# Patient Record
Sex: FEMALE | Race: WHITE | Employment: OTHER | URBAN - METROPOLITAN AREA
[De-identification: names, ages, dates, MRNs, and addresses within clinical notes are randomized per-mention and may not be internally consistent; named-entity substitution may affect disease eponyms.]

---

## 2017-05-04 PROBLEM — R10.12 LEFT UPPER QUADRANT PAIN: Status: ACTIVE | Noted: 2017-05-04

## 2017-05-14 PROBLEM — J18.9 PNEUMONIA OF LEFT LOWER LOBE DUE TO INFECTIOUS ORGANISM: Status: ACTIVE | Noted: 2017-05-14

## 2017-08-28 ENCOUNTER — HOSPITAL ENCOUNTER (OUTPATIENT)
Dept: OTHER | Age: 57
Discharge: HOME | End: 2017-08-28
Attending: SURGERY | Admitting: SURGERY

## 2017-08-28 LAB
ALBUMIN SERPL-MCNC: 3.5 GM/DL (ref 3.4–5)
ALP BLD-CCNC: 68 IU/L (ref 40–128)
ALT SERPL-CCNC: 16 U/L (ref 10–40)
ANION GAP SERPL CALCULATED.3IONS-SCNC: 12 MMOL/L (ref 4–16)
AST SERPL-CCNC: 23 IU/L (ref 15–37)
BASOPHILS ABSOLUTE: 0 K/CU MM
BASOPHILS RELATIVE PERCENT: 0.5 % (ref 0–1)
BILIRUB SERPL-MCNC: 0.2 MG/DL (ref 0–1)
BUN BLDV-MCNC: 24 MG/DL (ref 6–23)
CALCIUM SERPL-MCNC: 8 MG/DL (ref 8.3–10.6)
CHLORIDE BLD-SCNC: 95 MMOL/L (ref 99–110)
CO2: 31 MMOL/L (ref 21–32)
CREAT SERPL-MCNC: 0.7 MG/DL (ref 0.6–1.1)
DIFFERENTIAL TYPE: ABNORMAL
EOSINOPHILS ABSOLUTE: 0 K/CU MM
EOSINOPHILS RELATIVE PERCENT: 0 % (ref 0–3)
GFR AFRICAN AMERICAN: >60 ML/MIN/1.73M2
GFR NON-AFRICAN AMERICAN: >60 ML/MIN/1.73M2
GLUCOSE BLD-MCNC: 73 MG/DL (ref 70–140)
HCT VFR BLD CALC: 24.5 % (ref 37–47)
HEMOGLOBIN: 8 GM/DL (ref 12.5–16)
IMMATURE NEUTROPHIL %: 0.5 % (ref 0–0.43)
LYMPHOCYTES ABSOLUTE: 0.4 K/CU MM
LYMPHOCYTES RELATIVE PERCENT: 20 % (ref 24–44)
MAGNESIUM: 2.3 MG/DL (ref 1.8–2.4)
MCH RBC QN AUTO: 33.2 PG (ref 27–31)
MCHC RBC AUTO-ENTMCNC: 32.7 % (ref 32–36)
MCV RBC AUTO: 101.7 FL (ref 78–100)
MONOCYTES ABSOLUTE: 0.2 K/CU MM
MONOCYTES RELATIVE PERCENT: 8.4 % (ref 0–4)
NUCLEATED RBC %: 0 %
OSMOLALITY: 282 MOS/L (ref 280–300)
PDW BLD-RTO: 14.5 % (ref 11.7–14.9)
PHOSPHORUS: 5 MG/DL (ref 2.5–4.9)
PLATELET # BLD: 94 K/CU MM (ref 140–440)
PMV BLD AUTO: 11.4 FL (ref 7.5–11.1)
POTASSIUM SERPL-SCNC: 3.9 MMOL/L (ref 3.5–5.1)
PREALBUMIN: 18 MG/DL (ref 20–40)
RBC # BLD: 2.41 M/CU MM (ref 4.2–5.4)
SEGMENTED NEUTROPHILS ABSOLUTE COUNT: 1.5 K/CU MM
SEGMENTED NEUTROPHILS RELATIVE PERCENT: 70.6 % (ref 36–66)
SODIUM BLD-SCNC: 138 MMOL/L (ref 135–145)
TOTAL IMMATURE NEUTOROPHIL: 0.01 K/CU MM
TOTAL NUCLEATED RBC: 0 K/CU MM
TOTAL PROTEIN: 5.1 GM/DL (ref 6.4–8.2)
TRANSFERRIN: 244.6 MG/DL (ref 200–360)
TRIGL SERPL-MCNC: 58 MG/DL
WBC # BLD: 2.2 K/CU MM (ref 4–10.5)

## 2017-09-26 ENCOUNTER — HOSPITAL ENCOUNTER (OUTPATIENT)
Dept: OTHER | Age: 57
Discharge: HOME | End: 2017-09-26
Attending: SURGERY | Admitting: SURGERY

## 2017-09-26 LAB
ALBUMIN SERPL-MCNC: 3.8 GM/DL (ref 3.4–5)
ALP BLD-CCNC: 79 IU/L (ref 40–128)
ALT SERPL-CCNC: 10 U/L (ref 10–40)
ANION GAP SERPL CALCULATED.3IONS-SCNC: 11 MMOL/L (ref 4–16)
AST SERPL-CCNC: 13 IU/L (ref 15–37)
BASOPHILS ABSOLUTE: 0 K/CU MM
BASOPHILS RELATIVE PERCENT: 0.3 % (ref 0–1)
BILIRUB SERPL-MCNC: 0.2 MG/DL (ref 0–1)
BUN BLDV-MCNC: 32 MG/DL (ref 6–23)
CALCIUM SERPL-MCNC: 8.1 MG/DL (ref 8.3–10.6)
CHLORIDE BLD-SCNC: 99 MMOL/L (ref 99–110)
CO2: 27 MMOL/L (ref 21–32)
CREAT SERPL-MCNC: 0.8 MG/DL (ref 0.6–1.1)
DIFFERENTIAL TYPE: ABNORMAL
EOSINOPHILS ABSOLUTE: 0 K/CU MM
EOSINOPHILS RELATIVE PERCENT: 0 % (ref 0–3)
GFR AFRICAN AMERICAN: >60 ML/MIN/1.73M2
GFR NON-AFRICAN AMERICAN: >60 ML/MIN/1.73M2
GLUCOSE BLD-MCNC: 89 MG/DL (ref 70–140)
HCT VFR BLD CALC: 26.4 % (ref 37–47)
HEMOGLOBIN: 8.6 GM/DL (ref 12.5–16)
IMMATURE NEUTROPHIL %: 0.3 % (ref 0–0.43)
LYMPHOCYTES ABSOLUTE: 0.5 K/CU MM
LYMPHOCYTES RELATIVE PERCENT: 15 % (ref 24–44)
MAGNESIUM: 2.4 MG/DL (ref 1.8–2.4)
MCH RBC QN AUTO: 32 PG (ref 27–31)
MCHC RBC AUTO-ENTMCNC: 32.6 % (ref 32–36)
MCV RBC AUTO: 98.1 FL (ref 78–100)
MONOCYTES ABSOLUTE: 0.3 K/CU MM
MONOCYTES RELATIVE PERCENT: 7.2 % (ref 0–4)
NUCLEATED RBC %: 0 %
OSMOLALITY: 289 MOS/L (ref 280–300)
PDW BLD-RTO: 14.4 % (ref 11.7–14.9)
PHOSPHORUS: 4.1 MG/DL (ref 2.5–4.9)
PLATELET # BLD: 116 K/CU MM (ref 140–440)
PMV BLD AUTO: 10.9 FL (ref 7.5–11.1)
POTASSIUM SERPL-SCNC: 4.6 MMOL/L (ref 3.5–5.1)
PREALBUMIN: 22 MG/DL (ref 20–40)
RBC # BLD: 2.69 M/CU MM (ref 4.2–5.4)
SEGMENTED NEUTROPHILS ABSOLUTE COUNT: 2.8 K/CU MM
SEGMENTED NEUTROPHILS RELATIVE PERCENT: 77.2 % (ref 36–66)
SODIUM BLD-SCNC: 137 MMOL/L (ref 135–145)
TOTAL IMMATURE NEUTOROPHIL: 0.01 K/CU MM
TOTAL NUCLEATED RBC: 0 K/CU MM
TOTAL PROTEIN: 5.5 GM/DL (ref 6.4–8.2)
TRANSFERRIN: 244.2 MG/DL (ref 200–360)
TRIGL SERPL-MCNC: 50 MG/DL
WBC # BLD: 3.6 K/CU MM (ref 4–10.5)

## 2017-10-04 ENCOUNTER — HOSPITAL ENCOUNTER (OUTPATIENT)
Dept: OTHER | Age: 57
Discharge: HOME | End: 2017-10-04
Attending: SURGERY | Admitting: SURGERY

## 2017-10-04 LAB
ALBUMIN SERPL-MCNC: 3.8 GM/DL (ref 3.4–5)
ALP BLD-CCNC: 85 IU/L (ref 40–128)
ALT SERPL-CCNC: 11 U/L (ref 10–40)
ANION GAP SERPL CALCULATED.3IONS-SCNC: 14 MMOL/L (ref 4–16)
AST SERPL-CCNC: 15 IU/L (ref 15–37)
BASOPHILS ABSOLUTE: 0 K/CU MM
BASOPHILS RELATIVE PERCENT: 0 % (ref 0–1)
BILIRUB SERPL-MCNC: 0.2 MG/DL (ref 0–1)
BUN BLDV-MCNC: 24 MG/DL (ref 6–23)
CALCIUM SERPL-MCNC: 8.8 MG/DL (ref 8.3–10.6)
CHLORIDE BLD-SCNC: 95 MMOL/L (ref 99–110)
CO2: 26 MMOL/L (ref 21–32)
CREAT SERPL-MCNC: 0.9 MG/DL (ref 0.6–1.1)
DIFFERENTIAL TYPE: ABNORMAL
EOSINOPHILS ABSOLUTE: 0 K/CU MM
EOSINOPHILS RELATIVE PERCENT: 0 % (ref 0–3)
GFR AFRICAN AMERICAN: >60 ML/MIN/1.73M2
GFR NON-AFRICAN AMERICAN: >60 ML/MIN/1.73M2
GLUCOSE BLD-MCNC: 122 MG/DL (ref 70–140)
HCT VFR BLD CALC: 27.5 % (ref 37–47)
HEMOGLOBIN: 9.3 GM/DL (ref 12.5–16)
IMMATURE NEUTROPHIL %: 0.4 % (ref 0–0.43)
LYMPHOCYTES ABSOLUTE: 0.4 K/CU MM
LYMPHOCYTES RELATIVE PERCENT: 16.1 % (ref 24–44)
MAGNESIUM: 2.5 MG/DL (ref 1.8–2.4)
MCH RBC QN AUTO: 32.3 PG (ref 27–31)
MCHC RBC AUTO-ENTMCNC: 33.8 % (ref 32–36)
MCV RBC AUTO: 95.5 FL (ref 78–100)
MONOCYTES ABSOLUTE: 0.1 K/CU MM
MONOCYTES RELATIVE PERCENT: 3.7 % (ref 0–4)
NUCLEATED RBC %: 0 %
OSMOLALITY: 289 MOS/L (ref 280–300)
PDW BLD-RTO: 14.6 % (ref 11.7–14.9)
PHOSPHORUS: 3.9 MG/DL (ref 2.5–4.9)
PLATELET # BLD: 120 K/CU MM (ref 140–440)
PMV BLD AUTO: 9.6 FL (ref 7.5–11.1)
POTASSIUM SERPL-SCNC: 4 MMOL/L (ref 3.5–5.1)
RBC # BLD: 2.88 M/CU MM (ref 4.2–5.4)
SEGMENTED NEUTROPHILS ABSOLUTE COUNT: 2.2 K/CU MM
SEGMENTED NEUTROPHILS RELATIVE PERCENT: 79.8 % (ref 36–66)
SODIUM BLD-SCNC: 135 MMOL/L (ref 135–145)
TOTAL IMMATURE NEUTOROPHIL: 0.01 K/CU MM
TOTAL NUCLEATED RBC: 0 K/CU MM
TOTAL PROTEIN: 5.9 GM/DL (ref 6.4–8.2)
TRANSFERRIN: 253.5 MG/DL (ref 200–360)
TRIGL SERPL-MCNC: 88 MG/DL
WBC # BLD: 2.7 K/CU MM (ref 4–10.5)

## 2017-11-09 ENCOUNTER — HOSPITAL ENCOUNTER (OUTPATIENT)
Dept: OTHER | Age: 57
Discharge: HOME | End: 2017-11-09
Attending: SURGERY | Admitting: SURGERY

## 2017-11-09 LAB
ALBUMIN SERPL-MCNC: 4.2 GM/DL (ref 3.4–5)
ALP BLD-CCNC: 87 IU/L (ref 40–129)
ALT SERPL-CCNC: 16 U/L (ref 10–40)
ANION GAP SERPL CALCULATED.3IONS-SCNC: 15 MMOL/L (ref 4–16)
AST SERPL-CCNC: 19 IU/L (ref 15–37)
BASOPHILS ABSOLUTE: 0 K/CU MM
BASOPHILS RELATIVE PERCENT: 0.2 % (ref 0–1)
BILIRUB SERPL-MCNC: 0.4 MG/DL (ref 0–1)
BUN BLDV-MCNC: 17 MG/DL (ref 6–23)
CALCIUM SERPL-MCNC: 8.3 MG/DL (ref 8.3–10.6)
CHLORIDE BLD-SCNC: 98 MMOL/L (ref 99–110)
CO2: 25 MMOL/L (ref 21–32)
CREAT SERPL-MCNC: 0.7 MG/DL (ref 0.6–1.1)
DIFFERENTIAL TYPE: ABNORMAL
EOSINOPHILS ABSOLUTE: 0 K/CU MM
EOSINOPHILS RELATIVE PERCENT: 0 % (ref 0–3)
GFR AFRICAN AMERICAN: >60 ML/MIN/1.73M2
GFR NON-AFRICAN AMERICAN: >60 ML/MIN/1.73M2
GLUCOSE BLD-MCNC: 67 MG/DL (ref 70–140)
HCT VFR BLD CALC: 28.4 % (ref 37–47)
HEMOGLOBIN: 9.1 GM/DL (ref 12.5–16)
IMMATURE NEUTROPHIL %: 0.2 % (ref 0–0.43)
LYMPHOCYTES ABSOLUTE: 0.4 K/CU MM
LYMPHOCYTES RELATIVE PERCENT: 7.3 % (ref 24–44)
MAGNESIUM: 2 MG/DL (ref 1.8–2.4)
MCH RBC QN AUTO: 31.9 PG (ref 27–31)
MCHC RBC AUTO-ENTMCNC: 32 % (ref 32–36)
MCV RBC AUTO: 99.6 FL (ref 78–100)
MONOCYTES ABSOLUTE: 0.3 K/CU MM
MONOCYTES RELATIVE PERCENT: 5.2 % (ref 0–4)
PDW BLD-RTO: 16.8 % (ref 11.7–14.9)
PHOSPHORUS: 4.4 MG/DL (ref 2.5–4.9)
PLATELET # BLD: 109 K/CU MM (ref 140–440)
PMV BLD AUTO: 10.5 FL (ref 7.5–11.1)
POTASSIUM SERPL-SCNC: 4.4 MMOL/L (ref 3.5–5.1)
RBC # BLD: 2.85 M/CU MM (ref 4.2–5.4)
SEGMENTED NEUTROPHILS ABSOLUTE COUNT: 4.6 K/CU MM
SEGMENTED NEUTROPHILS RELATIVE PERCENT: 87.1 % (ref 36–66)
SODIUM BLD-SCNC: 138 MMOL/L (ref 135–145)
TOTAL IMMATURE NEUTOROPHIL: 0.01 K/CU MM
TOTAL PROTEIN: 6.1 GM/DL (ref 6.4–8.2)
WBC # BLD: 5.2 K/CU MM (ref 4–10.5)

## 2017-11-10 LAB
OSMOLALITY: 287 MOS/L (ref 280–300)
TRIGL SERPL-MCNC: 58 MG/DL

## 2017-11-14 ENCOUNTER — HOSPITAL ENCOUNTER (OUTPATIENT)
Dept: OTHER | Age: 57
Discharge: HOME | End: 2017-11-14
Attending: SURGERY | Admitting: SURGERY

## 2017-11-14 LAB
ALBUMIN SERPL-MCNC: 4 GM/DL (ref 3.4–5)
ALP BLD-CCNC: 101 IU/L (ref 40–128)
ALT SERPL-CCNC: 12 U/L (ref 10–40)
ANION GAP SERPL CALCULATED.3IONS-SCNC: 13 MMOL/L (ref 4–16)
AST SERPL-CCNC: 13 IU/L (ref 15–37)
BASOPHILS ABSOLUTE: 0 K/CU MM
BASOPHILS RELATIVE PERCENT: 0.3 % (ref 0–1)
BILIRUB SERPL-MCNC: 0.2 MG/DL (ref 0–1)
BUN BLDV-MCNC: 24 MG/DL (ref 6–23)
CALCIUM SERPL-MCNC: 8.6 MG/DL (ref 8.3–10.6)
CHLORIDE BLD-SCNC: 97 MMOL/L (ref 99–110)
CO2: 29 MMOL/L (ref 21–32)
CREAT SERPL-MCNC: 0.8 MG/DL (ref 0.6–1.1)
DIFFERENTIAL TYPE: ABNORMAL
EOSINOPHILS ABSOLUTE: 0 K/CU MM
EOSINOPHILS RELATIVE PERCENT: 0 % (ref 0–3)
GFR AFRICAN AMERICAN: >60 ML/MIN/1.73M2
GFR NON-AFRICAN AMERICAN: >60 ML/MIN/1.73M2
GLUCOSE BLD-MCNC: 72 MG/DL (ref 70–140)
HCT VFR BLD CALC: 30.8 % (ref 37–47)
HEMOGLOBIN: 9.8 GM/DL (ref 12.5–16)
IMMATURE NEUTROPHIL %: 0.3 % (ref 0–0.43)
LYMPHOCYTES ABSOLUTE: 0.6 K/CU MM
LYMPHOCYTES RELATIVE PERCENT: 17.6 % (ref 24–44)
MAGNESIUM: 2.1 MG/DL (ref 1.8–2.4)
MCH RBC QN AUTO: 31.8 PG (ref 27–31)
MCHC RBC AUTO-ENTMCNC: 31.8 % (ref 32–36)
MCV RBC AUTO: 100 FL (ref 78–100)
MONOCYTES ABSOLUTE: 0.4 K/CU MM
MONOCYTES RELATIVE PERCENT: 11.2 % (ref 0–4)
NUCLEATED RBC %: 0 %
OSMOLALITY: 294 MOS/L (ref 280–300)
PDW BLD-RTO: 16.1 % (ref 11.7–14.9)
PHOSPHORUS: 5.5 MG/DL (ref 2.5–4.9)
PLATELET # BLD: 137 K/CU MM (ref 140–440)
PMV BLD AUTO: 10.5 FL (ref 7.5–11.1)
POTASSIUM SERPL-SCNC: 4.5 MMOL/L (ref 3.5–5.1)
PREALBUMIN: 19 MG/DL (ref 20–40)
PREALBUMIN: 27 MG/DL (ref 20–40)
RBC # BLD: 3.08 M/CU MM (ref 4.2–5.4)
SEGMENTED NEUTROPHILS ABSOLUTE COUNT: 2.4 K/CU MM
SEGMENTED NEUTROPHILS RELATIVE PERCENT: 70.6 % (ref 36–66)
SODIUM BLD-SCNC: 139 MMOL/L (ref 135–145)
TOTAL IMMATURE NEUTOROPHIL: 0.01 K/CU MM
TOTAL NUCLEATED RBC: 0 K/CU MM
TOTAL PROTEIN: 5.9 GM/DL (ref 6.4–8.2)
TRANSFERRIN: 243.6 MG/DL (ref 200–360)
TRANSFERRIN: 253.4 MG/DL (ref 200–360)
TRIGL SERPL-MCNC: 118 MG/DL
WBC # BLD: 3.4 K/CU MM (ref 4–10.5)

## 2018-04-25 ENCOUNTER — TELEPHONE (OUTPATIENT)
Dept: GASTROENTEROLOGY | Age: 58
End: 2018-04-25

## 2018-04-25 PROBLEM — G93.41 ACUTE METABOLIC ENCEPHALOPATHY: Status: ACTIVE | Noted: 2018-04-25

## 2018-12-08 ENCOUNTER — APPOINTMENT (OUTPATIENT)
Dept: GENERAL RADIOLOGY | Age: 58
End: 2018-12-08
Payer: COMMERCIAL

## 2018-12-08 ENCOUNTER — HOSPITAL ENCOUNTER (EMERGENCY)
Age: 58
Discharge: ANOTHER HOSPITAL | End: 2018-12-09
Attending: EMERGENCY MEDICINE
Payer: COMMERCIAL

## 2018-12-08 DIAGNOSIS — Z45.02 AICD DISCHARGE: Primary | ICD-10-CM

## 2018-12-08 DIAGNOSIS — R42 LIGHTHEADEDNESS: ICD-10-CM

## 2018-12-08 DIAGNOSIS — R07.9 CHEST PAIN, UNSPECIFIED TYPE: ICD-10-CM

## 2018-12-08 LAB
BASOPHILS ABSOLUTE: 0 K/CU MM
BASOPHILS RELATIVE PERCENT: 0.7 % (ref 0–1)
DIFFERENTIAL TYPE: ABNORMAL
EOSINOPHILS ABSOLUTE: 0 K/CU MM
EOSINOPHILS RELATIVE PERCENT: 0.4 % (ref 0–3)
HCT VFR BLD CALC: 38.9 % (ref 37–47)
HEMOGLOBIN: 12.2 GM/DL (ref 12.5–16)
IMMATURE NEUTROPHIL %: 0.2 % (ref 0–0.43)
LYMPHOCYTES ABSOLUTE: 1 K/CU MM
LYMPHOCYTES RELATIVE PERCENT: 22.4 % (ref 24–44)
MCH RBC QN AUTO: 33 PG (ref 27–31)
MCHC RBC AUTO-ENTMCNC: 31.4 % (ref 32–36)
MCV RBC AUTO: 105.1 FL (ref 78–100)
MONOCYTES ABSOLUTE: 0.3 K/CU MM
MONOCYTES RELATIVE PERCENT: 6.9 % (ref 0–4)
NUCLEATED RBC %: 0 %
PDW BLD-RTO: 15.2 % (ref 11.7–14.9)
PLATELET # BLD: 487 K/CU MM (ref 140–440)
PMV BLD AUTO: 9.5 FL (ref 7.5–11.1)
RBC # BLD: 3.7 M/CU MM (ref 4.2–5.4)
REASON FOR REJECTION: NORMAL
REJECTED TEST: NORMAL
SEGMENTED NEUTROPHILS ABSOLUTE COUNT: 3.1 K/CU MM
SEGMENTED NEUTROPHILS RELATIVE PERCENT: 69.4 % (ref 36–66)
SOURCE: NORMAL
TOTAL IMMATURE NEUTOROPHIL: 0.01 K/CU MM
TOTAL NUCLEATED RBC: 0 K/CU MM
WBC # BLD: 4.5 K/CU MM (ref 4–10.5)

## 2018-12-08 PROCEDURE — 85025 COMPLETE CBC W/AUTO DIFF WBC: CPT

## 2018-12-08 PROCEDURE — 71045 X-RAY EXAM CHEST 1 VIEW: CPT

## 2018-12-08 PROCEDURE — 84443 ASSAY THYROID STIM HORMONE: CPT

## 2018-12-08 PROCEDURE — 83735 ASSAY OF MAGNESIUM: CPT

## 2018-12-08 PROCEDURE — 80053 COMPREHEN METABOLIC PANEL: CPT

## 2018-12-08 PROCEDURE — 84484 ASSAY OF TROPONIN QUANT: CPT

## 2018-12-08 PROCEDURE — 93005 ELECTROCARDIOGRAM TRACING: CPT | Performed by: EMERGENCY MEDICINE

## 2018-12-08 PROCEDURE — 84100 ASSAY OF PHOSPHORUS: CPT

## 2018-12-08 PROCEDURE — 99285 EMERGENCY DEPT VISIT HI MDM: CPT

## 2018-12-08 ASSESSMENT — PAIN DESCRIPTION - ONSET: ONSET: ON-GOING

## 2018-12-08 ASSESSMENT — PAIN DESCRIPTION - LOCATION: LOCATION: BACK;CHEST

## 2018-12-08 ASSESSMENT — PAIN DESCRIPTION - PAIN TYPE: TYPE: ACUTE PAIN

## 2018-12-08 ASSESSMENT — PAIN DESCRIPTION - ORIENTATION: ORIENTATION: LEFT

## 2018-12-08 ASSESSMENT — PAIN DESCRIPTION - FREQUENCY: FREQUENCY: CONTINUOUS

## 2018-12-08 ASSESSMENT — PAIN DESCRIPTION - PROGRESSION: CLINICAL_PROGRESSION: NOT CHANGED

## 2018-12-08 ASSESSMENT — PAIN DESCRIPTION - DESCRIPTORS: DESCRIPTORS: ACHING;DISCOMFORT;STABBING

## 2018-12-08 ASSESSMENT — PAIN SCALES - GENERAL: PAINLEVEL_OUTOF10: 8

## 2018-12-09 VITALS
SYSTOLIC BLOOD PRESSURE: 123 MMHG | DIASTOLIC BLOOD PRESSURE: 92 MMHG | OXYGEN SATURATION: 100 % | RESPIRATION RATE: 14 BRPM | HEART RATE: 79 BPM | WEIGHT: 123 LBS | TEMPERATURE: 98.5 F | BODY MASS INDEX: 19.77 KG/M2 | HEIGHT: 66 IN

## 2018-12-09 LAB
ALBUMIN SERPL-MCNC: 4.2 GM/DL (ref 3.4–5)
ALP BLD-CCNC: 92 IU/L (ref 40–129)
ALT SERPL-CCNC: 15 U/L (ref 10–40)
ANION GAP SERPL CALCULATED.3IONS-SCNC: 14 MMOL/L (ref 4–16)
AST SERPL-CCNC: 18 IU/L (ref 15–37)
BILIRUB SERPL-MCNC: 0.2 MG/DL (ref 0–1)
BUN BLDV-MCNC: 13 MG/DL (ref 6–23)
CALCIUM SERPL-MCNC: 9.4 MG/DL (ref 8.3–10.6)
CHLORIDE BLD-SCNC: 95 MMOL/L (ref 99–110)
CO2: 27 MMOL/L (ref 21–32)
CREAT SERPL-MCNC: 0.7 MG/DL (ref 0.6–1.1)
GFR AFRICAN AMERICAN: >60 ML/MIN/1.73M2
GFR NON-AFRICAN AMERICAN: >60 ML/MIN/1.73M2
GLUCOSE BLD-MCNC: 85 MG/DL (ref 70–99)
MAGNESIUM: 1.8 MG/DL (ref 1.8–2.4)
PHOSPHORUS: 4 MG/DL (ref 2.5–4.9)
POTASSIUM SERPL-SCNC: 3.2 MMOL/L (ref 3.5–5.1)
SODIUM BLD-SCNC: 136 MMOL/L (ref 135–145)
TOTAL PROTEIN: 7.6 GM/DL (ref 6.4–8.2)
TROPONIN T: 0.01 NG/ML
TSH HIGH SENSITIVITY: 2.49 UIU/ML (ref 0.27–4.2)

## 2018-12-09 PROCEDURE — 93010 ELECTROCARDIOGRAM REPORT: CPT | Performed by: INTERNAL MEDICINE

## 2018-12-09 PROCEDURE — 6370000000 HC RX 637 (ALT 250 FOR IP): Performed by: PHYSICIAN ASSISTANT

## 2018-12-09 PROCEDURE — 6370000000 HC RX 637 (ALT 250 FOR IP): Performed by: EMERGENCY MEDICINE

## 2018-12-09 RX ORDER — OXYCODONE HYDROCHLORIDE 5 MG/1
15 TABLET ORAL ONCE
Status: COMPLETED | OUTPATIENT
Start: 2018-12-09 | End: 2018-12-09

## 2018-12-09 RX ORDER — OXYCODONE HYDROCHLORIDE 5 MG/1
TABLET ORAL
Status: DISCONTINUED
Start: 2018-12-09 | End: 2018-12-09 | Stop reason: HOSPADM

## 2018-12-09 RX ORDER — OXYCODONE HYDROCHLORIDE 5 MG/1
15 TABLET ORAL ONCE
Status: DISCONTINUED | OUTPATIENT
Start: 2018-12-09 | End: 2019-02-04

## 2018-12-09 RX ORDER — POTASSIUM CHLORIDE 20 MEQ/1
40 TABLET, EXTENDED RELEASE ORAL ONCE
Status: COMPLETED | OUTPATIENT
Start: 2018-12-09 | End: 2018-12-09

## 2018-12-09 RX ADMIN — OXYCODONE HYDROCHLORIDE 15 MG: 5 TABLET ORAL at 00:36

## 2018-12-09 RX ADMIN — POTASSIUM CHLORIDE 40 MEQ: 20 TABLET, EXTENDED RELEASE ORAL at 00:38

## 2018-12-09 ASSESSMENT — PAIN SCALES - GENERAL: PAINLEVEL_OUTOF10: 8

## 2018-12-09 NOTE — ED PROVIDER NOTES
normal sinus rhythm with occasional PVCs. There is no ST elevation or depression. Labs are obtained and are significant for mild hypokalemia, mildly elevated troponin, and a mildly decreased hemoglobin no other clinically significant lab abnormalities. Chest x-rays negative. Patient was treated with oxycodone and 40 mEq of oral potassium chloride with improvement of pain. We attempted to interrogate her AICD, but her device is not compatible with her software. The nurses attempting to reach the representative for [employer] in order to have the device interrogated. At this time, care will be turned over to the [facility] as it is the end of my shift. He is to follow-up interrogation results and disposition the patient accordingly. Dr. Sushant Shepard is available in the emergency Department should she be needed. All diagnostic, treatment, and disposition decisions were made by myself in conjunction with the advanced practice provider. For all further details of the patient's emergency department visit, please see the advanced practice provider's documentation. Comment: Please note this report has been produced using speech recognition software and may contain errors related to that system including errors in grammar, punctuation, and spelling, as well as words and phrases that may be inappropriate. If there are any questions or concerns please feel free to contact the dictating provider for clarification.         Juaquin Dewitt MD  12/13/18 [id number]

## 2018-12-09 NOTE — ED PROVIDER NOTES
([facility] [address]     Spleen enlarged     Thyroid disease      Past Surgical History:   Procedure Laterality Date    ABDOMEN SURGERY      APPENDECTOMY      CARDIAC DEFIBRILLATOR PLACEMENT      CHOLECYSTECTOMY      COLONOSCOPY      CORONARY ANGIOPLASTY WITH STENT PLACEMENT      3 stents    ENDOSCOPY, COLON, DIAGNOSTIC      GASTRIC BYPASS SURGERY      HERNIA REPAIR      HIP SURGERY      HYSTERECTOMY      JOINT REPLACEMENT      SKIN BIOPSY      TONSILLECTOMY      VASCULAR SURGERY       Family History   Problem Relation Age of Onset    High Blood Pressure Mother     High Cholesterol Mother     Heart Disease Mother     Diabetes Mother     Heart Disease Father     Cancer Father     Other Sister         Multiple Sclerosis    Heart Disease Maternal Grandmother     High Blood Pressure Maternal Grandmother     High Cholesterol Maternal Grandmother      Social History     Social History    Marital status: Single     Spouse name: N/A    Number of children: N/A    Years of education: N/A     Occupational History    Not on file.      Social History Main Topics    Smoking status: Never Smoker    Smokeless tobacco: Never Used    Alcohol use No    Drug use: No    Sexual activity: Not Currently     Other Topics Concern    Not on file     Social History Narrative    No narrative on file     Current Facility-Administered Medications   Medication Dose Route Frequency Provider Last Rate Last Dose    oxyCODONE (ROXICODONE) immediate release tablet 15 mg  15 mg Oral Once Dennis Sanford MD         Current Outpatient Prescriptions   Medication Sig Dispense Refill    guaiFENesin (ROBITUSSIN) 100 MG/5ML syrup Take 30 mLs by mouth 4 times daily as needed for Cough      albuterol (PROVENTIL) (2.5 MG/3ML) 0.083% nebulizer solution Take 2.5 mg by nebulization every 4 hours as needed for Wheezing      sotalol (BETAPACE) 80 MG tablet Take 80 mg by mouth 2 times daily      insulin regular (HUMULIN R;NOVOLIN R) 100 Absolute 3.1 K/CU MM    Lymphocytes # 1.0 K/CU MM    Monocytes # 0.3 K/CU MM    Eosinophils # 0.0 K/CU MM    Basophils # 0.0 K/CU MM    Nucleated RBC % 0.0 %    Total Nucleated RBC 0.0 K/CU MM    Total Immature Neutrophil 0.01 K/CU MM    Immature Neutrophil % 0.2 0 - 0.43 %   SPECIMEN REJECTION   Result Value Ref Range    Source BLOOD     Rejected Test CMPR,MG,PHOS,TSHS     Reason for Rejection UNABLE TO PERFORM TESTING:    Comprehensive Metabolic Panel   Result Value Ref Range    Sodium 136 135 - 145 MMOL/L    Potassium 3.2 (L) 3.5 - 5.1 MMOL/L    Chloride 95 (L) 99 - 110 mMol/L    CO2 27 21 - 32 MMOL/L    BUN 13 6 - 23 MG/DL    CREATININE 0.7 0.6 - 1.1 MG/DL    Glucose 85 70 - 99 MG/DL    Calcium 9.4 8.3 - 10.6 MG/DL    Alb 4.2 3.4 - 5.0 GM/DL    Total Protein 7.6 6.4 - 8.2 GM/DL    Total Bilirubin 0.2 0.0 - 1.0 MG/DL    ALT 15 10 - 40 U/L    AST 18 15 - 37 IU/L    Alkaline Phosphatase 92 40 - 129 IU/L    GFR Non-African American >60 >60 mL/min/1.73m2    GFR African American >60 >60 mL/min/1.73m2    Anion Gap 14 4 - 16   Magnesium   Result Value Ref Range    Magnesium 1.8 1.8 - 2.4 mg/dl   Phosphorus   Result Value Ref Range    Phosphorus 4.0 2.5 - 4.9 MG/DL   TSH without Reflex   Result Value Ref Range    TSH, High Sensitivity 2.490 0.270 - 4.20 uIu/ml   Troponin   Result Value Ref Range    Troponin T 0.012 (H) <0.01 NG/ML   EKG 12 Lead   Result Value Ref Range    Ventricular Rate 67 BPM    Atrial Rate 67 BPM    P-R Interval 156 ms    QRS Duration 98 ms    Q-T Interval 438 ms    QTc Calculation (Bazett) 462 ms    P Axis 17 degrees    R Axis -43 degrees    T Axis -17 degrees    Diagnosis       Sinus rhythm with occasional premature ventricular complexes  Possible Left atrial enlargement  Left axis deviation  Inferior infarct (cited on or before 12-FEB-2017)  Abnormal ECG  When compared with ECG of 25-APR-2018 17:37,  premature ventricular complexes are now present  Minimal criteria for Anterior infarct are no

## 2018-12-12 LAB
EKG ATRIAL RATE: 67 BPM
EKG DIAGNOSIS: NORMAL
EKG P AXIS: 17 DEGREES
EKG P-R INTERVAL: 156 MS
EKG Q-T INTERVAL: 438 MS
EKG QRS DURATION: 98 MS
EKG QTC CALCULATION (BAZETT): 462 MS
EKG R AXIS: -43 DEGREES
EKG T AXIS: -17 DEGREES
EKG VENTRICULAR RATE: 67 BPM

## 2018-12-19 ENCOUNTER — HOSPITAL ENCOUNTER (EMERGENCY)
Age: 58
Discharge: ANOTHER HOSPITAL | End: 2018-12-20
Attending: EMERGENCY MEDICINE
Payer: COMMERCIAL

## 2018-12-19 ENCOUNTER — APPOINTMENT (OUTPATIENT)
Dept: GENERAL RADIOLOGY | Age: 58
End: 2018-12-19
Payer: COMMERCIAL

## 2018-12-19 DIAGNOSIS — Z45.02 DEFIBRILLATOR DISCHARGE: ICD-10-CM

## 2018-12-19 DIAGNOSIS — I47.20 V-TACH: Primary | ICD-10-CM

## 2018-12-19 LAB
ALBUMIN SERPL-MCNC: 3.4 GM/DL (ref 3.4–5)
ALP BLD-CCNC: 77 IU/L (ref 40–129)
ALT SERPL-CCNC: 13 U/L (ref 10–40)
ANION GAP SERPL CALCULATED.3IONS-SCNC: 17 MMOL/L (ref 4–16)
AST SERPL-CCNC: 14 IU/L (ref 15–37)
BASOPHILS ABSOLUTE: 0.1 K/CU MM
BASOPHILS RELATIVE PERCENT: 1 % (ref 0–1)
BILIRUB SERPL-MCNC: 0.1 MG/DL (ref 0–1)
BUN BLDV-MCNC: 9 MG/DL (ref 6–23)
CALCIUM SERPL-MCNC: 8.4 MG/DL (ref 8.3–10.6)
CHLORIDE BLD-SCNC: 98 MMOL/L (ref 99–110)
CO2: 24 MMOL/L (ref 21–32)
CREAT SERPL-MCNC: 0.5 MG/DL (ref 0.6–1.1)
DIFFERENTIAL TYPE: ABNORMAL
EOSINOPHILS ABSOLUTE: 0 K/CU MM
EOSINOPHILS RELATIVE PERCENT: 0.4 % (ref 0–3)
GFR AFRICAN AMERICAN: >60 ML/MIN/1.73M2
GFR NON-AFRICAN AMERICAN: >60 ML/MIN/1.73M2
GLUCOSE BLD-MCNC: 165 MG/DL (ref 70–99)
HCT VFR BLD CALC: 36.4 % (ref 37–47)
HEMOGLOBIN: 11 GM/DL (ref 12.5–16)
IMMATURE NEUTROPHIL %: 0.4 % (ref 0–0.43)
INR BLD: 1.07 INDEX
LYMPHOCYTES ABSOLUTE: 1.3 K/CU MM
LYMPHOCYTES RELATIVE PERCENT: 25.6 % (ref 24–44)
MAGNESIUM: 1.8 MG/DL (ref 1.8–2.4)
MCH RBC QN AUTO: 32.8 PG (ref 27–31)
MCHC RBC AUTO-ENTMCNC: 30.2 % (ref 32–36)
MCV RBC AUTO: 108.7 FL (ref 78–100)
MONOCYTES ABSOLUTE: 0.6 K/CU MM
MONOCYTES RELATIVE PERCENT: 10.6 % (ref 0–4)
NUCLEATED RBC %: 0 %
PDW BLD-RTO: 14.9 % (ref 11.7–14.9)
PLATELET # BLD: 463 K/CU MM (ref 140–440)
PMV BLD AUTO: 8.9 FL (ref 7.5–11.1)
POTASSIUM SERPL-SCNC: 3.1 MMOL/L (ref 3.5–5.1)
PRO-BNP: 748.1 PG/ML
PROTHROMBIN TIME: 12.2 SECONDS (ref 9.12–12.5)
RBC # BLD: 3.35 M/CU MM (ref 4.2–5.4)
SEGMENTED NEUTROPHILS ABSOLUTE COUNT: 3.2 K/CU MM
SEGMENTED NEUTROPHILS RELATIVE PERCENT: 62 % (ref 36–66)
SODIUM BLD-SCNC: 139 MMOL/L (ref 135–145)
TOTAL IMMATURE NEUTOROPHIL: 0.02 K/CU MM
TOTAL NUCLEATED RBC: 0 K/CU MM
TOTAL PROTEIN: 6.1 GM/DL (ref 6.4–8.2)
TROPONIN T: <0.01 NG/ML
TSH HIGH SENSITIVITY: 8.65 UIU/ML (ref 0.27–4.2)
WBC # BLD: 5.2 K/CU MM (ref 4–10.5)

## 2018-12-19 PROCEDURE — 6370000000 HC RX 637 (ALT 250 FOR IP): Performed by: EMERGENCY MEDICINE

## 2018-12-19 PROCEDURE — 93005 ELECTROCARDIOGRAM TRACING: CPT | Performed by: EMERGENCY MEDICINE

## 2018-12-19 PROCEDURE — 84443 ASSAY THYROID STIM HORMONE: CPT

## 2018-12-19 PROCEDURE — 85610 PROTHROMBIN TIME: CPT

## 2018-12-19 PROCEDURE — 71045 X-RAY EXAM CHEST 1 VIEW: CPT

## 2018-12-19 PROCEDURE — 85025 COMPLETE CBC W/AUTO DIFF WBC: CPT

## 2018-12-19 PROCEDURE — 36415 COLL VENOUS BLD VENIPUNCTURE: CPT

## 2018-12-19 PROCEDURE — 83880 ASSAY OF NATRIURETIC PEPTIDE: CPT

## 2018-12-19 PROCEDURE — 80053 COMPREHEN METABOLIC PANEL: CPT

## 2018-12-19 PROCEDURE — 83735 ASSAY OF MAGNESIUM: CPT

## 2018-12-19 PROCEDURE — 99285 EMERGENCY DEPT VISIT HI MDM: CPT

## 2018-12-19 PROCEDURE — 96374 THER/PROPH/DIAG INJ IV PUSH: CPT

## 2018-12-19 PROCEDURE — 84439 ASSAY OF FREE THYROXINE: CPT

## 2018-12-19 PROCEDURE — 84484 ASSAY OF TROPONIN QUANT: CPT

## 2018-12-19 RX ORDER — OXYCODONE HYDROCHLORIDE 5 MG/1
5 TABLET ORAL ONCE
Status: COMPLETED | OUTPATIENT
Start: 2018-12-19 | End: 2018-12-19

## 2018-12-19 RX ORDER — OXYCODONE HYDROCHLORIDE 5 MG/1
10 TABLET ORAL ONCE
Status: COMPLETED | OUTPATIENT
Start: 2018-12-20 | End: 2018-12-19

## 2018-12-19 RX ADMIN — OXYCODONE HYDROCHLORIDE 5 MG: 5 TABLET ORAL at 23:00

## 2018-12-19 RX ADMIN — OXYCODONE HYDROCHLORIDE 10 MG: 5 TABLET ORAL at 23:52

## 2018-12-19 ASSESSMENT — ENCOUNTER SYMPTOMS
SORE THROAT: 0
COUGH: 0
RHINORRHEA: 0
EYE REDNESS: 0
NAUSEA: 0
VOMITING: 0
BACK PAIN: 0
SHORTNESS OF BREATH: 0
ABDOMINAL PAIN: 0

## 2018-12-19 ASSESSMENT — PAIN DESCRIPTION - LOCATION: LOCATION: CHEST

## 2018-12-19 ASSESSMENT — PAIN DESCRIPTION - PAIN TYPE: TYPE: ACUTE PAIN

## 2018-12-19 ASSESSMENT — PAIN SCALES - GENERAL
PAINLEVEL_OUTOF10: 10
PAINLEVEL_OUTOF10: 9

## 2018-12-20 VITALS
DIASTOLIC BLOOD PRESSURE: 70 MMHG | HEIGHT: 66 IN | BODY MASS INDEX: 18.48 KG/M2 | RESPIRATION RATE: 16 BRPM | OXYGEN SATURATION: 100 % | SYSTOLIC BLOOD PRESSURE: 96 MMHG | HEART RATE: 78 BPM | WEIGHT: 115 LBS | TEMPERATURE: 97.7 F

## 2018-12-20 LAB — T4 FREE: 0.76 NG/DL (ref 0.9–1.8)

## 2018-12-20 PROCEDURE — 6370000000 HC RX 637 (ALT 250 FOR IP): Performed by: EMERGENCY MEDICINE

## 2018-12-20 PROCEDURE — 6360000002 HC RX W HCPCS: Performed by: EMERGENCY MEDICINE

## 2018-12-20 RX ORDER — MAGNESIUM SULFATE IN WATER 40 MG/ML
2 INJECTION, SOLUTION INTRAVENOUS ONCE
Status: COMPLETED | OUTPATIENT
Start: 2018-12-20 | End: 2018-12-20

## 2018-12-20 RX ORDER — POTASSIUM CHLORIDE 20 MEQ/1
40 TABLET, EXTENDED RELEASE ORAL PRN
Status: DISCONTINUED | OUTPATIENT
Start: 2018-12-20 | End: 2018-12-20 | Stop reason: HOSPADM

## 2018-12-20 RX ORDER — POTASSIUM CHLORIDE 7.45 MG/ML
10 INJECTION INTRAVENOUS PRN
Status: DISCONTINUED | OUTPATIENT
Start: 2018-12-20 | End: 2018-12-20 | Stop reason: HOSPADM

## 2018-12-20 RX ORDER — POTASSIUM CHLORIDE 20MEQ/15ML
40 LIQUID (ML) ORAL PRN
Status: DISCONTINUED | OUTPATIENT
Start: 2018-12-20 | End: 2018-12-20 | Stop reason: HOSPADM

## 2018-12-20 RX ADMIN — MAGNESIUM SULFATE HEPTAHYDRATE 2 G: 40 INJECTION, SOLUTION INTRAVENOUS at 01:51

## 2018-12-20 RX ADMIN — POTASSIUM CHLORIDE 40 MEQ: 20 TABLET, EXTENDED RELEASE ORAL at 01:49

## 2018-12-20 ASSESSMENT — PAIN SCALES - GENERAL: PAINLEVEL_OUTOF10: 8

## 2018-12-20 ASSESSMENT — PAIN DESCRIPTION - PAIN TYPE: TYPE: ACUTE PAIN

## 2018-12-20 ASSESSMENT — PAIN DESCRIPTION - LOCATION: LOCATION: CHEST

## 2018-12-20 NOTE — ED NOTES
Pt states she recently began taking a med for her heart that she is unsure of name.       Mark Estes RN  12/19/18 1480

## 2018-12-20 NOTE — ED PROVIDER NOTES
Triage Chief Complaint:   Loss of Consciousness (Defib Fired) and Chest Pain    [place]:  Cara Francisco is a 63 y.o. female that presents after her defibrillator firing she was at dinner. She states she felt lightheaded prior to the firing and then she lost consciousness after it fired for a brief second. She now has pain at the location of the defibrillator. She denies any shortness of breath. No current lightheadedness or dizziness. No lower extremity swelling. No fevers. Her defibrillator fired last week and she had an episode of syncope. She was sent to have an episode of V. tach which caused firing. She was started on Mexiletine at [facility] by her cardiologist.     ROS:   Review of Systems   Constitutional: Negative for chills and fever. HENT: Negative for congestion, rhinorrhea and sore throat. Eyes: Negative for redness and visual disturbance. Respiratory: Negative for cough and shortness of breath. Cardiovascular: Positive for chest pain. Negative for leg swelling. Gastrointestinal: Negative for abdominal pain, nausea and vomiting. Genitourinary: Negative for dysuria and frequency. Musculoskeletal: Negative for arthralgias and back pain. Skin: Negative for rash and wound. Neurological: Positive for syncope and light-headedness. Negative for weakness and headaches. Psychiatric/Behavioral: Negative. Negative for hallucinations and suicidal ideas. Past Medical History:   Diagnosis Date    Acute delirium     Arrhythmia     Arthritis     Asthma     CAD (coronary artery disease)     Cardiomyopathy ([address]     Cerebral artery occlusion with cerebral infarction ([address]     CHF (congestive heart failure) (HCC)     Enlarged liver     GERD (gastroesophageal reflux disease)     H/O echocardiogram 4/6/16    EF 55%, trivial TR & MR, stage 1 diastolic dysfunction    History of blood transfusion     Hx of cardiovascular stress test 12/29/2015    Alessia: EF 45%.  Pharmacologic stress myocardial perfusion scan shows a fixed inferior-lateral defect w/ no inducible ischemia. No evidence of ischemia. Inferior-lateral infarction. Normal LVSF    Hyperlipidemia     Hypertension     Lymph[facility] ([facility] [address]     MI, old     Movement disorder     MS (multiple sclerosis) ([facility] [address]     OP (osteoporosis)     PE (pulmonary embolism)     trapese filter    Pneumonia     Seizures ([facility] [address]     Spleen enlarged     Thyroid disease      Past Surgical History:   Procedure Laterality Date    ABDOMEN SURGERY      APPENDECTOMY      CARDIAC DEFIBRILLATOR PLACEMENT      CHOLECYSTECTOMY      COLONOSCOPY      CORONARY ANGIOPLASTY WITH STENT PLACEMENT      3 stents    ENDOSCOPY, COLON, DIAGNOSTIC      GASTRIC BYPASS SURGERY      HERNIA REPAIR      HIP SURGERY      HYSTERECTOMY      JOINT REPLACEMENT      SKIN BIOPSY      TONSILLECTOMY      VASCULAR SURGERY       Family History   Problem Relation Age of Onset    High Blood Pressure Mother     High Cholesterol Mother     Heart Disease Mother     Diabetes Mother     Heart Disease Father     Cancer Father     Other Sister         Multiple Sclerosis    Heart Disease Maternal Grandmother     High Blood Pressure Maternal Grandmother     High Cholesterol Maternal Grandmother      Social History     Social History    Marital status: Single     Spouse name: N/A    Number of children: N/A    Years of education: N/A     Occupational History    Not on file.      Social History Main Topics    Smoking status: Never Smoker    Smokeless tobacco: Never Used    Alcohol use No    Drug use: No    Sexual activity: Not Currently     Other Topics Concern    Not on file     Social History Narrative    No narrative on file     Current Facility-Administered Medications   Medication Dose Route Frequency Provider Last Rate Last Dose    potassium chloride (KLOR-CON M) extended release tablet 40 mEq  40 mEq Oral PRN Nithin Nava MD        Or    potassium chloride 20 Duration 114 ms    Q-T Interval 562 ms    QTc Calculation (Bazett) 635 ms    P Axis 19 degrees    R Axis -38 degrees    T Axis 6 degrees    Diagnosis       Sinus rhythm with occasional premature ventricular complexes  Left axis deviation  Inferior infarct (cited on or before 12-FEB-2017)  Anterolateral infarct , age undetermined  Abnormal ECG  When compared with ECG of 08-DEC-2018 23:09,  Anterior infarct is now present  Anterolateral infarct is now present  Nonspecific T wave abnormality now evident in Anterior leads  QT has lengthened        Radiographs (if obtained):  [] The following radiograph was interpreted by myself in the absence of a radiologist:  [x]Radiologist's Report Reviewed:  XR CHEST PORTABLE   Preliminary Result   No significant interval change since December 8, 2018. No radiographic   evidence of acute cardiopulmonary process. EKG (if obtained): (All EKG's are interpreted by myself in the absence of a cardiologist)  Sinus rhythm with occasional PVCs. Rate of 77. SC interval 156, , QTc 635. No ST elevations or depressions. Nonspecific T waves. Left axis deviation. Q waves in the inferior and anterior lateral leads. Impression: Abnormal EKG. Compared to previous EKG from 12/8/18, there are no significant changes. MDM:  Differential diagnoses considered include V tach, ventricular fibrillation, atrial arrhythmia,  electrolyte abnormality. EKG is not concerning for acute ischemia. Basic labs were obtained and shows a decreased potassium and magnesium level but are otherwise unremarkable. Her troponin level is normal.  Her TSH level is slightly elevated and free T4 level is slightly decreased. The TSH level is improved from previous. [employer] came in and interrogated the patient's defibrillator. It showed that it shocked her out of an episode of V. tach. Left for 45 seconds at the time of the shock.   Given this I spoke with cardiology at [facility] at

## 2018-12-21 LAB
EKG ATRIAL RATE: 77 BPM
EKG DIAGNOSIS: NORMAL
EKG P AXIS: 19 DEGREES
EKG P-R INTERVAL: 156 MS
EKG Q-T INTERVAL: 562 MS
EKG QRS DURATION: 114 MS
EKG QTC CALCULATION (BAZETT): 635 MS
EKG R AXIS: -38 DEGREES
EKG T AXIS: 6 DEGREES
EKG VENTRICULAR RATE: 77 BPM

## 2018-12-21 PROCEDURE — 93010 ELECTROCARDIOGRAM REPORT: CPT | Performed by: INTERNAL MEDICINE

## 2019-01-25 ENCOUNTER — APPOINTMENT (OUTPATIENT)
Dept: CT IMAGING | Age: 59
DRG: 871 | End: 2019-01-25
Payer: COMMERCIAL

## 2019-01-25 ENCOUNTER — APPOINTMENT (OUTPATIENT)
Dept: GENERAL RADIOLOGY | Age: 59
DRG: 871 | End: 2019-01-25
Payer: COMMERCIAL

## 2019-01-25 ENCOUNTER — HOSPITAL ENCOUNTER (INPATIENT)
Age: 59
LOS: 7 days | Discharge: HOME HEALTH | DRG: 871 | End: 2019-02-02
Attending: EMERGENCY MEDICINE
Payer: COMMERCIAL

## 2019-01-25 DIAGNOSIS — R10.9 FLANK PAIN: ICD-10-CM

## 2019-01-25 DIAGNOSIS — M53.3 SACRAL PAIN: ICD-10-CM

## 2019-01-25 DIAGNOSIS — R06.00 DYSPNEA, UNSPECIFIED TYPE: Primary | ICD-10-CM

## 2019-01-25 DIAGNOSIS — J18.9 PNEUMONIA DUE TO ORGANISM: ICD-10-CM

## 2019-01-25 DIAGNOSIS — L03.90 CELLULITIS, UNSPECIFIED CELLULITIS SITE: ICD-10-CM

## 2019-01-25 LAB
ALBUMIN SERPL-MCNC: 3.1 GM/DL (ref 3.4–5)
ALP BLD-CCNC: 90 IU/L (ref 40–129)
ALT SERPL-CCNC: 8 U/L (ref 10–40)
ANION GAP SERPL CALCULATED.3IONS-SCNC: 16 MMOL/L (ref 4–16)
AST SERPL-CCNC: 11 IU/L (ref 15–37)
BASOPHILS ABSOLUTE: 0 K/CU MM
BASOPHILS RELATIVE PERCENT: 0.8 % (ref 0–1)
BILIRUB SERPL-MCNC: 0.2 MG/DL (ref 0–1)
BUN BLDV-MCNC: 13 MG/DL (ref 6–23)
CALCIUM SERPL-MCNC: 8.6 MG/DL (ref 8.3–10.6)
CHLORIDE BLD-SCNC: 103 MMOL/L (ref 99–110)
CO2: 19 MMOL/L (ref 21–32)
CREAT SERPL-MCNC: 0.5 MG/DL (ref 0.6–1.1)
DIFFERENTIAL TYPE: ABNORMAL
EOSINOPHILS ABSOLUTE: 0 K/CU MM
EOSINOPHILS RELATIVE PERCENT: 0.2 % (ref 0–3)
GFR AFRICAN AMERICAN: >60 ML/MIN/1.73M2
GFR NON-AFRICAN AMERICAN: >60 ML/MIN/1.73M2
GLUCOSE BLD-MCNC: 81 MG/DL (ref 70–99)
GONADOTROPIN, CHORIONIC (HCG) QUANT: <0.5 UIU/ML
HCT VFR BLD CALC: 38.7 % (ref 37–47)
HEMOGLOBIN: 12.1 GM/DL (ref 12.5–16)
IMMATURE NEUTROPHIL %: 0.2 % (ref 0–0.43)
LIPASE: 14 IU/L (ref 13–60)
LYMPHOCYTES ABSOLUTE: 1.1 K/CU MM
LYMPHOCYTES RELATIVE PERCENT: 22 % (ref 24–44)
MCH RBC QN AUTO: 32.2 PG (ref 27–31)
MCHC RBC AUTO-ENTMCNC: 31.3 % (ref 32–36)
MCV RBC AUTO: 102.9 FL (ref 78–100)
MONOCYTES ABSOLUTE: 0.5 K/CU MM
MONOCYTES RELATIVE PERCENT: 9.6 % (ref 0–4)
NUCLEATED RBC %: 0 %
PDW BLD-RTO: 14.7 % (ref 11.7–14.9)
PLATELET # BLD: 401 K/CU MM (ref 140–440)
PMV BLD AUTO: 9.4 FL (ref 7.5–11.1)
POTASSIUM SERPL-SCNC: 3.7 MMOL/L (ref 3.5–5.1)
PRO-BNP: 2249 PG/ML
RBC # BLD: 3.76 M/CU MM (ref 4.2–5.4)
SEGMENTED NEUTROPHILS ABSOLUTE COUNT: 3.5 K/CU MM
SEGMENTED NEUTROPHILS RELATIVE PERCENT: 67.2 % (ref 36–66)
SODIUM BLD-SCNC: 138 MMOL/L (ref 135–145)
TOTAL CK: 23 IU/L (ref 26–140)
TOTAL IMMATURE NEUTOROPHIL: 0.01 K/CU MM
TOTAL NUCLEATED RBC: 0 K/CU MM
TOTAL PROTEIN: 6 GM/DL (ref 6.4–8.2)
TROPONIN T: <0.01 NG/ML
WBC # BLD: 5.2 K/CU MM (ref 4–10.5)

## 2019-01-25 PROCEDURE — 85025 COMPLETE CBC W/AUTO DIFF WBC: CPT

## 2019-01-25 PROCEDURE — 71045 X-RAY EXAM CHEST 1 VIEW: CPT

## 2019-01-25 PROCEDURE — 93005 ELECTROCARDIOGRAM TRACING: CPT | Performed by: EMERGENCY MEDICINE

## 2019-01-25 PROCEDURE — 2580000003 HC RX 258: Performed by: EMERGENCY MEDICINE

## 2019-01-25 PROCEDURE — 84484 ASSAY OF TROPONIN QUANT: CPT

## 2019-01-25 PROCEDURE — 83690 ASSAY OF LIPASE: CPT

## 2019-01-25 PROCEDURE — 80053 COMPREHEN METABOLIC PANEL: CPT

## 2019-01-25 PROCEDURE — 6370000000 HC RX 637 (ALT 250 FOR IP): Performed by: EMERGENCY MEDICINE

## 2019-01-25 PROCEDURE — 96375 TX/PRO/DX INJ NEW DRUG ADDON: CPT

## 2019-01-25 PROCEDURE — 71275 CT ANGIOGRAPHY CHEST: CPT

## 2019-01-25 PROCEDURE — 74176 CT ABD & PELVIS W/O CONTRAST: CPT

## 2019-01-25 PROCEDURE — 6360000002 HC RX W HCPCS: Performed by: EMERGENCY MEDICINE

## 2019-01-25 PROCEDURE — 96365 THER/PROPH/DIAG IV INF INIT: CPT

## 2019-01-25 PROCEDURE — 84702 CHORIONIC GONADOTROPIN TEST: CPT

## 2019-01-25 PROCEDURE — 99285 EMERGENCY DEPT VISIT HI MDM: CPT

## 2019-01-25 PROCEDURE — 83880 ASSAY OF NATRIURETIC PEPTIDE: CPT

## 2019-01-25 PROCEDURE — 36415 COLL VENOUS BLD VENIPUNCTURE: CPT

## 2019-01-25 PROCEDURE — 82550 ASSAY OF CK (CPK): CPT

## 2019-01-25 PROCEDURE — 6360000004 HC RX CONTRAST MEDICATION: Performed by: EMERGENCY MEDICINE

## 2019-01-25 PROCEDURE — 96367 TX/PROPH/DG ADDL SEQ IV INF: CPT

## 2019-01-25 RX ORDER — MORPHINE SULFATE 4 MG/ML
4 INJECTION, SOLUTION INTRAMUSCULAR; INTRAVENOUS EVERY 30 MIN PRN
Status: DISCONTINUED | OUTPATIENT
Start: 2019-01-25 | End: 2019-01-26

## 2019-01-25 RX ORDER — CLOPIDOGREL BISULFATE 75 MG/1
75 TABLET ORAL ONCE
Status: COMPLETED | OUTPATIENT
Start: 2019-01-25 | End: 2019-01-25

## 2019-01-25 RX ORDER — VANCOMYCIN HYDROCHLORIDE 1 G/200ML
20 INJECTION, SOLUTION INTRAVENOUS ONCE
Status: COMPLETED | OUTPATIENT
Start: 2019-01-25 | End: 2019-01-26

## 2019-01-25 RX ORDER — ONDANSETRON 2 MG/ML
4 INJECTION INTRAMUSCULAR; INTRAVENOUS EVERY 30 MIN PRN
Status: DISCONTINUED | OUTPATIENT
Start: 2019-01-25 | End: 2019-01-26

## 2019-01-25 RX ORDER — SODIUM CHLORIDE 0.9 % (FLUSH) 0.9 %
10 SYRINGE (ML) INJECTION 2 TIMES DAILY
Status: DISCONTINUED | OUTPATIENT
Start: 2019-01-25 | End: 2019-01-26

## 2019-01-25 RX ADMIN — MORPHINE SULFATE 4 MG: 4 INJECTION, SOLUTION INTRAMUSCULAR; INTRAVENOUS at 22:26

## 2019-01-25 RX ADMIN — Medication 10 ML: at 23:25

## 2019-01-25 RX ADMIN — ONDANSETRON 4 MG: 2 INJECTION INTRAMUSCULAR; INTRAVENOUS at 22:26

## 2019-01-25 RX ADMIN — IOPAMIDOL 80 ML: 755 INJECTION, SOLUTION INTRAVENOUS at 23:24

## 2019-01-25 RX ADMIN — TAZOBACTAM SODIUM AND PIPERACILLIN SODIUM 3.38 G: 375; 3 INJECTION, SOLUTION INTRAVENOUS at 23:28

## 2019-01-25 RX ADMIN — VANCOMYCIN HYDROCHLORIDE 1000 MG: 1 INJECTION, SOLUTION INTRAVENOUS at 23:52

## 2019-01-25 RX ADMIN — CLOPIDOGREL BISULFATE 75 MG: 75 TABLET ORAL at 22:27

## 2019-01-25 ASSESSMENT — PAIN SCALES - GENERAL
PAINLEVEL_OUTOF10: 10
PAINLEVEL_OUTOF10: 10

## 2019-01-25 ASSESSMENT — PAIN DESCRIPTION - LOCATION: LOCATION: CHEST

## 2019-01-25 ASSESSMENT — PAIN DESCRIPTION - PAIN TYPE: TYPE: ACUTE PAIN

## 2019-01-26 PROBLEM — J18.9 PNEUMONIA: Status: ACTIVE | Noted: 2019-01-26

## 2019-01-26 LAB
ADENOVIRUS DETECTION BY PCR: NOT DETECTED
BACTERIA: ABNORMAL /HPF
BILIRUBIN URINE: NEGATIVE MG/DL
BLOOD, URINE: NEGATIVE
BORDETELLA PERTUSSIS PCR: NOT DETECTED
CALCIUM OXALATE CRYSTALS: ABNORMAL /HPF
CHLAMYDOPHILA PNEUMONIA PCR: NOT DETECTED
CLARITY: CLEAR
COLOR: YELLOW
CORONAVIRUS 229E PCR: NOT DETECTED
CORONAVIRUS HKU1 PCR: NOT DETECTED
CORONAVIRUS NL63 PCR: NOT DETECTED
CORONAVIRUS OC43 PCR: NOT DETECTED
GLUCOSE BLD-MCNC: 89 MG/DL (ref 70–99)
GLUCOSE, URINE: NEGATIVE MG/DL
HUMAN METAPNEUMOVIRUS PCR: NOT DETECTED
INFLUENZA A BY PCR: NOT DETECTED
INFLUENZA A H1 (2009) PCR: NOT DETECTED
INFLUENZA A H1 PANDEMIC PCR: NOT DETECTED
INFLUENZA A H3 PCR: NOT DETECTED
INFLUENZA B BY PCR: NOT DETECTED
KETONES, URINE: ABNORMAL MG/DL
LEGIONELLA URINARY AG: NEGATIVE
LEUKOCYTE ESTERASE, URINE: ABNORMAL
MUCUS: ABNORMAL HPF
MYCOPLASMA PNEUMONIAE PCR: NOT DETECTED
NITRITE URINE, QUANTITATIVE: POSITIVE
PARAINFLUENZA 1 PCR: NOT DETECTED
PARAINFLUENZA 2 PCR: NOT DETECTED
PARAINFLUENZA 3 PCR: NOT DETECTED
PARAINFLUENZA 4 PCR: NOT DETECTED
PH, URINE: 6 (ref 5–8)
PROTEIN UA: 30 MG/DL
RBC URINE: 3 /HPF (ref 0–6)
RHINOVIRUS ENTEROVIRUS PCR: NOT DETECTED
RSV PCR: NOT DETECTED
SPECIFIC GRAVITY UA: 1.06 (ref 1–1.03)
SQUAMOUS EPITHELIAL: <1 /HPF
STREP PNEUMONIAE ANTIGEN: NORMAL
TRICHOMONAS: ABNORMAL /HPF
UROBILINOGEN, URINE: NORMAL MG/DL (ref 0.2–1)
WBC UA: 3 /HPF (ref 0–5)

## 2019-01-26 PROCEDURE — 6360000002 HC RX W HCPCS: Performed by: FAMILY MEDICINE

## 2019-01-26 PROCEDURE — 87798 DETECT AGENT NOS DNA AMP: CPT

## 2019-01-26 PROCEDURE — 81001 URINALYSIS AUTO W/SCOPE: CPT

## 2019-01-26 PROCEDURE — 36415 COLL VENOUS BLD VENIPUNCTURE: CPT

## 2019-01-26 PROCEDURE — 6370000000 HC RX 637 (ALT 250 FOR IP): Performed by: FAMILY MEDICINE

## 2019-01-26 PROCEDURE — 93010 ELECTROCARDIOGRAM REPORT: CPT | Performed by: INTERNAL MEDICINE

## 2019-01-26 PROCEDURE — 2580000003 HC RX 258: Performed by: FAMILY MEDICINE

## 2019-01-26 PROCEDURE — 87086 URINE CULTURE/COLONY COUNT: CPT

## 2019-01-26 PROCEDURE — 87449 NOS EACH ORGANISM AG IA: CPT

## 2019-01-26 PROCEDURE — 96375 TX/PRO/DX INJ NEW DRUG ADDON: CPT

## 2019-01-26 PROCEDURE — 87899 AGENT NOS ASSAY W/OPTIC: CPT

## 2019-01-26 PROCEDURE — 1200000000 HC SEMI PRIVATE

## 2019-01-26 PROCEDURE — 82962 GLUCOSE BLOOD TEST: CPT

## 2019-01-26 PROCEDURE — 94761 N-INVAS EAR/PLS OXIMETRY MLT: CPT

## 2019-01-26 PROCEDURE — 99223 1ST HOSP IP/OBS HIGH 75: CPT | Performed by: SURGERY

## 2019-01-26 PROCEDURE — 6360000002 HC RX W HCPCS: Performed by: EMERGENCY MEDICINE

## 2019-01-26 PROCEDURE — 87040 BLOOD CULTURE FOR BACTERIA: CPT

## 2019-01-26 RX ORDER — SODIUM CHLORIDE 9 MG/ML
INJECTION, SOLUTION INTRAVENOUS CONTINUOUS
Status: DISCONTINUED | OUTPATIENT
Start: 2019-01-26 | End: 2019-02-02 | Stop reason: HOSPADM

## 2019-01-26 RX ORDER — CLOPIDOGREL BISULFATE 75 MG/1
75 TABLET ORAL DAILY
Status: DISCONTINUED | OUTPATIENT
Start: 2019-01-26 | End: 2019-02-02 | Stop reason: HOSPADM

## 2019-01-26 RX ORDER — LISINOPRIL 2.5 MG/1
2.5 TABLET ORAL DAILY
Status: DISCONTINUED | OUTPATIENT
Start: 2019-01-26 | End: 2019-01-29

## 2019-01-26 RX ORDER — TORSEMIDE 20 MG/1
20 TABLET ORAL 2 TIMES DAILY
Status: DISCONTINUED | OUTPATIENT
Start: 2019-01-26 | End: 2019-01-27

## 2019-01-26 RX ORDER — DULOXETIN HYDROCHLORIDE 30 MG/1
60 CAPSULE, DELAYED RELEASE ORAL 2 TIMES DAILY
Status: DISCONTINUED | OUTPATIENT
Start: 2019-01-26 | End: 2019-02-02 | Stop reason: HOSPADM

## 2019-01-26 RX ORDER — GABAPENTIN 300 MG/1
300 CAPSULE ORAL 3 TIMES DAILY
Status: DISCONTINUED | OUTPATIENT
Start: 2019-01-26 | End: 2019-02-02 | Stop reason: HOSPADM

## 2019-01-26 RX ORDER — RANOLAZINE 500 MG/1
1000 TABLET, EXTENDED RELEASE ORAL 2 TIMES DAILY
Status: DISCONTINUED | OUTPATIENT
Start: 2019-01-26 | End: 2019-02-02 | Stop reason: HOSPADM

## 2019-01-26 RX ORDER — VANCOMYCIN HYDROCHLORIDE 1 G/200ML
1000 INJECTION, SOLUTION INTRAVENOUS EVERY 12 HOURS
Status: DISCONTINUED | OUTPATIENT
Start: 2019-01-26 | End: 2019-01-31

## 2019-01-26 RX ORDER — FERROUS GLUCONATE 324(37.5)
324 TABLET ORAL 2 TIMES DAILY
Status: DISCONTINUED | OUTPATIENT
Start: 2019-01-26 | End: 2019-02-02 | Stop reason: HOSPADM

## 2019-01-26 RX ORDER — ACETAMINOPHEN 325 MG/1
650 TABLET ORAL EVERY 4 HOURS PRN
Status: DISCONTINUED | OUTPATIENT
Start: 2019-01-26 | End: 2019-02-02 | Stop reason: HOSPADM

## 2019-01-26 RX ORDER — SODIUM CHLORIDE 0.9 % (FLUSH) 0.9 %
10 SYRINGE (ML) INJECTION EVERY 12 HOURS SCHEDULED
Status: DISCONTINUED | OUTPATIENT
Start: 2019-01-26 | End: 2019-02-02 | Stop reason: HOSPADM

## 2019-01-26 RX ORDER — MORPHINE SULFATE 4 MG/ML
2 INJECTION, SOLUTION INTRAMUSCULAR; INTRAVENOUS
Status: DISCONTINUED | OUTPATIENT
Start: 2019-01-26 | End: 2019-02-02 | Stop reason: HOSPADM

## 2019-01-26 RX ORDER — ASPIRIN 81 MG/1
81 TABLET, CHEWABLE ORAL DAILY
Status: DISCONTINUED | OUTPATIENT
Start: 2019-01-26 | End: 2019-02-02 | Stop reason: HOSPADM

## 2019-01-26 RX ORDER — VANCOMYCIN HYDROCHLORIDE 1 G/200ML
1000 INJECTION, SOLUTION INTRAVENOUS EVERY 12 HOURS
Status: DISCONTINUED | OUTPATIENT
Start: 2019-01-26 | End: 2019-01-26

## 2019-01-26 RX ORDER — ONDANSETRON 2 MG/ML
4 INJECTION INTRAMUSCULAR; INTRAVENOUS EVERY 6 HOURS PRN
Status: DISCONTINUED | OUTPATIENT
Start: 2019-01-26 | End: 2019-01-28

## 2019-01-26 RX ORDER — ERGOCALCIFEROL 1.25 MG/1
50000 CAPSULE ORAL WEEKLY
Status: DISCONTINUED | OUTPATIENT
Start: 2019-01-26 | End: 2019-02-02 | Stop reason: HOSPADM

## 2019-01-26 RX ORDER — ATORVASTATIN CALCIUM 40 MG/1
80 TABLET, FILM COATED ORAL NIGHTLY
Status: DISCONTINUED | OUTPATIENT
Start: 2019-01-26 | End: 2019-02-02 | Stop reason: HOSPADM

## 2019-01-26 RX ORDER — SODIUM CHLORIDE 0.9 % (FLUSH) 0.9 %
10 SYRINGE (ML) INJECTION PRN
Status: DISCONTINUED | OUTPATIENT
Start: 2019-01-26 | End: 2019-02-02 | Stop reason: HOSPADM

## 2019-01-26 RX ORDER — SOTALOL HYDROCHLORIDE 80 MG/1
80 TABLET ORAL 2 TIMES DAILY
Status: DISCONTINUED | OUTPATIENT
Start: 2019-01-26 | End: 2019-02-02 | Stop reason: HOSPADM

## 2019-01-26 RX ORDER — OXYCODONE HYDROCHLORIDE 5 MG/1
15 TABLET ORAL EVERY 4 HOURS PRN
Status: DISCONTINUED | OUTPATIENT
Start: 2019-01-26 | End: 2019-01-27

## 2019-01-26 RX ORDER — ALBUTEROL SULFATE 90 UG/1
2 AEROSOL, METERED RESPIRATORY (INHALATION) EVERY 6 HOURS PRN
Status: DISCONTINUED | OUTPATIENT
Start: 2019-01-26 | End: 2019-02-02 | Stop reason: HOSPADM

## 2019-01-26 RX ADMIN — CLOPIDOGREL BISULFATE 75 MG: 75 TABLET ORAL at 10:19

## 2019-01-26 RX ADMIN — ERGOCALCIFEROL 50000 UNITS: 1.25 CAPSULE ORAL at 10:18

## 2019-01-26 RX ADMIN — ONDANSETRON 4 MG: 2 INJECTION INTRAMUSCULAR; INTRAVENOUS at 17:01

## 2019-01-26 RX ADMIN — RANOLAZINE 1000 MG: 500 TABLET, FILM COATED, EXTENDED RELEASE ORAL at 10:18

## 2019-01-26 RX ADMIN — DULOXETINE 60 MG: 30 CAPSULE, DELAYED RELEASE ORAL at 22:06

## 2019-01-26 RX ADMIN — LEVOTHYROXINE SODIUM 137 MCG: 25 TABLET ORAL at 05:12

## 2019-01-26 RX ADMIN — OXYCODONE HYDROCHLORIDE 15 MG: 5 TABLET ORAL at 10:19

## 2019-01-26 RX ADMIN — SOTALOL HYDROCHLORIDE 80 MG: 80 TABLET ORAL at 22:07

## 2019-01-26 RX ADMIN — Medication 800 MG: at 03:48

## 2019-01-26 RX ADMIN — SOTALOL HYDROCHLORIDE 80 MG: 80 TABLET ORAL at 10:19

## 2019-01-26 RX ADMIN — TAZOBACTAM SODIUM AND PIPERACILLIN SODIUM 3.38 G: 375; 3 INJECTION, SOLUTION INTRAVENOUS at 05:08

## 2019-01-26 RX ADMIN — Medication 800 MG: at 10:19

## 2019-01-26 RX ADMIN — MORPHINE SULFATE 4 MG: 4 INJECTION, SOLUTION INTRAMUSCULAR; INTRAVENOUS at 01:02

## 2019-01-26 RX ADMIN — MORPHINE SULFATE 2 MG: 4 INJECTION INTRAVENOUS at 08:08

## 2019-01-26 RX ADMIN — OXYCODONE HYDROCHLORIDE 15 MG: 5 TABLET ORAL at 03:48

## 2019-01-26 RX ADMIN — GABAPENTIN 300 MG: 300 CAPSULE ORAL at 10:19

## 2019-01-26 RX ADMIN — ASPIRIN 81 MG CHEWABLE TABLET 81 MG: 81 TABLET CHEWABLE at 10:18

## 2019-01-26 RX ADMIN — DULOXETINE 60 MG: 30 CAPSULE, DELAYED RELEASE ORAL at 03:48

## 2019-01-26 RX ADMIN — DULOXETINE 60 MG: 30 CAPSULE, DELAYED RELEASE ORAL at 10:18

## 2019-01-26 RX ADMIN — TAZOBACTAM SODIUM AND PIPERACILLIN SODIUM 3.38 G: 375; 3 INJECTION, SOLUTION INTRAVENOUS at 10:39

## 2019-01-26 RX ADMIN — TORSEMIDE 20 MG: 20 TABLET ORAL at 10:18

## 2019-01-26 RX ADMIN — SODIUM CHLORIDE: 9 INJECTION, SOLUTION INTRAVENOUS at 03:48

## 2019-01-26 RX ADMIN — RANOLAZINE 1000 MG: 500 TABLET, FILM COATED, EXTENDED RELEASE ORAL at 03:49

## 2019-01-26 RX ADMIN — LISINOPRIL 2.5 MG: 2.5 TABLET ORAL at 10:19

## 2019-01-26 RX ADMIN — MORPHINE SULFATE 2 MG: 4 INJECTION INTRAVENOUS at 17:01

## 2019-01-26 RX ADMIN — TAZOBACTAM SODIUM AND PIPERACILLIN SODIUM 3.38 G: 375; 3 INJECTION, SOLUTION INTRAVENOUS at 17:01

## 2019-01-26 RX ADMIN — GABAPENTIN 300 MG: 300 CAPSULE ORAL at 14:20

## 2019-01-26 RX ADMIN — FERROUS GLUCONATE TAB 324 MG (37.5 MG ELEMENTAL IRON) 324 MG: 324 (37.5 FE) TAB at 10:18

## 2019-01-26 RX ADMIN — OXYCODONE HYDROCHLORIDE 15 MG: 5 TABLET ORAL at 19:02

## 2019-01-26 RX ADMIN — VANCOMYCIN HYDROCHLORIDE 1000 MG: 1 INJECTION, SOLUTION INTRAVENOUS at 10:39

## 2019-01-26 RX ADMIN — OXYCODONE HYDROCHLORIDE 15 MG: 5 TABLET ORAL at 14:19

## 2019-01-26 RX ADMIN — SODIUM CHLORIDE, PRESERVATIVE FREE 10 ML: 5 INJECTION INTRAVENOUS at 22:06

## 2019-01-26 RX ADMIN — Medication 800 MG: at 22:07

## 2019-01-26 RX ADMIN — GABAPENTIN 300 MG: 300 CAPSULE ORAL at 22:07

## 2019-01-26 RX ADMIN — SODIUM CHLORIDE: 9 INJECTION, SOLUTION INTRAVENOUS at 15:14

## 2019-01-26 RX ADMIN — ENOXAPARIN SODIUM 40 MG: 100 INJECTION SUBCUTANEOUS at 10:19

## 2019-01-26 ASSESSMENT — PAIN DESCRIPTION - PAIN TYPE: TYPE: CHRONIC PAIN

## 2019-01-26 ASSESSMENT — ENCOUNTER SYMPTOMS
EYE DISCHARGE: 0
COLOR CHANGE: 0
EYE REDNESS: 0
ABDOMINAL PAIN: 1
SHORTNESS OF BREATH: 1
EYES NEGATIVE: 1
ABDOMINAL DISTENTION: 0
RESPIRATORY NEGATIVE: 1
DIARRHEA: 0
SORE THROAT: 0
VOMITING: 0
CONSTIPATION: 0
CHEST TIGHTNESS: 0
ALLERGIC/IMMUNOLOGIC NEGATIVE: 1
COUGH: 1
NAUSEA: 1

## 2019-01-26 ASSESSMENT — PAIN DESCRIPTION - ONSET: ONSET: ON-GOING

## 2019-01-26 ASSESSMENT — PAIN SCALES - GENERAL
PAINLEVEL_OUTOF10: 8
PAINLEVEL_OUTOF10: 8
PAINLEVEL_OUTOF10: 0
PAINLEVEL_OUTOF10: 0
PAINLEVEL_OUTOF10: 8
PAINLEVEL_OUTOF10: 8
PAINLEVEL_OUTOF10: 9
PAINLEVEL_OUTOF10: 8
PAINLEVEL_OUTOF10: 0
PAINLEVEL_OUTOF10: 0
PAINLEVEL_OUTOF10: 9
PAINLEVEL_OUTOF10: 9

## 2019-01-26 ASSESSMENT — PAIN - FUNCTIONAL ASSESSMENT: PAIN_FUNCTIONAL_ASSESSMENT: PREVENTS OR INTERFERES SOME ACTIVE ACTIVITIES AND ADLS

## 2019-01-26 ASSESSMENT — PAIN DESCRIPTION - ORIENTATION
ORIENTATION: RIGHT
ORIENTATION: RIGHT

## 2019-01-26 ASSESSMENT — PAIN DESCRIPTION - LOCATION
LOCATION: HIP
LOCATION: HIP

## 2019-01-26 ASSESSMENT — PAIN DESCRIPTION - DESCRIPTORS
DESCRIPTORS: CONSTANT
DESCRIPTORS: CONSTANT

## 2019-01-26 ASSESSMENT — PAIN DESCRIPTION - FREQUENCY
FREQUENCY: CONTINUOUS
FREQUENCY: CONTINUOUS

## 2019-01-27 LAB
ANION GAP SERPL CALCULATED.3IONS-SCNC: 10 MMOL/L (ref 4–16)
BASOPHILS ABSOLUTE: 0.1 K/CU MM
BASOPHILS RELATIVE PERCENT: 1.4 % (ref 0–1)
BUN BLDV-MCNC: 9 MG/DL (ref 6–23)
CALCIUM SERPL-MCNC: 8 MG/DL (ref 8.3–10.6)
CHLORIDE BLD-SCNC: 108 MMOL/L (ref 99–110)
CO2: 24 MMOL/L (ref 21–32)
CREAT SERPL-MCNC: 0.7 MG/DL (ref 0.6–1.1)
CULTURE: NORMAL
DIFFERENTIAL TYPE: ABNORMAL
EOSINOPHILS ABSOLUTE: 0.1 K/CU MM
EOSINOPHILS RELATIVE PERCENT: 2.3 % (ref 0–3)
GFR AFRICAN AMERICAN: >60 ML/MIN/1.73M2
GFR NON-AFRICAN AMERICAN: >60 ML/MIN/1.73M2
GLUCOSE BLD-MCNC: 96 MG/DL (ref 70–99)
HCT VFR BLD CALC: 35.8 % (ref 37–47)
HEMOGLOBIN: 10.7 GM/DL (ref 12.5–16)
IMMATURE NEUTROPHIL %: 0.3 % (ref 0–0.43)
LYMPHOCYTES ABSOLUTE: 1 K/CU MM
LYMPHOCYTES RELATIVE PERCENT: 28 % (ref 24–44)
Lab: NORMAL
MCH RBC QN AUTO: 32.1 PG (ref 27–31)
MCHC RBC AUTO-ENTMCNC: 29.9 % (ref 32–36)
MCV RBC AUTO: 107.5 FL (ref 78–100)
MONOCYTES ABSOLUTE: 0.5 K/CU MM
MONOCYTES RELATIVE PERCENT: 13 % (ref 0–4)
NUCLEATED RBC %: 0 %
PDW BLD-RTO: 14.9 % (ref 11.7–14.9)
PLATELET # BLD: 337 K/CU MM (ref 140–440)
PMV BLD AUTO: 9.5 FL (ref 7.5–11.1)
POTASSIUM SERPL-SCNC: 3.6 MMOL/L (ref 3.5–5.1)
RBC # BLD: 3.33 M/CU MM (ref 4.2–5.4)
REPORT STATUS: NORMAL
SEGMENTED NEUTROPHILS ABSOLUTE COUNT: 1.9 K/CU MM
SEGMENTED NEUTROPHILS RELATIVE PERCENT: 55 % (ref 36–66)
SODIUM BLD-SCNC: 142 MMOL/L (ref 135–145)
SPECIMEN: NORMAL
TOTAL IMMATURE NEUTOROPHIL: 0.01 K/CU MM
TOTAL NUCLEATED RBC: 0 K/CU MM
WBC # BLD: 3.5 K/CU MM (ref 4–10.5)

## 2019-01-27 PROCEDURE — 6360000002 HC RX W HCPCS: Performed by: FAMILY MEDICINE

## 2019-01-27 PROCEDURE — 85025 COMPLETE CBC W/AUTO DIFF WBC: CPT

## 2019-01-27 PROCEDURE — 1200000000 HC SEMI PRIVATE

## 2019-01-27 PROCEDURE — 80202 ASSAY OF VANCOMYCIN: CPT

## 2019-01-27 PROCEDURE — 94761 N-INVAS EAR/PLS OXIMETRY MLT: CPT

## 2019-01-27 PROCEDURE — 6370000000 HC RX 637 (ALT 250 FOR IP): Performed by: HOSPITALIST

## 2019-01-27 PROCEDURE — 99232 SBSQ HOSP IP/OBS MODERATE 35: CPT | Performed by: SURGERY

## 2019-01-27 PROCEDURE — 6370000000 HC RX 637 (ALT 250 FOR IP): Performed by: FAMILY MEDICINE

## 2019-01-27 PROCEDURE — 2580000003 HC RX 258: Performed by: FAMILY MEDICINE

## 2019-01-27 PROCEDURE — 80048 BASIC METABOLIC PNL TOTAL CA: CPT

## 2019-01-27 PROCEDURE — 2580000003 HC RX 258: Performed by: HOSPITALIST

## 2019-01-27 PROCEDURE — 36415 COLL VENOUS BLD VENIPUNCTURE: CPT

## 2019-01-27 RX ORDER — TORSEMIDE 20 MG/1
20 TABLET ORAL 2 TIMES DAILY
Status: DISCONTINUED | OUTPATIENT
Start: 2019-01-27 | End: 2019-01-29

## 2019-01-27 RX ORDER — 0.9 % SODIUM CHLORIDE 0.9 %
500 INTRAVENOUS SOLUTION INTRAVENOUS ONCE
Status: COMPLETED | OUTPATIENT
Start: 2019-01-27 | End: 2019-01-27

## 2019-01-27 RX ADMIN — MORPHINE SULFATE 2 MG: 4 INJECTION INTRAVENOUS at 05:05

## 2019-01-27 RX ADMIN — TAZOBACTAM SODIUM AND PIPERACILLIN SODIUM 3.38 G: 375; 3 INJECTION, SOLUTION INTRAVENOUS at 05:04

## 2019-01-27 RX ADMIN — TAZOBACTAM SODIUM AND PIPERACILLIN SODIUM 3.38 G: 375; 3 INJECTION, SOLUTION INTRAVENOUS at 23:22

## 2019-01-27 RX ADMIN — LEVOTHYROXINE SODIUM 137 MCG: 25 TABLET ORAL at 09:17

## 2019-01-27 RX ADMIN — TAZOBACTAM SODIUM AND PIPERACILLIN SODIUM 3.38 G: 375; 3 INJECTION, SOLUTION INTRAVENOUS at 17:27

## 2019-01-27 RX ADMIN — OXYCODONE HYDROCHLORIDE 15 MG: 10 TABLET ORAL at 23:46

## 2019-01-27 RX ADMIN — LISINOPRIL 2.5 MG: 2.5 TABLET ORAL at 09:09

## 2019-01-27 RX ADMIN — SODIUM CHLORIDE: 9 INJECTION, SOLUTION INTRAVENOUS at 23:19

## 2019-01-27 RX ADMIN — FERROUS GLUCONATE TAB 324 MG (37.5 MG ELEMENTAL IRON) 324 MG: 324 (37.5 FE) TAB at 09:17

## 2019-01-27 RX ADMIN — DULOXETINE 60 MG: 30 CAPSULE, DELAYED RELEASE ORAL at 20:48

## 2019-01-27 RX ADMIN — TAZOBACTAM SODIUM AND PIPERACILLIN SODIUM 3.38 G: 375; 3 INJECTION, SOLUTION INTRAVENOUS at 00:05

## 2019-01-27 RX ADMIN — ASPIRIN 81 MG CHEWABLE TABLET 81 MG: 81 TABLET CHEWABLE at 09:09

## 2019-01-27 RX ADMIN — TORSEMIDE 20 MG: 20 TABLET ORAL at 09:09

## 2019-01-27 RX ADMIN — Medication 800 MG: at 20:48

## 2019-01-27 RX ADMIN — GABAPENTIN 300 MG: 300 CAPSULE ORAL at 09:10

## 2019-01-27 RX ADMIN — SODIUM CHLORIDE 500 ML: 9 INJECTION, SOLUTION INTRAVENOUS at 00:10

## 2019-01-27 RX ADMIN — OXYCODONE HYDROCHLORIDE 15 MG: 5 TABLET ORAL at 01:55

## 2019-01-27 RX ADMIN — VANCOMYCIN HYDROCHLORIDE 1000 MG: 1 INJECTION, SOLUTION INTRAVENOUS at 23:40

## 2019-01-27 RX ADMIN — OXYCODONE HYDROCHLORIDE 15 MG: 5 TABLET ORAL at 07:04

## 2019-01-27 RX ADMIN — ONDANSETRON 4 MG: 2 INJECTION INTRAMUSCULAR; INTRAVENOUS at 23:19

## 2019-01-27 RX ADMIN — VANCOMYCIN HYDROCHLORIDE 1000 MG: 1 INJECTION, SOLUTION INTRAVENOUS at 09:10

## 2019-01-27 RX ADMIN — RANOLAZINE 1000 MG: 500 TABLET, FILM COATED, EXTENDED RELEASE ORAL at 09:09

## 2019-01-27 RX ADMIN — OXYCODONE HYDROCHLORIDE 15 MG: 10 TABLET ORAL at 17:26

## 2019-01-27 RX ADMIN — OXYCODONE HYDROCHLORIDE 15 MG: 10 TABLET ORAL at 14:23

## 2019-01-27 RX ADMIN — SODIUM CHLORIDE, PRESERVATIVE FREE 10 ML: 5 INJECTION INTRAVENOUS at 20:49

## 2019-01-27 RX ADMIN — SODIUM CHLORIDE: 9 INJECTION, SOLUTION INTRAVENOUS at 00:15

## 2019-01-27 RX ADMIN — VANCOMYCIN HYDROCHLORIDE 1000 MG: 1 INJECTION, SOLUTION INTRAVENOUS at 00:06

## 2019-01-27 RX ADMIN — CLOPIDOGREL BISULFATE 75 MG: 75 TABLET ORAL at 09:09

## 2019-01-27 RX ADMIN — RANOLAZINE 1000 MG: 500 TABLET, FILM COATED, EXTENDED RELEASE ORAL at 20:48

## 2019-01-27 RX ADMIN — Medication 800 MG: at 09:09

## 2019-01-27 RX ADMIN — GABAPENTIN 300 MG: 300 CAPSULE ORAL at 20:48

## 2019-01-27 RX ADMIN — SODIUM CHLORIDE: 9 INJECTION, SOLUTION INTRAVENOUS at 14:24

## 2019-01-27 RX ADMIN — OXYCODONE HYDROCHLORIDE 15 MG: 10 TABLET ORAL at 20:48

## 2019-01-27 RX ADMIN — ONDANSETRON 4 MG: 2 INJECTION INTRAMUSCULAR; INTRAVENOUS at 11:13

## 2019-01-27 RX ADMIN — SOTALOL HYDROCHLORIDE 80 MG: 80 TABLET ORAL at 09:10

## 2019-01-27 RX ADMIN — GABAPENTIN 300 MG: 300 CAPSULE ORAL at 14:23

## 2019-01-27 RX ADMIN — SOTALOL HYDROCHLORIDE 80 MG: 80 TABLET ORAL at 20:48

## 2019-01-27 RX ADMIN — DULOXETINE 60 MG: 30 CAPSULE, DELAYED RELEASE ORAL at 09:09

## 2019-01-27 RX ADMIN — ENOXAPARIN SODIUM 40 MG: 100 INJECTION SUBCUTANEOUS at 09:10

## 2019-01-27 RX ADMIN — TAZOBACTAM SODIUM AND PIPERACILLIN SODIUM 3.38 G: 375; 3 INJECTION, SOLUTION INTRAVENOUS at 09:10

## 2019-01-27 RX ADMIN — OXYCODONE HYDROCHLORIDE 15 MG: 5 TABLET ORAL at 11:11

## 2019-01-27 ASSESSMENT — PAIN DESCRIPTION - LOCATION
LOCATION: ABDOMEN

## 2019-01-27 ASSESSMENT — PAIN DESCRIPTION - FREQUENCY
FREQUENCY: CONTINUOUS

## 2019-01-27 ASSESSMENT — PAIN SCALES - GENERAL
PAINLEVEL_OUTOF10: 8
PAINLEVEL_OUTOF10: 8
PAINLEVEL_OUTOF10: 0
PAINLEVEL_OUTOF10: 8
PAINLEVEL_OUTOF10: 0
PAINLEVEL_OUTOF10: 0
PAINLEVEL_OUTOF10: 8
PAINLEVEL_OUTOF10: 0
PAINLEVEL_OUTOF10: 8
PAINLEVEL_OUTOF10: 0
PAINLEVEL_OUTOF10: 0
PAINLEVEL_OUTOF10: 6
PAINLEVEL_OUTOF10: 8

## 2019-01-27 ASSESSMENT — PAIN DESCRIPTION - ONSET
ONSET: ON-GOING
ONSET: ON-GOING

## 2019-01-27 ASSESSMENT — PAIN DESCRIPTION - PAIN TYPE
TYPE: CHRONIC PAIN

## 2019-01-27 ASSESSMENT — PAIN DESCRIPTION - DESCRIPTORS
DESCRIPTORS: DISCOMFORT
DESCRIPTORS: DISCOMFORT
DESCRIPTORS: ACHING

## 2019-01-27 ASSESSMENT — PAIN - FUNCTIONAL ASSESSMENT
PAIN_FUNCTIONAL_ASSESSMENT: ACTIVITIES ARE NOT PREVENTED

## 2019-01-27 ASSESSMENT — PAIN DESCRIPTION - ORIENTATION
ORIENTATION: RIGHT

## 2019-01-28 PROBLEM — M53.3 SACRAL PAIN: Status: ACTIVE | Noted: 2019-01-28

## 2019-01-28 LAB
ANION GAP SERPL CALCULATED.3IONS-SCNC: 9 MMOL/L (ref 4–16)
BUN BLDV-MCNC: 9 MG/DL (ref 6–23)
CALCIUM SERPL-MCNC: 8 MG/DL (ref 8.3–10.6)
CHLORIDE BLD-SCNC: 107 MMOL/L (ref 99–110)
CO2: 25 MMOL/L (ref 21–32)
CREAT SERPL-MCNC: 0.7 MG/DL (ref 0.6–1.1)
GFR AFRICAN AMERICAN: >60 ML/MIN/1.73M2
GFR NON-AFRICAN AMERICAN: >60 ML/MIN/1.73M2
GLUCOSE BLD-MCNC: 95 MG/DL (ref 70–99)
HCT VFR BLD CALC: 34 % (ref 37–47)
HEMOGLOBIN: 10.2 GM/DL (ref 12.5–16)
MCH RBC QN AUTO: 32.5 PG (ref 27–31)
MCHC RBC AUTO-ENTMCNC: 30 % (ref 32–36)
MCV RBC AUTO: 108.3 FL (ref 78–100)
PDW BLD-RTO: 15 % (ref 11.7–14.9)
PLATELET # BLD: 298 K/CU MM (ref 140–440)
PMV BLD AUTO: 9.8 FL (ref 7.5–11.1)
POTASSIUM SERPL-SCNC: 4.5 MMOL/L (ref 3.5–5.1)
RBC # BLD: 3.14 M/CU MM (ref 4.2–5.4)
REASON FOR REJECTION: NORMAL
REJECTED TEST: NORMAL
SODIUM BLD-SCNC: 141 MMOL/L (ref 135–145)
SOURCE: NORMAL
VANCOMYCIN TROUGH: 17 UG/ML (ref 10–20)
WBC # BLD: 4.7 K/CU MM (ref 4–10.5)

## 2019-01-28 PROCEDURE — 6360000002 HC RX W HCPCS: Performed by: FAMILY MEDICINE

## 2019-01-28 PROCEDURE — 1200000000 HC SEMI PRIVATE

## 2019-01-28 PROCEDURE — 36415 COLL VENOUS BLD VENIPUNCTURE: CPT

## 2019-01-28 PROCEDURE — 6370000000 HC RX 637 (ALT 250 FOR IP): Performed by: FAMILY MEDICINE

## 2019-01-28 PROCEDURE — 2580000003 HC RX 258: Performed by: FAMILY MEDICINE

## 2019-01-28 PROCEDURE — 80048 BASIC METABOLIC PNL TOTAL CA: CPT

## 2019-01-28 PROCEDURE — 6370000000 HC RX 637 (ALT 250 FOR IP): Performed by: HOSPITALIST

## 2019-01-28 PROCEDURE — 6360000002 HC RX W HCPCS: Performed by: HOSPITALIST

## 2019-01-28 PROCEDURE — 99232 SBSQ HOSP IP/OBS MODERATE 35: CPT | Performed by: SURGERY

## 2019-01-28 PROCEDURE — 85027 COMPLETE CBC AUTOMATED: CPT

## 2019-01-28 PROCEDURE — 80202 ASSAY OF VANCOMYCIN: CPT

## 2019-01-28 PROCEDURE — 94761 N-INVAS EAR/PLS OXIMETRY MLT: CPT

## 2019-01-28 RX ORDER — ONDANSETRON 2 MG/ML
4 INJECTION INTRAMUSCULAR; INTRAVENOUS EVERY 4 HOURS PRN
Status: DISCONTINUED | OUTPATIENT
Start: 2019-01-28 | End: 2019-02-02 | Stop reason: HOSPADM

## 2019-01-28 RX ADMIN — LEVOTHYROXINE SODIUM 137 MCG: 25 TABLET ORAL at 05:01

## 2019-01-28 RX ADMIN — Medication 800 MG: at 20:17

## 2019-01-28 RX ADMIN — OXYCODONE HYDROCHLORIDE 15 MG: 10 TABLET ORAL at 14:54

## 2019-01-28 RX ADMIN — DULOXETINE 60 MG: 30 CAPSULE, DELAYED RELEASE ORAL at 20:16

## 2019-01-28 RX ADMIN — VANCOMYCIN HYDROCHLORIDE 1000 MG: 1 INJECTION, SOLUTION INTRAVENOUS at 23:16

## 2019-01-28 RX ADMIN — CLOPIDOGREL BISULFATE 75 MG: 75 TABLET ORAL at 08:26

## 2019-01-28 RX ADMIN — OXYCODONE HYDROCHLORIDE 15 MG: 10 TABLET ORAL at 11:25

## 2019-01-28 RX ADMIN — FERROUS GLUCONATE TAB 324 MG (37.5 MG ELEMENTAL IRON) 324 MG: 324 (37.5 FE) TAB at 20:16

## 2019-01-28 RX ADMIN — VANCOMYCIN HYDROCHLORIDE 1000 MG: 1 INJECTION, SOLUTION INTRAVENOUS at 13:36

## 2019-01-28 RX ADMIN — GABAPENTIN 300 MG: 300 CAPSULE ORAL at 23:15

## 2019-01-28 RX ADMIN — LISINOPRIL 2.5 MG: 2.5 TABLET ORAL at 08:26

## 2019-01-28 RX ADMIN — TORSEMIDE 20 MG: 20 TABLET ORAL at 08:26

## 2019-01-28 RX ADMIN — GABAPENTIN 300 MG: 300 CAPSULE ORAL at 08:26

## 2019-01-28 RX ADMIN — SOTALOL HYDROCHLORIDE 80 MG: 80 TABLET ORAL at 08:27

## 2019-01-28 RX ADMIN — ATORVASTATIN CALCIUM 80 MG: 40 TABLET, FILM COATED ORAL at 20:15

## 2019-01-28 RX ADMIN — SODIUM CHLORIDE: 9 INJECTION, SOLUTION INTRAVENOUS at 23:08

## 2019-01-28 RX ADMIN — ENOXAPARIN SODIUM 40 MG: 100 INJECTION SUBCUTANEOUS at 08:27

## 2019-01-28 RX ADMIN — ASPIRIN 81 MG CHEWABLE TABLET 81 MG: 81 TABLET CHEWABLE at 08:25

## 2019-01-28 RX ADMIN — OXYCODONE HYDROCHLORIDE 15 MG: 10 TABLET ORAL at 08:27

## 2019-01-28 RX ADMIN — TAZOBACTAM SODIUM AND PIPERACILLIN SODIUM 3.38 G: 375; 3 INJECTION, SOLUTION INTRAVENOUS at 05:01

## 2019-01-28 RX ADMIN — SODIUM CHLORIDE: 9 INJECTION, SOLUTION INTRAVENOUS at 11:16

## 2019-01-28 RX ADMIN — GABAPENTIN 300 MG: 300 CAPSULE ORAL at 13:33

## 2019-01-28 RX ADMIN — RANOLAZINE 1000 MG: 500 TABLET, FILM COATED, EXTENDED RELEASE ORAL at 08:26

## 2019-01-28 RX ADMIN — TAZOBACTAM SODIUM AND PIPERACILLIN SODIUM 3.38 G: 375; 3 INJECTION, SOLUTION INTRAVENOUS at 11:00

## 2019-01-28 RX ADMIN — RANOLAZINE 1000 MG: 500 TABLET, FILM COATED, EXTENDED RELEASE ORAL at 20:17

## 2019-01-28 RX ADMIN — Medication 800 MG: at 08:26

## 2019-01-28 RX ADMIN — FERROUS GLUCONATE TAB 324 MG (37.5 MG ELEMENTAL IRON) 324 MG: 324 (37.5 FE) TAB at 08:32

## 2019-01-28 RX ADMIN — ONDANSETRON 4 MG: 2 INJECTION INTRAMUSCULAR; INTRAVENOUS at 18:03

## 2019-01-28 RX ADMIN — OXYCODONE HYDROCHLORIDE 15 MG: 10 TABLET ORAL at 05:01

## 2019-01-28 RX ADMIN — OXYCODONE HYDROCHLORIDE 15 MG: 10 TABLET ORAL at 18:04

## 2019-01-28 RX ADMIN — DULOXETINE 60 MG: 30 CAPSULE, DELAYED RELEASE ORAL at 08:26

## 2019-01-28 RX ADMIN — OXYCODONE HYDROCHLORIDE 15 MG: 10 TABLET ORAL at 21:27

## 2019-01-28 RX ADMIN — ONDANSETRON 4 MG: 2 INJECTION INTRAMUSCULAR; INTRAVENOUS at 13:33

## 2019-01-28 RX ADMIN — SODIUM CHLORIDE: 9 INJECTION, SOLUTION INTRAVENOUS at 05:02

## 2019-01-28 ASSESSMENT — PAIN DESCRIPTION - DESCRIPTORS
DESCRIPTORS: DISCOMFORT

## 2019-01-28 ASSESSMENT — PAIN SCALES - GENERAL
PAINLEVEL_OUTOF10: 8
PAINLEVEL_OUTOF10: 8
PAINLEVEL_OUTOF10: 0
PAINLEVEL_OUTOF10: 1
PAINLEVEL_OUTOF10: 4
PAINLEVEL_OUTOF10: 0
PAINLEVEL_OUTOF10: 0
PAINLEVEL_OUTOF10: 7
PAINLEVEL_OUTOF10: 8
PAINLEVEL_OUTOF10: 7
PAINLEVEL_OUTOF10: 7

## 2019-01-28 ASSESSMENT — PAIN DESCRIPTION - FREQUENCY
FREQUENCY: CONTINUOUS

## 2019-01-28 ASSESSMENT — PAIN DESCRIPTION - LOCATION
LOCATION: ABDOMEN

## 2019-01-28 ASSESSMENT — PAIN DESCRIPTION - ONSET
ONSET: ON-GOING

## 2019-01-28 ASSESSMENT — PAIN DESCRIPTION - ORIENTATION
ORIENTATION: RIGHT

## 2019-01-28 ASSESSMENT — PAIN - FUNCTIONAL ASSESSMENT: PAIN_FUNCTIONAL_ASSESSMENT: ACTIVITIES ARE NOT PREVENTED

## 2019-01-28 ASSESSMENT — PAIN DESCRIPTION - PAIN TYPE
TYPE: CHRONIC PAIN

## 2019-01-29 LAB
ANION GAP SERPL CALCULATED.3IONS-SCNC: 9 MMOL/L (ref 4–16)
BUN BLDV-MCNC: 9 MG/DL (ref 6–23)
CALCIUM SERPL-MCNC: 7.8 MG/DL (ref 8.3–10.6)
CHLORIDE BLD-SCNC: 109 MMOL/L (ref 99–110)
CO2: 25 MMOL/L (ref 21–32)
CREAT SERPL-MCNC: 0.6 MG/DL (ref 0.6–1.1)
GFR AFRICAN AMERICAN: >60 ML/MIN/1.73M2
GFR NON-AFRICAN AMERICAN: >60 ML/MIN/1.73M2
GLUCOSE BLD-MCNC: 100 MG/DL (ref 70–99)
HCT VFR BLD CALC: 33.8 % (ref 37–47)
HEMOGLOBIN: 10.1 GM/DL (ref 12.5–16)
MCH RBC QN AUTO: 31.8 PG (ref 27–31)
MCHC RBC AUTO-ENTMCNC: 29.9 % (ref 32–36)
MCV RBC AUTO: 106.3 FL (ref 78–100)
PDW BLD-RTO: 14.9 % (ref 11.7–14.9)
PLATELET # BLD: 350 K/CU MM (ref 140–440)
PMV BLD AUTO: 9.6 FL (ref 7.5–11.1)
POTASSIUM SERPL-SCNC: 3.9 MMOL/L (ref 3.5–5.1)
RBC # BLD: 3.18 M/CU MM (ref 4.2–5.4)
SODIUM BLD-SCNC: 143 MMOL/L (ref 135–145)
WBC # BLD: 3.6 K/CU MM (ref 4–10.5)

## 2019-01-29 PROCEDURE — 2580000003 HC RX 258: Performed by: INTERNAL MEDICINE

## 2019-01-29 PROCEDURE — 1200000000 HC SEMI PRIVATE

## 2019-01-29 PROCEDURE — 6370000000 HC RX 637 (ALT 250 FOR IP): Performed by: HOSPITALIST

## 2019-01-29 PROCEDURE — 6370000000 HC RX 637 (ALT 250 FOR IP): Performed by: FAMILY MEDICINE

## 2019-01-29 PROCEDURE — 6360000002 HC RX W HCPCS: Performed by: HOSPITALIST

## 2019-01-29 PROCEDURE — 36415 COLL VENOUS BLD VENIPUNCTURE: CPT

## 2019-01-29 PROCEDURE — 80048 BASIC METABOLIC PNL TOTAL CA: CPT

## 2019-01-29 PROCEDURE — 85027 COMPLETE CBC AUTOMATED: CPT

## 2019-01-29 PROCEDURE — 6360000002 HC RX W HCPCS: Performed by: FAMILY MEDICINE

## 2019-01-29 PROCEDURE — 2580000003 HC RX 258: Performed by: FAMILY MEDICINE

## 2019-01-29 RX ORDER — SODIUM CHLORIDE 9 MG/ML
INJECTION, SOLUTION INTRAVENOUS CONTINUOUS
Status: ACTIVE | OUTPATIENT
Start: 2019-01-29 | End: 2019-01-30

## 2019-01-29 RX ORDER — 0.9 % SODIUM CHLORIDE 0.9 %
250 INTRAVENOUS SOLUTION INTRAVENOUS ONCE
Status: COMPLETED | OUTPATIENT
Start: 2019-01-29 | End: 2019-01-29

## 2019-01-29 RX ADMIN — GABAPENTIN 300 MG: 300 CAPSULE ORAL at 20:12

## 2019-01-29 RX ADMIN — SOTALOL HYDROCHLORIDE 80 MG: 80 TABLET ORAL at 09:39

## 2019-01-29 RX ADMIN — OXYCODONE HYDROCHLORIDE 15 MG: 10 TABLET ORAL at 10:16

## 2019-01-29 RX ADMIN — DULOXETINE 60 MG: 30 CAPSULE, DELAYED RELEASE ORAL at 09:38

## 2019-01-29 RX ADMIN — OXYCODONE HYDROCHLORIDE 15 MG: 10 TABLET ORAL at 22:20

## 2019-01-29 RX ADMIN — ONDANSETRON 4 MG: 2 INJECTION INTRAMUSCULAR; INTRAVENOUS at 20:13

## 2019-01-29 RX ADMIN — ATORVASTATIN CALCIUM 80 MG: 40 TABLET, FILM COATED ORAL at 20:11

## 2019-01-29 RX ADMIN — OXYCODONE HYDROCHLORIDE 15 MG: 10 TABLET ORAL at 16:43

## 2019-01-29 RX ADMIN — LEVOTHYROXINE SODIUM 137 MCG: 25 TABLET ORAL at 07:17

## 2019-01-29 RX ADMIN — VANCOMYCIN HYDROCHLORIDE 1000 MG: 1 INJECTION, SOLUTION INTRAVENOUS at 23:30

## 2019-01-29 RX ADMIN — ONDANSETRON 4 MG: 2 INJECTION INTRAMUSCULAR; INTRAVENOUS at 14:17

## 2019-01-29 RX ADMIN — TORSEMIDE 20 MG: 20 TABLET ORAL at 20:12

## 2019-01-29 RX ADMIN — SOTALOL HYDROCHLORIDE 80 MG: 80 TABLET ORAL at 20:12

## 2019-01-29 RX ADMIN — ONDANSETRON 4 MG: 2 INJECTION INTRAMUSCULAR; INTRAVENOUS at 00:32

## 2019-01-29 RX ADMIN — GABAPENTIN 300 MG: 300 CAPSULE ORAL at 09:39

## 2019-01-29 RX ADMIN — VANCOMYCIN HYDROCHLORIDE 1000 MG: 1 INJECTION, SOLUTION INTRAVENOUS at 11:28

## 2019-01-29 RX ADMIN — GABAPENTIN 300 MG: 300 CAPSULE ORAL at 13:11

## 2019-01-29 RX ADMIN — OXYCODONE HYDROCHLORIDE 15 MG: 10 TABLET ORAL at 00:34

## 2019-01-29 RX ADMIN — SODIUM CHLORIDE, PRESERVATIVE FREE 10 ML: 5 INJECTION INTRAVENOUS at 20:14

## 2019-01-29 RX ADMIN — RANOLAZINE 1000 MG: 500 TABLET, FILM COATED, EXTENDED RELEASE ORAL at 20:12

## 2019-01-29 RX ADMIN — SODIUM CHLORIDE: 9 INJECTION, SOLUTION INTRAVENOUS at 09:38

## 2019-01-29 RX ADMIN — ONDANSETRON 4 MG: 2 INJECTION INTRAMUSCULAR; INTRAVENOUS at 09:38

## 2019-01-29 RX ADMIN — FERROUS GLUCONATE TAB 324 MG (37.5 MG ELEMENTAL IRON) 324 MG: 324 (37.5 FE) TAB at 09:38

## 2019-01-29 RX ADMIN — MORPHINE SULFATE 2 MG: 4 INJECTION INTRAVENOUS at 20:13

## 2019-01-29 RX ADMIN — RANOLAZINE 1000 MG: 500 TABLET, FILM COATED, EXTENDED RELEASE ORAL at 09:38

## 2019-01-29 RX ADMIN — OXYCODONE HYDROCHLORIDE 15 MG: 10 TABLET ORAL at 13:12

## 2019-01-29 RX ADMIN — CLOPIDOGREL BISULFATE 75 MG: 75 TABLET ORAL at 09:38

## 2019-01-29 RX ADMIN — DULOXETINE 60 MG: 30 CAPSULE, DELAYED RELEASE ORAL at 20:11

## 2019-01-29 RX ADMIN — OXYCODONE HYDROCHLORIDE 15 MG: 10 TABLET ORAL at 03:54

## 2019-01-29 RX ADMIN — SODIUM CHLORIDE: 9 INJECTION, SOLUTION INTRAVENOUS at 23:30

## 2019-01-29 RX ADMIN — Medication 800 MG: at 20:13

## 2019-01-29 RX ADMIN — FERROUS GLUCONATE TAB 324 MG (37.5 MG ELEMENTAL IRON) 324 MG: 324 (37.5 FE) TAB at 20:13

## 2019-01-29 RX ADMIN — Medication 800 MG: at 09:38

## 2019-01-29 RX ADMIN — SODIUM CHLORIDE 250 ML: 9 INJECTION, SOLUTION INTRAVENOUS at 16:44

## 2019-01-29 RX ADMIN — SODIUM CHLORIDE, PRESERVATIVE FREE 10 ML: 5 INJECTION INTRAVENOUS at 09:38

## 2019-01-29 RX ADMIN — OXYCODONE HYDROCHLORIDE 15 MG: 10 TABLET ORAL at 07:17

## 2019-01-29 RX ADMIN — ENOXAPARIN SODIUM 40 MG: 100 INJECTION SUBCUTANEOUS at 09:39

## 2019-01-29 RX ADMIN — ASPIRIN 81 MG CHEWABLE TABLET 81 MG: 81 TABLET CHEWABLE at 09:39

## 2019-01-29 RX ADMIN — TORSEMIDE 20 MG: 20 TABLET ORAL at 09:39

## 2019-01-29 ASSESSMENT — PAIN SCALES - GENERAL
PAINLEVEL_OUTOF10: 9
PAINLEVEL_OUTOF10: 8
PAINLEVEL_OUTOF10: 7
PAINLEVEL_OUTOF10: 6
PAINLEVEL_OUTOF10: 8
PAINLEVEL_OUTOF10: 7
PAINLEVEL_OUTOF10: 8
PAINLEVEL_OUTOF10: 0
PAINLEVEL_OUTOF10: 5

## 2019-01-29 ASSESSMENT — PAIN DESCRIPTION - PAIN TYPE: TYPE: ACUTE PAIN

## 2019-01-29 ASSESSMENT — PAIN DESCRIPTION - LOCATION
LOCATION: ABDOMEN

## 2019-01-30 ENCOUNTER — APPOINTMENT (OUTPATIENT)
Dept: GENERAL RADIOLOGY | Age: 59
DRG: 871 | End: 2019-01-30
Payer: COMMERCIAL

## 2019-01-30 LAB
ANION GAP SERPL CALCULATED.3IONS-SCNC: 8 MMOL/L (ref 4–16)
BASOPHILS ABSOLUTE: 0 K/CU MM
BASOPHILS RELATIVE PERCENT: 0.7 % (ref 0–1)
BUN BLDV-MCNC: 13 MG/DL (ref 6–23)
CALCIUM SERPL-MCNC: 8.4 MG/DL (ref 8.3–10.6)
CHLORIDE BLD-SCNC: 103 MMOL/L (ref 99–110)
CO2: 31 MMOL/L (ref 21–32)
CREAT SERPL-MCNC: 0.7 MG/DL (ref 0.6–1.1)
DIFFERENTIAL TYPE: ABNORMAL
EOSINOPHILS ABSOLUTE: 0 K/CU MM
EOSINOPHILS RELATIVE PERCENT: 0.7 % (ref 0–3)
GFR AFRICAN AMERICAN: >60 ML/MIN/1.73M2
GFR NON-AFRICAN AMERICAN: >60 ML/MIN/1.73M2
GLUCOSE BLD-MCNC: 101 MG/DL (ref 70–99)
HCT VFR BLD CALC: 37.7 % (ref 37–47)
HEMOGLOBIN: 11 GM/DL (ref 12.5–16)
IMMATURE NEUTROPHIL %: 0.3 % (ref 0–0.43)
LYMPHOCYTES ABSOLUTE: 1.6 K/CU MM
LYMPHOCYTES RELATIVE PERCENT: 27.5 % (ref 24–44)
MCH RBC QN AUTO: 32.1 PG (ref 27–31)
MCHC RBC AUTO-ENTMCNC: 29.2 % (ref 32–36)
MCV RBC AUTO: 109.9 FL (ref 78–100)
MONOCYTES ABSOLUTE: 0.5 K/CU MM
MONOCYTES RELATIVE PERCENT: 8.1 % (ref 0–4)
NUCLEATED RBC %: 0 %
PDW BLD-RTO: 15.1 % (ref 11.7–14.9)
PLATELET # BLD: 348 K/CU MM (ref 140–440)
PMV BLD AUTO: 9.6 FL (ref 7.5–11.1)
POTASSIUM SERPL-SCNC: 3.8 MMOL/L (ref 3.5–5.1)
RBC # BLD: 3.43 M/CU MM (ref 4.2–5.4)
SEGMENTED NEUTROPHILS ABSOLUTE COUNT: 3.6 K/CU MM
SEGMENTED NEUTROPHILS RELATIVE PERCENT: 62.7 % (ref 36–66)
SODIUM BLD-SCNC: 142 MMOL/L (ref 135–145)
TOTAL IMMATURE NEUTOROPHIL: 0.02 K/CU MM
TOTAL NUCLEATED RBC: 0 K/CU MM
WBC # BLD: 5.8 K/CU MM (ref 4–10.5)

## 2019-01-30 PROCEDURE — 6360000002 HC RX W HCPCS: Performed by: HOSPITALIST

## 2019-01-30 PROCEDURE — 36415 COLL VENOUS BLD VENIPUNCTURE: CPT

## 2019-01-30 PROCEDURE — 6370000000 HC RX 637 (ALT 250 FOR IP): Performed by: HOSPITALIST

## 2019-01-30 PROCEDURE — 94761 N-INVAS EAR/PLS OXIMETRY MLT: CPT

## 2019-01-30 PROCEDURE — 71046 X-RAY EXAM CHEST 2 VIEWS: CPT

## 2019-01-30 PROCEDURE — 6360000002 HC RX W HCPCS: Performed by: FAMILY MEDICINE

## 2019-01-30 PROCEDURE — 85025 COMPLETE CBC W/AUTO DIFF WBC: CPT

## 2019-01-30 PROCEDURE — 2580000003 HC RX 258: Performed by: FAMILY MEDICINE

## 2019-01-30 PROCEDURE — 80048 BASIC METABOLIC PNL TOTAL CA: CPT

## 2019-01-30 PROCEDURE — 6370000000 HC RX 637 (ALT 250 FOR IP): Performed by: FAMILY MEDICINE

## 2019-01-30 PROCEDURE — 84145 PROCALCITONIN (PCT): CPT

## 2019-01-30 PROCEDURE — 1200000000 HC SEMI PRIVATE

## 2019-01-30 RX ADMIN — VANCOMYCIN HYDROCHLORIDE 1000 MG: 1 INJECTION, SOLUTION INTRAVENOUS at 22:27

## 2019-01-30 RX ADMIN — SOTALOL HYDROCHLORIDE 80 MG: 80 TABLET ORAL at 22:26

## 2019-01-30 RX ADMIN — ONDANSETRON 4 MG: 2 INJECTION INTRAMUSCULAR; INTRAVENOUS at 01:56

## 2019-01-30 RX ADMIN — ONDANSETRON 4 MG: 2 INJECTION INTRAMUSCULAR; INTRAVENOUS at 21:23

## 2019-01-30 RX ADMIN — RANOLAZINE 1000 MG: 500 TABLET, FILM COATED, EXTENDED RELEASE ORAL at 22:25

## 2019-01-30 RX ADMIN — Medication 800 MG: at 22:26

## 2019-01-30 RX ADMIN — ATORVASTATIN CALCIUM 80 MG: 40 TABLET, FILM COATED ORAL at 22:26

## 2019-01-30 RX ADMIN — DULOXETINE 60 MG: 30 CAPSULE, DELAYED RELEASE ORAL at 22:26

## 2019-01-30 RX ADMIN — SODIUM CHLORIDE: 9 INJECTION, SOLUTION INTRAVENOUS at 23:34

## 2019-01-30 RX ADMIN — SOTALOL HYDROCHLORIDE 80 MG: 80 TABLET ORAL at 08:08

## 2019-01-30 RX ADMIN — GABAPENTIN 300 MG: 300 CAPSULE ORAL at 08:08

## 2019-01-30 RX ADMIN — Medication 800 MG: at 08:08

## 2019-01-30 RX ADMIN — ENOXAPARIN SODIUM 40 MG: 100 INJECTION SUBCUTANEOUS at 08:08

## 2019-01-30 RX ADMIN — OXYCODONE HYDROCHLORIDE 15 MG: 10 TABLET ORAL at 18:12

## 2019-01-30 RX ADMIN — ONDANSETRON 4 MG: 2 INJECTION INTRAMUSCULAR; INTRAVENOUS at 09:03

## 2019-01-30 RX ADMIN — CLOPIDOGREL BISULFATE 75 MG: 75 TABLET ORAL at 08:08

## 2019-01-30 RX ADMIN — RANOLAZINE 1000 MG: 500 TABLET, FILM COATED, EXTENDED RELEASE ORAL at 08:08

## 2019-01-30 RX ADMIN — ONDANSETRON 4 MG: 2 INJECTION INTRAMUSCULAR; INTRAVENOUS at 15:02

## 2019-01-30 RX ADMIN — OXYCODONE HYDROCHLORIDE 15 MG: 10 TABLET ORAL at 01:56

## 2019-01-30 RX ADMIN — ASPIRIN 81 MG CHEWABLE TABLET 81 MG: 81 TABLET CHEWABLE at 08:08

## 2019-01-30 RX ADMIN — FERROUS GLUCONATE TAB 324 MG (37.5 MG ELEMENTAL IRON) 324 MG: 324 (37.5 FE) TAB at 08:14

## 2019-01-30 RX ADMIN — OXYCODONE HYDROCHLORIDE 15 MG: 10 TABLET ORAL at 11:49

## 2019-01-30 RX ADMIN — GABAPENTIN 300 MG: 300 CAPSULE ORAL at 12:58

## 2019-01-30 RX ADMIN — GABAPENTIN 300 MG: 300 CAPSULE ORAL at 22:26

## 2019-01-30 RX ADMIN — OXYCODONE HYDROCHLORIDE 15 MG: 10 TABLET ORAL at 09:03

## 2019-01-30 RX ADMIN — OXYCODONE HYDROCHLORIDE 15 MG: 10 TABLET ORAL at 05:41

## 2019-01-30 RX ADMIN — VANCOMYCIN HYDROCHLORIDE 1000 MG: 1 INJECTION, SOLUTION INTRAVENOUS at 11:44

## 2019-01-30 RX ADMIN — DULOXETINE 60 MG: 30 CAPSULE, DELAYED RELEASE ORAL at 08:07

## 2019-01-30 RX ADMIN — FERROUS GLUCONATE TAB 324 MG (37.5 MG ELEMENTAL IRON) 324 MG: 324 (37.5 FE) TAB at 22:25

## 2019-01-30 RX ADMIN — OXYCODONE HYDROCHLORIDE 15 MG: 10 TABLET ORAL at 21:23

## 2019-01-30 RX ADMIN — OXYCODONE HYDROCHLORIDE 15 MG: 10 TABLET ORAL at 15:02

## 2019-01-30 RX ADMIN — LEVOTHYROXINE SODIUM 137 MCG: 25 TABLET ORAL at 08:07

## 2019-01-30 ASSESSMENT — PAIN SCALES - GENERAL
PAINLEVEL_OUTOF10: 9
PAINLEVEL_OUTOF10: 9
PAINLEVEL_OUTOF10: 0
PAINLEVEL_OUTOF10: 8
PAINLEVEL_OUTOF10: 9
PAINLEVEL_OUTOF10: 0
PAINLEVEL_OUTOF10: 2
PAINLEVEL_OUTOF10: 9
PAINLEVEL_OUTOF10: 9
PAINLEVEL_OUTOF10: 6
PAINLEVEL_OUTOF10: 0

## 2019-01-30 ASSESSMENT — PAIN DESCRIPTION - PAIN TYPE: TYPE: ACUTE PAIN

## 2019-01-30 ASSESSMENT — PAIN DESCRIPTION - ORIENTATION: ORIENTATION: RIGHT;LEFT

## 2019-01-30 ASSESSMENT — PAIN DESCRIPTION - LOCATION: LOCATION: ABDOMEN;HIP

## 2019-01-31 LAB
ANION GAP SERPL CALCULATED.3IONS-SCNC: 7 MMOL/L (ref 4–16)
BUN BLDV-MCNC: 11 MG/DL (ref 6–23)
CALCIUM SERPL-MCNC: 7.9 MG/DL (ref 8.3–10.6)
CHLORIDE BLD-SCNC: 105 MMOL/L (ref 99–110)
CO2: 30 MMOL/L (ref 21–32)
CREAT SERPL-MCNC: 0.5 MG/DL (ref 0.6–1.1)
CULTURE: NORMAL
CULTURE: NORMAL
GFR AFRICAN AMERICAN: >60 ML/MIN/1.73M2
GFR NON-AFRICAN AMERICAN: >60 ML/MIN/1.73M2
GLUCOSE BLD-MCNC: 109 MG/DL (ref 70–99)
Lab: NORMAL
Lab: NORMAL
POTASSIUM SERPL-SCNC: 4 MMOL/L (ref 3.5–5.1)
PROCALCITONIN: 0.05
REPORT STATUS: NORMAL
REPORT STATUS: NORMAL
SODIUM BLD-SCNC: 142 MMOL/L (ref 135–145)
SPECIMEN: NORMAL
SPECIMEN: NORMAL

## 2019-01-31 PROCEDURE — 2580000003 HC RX 258: Performed by: FAMILY MEDICINE

## 2019-01-31 PROCEDURE — 6360000002 HC RX W HCPCS: Performed by: FAMILY MEDICINE

## 2019-01-31 PROCEDURE — 6370000000 HC RX 637 (ALT 250 FOR IP): Performed by: FAMILY MEDICINE

## 2019-01-31 PROCEDURE — 1200000000 HC SEMI PRIVATE

## 2019-01-31 PROCEDURE — 80048 BASIC METABOLIC PNL TOTAL CA: CPT

## 2019-01-31 PROCEDURE — 6360000002 HC RX W HCPCS: Performed by: HOSPITALIST

## 2019-01-31 PROCEDURE — 36415 COLL VENOUS BLD VENIPUNCTURE: CPT

## 2019-01-31 PROCEDURE — 6370000000 HC RX 637 (ALT 250 FOR IP): Performed by: HOSPITALIST

## 2019-01-31 PROCEDURE — 94761 N-INVAS EAR/PLS OXIMETRY MLT: CPT

## 2019-01-31 RX ORDER — CEFDINIR 300 MG/1
300 CAPSULE ORAL EVERY 12 HOURS SCHEDULED
Status: DISCONTINUED | OUTPATIENT
Start: 2019-01-31 | End: 2019-02-02

## 2019-01-31 RX ADMIN — SOTALOL HYDROCHLORIDE 80 MG: 80 TABLET ORAL at 21:04

## 2019-01-31 RX ADMIN — OXYCODONE HYDROCHLORIDE 15 MG: 10 TABLET ORAL at 10:41

## 2019-01-31 RX ADMIN — CLOPIDOGREL BISULFATE 75 MG: 75 TABLET ORAL at 08:07

## 2019-01-31 RX ADMIN — OXYCODONE HYDROCHLORIDE 15 MG: 10 TABLET ORAL at 14:49

## 2019-01-31 RX ADMIN — SODIUM CHLORIDE: 9 INJECTION, SOLUTION INTRAVENOUS at 10:45

## 2019-01-31 RX ADMIN — OXYCODONE HYDROCHLORIDE 15 MG: 10 TABLET ORAL at 07:12

## 2019-01-31 RX ADMIN — ASPIRIN 81 MG CHEWABLE TABLET 81 MG: 81 TABLET CHEWABLE at 08:07

## 2019-01-31 RX ADMIN — Medication 800 MG: at 21:05

## 2019-01-31 RX ADMIN — Medication 800 MG: at 08:07

## 2019-01-31 RX ADMIN — SOTALOL HYDROCHLORIDE 80 MG: 80 TABLET ORAL at 08:07

## 2019-01-31 RX ADMIN — ONDANSETRON 4 MG: 2 INJECTION INTRAMUSCULAR; INTRAVENOUS at 10:41

## 2019-01-31 RX ADMIN — GABAPENTIN 300 MG: 300 CAPSULE ORAL at 13:12

## 2019-01-31 RX ADMIN — FERROUS GLUCONATE TAB 324 MG (37.5 MG ELEMENTAL IRON) 324 MG: 324 (37.5 FE) TAB at 08:07

## 2019-01-31 RX ADMIN — DULOXETINE 60 MG: 30 CAPSULE, DELAYED RELEASE ORAL at 21:04

## 2019-01-31 RX ADMIN — ONDANSETRON 4 MG: 2 INJECTION INTRAMUSCULAR; INTRAVENOUS at 14:49

## 2019-01-31 RX ADMIN — GABAPENTIN 300 MG: 300 CAPSULE ORAL at 08:07

## 2019-01-31 RX ADMIN — RANOLAZINE 1000 MG: 500 TABLET, FILM COATED, EXTENDED RELEASE ORAL at 21:04

## 2019-01-31 RX ADMIN — GABAPENTIN 300 MG: 300 CAPSULE ORAL at 21:05

## 2019-01-31 RX ADMIN — OXYCODONE HYDROCHLORIDE 15 MG: 10 TABLET ORAL at 00:47

## 2019-01-31 RX ADMIN — RANOLAZINE 1000 MG: 500 TABLET, FILM COATED, EXTENDED RELEASE ORAL at 08:07

## 2019-01-31 RX ADMIN — ATORVASTATIN CALCIUM 80 MG: 40 TABLET, FILM COATED ORAL at 21:05

## 2019-01-31 RX ADMIN — OXYCODONE HYDROCHLORIDE 15 MG: 10 TABLET ORAL at 03:57

## 2019-01-31 RX ADMIN — ENOXAPARIN SODIUM 40 MG: 100 INJECTION SUBCUTANEOUS at 08:06

## 2019-01-31 RX ADMIN — VANCOMYCIN HYDROCHLORIDE 1000 MG: 1 INJECTION, SOLUTION INTRAVENOUS at 10:45

## 2019-01-31 RX ADMIN — ONDANSETRON 4 MG: 2 INJECTION INTRAMUSCULAR; INTRAVENOUS at 03:57

## 2019-01-31 RX ADMIN — DULOXETINE 60 MG: 30 CAPSULE, DELAYED RELEASE ORAL at 08:07

## 2019-01-31 RX ADMIN — MORPHINE SULFATE 2 MG: 4 INJECTION INTRAVENOUS at 21:02

## 2019-01-31 RX ADMIN — LEVOTHYROXINE SODIUM 137 MCG: 25 TABLET ORAL at 05:53

## 2019-01-31 RX ADMIN — OXYCODONE HYDROCHLORIDE 15 MG: 10 TABLET ORAL at 18:41

## 2019-01-31 ASSESSMENT — PAIN SCALES - GENERAL
PAINLEVEL_OUTOF10: 8
PAINLEVEL_OUTOF10: 4
PAINLEVEL_OUTOF10: 9
PAINLEVEL_OUTOF10: 8
PAINLEVEL_OUTOF10: 9
PAINLEVEL_OUTOF10: 9
PAINLEVEL_OUTOF10: 8
PAINLEVEL_OUTOF10: 8

## 2019-02-01 ENCOUNTER — APPOINTMENT (OUTPATIENT)
Dept: ULTRASOUND IMAGING | Age: 59
DRG: 871 | End: 2019-02-01
Payer: COMMERCIAL

## 2019-02-01 LAB
ANION GAP SERPL CALCULATED.3IONS-SCNC: 6 MMOL/L (ref 4–16)
BUN BLDV-MCNC: 11 MG/DL (ref 6–23)
CALCIUM SERPL-MCNC: 8 MG/DL (ref 8.3–10.6)
CHLORIDE BLD-SCNC: 107 MMOL/L (ref 99–110)
CO2: 27 MMOL/L (ref 21–32)
CREAT SERPL-MCNC: 0.5 MG/DL (ref 0.6–1.1)
FOLATE: >20 NG/ML (ref 3.1–17.5)
GFR AFRICAN AMERICAN: >60 ML/MIN/1.73M2
GFR NON-AFRICAN AMERICAN: >60 ML/MIN/1.73M2
GLUCOSE BLD-MCNC: 98 MG/DL (ref 70–99)
POTASSIUM SERPL-SCNC: 4.9 MMOL/L (ref 3.5–5.1)
SODIUM BLD-SCNC: 140 MMOL/L (ref 135–145)
VITAMIN B-12: 287.3 PG/ML (ref 211–911)

## 2019-02-01 PROCEDURE — 99232 SBSQ HOSP IP/OBS MODERATE 35: CPT | Performed by: SURGERY

## 2019-02-01 PROCEDURE — 6360000002 HC RX W HCPCS: Performed by: HOSPITALIST

## 2019-02-01 PROCEDURE — 94761 N-INVAS EAR/PLS OXIMETRY MLT: CPT

## 2019-02-01 PROCEDURE — 6360000002 HC RX W HCPCS: Performed by: FAMILY MEDICINE

## 2019-02-01 PROCEDURE — 80048 BASIC METABOLIC PNL TOTAL CA: CPT

## 2019-02-01 PROCEDURE — 6370000000 HC RX 637 (ALT 250 FOR IP): Performed by: FAMILY MEDICINE

## 2019-02-01 PROCEDURE — 6370000000 HC RX 637 (ALT 250 FOR IP): Performed by: INTERNAL MEDICINE

## 2019-02-01 PROCEDURE — 6370000000 HC RX 637 (ALT 250 FOR IP): Performed by: HOSPITALIST

## 2019-02-01 PROCEDURE — 36415 COLL VENOUS BLD VENIPUNCTURE: CPT

## 2019-02-01 PROCEDURE — 2580000003 HC RX 258: Performed by: FAMILY MEDICINE

## 2019-02-01 PROCEDURE — 82607 VITAMIN B-12: CPT

## 2019-02-01 PROCEDURE — 76642 ULTRASOUND BREAST LIMITED: CPT

## 2019-02-01 PROCEDURE — 82746 ASSAY OF FOLIC ACID SERUM: CPT

## 2019-02-01 PROCEDURE — 1200000000 HC SEMI PRIVATE

## 2019-02-01 RX ORDER — PROMETHAZINE HYDROCHLORIDE 25 MG/1
25 TABLET ORAL EVERY 6 HOURS PRN
Status: DISCONTINUED | OUTPATIENT
Start: 2019-02-01 | End: 2019-02-02 | Stop reason: HOSPADM

## 2019-02-01 RX ADMIN — OXYCODONE HYDROCHLORIDE 15 MG: 10 TABLET ORAL at 17:55

## 2019-02-01 RX ADMIN — FERROUS GLUCONATE TAB 324 MG (37.5 MG ELEMENTAL IRON) 324 MG: 324 (37.5 FE) TAB at 10:13

## 2019-02-01 RX ADMIN — CEFDINIR 300 MG: 300 CAPSULE ORAL at 21:58

## 2019-02-01 RX ADMIN — SODIUM CHLORIDE: 9 INJECTION, SOLUTION INTRAVENOUS at 00:45

## 2019-02-01 RX ADMIN — LEVETIRACETAM 625 MG: 500 TABLET, FILM COATED ORAL at 21:58

## 2019-02-01 RX ADMIN — OXYCODONE HYDROCHLORIDE 15 MG: 10 TABLET ORAL at 06:26

## 2019-02-01 RX ADMIN — DULOXETINE 60 MG: 30 CAPSULE, DELAYED RELEASE ORAL at 10:14

## 2019-02-01 RX ADMIN — ENOXAPARIN SODIUM 40 MG: 100 INJECTION SUBCUTANEOUS at 10:14

## 2019-02-01 RX ADMIN — CEFDINIR 300 MG: 300 CAPSULE ORAL at 10:14

## 2019-02-01 RX ADMIN — SOTALOL HYDROCHLORIDE 80 MG: 80 TABLET ORAL at 21:47

## 2019-02-01 RX ADMIN — GABAPENTIN 300 MG: 300 CAPSULE ORAL at 21:47

## 2019-02-01 RX ADMIN — GABAPENTIN 300 MG: 300 CAPSULE ORAL at 13:51

## 2019-02-01 RX ADMIN — OXYCODONE HYDROCHLORIDE 15 MG: 10 TABLET ORAL at 01:06

## 2019-02-01 RX ADMIN — Medication 800 MG: at 21:47

## 2019-02-01 RX ADMIN — LEVETIRACETAM 625 MG: 500 TABLET, FILM COATED ORAL at 01:06

## 2019-02-01 RX ADMIN — LEVETIRACETAM 625 MG: 500 TABLET, FILM COATED ORAL at 10:14

## 2019-02-01 RX ADMIN — Medication 800 MG: at 10:14

## 2019-02-01 RX ADMIN — OXYCODONE HYDROCHLORIDE 15 MG: 10 TABLET ORAL at 10:13

## 2019-02-01 RX ADMIN — CLOPIDOGREL BISULFATE 75 MG: 75 TABLET ORAL at 10:14

## 2019-02-01 RX ADMIN — RANOLAZINE 1000 MG: 500 TABLET, FILM COATED, EXTENDED RELEASE ORAL at 10:14

## 2019-02-01 RX ADMIN — FERROUS GLUCONATE TAB 324 MG (37.5 MG ELEMENTAL IRON) 324 MG: 324 (37.5 FE) TAB at 21:59

## 2019-02-01 RX ADMIN — OXYCODONE HYDROCHLORIDE 15 MG: 10 TABLET ORAL at 21:46

## 2019-02-01 RX ADMIN — LEVOTHYROXINE SODIUM 137 MCG: 25 TABLET ORAL at 06:22

## 2019-02-01 RX ADMIN — GABAPENTIN 300 MG: 300 CAPSULE ORAL at 10:14

## 2019-02-01 RX ADMIN — ONDANSETRON 4 MG: 2 INJECTION INTRAMUSCULAR; INTRAVENOUS at 16:27

## 2019-02-01 RX ADMIN — RANOLAZINE 1000 MG: 500 TABLET, FILM COATED, EXTENDED RELEASE ORAL at 21:46

## 2019-02-01 RX ADMIN — OXYCODONE HYDROCHLORIDE 15 MG: 10 TABLET ORAL at 13:51

## 2019-02-01 RX ADMIN — DULOXETINE 60 MG: 30 CAPSULE, DELAYED RELEASE ORAL at 21:47

## 2019-02-01 RX ADMIN — SOTALOL HYDROCHLORIDE 80 MG: 80 TABLET ORAL at 10:13

## 2019-02-01 RX ADMIN — FERROUS GLUCONATE TAB 324 MG (37.5 MG ELEMENTAL IRON) 324 MG: 324 (37.5 FE) TAB at 01:07

## 2019-02-01 RX ADMIN — PROMETHAZINE HYDROCHLORIDE 25 MG: 25 TABLET ORAL at 21:47

## 2019-02-01 RX ADMIN — ASPIRIN 81 MG CHEWABLE TABLET 81 MG: 81 TABLET CHEWABLE at 10:13

## 2019-02-01 ASSESSMENT — PAIN DESCRIPTION - ONSET
ONSET: ON-GOING

## 2019-02-01 ASSESSMENT — PAIN DESCRIPTION - LOCATION
LOCATION: GENERALIZED

## 2019-02-01 ASSESSMENT — PAIN SCALES - GENERAL
PAINLEVEL_OUTOF10: 9
PAINLEVEL_OUTOF10: 8
PAINLEVEL_OUTOF10: 0
PAINLEVEL_OUTOF10: 8
PAINLEVEL_OUTOF10: 9
PAINLEVEL_OUTOF10: 9
PAINLEVEL_OUTOF10: 0
PAINLEVEL_OUTOF10: 0
PAINLEVEL_OUTOF10: 8
PAINLEVEL_OUTOF10: 8

## 2019-02-01 ASSESSMENT — PAIN DESCRIPTION - FREQUENCY
FREQUENCY: CONTINUOUS
FREQUENCY: CONTINUOUS
FREQUENCY: INTERMITTENT

## 2019-02-01 ASSESSMENT — PAIN DESCRIPTION - PAIN TYPE
TYPE: ACUTE PAIN
TYPE: CHRONIC PAIN
TYPE: ACUTE PAIN

## 2019-02-01 ASSESSMENT — PAIN DESCRIPTION - DESCRIPTORS
DESCRIPTORS: ACHING
DESCRIPTORS: ACHING
DESCRIPTORS: DISCOMFORT

## 2019-02-01 ASSESSMENT — PAIN - FUNCTIONAL ASSESSMENT
PAIN_FUNCTIONAL_ASSESSMENT: ACTIVITIES ARE NOT PREVENTED

## 2019-02-01 ASSESSMENT — PAIN DESCRIPTION - PROGRESSION
CLINICAL_PROGRESSION: NOT CHANGED
CLINICAL_PROGRESSION: NOT CHANGED

## 2019-02-02 VITALS
DIASTOLIC BLOOD PRESSURE: 58 MMHG | SYSTOLIC BLOOD PRESSURE: 106 MMHG | HEART RATE: 59 BPM | HEIGHT: 66 IN | OXYGEN SATURATION: 94 % | WEIGHT: 161.1 LBS | RESPIRATION RATE: 18 BRPM | TEMPERATURE: 98.7 F | BODY MASS INDEX: 25.89 KG/M2

## 2019-02-02 LAB
ANION GAP SERPL CALCULATED.3IONS-SCNC: 7 MMOL/L (ref 4–16)
BUN BLDV-MCNC: 12 MG/DL (ref 6–23)
CALCIUM SERPL-MCNC: 8.1 MG/DL (ref 8.3–10.6)
CHLORIDE BLD-SCNC: 105 MMOL/L (ref 99–110)
CO2: 29 MMOL/L (ref 21–32)
CREAT SERPL-MCNC: 0.5 MG/DL (ref 0.6–1.1)
GFR AFRICAN AMERICAN: >60 ML/MIN/1.73M2
GFR NON-AFRICAN AMERICAN: >60 ML/MIN/1.73M2
GLUCOSE BLD-MCNC: 102 MG/DL (ref 70–99)
POTASSIUM SERPL-SCNC: 4.4 MMOL/L (ref 3.5–5.1)
SODIUM BLD-SCNC: 141 MMOL/L (ref 135–145)

## 2019-02-02 PROCEDURE — 6360000002 HC RX W HCPCS: Performed by: FAMILY MEDICINE

## 2019-02-02 PROCEDURE — 6370000000 HC RX 637 (ALT 250 FOR IP): Performed by: FAMILY MEDICINE

## 2019-02-02 PROCEDURE — 6370000000 HC RX 637 (ALT 250 FOR IP): Performed by: HOSPITALIST

## 2019-02-02 PROCEDURE — 80048 BASIC METABOLIC PNL TOTAL CA: CPT

## 2019-02-02 PROCEDURE — 36415 COLL VENOUS BLD VENIPUNCTURE: CPT

## 2019-02-02 PROCEDURE — 6370000000 HC RX 637 (ALT 250 FOR IP): Performed by: INTERNAL MEDICINE

## 2019-02-02 RX ORDER — LEVETIRACETAM 750 MG/1
750 TABLET ORAL 2 TIMES DAILY
COMMUNITY
End: 2021-01-22

## 2019-02-02 RX ADMIN — OXYCODONE HYDROCHLORIDE 15 MG: 10 TABLET ORAL at 21:24

## 2019-02-02 RX ADMIN — GABAPENTIN 300 MG: 300 CAPSULE ORAL at 21:18

## 2019-02-02 RX ADMIN — CEFDINIR 300 MG: 300 CAPSULE ORAL at 08:56

## 2019-02-02 RX ADMIN — LEVOTHYROXINE SODIUM 137 MCG: 25 TABLET ORAL at 07:57

## 2019-02-02 RX ADMIN — OXYCODONE HYDROCHLORIDE 15 MG: 10 TABLET ORAL at 18:32

## 2019-02-02 RX ADMIN — GABAPENTIN 300 MG: 300 CAPSULE ORAL at 12:20

## 2019-02-02 RX ADMIN — RANOLAZINE 1000 MG: 500 TABLET, FILM COATED, EXTENDED RELEASE ORAL at 21:18

## 2019-02-02 RX ADMIN — Medication 800 MG: at 21:19

## 2019-02-02 RX ADMIN — CLOPIDOGREL BISULFATE 75 MG: 75 TABLET ORAL at 08:53

## 2019-02-02 RX ADMIN — FERROUS GLUCONATE TAB 324 MG (37.5 MG ELEMENTAL IRON) 324 MG: 324 (37.5 FE) TAB at 21:19

## 2019-02-02 RX ADMIN — DULOXETINE 60 MG: 30 CAPSULE, DELAYED RELEASE ORAL at 08:53

## 2019-02-02 RX ADMIN — ERGOCALCIFEROL 50000 UNITS: 1.25 CAPSULE ORAL at 08:56

## 2019-02-02 RX ADMIN — ENOXAPARIN SODIUM 40 MG: 100 INJECTION SUBCUTANEOUS at 08:53

## 2019-02-02 RX ADMIN — Medication 800 MG: at 08:53

## 2019-02-02 RX ADMIN — LEVETIRACETAM 625 MG: 500 TABLET, FILM COATED ORAL at 21:22

## 2019-02-02 RX ADMIN — OXYCODONE HYDROCHLORIDE 15 MG: 10 TABLET ORAL at 04:23

## 2019-02-02 RX ADMIN — OXYCODONE HYDROCHLORIDE 15 MG: 10 TABLET ORAL at 08:53

## 2019-02-02 RX ADMIN — LEVETIRACETAM 625 MG: 500 TABLET, FILM COATED ORAL at 08:57

## 2019-02-02 RX ADMIN — SOTALOL HYDROCHLORIDE 80 MG: 80 TABLET ORAL at 08:53

## 2019-02-02 RX ADMIN — FERROUS GLUCONATE TAB 324 MG (37.5 MG ELEMENTAL IRON) 324 MG: 324 (37.5 FE) TAB at 08:57

## 2019-02-02 RX ADMIN — RANOLAZINE 1000 MG: 500 TABLET, FILM COATED, EXTENDED RELEASE ORAL at 08:53

## 2019-02-02 RX ADMIN — GABAPENTIN 300 MG: 300 CAPSULE ORAL at 08:53

## 2019-02-02 RX ADMIN — OXYCODONE HYDROCHLORIDE 15 MG: 10 TABLET ORAL at 15:24

## 2019-02-02 RX ADMIN — ASPIRIN 81 MG CHEWABLE TABLET 81 MG: 81 TABLET CHEWABLE at 08:53

## 2019-02-02 RX ADMIN — OXYCODONE HYDROCHLORIDE 15 MG: 10 TABLET ORAL at 12:20

## 2019-02-02 RX ADMIN — SOTALOL HYDROCHLORIDE 80 MG: 80 TABLET ORAL at 21:18

## 2019-02-02 RX ADMIN — DULOXETINE 60 MG: 30 CAPSULE, DELAYED RELEASE ORAL at 21:18

## 2019-02-02 ASSESSMENT — PAIN SCALES - GENERAL
PAINLEVEL_OUTOF10: 0
PAINLEVEL_OUTOF10: 9
PAINLEVEL_OUTOF10: 9
PAINLEVEL_OUTOF10: 8
PAINLEVEL_OUTOF10: 9
PAINLEVEL_OUTOF10: 6
PAINLEVEL_OUTOF10: 9

## 2019-02-02 ASSESSMENT — PAIN DESCRIPTION - DESCRIPTORS: DESCRIPTORS: DISCOMFORT

## 2019-02-02 ASSESSMENT — PAIN DESCRIPTION - FREQUENCY: FREQUENCY: INTERMITTENT

## 2019-02-02 ASSESSMENT — PAIN DESCRIPTION - ONSET: ONSET: ON-GOING

## 2019-02-02 ASSESSMENT — PAIN DESCRIPTION - LOCATION: LOCATION: GENERALIZED

## 2019-02-02 ASSESSMENT — PAIN DESCRIPTION - PAIN TYPE: TYPE: CHRONIC PAIN

## 2019-02-03 ENCOUNTER — HOSPITAL ENCOUNTER (INPATIENT)
Age: 59
LOS: 3 days | Discharge: HOME | DRG: 291 | End: 2019-02-07
Attending: EMERGENCY MEDICINE | Admitting: INTERNAL MEDICINE
Payer: COMMERCIAL

## 2019-02-03 ENCOUNTER — APPOINTMENT (OUTPATIENT)
Dept: GENERAL RADIOLOGY | Age: 59
DRG: 291 | End: 2019-02-03
Payer: COMMERCIAL

## 2019-02-03 DIAGNOSIS — I50.9 ACUTE ON CHRONIC CONGESTIVE HEART FAILURE, UNSPECIFIED HEART FAILURE TYPE (HCC): Primary | ICD-10-CM

## 2019-02-03 DIAGNOSIS — R07.9 CHEST PAIN, UNSPECIFIED TYPE: ICD-10-CM

## 2019-02-03 DIAGNOSIS — R09.02 HYPOXIA: ICD-10-CM

## 2019-02-03 PROBLEM — I50.23 ACUTE ON CHRONIC SYSTOLIC CHF (CONGESTIVE HEART FAILURE), NYHA CLASS 3 (HCC): Status: ACTIVE | Noted: 2019-02-03

## 2019-02-03 LAB
ALBUMIN SERPL-MCNC: 2.9 GM/DL (ref 3.4–5)
ALP BLD-CCNC: 65 IU/L (ref 40–129)
ALT SERPL-CCNC: 7 U/L (ref 10–40)
ANION GAP SERPL CALCULATED.3IONS-SCNC: 10 MMOL/L (ref 4–16)
AST SERPL-CCNC: 18 IU/L (ref 15–37)
BASOPHILS ABSOLUTE: 0.1 K/CU MM
BASOPHILS RELATIVE PERCENT: 0.8 % (ref 0–1)
BILIRUB SERPL-MCNC: 0.1 MG/DL (ref 0–1)
BUN BLDV-MCNC: 10 MG/DL (ref 6–23)
CALCIUM SERPL-MCNC: 7.5 MG/DL (ref 8.3–10.6)
CHLORIDE BLD-SCNC: 110 MMOL/L (ref 99–110)
CO2: 21 MMOL/L (ref 21–32)
CREAT SERPL-MCNC: 0.4 MG/DL (ref 0.6–1.1)
DIFFERENTIAL TYPE: ABNORMAL
EOSINOPHILS ABSOLUTE: 0 K/CU MM
EOSINOPHILS RELATIVE PERCENT: 0.2 % (ref 0–3)
GFR AFRICAN AMERICAN: >60 ML/MIN/1.73M2
GFR NON-AFRICAN AMERICAN: >60 ML/MIN/1.73M2
GLUCOSE BLD-MCNC: 84 MG/DL (ref 70–99)
HCT VFR BLD CALC: 31.6 % (ref 37–47)
HEMOGLOBIN: 9.7 GM/DL (ref 12.5–16)
IMMATURE NEUTROPHIL %: 0.3 % (ref 0–0.43)
LYMPHOCYTES ABSOLUTE: 1.4 K/CU MM
LYMPHOCYTES RELATIVE PERCENT: 15.7 % (ref 24–44)
MCH RBC QN AUTO: 32.4 PG (ref 27–31)
MCHC RBC AUTO-ENTMCNC: 30.7 % (ref 32–36)
MCV RBC AUTO: 105.7 FL (ref 78–100)
MONOCYTES ABSOLUTE: 0.7 K/CU MM
MONOCYTES RELATIVE PERCENT: 7.5 % (ref 0–4)
NUCLEATED RBC %: 0 %
PDW BLD-RTO: 15.7 % (ref 11.7–14.9)
PLATELET # BLD: 328 K/CU MM (ref 140–440)
PMV BLD AUTO: 10.6 FL (ref 7.5–11.1)
POTASSIUM SERPL-SCNC: 4 MMOL/L (ref 3.5–5.1)
PRO-BNP: 5941 PG/ML
RBC # BLD: 2.99 M/CU MM (ref 4.2–5.4)
REASON FOR REJECTION: NORMAL
REJECTED TEST: NORMAL
SEGMENTED NEUTROPHILS ABSOLUTE COUNT: 6.7 K/CU MM
SEGMENTED NEUTROPHILS RELATIVE PERCENT: 75.5 % (ref 36–66)
SODIUM BLD-SCNC: 141 MMOL/L (ref 135–145)
SOURCE: NORMAL
TOTAL IMMATURE NEUTOROPHIL: 0.03 K/CU MM
TOTAL NUCLEATED RBC: 0 K/CU MM
TOTAL PROTEIN: 4.6 GM/DL (ref 6.4–8.2)
TROPONIN T: <0.01 NG/ML
WBC # BLD: 8.9 K/CU MM (ref 4–10.5)

## 2019-02-03 PROCEDURE — 96374 THER/PROPH/DIAG INJ IV PUSH: CPT

## 2019-02-03 PROCEDURE — 80053 COMPREHEN METABOLIC PANEL: CPT

## 2019-02-03 PROCEDURE — 99285 EMERGENCY DEPT VISIT HI MDM: CPT

## 2019-02-03 PROCEDURE — 71046 X-RAY EXAM CHEST 2 VIEWS: CPT

## 2019-02-03 PROCEDURE — 85025 COMPLETE CBC W/AUTO DIFF WBC: CPT

## 2019-02-03 PROCEDURE — 93005 ELECTROCARDIOGRAM TRACING: CPT | Performed by: EMERGENCY MEDICINE

## 2019-02-03 PROCEDURE — 93010 ELECTROCARDIOGRAM REPORT: CPT | Performed by: INTERNAL MEDICINE

## 2019-02-03 PROCEDURE — 6360000002 HC RX W HCPCS: Performed by: PHYSICIAN ASSISTANT

## 2019-02-03 PROCEDURE — 96375 TX/PRO/DX INJ NEW DRUG ADDON: CPT

## 2019-02-03 PROCEDURE — 84484 ASSAY OF TROPONIN QUANT: CPT

## 2019-02-03 PROCEDURE — 83880 ASSAY OF NATRIURETIC PEPTIDE: CPT

## 2019-02-03 RX ORDER — FUROSEMIDE 10 MG/ML
40 INJECTION INTRAMUSCULAR; INTRAVENOUS ONCE
Status: COMPLETED | OUTPATIENT
Start: 2019-02-03 | End: 2019-02-03

## 2019-02-03 RX ORDER — LISINOPRIL 10 MG/1
5 TABLET ORAL DAILY
Status: CANCELLED | OUTPATIENT
Start: 2019-02-04

## 2019-02-03 RX ORDER — MORPHINE SULFATE 4 MG/ML
4 INJECTION, SOLUTION INTRAMUSCULAR; INTRAVENOUS ONCE
Status: COMPLETED | OUTPATIENT
Start: 2019-02-03 | End: 2019-02-03

## 2019-02-03 RX ORDER — ONDANSETRON 2 MG/ML
4 INJECTION INTRAMUSCULAR; INTRAVENOUS ONCE
Status: COMPLETED | OUTPATIENT
Start: 2019-02-03 | End: 2019-02-03

## 2019-02-03 RX ADMIN — FUROSEMIDE 40 MG: 10 INJECTION, SOLUTION INTRAVENOUS at 23:37

## 2019-02-03 RX ADMIN — ONDANSETRON 4 MG: 2 INJECTION INTRAMUSCULAR; INTRAVENOUS at 20:54

## 2019-02-03 RX ADMIN — MORPHINE SULFATE 4 MG: 4 INJECTION INTRAVENOUS at 20:54

## 2019-02-03 ASSESSMENT — PAIN SCALES - GENERAL
PAINLEVEL_OUTOF10: 8
PAINLEVEL_OUTOF10: 9

## 2019-02-03 ASSESSMENT — PAIN DESCRIPTION - LOCATION: LOCATION: CHEST;NECK;BACK

## 2019-02-04 PROBLEM — E43 SEVERE MALNUTRITION (HCC): Chronic | Status: ACTIVE | Noted: 2019-02-04

## 2019-02-04 LAB
ANION GAP SERPL CALCULATED.3IONS-SCNC: 10 MMOL/L (ref 4–16)
BUN BLDV-MCNC: 10 MG/DL (ref 6–23)
CALCIUM SERPL-MCNC: 8.2 MG/DL (ref 8.3–10.6)
CHLORIDE BLD-SCNC: 106 MMOL/L (ref 99–110)
CHOLESTEROL: 162 MG/DL
CO2: 27 MMOL/L (ref 21–32)
CREAT SERPL-MCNC: 0.5 MG/DL (ref 0.6–1.1)
EKG ATRIAL RATE: 66 BPM
EKG ATRIAL RATE: 69 BPM
EKG DIAGNOSIS: NORMAL
EKG DIAGNOSIS: NORMAL
EKG P AXIS: 19 DEGREES
EKG P AXIS: 26 DEGREES
EKG P-R INTERVAL: 128 MS
EKG P-R INTERVAL: 150 MS
EKG Q-T INTERVAL: 440 MS
EKG Q-T INTERVAL: 470 MS
EKG QRS DURATION: 102 MS
EKG QRS DURATION: 102 MS
EKG QTC CALCULATION (BAZETT): 471 MS
EKG QTC CALCULATION (BAZETT): 492 MS
EKG R AXIS: -35 DEGREES
EKG R AXIS: -47 DEGREES
EKG T AXIS: -35 DEGREES
EKG T AXIS: -53 DEGREES
EKG VENTRICULAR RATE: 66 BPM
EKG VENTRICULAR RATE: 69 BPM
GFR AFRICAN AMERICAN: >60 ML/MIN/1.73M2
GFR NON-AFRICAN AMERICAN: >60 ML/MIN/1.73M2
GLUCOSE BLD-MCNC: 107 MG/DL (ref 70–99)
HCT VFR BLD CALC: 28.1 % (ref 37–47)
HDLC SERPL-MCNC: 59 MG/DL
HEMOGLOBIN: 8.7 GM/DL (ref 12.5–16)
LDL CHOLESTEROL DIRECT: 105 MG/DL
LV EF: 38 %
LVEF MODALITY: NORMAL
MAGNESIUM: 2 MG/DL (ref 1.8–2.4)
MCH RBC QN AUTO: 32.6 PG (ref 27–31)
MCHC RBC AUTO-ENTMCNC: 31 % (ref 32–36)
MCV RBC AUTO: 105.2 FL (ref 78–100)
PDW BLD-RTO: 15.8 % (ref 11.7–14.9)
PLATELET # BLD: 299 K/CU MM (ref 140–440)
PMV BLD AUTO: 10.7 FL (ref 7.5–11.1)
POTASSIUM SERPL-SCNC: 3.7 MMOL/L (ref 3.5–5.1)
RBC # BLD: 2.67 M/CU MM (ref 4.2–5.4)
SODIUM BLD-SCNC: 143 MMOL/L (ref 135–145)
TRIGL SERPL-MCNC: 45 MG/DL
WBC # BLD: 5.3 K/CU MM (ref 4–10.5)

## 2019-02-04 PROCEDURE — 80061 LIPID PANEL: CPT

## 2019-02-04 PROCEDURE — 99222 1ST HOSP IP/OBS MODERATE 55: CPT | Performed by: INTERNAL MEDICINE

## 2019-02-04 PROCEDURE — 85027 COMPLETE CBC AUTOMATED: CPT

## 2019-02-04 PROCEDURE — G0378 HOSPITAL OBSERVATION PER HR: HCPCS

## 2019-02-04 PROCEDURE — 2580000003 HC RX 258: Performed by: INTERNAL MEDICINE

## 2019-02-04 PROCEDURE — 83721 ASSAY OF BLOOD LIPOPROTEIN: CPT

## 2019-02-04 PROCEDURE — 93306 TTE W/DOPPLER COMPLETE: CPT

## 2019-02-04 PROCEDURE — 6370000000 HC RX 637 (ALT 250 FOR IP): Performed by: INTERNAL MEDICINE

## 2019-02-04 PROCEDURE — 36415 COLL VENOUS BLD VENIPUNCTURE: CPT

## 2019-02-04 PROCEDURE — 1200000000 HC SEMI PRIVATE

## 2019-02-04 PROCEDURE — 80048 BASIC METABOLIC PNL TOTAL CA: CPT

## 2019-02-04 PROCEDURE — 6360000002 HC RX W HCPCS: Performed by: INTERNAL MEDICINE

## 2019-02-04 PROCEDURE — 83735 ASSAY OF MAGNESIUM: CPT

## 2019-02-04 RX ORDER — FAMOTIDINE 20 MG/1
20 TABLET, FILM COATED ORAL 2 TIMES DAILY
Status: DISCONTINUED | OUTPATIENT
Start: 2019-02-04 | End: 2019-02-07 | Stop reason: HOSPADM

## 2019-02-04 RX ORDER — SENNA AND DOCUSATE SODIUM 50; 8.6 MG/1; MG/1
1 TABLET, FILM COATED ORAL DAILY
Status: DISCONTINUED | OUTPATIENT
Start: 2019-02-04 | End: 2019-02-07 | Stop reason: HOSPADM

## 2019-02-04 RX ORDER — ALBUTEROL SULFATE 90 UG/1
2 AEROSOL, METERED RESPIRATORY (INHALATION) EVERY 6 HOURS PRN
Status: DISCONTINUED | OUTPATIENT
Start: 2019-02-04 | End: 2019-02-07 | Stop reason: HOSPADM

## 2019-02-04 RX ORDER — OMEGA-3-ACID ETHYL ESTERS 1 G/1
1000 CAPSULE, LIQUID FILLED ORAL NIGHTLY
Status: DISCONTINUED | OUTPATIENT
Start: 2019-02-04 | End: 2019-02-07 | Stop reason: HOSPADM

## 2019-02-04 RX ORDER — LISINOPRIL 2.5 MG/1
2.5 TABLET ORAL DAILY
Status: DISCONTINUED | OUTPATIENT
Start: 2019-02-04 | End: 2019-02-07 | Stop reason: HOSPADM

## 2019-02-04 RX ORDER — DULOXETIN HYDROCHLORIDE 30 MG/1
60 CAPSULE, DELAYED RELEASE ORAL 2 TIMES DAILY
Status: DISCONTINUED | OUTPATIENT
Start: 2019-02-04 | End: 2019-02-07 | Stop reason: HOSPADM

## 2019-02-04 RX ORDER — ERGOCALCIFEROL 1.25 MG/1
50000 CAPSULE ORAL
Status: DISCONTINUED | OUTPATIENT
Start: 2019-02-04 | End: 2019-02-07 | Stop reason: HOSPADM

## 2019-02-04 RX ORDER — RANOLAZINE 500 MG/1
1000 TABLET, EXTENDED RELEASE ORAL 2 TIMES DAILY
Status: DISCONTINUED | OUTPATIENT
Start: 2019-02-04 | End: 2019-02-07 | Stop reason: HOSPADM

## 2019-02-04 RX ORDER — ACETAMINOPHEN 325 MG/1
650 TABLET ORAL EVERY 4 HOURS PRN
Status: DISCONTINUED | OUTPATIENT
Start: 2019-02-04 | End: 2019-02-07 | Stop reason: HOSPADM

## 2019-02-04 RX ORDER — SODIUM CHLORIDE 0.9 % (FLUSH) 0.9 %
10 SYRINGE (ML) INJECTION PRN
Status: DISCONTINUED | OUTPATIENT
Start: 2019-02-04 | End: 2019-02-07 | Stop reason: HOSPADM

## 2019-02-04 RX ORDER — NITROGLYCERIN 0.4 MG/1
0.4 TABLET SUBLINGUAL EVERY 5 MIN PRN
Status: DISCONTINUED | OUTPATIENT
Start: 2019-02-04 | End: 2019-02-07 | Stop reason: HOSPADM

## 2019-02-04 RX ORDER — LANOLIN ALCOHOL/MO/W.PET/CERES
1000 CREAM (GRAM) TOPICAL DAILY
Status: DISCONTINUED | OUTPATIENT
Start: 2019-02-04 | End: 2019-02-07 | Stop reason: HOSPADM

## 2019-02-04 RX ORDER — ASPIRIN 81 MG/1
81 TABLET, CHEWABLE ORAL DAILY
Status: DISCONTINUED | OUTPATIENT
Start: 2019-02-04 | End: 2019-02-07 | Stop reason: HOSPADM

## 2019-02-04 RX ORDER — CLOPIDOGREL BISULFATE 75 MG/1
75 TABLET ORAL DAILY
Status: DISCONTINUED | OUTPATIENT
Start: 2019-02-04 | End: 2019-02-07 | Stop reason: HOSPADM

## 2019-02-04 RX ORDER — ATORVASTATIN CALCIUM 40 MG/1
80 TABLET, FILM COATED ORAL NIGHTLY
Status: DISCONTINUED | OUTPATIENT
Start: 2019-02-04 | End: 2019-02-07 | Stop reason: HOSPADM

## 2019-02-04 RX ORDER — GABAPENTIN 300 MG/1
300 CAPSULE ORAL 3 TIMES DAILY
Status: DISCONTINUED | OUTPATIENT
Start: 2019-02-04 | End: 2019-02-07 | Stop reason: HOSPADM

## 2019-02-04 RX ORDER — SODIUM CHLORIDE 0.9 % (FLUSH) 0.9 %
10 SYRINGE (ML) INJECTION EVERY 12 HOURS SCHEDULED
Status: DISCONTINUED | OUTPATIENT
Start: 2019-02-04 | End: 2019-02-07 | Stop reason: HOSPADM

## 2019-02-04 RX ORDER — FERROUS GLUCONATE 324(37.5)
324 TABLET ORAL 2 TIMES DAILY
Status: DISCONTINUED | OUTPATIENT
Start: 2019-02-04 | End: 2019-02-07 | Stop reason: HOSPADM

## 2019-02-04 RX ORDER — LEVETIRACETAM 250 MG/1
750 TABLET ORAL 2 TIMES DAILY
Status: DISCONTINUED | OUTPATIENT
Start: 2019-02-04 | End: 2019-02-07 | Stop reason: HOSPADM

## 2019-02-04 RX ORDER — SOTALOL HYDROCHLORIDE 80 MG/1
80 TABLET ORAL 2 TIMES DAILY
Status: DISCONTINUED | OUTPATIENT
Start: 2019-02-04 | End: 2019-02-07 | Stop reason: HOSPADM

## 2019-02-04 RX ORDER — ONDANSETRON 2 MG/ML
4 INJECTION INTRAMUSCULAR; INTRAVENOUS EVERY 6 HOURS PRN
Status: DISCONTINUED | OUTPATIENT
Start: 2019-02-04 | End: 2019-02-07 | Stop reason: HOSPADM

## 2019-02-04 RX ORDER — LANOLIN ALCOHOL/MO/W.PET/CERES
6 CREAM (GRAM) TOPICAL NIGHTLY PRN
Status: DISCONTINUED | OUTPATIENT
Start: 2019-02-04 | End: 2019-02-07 | Stop reason: HOSPADM

## 2019-02-04 RX ORDER — FUROSEMIDE 10 MG/ML
40 INJECTION INTRAMUSCULAR; INTRAVENOUS 2 TIMES DAILY
Status: DISCONTINUED | OUTPATIENT
Start: 2019-02-04 | End: 2019-02-07 | Stop reason: HOSPADM

## 2019-02-04 RX ADMIN — OXYCODONE HYDROCHLORIDE 15 MG: 10 TABLET ORAL at 01:27

## 2019-02-04 RX ADMIN — LEVETIRACETAM 750 MG: 250 TABLET, FILM COATED ORAL at 21:11

## 2019-02-04 RX ADMIN — SOTALOL HYDROCHLORIDE 80 MG: 80 TABLET ORAL at 21:11

## 2019-02-04 RX ADMIN — SENNOSIDES AND DOCUSATE SODIUM 1 TABLET: 8.6; 5 TABLET ORAL at 08:23

## 2019-02-04 RX ADMIN — OXYCODONE HYDROCHLORIDE 15 MG: 10 TABLET ORAL at 05:46

## 2019-02-04 RX ADMIN — SODIUM CHLORIDE, PRESERVATIVE FREE 10 ML: 5 INJECTION INTRAVENOUS at 08:24

## 2019-02-04 RX ADMIN — RANOLAZINE 1000 MG: 500 TABLET, FILM COATED, EXTENDED RELEASE ORAL at 01:27

## 2019-02-04 RX ADMIN — RANOLAZINE 1000 MG: 500 TABLET, FILM COATED, EXTENDED RELEASE ORAL at 21:10

## 2019-02-04 RX ADMIN — ENOXAPARIN SODIUM 40 MG: 100 INJECTION SUBCUTANEOUS at 08:24

## 2019-02-04 RX ADMIN — FAMOTIDINE 20 MG: 20 TABLET, FILM COATED ORAL at 01:27

## 2019-02-04 RX ADMIN — FAMOTIDINE 20 MG: 20 TABLET, FILM COATED ORAL at 08:33

## 2019-02-04 RX ADMIN — LEVOTHYROXINE SODIUM 137 MCG: 25 TABLET ORAL at 05:46

## 2019-02-04 RX ADMIN — FERROUS GLUCONATE TAB 324 MG (37.5 MG ELEMENTAL IRON) 324 MG: 324 (37.5 FE) TAB at 21:10

## 2019-02-04 RX ADMIN — FERROUS GLUCONATE TAB 324 MG (37.5 MG ELEMENTAL IRON) 324 MG: 324 (37.5 FE) TAB at 08:22

## 2019-02-04 RX ADMIN — GABAPENTIN 300 MG: 300 CAPSULE ORAL at 12:55

## 2019-02-04 RX ADMIN — OMEGA-3-ACID ETHYL ESTERS 1000 MG: 1 CAPSULE, LIQUID FILLED ORAL at 21:10

## 2019-02-04 RX ADMIN — OXYCODONE HYDROCHLORIDE 15 MG: 10 TABLET ORAL at 12:52

## 2019-02-04 RX ADMIN — OXYCODONE HYDROCHLORIDE 15 MG: 10 TABLET ORAL at 21:54

## 2019-02-04 RX ADMIN — ERGOCALCIFEROL 50000 UNITS: 1.25 CAPSULE ORAL at 08:34

## 2019-02-04 RX ADMIN — SODIUM CHLORIDE, PRESERVATIVE FREE 10 ML: 5 INJECTION INTRAVENOUS at 21:14

## 2019-02-04 RX ADMIN — OXYCODONE HYDROCHLORIDE 15 MG: 10 TABLET ORAL at 18:52

## 2019-02-04 RX ADMIN — GABAPENTIN 300 MG: 300 CAPSULE ORAL at 21:10

## 2019-02-04 RX ADMIN — FAMOTIDINE 20 MG: 20 TABLET, FILM COATED ORAL at 21:10

## 2019-02-04 RX ADMIN — SOTALOL HYDROCHLORIDE 80 MG: 80 TABLET ORAL at 08:23

## 2019-02-04 RX ADMIN — DULOXETINE HYDROCHLORIDE 60 MG: 30 CAPSULE, DELAYED RELEASE ORAL at 08:33

## 2019-02-04 RX ADMIN — SOTALOL HYDROCHLORIDE 80 MG: 80 TABLET ORAL at 01:27

## 2019-02-04 RX ADMIN — ASPIRIN 81 MG 81 MG: 81 TABLET ORAL at 08:23

## 2019-02-04 RX ADMIN — ATORVASTATIN CALCIUM 80 MG: 40 TABLET, FILM COATED ORAL at 01:27

## 2019-02-04 RX ADMIN — METOPROLOL TARTRATE 12.5 MG: 25 TABLET ORAL at 01:34

## 2019-02-04 RX ADMIN — CLOPIDOGREL BISULFATE 75 MG: 75 TABLET, FILM COATED ORAL at 08:23

## 2019-02-04 RX ADMIN — FERROUS GLUCONATE TAB 324 MG (37.5 MG ELEMENTAL IRON) 324 MG: 324 (37.5 FE) TAB at 01:27

## 2019-02-04 RX ADMIN — CYANOCOBALAMIN TAB 1000 MCG 1000 MCG: 1000 TAB at 08:23

## 2019-02-04 RX ADMIN — FUROSEMIDE 40 MG: 10 INJECTION, SOLUTION INTRAMUSCULAR; INTRAVENOUS at 09:54

## 2019-02-04 RX ADMIN — OMEGA-3-ACID ETHYL ESTERS 1000 MG: 1 CAPSULE, LIQUID FILLED ORAL at 01:34

## 2019-02-04 RX ADMIN — DULOXETINE HYDROCHLORIDE 60 MG: 30 CAPSULE, DELAYED RELEASE ORAL at 21:09

## 2019-02-04 RX ADMIN — LEVETIRACETAM 750 MG: 250 TABLET, FILM COATED ORAL at 08:24

## 2019-02-04 RX ADMIN — RANOLAZINE 1000 MG: 500 TABLET, FILM COATED, EXTENDED RELEASE ORAL at 08:22

## 2019-02-04 RX ADMIN — GABAPENTIN 300 MG: 300 CAPSULE ORAL at 08:23

## 2019-02-04 RX ADMIN — OXYCODONE HYDROCHLORIDE 15 MG: 10 TABLET ORAL at 15:53

## 2019-02-04 RX ADMIN — METOPROLOL TARTRATE 12.5 MG: 25 TABLET ORAL at 21:10

## 2019-02-04 RX ADMIN — FUROSEMIDE 40 MG: 10 INJECTION, SOLUTION INTRAMUSCULAR; INTRAVENOUS at 17:47

## 2019-02-04 RX ADMIN — ATORVASTATIN CALCIUM 80 MG: 40 TABLET, FILM COATED ORAL at 21:10

## 2019-02-04 RX ADMIN — OXYCODONE HYDROCHLORIDE 15 MG: 10 TABLET ORAL at 09:52

## 2019-02-04 ASSESSMENT — PAIN SCALES - GENERAL
PAINLEVEL_OUTOF10: 8
PAINLEVEL_OUTOF10: 4
PAINLEVEL_OUTOF10: 9
PAINLEVEL_OUTOF10: 5
PAINLEVEL_OUTOF10: 8
PAINLEVEL_OUTOF10: 9
PAINLEVEL_OUTOF10: 4
PAINLEVEL_OUTOF10: 8
PAINLEVEL_OUTOF10: 8
PAINLEVEL_OUTOF10: 4
PAINLEVEL_OUTOF10: 8
PAINLEVEL_OUTOF10: 6
PAINLEVEL_OUTOF10: 9
PAINLEVEL_OUTOF10: 5
PAINLEVEL_OUTOF10: 7
PAINLEVEL_OUTOF10: 9

## 2019-02-04 ASSESSMENT — PAIN DESCRIPTION - PAIN TYPE
TYPE: CHRONIC PAIN
TYPE: CHRONIC PAIN

## 2019-02-04 ASSESSMENT — PAIN DESCRIPTION - PROGRESSION
CLINICAL_PROGRESSION: GRADUALLY IMPROVING
CLINICAL_PROGRESSION: NOT CHANGED
CLINICAL_PROGRESSION: NOT CHANGED

## 2019-02-04 ASSESSMENT — PAIN DESCRIPTION - LOCATION
LOCATION: CHEST;BACK
LOCATION: BACK;CHEST

## 2019-02-04 ASSESSMENT — PAIN DESCRIPTION - ORIENTATION: ORIENTATION: RIGHT;LEFT

## 2019-02-04 ASSESSMENT — PAIN DESCRIPTION - ONSET
ONSET: ON-GOING
ONSET: ON-GOING

## 2019-02-04 ASSESSMENT — PAIN DESCRIPTION - DESCRIPTORS
DESCRIPTORS: DISCOMFORT
DESCRIPTORS: DISCOMFORT

## 2019-02-04 ASSESSMENT — PAIN DESCRIPTION - FREQUENCY
FREQUENCY: CONTINUOUS
FREQUENCY: CONTINUOUS

## 2019-02-05 LAB
ANION GAP SERPL CALCULATED.3IONS-SCNC: 11 MMOL/L (ref 4–16)
BUN BLDV-MCNC: 9 MG/DL (ref 6–23)
CALCIUM SERPL-MCNC: 8.5 MG/DL (ref 8.3–10.6)
CHLORIDE BLD-SCNC: 101 MMOL/L (ref 99–110)
CO2: 28 MMOL/L (ref 21–32)
CREAT SERPL-MCNC: 0.7 MG/DL (ref 0.6–1.1)
GFR AFRICAN AMERICAN: >60 ML/MIN/1.73M2
GFR NON-AFRICAN AMERICAN: >60 ML/MIN/1.73M2
GLUCOSE BLD-MCNC: 112 MG/DL (ref 70–99)
MAGNESIUM: 2.1 MG/DL (ref 1.8–2.4)
POTASSIUM SERPL-SCNC: 4.1 MMOL/L (ref 3.5–5.1)
PRO-BNP: 2875 PG/ML
SODIUM BLD-SCNC: 140 MMOL/L (ref 135–145)

## 2019-02-05 PROCEDURE — 99232 SBSQ HOSP IP/OBS MODERATE 35: CPT | Performed by: INTERNAL MEDICINE

## 2019-02-05 PROCEDURE — 36415 COLL VENOUS BLD VENIPUNCTURE: CPT

## 2019-02-05 PROCEDURE — 6360000002 HC RX W HCPCS: Performed by: INTERNAL MEDICINE

## 2019-02-05 PROCEDURE — 6370000000 HC RX 637 (ALT 250 FOR IP): Performed by: INTERNAL MEDICINE

## 2019-02-05 PROCEDURE — 80048 BASIC METABOLIC PNL TOTAL CA: CPT

## 2019-02-05 PROCEDURE — 1200000000 HC SEMI PRIVATE

## 2019-02-05 PROCEDURE — 83735 ASSAY OF MAGNESIUM: CPT

## 2019-02-05 PROCEDURE — 2700000000 HC OXYGEN THERAPY PER DAY

## 2019-02-05 PROCEDURE — 83880 ASSAY OF NATRIURETIC PEPTIDE: CPT

## 2019-02-05 PROCEDURE — 2580000003 HC RX 258: Performed by: INTERNAL MEDICINE

## 2019-02-05 PROCEDURE — 94761 N-INVAS EAR/PLS OXIMETRY MLT: CPT

## 2019-02-05 RX ADMIN — CYANOCOBALAMIN TAB 1000 MCG 1000 MCG: 1000 TAB at 08:48

## 2019-02-05 RX ADMIN — OXYCODONE HYDROCHLORIDE 15 MG: 10 TABLET ORAL at 10:31

## 2019-02-05 RX ADMIN — LEVOTHYROXINE SODIUM 137 MCG: 25 TABLET ORAL at 06:11

## 2019-02-05 RX ADMIN — CLOPIDOGREL BISULFATE 75 MG: 75 TABLET, FILM COATED ORAL at 08:47

## 2019-02-05 RX ADMIN — DULOXETINE HYDROCHLORIDE 60 MG: 30 CAPSULE, DELAYED RELEASE ORAL at 08:47

## 2019-02-05 RX ADMIN — OXYCODONE HYDROCHLORIDE 15 MG: 10 TABLET ORAL at 03:48

## 2019-02-05 RX ADMIN — OXYCODONE HYDROCHLORIDE 15 MG: 10 TABLET ORAL at 07:02

## 2019-02-05 RX ADMIN — GABAPENTIN 300 MG: 300 CAPSULE ORAL at 13:10

## 2019-02-05 RX ADMIN — SENNOSIDES AND DOCUSATE SODIUM 1 TABLET: 8.6; 5 TABLET ORAL at 08:47

## 2019-02-05 RX ADMIN — FUROSEMIDE 40 MG: 10 INJECTION, SOLUTION INTRAMUSCULAR; INTRAVENOUS at 08:49

## 2019-02-05 RX ADMIN — SOTALOL HYDROCHLORIDE 80 MG: 80 TABLET ORAL at 21:57

## 2019-02-05 RX ADMIN — SODIUM CHLORIDE, PRESERVATIVE FREE 10 ML: 5 INJECTION INTRAVENOUS at 21:56

## 2019-02-05 RX ADMIN — FUROSEMIDE 40 MG: 10 INJECTION, SOLUTION INTRAMUSCULAR; INTRAVENOUS at 17:19

## 2019-02-05 RX ADMIN — FERROUS GLUCONATE TAB 324 MG (37.5 MG ELEMENTAL IRON) 324 MG: 324 (37.5 FE) TAB at 08:47

## 2019-02-05 RX ADMIN — FAMOTIDINE 20 MG: 20 TABLET, FILM COATED ORAL at 08:47

## 2019-02-05 RX ADMIN — RANOLAZINE 1000 MG: 500 TABLET, FILM COATED, EXTENDED RELEASE ORAL at 08:47

## 2019-02-05 RX ADMIN — OXYCODONE HYDROCHLORIDE 15 MG: 10 TABLET ORAL at 21:56

## 2019-02-05 RX ADMIN — OXYCODONE HYDROCHLORIDE 15 MG: 10 TABLET ORAL at 17:20

## 2019-02-05 RX ADMIN — LEVETIRACETAM 750 MG: 250 TABLET, FILM COATED ORAL at 08:47

## 2019-02-05 RX ADMIN — FERROUS GLUCONATE TAB 324 MG (37.5 MG ELEMENTAL IRON) 324 MG: 324 (37.5 FE) TAB at 21:58

## 2019-02-05 RX ADMIN — METOPROLOL TARTRATE 12.5 MG: 25 TABLET ORAL at 08:48

## 2019-02-05 RX ADMIN — SODIUM CHLORIDE, PRESERVATIVE FREE 10 ML: 5 INJECTION INTRAVENOUS at 08:49

## 2019-02-05 RX ADMIN — DULOXETINE HYDROCHLORIDE 60 MG: 30 CAPSULE, DELAYED RELEASE ORAL at 21:57

## 2019-02-05 RX ADMIN — LEVETIRACETAM 750 MG: 250 TABLET, FILM COATED ORAL at 21:57

## 2019-02-05 RX ADMIN — RANOLAZINE 1000 MG: 500 TABLET, FILM COATED, EXTENDED RELEASE ORAL at 21:57

## 2019-02-05 RX ADMIN — GABAPENTIN 300 MG: 300 CAPSULE ORAL at 08:47

## 2019-02-05 RX ADMIN — OMEGA-3-ACID ETHYL ESTERS 1000 MG: 1 CAPSULE, LIQUID FILLED ORAL at 21:58

## 2019-02-05 RX ADMIN — ONDANSETRON 4 MG: 2 INJECTION INTRAMUSCULAR; INTRAVENOUS at 13:10

## 2019-02-05 RX ADMIN — FAMOTIDINE 20 MG: 20 TABLET, FILM COATED ORAL at 21:57

## 2019-02-05 RX ADMIN — SOTALOL HYDROCHLORIDE 80 MG: 80 TABLET ORAL at 08:48

## 2019-02-05 RX ADMIN — GABAPENTIN 300 MG: 300 CAPSULE ORAL at 21:58

## 2019-02-05 RX ADMIN — OXYCODONE HYDROCHLORIDE 15 MG: 10 TABLET ORAL at 14:00

## 2019-02-05 RX ADMIN — ENOXAPARIN SODIUM 40 MG: 100 INJECTION SUBCUTANEOUS at 08:49

## 2019-02-05 RX ADMIN — ATORVASTATIN CALCIUM 80 MG: 40 TABLET, FILM COATED ORAL at 21:58

## 2019-02-05 RX ADMIN — OXYCODONE HYDROCHLORIDE 15 MG: 10 TABLET ORAL at 00:56

## 2019-02-05 RX ADMIN — ASPIRIN 81 MG 81 MG: 81 TABLET ORAL at 08:48

## 2019-02-05 ASSESSMENT — PAIN SCALES - GENERAL
PAINLEVEL_OUTOF10: 8
PAINLEVEL_OUTOF10: 5
PAINLEVEL_OUTOF10: 7
PAINLEVEL_OUTOF10: 8
PAINLEVEL_OUTOF10: 2
PAINLEVEL_OUTOF10: 8
PAINLEVEL_OUTOF10: 6
PAINLEVEL_OUTOF10: 8
PAINLEVEL_OUTOF10: 9
PAINLEVEL_OUTOF10: 3
PAINLEVEL_OUTOF10: 2
PAINLEVEL_OUTOF10: 3
PAINLEVEL_OUTOF10: 9

## 2019-02-05 ASSESSMENT — PAIN DESCRIPTION - PAIN TYPE: TYPE: CHRONIC PAIN

## 2019-02-05 ASSESSMENT — PAIN DESCRIPTION - LOCATION: LOCATION: CHEST;BACK;HIP

## 2019-02-05 ASSESSMENT — PAIN DESCRIPTION - ORIENTATION: ORIENTATION: RIGHT;LEFT

## 2019-02-06 LAB
ANION GAP SERPL CALCULATED.3IONS-SCNC: 6 MMOL/L (ref 4–16)
BUN BLDV-MCNC: 11 MG/DL (ref 6–23)
CALCIUM SERPL-MCNC: 8.2 MG/DL (ref 8.3–10.6)
CHLORIDE BLD-SCNC: 101 MMOL/L (ref 99–110)
CO2: 33 MMOL/L (ref 21–32)
CREAT SERPL-MCNC: 0.6 MG/DL (ref 0.6–1.1)
EKG ATRIAL RATE: 53 BPM
EKG DIAGNOSIS: NORMAL
EKG P AXIS: 13 DEGREES
EKG P-R INTERVAL: 148 MS
EKG Q-T INTERVAL: 534 MS
EKG QRS DURATION: 98 MS
EKG QTC CALCULATION (BAZETT): 501 MS
EKG R AXIS: -37 DEGREES
EKG T AXIS: -24 DEGREES
EKG VENTRICULAR RATE: 53 BPM
GFR AFRICAN AMERICAN: >60 ML/MIN/1.73M2
GFR NON-AFRICAN AMERICAN: >60 ML/MIN/1.73M2
GLUCOSE BLD-MCNC: 89 MG/DL (ref 70–99)
MAGNESIUM: 2.1 MG/DL (ref 1.8–2.4)
POTASSIUM SERPL-SCNC: 3.9 MMOL/L (ref 3.5–5.1)
REASON FOR REJECTION: NORMAL
REJECTED TEST: NORMAL
SODIUM BLD-SCNC: 140 MMOL/L (ref 135–145)
SOURCE: NORMAL

## 2019-02-06 PROCEDURE — 1200000000 HC SEMI PRIVATE

## 2019-02-06 PROCEDURE — 36415 COLL VENOUS BLD VENIPUNCTURE: CPT

## 2019-02-06 PROCEDURE — 6360000002 HC RX W HCPCS: Performed by: INTERNAL MEDICINE

## 2019-02-06 PROCEDURE — 6370000000 HC RX 637 (ALT 250 FOR IP): Performed by: INTERNAL MEDICINE

## 2019-02-06 PROCEDURE — 99232 SBSQ HOSP IP/OBS MODERATE 35: CPT | Performed by: INTERNAL MEDICINE

## 2019-02-06 PROCEDURE — 83735 ASSAY OF MAGNESIUM: CPT

## 2019-02-06 PROCEDURE — 2580000003 HC RX 258: Performed by: INTERNAL MEDICINE

## 2019-02-06 PROCEDURE — 80048 BASIC METABOLIC PNL TOTAL CA: CPT

## 2019-02-06 RX ORDER — PROMETHAZINE HYDROCHLORIDE 25 MG/ML
6.25 INJECTION, SOLUTION INTRAMUSCULAR; INTRAVENOUS EVERY 6 HOURS PRN
Status: DISCONTINUED | OUTPATIENT
Start: 2019-02-06 | End: 2019-02-07 | Stop reason: HOSPADM

## 2019-02-06 RX ADMIN — CYANOCOBALAMIN TAB 1000 MCG 1000 MCG: 1000 TAB at 08:55

## 2019-02-06 RX ADMIN — DULOXETINE HYDROCHLORIDE 60 MG: 30 CAPSULE, DELAYED RELEASE ORAL at 08:54

## 2019-02-06 RX ADMIN — OXYCODONE HYDROCHLORIDE 15 MG: 10 TABLET ORAL at 12:34

## 2019-02-06 RX ADMIN — SOTALOL HYDROCHLORIDE 80 MG: 80 TABLET ORAL at 22:21

## 2019-02-06 RX ADMIN — ASPIRIN 81 MG 81 MG: 81 TABLET ORAL at 08:55

## 2019-02-06 RX ADMIN — OXYCODONE HYDROCHLORIDE 15 MG: 10 TABLET ORAL at 01:46

## 2019-02-06 RX ADMIN — GABAPENTIN 300 MG: 300 CAPSULE ORAL at 16:04

## 2019-02-06 RX ADMIN — LEVOTHYROXINE SODIUM 137 MCG: 25 TABLET ORAL at 06:41

## 2019-02-06 RX ADMIN — OXYCODONE HYDROCHLORIDE 15 MG: 10 TABLET ORAL at 08:53

## 2019-02-06 RX ADMIN — DULOXETINE HYDROCHLORIDE 60 MG: 30 CAPSULE, DELAYED RELEASE ORAL at 22:20

## 2019-02-06 RX ADMIN — FAMOTIDINE 20 MG: 20 TABLET, FILM COATED ORAL at 08:55

## 2019-02-06 RX ADMIN — ONDANSETRON 4 MG: 2 INJECTION INTRAMUSCULAR; INTRAVENOUS at 13:37

## 2019-02-06 RX ADMIN — FERROUS GLUCONATE TAB 324 MG (37.5 MG ELEMENTAL IRON) 324 MG: 324 (37.5 FE) TAB at 22:21

## 2019-02-06 RX ADMIN — LEVETIRACETAM 750 MG: 250 TABLET, FILM COATED ORAL at 08:54

## 2019-02-06 RX ADMIN — OXYCODONE HYDROCHLORIDE 15 MG: 10 TABLET ORAL at 16:02

## 2019-02-06 RX ADMIN — FUROSEMIDE 40 MG: 10 INJECTION, SOLUTION INTRAMUSCULAR; INTRAVENOUS at 10:19

## 2019-02-06 RX ADMIN — OXYCODONE HYDROCHLORIDE 15 MG: 10 TABLET ORAL at 22:33

## 2019-02-06 RX ADMIN — FUROSEMIDE 40 MG: 10 INJECTION, SOLUTION INTRAMUSCULAR; INTRAVENOUS at 18:56

## 2019-02-06 RX ADMIN — LEVETIRACETAM 750 MG: 250 TABLET, FILM COATED ORAL at 22:21

## 2019-02-06 RX ADMIN — ONDANSETRON 4 MG: 2 INJECTION INTRAMUSCULAR; INTRAVENOUS at 22:33

## 2019-02-06 RX ADMIN — RANOLAZINE 1000 MG: 500 TABLET, FILM COATED, EXTENDED RELEASE ORAL at 22:20

## 2019-02-06 RX ADMIN — FERROUS GLUCONATE TAB 324 MG (37.5 MG ELEMENTAL IRON) 324 MG: 324 (37.5 FE) TAB at 08:55

## 2019-02-06 RX ADMIN — FAMOTIDINE 20 MG: 20 TABLET, FILM COATED ORAL at 22:20

## 2019-02-06 RX ADMIN — ENOXAPARIN SODIUM 40 MG: 100 INJECTION SUBCUTANEOUS at 08:42

## 2019-02-06 RX ADMIN — PROMETHAZINE HYDROCHLORIDE 6.25 MG: 25 INJECTION, SOLUTION INTRAMUSCULAR; INTRAVENOUS at 16:55

## 2019-02-06 RX ADMIN — CLOPIDOGREL BISULFATE 75 MG: 75 TABLET, FILM COATED ORAL at 08:54

## 2019-02-06 RX ADMIN — METOPROLOL TARTRATE 12.5 MG: 25 TABLET ORAL at 22:20

## 2019-02-06 RX ADMIN — OXYCODONE HYDROCHLORIDE 15 MG: 10 TABLET ORAL at 19:27

## 2019-02-06 RX ADMIN — GABAPENTIN 300 MG: 300 CAPSULE ORAL at 08:55

## 2019-02-06 RX ADMIN — SODIUM CHLORIDE, PRESERVATIVE FREE 10 ML: 5 INJECTION INTRAVENOUS at 08:43

## 2019-02-06 RX ADMIN — OXYCODONE HYDROCHLORIDE 15 MG: 10 TABLET ORAL at 04:52

## 2019-02-06 RX ADMIN — OMEGA-3-ACID ETHYL ESTERS 1000 MG: 1 CAPSULE, LIQUID FILLED ORAL at 22:20

## 2019-02-06 RX ADMIN — GABAPENTIN 300 MG: 300 CAPSULE ORAL at 22:19

## 2019-02-06 RX ADMIN — ATORVASTATIN CALCIUM 80 MG: 40 TABLET, FILM COATED ORAL at 22:20

## 2019-02-06 RX ADMIN — SODIUM CHLORIDE, PRESERVATIVE FREE 10 ML: 5 INJECTION INTRAVENOUS at 22:29

## 2019-02-06 RX ADMIN — SENNOSIDES AND DOCUSATE SODIUM 1 TABLET: 8.6; 5 TABLET ORAL at 08:55

## 2019-02-06 ASSESSMENT — PAIN DESCRIPTION - FREQUENCY: FREQUENCY: CONTINUOUS

## 2019-02-06 ASSESSMENT — PAIN SCALES - GENERAL
PAINLEVEL_OUTOF10: 8
PAINLEVEL_OUTOF10: 7
PAINLEVEL_OUTOF10: 8
PAINLEVEL_OUTOF10: 8
PAINLEVEL_OUTOF10: 7
PAINLEVEL_OUTOF10: 9
PAINLEVEL_OUTOF10: 8
PAINLEVEL_OUTOF10: 9
PAINLEVEL_OUTOF10: 9
PAINLEVEL_OUTOF10: 8

## 2019-02-06 ASSESSMENT — PAIN DESCRIPTION - ORIENTATION
ORIENTATION: RIGHT;LEFT
ORIENTATION: LEFT;RIGHT
ORIENTATION: RIGHT;LEFT

## 2019-02-06 ASSESSMENT — PAIN DESCRIPTION - DESCRIPTORS: DESCRIPTORS: DISCOMFORT

## 2019-02-06 ASSESSMENT — PAIN DESCRIPTION - LOCATION
LOCATION: CHEST;BACK;HIP
LOCATION: CHEST;HIP
LOCATION: CHEST;BACK;HIP
LOCATION: HIP;ABDOMEN
LOCATION: CHEST;HIP
LOCATION: CHEST;HIP;BACK
LOCATION: CHEST;HIP
LOCATION: CHEST;HIP

## 2019-02-06 ASSESSMENT — PAIN DESCRIPTION - PAIN TYPE
TYPE: CHRONIC PAIN

## 2019-02-06 ASSESSMENT — PAIN - FUNCTIONAL ASSESSMENT: PAIN_FUNCTIONAL_ASSESSMENT: PREVENTS OR INTERFERES SOME ACTIVE ACTIVITIES AND ADLS

## 2019-02-06 ASSESSMENT — PAIN DESCRIPTION - ONSET: ONSET: ON-GOING

## 2019-02-06 ASSESSMENT — PAIN DESCRIPTION - PROGRESSION: CLINICAL_PROGRESSION: GRADUALLY WORSENING

## 2019-02-07 VITALS
BODY MASS INDEX: 22.66 KG/M2 | HEIGHT: 66 IN | TEMPERATURE: 98.4 F | SYSTOLIC BLOOD PRESSURE: 96 MMHG | OXYGEN SATURATION: 100 % | DIASTOLIC BLOOD PRESSURE: 46 MMHG | HEART RATE: 65 BPM | WEIGHT: 141 LBS | RESPIRATION RATE: 14 BRPM

## 2019-02-07 LAB
ANION GAP SERPL CALCULATED.3IONS-SCNC: 9 MMOL/L (ref 4–16)
BUN BLDV-MCNC: 13 MG/DL (ref 6–23)
CALCIUM SERPL-MCNC: 8.2 MG/DL (ref 8.3–10.6)
CHLORIDE BLD-SCNC: 99 MMOL/L (ref 99–110)
CO2: 29 MMOL/L (ref 21–32)
CREAT SERPL-MCNC: 0.5 MG/DL (ref 0.6–1.1)
GFR AFRICAN AMERICAN: >60 ML/MIN/1.73M2
GFR NON-AFRICAN AMERICAN: >60 ML/MIN/1.73M2
GLUCOSE BLD-MCNC: 93 MG/DL (ref 70–99)
MAGNESIUM: 2.3 MG/DL (ref 1.8–2.4)
POTASSIUM SERPL-SCNC: 4.3 MMOL/L (ref 3.5–5.1)
PRO-BNP: 889.4 PG/ML
SODIUM BLD-SCNC: 137 MMOL/L (ref 135–145)

## 2019-02-07 PROCEDURE — 83880 ASSAY OF NATRIURETIC PEPTIDE: CPT

## 2019-02-07 PROCEDURE — 6360000002 HC RX W HCPCS: Performed by: INTERNAL MEDICINE

## 2019-02-07 PROCEDURE — 6370000000 HC RX 637 (ALT 250 FOR IP): Performed by: INTERNAL MEDICINE

## 2019-02-07 PROCEDURE — 36415 COLL VENOUS BLD VENIPUNCTURE: CPT

## 2019-02-07 PROCEDURE — 83735 ASSAY OF MAGNESIUM: CPT

## 2019-02-07 PROCEDURE — 80048 BASIC METABOLIC PNL TOTAL CA: CPT

## 2019-02-07 PROCEDURE — 2580000003 HC RX 258: Performed by: INTERNAL MEDICINE

## 2019-02-07 RX ORDER — FAMOTIDINE 20 MG/1
20 TABLET, FILM COATED ORAL 2 TIMES DAILY
Qty: 60 TABLET | Refills: 3 | Status: SHIPPED | OUTPATIENT
Start: 2019-02-07 | End: 2021-01-22

## 2019-02-07 RX ADMIN — SODIUM CHLORIDE, PRESERVATIVE FREE 10 ML: 5 INJECTION INTRAVENOUS at 09:18

## 2019-02-07 RX ADMIN — OXYCODONE HYDROCHLORIDE 15 MG: 10 TABLET ORAL at 06:11

## 2019-02-07 RX ADMIN — DULOXETINE HYDROCHLORIDE 60 MG: 30 CAPSULE, DELAYED RELEASE ORAL at 09:23

## 2019-02-07 RX ADMIN — FUROSEMIDE 40 MG: 10 INJECTION, SOLUTION INTRAMUSCULAR; INTRAVENOUS at 09:19

## 2019-02-07 RX ADMIN — ASPIRIN 81 MG 81 MG: 81 TABLET ORAL at 09:23

## 2019-02-07 RX ADMIN — FERROUS GLUCONATE TAB 324 MG (37.5 MG ELEMENTAL IRON) 324 MG: 324 (37.5 FE) TAB at 09:23

## 2019-02-07 RX ADMIN — OXYCODONE HYDROCHLORIDE 15 MG: 10 TABLET ORAL at 09:18

## 2019-02-07 RX ADMIN — CLOPIDOGREL BISULFATE 75 MG: 75 TABLET, FILM COATED ORAL at 09:23

## 2019-02-07 RX ADMIN — GABAPENTIN 300 MG: 300 CAPSULE ORAL at 09:23

## 2019-02-07 RX ADMIN — LEVOTHYROXINE SODIUM 137 MCG: 25 TABLET ORAL at 06:07

## 2019-02-07 RX ADMIN — LEVETIRACETAM 750 MG: 250 TABLET, FILM COATED ORAL at 09:19

## 2019-02-07 RX ADMIN — CYANOCOBALAMIN TAB 1000 MCG 1000 MCG: 1000 TAB at 09:24

## 2019-02-07 RX ADMIN — SENNOSIDES AND DOCUSATE SODIUM 1 TABLET: 8.6; 5 TABLET ORAL at 09:24

## 2019-02-07 RX ADMIN — FAMOTIDINE 20 MG: 20 TABLET, FILM COATED ORAL at 09:24

## 2019-02-07 RX ADMIN — ERGOCALCIFEROL 50000 UNITS: 1.25 CAPSULE ORAL at 09:40

## 2019-02-07 RX ADMIN — RANOLAZINE 1000 MG: 500 TABLET, FILM COATED, EXTENDED RELEASE ORAL at 09:23

## 2019-02-07 RX ADMIN — ENOXAPARIN SODIUM 40 MG: 100 INJECTION SUBCUTANEOUS at 09:22

## 2019-02-07 RX ADMIN — OXYCODONE HYDROCHLORIDE 15 MG: 10 TABLET ORAL at 12:44

## 2019-02-07 RX ADMIN — GABAPENTIN 300 MG: 300 CAPSULE ORAL at 12:44

## 2019-02-07 ASSESSMENT — PAIN SCALES - GENERAL
PAINLEVEL_OUTOF10: 8
PAINLEVEL_OUTOF10: 8
PAINLEVEL_OUTOF10: 7
PAINLEVEL_OUTOF10: 8
PAINLEVEL_OUTOF10: 7
PAINLEVEL_OUTOF10: 9
PAINLEVEL_OUTOF10: 8

## 2019-02-07 ASSESSMENT — PAIN DESCRIPTION - FREQUENCY
FREQUENCY: CONTINUOUS

## 2019-02-07 ASSESSMENT — PAIN DESCRIPTION - LOCATION
LOCATION: BACK;HIP
LOCATION: CHEST;BACK;HIP
LOCATION: BACK;HIP
LOCATION: BACK;CHEST;HIP
LOCATION: BACK;CHEST;HIP
LOCATION: CHEST;BACK;HIP
LOCATION: HIP;ABDOMEN
LOCATION: BACK;CHEST;HIP
LOCATION: BACK;CHEST;HIP
LOCATION: CHEST;HIP
LOCATION: BACK;CHEST;HIP

## 2019-02-07 ASSESSMENT — PAIN DESCRIPTION - PAIN TYPE
TYPE: CHRONIC PAIN

## 2019-02-07 ASSESSMENT — PAIN DESCRIPTION - ORIENTATION
ORIENTATION: RIGHT;LEFT

## 2019-02-07 ASSESSMENT — PAIN DESCRIPTION - ONSET: ONSET: ON-GOING

## 2019-02-07 ASSESSMENT — PAIN DESCRIPTION - PROGRESSION: CLINICAL_PROGRESSION: GRADUALLY WORSENING

## 2019-02-07 ASSESSMENT — PAIN DESCRIPTION - DESCRIPTORS
DESCRIPTORS: CONSTANT
DESCRIPTORS: DISCOMFORT

## 2019-02-07 ASSESSMENT — PAIN - FUNCTIONAL ASSESSMENT: PAIN_FUNCTIONAL_ASSESSMENT: PREVENTS OR INTERFERES SOME ACTIVE ACTIVITIES AND ADLS

## 2019-02-18 ENCOUNTER — APPOINTMENT (OUTPATIENT)
Dept: GENERAL RADIOLOGY | Age: 59
DRG: 393 | End: 2019-02-18
Payer: COMMERCIAL

## 2019-02-18 ENCOUNTER — HOSPITAL ENCOUNTER (INPATIENT)
Age: 59
LOS: 8 days | Discharge: HOME HEALTH | DRG: 393 | End: 2019-02-26
Attending: EMERGENCY MEDICINE | Admitting: INTERNAL MEDICINE
Payer: COMMERCIAL

## 2019-02-18 DIAGNOSIS — R07.9 CHEST PAIN, UNSPECIFIED TYPE: Primary | ICD-10-CM

## 2019-02-18 DIAGNOSIS — R06.00 DYSPNEA, UNSPECIFIED TYPE: ICD-10-CM

## 2019-02-18 LAB
ALBUMIN SERPL-MCNC: 3.5 GM/DL (ref 3.4–5)
ALP BLD-CCNC: 108 IU/L (ref 40–129)
ALT SERPL-CCNC: 10 U/L (ref 10–40)
ANION GAP SERPL CALCULATED.3IONS-SCNC: 13 MMOL/L (ref 4–16)
AST SERPL-CCNC: 13 IU/L (ref 15–37)
BASOPHILS ABSOLUTE: 0 K/CU MM
BASOPHILS RELATIVE PERCENT: 0.5 % (ref 0–1)
BILIRUB SERPL-MCNC: 0.1 MG/DL (ref 0–1)
BUN BLDV-MCNC: 11 MG/DL (ref 6–23)
CALCIUM SERPL-MCNC: 8.5 MG/DL (ref 8.3–10.6)
CHLORIDE BLD-SCNC: 103 MMOL/L (ref 99–110)
CO2: 21 MMOL/L (ref 21–32)
CREAT SERPL-MCNC: 0.5 MG/DL (ref 0.6–1.1)
DIFFERENTIAL TYPE: ABNORMAL
EOSINOPHILS ABSOLUTE: 0 K/CU MM
EOSINOPHILS RELATIVE PERCENT: 0.2 % (ref 0–3)
GFR AFRICAN AMERICAN: >60 ML/MIN/1.73M2
GFR NON-AFRICAN AMERICAN: >60 ML/MIN/1.73M2
GLUCOSE BLD-MCNC: 184 MG/DL (ref 70–99)
HCT VFR BLD CALC: 38.3 % (ref 37–47)
HEMOGLOBIN: 12 GM/DL (ref 12.5–16)
IMMATURE NEUTROPHIL %: 0.5 % (ref 0–0.43)
LYMPHOCYTES ABSOLUTE: 1 K/CU MM
LYMPHOCYTES RELATIVE PERCENT: 17.3 % (ref 24–44)
MAGNESIUM: 2 MG/DL (ref 1.8–2.4)
MCH RBC QN AUTO: 32.3 PG (ref 27–31)
MCHC RBC AUTO-ENTMCNC: 31.3 % (ref 32–36)
MCV RBC AUTO: 103.2 FL (ref 78–100)
MONOCYTES ABSOLUTE: 0.2 K/CU MM
MONOCYTES RELATIVE PERCENT: 4 % (ref 0–4)
NUCLEATED RBC %: 0 %
PDW BLD-RTO: 15.1 % (ref 11.7–14.9)
PLATELET # BLD: 423 K/CU MM (ref 140–440)
PMV BLD AUTO: 9.2 FL (ref 7.5–11.1)
POTASSIUM SERPL-SCNC: 4 MMOL/L (ref 3.5–5.1)
PRO-BNP: 1007 PG/ML
RBC # BLD: 3.71 M/CU MM (ref 4.2–5.4)
SEGMENTED NEUTROPHILS ABSOLUTE COUNT: 4.7 K/CU MM
SEGMENTED NEUTROPHILS RELATIVE PERCENT: 77.5 % (ref 36–66)
SODIUM BLD-SCNC: 137 MMOL/L (ref 135–145)
TOTAL IMMATURE NEUTOROPHIL: 0.03 K/CU MM
TOTAL NUCLEATED RBC: 0 K/CU MM
TOTAL PROTEIN: 5.6 GM/DL (ref 6.4–8.2)
TROPONIN T: <0.01 NG/ML
TROPONIN T: <0.01 NG/ML
WBC # BLD: 6 K/CU MM (ref 4–10.5)

## 2019-02-18 PROCEDURE — G0378 HOSPITAL OBSERVATION PER HR: HCPCS

## 2019-02-18 PROCEDURE — 6370000000 HC RX 637 (ALT 250 FOR IP): Performed by: NURSE PRACTITIONER

## 2019-02-18 PROCEDURE — 83880 ASSAY OF NATRIURETIC PEPTIDE: CPT

## 2019-02-18 PROCEDURE — 71045 X-RAY EXAM CHEST 1 VIEW: CPT

## 2019-02-18 PROCEDURE — 96374 THER/PROPH/DIAG INJ IV PUSH: CPT

## 2019-02-18 PROCEDURE — 6370000000 HC RX 637 (ALT 250 FOR IP): Performed by: EMERGENCY MEDICINE

## 2019-02-18 PROCEDURE — 84484 ASSAY OF TROPONIN QUANT: CPT

## 2019-02-18 PROCEDURE — 1200000000 HC SEMI PRIVATE

## 2019-02-18 PROCEDURE — 6360000002 HC RX W HCPCS: Performed by: PHYSICIAN ASSISTANT

## 2019-02-18 PROCEDURE — 93005 ELECTROCARDIOGRAM TRACING: CPT | Performed by: PHYSICIAN ASSISTANT

## 2019-02-18 PROCEDURE — 99285 EMERGENCY DEPT VISIT HI MDM: CPT

## 2019-02-18 PROCEDURE — 36415 COLL VENOUS BLD VENIPUNCTURE: CPT

## 2019-02-18 PROCEDURE — 80053 COMPREHEN METABOLIC PANEL: CPT

## 2019-02-18 PROCEDURE — 83735 ASSAY OF MAGNESIUM: CPT

## 2019-02-18 PROCEDURE — 85025 COMPLETE CBC W/AUTO DIFF WBC: CPT

## 2019-02-18 RX ORDER — LISINOPRIL 2.5 MG/1
2.5 TABLET ORAL DAILY
Status: DISCONTINUED | OUTPATIENT
Start: 2019-02-18 | End: 2019-02-26 | Stop reason: HOSPADM

## 2019-02-18 RX ORDER — ALBUTEROL SULFATE 2.5 MG/3ML
2.5 SOLUTION RESPIRATORY (INHALATION) EVERY 4 HOURS PRN
Status: DISCONTINUED | OUTPATIENT
Start: 2019-02-18 | End: 2019-02-26 | Stop reason: HOSPADM

## 2019-02-18 RX ORDER — MEXILETINE HYDROCHLORIDE 150 MG/1
150 CAPSULE ORAL 3 TIMES DAILY
Status: DISCONTINUED | OUTPATIENT
Start: 2019-02-18 | End: 2019-02-26 | Stop reason: HOSPADM

## 2019-02-18 RX ORDER — ATORVASTATIN CALCIUM 40 MG/1
80 TABLET, FILM COATED ORAL NIGHTLY
Status: DISCONTINUED | OUTPATIENT
Start: 2019-02-18 | End: 2019-02-26 | Stop reason: HOSPADM

## 2019-02-18 RX ORDER — SENNA AND DOCUSATE SODIUM 50; 8.6 MG/1; MG/1
1 TABLET, FILM COATED ORAL DAILY
Status: DISCONTINUED | OUTPATIENT
Start: 2019-02-18 | End: 2019-02-26 | Stop reason: HOSPADM

## 2019-02-18 RX ORDER — METOPROLOL SUCCINATE 25 MG/1
12.5 TABLET, EXTENDED RELEASE ORAL DAILY
Status: DISCONTINUED | OUTPATIENT
Start: 2019-02-18 | End: 2019-02-26 | Stop reason: HOSPADM

## 2019-02-18 RX ORDER — LANOLIN ALCOHOL/MO/W.PET/CERES
6 CREAM (GRAM) TOPICAL NIGHTLY PRN
Status: DISCONTINUED | OUTPATIENT
Start: 2019-02-18 | End: 2019-02-26 | Stop reason: HOSPADM

## 2019-02-18 RX ORDER — ACETAMINOPHEN 80 MG
TABLET,CHEWABLE ORAL
Status: DISPENSED
Start: 2019-02-18 | End: 2019-02-19

## 2019-02-18 RX ORDER — MEXILETINE HYDROCHLORIDE 150 MG/1
150 CAPSULE ORAL 3 TIMES DAILY
Status: ON HOLD | COMMUNITY
End: 2020-05-24 | Stop reason: SINTOL

## 2019-02-18 RX ORDER — HYDROMORPHONE HCL 110MG/55ML
1 PATIENT CONTROLLED ANALGESIA SYRINGE INTRAVENOUS ONCE
Status: COMPLETED | OUTPATIENT
Start: 2019-02-18 | End: 2019-02-18

## 2019-02-18 RX ORDER — ALBUTEROL SULFATE 90 UG/1
2 AEROSOL, METERED RESPIRATORY (INHALATION) EVERY 6 HOURS PRN
Status: DISCONTINUED | OUTPATIENT
Start: 2019-02-18 | End: 2019-02-26 | Stop reason: HOSPADM

## 2019-02-18 RX ORDER — OXYCODONE HYDROCHLORIDE 5 MG/1
15 TABLET ORAL ONCE
Status: COMPLETED | OUTPATIENT
Start: 2019-02-18 | End: 2019-02-18

## 2019-02-18 RX ORDER — DULOXETIN HYDROCHLORIDE 30 MG/1
60 CAPSULE, DELAYED RELEASE ORAL 2 TIMES DAILY
Status: DISCONTINUED | OUTPATIENT
Start: 2019-02-18 | End: 2019-02-26 | Stop reason: HOSPADM

## 2019-02-18 RX ORDER — SODIUM CHLORIDE 0.9 % (FLUSH) 0.9 %
10 SYRINGE (ML) INJECTION EVERY 12 HOURS SCHEDULED
Status: DISCONTINUED | OUTPATIENT
Start: 2019-02-18 | End: 2019-02-23 | Stop reason: SDUPTHER

## 2019-02-18 RX ORDER — FAMOTIDINE 20 MG/1
20 TABLET, FILM COATED ORAL 2 TIMES DAILY
Status: DISCONTINUED | OUTPATIENT
Start: 2019-02-18 | End: 2019-02-26 | Stop reason: HOSPADM

## 2019-02-18 RX ORDER — FERROUS GLUCONATE 324(37.5)
324 TABLET ORAL 2 TIMES DAILY
Status: DISCONTINUED | OUTPATIENT
Start: 2019-02-18 | End: 2019-02-26 | Stop reason: HOSPADM

## 2019-02-18 RX ORDER — METOPROLOL SUCCINATE 25 MG/1
12.5 TABLET, EXTENDED RELEASE ORAL DAILY
Status: ON HOLD | COMMUNITY
End: 2019-04-05 | Stop reason: HOSPADM

## 2019-02-18 RX ORDER — ACETAMINOPHEN 325 MG/1
650 TABLET ORAL EVERY 4 HOURS PRN
Status: DISCONTINUED | OUTPATIENT
Start: 2019-02-18 | End: 2019-02-26 | Stop reason: HOSPADM

## 2019-02-18 RX ORDER — ONDANSETRON 2 MG/ML
4 INJECTION INTRAMUSCULAR; INTRAVENOUS EVERY 6 HOURS PRN
Status: DISCONTINUED | OUTPATIENT
Start: 2019-02-18 | End: 2019-02-26 | Stop reason: HOSPADM

## 2019-02-18 RX ORDER — RANOLAZINE 500 MG/1
1000 TABLET, EXTENDED RELEASE ORAL 2 TIMES DAILY
Status: DISCONTINUED | OUTPATIENT
Start: 2019-02-18 | End: 2019-02-26 | Stop reason: HOSPADM

## 2019-02-18 RX ORDER — DOCUSATE SODIUM 100 MG/1
100 CAPSULE, LIQUID FILLED ORAL 2 TIMES DAILY PRN
COMMUNITY

## 2019-02-18 RX ORDER — POTASSIUM CHLORIDE 20 MEQ/1
20 TABLET, EXTENDED RELEASE ORAL DAILY
Status: DISCONTINUED | OUTPATIENT
Start: 2019-02-18 | End: 2019-02-26 | Stop reason: HOSPADM

## 2019-02-18 RX ORDER — GABAPENTIN 300 MG/1
300 CAPSULE ORAL 3 TIMES DAILY
Status: DISCONTINUED | OUTPATIENT
Start: 2019-02-18 | End: 2019-02-26 | Stop reason: HOSPADM

## 2019-02-18 RX ORDER — ASPIRIN 81 MG/1
81 TABLET, CHEWABLE ORAL DAILY
Status: DISCONTINUED | OUTPATIENT
Start: 2019-02-18 | End: 2019-02-26 | Stop reason: HOSPADM

## 2019-02-18 RX ORDER — DOCUSATE SODIUM 100 MG/1
100 CAPSULE, LIQUID FILLED ORAL 2 TIMES DAILY PRN
Status: DISCONTINUED | OUTPATIENT
Start: 2019-02-18 | End: 2019-02-26 | Stop reason: HOSPADM

## 2019-02-18 RX ORDER — SODIUM CHLORIDE 0.9 % (FLUSH) 0.9 %
10 SYRINGE (ML) INJECTION PRN
Status: DISCONTINUED | OUTPATIENT
Start: 2019-02-18 | End: 2019-02-23 | Stop reason: SDUPTHER

## 2019-02-18 RX ORDER — CLOPIDOGREL BISULFATE 75 MG/1
75 TABLET ORAL DAILY
Status: DISCONTINUED | OUTPATIENT
Start: 2019-02-18 | End: 2019-02-26 | Stop reason: HOSPADM

## 2019-02-18 RX ORDER — LANOLIN ALCOHOL/MO/W.PET/CERES
6 CREAM (GRAM) TOPICAL NIGHTLY PRN
Status: ON HOLD | COMMUNITY
End: 2019-03-19

## 2019-02-18 RX ORDER — TORSEMIDE 20 MG/1
20 TABLET ORAL 2 TIMES DAILY
Status: DISCONTINUED | OUTPATIENT
Start: 2019-02-18 | End: 2019-02-26 | Stop reason: HOSPADM

## 2019-02-18 RX ORDER — OMEGA-3-ACID ETHYL ESTERS 1 G/1
1000 CAPSULE, LIQUID FILLED ORAL NIGHTLY
Status: DISCONTINUED | OUTPATIENT
Start: 2019-02-18 | End: 2019-02-26 | Stop reason: HOSPADM

## 2019-02-18 RX ORDER — MAGNESIUM OXIDE 400 MG/1
800 TABLET ORAL 2 TIMES DAILY
COMMUNITY
End: 2021-01-22

## 2019-02-18 RX ORDER — ERGOCALCIFEROL 1.25 MG/1
50000 CAPSULE ORAL
Status: DISCONTINUED | OUTPATIENT
Start: 2019-02-18 | End: 2019-02-26 | Stop reason: HOSPADM

## 2019-02-18 RX ORDER — NYSTATIN 100000 [USP'U]/G
1 POWDER TOPICAL DAILY
Status: ON HOLD | COMMUNITY
End: 2019-09-24 | Stop reason: ALTCHOICE

## 2019-02-18 RX ADMIN — Medication 800 MG: at 21:20

## 2019-02-18 RX ADMIN — MEXILETINE HYDROCHLORIDE 150 MG: 150 CAPSULE ORAL at 23:28

## 2019-02-18 RX ADMIN — ACETAMINOPHEN 650 MG: 325 TABLET ORAL at 21:29

## 2019-02-18 RX ADMIN — LEVETIRACETAM 750 MG: 500 TABLET, FILM COATED ORAL at 21:21

## 2019-02-18 RX ADMIN — OXYCODONE HYDROCHLORIDE 15 MG: 5 TABLET ORAL at 17:50

## 2019-02-18 RX ADMIN — HYDROMORPHONE HYDROCHLORIDE 1 MG: 2 INJECTION INTRAMUSCULAR; INTRAVENOUS; SUBCUTANEOUS at 16:02

## 2019-02-18 RX ADMIN — FERROUS GLUCONATE TAB 324 MG (37.5 MG ELEMENTAL IRON) 324 MG: 324 (37.5 FE) TAB at 21:21

## 2019-02-18 RX ADMIN — ERGOCALCIFEROL 50000 UNITS: 1.25 CAPSULE ORAL at 23:28

## 2019-02-18 RX ADMIN — ATORVASTATIN CALCIUM 80 MG: 40 TABLET, FILM COATED ORAL at 21:20

## 2019-02-18 RX ADMIN — DOCUSATE SODIUM 100 MG: 100 CAPSULE, LIQUID FILLED ORAL at 21:21

## 2019-02-18 RX ADMIN — MELATONIN TAB 3 MG 6 MG: 3 TAB at 21:21

## 2019-02-18 RX ADMIN — CLOPIDOGREL BISULFATE 75 MG: 75 TABLET, FILM COATED ORAL at 21:20

## 2019-02-18 RX ADMIN — POTASSIUM CHLORIDE 20 MEQ: 1500 TABLET, EXTENDED RELEASE ORAL at 21:30

## 2019-02-18 RX ADMIN — METOPROLOL SUCCINATE 12.5 MG: 25 TABLET, EXTENDED RELEASE ORAL at 21:22

## 2019-02-18 RX ADMIN — OXYCODONE HYDROCHLORIDE 15 MG: 10 TABLET ORAL at 21:20

## 2019-02-18 RX ADMIN — OMEGA-3-ACID ETHYL ESTERS 1000 MG: 1 CAPSULE, LIQUID FILLED ORAL at 21:21

## 2019-02-18 RX ADMIN — RANOLAZINE 1000 MG: 500 TABLET, FILM COATED, EXTENDED RELEASE ORAL at 21:22

## 2019-02-18 RX ADMIN — LISINOPRIL 2.5 MG: 2.5 TABLET ORAL at 21:20

## 2019-02-18 RX ADMIN — DOCUSATE SODIUM - SENNOSIDES 1 TABLET: 50; 8.6 TABLET, FILM COATED ORAL at 21:21

## 2019-02-18 RX ADMIN — TORSEMIDE 20 MG: 20 TABLET ORAL at 21:25

## 2019-02-18 RX ADMIN — DULOXETINE HYDROCHLORIDE 60 MG: 30 CAPSULE, DELAYED RELEASE ORAL at 21:20

## 2019-02-18 RX ADMIN — ASPIRIN 81 MG 81 MG: 81 TABLET ORAL at 21:20

## 2019-02-18 RX ADMIN — GABAPENTIN 300 MG: 300 CAPSULE ORAL at 21:20

## 2019-02-18 RX ADMIN — FAMOTIDINE 20 MG: 20 TABLET ORAL at 21:21

## 2019-02-18 ASSESSMENT — PAIN DESCRIPTION - LOCATION
LOCATION: CHEST

## 2019-02-18 ASSESSMENT — PAIN DESCRIPTION - FREQUENCY
FREQUENCY: CONTINUOUS
FREQUENCY: CONTINUOUS

## 2019-02-18 ASSESSMENT — PAIN DESCRIPTION - ONSET
ONSET: ON-GOING
ONSET: ON-GOING

## 2019-02-18 ASSESSMENT — PAIN DESCRIPTION - PAIN TYPE
TYPE: ACUTE PAIN

## 2019-02-18 ASSESSMENT — PAIN SCALES - GENERAL
PAINLEVEL_OUTOF10: 6
PAINLEVEL_OUTOF10: 6
PAINLEVEL_OUTOF10: 9
PAINLEVEL_OUTOF10: 6
PAINLEVEL_OUTOF10: 9
PAINLEVEL_OUTOF10: 9

## 2019-02-18 ASSESSMENT — PAIN DESCRIPTION - PROGRESSION
CLINICAL_PROGRESSION: NOT CHANGED
CLINICAL_PROGRESSION: NOT CHANGED

## 2019-02-18 ASSESSMENT — PAIN DESCRIPTION - DESCRIPTORS
DESCRIPTORS: PRESSURE

## 2019-02-19 ENCOUNTER — APPOINTMENT (OUTPATIENT)
Dept: NUCLEAR MEDICINE | Age: 59
DRG: 393 | End: 2019-02-19
Payer: COMMERCIAL

## 2019-02-19 LAB
ANION GAP SERPL CALCULATED.3IONS-SCNC: 11 MMOL/L (ref 4–16)
BASOPHILS ABSOLUTE: 0 K/CU MM
BASOPHILS RELATIVE PERCENT: 1 % (ref 0–1)
BUN BLDV-MCNC: 11 MG/DL (ref 6–23)
CALCIUM SERPL-MCNC: 8.5 MG/DL (ref 8.3–10.6)
CHLORIDE BLD-SCNC: 105 MMOL/L (ref 99–110)
CO2: 27 MMOL/L (ref 21–32)
CREAT SERPL-MCNC: 0.7 MG/DL (ref 0.6–1.1)
DIFFERENTIAL TYPE: ABNORMAL
EKG ATRIAL RATE: 53 BPM
EKG ATRIAL RATE: 72 BPM
EKG DIAGNOSIS: NORMAL
EKG DIAGNOSIS: NORMAL
EKG P AXIS: 10 DEGREES
EKG P AXIS: 4 DEGREES
EKG P-R INTERVAL: 142 MS
EKG P-R INTERVAL: 150 MS
EKG Q-T INTERVAL: 406 MS
EKG Q-T INTERVAL: 490 MS
EKG QRS DURATION: 90 MS
EKG QRS DURATION: 98 MS
EKG QTC CALCULATION (BAZETT): 444 MS
EKG QTC CALCULATION (BAZETT): 459 MS
EKG R AXIS: -41 DEGREES
EKG R AXIS: -44 DEGREES
EKG T AXIS: -30 DEGREES
EKG T AXIS: -64 DEGREES
EKG VENTRICULAR RATE: 53 BPM
EKG VENTRICULAR RATE: 72 BPM
EOSINOPHILS ABSOLUTE: 0 K/CU MM
EOSINOPHILS RELATIVE PERCENT: 0.7 % (ref 0–3)
GFR AFRICAN AMERICAN: >60 ML/MIN/1.73M2
GFR NON-AFRICAN AMERICAN: >60 ML/MIN/1.73M2
GLUCOSE BLD-MCNC: 86 MG/DL (ref 70–99)
HCT VFR BLD CALC: 38.8 % (ref 37–47)
HEMOGLOBIN: 11.9 GM/DL (ref 12.5–16)
IMMATURE NEUTROPHIL %: 0 % (ref 0–0.43)
LV EF: 32 %
LVEF MODALITY: NORMAL
LYMPHOCYTES ABSOLUTE: 1.3 K/CU MM
LYMPHOCYTES RELATIVE PERCENT: 32.1 % (ref 24–44)
MCH RBC QN AUTO: 32.3 PG (ref 27–31)
MCHC RBC AUTO-ENTMCNC: 30.7 % (ref 32–36)
MCV RBC AUTO: 105.4 FL (ref 78–100)
MONOCYTES ABSOLUTE: 0.4 K/CU MM
MONOCYTES RELATIVE PERCENT: 10.3 % (ref 0–4)
NUCLEATED RBC %: 0 %
PDW BLD-RTO: 15.2 % (ref 11.7–14.9)
PLATELET # BLD: 384 K/CU MM (ref 140–440)
PMV BLD AUTO: 9.3 FL (ref 7.5–11.1)
POTASSIUM SERPL-SCNC: 4.1 MMOL/L (ref 3.5–5.1)
RBC # BLD: 3.68 M/CU MM (ref 4.2–5.4)
SEGMENTED NEUTROPHILS ABSOLUTE COUNT: 2.3 K/CU MM
SEGMENTED NEUTROPHILS RELATIVE PERCENT: 55.9 % (ref 36–66)
SODIUM BLD-SCNC: 143 MMOL/L (ref 135–145)
TOTAL IMMATURE NEUTOROPHIL: 0 K/CU MM
TOTAL NUCLEATED RBC: 0 K/CU MM
TROPONIN T: <0.01 NG/ML
WBC # BLD: 4.2 K/CU MM (ref 4–10.5)

## 2019-02-19 PROCEDURE — G0378 HOSPITAL OBSERVATION PER HR: HCPCS

## 2019-02-19 PROCEDURE — 93010 ELECTROCARDIOGRAM REPORT: CPT | Performed by: INTERNAL MEDICINE

## 2019-02-19 PROCEDURE — 99219 PR INITIAL OBSERVATION CARE/DAY 50 MINUTES: CPT | Performed by: INTERNAL MEDICINE

## 2019-02-19 PROCEDURE — 3430000000 HC RX DIAGNOSTIC RADIOPHARMACEUTICAL: Performed by: INTERNAL MEDICINE

## 2019-02-19 PROCEDURE — 2580000003 HC RX 258: Performed by: NURSE PRACTITIONER

## 2019-02-19 PROCEDURE — 6370000000 HC RX 637 (ALT 250 FOR IP): Performed by: NURSE PRACTITIONER

## 2019-02-19 PROCEDURE — 80048 BASIC METABOLIC PNL TOTAL CA: CPT

## 2019-02-19 PROCEDURE — 94761 N-INVAS EAR/PLS OXIMETRY MLT: CPT

## 2019-02-19 PROCEDURE — 84484 ASSAY OF TROPONIN QUANT: CPT

## 2019-02-19 PROCEDURE — 1200000000 HC SEMI PRIVATE

## 2019-02-19 PROCEDURE — 6370000000 HC RX 637 (ALT 250 FOR IP): Performed by: INTERNAL MEDICINE

## 2019-02-19 PROCEDURE — 78452 HT MUSCLE IMAGE SPECT MULT: CPT

## 2019-02-19 PROCEDURE — 6360000002 HC RX W HCPCS: Performed by: NURSE PRACTITIONER

## 2019-02-19 PROCEDURE — 99211 OFF/OP EST MAY X REQ PHY/QHP: CPT

## 2019-02-19 PROCEDURE — 93017 CV STRESS TEST TRACING ONLY: CPT

## 2019-02-19 PROCEDURE — 36415 COLL VENOUS BLD VENIPUNCTURE: CPT

## 2019-02-19 PROCEDURE — 92610 EVALUATE SWALLOWING FUNCTION: CPT | Performed by: SPEECH-LANGUAGE PATHOLOGIST

## 2019-02-19 PROCEDURE — 6360000002 HC RX W HCPCS: Performed by: INTERNAL MEDICINE

## 2019-02-19 PROCEDURE — A9500 TC99M SESTAMIBI: HCPCS | Performed by: INTERNAL MEDICINE

## 2019-02-19 PROCEDURE — 85025 COMPLETE CBC W/AUTO DIFF WBC: CPT

## 2019-02-19 RX ADMIN — DOCUSATE SODIUM - SENNOSIDES 1 TABLET: 50; 8.6 TABLET, FILM COATED ORAL at 13:09

## 2019-02-19 RX ADMIN — GABAPENTIN 300 MG: 300 CAPSULE ORAL at 05:39

## 2019-02-19 RX ADMIN — LEVETIRACETAM 750 MG: 500 TABLET, FILM COATED ORAL at 13:08

## 2019-02-19 RX ADMIN — DULOXETINE HYDROCHLORIDE 60 MG: 30 CAPSULE, DELAYED RELEASE ORAL at 13:09

## 2019-02-19 RX ADMIN — RANOLAZINE 1000 MG: 500 TABLET, FILM COATED, EXTENDED RELEASE ORAL at 13:08

## 2019-02-19 RX ADMIN — DULOXETINE HYDROCHLORIDE 60 MG: 30 CAPSULE, DELAYED RELEASE ORAL at 22:14

## 2019-02-19 RX ADMIN — RANOLAZINE 1000 MG: 500 TABLET, FILM COATED, EXTENDED RELEASE ORAL at 22:14

## 2019-02-19 RX ADMIN — ACETAMINOPHEN 650 MG: 325 TABLET ORAL at 05:39

## 2019-02-19 RX ADMIN — REGADENOSON 0.4 MG: 0.08 INJECTION, SOLUTION INTRAVENOUS at 11:06

## 2019-02-19 RX ADMIN — Medication 10 MILLICURIE: at 07:35

## 2019-02-19 RX ADMIN — FAMOTIDINE 20 MG: 20 TABLET ORAL at 13:09

## 2019-02-19 RX ADMIN — ASPIRIN 81 MG 81 MG: 81 TABLET ORAL at 13:09

## 2019-02-19 RX ADMIN — Medication 800 MG: at 22:13

## 2019-02-19 RX ADMIN — LEVETIRACETAM 750 MG: 500 TABLET, FILM COATED ORAL at 22:14

## 2019-02-19 RX ADMIN — TORSEMIDE 20 MG: 20 TABLET ORAL at 13:14

## 2019-02-19 RX ADMIN — OXYCODONE HYDROCHLORIDE 15 MG: 10 TABLET ORAL at 09:41

## 2019-02-19 RX ADMIN — ACETAMINOPHEN 650 MG: 325 TABLET ORAL at 22:13

## 2019-02-19 RX ADMIN — MELATONIN TAB 3 MG 6 MG: 3 TAB at 22:13

## 2019-02-19 RX ADMIN — OMEGA-3-ACID ETHYL ESTERS 1000 MG: 1 CAPSULE, LIQUID FILLED ORAL at 22:13

## 2019-02-19 RX ADMIN — OXYCODONE HYDROCHLORIDE 15 MG: 10 TABLET ORAL at 05:43

## 2019-02-19 RX ADMIN — OXYCODONE HYDROCHLORIDE 15 MG: 5 TABLET ORAL at 19:05

## 2019-02-19 RX ADMIN — LEVOTHYROXINE SODIUM 137 MCG: 0.11 TABLET ORAL at 05:39

## 2019-02-19 RX ADMIN — FERROUS GLUCONATE TAB 324 MG (37.5 MG ELEMENTAL IRON) 324 MG: 324 (37.5 FE) TAB at 13:07

## 2019-02-19 RX ADMIN — POTASSIUM CHLORIDE 20 MEQ: 1500 TABLET, EXTENDED RELEASE ORAL at 13:08

## 2019-02-19 RX ADMIN — OXYCODONE HYDROCHLORIDE 15 MG: 5 TABLET ORAL at 22:13

## 2019-02-19 RX ADMIN — OXYCODONE HYDROCHLORIDE 15 MG: 5 TABLET ORAL at 13:08

## 2019-02-19 RX ADMIN — GABAPENTIN 300 MG: 300 CAPSULE ORAL at 13:08

## 2019-02-19 RX ADMIN — MEXILETINE HYDROCHLORIDE 150 MG: 150 CAPSULE ORAL at 22:12

## 2019-02-19 RX ADMIN — OXYCODONE HYDROCHLORIDE 15 MG: 10 TABLET ORAL at 01:44

## 2019-02-19 RX ADMIN — Medication 800 MG: at 13:09

## 2019-02-19 RX ADMIN — Medication 30 MILLICURIE: at 11:15

## 2019-02-19 RX ADMIN — OXYCODONE HYDROCHLORIDE 15 MG: 5 TABLET ORAL at 16:13

## 2019-02-19 RX ADMIN — FERROUS GLUCONATE TAB 324 MG (37.5 MG ELEMENTAL IRON) 324 MG: 324 (37.5 FE) TAB at 22:13

## 2019-02-19 RX ADMIN — CLOPIDOGREL BISULFATE 75 MG: 75 TABLET, FILM COATED ORAL at 13:10

## 2019-02-19 RX ADMIN — SODIUM CHLORIDE, PRESERVATIVE FREE 10 ML: 5 INJECTION INTRAVENOUS at 13:10

## 2019-02-19 RX ADMIN — GABAPENTIN 300 MG: 300 CAPSULE ORAL at 22:13

## 2019-02-19 RX ADMIN — MEXILETINE HYDROCHLORIDE 150 MG: 150 CAPSULE ORAL at 13:14

## 2019-02-19 RX ADMIN — ATORVASTATIN CALCIUM 80 MG: 40 TABLET, FILM COATED ORAL at 22:13

## 2019-02-19 RX ADMIN — ONDANSETRON 4 MG: 2 INJECTION INTRAMUSCULAR; INTRAVENOUS at 17:51

## 2019-02-19 RX ADMIN — FAMOTIDINE 20 MG: 20 TABLET ORAL at 22:14

## 2019-02-19 ASSESSMENT — PAIN DESCRIPTION - ONSET
ONSET: ON-GOING
ONSET: ON-GOING

## 2019-02-19 ASSESSMENT — PAIN SCALES - GENERAL
PAINLEVEL_OUTOF10: 8
PAINLEVEL_OUTOF10: 9
PAINLEVEL_OUTOF10: 8
PAINLEVEL_OUTOF10: 9
PAINLEVEL_OUTOF10: 7

## 2019-02-19 ASSESSMENT — PAIN DESCRIPTION - DESCRIPTORS
DESCRIPTORS: ACHING;BURNING;PRESSURE
DESCRIPTORS: PRESSURE

## 2019-02-19 ASSESSMENT — PAIN DESCRIPTION - LOCATION
LOCATION: BACK;HIP
LOCATION: CHEST
LOCATION: HIP

## 2019-02-19 ASSESSMENT — PAIN DESCRIPTION - PAIN TYPE
TYPE: ACUTE PAIN
TYPE: CHRONIC PAIN
TYPE: ACUTE PAIN

## 2019-02-19 ASSESSMENT — PAIN DESCRIPTION - ORIENTATION
ORIENTATION: UPPER
ORIENTATION: RIGHT;LEFT

## 2019-02-19 ASSESSMENT — PAIN DESCRIPTION - FREQUENCY
FREQUENCY: CONTINUOUS
FREQUENCY: CONTINUOUS

## 2019-02-19 ASSESSMENT — PAIN DESCRIPTION - PROGRESSION
CLINICAL_PROGRESSION: NOT CHANGED
CLINICAL_PROGRESSION: NOT CHANGED

## 2019-02-19 ASSESSMENT — PAIN - FUNCTIONAL ASSESSMENT
PAIN_FUNCTIONAL_ASSESSMENT: PREVENTS OR INTERFERES SOME ACTIVE ACTIVITIES AND ADLS
PAIN_FUNCTIONAL_ASSESSMENT: PREVENTS OR INTERFERES SOME ACTIVE ACTIVITIES AND ADLS

## 2019-02-20 LAB
ERYTHROCYTE SEDIMENTATION RATE: 17 MM/HR (ref 0–30)
HIGH SENSITIVE C-REACTIVE PROTEIN: 1.7 MG/L

## 2019-02-20 PROCEDURE — 87071 CULTURE AEROBIC QUANT OTHER: CPT

## 2019-02-20 PROCEDURE — 36415 COLL VENOUS BLD VENIPUNCTURE: CPT

## 2019-02-20 PROCEDURE — 86141 C-REACTIVE PROTEIN HS: CPT

## 2019-02-20 PROCEDURE — 87081 CULTURE SCREEN ONLY: CPT

## 2019-02-20 PROCEDURE — 99231 SBSQ HOSP IP/OBS SF/LOW 25: CPT | Performed by: INTERNAL MEDICINE

## 2019-02-20 PROCEDURE — 6370000000 HC RX 637 (ALT 250 FOR IP): Performed by: NURSE PRACTITIONER

## 2019-02-20 PROCEDURE — 87147 CULTURE TYPE IMMUNOLOGIC: CPT

## 2019-02-20 PROCEDURE — 2580000003 HC RX 258: Performed by: NURSE PRACTITIONER

## 2019-02-20 PROCEDURE — 85652 RBC SED RATE AUTOMATED: CPT

## 2019-02-20 PROCEDURE — 6360000002 HC RX W HCPCS: Performed by: INTERNAL MEDICINE

## 2019-02-20 PROCEDURE — 99222 1ST HOSP IP/OBS MODERATE 55: CPT | Performed by: INTERNAL MEDICINE

## 2019-02-20 PROCEDURE — 87073 CULTURE BACTERIA ANAEROBIC: CPT

## 2019-02-20 PROCEDURE — 6370000000 HC RX 637 (ALT 250 FOR IP): Performed by: INTERNAL MEDICINE

## 2019-02-20 PROCEDURE — 1200000000 HC SEMI PRIVATE

## 2019-02-20 PROCEDURE — 87186 SC STD MICRODIL/AGAR DIL: CPT

## 2019-02-20 PROCEDURE — 87077 CULTURE AEROBIC IDENTIFY: CPT

## 2019-02-20 PROCEDURE — 6360000002 HC RX W HCPCS: Performed by: NURSE PRACTITIONER

## 2019-02-20 PROCEDURE — G0378 HOSPITAL OBSERVATION PER HR: HCPCS

## 2019-02-20 PROCEDURE — 2580000003 HC RX 258: Performed by: INTERNAL MEDICINE

## 2019-02-20 RX ORDER — VANCOMYCIN HYDROCHLORIDE 1 G/200ML
1000 INJECTION, SOLUTION INTRAVENOUS EVERY 12 HOURS
Status: DISCONTINUED | OUTPATIENT
Start: 2019-02-20 | End: 2019-02-21

## 2019-02-20 RX ORDER — CEFAZOLIN SODIUM 1 G/50ML
1 INJECTION, SOLUTION INTRAVENOUS EVERY 8 HOURS
Status: DISCONTINUED | OUTPATIENT
Start: 2019-02-20 | End: 2019-02-20

## 2019-02-20 RX ORDER — LACTOBACILLUS RHAMNOSUS GG 10B CELL
1 CAPSULE ORAL
Status: DISCONTINUED | OUTPATIENT
Start: 2019-02-20 | End: 2019-02-26 | Stop reason: HOSPADM

## 2019-02-20 RX ADMIN — ACETAMINOPHEN 650 MG: 325 TABLET ORAL at 06:46

## 2019-02-20 RX ADMIN — SODIUM CHLORIDE, PRESERVATIVE FREE 10 ML: 5 INJECTION INTRAVENOUS at 10:23

## 2019-02-20 RX ADMIN — VANCOMYCIN HYDROCHLORIDE 1000 MG: 1 INJECTION, SOLUTION INTRAVENOUS at 18:07

## 2019-02-20 RX ADMIN — FAMOTIDINE 20 MG: 20 TABLET ORAL at 10:22

## 2019-02-20 RX ADMIN — OXYCODONE HYDROCHLORIDE 15 MG: 5 TABLET ORAL at 21:06

## 2019-02-20 RX ADMIN — OXYCODONE HYDROCHLORIDE 15 MG: 5 TABLET ORAL at 01:52

## 2019-02-20 RX ADMIN — FERROUS GLUCONATE TAB 324 MG (37.5 MG ELEMENTAL IRON) 324 MG: 324 (37.5 FE) TAB at 10:22

## 2019-02-20 RX ADMIN — MEXILETINE HYDROCHLORIDE 150 MG: 150 CAPSULE ORAL at 06:42

## 2019-02-20 RX ADMIN — GABAPENTIN 300 MG: 300 CAPSULE ORAL at 21:05

## 2019-02-20 RX ADMIN — OXYCODONE HYDROCHLORIDE 15 MG: 5 TABLET ORAL at 17:45

## 2019-02-20 RX ADMIN — OXYCODONE HYDROCHLORIDE 15 MG: 5 TABLET ORAL at 14:22

## 2019-02-20 RX ADMIN — ENOXAPARIN SODIUM 30 MG: 30 INJECTION SUBCUTANEOUS at 10:23

## 2019-02-20 RX ADMIN — GABAPENTIN 300 MG: 300 CAPSULE ORAL at 06:42

## 2019-02-20 RX ADMIN — TORSEMIDE 20 MG: 20 TABLET ORAL at 10:22

## 2019-02-20 RX ADMIN — ACETAMINOPHEN 650 MG: 325 TABLET ORAL at 21:05

## 2019-02-20 RX ADMIN — GABAPENTIN 300 MG: 300 CAPSULE ORAL at 14:21

## 2019-02-20 RX ADMIN — DULOXETINE HYDROCHLORIDE 60 MG: 30 CAPSULE, DELAYED RELEASE ORAL at 21:05

## 2019-02-20 RX ADMIN — CLOPIDOGREL BISULFATE 75 MG: 75 TABLET, FILM COATED ORAL at 10:20

## 2019-02-20 RX ADMIN — MEROPENEM 2 G: 1 INJECTION, POWDER, FOR SOLUTION INTRAVENOUS at 17:37

## 2019-02-20 RX ADMIN — RANOLAZINE 1000 MG: 500 TABLET, FILM COATED, EXTENDED RELEASE ORAL at 21:05

## 2019-02-20 RX ADMIN — Medication 800 MG: at 10:22

## 2019-02-20 RX ADMIN — RANOLAZINE 1000 MG: 500 TABLET, FILM COATED, EXTENDED RELEASE ORAL at 10:22

## 2019-02-20 RX ADMIN — LEVETIRACETAM 750 MG: 500 TABLET, FILM COATED ORAL at 21:06

## 2019-02-20 RX ADMIN — ASPIRIN 81 MG 81 MG: 81 TABLET ORAL at 10:21

## 2019-02-20 RX ADMIN — DOCUSATE SODIUM - SENNOSIDES 1 TABLET: 50; 8.6 TABLET, FILM COATED ORAL at 10:22

## 2019-02-20 RX ADMIN — DULOXETINE HYDROCHLORIDE 60 MG: 30 CAPSULE, DELAYED RELEASE ORAL at 10:22

## 2019-02-20 RX ADMIN — POTASSIUM CHLORIDE 20 MEQ: 1500 TABLET, EXTENDED RELEASE ORAL at 10:22

## 2019-02-20 RX ADMIN — LEVETIRACETAM 750 MG: 500 TABLET, FILM COATED ORAL at 10:22

## 2019-02-20 RX ADMIN — MEXILETINE HYDROCHLORIDE 150 MG: 150 CAPSULE ORAL at 21:05

## 2019-02-20 RX ADMIN — MEXILETINE HYDROCHLORIDE 150 MG: 150 CAPSULE ORAL at 14:21

## 2019-02-20 RX ADMIN — ATORVASTATIN CALCIUM 80 MG: 40 TABLET, FILM COATED ORAL at 21:06

## 2019-02-20 RX ADMIN — OXYCODONE HYDROCHLORIDE 15 MG: 5 TABLET ORAL at 07:59

## 2019-02-20 RX ADMIN — FAMOTIDINE 20 MG: 20 TABLET ORAL at 21:06

## 2019-02-20 RX ADMIN — FERROUS GLUCONATE TAB 324 MG (37.5 MG ELEMENTAL IRON) 324 MG: 324 (37.5 FE) TAB at 21:06

## 2019-02-20 RX ADMIN — OXYCODONE HYDROCHLORIDE 15 MG: 5 TABLET ORAL at 11:01

## 2019-02-20 RX ADMIN — Medication 1 CAPSULE: at 17:45

## 2019-02-20 RX ADMIN — LEVOTHYROXINE SODIUM 137 MCG: 0.11 TABLET ORAL at 06:42

## 2019-02-20 RX ADMIN — OMEGA-3-ACID ETHYL ESTERS 1000 MG: 1 CAPSULE, LIQUID FILLED ORAL at 21:05

## 2019-02-20 RX ADMIN — ONDANSETRON 4 MG: 2 INJECTION INTRAMUSCULAR; INTRAVENOUS at 12:41

## 2019-02-20 RX ADMIN — Medication 800 MG: at 21:06

## 2019-02-20 RX ADMIN — TORSEMIDE 20 MG: 20 TABLET ORAL at 17:45

## 2019-02-20 RX ADMIN — OXYCODONE HYDROCHLORIDE 15 MG: 5 TABLET ORAL at 04:42

## 2019-02-20 RX ADMIN — ACETAMINOPHEN 650 MG: 325 TABLET ORAL at 02:17

## 2019-02-20 ASSESSMENT — PAIN DESCRIPTION - PROGRESSION

## 2019-02-20 ASSESSMENT — PAIN - FUNCTIONAL ASSESSMENT: PAIN_FUNCTIONAL_ASSESSMENT: PREVENTS OR INTERFERES SOME ACTIVE ACTIVITIES AND ADLS

## 2019-02-20 ASSESSMENT — PAIN SCALES - GENERAL
PAINLEVEL_OUTOF10: 7
PAINLEVEL_OUTOF10: 8
PAINLEVEL_OUTOF10: 7
PAINLEVEL_OUTOF10: 6
PAINLEVEL_OUTOF10: 7

## 2019-02-20 ASSESSMENT — PAIN DESCRIPTION - DESCRIPTORS: DESCRIPTORS: CONSTANT;CRUSHING

## 2019-02-20 ASSESSMENT — PAIN DESCRIPTION - LOCATION: LOCATION: HIP

## 2019-02-20 ASSESSMENT — PAIN DESCRIPTION - ONSET: ONSET: ON-GOING

## 2019-02-20 ASSESSMENT — PAIN DESCRIPTION - PAIN TYPE: TYPE: CHRONIC PAIN

## 2019-02-20 ASSESSMENT — PAIN DESCRIPTION - FREQUENCY: FREQUENCY: CONTINUOUS

## 2019-02-20 ASSESSMENT — PAIN DESCRIPTION - ORIENTATION: ORIENTATION: UPPER

## 2019-02-21 LAB
BUN BLDV-MCNC: 16 MG/DL (ref 6–23)
CREAT SERPL-MCNC: 0.8 MG/DL (ref 0.6–1.1)
CULTURE: NORMAL
GFR AFRICAN AMERICAN: >60 ML/MIN/1.73M2
GFR NON-AFRICAN AMERICAN: >60 ML/MIN/1.73M2
Lab: NORMAL
REPORT STATUS: NORMAL
SPECIMEN: NORMAL

## 2019-02-21 PROCEDURE — 99232 SBSQ HOSP IP/OBS MODERATE 35: CPT | Performed by: INTERNAL MEDICINE

## 2019-02-21 PROCEDURE — 84520 ASSAY OF UREA NITROGEN: CPT

## 2019-02-21 PROCEDURE — 6360000002 HC RX W HCPCS: Performed by: INTERNAL MEDICINE

## 2019-02-21 PROCEDURE — 6360000002 HC RX W HCPCS: Performed by: NURSE PRACTITIONER

## 2019-02-21 PROCEDURE — 6370000000 HC RX 637 (ALT 250 FOR IP): Performed by: INTERNAL MEDICINE

## 2019-02-21 PROCEDURE — 6370000000 HC RX 637 (ALT 250 FOR IP): Performed by: NURSE PRACTITIONER

## 2019-02-21 PROCEDURE — 2580000003 HC RX 258: Performed by: INTERNAL MEDICINE

## 2019-02-21 PROCEDURE — 2580000003 HC RX 258: Performed by: NURSE PRACTITIONER

## 2019-02-21 PROCEDURE — 87040 BLOOD CULTURE FOR BACTERIA: CPT

## 2019-02-21 PROCEDURE — 82565 ASSAY OF CREATININE: CPT

## 2019-02-21 PROCEDURE — 36415 COLL VENOUS BLD VENIPUNCTURE: CPT

## 2019-02-21 PROCEDURE — 1200000000 HC SEMI PRIVATE

## 2019-02-21 RX ORDER — VANCOMYCIN HYDROCHLORIDE 1 G/200ML
1000 INJECTION, SOLUTION INTRAVENOUS
Status: DISCONTINUED | OUTPATIENT
Start: 2019-02-22 | End: 2019-02-23

## 2019-02-21 RX ADMIN — OXYCODONE HYDROCHLORIDE 15 MG: 5 TABLET ORAL at 06:09

## 2019-02-21 RX ADMIN — TORSEMIDE 20 MG: 20 TABLET ORAL at 20:33

## 2019-02-21 RX ADMIN — ASPIRIN 81 MG 81 MG: 81 TABLET ORAL at 09:52

## 2019-02-21 RX ADMIN — OXYCODONE HYDROCHLORIDE 15 MG: 5 TABLET ORAL at 00:04

## 2019-02-21 RX ADMIN — Medication 1 CAPSULE: at 09:53

## 2019-02-21 RX ADMIN — LEVOTHYROXINE SODIUM 137 MCG: 0.11 TABLET ORAL at 06:06

## 2019-02-21 RX ADMIN — CLOPIDOGREL BISULFATE 75 MG: 75 TABLET, FILM COATED ORAL at 09:51

## 2019-02-21 RX ADMIN — OMEGA-3-ACID ETHYL ESTERS 1000 MG: 1 CAPSULE, LIQUID FILLED ORAL at 20:32

## 2019-02-21 RX ADMIN — SODIUM CHLORIDE, PRESERVATIVE FREE 10 ML: 5 INJECTION INTRAVENOUS at 20:34

## 2019-02-21 RX ADMIN — ACETAMINOPHEN 650 MG: 325 TABLET ORAL at 20:31

## 2019-02-21 RX ADMIN — FAMOTIDINE 20 MG: 20 TABLET ORAL at 20:32

## 2019-02-21 RX ADMIN — ACETAMINOPHEN 650 MG: 325 TABLET ORAL at 13:23

## 2019-02-21 RX ADMIN — OXYCODONE HYDROCHLORIDE 15 MG: 5 TABLET ORAL at 16:49

## 2019-02-21 RX ADMIN — MELATONIN TAB 3 MG 6 MG: 3 TAB at 20:32

## 2019-02-21 RX ADMIN — GABAPENTIN 300 MG: 300 CAPSULE ORAL at 20:33

## 2019-02-21 RX ADMIN — MEXILETINE HYDROCHLORIDE 150 MG: 150 CAPSULE ORAL at 14:18

## 2019-02-21 RX ADMIN — OXYCODONE HYDROCHLORIDE 15 MG: 5 TABLET ORAL at 20:31

## 2019-02-21 RX ADMIN — LISINOPRIL 2.5 MG: 2.5 TABLET ORAL at 09:53

## 2019-02-21 RX ADMIN — LEVETIRACETAM 750 MG: 500 TABLET, FILM COATED ORAL at 09:51

## 2019-02-21 RX ADMIN — MEROPENEM 2 G: 1 INJECTION, POWDER, FOR SOLUTION INTRAVENOUS at 23:14

## 2019-02-21 RX ADMIN — FERROUS GLUCONATE TAB 324 MG (37.5 MG ELEMENTAL IRON) 324 MG: 324 (37.5 FE) TAB at 20:32

## 2019-02-21 RX ADMIN — SODIUM CHLORIDE, PRESERVATIVE FREE 10 ML: 5 INJECTION INTRAVENOUS at 09:55

## 2019-02-21 RX ADMIN — DULOXETINE HYDROCHLORIDE 60 MG: 30 CAPSULE, DELAYED RELEASE ORAL at 20:33

## 2019-02-21 RX ADMIN — Medication 1 CAPSULE: at 13:23

## 2019-02-21 RX ADMIN — VANCOMYCIN HYDROCHLORIDE 1000 MG: 1 INJECTION, SOLUTION INTRAVENOUS at 23:57

## 2019-02-21 RX ADMIN — RANOLAZINE 1000 MG: 500 TABLET, FILM COATED, EXTENDED RELEASE ORAL at 09:52

## 2019-02-21 RX ADMIN — MEROPENEM 2 G: 1 INJECTION, POWDER, FOR SOLUTION INTRAVENOUS at 13:22

## 2019-02-21 RX ADMIN — OXYCODONE HYDROCHLORIDE 15 MG: 5 TABLET ORAL at 09:50

## 2019-02-21 RX ADMIN — OXYCODONE HYDROCHLORIDE 15 MG: 5 TABLET ORAL at 13:23

## 2019-02-21 RX ADMIN — MEXILETINE HYDROCHLORIDE 150 MG: 150 CAPSULE ORAL at 20:33

## 2019-02-21 RX ADMIN — RANOLAZINE 1000 MG: 500 TABLET, FILM COATED, EXTENDED RELEASE ORAL at 20:32

## 2019-02-21 RX ADMIN — GABAPENTIN 300 MG: 300 CAPSULE ORAL at 14:18

## 2019-02-21 RX ADMIN — ERGOCALCIFEROL 50000 UNITS: 1.25 CAPSULE ORAL at 13:24

## 2019-02-21 RX ADMIN — TORSEMIDE 20 MG: 20 TABLET ORAL at 09:51

## 2019-02-21 RX ADMIN — METOPROLOL SUCCINATE 12.5 MG: 25 TABLET, EXTENDED RELEASE ORAL at 09:52

## 2019-02-21 RX ADMIN — Medication 800 MG: at 20:33

## 2019-02-21 RX ADMIN — DULOXETINE HYDROCHLORIDE 60 MG: 30 CAPSULE, DELAYED RELEASE ORAL at 09:52

## 2019-02-21 RX ADMIN — ENOXAPARIN SODIUM 30 MG: 30 INJECTION SUBCUTANEOUS at 09:53

## 2019-02-21 RX ADMIN — FAMOTIDINE 20 MG: 20 TABLET ORAL at 09:52

## 2019-02-21 RX ADMIN — FERROUS GLUCONATE TAB 324 MG (37.5 MG ELEMENTAL IRON) 324 MG: 324 (37.5 FE) TAB at 09:52

## 2019-02-21 RX ADMIN — Medication 1 CAPSULE: at 16:49

## 2019-02-21 RX ADMIN — GABAPENTIN 300 MG: 300 CAPSULE ORAL at 06:06

## 2019-02-21 RX ADMIN — ACETAMINOPHEN 650 MG: 325 TABLET ORAL at 01:11

## 2019-02-21 RX ADMIN — MEXILETINE HYDROCHLORIDE 150 MG: 150 CAPSULE ORAL at 06:06

## 2019-02-21 RX ADMIN — MEROPENEM 2 G: 1 INJECTION, POWDER, FOR SOLUTION INTRAVENOUS at 02:13

## 2019-02-21 RX ADMIN — OXYCODONE HYDROCHLORIDE 15 MG: 5 TABLET ORAL at 03:05

## 2019-02-21 RX ADMIN — POTASSIUM CHLORIDE 20 MEQ: 1500 TABLET, EXTENDED RELEASE ORAL at 09:53

## 2019-02-21 RX ADMIN — LEVETIRACETAM 750 MG: 500 TABLET, FILM COATED ORAL at 20:32

## 2019-02-21 RX ADMIN — ATORVASTATIN CALCIUM 80 MG: 40 TABLET, FILM COATED ORAL at 20:33

## 2019-02-21 RX ADMIN — VANCOMYCIN HYDROCHLORIDE 1000 MG: 1 INJECTION, SOLUTION INTRAVENOUS at 06:06

## 2019-02-21 RX ADMIN — Medication 800 MG: at 09:51

## 2019-02-21 ASSESSMENT — PAIN SCALES - GENERAL
PAINLEVEL_OUTOF10: 8
PAINLEVEL_OUTOF10: 9
PAINLEVEL_OUTOF10: 7
PAINLEVEL_OUTOF10: 7
PAINLEVEL_OUTOF10: 5
PAINLEVEL_OUTOF10: 7
PAINLEVEL_OUTOF10: 9
PAINLEVEL_OUTOF10: 5
PAINLEVEL_OUTOF10: 8
PAINLEVEL_OUTOF10: 7
PAINLEVEL_OUTOF10: 8
PAINLEVEL_OUTOF10: 6

## 2019-02-21 ASSESSMENT — PAIN DESCRIPTION - PROGRESSION

## 2019-02-21 ASSESSMENT — PAIN DESCRIPTION - FREQUENCY
FREQUENCY: CONTINUOUS
FREQUENCY: INTERMITTENT
FREQUENCY: CONTINUOUS
FREQUENCY: CONTINUOUS

## 2019-02-21 ASSESSMENT — PAIN DESCRIPTION - DESCRIPTORS
DESCRIPTORS: CONSTANT
DESCRIPTORS: CONSTANT;ACHING
DESCRIPTORS: ACHING;CRAMPING
DESCRIPTORS: CONSTANT;ACHING
DESCRIPTORS: CONSTANT;ACHING
DESCRIPTORS: CONSTANT;CRAMPING

## 2019-02-21 ASSESSMENT — PAIN DESCRIPTION - LOCATION
LOCATION: HIP
LOCATION: ABDOMEN;HIP
LOCATION: HIP
LOCATION: ABDOMEN;HIP
LOCATION: HIP

## 2019-02-21 ASSESSMENT — PAIN - FUNCTIONAL ASSESSMENT
PAIN_FUNCTIONAL_ASSESSMENT: PREVENTS OR INTERFERES SOME ACTIVE ACTIVITIES AND ADLS
PAIN_FUNCTIONAL_ASSESSMENT: PREVENTS OR INTERFERES SOME ACTIVE ACTIVITIES AND ADLS
PAIN_FUNCTIONAL_ASSESSMENT: ACTIVITIES ARE NOT PREVENTED

## 2019-02-21 ASSESSMENT — PAIN DESCRIPTION - ORIENTATION
ORIENTATION: UPPER

## 2019-02-21 ASSESSMENT — PAIN DESCRIPTION - PAIN TYPE
TYPE: CHRONIC PAIN

## 2019-02-21 ASSESSMENT — PAIN DESCRIPTION - ONSET
ONSET: ON-GOING

## 2019-02-22 LAB
ANION GAP SERPL CALCULATED.3IONS-SCNC: 10 MMOL/L (ref 4–16)
BUN BLDV-MCNC: 18 MG/DL (ref 6–23)
CALCIUM SERPL-MCNC: 8.2 MG/DL (ref 8.3–10.6)
CHLORIDE BLD-SCNC: 101 MMOL/L (ref 99–110)
CO2: 30 MMOL/L (ref 21–32)
CREAT SERPL-MCNC: 0.7 MG/DL (ref 0.6–1.1)
GFR AFRICAN AMERICAN: >60 ML/MIN/1.73M2
GFR NON-AFRICAN AMERICAN: >60 ML/MIN/1.73M2
GLUCOSE BLD-MCNC: 115 MG/DL (ref 70–99)
POTASSIUM SERPL-SCNC: 3.7 MMOL/L (ref 3.5–5.1)
SODIUM BLD-SCNC: 141 MMOL/L (ref 135–145)
VANCOMYCIN TROUGH: 20.2 UG/ML (ref 10–20)

## 2019-02-22 PROCEDURE — 6360000002 HC RX W HCPCS: Performed by: NURSE PRACTITIONER

## 2019-02-22 PROCEDURE — 6370000000 HC RX 637 (ALT 250 FOR IP): Performed by: INTERNAL MEDICINE

## 2019-02-22 PROCEDURE — 94761 N-INVAS EAR/PLS OXIMETRY MLT: CPT

## 2019-02-22 PROCEDURE — 2580000003 HC RX 258: Performed by: INTERNAL MEDICINE

## 2019-02-22 PROCEDURE — 6370000000 HC RX 637 (ALT 250 FOR IP): Performed by: NURSE PRACTITIONER

## 2019-02-22 PROCEDURE — 1200000000 HC SEMI PRIVATE

## 2019-02-22 PROCEDURE — 2580000003 HC RX 258: Performed by: NURSE PRACTITIONER

## 2019-02-22 PROCEDURE — 80048 BASIC METABOLIC PNL TOTAL CA: CPT

## 2019-02-22 PROCEDURE — 99232 SBSQ HOSP IP/OBS MODERATE 35: CPT | Performed by: INTERNAL MEDICINE

## 2019-02-22 PROCEDURE — 6360000002 HC RX W HCPCS: Performed by: INTERNAL MEDICINE

## 2019-02-22 PROCEDURE — 80202 ASSAY OF VANCOMYCIN: CPT

## 2019-02-22 RX ORDER — BACITRACIN 500 [USP'U]/G
OINTMENT TOPICAL 4 TIMES DAILY
Status: DISCONTINUED | OUTPATIENT
Start: 2019-02-22 | End: 2019-02-26 | Stop reason: HOSPADM

## 2019-02-22 RX ADMIN — ATORVASTATIN CALCIUM 80 MG: 40 TABLET, FILM COATED ORAL at 19:59

## 2019-02-22 RX ADMIN — SODIUM CHLORIDE, PRESERVATIVE FREE 10 ML: 5 INJECTION INTRAVENOUS at 20:01

## 2019-02-22 RX ADMIN — RANOLAZINE 1000 MG: 500 TABLET, FILM COATED, EXTENDED RELEASE ORAL at 10:03

## 2019-02-22 RX ADMIN — ACETAMINOPHEN 650 MG: 325 TABLET ORAL at 09:47

## 2019-02-22 RX ADMIN — GABAPENTIN 300 MG: 300 CAPSULE ORAL at 06:33

## 2019-02-22 RX ADMIN — Medication 1 CAPSULE: at 10:03

## 2019-02-22 RX ADMIN — OXYCODONE HYDROCHLORIDE 15 MG: 5 TABLET ORAL at 00:56

## 2019-02-22 RX ADMIN — LEVETIRACETAM 750 MG: 500 TABLET, FILM COATED ORAL at 19:59

## 2019-02-22 RX ADMIN — FERROUS GLUCONATE TAB 324 MG (37.5 MG ELEMENTAL IRON) 324 MG: 324 (37.5 FE) TAB at 20:00

## 2019-02-22 RX ADMIN — RANOLAZINE 1000 MG: 500 TABLET, FILM COATED, EXTENDED RELEASE ORAL at 19:59

## 2019-02-22 RX ADMIN — ZINC OXIDE: 200 OINTMENT TOPICAL at 14:21

## 2019-02-22 RX ADMIN — ASPIRIN 81 MG 81 MG: 81 TABLET ORAL at 10:06

## 2019-02-22 RX ADMIN — MEXILETINE HYDROCHLORIDE 150 MG: 150 CAPSULE ORAL at 20:00

## 2019-02-22 RX ADMIN — OMEGA-3-ACID ETHYL ESTERS 1000 MG: 1 CAPSULE, LIQUID FILLED ORAL at 19:59

## 2019-02-22 RX ADMIN — MUPIROCIN: 20 OINTMENT TOPICAL at 20:00

## 2019-02-22 RX ADMIN — OXYCODONE HYDROCHLORIDE 15 MG: 5 TABLET ORAL at 16:12

## 2019-02-22 RX ADMIN — ZINC OXIDE: 200 OINTMENT TOPICAL at 20:00

## 2019-02-22 RX ADMIN — DULOXETINE HYDROCHLORIDE 60 MG: 30 CAPSULE, DELAYED RELEASE ORAL at 19:59

## 2019-02-22 RX ADMIN — FERROUS GLUCONATE TAB 324 MG (37.5 MG ELEMENTAL IRON) 324 MG: 324 (37.5 FE) TAB at 10:06

## 2019-02-22 RX ADMIN — ACETAMINOPHEN 650 MG: 325 TABLET ORAL at 19:58

## 2019-02-22 RX ADMIN — ACETAMINOPHEN 650 MG: 325 TABLET ORAL at 00:56

## 2019-02-22 RX ADMIN — ACETAMINOPHEN 650 MG: 325 TABLET ORAL at 16:12

## 2019-02-22 RX ADMIN — MEXILETINE HYDROCHLORIDE 150 MG: 150 CAPSULE ORAL at 06:34

## 2019-02-22 RX ADMIN — FAMOTIDINE 20 MG: 20 TABLET ORAL at 10:04

## 2019-02-22 RX ADMIN — CLOPIDOGREL BISULFATE 75 MG: 75 TABLET, FILM COATED ORAL at 10:05

## 2019-02-22 RX ADMIN — Medication 1 CAPSULE: at 12:07

## 2019-02-22 RX ADMIN — OXYCODONE HYDROCHLORIDE 15 MG: 5 TABLET ORAL at 19:59

## 2019-02-22 RX ADMIN — GABAPENTIN 300 MG: 300 CAPSULE ORAL at 20:05

## 2019-02-22 RX ADMIN — GABAPENTIN 300 MG: 300 CAPSULE ORAL at 14:22

## 2019-02-22 RX ADMIN — TORSEMIDE 20 MG: 20 TABLET ORAL at 10:04

## 2019-02-22 RX ADMIN — LEVOTHYROXINE SODIUM 137 MCG: 0.11 TABLET ORAL at 06:33

## 2019-02-22 RX ADMIN — MEROPENEM 2 G: 1 INJECTION, POWDER, FOR SOLUTION INTRAVENOUS at 15:10

## 2019-02-22 RX ADMIN — DULOXETINE HYDROCHLORIDE 60 MG: 30 CAPSULE, DELAYED RELEASE ORAL at 10:04

## 2019-02-22 RX ADMIN — LEVETIRACETAM 750 MG: 500 TABLET, FILM COATED ORAL at 10:05

## 2019-02-22 RX ADMIN — MEXILETINE HYDROCHLORIDE 150 MG: 150 CAPSULE ORAL at 14:22

## 2019-02-22 RX ADMIN — FAMOTIDINE 20 MG: 20 TABLET ORAL at 20:00

## 2019-02-22 RX ADMIN — OXYCODONE HYDROCHLORIDE 15 MG: 5 TABLET ORAL at 13:09

## 2019-02-22 RX ADMIN — MELATONIN TAB 3 MG 6 MG: 3 TAB at 20:00

## 2019-02-22 RX ADMIN — DOCUSATE SODIUM - SENNOSIDES 1 TABLET: 50; 8.6 TABLET, FILM COATED ORAL at 10:05

## 2019-02-22 RX ADMIN — POTASSIUM CHLORIDE 20 MEQ: 1500 TABLET, EXTENDED RELEASE ORAL at 12:07

## 2019-02-22 RX ADMIN — ENOXAPARIN SODIUM 30 MG: 30 INJECTION SUBCUTANEOUS at 10:06

## 2019-02-22 RX ADMIN — CHLORHEXIDINE GLUCONATE: 4 LIQUID TOPICAL at 14:42

## 2019-02-22 RX ADMIN — OXYCODONE HYDROCHLORIDE 15 MG: 5 TABLET ORAL at 06:41

## 2019-02-22 RX ADMIN — OXYCODONE HYDROCHLORIDE 15 MG: 5 TABLET ORAL at 09:47

## 2019-02-22 RX ADMIN — Medication 1 CAPSULE: at 17:44

## 2019-02-22 RX ADMIN — MEROPENEM 2 G: 1 INJECTION, POWDER, FOR SOLUTION INTRAVENOUS at 06:33

## 2019-02-22 RX ADMIN — Medication 800 MG: at 19:59

## 2019-02-22 RX ADMIN — TORSEMIDE 20 MG: 20 TABLET ORAL at 17:44

## 2019-02-22 RX ADMIN — Medication 800 MG: at 10:05

## 2019-02-22 RX ADMIN — SODIUM CHLORIDE, PRESERVATIVE FREE 10 ML: 5 INJECTION INTRAVENOUS at 10:07

## 2019-02-22 RX ADMIN — VANCOMYCIN HYDROCHLORIDE 1000 MG: 1 INJECTION, SOLUTION INTRAVENOUS at 17:45

## 2019-02-22 RX ADMIN — MEROPENEM 2 G: 1 INJECTION, POWDER, FOR SOLUTION INTRAVENOUS at 22:08

## 2019-02-22 ASSESSMENT — PAIN SCALES - GENERAL
PAINLEVEL_OUTOF10: 8
PAINLEVEL_OUTOF10: 9
PAINLEVEL_OUTOF10: 8
PAINLEVEL_OUTOF10: 8
PAINLEVEL_OUTOF10: 5
PAINLEVEL_OUTOF10: 9
PAINLEVEL_OUTOF10: 5
PAINLEVEL_OUTOF10: 7
PAINLEVEL_OUTOF10: 6

## 2019-02-22 ASSESSMENT — PAIN DESCRIPTION - PAIN TYPE: TYPE: CHRONIC PAIN

## 2019-02-22 ASSESSMENT — PAIN DESCRIPTION - LOCATION: LOCATION: HIP

## 2019-02-23 LAB
ANION GAP SERPL CALCULATED.3IONS-SCNC: 6 MMOL/L (ref 4–16)
BUN BLDV-MCNC: 20 MG/DL (ref 6–23)
CALCIUM SERPL-MCNC: 8.6 MG/DL (ref 8.3–10.6)
CHLORIDE BLD-SCNC: 100 MMOL/L (ref 99–110)
CO2: 35 MMOL/L (ref 21–32)
CREAT SERPL-MCNC: 0.8 MG/DL (ref 0.6–1.1)
GFR AFRICAN AMERICAN: >60 ML/MIN/1.73M2
GFR NON-AFRICAN AMERICAN: >60 ML/MIN/1.73M2
GLUCOSE BLD-MCNC: 104 MG/DL (ref 70–99)
POTASSIUM SERPL-SCNC: 3.6 MMOL/L (ref 3.5–5.1)
SODIUM BLD-SCNC: 141 MMOL/L (ref 135–145)
VANCOMYCIN TROUGH: 10.9 UG/ML (ref 10–20)

## 2019-02-23 PROCEDURE — 6360000002 HC RX W HCPCS: Performed by: INTERNAL MEDICINE

## 2019-02-23 PROCEDURE — 80048 BASIC METABOLIC PNL TOTAL CA: CPT

## 2019-02-23 PROCEDURE — 76937 US GUIDE VASCULAR ACCESS: CPT

## 2019-02-23 PROCEDURE — C1751 CATH, INF, PER/CENT/MIDLINE: HCPCS

## 2019-02-23 PROCEDURE — 80202 ASSAY OF VANCOMYCIN: CPT

## 2019-02-23 PROCEDURE — 6360000002 HC RX W HCPCS: Performed by: NURSE PRACTITIONER

## 2019-02-23 PROCEDURE — 6370000000 HC RX 637 (ALT 250 FOR IP): Performed by: NURSE PRACTITIONER

## 2019-02-23 PROCEDURE — 1200000000 HC SEMI PRIVATE

## 2019-02-23 PROCEDURE — 6370000000 HC RX 637 (ALT 250 FOR IP): Performed by: INTERNAL MEDICINE

## 2019-02-23 PROCEDURE — 2580000003 HC RX 258: Performed by: INTERNAL MEDICINE

## 2019-02-23 PROCEDURE — 02HV33Z INSERTION OF INFUSION DEVICE INTO SUPERIOR VENA CAVA, PERCUTANEOUS APPROACH: ICD-10-PCS | Performed by: INTERNAL MEDICINE

## 2019-02-23 PROCEDURE — 36569 INSJ PICC 5 YR+ W/O IMAGING: CPT

## 2019-02-23 PROCEDURE — 36415 COLL VENOUS BLD VENIPUNCTURE: CPT

## 2019-02-23 PROCEDURE — 2500000003 HC RX 250 WO HCPCS: Performed by: INTERNAL MEDICINE

## 2019-02-23 PROCEDURE — C1769 GUIDE WIRE: HCPCS

## 2019-02-23 RX ORDER — SODIUM CHLORIDE 0.9 % (FLUSH) 0.9 %
10 SYRINGE (ML) INJECTION PRN
Status: DISCONTINUED | OUTPATIENT
Start: 2019-02-23 | End: 2019-02-26 | Stop reason: HOSPADM

## 2019-02-23 RX ORDER — BACITRACIN 500 [USP'U]/G
OINTMENT TOPICAL
Qty: 56 G | Refills: 1 | Status: SHIPPED | OUTPATIENT
Start: 2019-02-23 | End: 2019-03-31

## 2019-02-23 RX ORDER — LIDOCAINE HYDROCHLORIDE 10 MG/ML
5 INJECTION, SOLUTION EPIDURAL; INFILTRATION; INTRACAUDAL; PERINEURAL ONCE
Status: COMPLETED | OUTPATIENT
Start: 2019-02-23 | End: 2019-02-23

## 2019-02-23 RX ORDER — LACTOBACILLUS RHAMNOSUS GG 10B CELL
1 CAPSULE ORAL
Qty: 90 CAPSULE | Refills: 0 | Status: SHIPPED | OUTPATIENT
Start: 2019-02-23 | End: 2019-02-26

## 2019-02-23 RX ORDER — SODIUM CHLORIDE 0.9 % (FLUSH) 0.9 %
10 SYRINGE (ML) INJECTION EVERY 12 HOURS SCHEDULED
Status: DISCONTINUED | OUTPATIENT
Start: 2019-02-23 | End: 2019-02-26 | Stop reason: HOSPADM

## 2019-02-23 RX ADMIN — ACETAMINOPHEN 650 MG: 325 TABLET ORAL at 21:07

## 2019-02-23 RX ADMIN — FERROUS GLUCONATE TAB 324 MG (37.5 MG ELEMENTAL IRON) 324 MG: 324 (37.5 FE) TAB at 10:52

## 2019-02-23 RX ADMIN — TORSEMIDE 20 MG: 20 TABLET ORAL at 17:40

## 2019-02-23 RX ADMIN — DOCUSATE SODIUM - SENNOSIDES 1 TABLET: 50; 8.6 TABLET, FILM COATED ORAL at 10:51

## 2019-02-23 RX ADMIN — OXYCODONE HYDROCHLORIDE 15 MG: 5 TABLET ORAL at 21:08

## 2019-02-23 RX ADMIN — LIDOCAINE HYDROCHLORIDE 5 ML: 10 INJECTION, SOLUTION EPIDURAL; INFILTRATION; INTRACAUDAL; PERINEURAL at 17:06

## 2019-02-23 RX ADMIN — ENOXAPARIN SODIUM 30 MG: 30 INJECTION SUBCUTANEOUS at 10:53

## 2019-02-23 RX ADMIN — Medication 1 CAPSULE: at 10:49

## 2019-02-23 RX ADMIN — Medication 800 MG: at 10:51

## 2019-02-23 RX ADMIN — OXYCODONE HYDROCHLORIDE 15 MG: 5 TABLET ORAL at 11:37

## 2019-02-23 RX ADMIN — MUPIROCIN: 20 OINTMENT TOPICAL at 21:20

## 2019-02-23 RX ADMIN — GABAPENTIN 300 MG: 300 CAPSULE ORAL at 21:08

## 2019-02-23 RX ADMIN — CLOPIDOGREL BISULFATE 75 MG: 75 TABLET, FILM COATED ORAL at 10:57

## 2019-02-23 RX ADMIN — MEROPENEM 2 G: 1 INJECTION, POWDER, FOR SOLUTION INTRAVENOUS at 23:18

## 2019-02-23 RX ADMIN — ZINC OXIDE: 200 OINTMENT TOPICAL at 21:10

## 2019-02-23 RX ADMIN — Medication 1 CAPSULE: at 17:40

## 2019-02-23 RX ADMIN — OXYCODONE HYDROCHLORIDE 15 MG: 5 TABLET ORAL at 17:51

## 2019-02-23 RX ADMIN — ATORVASTATIN CALCIUM 80 MG: 40 TABLET, FILM COATED ORAL at 21:07

## 2019-02-23 RX ADMIN — TORSEMIDE 20 MG: 20 TABLET ORAL at 10:52

## 2019-02-23 RX ADMIN — LEVETIRACETAM 750 MG: 500 TABLET, FILM COATED ORAL at 21:07

## 2019-02-23 RX ADMIN — Medication 800 MG: at 21:08

## 2019-02-23 RX ADMIN — LEVETIRACETAM 750 MG: 500 TABLET, FILM COATED ORAL at 10:50

## 2019-02-23 RX ADMIN — ASPIRIN 81 MG 81 MG: 81 TABLET ORAL at 10:52

## 2019-02-23 RX ADMIN — LEVOTHYROXINE SODIUM 137 MCG: 0.11 TABLET ORAL at 06:34

## 2019-02-23 RX ADMIN — FAMOTIDINE 20 MG: 20 TABLET ORAL at 10:52

## 2019-02-23 RX ADMIN — CHLORHEXIDINE GLUCONATE: 4 LIQUID TOPICAL at 14:48

## 2019-02-23 RX ADMIN — CHLORHEXIDINE GLUCONATE: 4 LIQUID TOPICAL at 21:23

## 2019-02-23 RX ADMIN — MEXILETINE HYDROCHLORIDE 150 MG: 150 CAPSULE ORAL at 14:22

## 2019-02-23 RX ADMIN — FERROUS GLUCONATE TAB 324 MG (37.5 MG ELEMENTAL IRON) 324 MG: 324 (37.5 FE) TAB at 21:07

## 2019-02-23 RX ADMIN — ACETAMINOPHEN 650 MG: 325 TABLET ORAL at 14:47

## 2019-02-23 RX ADMIN — MEXILETINE HYDROCHLORIDE 150 MG: 150 CAPSULE ORAL at 21:20

## 2019-02-23 RX ADMIN — OXYCODONE HYDROCHLORIDE 15 MG: 5 TABLET ORAL at 14:46

## 2019-02-23 RX ADMIN — VANCOMYCIN HYDROCHLORIDE 750 MG: 5 INJECTION, POWDER, LYOPHILIZED, FOR SOLUTION INTRAVENOUS at 21:08

## 2019-02-23 RX ADMIN — MUPIROCIN: 20 OINTMENT TOPICAL at 10:58

## 2019-02-23 RX ADMIN — MEROPENEM 2 G: 1 INJECTION, POWDER, FOR SOLUTION INTRAVENOUS at 06:34

## 2019-02-23 RX ADMIN — GABAPENTIN 300 MG: 300 CAPSULE ORAL at 14:22

## 2019-02-23 RX ADMIN — MEROPENEM 2 G: 1 INJECTION, POWDER, FOR SOLUTION INTRAVENOUS at 14:23

## 2019-02-23 RX ADMIN — RANOLAZINE 1000 MG: 500 TABLET, FILM COATED, EXTENDED RELEASE ORAL at 10:50

## 2019-02-23 RX ADMIN — ZINC OXIDE: 200 OINTMENT TOPICAL at 10:57

## 2019-02-23 RX ADMIN — DULOXETINE HYDROCHLORIDE 60 MG: 30 CAPSULE, DELAYED RELEASE ORAL at 21:08

## 2019-02-23 RX ADMIN — DULOXETINE HYDROCHLORIDE 60 MG: 30 CAPSULE, DELAYED RELEASE ORAL at 10:51

## 2019-02-23 RX ADMIN — OXYCODONE HYDROCHLORIDE 15 MG: 5 TABLET ORAL at 03:57

## 2019-02-23 RX ADMIN — POTASSIUM CHLORIDE 20 MEQ: 1500 TABLET, EXTENDED RELEASE ORAL at 12:01

## 2019-02-23 RX ADMIN — SODIUM CHLORIDE, PRESERVATIVE FREE 10 ML: 5 INJECTION INTRAVENOUS at 21:08

## 2019-02-23 RX ADMIN — ZINC OXIDE: 200 OINTMENT TOPICAL at 17:41

## 2019-02-23 RX ADMIN — OXYCODONE HYDROCHLORIDE 15 MG: 5 TABLET ORAL at 08:20

## 2019-02-23 RX ADMIN — OXYCODONE HYDROCHLORIDE 15 MG: 5 TABLET ORAL at 00:27

## 2019-02-23 RX ADMIN — GABAPENTIN 300 MG: 300 CAPSULE ORAL at 06:34

## 2019-02-23 RX ADMIN — RANOLAZINE 1000 MG: 500 TABLET, FILM COATED, EXTENDED RELEASE ORAL at 21:07

## 2019-02-23 RX ADMIN — FAMOTIDINE 20 MG: 20 TABLET ORAL at 21:08

## 2019-02-23 RX ADMIN — MEXILETINE HYDROCHLORIDE 150 MG: 150 CAPSULE ORAL at 06:34

## 2019-02-23 RX ADMIN — ACETAMINOPHEN 650 MG: 325 TABLET ORAL at 03:57

## 2019-02-23 RX ADMIN — ZINC OXIDE: 200 OINTMENT TOPICAL at 14:23

## 2019-02-23 RX ADMIN — OMEGA-3-ACID ETHYL ESTERS 1000 MG: 1 CAPSULE, LIQUID FILLED ORAL at 21:07

## 2019-02-23 RX ADMIN — SODIUM CHLORIDE, PRESERVATIVE FREE 10 ML: 5 INJECTION INTRAVENOUS at 12:01

## 2019-02-23 ASSESSMENT — PAIN DESCRIPTION - PAIN TYPE
TYPE: CHRONIC PAIN
TYPE: CHRONIC PAIN

## 2019-02-23 ASSESSMENT — PAIN SCALES - GENERAL
PAINLEVEL_OUTOF10: 7
PAINLEVEL_OUTOF10: 7
PAINLEVEL_OUTOF10: 8
PAINLEVEL_OUTOF10: 7
PAINLEVEL_OUTOF10: 8
PAINLEVEL_OUTOF10: 8
PAINLEVEL_OUTOF10: 7
PAINLEVEL_OUTOF10: 7

## 2019-02-23 ASSESSMENT — PAIN DESCRIPTION - PROGRESSION: CLINICAL_PROGRESSION: NOT CHANGED

## 2019-02-23 ASSESSMENT — PAIN DESCRIPTION - LOCATION
LOCATION: HIP
LOCATION: HIP

## 2019-02-23 ASSESSMENT — PAIN DESCRIPTION - ONSET
ONSET: ON-GOING
ONSET: ON-GOING

## 2019-02-23 ASSESSMENT — PAIN DESCRIPTION - FREQUENCY
FREQUENCY: CONTINUOUS
FREQUENCY: CONTINUOUS

## 2019-02-23 ASSESSMENT — PAIN DESCRIPTION - DESCRIPTORS
DESCRIPTORS: DULL;ACHING
DESCRIPTORS: ACHING;DULL

## 2019-02-23 ASSESSMENT — PAIN DESCRIPTION - ORIENTATION: ORIENTATION: RIGHT;LEFT

## 2019-02-24 LAB
ANION GAP SERPL CALCULATED.3IONS-SCNC: 8 MMOL/L (ref 4–16)
BUN BLDV-MCNC: 19 MG/DL (ref 6–23)
CALCIUM SERPL-MCNC: 9.2 MG/DL (ref 8.3–10.6)
CHLORIDE BLD-SCNC: 96 MMOL/L (ref 99–110)
CO2: 35 MMOL/L (ref 21–32)
CREAT SERPL-MCNC: 0.7 MG/DL (ref 0.6–1.1)
GFR AFRICAN AMERICAN: >60 ML/MIN/1.73M2
GFR NON-AFRICAN AMERICAN: >60 ML/MIN/1.73M2
GLUCOSE BLD-MCNC: 103 MG/DL (ref 70–99)
POTASSIUM SERPL-SCNC: 3.7 MMOL/L (ref 3.5–5.1)
SODIUM BLD-SCNC: 139 MMOL/L (ref 135–145)

## 2019-02-24 PROCEDURE — 6360000002 HC RX W HCPCS: Performed by: INTERNAL MEDICINE

## 2019-02-24 PROCEDURE — 6370000000 HC RX 637 (ALT 250 FOR IP): Performed by: INTERNAL MEDICINE

## 2019-02-24 PROCEDURE — 2580000003 HC RX 258: Performed by: INTERNAL MEDICINE

## 2019-02-24 PROCEDURE — 6370000000 HC RX 637 (ALT 250 FOR IP): Performed by: NURSE PRACTITIONER

## 2019-02-24 PROCEDURE — 36415 COLL VENOUS BLD VENIPUNCTURE: CPT

## 2019-02-24 PROCEDURE — 80048 BASIC METABOLIC PNL TOTAL CA: CPT

## 2019-02-24 PROCEDURE — 1200000000 HC SEMI PRIVATE

## 2019-02-24 PROCEDURE — 6360000002 HC RX W HCPCS: Performed by: NURSE PRACTITIONER

## 2019-02-24 RX ADMIN — Medication 1 CAPSULE: at 16:27

## 2019-02-24 RX ADMIN — DULOXETINE HYDROCHLORIDE 60 MG: 30 CAPSULE, DELAYED RELEASE ORAL at 22:51

## 2019-02-24 RX ADMIN — GABAPENTIN 300 MG: 300 CAPSULE ORAL at 22:58

## 2019-02-24 RX ADMIN — Medication 800 MG: at 22:50

## 2019-02-24 RX ADMIN — MEROPENEM 2 G: 1 INJECTION, POWDER, FOR SOLUTION INTRAVENOUS at 14:17

## 2019-02-24 RX ADMIN — ZINC OXIDE: 200 OINTMENT TOPICAL at 16:38

## 2019-02-24 RX ADMIN — DOCUSATE SODIUM - SENNOSIDES 1 TABLET: 50; 8.6 TABLET, FILM COATED ORAL at 10:04

## 2019-02-24 RX ADMIN — GABAPENTIN 300 MG: 300 CAPSULE ORAL at 06:46

## 2019-02-24 RX ADMIN — MUPIROCIN: 20 OINTMENT TOPICAL at 22:59

## 2019-02-24 RX ADMIN — ATORVASTATIN CALCIUM 80 MG: 40 TABLET, FILM COATED ORAL at 22:50

## 2019-02-24 RX ADMIN — OXYCODONE HYDROCHLORIDE 15 MG: 5 TABLET ORAL at 23:01

## 2019-02-24 RX ADMIN — OXYCODONE HYDROCHLORIDE 15 MG: 5 TABLET ORAL at 09:53

## 2019-02-24 RX ADMIN — OMEGA-3-ACID ETHYL ESTERS 1000 MG: 1 CAPSULE, LIQUID FILLED ORAL at 22:51

## 2019-02-24 RX ADMIN — CLOPIDOGREL BISULFATE 75 MG: 75 TABLET, FILM COATED ORAL at 10:06

## 2019-02-24 RX ADMIN — FERROUS GLUCONATE TAB 324 MG (37.5 MG ELEMENTAL IRON) 324 MG: 324 (37.5 FE) TAB at 22:51

## 2019-02-24 RX ADMIN — OXYCODONE HYDROCHLORIDE 15 MG: 5 TABLET ORAL at 00:29

## 2019-02-24 RX ADMIN — Medication 1 CAPSULE: at 12:54

## 2019-02-24 RX ADMIN — ZINC OXIDE: 200 OINTMENT TOPICAL at 22:49

## 2019-02-24 RX ADMIN — Medication 1 CAPSULE: at 10:03

## 2019-02-24 RX ADMIN — FERROUS GLUCONATE TAB 324 MG (37.5 MG ELEMENTAL IRON) 324 MG: 324 (37.5 FE) TAB at 10:05

## 2019-02-24 RX ADMIN — ASPIRIN 81 MG 81 MG: 81 TABLET ORAL at 10:07

## 2019-02-24 RX ADMIN — ZINC OXIDE: 200 OINTMENT TOPICAL at 12:54

## 2019-02-24 RX ADMIN — OXYCODONE HYDROCHLORIDE 15 MG: 5 TABLET ORAL at 16:27

## 2019-02-24 RX ADMIN — MEROPENEM 2 G: 1 INJECTION, POWDER, FOR SOLUTION INTRAVENOUS at 22:49

## 2019-02-24 RX ADMIN — Medication 800 MG: at 10:06

## 2019-02-24 RX ADMIN — LEVETIRACETAM 750 MG: 500 TABLET, FILM COATED ORAL at 10:04

## 2019-02-24 RX ADMIN — TORSEMIDE 20 MG: 20 TABLET ORAL at 16:28

## 2019-02-24 RX ADMIN — RANOLAZINE 1000 MG: 500 TABLET, FILM COATED, EXTENDED RELEASE ORAL at 22:50

## 2019-02-24 RX ADMIN — MUPIROCIN: 20 OINTMENT TOPICAL at 10:09

## 2019-02-24 RX ADMIN — FAMOTIDINE 20 MG: 20 TABLET ORAL at 22:58

## 2019-02-24 RX ADMIN — LEVETIRACETAM 750 MG: 500 TABLET, FILM COATED ORAL at 22:51

## 2019-02-24 RX ADMIN — LEVOTHYROXINE SODIUM 137 MCG: 0.11 TABLET ORAL at 06:46

## 2019-02-24 RX ADMIN — DULOXETINE HYDROCHLORIDE 60 MG: 30 CAPSULE, DELAYED RELEASE ORAL at 10:05

## 2019-02-24 RX ADMIN — OXYCODONE HYDROCHLORIDE 15 MG: 5 TABLET ORAL at 12:53

## 2019-02-24 RX ADMIN — MEXILETINE HYDROCHLORIDE 150 MG: 150 CAPSULE ORAL at 06:47

## 2019-02-24 RX ADMIN — MEROPENEM 2 G: 1 INJECTION, POWDER, FOR SOLUTION INTRAVENOUS at 06:47

## 2019-02-24 RX ADMIN — POTASSIUM CHLORIDE 20 MEQ: 1500 TABLET, EXTENDED RELEASE ORAL at 10:04

## 2019-02-24 RX ADMIN — MEXILETINE HYDROCHLORIDE 150 MG: 150 CAPSULE ORAL at 22:50

## 2019-02-24 RX ADMIN — SODIUM CHLORIDE, PRESERVATIVE FREE 10 ML: 5 INJECTION INTRAVENOUS at 10:07

## 2019-02-24 RX ADMIN — OXYCODONE HYDROCHLORIDE 15 MG: 5 TABLET ORAL at 03:49

## 2019-02-24 RX ADMIN — CHLORHEXIDINE GLUCONATE: 4 LIQUID TOPICAL at 16:38

## 2019-02-24 RX ADMIN — RANOLAZINE 1000 MG: 500 TABLET, FILM COATED, EXTENDED RELEASE ORAL at 10:05

## 2019-02-24 RX ADMIN — ACETAMINOPHEN 650 MG: 325 TABLET ORAL at 16:27

## 2019-02-24 RX ADMIN — SODIUM CHLORIDE, PRESERVATIVE FREE 10 ML: 5 INJECTION INTRAVENOUS at 22:50

## 2019-02-24 RX ADMIN — OXYCODONE HYDROCHLORIDE 15 MG: 5 TABLET ORAL at 06:49

## 2019-02-24 RX ADMIN — ONDANSETRON 4 MG: 2 INJECTION INTRAMUSCULAR; INTRAVENOUS at 16:27

## 2019-02-24 RX ADMIN — MEXILETINE HYDROCHLORIDE 150 MG: 150 CAPSULE ORAL at 12:54

## 2019-02-24 RX ADMIN — VANCOMYCIN HYDROCHLORIDE 750 MG: 5 INJECTION, POWDER, LYOPHILIZED, FOR SOLUTION INTRAVENOUS at 07:58

## 2019-02-24 RX ADMIN — ENOXAPARIN SODIUM 30 MG: 30 INJECTION SUBCUTANEOUS at 10:06

## 2019-02-24 RX ADMIN — VANCOMYCIN HYDROCHLORIDE 750 MG: 5 INJECTION, POWDER, LYOPHILIZED, FOR SOLUTION INTRAVENOUS at 17:28

## 2019-02-24 RX ADMIN — FAMOTIDINE 20 MG: 20 TABLET ORAL at 10:06

## 2019-02-24 RX ADMIN — TORSEMIDE 20 MG: 20 TABLET ORAL at 10:05

## 2019-02-24 RX ADMIN — ZINC OXIDE: 200 OINTMENT TOPICAL at 10:08

## 2019-02-24 RX ADMIN — GABAPENTIN 300 MG: 300 CAPSULE ORAL at 12:53

## 2019-02-24 ASSESSMENT — PAIN SCALES - GENERAL
PAINLEVEL_OUTOF10: 6
PAINLEVEL_OUTOF10: 7
PAINLEVEL_OUTOF10: 8
PAINLEVEL_OUTOF10: 0

## 2019-02-25 ENCOUNTER — APPOINTMENT (OUTPATIENT)
Dept: INTERVENTIONAL RADIOLOGY/VASCULAR | Age: 59
DRG: 393 | End: 2019-02-25
Payer: COMMERCIAL

## 2019-02-25 ENCOUNTER — APPOINTMENT (OUTPATIENT)
Dept: CT IMAGING | Age: 59
DRG: 393 | End: 2019-02-25
Payer: COMMERCIAL

## 2019-02-25 LAB
ANION GAP SERPL CALCULATED.3IONS-SCNC: 6 MMOL/L (ref 4–16)
BUN BLDV-MCNC: 20 MG/DL (ref 6–23)
CALCIUM SERPL-MCNC: 8.6 MG/DL (ref 8.3–10.6)
CHLORIDE BLD-SCNC: 95 MMOL/L (ref 99–110)
CO2: 39 MMOL/L (ref 21–32)
CREAT SERPL-MCNC: 0.6 MG/DL (ref 0.6–1.1)
GFR AFRICAN AMERICAN: >60 ML/MIN/1.73M2
GFR NON-AFRICAN AMERICAN: >60 ML/MIN/1.73M2
GLUCOSE BLD-MCNC: 95 MG/DL (ref 70–99)
POTASSIUM SERPL-SCNC: 3.3 MMOL/L (ref 3.5–5.1)
SODIUM BLD-SCNC: 140 MMOL/L (ref 135–145)
VANCOMYCIN TROUGH: 12.2 UG/ML (ref 10–20)

## 2019-02-25 PROCEDURE — 77001 FLUOROGUIDE FOR VEIN DEVICE: CPT

## 2019-02-25 PROCEDURE — C1751 CATH, INF, PER/CENT/MIDLINE: HCPCS

## 2019-02-25 PROCEDURE — 80202 ASSAY OF VANCOMYCIN: CPT

## 2019-02-25 PROCEDURE — 02HV33Z INSERTION OF INFUSION DEVICE INTO SUPERIOR VENA CAVA, PERCUTANEOUS APPROACH: ICD-10-PCS | Performed by: RADIOLOGY

## 2019-02-25 PROCEDURE — 6360000002 HC RX W HCPCS: Performed by: INTERNAL MEDICINE

## 2019-02-25 PROCEDURE — 36415 COLL VENOUS BLD VENIPUNCTURE: CPT

## 2019-02-25 PROCEDURE — 36556 INSERT NON-TUNNEL CV CATH: CPT

## 2019-02-25 PROCEDURE — 99232 SBSQ HOSP IP/OBS MODERATE 35: CPT | Performed by: INTERNAL MEDICINE

## 2019-02-25 PROCEDURE — 2580000003 HC RX 258: Performed by: INTERNAL MEDICINE

## 2019-02-25 PROCEDURE — 74177 CT ABD & PELVIS W/CONTRAST: CPT

## 2019-02-25 PROCEDURE — 6370000000 HC RX 637 (ALT 250 FOR IP): Performed by: NURSE PRACTITIONER

## 2019-02-25 PROCEDURE — 6360000002 HC RX W HCPCS: Performed by: NURSE PRACTITIONER

## 2019-02-25 PROCEDURE — 80048 BASIC METABOLIC PNL TOTAL CA: CPT

## 2019-02-25 PROCEDURE — 6360000004 HC RX CONTRAST MEDICATION: Performed by: INTERNAL MEDICINE

## 2019-02-25 PROCEDURE — 76937 US GUIDE VASCULAR ACCESS: CPT

## 2019-02-25 PROCEDURE — 6370000000 HC RX 637 (ALT 250 FOR IP): Performed by: INTERNAL MEDICINE

## 2019-02-25 PROCEDURE — 1200000000 HC SEMI PRIVATE

## 2019-02-25 RX ORDER — SODIUM CHLORIDE 9 MG/ML
INJECTION, SOLUTION INTRAVENOUS CONTINUOUS
Status: DISCONTINUED | OUTPATIENT
Start: 2019-02-25 | End: 2019-02-26 | Stop reason: HOSPADM

## 2019-02-25 RX ORDER — SODIUM CHLORIDE 0.9 % (FLUSH) 0.9 %
10 SYRINGE (ML) INJECTION 2 TIMES DAILY
Status: DISCONTINUED | OUTPATIENT
Start: 2019-02-25 | End: 2019-02-26 | Stop reason: HOSPADM

## 2019-02-25 RX ADMIN — FERROUS GLUCONATE TAB 324 MG (37.5 MG ELEMENTAL IRON) 324 MG: 324 (37.5 FE) TAB at 09:41

## 2019-02-25 RX ADMIN — Medication 1 CAPSULE: at 09:41

## 2019-02-25 RX ADMIN — LEVETIRACETAM 750 MG: 500 TABLET, FILM COATED ORAL at 20:22

## 2019-02-25 RX ADMIN — ZINC OXIDE: 200 OINTMENT TOPICAL at 20:24

## 2019-02-25 RX ADMIN — Medication 1 CAPSULE: at 13:19

## 2019-02-25 RX ADMIN — SODIUM CHLORIDE: 9 INJECTION, SOLUTION INTRAVENOUS at 13:23

## 2019-02-25 RX ADMIN — SODIUM CHLORIDE, PRESERVATIVE FREE 10 ML: 5 INJECTION INTRAVENOUS at 17:58

## 2019-02-25 RX ADMIN — LEVETIRACETAM 750 MG: 500 TABLET, FILM COATED ORAL at 09:40

## 2019-02-25 RX ADMIN — ACETAMINOPHEN 650 MG: 325 TABLET ORAL at 21:23

## 2019-02-25 RX ADMIN — OXYCODONE HYDROCHLORIDE 15 MG: 5 TABLET ORAL at 13:19

## 2019-02-25 RX ADMIN — OXYCODONE HYDROCHLORIDE 15 MG: 5 TABLET ORAL at 22:47

## 2019-02-25 RX ADMIN — GABAPENTIN 300 MG: 300 CAPSULE ORAL at 20:22

## 2019-02-25 RX ADMIN — MUPIROCIN: 20 OINTMENT TOPICAL at 11:08

## 2019-02-25 RX ADMIN — DULOXETINE HYDROCHLORIDE 60 MG: 30 CAPSULE, DELAYED RELEASE ORAL at 20:23

## 2019-02-25 RX ADMIN — ENOXAPARIN SODIUM 30 MG: 30 INJECTION SUBCUTANEOUS at 09:41

## 2019-02-25 RX ADMIN — FAMOTIDINE 20 MG: 20 TABLET ORAL at 09:39

## 2019-02-25 RX ADMIN — IOPAMIDOL 85 ML: 755 INJECTION, SOLUTION INTRAVENOUS at 17:57

## 2019-02-25 RX ADMIN — GABAPENTIN 300 MG: 300 CAPSULE ORAL at 13:19

## 2019-02-25 RX ADMIN — GABAPENTIN 300 MG: 300 CAPSULE ORAL at 06:20

## 2019-02-25 RX ADMIN — ASPIRIN 81 MG 81 MG: 81 TABLET ORAL at 09:39

## 2019-02-25 RX ADMIN — MEXILETINE HYDROCHLORIDE 150 MG: 150 CAPSULE ORAL at 20:22

## 2019-02-25 RX ADMIN — SODIUM CHLORIDE, PRESERVATIVE FREE 10 ML: 5 INJECTION INTRAVENOUS at 13:20

## 2019-02-25 RX ADMIN — MEROPENEM 2 G: 1 INJECTION, POWDER, FOR SOLUTION INTRAVENOUS at 06:20

## 2019-02-25 RX ADMIN — TORSEMIDE 20 MG: 20 TABLET ORAL at 09:40

## 2019-02-25 RX ADMIN — Medication 1 CAPSULE: at 18:08

## 2019-02-25 RX ADMIN — RANOLAZINE 1000 MG: 500 TABLET, FILM COATED, EXTENDED RELEASE ORAL at 09:39

## 2019-02-25 RX ADMIN — MEXILETINE HYDROCHLORIDE 150 MG: 150 CAPSULE ORAL at 06:20

## 2019-02-25 RX ADMIN — DOCUSATE SODIUM - SENNOSIDES 1 TABLET: 50; 8.6 TABLET, FILM COATED ORAL at 09:40

## 2019-02-25 RX ADMIN — OXYCODONE HYDROCHLORIDE 15 MG: 5 TABLET ORAL at 06:20

## 2019-02-25 RX ADMIN — POTASSIUM CHLORIDE 20 MEQ: 1500 TABLET, EXTENDED RELEASE ORAL at 09:40

## 2019-02-25 RX ADMIN — MEROPENEM 2 G: 1 INJECTION, POWDER, FOR SOLUTION INTRAVENOUS at 23:21

## 2019-02-25 RX ADMIN — RANOLAZINE 1000 MG: 500 TABLET, FILM COATED, EXTENDED RELEASE ORAL at 20:22

## 2019-02-25 RX ADMIN — METOPROLOL SUCCINATE 12.5 MG: 25 TABLET, EXTENDED RELEASE ORAL at 09:39

## 2019-02-25 RX ADMIN — Medication 800 MG: at 09:40

## 2019-02-25 RX ADMIN — OXYCODONE HYDROCHLORIDE 15 MG: 5 TABLET ORAL at 18:08

## 2019-02-25 RX ADMIN — VANCOMYCIN HYDROCHLORIDE 750 MG: 5 INJECTION, POWDER, LYOPHILIZED, FOR SOLUTION INTRAVENOUS at 10:06

## 2019-02-25 RX ADMIN — OXYCODONE HYDROCHLORIDE 15 MG: 5 TABLET ORAL at 03:11

## 2019-02-25 RX ADMIN — ERGOCALCIFEROL 50000 UNITS: 1.25 CAPSULE ORAL at 09:41

## 2019-02-25 RX ADMIN — OXYCODONE HYDROCHLORIDE 15 MG: 5 TABLET ORAL at 09:40

## 2019-02-25 RX ADMIN — DULOXETINE HYDROCHLORIDE 60 MG: 30 CAPSULE, DELAYED RELEASE ORAL at 09:40

## 2019-02-25 RX ADMIN — TORSEMIDE 20 MG: 20 TABLET ORAL at 18:08

## 2019-02-25 RX ADMIN — ZINC OXIDE: 200 OINTMENT TOPICAL at 11:10

## 2019-02-25 RX ADMIN — FAMOTIDINE 20 MG: 20 TABLET ORAL at 20:22

## 2019-02-25 RX ADMIN — OMEGA-3-ACID ETHYL ESTERS 1000 MG: 1 CAPSULE, LIQUID FILLED ORAL at 20:22

## 2019-02-25 RX ADMIN — ATORVASTATIN CALCIUM 80 MG: 40 TABLET, FILM COATED ORAL at 20:22

## 2019-02-25 RX ADMIN — FERROUS GLUCONATE TAB 324 MG (37.5 MG ELEMENTAL IRON) 324 MG: 324 (37.5 FE) TAB at 20:22

## 2019-02-25 RX ADMIN — Medication 800 MG: at 20:22

## 2019-02-25 RX ADMIN — MEROPENEM 2 G: 1 INJECTION, POWDER, FOR SOLUTION INTRAVENOUS at 13:23

## 2019-02-25 RX ADMIN — MUPIROCIN: 20 OINTMENT TOPICAL at 20:23

## 2019-02-25 RX ADMIN — ZINC OXIDE: 200 OINTMENT TOPICAL at 13:28

## 2019-02-25 RX ADMIN — LEVOTHYROXINE SODIUM 137 MCG: 0.11 TABLET ORAL at 06:20

## 2019-02-25 RX ADMIN — ZINC OXIDE: 200 OINTMENT TOPICAL at 18:09

## 2019-02-25 RX ADMIN — LISINOPRIL 2.5 MG: 2.5 TABLET ORAL at 09:40

## 2019-02-25 RX ADMIN — VANCOMYCIN HYDROCHLORIDE 750 MG: 5 INJECTION, POWDER, LYOPHILIZED, FOR SOLUTION INTRAVENOUS at 23:22

## 2019-02-25 RX ADMIN — MEXILETINE HYDROCHLORIDE 150 MG: 150 CAPSULE ORAL at 13:21

## 2019-02-25 RX ADMIN — CLOPIDOGREL BISULFATE 75 MG: 75 TABLET, FILM COATED ORAL at 09:41

## 2019-02-25 ASSESSMENT — PAIN SCALES - GENERAL
PAINLEVEL_OUTOF10: 0
PAINLEVEL_OUTOF10: 0
PAINLEVEL_OUTOF10: 10
PAINLEVEL_OUTOF10: 5
PAINLEVEL_OUTOF10: 10
PAINLEVEL_OUTOF10: 8
PAINLEVEL_OUTOF10: 10
PAINLEVEL_OUTOF10: 9
PAINLEVEL_OUTOF10: 0
PAINLEVEL_OUTOF10: 7

## 2019-02-26 VITALS
HEIGHT: 66 IN | OXYGEN SATURATION: 97 % | BODY MASS INDEX: 23.31 KG/M2 | HEART RATE: 65 BPM | DIASTOLIC BLOOD PRESSURE: 66 MMHG | RESPIRATION RATE: 16 BRPM | WEIGHT: 145.06 LBS | TEMPERATURE: 98.3 F | SYSTOLIC BLOOD PRESSURE: 121 MMHG

## 2019-02-26 LAB
ANION GAP SERPL CALCULATED.3IONS-SCNC: 8 MMOL/L (ref 4–16)
BUN BLDV-MCNC: 14 MG/DL (ref 6–23)
CALCIUM SERPL-MCNC: 8.1 MG/DL (ref 8.3–10.6)
CHLORIDE BLD-SCNC: 93 MMOL/L (ref 99–110)
CO2: 35 MMOL/L (ref 21–32)
CREAT SERPL-MCNC: 0.7 MG/DL (ref 0.6–1.1)
CULTURE: NORMAL
CULTURE: NORMAL
GFR AFRICAN AMERICAN: >60 ML/MIN/1.73M2
GFR NON-AFRICAN AMERICAN: >60 ML/MIN/1.73M2
GLUCOSE BLD-MCNC: 88 MG/DL (ref 70–99)
Lab: NORMAL
Lab: NORMAL
POTASSIUM SERPL-SCNC: 3.3 MMOL/L (ref 3.5–5.1)
REPORT STATUS: NORMAL
REPORT STATUS: NORMAL
SODIUM BLD-SCNC: 136 MMOL/L (ref 135–145)
SPECIMEN: NORMAL
SPECIMEN: NORMAL

## 2019-02-26 PROCEDURE — 6360000002 HC RX W HCPCS: Performed by: INTERNAL MEDICINE

## 2019-02-26 PROCEDURE — 2580000003 HC RX 258: Performed by: INTERNAL MEDICINE

## 2019-02-26 PROCEDURE — 6370000000 HC RX 637 (ALT 250 FOR IP): Performed by: INTERNAL MEDICINE

## 2019-02-26 PROCEDURE — 80048 BASIC METABOLIC PNL TOTAL CA: CPT

## 2019-02-26 PROCEDURE — 6370000000 HC RX 637 (ALT 250 FOR IP): Performed by: NURSE PRACTITIONER

## 2019-02-26 PROCEDURE — 6360000002 HC RX W HCPCS: Performed by: NURSE PRACTITIONER

## 2019-02-26 RX ORDER — LACTOBACILLUS RHAMNOSUS GG 10B CELL
1 CAPSULE ORAL
Qty: 90 CAPSULE | Refills: 0 | Status: SHIPPED | OUTPATIENT
Start: 2019-02-26 | End: 2020-06-08

## 2019-02-26 RX ORDER — LEVOTHYROXINE SODIUM 137 UG/1
137 TABLET ORAL DAILY
Qty: 30 TABLET | Refills: 0 | Status: ON HOLD | OUTPATIENT
Start: 2019-02-26 | End: 2019-04-05 | Stop reason: HOSPADM

## 2019-02-26 RX ORDER — ONDANSETRON 4 MG/1
4 TABLET, FILM COATED ORAL EVERY 6 HOURS PRN
Qty: 20 TABLET | Refills: 0 | Status: ON HOLD | OUTPATIENT
Start: 2019-02-26 | End: 2021-01-27 | Stop reason: HOSPADM

## 2019-02-26 RX ADMIN — CHLORHEXIDINE GLUCONATE: 4 LIQUID TOPICAL at 08:39

## 2019-02-26 RX ADMIN — FAMOTIDINE 20 MG: 20 TABLET ORAL at 08:29

## 2019-02-26 RX ADMIN — LEVOTHYROXINE SODIUM 137 MCG: 0.11 TABLET ORAL at 08:29

## 2019-02-26 RX ADMIN — POTASSIUM CHLORIDE 20 MEQ: 1500 TABLET, EXTENDED RELEASE ORAL at 08:28

## 2019-02-26 RX ADMIN — LEVETIRACETAM 750 MG: 500 TABLET, FILM COATED ORAL at 08:29

## 2019-02-26 RX ADMIN — Medication 800 MG: at 08:30

## 2019-02-26 RX ADMIN — ENOXAPARIN SODIUM 30 MG: 30 INJECTION SUBCUTANEOUS at 08:39

## 2019-02-26 RX ADMIN — GABAPENTIN 300 MG: 300 CAPSULE ORAL at 14:39

## 2019-02-26 RX ADMIN — MEXILETINE HYDROCHLORIDE 150 MG: 150 CAPSULE ORAL at 08:44

## 2019-02-26 RX ADMIN — OXYCODONE HYDROCHLORIDE 15 MG: 5 TABLET ORAL at 15:43

## 2019-02-26 RX ADMIN — MEROPENEM 2 G: 1 INJECTION, POWDER, FOR SOLUTION INTRAVENOUS at 06:54

## 2019-02-26 RX ADMIN — MUPIROCIN: 20 OINTMENT TOPICAL at 08:44

## 2019-02-26 RX ADMIN — CLOPIDOGREL BISULFATE 75 MG: 75 TABLET, FILM COATED ORAL at 08:45

## 2019-02-26 RX ADMIN — GABAPENTIN 300 MG: 300 CAPSULE ORAL at 08:30

## 2019-02-26 RX ADMIN — VANCOMYCIN HYDROCHLORIDE 750 MG: 5 INJECTION, POWDER, LYOPHILIZED, FOR SOLUTION INTRAVENOUS at 06:53

## 2019-02-26 RX ADMIN — SODIUM CHLORIDE, PRESERVATIVE FREE 10 ML: 5 INJECTION INTRAVENOUS at 08:45

## 2019-02-26 RX ADMIN — OXYCODONE HYDROCHLORIDE 15 MG: 5 TABLET ORAL at 08:29

## 2019-02-26 RX ADMIN — DULOXETINE HYDROCHLORIDE 60 MG: 30 CAPSULE, DELAYED RELEASE ORAL at 08:30

## 2019-02-26 RX ADMIN — DOCUSATE SODIUM - SENNOSIDES 1 TABLET: 50; 8.6 TABLET, FILM COATED ORAL at 08:30

## 2019-02-26 RX ADMIN — ASPIRIN 81 MG 81 MG: 81 TABLET ORAL at 08:30

## 2019-02-26 RX ADMIN — Medication 1 CAPSULE: at 12:13

## 2019-02-26 RX ADMIN — FERROUS GLUCONATE TAB 324 MG (37.5 MG ELEMENTAL IRON) 324 MG: 324 (37.5 FE) TAB at 08:30

## 2019-02-26 RX ADMIN — ZINC OXIDE: 200 OINTMENT TOPICAL at 08:44

## 2019-02-26 RX ADMIN — OXYCODONE HYDROCHLORIDE 15 MG: 5 TABLET ORAL at 02:10

## 2019-02-26 RX ADMIN — MEROPENEM 2 G: 1 INJECTION, POWDER, FOR SOLUTION INTRAVENOUS at 14:40

## 2019-02-26 RX ADMIN — MEXILETINE HYDROCHLORIDE 150 MG: 150 CAPSULE ORAL at 14:40

## 2019-02-26 RX ADMIN — OXYCODONE HYDROCHLORIDE 15 MG: 5 TABLET ORAL at 12:13

## 2019-02-26 RX ADMIN — Medication 1 CAPSULE: at 08:00

## 2019-02-26 RX ADMIN — RANOLAZINE 1000 MG: 500 TABLET, FILM COATED, EXTENDED RELEASE ORAL at 08:29

## 2019-02-26 ASSESSMENT — PAIN SCALES - GENERAL
PAINLEVEL_OUTOF10: 10

## 2019-02-28 LAB
CULTURE: NORMAL
Lab: NORMAL
ORGANISM: NORMAL
ORGANISM: NORMAL
REPORT STATUS: NORMAL
SPECIMEN: NORMAL

## 2019-03-03 ENCOUNTER — APPOINTMENT (OUTPATIENT)
Dept: GENERAL RADIOLOGY | Age: 59
End: 2019-03-03
Payer: COMMERCIAL

## 2019-03-03 ENCOUNTER — HOSPITAL ENCOUNTER (EMERGENCY)
Age: 59
Discharge: HOME | End: 2019-03-03
Attending: EMERGENCY MEDICINE
Payer: COMMERCIAL

## 2019-03-03 VITALS
OXYGEN SATURATION: 97 % | HEART RATE: 75 BPM | SYSTOLIC BLOOD PRESSURE: 136 MMHG | WEIGHT: 124 LBS | TEMPERATURE: 97.9 F | BODY MASS INDEX: 19.93 KG/M2 | HEIGHT: 66 IN | DIASTOLIC BLOOD PRESSURE: 79 MMHG | RESPIRATION RATE: 18 BRPM

## 2019-03-03 DIAGNOSIS — E87.6 HYPOKALEMIA: Primary | ICD-10-CM

## 2019-03-03 DIAGNOSIS — R00.2 PALPITATIONS: ICD-10-CM

## 2019-03-03 LAB
ALBUMIN SERPL-MCNC: 3.3 GM/DL (ref 3.4–5)
ALP BLD-CCNC: 92 IU/L (ref 40–129)
ALT SERPL-CCNC: 11 U/L (ref 10–40)
ANION GAP SERPL CALCULATED.3IONS-SCNC: 10 MMOL/L (ref 4–16)
AST SERPL-CCNC: 14 IU/L (ref 15–37)
BASOPHILS ABSOLUTE: 0.1 K/CU MM
BASOPHILS RELATIVE PERCENT: 1.9 % (ref 0–1)
BILIRUB SERPL-MCNC: 0.2 MG/DL (ref 0–1)
BUN BLDV-MCNC: 6 MG/DL (ref 6–23)
CALCIUM SERPL-MCNC: 7.9 MG/DL (ref 8.3–10.6)
CHLORIDE BLD-SCNC: 108 MMOL/L (ref 99–110)
CO2: 24 MMOL/L (ref 21–32)
CREAT SERPL-MCNC: 0.5 MG/DL (ref 0.6–1.1)
DIFFERENTIAL TYPE: ABNORMAL
EOSINOPHILS ABSOLUTE: 0.1 K/CU MM
EOSINOPHILS RELATIVE PERCENT: 2.3 % (ref 0–3)
GFR AFRICAN AMERICAN: >60 ML/MIN/1.73M2
GFR NON-AFRICAN AMERICAN: >60 ML/MIN/1.73M2
GLUCOSE BLD-MCNC: 94 MG/DL (ref 70–99)
HCT VFR BLD CALC: 33.2 % (ref 37–47)
HEMOGLOBIN: 10.2 GM/DL (ref 12.5–16)
IMMATURE NEUTROPHIL %: 0.2 % (ref 0–0.43)
LACTATE: 0.6 MMOL/L (ref 0.4–2)
LYMPHOCYTES ABSOLUTE: 1 K/CU MM
LYMPHOCYTES RELATIVE PERCENT: 19.7 % (ref 24–44)
MAGNESIUM: 2.2 MG/DL (ref 1.8–2.4)
MCH RBC QN AUTO: 32.3 PG (ref 27–31)
MCHC RBC AUTO-ENTMCNC: 30.7 % (ref 32–36)
MCV RBC AUTO: 105.1 FL (ref 78–100)
MONOCYTES ABSOLUTE: 0.5 K/CU MM
MONOCYTES RELATIVE PERCENT: 10.6 % (ref 0–4)
NUCLEATED RBC %: 0 %
PDW BLD-RTO: 15.1 % (ref 11.7–14.9)
PLATELET # BLD: 259 K/CU MM (ref 140–440)
PMV BLD AUTO: 9.7 FL (ref 7.5–11.1)
POTASSIUM SERPL-SCNC: 3.2 MMOL/L (ref 3.5–5.1)
RBC # BLD: 3.16 M/CU MM (ref 4.2–5.4)
SEGMENTED NEUTROPHILS ABSOLUTE COUNT: 3.2 K/CU MM
SEGMENTED NEUTROPHILS RELATIVE PERCENT: 65.3 % (ref 36–66)
SODIUM BLD-SCNC: 142 MMOL/L (ref 135–145)
TOTAL IMMATURE NEUTOROPHIL: 0.01 K/CU MM
TOTAL NUCLEATED RBC: 0 K/CU MM
TOTAL PROTEIN: 5.4 GM/DL (ref 6.4–8.2)
TROPONIN T: <0.01 NG/ML
WBC # BLD: 4.8 K/CU MM (ref 4–10.5)

## 2019-03-03 PROCEDURE — 84484 ASSAY OF TROPONIN QUANT: CPT

## 2019-03-03 PROCEDURE — 85025 COMPLETE CBC W/AUTO DIFF WBC: CPT

## 2019-03-03 PROCEDURE — 93005 ELECTROCARDIOGRAM TRACING: CPT | Performed by: EMERGENCY MEDICINE

## 2019-03-03 PROCEDURE — 99285 EMERGENCY DEPT VISIT HI MDM: CPT

## 2019-03-03 PROCEDURE — 83605 ASSAY OF LACTIC ACID: CPT

## 2019-03-03 PROCEDURE — 73501 X-RAY EXAM HIP UNI 1 VIEW: CPT

## 2019-03-03 PROCEDURE — 71045 X-RAY EXAM CHEST 1 VIEW: CPT

## 2019-03-03 PROCEDURE — 6370000000 HC RX 637 (ALT 250 FOR IP): Performed by: EMERGENCY MEDICINE

## 2019-03-03 PROCEDURE — 83735 ASSAY OF MAGNESIUM: CPT

## 2019-03-03 PROCEDURE — 80053 COMPREHEN METABOLIC PANEL: CPT

## 2019-03-03 RX ORDER — OXYCODONE HYDROCHLORIDE 5 MG/1
15 TABLET ORAL ONCE
Status: COMPLETED | OUTPATIENT
Start: 2019-03-03 | End: 2019-03-03

## 2019-03-03 RX ORDER — POTASSIUM CHLORIDE 20 MEQ/1
40 TABLET, EXTENDED RELEASE ORAL ONCE
Status: COMPLETED | OUTPATIENT
Start: 2019-03-03 | End: 2019-03-03

## 2019-03-03 RX ADMIN — POTASSIUM CHLORIDE 40 MEQ: 20 TABLET, EXTENDED RELEASE ORAL at 18:40

## 2019-03-03 RX ADMIN — OXYCODONE HYDROCHLORIDE 15 MG: 5 TABLET ORAL at 17:07

## 2019-03-03 ASSESSMENT — PAIN SCALES - GENERAL
PAINLEVEL_OUTOF10: 9
PAINLEVEL_OUTOF10: 9
PAINLEVEL_OUTOF10: 3

## 2019-03-03 ASSESSMENT — PAIN DESCRIPTION - PAIN TYPE: TYPE: ACUTE PAIN

## 2019-03-03 ASSESSMENT — PAIN DESCRIPTION - LOCATION: LOCATION: GENERALIZED

## 2019-03-04 PROCEDURE — 93010 ELECTROCARDIOGRAM REPORT: CPT | Performed by: INTERNAL MEDICINE

## 2019-03-08 LAB
EKG ATRIAL RATE: 81 BPM
EKG DIAGNOSIS: NORMAL
EKG P AXIS: 16 DEGREES
EKG P-R INTERVAL: 136 MS
EKG Q-T INTERVAL: 390 MS
EKG QRS DURATION: 108 MS
EKG QTC CALCULATION (BAZETT): 453 MS
EKG R AXIS: -47 DEGREES
EKG T AXIS: -55 DEGREES
EKG VENTRICULAR RATE: 81 BPM

## 2019-03-19 ENCOUNTER — APPOINTMENT (OUTPATIENT)
Dept: CT IMAGING | Age: 59
DRG: 394 | End: 2019-03-19
Payer: COMMERCIAL

## 2019-03-19 ENCOUNTER — HOSPITAL ENCOUNTER (INPATIENT)
Age: 59
LOS: 4 days | Discharge: ANOTHER HOSPITAL | DRG: 394 | End: 2019-03-23
Attending: EMERGENCY MEDICINE | Admitting: HOSPITALIST
Payer: COMMERCIAL

## 2019-03-19 DIAGNOSIS — L03.319 CELLULITIS AT GASTROSTOMY TUBE SITE (HCC): Primary | ICD-10-CM

## 2019-03-19 DIAGNOSIS — K94.22 CELLULITIS AT GASTROSTOMY TUBE SITE (HCC): Primary | ICD-10-CM

## 2019-03-19 LAB
ALBUMIN SERPL-MCNC: 3.6 GM/DL (ref 3.4–5)
ALP BLD-CCNC: 79 IU/L (ref 40–129)
ALT SERPL-CCNC: 17 U/L (ref 10–40)
ANION GAP SERPL CALCULATED.3IONS-SCNC: 9 MMOL/L (ref 4–16)
AST SERPL-CCNC: 16 IU/L (ref 15–37)
BASOPHILS ABSOLUTE: 0 K/CU MM
BASOPHILS RELATIVE PERCENT: 0.2 % (ref 0–1)
BILIRUB SERPL-MCNC: 0.3 MG/DL (ref 0–1)
BUN BLDV-MCNC: 9 MG/DL (ref 6–23)
CALCIUM SERPL-MCNC: 8.2 MG/DL (ref 8.3–10.6)
CHLORIDE BLD-SCNC: 109 MMOL/L (ref 99–110)
CO2: 22 MMOL/L (ref 21–32)
CREAT SERPL-MCNC: 0.9 MG/DL (ref 0.6–1.1)
DIFFERENTIAL TYPE: ABNORMAL
EOSINOPHILS ABSOLUTE: 0 K/CU MM
EOSINOPHILS RELATIVE PERCENT: 0.7 % (ref 0–3)
ERYTHROCYTE SEDIMENTATION RATE: 51 MM/HR (ref 0–30)
GFR AFRICAN AMERICAN: >60 ML/MIN/1.73M2
GFR NON-AFRICAN AMERICAN: >60 ML/MIN/1.73M2
GLUCOSE BLD-MCNC: 111 MG/DL (ref 70–99)
HCT VFR BLD CALC: 35.2 % (ref 37–47)
HEMOGLOBIN: 11 GM/DL (ref 12.5–16)
HIGH SENSITIVE C-REACTIVE PROTEIN: 9.9 MG/L
IMMATURE NEUTROPHIL %: 0.5 % (ref 0–0.43)
LACTATE: 1.6 MMOL/L (ref 0.4–2)
LYMPHOCYTES ABSOLUTE: 0.7 K/CU MM
LYMPHOCYTES RELATIVE PERCENT: 12.6 % (ref 24–44)
MCH RBC QN AUTO: 32 PG (ref 27–31)
MCHC RBC AUTO-ENTMCNC: 31.3 % (ref 32–36)
MCV RBC AUTO: 102.3 FL (ref 78–100)
MONOCYTES ABSOLUTE: 0.5 K/CU MM
MONOCYTES RELATIVE PERCENT: 8.7 % (ref 0–4)
NUCLEATED RBC %: 0 %
PDW BLD-RTO: 15.9 % (ref 11.7–14.9)
PLATELET # BLD: 320 K/CU MM (ref 140–440)
PMV BLD AUTO: 9.5 FL (ref 7.5–11.1)
POTASSIUM SERPL-SCNC: 4.3 MMOL/L (ref 3.5–5.1)
PROCALCITONIN: 0.03
RBC # BLD: 3.44 M/CU MM (ref 4.2–5.4)
SEGMENTED NEUTROPHILS ABSOLUTE COUNT: 4.4 K/CU MM
SEGMENTED NEUTROPHILS RELATIVE PERCENT: 77.3 % (ref 36–66)
SODIUM BLD-SCNC: 140 MMOL/L (ref 135–145)
TOTAL IMMATURE NEUTOROPHIL: 0.03 K/CU MM
TOTAL NUCLEATED RBC: 0 K/CU MM
TOTAL PROTEIN: 6.2 GM/DL (ref 6.4–8.2)
WBC # BLD: 5.6 K/CU MM (ref 4–10.5)

## 2019-03-19 PROCEDURE — 99221 1ST HOSP IP/OBS SF/LOW 40: CPT | Performed by: SURGERY

## 2019-03-19 PROCEDURE — 6360000002 HC RX W HCPCS: Performed by: NURSE PRACTITIONER

## 2019-03-19 PROCEDURE — 99284 EMERGENCY DEPT VISIT MOD MDM: CPT

## 2019-03-19 PROCEDURE — 80053 COMPREHEN METABOLIC PANEL: CPT

## 2019-03-19 PROCEDURE — 86141 C-REACTIVE PROTEIN HS: CPT

## 2019-03-19 PROCEDURE — 96376 TX/PRO/DX INJ SAME DRUG ADON: CPT

## 2019-03-19 PROCEDURE — 85025 COMPLETE CBC W/AUTO DIFF WBC: CPT

## 2019-03-19 PROCEDURE — 85652 RBC SED RATE AUTOMATED: CPT

## 2019-03-19 PROCEDURE — 83605 ASSAY OF LACTIC ACID: CPT

## 2019-03-19 PROCEDURE — 6360000004 HC RX CONTRAST MEDICATION: Performed by: EMERGENCY MEDICINE

## 2019-03-19 PROCEDURE — 87040 BLOOD CULTURE FOR BACTERIA: CPT

## 2019-03-19 PROCEDURE — 36415 COLL VENOUS BLD VENIPUNCTURE: CPT

## 2019-03-19 PROCEDURE — 93010 ELECTROCARDIOGRAM REPORT: CPT | Performed by: INTERNAL MEDICINE

## 2019-03-19 PROCEDURE — 6360000002 HC RX W HCPCS: Performed by: EMERGENCY MEDICINE

## 2019-03-19 PROCEDURE — 99221 1ST HOSP IP/OBS SF/LOW 40: CPT | Performed by: INTERNAL MEDICINE

## 2019-03-19 PROCEDURE — 2500000003 HC RX 250 WO HCPCS: Performed by: NURSE PRACTITIONER

## 2019-03-19 PROCEDURE — 2580000003 HC RX 258: Performed by: NURSE PRACTITIONER

## 2019-03-19 PROCEDURE — 6370000000 HC RX 637 (ALT 250 FOR IP): Performed by: NURSE PRACTITIONER

## 2019-03-19 PROCEDURE — 84145 PROCALCITONIN (PCT): CPT

## 2019-03-19 PROCEDURE — 93005 ELECTROCARDIOGRAM TRACING: CPT | Performed by: EMERGENCY MEDICINE

## 2019-03-19 PROCEDURE — 96365 THER/PROPH/DIAG IV INF INIT: CPT

## 2019-03-19 PROCEDURE — 96375 TX/PRO/DX INJ NEW DRUG ADDON: CPT

## 2019-03-19 PROCEDURE — 1200000000 HC SEMI PRIVATE

## 2019-03-19 PROCEDURE — 74177 CT ABD & PELVIS W/CONTRAST: CPT

## 2019-03-19 PROCEDURE — 2580000003 HC RX 258: Performed by: EMERGENCY MEDICINE

## 2019-03-19 RX ORDER — FERROUS GLUCONATE 324(37.5)
324 TABLET ORAL 2 TIMES DAILY WITH MEALS
Status: DISCONTINUED | OUTPATIENT
Start: 2019-03-19 | End: 2019-03-23 | Stop reason: HOSPADM

## 2019-03-19 RX ORDER — METOPROLOL SUCCINATE 25 MG/1
12.5 TABLET, EXTENDED RELEASE ORAL DAILY
Status: DISCONTINUED | OUTPATIENT
Start: 2019-03-20 | End: 2019-03-23 | Stop reason: HOSPADM

## 2019-03-19 RX ORDER — LACTOBACILLUS RHAMNOSUS GG 10B CELL
1 CAPSULE ORAL
Status: DISCONTINUED | OUTPATIENT
Start: 2019-03-19 | End: 2019-03-23 | Stop reason: HOSPADM

## 2019-03-19 RX ORDER — MULTIVIT-MIN/FERROUS GLUCONATE 9 MG/15 ML
15 LIQUID (ML) ORAL DAILY
Status: DISCONTINUED | OUTPATIENT
Start: 2019-03-20 | End: 2019-03-23 | Stop reason: HOSPADM

## 2019-03-19 RX ORDER — POTASSIUM CHLORIDE 20 MEQ/1
20 TABLET, EXTENDED RELEASE ORAL DAILY
Status: DISCONTINUED | OUTPATIENT
Start: 2019-03-20 | End: 2019-03-23 | Stop reason: HOSPADM

## 2019-03-19 RX ORDER — ATORVASTATIN CALCIUM 40 MG/1
80 TABLET, FILM COATED ORAL NIGHTLY
Status: DISCONTINUED | OUTPATIENT
Start: 2019-03-19 | End: 2019-03-23 | Stop reason: HOSPADM

## 2019-03-19 RX ORDER — ASPIRIN 81 MG/1
81 TABLET, CHEWABLE ORAL DAILY
Status: DISCONTINUED | OUTPATIENT
Start: 2019-03-20 | End: 2019-03-23 | Stop reason: HOSPADM

## 2019-03-19 RX ORDER — GABAPENTIN 300 MG/1
300 CAPSULE ORAL 3 TIMES DAILY
Status: DISCONTINUED | OUTPATIENT
Start: 2019-03-19 | End: 2019-03-23 | Stop reason: HOSPADM

## 2019-03-19 RX ORDER — LANOLIN ALCOHOL/MO/W.PET/CERES
1000 CREAM (GRAM) TOPICAL DAILY
Status: DISCONTINUED | OUTPATIENT
Start: 2019-03-20 | End: 2019-03-23 | Stop reason: HOSPADM

## 2019-03-19 RX ORDER — SENNA AND DOCUSATE SODIUM 50; 8.6 MG/1; MG/1
1 TABLET, FILM COATED ORAL DAILY
Status: DISCONTINUED | OUTPATIENT
Start: 2019-03-20 | End: 2019-03-23 | Stop reason: HOSPADM

## 2019-03-19 RX ORDER — DOCUSATE SODIUM 100 MG/1
100 CAPSULE, LIQUID FILLED ORAL 2 TIMES DAILY PRN
Status: DISCONTINUED | OUTPATIENT
Start: 2019-03-19 | End: 2019-03-23 | Stop reason: HOSPADM

## 2019-03-19 RX ORDER — MORPHINE SULFATE 4 MG/ML
4 INJECTION, SOLUTION INTRAMUSCULAR; INTRAVENOUS EVERY 30 MIN PRN
Status: DISCONTINUED | OUTPATIENT
Start: 2019-03-19 | End: 2019-03-20

## 2019-03-19 RX ORDER — VANCOMYCIN HYDROCHLORIDE 1 G/200ML
1000 INJECTION, SOLUTION INTRAVENOUS ONCE
Status: COMPLETED | OUTPATIENT
Start: 2019-03-19 | End: 2019-03-19

## 2019-03-19 RX ORDER — OMEGA-3-ACID ETHYL ESTERS 1 G/1
1000 CAPSULE, LIQUID FILLED ORAL NIGHTLY
Status: DISCONTINUED | OUTPATIENT
Start: 2019-03-19 | End: 2019-03-23 | Stop reason: HOSPADM

## 2019-03-19 RX ORDER — FAMOTIDINE 20 MG/1
20 TABLET, FILM COATED ORAL 2 TIMES DAILY
Status: DISCONTINUED | OUTPATIENT
Start: 2019-03-19 | End: 2019-03-23 | Stop reason: HOSPADM

## 2019-03-19 RX ORDER — CLOPIDOGREL BISULFATE 75 MG/1
75 TABLET ORAL DAILY
Status: DISCONTINUED | OUTPATIENT
Start: 2019-03-20 | End: 2019-03-23 | Stop reason: HOSPADM

## 2019-03-19 RX ORDER — TORSEMIDE 20 MG/1
20 TABLET ORAL 2 TIMES DAILY
Status: DISCONTINUED | OUTPATIENT
Start: 2019-03-19 | End: 2019-03-23 | Stop reason: HOSPADM

## 2019-03-19 RX ORDER — PROMETHAZINE HYDROCHLORIDE 25 MG/1
25 TABLET ORAL EVERY 6 HOURS PRN
Status: DISCONTINUED | OUTPATIENT
Start: 2019-03-19 | End: 2019-03-23 | Stop reason: HOSPADM

## 2019-03-19 RX ORDER — LISINOPRIL 2.5 MG/1
2.5 TABLET ORAL DAILY
Status: DISCONTINUED | OUTPATIENT
Start: 2019-03-20 | End: 2019-03-23 | Stop reason: HOSPADM

## 2019-03-19 RX ORDER — MEXILETINE HYDROCHLORIDE 150 MG/1
150 CAPSULE ORAL 3 TIMES DAILY
Status: DISCONTINUED | OUTPATIENT
Start: 2019-03-19 | End: 2019-03-23 | Stop reason: HOSPADM

## 2019-03-19 RX ORDER — ERGOCALCIFEROL 1.25 MG/1
50000 CAPSULE ORAL
Status: DISCONTINUED | OUTPATIENT
Start: 2019-03-21 | End: 2019-03-23 | Stop reason: HOSPADM

## 2019-03-19 RX ORDER — DULOXETIN HYDROCHLORIDE 30 MG/1
60 CAPSULE, DELAYED RELEASE ORAL 2 TIMES DAILY
Status: DISCONTINUED | OUTPATIENT
Start: 2019-03-19 | End: 2019-03-23 | Stop reason: HOSPADM

## 2019-03-19 RX ORDER — 0.9 % SODIUM CHLORIDE 0.9 %
10 VIAL (ML) INJECTION
Status: COMPLETED | OUTPATIENT
Start: 2019-03-19 | End: 2019-03-19

## 2019-03-19 RX ORDER — ONDANSETRON 4 MG/1
4 TABLET, ORALLY DISINTEGRATING ORAL EVERY 6 HOURS PRN
Status: DISCONTINUED | OUTPATIENT
Start: 2019-03-19 | End: 2019-03-23 | Stop reason: HOSPADM

## 2019-03-19 RX ORDER — ALBUTEROL SULFATE 2.5 MG/3ML
2.5 SOLUTION RESPIRATORY (INHALATION) EVERY 4 HOURS PRN
Status: DISCONTINUED | OUTPATIENT
Start: 2019-03-19 | End: 2019-03-23 | Stop reason: HOSPADM

## 2019-03-19 RX ORDER — LANOLIN ALCOHOL/MO/W.PET/CERES
6 CREAM (GRAM) TOPICAL NIGHTLY PRN
Status: DISCONTINUED | OUTPATIENT
Start: 2019-03-19 | End: 2019-03-23 | Stop reason: HOSPADM

## 2019-03-19 RX ORDER — RANOLAZINE 500 MG/1
1000 TABLET, EXTENDED RELEASE ORAL 2 TIMES DAILY
Status: DISCONTINUED | OUTPATIENT
Start: 2019-03-19 | End: 2019-03-23 | Stop reason: HOSPADM

## 2019-03-19 RX ORDER — ACETAMINOPHEN 325 MG/1
650 TABLET ORAL EVERY 4 HOURS PRN
Status: DISCONTINUED | OUTPATIENT
Start: 2019-03-19 | End: 2019-03-23 | Stop reason: HOSPADM

## 2019-03-19 RX ORDER — ALBUTEROL SULFATE 90 UG/1
2 AEROSOL, METERED RESPIRATORY (INHALATION) EVERY 6 HOURS PRN
Status: DISCONTINUED | OUTPATIENT
Start: 2019-03-19 | End: 2019-03-23 | Stop reason: HOSPADM

## 2019-03-19 RX ADMIN — MEXILETINE HYDROCHLORIDE 150 MG: 150 CAPSULE ORAL at 22:56

## 2019-03-19 RX ADMIN — FERROUS GLUCONATE TAB 324 MG (37.5 MG ELEMENTAL IRON) 324 MG: 324 (37.5 FE) TAB at 19:02

## 2019-03-19 RX ADMIN — FAMOTIDINE 20 MG: 20 TABLET ORAL at 20:43

## 2019-03-19 RX ADMIN — IOPAMIDOL 75 ML: 755 INJECTION, SOLUTION INTRAVENOUS at 14:07

## 2019-03-19 RX ADMIN — DULOXETINE HYDROCHLORIDE 60 MG: 30 CAPSULE, DELAYED RELEASE ORAL at 20:42

## 2019-03-19 RX ADMIN — DEXTROSE MONOHYDRATE 600 MG: 5 INJECTION, SOLUTION INTRAVENOUS at 20:41

## 2019-03-19 RX ADMIN — SODIUM CHLORIDE 10 ML: 9 INJECTION, SOLUTION INTRAMUSCULAR; INTRAVENOUS; SUBCUTANEOUS at 14:07

## 2019-03-19 RX ADMIN — OXYCODONE HYDROCHLORIDE 15 MG: 5 TABLET ORAL at 19:59

## 2019-03-19 RX ADMIN — VANCOMYCIN HYDROCHLORIDE 1000 MG: 1 INJECTION, SOLUTION INTRAVENOUS at 15:52

## 2019-03-19 RX ADMIN — ATORVASTATIN CALCIUM 80 MG: 40 TABLET, FILM COATED ORAL at 20:41

## 2019-03-19 RX ADMIN — ACETAMINOPHEN 650 MG: 325 TABLET ORAL at 20:42

## 2019-03-19 RX ADMIN — TORSEMIDE 20 MG: 20 TABLET ORAL at 19:02

## 2019-03-19 RX ADMIN — MORPHINE SULFATE 4 MG: 4 INJECTION INTRAVENOUS at 15:51

## 2019-03-19 RX ADMIN — Medication 800 MG: at 20:42

## 2019-03-19 RX ADMIN — GABAPENTIN 300 MG: 300 CAPSULE ORAL at 20:43

## 2019-03-19 RX ADMIN — MICONAZOLE NITRATE: 2 POWDER TOPICAL at 22:56

## 2019-03-19 RX ADMIN — Medication 1 CAPSULE: at 19:02

## 2019-03-19 RX ADMIN — MORPHINE SULFATE 4 MG: 4 INJECTION INTRAVENOUS at 13:20

## 2019-03-19 RX ADMIN — RANOLAZINE 1000 MG: 500 TABLET, FILM COATED, EXTENDED RELEASE ORAL at 20:52

## 2019-03-19 RX ADMIN — MORPHINE SULFATE 4 MG: 4 INJECTION INTRAVENOUS at 14:21

## 2019-03-19 RX ADMIN — LEVETIRACETAM 750 MG: 250 TABLET, FILM COATED ORAL at 20:43

## 2019-03-19 RX ADMIN — OMEGA-3-ACID ETHYL ESTERS 1000 MG: 1 CAPSULE, LIQUID FILLED ORAL at 20:43

## 2019-03-19 RX ADMIN — MEROPENEM 1 G: 1 INJECTION, POWDER, FOR SOLUTION INTRAVENOUS at 22:55

## 2019-03-19 RX ADMIN — OXYCODONE HYDROCHLORIDE 15 MG: 5 TABLET ORAL at 23:12

## 2019-03-19 ASSESSMENT — PAIN SCALES - GENERAL
PAINLEVEL_OUTOF10: 7
PAINLEVEL_OUTOF10: 8
PAINLEVEL_OUTOF10: 8
PAINLEVEL_OUTOF10: 9
PAINLEVEL_OUTOF10: 9
PAINLEVEL_OUTOF10: 8
PAINLEVEL_OUTOF10: 9

## 2019-03-19 ASSESSMENT — PAIN DESCRIPTION - LOCATION
LOCATION: ABDOMEN
LOCATION: ABDOMEN

## 2019-03-19 ASSESSMENT — ENCOUNTER SYMPTOMS
ABDOMINAL DISTENTION: 0
COUGH: 0
NAUSEA: 1
ABDOMINAL PAIN: 1
VOMITING: 0
SHORTNESS OF BREATH: 0
EYE DISCHARGE: 0
CONSTIPATION: 0
COLOR CHANGE: 0
CHEST TIGHTNESS: 0
EYE REDNESS: 0
SORE THROAT: 0
DIARRHEA: 0

## 2019-03-19 ASSESSMENT — PAIN DESCRIPTION - PAIN TYPE
TYPE: ACUTE PAIN
TYPE: ACUTE PAIN

## 2019-03-20 LAB — HIGH SENSITIVE C-REACTIVE PROTEIN: 7.4 MG/L

## 2019-03-20 PROCEDURE — 2580000003 HC RX 258: Performed by: INTERNAL MEDICINE

## 2019-03-20 PROCEDURE — 6370000000 HC RX 637 (ALT 250 FOR IP): Performed by: NURSE PRACTITIONER

## 2019-03-20 PROCEDURE — 99232 SBSQ HOSP IP/OBS MODERATE 35: CPT | Performed by: INTERNAL MEDICINE

## 2019-03-20 PROCEDURE — 99232 SBSQ HOSP IP/OBS MODERATE 35: CPT | Performed by: SURGERY

## 2019-03-20 PROCEDURE — 2580000003 HC RX 258: Performed by: NURSE PRACTITIONER

## 2019-03-20 PROCEDURE — 2500000003 HC RX 250 WO HCPCS: Performed by: NURSE PRACTITIONER

## 2019-03-20 PROCEDURE — 86141 C-REACTIVE PROTEIN HS: CPT

## 2019-03-20 PROCEDURE — 36415 COLL VENOUS BLD VENIPUNCTURE: CPT

## 2019-03-20 PROCEDURE — 6360000002 HC RX W HCPCS: Performed by: INTERNAL MEDICINE

## 2019-03-20 PROCEDURE — 6360000002 HC RX W HCPCS: Performed by: NURSE PRACTITIONER

## 2019-03-20 PROCEDURE — 1200000000 HC SEMI PRIVATE

## 2019-03-20 RX ADMIN — LISINOPRIL 2.5 MG: 2.5 TABLET ORAL at 10:22

## 2019-03-20 RX ADMIN — MEXILETINE HYDROCHLORIDE 150 MG: 150 CAPSULE ORAL at 21:26

## 2019-03-20 RX ADMIN — LEVETIRACETAM 750 MG: 250 TABLET, FILM COATED ORAL at 10:22

## 2019-03-20 RX ADMIN — Medication 800 MG: at 10:22

## 2019-03-20 RX ADMIN — VANCOMYCIN HYDROCHLORIDE 500 MG: 500 INJECTION, POWDER, LYOPHILIZED, FOR SOLUTION INTRAVENOUS at 16:51

## 2019-03-20 RX ADMIN — MEXILETINE HYDROCHLORIDE 150 MG: 150 CAPSULE ORAL at 10:24

## 2019-03-20 RX ADMIN — Medication 800 MG: at 21:26

## 2019-03-20 RX ADMIN — LEVOTHYROXINE SODIUM 137 MCG: 25 TABLET ORAL at 06:25

## 2019-03-20 RX ADMIN — DEXTROSE MONOHYDRATE 600 MG: 5 INJECTION, SOLUTION INTRAVENOUS at 14:33

## 2019-03-20 RX ADMIN — TORSEMIDE 20 MG: 20 TABLET ORAL at 18:38

## 2019-03-20 RX ADMIN — OXYCODONE HYDROCHLORIDE 15 MG: 5 TABLET ORAL at 19:25

## 2019-03-20 RX ADMIN — Medication 1 CAPSULE: at 10:25

## 2019-03-20 RX ADMIN — LEVETIRACETAM 750 MG: 250 TABLET, FILM COATED ORAL at 21:25

## 2019-03-20 RX ADMIN — FERROUS GLUCONATE TAB 324 MG (37.5 MG ELEMENTAL IRON) 324 MG: 324 (37.5 FE) TAB at 10:26

## 2019-03-20 RX ADMIN — RANOLAZINE 1000 MG: 500 TABLET, FILM COATED, EXTENDED RELEASE ORAL at 10:22

## 2019-03-20 RX ADMIN — VANCOMYCIN HYDROCHLORIDE 500 MG: 500 INJECTION, POWDER, LYOPHILIZED, FOR SOLUTION INTRAVENOUS at 02:40

## 2019-03-20 RX ADMIN — OXYCODONE HYDROCHLORIDE 15 MG: 5 TABLET ORAL at 16:01

## 2019-03-20 RX ADMIN — OXYCODONE HYDROCHLORIDE 15 MG: 5 TABLET ORAL at 22:31

## 2019-03-20 RX ADMIN — CYANOCOBALAMIN TAB 1000 MCG 1000 MCG: 1000 TAB at 10:26

## 2019-03-20 RX ADMIN — ASPIRIN 81 MG 81 MG: 81 TABLET ORAL at 10:22

## 2019-03-20 RX ADMIN — POTASSIUM CHLORIDE 20 MEQ: 1500 TABLET, EXTENDED RELEASE ORAL at 10:23

## 2019-03-20 RX ADMIN — CLOPIDOGREL BISULFATE 75 MG: 75 TABLET ORAL at 10:23

## 2019-03-20 RX ADMIN — SENNOSIDES AND DOCUSATE SODIUM 1 TABLET: 8.6; 5 TABLET ORAL at 10:25

## 2019-03-20 RX ADMIN — OMEGA-3-ACID ETHYL ESTERS 1000 MG: 1 CAPSULE, LIQUID FILLED ORAL at 21:26

## 2019-03-20 RX ADMIN — RANOLAZINE 1000 MG: 500 TABLET, FILM COATED, EXTENDED RELEASE ORAL at 21:26

## 2019-03-20 RX ADMIN — DEXTROSE MONOHYDRATE 600 MG: 5 INJECTION, SOLUTION INTRAVENOUS at 04:47

## 2019-03-20 RX ADMIN — OXYCODONE HYDROCHLORIDE 15 MG: 5 TABLET ORAL at 05:50

## 2019-03-20 RX ADMIN — MICONAZOLE NITRATE: 2 POWDER TOPICAL at 10:35

## 2019-03-20 RX ADMIN — ATORVASTATIN CALCIUM 80 MG: 40 TABLET, FILM COATED ORAL at 21:25

## 2019-03-20 RX ADMIN — MEXILETINE HYDROCHLORIDE 150 MG: 150 CAPSULE ORAL at 14:27

## 2019-03-20 RX ADMIN — OXYCODONE HYDROCHLORIDE 15 MG: 5 TABLET ORAL at 02:39

## 2019-03-20 RX ADMIN — GABAPENTIN 300 MG: 300 CAPSULE ORAL at 10:23

## 2019-03-20 RX ADMIN — FERROUS GLUCONATE TAB 324 MG (37.5 MG ELEMENTAL IRON) 324 MG: 324 (37.5 FE) TAB at 16:04

## 2019-03-20 RX ADMIN — MEROPENEM 1 G: 1 INJECTION, POWDER, FOR SOLUTION INTRAVENOUS at 14:28

## 2019-03-20 RX ADMIN — TORSEMIDE 20 MG: 20 TABLET ORAL at 10:23

## 2019-03-20 RX ADMIN — MEROPENEM 1 G: 1 INJECTION, POWDER, FOR SOLUTION INTRAVENOUS at 05:50

## 2019-03-20 RX ADMIN — DULOXETINE HYDROCHLORIDE 60 MG: 30 CAPSULE, DELAYED RELEASE ORAL at 21:26

## 2019-03-20 RX ADMIN — GABAPENTIN 300 MG: 300 CAPSULE ORAL at 14:57

## 2019-03-20 RX ADMIN — GABAPENTIN 300 MG: 300 CAPSULE ORAL at 21:26

## 2019-03-20 RX ADMIN — Medication 1 CAPSULE: at 14:27

## 2019-03-20 RX ADMIN — OXYCODONE HYDROCHLORIDE 15 MG: 5 TABLET ORAL at 09:16

## 2019-03-20 RX ADMIN — MEROPENEM 1 G: 1 INJECTION, POWDER, FOR SOLUTION INTRAVENOUS at 21:26

## 2019-03-20 RX ADMIN — FAMOTIDINE 20 MG: 20 TABLET ORAL at 10:23

## 2019-03-20 RX ADMIN — Medication 1 CAPSULE: at 16:05

## 2019-03-20 RX ADMIN — FAMOTIDINE 20 MG: 20 TABLET ORAL at 21:26

## 2019-03-20 RX ADMIN — DULOXETINE HYDROCHLORIDE 60 MG: 30 CAPSULE, DELAYED RELEASE ORAL at 10:24

## 2019-03-20 RX ADMIN — OXYCODONE HYDROCHLORIDE 15 MG: 5 TABLET ORAL at 12:54

## 2019-03-20 RX ADMIN — CHLORHEXIDINE GLUCONATE: 4 LIQUID TOPICAL at 10:36

## 2019-03-20 ASSESSMENT — PAIN - FUNCTIONAL ASSESSMENT
PAIN_FUNCTIONAL_ASSESSMENT: PREVENTS OR INTERFERES WITH MANY ACTIVE NOT PASSIVE ACTIVITIES
PAIN_FUNCTIONAL_ASSESSMENT: PREVENTS OR INTERFERES WITH ALL ACTIVE AND SOME PASSIVE ACTIVITIES

## 2019-03-20 ASSESSMENT — PAIN DESCRIPTION - LOCATION
LOCATION: ABDOMEN

## 2019-03-20 ASSESSMENT — PAIN SCALES - GENERAL
PAINLEVEL_OUTOF10: 3
PAINLEVEL_OUTOF10: 7
PAINLEVEL_OUTOF10: 5
PAINLEVEL_OUTOF10: 8
PAINLEVEL_OUTOF10: 7
PAINLEVEL_OUTOF10: 8

## 2019-03-20 ASSESSMENT — PAIN DESCRIPTION - PROGRESSION
CLINICAL_PROGRESSION: NOT CHANGED
CLINICAL_PROGRESSION: NOT CHANGED

## 2019-03-20 ASSESSMENT — PAIN DESCRIPTION - FREQUENCY
FREQUENCY: CONTINUOUS
FREQUENCY: CONTINUOUS

## 2019-03-20 ASSESSMENT — PAIN DESCRIPTION - DESCRIPTORS
DESCRIPTORS: ACHING;SHARP

## 2019-03-20 ASSESSMENT — PAIN DESCRIPTION - ONSET
ONSET: ON-GOING
ONSET: ON-GOING

## 2019-03-20 ASSESSMENT — PAIN DESCRIPTION - ORIENTATION
ORIENTATION: RIGHT;LEFT

## 2019-03-20 ASSESSMENT — PAIN DESCRIPTION - PAIN TYPE
TYPE: ACUTE PAIN;CHRONIC PAIN

## 2019-03-21 LAB
ANION GAP SERPL CALCULATED.3IONS-SCNC: 13 MMOL/L (ref 4–16)
BUN BLDV-MCNC: 10 MG/DL (ref 6–23)
CALCIUM SERPL-MCNC: 7.7 MG/DL (ref 8.3–10.6)
CHLORIDE BLD-SCNC: 102 MMOL/L (ref 99–110)
CO2: 25 MMOL/L (ref 21–32)
CREAT SERPL-MCNC: 0.8 MG/DL (ref 0.6–1.1)
DOSE AMOUNT: ABNORMAL
DOSE TIME: ABNORMAL
GFR AFRICAN AMERICAN: >60 ML/MIN/1.73M2
GFR NON-AFRICAN AMERICAN: >60 ML/MIN/1.73M2
GLUCOSE BLD-MCNC: 98 MG/DL (ref 70–99)
POTASSIUM SERPL-SCNC: 4.1 MMOL/L (ref 3.5–5.1)
SODIUM BLD-SCNC: 140 MMOL/L (ref 135–145)
VANCOMYCIN TROUGH: 8.6 UG/ML (ref 10–20)

## 2019-03-21 PROCEDURE — 6370000000 HC RX 637 (ALT 250 FOR IP): Performed by: NURSE PRACTITIONER

## 2019-03-21 PROCEDURE — 6360000002 HC RX W HCPCS: Performed by: NURSE PRACTITIONER

## 2019-03-21 PROCEDURE — 80048 BASIC METABOLIC PNL TOTAL CA: CPT

## 2019-03-21 PROCEDURE — 1200000000 HC SEMI PRIVATE

## 2019-03-21 PROCEDURE — 36415 COLL VENOUS BLD VENIPUNCTURE: CPT

## 2019-03-21 PROCEDURE — 2580000003 HC RX 258: Performed by: NURSE PRACTITIONER

## 2019-03-21 PROCEDURE — 80202 ASSAY OF VANCOMYCIN: CPT

## 2019-03-21 PROCEDURE — 6360000002 HC RX W HCPCS: Performed by: INTERNAL MEDICINE

## 2019-03-21 PROCEDURE — 2500000003 HC RX 250 WO HCPCS: Performed by: INTERNAL MEDICINE

## 2019-03-21 PROCEDURE — 2580000003 HC RX 258: Performed by: INTERNAL MEDICINE

## 2019-03-21 PROCEDURE — 99211 OFF/OP EST MAY X REQ PHY/QHP: CPT

## 2019-03-21 PROCEDURE — 99232 SBSQ HOSP IP/OBS MODERATE 35: CPT | Performed by: INTERNAL MEDICINE

## 2019-03-21 RX ADMIN — RANOLAZINE 1000 MG: 500 TABLET, FILM COATED, EXTENDED RELEASE ORAL at 09:52

## 2019-03-21 RX ADMIN — FERROUS GLUCONATE TAB 324 MG (37.5 MG ELEMENTAL IRON) 324 MG: 324 (37.5 FE) TAB at 09:53

## 2019-03-21 RX ADMIN — OXYCODONE HYDROCHLORIDE 15 MG: 5 TABLET ORAL at 19:28

## 2019-03-21 RX ADMIN — RANOLAZINE 1000 MG: 500 TABLET, FILM COATED, EXTENDED RELEASE ORAL at 21:27

## 2019-03-21 RX ADMIN — Medication 1 CAPSULE: at 09:51

## 2019-03-21 RX ADMIN — SILVER SULFADIAZINE: 10 CREAM TOPICAL at 22:48

## 2019-03-21 RX ADMIN — MEXILETINE HYDROCHLORIDE 150 MG: 150 CAPSULE ORAL at 22:46

## 2019-03-21 RX ADMIN — LEVOTHYROXINE SODIUM 137 MCG: 25 TABLET ORAL at 05:52

## 2019-03-21 RX ADMIN — OXYCODONE HYDROCHLORIDE 15 MG: 5 TABLET ORAL at 22:47

## 2019-03-21 RX ADMIN — CLOPIDOGREL BISULFATE 75 MG: 75 TABLET ORAL at 09:52

## 2019-03-21 RX ADMIN — GABAPENTIN 300 MG: 300 CAPSULE ORAL at 21:28

## 2019-03-21 RX ADMIN — MICONAZOLE NITRATE: 2 POWDER TOPICAL at 22:48

## 2019-03-21 RX ADMIN — FERROUS GLUCONATE TAB 324 MG (37.5 MG ELEMENTAL IRON) 324 MG: 324 (37.5 FE) TAB at 16:49

## 2019-03-21 RX ADMIN — VANCOMYCIN 750 MG: 1 INJECTION, SOLUTION INTRAVENOUS at 11:02

## 2019-03-21 RX ADMIN — Medication 800 MG: at 09:52

## 2019-03-21 RX ADMIN — Medication 1 CAPSULE: at 13:02

## 2019-03-21 RX ADMIN — MEXILETINE HYDROCHLORIDE 150 MG: 150 CAPSULE ORAL at 14:19

## 2019-03-21 RX ADMIN — OXYCODONE HYDROCHLORIDE 15 MG: 5 TABLET ORAL at 05:52

## 2019-03-21 RX ADMIN — TORSEMIDE 20 MG: 20 TABLET ORAL at 16:51

## 2019-03-21 RX ADMIN — LEVETIRACETAM 750 MG: 250 TABLET, FILM COATED ORAL at 21:27

## 2019-03-21 RX ADMIN — ASPIRIN 81 MG 81 MG: 81 TABLET ORAL at 09:53

## 2019-03-21 RX ADMIN — Medication 800 MG: at 21:27

## 2019-03-21 RX ADMIN — OXYCODONE HYDROCHLORIDE 15 MG: 5 TABLET ORAL at 01:31

## 2019-03-21 RX ADMIN — MICONAZOLE NITRATE: 2 POWDER TOPICAL at 11:02

## 2019-03-21 RX ADMIN — OXYCODONE HYDROCHLORIDE 15 MG: 5 TABLET ORAL at 16:25

## 2019-03-21 RX ADMIN — FAMOTIDINE 20 MG: 20 TABLET ORAL at 09:53

## 2019-03-21 RX ADMIN — CYANOCOBALAMIN TAB 1000 MCG 1000 MCG: 1000 TAB at 09:52

## 2019-03-21 RX ADMIN — MEROPENEM 1 G: 1 INJECTION, POWDER, FOR SOLUTION INTRAVENOUS at 05:52

## 2019-03-21 RX ADMIN — POTASSIUM CHLORIDE 20 MEQ: 1500 TABLET, EXTENDED RELEASE ORAL at 09:53

## 2019-03-21 RX ADMIN — Medication 1 CAPSULE: at 16:48

## 2019-03-21 RX ADMIN — ATORVASTATIN CALCIUM 80 MG: 40 TABLET, FILM COATED ORAL at 21:28

## 2019-03-21 RX ADMIN — LEVETIRACETAM 750 MG: 250 TABLET, FILM COATED ORAL at 09:52

## 2019-03-21 RX ADMIN — GABAPENTIN 300 MG: 300 CAPSULE ORAL at 09:52

## 2019-03-21 RX ADMIN — OXYCODONE HYDROCHLORIDE 15 MG: 5 TABLET ORAL at 09:51

## 2019-03-21 RX ADMIN — ERGOCALCIFEROL 50000 UNITS: 1.25 CAPSULE ORAL at 09:52

## 2019-03-21 RX ADMIN — DULOXETINE HYDROCHLORIDE 60 MG: 30 CAPSULE, DELAYED RELEASE ORAL at 09:53

## 2019-03-21 RX ADMIN — DULOXETINE HYDROCHLORIDE 60 MG: 30 CAPSULE, DELAYED RELEASE ORAL at 21:28

## 2019-03-21 RX ADMIN — SILVER SULFADIAZINE: 10 CREAM TOPICAL at 14:20

## 2019-03-21 RX ADMIN — TORSEMIDE 20 MG: 20 TABLET ORAL at 09:52

## 2019-03-21 RX ADMIN — OXYCODONE HYDROCHLORIDE 15 MG: 5 TABLET ORAL at 13:02

## 2019-03-21 RX ADMIN — MEXILETINE HYDROCHLORIDE 150 MG: 150 CAPSULE ORAL at 09:53

## 2019-03-21 RX ADMIN — OMEGA-3-ACID ETHYL ESTERS 1000 MG: 1 CAPSULE, LIQUID FILLED ORAL at 21:27

## 2019-03-21 RX ADMIN — GABAPENTIN 300 MG: 300 CAPSULE ORAL at 13:03

## 2019-03-21 RX ADMIN — FAMOTIDINE 20 MG: 20 TABLET ORAL at 21:28

## 2019-03-21 ASSESSMENT — PAIN SCALES - GENERAL
PAINLEVEL_OUTOF10: 8
PAINLEVEL_OUTOF10: 7
PAINLEVEL_OUTOF10: 6
PAINLEVEL_OUTOF10: 4
PAINLEVEL_OUTOF10: 9
PAINLEVEL_OUTOF10: 7
PAINLEVEL_OUTOF10: 7
PAINLEVEL_OUTOF10: 0
PAINLEVEL_OUTOF10: 8

## 2019-03-21 ASSESSMENT — PAIN DESCRIPTION - LOCATION
LOCATION: ABDOMEN
LOCATION: ABDOMEN
LOCATION: ABDOMEN;HIP

## 2019-03-21 ASSESSMENT — PAIN DESCRIPTION - PAIN TYPE
TYPE: ACUTE PAIN;CHRONIC PAIN
TYPE: ACUTE PAIN
TYPE: ACUTE PAIN;CHRONIC PAIN

## 2019-03-21 ASSESSMENT — PAIN DESCRIPTION - DESCRIPTORS: DESCRIPTORS: ACHING;SHARP

## 2019-03-21 ASSESSMENT — PAIN DESCRIPTION - FREQUENCY: FREQUENCY: CONTINUOUS

## 2019-03-21 ASSESSMENT — PAIN DESCRIPTION - PROGRESSION: CLINICAL_PROGRESSION: NOT CHANGED

## 2019-03-21 ASSESSMENT — PAIN DESCRIPTION - ONSET: ONSET: ON-GOING

## 2019-03-21 ASSESSMENT — PAIN - FUNCTIONAL ASSESSMENT: PAIN_FUNCTIONAL_ASSESSMENT: PREVENTS OR INTERFERES WITH ALL ACTIVE AND SOME PASSIVE ACTIVITIES

## 2019-03-21 ASSESSMENT — PAIN DESCRIPTION - ORIENTATION
ORIENTATION: RIGHT
ORIENTATION: RIGHT;LEFT

## 2019-03-22 ENCOUNTER — APPOINTMENT (OUTPATIENT)
Dept: GENERAL RADIOLOGY | Age: 59
DRG: 394 | End: 2019-03-22
Payer: COMMERCIAL

## 2019-03-22 LAB
ANION GAP SERPL CALCULATED.3IONS-SCNC: 9 MMOL/L (ref 4–16)
BUN BLDV-MCNC: 12 MG/DL (ref 6–23)
CALCIUM SERPL-MCNC: 8.3 MG/DL (ref 8.3–10.6)
CHLORIDE BLD-SCNC: 97 MMOL/L (ref 99–110)
CO2: 31 MMOL/L (ref 21–32)
CREAT SERPL-MCNC: 0.8 MG/DL (ref 0.6–1.1)
GFR AFRICAN AMERICAN: >60 ML/MIN/1.73M2
GFR NON-AFRICAN AMERICAN: >60 ML/MIN/1.73M2
GLUCOSE BLD-MCNC: 100 MG/DL (ref 70–99)
IGA: 50 MG/DL (ref 69–382)
IGG,SERUM: 416 MG/DL (ref 723–1685)
IGM,SERUM: <25 MG/DL (ref 62–277)
POTASSIUM SERPL-SCNC: 3.6 MMOL/L (ref 3.5–5.1)
SODIUM BLD-SCNC: 137 MMOL/L (ref 135–145)

## 2019-03-22 PROCEDURE — 99232 SBSQ HOSP IP/OBS MODERATE 35: CPT | Performed by: INTERNAL MEDICINE

## 2019-03-22 PROCEDURE — 76937 US GUIDE VASCULAR ACCESS: CPT | Performed by: SURGERY

## 2019-03-22 PROCEDURE — 6370000000 HC RX 637 (ALT 250 FOR IP): Performed by: NURSE PRACTITIONER

## 2019-03-22 PROCEDURE — 86162 COMPLEMENT TOTAL (CH50): CPT

## 2019-03-22 PROCEDURE — 36415 COLL VENOUS BLD VENIPUNCTURE: CPT

## 2019-03-22 PROCEDURE — 02HV33Z INSERTION OF INFUSION DEVICE INTO SUPERIOR VENA CAVA, PERCUTANEOUS APPROACH: ICD-10-PCS | Performed by: SURGERY

## 2019-03-22 PROCEDURE — 6360000002 HC RX W HCPCS: Performed by: INTERNAL MEDICINE

## 2019-03-22 PROCEDURE — 71045 X-RAY EXAM CHEST 1 VIEW: CPT

## 2019-03-22 PROCEDURE — 82784 ASSAY IGA/IGD/IGG/IGM EACH: CPT

## 2019-03-22 PROCEDURE — 1200000000 HC SEMI PRIVATE

## 2019-03-22 PROCEDURE — 2580000003 HC RX 258: Performed by: INTERNAL MEDICINE

## 2019-03-22 PROCEDURE — 99232 SBSQ HOSP IP/OBS MODERATE 35: CPT | Performed by: SURGERY

## 2019-03-22 PROCEDURE — 80048 BASIC METABOLIC PNL TOTAL CA: CPT

## 2019-03-22 PROCEDURE — 36556 INSERT NON-TUNNEL CV CATH: CPT | Performed by: SURGERY

## 2019-03-22 RX ADMIN — ATORVASTATIN CALCIUM 80 MG: 40 TABLET, FILM COATED ORAL at 21:22

## 2019-03-22 RX ADMIN — ONDANSETRON 4 MG: 4 TABLET, ORALLY DISINTEGRATING ORAL at 20:23

## 2019-03-22 RX ADMIN — OMEGA-3-ACID ETHYL ESTERS 1000 MG: 1 CAPSULE, LIQUID FILLED ORAL at 21:23

## 2019-03-22 RX ADMIN — Medication 1 CAPSULE: at 17:05

## 2019-03-22 RX ADMIN — FAMOTIDINE 20 MG: 20 TABLET ORAL at 09:34

## 2019-03-22 RX ADMIN — FERROUS GLUCONATE TAB 324 MG (37.5 MG ELEMENTAL IRON) 324 MG: 324 (37.5 FE) TAB at 17:05

## 2019-03-22 RX ADMIN — Medication 1 CAPSULE: at 13:11

## 2019-03-22 RX ADMIN — CLOPIDOGREL BISULFATE 75 MG: 75 TABLET ORAL at 09:34

## 2019-03-22 RX ADMIN — OXYCODONE HYDROCHLORIDE 15 MG: 5 TABLET ORAL at 17:05

## 2019-03-22 RX ADMIN — OXYCODONE HYDROCHLORIDE 15 MG: 5 TABLET ORAL at 13:11

## 2019-03-22 RX ADMIN — OXYCODONE HYDROCHLORIDE 15 MG: 5 TABLET ORAL at 05:31

## 2019-03-22 RX ADMIN — MEROPENEM 1 G: 1 INJECTION, POWDER, FOR SOLUTION INTRAVENOUS at 19:55

## 2019-03-22 RX ADMIN — RANOLAZINE 1000 MG: 500 TABLET, FILM COATED, EXTENDED RELEASE ORAL at 09:33

## 2019-03-22 RX ADMIN — MICONAZOLE NITRATE: 2 POWDER TOPICAL at 21:31

## 2019-03-22 RX ADMIN — OXYCODONE HYDROCHLORIDE 15 MG: 5 TABLET ORAL at 20:23

## 2019-03-22 RX ADMIN — DULOXETINE HYDROCHLORIDE 60 MG: 30 CAPSULE, DELAYED RELEASE ORAL at 09:34

## 2019-03-22 RX ADMIN — GABAPENTIN 300 MG: 300 CAPSULE ORAL at 09:33

## 2019-03-22 RX ADMIN — MICONAZOLE NITRATE: 2 POWDER TOPICAL at 09:32

## 2019-03-22 RX ADMIN — LEVETIRACETAM 750 MG: 250 TABLET, FILM COATED ORAL at 21:22

## 2019-03-22 RX ADMIN — MEXILETINE HYDROCHLORIDE 150 MG: 150 CAPSULE ORAL at 21:22

## 2019-03-22 RX ADMIN — Medication 800 MG: at 09:33

## 2019-03-22 RX ADMIN — Medication 800 MG: at 21:22

## 2019-03-22 RX ADMIN — OXYCODONE HYDROCHLORIDE 15 MG: 5 TABLET ORAL at 09:33

## 2019-03-22 RX ADMIN — TORSEMIDE 20 MG: 20 TABLET ORAL at 17:05

## 2019-03-22 RX ADMIN — FERROUS GLUCONATE TAB 324 MG (37.5 MG ELEMENTAL IRON) 324 MG: 324 (37.5 FE) TAB at 09:34

## 2019-03-22 RX ADMIN — LEVOTHYROXINE SODIUM 137 MCG: 25 TABLET ORAL at 05:31

## 2019-03-22 RX ADMIN — SILVER SULFADIAZINE: 10 CREAM TOPICAL at 21:27

## 2019-03-22 RX ADMIN — CHLORHEXIDINE GLUCONATE: 4 LIQUID TOPICAL at 10:00

## 2019-03-22 RX ADMIN — MEXILETINE HYDROCHLORIDE 150 MG: 150 CAPSULE ORAL at 09:33

## 2019-03-22 RX ADMIN — FAMOTIDINE 20 MG: 20 TABLET ORAL at 21:23

## 2019-03-22 RX ADMIN — GABAPENTIN 300 MG: 300 CAPSULE ORAL at 21:22

## 2019-03-22 RX ADMIN — Medication 1 CAPSULE: at 09:32

## 2019-03-22 RX ADMIN — MEXILETINE HYDROCHLORIDE 150 MG: 150 CAPSULE ORAL at 13:11

## 2019-03-22 RX ADMIN — RANOLAZINE 1000 MG: 500 TABLET, FILM COATED, EXTENDED RELEASE ORAL at 21:22

## 2019-03-22 RX ADMIN — CYANOCOBALAMIN TAB 1000 MCG 1000 MCG: 1000 TAB at 09:34

## 2019-03-22 RX ADMIN — SILVER SULFADIAZINE: 10 CREAM TOPICAL at 09:32

## 2019-03-22 RX ADMIN — OXYCODONE HYDROCHLORIDE 15 MG: 5 TABLET ORAL at 23:35

## 2019-03-22 RX ADMIN — OXYCODONE HYDROCHLORIDE 15 MG: 5 TABLET ORAL at 02:13

## 2019-03-22 RX ADMIN — ASPIRIN 81 MG 81 MG: 81 TABLET ORAL at 09:34

## 2019-03-22 RX ADMIN — POTASSIUM CHLORIDE 20 MEQ: 1500 TABLET, EXTENDED RELEASE ORAL at 09:33

## 2019-03-22 RX ADMIN — LEVETIRACETAM 750 MG: 250 TABLET, FILM COATED ORAL at 09:33

## 2019-03-22 RX ADMIN — GABAPENTIN 300 MG: 300 CAPSULE ORAL at 13:11

## 2019-03-22 RX ADMIN — TORSEMIDE 20 MG: 20 TABLET ORAL at 09:34

## 2019-03-22 RX ADMIN — DULOXETINE HYDROCHLORIDE 60 MG: 30 CAPSULE, DELAYED RELEASE ORAL at 21:23

## 2019-03-22 ASSESSMENT — PAIN DESCRIPTION - LOCATION
LOCATION: BACK;HIP;ABDOMEN
LOCATION: HIP;ABDOMEN

## 2019-03-22 ASSESSMENT — PAIN DESCRIPTION - PAIN TYPE
TYPE: ACUTE PAIN
TYPE: ACUTE PAIN

## 2019-03-22 ASSESSMENT — PAIN SCALES - GENERAL
PAINLEVEL_OUTOF10: 8
PAINLEVEL_OUTOF10: 6
PAINLEVEL_OUTOF10: 8
PAINLEVEL_OUTOF10: 9
PAINLEVEL_OUTOF10: 7
PAINLEVEL_OUTOF10: 8
PAINLEVEL_OUTOF10: 7
PAINLEVEL_OUTOF10: 7

## 2019-03-22 ASSESSMENT — PAIN DESCRIPTION - FREQUENCY
FREQUENCY: CONTINUOUS
FREQUENCY: CONTINUOUS

## 2019-03-22 ASSESSMENT — PAIN DESCRIPTION - ORIENTATION
ORIENTATION: RIGHT;LEFT
ORIENTATION: RIGHT;LEFT

## 2019-03-22 ASSESSMENT — PAIN DESCRIPTION - DESCRIPTORS
DESCRIPTORS: SHARP;ACHING
DESCRIPTORS: ACHING;SHARP

## 2019-03-23 VITALS
OXYGEN SATURATION: 94 % | WEIGHT: 122 LBS | HEIGHT: 66 IN | SYSTOLIC BLOOD PRESSURE: 117 MMHG | BODY MASS INDEX: 19.61 KG/M2 | RESPIRATION RATE: 18 BRPM | DIASTOLIC BLOOD PRESSURE: 59 MMHG | TEMPERATURE: 98.2 F | HEART RATE: 57 BPM

## 2019-03-23 PROCEDURE — 6360000002 HC RX W HCPCS: Performed by: INTERNAL MEDICINE

## 2019-03-23 PROCEDURE — 2580000003 HC RX 258: Performed by: INTERNAL MEDICINE

## 2019-03-23 RX ADMIN — VANCOMYCIN HYDROCHLORIDE 750 MG: 1 INJECTION, POWDER, LYOPHILIZED, FOR SOLUTION INTRAVENOUS at 00:01

## 2019-03-23 ASSESSMENT — PAIN SCALES - GENERAL: PAINLEVEL_OUTOF10: 4

## 2019-03-24 LAB
CULTURE: NORMAL
CULTURE: NORMAL
Lab: NORMAL
Lab: NORMAL
SPECIMEN: NORMAL
SPECIMEN: NORMAL

## 2019-03-25 LAB
EKG ATRIAL RATE: 75 BPM
EKG DIAGNOSIS: NORMAL
EKG P AXIS: 9 DEGREES
EKG P-R INTERVAL: 118 MS
EKG Q-T INTERVAL: 410 MS
EKG QRS DURATION: 90 MS
EKG QTC CALCULATION (BAZETT): 457 MS
EKG R AXIS: -42 DEGREES
EKG T AXIS: 100 DEGREES
EKG VENTRICULAR RATE: 75 BPM

## 2019-03-27 LAB
COMPLEMENT TOTAL (CH50): 102 CAE UNITS (ref 60–144)
COMPLEMENT TOTAL (CH50): NORMAL CAE UNITS (ref 60–144)

## 2019-03-30 ENCOUNTER — APPOINTMENT (OUTPATIENT)
Dept: CT IMAGING | Age: 59
DRG: 602 | End: 2019-03-30
Payer: COMMERCIAL

## 2019-03-30 ENCOUNTER — APPOINTMENT (OUTPATIENT)
Dept: GENERAL RADIOLOGY | Age: 59
DRG: 602 | End: 2019-03-30
Payer: COMMERCIAL

## 2019-03-30 ENCOUNTER — HOSPITAL ENCOUNTER (EMERGENCY)
Age: 59
Discharge: HOME | DRG: 602 | End: 2019-03-31
Attending: EMERGENCY MEDICINE
Payer: COMMERCIAL

## 2019-03-30 DIAGNOSIS — R10.9 ABDOMINAL PAIN, UNSPECIFIED ABDOMINAL LOCATION: Primary | ICD-10-CM

## 2019-03-30 LAB
ALBUMIN SERPL-MCNC: 3.6 GM/DL (ref 3.4–5)
ALP BLD-CCNC: 93 IU/L (ref 40–128)
ALT SERPL-CCNC: 12 U/L (ref 10–40)
AMMONIA: 21 UMOL/L (ref 11–51)
ANION GAP SERPL CALCULATED.3IONS-SCNC: 13 MMOL/L (ref 4–16)
AST SERPL-CCNC: 14 IU/L (ref 15–37)
BACTERIA: ABNORMAL /HPF
BASOPHILS ABSOLUTE: 0 K/CU MM
BASOPHILS RELATIVE PERCENT: 0.2 % (ref 0–1)
BILIRUB SERPL-MCNC: 0.3 MG/DL (ref 0–1)
BILIRUBIN URINE: NEGATIVE MG/DL
BLOOD, URINE: NEGATIVE
BUN BLDV-MCNC: 9 MG/DL (ref 6–23)
CALCIUM SERPL-MCNC: 9 MG/DL (ref 8.3–10.6)
CHLORIDE BLD-SCNC: 109 MMOL/L (ref 99–110)
CLARITY: ABNORMAL
CO2: 20 MMOL/L (ref 21–32)
COLOR: YELLOW
CREAT SERPL-MCNC: 0.5 MG/DL (ref 0.6–1.1)
DIFFERENTIAL TYPE: ABNORMAL
EOSINOPHILS ABSOLUTE: 0 K/CU MM
EOSINOPHILS RELATIVE PERCENT: 0 % (ref 0–3)
GFR AFRICAN AMERICAN: >60 ML/MIN/1.73M2
GFR NON-AFRICAN AMERICAN: >60 ML/MIN/1.73M2
GLUCOSE BLD-MCNC: 110 MG/DL (ref 70–99)
GLUCOSE, URINE: NEGATIVE MG/DL
HCT VFR BLD CALC: 32.2 % (ref 37–47)
HEMOGLOBIN: 10.4 GM/DL (ref 12.5–16)
IMMATURE NEUTROPHIL %: 0.5 % (ref 0–0.43)
KETONES, URINE: ABNORMAL MG/DL
LACTATE: 1.5 MMOL/L (ref 0.4–2)
LEUKOCYTE ESTERASE, URINE: ABNORMAL
LIPASE: 27 IU/L (ref 13–60)
LYMPHOCYTES ABSOLUTE: 0.8 K/CU MM
LYMPHOCYTES RELATIVE PERCENT: 19.4 % (ref 24–44)
MCH RBC QN AUTO: 32.8 PG (ref 27–31)
MCHC RBC AUTO-ENTMCNC: 32.3 % (ref 32–36)
MCV RBC AUTO: 101.6 FL (ref 78–100)
MONOCYTES ABSOLUTE: 0.3 K/CU MM
MONOCYTES RELATIVE PERCENT: 7.5 % (ref 0–4)
MUCUS: ABNORMAL HPF
NITRITE URINE, QUANTITATIVE: NEGATIVE
NUCLEATED RBC %: 0 %
PDW BLD-RTO: 15.9 % (ref 11.7–14.9)
PH, URINE: 8 (ref 5–8)
PLATELET # BLD: 305 K/CU MM (ref 140–440)
PMV BLD AUTO: 10.1 FL (ref 7.5–11.1)
POTASSIUM SERPL-SCNC: 3.5 MMOL/L (ref 3.5–5.1)
PROTEIN UA: NEGATIVE MG/DL
RBC # BLD: 3.17 M/CU MM (ref 4.2–5.4)
RBC URINE: ABNORMAL /HPF (ref 0–6)
SEGMENTED NEUTROPHILS ABSOLUTE COUNT: 3 K/CU MM
SEGMENTED NEUTROPHILS RELATIVE PERCENT: 72.4 % (ref 36–66)
SODIUM BLD-SCNC: 142 MMOL/L (ref 135–145)
SPECIFIC GRAVITY UA: 1.04 (ref 1–1.03)
SPECIFIC GRAVITY UA: ABNORMAL (ref 1–1.03)
SQUAMOUS EPITHELIAL: 21 /HPF
TOTAL CK: 24 IU/L (ref 26–140)
TOTAL IMMATURE NEUTOROPHIL: 0.02 K/CU MM
TOTAL NUCLEATED RBC: 0 K/CU MM
TOTAL PROTEIN: 6 GM/DL (ref 6.4–8.2)
TRANSITIONAL EPITHELIAL: <1 /HPF
TRICHOMONAS: ABNORMAL /HPF
TROPONIN T: <0.01 NG/ML
UROBILINOGEN, URINE: NORMAL MG/DL (ref 0.2–1)
WBC # BLD: 4.1 K/CU MM (ref 4–10.5)
WBC UA: 7 /HPF (ref 0–5)

## 2019-03-30 PROCEDURE — 83690 ASSAY OF LIPASE: CPT

## 2019-03-30 PROCEDURE — 84484 ASSAY OF TROPONIN QUANT: CPT

## 2019-03-30 PROCEDURE — 81001 URINALYSIS AUTO W/SCOPE: CPT

## 2019-03-30 PROCEDURE — 99285 EMERGENCY DEPT VISIT HI MDM: CPT

## 2019-03-30 PROCEDURE — 74177 CT ABD & PELVIS W/CONTRAST: CPT

## 2019-03-30 PROCEDURE — 82140 ASSAY OF AMMONIA: CPT

## 2019-03-30 PROCEDURE — 2580000003 HC RX 258: Performed by: EMERGENCY MEDICINE

## 2019-03-30 PROCEDURE — 96360 HYDRATION IV INFUSION INIT: CPT

## 2019-03-30 PROCEDURE — 70450 CT HEAD/BRAIN W/O DYE: CPT

## 2019-03-30 PROCEDURE — 6370000000 HC RX 637 (ALT 250 FOR IP): Performed by: EMERGENCY MEDICINE

## 2019-03-30 PROCEDURE — 96361 HYDRATE IV INFUSION ADD-ON: CPT

## 2019-03-30 PROCEDURE — 82550 ASSAY OF CK (CPK): CPT

## 2019-03-30 PROCEDURE — 85025 COMPLETE CBC W/AUTO DIFF WBC: CPT

## 2019-03-30 PROCEDURE — 80053 COMPREHEN METABOLIC PANEL: CPT

## 2019-03-30 PROCEDURE — 93005 ELECTROCARDIOGRAM TRACING: CPT | Performed by: EMERGENCY MEDICINE

## 2019-03-30 PROCEDURE — 6360000004 HC RX CONTRAST MEDICATION: Performed by: EMERGENCY MEDICINE

## 2019-03-30 PROCEDURE — 71045 X-RAY EXAM CHEST 1 VIEW: CPT

## 2019-03-30 PROCEDURE — 83605 ASSAY OF LACTIC ACID: CPT

## 2019-03-30 RX ORDER — 0.9 % SODIUM CHLORIDE 0.9 %
1000 INTRAVENOUS SOLUTION INTRAVENOUS ONCE
Status: COMPLETED | OUTPATIENT
Start: 2019-03-30 | End: 2019-03-30

## 2019-03-30 RX ORDER — OXYCODONE HYDROCHLORIDE 5 MG/1
15 TABLET ORAL ONCE
Status: COMPLETED | OUTPATIENT
Start: 2019-03-30 | End: 2019-03-30

## 2019-03-30 RX ADMIN — OXYCODONE HYDROCHLORIDE 15 MG: 5 TABLET ORAL at 20:34

## 2019-03-30 RX ADMIN — SODIUM CHLORIDE 1000 ML: 9 INJECTION, SOLUTION INTRAVENOUS at 20:33

## 2019-03-30 RX ADMIN — IOPAMIDOL 75 ML: 755 INJECTION, SOLUTION INTRAVENOUS at 21:49

## 2019-03-30 ASSESSMENT — PAIN SCALES - GENERAL: PAINLEVEL_OUTOF10: 10

## 2019-03-30 ASSESSMENT — PAIN DESCRIPTION - ORIENTATION: ORIENTATION: LEFT;UPPER

## 2019-03-30 ASSESSMENT — PAIN DESCRIPTION - LOCATION: LOCATION: ABDOMEN

## 2019-03-30 ASSESSMENT — PAIN DESCRIPTION - PAIN TYPE: TYPE: ACUTE PAIN

## 2019-03-30 ASSESSMENT — PAIN DESCRIPTION - DESCRIPTORS: DESCRIPTORS: PATIENT UNABLE TO DESCRIBE

## 2019-03-30 NOTE — ED PROVIDER NOTES
Triage Chief Complaint:   Altered Mental Status (pt presents to ED via EMS from home due to increasing confusion per family; pt is not present on arrival; EMS reports blood glucose en route 128; pt oriented to person, but disoriented to place, situation, and time, does follow commands but needs reinforcement); Abdominal Pain (pt reporting pain, especially at G/J tube placment; drainage and foul odor noted at site; history of this; recently at [facility] this month; history of gastric bypass c/b malabsorption s/p GJ tube on chronic tube feeds); and Generalized Body Aches    [place]:  Ana Pandya is a 63 y.o. female that presents with abdominal pain. Patient reports pain started yesterday. Pain has been slowly progressive. Patient reports pain at the site of her GJ-tube that was recently replaced on 3/27/19. Patient reports pain is currently severe, constant and localized at the site of her tube. Pain is otherwise poorly described. Patient reports she did not take any of her home pain medications today. Some generalized body aches. Family reporting to EMS the patient seemed more confused. Patient is dependent on her tube 4 chronic tube feedings following gastric bypass and gastroparesis. Patient is slightly confused limiting history. ROS:  Limited as above. Past Medical History:   Diagnosis Date    Acute delirium     Arrhythmia     Arthritis     Asthma     CAD (coronary artery disease)     1st stent at age 46    Cardiomyopathy [facility])     Cerebral artery occlusion with cerebral infarction ([facility] [address]     CHF (congestive heart failure) (HCC)     Enlarged liver     GERD (gastroesophageal reflux disease)     H/O echocardiogram 4/6/16    EF 55%, trivial TR & MR, stage 1 diastolic dysfunction    History of blood transfusion     Hx of cardiovascular stress test 12/29/2015    Alessia: EF 45%. Pharmacologic stress myocardial perfusion scan shows a fixed inferior-lateral defect w/ no inducible ischemia.  No evidence of ischemia. Inferior-lateral infarction.  Normal LVSF    Hyperlipidemia     Hypertension     Lymphoma ([facility] [address]     MI, old     Movement disorder     MS (multiple sclerosis) ([facility] [address]     OP (osteoporosis)     PE (pulmonary embolism)     trapese filter    Pneumonia     Seizures ([facility] [address]     Spleen enlarged     Thyroid disease      Past Surgical History:   Procedure Laterality Date    ABDOMEN SURGERY      APPENDECTOMY      CARDIAC DEFIBRILLATOR PLACEMENT      CHOLECYSTECTOMY      COLONOSCOPY      CORONARY ANGIOPLASTY WITH STENT PLACEMENT      3 stents    CORONARY ARTERY BYPASS GRAFT      Age 49    ENDOSCOPY, COLON, DIAGNOSTIC      GASTRIC BYPASS SURGERY      HERNIA REPAIR      HIP SURGERY      HYSTERECTOMY      JOINT REPLACEMENT      SKIN BIOPSY      TONSILLECTOMY      VASCULAR SURGERY       Family History   Problem Relation Age of Onset    High Blood Pressure Mother     High Cholesterol Mother     Heart Disease Mother     Diabetes Mother     Heart Disease Father     Cancer Father     Other Sister         Multiple Sclerosis    Heart Disease Maternal Grandmother     High Blood Pressure Maternal Grandmother     High Cholesterol Maternal Grandmother      Social History     Socioeconomic History    Marital status: Single     Spouse name: Not on file    Number of children: Not on file    Years of education: Not on file    Highest education level: Not on file   Occupational History    Not on file   Social Needs    Financial resource strain: Not on file    Food insecurity:     Worry: Not on file     Inability: Not on file    Transportation needs:     Medical: Not on file     Non-medical: Not on file   Tobacco Use    Smoking status: Never Smoker    Smokeless tobacco: Never Used   Substance and Sexual Activity    Alcohol use: No    Drug use: No    Sexual activity: Not Currently   Lifestyle    Physical activity:     Days per week: Not on file     Minutes per session: Not on file    Wheezing      torsemide (DEMADEX) 20 MG tablet Take 20 mg by mouth 2 times daily      atorvastatin (LIPITOR) 80 MG tablet Take 80 mg by mouth nightly      DULoxetine (CYMBALTA) 60 MG extended release capsule Take 60 mg by mouth 2 times daily      Pediatric Multivitamins-Iron (FLINTSTONES PLUS IRON) CHEW Take 1 tablet by mouth 4 times daily      Omega-3 1000 MG CAPS Take 1,000 mg by mouth nightly      potassium chloride (KLOR-CON M) 20 MEQ extended release tablet Take 20 mEq by mouth daily      ranolazine (RANEXA) 1000 MG extended release tablet Take 1,000 mg by mouth 2 times daily      senna-docusate (PERICOLACE) 8.6-50 MG per tablet Take 1 tablet by mouth daily      promethazine (PHENERGAN) 25 MG tablet Take 25 mg by mouth every 6 hours as needed for Nausea      gabapentin (NEURONTIN) 300 MG capsule Take 300 mg by mouth 3 times daily. Rosalva Juares clopidogrel (PLAVIX) 75 MG tablet Take 75 mg by mouth daily      lisinopril (PRINIVIL;ZESTRIL) 2.5 MG tablet Take 2.5 mg by mouth daily      oxyCODONE (OXY-IR) 15 MG immediate release tablet Take 1 tablet by mouth every 4 hours as needed for Pain . 30 tablet 0    acetaminophen 650 MG TABS Take 650 mg by mouth every 4 hours as needed 120 tablet 3    ferrous gluconate (FERGON) 324 (38 FE) MG tablet Take 324 mg by mouth 2 times daily       aspirin 81 MG chewable tablet Take 81 mg by mouth daily.  interferon beta-1b (BETASERON) 0.3 MG injection Inject 0.25 mg into the skin every 30 days 04/25/18 Last known dose given 04/19/18 per MAR      vitamin B-12 (CYANOCOBALAMIN) 1000 MCG tablet Inject 1,000 mcg into the muscle every 30 days 04/25/18 Given on the 28 th of each month      albuterol (PROVENTIL HFA;VENTOLIN HFA) 108 (90 BASE) MCG/ACT inhaler Inhale 2 puffs into the lungs every 6 hours as needed.         vitamin D (ERGOCALCIFEROL) 68914 UNIT CAPS capsule Take 50,000 Units by mouth Twice a Week Take on Mondays and on Fridays       Allergies   Allergen Reactions    Baclofen     Fentanyl Swelling    Ibuprofen      \"Due to heart problems\"    Methocarbamol      Worsening edema    Nsaids      \"Due to heart problems\"    Tizanidine     Tolmetin      \"Due to heart problems\"    Tramadol Other (See Comments)     Doctor doesn't her to take due to heart problems  Seizures   Doctor doesn't her to take due to lowers seizure threshold.  Avelox [Moxifloxacin Hcl In Nacl] Swelling and Rash    Betadine [Povidone Iodine] Rash    Moxifloxacin Rash and Swelling     Lips swell and tingling in face        Nursing Notes Reviewed    Physical Exam:  ED Triage Vitals   Enc Vitals Group      BP 03/30/19 1932 (!) 125/54      Pulse 03/30/19 1914 78      Resp 03/30/19 1914 22      Temp 03/30/19 1914 97.9 °F (36.6 °C)      Temp Source 03/30/19 1914 Oral      SpO2 03/30/19 1914 100 %      Weight 03/30/19 1915 122 lb (55.3 kg)      Height 03/30/19 1915 5' 6\" (1.676 m)      Head Circumference --       Peak Flow --       Pain Score --       Pain Loc --       Pain Edu? --       Excl. in [address]? --        My pulse ox interpretation is - normal    General appearance:  Appears chronically ill. Moaning intermittently. Skin:  Warm. Dry. Mild erythema surrounding G-tube site without significant induration or warmth. Dressing was soiled and replaced. Eye:  Extraocular movements intact. Ears, nose, mouth and throat:  Oral mucosa moist   Neck:  Trachea midline. Extremity:  No swelling. Normal ROM     Heart:  Regular rate and rhythm, normal S1 & S2, no extra heart sounds. Perfusion:  Intact   Respiratory:  Lungs clear to auscultation bilaterally. Respirations nonlabored. Speaking clearly in full sentences. No respiratory distress. Abdominal:  Normal bowel sounds. Soft. Tender to palpation along GJ-tube site. Abdomen is otherwise benign and nontender. No rebound or guarding. No peritoneal signs. Non distended.   Back:  No CVA tenderness to palpation     Neurological:  Alert and oriented to person and place but not day. No focal neuro deficits. Sensation intact to light touch to distal upper/lower extremities; 5/5 and symmetric  and dorsi/plantar flexion            Psychiatric:  Deferred.     I have reviewed and interpreted all of the currently available lab results from this visit (if applicable):  Results for orders placed or performed during the hospital encounter of 03/30/19   CBC auto diff   Result Value Ref Range    WBC 4.1 4.0 - 10.5 K/CU MM    RBC 3.17 (L) 4.2 - 5.4 M/CU MM    Hemoglobin 10.4 (L) 12.5 - 16.0 GM/DL    Hematocrit 32.2 (L) 37 - 47 %    .6 (H) 78 - 100 FL    MCH 32.8 (H) 27 - 31 PG    MCHC 32.3 32.0 - 36.0 %    RDW 15.9 (H) 11.7 - 14.9 %    Platelets 204 083 - 912 K/CU MM    MPV 10.1 7.5 - 11.1 FL    Differential Type AUTOMATED DIFFERENTIAL     Segs Relative 72.4 (H) 36 - 66 %    Lymphocytes % 19.4 (L) 24 - 44 %    Monocytes % 7.5 (H) 0 - 4 %    Eosinophils % 0.0 0 - 3 %    Basophils % 0.2 0 - 1 %    Segs Absolute 3.0 K/CU MM    Lymphocytes # 0.8 K/CU MM    Monocytes # 0.3 K/CU MM    Eosinophils # 0.0 K/CU MM    Basophils # 0.0 K/CU MM    Nucleated RBC % 0.0 %    Total Nucleated RBC 0.0 K/CU MM    Total Immature Neutrophil 0.02 K/CU MM    Immature Neutrophil % 0.5 (H) 0 - 0.43 %   CMP   Result Value Ref Range    Sodium 142 135 - 145 MMOL/L    Potassium 3.5 3.5 - 5.1 MMOL/L    Chloride 109 99 - 110 mMol/L    CO2 20 (L) 21 - 32 MMOL/L    BUN 9 6 - 23 MG/DL    CREATININE 0.5 (L) 0.6 - 1.1 MG/DL    Glucose 110 (H) 70 - 99 MG/DL    Calcium 9.0 8.3 - 10.6 MG/DL    Alb 3.6 3.4 - 5.0 GM/DL    Total Protein 6.0 (L) 6.4 - 8.2 GM/DL    Total Bilirubin 0.3 0.0 - 1.0 MG/DL    ALT 12 10 - 40 U/L    AST 14 (L) 15 - 37 IU/L    Alkaline Phosphatase 93 40 - 128 IU/L    GFR Non-African American >60 >60 mL/min/1.73m2    GFR African American >60 >60 mL/min/1.73m2    Anion Gap 13 4 - 16   Lipase   Result Value Ref Range    Lipase 27 13 - 60 IU/L   Lactic Acid, Plasma   Result Value Ref Range    Lactate 1.5 0.4 - 2.0 mMOL/L   Urinalysis   Result Value Ref Range    Color, UA YELLOW YELLOW    Clarity, UA HAZY (A) CLEAR    Glucose, Urine NEGATIVE NEGATIVE MG/DL    Bilirubin Urine NEGATIVE NEGATIVE MG/DL    Ketones, Urine SMALL (A) NEGATIVE MG/DL    Specific Gravity, UA 1.040 (H) 1.001 - 1.035    Specific Gravity, UA (H) 1.001 - 1.035     (NOTE)  **CONSIDER URINE OSMOLARITY TEST IF CLINICALLY INDICATED**      Blood, Urine NEGATIVE NEGATIVE    pH, Urine 8.0 5.0 - 8.0    Protein, UA NEGATIVE NEGATIVE MG/DL    Urobilinogen, Urine NORMAL 0.2 - 1.0 MG/DL    Nitrite Urine, Quantitative NEGATIVE NEGATIVE    Leukocyte Esterase, Urine SMALL (A) NEGATIVE    RBC, UA NONE SEEN 0 - 6 /HPF    WBC, UA 7 (H) 0 - 5 /HPF    Bacteria, UA RARE (A) NEGATIVE /HPF    Squam Epithel, UA 21 /HPF    Trans Epithel, UA <1 /HPF    Mucus, UA RARE (A) NEGATIVE HPF    Trichomonas, UA NONE SEEN NONE SEEN /HPF   CK   Result Value Ref Range    Total CK 24 (L) 26 - 140 IU/L   Troponin   Result Value Ref Range    Troponin T <0.010 <0.01 NG/ML   Ammonia Level   Result Value Ref Range    Ammonia 21 11 - 51 UMOL/L   EKG 12 Lead   Result Value Ref Range    Ventricular Rate 87 BPM    Atrial Rate 87 BPM    P-R Interval 132 ms    QRS Duration 96 ms    Q-T Interval 406 ms    QTc Calculation (Bazett) 488 ms    P Axis 31 degrees    R Axis -48 degrees    T Axis -74 degrees    Diagnosis       Normal sinus rhythm  Left axis deviation  Possible Lateral infarct , age undetermined  Inferior infarct (cited on or before 12-FEB-2017)  Abnormal ECG  When compared with ECG of 19-MAR-2019 12:13,  Borderline criteria for Lateral infarct are now present        Radiographs (if obtained):  [] The following radiograph was interpreted by myself in the absence of a radiologist:   [x] Radiologist's Report Reviewed:  CT ABDOMEN PELVIS W IV CONTRAST Additional Contrast? None   Preliminary Result   1. No acute findings within the abdomen or pelvis.    2. post gastric bypass. There is a percutaneous gastrostomy tube in the excluded portion of the stomach. Diverticulosis of the sigmoid colon with no diverticular inflammatory stranding. Pelvis: Streak artifact from right hip prosthesis. Urinary bladder grossly unremarkable. Uterus surgically absent. No pelvic fluid collection Peritoneum/Retroperitoneum: No abnormal intra abdominopelvic fluid collection. No ascites or pneumoperitoneum. Aorta normal in caliber. IVC filter noted Bones/Soft Tissues: There is a gas collection adjacent to the intramuscular portion of a percutaneous gastrostomy tube. This measures approximately 3 cm in craniocaudal dimension and 1 cm in transverse dimension. There is no obvious associated fluid and this appears to extend alongside the tube in extent to the skin. No acute bony abnormality     Intramuscular gas collection adjacent to the tubing of a percutaneous gastrostomy tube. This appears to extend alongside the tubing and extend to the skin compatible with a draining infection. No drainable abscess is demonstrated currently. No acute intra abdominopelvic process is demonstrated     Xr Chest Portable    Result Date: 3/22/2019  EXAMINATION: SINGLE XRAY VIEW OF THE CHEST 3/22/2019 8:57 pm COMPARISON: 03/03/2019. HISTORY: ORDERING SYSTEM PROVIDED HISTORY: Central line placement TECHNOLOGIST PROVIDED HISTORY: Reason for exam:->Central line placement Ordering Physician Provided Reason for Exam: central line placement Acuity: Unknown Type of Exam: Initial FINDINGS: AICD overlies left hemithorax. Right IJ central venous catheter present with tip at cavoatrial junction. Lungs are clear. Cardiac and mediastinal silhouettes are within normal limits. No pneumothoraces. Bony structures appear intact. Right IJ central venous catheter present with tip at cavoatrial junction. No evidence for acute cardiopulmonary process to include no pneumothorax.      Xr Chest Portable    Result hypoperfusion. Ammonia within normal limits. CK is not suggestive of rhabdomyolysis. Chest x-ray negative for acute cardio pulmonary process. CT of patient's abd/pelvis negative for acute process with satisfactory placement of patient's new GJ tube. Patient treated symptomatically with her oxycodone 15 mg (home regimen). On reevaluation, patient is sleeping. Patient awakens and does look improved. Patient's mental status is improving and she is able to tell me extensive medical history and remains of medical providers. Vital signs remained stable. Pain remains isolated at the site of her GJ tube with some localized erythema and superficial tenderness palpation. Barrier cream applied around patient's G-tube in dressing was changed. Patient counseled that she will need more frequent dressing changes as she is only changing is now every 2 days and does report she does have supplies for this at home. I do believe patient is appropriate for further outpatient follow-up despite her complicated medical history pain is localized to superficial abdominal wall and there are no signs of infection. Patient also counseled the need to maintain her home pain medication regimen and she should not be holding his medications and was in likely some degree of withdrawal here. Patient counseled the need for close outpatient follow-up and is agreeable. Discharged home. Questions sought and answered with the patient. They voice understanding and agree with plan. Instructed to return for any worsening or worrisome concerns. Clinical Impression:  1. Abdominal pain, unspecified abdominal location      Disposition referral (if applicable):  No follow-up provider specified.   Disposition medications (if applicable):  New Prescriptions    No medications on file       Comment: Please note this report has been produced using speech recognition software and may contain errors related to that system including errors in

## 2019-03-30 NOTE — ED NOTES
Bed: ED-30  Expected date:   Expected time:   Means of arrival:   Comments:  Medic 65 F pain all over     Mariia Sweet, RN  03/30/19 1921

## 2019-03-30 NOTE — ED NOTES
Arturo Reid is an alert but disoriented 63 y.o female that presents to ED via EMS from home due to family concerns of increasing confusion and generalized body aches. Reportedly, pt was released from [facility] 2 days ago. Pt's family not present on arrival. Pt reporting abdominal pain, especially at G/J tube site and generalized body aches. Reportedly, the pain was radiating up into the chest. Pt denies chest pain currently. EMS did administer 1 nitro en route. Pt had J/G tube replaced at [facility] and was reportedly having discharge from the site. Site does have foul odor noted and drainage. Pt history of gastric bypass, malabsoprition issues and on chronic tube feets, history of recurrent bacteremia, hypothyroidism, hyperlipidemia, PE, seizures, MS, asthma, and cardiomyopathy. Pt placed on continuous cardiac monitoring and pulse oximetry. Pt denying chest pain at this time but mainly complaining of pain in her abdomen and \"all over. \" Pt does follow commands but requires reinforcement. Able to tell this nurse her name but unable to tell where she is, time or situation other than she is in pain.       Stanley Snyder, RN  03/30/19 1924

## 2019-03-31 ENCOUNTER — HOSPITAL ENCOUNTER (INPATIENT)
Age: 59
LOS: 8 days | Discharge: HOME | DRG: 602 | End: 2019-04-08
Attending: EMERGENCY MEDICINE | Admitting: HOSPITALIST
Payer: COMMERCIAL

## 2019-03-31 VITALS
WEIGHT: 122 LBS | TEMPERATURE: 97.9 F | HEIGHT: 66 IN | OXYGEN SATURATION: 100 % | HEART RATE: 72 BPM | BODY MASS INDEX: 19.61 KG/M2 | SYSTOLIC BLOOD PRESSURE: 137 MMHG | RESPIRATION RATE: 22 BRPM | DIASTOLIC BLOOD PRESSURE: 76 MMHG

## 2019-03-31 DIAGNOSIS — R10.13 ABDOMINAL PAIN, EPIGASTRIC: Primary | ICD-10-CM

## 2019-03-31 DIAGNOSIS — E87.6 HYPOKALEMIA: ICD-10-CM

## 2019-03-31 PROBLEM — R10.9 ABDOMINAL PAIN: Status: ACTIVE | Noted: 2019-03-31

## 2019-03-31 LAB
ALBUMIN SERPL-MCNC: 3.6 GM/DL (ref 3.4–5)
ALP BLD-CCNC: 86 IU/L (ref 40–129)
ALT SERPL-CCNC: 14 U/L (ref 10–40)
ANION GAP SERPL CALCULATED.3IONS-SCNC: 12 MMOL/L (ref 4–16)
AST SERPL-CCNC: 18 IU/L (ref 15–37)
BASOPHILS ABSOLUTE: 0 K/CU MM
BASOPHILS RELATIVE PERCENT: 0.7 % (ref 0–1)
BILIRUB SERPL-MCNC: 0.2 MG/DL (ref 0–1)
BUN BLDV-MCNC: 7 MG/DL (ref 6–23)
CALCIUM SERPL-MCNC: 8.3 MG/DL (ref 8.3–10.6)
CHLORIDE BLD-SCNC: 109 MMOL/L (ref 99–110)
CO2: 20 MMOL/L (ref 21–32)
CREAT SERPL-MCNC: 0.6 MG/DL (ref 0.6–1.1)
DIFFERENTIAL TYPE: ABNORMAL
EOSINOPHILS ABSOLUTE: 0 K/CU MM
EOSINOPHILS RELATIVE PERCENT: 0 % (ref 0–3)
GFR AFRICAN AMERICAN: >60 ML/MIN/1.73M2
GFR NON-AFRICAN AMERICAN: >60 ML/MIN/1.73M2
GLUCOSE BLD-MCNC: 103 MG/DL (ref 70–99)
HCT VFR BLD CALC: 31 % (ref 37–47)
HEMOGLOBIN: 9.7 GM/DL (ref 12.5–16)
IMMATURE NEUTROPHIL %: 0.2 % (ref 0–0.43)
LACTATE: 1.2 MMOL/L (ref 0.4–2)
LIPASE: 28 IU/L (ref 13–60)
LYMPHOCYTES ABSOLUTE: 1.1 K/CU MM
LYMPHOCYTES RELATIVE PERCENT: 25.2 % (ref 24–44)
MCH RBC QN AUTO: 32.1 PG (ref 27–31)
MCHC RBC AUTO-ENTMCNC: 31.3 % (ref 32–36)
MCV RBC AUTO: 102.6 FL (ref 78–100)
MONOCYTES ABSOLUTE: 0.5 K/CU MM
MONOCYTES RELATIVE PERCENT: 10.4 % (ref 0–4)
NUCLEATED RBC %: 0 %
PDW BLD-RTO: 16.5 % (ref 11.7–14.9)
PLATELET # BLD: 293 K/CU MM (ref 140–440)
PMV BLD AUTO: 9.9 FL (ref 7.5–11.1)
POTASSIUM SERPL-SCNC: 3.3 MMOL/L (ref 3.5–5.1)
RBC # BLD: 3.02 M/CU MM (ref 4.2–5.4)
SEGMENTED NEUTROPHILS ABSOLUTE COUNT: 2.8 K/CU MM
SEGMENTED NEUTROPHILS RELATIVE PERCENT: 63.5 % (ref 36–66)
SODIUM BLD-SCNC: 141 MMOL/L (ref 135–145)
TOTAL IMMATURE NEUTOROPHIL: 0.01 K/CU MM
TOTAL NUCLEATED RBC: 0 K/CU MM
TOTAL PROTEIN: 5.8 GM/DL (ref 6.4–8.2)
WBC # BLD: 4.4 K/CU MM (ref 4–10.5)

## 2019-03-31 PROCEDURE — 96375 TX/PRO/DX INJ NEW DRUG ADDON: CPT

## 2019-03-31 PROCEDURE — 6360000002 HC RX W HCPCS: Performed by: EMERGENCY MEDICINE

## 2019-03-31 PROCEDURE — 93010 ELECTROCARDIOGRAM REPORT: CPT | Performed by: INTERNAL MEDICINE

## 2019-03-31 PROCEDURE — 85025 COMPLETE CBC W/AUTO DIFF WBC: CPT

## 2019-03-31 PROCEDURE — 2500000003 HC RX 250 WO HCPCS: Performed by: NURSE PRACTITIONER

## 2019-03-31 PROCEDURE — 2580000003 HC RX 258: Performed by: NURSE PRACTITIONER

## 2019-03-31 PROCEDURE — 1200000000 HC SEMI PRIVATE

## 2019-03-31 PROCEDURE — 99285 EMERGENCY DEPT VISIT HI MDM: CPT

## 2019-03-31 PROCEDURE — 80053 COMPREHEN METABOLIC PANEL: CPT

## 2019-03-31 PROCEDURE — 83605 ASSAY OF LACTIC ACID: CPT

## 2019-03-31 PROCEDURE — 6360000002 HC RX W HCPCS: Performed by: NURSE PRACTITIONER

## 2019-03-31 PROCEDURE — 96376 TX/PRO/DX INJ SAME DRUG ADON: CPT

## 2019-03-31 PROCEDURE — 6370000000 HC RX 637 (ALT 250 FOR IP): Performed by: NURSE PRACTITIONER

## 2019-03-31 PROCEDURE — 83690 ASSAY OF LIPASE: CPT

## 2019-03-31 PROCEDURE — 6370000000 HC RX 637 (ALT 250 FOR IP): Performed by: EMERGENCY MEDICINE

## 2019-03-31 RX ORDER — CLOPIDOGREL BISULFATE 75 MG/1
75 TABLET ORAL DAILY
Status: DISCONTINUED | OUTPATIENT
Start: 2019-04-01 | End: 2019-04-08 | Stop reason: HOSPADM

## 2019-03-31 RX ORDER — METOPROLOL SUCCINATE 25 MG/1
12.5 TABLET, EXTENDED RELEASE ORAL DAILY
Status: DISCONTINUED | OUTPATIENT
Start: 2019-04-01 | End: 2019-04-08 | Stop reason: HOSPADM

## 2019-03-31 RX ORDER — DULOXETIN HYDROCHLORIDE 30 MG/1
60 CAPSULE, DELAYED RELEASE ORAL 2 TIMES DAILY
Status: DISCONTINUED | OUTPATIENT
Start: 2019-03-31 | End: 2019-04-08 | Stop reason: HOSPADM

## 2019-03-31 RX ORDER — ONDANSETRON 2 MG/ML
4 INJECTION INTRAMUSCULAR; INTRAVENOUS EVERY 6 HOURS PRN
Status: DISCONTINUED | OUTPATIENT
Start: 2019-03-31 | End: 2019-04-08 | Stop reason: HOSPADM

## 2019-03-31 RX ORDER — MORPHINE SULFATE 4 MG/ML
4 INJECTION, SOLUTION INTRAMUSCULAR; INTRAVENOUS ONCE
Status: COMPLETED | OUTPATIENT
Start: 2019-03-31 | End: 2019-03-31

## 2019-03-31 RX ORDER — SODIUM CHLORIDE 0.9 % (FLUSH) 0.9 %
10 SYRINGE (ML) INJECTION EVERY 12 HOURS SCHEDULED
Status: DISCONTINUED | OUTPATIENT
Start: 2019-03-31 | End: 2019-04-08 | Stop reason: HOSPADM

## 2019-03-31 RX ORDER — ONDANSETRON 2 MG/ML
4 INJECTION INTRAMUSCULAR; INTRAVENOUS ONCE
Status: COMPLETED | OUTPATIENT
Start: 2019-03-31 | End: 2019-03-31

## 2019-03-31 RX ORDER — RANOLAZINE 500 MG/1
1000 TABLET, EXTENDED RELEASE ORAL 2 TIMES DAILY
Status: DISCONTINUED | OUTPATIENT
Start: 2019-03-31 | End: 2019-04-08 | Stop reason: HOSPADM

## 2019-03-31 RX ORDER — MORPHINE SULFATE 4 MG/ML
2 INJECTION, SOLUTION INTRAMUSCULAR; INTRAVENOUS EVERY 4 HOURS PRN
Status: DISCONTINUED | OUTPATIENT
Start: 2019-03-31 | End: 2019-04-01

## 2019-03-31 RX ORDER — OXYCODONE HYDROCHLORIDE 5 MG/1
15 TABLET ORAL ONCE
Status: COMPLETED | OUTPATIENT
Start: 2019-03-31 | End: 2019-03-31

## 2019-03-31 RX ORDER — POTASSIUM CHLORIDE 7.45 MG/ML
10 INJECTION INTRAVENOUS PRN
Status: DISCONTINUED | OUTPATIENT
Start: 2019-03-31 | End: 2019-04-06 | Stop reason: ALTCHOICE

## 2019-03-31 RX ORDER — SODIUM CHLORIDE 0.9 % (FLUSH) 0.9 %
10 SYRINGE (ML) INJECTION PRN
Status: DISCONTINUED | OUTPATIENT
Start: 2019-03-31 | End: 2019-04-08 | Stop reason: HOSPADM

## 2019-03-31 RX ORDER — ACETAMINOPHEN 325 MG/1
650 TABLET ORAL EVERY 4 HOURS PRN
Status: DISCONTINUED | OUTPATIENT
Start: 2019-03-31 | End: 2019-04-08 | Stop reason: HOSPADM

## 2019-03-31 RX ORDER — LISINOPRIL 2.5 MG/1
2.5 TABLET ORAL DAILY
Status: DISCONTINUED | OUTPATIENT
Start: 2019-04-01 | End: 2019-04-02

## 2019-03-31 RX ORDER — OXYCODONE HYDROCHLORIDE 5 MG/1
15 TABLET ORAL ONCE
Status: DISCONTINUED | OUTPATIENT
Start: 2019-03-31 | End: 2019-11-17

## 2019-03-31 RX ORDER — POTASSIUM CHLORIDE 1.5 G/1.77G
40 POWDER, FOR SOLUTION ORAL PRN
Status: DISCONTINUED | OUTPATIENT
Start: 2019-03-31 | End: 2019-04-06 | Stop reason: ALTCHOICE

## 2019-03-31 RX ORDER — POTASSIUM CHLORIDE 20 MEQ/1
40 TABLET, EXTENDED RELEASE ORAL PRN
Status: DISCONTINUED | OUTPATIENT
Start: 2019-03-31 | End: 2019-04-06 | Stop reason: ALTCHOICE

## 2019-03-31 RX ADMIN — MORPHINE SULFATE 4 MG: 4 INJECTION INTRAVENOUS at 20:22

## 2019-03-31 RX ADMIN — ONDANSETRON 4 MG: 2 INJECTION INTRAMUSCULAR; INTRAVENOUS at 20:23

## 2019-03-31 RX ADMIN — OXYCODONE HYDROCHLORIDE 15 MG: 5 TABLET ORAL at 18:26

## 2019-03-31 RX ADMIN — MORPHINE SULFATE 2 MG: 4 INJECTION INTRAVENOUS at 22:20

## 2019-03-31 RX ADMIN — SODIUM CHLORIDE, PRESERVATIVE FREE 10 ML: 5 INJECTION INTRAVENOUS at 22:21

## 2019-03-31 RX ADMIN — DULOXETINE 60 MG: 30 CAPSULE, DELAYED RELEASE ORAL at 22:20

## 2019-03-31 RX ADMIN — FAMOTIDINE 20 MG: 10 INJECTION, SOLUTION INTRAVENOUS at 22:20

## 2019-03-31 RX ADMIN — RANOLAZINE 1000 MG: 500 TABLET, FILM COATED, EXTENDED RELEASE ORAL at 22:20

## 2019-03-31 ASSESSMENT — PAIN DESCRIPTION - PROGRESSION: CLINICAL_PROGRESSION: NOT CHANGED

## 2019-03-31 ASSESSMENT — PAIN SCALES - GENERAL
PAINLEVEL_OUTOF10: 10
PAINLEVEL_OUTOF10: 9
PAINLEVEL_OUTOF10: 9
PAINLEVEL_OUTOF10: 10
PAINLEVEL_OUTOF10: 10

## 2019-03-31 ASSESSMENT — PAIN DESCRIPTION - PAIN TYPE
TYPE: ACUTE PAIN

## 2019-03-31 ASSESSMENT — PAIN DESCRIPTION - LOCATION
LOCATION: ABDOMEN

## 2019-03-31 ASSESSMENT — PAIN DESCRIPTION - ORIENTATION: ORIENTATION: MID

## 2019-03-31 ASSESSMENT — PAIN DESCRIPTION - ONSET: ONSET: ON-GOING

## 2019-03-31 ASSESSMENT — PAIN DESCRIPTION - DESCRIPTORS: DESCRIPTORS: SHARP

## 2019-03-31 ASSESSMENT — PAIN DESCRIPTION - FREQUENCY: FREQUENCY: CONTINUOUS

## 2019-03-31 NOTE — ED NOTES
Significant other arrives to ED. Pt c/o pain. Pt will be medicated for continued pain prior to discharge. See downtime charting.      José Nicole RN  03/31/19 [id number]

## 2019-03-31 NOTE — ED PROVIDER NOTES
Triage Chief Complaint:   Abdominal Pain (ongoing since last night; seen here yesterday for same issue; recently had G-J tube replaced, c/o increased pain at G tube site) and Generalized Body Aches    [place]:  Rosenda Jarquin is a 63 y.o. female that presents to the emergency department with epigastric abdominal discomfort. She reports symptoms worsened over the past week. Recently had her G-J tube replaced and has been complaining of constant pain since that time. She reports she is tolerating food and having bowel movements. No aggravating or relieving factors. She reports that she does not have fevers. Was supposed to see her infectious disease  Dr. Barbara Banegas Wednesday. She reports that she was supposed to get antibiotics as she was positive for pseudomonas.     ROS:  General:  No fevers, no chills, no weakness  Eyes:  No recent vison changes, no discharge  ENT:  No sore throat, no nasal congestion, no hearing changes  Cardiovascular:  No chest pain, no palpitations  Respiratory:  No shortness of breath, no cough, no wheezing  Gastrointestinal:  +pain, +nausea, no vomiting, no diarrhea  Musculoskeletal:  No muscle pain, no joint pain  Skin:  No rash, no pruritis, no easy bruising  Neurologic:  No speech problems, no headache, no extremity numbness, no extremity tingling, no extremity weakness  Psychiatric:  No anxiety  Genitourinary:  No dysuria, no hematuria  Endocrine:  No unexpected weight gain, no unexpected weight loss  Extremities:  no edema, no pain    Past Medical History:   Diagnosis Date    Acute delirium     Arrhythmia     Arthritis     Asthma     CAD (coronary artery disease)     1st stent at age 46    Cardiomyopathy [facility])     [facility]erebral artery occlusion with [facility] infarction [facility]     CHF (congestive heart failure) ([facility]     Enlarged liver     GERD (gastroesophageal reflux disease)     H/O echocardiogram 4/6/16    EF 55%, trivial TR & MR, stage 1 diastolic dysfunction    History of blood transfusion     Hx of cardiovascular stress test 12/29/2015    Alessia: EF 45%. Pharmacologic stress myocardial perfusion scan shows a fixed inferior-lateral defect w/ no inducible ischemia. No evidence of ischemia. Inferior-lateral infarction.  Normal LVSF    Hyperlipidemia     Hypertension     Lymphoma ([facility]     MI, old     Movement disorder     MS (multiple sclerosis) ([facility]     OP (osteoporosis)     PE (pulmonary embolism)     trapese filter    Pneumonia     Seizures ([facility]     Spleen enlarged     Thyroid disease      Past Surgical History:   Procedure Laterality Date    ABDOMEN SURGERY      APPENDECTOMY      CARDIAC DEFIBRILLATOR PLACEMENT      CHOLECYSTECTOMY      COLONOSCOPY      CORONARY ANGIOPLASTY WITH STENT PLACEMENT      3 stents    CORONARY ARTERY BYPASS GRAFT      Age 49    ENDOSCOPY, COLON, DIAGNOSTIC      GASTRIC BYPASS SURGERY      HERNIA REPAIR      HIP SURGERY      HYSTERECTOMY      JOINT REPLACEMENT      SKIN BIOPSY      TONSILLECTOMY      VASCULAR SURGERY       Family History   Problem Relation Age of Onset    High Blood Pressure Mother     High Cholesterol Mother     Heart Disease Mother     Diabetes Mother     Heart Disease Father     Cancer Father     Other Sister         Multiple Sclerosis    Heart Disease Maternal Grandmother     High Blood Pressure Maternal Grandmother     High Cholesterol Maternal Grandmother      Social History     Socioeconomic History    Marital status: Single     Spouse name: Not on file    Number of children: Not on file    Years of education: Not on file    Highest education level: Not on file   Occupational History    Not on file   Social Needs    Financial resource strain: Not on file    Food insecurity:     Worry: Not on file     Inability: Not on file    Transportation needs:     Medical: Not on file     Non-medical: Not on file   Tobacco Use    Smoking status: Never Smoker    Smokeless tobacco: Never Used   Substance and Sexual Activity    Alcohol use: No    Drug use: No    Sexual activity: Not Currently   Lifestyle    Physical activity:     Days per week: Not on file     Minutes per session: Not on file    Stress: Not on file   Relationships    Social connections:     Talks on phone: Not on file     Gets together: Not on file     Attends Mandaeism service: Not on file     Active member of club or organization: Not on file     Attends meetings of clubs or organizations: Not on file     Relationship status: Not on file    Intimate partner violence:     Fear of current or ex partner: Not on file     Emotionally abused: Not on file     Physically abused: Not on file     Forced sexual activity: Not on file   Other Topics Concern    Not on file   Social History Narrative    Not on file     Current Facility-Administered Medications   Medication Dose Route Frequency Provider Last Rate Last Dose    morphine sulfate (PF) injection 4 mg  4 mg Intravenous Q4H PRN Lynette Hurst MD   4 mg at 04/01/19 0218    oxyCODONE (ROXICODONE) immediate release tablet 15 mg  15 mg Oral Q4H PRN Lynette Hurst MD        sodium chloride flush 0.9 % injection 10 mL  10 mL Intravenous 2 times per day JESSE Bernstein NP   10 mL at 03/31/19 2221    sodium chloride flush 0.9 % injection 10 mL  10 mL Intravenous PRN JESSE Bernstein NP        ondansetron (ZOFRAN) injection 4 mg  4 mg Intravenous Q6H PRN JESSE Bernstein NP        enoxaparin (LOVENOX) injection 40 mg  40 mg Subcutaneous Daily JESSE Bernstein NP        famotidine (PEPCID) injection 20 mg  20 mg Intravenous BID JESSE Bernstein NP   20 mg at 03/31/19 2220    acetaminophen (TYLENOL) tablet 650 mg  650 mg Oral Q4H PRN JESSE Bernstein NP        potassium chloride (KLOR-CON M) extended release tablet 40 mEq  40 mEq Oral PRN JESSE Bernstein NP        Or    potassium chloride (KLOR-CON) packet 40 mEq  40 mEq Oral PRN JESSE Betts NP        Or   [facility] potassium chloride 10 mEq/100 mL IVPB (Peripheral Line)  10 mEq Intravenous PRN JESSE Betts NP        metoprolol succinate (TOPROL XL) extended release tablet 12.5 mg  12.5 mg Oral Daily JESSE Betts NP        clopidogrel (PLAVIX) tablet 75 mg  75 mg Oral Daily JESSE Betts NP        DULoxetine (CYMBALTA) extended release capsule 60 mg  60 mg Oral BID JESSE Betts NP   60 mg at 03/31/19 2220    ranolazine (RANEXA) extended release tablet 1,000 mg  1,000 mg Oral BID JESSE Betts NP   1,000 mg at 03/31/19 2220    lisinopril (PRINIVIL;ZESTRIL) tablet 2.5 mg  2.5 mg Oral Daily JESSE Betts NP         Facility-Administered Medications Ordered in Other Encounters   Medication Dose Route Frequency Provider Last Rate Last Dose    oxyCODONE (ROXICODONE) immediate release tablet 15 mg  15 mg Oral Once Jose Salamanca MD         Allergies   Allergen Reactions    Baclofen     Fentanyl Swelling    Ibuprofen      \"Due to heart problems\"    Methocarbamol      Worsening edema    Nsaids      \"Due to heart problems\"    Tizanidine     Tolmetin      \"Due to heart problems\"    Tramadol Other (See Comments)     Doctor doesn't her to take due to heart problems  Seizures   Doctor doesn't her to take due to lowers seizure threshold.     Avelox [Moxifloxacin Hcl In Nacl] Swelling and Rash    Betadine [Povidone Iodine] Rash    Moxifloxacin Rash and Swelling     Lips swell and tingling in face        Nursing Notes Reviewed    Physical Exam:  ED Triage Vitals   Enc Vitals Group      BP 03/31/19 1740 (!) 152/129      Pulse 03/31/19 1740 92      Resp 03/31/19 1740 17      Temp 03/31/19 1740 98.4 °F (36.9 °C)      Temp Source 03/31/19 1740 Oral      SpO2 03/31/19 1740 98 %      Weight 03/31/19 1731 122 lb (55.3 kg)      Height 03/31/19 1731 5' 6\" (1.676 m)      Head Circumference --       Peak Flow --       Pain Score --       Pain Loc --       Pain Edu? --       Excl. in [address]? --        My pulse ox interpretation is - normal    General appearance:  No acute distress. Skin:  Warm. Dry. Eye:  Extraocular movements intact. Ears, nose, mouth and throat:  Oral mucosa moist   Neck:  Trachea midline. Extremity:  No swelling. Normal ROM     Heart:  Regular rate and rhythm, normal S1 & S2, no extra heart sounds. Perfusion:  intact  Respiratory:  Lungs clear to auscultation bilaterally. Respirations nonlabored. Abdominal:  Normal bowel sounds. Soft. Diffuse tenderness. GJ tube in place with bilious colored fluid in bag. .  Non distended. Neurological:  Alert and oriented times 3. No focal neuro deficits.           I have reviewed and interpreted all of the currently available lab results from this visit (if applicable):  Results for orders placed or performed during the hospital encounter of 03/31/19   CBC Auto Differential   Result Value Ref Range    WBC 4.4 4.0 - 10.5 K/CU MM    RBC 3.02 (L) 4.2 - 5.4 M/CU MM    Hemoglobin 9.7 (L) 12.5 - 16.0 GM/DL    Hematocrit 31.0 (L) 37 - 47 %    .6 (H) 78 - 100 FL    MCH 32.1 (H) 27 - 31 PG    MCHC 31.3 (L) 32.0 - 36.0 %    RDW 16.5 (H) 11.7 - 14.9 %    Platelets 592 453 - 880 K/CU MM    MPV 9.9 7.5 - 11.1 FL    Differential Type AUTOMATED DIFFERENTIAL     Segs Relative 63.5 36 - 66 %    Lymphocytes % 25.2 24 - 44 %    Monocytes % 10.4 (H) 0 - 4 %    Eosinophils % 0.0 0 - 3 %    Basophils % 0.7 0 - 1 %    Segs Absolute 2.8 K/CU MM    Lymphocytes # 1.1 K/CU MM    Monocytes # 0.5 K/CU MM    Eosinophils # 0.0 K/CU MM    Basophils # 0.0 K/CU MM    Nucleated RBC % 0.0 %    Total Nucleated RBC 0.0 K/CU MM    Total Immature Neutrophil 0.01 K/CU MM    Immature Neutrophil % 0.2 0 - 0.43 %   CMP   Result Value Ref Range    Sodium 141 135 - 145 MMOL/L    Potassium 3.3 (L) 3.5 - 5.1 MMOL/L    Chloride 109 99 - 110 mMol/L    CO2 20 (L) 21 - 32 MMOL/L    BUN 7 6 - 23 MG/DL CREATININE 0.6 0.6 - 1.1 MG/DL    Glucose 103 (H) 70 - 99 MG/DL    Calcium 8.3 8.3 - 10.6 MG/DL    Alb 3.6 3.4 - 5.0 GM/DL    Total Protein 5.8 (L) 6.4 - 8.2 GM/DL    Total Bilirubin 0.2 0.0 - 1.0 MG/DL    ALT 14 10 - 40 U/L    AST 18 15 - 37 IU/L    Alkaline Phosphatase 86 40 - 129 IU/L    GFR Non-African American >60 >60 mL/min/1.73m2    GFR African American >60 >60 mL/min/1.73m2    Anion Gap 12 4 - 16   Lipase   Result Value Ref Range    Lipase 28 13 - 60 IU/L   Lactic Acid, Plasma   Result Value Ref Range    Lactate 1.2 0.4 - 2.0 mMOL/L      Radiographs (if obtained):  [] The following radiograph was interpreted by myself in the absence of a radiologist:   [] Radiologist's Report Reviewed:  No orders to display         EKG (if obtained): (All EKG's are interpreted by myself in the absence of a cardiologist)    Chart review shows recent radiographs:  Ct Head Wo Contrast    Result Date: 3/30/2019  EXAMINATION: CT OF THE HEAD WITHOUT CONTRAST  3/30/2019 9:08 pm TECHNIQUE: CT of the head was performed without the administration of intravenous contrast. Dose modulation, iterative reconstruction, and/or weight based adjustment of the mA/kV was utilized to reduce the radiation dose to as low as reasonably achievable. COMPARISON: CT head April 25, 2018 HISTORY: ORDERING SYSTEM PROVIDED HISTORY: [facility] TECHNOLOGIST PROVIDED HISTORY: Has a \"code stroke\" or \"stroke alert\" been called? ->No Ordering Physician Provided Reason for Exam: gi pain FINDINGS: BRAIN/VENTRICLES: There is no acute intracranial hemorrhage, mass effect or midline shift. No abnormal extra-axial fluid collection. Redemonstration of stable left frontal encephalomalacia compatible with sequela of remote infarct. The gray-white differentiation is otherwise maintained without evidence of an acute infarct. There is no evidence of hydrocephalus. ORBITS: The visualized portion of the orbits demonstrate no acute abnormality.  SINUSES: The visualized paranasal gastrostomy tube. This measures approximately 3 cm in craniocaudal dimension and 1 cm in transverse dimension. There is no obvious associated fluid and this appears to extend alongside the tube in extent to the skin. No acute bony abnormality     Intramuscular gas collection adjacent to the tubing of a percutaneous gastrostomy tube. This appears to extend alongside the tubing and extend to the skin compatible with a draining infection. No drainable abscess is demonstrated currently. No acute intra abdominopelvic process is demonstrated     Xr Chest Portable    Result Date: 3/30/2019  EXAMINATION: SINGLE XRAY VIEW OF THE CHEST 3/30/2019 8:08 pm COMPARISON: March 22, 2019 HISTORY: ORDERING SYSTEM PROVIDED HISTORY: AMS, recent admit TECHNOLOGIST PROVIDED HISTORY: Reason for exam:->AMS, recent admit Ordering Physician Provided Reason for Exam: AMS, recent admit Acuity: Unknown Type of Exam: Unknown FINDINGS: No evidence of pneumonia, edema or other acute pulmonary process. No evidence of acute process of the cardiac or mediastinal structures. No evidence of pneumothorax or pleural effusion. No evidence of acute cardiopulmonary disease. Xr Chest Portable    Result Date: 3/22/2019  EXAMINATION: SINGLE XRAY VIEW OF THE CHEST 3/22/2019 8:57 pm COMPARISON: 03/03/2019. HISTORY: ORDERING SYSTEM PROVIDED HISTORY: Central line placement TECHNOLOGIST PROVIDED HISTORY: Reason for exam:->Central line placement Ordering Physician Provided Reason for Exam: central line placement Acuity: Unknown Type of Exam: Initial FINDINGS: AICD overlies left hemithorax. Right IJ central venous catheter present with tip at cavoatrial junction. Lungs are clear. Cardiac and mediastinal silhouettes are within normal limits. No pneumothoraces. Bony structures appear intact. Right IJ central venous catheter present with tip at cavoatrial junction. No evidence for acute cardiopulmonary process to include no pneumothorax.      Km Mancini Chest Portable    Result Date: 3/3/2019  EXAMINATION: SINGLE XRAY VIEW OF THE CHEST 3/3/2019 5:41 pm COMPARISON: 02/18/2019 HISTORY: ORDERING SYSTEM PROVIDED HISTORY: palpitations TECHNOLOGIST PROVIDED HISTORY: Reason for exam:->palpitations Ordering Physician Provided Reason for Exam: palpitations FINDINGS: Prior sternal splitting procedure. There is an implanted external defibrillator, unchanged. There are coronary artery stents. The heart is borderline enlarged and unchanged. The lungs are clear. No pneumothorax or pleural fluid. There is a right internal jugular venous catheter with the tip in good position in the region of the superior vena cava. The patient is rotated to the left similar to the prior study. Stable chest x-ray. No acute disease. Xr Hip 1 Vw W Pelvis Right    Result Date: 3/3/2019  EXAMINATION: SINGLE XRAY VIEW OF THE PELVIS AND 2 XRAY VIEWS RIGHT HIP 3/3/2019 6:50 pm COMPARISON: None. HISTORY: ORDERING SYSTEM PROVIDED HISTORY: pain TECHNOLOGIST PROVIDED HISTORY: Reason for exam:->pain Ordering Physician Provided Reason for Exam: injury;pt fell while getting into ER wheelchair Acuity: Acute Type of Exam: Initial Mechanism of Injury: fell trying to get into wheelchair FINDINGS: There is a right hip arthroplasty with cerclage wire. Arthroplasty is intact and in good position. There is lucency with thin linear sclerosis adjacent to the femoral shaft component suggesting mild loosening. Gas and stool obscure visualization of the sacrum. No definite pelvic fracture. No evidence of an acute fracture or dislocation. Intact right hip arthroplasty with suspicion of mild loosening of the femoral shaft component. MDM:  56-year-old female presenting with worsening abdominal discomfort. Given history and physical suspicious for infectious etiology versus JG tube complication.  Patient does not have any systemic complaints concerning for infection however CAT scan from yesterday shows that she has air fluid levels that are also involving her subcutaneous tissues. I discussed the case with  and was on-call he alsopatient well. We will consult with patient as needed. On-call infectious disease not available to be consulted urgently. I reassessed patient and she continues to have persistence of her symptoms. She was given her home dose of oxycodone and morphine. She was admitted to the hospital for intractable abdominal pain. will be seen by infectious disease to determine if she will need antibiotics as well as surgery consult. She agreed with the plan and remains stable at the bedside. I signed out the patient to the hospitalist.    Clinical Impression:  1. Abdominal pain, epigastric    2. Hypokalemia      Disposition referral (if applicable):  Josee Huizar MD  [address] [place] [address]  [phone]          Disposition medications (if applicable):  Current Discharge Medication List          Comment: Please note this report has been produced using speech recognition software and may contain errors related to that system including errors in grammar, punctuation, and spelling, as well as words and phrases that may be inappropriate. If there are any questions or concerns please feel free to contact the dictating provider for clarification.        Glendy Reyna MD  04/01/19 3798

## 2019-04-01 ENCOUNTER — APPOINTMENT (OUTPATIENT)
Dept: ULTRASOUND IMAGING | Age: 59
DRG: 602 | End: 2019-04-01
Payer: COMMERCIAL

## 2019-04-01 LAB
ALBUMIN SERPL-MCNC: 3.5 GM/DL (ref 3.4–5)
ALP BLD-CCNC: 94 IU/L (ref 40–128)
ALT SERPL-CCNC: 18 U/L (ref 10–40)
ANION GAP SERPL CALCULATED.3IONS-SCNC: 10 MMOL/L (ref 4–16)
ANION GAP SERPL CALCULATED.3IONS-SCNC: 9 MMOL/L (ref 4–16)
AST SERPL-CCNC: 24 IU/L (ref 15–37)
BASOPHILS ABSOLUTE: 0 K/CU MM
BASOPHILS RELATIVE PERCENT: 0.5 % (ref 0–1)
BILIRUB SERPL-MCNC: 0.2 MG/DL (ref 0–1)
BUN BLDV-MCNC: 8 MG/DL (ref 6–23)
BUN BLDV-MCNC: 8 MG/DL (ref 6–23)
C-REACTIVE PROTEIN, HIGH SENSITIVITY: 1.2 MG/L
CALCIUM SERPL-MCNC: 8 MG/DL (ref 8.3–10.6)
CALCIUM SERPL-MCNC: 8.9 MG/DL (ref 8.3–10.6)
CHLORIDE BLD-SCNC: 106 MMOL/L (ref 99–110)
CHLORIDE BLD-SCNC: 110 MMOL/L (ref 99–110)
CO2: 22 MMOL/L (ref 21–32)
CO2: 22 MMOL/L (ref 21–32)
CREAT SERPL-MCNC: 0.6 MG/DL (ref 0.6–1.1)
CREAT SERPL-MCNC: 0.6 MG/DL (ref 0.6–1.1)
DIFFERENTIAL TYPE: ABNORMAL
EKG ATRIAL RATE: 87 BPM
EKG DIAGNOSIS: NORMAL
EKG P AXIS: 31 DEGREES
EKG P-R INTERVAL: 132 MS
EKG Q-T INTERVAL: 406 MS
EKG QRS DURATION: 96 MS
EKG QTC CALCULATION (BAZETT): 488 MS
EKG R AXIS: -48 DEGREES
EKG T AXIS: -74 DEGREES
EKG VENTRICULAR RATE: 87 BPM
EOSINOPHILS ABSOLUTE: 0 K/CU MM
EOSINOPHILS RELATIVE PERCENT: 0.8 % (ref 0–3)
ERYTHROCYTE SEDIMENTATION RATE: 28 MM/HR (ref 0–30)
GFR AFRICAN AMERICAN: >60 ML/MIN/1.73M2
GFR AFRICAN AMERICAN: >60 ML/MIN/1.73M2
GFR NON-AFRICAN AMERICAN: >60 ML/MIN/1.73M2
GFR NON-AFRICAN AMERICAN: >60 ML/MIN/1.73M2
GLUCOSE BLD-MCNC: 101 MG/DL (ref 70–99)
GLUCOSE BLD-MCNC: 98 MG/DL (ref 70–99)
HCT VFR BLD CALC: 29 % (ref 37–47)
HEMOGLOBIN: 9 GM/DL (ref 12.5–16)
IMMATURE NEUTROPHIL %: 0.3 % (ref 0–0.43)
LYMPHOCYTES ABSOLUTE: 1.4 K/CU MM
LYMPHOCYTES RELATIVE PERCENT: 36.9 % (ref 24–44)
MCH RBC QN AUTO: 32.4 PG (ref 27–31)
MCHC RBC AUTO-ENTMCNC: 31 % (ref 32–36)
MCV RBC AUTO: 104.3 FL (ref 78–100)
MONOCYTES ABSOLUTE: 0.6 K/CU MM
MONOCYTES RELATIVE PERCENT: 16.7 % (ref 0–4)
NUCLEATED RBC %: 0 %
PDW BLD-RTO: 16.3 % (ref 11.7–14.9)
PLATELET # BLD: 268 K/CU MM (ref 140–440)
PMV BLD AUTO: 10.4 FL (ref 7.5–11.1)
POTASSIUM SERPL-SCNC: 3.6 MMOL/L (ref 3.5–5.1)
POTASSIUM SERPL-SCNC: 3.7 MMOL/L (ref 3.5–5.1)
RBC # BLD: 2.78 M/CU MM (ref 4.2–5.4)
SEGMENTED NEUTROPHILS ABSOLUTE COUNT: 1.7 K/CU MM
SEGMENTED NEUTROPHILS RELATIVE PERCENT: 44.8 % (ref 36–66)
SODIUM BLD-SCNC: 138 MMOL/L (ref 135–145)
SODIUM BLD-SCNC: 141 MMOL/L (ref 135–145)
TOTAL IMMATURE NEUTOROPHIL: 0.01 K/CU MM
TOTAL NUCLEATED RBC: 0 K/CU MM
TOTAL PROTEIN: 5.1 GM/DL (ref 6.4–8.2)
WBC # BLD: 3.8 K/CU MM (ref 4–10.5)

## 2019-04-01 PROCEDURE — 6360000002 HC RX W HCPCS: Performed by: NURSE PRACTITIONER

## 2019-04-01 PROCEDURE — 6370000000 HC RX 637 (ALT 250 FOR IP): Performed by: INTERNAL MEDICINE

## 2019-04-01 PROCEDURE — 85652 RBC SED RATE AUTOMATED: CPT

## 2019-04-01 PROCEDURE — 2500000003 HC RX 250 WO HCPCS: Performed by: NURSE PRACTITIONER

## 2019-04-01 PROCEDURE — 87147 CULTURE TYPE IMMUNOLOGIC: CPT

## 2019-04-01 PROCEDURE — 96376 TX/PRO/DX INJ SAME DRUG ADON: CPT

## 2019-04-01 PROCEDURE — 87186 SC STD MICRODIL/AGAR DIL: CPT

## 2019-04-01 PROCEDURE — 87071 CULTURE AEROBIC QUANT OTHER: CPT

## 2019-04-01 PROCEDURE — 96366 THER/PROPH/DIAG IV INF ADDON: CPT

## 2019-04-01 PROCEDURE — 99222 1ST HOSP IP/OBS MODERATE 55: CPT | Performed by: INTERNAL MEDICINE

## 2019-04-01 PROCEDURE — 84145 PROCALCITONIN (PCT): CPT

## 2019-04-01 PROCEDURE — 6370000000 HC RX 637 (ALT 250 FOR IP): Performed by: NURSE PRACTITIONER

## 2019-04-01 PROCEDURE — 96372 THER/PROPH/DIAG INJ SC/IM: CPT

## 2019-04-01 PROCEDURE — 99221 1ST HOSP IP/OBS SF/LOW 40: CPT | Performed by: SURGERY

## 2019-04-01 PROCEDURE — 87073 CULTURE BACTERIA ANAEROBIC: CPT

## 2019-04-01 PROCEDURE — 87077 CULTURE AEROBIC IDENTIFY: CPT

## 2019-04-01 PROCEDURE — 6360000002 HC RX W HCPCS: Performed by: INTERNAL MEDICINE

## 2019-04-01 PROCEDURE — 96365 THER/PROPH/DIAG IV INF INIT: CPT

## 2019-04-01 PROCEDURE — 80053 COMPREHEN METABOLIC PANEL: CPT

## 2019-04-01 PROCEDURE — 94761 N-INVAS EAR/PLS OXIMETRY MLT: CPT

## 2019-04-01 PROCEDURE — 93971 EXTREMITY STUDY: CPT

## 2019-04-01 PROCEDURE — 85025 COMPLETE CBC W/AUTO DIFF WBC: CPT

## 2019-04-01 PROCEDURE — 80048 BASIC METABOLIC PNL TOTAL CA: CPT

## 2019-04-01 PROCEDURE — 1200000000 HC SEMI PRIVATE

## 2019-04-01 PROCEDURE — 2580000003 HC RX 258: Performed by: NURSE PRACTITIONER

## 2019-04-01 PROCEDURE — 86140 C-REACTIVE PROTEIN: CPT

## 2019-04-01 RX ORDER — LINEZOLID 2 MG/ML
600 INJECTION, SOLUTION INTRAVENOUS EVERY 12 HOURS
Status: DISCONTINUED | OUTPATIENT
Start: 2019-04-01 | End: 2019-04-08 | Stop reason: HOSPADM

## 2019-04-01 RX ORDER — MORPHINE SULFATE 4 MG/ML
4 INJECTION, SOLUTION INTRAMUSCULAR; INTRAVENOUS EVERY 4 HOURS PRN
Status: DISCONTINUED | OUTPATIENT
Start: 2019-04-01 | End: 2019-04-04

## 2019-04-01 RX ADMIN — FAMOTIDINE 20 MG: 10 INJECTION, SOLUTION INTRAVENOUS at 20:45

## 2019-04-01 RX ADMIN — METOPROLOL SUCCINATE 12.5 MG: 25 TABLET, EXTENDED RELEASE ORAL at 10:41

## 2019-04-01 RX ADMIN — ENOXAPARIN SODIUM 40 MG: 40 INJECTION SUBCUTANEOUS at 10:36

## 2019-04-01 RX ADMIN — OXYCODONE HYDROCHLORIDE 15 MG: 5 TABLET ORAL at 18:34

## 2019-04-01 RX ADMIN — FAMOTIDINE 20 MG: 10 INJECTION, SOLUTION INTRAVENOUS at 10:34

## 2019-04-01 RX ADMIN — OXYCODONE HYDROCHLORIDE 15 MG: 5 TABLET ORAL at 13:14

## 2019-04-01 RX ADMIN — ONDANSETRON 4 MG: 2 INJECTION INTRAMUSCULAR; INTRAVENOUS at 12:19

## 2019-04-01 RX ADMIN — ONDANSETRON 4 MG: 2 INJECTION INTRAMUSCULAR; INTRAVENOUS at 18:57

## 2019-04-01 RX ADMIN — MORPHINE SULFATE 4 MG: 4 INJECTION INTRAVENOUS at 02:18

## 2019-04-01 RX ADMIN — LINEZOLID 600 MG: 600 INJECTION, SOLUTION INTRAVENOUS at 22:21

## 2019-04-01 RX ADMIN — DULOXETINE 60 MG: 30 CAPSULE, DELAYED RELEASE ORAL at 20:46

## 2019-04-01 RX ADMIN — MORPHINE SULFATE 4 MG: 4 INJECTION INTRAVENOUS at 20:45

## 2019-04-01 RX ADMIN — MORPHINE SULFATE 4 MG: 4 INJECTION INTRAVENOUS at 10:34

## 2019-04-01 RX ADMIN — LINEZOLID 600 MG: 600 INJECTION, SOLUTION INTRAVENOUS at 10:47

## 2019-04-01 RX ADMIN — MORPHINE SULFATE 4 MG: 4 INJECTION INTRAVENOUS at 15:30

## 2019-04-01 RX ADMIN — SODIUM CHLORIDE, PRESERVATIVE FREE 10 ML: 5 INJECTION INTRAVENOUS at 10:47

## 2019-04-01 RX ADMIN — RANOLAZINE 1000 MG: 500 TABLET, FILM COATED, EXTENDED RELEASE ORAL at 10:35

## 2019-04-01 RX ADMIN — LISINOPRIL 2.5 MG: 2.5 TABLET ORAL at 10:40

## 2019-04-01 RX ADMIN — OXYCODONE HYDROCHLORIDE 15 MG: 5 TABLET ORAL at 23:09

## 2019-04-01 RX ADMIN — DULOXETINE 60 MG: 30 CAPSULE, DELAYED RELEASE ORAL at 10:36

## 2019-04-01 RX ADMIN — OXYCODONE HYDROCHLORIDE 15 MG: 5 TABLET ORAL at 04:40

## 2019-04-01 RX ADMIN — SODIUM CHLORIDE, PRESERVATIVE FREE 10 ML: 5 INJECTION INTRAVENOUS at 20:46

## 2019-04-01 RX ADMIN — RANOLAZINE 1000 MG: 500 TABLET, FILM COATED, EXTENDED RELEASE ORAL at 20:46

## 2019-04-01 RX ADMIN — CLOPIDOGREL BISULFATE 75 MG: 75 TABLET ORAL at 10:35

## 2019-04-01 ASSESSMENT — PAIN DESCRIPTION - ONSET
ONSET: ON-GOING
ONSET: ON-GOING

## 2019-04-01 ASSESSMENT — PAIN SCALES - GENERAL
PAINLEVEL_OUTOF10: 5
PAINLEVEL_OUTOF10: 9
PAINLEVEL_OUTOF10: 8
PAINLEVEL_OUTOF10: 7
PAINLEVEL_OUTOF10: 5
PAINLEVEL_OUTOF10: 7
PAINLEVEL_OUTOF10: 4
PAINLEVEL_OUTOF10: 9
PAINLEVEL_OUTOF10: 8
PAINLEVEL_OUTOF10: 0
PAINLEVEL_OUTOF10: 7
PAINLEVEL_OUTOF10: 8

## 2019-04-01 ASSESSMENT — ENCOUNTER SYMPTOMS
EYE REDNESS: 0
DIARRHEA: 0
ABDOMINAL DISTENTION: 0
SHORTNESS OF BREATH: 0
NAUSEA: 0
ABDOMINAL PAIN: 1
CHEST TIGHTNESS: 0
SORE THROAT: 0
COLOR CHANGE: 0
CONSTIPATION: 0
VOMITING: 0
EYE DISCHARGE: 0

## 2019-04-01 ASSESSMENT — PAIN DESCRIPTION - PROGRESSION
CLINICAL_PROGRESSION: NOT CHANGED
CLINICAL_PROGRESSION: NOT CHANGED

## 2019-04-01 ASSESSMENT — PAIN DESCRIPTION - LOCATION
LOCATION: GENERALIZED
LOCATION: GENERALIZED

## 2019-04-01 ASSESSMENT — PAIN DESCRIPTION - DESCRIPTORS
DESCRIPTORS: ACHING
DESCRIPTORS: BURNING

## 2019-04-01 ASSESSMENT — PAIN DESCRIPTION - ORIENTATION
ORIENTATION: RIGHT;LEFT
ORIENTATION: RIGHT;LEFT

## 2019-04-01 ASSESSMENT — PAIN DESCRIPTION - PAIN TYPE
TYPE: ACUTE PAIN
TYPE: ACUTE PAIN

## 2019-04-01 ASSESSMENT — PAIN DESCRIPTION - FREQUENCY
FREQUENCY: CONTINUOUS
FREQUENCY: CONTINUOUS

## 2019-04-01 NOTE — PLAN OF CARE
Nutrition Problem: Inadequate oral intake  Intervention: Food and/or Nutrient Delivery: Continue current diet, Start Tube Feeding  Nutritional Goals: Patient will tolerate tube feeding to provide consistent source of nutrition

## 2019-04-01 NOTE — PLAN OF CARE
Problem: Pain:  Goal: Pain level will decrease  Description  Pain level will decrease  Outcome: Ongoing  Goal: Control of acute pain  Description  Control of acute pain  Outcome: Ongoing  Goal: Control of chronic pain  Description  Control of chronic pain  Outcome: Ongoing  Goal: Patient's pain/discomfort is manageable  Description  Patient's pain/discomfort is manageable  Outcome: Ongoing     Problem: Falls - Risk of:  Goal: Will remain free from falls  Description  Will remain free from falls  Outcome: Ongoing  Goal: Absence of physical injury  Description  Absence of physical injury  Outcome: Ongoing     Problem: Infection:  Goal: Will remain free from infection  Description  Will remain free from infection  Outcome: Ongoing     Problem: Safety:  Goal: Free from accidental physical injury  Description  Free from accidental physical injury  Outcome: Ongoing  Goal: Free from intentional harm  Description  Free from intentional harm  Outcome: Ongoing     Problem: Daily Care:  Goal: Daily care needs are met  Description  Daily care needs are met  Outcome: Ongoing     Problem: Discharge Planning:  Goal: Patients continuum of care needs are met  Description  Patients continuum of care needs are met  Outcome: Ongoing

## 2019-04-01 NOTE — PROGRESS NOTES
Hospitalist Progress Note      Name:  Adelso Viera /Age/Sex: 1960  (63 y.o. female)   MRN & CSN:  [id number] & [id number] Admission Date/Time: 3/31/2019  5:33 PM   Location:  [address] [place] [address] PCP: Denisha Ruiz MD         Hospital Day: 2    Assessment and Plan:   Adelso Viera is a 63 y.o.  female  who presents with abdominal pain        1- Abdominal pain with a possible peristomal cellulitis at [address] [place] [address] site:  Patient had history of multidrug-resistant organism MRSA MDR pseudomonas  Difficult to distinguish infective versus chemical damage  ID consulted  Treated empirically with linezolid  Use topical Silvadene  Superficial wound culture  Continuous barrier cream to the GJ site  CT of the abdomen showed the GJ tube appeared to be near the pylorus  Surgery is consulted, ok to  continue tube feed via jejunal port  No surgical plan  On oxycodone 15 mg q4h prn home dose        2- history of malabsorption s/p tatiana- en  Y with complication status post GJ tube placement      3- hypokalemia on replacement  Patient requires continued admission due to peristomal cellulitis          Diet DIET LOW SODIUM 2 GM;  Diet Tube Feed Continuous/Cyclic w/ Diet   DVT Prophylaxis [x] Lovenox, []  Heparin, [] SCDs, [] Ambulation   GI Prophylaxis [] PPI,  [x] H2 Blocker,  [] Carafate,  [x] Diet/Tube Feeds   Code Status Full Code   Disposition    MDM [] Low, [] Moderate,[]  High  Patient's risk as above due to      History of Present Illness:        Patient was seen and examined at the bedside. Patient report purulent discharge from the [facility] [address] [place] [address] [facility] [place] site  Patient denied nausea vomiting or change in bowel movement  I did consult ID .  She also complaining of  right leg pain, she states had hx of DVT in paste, I did order venous doppler  Resume on home dose of pain medication    Objective:        Intake/Output Summary (Last 24 hours) at 2019 1232  Last data filed at 2019 0741  Gross per 24 hour   Intake --   Output 275 ml

## 2019-04-01 NOTE — PROGRESS NOTES
Unable to assess  · Oral Nutrition Supplement (ONS) Orders: None  · ONS intake:    · Tube Feeding (TF) Orders:   · Feeding Route: J/G Tube  · Current TF & Flush Orders Provides: plan to resume as per usual   · Goal TF & Flush Orders Provides: osmolite 1.2 from 4p-9a at 90 ml/hr to provide 1530 ml, 1836 calories, 84 g protein, 1254 ml free water  · Anthropometric Measures:  · Ht: 5' 6\" (167.6 cm)   · Current Body Wt: 126 lb 1.7 oz (57.2 kg)  · Weight Change: variance in weights over past year, per records loss in past months more than 10% in 6 months    · Ideal Body Wt: 130 lb (59 kg), % [place] Body 97  · BMI Classification: BMI 18.5 - 24.9 Normal Weight(BMI-20.4 )    Nutrition Interventions:   Continue current diet, Start Tube Feeding  Continued Inpatient Monitoring, Education not appropriate at this time    Nutrition Evaluation:   · Evaluation: Goals set   · Goals: Patient will tolerate tube feeding to provide consistent source of nutrition   · Monitoring: Meal Intake, TF Intake, TF Tolerance, Pertinent Labs, Weight      Electronically signed by Nanci Swanson RD, LD on 4/1/19 at 11:48 AM    Contact Number: [phone]

## 2019-04-01 NOTE — CONSULTS
Infectious Disease Consult Note  2019   Patient Name: Sara Rose : 1960   Impression   Peristomal cellulitis at [address] [place] [address]  o Known to me from previous admissions, has a history of multidrug resistant organisms-MRSA, MDR Pseudomonas and now VRE  o Difficult to distinguish infective versus chemical damage  o We will need to control underlying risk factor of continues EKG of intestinal contents from site, to have any hope of controlling cellulitis/dermatitis  o Needs a physical solution rather than an antibiotic  o IgM total complement assay within normal limits   Gastroparesis/malabsorption status post Maru-en-Y with a PEG tube   CHF status post AICD   Multi-morbidity: per PMHx  Plan:   Treat empirically with linezolid   We will consider use of topical Silvadene   Discussed with Dr. Arlene Jin, who will review patient   Superficial wound culture   Continue barrier cream to [address] [place] [address] to minimize inflammation and maceration   Check ESR, CRP. Thank you for allowing me to consult in the care of this patient.  ------------------------  REASON FOR CONSULT: Infective syndrome   Requested by: Hospitalist service. HPI:Patient is a 63 y.o. white female with a history of CHF with AICD,  surgical asplenia,  Maru-en-Y, complicated by malabsorption and has required a gastrostomy-jejunostomy tube and has had recurrent hospital admissions for peristomal cellulitis. Peristomal area has been culture positive for multidrug resistant organisms. She was last seen by myself after last admission but was transferred to [facility] where she had the [address] tube changed out and was discharged home after a 5 day stay on 3/29/19. She was re-admitted 3/31/2019 for further evaluation and management of peristomal pain, and increased discharge from the site. She described the fluid is being purulent. She denies any fever, chills, change in bowel or urinary habits. She continues to complain of malaise. ?   Infectious diseases of cardiovascular stress test 12/29/2015    Alessia: EF 45%. Pharmacologic stress myocardial perfusion scan shows a fixed inferior-lateral defect w/ no inducible ischemia. No evidence of ischemia. Inferior-lateral infarction. Normal LVSF    Hyperlipidemia     Hypertension     Lymphoma [facility]     MI, old     Movement disorder     MS (multiple sclerosis) ([facility]     OP (osteoporosis)     PE (pulmonary embolism)     trapese filter    Pneumonia     Seizures ([facility] [facility]     Spleen enlarged     Thyroid disease       Past Surgical History:   Procedure Laterality Date    ABDOMEN SURGERY      APPENDECTOMY      CARDIAC DEFIBRILLATOR PLACEMENT      CHOLECYSTECTOMY      COLONOSCOPY      CORONARY ANGIOPLASTY WITH STENT PLACEMENT      3 stents    CORONARY ARTERY BYPASS GRAFT      Age 49    ENDOSCOPY, COLON, DIAGNOSTIC      GASTRIC BYPASS SURGERY      HERNIA REPAIR      HIP SURGERY      HYSTERECTOMY      JOINT REPLACEMENT      SKIN BIOPSY      TONSILLECTOMY      VASCULAR SURGERY        Family History   Problem Relation Age of Onset    High Blood Pressure Mother     High Cholesterol Mother     Heart Disease Mother     Diabetes Mother     Heart Disease Father     Cancer Father     Other Sister         Multiple Sclerosis    Heart Disease Maternal Grandmother     High Blood Pressure Maternal Grandmother     High Cholesterol Maternal Grandmother       Infectious disease related family history - not contibutory. SOCIAL HISTORY  Social History     Tobacco Use    Smoking status: Never Smoker    Smokeless tobacco: Never Used   Substance Use Topics    Alcohol use: No       Born:   Lived   Occupation:   No recent travel of significance.  No recent unusual exposures.  NO pets   ?   ALLERGIES  Allergies   Allergen Reactions    Baclofen     Fentanyl Swelling    Ibuprofen      \"Due to heart problems\"    Methocarbamol      Worsening edema    Nsaids      \"Due to heart problems\"    Tizanidine     Tolmetin      \"Due to heart problems\"    Tramadol Other (See Comments)     Doctor doesn't her to take due to heart problems  Seizures   Doctor doesn't her to take due to lowers seizure threshold.  Avelox [Moxifloxacin Hcl In Nacl] Swelling and Rash    Betadine [Povidone Iodine] Rash    Moxifloxacin Rash and Swelling     Lips swell and tingling in face       MEDICATIONS  Reviewed and are per the chart/EMR. ? Antibiotics:   ?  -------------------------------------------------------------------------------------------------------------------    Vital Signs:  Vitals:    04/01/19 0825   BP:    Pulse:    Resp:    Temp:    SpO2: 99%         Exam:    VS: noted; wt 57.2 kg  Gen: alert and oriented X3, no distress  Skin: no stigmata of endocarditis  Wounds: C/D/I  HEMT: AT/NC Oropharynx pink, moist, and without lesions or exudates; dentition in good state of repair  Eyes: PERRLA, EOMI, conjunctiva pink, sclera anicteric. Neck: Supple. Trachea midline. No LAD. Chest: no distress and CTA. Good air movement. Heart: RRR and no MRG. Abd: Left upper quadrant GEJ tube with peristomal blanchable redness and maceration, tender to palpation. soft, non-distended, no tenderness, no hepatomegaly. Normoactive bowel sounds. Ext: no clubbing, cyanosis, or edema  Catheter Site: Right internal jugular CVC without erythema or tenderness  Neuro: Mental status intact. CN 2-12 intact and no focal sensory or motor deficits    ? Diagnostic Studies: reviewed  ? ? I have examined this patient and available medical records on this date and have made the above observations, conclusions and recommendations.   Electronically signed by: Electronically signed by Chiquita Hardy MD on 4/1/2019 at 9:37 AM

## 2019-04-01 NOTE — ED NOTES
Pt alert and oriented. States lives at home with family, walks with a walker but it weak. Pt states she does eat a regular diet by mouth and takes pills by mouth without difficulty. Pt does have to use tube feedings as well. Pt states has a hx of MRSA and pseudomonas, see ID Dr. Saw Carpio.       Miriam Pimentel RN  03/31/19 0930

## 2019-04-01 NOTE — CONSULTS
GENERAL SURGERY INPATIENT CONSULT NOTE  [facility]) Physicians    PATIENT: Karina Melissa 1960, 63 y.o., female    MRN: [id number]    Physician: Mona Man MD    Date: 4/1/19    Reason for Evaluation:     Chief Complaint   Patient presents with    Abdominal Pain     ongoing since last night; seen here yesterday for same issue; recently had G-J tube replaced, c/o increased pain at G tube site    Generalized Body Aches       Requesting Physician: Lenny Nicole MD    CHIEF COMPLAINT:     Chief Complaint   Patient presents with    Abdominal Pain     ongoing since last night; seen here yesterday for same issue; recently had G-J tube replaced, c/o increased pain at G tube site    Generalized Body Aches       History Obtained From:  patient, electronic medical record    HISTORY OF PRESENT ILLNESS:      Jack Morrison is a 63 y.o. female presenting with drainage around her G tube and concern for infection. She has a complex history including remote tatiana en Y gastric bypass with later failure to thrive with excessive weight loss requiring enteral nutrition. 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 She has had the G-J tube exchanged several times, most recently had it replaced at [facility] on 3/25/19 after a failed IR replacement at [facility]. In the interval, she was seen at [facility] for possible infection around the G-J tube site, and was transferred to [facility] for endoscopic replacement of the G-J tube (we don't have the correct scope at [facility] to do it). She now returns with concerns for purulent drainage around the G-J tube.       Past Medical History:    Past Medical History:   Diagnosis Date    Acute delirium     Arrhythmia     Arthritis     Asthma     CAD (coronary artery disease)     1st stent at age 46    Cardiomyopathy [facility])     Cerebral artery occlusion with cerebral infarction ([facility] [address]     CHF (congestive heart failure) ([facility] [address] [facility]     Enlarged liver     GERD (gastroesophageal reflux disease)     H/O echocardiogram 4/6/16    EF 55%, trivial TR & MR, stage 1 diastolic dysfunction    History of blood transfusion     Hx of cardiovascular stress test 12/29/2015    Alessia: EF 45%. Pharmacologic stress myocardial perfusion scan shows a fixed inferior-lateral defect w/ no inducible ischemia. No evidence of ischemia. Inferior-lateral infarction.  Normal LVSF    Hyperlipidemia     Hypertension     Lymphoma ([facility] [address]     MI, old     Movement disorder     MS (multiple sclerosis) ([facility] [address]     OP (osteoporosis)     PE (pulmonary embolism)     trapese filter    Pneumonia     Seizures ([facility] [address]     Spleen enlarged     Thyroid disease        Past Surgical History:    Past Surgical History:   Procedure Laterality Date    ABDOMEN SURGERY      APPENDECTOMY      CARDIAC DEFIBRILLATOR PLACEMENT      CHOLECYSTECTOMY      COLONOSCOPY      CORONARY ANGIOPLASTY WITH STENT PLACEMENT      3 stents    CORONARY ARTERY BYPASS GRAFT      Age 49    ENDOSCOPY, COLON, DIAGNOSTIC      GASTRIC BYPASS SURGERY      HERNIA REPAIR      HIP SURGERY      HYSTERECTOMY      JOINT REPLACEMENT      SKIN BIOPSY      TONSILLECTOMY      VASCULAR SURGERY         Current Medications:   Current Facility-Administered Medications   Medication Dose Route Frequency Provider Last Rate Last Dose    morphine sulfate (PF) injection 4 mg  4 mg Intravenous Q4H PRN Laverne Mario MD   4 mg at 04/01/19 0218    oxyCODONE (ROXICODONE) immediate release tablet 15 mg  15 mg Oral Q4H PRN Laverne Mario MD   15 mg at 04/01/19 0440    sodium chloride flush 0.9 % injection 10 mL  10 mL Intravenous 2 times per day JESSE Hodges - NP   10 mL at 03/31/19 2221    sodium chloride flush 0.9 % injection 10 mL  10 mL Intravenous PRN JESSE Hodges NP        ondansetron [facility]) injection 4 mg  4 mg Intravenous Q6H PRN Karen Salazar Shotsberger, APRN - NP        enoxaparin (LOVENOX) injection 40 mg  40 mg Subcutaneous Daily JESSE Zhu NP        famotidine (PEPCID) injection 20 mg  20 mg Intravenous BID JESSE Zhu - NP   20 mg at 03/31/19 2220    acetaminophen (TYLENOL) tablet 650 mg  650 mg Oral Q4H PRN JESSE Zhu - NP        potassium chloride (KLOR-CON M) extended release tablet 40 mEq  40 mEq Oral PRN JESSE Zhu - NP        Or    potassium chloride (KLOR-CON) packet 40 mEq  40 mEq Oral PRN JESSE Zhu NP        Or    potassium chloride 10 mEq/100 mL IVPB (Peripheral Line)  10 mEq Intravenous PRN JESSE Zhu - CALVIN        metoprolol succinate (TOPROL XL) extended release tablet 12.5 mg  12.5 mg Oral Daily JESSE Zhu NP        clopidogrel (PLAVIX) tablet 75 mg  75 mg Oral Daily JESSE Zhu NP        DULoxetine (CYMBALTA) extended release capsule 60 mg  60 mg Oral BID JESSE Zhu NP   60 mg at 03/31/19 2220    ranolazine (RANEXA) extended release tablet 1,000 mg  1,000 mg Oral BID JESSE Zhu - NP   1,000 mg at 03/31/19 2220    lisinopril (PRINIVIL;ZESTRIL) tablet 2.5 mg  2.5 mg Oral Daily JESSE Zhu NP         Facility-Administered Medications Ordered in Other Encounters   Medication Dose Route Frequency Provider Last Rate Last Dose    oxyCODONE (ROXICODONE) immediate release tablet 15 mg  15 mg Oral Once Darlene Gentile MD           Allergies:  Baclofen; Fentanyl; Ibuprofen; Methocarbamol; Nsaids; Tizanidine; Tolmetin; Tramadol; Avelox [moxifloxacin hcl in nacl];  Betadine [povidone iodine]; and Moxifloxacin    Social History:   Social History     Socioeconomic History    Marital status: Single     Spouse name: None    Number of children: None    Years of education: None    Highest education level: None   Occupational History    None   Social Needs    Financial resource strain: None  Food insecurity:     Worry: None     Inability: None    Transportation needs:     Medical: None     Non-medical: None   Tobacco Use    Smoking status: Never Smoker    Smokeless tobacco: Never Used   Substance and Sexual Activity    Alcohol use: No    Drug use: No    Sexual activity: Not Currently   Lifestyle    Physical activity:     Days per week: None     Minutes per session: None    Stress: None   Relationships    Social connections:     Talks on phone: None     Gets together: None     Attends Mandaeism service: None     Active member of club or organization: None     Attends meetings of clubs or organizations: None     Relationship status: None    Intimate partner violence:     Fear of current or ex partner: None     Emotionally abused: None     Physically abused: None     Forced sexual activity: None   Other Topics Concern    None   Social History Narrative    None       Family History:   Family History   Problem Relation Age of Onset    High Blood Pressure Mother     High Cholesterol Mother     Heart Disease Mother     Diabetes Mother     Heart Disease Father     Cancer Father     Other Sister         Multiple Sclerosis    Heart Disease Maternal Grandmother     High Blood Pressure Maternal Grandmother     High Cholesterol Maternal Grandmother        REVIEW OF SYSTEMS:    Review of Systems   Constitutional: Positive for appetite change (chronically poor appetite). Negative for chills and fever. HENT: Negative for congestion and sore throat. Eyes: Negative for discharge and redness. Respiratory: Negative for chest tightness and shortness of breath. Cardiovascular: Negative for chest pain and palpitations. Gastrointestinal: Positive for abdominal pain (around [address] tube site). Negative for abdominal distention, constipation, diarrhea, nausea and vomiting. Genitourinary: Negative for dysuria and flank pain. Musculoskeletal: Positive for arthralgias (chronic).  Negative for myalgias. Skin: Negative for color change and rash. Neurological: Negative for dizziness and numbness. Psychiatric/Behavioral: Negative for confusion. The patient is not nervous/anxious. I have reviewed the patient's information pertinent to this visit, including medical history, family history, social history and review of systems. PHYSICAL EXAM:    Vitals:    03/31/19 1905 03/31/19 2120 03/31/19 2145 04/01/19 0519   BP: (!) 139/96 138/64 (!) 150/87 (!) 145/73   Pulse: 78 69 84 59   Resp: 18 18 16 16   Temp:  98.8 °F (37.1 °C) 99.4 °F (37.4 °C) 97.6 °F (36.4 °C)   TempSrc:  Oral Oral Oral   SpO2: 100% 100% 100% 99%   Weight:   126 lb (57.2 kg)    Height:   5' 6\" (1.676 m)        Physical Exam   Constitutional: She is oriented to person, place, and time. She appears well-developed. No distress. Chronically ill appearing   HENT:   Head: Normocephalic and atraumatic. Eyes: Pupils are equal, round, and reactive to light. Right eye exhibits no discharge. Left eye exhibits no discharge. Neck: Neck supple. No tracheal deviation present. Cardiovascular: Normal rate and regular rhythm. Pulmonary/Chest: No respiratory distress. She has no wheezes. Shallow effort   Abdominal: Soft. There is tenderness (around G tube). There is no rebound and no guarding. GJ-tube with surrounding macerated skin and gastric drainage, mildly erythematous, but no apparent abscess. No crepitance. Musculoskeletal: She exhibits no tenderness or deformity. Neurological: She is alert and oriented to person, place, and time. Skin: Skin is warm and dry. No rash noted. She is not diaphoretic. Psychiatric: She has a normal mood and affect.  Her behavior is normal.       DATA:    Lab Results   Component Value Date    WBC 3.8 (L) 04/01/2019    HGB 9.0 (L) 04/01/2019    HCT 29.0 (L) 04/01/2019     04/01/2019     04/01/2019    K 3.6 04/01/2019     04/01/2019    CO2 22 04/01/2019    BUN 8 04/01/2019 CREATININE 0.6 04/01/2019    GLUCOSE 101 (H) 04/01/2019    CALCIUM 8.0 (L) 04/01/2019    PROT 5.8 (L) 03/31/2019    BILITOT 0.2 03/31/2019    AST 18 03/31/2019    ALT 14 03/31/2019    ALKPHOS 86 03/31/2019    AMYLASE 30 02/21/2013    LIPASE 28 03/31/2019    INR 1.07 12/19/2018    GLUF 150 (H) 02/12/2017       Imaging:   Ct Head Wo Contrast    Result Date: 3/30/2019  EXAMINATION: CT OF THE HEAD WITHOUT CONTRAST  3/30/2019 9:08 pm TECHNIQUE: CT of the head was performed without the administration of intravenous contrast. Dose modulation, iterative reconstruction, and/or weight based adjustment of the mA/kV was utilized to reduce the radiation dose to as low as reasonably achievable. COMPARISON: CT head April 25, 2018 HISTORY: ORDERING SYSTEM PROVIDED HISTORY: AMS TECHNOLOGIST PROVIDED HISTORY: Has a \"code stroke\" or \"stroke alert\" been called? ->No Ordering Physician Provided Reason for Exam: gi pain FINDINGS: BRAIN/VENTRICLES: There is no acute intracranial hemorrhage, mass effect or midline shift. No abnormal extra-axial fluid collection. Redemonstration of stable left frontal encephalomalacia compatible with sequela of remote infarct. The gray-white differentiation is otherwise maintained without evidence of an acute infarct. There is no evidence of hydrocephalus. ORBITS: The visualized portion of the orbits demonstrate no acute abnormality. SINUSES: The visualized paranasal sinuses and mastoid air cells demonstrate no acute abnormality. SOFT TISSUES/SKULL:  No acute abnormality of the visualized skull or soft tissues. No acute intracranial abnormality. Ct Abdomen Pelvis W Iv Contrast Additional Contrast? None    Result Date: 3/31/2019  EXAMINATION: CT OF THE ABDOMEN AND PELVIS WITH CONTRAST 3/30/2019 9:08 pm TECHNIQUE: CT of the abdomen and pelvis was performed with the administration of intravenous contrast. Multiplanar reformatted images are provided for review.  Dose modulation, iterative reconstruction, and/or weight based adjustment of the mA/kV was utilized to reduce the radiation dose to as low as reasonably achievable. COMPARISON: None HISTORY: ORDERING SYSTEM PROVIDED HISTORY: abd pain, recent GJ tube placement TECHNOLOGIST PROVIDED HISTORY: Additional Contrast?->None Ordering Physician Provided Reason for Exam: gi pain FINDINGS: Lower Chest: Bibasilar atelectasis is noted. Cardiomegaly is seen. Median sternotomy wires are noted. Coronary artery calcifications are seen. A left-sided ICD is in place. Organs: The liver, adrenal glands, pancreas, and kidneys are unremarkable. The spleen is absent. A few splenules are seen within the left upper quadrant. GI/Bowel: The patient is status post gastric bypass surgery. A gastrojejunostomy tube is noted. The tip is seen within the jejunal loops in the left mid abdomen. There is no evidence of bowel obstruction. The appendix is not well visualized. There is a fluid and air-filled tract in the previously noted gastrostomy tube extending to the subcutaneous tissues. Pelvis: The bladder is unremarkable. There is no free fluid. Beam hardening artifact from a right hip arthroplasty is seen obscuring a portion of the pelvis. Peritoneum/Retroperitoneum: Mild atherosclerotic disease is seen involving the aorta. There is no evidence of free air or lymphadenopathy. An inferior vena cava filter is seen. Bones/Soft Tissues: No destructive osseous lesions are identified. 1. No acute findings within the abdomen or pelvis. 2. Gastrojejunostomy tube is in place without acute complication noted. 3. There is a fluid and air-filled tract in the previously noted gastrostomy tube extending to the subcutaneous tissues. Pertinent laboratory and imaging studies were personally reviewed if available.     IMPRESSION:    Patricia Graff is a 63 y.o. female with complex history including gastric bypass, failure to thrive, enteral access via gastrostomy in the remnant stomach subsequently converted to a G-J tube due to chronic drainage and suspicion of gastric outlet obstruction. Now presenting with drainage around her [address] tube site with concern for infection, last replaced at [facility] on 3/25/19. Patient Active Problem List    Diagnosis Date Noted    Acute delirium      Priority: High    Coronary artery disease involving coronary bypass graft of native heart with angina pectoris ([facility] [address]      Priority: High    Shortness of breath      Priority: High    Ischemic cardiomyopathy      Priority: High    Angina at rest [facility])      Priority: High    Abdominal pain 03/31/2019    History of Maru-en-Y gastric bypass     Gastroparesis     Cellulitis at gastrostomy tube site ([facility] [address] 03/19/2019    Cellulitis     Severe malnutrition ([facility] [address] 02/04/2019    Acute on chronic systolic CHF (congestive heart failure), NYHA class 3 ([facility] [address] [facility] 02/03/2019    Sacral pain 01/28/2019    Pneumonia 01/26/2019    Acute metabolic encephalopathy 06/22/1222    Pneumonia of left lower lobe due to infectious organism ([facility] [address] 05/14/2017    Left upper quadrant pain 05/04/2017    Congestive heart failure (HCC)     CHF exacerbation (HCC)     Acute exacerbation of CHF (congestive heart failure) ([facility] [address]     CAD (coronary artery disease) 04/01/2016    Hyperlipidemia 04/01/2016    Thyroid disease 04/01/2016    Chest pain     Chest pain 01/28/2012       Visit Diagnoses:  1. Abdominal pain, epigastric    2. Hypokalemia        PLAN:  · Upon reviewing the CT scan, the balloon of her GJ tube appears to be near the pylorus. On exam the bumper was at approximately 10cm, pulled the balloon back so it moves easily and secured the bumper with a silk suture around it at 4cm (with approximately 1cm between the bumper and the skin). · Drainage around her G-J tube has been a chronic problem. I suspect her erythema is chemical rather than infectious.  Recommend keeping the G port to gravity drainage and keeping tension

## 2019-04-01 NOTE — H&P
History and Physical      Name:  Adelso Viera /Age/Sex: 1960  (63 y.o. female)   MRN & CSN:  [id number] & [id number] Admission Date/Time: 3/31/2019  5:33 PM   Location:  ED15/ED-15 PCP: Denisha Ruiz MD       Hospital Day: 1    Discussed patient and POC with Dr. Kurt Echevarria and Plan:     Adelso Viera is a 63 y.o.  female  who presents with abdominal pain. - Abdominal pain  Infection v local irritation from gastric secretion  CT ab/pelv from 3/30: No acute findings; GJ tube in place; fluid & air-filled tract extending in SQ  Afebrile; WBC 4.4; Lactate 1.2  G-J tube replaced at [facility] last week  C/s Gen surgery    - Possible cellulitis, PEG site  Infection v local irritation from gastric secretion  C/s ID- Seen by Dr Cecelia Victor 3/22  Holding abx due to afebrile, stable VS, and labs. Does not appear toxic. If patient experiences change in status and appears with infection prior to ID and surgical evaluation, will start Zosyn. Wound care consult    - Malabsorption  Hx of tatiana-en-y with complication  GJ tube  Gi feedings  Nutritional c/s    - CHF  S/p ICD  Stable    - Hypokalemia  K 3.3  K replacement protocol  Recheck    - HTN  Metoprolol and lisinopril  stable    - Chronic: treatment continued per home medications unless  contraindicated by above plan and assessment.      Hx of splenectomy      Discussed patient with ED physician    Diet Card; tube feedings per Nutrition   DVT Prophylaxis [x] Lovenox, []  Heparin, [] SCDs, [] Ambulation   GI Prophylaxis [x] PPI,  [] H2 Blocker,  [] Carafate,  [] Diet/Tube Feeds   Code Status full   Disposition Patient requires continued admission due to persistent abdominal pain   MDM [] Low, [x] Moderate,[]  High  Patient's risk as above due to      [x] One or more chronic illnesses with exacerbation progression      [x] Two or more stable chronic illnesses      [x] Undiagnosed new problem with uncertain prognosis      [] Elective major surgery bilaterally and palpable. No peripheral edema. GI Abdomen is soft without significant tenderness, masses, or guarding. G-J tube present. Mild erythema of 1-2 cm surrounding site. Drainage present. Bowel sounds are normoactive. Rectal exam deferred.  No costovertebral angle tenderness. Coppola catheter is not present. HEME/LYMPH No palpable cervical lymphadenopathy and no hepatosplenomegaly. No petechiae or ecchymoses. MSK No gross joint deformities. SKIN Normal coloration, warm, dry. NEURO Cranial nerves appear grossly intact, normal speech, no lateralizing weakness. PSYCH Awake, alert, oriented x 4. Affect appropriate. Past Medical History:      Past Medical History:   Diagnosis Date    Acute delirium     Arrhythmia     Arthritis     Asthma     CAD (coronary artery disease)     1st stent at age 46    Cardiomyopathy [facility])     Cerebral artery occlusion with cerebral infarction ([address]     CHF (congestive heart failure) (HCC)     Enlarged liver     GERD (gastroesophageal reflux disease)     H/O echocardiogram 4/6/16    EF 55%, trivial TR & MR, stage 1 diastolic dysfunction    History of blood transfusion     Hx of cardiovascular stress test 12/29/2015    Alessia: EF 45%. Pharmacologic stress myocardial perfusion scan shows a fixed inferior-lateral defect w/ no inducible ischemia. No evidence of ischemia. Inferior-lateral infarction. Normal LVSF    Hyperlipidemia     Hypertension     Lymphoma ([address]     MI, old     Movement disorder     MS (multiple sclerosis) ([address]     OP (osteoporosis)     PE (pulmonary embolism)     trapese filter    Pneumonia     Seizures ([address]     Spleen enlarged     Thyroid disease      PSHX:  has a past surgical history that includes Coronary angioplasty with stent; Hysterectomy; Appendectomy; Cholecystectomy; Tonsillectomy; Gastric bypass surgery; Cardiac defibrillator placement; hip surgery; Abdomen surgery;  Colonoscopy; Endoscopy, colon, diagnostic; hernia repair; joint replacement; skin biopsy; vascular surgery; and Coronary artery bypass graft. Allergies: Allergies   Allergen Reactions    Baclofen     Fentanyl Swelling    Ibuprofen      \"Due to heart problems\"    Methocarbamol      Worsening edema    Nsaids      \"Due to heart problems\"    Tizanidine     Tolmetin      \"Due to heart problems\"    Tramadol Other (See Comments)     Doctor doesn't her to take due to heart problems  Seizures   Doctor doesn't her to take due to lowers seizure threshold.  Avelox [Moxifloxacin Hcl In Nacl] Swelling and Rash    Betadine [Povidone Iodine] Rash    Moxifloxacin Rash and Swelling     Lips swell and tingling in face        FAM HX: family history includes Cancer in her father; Diabetes in her mother; Heart Disease in her father, maternal grandmother, and mother; High Blood Pressure in her maternal grandmother and mother; High Cholesterol in her maternal grandmother and mother; Other in her sister.   Soc HX:   Social History     Socioeconomic History    Marital status: Single     Spouse name: None    Number of children: None    Years of education: None    Highest education level: None   Occupational History    None   Social Needs    Financial resource strain: None    Food insecurity:     Worry: None     Inability: None    Transportation needs:     Medical: None     Non-medical: None   Tobacco Use    Smoking status: Never Smoker    Smokeless tobacco: Never Used   Substance and Sexual Activity    Alcohol use: No    Drug use: No    Sexual activity: Not Currently   Lifestyle    Physical activity:     Days per week: None     Minutes per session: None    Stress: None   Relationships    Social connections:     Talks on phone: None     Gets together: None     Attends Judaism service: None     Active member of club or organization: None     Attends meetings of clubs or organizations: None     Relationship status: None    Intimate partner violence:     Fear of current or ex partner: None     Emotionally abused: None     Physically abused: None     Forced sexual activity: None   Other Topics Concern    None   Social History Narrative    None       Medications:   Medications:    white petrolatum-corn starch-lanolin (TRIPLE PASTE) 12.8 % ointment Apply topically twice a day with dressing changes.  Zen Cloud MD Needs Review   levothyroxine (SYNTHROID) 137 MCG tablet Take 1 tablet by mouth Daily Sanna Monreal MD Needs Review   ondansetron (ZOFRAN) 4 MG tablet Take 1 tablet by mouth every 6 hours as needed for Nausea or Vomiting Sanna Monreal MD Needs Review   lactobacillus (CULTURELLE) capsule Take 1 capsule by mouth 3 times daily (with meals) Sanna Monreal MD Needs Review   chlorhexidine gluconate (ANTISEPTIC SKIN CLEANSER) 4 % SOLN external solution Apply topically daily Jae Duval MD Needs Review   magnesium oxide (MAG-OX) 400 MG tablet Take 800 mg by mouth 2 times daily Historical MD Alberto Needs Review   metoprolol succinate (TOPROL XL) 25 MG extended release tablet Take 12.5 mg by mouth daily Francisco J Dominguez MD Needs Review   nystatin (MYCOSTATIN) 712344 UNIT/GM powder Apply 1 g topically daily To stomach Francisco J Dominguez MD Needs Review   mexiletine (MEXITIL) 150 MG capsule Take 150 mg by mouth 3 times daily Francisco J Dominguez MD Needs Review   docusate sodium (COLACE) 100 MG capsule Take 100 mg by mouth 2 times daily as needed for Constipation Historical MD Alberto Needs Review   famotidine (PEPCID) 20 MG tablet Take 1 tablet by mouth 2 times daily Arcenio Underwood MD Needs Review   levETIRAcetam (KEPPRA) 750 MG tablet Take 750 mg by mouth 2 times daily Francisco J Dominguez MD Needs Review   albuterol (PROVENTIL) (2.5 MG/3ML) 0.083% nebulizer solution Take 2.5 mg by nebulization every 4 hours as needed for Wheezing Historical MD Alberto Needs Review   torsemide (DEMADEX) 20 MG tablet Take 20 mg by mouth 2 times daily Francisco J Dominguez MD Needs Review   atorvastatin (LIPITOR) 80 MG tablet Take 80 mg by mouth nightly Historical Provider, MD Needs Review   DULoxetine (CYMBALTA) 60 MG extended release capsule Take 60 mg by mouth 2 times daily Historical MD Alberto Needs Review   Pediatric Multivitamins-Iron (FLINTSTONES PLUS IRON) CHEW Take 1 tablet by mouth 4 times daily Historical Provider, MD Needs Review   Omega-3 1000 MG CAPS Take 1,000 mg by mouth nightly Historical Provider, MD Needs Review   potassium chloride (KLOR-CON M) 20 MEQ extended release tablet Take 20 mEq by mouth daily Historical Provider, MD Needs Review   ranolazine (RANEXA) 1000 MG extended release tablet Take 1,000 mg by mouth 2 times daily Historical Provider, MD Needs Review   senna-docusate (PERICOLACE) 8.6-50 MG per tablet Take 1 tablet by mouth daily Historical Provider, MD Needs Review   promethazine (PHENERGAN) 25 MG tablet Take 25 mg by mouth every 6 hours as needed for Nausea Historical Provider, MD Needs Review   gabapentin (NEURONTIN) 300 MG capsule Take 300 mg by mouth 3 times daily. Marlene House Historical Provider, MD Needs Review   clopidogrel (PLAVIX) 75 MG tablet Take 75 mg by mouth daily Historical Provider, MD Needs Review   lisinopril (PRINIVIL;ZESTRIL) 2.5 MG tablet Take 2.5 mg by mouth daily Historical Provider, MD Needs Review   oxyCODONE (OXY-IR) 15 MG immediate release tablet Take 1 tablet by mouth every 4 hours as needed for Pain . Valdemar Sharma DO Needs Review   acetaminophen 650 MG TABS Take 650 mg by mouth every 4 hours as needed Quin Horowitz MD Needs Review   ferrous gluconate (FERGON) 324 (38 FE) MG tablet Take 324 mg by mouth 2 times daily  Historical Provider, MD Needs Review   aspirin 81 MG chewable tablet Take 81 mg by mouth daily.  Historical Provider, MD Needs Review   interferon beta-1b (BETASERON) 0.3 MG injection Inject 0.25 mg into the skin every 30 days 04/25/18 Last known dose given 04/19/18 per [facility] Historical Provider, MD Needs Review   vitamin B-12 (CYANOCOBALAMIN) 1000 MCG tablet Inject 1,000 mcg into the muscle every 30 days 04/25/18 Given on the 28 th of each month Historical Provider, MD Needs Review   albuterol (PROVENTIL HFA;VENTOLIN HFA) 108 (90 BASE) MCG/ACT inhaler Inhale 2 puffs into the lungs every 6 hours as needed.   Historical Provider, MD Needs Review   vitamin D (ERGOCALCIFEROL) 21935 UNIT CAPS capsule Take 50,000 Units by mouth Twice a Week Take on Mondays and on Fridays Historical Provider, MD Needs Review     Data:     EXAMINATION:   CT OF THE ABDOMEN AND PELVIS WITH CONTRAST 3/30/2019 9:08 pm       TECHNIQUE:   CT of the abdomen and pelvis was performed with the administration of   intravenous contrast. Multiplanar reformatted images are provided for review. Dose modulation, iterative reconstruction, and/or weight based adjustment of   the mA/kV was utilized to reduce the radiation dose to as low as reasonably   achievable.       COMPARISON:   None       HISTORY:   ORDERING SYSTEM PROVIDED HISTORY: abd pain, recent GJ tube placement   TECHNOLOGIST PROVIDED HISTORY:   Additional Contrast?->None   Ordering Physician Provided Reason for Exam: gi pain       FINDINGS:   Lower Chest: Bibasilar atelectasis is noted.  Cardiomegaly is seen.  Median   sternotomy wires are noted.  Coronary artery calcifications are seen.  A   left-sided ICD is in place.       Organs: The liver, adrenal glands, pancreas, and kidneys are unremarkable. The spleen is absent.  A few splenules are seen within the left upper   quadrant.       GI/Bowel: The patient is status post gastric bypass surgery.  A   gastrojejunostomy tube is noted.  The tip is seen within the jejunal loops in   the left mid abdomen.  There is no evidence of bowel obstruction.  The   appendix is not well visualized.       There is a fluid and air-filled tract in the previously noted gastrostomy   tube extending to the subcutaneous tissues.       Pelvis:  The bladder is unremarkable. Doreene Kanchan is no free fluid.  Beam hardening   artifact from a right hip arthroplasty is seen obscuring a portion of the   pelvis.       Peritoneum/Retroperitoneum: Mild atherosclerotic disease is seen involving   the aorta.  There is no evidence of free air or lymphadenopathy.  An inferior   vena cava filter is seen.       Bones/Soft Tissues: No destructive osseous lesions are identified.           Impression   1. No acute findings within the abdomen or pelvis. 2. Gastrojejunostomy tube is in place without acute complication noted. 3. There is a fluid and air-filled tract in the previously noted gastrostomy   tube extending to the subcutaneous tissues.         EXAMINATION:   CT OF THE HEAD WITHOUT CONTRAST  3/30/2019 9:08 pm       TECHNIQUE:   CT of the head was performed without the administration of intravenous   contrast. Dose modulation, iterative reconstruction, and/or weight based   adjustment of the mA/kV was utilized to reduce the radiation dose to as low   as reasonably achievable.       COMPARISON:   CT head April 25, 2018       HISTORY:   ORDERING SYSTEM PROVIDED HISTORY: AMS   TECHNOLOGIST PROVIDED HISTORY:   Has a \"code stroke\" or \"stroke alert\" been called? ->No   Ordering Physician Provided Reason for Exam: gi pain       FINDINGS:   BRAIN/VENTRICLES: There is no acute intracranial hemorrhage, mass effect or   midline shift.  No abnormal extra-axial fluid collection.  Redemonstration of   stable left frontal encephalomalacia compatible with sequela of remote   infarct.  The gray-white differentiation is otherwise maintained without   evidence of an acute infarct.  There is no evidence of hydrocephalus.       ORBITS: The visualized portion of the orbits demonstrate no acute abnormality.       SINUSES: The visualized paranasal sinuses and mastoid air cells demonstrate   no acute abnormality.       SOFT TISSUES/SKULL:  No acute abnormality of the visualized skull or soft   tissues.         Impression   No acute intracranial abnormality. EXAMINATION:   SINGLE XRAY VIEW OF THE CHEST       3/30/2019 8:08 pm       COMPARISON:   March 22, 2019       HISTORY:   ORDERING SYSTEM PROVIDED HISTORY: AMS, recent admit   TECHNOLOGIST PROVIDED HISTORY:   Reason for exam:->AMS, recent admit   Ordering Physician Provided Reason for Exam: AMS, recent admit   Acuity: Unknown   Type of Exam: Unknown       FINDINGS:   No evidence of pneumonia, edema or other acute pulmonary process. No evidence   of acute process of the cardiac or mediastinal structures. No evidence of   pneumothorax or pleural effusion.           Impression   No evidence of acute cardiopulmonary disease.      Normal sinus rhythm   Left axis deviation   Possible Lateral infarct , age undetermined   Inferior infarct (cited on or before 12-FEB-2017)   Abnormal ECG   When compared with ECG of 19-MAR-2019 12:13,   Borderline criteria for Lateral infarct are now present   Confirmed by Raya Sinclair [id number] ([id number]) on 3/31/2019 4:45:34 PM    Electronically signed by JESSE Hanna NP on 3/31/2019 at 9:07 PM

## 2019-04-02 LAB
HCT VFR BLD CALC: 28.6 % (ref 37–47)
HEMOGLOBIN: 9 GM/DL (ref 12.5–16)
MAGNESIUM: 2 MG/DL (ref 1.8–2.4)
MCH RBC QN AUTO: 33 PG (ref 27–31)
MCHC RBC AUTO-ENTMCNC: 31.5 % (ref 32–36)
MCV RBC AUTO: 104.8 FL (ref 78–100)
PDW BLD-RTO: 16 % (ref 11.7–14.9)
PHOSPHORUS: 4.5 MG/DL (ref 2.5–4.9)
PLATELET # BLD: 276 K/CU MM (ref 140–440)
PMV BLD AUTO: 10.2 FL (ref 7.5–11.1)
RBC # BLD: 2.73 M/CU MM (ref 4.2–5.4)
WBC # BLD: 4.1 K/CU MM (ref 4–10.5)

## 2019-04-02 PROCEDURE — 83735 ASSAY OF MAGNESIUM: CPT

## 2019-04-02 PROCEDURE — 94761 N-INVAS EAR/PLS OXIMETRY MLT: CPT

## 2019-04-02 PROCEDURE — 6360000002 HC RX W HCPCS: Performed by: INTERNAL MEDICINE

## 2019-04-02 PROCEDURE — 2500000003 HC RX 250 WO HCPCS: Performed by: NURSE PRACTITIONER

## 2019-04-02 PROCEDURE — 2580000003 HC RX 258: Performed by: NURSE PRACTITIONER

## 2019-04-02 PROCEDURE — 6370000000 HC RX 637 (ALT 250 FOR IP): Performed by: NURSE PRACTITIONER

## 2019-04-02 PROCEDURE — 92610 EVALUATE SWALLOWING FUNCTION: CPT | Performed by: SPEECH-LANGUAGE PATHOLOGIST

## 2019-04-02 PROCEDURE — 99211 OFF/OP EST MAY X REQ PHY/QHP: CPT

## 2019-04-02 PROCEDURE — 6370000000 HC RX 637 (ALT 250 FOR IP): Performed by: INTERNAL MEDICINE

## 2019-04-02 PROCEDURE — 99232 SBSQ HOSP IP/OBS MODERATE 35: CPT | Performed by: INTERNAL MEDICINE

## 2019-04-02 PROCEDURE — 99232 SBSQ HOSP IP/OBS MODERATE 35: CPT | Performed by: SURGERY

## 2019-04-02 PROCEDURE — 1200000000 HC SEMI PRIVATE

## 2019-04-02 PROCEDURE — 96376 TX/PRO/DX INJ SAME DRUG ADON: CPT

## 2019-04-02 PROCEDURE — 96366 THER/PROPH/DIAG IV INF ADDON: CPT

## 2019-04-02 PROCEDURE — 96372 THER/PROPH/DIAG INJ SC/IM: CPT

## 2019-04-02 PROCEDURE — 6360000002 HC RX W HCPCS: Performed by: NURSE PRACTITIONER

## 2019-04-02 PROCEDURE — 84100 ASSAY OF PHOSPHORUS: CPT

## 2019-04-02 PROCEDURE — 85027 COMPLETE CBC AUTOMATED: CPT

## 2019-04-02 RX ORDER — GABAPENTIN 300 MG/1
300 CAPSULE ORAL 3 TIMES DAILY
Status: DISCONTINUED | OUTPATIENT
Start: 2019-04-02 | End: 2019-04-08 | Stop reason: HOSPADM

## 2019-04-02 RX ORDER — FERROUS GLUCONATE 324(37.5)
324 TABLET ORAL 2 TIMES DAILY WITH MEALS
Status: DISCONTINUED | OUTPATIENT
Start: 2019-04-02 | End: 2019-04-08 | Stop reason: HOSPADM

## 2019-04-02 RX ORDER — MEXILETINE HYDROCHLORIDE 150 MG/1
150 CAPSULE ORAL 3 TIMES DAILY
Status: DISCONTINUED | OUTPATIENT
Start: 2019-04-02 | End: 2019-04-08 | Stop reason: HOSPADM

## 2019-04-02 RX ORDER — LACTOBACILLUS RHAMNOSUS GG 10B CELL
1 CAPSULE ORAL
Status: DISCONTINUED | OUTPATIENT
Start: 2019-04-02 | End: 2019-04-08 | Stop reason: HOSPADM

## 2019-04-02 RX ORDER — ALBUTEROL SULFATE 2.5 MG/3ML
2.5 SOLUTION RESPIRATORY (INHALATION) EVERY 4 HOURS PRN
Status: DISCONTINUED | OUTPATIENT
Start: 2019-04-02 | End: 2019-04-08 | Stop reason: HOSPADM

## 2019-04-02 RX ORDER — ASPIRIN 81 MG/1
81 TABLET, CHEWABLE ORAL DAILY
Status: DISCONTINUED | OUTPATIENT
Start: 2019-04-02 | End: 2019-04-08 | Stop reason: HOSPADM

## 2019-04-02 RX ORDER — TORSEMIDE 20 MG/1
20 TABLET ORAL 2 TIMES DAILY
Status: DISCONTINUED | OUTPATIENT
Start: 2019-04-02 | End: 2019-04-08 | Stop reason: HOSPADM

## 2019-04-02 RX ORDER — DOCUSATE SODIUM 100 MG/1
100 CAPSULE, LIQUID FILLED ORAL 2 TIMES DAILY PRN
Status: DISCONTINUED | OUTPATIENT
Start: 2019-04-02 | End: 2019-04-08 | Stop reason: HOSPADM

## 2019-04-02 RX ORDER — ALBUTEROL SULFATE 90 UG/1
2 AEROSOL, METERED RESPIRATORY (INHALATION) EVERY 6 HOURS PRN
Status: DISCONTINUED | OUTPATIENT
Start: 2019-04-02 | End: 2019-04-08 | Stop reason: HOSPADM

## 2019-04-02 RX ADMIN — LINEZOLID 600 MG: 600 INJECTION, SOLUTION INTRAVENOUS at 21:36

## 2019-04-02 RX ADMIN — MORPHINE SULFATE 4 MG: 4 INJECTION INTRAVENOUS at 07:03

## 2019-04-02 RX ADMIN — RANOLAZINE 1000 MG: 500 TABLET, FILM COATED, EXTENDED RELEASE ORAL at 21:37

## 2019-04-02 RX ADMIN — RANOLAZINE 1000 MG: 500 TABLET, FILM COATED, EXTENDED RELEASE ORAL at 09:26

## 2019-04-02 RX ADMIN — OXYCODONE HYDROCHLORIDE 15 MG: 5 TABLET ORAL at 21:45

## 2019-04-02 RX ADMIN — OXYCODONE HYDROCHLORIDE 15 MG: 5 TABLET ORAL at 04:54

## 2019-04-02 RX ADMIN — ENOXAPARIN SODIUM 40 MG: 40 INJECTION SUBCUTANEOUS at 08:36

## 2019-04-02 RX ADMIN — SODIUM CHLORIDE, PRESERVATIVE FREE 10 ML: 5 INJECTION INTRAVENOUS at 08:36

## 2019-04-02 RX ADMIN — GABAPENTIN 300 MG: 300 CAPSULE ORAL at 21:36

## 2019-04-02 RX ADMIN — ASPIRIN 81 MG CHEWABLE TABLET 81 MG: 81 TABLET CHEWABLE at 16:47

## 2019-04-02 RX ADMIN — MEXILETINE HYDROCHLORIDE 150 MG: 150 CAPSULE ORAL at 17:24

## 2019-04-02 RX ADMIN — DULOXETINE 60 MG: 30 CAPSULE, DELAYED RELEASE ORAL at 08:35

## 2019-04-02 RX ADMIN — GABAPENTIN 300 MG: 300 CAPSULE ORAL at 16:47

## 2019-04-02 RX ADMIN — MORPHINE SULFATE 4 MG: 4 INJECTION INTRAVENOUS at 20:14

## 2019-04-02 RX ADMIN — LINEZOLID 600 MG: 600 INJECTION, SOLUTION INTRAVENOUS at 09:31

## 2019-04-02 RX ADMIN — FAMOTIDINE 20 MG: 10 INJECTION, SOLUTION INTRAVENOUS at 08:36

## 2019-04-02 RX ADMIN — CLOPIDOGREL BISULFATE 75 MG: 75 TABLET ORAL at 08:36

## 2019-04-02 RX ADMIN — DULOXETINE 60 MG: 30 CAPSULE, DELAYED RELEASE ORAL at 21:37

## 2019-04-02 RX ADMIN — SODIUM CHLORIDE, PRESERVATIVE FREE 10 ML: 5 INJECTION INTRAVENOUS at 21:37

## 2019-04-02 RX ADMIN — Medication 1 CAPSULE: at 16:47

## 2019-04-02 RX ADMIN — Medication 800 MG: at 21:36

## 2019-04-02 RX ADMIN — OXYCODONE HYDROCHLORIDE 15 MG: 5 TABLET ORAL at 17:25

## 2019-04-02 RX ADMIN — FERROUS GLUCONATE TAB 324 MG (37.5 MG ELEMENTAL IRON) 324 MG: 324 (37.5 FE) TAB at 17:24

## 2019-04-02 RX ADMIN — OXYCODONE HYDROCHLORIDE 15 MG: 5 TABLET ORAL at 13:14

## 2019-04-02 RX ADMIN — MORPHINE SULFATE 4 MG: 4 INJECTION INTRAVENOUS at 15:21

## 2019-04-02 RX ADMIN — MORPHINE SULFATE 4 MG: 4 INJECTION INTRAVENOUS at 01:50

## 2019-04-02 RX ADMIN — FAMOTIDINE 20 MG: 10 INJECTION, SOLUTION INTRAVENOUS at 21:37

## 2019-04-02 RX ADMIN — MEXILETINE HYDROCHLORIDE 150 MG: 150 CAPSULE ORAL at 21:37

## 2019-04-02 RX ADMIN — LEVETIRACETAM 750 MG: 500 TABLET, FILM COATED ORAL at 21:36

## 2019-04-02 RX ADMIN — OXYCODONE HYDROCHLORIDE 15 MG: 5 TABLET ORAL at 09:26

## 2019-04-02 ASSESSMENT — PAIN DESCRIPTION - PAIN TYPE
TYPE: ACUTE PAIN

## 2019-04-02 ASSESSMENT — PAIN SCALES - GENERAL
PAINLEVEL_OUTOF10: 7
PAINLEVEL_OUTOF10: 7
PAINLEVEL_OUTOF10: 3
PAINLEVEL_OUTOF10: 5
PAINLEVEL_OUTOF10: 7
PAINLEVEL_OUTOF10: 8
PAINLEVEL_OUTOF10: 7
PAINLEVEL_OUTOF10: 9
PAINLEVEL_OUTOF10: 8
PAINLEVEL_OUTOF10: 4
PAINLEVEL_OUTOF10: 7
PAINLEVEL_OUTOF10: 8
PAINLEVEL_OUTOF10: 0
PAINLEVEL_OUTOF10: 9
PAINLEVEL_OUTOF10: 9
PAINLEVEL_OUTOF10: 5
PAINLEVEL_OUTOF10: 7

## 2019-04-02 ASSESSMENT — PAIN DESCRIPTION - FREQUENCY
FREQUENCY: CONTINUOUS

## 2019-04-02 ASSESSMENT — PAIN DESCRIPTION - ORIENTATION
ORIENTATION: LEFT

## 2019-04-02 ASSESSMENT — PAIN DESCRIPTION - PROGRESSION
CLINICAL_PROGRESSION: NOT CHANGED

## 2019-04-02 ASSESSMENT — PAIN DESCRIPTION - DESCRIPTORS
DESCRIPTORS: DISCOMFORT
DESCRIPTORS: DISCOMFORT;CONSTANT

## 2019-04-02 ASSESSMENT — PAIN DESCRIPTION - ONSET
ONSET: ON-GOING

## 2019-04-02 ASSESSMENT — PAIN DESCRIPTION - LOCATION
LOCATION: ABDOMEN

## 2019-04-02 NOTE — PROGRESS NOTES
Desmond Ferreira is a 63 y.o. female patient has mild pain     Current Facility-Administered Medications   Medication Dose Route Frequency Provider Last Rate Last Dose    morphine sulfate (PF) injection 4 mg  4 mg Intravenous Q4H PRN Gracie Quan MD   4 mg at 04/02/19 0150    oxyCODONE (ROXICODONE) immediate release tablet 15 mg  15 mg Oral Q4H PRN Gracie Quan MD   15 mg at 04/02/19 0454    linezolid (ZYVOX) IVPB 600 mg  600 mg Intravenous Q12H Alecia Dhaliwal MD   Stopped at 04/01/19 2321    sodium chloride flush 0.9 % injection 10 mL  10 mL Intravenous 2 times per day JESSE Norwood - NP   10 mL at 04/01/19 2046    sodium chloride flush 0.9 % injection 10 mL  10 mL Intravenous PRN JESSE Norwood - NP        ondansetron [facility]) injection 4 mg  4 mg Intravenous Q6H PRN Bassem Fajardo APRN - NP   4 mg at 04/01/19 1857    enoxaparin (LOVENOX) injection 40 mg  40 mg Subcutaneous Daily JESSE Norwood - NP   40 mg at 04/01/19 1036    famotidine (PEPCID) injection 20 mg  20 mg Intravenous BID JESSE Norwood - NP   20 mg at 04/01/19 2045    acetaminophen (TYLENOL) tablet 650 mg  650 mg Oral Q4H PRN JESSE Norwood - NP        potassium chloride (KLOR-CON M) extended release tablet 40 mEq  40 mEq Oral PRN JESSE Norwood - NP        Or    potassium chloride (KLOR-CON) packet 40 mEq  40 mEq Oral PRN JESSE Norwood - NP        Or    potassium chloride 10 mEq/100 mL IVPB (Peripheral Line)  10 mEq Intravenous PRN JESSE Norwood - NP        metoprolol succinate (TOPROL XL) extended release tablet 12.5 mg  12.5 mg Oral Daily Bassemallyssa Fajardo APRN - NP   12.5 mg at 04/01/19 1041    clopidogrel (PLAVIX) tablet 75 mg  75 mg Oral Daily JESSE Norwood - NP   75 mg at 04/01/19 1035    DULoxetine (CYMBALTA) extended release capsule 60 mg  60 mg Oral BID JESSE Norwood NP   60 mg at 04/01/19 2046    ranolazine (RANEXA) extended release tablet 1,000 mg  1,000 mg Oral BID JESSE Zhu Charly NP   1,000 mg at 04/01/19 2046    lisinopril (PRINIVIL;ZESTRIL) tablet 2.5 mg  2.5 mg Oral Daily JESSE Zhu Charly NP   2.5 mg at 04/01/19 1040     Facility-Administered Medications Ordered in Other Encounters   Medication Dose Route Frequency Provider Last Rate Last Dose    oxyCODONE (ROXICODONE) immediate release tablet 15 mg  15 mg Oral Once Darlene Gentile MD         Allergies   Allergen Reactions    Baclofen     Fentanyl Swelling    Ibuprofen      \"Due to heart problems\"    Methocarbamol      Worsening edema    Nsaids      \"Due to heart problems\"    Tizanidine     Tolmetin      \"Due to heart problems\"    Tramadol Other (See Comments)     Doctor doesn't her to take due to heart problems  Seizures   Doctor doesn't her to take due to lowers seizure threshold.  Avelox [Moxifloxacin Hcl In Nacl] Swelling and Rash    Betadine [Povidone Iodine] Rash    Moxifloxacin Rash and Swelling     Lips swell and tingling in face      Active Problems:    Abdominal pain    Feeding tube dysfunction    Abdominal pain, epigastric    Gastrojejunostomy tube status (HCC)    Chronic malnutrition (HCC)  Resolved Problems:    * No resolved hospital problems. *    Blood pressure (!) 99/57, pulse 63, temperature 97.2 °F (36.2 °C), temperature source Oral, resp. rate 16, height 5' 6\" (1.676 m), weight 126 lb (57.2 kg), SpO2 100 %. Subjective:  Symptoms:  Stable. Diet:  Adequate intake. Pain:  She complains of pain that is mild. Objective:  General Appearance:  Comfortable. Vital signs: (most recent): Blood pressure (!) 99/55, pulse 57, temperature 97.8 °F (36.6 °C), temperature source Oral, resp. rate 16, height 5' 6\" (1.676 m), weight 124 lb 6.4 oz (56.4 kg), SpO2 97 %. Vital signs are normal.    HEENT: Normal HEENT exam.    Heart: Normal rate. Abdomen: Abdomen is soft. (PEG tube with mild discomfort).   Bowel sounds are normal.     Extremities: Decreased range of motion. Neurological: Patient is alert. Pupils:  Pupils are equal, round, and reactive to light. Skin:  Warm.       Assessment & Plan  abd pain with GJ site peristomal cellulitis  -ID consulted  -zyvox  -wound cx  -CT with no acute complication  Hx of MRSA   -no surgery  HTN  -BB and stop ACE as SBP 90s  Hx of malabsorption  -GJ tube placement   Macrocytic anemia  -B12/folate recheck  Hypokalemia  -recheck and check mag  Edema  -doppler neg for DVT right lower ext  DVT prophylaxis  -lovenox        [facility], MD  4/2/2019

## 2019-04-02 NOTE — PROGRESS NOTES
Speech Language Pathology  Facility/Department: [address] [place] [address]   BEDSIDE SWALLOW EVALUATION    NAME: Ana Pandya  : 1960  MRN: [id number]    Impression  Dysphagia Diagnosis: Swallow function appears grossly intact  Dysphagia Outcome Severity Scale: Level 7: Normal in all situations     Ana Pandya was seen for a clinical bedside swallow evaluation following admission for PEG tube cellulitis/infection. Pt has had multiple admission for same. H/o Gastric bypass, GERD, malabsorption, s/o Maru-en Y, and CVA. Pt denies h/o dysphagia or current difficulty swallowing and indicates that PEG tube is for nutrition only. Pt has been seen on prior admission for swallowing assessment which was WNL. Pt was alert, pleasant, and cooperative. Oromotor exam was WNL with no focal or generalized weakness. PO trials of regular solids and thins given with normal oropharyngeal swallow. Recommend:  Regular/Thins. No needs identified at this time. ADMISSION DATE: 3/31/2019  ADMITTING DIAGNOSIS: has Chest pain; Chest pain; CAD (coronary artery disease); Hyperlipidemia; Thyroid disease; Acute exacerbation of CHF (congestive heart failure) ([address] CHF exacerbation ([address] Congestive heart failure ([address] Left upper quadrant pain; Pneumonia of left lower lobe due to infectious organism [facility]); Angina at rest [facility]); Coronary artery disease involving coronary bypass graft of native heart with angina pectoris ([address] Shortness of breath; Ischemic cardiomyopathy; Acute metabolic encephalopathy; Acute delirium; Pneumonia; Sacral pain; Acute on chronic systolic CHF (congestive heart failure), NYHA class 3 ([address] Severe malnutrition ([address] Cellulitis; Cellulitis at gastrostomy tube site [facility]); History of Maru-en-Y gastric bypass; Gastroparesis; Abdominal pain; Feeding tube dysfunction;  Abdominal pain, epigastric; Gastrojejunostomy tube status ([address] and Chronic malnutrition ([address] on their problem list.  ONSET DATE: Motor Deficits  Oral/Motor  Oral Motor:  Within functional limits    Oral Phase Dysfunction  Oral Phase  Oral Phase: WNL     Indicators of Pharyngeal Phase Dysfunction   Pharyngeal Phase  Pharyngeal Phase: WNL    Prognosis  Prognosis  Prognosis for safe diet advancement: excellent  Individuals consulted  Consulted and agree with results and recommendations: Patient    Education  Patient Education: results WNL  Patient Education Response: Demonstrated understanding      G-Code            Therapy Time  SLP Individual Minutes  Time In: 3808  Time Out: [facility]  Minutes: Roxy Park, SLP  4/2/2019 10:51 AM

## 2019-04-02 NOTE — PROGRESS NOTES
[facility]    PATIENT: Irving Simmons, 63 y.o., female, MRN: [id number]    Hospital Day:  LOS: 2 days     Irving Simmons is a 63 y.o. female with presenting with drainage around her G tube and concern for infection. She has a complex history including remote tatiana en Y gastric bypass with later failure to thrive with excessive weight loss requiring enteral nutrition. 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 She has had the G-J tube exchanged several times, most recently had it replaced at [facility] on 3/25/19 after a failed IR replacement at [facility]. In the interval, she was seen at [facility] for possible infection around the G-J tube site, and was transferred to [facility] for endoscopic replacement of the G-J tube (we don't have the correct scope at [facility] to do it). She now returns with concerns for purulent drainage around the G-J tube              Subjective:  Chief Complaint: abdominal pain  Pain: 3/10  BM: yes   Diet: Diet Tube Feed Continuous/Cyclic w/ Diet  DIET GENERAL; Activity: as tolerated    The drainage and redness around her tube is some better today. She is still having pain.      Objective:    Vitals: BP (!) 99/55   Pulse 57   Temp 97.8 °F (36.6 °C) (Oral)   Resp 16   Ht 5' 6\" (1.676 m)   Wt 124 lb 6.4 oz (56.4 kg)   SpO2 97%   BMI 20.08 kg/m²   Vital Signs (Last 24 Hours)  Temp  Av.9 °F (36.6 °C)  Min: 97.2 °F (36.2 °C)  Max: 98.5 °F (36.9 °C)  Pulse  Av.2  Min: 54  Max: 63  BP  Min: 93/50  Max: 130/69  Resp  Av.2  Min: 16  Max: 17  SpO2  Av.7 %  Min: 97 %  Max: 100 %  Wt Readings from Last 3 Encounters:   19 124 lb 6.4 oz (56.4 kg)   19 122 lb (55.3 kg)   19 122 lb (55.3 kg)       I/O:  07 -  07  In: 270 [P.O.:240]  Out: 275     IV Fluids:     Scheduled Meds: linezolid, 600 mg, Intravenous, Q12H    sodium chloride flush, 10 mL, Intravenous, 2 times per day    enoxaparin, 40 mg, Subcutaneous, Daily    famotidine (PEPCID) injection, 20 mg, Intravenous, BID    metoprolol succinate, 12.5 mg, Oral, Daily    clopidogrel, 75 mg, Oral, Daily    DULoxetine, 60 mg, Oral, BID    ranolazine, 1,000 mg, Oral, BID    Physical Exam:  General Appearance:   Alert, cooperative, no distress    Head:   Normocephalic, atraumatic    Lungs:    Equal chest rise, respirations unlabored   Heart:   Regular rate and rhythm    Abdomen:    Soft, minimally-tender around GJ tube, no rebound or guarding. Dermatitis/erythema around [address] tube improving.     Extremities:  No cyanosis or edema    Neurologic:  Nonfocal, grossly intact        Labs/Imaging Results:   Recent Results (from the past 24 hour(s))   Comprehensive Metabolic Panel    Collection Time: 04/01/19  3:15 PM   Result Value Ref Range    Sodium 138 135 - 145 MMOL/L    Potassium 3.7 3.5 - 5.1 MMOL/L    Chloride 106 99 - 110 mMol/L    CO2 22 21 - 32 MMOL/L    BUN 8 6 - 23 MG/DL    CREATININE 0.6 0.6 - 1.1 MG/DL    Glucose 98 70 - 99 MG/DL    Calcium 8.9 8.3 - 10.6 MG/DL    Alb 3.5 3.4 - 5.0 GM/DL    Total Protein 5.1 (L) 6.4 - 8.2 GM/DL    Total Bilirubin 0.2 0.0 - 1.0 MG/DL    ALT 18 10 - 40 U/L    AST 24 15 - 37 IU/L    Alkaline Phosphatase 94 40 - 128 IU/L    GFR Non-African American >60 >60 mL/min/1.73m2    GFR African American >60 >60 mL/min/1.73m2    Anion Gap 10 4 - 16   CBC    Collection Time: 04/02/19  6:05 AM   Result Value Ref Range    WBC 4.1 4.0 - 10.5 K/CU MM    RBC 2.73 (L) 4.2 - 5.4 M/CU MM    Hemoglobin 9.0 (L) 12.5 - 16.0 GM/DL    Hematocrit 28.6 (L) 37 - 47 %    .8 (H) 78 - 100 FL    MCH 33.0 (H) 27 - 31 PG    MCHC 31.5 (L) 32.0 - 36.0 %    RDW 16.0 (H) 11.7 - 14.9 %    Platelets 806 366 - 952 K/CU MM    MPV 10.2 7.5 - 11.1 FL   Phosphorus    Collection Time: 04/02/19  6:05 AM   Result Value Ref Range    Phosphorus 4.5 2.5 - 4.9 MG/DL

## 2019-04-02 NOTE — PROGRESS NOTES
MMOL/L    Potassium 3.7 3.5 - 5.1 MMOL/L    Chloride 106 99 - 110 mMol/L    CO2 22 21 - 32 MMOL/L    BUN 8 6 - 23 MG/DL    CREATININE 0.6 0.6 - 1.1 MG/DL    Glucose 98 70 - 99 MG/DL    Calcium 8.9 8.3 - 10.6 MG/DL    Alb 3.5 3.4 - 5.0 GM/DL    Total Protein 5.1 (L) 6.4 - 8.2 GM/DL    Total Bilirubin 0.2 0.0 - 1.0 MG/DL    ALT 18 10 - 40 U/L    AST 24 15 - 37 IU/L    Alkaline Phosphatase 94 40 - 128 IU/L    GFR Non-African American >60 >60 mL/min/1.73m2    GFR African American >60 >60 mL/min/1.73m2    Anion Gap 10 4 - 16   CBC    Collection Time: 04/02/19  6:05 AM   Result Value Ref Range    WBC 4.1 4.0 - 10.5 K/CU MM    RBC 2.73 (L) 4.2 - 5.4 M/CU MM    Hemoglobin 9.0 (L) 12.5 - 16.0 GM/DL    Hematocrit 28.6 (L) 37 - 47 %    .8 (H) 78 - 100 FL    MCH 33.0 (H) 27 - 31 PG    MCHC 31.5 (L) 32.0 - 36.0 %    RDW 16.0 (H) 11.7 - 14.9 %    Platelets 459 695 - 491 K/CU MM    MPV 10.2 7.5 - 11.1 FL   Phosphorus    Collection Time: 04/02/19  6:05 AM   Result Value Ref Range    Phosphorus 4.5 2.5 - 4.9 MG/DL   Magnesium    Collection Time: 04/02/19  6:05 AM   Result Value Ref Range    Magnesium 2.0 1.8 - 2.4 mg/dl     CULTURE results: Invalid input(s): BLOOD CULTURE,  URINE CULTURE, SURGICAL CULTURE    Diagnosis:  Patient Active Problem List   Diagnosis    Chest pain    Chest pain    CAD (coronary artery disease)    Hyperlipidemia    Thyroid disease    Acute exacerbation of CHF (congestive heart failure) (HCC)    CHF exacerbation (HCC)    Congestive heart failure ([address]    Left upper quadrant pain    Pneumonia of left lower lobe due to infectious organism [facility]    Angina at rest [facility])    Coronary artery disease involving coronary bypass graft of native heart with angina pectoris ([facility])    Shortness of breath    Ischemic cardiomyopathy    Acute metabolic encephalopathy    Acute delirium    Pneumonia    Sacral pain    Acute on chronic systolic CHF (congestive heart failure), NYHA class 3 ([facility])    Severe malnutrition ([facility]    Cellulitis    Cellulitis at gastrostomy tube site [facility])    History of Maru-en-Y gastric bypass    Gastroparesis    Abdominal pain    Feeding tube dysfunction    Abdominal pain, epigastric    Gastrojejunostomy tube status (HCC)    Chronic malnutrition (HCC)       Active Problems  Active Problems:    Abdominal pain    Feeding tube dysfunction    Abdominal pain, epigastric    Gastrojejunostomy tube status (HCC)    Chronic malnutrition ([address]  Resolved Problems:    * No resolved hospital problems.  *    Electronically signed by: Electronically signed by Aren Vaca MD on 4/2/2019 at 8:29 AM

## 2019-04-02 NOTE — CONSULTS
meals) 90 capsule 0    chlorhexidine gluconate (ANTISEPTIC SKIN CLEANSER) 4 % SOLN external solution Apply topically daily 473 mL 1    magnesium oxide (MAG-OX) 400 MG tablet Take 800 mg by mouth 2 times daily      metoprolol succinate (TOPROL XL) 25 MG extended release tablet Take 12.5 mg by mouth daily      nystatin (MYCOSTATIN) 867198 UNIT/GM powder Apply 1 g topically daily To stomach      mexiletine (MEXITIL) 150 MG capsule Take 150 mg by mouth 3 times daily      docusate sodium (COLACE) 100 MG capsule Take 100 mg by mouth 2 times daily as needed for Constipation      famotidine (PEPCID) 20 MG tablet Take 1 tablet by mouth 2 times daily 60 tablet 3    levETIRAcetam (KEPPRA) 750 MG tablet Take 750 mg by mouth 2 times daily      albuterol (PROVENTIL) (2.5 MG/3ML) 0.083% nebulizer solution Take 2.5 mg by nebulization every 4 hours as needed for Wheezing      torsemide (DEMADEX) 20 MG tablet Take 20 mg by mouth 2 times daily      atorvastatin (LIPITOR) 80 MG tablet Take 80 mg by mouth nightly      DULoxetine (CYMBALTA) 60 MG extended release capsule Take 60 mg by mouth 2 times daily      Pediatric Multivitamins-Iron (FLINTSTONES PLUS IRON) CHEW Take 1 tablet by mouth 4 times daily      Omega-3 1000 MG CAPS Take 1,000 mg by mouth nightly      potassium chloride (KLOR-CON M) 20 MEQ extended release tablet Take 20 mEq by mouth daily      ranolazine (RANEXA) 1000 MG extended release tablet Take 1,000 mg by mouth 2 times daily      senna-docusate (PERICOLACE) 8.6-50 MG per tablet Take 1 tablet by mouth daily      promethazine (PHENERGAN) 25 MG tablet Take 25 mg by mouth every 6 hours as needed for Nausea      gabapentin (NEURONTIN) 300 MG capsule Take 300 mg by mouth 3 times daily.  [facility] clopidogrel (PLAVIX) 75 MG tablet Take 75 mg by mouth daily      lisinopril (PRINIVIL;ZESTRIL) 2.5 MG tablet Take 2.5 mg by mouth daily      oxyCODONE (OXY-IR) 15 MG immediate release tablet Take 1 tablet by mouth every 4 hours as needed for Pain . 30 tablet 0    acetaminophen 650 MG TABS Take 650 mg by mouth every 4 hours as needed 120 tablet 3    ferrous gluconate (FERGON) 324 (38 FE) MG tablet Take 324 mg by mouth 2 times daily       aspirin 81 MG chewable tablet Take 81 mg by mouth daily.  interferon beta-1b (BETASERON) 0.3 MG injection Inject 0.25 mg into the skin every 30 days 04/25/18 Last known dose given 04/19/18 per MAR      vitamin B-12 (CYANOCOBALAMIN) 1000 MCG tablet Inject 1,000 mcg into the muscle every 30 days 04/25/18 Given on the 28 th of each month      albuterol (PROVENTIL HFA;VENTOLIN HFA) 108 (90 BASE) MCG/ACT inhaler Inhale 2 puffs into the lungs every 6 hours as needed.         vitamin D (ERGOCALCIFEROL) 13233 UNIT CAPS capsule Take 50,000 Units by mouth Twice a Week Take on Mondays and on Fridays           Objective:      BP (!) 99/55   Pulse 57   Temp 97.8 °F (36.6 °C) (Oral)   Resp 16   Ht 5' 6\" (1.676 m)   Wt 124 lb 6.4 oz (56.4 kg)   SpO2 97%   BMI 20.08 kg/m²   Donal Risk Score: Donal Scale Score: 19    LABS    CBC:   Lab Results   Component Value Date    WBC 4.1 04/02/2019    RBC 2.73 04/02/2019    HGB 9.0 04/02/2019    HCT 28.6 04/02/2019    .8 04/02/2019    MCH 33.0 04/02/2019    MCHC 31.5 04/02/2019    RDW 16.0 04/02/2019     04/02/2019    MPV 10.2 04/02/2019     CMP:    Lab Results   Component Value Date     04/01/2019    K 3.7 04/01/2019     04/01/2019    CO2 22 04/01/2019    BUN 8 04/01/2019    CREATININE 0.6 04/01/2019    GFRAA >60 04/01/2019    LABGLOM >60 04/01/2019    GLUCOSE 98 04/01/2019    PROT 5.1 04/01/2019    PROT 6.3 02/21/2013    LABALBU 3.5 04/01/2019    CALCIUM 8.9 04/01/2019    BILITOT 0.2 04/01/2019    ALKPHOS 94 04/01/2019    AST 24 04/01/2019    ALT 18 04/01/2019     Albumin:    Lab Results   Component Value Date    LABALBU 3.5 04/01/2019     PT/INR:    Lab Results   Component Value Date    PROTIME 12.2 12/19/2018    PROTIME Distance (cm) 0 3/21/2019 11:24 AM   Wound Assessment PHILIP 4/2/2019  8:30 AM   Drainage Amount None 4/2/2019  8:30 AM   Drainage Description Serosanguinous 3/21/2019 11:24 AM   Odor None 3/22/2019  9:18 PM   Margins Undefined edges 3/21/2019 11:24 AM   Luz Elena-wound Assessment Intact 3/21/2019 11:24 AM   [place]%Wound Bed 0 3/21/2019 11:24 AM   Red%Wound Bed 100 3/21/2019 11:24 AM   Yellow%Wound Bed 0 3/21/2019 11:24 AM   Black%Wound Bed 0 3/21/2019 11:24 AM   Purple%Wound Bed 0 3/21/2019 11:24 AM   Other%Wound Bed 0 3/21/2019 11:24 AM   Culture Taken No 3/21/2019 10:49 PM   Number of days: 12       Response to treatment:  Well tolerated by patient. Pain Assessment:  Severity:  0  Quality of pain: none  Wound Pain Timing/Severity:   Premedicated: no    Plan:     Plan of Care:  wound care assessment for g-tube has some yellow drainage around tube noted. Skin intact not open cleanse with soap and water or saline apply calazime then  cover with aquacel change daily     Specialty Bed Required : no  [] Low Air Loss   [] Pressure Redistribution  [] Fluid Immersion  [] Bariatric  [] Total Pressure Relief  [] Other:     Discharge Plan:  Placement for patient upon discharge: To be determined  Hospice Care: no  Patient appropriate for Outpatient [address]: no    Patient/Caregiver Teaching:  Level of patient/caregiver understanding able to: patient verbalized understanding        Electronically signed by Maximus Blair RN,  on 4/2/2019 at 4:14 PM

## 2019-04-03 LAB
FOLATE: >20 NG/ML (ref 3.1–17.5)
PROCALCITONIN: <0.07 NG/ML
T4 FREE: 0.67 NG/DL (ref 0.9–1.8)
TSH HIGH SENSITIVITY: 10.63 UIU/ML (ref 0.27–4.2)
VITAMIN B-12: 365.8 PG/ML (ref 211–911)

## 2019-04-03 PROCEDURE — 96367 TX/PROPH/DG ADDL SEQ IV INF: CPT

## 2019-04-03 PROCEDURE — 6360000002 HC RX W HCPCS: Performed by: INTERNAL MEDICINE

## 2019-04-03 PROCEDURE — 99232 SBSQ HOSP IP/OBS MODERATE 35: CPT | Performed by: INTERNAL MEDICINE

## 2019-04-03 PROCEDURE — 84443 ASSAY THYROID STIM HORMONE: CPT

## 2019-04-03 PROCEDURE — 2500000003 HC RX 250 WO HCPCS: Performed by: NURSE PRACTITIONER

## 2019-04-03 PROCEDURE — 84439 ASSAY OF FREE THYROXINE: CPT

## 2019-04-03 PROCEDURE — 96372 THER/PROPH/DIAG INJ SC/IM: CPT

## 2019-04-03 PROCEDURE — 2580000003 HC RX 258: Performed by: NURSE PRACTITIONER

## 2019-04-03 PROCEDURE — 82746 ASSAY OF FOLIC ACID SERUM: CPT

## 2019-04-03 PROCEDURE — 94761 N-INVAS EAR/PLS OXIMETRY MLT: CPT

## 2019-04-03 PROCEDURE — 96376 TX/PRO/DX INJ SAME DRUG ADON: CPT

## 2019-04-03 PROCEDURE — 82607 VITAMIN B-12: CPT

## 2019-04-03 PROCEDURE — 6370000000 HC RX 637 (ALT 250 FOR IP): Performed by: NURSE PRACTITIONER

## 2019-04-03 PROCEDURE — 6360000002 HC RX W HCPCS: Performed by: NURSE PRACTITIONER

## 2019-04-03 PROCEDURE — 2580000003 HC RX 258: Performed by: INTERNAL MEDICINE

## 2019-04-03 PROCEDURE — 1200000000 HC SEMI PRIVATE

## 2019-04-03 PROCEDURE — 6370000000 HC RX 637 (ALT 250 FOR IP): Performed by: INTERNAL MEDICINE

## 2019-04-03 PROCEDURE — 96366 THER/PROPH/DIAG IV INF ADDON: CPT

## 2019-04-03 RX ADMIN — ASPIRIN 81 MG CHEWABLE TABLET 81 MG: 81 TABLET CHEWABLE at 08:46

## 2019-04-03 RX ADMIN — CLOPIDOGREL BISULFATE 75 MG: 75 TABLET ORAL at 08:46

## 2019-04-03 RX ADMIN — LINEZOLID 600 MG: 600 INJECTION, SOLUTION INTRAVENOUS at 10:42

## 2019-04-03 RX ADMIN — ENOXAPARIN SODIUM 40 MG: 40 INJECTION SUBCUTANEOUS at 08:47

## 2019-04-03 RX ADMIN — GABAPENTIN 300 MG: 300 CAPSULE ORAL at 05:34

## 2019-04-03 RX ADMIN — LEVETIRACETAM 750 MG: 500 TABLET, FILM COATED ORAL at 08:57

## 2019-04-03 RX ADMIN — Medication 1 CAPSULE: at 11:54

## 2019-04-03 RX ADMIN — LEVETIRACETAM 750 MG: 500 TABLET, FILM COATED ORAL at 21:48

## 2019-04-03 RX ADMIN — MORPHINE SULFATE 4 MG: 4 INJECTION INTRAVENOUS at 23:25

## 2019-04-03 RX ADMIN — OXYCODONE HYDROCHLORIDE 15 MG: 5 TABLET ORAL at 17:14

## 2019-04-03 RX ADMIN — RANOLAZINE 1000 MG: 500 TABLET, FILM COATED, EXTENDED RELEASE ORAL at 08:49

## 2019-04-03 RX ADMIN — OXYCODONE HYDROCHLORIDE 15 MG: 5 TABLET ORAL at 13:04

## 2019-04-03 RX ADMIN — FAMOTIDINE 20 MG: 10 INJECTION, SOLUTION INTRAVENOUS at 08:44

## 2019-04-03 RX ADMIN — LEVOTHYROXINE SODIUM 137 MCG: 25 TABLET ORAL at 05:35

## 2019-04-03 RX ADMIN — FERROUS GLUCONATE TAB 324 MG (37.5 MG ELEMENTAL IRON) 324 MG: 324 (37.5 FE) TAB at 08:56

## 2019-04-03 RX ADMIN — GABAPENTIN 300 MG: 300 CAPSULE ORAL at 21:49

## 2019-04-03 RX ADMIN — OXYCODONE HYDROCHLORIDE 15 MG: 5 TABLET ORAL at 07:20

## 2019-04-03 RX ADMIN — MORPHINE SULFATE 4 MG: 4 INJECTION INTRAVENOUS at 00:28

## 2019-04-03 RX ADMIN — OXYCODONE HYDROCHLORIDE 15 MG: 5 TABLET ORAL at 02:08

## 2019-04-03 RX ADMIN — MEROPENEM 2 G: 1 INJECTION, POWDER, FOR SOLUTION INTRAVENOUS at 13:36

## 2019-04-03 RX ADMIN — DULOXETINE 60 MG: 30 CAPSULE, DELAYED RELEASE ORAL at 08:47

## 2019-04-03 RX ADMIN — Medication 800 MG: at 21:49

## 2019-04-03 RX ADMIN — MORPHINE SULFATE 4 MG: 4 INJECTION INTRAVENOUS at 10:40

## 2019-04-03 RX ADMIN — MORPHINE SULFATE 4 MG: 4 INJECTION INTRAVENOUS at 19:06

## 2019-04-03 RX ADMIN — FAMOTIDINE 20 MG: 10 INJECTION, SOLUTION INTRAVENOUS at 21:49

## 2019-04-03 RX ADMIN — Medication 1 CAPSULE: at 17:13

## 2019-04-03 RX ADMIN — Medication 1 CAPSULE: at 08:48

## 2019-04-03 RX ADMIN — DULOXETINE 60 MG: 30 CAPSULE, DELAYED RELEASE ORAL at 21:49

## 2019-04-03 RX ADMIN — FERROUS GLUCONATE TAB 324 MG (37.5 MG ELEMENTAL IRON) 324 MG: 324 (37.5 FE) TAB at 17:13

## 2019-04-03 RX ADMIN — GABAPENTIN 300 MG: 300 CAPSULE ORAL at 13:43

## 2019-04-03 RX ADMIN — MORPHINE SULFATE 4 MG: 4 INJECTION INTRAVENOUS at 04:39

## 2019-04-03 RX ADMIN — MEXILETINE HYDROCHLORIDE 150 MG: 150 CAPSULE ORAL at 05:35

## 2019-04-03 RX ADMIN — SODIUM CHLORIDE, PRESERVATIVE FREE 10 ML: 5 INJECTION INTRAVENOUS at 21:50

## 2019-04-03 RX ADMIN — RANOLAZINE 1000 MG: 500 TABLET, FILM COATED, EXTENDED RELEASE ORAL at 21:49

## 2019-04-03 RX ADMIN — SODIUM CHLORIDE, PRESERVATIVE FREE 10 ML: 5 INJECTION INTRAVENOUS at 08:49

## 2019-04-03 RX ADMIN — Medication 800 MG: at 08:48

## 2019-04-03 RX ADMIN — MEROPENEM 2 G: 1 INJECTION, POWDER, FOR SOLUTION INTRAVENOUS at 23:21

## 2019-04-03 RX ADMIN — MEXILETINE HYDROCHLORIDE 150 MG: 150 CAPSULE ORAL at 21:48

## 2019-04-03 RX ADMIN — LINEZOLID 600 MG: 600 INJECTION, SOLUTION INTRAVENOUS at 21:48

## 2019-04-03 RX ADMIN — MEXILETINE HYDROCHLORIDE 150 MG: 150 CAPSULE ORAL at 13:43

## 2019-04-03 RX ADMIN — OXYCODONE HYDROCHLORIDE 15 MG: 5 TABLET ORAL at 21:49

## 2019-04-03 ASSESSMENT — PAIN SCALES - GENERAL
PAINLEVEL_OUTOF10: 0
PAINLEVEL_OUTOF10: 0
PAINLEVEL_OUTOF10: 8
PAINLEVEL_OUTOF10: 9
PAINLEVEL_OUTOF10: 8
PAINLEVEL_OUTOF10: 9
PAINLEVEL_OUTOF10: 8
PAINLEVEL_OUTOF10: 8
PAINLEVEL_OUTOF10: 9
PAINLEVEL_OUTOF10: 8
PAINLEVEL_OUTOF10: 0
PAINLEVEL_OUTOF10: 4
PAINLEVEL_OUTOF10: 8

## 2019-04-03 ASSESSMENT — PAIN DESCRIPTION - PROGRESSION
CLINICAL_PROGRESSION: NOT CHANGED
CLINICAL_PROGRESSION: GRADUALLY WORSENING
CLINICAL_PROGRESSION: NOT CHANGED
CLINICAL_PROGRESSION: GRADUALLY WORSENING
CLINICAL_PROGRESSION: NOT CHANGED
CLINICAL_PROGRESSION: GRADUALLY WORSENING
CLINICAL_PROGRESSION: GRADUALLY WORSENING
CLINICAL_PROGRESSION: NOT CHANGED

## 2019-04-03 ASSESSMENT — PAIN DESCRIPTION - PAIN TYPE
TYPE: ACUTE PAIN

## 2019-04-03 ASSESSMENT — PAIN DESCRIPTION - LOCATION
LOCATION: ABDOMEN

## 2019-04-03 ASSESSMENT — PAIN - FUNCTIONAL ASSESSMENT
PAIN_FUNCTIONAL_ASSESSMENT: ACTIVITIES ARE NOT PREVENTED

## 2019-04-03 ASSESSMENT — PAIN DESCRIPTION - FREQUENCY
FREQUENCY: CONTINUOUS

## 2019-04-03 ASSESSMENT — PAIN DESCRIPTION - ORIENTATION
ORIENTATION: MID
ORIENTATION: RIGHT;MID
ORIENTATION: MID
ORIENTATION: MID;LOWER

## 2019-04-03 ASSESSMENT — PAIN DESCRIPTION - ONSET
ONSET: ON-GOING

## 2019-04-03 ASSESSMENT — PAIN DESCRIPTION - DESCRIPTORS
DESCRIPTORS: SHARP

## 2019-04-03 NOTE — PROGRESS NOTES
Ramos Marin is a 63 y.o. female patient feels weak this morning    Current Facility-Administered Medications   Medication Dose Route Frequency Provider Last Rate Last Dose    albuterol sulfate  (90 Base) MCG/ACT inhaler 2 puff  2 puff Inhalation Q6H PRN Jarome Mylar, APRN - NP        albuterol (PROVENTIL) nebulizer solution 2.5 mg  2.5 mg Nebulization Q4H PRN Jarome Mylar, APRN - NP        aspirin chewable tablet 81 mg  81 mg Oral Daily Jarome Mylar, APRN - NP   81 mg at 04/02/19 1647    gabapentin (NEURONTIN) capsule 300 mg  300 mg Oral TID Jarome Mylar, APRN - NP   300 mg at 04/03/19 0534    ferrous gluconate 324 (37.5 Fe) MG tablet 324 mg  324 mg Oral BID WC Jarome Mylar, APRN - NP   324 mg at 04/02/19 1724    docusate sodium (COLACE) capsule 100 mg  100 mg Oral BID PRN Jarome Mylar, APRN - NP        lactobacillus (CULTURELLE) capsule 1 capsule  1 capsule Oral TID WC Jarome Mylar, APRN - NP   1 capsule at 04/02/19 1647    levETIRAcetam (KEPPRA) tablet 750 mg  750 mg Oral BID Jarome Mylar, APRN - NP   750 mg at 04/02/19 2136    levothyroxine (SYNTHROID) tablet 137 mcg  137 mcg Oral Daily Jarome Mylar, APRN - NP   137 mcg at 04/03/19 0535    magnesium oxide (MAG-OX) tablet 800 mg  800 mg Oral BID Jarome Mylar, APRN - NP   800 mg at 04/02/19 2136    mexiletine (MEXITIL) capsule 150 mg  150 mg Oral TID Jarome Mylar, APRN - NP   150 mg at 04/03/19 0535    oxyCODONE (ROXICODONE) immediate release tablet 15 mg  15 mg Oral Q4H PRN Jarome Mylar, APRN - NP        torsemide (DEMADEX) tablet 20 mg  20 mg Oral BID Jarome Mylar, APRN - NP        morphine sulfate (PF) injection 4 mg  4 mg Intravenous Q4H PRN Laverne Mario MD   4 mg at 04/03/19 0439    oxyCODONE (ROXICODONE) immediate release tablet 15 mg  15 mg Oral Q4H PRN Laverne Mario MD   15 mg at 04/03/19 0720    linezolid (ZYVOX) IVPB 600 mg  600 mg Intravenous Q12H Estiven Pack Mukesh Jean MD   Stopped at 04/02/19 2240    sodium chloride flush 0.9 % injection 10 mL  10 mL Intravenous 2 times per day JESSE Zhu - NP   10 mL at 04/02/19 2137    sodium chloride flush 0.9 % injection 10 mL  10 mL Intravenous PRN Neda Newell APRN - NP        ondansetron [facility]) injection 4 mg  4 mg Intravenous Q6H PRN Neda Newell APRN - NP   4 mg at 04/01/19 1857    enoxaparin (LOVENOX) injection 40 mg  40 mg Subcutaneous Daily Neda Newell APRN - NP   40 mg at 04/02/19 0836    famotidine (PEPCID) injection 20 mg  20 mg Intravenous BID JESSE Zhu - NP   20 mg at 04/02/19 2137    acetaminophen (TYLENOL) tablet 650 mg  650 mg Oral Q4H PRN Neda Newell APRN - NP        potassium chloride (KLOR-CON M) extended release tablet 40 mEq  40 mEq Oral PRN JESSE Zhu - NP        Or    potassium chloride (KLOR-CON) packet 40 mEq  40 mEq Oral PRN Neda Newell APRN - NP        Or    potassium chloride 10 mEq/100 mL IVPB (Peripheral Line)  10 mEq Intravenous PRN Neda Newell APRN - NP        metoprolol succinate (TOPROL XL) extended release tablet 12.5 mg  12.5 mg Oral Daily Neda Newell APRN - NP   Stopped at 04/02/19 0900    clopidogrel (PLAVIX) tablet 75 mg  75 mg Oral Daily JESSE Zhu - NP   75 mg at 04/02/19 0836    DULoxetine (CYMBALTA) extended release capsule 60 mg  60 mg Oral BID Neda Newell APRN - NP   60 mg at 04/02/19 2137    ranolazine (RANEXA) extended release tablet 1,000 mg  1,000 mg Oral BID Neda Newell APRN - NP   1,000 mg at 04/02/19 2137     Facility-Administered Medications Ordered in Other Encounters   Medication Dose Route Frequency Provider Last Rate Last Dose    oxyCODONE (ROXICODONE) immediate release tablet 15 mg  15 mg Oral Once Darlene Gentile MD         Allergies   Allergen Reactions    Baclofen     Fentanyl Swelling    Ibuprofen      \"Due to heart problems\"    Methocarbamol Worsening edema    Nsaids      \"Due to heart problems\"    Tizanidine     Tolmetin      \"Due to heart problems\"    Tramadol Other (See Comments)     Doctor doesn't her to take due to heart problems  Seizures   Doctor doesn't her to take due to lowers seizure threshold.  Avelox [Moxifloxacin Hcl In Nacl] Swelling and Rash    Betadine [Povidone Iodine] Rash    Moxifloxacin Rash and Swelling     Lips swell and tingling in face      Active Problems:    Abdominal pain    Feeding tube dysfunction    Abdominal pain, epigastric    Gastrojejunostomy tube status (HCC)    Chronic malnutrition ([facility]    Asplenia  Resolved Problems:    * No resolved hospital problems. *    Blood pressure 107/63, pulse 64, temperature 98.6 °F (37 °C), temperature source Oral, resp. rate 16, height 5' 6\" (1.676 m), weight 134 lb 4.8 oz (60.9 kg), SpO2 99 %. Subjective:  Symptoms:  Stable. Diet:  Adequate intake. Pain:  She complains of pain that is mild. Objective:  General Appearance:  Comfortable. Vital signs: (most recent): Blood pressure 107/63, pulse 64, temperature 98.6 °F (37 °C), temperature source Oral, resp. rate 16, height 5' 6\" (1.676 m), weight 134 lb 4.8 oz (60.9 kg), SpO2 99 %. Vital signs are normal.    HEENT: Normal HEENT exam.    Heart: Normal rate. Abdomen: Abdomen is soft. (PEG tube with mild discomfort). Bowel sounds are normal.     Extremities: Decreased range of motion. Neurological: Patient is alert. Pupils:  Pupils are equal, round, and reactive to light. Skin:  Warm.       Assessment & Plan  abd pain with GJ site peristomal cellulitis  -ID consulted  -zyvox  -wound cx  -CT with no acute complication  Hx of MRSA   -no surgery  HTN  -BB with parameters and stop ACE as SBP 90s  Hx of malabsorption  -GJ tube placement   Macrocytic anemia  -B12/folate wnl  Hypokalemia  -recheck and check mag  Edema  -doppler neg for DVT right lower ext  DVT prophylaxis  -lovenox      More fatigued and

## 2019-04-03 NOTE — PROGRESS NOTES
Infectious Disease Progress Note  4/3/2019   Patient Name: Pete Lau : 1960   Impression  · Peristomal cellulitis at [address] [place] [address]  ? Improving redness  · Gastroparesis/malabsorption status post Maru-en-Y with a PEG tube  · CHF status post AICD  · Multi-morbidity: per PMHx  Plan:  · Continue linezolid,   · Start meropenem  · Follow-up superficial culture  · Discussed with Dr. Emeterio Cardozo on 19, she advises that patient will need to follow-up with [facility]  · Continue barrier cream to [address] [place] [address] to minimize inflammation and maceration        Ongoing Antimicrobial Therapy  Linezolid -  Meropenem 4/? Completed Antimicrobial Therapy  ? History:? Interval history noted: Being seen for peristomal cellulitis/inflammation  Feels better today Denies n/v/d/f or untoward effects of antibiotics  Physical Exam:  Vital Signs: /64   Pulse 66   Temp 97.5 °F (36.4 °C) (Oral)   Resp 16   Ht 5' 6\" (1.676 m)   Wt 134 lb 4.8 oz (60.9 kg)   SpO2 97%   BMI 21.68 kg/m²     Gen: alert and oriented X3, no distress  Skin: no stigmata of endocarditis  Wounds: C/D/I  HEMT: AT/NC Oropharynx pink, moist, and without lesions or exudates; dentition in good state of repair  Eyes: PERRLA, EOMI, conjunctiva pink, sclera anicteric. Neck: Supple. Trachea midline. No LAD. Chest: no distress and CTA. Good air movement. Heart: RRR and no MRG. Abd: Left upper quadrant GEJ tube with peristomal blanchable redness, less macerated, slimy discharge on tube, tender to palpation. soft, non-distended, no tenderness, no hepatomegaly. Normoactive bowel sounds. Ext: no clubbing, cyanosis, or edema  Catheter Site: Right internal jugular CVC without erythema or tenderness  Neuro: Mental status intact. CN 2-12 intact and no focal sensory or motor deficits         Radiologic / Imaging / TESTING  No results found.      Labs:    Recent Results (from the past 24 hour(s))   Vitamin B12 & folate    Collection Time: 19  5:40 AM   Result Value Ref Range    Vitamin B-12 365.8 211 - 911 pg/ml    Folate >20.0 (H) 3.1 - 17.5 NG/ML   TSH without Reflex    Collection Time: 04/03/19  5:40 AM   Result Value Ref Range    TSH, High Sensitivity 10.630 (H) 0.270 - 4.20 uIu/ml   T4, free    Collection Time: 04/03/19  5:40 AM   Result Value Ref Range    T4 Free 0.67 (L) 0.9 - 1.8 NG/DL     CULTURE results: Invalid input(s): BLOOD CULTURE,  URINE CULTURE, SURGICAL CULTURE    Diagnosis:  Patient Active Problem List   Diagnosis    Chest pain    Chest pain    CAD (coronary artery disease)    Hyperlipidemia    Thyroid disease    Acute exacerbation of CHF (congestive heart failure) (HCC)    CHF exacerbation (HCC)    Congestive heart failure (HCC)    Left upper quadrant pain    Pneumonia of left lower lobe due to infectious organism ([facility]    Angina at rest [facility])    Coronary artery disease involving coronary bypass graft of native heart with angina pectoris ([facility])    Shortness of breath    Ischemic cardiomyopathy    Acute metabolic encephalopathy    Acute delirium    Pneumonia    Sacral pain    Acute on chronic systolic CHF (congestive heart failure), NYHA class 3 (HCC)    Severe malnutrition (HCC)    Cellulitis    Cellulitis at gastrostomy tube site ([address]    History of Maru-en-Y gastric bypass    Gastroparesis    Abdominal pain    Feeding tube dysfunction    Abdominal pain, epigastric    Gastrojejunostomy tube status (HCC)    Chronic malnutrition (HCC)    Asplenia       Active Problems  Active Problems:    Abdominal pain    Feeding tube dysfunction    Abdominal pain, epigastric    Gastrojejunostomy tube status (HCC)    Chronic malnutrition ([address]    Asplenia  Resolved Problems:    * No resolved hospital problems.  *    Electronically signed by: Electronically signed by Sky Maki MD on 4/3/2019 at 12:54 PM

## 2019-04-03 NOTE — PLAN OF CARE
Problem: Pain:  Goal: Pain level will decrease  Description  Pain level will decrease  4/3/2019 1129 by Charlie Onofre RN  Outcome: Ongoing  4/2/2019 2259 by Favio Balderas RN  Outcome: Ongoing  Goal: Control of acute pain  Description  Control of acute pain  4/3/2019 1129 by Charlie Onofre RN  Outcome: Ongoing  4/2/2019 2259 by Favio Balderas RN  Outcome: Ongoing  Goal: Control of chronic pain  Description  Control of chronic pain  4/3/2019 1129 by Charlie Onofre RN  Outcome: Ongoing  4/2/2019 2259 by Favio Balderas RN  Outcome: Ongoing  Goal: Patient's pain/discomfort is manageable  Description  Patient's pain/discomfort is manageable  4/3/2019 1129 by Charlie Onofre RN  Outcome: Ongoing  4/2/2019 2259 by Favio Balderas RN  Outcome: Ongoing     Problem: Falls - Risk of:  Goal: Will remain free from falls  Description  Will remain free from falls  4/3/2019 1129 by Charlie Onofre RN  Outcome: Ongoing  4/2/2019 2259 by Favio Balderas RN  Outcome: Ongoing  Goal: Absence of physical injury  Description  Absence of physical injury  4/3/2019 1129 by Charlie Onofre RN  Outcome: Ongoing  4/2/2019 2259 by Favio Balderas RN  Outcome: Ongoing     Problem: Infection:  Goal: Will remain free from infection  Description  Will remain free from infection  4/3/2019 1129 by Charlie Onofre RN  Outcome: Ongoing  4/2/2019 2259 by Favio Balderas RN  Outcome: Ongoing     Problem: Safety:  Goal: Free from accidental physical injury  Description  Free from accidental physical injury  4/3/2019 1129 by Charlie Onofre RN  Outcome: Ongoing  4/2/2019 2259 by Favio Balderas RN  Outcome: Ongoing  Goal: Free from intentional harm  Description  Free from intentional harm  4/3/2019 1129 by Charlie Onofre RN  Outcome: Ongoing  4/2/2019 2259 by Favio Balderas RN  Outcome: Ongoing     Problem: Daily Care:  Goal: Daily care needs are met  Description  Daily care needs are met  4/3/2019 1129 by Charlie Onofre RN  Outcome: Ongoing  4/2/2019 2259 by Mirta Jordan RN  Outcome: Ongoing     Problem: Discharge Planning:  Goal: Patients continuum of care needs are met  Description  Patients continuum of care needs are met  4/3/2019 1129 by Samantha lAvarez RN  Outcome: Ongoing  4/2/2019 2259 by Mirta Jordan RN  Outcome: Ongoing     Problem: Nutrition  Goal: Optimal nutrition therapy  4/3/2019 1129 by Samantha Alvarez RN  Outcome: Ongoing  4/2/2019 2259 by Mirta Jordan RN  Outcome: Ongoing

## 2019-04-04 PROCEDURE — 6370000000 HC RX 637 (ALT 250 FOR IP): Performed by: NURSE PRACTITIONER

## 2019-04-04 PROCEDURE — 96366 THER/PROPH/DIAG IV INF ADDON: CPT

## 2019-04-04 PROCEDURE — 6360000002 HC RX W HCPCS: Performed by: HOSPITALIST

## 2019-04-04 PROCEDURE — 2580000003 HC RX 258: Performed by: NURSE PRACTITIONER

## 2019-04-04 PROCEDURE — 1200000000 HC SEMI PRIVATE

## 2019-04-04 PROCEDURE — 99232 SBSQ HOSP IP/OBS MODERATE 35: CPT | Performed by: INTERNAL MEDICINE

## 2019-04-04 PROCEDURE — 96376 TX/PRO/DX INJ SAME DRUG ADON: CPT

## 2019-04-04 PROCEDURE — 6360000002 HC RX W HCPCS: Performed by: NURSE PRACTITIONER

## 2019-04-04 PROCEDURE — 96367 TX/PROPH/DG ADDL SEQ IV INF: CPT

## 2019-04-04 PROCEDURE — 6370000000 HC RX 637 (ALT 250 FOR IP): Performed by: HOSPITALIST

## 2019-04-04 PROCEDURE — 6360000002 HC RX W HCPCS: Performed by: INTERNAL MEDICINE

## 2019-04-04 PROCEDURE — 2500000003 HC RX 250 WO HCPCS: Performed by: NURSE PRACTITIONER

## 2019-04-04 PROCEDURE — 6370000000 HC RX 637 (ALT 250 FOR IP): Performed by: INTERNAL MEDICINE

## 2019-04-04 PROCEDURE — 94761 N-INVAS EAR/PLS OXIMETRY MLT: CPT

## 2019-04-04 PROCEDURE — 96372 THER/PROPH/DIAG INJ SC/IM: CPT

## 2019-04-04 PROCEDURE — 2580000003 HC RX 258: Performed by: INTERNAL MEDICINE

## 2019-04-04 RX ORDER — MORPHINE SULFATE 4 MG/ML
2 INJECTION, SOLUTION INTRAMUSCULAR; INTRAVENOUS EVERY 4 HOURS PRN
Status: DISCONTINUED | OUTPATIENT
Start: 2019-04-04 | End: 2019-04-05

## 2019-04-04 RX ADMIN — TORSEMIDE 20 MG: 20 TABLET ORAL at 18:10

## 2019-04-04 RX ADMIN — Medication 1 CAPSULE: at 10:47

## 2019-04-04 RX ADMIN — CLOPIDOGREL BISULFATE 75 MG: 75 TABLET ORAL at 10:48

## 2019-04-04 RX ADMIN — FERROUS GLUCONATE TAB 324 MG (37.5 MG ELEMENTAL IRON) 324 MG: 324 (37.5 FE) TAB at 18:10

## 2019-04-04 RX ADMIN — GABAPENTIN 300 MG: 300 CAPSULE ORAL at 21:27

## 2019-04-04 RX ADMIN — Medication 1 CAPSULE: at 14:29

## 2019-04-04 RX ADMIN — MORPHINE SULFATE 2 MG: 4 INJECTION INTRAVENOUS at 14:44

## 2019-04-04 RX ADMIN — MEROPENEM 2 G: 1 INJECTION, POWDER, FOR SOLUTION INTRAVENOUS at 14:29

## 2019-04-04 RX ADMIN — DULOXETINE 60 MG: 30 CAPSULE, DELAYED RELEASE ORAL at 21:27

## 2019-04-04 RX ADMIN — Medication 1 CAPSULE: at 18:10

## 2019-04-04 RX ADMIN — OXYCODONE HYDROCHLORIDE 15 MG: 5 TABLET ORAL at 16:50

## 2019-04-04 RX ADMIN — LEVOTHYROXINE SODIUM 175 MCG: 100 TABLET ORAL at 11:16

## 2019-04-04 RX ADMIN — GABAPENTIN 300 MG: 300 CAPSULE ORAL at 14:29

## 2019-04-04 RX ADMIN — MORPHINE SULFATE 2 MG: 4 INJECTION INTRAVENOUS at 21:29

## 2019-04-04 RX ADMIN — MEXILETINE HYDROCHLORIDE 150 MG: 150 CAPSULE ORAL at 14:29

## 2019-04-04 RX ADMIN — Medication 800 MG: at 10:47

## 2019-04-04 RX ADMIN — SODIUM CHLORIDE, PRESERVATIVE FREE 10 ML: 5 INJECTION INTRAVENOUS at 10:47

## 2019-04-04 RX ADMIN — FAMOTIDINE 20 MG: 10 INJECTION, SOLUTION INTRAVENOUS at 10:46

## 2019-04-04 RX ADMIN — GABAPENTIN 300 MG: 300 CAPSULE ORAL at 11:17

## 2019-04-04 RX ADMIN — ASPIRIN 81 MG CHEWABLE TABLET 81 MG: 81 TABLET CHEWABLE at 10:48

## 2019-04-04 RX ADMIN — FERROUS GLUCONATE TAB 324 MG (37.5 MG ELEMENTAL IRON) 324 MG: 324 (37.5 FE) TAB at 11:19

## 2019-04-04 RX ADMIN — CEFTRIAXONE SODIUM 2 G: 2 INJECTION, POWDER, FOR SOLUTION INTRAMUSCULAR; INTRAVENOUS at 18:14

## 2019-04-04 RX ADMIN — FAMOTIDINE 20 MG: 10 INJECTION, SOLUTION INTRAVENOUS at 21:28

## 2019-04-04 RX ADMIN — RANOLAZINE 1000 MG: 500 TABLET, FILM COATED, EXTENDED RELEASE ORAL at 10:48

## 2019-04-04 RX ADMIN — DULOXETINE 60 MG: 30 CAPSULE, DELAYED RELEASE ORAL at 10:47

## 2019-04-04 RX ADMIN — Medication 800 MG: at 21:27

## 2019-04-04 RX ADMIN — MEXILETINE HYDROCHLORIDE 150 MG: 150 CAPSULE ORAL at 10:49

## 2019-04-04 RX ADMIN — OXYCODONE HYDROCHLORIDE 15 MG: 5 TABLET ORAL at 22:52

## 2019-04-04 RX ADMIN — OXYCODONE HYDROCHLORIDE 15 MG: 5 TABLET ORAL at 11:17

## 2019-04-04 RX ADMIN — LINEZOLID 600 MG: 600 INJECTION, SOLUTION INTRAVENOUS at 21:28

## 2019-04-04 RX ADMIN — MEROPENEM 2 G: 1 INJECTION, POWDER, FOR SOLUTION INTRAVENOUS at 11:16

## 2019-04-04 RX ADMIN — LEVETIRACETAM 750 MG: 500 TABLET, FILM COATED ORAL at 21:26

## 2019-04-04 RX ADMIN — LEVETIRACETAM 750 MG: 500 TABLET, FILM COATED ORAL at 11:18

## 2019-04-04 RX ADMIN — RANOLAZINE 1000 MG: 500 TABLET, FILM COATED, EXTENDED RELEASE ORAL at 21:27

## 2019-04-04 RX ADMIN — MEXILETINE HYDROCHLORIDE 150 MG: 150 CAPSULE ORAL at 21:30

## 2019-04-04 RX ADMIN — LINEZOLID 600 MG: 600 INJECTION, SOLUTION INTRAVENOUS at 12:12

## 2019-04-04 RX ADMIN — ENOXAPARIN SODIUM 40 MG: 40 INJECTION SUBCUTANEOUS at 10:45

## 2019-04-04 ASSESSMENT — PAIN DESCRIPTION - ORIENTATION
ORIENTATION: RIGHT;LEFT
ORIENTATION: RIGHT;LEFT

## 2019-04-04 ASSESSMENT — PAIN DESCRIPTION - DESCRIPTORS
DESCRIPTORS: ACHING
DESCRIPTORS: ACHING

## 2019-04-04 ASSESSMENT — PAIN DESCRIPTION - LOCATION
LOCATION: GENERALIZED
LOCATION: GENERALIZED

## 2019-04-04 ASSESSMENT — PAIN DESCRIPTION - PROGRESSION: CLINICAL_PROGRESSION: GRADUALLY IMPROVING

## 2019-04-04 ASSESSMENT — PAIN DESCRIPTION - ONSET
ONSET: ON-GOING
ONSET: ON-GOING

## 2019-04-04 ASSESSMENT — PAIN - FUNCTIONAL ASSESSMENT: PAIN_FUNCTIONAL_ASSESSMENT: ACTIVITIES ARE NOT PREVENTED

## 2019-04-04 ASSESSMENT — PAIN SCALES - GENERAL
PAINLEVEL_OUTOF10: 9
PAINLEVEL_OUTOF10: 9
PAINLEVEL_OUTOF10: 7
PAINLEVEL_OUTOF10: 8
PAINLEVEL_OUTOF10: 10

## 2019-04-04 ASSESSMENT — PAIN DESCRIPTION - PAIN TYPE
TYPE: CHRONIC PAIN
TYPE: CHRONIC PAIN

## 2019-04-04 ASSESSMENT — PAIN DESCRIPTION - FREQUENCY
FREQUENCY: CONTINUOUS
FREQUENCY: CONTINUOUS

## 2019-04-04 NOTE — PROGRESS NOTES
Per CNP Quintero - only do routine bp's when scheduled per floor - no extra and hold all pain medications if systolic bp is lower than 110 - if above 110 - ok to give only one pain medication until pt is seen by pain management.   Prasanth Walker RN

## 2019-04-04 NOTE — PROGRESS NOTES
Ria Davidson is a 63 y.o. female patient feels weak and wanting pain meds    Current Facility-Administered Medications   Medication Dose Route Frequency Provider Last Rate Last Dose    meropenem (MERREM) 2 g in sodium chloride 0.9 % 100 mL IVPB  2 g Intravenous Q8H Jeane Garcia MD   Stopped at 04/03/19 2353    levothyroxine (SYNTHROID) tablet 175 mcg  175 mcg Oral Daily Joyce Samuel MD        albuterol sulfate  (90 Base) MCG/ACT inhaler 2 puff  2 puff Inhalation Q6H PRN Levada Labella, APRN - NP        albuterol (PROVENTIL) nebulizer solution 2.5 mg  2.5 mg Nebulization Q4H PRN Levada Labella, APRN - NP        aspirin chewable tablet 81 mg  81 mg Oral Daily Levada Labella, APRN - NP   81 mg at 04/03/19 0846    gabapentin (NEURONTIN) capsule 300 mg  300 mg Oral TID Levada Labella, APRN - NP   300 mg at 04/03/19 2149    ferrous gluconate 324 (37.5 Fe) MG tablet 324 mg  324 mg Oral BID  Levada Labella, APRN - NP   324 mg at 04/03/19 1713    docusate sodium (COLACE) capsule 100 mg  100 mg Oral BID PRN Levada Labella, APRN - NP        lactobacillus (CULTURELLE) capsule 1 capsule  1 capsule Oral TID  Levada Labella, APRN - NP   1 capsule at 04/03/19 1713    levETIRAcetam (KEPPRA) tablet 750 mg  750 mg Oral BID Levada Labella, APRN - NP   750 mg at 04/03/19 2148    magnesium oxide (MAG-OX) tablet 800 mg  800 mg Oral BID Levada Labella, APRN - NP   800 mg at 04/03/19 2149    mexiletine (MEXITIL) capsule 150 mg  150 mg Oral TID Levada Labella, APRN - NP   150 mg at 04/03/19 2148    oxyCODONE (ROXICODONE) immediate release tablet 15 mg  15 mg Oral Q4H PRN Levada Labella, APRN - NP        torsemide (DEMADEX) tablet 20 mg  20 mg Oral BID Levada Labella, APRN - NP        morphine sulfate (PF) injection 4 mg  4 mg Intravenous Q4H PRN Angel Andrade MD   4 mg at 04/03/19 5674    oxyCODONE (ROXICODONE) immediate release tablet 15 mg  15 mg Oral Q4H PRN Seferino Baclofen     Fentanyl Swelling    Ibuprofen      \"Due to heart problems\"    Methocarbamol      Worsening edema    Nsaids      \"Due to heart problems\"    Tizanidine     Tolmetin      \"Due to heart problems\"    Tramadol Other (See Comments)     Doctor doesn't her to take due to heart problems  Seizures   Doctor doesn't her to take due to lowers seizure threshold.  Avelox [Moxifloxacin Hcl In Nacl] Swelling and Rash    Betadine [Povidone Iodine] Rash    Moxifloxacin Rash and Swelling     Lips swell and tingling in face      Active Problems:    Abdominal pain    Feeding tube dysfunction    Abdominal pain, epigastric    Gastrojejunostomy tube status (HCC)    Chronic malnutrition ([facility] [address]    Asplenia  Resolved Problems:    * No resolved hospital problems. *    Blood pressure (!) 102/54, pulse 54, temperature 99.6 °F (37.6 °C), temperature source Oral, resp. rate 12, height 5' 6\" (1.676 m), weight 134 lb 4.8 oz (60.9 kg), SpO2 100 %. Subjective:  Symptoms:  Stable. Diet:  Adequate intake. Pain:  She complains of pain that is mild. Objective:  General Appearance:  Comfortable. Vital signs: (most recent): Blood pressure (!) 102/54, pulse 54, temperature 99.6 °F (37.6 °C), temperature source Oral, resp. rate 12, height 5' 6\" (1.676 m), weight 134 lb 4.8 oz (60.9 kg), SpO2 100 %. Vital signs are normal.    HEENT: Normal HEENT exam.    Heart: Normal rate. Abdomen: Abdomen is soft. (PEG tube with mild discomfort). Bowel sounds are normal.     Extremities: Decreased range of motion. Neurological: Patient is alert. Pupils:  Pupils are equal, round, and reactive to light. Skin:  Warm. Assessment & Plan  abd pain with GJ site peristomal cellulitis  -ID consulted  -zyvox  -wound cx with MRSA and klebsiella  -CT with no acute complication  -no surgery  Hypothyroid  -increased synthroid.  Recheck TSH in 4 wks  HTN  -BB with parameters and stop ACE as SBP 90s  Hx of malabsorption  -GJ tube placement   Macrocytic anemia  -B12/folate wnl  Hypokalemia(resolved)  -mag wnl  Edema  -doppler neg for DVT right lower ext  DVT prophylaxis  -lovenox      Await for final sensitivies for discharge  Rodrigo Arteaga MD  4/4/2019

## 2019-04-04 NOTE — PROGRESS NOTES
Call initiated by: Nursing staff:  Jumana Marie, RN  Call addressed around: 4/4/2019 5:58 AM      Reason for call: patient asking pain medication round the clock and get upset if she did not get her pain meds,       Orders placed: unable to give oxycodone, morphine, due to SBP at 70's to low 100's, patient refused tylenol, Cymbalta, pt is allergic to fentanyl and NSAID, pain management consulted        Sosa Ulloa, JESSE - CNP

## 2019-04-04 NOTE — CONSULTS
[address], [facility]                                  CONSULTATION    PATIENT NAME: Severiano Creeks                   :        1960  MED REC NO:   [id number]                          ROOM:       [id number]  ACCOUNT NO:   [de-identified]                           ADMIT DATE: 2019  PROVIDER:     Yuni Navarro MD    CONSULT DATE:  2019    This patient is well known to me, seen and followed up in the office. The patient states that her pain is under control and that she does not  need any adjustment. Therefore, there is no need for me to follow her  up here. She can follow up and keep her appointment at the office.         Barbara Caldwell MD    D: 2019 12:51:26       T: 2019 13:42:55     [id number]  Job#: [id number]     Doc#: [id number]    CC:

## 2019-04-04 NOTE — PLAN OF CARE
Problem: Pain:  Goal: Pain level will decrease  Description  Pain level will decrease  4/4/2019 0027 by Alysia Benson RN  Outcome: Ongoing  4/3/2019 1129 by Jean-Paul Nichole RN  Outcome: Ongoing  Goal: Control of acute pain  Description  Control of acute pain  4/4/2019 0027 by Alysia Benson RN  Outcome: Ongoing  4/3/2019 1129 by Jean-Paul Nichole RN  Outcome: Ongoing  Goal: Control of chronic pain  Description  Control of chronic pain  4/4/2019 0027 by Alysia Benson RN  Outcome: Ongoing  4/3/2019 1129 by Jean-Paul Nichole RN  Outcome: Ongoing  Goal: Patient's pain/discomfort is manageable  Description  Patient's pain/discomfort is manageable  4/4/2019 0027 by Alysia Benson RN  Outcome: Ongoing  4/3/2019 1129 by Jean-Paul Nichole RN  Outcome: Ongoing     Problem: Falls - Risk of:  Goal: Will remain free from falls  Description  Will remain free from falls  4/4/2019 0027 by Alysia Benson RN  Outcome: Ongoing  4/3/2019 1129 by Jean-Paul Nichole RN  Outcome: Ongoing  Goal: Absence of physical injury  Description  Absence of physical injury  4/4/2019 0027 by Alysia Benson RN  Outcome: Ongoing  4/3/2019 1129 by Jean-Paul Nichole RN  Outcome: Ongoing     Problem: Infection:  Goal: Will remain free from infection  Description  Will remain free from infection  4/4/2019 0027 by Alysia Benson RN  Outcome: Ongoing  4/3/2019 1129 by Jean-Paul Nichole RN  Outcome: Ongoing     Problem: Safety:  Goal: Free from accidental physical injury  Description  Free from accidental physical injury  4/4/2019 0027 by Alysia Benson RN  Outcome: Ongoing  4/3/2019 1129 by Jean-Paul Nichole RN  Outcome: Ongoing  Goal: Free from intentional harm  Description  Free from intentional harm  4/4/2019 0027 by Alysia Benson RN  Outcome: Ongoing  4/3/2019 1129 by Jean-Paul Nichole RN  Outcome: Ongoing     Problem: Daily Care:  Goal: Daily care needs are met  Description  Daily care needs are met  4/4/2019 0027 by Alysia Benson RN  Outcome: Ongoing  4/3/2019 1129 by Avelina Terry RN  Outcome: Ongoing     Problem: Discharge Planning:  Goal: Patients continuum of care needs are met  Description  Patients continuum of care needs are met  4/4/2019 0027 by Alba Larkin RN  Outcome: Ongoing  4/3/2019 1129 by Avelina Terry RN  Outcome: Ongoing     Problem: Nutrition  Goal: Optimal nutrition therapy  4/4/2019 0027 by Alba Larkin RN  Outcome: Ongoing  4/3/2019 1129 by Avelina Terry RN  Outcome: Ongoing

## 2019-04-04 NOTE — PROGRESS NOTES
Nutrition Assessment (Enteral Nutrition)    Type and Reason for Visit: Reassess(ongoing follow up  )    Nutrition Recommendations: Diet as tolerates   Continue osmolite 1.2 TF per order     Nutrition Assessment: Remains on tube feeding to provide main source of nutrition and meet minimum estimated needs. Able to have general diet as desired, intake usually small, 1-25% of meals. Remains high nutrition risk .     Malnutrition Assessment:  · Malnutrition Status: (hx chronic malnutrition )  · Context: Chronic illness    Nutrition Risk Level: High    Nutrition Needs:  · Estimated Daily Total Kcal: 4540-1449 (25-30 juancho/kg current weight)  · Estimated Daily Protein (g): 68-85 (1.2-1.5 g/kg)  · Estimated Daily Fluid (ml/day): 2536-6886 (1 ml/juancho)    Nutrition Diagnosis:   · Problem: Inadequate oral intake  · Etiology: related to Alteration in GI function     Signs and symptoms:  as evidenced by Diet history of poor intake, Nutrition support - EN, Weight loss greater than or equal to 10% in 6 months    Objective Information:  · Nutrition-Focused Physical Findings: receiving nursing care on visit, meal tray present, nutrition janet 3   · Wound Type: None  · Current Nutrition Therapies:  · Oral Diet Orders: General   · Oral Diet intake: 1-25%  · Oral Nutrition Supplement (ONS) Orders: None  · ONS intake:    · Tube Feeding (TF) Orders:   · Feeding Route: J/G Tube  · Formula: Standard without Fiber  · Rate (ml/hr):90 ml/hr 4p-9a     · Volume (ml/day): 1530  · Duration: Cyclic  · Current TF & Flush Orders Provides: 1836 calories, 84 g protein, 1254 ml free water   · Goal TF & Flush Orders Provides: osmolite 1.2 from 4p-9a at 90 ml/hr to provide 1530 ml, 1836 calories, 84 g protein, 1254 ml free water  · Additional Calories:    · Anthropometric Measures:  · Ht: 5' 6\" (167.6 cm)   · Current Body Wt: 134 lb 4.2 oz (60.9 kg)  · Admission Body Wt: 126 lb 1.7 oz (57.2 kg)  · Usual Body Wt:    · Weight Change: variance in weights over past year, per records loss in past months more than 10% in 6 months    · Ideal Body Wt: 130 lb (59 kg), % Ideal Body 97  · Adjusted Body Wt:  , body weight adjusted for    · BMI Classification: BMI 18.5 - 24.9 Normal Weight    Nutrition Interventions:   Continue current diet, Continue current Tube Feeding  Continued Inpatient Monitoring, Education not appropriate at this time, Coordination of Care    Nutrition Evaluation:   · Evaluation: Progressing toward goals   · Goals: Patient will tolerate tube feeding to provide consistent source of nutrition   · Monitoring: Meal Intake, TF Tolerance, TF Intake, Diet Tolerance, Weight, Pertinent Labs      Electronically signed by Suzanne Pickering RD, LD on 4/4/19 at 2:28 PM    Contact Number: [phone]

## 2019-04-04 NOTE — PROGRESS NOTES
Infectious Disease Progress Note  2019   Patient Name: Caesar Marroquin : 1960   Impression  · Peristomal cellulitis at [address] [place] [address]  ? Improving redness  ? Culture positive for MRSA, and K pneumoniae  · Gastroparesis/malabsorption status post Maru-en-Y with a PEG tube  · CHF status post AICD  · Multi-morbidity: per PMHx  Plan:  · Continue linezolid,   · D/c meropenem  · Start ceftriaxone  · May d/c with oral antibiotics linezolid 600 mg bid and bactrim DS I tab po bid (end-date: 19)  · Discussed with Dr. Wero Salazar on 19, she advises that patient will need to follow-up with [facility]  · Continue barrier cream to [address] [place] [address] to minimize inflammation and maceration        Ongoing Antimicrobial Therapy  Linezolid -  Ceftriaxone -  Completed Antimicrobial Therapy  Meropenem 4/3-?? History:? Interval history noted: Being seen for peristomal cellulitis/inflammation  Feels better today Denies n/v/d/f or untoward effects of antibiotics  Physical Exam:  Vital Signs: BP (!) 102/54   Pulse 54   Temp 99.6 °F (37.6 °C) (Oral)   Resp 12   Ht 5' 6\" (1.676 m)   Wt 134 lb 4.8 oz (60.9 kg)   SpO2 100%   BMI 21.68 kg/m²     Gen: alert and oriented X3, no distress  Skin: no stigmata of endocarditis  Wounds: C/D/I  HEMT: AT/NC Oropharynx pink, moist, and without lesions or exudates; dentition in good state of repair  Eyes: PERRLA, EOMI, conjunctiva pink, sclera anicteric. Neck: Supple. Trachea midline. No LAD. Chest: no distress and CTA. Good air movement. Heart: RRR and no MRG. Abd: Left upper quadrant GEJ tube with peristomal blanchable redness, less macerated, slimy discharge on tube, tender to palpation. soft, non-distended, no tenderness, no hepatomegaly. Normoactive bowel sounds. Ext: no clubbing, cyanosis, or edema  Catheter Site: Right internal jugular CVC without erythema or tenderness  Neuro: Mental status intact.  CN 2-12 intact and no focal sensory or motor deficits         Radiologic / Imaging /

## 2019-04-05 ENCOUNTER — APPOINTMENT (OUTPATIENT)
Dept: CT IMAGING | Age: 59
DRG: 602 | End: 2019-04-05
Payer: COMMERCIAL

## 2019-04-05 ENCOUNTER — APPOINTMENT (OUTPATIENT)
Dept: GENERAL RADIOLOGY | Age: 59
DRG: 602 | End: 2019-04-05
Payer: COMMERCIAL

## 2019-04-05 PROCEDURE — 96372 THER/PROPH/DIAG INJ SC/IM: CPT

## 2019-04-05 PROCEDURE — 6360000002 HC RX W HCPCS: Performed by: NURSE PRACTITIONER

## 2019-04-05 PROCEDURE — 2500000003 HC RX 250 WO HCPCS: Performed by: NURSE PRACTITIONER

## 2019-04-05 PROCEDURE — 99232 SBSQ HOSP IP/OBS MODERATE 35: CPT | Performed by: INTERNAL MEDICINE

## 2019-04-05 PROCEDURE — 6370000000 HC RX 637 (ALT 250 FOR IP): Performed by: INTERNAL MEDICINE

## 2019-04-05 PROCEDURE — 6360000002 HC RX W HCPCS: Performed by: INTERNAL MEDICINE

## 2019-04-05 PROCEDURE — 6370000000 HC RX 637 (ALT 250 FOR IP): Performed by: NURSE PRACTITIONER

## 2019-04-05 PROCEDURE — 96366 THER/PROPH/DIAG IV INF ADDON: CPT

## 2019-04-05 PROCEDURE — 2580000003 HC RX 258: Performed by: INTERNAL MEDICINE

## 2019-04-05 PROCEDURE — 73030 X-RAY EXAM OF SHOULDER: CPT

## 2019-04-05 PROCEDURE — 2580000003 HC RX 258: Performed by: NURSE PRACTITIONER

## 2019-04-05 PROCEDURE — 96375 TX/PRO/DX INJ NEW DRUG ADDON: CPT

## 2019-04-05 PROCEDURE — 96376 TX/PRO/DX INJ SAME DRUG ADON: CPT

## 2019-04-05 PROCEDURE — 1200000000 HC SEMI PRIVATE

## 2019-04-05 PROCEDURE — 6370000000 HC RX 637 (ALT 250 FOR IP): Performed by: HOSPITALIST

## 2019-04-05 PROCEDURE — 70450 CT HEAD/BRAIN W/O DYE: CPT

## 2019-04-05 RX ORDER — MORPHINE SULFATE 4 MG/ML
4 INJECTION, SOLUTION INTRAMUSCULAR; INTRAVENOUS EVERY 4 HOURS PRN
Status: DISCONTINUED | OUTPATIENT
Start: 2019-04-05 | End: 2019-04-08 | Stop reason: HOSPADM

## 2019-04-05 RX ORDER — LINEZOLID 600 MG/1
600 TABLET, FILM COATED ORAL 2 TIMES DAILY
Qty: 20 TABLET | Refills: 0 | Status: SHIPPED | OUTPATIENT
Start: 2019-04-05 | End: 2019-04-08 | Stop reason: SDUPTHER

## 2019-04-05 RX ORDER — LEVOTHYROXINE SODIUM 175 UG/1
175 TABLET ORAL DAILY
Qty: 30 TABLET | Refills: 3 | Status: SHIPPED | OUTPATIENT
Start: 2019-04-06 | End: 2019-04-05

## 2019-04-05 RX ORDER — SULFAMETHOXAZOLE AND TRIMETHOPRIM 800; 160 MG/1; MG/1
1 TABLET ORAL 2 TIMES DAILY
Qty: 20 TABLET | Refills: 0 | Status: SHIPPED | OUTPATIENT
Start: 2019-04-05 | End: 2019-04-15

## 2019-04-05 RX ORDER — SULFAMETHOXAZOLE AND TRIMETHOPRIM 800; 160 MG/1; MG/1
1 TABLET ORAL 2 TIMES DAILY
Qty: 20 TABLET | Refills: 0 | Status: SHIPPED | OUTPATIENT
Start: 2019-04-05 | End: 2019-04-05 | Stop reason: SDUPTHER

## 2019-04-05 RX ORDER — LEVOTHYROXINE SODIUM 175 UG/1
175 TABLET ORAL DAILY
Qty: 30 TABLET | Refills: 0 | Status: SHIPPED | OUTPATIENT
Start: 2019-04-06 | End: 2019-10-24

## 2019-04-05 RX ADMIN — LEVETIRACETAM 750 MG: 500 TABLET, FILM COATED ORAL at 22:45

## 2019-04-05 RX ADMIN — RANOLAZINE 1000 MG: 500 TABLET, FILM COATED, EXTENDED RELEASE ORAL at 08:48

## 2019-04-05 RX ADMIN — FAMOTIDINE 20 MG: 10 INJECTION, SOLUTION INTRAVENOUS at 08:47

## 2019-04-05 RX ADMIN — RANOLAZINE 1000 MG: 500 TABLET, FILM COATED, EXTENDED RELEASE ORAL at 22:36

## 2019-04-05 RX ADMIN — FERROUS GLUCONATE TAB 324 MG (37.5 MG ELEMENTAL IRON) 324 MG: 324 (37.5 FE) TAB at 08:57

## 2019-04-05 RX ADMIN — LEVETIRACETAM 750 MG: 500 TABLET, FILM COATED ORAL at 08:57

## 2019-04-05 RX ADMIN — DULOXETINE 60 MG: 30 CAPSULE, DELAYED RELEASE ORAL at 08:48

## 2019-04-05 RX ADMIN — CEFTRIAXONE SODIUM 2 G: 2 INJECTION, POWDER, FOR SOLUTION INTRAMUSCULAR; INTRAVENOUS at 18:22

## 2019-04-05 RX ADMIN — LINEZOLID 600 MG: 600 INJECTION, SOLUTION INTRAVENOUS at 22:49

## 2019-04-05 RX ADMIN — GABAPENTIN 300 MG: 300 CAPSULE ORAL at 22:37

## 2019-04-05 RX ADMIN — LINEZOLID 600 MG: 600 INJECTION, SOLUTION INTRAVENOUS at 08:57

## 2019-04-05 RX ADMIN — GABAPENTIN 300 MG: 300 CAPSULE ORAL at 06:04

## 2019-04-05 RX ADMIN — SODIUM CHLORIDE, PRESERVATIVE FREE 10 ML: 5 INJECTION INTRAVENOUS at 08:49

## 2019-04-05 RX ADMIN — FERROUS GLUCONATE TAB 324 MG (37.5 MG ELEMENTAL IRON) 324 MG: 324 (37.5 FE) TAB at 18:22

## 2019-04-05 RX ADMIN — Medication 800 MG: at 22:37

## 2019-04-05 RX ADMIN — Medication 1 CAPSULE: at 08:48

## 2019-04-05 RX ADMIN — LEVOTHYROXINE SODIUM 175 MCG: 100 TABLET ORAL at 06:04

## 2019-04-05 RX ADMIN — MEXILETINE HYDROCHLORIDE 150 MG: 150 CAPSULE ORAL at 22:36

## 2019-04-05 RX ADMIN — Medication 800 MG: at 08:48

## 2019-04-05 RX ADMIN — GABAPENTIN 300 MG: 300 CAPSULE ORAL at 13:49

## 2019-04-05 RX ADMIN — OXYCODONE HYDROCHLORIDE 15 MG: 5 TABLET ORAL at 09:03

## 2019-04-05 RX ADMIN — ENOXAPARIN SODIUM 40 MG: 40 INJECTION SUBCUTANEOUS at 08:47

## 2019-04-05 RX ADMIN — DULOXETINE 60 MG: 30 CAPSULE, DELAYED RELEASE ORAL at 22:36

## 2019-04-05 RX ADMIN — OXYCODONE HYDROCHLORIDE 15 MG: 5 TABLET ORAL at 22:45

## 2019-04-05 RX ADMIN — FAMOTIDINE 20 MG: 10 INJECTION, SOLUTION INTRAVENOUS at 22:37

## 2019-04-05 RX ADMIN — OXYCODONE HYDROCHLORIDE 15 MG: 5 TABLET ORAL at 13:48

## 2019-04-05 RX ADMIN — Medication 1 CAPSULE: at 13:49

## 2019-04-05 RX ADMIN — OXYCODONE HYDROCHLORIDE 15 MG: 5 TABLET ORAL at 18:22

## 2019-04-05 RX ADMIN — TORSEMIDE 20 MG: 20 TABLET ORAL at 08:48

## 2019-04-05 RX ADMIN — ASPIRIN 81 MG CHEWABLE TABLET 81 MG: 81 TABLET CHEWABLE at 08:49

## 2019-04-05 RX ADMIN — CLOPIDOGREL BISULFATE 75 MG: 75 TABLET ORAL at 08:48

## 2019-04-05 RX ADMIN — Medication 1 CAPSULE: at 18:22

## 2019-04-05 RX ADMIN — MEXILETINE HYDROCHLORIDE 150 MG: 150 CAPSULE ORAL at 13:48

## 2019-04-05 RX ADMIN — TORSEMIDE 20 MG: 20 TABLET ORAL at 18:22

## 2019-04-05 ASSESSMENT — PAIN SCALES - GENERAL
PAINLEVEL_OUTOF10: 9
PAINLEVEL_OUTOF10: 0
PAINLEVEL_OUTOF10: 8
PAINLEVEL_OUTOF10: 5

## 2019-04-05 NOTE — PROGRESS NOTES
Radiologic / Imaging / TESTING  No results found. Labs:    No results found for this or any previous visit (from the past 24 hour(s)). CULTURE results: Invalid input(s): BLOOD CULTURE,  URINE CULTURE, SURGICAL CULTURE    Diagnosis:  Patient Active Problem List   Diagnosis    Chest pain    Chest pain    CAD (coronary artery disease)    Hyperlipidemia    Thyroid disease    Acute exacerbation of CHF (congestive heart failure) (HCC)    CHF exacerbation (HCC)    Congestive heart failure ([facility])    Left upper quadrant pain    Pneumonia of left lower lobe due to infectious organism ([address]    Angina at rest [facility])    Coronary artery disease involving coronary bypass graft of native heart with angina pectoris ([facility])    Shortness of breath    Ischemic cardiomyopathy    Acute metabolic encephalopathy    Acute delirium    Pneumonia    Sacral pain    Acute on chronic systolic CHF (congestive heart failure), NYHA class 3 ([facility])    Severe malnutrition ([facility])    Cellulitis    Cellulitis at gastrostomy tube site ([address]    History of Maru-en-Y gastric bypass    Gastroparesis    Abdominal pain    Feeding tube dysfunction    Abdominal pain, epigastric    Gastrojejunostomy tube status ([facility])    Chronic malnutrition ([facility])    Asplenia       Active Problems  Active Problems:    Abdominal pain    Feeding tube dysfunction    Abdominal pain, epigastric    Gastrojejunostomy tube status (HCC)    Chronic malnutrition ([address]    Asplenia  Resolved Problems:    * No resolved hospital problems.  *    Electronically signed by: Electronically signed by Cristel Chen MD on 4/5/2019 at 11:33 AM

## 2019-04-05 NOTE — PROGRESS NOTES
CLINICAL PHARMACY NOTE: MEDS TO [address] Select Patient?: No  Total # of Prescriptions Filled: 2   The following medications were delivered to the patient:  · Levothyroxine 175mcg  · Bactrim 800-160mg  Total # of Interventions Completed: 0  Time Spent (min): 15    Additional Documentation:

## 2019-04-05 NOTE — PROGRESS NOTES
Rosenda Jarquin is a 63 y.o. female stated she was not conscious this morning but verified with nursing this is not accurate.  Complains of right arm pain    Current Facility-Administered Medications   Medication Dose Route Frequency Provider Last Rate Last Dose    morphine sulfate (PF) injection 2 mg  2 mg Intravenous Q4H PRN Jose Maria Bolivar MD   2 mg at 04/04/19 2129    cefTRIAXone (ROCEPHIN) 2 g in dextrose 5 % 50 mL IVPB  2 g Intravenous Q24H Sarah Reyna MD   Stopped at 04/04/19 1845    levothyroxine (SYNTHROID) tablet 175 mcg  175 mcg Oral Daily Tomichael Bolivar MD   175 mcg at 04/05/19 0604    albuterol sulfate  (90 Base) MCG/ACT inhaler 2 puff  2 puff Inhalation Q6H PRN Evaline Courtney, APRN - NP        albuterol (PROVENTIL) nebulizer solution 2.5 mg  2.5 mg Nebulization Q4H PRN Evaline Courtney, APRN - NP        aspirin chewable tablet 81 mg  81 mg Oral Daily Evaline Courtney, APRN - NP   81 mg at 04/04/19 1048    gabapentin (NEURONTIN) capsule 300 mg  300 mg Oral TID Evaline Courtney, APRN - NP   300 mg at 04/05/19 0604    ferrous gluconate 324 (37.5 Fe) MG tablet 324 mg  324 mg Oral BID WC Evaline Courtney, APRN - NP   324 mg at 04/04/19 1810    docusate sodium (COLACE) capsule 100 mg  100 mg Oral BID PRN Evaline Courtney, APRN - NP        lactobacillus (CULTURELLE) capsule 1 capsule  1 capsule Oral TID  Evaline Courtney, APRN - NP   1 capsule at 04/04/19 1810    levETIRAcetam (KEPPRA) tablet 750 mg  750 mg Oral BID Evaline Courtney, APRN - NP   750 mg at 04/04/19 2126    magnesium oxide (MAG-OX) tablet 800 mg  800 mg Oral BID Evaline Courtney, APRN - NP   800 mg at 04/04/19 2127    mexiletine (MEXITIL) capsule 150 mg  150 mg Oral TID Evaline Courtney, APRN - NP   150 mg at 04/04/19 2130    torsemide (DEMADEX) tablet 20 mg  20 mg Oral BID Evaline Courtney, APRN - NP   20 mg at 04/04/19 1810    oxyCODONE (ROXICODONE) immediate release tablet 15 mg  15 mg Oral Q4H PRN Seferino Anshul Denson MD   15 mg at 04/04/19 2252    linezolid (ZYVOX) IVPB 600 mg  600 mg Intravenous Q12H Roge Denise MD   Stopped at 04/04/19 2245    sodium chloride flush 0.9 % injection 10 mL  10 mL Intravenous 2 times per day Giles Downy, APRN - NP   10 mL at 04/04/19 1047    sodium chloride flush 0.9 % injection 10 mL  10 mL Intravenous PRN Giles Downy, APRN - NP        ondansetron [facility]) injection 4 mg  4 mg Intravenous Q6H PRN Waverly Downy, APRN - NP   4 mg at 04/01/19 1857    enoxaparin (LOVENOX) injection 40 mg  40 mg Subcutaneous Daily Giles Downy, APRN - NP   40 mg at 04/04/19 1045    famotidine (PEPCID) injection 20 mg  20 mg Intravenous BID Giles Downy, APRN - NP   20 mg at 04/04/19 2128    acetaminophen (TYLENOL) tablet 650 mg  650 mg Oral Q4H PRN Giles Downy, APRN - NP        potassium chloride (KLOR-CON M) extended release tablet 40 mEq  40 mEq Oral PRN Giles Downy, APRN - NP        Or   Mayes potassium chloride (KLOR-CON) packet 40 mEq  40 mEq Oral PRN Giles Downy, APRN - NP        Or   Mayes potassium chloride 10 mEq/100 mL IVPB (Peripheral Line)  10 mEq Intravenous PRN Waverly Downy, APRN - NP        metoprolol succinate (TOPROL XL) extended release tablet 12.5 mg  12.5 mg Oral Daily Soraya French MD   Stopped at 04/02/19 0900    clopidogrel (PLAVIX) tablet 75 mg  75 mg Oral Daily Waverly Downy, APRN - NP   75 mg at 04/04/19 1048    DULoxetine (CYMBALTA) extended release capsule 60 mg  60 mg Oral BID Giles Downy, APRN - NP   60 mg at 04/04/19 2127    ranolazine (RANEXA) extended release tablet 1,000 mg  1,000 mg Oral BID Waverly Downy, APRN - NP   1,000 mg at 04/04/19 2127     Facility-Administered Medications Ordered in Other Encounters   Medication Dose Route Frequency Provider Last Rate Last Dose    oxyCODONE (ROXICODONE) immediate release tablet 15 mg  15 mg Oral Once Joi Rayo MD         Allergies   Allergen Reactions   Mayes Baclofen     Fentanyl Swelling    Ibuprofen      \"Due to heart problems\"    Methocarbamol      Worsening edema    Nsaids      \"Due to heart problems\"    Tizanidine     Tolmetin      \"Due to heart problems\"    Tramadol Other (See Comments)     Doctor doesn't her to take due to heart problems  Seizures   Doctor doesn't her to take due to lowers seizure threshold.  Avelox [Moxifloxacin Hcl In Nacl] Swelling and Rash    Betadine [Povidone Iodine] Rash    Moxifloxacin Rash and Swelling     Lips swell and tingling in face      Active Problems:    Abdominal pain    Feeding tube dysfunction    Abdominal pain, epigastric    Gastrojejunostomy tube status (HCC)    Chronic malnutrition ([facility] [address]    Asplenia  Resolved Problems:    * No resolved hospital problems. *    Blood pressure 124/60, pulse 69, temperature 97.2 °F (36.2 °C), temperature source Oral, resp. rate 19, height 5' 6\" (1.676 m), weight 134 lb 4.8 oz (60.9 kg), SpO2 100 %. Subjective:  Symptoms:  Stable. Diet:  Adequate intake. Pain:  She complains of pain that is mild. Objective:  General Appearance:  Comfortable. Vital signs: (most recent): Blood pressure 124/60, pulse 69, temperature 97.2 °F (36.2 °C), temperature source Oral, resp. rate 19, height 5' 6\" (1.676 m), weight 134 lb 4.8 oz (60.9 kg), SpO2 100 %. Vital signs are normal.    HEENT: Normal HEENT exam.    Heart: Normal rate. Abdomen: Abdomen is soft. (PEG tube with mild discomfort). Bowel sounds are normal.     Extremities: Decreased range of motion. Neurological: Patient is alert. Pupils:  Pupils are equal, round, and reactive to light. Skin:  Warm. Assessment & Plan  abd pain with GJ site peristomal cellulitis unrelated to G tube malfunction  -ID consulted  -zyvox  -wound cx with MRSA and klebsiella  -CT with no acute complication  -no surgery  Hypothyroid  -increased synthroid.  Recheck TSH in 4 wks  HTN  -BB with parameters and stop ACE as SBP 90s  Hx of malabsorption  -GJ tube placement   Macrocytic anemia  -B12/folate wnl  Hypokalemia(resolved)  -mag wnl  Edema  -doppler neg for DVT right lower ext  DVT prophylaxis  -lovenox      Discharge today with bactrim and zyvox  Anupam Rosenberg MD  4/5/2019

## 2019-04-06 ENCOUNTER — APPOINTMENT (OUTPATIENT)
Dept: GENERAL RADIOLOGY | Age: 59
DRG: 602 | End: 2019-04-06
Payer: COMMERCIAL

## 2019-04-06 LAB
CULTURE: ABNORMAL
Lab: ABNORMAL
SPECIMEN: ABNORMAL

## 2019-04-06 PROCEDURE — 71045 X-RAY EXAM CHEST 1 VIEW: CPT

## 2019-04-06 PROCEDURE — 2580000003 HC RX 258: Performed by: NURSE PRACTITIONER

## 2019-04-06 PROCEDURE — 96366 THER/PROPH/DIAG IV INF ADDON: CPT

## 2019-04-06 PROCEDURE — 6370000000 HC RX 637 (ALT 250 FOR IP): Performed by: HOSPITALIST

## 2019-04-06 PROCEDURE — 6360000002 HC RX W HCPCS: Performed by: INTERNAL MEDICINE

## 2019-04-06 PROCEDURE — 6360000002 HC RX W HCPCS: Performed by: HOSPITALIST

## 2019-04-06 PROCEDURE — 2580000003 HC RX 258: Performed by: INTERNAL MEDICINE

## 2019-04-06 PROCEDURE — 1200000000 HC SEMI PRIVATE

## 2019-04-06 PROCEDURE — 2500000003 HC RX 250 WO HCPCS: Performed by: NURSE PRACTITIONER

## 2019-04-06 PROCEDURE — 6370000000 HC RX 637 (ALT 250 FOR IP): Performed by: NURSE PRACTITIONER

## 2019-04-06 PROCEDURE — 96372 THER/PROPH/DIAG INJ SC/IM: CPT

## 2019-04-06 PROCEDURE — 6370000000 HC RX 637 (ALT 250 FOR IP): Performed by: INTERNAL MEDICINE

## 2019-04-06 PROCEDURE — 96376 TX/PRO/DX INJ SAME DRUG ADON: CPT

## 2019-04-06 PROCEDURE — 6360000002 HC RX W HCPCS: Performed by: NURSE PRACTITIONER

## 2019-04-06 RX ORDER — FUROSEMIDE 10 MG/ML
20 INJECTION INTRAMUSCULAR; INTRAVENOUS ONCE
Status: COMPLETED | OUTPATIENT
Start: 2019-04-06 | End: 2019-04-06

## 2019-04-06 RX ORDER — POTASSIUM CHLORIDE 20 MEQ/1
40 TABLET, EXTENDED RELEASE ORAL PRN
Status: DISCONTINUED | OUTPATIENT
Start: 2019-04-06 | End: 2019-04-08 | Stop reason: HOSPADM

## 2019-04-06 RX ORDER — POTASSIUM CHLORIDE 1.5 G/1.77G
40 POWDER, FOR SOLUTION ORAL PRN
Status: DISCONTINUED | OUTPATIENT
Start: 2019-04-06 | End: 2019-04-08 | Stop reason: HOSPADM

## 2019-04-06 RX ORDER — FUROSEMIDE 10 MG/ML
20 INJECTION INTRAMUSCULAR; INTRAVENOUS ONCE
Status: DISCONTINUED | OUTPATIENT
Start: 2019-04-06 | End: 2019-04-06

## 2019-04-06 RX ORDER — POTASSIUM CHLORIDE 7.45 MG/ML
10 INJECTION INTRAVENOUS PRN
Status: DISCONTINUED | OUTPATIENT
Start: 2019-04-06 | End: 2019-04-08 | Stop reason: HOSPADM

## 2019-04-06 RX ADMIN — GABAPENTIN 300 MG: 300 CAPSULE ORAL at 06:06

## 2019-04-06 RX ADMIN — MORPHINE SULFATE 4 MG: 4 INJECTION INTRAVENOUS at 20:35

## 2019-04-06 RX ADMIN — ENOXAPARIN SODIUM 40 MG: 40 INJECTION SUBCUTANEOUS at 10:12

## 2019-04-06 RX ADMIN — GABAPENTIN 300 MG: 300 CAPSULE ORAL at 15:00

## 2019-04-06 RX ADMIN — TORSEMIDE 20 MG: 20 TABLET ORAL at 18:12

## 2019-04-06 RX ADMIN — FAMOTIDINE 20 MG: 10 INJECTION, SOLUTION INTRAVENOUS at 10:13

## 2019-04-06 RX ADMIN — DULOXETINE 60 MG: 30 CAPSULE, DELAYED RELEASE ORAL at 20:35

## 2019-04-06 RX ADMIN — OXYCODONE HYDROCHLORIDE 15 MG: 5 TABLET ORAL at 18:25

## 2019-04-06 RX ADMIN — CLOPIDOGREL BISULFATE 75 MG: 75 TABLET ORAL at 10:13

## 2019-04-06 RX ADMIN — OXYCODONE HYDROCHLORIDE 15 MG: 5 TABLET ORAL at 03:13

## 2019-04-06 RX ADMIN — LEVETIRACETAM 750 MG: 500 TABLET, FILM COATED ORAL at 12:06

## 2019-04-06 RX ADMIN — Medication 1 CAPSULE: at 18:12

## 2019-04-06 RX ADMIN — MORPHINE SULFATE 4 MG: 4 INJECTION INTRAVENOUS at 15:00

## 2019-04-06 RX ADMIN — LEVOTHYROXINE SODIUM 175 MCG: 100 TABLET ORAL at 06:06

## 2019-04-06 RX ADMIN — GABAPENTIN 300 MG: 300 CAPSULE ORAL at 23:59

## 2019-04-06 RX ADMIN — RANOLAZINE 1000 MG: 500 TABLET, FILM COATED, EXTENDED RELEASE ORAL at 20:35

## 2019-04-06 RX ADMIN — OXYCODONE HYDROCHLORIDE 15 MG: 5 TABLET ORAL at 12:05

## 2019-04-06 RX ADMIN — LINEZOLID 600 MG: 600 INJECTION, SOLUTION INTRAVENOUS at 12:08

## 2019-04-06 RX ADMIN — ASPIRIN 81 MG CHEWABLE TABLET 81 MG: 81 TABLET CHEWABLE at 10:13

## 2019-04-06 RX ADMIN — OXYCODONE HYDROCHLORIDE 15 MG: 5 TABLET ORAL at 23:59

## 2019-04-06 RX ADMIN — RANOLAZINE 1000 MG: 500 TABLET, FILM COATED, EXTENDED RELEASE ORAL at 10:13

## 2019-04-06 RX ADMIN — FERROUS GLUCONATE TAB 324 MG (37.5 MG ELEMENTAL IRON) 324 MG: 324 (37.5 FE) TAB at 12:07

## 2019-04-06 RX ADMIN — CEFTRIAXONE SODIUM 2 G: 2 INJECTION, POWDER, FOR SOLUTION INTRAMUSCULAR; INTRAVENOUS at 18:12

## 2019-04-06 RX ADMIN — Medication 800 MG: at 20:35

## 2019-04-06 RX ADMIN — MORPHINE SULFATE 4 MG: 4 INJECTION INTRAVENOUS at 10:09

## 2019-04-06 RX ADMIN — SODIUM CHLORIDE, PRESERVATIVE FREE 10 ML: 5 INJECTION INTRAVENOUS at 22:27

## 2019-04-06 RX ADMIN — Medication 1 CAPSULE: at 10:13

## 2019-04-06 RX ADMIN — TORSEMIDE 20 MG: 20 TABLET ORAL at 10:14

## 2019-04-06 RX ADMIN — MEXILETINE HYDROCHLORIDE 150 MG: 150 CAPSULE ORAL at 12:06

## 2019-04-06 RX ADMIN — MEXILETINE HYDROCHLORIDE 150 MG: 150 CAPSULE ORAL at 06:06

## 2019-04-06 RX ADMIN — LEVETIRACETAM 750 MG: 500 TABLET, FILM COATED ORAL at 20:40

## 2019-04-06 RX ADMIN — SODIUM CHLORIDE, PRESERVATIVE FREE 10 ML: 5 INJECTION INTRAVENOUS at 12:15

## 2019-04-06 RX ADMIN — FUROSEMIDE 20 MG: 10 INJECTION, SOLUTION INTRAVENOUS at 18:11

## 2019-04-06 RX ADMIN — DULOXETINE 60 MG: 30 CAPSULE, DELAYED RELEASE ORAL at 10:13

## 2019-04-06 RX ADMIN — Medication 800 MG: at 10:13

## 2019-04-06 RX ADMIN — Medication 1 CAPSULE: at 12:05

## 2019-04-06 RX ADMIN — FERROUS GLUCONATE TAB 324 MG (37.5 MG ELEMENTAL IRON) 324 MG: 324 (37.5 FE) TAB at 18:21

## 2019-04-06 RX ADMIN — FAMOTIDINE 20 MG: 10 INJECTION, SOLUTION INTRAVENOUS at 20:35

## 2019-04-06 RX ADMIN — MEXILETINE HYDROCHLORIDE 150 MG: 150 CAPSULE ORAL at 23:58

## 2019-04-06 ASSESSMENT — PAIN SCALES - GENERAL
PAINLEVEL_OUTOF10: 7
PAINLEVEL_OUTOF10: 6
PAINLEVEL_OUTOF10: 9
PAINLEVEL_OUTOF10: 4
PAINLEVEL_OUTOF10: 8
PAINLEVEL_OUTOF10: 5
PAINLEVEL_OUTOF10: 4
PAINLEVEL_OUTOF10: 8

## 2019-04-06 ASSESSMENT — PAIN DESCRIPTION - LOCATION: LOCATION: ABDOMEN

## 2019-04-06 ASSESSMENT — PAIN DESCRIPTION - FREQUENCY: FREQUENCY: CONTINUOUS

## 2019-04-06 ASSESSMENT — PAIN DESCRIPTION - PAIN TYPE: TYPE: CHRONIC PAIN

## 2019-04-06 ASSESSMENT — PAIN DESCRIPTION - DESCRIPTORS: DESCRIPTORS: ACHING

## 2019-04-06 NOTE — PROGRESS NOTES
Lew Mcclellan is a 63 y.o. female right shoulder hurts    Current Facility-Administered Medications   Medication Dose Route Frequency Provider Last Rate Last Dose    furosemide (LASIX) injection 20 mg  20 mg Intravenous Once Juni Wallace MD        morphine sulfate (PF) injection 4 mg  4 mg Intravenous Q4H PRN Juni Wallace MD        cefTRIAXone (ROCEPHIN) 2 g in dextrose 5 % 50 mL IVPB  2 g Intravenous Q24H August Sousa  mL/hr at 04/05/19 1822 2 g at 04/05/19 1822    levothyroxine (SYNTHROID) tablet 175 mcg  175 mcg Oral Daily Juni Wallace MD   175 mcg at 04/06/19 0606    albuterol sulfate  (90 Base) MCG/ACT inhaler 2 puff  2 puff Inhalation Q6H PRN [facility], APRN - NP        albuterol (PROVENTIL) nebulizer solution 2.5 mg  2.5 mg Nebulization Q4H PRN [facility], APRN - NP        aspirin chewable tablet 81 mg  81 mg Oral Daily [facility], APRN - NP   81 mg at 04/05/19 0849    gabapentin (NEURONTIN) capsule 300 mg  300 mg Oral TID [facility], APRN - NP   300 mg at 04/06/19 0606    ferrous gluconate 324 (37.5 Fe) MG tablet 324 mg  324 mg Oral BID [facility], APRN - NP   324 mg at 04/05/19 1822    docusate sodium (COLACE) capsule 100 mg  100 mg Oral BID PRN [facility], APRN - NP        lactobacillus (CULTURELLE) capsule 1 capsule  1 capsule Oral TID [facility], APRN - NP   1 capsule at 04/05/19 1822    levETIRAcetam (KEPPRA) tablet 750 mg  750 mg Oral BID [facility], APRN - NP   750 mg at 04/05/19 2245    magnesium oxide (MAG-OX) tablet 800 mg  800 mg Oral BID [facility], APRN - NP   800 mg at 04/05/19 2237    mexiletine (MEXITIL) capsule 150 mg  150 mg Oral TID [facility], APRN - NP   150 mg at 04/06/19 0606    torsemide (DEMADEX) tablet 20 mg  20 mg Oral BID Melda Payer, APRN - NP   20 mg at 04/05/19 1822    oxyCODONE (ROXICODONE) immediate release tablet 15 mg  15 mg Oral Q4H PRN Leandro Tovar MD 15 mg at 04/06/19 0313    linezolid (ZYVOX) IVPB 600 mg  600 mg Intravenous Q12H Uzma Brar MD   Stopped at 04/06/19 0111    sodium chloride flush 0.9 % injection 10 mL  10 mL Intravenous 2 times per day Miracle Haines, APRN - NP   10 mL at 04/05/19 0849    sodium chloride flush 0.9 % injection 10 mL  10 mL Intravenous PRN Miracle Haines APRN - NP        ondansetron [facility]) injection 4 mg  4 mg Intravenous Q6H PRN Miracle Haines, APRN - NP   4 mg at 04/01/19 1857    enoxaparin (LOVENOX) injection 40 mg  40 mg Subcutaneous Daily Miracle Haines APRN - NP   40 mg at 04/05/19 0847    famotidine (PEPCID) injection 20 mg  20 mg Intravenous BID Miracle Haines APRN - NP   20 mg at 04/05/19 2237    acetaminophen (TYLENOL) tablet 650 mg  650 mg Oral Q4H PRN Miracle Haines APRN - NP        potassium chloride (KLOR-CON M) extended release tablet 40 mEq  40 mEq Oral PRN JESSE Hwang - NP        Or   [facility] potassium chloride (KLOR-CON) packet 40 mEq  40 mEq Oral PRN JESSE Hwang - CALVIN        Or   [facility] potassium chloride 10 mEq/100 mL IVPB (Peripheral Line)  10 mEq Intravenous PRN Miracle Haines APRN - NP        metoprolol succinate (TOPROL XL) extended release tablet 12.5 mg  12.5 mg Oral Daily Rodrigo Arteaga MD   Stopped at 04/02/19 0900    clopidogrel (PLAVIX) tablet 75 mg  75 mg Oral Daily Miracle Haines APRN - NP   75 mg at 04/05/19 0848    DULoxetine (CYMBALTA) extended release capsule 60 mg  60 mg Oral BID Miracle Haines APRN - NP   60 mg at 04/05/19 2236    ranolazine (RANEXA) extended release tablet 1,000 mg  1,000 mg Oral BID Miracle Haines APRN - NP   1,000 mg at 04/05/19 2236     Facility-Administered Medications Ordered in Other Encounters   Medication Dose Route Frequency Provider Last Rate Last Dose    oxyCODONE (ROXICODONE) immediate release tablet 15 mg  15 mg Oral Once Livia Patricio MD         Allergies   Allergen Reactions    Baclofen     Fentanyl Swelling    Ibuprofen      \"Due to heart problems\"    Methocarbamol      Worsening edema    Nsaids      \"Due to heart problems\"    Tizanidine     Tolmetin      \"Due to heart problems\"    Tramadol Other (See Comments)     Doctor doesn't her to take due to heart problems  Seizures   Doctor doesn't her to take due to lowers seizure threshold.  Avelox [Moxifloxacin Hcl In Nacl] Swelling and Rash    Betadine [Povidone Iodine] Rash    Moxifloxacin Rash and Swelling     Lips swell and tingling in face      Active Problems:    Abdominal pain    Feeding tube dysfunction    Abdominal pain, epigastric    Gastrojejunostomy tube status (HCC)    Chronic malnutrition ([facility] [address]    Asplenia  Resolved Problems:    * No resolved hospital problems. *    Blood pressure (!) 92/51, pulse 60, temperature 99.1 °F (37.3 °C), temperature source Oral, resp. rate 18, height 5' 6\" (1.676 m), weight 144 lb 4.8 oz (65.5 kg), SpO2 93 %. Subjective:  Symptoms:  Stable. Diet:  Adequate intake. Pain:  She complains of pain that is mild. Objective:  General Appearance:  Comfortable. Vital signs: (most recent): Blood pressure (!) 92/51, pulse 60, temperature 99.1 °F (37.3 °C), temperature source Oral, resp. rate 18, height 5' 6\" (1.676 m), weight 144 lb 4.8 oz (65.5 kg), SpO2 93 %. Vital signs are normal.    HEENT: Normal HEENT exam.    Heart: Normal rate. Abdomen: Abdomen is soft. (PEG tube with mild discomfort). Bowel sounds are normal.     Extremities: Decreased range of motion. Neurological: Patient is alert. Pupils:  Pupils are equal, round, and reactive to light. Skin:  Warm. (Bruise right shoulder)    Assessment & Plan  abd pain with GJ site peristomal cellulitis unrelated to G tube malfunction  -ID consulted  -zyvox  -wound cx with MRSA and klebsiella  -CT with no acute complication  -no surgery  Hypothyroid  -increased synthroid.  Recheck TSH in 4 wks  HTN  -BB with parameters and stop ACE as SBP

## 2019-04-07 ENCOUNTER — APPOINTMENT (OUTPATIENT)
Dept: GENERAL RADIOLOGY | Age: 59
DRG: 602 | End: 2019-04-07
Payer: COMMERCIAL

## 2019-04-07 LAB
ADENOVIRUS DETECTION BY PCR: NOT DETECTED
ANION GAP SERPL CALCULATED.3IONS-SCNC: 6 MMOL/L (ref 4–16)
BORDETELLA PERTUSSIS PCR: NOT DETECTED
BUN BLDV-MCNC: 9 MG/DL (ref 6–23)
CALCIUM SERPL-MCNC: 8.2 MG/DL (ref 8.3–10.6)
CHLAMYDOPHILA PNEUMONIA PCR: NOT DETECTED
CHLORIDE BLD-SCNC: 96 MMOL/L (ref 99–110)
CO2: 38 MMOL/L (ref 21–32)
CORONAVIRUS 229E PCR: NOT DETECTED
CORONAVIRUS HKU1 PCR: NOT DETECTED
CORONAVIRUS NL63 PCR: NOT DETECTED
CORONAVIRUS OC43 PCR: NOT DETECTED
CREAT SERPL-MCNC: 0.6 MG/DL (ref 0.6–1.1)
GFR AFRICAN AMERICAN: >60 ML/MIN/1.73M2
GFR NON-AFRICAN AMERICAN: >60 ML/MIN/1.73M2
GLUCOSE BLD-MCNC: 73 MG/DL (ref 70–99)
HCT VFR BLD CALC: 25.4 % (ref 37–47)
HEMOGLOBIN: 9.2 GM/DL (ref 12.5–16)
HUMAN METAPNEUMOVIRUS PCR: NOT DETECTED
INFLUENZA A BY PCR: NOT DETECTED
INFLUENZA A H1 (2009) PCR: NOT DETECTED
INFLUENZA A H1 PANDEMIC PCR: NOT DETECTED
INFLUENZA A H3 PCR: NOT DETECTED
INFLUENZA B BY PCR: NOT DETECTED
LEGIONELLA URINARY AG: NEGATIVE
MCH RBC QN AUTO: 35.5 PG (ref 27–31)
MCHC RBC AUTO-ENTMCNC: 36.2 % (ref 32–36)
MCV RBC AUTO: 98.1 FL (ref 78–100)
MYCOPLASMA PNEUMONIAE PCR: NOT DETECTED
PARAINFLUENZA 1 PCR: NOT DETECTED
PARAINFLUENZA 2 PCR: NOT DETECTED
PARAINFLUENZA 3 PCR: NOT DETECTED
PARAINFLUENZA 4 PCR: NOT DETECTED
PDW BLD-RTO: 25.7 % (ref 11.7–14.9)
PLATELET # BLD: 686 K/CU MM (ref 140–440)
PMV BLD AUTO: 12.8 FL (ref 7.5–11.1)
POTASSIUM SERPL-SCNC: 3.6 MMOL/L (ref 3.5–5.1)
RBC # BLD: 2.59 M/CU MM (ref 4.2–5.4)
REASON FOR REJECTION: NORMAL
REASON FOR REJECTION: NORMAL
REJECTED TEST: NORMAL
RHINOVIRUS ENTEROVIRUS PCR: NOT DETECTED
RSV PCR: NOT DETECTED
SODIUM BLD-SCNC: 140 MMOL/L (ref 135–145)
STREP PNEUMONIAE ANTIGEN: NORMAL
WBC # BLD: 4.5 K/CU MM (ref 4–10.5)

## 2019-04-07 PROCEDURE — 71045 X-RAY EXAM CHEST 1 VIEW: CPT

## 2019-04-07 PROCEDURE — 6360000002 HC RX W HCPCS: Performed by: HOSPITALIST

## 2019-04-07 PROCEDURE — 94761 N-INVAS EAR/PLS OXIMETRY MLT: CPT

## 2019-04-07 PROCEDURE — 87899 AGENT NOS ASSAY W/OPTIC: CPT

## 2019-04-07 PROCEDURE — 2500000003 HC RX 250 WO HCPCS: Performed by: HOSPITALIST

## 2019-04-07 PROCEDURE — 87798 DETECT AGENT NOS DNA AMP: CPT

## 2019-04-07 PROCEDURE — 96376 TX/PRO/DX INJ SAME DRUG ADON: CPT

## 2019-04-07 PROCEDURE — 85027 COMPLETE CBC AUTOMATED: CPT

## 2019-04-07 PROCEDURE — 6360000002 HC RX W HCPCS: Performed by: INTERNAL MEDICINE

## 2019-04-07 PROCEDURE — 96366 THER/PROPH/DIAG IV INF ADDON: CPT

## 2019-04-07 PROCEDURE — 87449 NOS EACH ORGANISM AG IA: CPT

## 2019-04-07 PROCEDURE — 6370000000 HC RX 637 (ALT 250 FOR IP): Performed by: HOSPITALIST

## 2019-04-07 PROCEDURE — 6370000000 HC RX 637 (ALT 250 FOR IP): Performed by: INTERNAL MEDICINE

## 2019-04-07 PROCEDURE — 2580000003 HC RX 258: Performed by: NURSE PRACTITIONER

## 2019-04-07 PROCEDURE — 1200000000 HC SEMI PRIVATE

## 2019-04-07 PROCEDURE — 96372 THER/PROPH/DIAG INJ SC/IM: CPT

## 2019-04-07 PROCEDURE — 6370000000 HC RX 637 (ALT 250 FOR IP): Performed by: NURSE PRACTITIONER

## 2019-04-07 PROCEDURE — 2580000003 HC RX 258: Performed by: INTERNAL MEDICINE

## 2019-04-07 PROCEDURE — 87486 CHLMYD PNEUM DNA AMP PROBE: CPT

## 2019-04-07 PROCEDURE — 87581 M.PNEUMON DNA AMP PROBE: CPT

## 2019-04-07 PROCEDURE — 2500000003 HC RX 250 WO HCPCS: Performed by: NURSE PRACTITIONER

## 2019-04-07 PROCEDURE — 80048 BASIC METABOLIC PNL TOTAL CA: CPT

## 2019-04-07 PROCEDURE — 6360000002 HC RX W HCPCS: Performed by: NURSE PRACTITIONER

## 2019-04-07 RX ADMIN — OXYCODONE HYDROCHLORIDE 15 MG: 5 TABLET ORAL at 22:42

## 2019-04-07 RX ADMIN — Medication 800 MG: at 20:46

## 2019-04-07 RX ADMIN — ENOXAPARIN SODIUM 40 MG: 40 INJECTION SUBCUTANEOUS at 09:22

## 2019-04-07 RX ADMIN — FERROUS GLUCONATE TAB 324 MG (37.5 MG ELEMENTAL IRON) 324 MG: 324 (37.5 FE) TAB at 09:22

## 2019-04-07 RX ADMIN — MEXILETINE HYDROCHLORIDE 150 MG: 150 CAPSULE ORAL at 20:47

## 2019-04-07 RX ADMIN — RANOLAZINE 1000 MG: 500 TABLET, FILM COATED, EXTENDED RELEASE ORAL at 09:21

## 2019-04-07 RX ADMIN — ASPIRIN 81 MG CHEWABLE TABLET 81 MG: 81 TABLET CHEWABLE at 09:22

## 2019-04-07 RX ADMIN — LINEZOLID 600 MG: 600 INJECTION, SOLUTION INTRAVENOUS at 22:41

## 2019-04-07 RX ADMIN — LEVETIRACETAM 750 MG: 500 TABLET, FILM COATED ORAL at 21:00

## 2019-04-07 RX ADMIN — OXYCODONE HYDROCHLORIDE 15 MG: 5 TABLET ORAL at 18:39

## 2019-04-07 RX ADMIN — LEVOTHYROXINE SODIUM 175 MCG: 100 TABLET ORAL at 06:30

## 2019-04-07 RX ADMIN — GABAPENTIN 300 MG: 300 CAPSULE ORAL at 13:19

## 2019-04-07 RX ADMIN — SODIUM CHLORIDE, PRESERVATIVE FREE 10 ML: 5 INJECTION INTRAVENOUS at 09:22

## 2019-04-07 RX ADMIN — GABAPENTIN 300 MG: 300 CAPSULE ORAL at 06:30

## 2019-04-07 RX ADMIN — OXYCODONE HYDROCHLORIDE 15 MG: 5 TABLET ORAL at 14:40

## 2019-04-07 RX ADMIN — LINEZOLID 600 MG: 600 INJECTION, SOLUTION INTRAVENOUS at 09:23

## 2019-04-07 RX ADMIN — FAMOTIDINE 20 MG: 10 INJECTION, SOLUTION INTRAVENOUS at 09:22

## 2019-04-07 RX ADMIN — SODIUM CHLORIDE, PRESERVATIVE FREE 10 ML: 5 INJECTION INTRAVENOUS at 20:48

## 2019-04-07 RX ADMIN — CEFTRIAXONE SODIUM 2 G: 2 INJECTION, POWDER, FOR SOLUTION INTRAMUSCULAR; INTRAVENOUS at 17:09

## 2019-04-07 RX ADMIN — Medication 800 MG: at 09:21

## 2019-04-07 RX ADMIN — MORPHINE SULFATE 4 MG: 4 INJECTION INTRAVENOUS at 06:34

## 2019-04-07 RX ADMIN — OXYCODONE HYDROCHLORIDE 15 MG: 5 TABLET ORAL at 04:09

## 2019-04-07 RX ADMIN — Medication 1 CAPSULE: at 12:11

## 2019-04-07 RX ADMIN — SILVER SULFADIAZINE: 10 CREAM TOPICAL at 14:37

## 2019-04-07 RX ADMIN — TORSEMIDE 20 MG: 20 TABLET ORAL at 09:22

## 2019-04-07 RX ADMIN — FAMOTIDINE 20 MG: 10 INJECTION, SOLUTION INTRAVENOUS at 20:48

## 2019-04-07 RX ADMIN — LEVETIRACETAM 750 MG: 500 TABLET, FILM COATED ORAL at 09:21

## 2019-04-07 RX ADMIN — TORSEMIDE 20 MG: 20 TABLET ORAL at 17:09

## 2019-04-07 RX ADMIN — RANOLAZINE 1000 MG: 500 TABLET, FILM COATED, EXTENDED RELEASE ORAL at 20:46

## 2019-04-07 RX ADMIN — FERROUS GLUCONATE TAB 324 MG (37.5 MG ELEMENTAL IRON) 324 MG: 324 (37.5 FE) TAB at 17:19

## 2019-04-07 RX ADMIN — Medication 1 CAPSULE: at 17:09

## 2019-04-07 RX ADMIN — LINEZOLID 600 MG: 600 INJECTION, SOLUTION INTRAVENOUS at 00:00

## 2019-04-07 RX ADMIN — MORPHINE SULFATE 4 MG: 4 INJECTION INTRAVENOUS at 02:00

## 2019-04-07 RX ADMIN — DULOXETINE 60 MG: 30 CAPSULE, DELAYED RELEASE ORAL at 09:22

## 2019-04-07 RX ADMIN — Medication 1 CAPSULE: at 09:21

## 2019-04-07 RX ADMIN — MEXILETINE HYDROCHLORIDE 150 MG: 150 CAPSULE ORAL at 06:30

## 2019-04-07 RX ADMIN — GABAPENTIN 300 MG: 300 CAPSULE ORAL at 20:46

## 2019-04-07 RX ADMIN — OXYCODONE HYDROCHLORIDE 15 MG: 5 TABLET ORAL at 09:21

## 2019-04-07 RX ADMIN — DULOXETINE 60 MG: 30 CAPSULE, DELAYED RELEASE ORAL at 20:47

## 2019-04-07 RX ADMIN — CLOPIDOGREL BISULFATE 75 MG: 75 TABLET ORAL at 09:21

## 2019-04-07 ASSESSMENT — PAIN DESCRIPTION - FREQUENCY: FREQUENCY: CONTINUOUS

## 2019-04-07 ASSESSMENT — PAIN SCALES - GENERAL
PAINLEVEL_OUTOF10: 7
PAINLEVEL_OUTOF10: 9
PAINLEVEL_OUTOF10: 9
PAINLEVEL_OUTOF10: 7
PAINLEVEL_OUTOF10: 9
PAINLEVEL_OUTOF10: 8
PAINLEVEL_OUTOF10: 9
PAINLEVEL_OUTOF10: 4

## 2019-04-07 ASSESSMENT — PAIN DESCRIPTION - LOCATION: LOCATION: ABDOMEN

## 2019-04-07 ASSESSMENT — PAIN DESCRIPTION - PAIN TYPE: TYPE: ACUTE PAIN;CHRONIC PAIN

## 2019-04-07 ASSESSMENT — PAIN - FUNCTIONAL ASSESSMENT: PAIN_FUNCTIONAL_ASSESSMENT: PREVENTS OR INTERFERES WITH MANY ACTIVE NOT PASSIVE ACTIVITIES

## 2019-04-07 ASSESSMENT — PAIN DESCRIPTION - ORIENTATION: ORIENTATION: LEFT

## 2019-04-07 ASSESSMENT — PAIN DESCRIPTION - DESCRIPTORS: DESCRIPTORS: ACHING

## 2019-04-07 ASSESSMENT — PAIN DESCRIPTION - PROGRESSION: CLINICAL_PROGRESSION: NOT CHANGED

## 2019-04-07 ASSESSMENT — PAIN DESCRIPTION - ONSET: ONSET: ON-GOING

## 2019-04-07 NOTE — PROGRESS NOTES
Jack Morrison is a 63 y.o. female has shortness of breath    Current Facility-Administered Medications   Medication Dose Route Frequency Provider Last Rate Last Dose    potassium chloride (KLOR-CON M) extended release tablet 40 mEq  40 mEq Oral PRN Jose David Amos MD        Or    potassium chloride (KLOR-CON) packet 40 mEq  40 mEq Oral PRN Jose David Amos MD        Or    potassium chloride 10 mEq/100 mL IVPB (Peripheral Line)  10 mEq Intravenous PRN Jose David Amos MD        morphine sulfate (PF) injection 4 mg  4 mg Intravenous Q4H PRN Jose David Amos MD   4 mg at 04/07/19 0634    cefTRIAXone (ROCEPHIN) 2 g in dextrose 5 % 50 mL IVPB  2 g Intravenous Q24H Erlinda Muniz MD   Stopped at 04/06/19 1842    levothyroxine (SYNTHROID) tablet 175 mcg  175 mcg Oral Daily Jose David Amos MD   175 mcg at 04/07/19 0630    albuterol sulfate  (90 Base) MCG/ACT inhaler 2 puff  2 puff Inhalation Q6H PRN Edie Jj APRN - NP        albuterol (PROVENTIL) nebulizer solution 2.5 mg  2.5 mg Nebulization Q4H PRN Edie Jj APRN - NP        aspirin chewable tablet 81 mg  81 mg Oral Daily Edie Jj APRN - NP   81 mg at 04/06/19 1013    gabapentin (NEURONTIN) capsule 300 mg  300 mg Oral TID Edie Jj APRN - NP   300 mg at 04/07/19 0630    ferrous gluconate 324 (37.5 Fe) MG tablet 324 mg  324 mg Oral BID  Edie Jj APRN - NP   324 mg at 04/06/19 1821    docusate sodium (COLACE) capsule 100 mg  100 mg Oral BID PRN Edie Jj APRN - NP        lactobacillus (CULTURELLE) capsule 1 capsule  1 capsule Oral TID  Edie Jj APRN - NP   1 capsule at 04/06/19 1812    levETIRAcetam (KEPPRA) tablet 750 mg  750 mg Oral BID Edie Jj APRN - NP   750 mg at 04/06/19 2040    magnesium oxide (MAG-OX) tablet 800 mg  800 mg Oral BID JESSE Canela NP   800 mg at 04/06/19 2035    mexiletine (MEXITIL) capsule 150 mg  150 mg Oral TID JESSE Canela NP   150 mg at 04/07/19 0630    torsemide (DEMADEX) tablet 20 mg  20 mg Oral BID Prabhu Olson, APRN - NP   20 mg at 04/06/19 1812    oxyCODONE (ROXICODONE) immediate release tablet 15 mg  15 mg Oral Q4H PRN Wily Francois MD   15 mg at 04/07/19 0409    linezolid (ZYVOX) IVPB 600 mg  600 mg Intravenous Q12H Ca Marshall MD   Stopped at 04/07/19 0146    sodium chloride flush 0.9 % injection 10 mL  10 mL Intravenous 2 times per day Prabhu Olson, APRN - NP   10 mL at 04/06/19 2227    sodium chloride flush 0.9 % injection 10 mL  10 mL Intravenous PRN Prabhu Olson, APRN - NP        [facility]ron [facility] injection 4 mg  4 mg Intravenous Q6H PRN Prabhu Olson, APRN - NP   4 mg at 04/01/19 1857    enoxaparin (LOVENOX) injection 40 mg  40 mg Subcutaneous Daily Prabhu Olson, APRN - NP   40 mg at 04/06/19 1012    famotidine (PEPCID) injection 20 mg  20 mg Intravenous BID Prabhu Olson, APRN - NP   20 mg at 04/06/19 2035    acetaminophen (TYLENOL) tablet 650 mg  650 mg Oral Q4H PRN Prabhu Olson, APRN - NP        metoprolol succinate (TOPROL XL) extended release tablet 12.5 mg  12.5 mg Oral Daily Rodger Rose MD   Stopped at 04/02/19 0900    clopidogrel (PLAVIX) tablet 75 mg  75 mg Oral Daily Prabhu Olson, APRN - NP   75 mg at 04/06/19 1013    DULoxetine (CYMBALTA) extended release capsule 60 mg  60 mg Oral BID Prabhu Olson, APRN - NP   60 mg at 04/06/19 2035    ranolazine (RANEXA) extended release tablet 1,000 mg  1,000 mg Oral BID Prabhu Olson, APRN - NP   1,000 mg at 04/06/19 2035     Facility-Administered Medications Ordered in Other Encounters   Medication Dose Route Frequency Provider Last Rate Last Dose    oxyCODONE (ROXICODONE) immediate release tablet 15 mg  15 mg Oral Once Derek Crutch, MD         Allergies   Allergen Reactions    Baclofen     Fentanyl Swelling    Ibuprofen      \"Due to heart problems\"    Methocarbamol      Worsening edema    Nsaids      \"Due to heart problems\"    Tizanidine     Tolmetin      \"Due to heart problems\"    Tramadol Other (See Comments)     Doctor doesn't her to take due to heart problems  Seizures   Doctor doesn't her to take due to lowers seizure threshold.  Avelox [Moxifloxacin Hcl In Nacl] Swelling and Rash    Betadine [Povidone Iodine] Rash    Moxifloxacin Rash and Swelling     Lips swell and tingling in face      Active Problems:    Abdominal pain    Feeding tube dysfunction    Abdominal pain, epigastric    Gastrojejunostomy tube status (HCC)    Chronic malnutrition ([facility] [address]    Asplenia  Resolved Problems:    * No resolved hospital problems. *    Blood pressure (!) 92/46, pulse 54, temperature 97.9 °F (36.6 °C), resp. rate 14, height 5' 6\" (1.676 m), weight 144 lb 4.8 oz (65.5 kg), SpO2 99 %. Subjective:  Diet:  Adequate intake. Pain:  She complains of pain that is mild. Objective:  General Appearance:  Comfortable. Vital signs: (most recent): Blood pressure (!) 92/46, pulse 54, temperature 97.9 °F (36.6 °C), resp. rate 14, height 5' 6\" (1.676 m), weight 144 lb 4.8 oz (65.5 kg), SpO2 99 %. Vital signs are normal.    HEENT: Normal HEENT exam.    Lungs: There are wheezes. Heart: Normal rate. Abdomen: Abdomen is soft. (PEG tube with mild discomfort). Bowel sounds are normal.     Extremities: Decreased range of motion. Neurological: Patient is alert. Pupils:  Pupils are equal, round, and reactive to light. Skin:  Warm. (Bruise right shoulder)    Assessment & Plan   Pneumonia suspected gram pos  -on rocephin and zyvox  -CXR stable with RUL  -check labs, resp PCR, strep and legionella  abd pain with GJ site peristomal cellulitis unrelated to G tube malfunction  -ID consulted  -zyvox  -wound cx with MRSA and klebsiella  -CT with no acute complication  -no surgery  Hypothyroid  -increased synthroid.  Recheck TSH in 4 wks  HTN  -BB with parameters and stop ACE as SBP 90s  Hx of malabsorption  -GJ tube placement

## 2019-04-08 VITALS
DIASTOLIC BLOOD PRESSURE: 56 MMHG | BODY MASS INDEX: 23.19 KG/M2 | OXYGEN SATURATION: 99 % | RESPIRATION RATE: 18 BRPM | SYSTOLIC BLOOD PRESSURE: 119 MMHG | HEART RATE: 70 BPM | TEMPERATURE: 98.9 F | HEIGHT: 66 IN | WEIGHT: 144.3 LBS

## 2019-04-08 LAB
ALBUMIN SERPL-MCNC: 3.4 GM/DL (ref 3.4–5)
ALP BLD-CCNC: 92 IU/L (ref 40–129)
ALT SERPL-CCNC: 9 U/L (ref 10–40)
AST SERPL-CCNC: 13 IU/L (ref 15–37)
BILIRUB SERPL-MCNC: 0.2 MG/DL (ref 0–1)
BILIRUBIN DIRECT: 0.2 MG/DL (ref 0–0.3)
BILIRUBIN, INDIRECT: 0 MG/DL (ref 0–0.7)
TOTAL PROTEIN: 5.1 GM/DL (ref 6.4–8.2)

## 2019-04-08 PROCEDURE — 96372 THER/PROPH/DIAG INJ SC/IM: CPT

## 2019-04-08 PROCEDURE — 6370000000 HC RX 637 (ALT 250 FOR IP): Performed by: INTERNAL MEDICINE

## 2019-04-08 PROCEDURE — 80076 HEPATIC FUNCTION PANEL: CPT

## 2019-04-08 PROCEDURE — 96376 TX/PRO/DX INJ SAME DRUG ADON: CPT

## 2019-04-08 PROCEDURE — 6360000002 HC RX W HCPCS: Performed by: INTERNAL MEDICINE

## 2019-04-08 PROCEDURE — 96366 THER/PROPH/DIAG IV INF ADDON: CPT

## 2019-04-08 PROCEDURE — 99232 SBSQ HOSP IP/OBS MODERATE 35: CPT | Performed by: SURGERY

## 2019-04-08 PROCEDURE — 6370000000 HC RX 637 (ALT 250 FOR IP): Performed by: HOSPITALIST

## 2019-04-08 PROCEDURE — 2580000003 HC RX 258: Performed by: NURSE PRACTITIONER

## 2019-04-08 PROCEDURE — 6360000002 HC RX W HCPCS: Performed by: HOSPITALIST

## 2019-04-08 PROCEDURE — 6360000002 HC RX W HCPCS: Performed by: NURSE PRACTITIONER

## 2019-04-08 PROCEDURE — 6370000000 HC RX 637 (ALT 250 FOR IP): Performed by: NURSE PRACTITIONER

## 2019-04-08 PROCEDURE — 2500000003 HC RX 250 WO HCPCS: Performed by: NURSE PRACTITIONER

## 2019-04-08 PROCEDURE — 94761 N-INVAS EAR/PLS OXIMETRY MLT: CPT

## 2019-04-08 RX ORDER — OXYCODONE HYDROCHLORIDE 15 MG/1
15 TABLET ORAL EVERY 4 HOURS PRN
Qty: 18 TABLET | Refills: 0 | Status: SHIPPED | OUTPATIENT
Start: 2019-04-08 | End: 2019-04-11

## 2019-04-08 RX ORDER — LINEZOLID 600 MG/1
600 TABLET, FILM COATED ORAL 2 TIMES DAILY
Qty: 20 TABLET | Refills: 0 | Status: SHIPPED | OUTPATIENT
Start: 2019-04-08 | End: 2019-04-18

## 2019-04-08 RX ADMIN — DULOXETINE 60 MG: 30 CAPSULE, DELAYED RELEASE ORAL at 09:44

## 2019-04-08 RX ADMIN — METOPROLOL SUCCINATE 12.5 MG: 25 TABLET, EXTENDED RELEASE ORAL at 09:44

## 2019-04-08 RX ADMIN — MORPHINE SULFATE 4 MG: 4 INJECTION INTRAVENOUS at 04:57

## 2019-04-08 RX ADMIN — GABAPENTIN 300 MG: 300 CAPSULE ORAL at 05:59

## 2019-04-08 RX ADMIN — ASPIRIN 81 MG CHEWABLE TABLET 81 MG: 81 TABLET CHEWABLE at 09:45

## 2019-04-08 RX ADMIN — LEVOTHYROXINE SODIUM 175 MCG: 100 TABLET ORAL at 05:59

## 2019-04-08 RX ADMIN — CLOPIDOGREL BISULFATE 75 MG: 75 TABLET ORAL at 09:44

## 2019-04-08 RX ADMIN — LINEZOLID 600 MG: 600 INJECTION, SOLUTION INTRAVENOUS at 09:45

## 2019-04-08 RX ADMIN — SILVER SULFADIAZINE: 10 CREAM TOPICAL at 09:47

## 2019-04-08 RX ADMIN — OXYCODONE HYDROCHLORIDE 15 MG: 5 TABLET ORAL at 11:45

## 2019-04-08 RX ADMIN — OXYCODONE HYDROCHLORIDE 15 MG: 5 TABLET ORAL at 02:44

## 2019-04-08 RX ADMIN — ENOXAPARIN SODIUM 40 MG: 40 INJECTION SUBCUTANEOUS at 09:45

## 2019-04-08 RX ADMIN — FAMOTIDINE 20 MG: 10 INJECTION, SOLUTION INTRAVENOUS at 09:45

## 2019-04-08 RX ADMIN — MEXILETINE HYDROCHLORIDE 150 MG: 150 CAPSULE ORAL at 05:59

## 2019-04-08 RX ADMIN — SODIUM CHLORIDE, PRESERVATIVE FREE 10 ML: 5 INJECTION INTRAVENOUS at 09:45

## 2019-04-08 RX ADMIN — LEVETIRACETAM 750 MG: 500 TABLET, FILM COATED ORAL at 09:43

## 2019-04-08 RX ADMIN — OXYCODONE HYDROCHLORIDE 15 MG: 5 TABLET ORAL at 07:36

## 2019-04-08 RX ADMIN — Medication 1 CAPSULE: at 09:43

## 2019-04-08 RX ADMIN — Medication 800 MG: at 09:44

## 2019-04-08 RX ADMIN — MORPHINE SULFATE 4 MG: 4 INJECTION INTRAVENOUS at 09:45

## 2019-04-08 RX ADMIN — RANOLAZINE 1000 MG: 500 TABLET, FILM COATED, EXTENDED RELEASE ORAL at 09:44

## 2019-04-08 RX ADMIN — FERROUS GLUCONATE TAB 324 MG (37.5 MG ELEMENTAL IRON) 324 MG: 324 (37.5 FE) TAB at 09:44

## 2019-04-08 RX ADMIN — TORSEMIDE 20 MG: 20 TABLET ORAL at 09:45

## 2019-04-08 ASSESSMENT — PAIN DESCRIPTION - PAIN TYPE: TYPE: CHRONIC PAIN

## 2019-04-08 ASSESSMENT — PAIN SCALES - GENERAL
PAINLEVEL_OUTOF10: 10
PAINLEVEL_OUTOF10: 9
PAINLEVEL_OUTOF10: 8
PAINLEVEL_OUTOF10: 3

## 2019-04-08 ASSESSMENT — PAIN - FUNCTIONAL ASSESSMENT: PAIN_FUNCTIONAL_ASSESSMENT: PREVENTS OR INTERFERES SOME ACTIVE ACTIVITIES AND ADLS

## 2019-04-08 ASSESSMENT — PAIN DESCRIPTION - PROGRESSION
CLINICAL_PROGRESSION: NOT CHANGED

## 2019-04-08 ASSESSMENT — PAIN DESCRIPTION - DESCRIPTORS: DESCRIPTORS: ACHING

## 2019-04-08 ASSESSMENT — PAIN DESCRIPTION - ONSET: ONSET: ON-GOING

## 2019-04-08 ASSESSMENT — PAIN DESCRIPTION - FREQUENCY: FREQUENCY: CONTINUOUS

## 2019-04-08 ASSESSMENT — PAIN DESCRIPTION - ORIENTATION: ORIENTATION: LEFT

## 2019-04-08 ASSESSMENT — PAIN DESCRIPTION - LOCATION: LOCATION: ABDOMEN

## 2019-04-08 NOTE — DISCHARGE SUMMARY
Physician Discharge Summary     Patient ID:  Melanie Newell  [id number]  63 y.o.  1960    Admit date: 3/31/2019    Discharge date and time: 4/8/2019  1:36 PM     Admitting Physician: Rhiannon Maxwell MD     Discharge Physician:Rosario    Admission Diagnoses: Abdominal pain, epigastric [R10.13]  Hypokalemia [E87.6]  Abdominal pain [R10.9]    Discharge Diagnoses: Abd pain with GJ site peristomal cellulitis unrelated to G tube malfunction                                        Pneumonia suspected gram pos                                        Hypothyroid                                        HTN(follow up PCP)                                        Macrocytic anemia                                        Transient hypokalemia                                         Hx of seizure                                         Chronic pain syndrome                                            Admission Condition: fair    Discharged Condition: good    Indication for Admission: cellulitis    Hospital Course:   63 y.o.  female  who presents from home with abdominal pain, onset about 10 days ago. 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 Due to leakage at the site, she reports the G-J tube was replaced this last week at [place]. States she was not discharged on abx from [place] and that she has had leakage around the site since her procedure on 3/25. She was here on 3/19 for abdominal pain with g-j leakage and states she was told she was supposed to be on abx but states she never took any; she cannot provide further details regarding this. States she is tolerating food and still reports + bowel function. She denies bleeding at G-J site, melanotic stool/hematochezia, emesis. Rates pain 8/10 on exam, with wincing and moaning. When distracted, she appears comfortable and not in distress. She was admitted for abd pain and G tube site. CT abd showed no abscess.  Surgery consulted and no surgical intervention but did recommend to follow up at [facility] for [address] tube change as there as NO correct equipment here. Her cellulitis improved and wound cx came back with MRSA,klebsiella and scanty psuedomonas. She was also treated for pneumonia. Bld cx neg and resp PCR, strep and legionella negative. She was placed on bactrim and zyvox at discharge. She was weak and her synthroid was increased due elevated TSH and low Free T4 and will need a recheck of these labs in 4 wks. Pt did appeal her discharge but denied by insurance. Did discuss that if her pain from [address] tube recurs then may need to go to [facility] as noted above. Consults: ID and general surgery        Outstanding Order Results     No orders found from 3/2/2019 to 4/1/2019. Discharge Exam:  Lungs: There are decreased breath sounds. Heart: Normal rate. Abdomen: Abdomen is soft. (PEG tube with mild discomfort). Bowel sounds are normal.     Extremities: Decreased range of motion. Neurological: Patient is alert. Pupils:  Pupils are equal, round, and reactive to light. Skin:  Warm. (Bruise right shoulder)           Disposition: home    Patient Instructions:      Medication List      START taking these medications    linezolid 600 MG tablet  Commonly known as:  ZYVOX  Take 1 tablet by mouth 2 times daily for 10 days     sulfamethoxazole-trimethoprim 800-160 MG per tablet  Commonly known as:  BACTRIM DS;SEPTRA DS  Take 1 tablet by mouth 2 times daily for 10 days        CHANGE how you take these medications    albuterol sulfate  (90 Base) MCG/ACT inhaler  What changed:  Another medication with the same name was removed. Continue taking this medication, and follow the directions you see here.      levothyroxine 175 MCG tablet  Commonly known as:  SYNTHROID  Take 1 tablet by mouth Daily  What changed:    · medication strength  · how much to take        CONTINUE taking these medications    Acetaminophen 650 MG metoprolol succinate 25 MG extended release tablet  Commonly known as:  TOPROL XL     potassium chloride 20 MEQ extended release tablet  Commonly known as:  KLOR-CON M     senna-docusate 8.6-50 MG per tablet  Commonly known as:  PERICOLACE     torsemide 20 MG tablet  Commonly known as:  DEMADEX           Where to Get Your Medications      You can get these medications from any pharmacy    Bring a paper prescription for each of these medications  · levothyroxine 175 MCG tablet  · linezolid 600 MG tablet  · oxyCODONE 15 MG immediate release tablet  · sulfamethoxazole-trimethoprim 800-160 MG per tablet         Activity: activity as tolerated  Diet: regular diet  Wound Care: as directed    Follow-up with PCP  Discharge time 35min.     Rickey Jones  4/8/2019  4:29 PM

## 2019-04-08 NOTE — PROGRESS NOTES
CLINICAL PHARMACY NOTE: MEDS TO [address] Select Patient?: No  Total # of Prescriptions Filled: 2   The following medications were delivered to the patient:  · Linezolid 600mg  · Oxycodone 15mg IR tabs  Total # of Interventions Completed: 0  Time Spent (min): 15    Additional Documentation:

## 2019-04-08 NOTE — PROGRESS NOTES
[facility]    PATIENT: Desmond Ferreira, 63 y.o., female, MRN: [id number]    Hospital Day:  LOS: 8 days     Desmond Ferreira is a 63 y.o. female with presenting with drainage around her G tube and concern for infection. She has a complex history including remote tatiana en Y gastric bypass with later failure to thrive with excessive weight loss requiring enteral nutrition. 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 She has had the G-J tube exchanged several times, most recently had it replaced at [facility] on 3/25/19 after a failed IR replacement at [facility]. In the interval, she was seen at [facility] for possible infection around the G-J tube site, and was transferred to [facility] for endoscopic replacement of the G-J tube (we don't have the correct scope at [facility] to do it). She now returns with concerns for purulent drainage around the G-J tube              Subjective:  Chief Complaint: abdominal pain  Pain: 8/10  BM: yes   Diet: Diet Tube Feed Continuous/Cyclic w/ Diet  DIET GENERAL; Activity: as tolerated    She is complaining of worse abdominal pain today. She reports bloody drainage from the G port of her GJ tube (but is none apparent on exam).       Objective:    Vitals: BP (!) 119/56   Pulse 70   Temp 98.9 °F (37.2 °C) (Oral)   Resp 18   Ht 5' 6\" (1.676 m)   Wt 144 lb 4.8 oz (65.5 kg)   SpO2 99%   BMI 23.29 kg/m²   Vital Signs (Last 24 Hours)  Temp  Av.5 °F (36.9 °C)  Min: 98.1 °F (36.7 °C)  Max: 98.9 °F (37.2 °C)  Pulse  Av.3  Min: 56  Max: 70  BP  Min: 93/47  Max: 131/60  Resp  Av.3  Min: 17  Max: 18  SpO2  Av %  Min: 95 %  Max: 99 %  Wt Readings from Last 3 Encounters:   19 144 lb 4.8 oz (65.5 kg)   19 122 lb (55.3 kg)   19 122 lb (55.3 kg)       I/O: 701 -  07  In: 40 [I.V.:10]  Out: -     IV Fluids:     Scheduled Meds:   silver sulfADIAZINE, , Topical, Daily    cefTRIAXone (ROCEPHIN) IV, 2 g, Intravenous, Q24H    levothyroxine, 175 mcg, Oral, Daily    aspirin, 81 mg, Oral, Daily    gabapentin, 300 mg, Oral, TID    ferrous gluconate, 324 mg, Oral, BID WC    lactobacillus, 1 capsule, Oral, TID WC    levETIRAcetam, 750 mg, Oral, BID    magnesium oxide, 800 mg, Oral, BID    mexiletine, 150 mg, Oral, TID    torsemide, 20 mg, Oral, BID    linezolid, 600 mg, Intravenous, Q12H    sodium chloride flush, 10 mL, Intravenous, 2 times per day    enoxaparin, 40 mg, Subcutaneous, Daily    famotidine (PEPCID) injection, 20 mg, Intravenous, BID    metoprolol succinate, 12.5 mg, Oral, Daily    clopidogrel, 75 mg, Oral, Daily    DULoxetine, 60 mg, Oral, BID    ranolazine, 1,000 mg, Oral, BID    Physical Exam:  General Appearance:   Alert, cooperative, no distress    Head:   Normocephalic, atraumatic    Lungs:    Equal chest rise, respirations unlabored   Heart:   Regular rate and rhythm    Abdomen:    Soft, minimally-tender around GJ tube, no rebound or guarding. Dermatitis/erythema around [address] tube improving. It was not secured to the abdominal wall today, appears to have pulled back to about 2cm. Re-adjusted balloon so it is in the stomach again, bumper secured at 4cm. There is no blood or trace of blood in the tube, in the drainage bag, or around the [address] tube. The drainage bag has yellow bilious output.     Extremities:  No cyanosis or edema    Neurologic:  Nonfocal, grossly intact        Labs/Imaging Results:   Recent Results (from the past 24 hour(s))   RESP Disease Panel PCR    Collection Time: 04/07/19 10:55 AM   Result Value Ref Range    Adenovirus Detection by PCR NOT DETECTED NOT DETECTED    Coronavirus 229E PCR NOT DETECTED NOT DETECTED    Coronavirus HKU1 PCR NOT DETECTED NOT DETECTED    Coronavirus NL63 PCR NOT DETECTED NOT DETECTED    Coronavirus OC43 PCR NOT DETECTED NOT DETECTED    Human Metapneumovirus PCR NOT DETECTED NOT DETECTED    Rhinovirus Enterovirus PCR NOT DETECTED NOT DETECTED    Influenza A by PCR NOT DETECTED NOT DETECTED    Influenza A H1 (2009) PCR NOT DETECTED NOT DETECTED    Influenza A H1 Pandemic PCR NOT DETECTED NOT DETECTED    Influenza A H3 PCR NOT DETECTED NOT DETECTED    Influenza B by PCR NOT DETECTED NOT DETECTED    Parainfluenza 1 PCR NOT DETECTED NOT DETECTED    Parainfluenza 2 PCR NOT DETECTED NOT DETECTED    Parainfluenza 3 PCR NOT DETECTED NOT DETECTED    Parainfluenza 4 PCR NOT DETECTED NOT DETECTED    RSV PCR NOT DETECTED NOT DETECTED    B Pertussis by PCR NOT DETECTED NOT DETECTED    Chlamydophila Pneumonia PCR NOT DETECTED NOT DETECTED    Mycoplasma pneumo by PCR NOT DETECTED NOT DETECTED   CBC    Collection Time: 04/07/19 10:55 AM   Result Value Ref Range    WBC 4.5 4.0 - 10.5 K/CU MM    RBC 2.59 (L) 4.2 - 5.4 M/CU MM    Hemoglobin 9.2 (L) 12.5 - 16.0 GM/DL    Hematocrit 25.4 (L) 37 - 47 %    MCV 98.1 78 - 100 FL    MCH 35.5 (H) 27 - 31 PG    MCHC 36.2 (H) 32.0 - 36.0 %    RDW 25.7 (H) 11.7 - 14.9 %    Platelets 451 (H) 267 - 440 K/CU MM    MPV 12.8 (H) 7.5 - 11.1 FL   Legionella antigen, urine    Collection Time: 04/07/19 10:55 AM   Result Value Ref Range    Legionella Urinary Ag NEGATIVE NEGATIVE   Strep Pneumoniae Antigen    Collection Time: 04/07/19 10:55 AM   Result Value Ref Range    Strep pneumo Ag URINE NEGATIVE  Presumptive negative for        Strep pneumo Ag pneumococcal pneumonia,     Strep pneumo Ag suggesting no current or recent     Strep pneumo Ag pneumococcal infection.  Infection     Strep pneumo Ag due to S pneumonia cannot be ruled     Strep pneumo Ag out if the antigen present in the     Strep pneumo Ag sample is below the detection     Strep pneumo Ag limit of the test.     Strep pneumo Ag (NOTE)  REFERENCE RANGE: NEGATIVE      SPECIMEN REJECTION    Collection Time: 04/07/19 10:55 AM   Result Value Ref Range    Rejected Test BMPX     Reason for Rejection UNABLE TO PERFORM TESTING:     Reason for Rejection SPECIMEN HEMOLYZED    Basic Metabolic Panel w/ Reflex to MG    Collection Time: 04/07/19 12:13 PM   Result Value Ref Range    Sodium 140 135 - 145 MMOL/L    Potassium 3.6 3.5 - 5.1 MMOL/L    Chloride 96 (L) 99 - 110 mMol/L    CO2 38 (H) 21 - 32 MMOL/L    Anion Gap 6 4 - 16    BUN 9 6 - 23 MG/DL    CREATININE 0.6 0.6 - 1.1 MG/DL    Glucose 73 70 - 99 MG/DL    Calcium 8.2 (L) 8.3 - 10.6 MG/DL    GFR Non-African American >60 >60 mL/min/1.73m2    GFR African American >60 >60 mL/min/1.73m2         Assessment:  Andre Kendall is a 63 y.o. female with complex history including gastric bypass, failure to thrive, enteral access via gastrostomy in the remnant stomach subsequently converted to a G-J tube due to chronic drainage and suspicion of gastric outlet obstruction. Now presenting with drainage around her GJ tube site with concern for infection, last replaced at [facility] on 3/25/19     Active Problems:    Abdominal pain    Feeding tube dysfunction    Abdominal pain, epigastric    Gastrojejunostomy tube status (HCC)    Chronic malnutrition ([address]    Asplenia  Resolved Problems:    * No resolved hospital problems. *      Plan:  · Drainage around her G-J tube has been a chronic problem. I suspect her erythema is chemical rather than infectious. Recommend keeping the G port to gravity drainage and keeping tension off of the tube site by securing it to the abdominal wall. Re-secured to the abdominal wall today and reiterated to the patient that it needs to be secured to the abdominal wall to avoid tension whenever the GJ tube is attached to a drainage bag or tube feeding. · Follow up at [facility] when she is due for GJ tube change (we don't have the correct equipment here). · OK for discharge today from surgical standpoint. · Agree with local wound care, barrier paste around the [address] [place] [address] tube site. Goal is to keep the skin clean and dry.   · Ok to continue tube feeds via the jejunal port. · No surgical plans at this time. Discussed with Dr. Cherie Cheng regarding long-term management of the G-J tube since she is re-admitted frequently with concerns for possible infection. Unfortunately, re-locating the tube is likely not possible since there was not much space to get the remnant stomach up to the abdominal wall at the time of her original surgery. If she continues to have chronic issues with malnutrition and the GJ tube cannot be maintained, she may need to be referred for reversal of her gastric bypass in the future.    · Thank you for the consultation and the opportunity to care for Royce Velásquez    Electronically signed: Lisa Dumas MD 4/8/2019 10:14 AM

## 2019-04-08 NOTE — PROGRESS NOTES
Infectious Disease Progress Note  2019   Patient Name: Niraj Ulloa : 1960   Impression  · Peristomal cellulitis at [address] [place] [address]  ? Improving redness  ? Culture positive for MRSA, and K pneumoniae  · Gastroparesis/malabsorption status post Maru-en-Y with a PEG tube  · CHF status post AICD  · Multi-morbidity: per PMHx  Plan:  · Continue linezolid and ceftriaxone  · May d/c with oral antibiotics linezolid 600 mg bid and bactrim DS I tab po bid (end-date: 19)  · Discussed with Dr. Darlyn Pina on 19, she advises that patient will need to follow-up with [facility]  · Continue barrier cream to [address] [place] [address] to minimize inflammation and maceration  · May d/c from  ID standpoint        Ongoing Antimicrobial Therapy  Linezolid -  Ceftriaxone -  Completed Antimicrobial Therapy  Meropenem 4/3-?? History:? Interval history noted: Being seen for peristomal cellulitis/inflammation  Feels better today Denies n/v/d/f or untoward effects of antibiotics  Physical Exam:  Vital Signs: BP (!) 119/56   Pulse 70   Temp 98.9 °F (37.2 °C) (Oral)   Resp 18   Ht 5' 6\" (1.676 m)   Wt 144 lb 4.8 oz (65.5 kg)   SpO2 99%   BMI 23.29 kg/m²     Gen: alert and oriented X3, no distress  Skin: no stigmata of endocarditis  Wounds: C/D/I  HEMT: AT/NC Oropharynx pink, moist, and without lesions or exudates; dentition in good state of repair  Eyes: PERRLA, EOMI, conjunctiva pink, sclera anicteric. Neck: Supple. Trachea midline. No LAD. Chest: no distress and CTA. Good air movement. Heart: RRR and no MRG. Abd: Left upper quadrant GEJ tube with peristomal blanchable redness, less macerated, slimy discharge on tube, tender to palpation. soft, non-distended, no tenderness, no hepatomegaly. Normoactive bowel sounds. Ext: no clubbing, cyanosis, or edema  Catheter Site: Right internal jugular CVC without erythema or tenderness  Neuro: Mental status intact.  CN 2-12 intact and no focal sensory or motor deficits         Radiologic / Imaging / TESTING  No results found.      Labs:    Recent Results (from the past 24 hour(s))   RESP Disease Panel PCR    Collection Time: 04/07/19 10:55 AM   Result Value Ref Range    Adenovirus Detection by PCR NOT DETECTED NOT DETECTED    Coronavirus 229E PCR NOT DETECTED NOT DETECTED    Coronavirus HKU1 PCR NOT DETECTED NOT DETECTED    Coronavirus NL63 PCR NOT DETECTED NOT DETECTED    Coronavirus OC43 PCR NOT DETECTED NOT DETECTED    Human Metapneumovirus PCR NOT DETECTED NOT DETECTED    Rhinovirus Enterovirus PCR NOT DETECTED NOT DETECTED    Influenza A by PCR NOT DETECTED NOT DETECTED    Influenza A H1 (2009) PCR NOT DETECTED NOT DETECTED    Influenza A H1 Pandemic PCR NOT DETECTED NOT DETECTED    Influenza A H3 PCR NOT DETECTED NOT DETECTED    Influenza B by PCR NOT DETECTED NOT DETECTED    Parainfluenza 1 PCR NOT DETECTED NOT DETECTED    Parainfluenza 2 PCR NOT DETECTED NOT DETECTED    Parainfluenza 3 PCR NOT DETECTED NOT DETECTED    Parainfluenza 4 PCR NOT DETECTED NOT DETECTED    RSV PCR NOT DETECTED NOT DETECTED    B Pertussis by PCR NOT DETECTED NOT DETECTED    Chlamydophila Pneumonia PCR NOT DETECTED NOT DETECTED    Mycoplasma pneumo by PCR NOT DETECTED NOT DETECTED   CBC    Collection Time: 04/07/19 10:55 AM   Result Value Ref Range    WBC 4.5 4.0 - 10.5 K/CU MM    RBC 2.59 (L) 4.2 - 5.4 M/CU MM    Hemoglobin 9.2 (L) 12.5 - 16.0 GM/DL    Hematocrit 25.4 (L) 37 - 47 %    MCV 98.1 78 - 100 FL    MCH 35.5 (H) 27 - 31 PG    MCHC 36.2 (H) 32.0 - 36.0 %    RDW 25.7 (H) 11.7 - 14.9 %    Platelets 444 (H) 226 - 440 K/CU MM    MPV 12.8 (H) 7.5 - 11.1 FL   Legionella antigen, urine    Collection Time: 04/07/19 10:55 AM   Result Value Ref Range    Legionella Urinary Ag NEGATIVE NEGATIVE   Strep Pneumoniae Antigen    Collection Time: 04/07/19 10:55 AM   Result Value Ref Range    Strep pneumo Ag URINE NEGATIVE  Presumptive negative for        Strep pneumo Ag pneumococcal pneumonia,     Strep pneumo Ag suggesting no Gastroparesis    Abdominal pain    Feeding tube dysfunction    Abdominal pain, epigastric    Gastrojejunostomy tube status (HCC)    Chronic malnutrition (HCC)    Asplenia       Active Problems  Active Problems:    Abdominal pain    Feeding tube dysfunction    Abdominal pain, epigastric    Gastrojejunostomy tube status (HCC)    Chronic malnutrition ([address]    Asplenia  Resolved Problems:    * No resolved hospital problems.  *    Electronically signed by: Electronically signed by Karlie Veras MD on 4/8/2019 at 9:35 AM

## 2019-04-08 NOTE — PROGRESS NOTES
Pete Lau is a 63 y.o. female complains of G tube but chronic    Current Facility-Administered Medications   Medication Dose Route Frequency Provider Last Rate Last Dose    silver sulfADIAZINE (SILVADENE) 1 % cream   Topical Daily Attila Ely MD        potassium chloride (KLOR-CON M) extended release tablet 40 mEq  40 mEq Oral PRN Attila Ely MD        Or    potassium chloride (KLOR-CON) packet 40 mEq  40 mEq Oral PRN Attila Ely MD        Or    potassium chloride 10 mEq/100 mL IVPB (Peripheral Line)  10 mEq Intravenous PRN Attila Ely MD        morphine sulfate (PF) injection 4 mg  4 mg Intravenous Q4H PRN Attila Ely MD   4 mg at 04/08/19 0457    cefTRIAXone (ROCEPHIN) 2 g in dextrose 5 % 50 mL IVPB  2 g Intravenous Q24H Jessy Major MD   Stopped at 04/07/19 1739    levothyroxine (SYNTHROID) tablet 175 mcg  175 mcg Oral Daily Attila Ely MD   175 mcg at 04/08/19 0559    albuterol sulfate  (90 Base) MCG/ACT inhaler 2 puff  2 puff Inhalation Q6H PRN JESSE Zhu NP        albuterol (PROVENTIL) nebulizer solution 2.5 mg  2.5 mg Nebulization Q4H PRN JESSE Zhu NP        aspirin chewable tablet 81 mg  81 mg Oral Daily JESSE Zhu NP   81 mg at 04/07/19 6317    gabapentin (NEURONTIN) capsule 300 mg  300 mg Oral TID JESSE Zhu NP   300 mg at 04/08/19 0559    ferrous gluconate 324 (37.5 Fe) MG tablet 324 mg  324 mg Oral BID  JESSE Zhu NP   324 mg at 04/07/19 1719    docusate sodium (COLACE) capsule 100 mg  100 mg Oral BID PRN JESSE Zhu NP        lactobacillus (CULTURELLE) capsule 1 capsule  1 capsule Oral TID  JESSE Zhu NP   1 capsule at 04/07/19 1709    levETIRAcetam (KEPPRA) tablet 750 mg  750 mg Oral BID JESSE Zhu NP   750 mg at 04/07/19 2100    magnesium oxide (MAG-OX) tablet 800 mg  800 mg Oral BID JESSE Zhu NP   800 mg at 04/07/19 2040    mexiletine (MEXITIL) capsule 150 mg  150 mg Oral TID Jose Eduardo Martha, APRN - NP   150 mg at 04/08/19 0559    torsemide (DEMADEX) tablet 20 mg  20 mg Oral BID Jose Eduardo Yabucoa, APRN - NP   20 mg at 04/07/19 1709    oxyCODONE (ROXICODONE) immediate release tablet 15 mg  15 mg Oral Q4H PRN Chad Melara MD   15 mg at 04/08/19 0244    linezolid (ZYVOX) IVPB 600 mg  600 mg Intravenous Q12H Uche Rincon MD   Stopped at 04/08/19 0011    sodium chloride flush 0.9 % injection 10 mL  10 mL Intravenous 2 times per day [place] Martha, APRN - NP   10 mL at 04/07/19 2048    sodium chloride flush 0.9 % injection 10 mL  10 mL Intravenous PRN [place] Martha, APRN - NP        ondansetron [facility]) injection 4 mg  4 mg Intravenous Q6H PRN [place] Martha, APRN - NP   4 mg at 04/01/19 1857    enoxaparin (LOVENOX) injection 40 mg  40 mg Subcutaneous Daily [place] Martha, APRN - NP   40 mg at 04/07/19 5868    famotidine (PEPCID) injection 20 mg  20 mg Intravenous BID [place] Yabucoa, APRN - NP   20 mg at 04/07/19 2048    acetaminophen (TYLENOL) tablet 650 mg  650 mg Oral Q4H PRN [place] Yabucoa, APRN - NP        metoprolol succinate (TOPROL XL) extended release tablet 12.5 mg  12.5 mg Oral Daily Charbel Coppola MD   Stopped at 04/02/19 0900    clopidogrel (PLAVIX) tablet 75 mg  75 mg Oral Daily [place] Yabucoa, APRN - NP   75 mg at 04/07/19 1673    DULoxetine (CYMBALTA) extended release capsule 60 mg  60 mg Oral BID [place] Yabucoa, APRN - NP   60 mg at 04/07/19 2047    ranolazine (RANEXA) extended release tablet 1,000 mg  1,000 mg Oral BID Jose Eduardo Yabucoa, APRN - NP   1,000 mg at 04/07/19 2046     Facility-Administered Medications Ordered in Other Encounters   Medication Dose Route Frequency Provider Last Rate Last Dose    oxyCODONE (ROXICODONE) immediate release tablet 15 mg  15 mg Oral Once Selam Taylor MD         Allergies   Allergen Reactions    Baclofen     Fentanyl Swelling    Ibuprofen \"Due to heart problems\"    Methocarbamol      Worsening edema    Nsaids      \"Due to heart problems\"    Tizanidine     Tolmetin      \"Due to heart problems\"    Tramadol Other (See Comments)     Doctor doesn't her to take due to heart problems  Seizures   Doctor doesn't her to take due to lowers seizure threshold.  Avelox [Moxifloxacin Hcl In Nacl] Swelling and Rash    Betadine [Povidone Iodine] Rash    Moxifloxacin Rash and Swelling     Lips swell and tingling in face      Active Problems:    Abdominal pain    Feeding tube dysfunction    Abdominal pain, epigastric    Gastrojejunostomy tube status (HCC)    Chronic malnutrition ([facility] [address]    Asplenia  Resolved Problems:    * No resolved hospital problems. *    Blood pressure 131/60, pulse 65, temperature 98.1 °F (36.7 °C), temperature source Oral, resp. rate 17, height 5' 6\" (1.676 m), weight 144 lb 4.8 oz (65.5 kg), SpO2 95 %. Subjective:  Symptoms:  Stable. Diet:  Adequate intake. Pain:  She complains of pain that is mild. Objective:  General Appearance:  Comfortable. Vital signs: (most recent): Blood pressure 131/60, pulse 65, temperature 98.1 °F (36.7 °C), temperature source Oral, resp. rate 17, height 5' 6\" (1.676 m), weight 144 lb 4.8 oz (65.5 kg), SpO2 95 %. Vital signs are normal.    HEENT: Normal HEENT exam.    Lungs: There are decreased breath sounds. Heart: Normal rate. Abdomen: Abdomen is soft. (PEG tube with mild discomfort). Bowel sounds are normal.     Extremities: Decreased range of motion. Neurological: Patient is alert. Pupils:  Pupils are equal, round, and reactive to light. Skin:  Warm.   (Bruise right shoulder)    Assessment & Plan   Pneumonia suspected gram pos  -on rocephin and zyvox  -CXR stable with RUL  -check labs, resp PCR, strep and legionella  abd pain with GJ site peristomal cellulitis unrelated to G tube malfunction  -ID consulted  -zyvox  -wound cx with MRSA and klebsiella  -CT with no acute complication  -no surgery  Hypothyroid  -increased synthroid. Recheck TSH in 4 wks  HTN  -BB with parameters and stop ACE as SBP 90s  Hx of malabsorption  -GJ tube placement   Macrocytic anemia  -B12/folate wnl  Hypokalemia(resolved)  -mag wnl  Edema  -doppler neg for DVT right lower ext  DVT prophylaxis  -lovenox      Pt appealed discharge and denied and discharge today.           Attila Ely MD  4/8/2019

## 2019-04-17 ENCOUNTER — HOSPITAL ENCOUNTER (EMERGENCY)
Age: 59
Discharge: HOME | End: 2019-04-17
Attending: EMERGENCY MEDICINE
Payer: COMMERCIAL

## 2019-04-17 ENCOUNTER — APPOINTMENT (OUTPATIENT)
Dept: CT IMAGING | Age: 59
End: 2019-04-17
Payer: COMMERCIAL

## 2019-04-17 VITALS
HEART RATE: 86 BPM | TEMPERATURE: 98.4 F | WEIGHT: 144 LBS | SYSTOLIC BLOOD PRESSURE: 118 MMHG | BODY MASS INDEX: 23.14 KG/M2 | OXYGEN SATURATION: 99 % | DIASTOLIC BLOOD PRESSURE: 73 MMHG | RESPIRATION RATE: 16 BRPM | HEIGHT: 66 IN

## 2019-04-17 DIAGNOSIS — R10.84 GENERALIZED ABDOMINAL PAIN: Primary | ICD-10-CM

## 2019-04-17 LAB
ALBUMIN SERPL-MCNC: 3.9 GM/DL (ref 3.4–5)
ALP BLD-CCNC: 105 IU/L (ref 40–129)
ALT SERPL-CCNC: 12 U/L (ref 10–40)
ANION GAP SERPL CALCULATED.3IONS-SCNC: 12 MMOL/L (ref 4–16)
AST SERPL-CCNC: 21 IU/L (ref 15–37)
BACTERIA: NEGATIVE /HPF
BASOPHILS ABSOLUTE: 0 K/CU MM
BASOPHILS RELATIVE PERCENT: 0.4 % (ref 0–1)
BILIRUB SERPL-MCNC: 0.1 MG/DL (ref 0–1)
BILIRUBIN URINE: NEGATIVE MG/DL
BLOOD, URINE: NEGATIVE
BUN BLDV-MCNC: 5 MG/DL (ref 6–23)
CALCIUM SERPL-MCNC: 8.6 MG/DL (ref 8.3–10.6)
CHLORIDE BLD-SCNC: 108 MMOL/L (ref 99–110)
CLARITY: CLEAR
CO2: 18 MMOL/L (ref 21–32)
COLOR: ABNORMAL
CREAT SERPL-MCNC: 0.5 MG/DL (ref 0.6–1.1)
DIFFERENTIAL TYPE: ABNORMAL
EOSINOPHILS ABSOLUTE: 0 K/CU MM
EOSINOPHILS RELATIVE PERCENT: 0 % (ref 0–3)
GFR AFRICAN AMERICAN: >60 ML/MIN/1.73M2
GFR NON-AFRICAN AMERICAN: >60 ML/MIN/1.73M2
GLUCOSE BLD-MCNC: 143 MG/DL (ref 70–99)
GLUCOSE, URINE: NEGATIVE MG/DL
HCT VFR BLD CALC: 30.3 % (ref 37–47)
HEMOGLOBIN: 9.5 GM/DL (ref 12.5–16)
IMMATURE NEUTROPHIL %: 0.4 % (ref 0–0.43)
KETONES, URINE: NEGATIVE MG/DL
LACTATE: 1.6 MMOL/L (ref 0.4–2)
LEUKOCYTE ESTERASE, URINE: NEGATIVE
LIPASE: 11 IU/L (ref 13–60)
LYMPHOCYTES ABSOLUTE: 0.7 K/CU MM
LYMPHOCYTES RELATIVE PERCENT: 14.6 % (ref 24–44)
MCH RBC QN AUTO: 33.2 PG (ref 27–31)
MCHC RBC AUTO-ENTMCNC: 31.4 % (ref 32–36)
MCV RBC AUTO: 105.9 FL (ref 78–100)
MONOCYTES ABSOLUTE: 0.3 K/CU MM
MONOCYTES RELATIVE PERCENT: 6.8 % (ref 0–4)
NITRITE URINE, QUANTITATIVE: NEGATIVE
NUCLEATED RBC %: 0 %
PDW BLD-RTO: 15.9 % (ref 11.7–14.9)
PH, URINE: 6 (ref 5–8)
PLATELET # BLD: 220 K/CU MM (ref 140–440)
PMV BLD AUTO: 10.1 FL (ref 7.5–11.1)
POTASSIUM SERPL-SCNC: 4.4 MMOL/L (ref 3.5–5.1)
PROTEIN UA: NEGATIVE MG/DL
RBC # BLD: 2.86 M/CU MM (ref 4.2–5.4)
RBC URINE: ABNORMAL /HPF (ref 0–6)
SEGMENTED NEUTROPHILS ABSOLUTE COUNT: 3.9 K/CU MM
SEGMENTED NEUTROPHILS RELATIVE PERCENT: 77.8 % (ref 36–66)
SODIUM BLD-SCNC: 138 MMOL/L (ref 135–145)
SPECIFIC GRAVITY UA: 1.03 (ref 1–1.03)
SQUAMOUS EPITHELIAL: <1 /HPF
TOTAL IMMATURE NEUTOROPHIL: 0.02 K/CU MM
TOTAL NUCLEATED RBC: 0 K/CU MM
TOTAL PROTEIN: 6.4 GM/DL (ref 6.4–8.2)
TRICHOMONAS: ABNORMAL /HPF
UROBILINOGEN, URINE: NORMAL MG/DL (ref 0.2–1)
WBC # BLD: 5 K/CU MM (ref 4–10.5)
WBC UA: <1 /HPF (ref 0–5)

## 2019-04-17 PROCEDURE — 85025 COMPLETE CBC W/AUTO DIFF WBC: CPT

## 2019-04-17 PROCEDURE — 80053 COMPREHEN METABOLIC PANEL: CPT

## 2019-04-17 PROCEDURE — 6360000002 HC RX W HCPCS: Performed by: EMERGENCY MEDICINE

## 2019-04-17 PROCEDURE — 74176 CT ABD & PELVIS W/O CONTRAST: CPT

## 2019-04-17 PROCEDURE — 96372 THER/PROPH/DIAG INJ SC/IM: CPT

## 2019-04-17 PROCEDURE — 83690 ASSAY OF LIPASE: CPT

## 2019-04-17 PROCEDURE — 81001 URINALYSIS AUTO W/SCOPE: CPT

## 2019-04-17 PROCEDURE — 87040 BLOOD CULTURE FOR BACTERIA: CPT

## 2019-04-17 PROCEDURE — 83605 ASSAY OF LACTIC ACID: CPT

## 2019-04-17 PROCEDURE — 99283 EMERGENCY DEPT VISIT LOW MDM: CPT

## 2019-04-17 RX ORDER — MORPHINE SULFATE 4 MG/ML
4 INJECTION, SOLUTION INTRAMUSCULAR; INTRAVENOUS ONCE
Status: COMPLETED | OUTPATIENT
Start: 2019-04-17 | End: 2019-04-17

## 2019-04-17 RX ORDER — PROMETHAZINE HYDROCHLORIDE 25 MG/ML
25 INJECTION, SOLUTION INTRAMUSCULAR; INTRAVENOUS ONCE
Status: COMPLETED | OUTPATIENT
Start: 2019-04-17 | End: 2019-04-17

## 2019-04-17 RX ORDER — PROMETHAZINE HYDROCHLORIDE 25 MG/1
25 TABLET ORAL EVERY 6 HOURS PRN
Qty: 10 TABLET | Refills: 0 | Status: SHIPPED | OUTPATIENT
Start: 2019-04-17 | End: 2019-04-24

## 2019-04-17 RX ORDER — ONDANSETRON 4 MG/1
4 TABLET, ORALLY DISINTEGRATING ORAL 3 TIMES DAILY PRN
Qty: 21 TABLET | Refills: 0 | Status: SHIPPED | OUTPATIENT
Start: 2019-04-17 | End: 2019-09-26

## 2019-04-17 RX ADMIN — MORPHINE SULFATE 4 MG: 4 INJECTION INTRAVENOUS at 21:01

## 2019-04-17 RX ADMIN — PROMETHAZINE HYDROCHLORIDE 25 MG: 25 INJECTION INTRAMUSCULAR; INTRAVENOUS at 21:01

## 2019-04-17 ASSESSMENT — PAIN DESCRIPTION - PAIN TYPE
TYPE: ACUTE PAIN
TYPE: ACUTE PAIN

## 2019-04-17 ASSESSMENT — PAIN DESCRIPTION - LOCATION
LOCATION: ABDOMEN
LOCATION: ABDOMEN

## 2019-04-17 ASSESSMENT — PAIN SCALES - GENERAL
PAINLEVEL_OUTOF10: 9
PAINLEVEL_OUTOF10: 7

## 2019-04-17 NOTE — ED TRIAGE NOTES
Pt reports her feeding tube fell out 1 week ago. Pt reports she did not want it replaced and has been dressing the site, but it is not closing. Pt also reports severe pain at site.

## 2019-04-18 ENCOUNTER — TELEPHONE (OUTPATIENT)
Dept: SURGERY | Age: 59
End: 2019-04-18

## 2019-04-18 ENCOUNTER — HOSPITAL ENCOUNTER (EMERGENCY)
Age: 59
Discharge: HOME | End: 2019-04-18
Attending: EMERGENCY MEDICINE
Payer: COMMERCIAL

## 2019-04-18 ENCOUNTER — HOSPITAL ENCOUNTER (EMERGENCY)
Age: 59
Discharge: HOME | End: 2019-04-18
Payer: COMMERCIAL

## 2019-04-18 VITALS
BODY MASS INDEX: 23.14 KG/M2 | OXYGEN SATURATION: 100 % | DIASTOLIC BLOOD PRESSURE: 104 MMHG | HEIGHT: 66 IN | RESPIRATION RATE: 18 BRPM | WEIGHT: 144 LBS | TEMPERATURE: 98.2 F | SYSTOLIC BLOOD PRESSURE: 127 MMHG | HEART RATE: 95 BPM

## 2019-04-18 VITALS
HEART RATE: 80 BPM | OXYGEN SATURATION: 100 % | TEMPERATURE: 98.7 F | SYSTOLIC BLOOD PRESSURE: 141 MMHG | BODY MASS INDEX: 19.61 KG/M2 | HEIGHT: 66 IN | DIASTOLIC BLOOD PRESSURE: 89 MMHG | WEIGHT: 122 LBS | RESPIRATION RATE: 17 BRPM

## 2019-04-18 DIAGNOSIS — R10.9 ABDOMINAL PAIN, UNSPECIFIED ABDOMINAL LOCATION: Primary | ICD-10-CM

## 2019-04-18 LAB
ALBUMIN SERPL-MCNC: 4 GM/DL (ref 3.4–5)
ALP BLD-CCNC: 116 IU/L (ref 40–129)
ALT SERPL-CCNC: 10 U/L (ref 10–40)
ANION GAP SERPL CALCULATED.3IONS-SCNC: 13 MMOL/L (ref 4–16)
AST SERPL-CCNC: 13 IU/L (ref 15–37)
BACTERIA: NEGATIVE /HPF
BASOPHILS ABSOLUTE: 0 K/CU MM
BASOPHILS RELATIVE PERCENT: 0.8 % (ref 0–1)
BILIRUB SERPL-MCNC: 0.1 MG/DL (ref 0–1)
BILIRUBIN URINE: NEGATIVE MG/DL
BLOOD, URINE: NEGATIVE
BUN BLDV-MCNC: 6 MG/DL (ref 6–23)
CALCIUM SERPL-MCNC: 9.1 MG/DL (ref 8.3–10.6)
CHLORIDE BLD-SCNC: 109 MMOL/L (ref 99–110)
CLARITY: CLEAR
CO2: 20 MMOL/L (ref 21–32)
COLOR: ABNORMAL
CREAT SERPL-MCNC: 0.5 MG/DL (ref 0.6–1.1)
DIFFERENTIAL TYPE: ABNORMAL
EOSINOPHILS ABSOLUTE: 0 K/CU MM
EOSINOPHILS RELATIVE PERCENT: 0.6 % (ref 0–3)
GFR AFRICAN AMERICAN: >60 ML/MIN/1.73M2
GFR NON-AFRICAN AMERICAN: >60 ML/MIN/1.73M2
GLUCOSE BLD-MCNC: 191 MG/DL (ref 70–99)
GLUCOSE, URINE: NEGATIVE MG/DL
HCT VFR BLD CALC: 33 % (ref 37–47)
HEMOGLOBIN: 10.2 GM/DL (ref 12.5–16)
IMMATURE NEUTROPHIL %: 0.3 % (ref 0–0.43)
KETONES, URINE: NEGATIVE MG/DL
LEUKOCYTE ESTERASE, URINE: NEGATIVE
LIPASE: 14 IU/L (ref 13–60)
LYMPHOCYTES ABSOLUTE: 0.9 K/CU MM
LYMPHOCYTES RELATIVE PERCENT: 26.6 % (ref 24–44)
MCH RBC QN AUTO: 33.1 PG (ref 27–31)
MCHC RBC AUTO-ENTMCNC: 30.9 % (ref 32–36)
MCV RBC AUTO: 107.1 FL (ref 78–100)
MONOCYTES ABSOLUTE: 0.3 K/CU MM
MONOCYTES RELATIVE PERCENT: 9.3 % (ref 0–4)
NITRITE URINE, QUANTITATIVE: NEGATIVE
NUCLEATED RBC %: 0 %
PDW BLD-RTO: 16 % (ref 11.7–14.9)
PH, URINE: 6 (ref 5–8)
PLATELET # BLD: 218 K/CU MM (ref 140–440)
PMV BLD AUTO: 10 FL (ref 7.5–11.1)
POTASSIUM SERPL-SCNC: 3.7 MMOL/L (ref 3.5–5.1)
PROTEIN UA: NEGATIVE MG/DL
RBC # BLD: 3.08 M/CU MM (ref 4.2–5.4)
RBC URINE: 1 /HPF (ref 0–6)
SEGMENTED NEUTROPHILS ABSOLUTE COUNT: 2.2 K/CU MM
SEGMENTED NEUTROPHILS RELATIVE PERCENT: 62.4 % (ref 36–66)
SODIUM BLD-SCNC: 142 MMOL/L (ref 135–145)
SPECIFIC GRAVITY UA: 1 (ref 1–1.03)
TOTAL IMMATURE NEUTOROPHIL: 0.01 K/CU MM
TOTAL NUCLEATED RBC: 0 K/CU MM
TOTAL PROTEIN: 6.5 GM/DL (ref 6.4–8.2)
TRICHOMONAS: ABNORMAL /HPF
UROBILINOGEN, URINE: NORMAL MG/DL (ref 0.2–1)
WBC # BLD: 3.5 K/CU MM (ref 4–10.5)
WBC UA: <1 /HPF (ref 0–5)

## 2019-04-18 PROCEDURE — 83690 ASSAY OF LIPASE: CPT

## 2019-04-18 PROCEDURE — 80053 COMPREHEN METABOLIC PANEL: CPT

## 2019-04-18 PROCEDURE — 99283 EMERGENCY DEPT VISIT LOW MDM: CPT

## 2019-04-18 PROCEDURE — 36415 COLL VENOUS BLD VENIPUNCTURE: CPT

## 2019-04-18 PROCEDURE — 85025 COMPLETE CBC W/AUTO DIFF WBC: CPT

## 2019-04-18 PROCEDURE — 81001 URINALYSIS AUTO W/SCOPE: CPT

## 2019-04-18 PROCEDURE — 6370000000 HC RX 637 (ALT 250 FOR IP): Performed by: PHYSICIAN ASSISTANT

## 2019-04-18 RX ORDER — OXYCODONE HYDROCHLORIDE AND ACETAMINOPHEN 5; 325 MG/1; MG/1
2 TABLET ORAL ONCE
Status: COMPLETED | OUTPATIENT
Start: 2019-04-18 | End: 2019-04-18

## 2019-04-18 RX ORDER — DICYCLOMINE HCL 20 MG
20 TABLET ORAL ONCE
Status: COMPLETED | OUTPATIENT
Start: 2019-04-18 | End: 2019-04-18

## 2019-04-18 RX ADMIN — OXYCODONE AND ACETAMINOPHEN 2 TABLET: 5; 325 TABLET ORAL at 16:53

## 2019-04-18 RX ADMIN — DICYCLOMINE HYDROCHLORIDE 20 MG: 20 TABLET ORAL at 16:53

## 2019-04-18 ASSESSMENT — PAIN DESCRIPTION - LOCATION
LOCATION: ABDOMEN
LOCATION: ABDOMEN

## 2019-04-18 ASSESSMENT — PAIN SCALES - GENERAL
PAINLEVEL_OUTOF10: 9

## 2019-04-18 ASSESSMENT — PAIN DESCRIPTION - PAIN TYPE
TYPE: ACUTE PAIN
TYPE: ACUTE PAIN

## 2019-04-18 NOTE — ED NOTES
Joann Garcia PA-C at bedside, pt has more questions about her discharge and had requested to speak with him.      Demetrius Russo RN  04/18/19 7468

## 2019-04-18 NOTE — ED NOTES
Provided pt with warm blanket , states her pain level is back at a 7.  Will continue to monitor      Griffin Hickman RN  04/17/19 1442

## 2019-04-18 NOTE — TELEPHONE ENCOUNTER
Patient went to ER per on call doc bc of feeeding tube being out and draining, was sent home. Patient called the office and wanted to talk to Dr Andrzej Ji. I called Dr Andrzej Ji to go over information, she advised me to tell the patient to go back to [address] [place] [address] bc we don't have the proper materials to help her. Feeding tube was placed previous by physician at [address] [place] [address] and patient was advised before to go back.

## 2019-04-18 NOTE — ED NOTES
Pt to bathroom via personal wheelchair. Urine spec obtained and sent to lab.       Zakiya Rogers RN  04/18/19 1529

## 2019-04-18 NOTE — ED NOTES
Colostomy bag placed. Attempted to clean area with alcohol patient screams and yells at this nurse. Lencho MAGALLON at bedside.       Ausitn German, RN  04/18/19 1185

## 2019-04-18 NOTE — ED PROVIDER NOTES
Triage Chief Complaint:   Abdominal Pain and Feeding Tube Problem    [place]:  Jenny Hansen is a 63 y.o. female that presents today to the emergency department complaining of abdominal pain leading to problem context this patient states her feeding tube came out recently. And she does not want to put back in. She's had discharge from that area. Patient was evaluated earlier today including blood work, CT scan abdomen pelvis medically cleared and discharged home. States that she got home and had more discharge. Her pain continued so she came here for reevaluation. .    ROS:  REVIEW OF SYSTEMS    At least 10 systems reviewed      All other review of systems are negative  See HPI and nursing notes for additional information       Past Medical History:   Diagnosis Date    Acute delirium     Arrhythmia     Arthritis     Asplenia     Asthma     CAD (coronary artery disease)     1st stent at age 46    Cardiomyopathy [facility])     Cerebral artery occlusion with cerebral infarction ([address]     CHF (congestive heart failure) ([address]     Enlarged liver     GERD (gastroesophageal reflux disease)     H/O echocardiogram 4/6/16    EF 55%, trivial TR & MR, stage 1 diastolic dysfunction    History of blood transfusion     Hx of cardiovascular stress test 12/29/2015    Alessia: EF 45%. Pharmacologic stress myocardial perfusion scan shows a fixed inferior-lateral defect w/ no inducible ischemia. No evidence of ischemia. Inferior-lateral infarction.  Normal LVSF    Hyperlipidemia     Hypertension     Lymphoma ([address]     MI, old     Movement disorder     MS (multiple sclerosis) ([address]     OP (osteoporosis)     PE (pulmonary embolism)     trapese filter    Pneumonia     Seizures ([address]     Spleen enlarged     Thyroid disease      Past Surgical History:   Procedure Laterality Date    ABDOMEN SURGERY      APPENDECTOMY      CARDIAC DEFIBRILLATOR PLACEMENT      CHOLECYSTECTOMY      COLONOSCOPY      CORONARY ANGIOPLASTY  Not on file   Social History Narrative    Not on file     No current facility-administered medications for this encounter. Current Outpatient Medications   Medication Sig Dispense Refill    ondansetron (ZOFRAN-ODT) 4 MG disintegrating tablet Take 1 tablet by mouth 3 times daily as needed for Nausea or Vomiting 21 tablet 0    promethazine (PHENERGAN) 25 MG tablet Take 1 tablet by mouth every 6 hours as needed for Nausea 10 tablet 0    linezolid (ZYVOX) 600 MG tablet Take 1 tablet by mouth 2 times daily for 10 days 20 tablet 0    levothyroxine (SYNTHROID) 175 MCG tablet Take 1 tablet by mouth Daily 30 tablet 0    white petrolatum-corn starch-lanolin (TRIPLE PASTE) 12.8 % ointment Apply topically twice a day with dressing changes.  56.7 g 0    ondansetron (ZOFRAN) 4 MG tablet Take 1 tablet by mouth every 6 hours as needed for Nausea or Vomiting 20 tablet 0    lactobacillus (CULTURELLE) capsule Take 1 capsule by mouth 3 times daily (with meals) 90 capsule 0    chlorhexidine gluconate (ANTISEPTIC SKIN CLEANSER) 4 % SOLN external solution Apply topically daily 473 mL 1    magnesium oxide (MAG-OX) 400 MG tablet Take 800 mg by mouth 2 times daily      nystatin (MYCOSTATIN) 184023 UNIT/GM powder Apply 1 g topically daily To stomach      mexiletine (MEXITIL) 150 MG capsule Take 150 mg by mouth 3 times daily      docusate sodium (COLACE) 100 MG capsule Take 100 mg by mouth 2 times daily as needed for Constipation      famotidine (PEPCID) 20 MG tablet Take 1 tablet by mouth 2 times daily 60 tablet 3    levETIRAcetam (KEPPRA) 750 MG tablet Take 750 mg by mouth 2 times daily      atorvastatin (LIPITOR) 80 MG tablet Take 80 mg by mouth nightly      DULoxetine (CYMBALTA) 60 MG extended release capsule Take 60 mg by mouth 2 times daily      Pediatric Multivitamins-Iron (FLINTSTONES PLUS IRON) CHEW Take 1 tablet by mouth 4 times daily      Omega-3 1000 MG CAPS Take 1,000 mg by mouth nightly      ranolazine (RANEXA) 1000 MG extended release tablet Take 1,000 mg by mouth 2 times daily      gabapentin (NEURONTIN) 300 MG capsule Take 300 mg by mouth 3 times daily. Dois Hallmark clopidogrel (PLAVIX) 75 MG tablet Take 75 mg by mouth daily      acetaminophen 650 MG TABS Take 650 mg by mouth every 4 hours as needed 120 tablet 3    ferrous gluconate (FERGON) 324 (38 FE) MG tablet Take 324 mg by mouth 2 times daily       aspirin 81 MG chewable tablet Take 81 mg by mouth daily.  interferon beta-1b (BETASERON) 0.3 MG injection Inject 0.25 mg into the skin every 30 days 04/25/18 Last known dose given 04/19/18 per MAR      vitamin B-12 (CYANOCOBALAMIN) 1000 MCG tablet Inject 1,000 mcg into the muscle every 30 days 04/25/18 Given on the 28 th of each month      albuterol (PROVENTIL HFA;VENTOLIN HFA) 108 (90 BASE) MCG/ACT inhaler Inhale 2 puffs into the lungs every 6 hours as needed.  vitamin D (ERGOCALCIFEROL) 68089 UNIT CAPS capsule Take 50,000 Units by mouth Twice a Week Take on Mondays and on Fridays       Facility-Administered Medications Ordered in Other Encounters   Medication Dose Route Frequency Provider Last Rate Last Dose    oxyCODONE (ROXICODONE) immediate release tablet 15 mg  15 mg Oral Once Bernadette Fairchild MD         Allergies   Allergen Reactions    Baclofen     Fentanyl Swelling    Ibuprofen      \"Due to heart problems\"    Methocarbamol      Worsening edema    Nsaids      \"Due to heart problems\"    Tizanidine     Tolmetin      \"Due to heart problems\"    Tramadol Other (See Comments)     Doctor doesn't her to take due to heart problems  Seizures   Doctor doesn't her to take due to lowers seizure threshold.     Avelox [Moxifloxacin Hcl In Nacl] Swelling and Rash    Betadine [Povidone Iodine] Rash    Moxifloxacin Rash and Swelling     Lips swell and tingling in face        Nursing Notes Reviewed    Physical Exam:  ED Triage Vitals [04/18/19 0309]   Enc Vitals Group      BP (!) 127/104      Pulse 95 Resp 18      Temp 98.2 °F (36.8 °C)      Temp Source Oral      SpO2 100 %      Weight 144 lb (65.3 kg)      Height 5' 6\" (1.676 m)      Head Circumference       Peak Flow       Pain Score       Pain Loc       Pain Edu? Excl. in [address]? General :Patient is awake alert oriented person place and time no acute distress nontoxic appearing  HEENT: Pupils are equally round and reactive to light extraocular motors are intact conjunctivae clear sclerae white there is no injection no icterus. Nose without any rhinorrhea or epistaxis. Chest wall: There is no tenderness to palpation over the chest wall or over ribs  Abdomen: Abdomen is soft nontender nondistended. There is no firm or pulsatile masses no rebound rigidity or guarding negative Otto's negative McBurney, no peritoneal signs. The peristomal area does have tenderness to palpation (patient jumps drastically exaggerating with the slightest touch and even sometimes before I touch her). Stoma reveals very minimal melissa-stomal redness/irritation. No warmth or heat discharge streaking  Suprapubic:  there is no tenderness to palpation over the external bladder   Musculoskeletal: 5 out of 5 strength in all 4 extremities full flexion extension abduction and adduction supination pronation of all extremities and all digits. No obvious muscle atrophy is noted. No focal muscle deficits are appreciated  Dermatology: Skin is warm and dry there is no obvious abscesses lacerations or lesions noted  Psych: Mentation is grossly normal cognition is grossly normal. Affect is appropriate        I have reviewed and interpreted all of the currently available lab results from this visit (if applicable):  No results found for this visit on 04/18/19.    Radiographs (if obtained):  [] The following radiograph was interpreted by myself in the absence of a radiologist:   [] Radiologist's Report Reviewed:  No orders to display       EKG (if obtained):   Please See Note of attending physician for EKG interpretation. Chart review shows recent radiograph(s):  Ct Abdomen Pelvis Wo Contrast    Result Date: 4/17/2019  EXAMINATION: CT OF THE ABDOMEN AND PELVIS WITHOUT CONTRAST 4/17/2019 9:53 pm TECHNIQUE: CT of the abdomen and pelvis was performed without the administration of intravenous contrast. Multiplanar reformatted images are provided for review. Dose modulation, iterative reconstruction, and/or weight based adjustment of the mA/kV was utilized to reduce the radiation dose to as low as reasonably achievable. COMPARISON: 03/30/2019 HISTORY: ORDERING SYSTEM PROVIDED HISTORY: ABDOMINAL PAIN TECHNOLOGIST PROVIDED HISTORY: Ordering Physician Provided Reason for Exam: midline abd pain FINDINGS: Lower Chest: The lung bases are clear and the heart size is normal. Organs: The liver is enlarged but is homogeneous in density. A normal spleen is not visualized but there are small rounded masses in the left upper quadrant of the abdomen likely reflecting splenules. The pancreas is atrophic. No adrenal masses are identified and there is no hydronephrosis. There is a nonobstructing left renal stone. GI/Bowel: No bowel obstruction or free air or free fluid is identified. The patient appears to be status post gastric bypass surgery. The appendix is not visualized. The previously seen gastrostomy tube has been removed. Pelvis: Status post hysterectomy. Peritoneum/Retroperitoneum: Vascular calcifications are present. There is an IVC filter. No pathologically enlarged lymph nodes are visualized. Bones/Soft Tissues: The patient is status post right hip arthroplasty. No acute abdominopelvic abnormality identified. Xr Shoulder Right (min 2 Views)    Result Date: 4/5/2019  EXAMINATION: 3 XRAY VIEWS OF THE RIGHT SHOULDER 4/5/2019 4:29 pm COMPARISON: None.  HISTORY: ORDERING SYSTEM PROVIDED HISTORY: supposed fall TECHNOLOGIST PROVIDED HISTORY: Reason for exam:->supposed fall Ordering Physician Provided Reason for Exam: supposed fall Acuity: Acute Type of Exam: Initial FINDINGS: Overlying items external to the patient somewhat limit evaluation. Diffuse bone demineralization. No fractures or dislocations. No suspicious focal bony lesions. No bony erosions or bony destructive changes. To the extent that the [facility] joint can be evaluated it appears unremarkable. Glenohumeral joint is unremarkable. No significant soft tissue abnormalities. Mild patchy airspace opacities to the right lung with upper lung zone predominance. No acute bony abnormalities. Diffuse bone demineralization. Mild patchy airspace opacities to the visualized right lung with upper lung zone predominance concerning for multifocal infiltrates. These appear to be new since the prior chest x-ray 03/30/2019. Clinical correlation and continued follow-up suggested. Ct Head Wo Contrast    Result Date: 4/5/2019  EXAMINATION: CT OF THE HEAD WITHOUT CONTRAST  4/5/2019 9:14 pm TECHNIQUE: CT of the head was performed without the administration of intravenous contrast. Dose modulation, iterative reconstruction, and/or weight based adjustment of the mA/kV was utilized to reduce the radiation dose to as low as reasonably achievable. COMPARISON: 03/30/2019 HISTORY: ORDERING SYSTEM PROVIDED HISTORY: HEADACHE TECHNOLOGIST PROVIDED HISTORY: Has a \"code stroke\" or \"stroke alert\" been called? ->No Ordering Physician Provided Reason for Exam: ha Acuity: Unknown Type of Exam: Unknown Additional signs and symptoms: none Relevant Medical/Surgical History: hx cad FINDINGS: BRAIN/VENTRICLES: The ventricles and sulci are diffusely enlarged. Low attenuation is seen in the periventricular and subcortical white matter. No acute intracranial hemorrhage or acute infarct is identified. Encephalomalacia left frontal lobe. ORBITS: The visualized portion of the orbits demonstrate no acute abnormality.  SINUSES: The visualized paranasal sinuses and mastoid air cells demonstrate no acute abnormality. SOFT TISSUES/SKULL:  No acute abnormality of the visualized skull or soft tissues. No acute intracranial abnormality. Diffuse atrophic changes with findings suggesting chronic microvascular ischemia and an old infarct in the left frontal lobe     Ct Head Wo Contrast    Result Date: 3/30/2019  EXAMINATION: CT OF THE HEAD WITHOUT CONTRAST  3/30/2019 9:08 pm TECHNIQUE: CT of the head was performed without the administration of intravenous contrast. Dose modulation, iterative reconstruction, and/or weight based adjustment of the mA/kV was utilized to reduce the radiation dose to as low as reasonably achievable. COMPARISON: CT head April 25, 2018 HISTORY: ORDERING SYSTEM PROVIDED HISTORY: AMS TECHNOLOGIST PROVIDED HISTORY: Has a \"code stroke\" or \"stroke alert\" been called? ->No Ordering Physician Provided Reason for Exam: gi pain FINDINGS: BRAIN/VENTRICLES: There is no acute intracranial hemorrhage, mass effect or midline shift. No abnormal extra-axial fluid collection. Redemonstration of stable left frontal encephalomalacia compatible with sequela of remote infarct. The gray-white differentiation is otherwise maintained without evidence of an acute infarct. There is no evidence of hydrocephalus. ORBITS: The visualized portion of the orbits demonstrate no acute abnormality. SINUSES: The visualized paranasal sinuses and mastoid air cells demonstrate no acute abnormality. SOFT TISSUES/SKULL:  No acute abnormality of the visualized skull or soft tissues. No acute intracranial abnormality. Ct Abdomen Pelvis W Iv Contrast Additional Contrast? None    Result Date: 3/31/2019  EXAMINATION: CT OF THE ABDOMEN AND PELVIS WITH CONTRAST 3/30/2019 9:08 pm TECHNIQUE: CT of the abdomen and pelvis was performed with the administration of intravenous contrast. Multiplanar reformatted images are provided for review.  Dose modulation, iterative reconstruction, and/or weight based adjustment of the mA/kV was utilized to reduce the radiation dose to as low as reasonably achievable. COMPARISON: None HISTORY: ORDERING SYSTEM PROVIDED HISTORY: abd pain, recent GJ tube placement TECHNOLOGIST PROVIDED HISTORY: Additional Contrast?->None Ordering Physician Provided Reason for Exam: gi pain FINDINGS: Lower Chest: Bibasilar atelectasis is noted. Cardiomegaly is seen. Median sternotomy wires are noted. Coronary artery calcifications are seen. A left-sided ICD is in place. Organs: The liver, adrenal glands, pancreas, and kidneys are unremarkable. The spleen is absent. A few splenules are seen within the left upper quadrant. GI/Bowel: The patient is status post gastric bypass surgery. A gastrojejunostomy tube is noted. The tip is seen within the jejunal loops in the left mid abdomen. There is no evidence of bowel obstruction. The appendix is not well visualized. There is a fluid and air-filled tract in the previously noted gastrostomy tube extending to the subcutaneous tissues. Pelvis: The bladder is unremarkable. There is no free fluid. Beam hardening artifact from a right hip arthroplasty is seen obscuring a portion of the pelvis. Peritoneum/Retroperitoneum: Mild atherosclerotic disease is seen involving the aorta. There is no evidence of free air or lymphadenopathy. An inferior vena cava filter is seen. Bones/Soft Tissues: No destructive osseous lesions are identified. 1. No acute findings within the abdomen or pelvis. 2. Gastrojejunostomy tube is in place without acute complication noted. 3. There is a fluid and air-filled tract in the previously noted gastrostomy tube extending to the subcutaneous tissues.      Ct Abdomen Pelvis W Iv Contrast Additional Contrast? None    Result Date: 3/19/2019  EXAMINATION: CT OF THE ABDOMEN AND PELVIS WITH CONTRAST 3/19/2019 2:05 pm TECHNIQUE: CT of the abdomen and pelvis was performed with the administration of intravenous contrast. Multiplanar reformatted images are provided for review. Dose modulation, iterative reconstruction, and/or weight based adjustment of the mA/kV was utilized to reduce the radiation dose to as low as reasonably achievable. COMPARISON: 02/25/2019 HISTORY: ORDERING SYSTEM PROVIDED HISTORY: g-tube infection TECHNOLOGIST PROVIDED HISTORY: IV contrast only. Thank you. Additional Contrast?->None Ordering Physician Provided Reason for Exam: g-tube infection Acuity: Acute Type of Exam: Initial Additional signs and symptoms: None Relevant Medical/Surgical History: Hx Appy, [place], Hyster, Gastric bypass, Hernia repair, Hip surg / 75cc [id number] FINDINGS: Lower Chest: Lung bases clear Organs: Spleen surgically absent. Remaining solid organs unremarkable. Gallbladder surgically absent GI/Bowel: Status post gastric bypass. There is a percutaneous gastrostomy tube in the excluded portion of the stomach. Diverticulosis of the sigmoid colon with no diverticular inflammatory stranding. Pelvis: Streak artifact from right hip prosthesis. Urinary bladder grossly unremarkable. Uterus surgically absent. No pelvic fluid collection Peritoneum/Retroperitoneum: No abnormal intra abdominopelvic fluid collection. No ascites or pneumoperitoneum. Aorta normal in caliber. IVC filter noted Bones/Soft Tissues: There is a gas collection adjacent to the intramuscular portion of a percutaneous gastrostomy tube. This measures approximately 3 cm in craniocaudal dimension and 1 cm in transverse dimension. There is no obvious associated fluid and this appears to extend alongside the tube in extent to the skin. No acute bony abnormality     Intramuscular gas collection adjacent to the tubing of a percutaneous gastrostomy tube. This appears to extend alongside the tubing and extend to the skin compatible with a draining infection. No drainable abscess is demonstrated currently.   No acute intra abdominopelvic process is demonstrated     Xr Chest Portable    Result Date: Initial Relevant Medical/Surgical History: pt w/  significant h/o heart disease and blood clots and EARLY MATERNAL AND PATERNAL DEMISE DUE TO CVD, PT W. IVC FILTER ,, RT LEG PAIN FINDINGS: The visualized veins of the right lower extremity are patent and free of echogenic thrombus. The veins are normally compressible and have normal phasic flow. No evidence of DVT in the right lower extremity. MDM:   Presents today with peristomal pain. Discharge from her stoma. Likely secondary from a not completely. Likely gastric juices coming out. We'll put a colostomy bag over that nor to capture the juices. Patient is a follow-up with her surgeon. I did review patient's labs including CT scan of her abdomen just earlier today. Which revealed no abnormality. I do not believe the patient requires any further emergent measures    My typical dicussion, presentation, and considerations for this patients' chief complaint, diagnosis, differential diagnosis, medications, medication use, medication safety and medication interactions have been explained and outlined to this patient for this patient encounter. I have stressed need for follow up and reexamination for this encounter and or return to the emergency department if any changes or any concern  I have discussed the findings of today's workup with the patient and present family members and have addressed their questions and concerns. Important warning signs as well as new or worsening symptoms which would necessitate immediate return to the ED were discussed. The plan is to discharge from the ED at this time, and the patient is in stable condition. The patient acknowledged understanding is agreeable with this plan. The patient and/or family and I have discussed the diagnosis and risks, and we agree with discharging home to follow-up with their primary care, specialist or referral doctor. Questions addressed. Disposition and follow-up agreed upon.  Specific discharge

## 2019-04-18 NOTE — ED PROVIDER NOTES
I independently examined and evaluated Roni Jasso. In brief their history revealed abdominal pain around the site where her G-tube was previously. It had recently been removed and she's been having pain in the area ever since. She was seen here yesterday for the same thing, had a CT scan which showed no acute abnormality. She states pain continues, there is drainage from the site. She states that they called the doctor's office and they told her if she is in a lot of pain she needs to go to the emergency department. She states that last time in the 4 mg of morphine and IM Phenergan helped her. Their focused exam revealed awake, alert, well-hydrated, well-nourished, and in no acute distress. Mucous membranes are moist. Speech is clear. Breathing is unlabored. Skin is dry. Mental status is normal. The patient moves all extremities, and is without facial droop. ED course: 52-year-old female presenting with pain around a previous G-tube site. The site has been draining. She was seen yesterday for the same, had a CT scan without acute findings. I did review this. White count is 3.5, Hemoglobin 10.2. Remainder of the laboratory workup was virtually unremarkable. Case discussed with Dr. Raj Mccoy by the NAUN Davis, see details of the discussion and her note. Do not see an obvious cause for her pain at this point. She does not want another tube placed. She is well appearing and nontoxic. I do not suspect threatening etiology at this point as she had full workup yesterday including CT scan. I do feel that she can be discharged home with continue her home pain medication regimen and follow up outpatient with her specialists. All diagnostic, treatment, and disposition decisions were made by myself in conjunction with the Advanced Practice Provider. For all further details of the patient's emergency department visit, please see the Advanced Practice Provider's documentation.      David Bailey Berenice Pop DO  04/18/19 1839

## 2019-04-18 NOTE — ED PROVIDER NOTES
eMERGENCY dEPARTMENT eNCOUnter      PCP: Hunter Potts MD    [address] [facility]    Chief Complaint   Patient presents with    Abdominal Pain    Nausea       HPI    Ramos Marin is a 63 y.o. female who presents with abdominal pain and nausea. Onset - 4 days ago  Location - peristomal around where her G-tube fell out approximately one week ago  Duration - constant  Character - sharp  Aggravating/Alleviating factors - no aggravating or alleviating factors. Associate symptoms - nausea, one episode of nonbloody, nonbilious emesis yesterday but none today; subjective fever  Radiation - does not radiate  Severity - 9/10    Patient reports she was seen in our ED yesterday as well as earlier today for her pain. She reports that she was told to follow-up with her surgeon, Dr. Quan Turpin and Dr. Brayden Cook of infectious disease by our ED. She reports that she called their offices today and Dr. Helen Calderon office told her to go to [address] [place] [address] to have her G-tube replaced. However patient reports that she does not want the tube replaced but rather only wants the hole to be closed up to prevent gastric juices from oozing from the opening. Patient reports that she is taking her home pain medication for avascular necrosis of 15 mg of oxycodone 4 times daily without improvement in her pain. Patient reports that the 4 mg of morphine and IM Phenergan that she on the ED yesterday seemed to help her. Patient denies hematemesis, diarrhea, constipation, urinary symptoms, vaginal symptoms, chest pain, shortness of breath. REVIEW OF SYSTEMS    Constitutional:  + subjective fever  HENT:  Denies sore throat or ear pain   Cardiovascular:  Denies chest pain, palpitations or swelling   Respiratory:  Denies cough or shortness of breath   GI:  See HPI above  : No hematuria or dysuria. No vaginal symptoms. Musculoskeletal:  Denies back pain or groin pain or masses. No pain or swelling of extremities.   Skin:  Denies rash  Neurologic: for Nausea or Vomiting 21 tablet 0    promethazine (PHENERGAN) 25 MG tablet Take 1 tablet by mouth every 6 hours as needed for Nausea 10 tablet 0    levothyroxine (SYNTHROID) 175 MCG tablet Take 1 tablet by mouth Daily 30 tablet 0    white petrolatum-corn starch-lanolin (TRIPLE PASTE) 12.8 % ointment Apply topically twice a day with dressing changes. 56.7 g 0    ondansetron (ZOFRAN) 4 MG tablet Take 1 tablet by mouth every 6 hours as needed for Nausea or Vomiting 20 tablet 0    lactobacillus (CULTURELLE) capsule Take 1 capsule by mouth 3 times daily (with meals) 90 capsule 0    chlorhexidine gluconate (ANTISEPTIC SKIN CLEANSER) 4 % SOLN external solution Apply topically daily 473 mL 1    magnesium oxide (MAG-OX) 400 MG tablet Take 800 mg by mouth 2 times daily      nystatin (MYCOSTATIN) 271418 UNIT/GM powder Apply 1 g topically daily To stomach      mexiletine (MEXITIL) 150 MG capsule Take 150 mg by mouth 3 times daily      docusate sodium (COLACE) 100 MG capsule Take 100 mg by mouth 2 times daily as needed for Constipation      famotidine (PEPCID) 20 MG tablet Take 1 tablet by mouth 2 times daily 60 tablet 3    levETIRAcetam (KEPPRA) 750 MG tablet Take 750 mg by mouth 2 times daily      atorvastatin (LIPITOR) 80 MG tablet Take 80 mg by mouth nightly      DULoxetine (CYMBALTA) 60 MG extended release capsule Take 60 mg by mouth 2 times daily      Pediatric Multivitamins-Iron (FLINTSTONES PLUS IRON) CHEW Take 1 tablet by mouth 4 times daily      Omega-3 1000 MG CAPS Take 1,000 mg by mouth nightly      ranolazine (RANEXA) 1000 MG extended release tablet Take 1,000 mg by mouth 2 times daily      gabapentin (NEURONTIN) 300 MG capsule Take 300 mg by mouth 3 times daily.  [place] clopidogrel (PLAVIX) 75 MG tablet Take 75 mg by mouth daily      acetaminophen 650 MG TABS Take 650 mg by mouth every 4 hours as needed 120 tablet 3    ferrous gluconate (FERGON) 324 (38 FE) MG tablet Take 324 mg by mouth 2 times daily       aspirin 81 MG chewable tablet Take 81 mg by mouth daily.  interferon beta-1b (BETASERON) 0.3 MG injection Inject 0.25 mg into the skin every 30 days 04/25/18 Last known dose given 04/19/18 per MAR      vitamin B-12 (CYANOCOBALAMIN) 1000 MCG tablet Inject 1,000 mcg into the muscle every 30 days 04/25/18 Given on the 28 th of each month      albuterol (PROVENTIL HFA;VENTOLIN HFA) 108 (90 BASE) MCG/ACT inhaler Inhale 2 puffs into the lungs every 6 hours as needed.  vitamin D (ERGOCALCIFEROL) 09443 UNIT CAPS capsule Take 50,000 Units by mouth Twice a Week Take on Mondays and on Fridays         ALLERGIES    Allergies   Allergen Reactions    Baclofen     Fentanyl Swelling    Ibuprofen      \"Due to heart problems\"    Methocarbamol      Worsening edema    Nsaids      \"Due to heart problems\"    Tizanidine     Tolmetin      \"Due to heart problems\"    Tramadol Other (See Comments)     Doctor doesn't her to take due to heart problems  Seizures   Doctor doesn't her to take due to lowers seizure threshold.     Avelox [Moxifloxacin Hcl In Nacl] Swelling and Rash    Betadine [Povidone Iodine] Rash    Moxifloxacin Rash and Swelling     Lips swell and tingling in face        SOCIAL AND FAMILY HISTORY    Social History     Socioeconomic History    Marital status: Single     Spouse name: None    Number of children: None    Years of education: None    Highest education level: None   Occupational History    None   Social Needs    Financial resource strain: None    Food insecurity:     Worry: None     Inability: None    Transportation needs:     Medical: None     Non-medical: None   Tobacco Use    Smoking status: Never Smoker    Smokeless tobacco: Never Used   Substance and Sexual Activity    Alcohol use: No    Drug use: No    Sexual activity: Not Currently   Lifestyle    Physical activity:     Days per week: None     Minutes per session: None    Stress: None   Relationships    Social connections:     Talks on phone: None     Gets together: None     Attends Hoahaoism service: None     Active member of club or organization: None     Attends meetings of clubs or organizations: None     Relationship status: None    Intimate partner violence:     Fear of current or ex partner: None     Emotionally abused: None     Physically abused: None     Forced sexual activity: None   Other Topics Concern    None   Social History Narrative    None     Family History   Problem Relation Age of Onset    High Blood Pressure Mother     High Cholesterol Mother     Heart Disease Mother     Diabetes Mother     Heart Disease Father     Cancer Father     Other Sister         Multiple Sclerosis    Heart Disease Maternal Grandmother     High Blood Pressure Maternal Grandmother     High Cholesterol Maternal Grandmother        PHYSICAL EXAM    VITAL SIGNS: BP (!) 141/89   Pulse 80   Temp 98.7 °F (37.1 °C) (Axillary)   Resp 17   Ht 5' 6\" (1.676 m)   Wt 122 lb (55.3 kg)   SpO2 100%   BMI 19.69 kg/m²   Constitutional:  Well developed, well nourished. No distress  Eyes:  Sclera nonicteric, conjunctiva moist  HENT:  Atraumatic. PERRL. EOMI. Moist mucus membranes. Neck/Lymphatics: supple, no JVD, no swollen nodes  Respiratory:  No retractions, no accessory muscle use, normal breath sounds   Cardiovascular:  normal rate, normal rhythm, no murmurs    GI:     No gross discoloration.       -no Pietro's sign (periumbilical ecchymosis)       -no Grey-Dukes's sign (flank ecchymosis)    Bowel sounds present, no audible bruits.  Soft, no distention, no guarding, no rigidity,   + Diffuse abdominal tenderness to palpation when I palpate patient's abdomen with my hands, but when I palpate patient's abdomen deeply with my stethoscope patient does not react and does not grimace, no rebound tenderness, no palpable pulsatile masses,  Healthy-appearing wound of abdomen without surrounding erythema present in the left upper quadrant. Colostomy bag placed over the wound from feeding tube with quarter-sized amount of clear to yellow drainage present in bag. No increased tenderness to palpation over McBurney's point    Negative Rovsing sign    Negative Otto's sign. Back:   No CVA tenderness to percussion. Musculoskeletal:  No edema, no deformity  Vascular: DP pulses 2+ equal bilaterally  Integument: No rash, dry skin.  Skin opening of abdomen is described abdominal exam.  Neurologic:  Alert & oriented, normal speech  Psychiatric: Cooperative, pleasant affect       LABS:  Results for orders placed or performed during the hospital encounter of 04/18/19   Comprehensive Metabolic Panel   Result Value Ref Range    Sodium 142 135 - 145 MMOL/L    Potassium 3.7 3.5 - 5.1 MMOL/L    Chloride 109 99 - 110 mMol/L    CO2 20 (L) 21 - 32 MMOL/L    BUN 6 6 - 23 MG/DL    CREATININE 0.5 (L) 0.6 - 1.1 MG/DL    Glucose 191 (H) 70 - 99 MG/DL    Calcium 9.1 8.3 - 10.6 MG/DL    Alb 4.0 3.4 - 5.0 GM/DL    Total Protein 6.5 6.4 - 8.2 GM/DL    Total Bilirubin 0.1 0.0 - 1.0 MG/DL    ALT 10 10 - 40 U/L    AST 13 (L) 15 - 37 IU/L    Alkaline Phosphatase 116 40 - 129 IU/L    GFR Non-African American >60 >60 mL/min/1.73m2    GFR African American >60 >60 mL/min/1.73m2    Anion Gap 13 4 - 16   CBC Auto Differential   Result Value Ref Range    WBC 3.5 (L) 4.0 - 10.5 K/CU MM    RBC 3.08 (L) 4.2 - 5.4 M/CU MM    Hemoglobin 10.2 (L) 12.5 - 16.0 GM/DL    Hematocrit 33.0 (L) 37 - 47 %    .1 (H) 78 - 100 FL    MCH 33.1 (H) 27 - 31 PG    MCHC 30.9 (L) 32.0 - 36.0 %    RDW 16.0 (H) 11.7 - 14.9 %    Platelets 379 998 - 787 K/CU MM    MPV 10.0 7.5 - 11.1 FL    Differential Type AUTOMATED DIFFERENTIAL     Segs Relative 62.4 36 - 66 %    Lymphocytes % 26.6 24 - 44 %    Monocytes % 9.3 (H) 0 - 4 %    Eosinophils % 0.6 0 - 3 %    Basophils % 0.8 0 - 1 %    Segs Absolute 2.2 K/CU MM    Lymphocytes # 0.9 K/CU MM    Monocytes # 0.3 K/CU MM    Eosinophils # 0.0 K/CU MM have wound healing over the next several weeks that she should follow-up with her [facility] who performed the surgery regarding closure of the wound. I attempted to consult patient's infectious disease specialist, but consultation not return today. At this time the wound appears healthy without evidence concerning for cellulitis or abscess. Patient treated in the ED with Bentyl and Percocet similar to her home dose. At this time do not feel patient requires further narcotic therapy other than her home regimen. I advised patient to closely follow up with her pain management specialist. Emergent processes considered today and have low clinical suspicion for emergent intra-abdominal processes. Abdominal exam without peritoneal signs. I advised patient to continue her home pain management regimen. All pertinent Lab data and radiographic results reviewed with patient at bedside. The patient and/or the family were informed of the results of any tests/labs/imaging, the treatment plan, and time was allotted to answer questions. Diagnosis, disposition, and treatment plan reviewed with patient and/or family who understands and agrees. Patient understands and agrees to follow up with PCP for recheck in 2-3 days and with her infectious disease specialist and pain management specialist for recheck as soon as possible and her [facility] if wound is healing over a few weeks. Patient understands and agrees to return to the emergency department for any new or worsening symptoms- strict return precautions given. Clinical  IMPRESSION    1. Abdominal pain, unspecified abdominal location        Disposition referral (if applicable):  Sky Maki MD  [address] .  [address]  [phone]    Schedule an appointment as soon as possible for a visit   Recheck as soon as possible    Your [facility] surgeon    Call   As needed if wound is not healing after 2-3 wks    Jerilyn Garcia MD  [id number]

## 2019-04-18 NOTE — ED NOTES
Pt left in [facility] with her friend she has refused to sign discharge paperwork.      Az Gonzalez RN  04/18/19 [id number]

## 2019-04-18 NOTE — CARE COORDINATION
This pt identified as risk for possible readmission. Pt was in hospital from 3/31 - 4/8. 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 Pt has had a G-J tube for some time which she has received treatment @ [facility], including replacement. Pt also has hospital admission 3/19 & has been non-complaint w/abx and gave no reason why. Pt did appeal her recent discharge but denied by insurance. Pt had [facility] and another abx treatment @ last d/c. Pt here today for abdominal pain  nausea and vomiting and chills. Pt also noted she has issues with malnutrition and had a G-tube in place for supplemental nutrition. States it fell out a week ago and she has had pustular drainage and blood from her stoma. It is closing but states she's had a lot of drainage. LSW spoke to Dr. Yany Walsh and there are no significant findings w/pts labs. Pt informed Dr. Yany Walsh that her pain meds do not seem to be working. Dr. Yany Walsh to Consult surgeon but likely pt to be d/c'd.  Still, at this time disposition pending.

## 2019-04-18 NOTE — ED TRIAGE NOTES
Pt to ED with complaints of abdominal pain and nausea. Pt was seen in this ED for same symptoms last night.

## 2019-04-18 NOTE — ED NOTES
Discharge instructions given to pt, pt verbalized understanding. All questions answered. Follow-up instructions given.      Luis Mcdaniels RN  04/18/19 4102

## 2019-04-18 NOTE — ED PROVIDER NOTES
Triage Chief Complaint:   Feeding Tube Problem (fell out last week now draining )      [place]:  Royce Velásquez is a 63 y.o. female that presents to the emergency department with abdominal pain nausea and vomiting and chills. .  States she has issues with malnutrition and had a G-tube in place for supplemental nutrition. States it fell out a week ago and she has had pustular drainage and blood from her stoma. It is closing but states she's had a lot of drainage. States she sees Dr. Mary Romero is her surgeon and Dr. Brian Canseco of infectious disease. States she frequently has MRSA and pseudomonal infections from this area. States she called her PCP but was told to come to the ED to get checked for infection. Patient states she is on oxycodone 15 grams tablets of \"not even touching my pain. \"  States she had a prior gastric bypass surgery. States the pain is currently a 9 out of 10 across her entire abdomen. Does not go into her back. Nothing seems to make it better or worse. She is in a wheelchair and states she has chronic avascular necrosis for which she takes her pain medication. Past Medical History:   Diagnosis Date    Acute delirium     Arrhythmia     Arthritis     Asplenia     Asthma     CAD (coronary artery disease)     1st stent at age 46    Cardiomyopathy [facility])     Cerebral artery occlusion with cerebral infarction ([facility] [address]     CHF (congestive heart failure) (HCC)     Enlarged liver     GERD (gastroesophageal reflux disease)     H/O echocardiogram 4/6/16    EF 55%, trivial TR & MR, stage 1 diastolic dysfunction    History of blood transfusion     Hx of cardiovascular stress test 12/29/2015    Alessia: EF 45%. Pharmacologic stress myocardial perfusion scan shows a fixed inferior-lateral defect w/ no inducible ischemia. No evidence of ischemia. Inferior-lateral infarction.  Normal LVSF    Hyperlipidemia     Hypertension     Lymphoma (HCC)     MI, old     Movement disorder     MS (multiple sclerosis) ([facility] [address]     OP (osteoporosis)     PE (pulmonary embolism)     trapese filter    Pneumonia     Seizures (HCC)     Spleen enlarged     Thyroid disease      Past Surgical History:   Procedure Laterality Date    ABDOMEN SURGERY      APPENDECTOMY      CARDIAC DEFIBRILLATOR PLACEMENT      CHOLECYSTECTOMY      COLONOSCOPY      CORONARY ANGIOPLASTY WITH STENT PLACEMENT      3 stents    CORONARY ARTERY BYPASS GRAFT      Age 49    ENDOSCOPY, COLON, DIAGNOSTIC      GASTRIC BYPASS SURGERY      HERNIA REPAIR      HIP SURGERY      HYSTERECTOMY      JOINT REPLACEMENT      SKIN BIOPSY      TONSILLECTOMY      VASCULAR SURGERY       Family History   Problem Relation Age of Onset    High Blood Pressure Mother     High Cholesterol Mother     Heart Disease Mother     Diabetes Mother     Heart Disease Father     Cancer Father     Other Sister         Multiple Sclerosis    Heart Disease Maternal Grandmother     High Blood Pressure Maternal Grandmother     High Cholesterol Maternal Grandmother      Social History     Socioeconomic History    Marital status: Single     Spouse name: Not on file    Number of children: Not on file    Years of education: Not on file    Highest education level: Not on file   Occupational History    Not on file   Social Needs    Financial resource strain: Not on file    Food insecurity:     Worry: Not on file     Inability: Not on file    Transportation needs:     Medical: Not on file     Non-medical: Not on file   Tobacco Use    Smoking status: Never Smoker    Smokeless tobacco: Never Used   Substance and Sexual Activity    Alcohol use: No    Drug use: No    Sexual activity: Not Currently   Lifestyle    Physical activity:     Days per week: Not on file     Minutes per session: Not on file    Stress: Not on file   Relationships    Social connections:     Talks on phone: Not on file     Gets together: Not on file     Attends [employer] service: Not on file     Active member of club or organization: Not on file     Attends meetings of clubs or organizations: Not on file     Relationship status: Not on file    Intimate partner violence:     Fear of current or ex partner: Not on file     Emotionally abused: Not on file     Physically abused: Not on file     Forced sexual activity: Not on file   Other Topics Concern    Not on file   Social History Narrative    Not on file     No current facility-administered medications for this encounter. Current Outpatient Medications   Medication Sig Dispense Refill    linezolid (ZYVOX) 600 MG tablet Take 1 tablet by mouth 2 times daily for 10 days 20 tablet 0    levothyroxine (SYNTHROID) 175 MCG tablet Take 1 tablet by mouth Daily 30 tablet 0    white petrolatum-corn starch-lanolin (TRIPLE PASTE) 12.8 % ointment Apply topically twice a day with dressing changes.  56.7 g 0    ondansetron (ZOFRAN) 4 MG tablet Take 1 tablet by mouth every 6 hours as needed for Nausea or Vomiting 20 tablet 0    lactobacillus (CULTURELLE) capsule Take 1 capsule by mouth 3 times daily (with meals) 90 capsule 0    chlorhexidine gluconate (ANTISEPTIC SKIN CLEANSER) 4 % SOLN external solution Apply topically daily 473 mL 1    magnesium oxide (MAG-OX) 400 MG tablet Take 800 mg by mouth 2 times daily      nystatin (MYCOSTATIN) 239335 UNIT/GM powder Apply 1 g topically daily To stomach      mexiletine (MEXITIL) 150 MG capsule Take 150 mg by mouth 3 times daily      docusate sodium (COLACE) 100 MG capsule Take 100 mg by mouth 2 times daily as needed for Constipation      famotidine (PEPCID) 20 MG tablet Take 1 tablet by mouth 2 times daily 60 tablet 3    levETIRAcetam (KEPPRA) 750 MG tablet Take 750 mg by mouth 2 times daily      atorvastatin (LIPITOR) 80 MG tablet Take 80 mg by mouth nightly      DULoxetine (CYMBALTA) 60 MG extended release capsule Take 60 mg by mouth 2 times daily      Pediatric Multivitamins-Iron (FLINTSTONES PLUS IRON) CHEW Take 1 tablet by mouth 4 times daily      Omega-3 1000 MG CAPS Take 1,000 mg by mouth nightly      ranolazine (RANEXA) 1000 MG extended release tablet Take 1,000 mg by mouth 2 times daily      promethazine (PHENERGAN) 25 MG tablet Take 25 mg by mouth every 6 hours as needed for Nausea      gabapentin (NEURONTIN) 300 MG capsule Take 300 mg by mouth 3 times daily. Evelin De La Rosa clopidogrel (PLAVIX) 75 MG tablet Take 75 mg by mouth daily      acetaminophen 650 MG TABS Take 650 mg by mouth every 4 hours as needed 120 tablet 3    ferrous gluconate (FERGON) 324 (38 FE) MG tablet Take 324 mg by mouth 2 times daily       aspirin 81 MG chewable tablet Take 81 mg by mouth daily.  interferon beta-1b (BETASERON) 0.3 MG injection Inject 0.25 mg into the skin every 30 days 04/25/18 Last known dose given 04/19/18 per MAR      vitamin B-12 (CYANOCOBALAMIN) 1000 MCG tablet Inject 1,000 mcg into the muscle every 30 days 04/25/18 Given on the 28 th of each month      albuterol (PROVENTIL HFA;VENTOLIN HFA) 108 (90 BASE) MCG/ACT inhaler Inhale 2 puffs into the lungs every 6 hours as needed.  vitamin D (ERGOCALCIFEROL) 18197 UNIT CAPS capsule Take 50,000 Units by mouth Twice a Week Take on Mondays and on Fridays       Facility-Administered Medications Ordered in Other Encounters   Medication Dose Route Frequency Provider Last Rate Last Dose    oxyCODONE (ROXICODONE) immediate release tablet 15 mg  15 mg Oral Once Betzaida Crawford MD         Allergies   Allergen Reactions    Baclofen     Fentanyl Swelling    Ibuprofen      \"Due to heart problems\"    Methocarbamol      Worsening edema    Nsaids      \"Due to heart problems\"    Tizanidine     Tolmetin      \"Due to heart problems\"    Tramadol Other (See Comments)     Doctor doesn't her to take due to heart problems  Seizures   Doctor doesn't her to take due to lowers seizure threshold.     Avelox [Moxifloxacin Hcl In Nacl] Swelling and Rash    Betadine [Povidone Iodine] Rash    Moxifloxacin Rash and Swelling     Lips swell and tingling in face      Nursing Notes Reviewed    ROS:  At least 10 systems reviewed and otherwise negative except as in the [address] Physical Exam:  ED Triage Vitals [04/17/19 1955]   Enc Vitals Group      BP (!) 153/85      Pulse 77      Resp 16      Temp 98.4 °F (36.9 °C)      Temp Source Oral      SpO2 100 %      Weight 144 lb (65.3 kg)      Height 5' 6\" (1.676 m)      Head Circumference       Peak Flow       Pain Score       Pain Loc       Pain Edu? Excl. in [address]? My pulse oximetry interpretation is which is within the normal range    GENERAL APPEARANCE: Awake and alert. Cooperative. No acute distress. HEAD: Normocephalic. Atraumatic. EYES: EOM's grossly intact. Sclera anicteric. ENT: Mucous membranes are moist. Tolerates saliva. No trismus. NECK: Supple. No meningismus. Trachea midline. HEART: RRR. Radial pulses 2+. LUNGS: Respirations unlabored. CTAB  ABDOMEN: Soft. Non-tender. No guarding or rebound. Patient has a mid left abdominal stoma without any obvious drainage redness or warmth to her skin at this time. EXTREMITIES: No acute deformities. SKIN: Warm and dry. NEUROLOGICAL: No gross facial drooping. Moves all 4 extremities spontaneously. PSYCHIATRIC: Normal mood.     I have reviewed and interpreted all of the currently available lab results from this visit (if applicable):  Results for orders placed or performed during the hospital encounter of 04/17/19   CBC auto diff   Result Value Ref Range    WBC 5.0 4.0 - 10.5 K/CU MM    RBC 2.86 (L) 4.2 - 5.4 M/CU MM    Hemoglobin 9.5 (L) 12.5 - 16.0 GM/DL    Hematocrit 30.3 (L) 37 - 47 %    .9 (H) 78 - 100 FL    MCH 33.2 (H) 27 - 31 PG    MCHC 31.4 (L) 32.0 - 36.0 %    RDW 15.9 (H) 11.7 - 14.9 %    Platelets 440 557 - 527 K/CU MM    MPV 10.1 7.5 - 11.1 FL    Differential Type AUTOMATED DIFFERENTIAL     Segs Relative 77.8 (H) 36 - 66 %    Lymphocytes % 14.6 (L) 24 - 44 %    Monocytes % 6.8 (H) 0 - 4 %    Eosinophils % 0.0 0 - 3 %    Basophils % 0.4 0 - 1 %    Segs Absolute 3.9 K/CU MM    Lymphocytes # 0.7 K/CU MM    Monocytes # 0.3 K/CU MM    Eosinophils # 0.0 K/CU MM    Basophils # 0.0 K/CU MM    Nucleated RBC % 0.0 %    Total Nucleated RBC 0.0 K/CU MM    Total Immature Neutrophil 0.02 K/CU MM    Immature Neutrophil % 0.4 0 - 0.43 %   CMP   Result Value Ref Range    Sodium 138 135 - 145 MMOL/L    Potassium 4.4 3.5 - 5.1 MMOL/L    Chloride 108 99 - 110 mMol/L    CO2 18 (L) 21 - 32 MMOL/L    BUN 5 (L) 6 - 23 MG/DL    CREATININE 0.5 (L) 0.6 - 1.1 MG/DL    Glucose 143 (H) 70 - 99 MG/DL    Calcium 8.6 8.3 - 10.6 MG/DL    Alb 3.9 3.4 - 5.0 GM/DL    Total Protein 6.4 6.4 - 8.2 GM/DL    Total Bilirubin 0.1 0.0 - 1.0 MG/DL    ALT 12 10 - 40 U/L    AST 21 15 - 37 IU/L    Alkaline Phosphatase 105 40 - 129 IU/L    GFR Non-African American >60 >60 mL/min/1.73m2    GFR African American >60 >60 mL/min/1.73m2    Anion Gap 12 4 - 16   Lipase   Result Value Ref Range    Lipase 11 (L) 13 - 60 IU/L   Lactic Acid, Plasma   Result Value Ref Range    Lactate 1.6 0.4 - 2.0 mMOL/L   Urinalysis with microscopic   Result Value Ref Range    Color, UA STRAW (A) YELLOW    Clarity, UA CLEAR CLEAR    Glucose, Urine NEGATIVE NEGATIVE MG/DL    Bilirubin Urine NEGATIVE NEGATIVE MG/DL    Ketones, Urine NEGATIVE NEGATIVE MG/DL    Specific Gravity, UA 1.027 1.001 - 1.035    Blood, Urine NEGATIVE NEGATIVE    pH, Urine 6.0 5.0 - 8.0    Protein, UA NEGATIVE NEGATIVE MG/DL    Urobilinogen, Urine NORMAL 0.2 - 1.0 MG/DL    Nitrite Urine, Quantitative NEGATIVE NEGATIVE    Leukocyte Esterase, Urine NEGATIVE NEGATIVE    RBC, UA NONE SEEN 0 - 6 /HPF    WBC, UA <1 0 - 5 /HPF    Bacteria, UA NEGATIVE NEGATIVE /HPF    Squam Epithel, UA <1 /HPF    Trichomonas, UA NONE SEEN NONE SEEN /HPF        Radiographs:  [] Radiologist's Wet Read Report Reviewed:      CT ABDOMEN PELVIS WO CONTRAST (Final result)   Result time 04/17/19 Abdomen is soft. Ordered IM Phenergan and morphine. Patient's labs are all within normals. No elevation in white count. No signs of sepsis. CT scan shows no signs of cellulitis, abscess or infection. Discussed with patient. Because of the time her doctors are not on call and I will be unable to get a hold of them and I do feel is reasonable for her to contact the office in the morning for follow-up appointments. Clinical Impression:  1. Generalized abdominal pain        Disposition Vitals:  [unfilled], [unfilled], [unfilled], [unfilled]    Disposition referral (if applicable):  Thanh Musa MD  [address] [facility]  [phone]    Schedule an appointment as soon as possible for a visit       Jessy Major MD  [address] [place], [place] [phone]    Schedule an appointment as soon as possible for a visit         Disposition medications (if applicable):  New Prescriptions    No medications on file         (Please note that portions of this note may have been completed with a voice recognition program. Efforts were made to edit the dictations but occasionally words are mis-transcribed.)    MD Tomy Jensen MD  04/17/19 0247

## 2019-04-19 NOTE — CARE COORDINATION
CM - room 4 in Ec - Pt is at risk for readmission . Pt has been seen in recent and further past within the last month or two in particular for problems and issues centered around her G- J  Tube . There are many CM notes  attempting to deal with pt needs with alternative solutions . Pt is discharged  with a non acute work up in ER today -no further needs are identified  By CM at this time .  Cal Turner / JOSE

## 2019-04-22 LAB
CULTURE: NORMAL
CULTURE: NORMAL
Lab: NORMAL
Lab: NORMAL
SPECIMEN: NORMAL
SPECIMEN: NORMAL

## 2019-05-15 ENCOUNTER — TELEPHONE (OUTPATIENT)
Dept: INFECTIOUS DISEASES | Age: 59
End: 2019-05-15

## 2019-05-15 NOTE — TELEPHONE ENCOUNTER
Bertha Burrell, from [facility] called and wants to know if PICC line can be pulled after antibiotics are done. Please advise.     Contact #:[phone]

## 2019-05-17 ENCOUNTER — TELEPHONE (OUTPATIENT)
Dept: INFECTIOUS DISEASES | Age: 59
End: 2019-05-17

## 2019-05-17 NOTE — TELEPHONE ENCOUNTER
Irma from [facility] ask if PICC line can be pulled at completion of ATBs. Please advise.

## 2019-05-28 ENCOUNTER — HOSPITAL ENCOUNTER (INPATIENT)
Age: 59
LOS: 1 days | Discharge: ANOTHER HOSPITAL | DRG: 311 | End: 2019-05-29
Attending: EMERGENCY MEDICINE | Admitting: INTERNAL MEDICINE
Payer: MEDICARE

## 2019-05-28 ENCOUNTER — APPOINTMENT (OUTPATIENT)
Dept: GENERAL RADIOLOGY | Age: 59
DRG: 311 | End: 2019-05-28
Payer: MEDICARE

## 2019-05-28 DIAGNOSIS — R07.9 CHEST PAIN, UNSPECIFIED TYPE: ICD-10-CM

## 2019-05-28 DIAGNOSIS — D64.9 ACUTE ANEMIA: Primary | ICD-10-CM

## 2019-05-28 DIAGNOSIS — E83.42 HYPOMAGNESEMIA: ICD-10-CM

## 2019-05-28 LAB
ALBUMIN SERPL-MCNC: 3.5 GM/DL (ref 3.4–5)
ALP BLD-CCNC: 127 IU/L (ref 40–129)
ALT SERPL-CCNC: 9 U/L (ref 10–40)
ANION GAP SERPL CALCULATED.3IONS-SCNC: 10 MMOL/L (ref 4–16)
AST SERPL-CCNC: 13 IU/L (ref 15–37)
BASOPHILS ABSOLUTE: 0 K/CU MM
BASOPHILS RELATIVE PERCENT: 0.7 % (ref 0–1)
BILIRUB SERPL-MCNC: 0.2 MG/DL (ref 0–1)
BUN BLDV-MCNC: 11 MG/DL (ref 6–23)
CALCIUM SERPL-MCNC: 8 MG/DL (ref 8.3–10.6)
CHLORIDE BLD-SCNC: 98 MMOL/L (ref 99–110)
CO2: 32 MMOL/L (ref 21–32)
CREAT SERPL-MCNC: 0.7 MG/DL (ref 0.6–1.1)
DIFFERENTIAL TYPE: ABNORMAL
EOSINOPHILS ABSOLUTE: 0.1 K/CU MM
EOSINOPHILS RELATIVE PERCENT: 0.8 % (ref 0–3)
GFR AFRICAN AMERICAN: >60 ML/MIN/1.73M2
GFR NON-AFRICAN AMERICAN: >60 ML/MIN/1.73M2
GLUCOSE BLD-MCNC: 172 MG/DL (ref 70–99)
HCT VFR BLD CALC: 28.3 % (ref 37–47)
HEMOGLOBIN: 8.6 GM/DL (ref 12.5–16)
IMMATURE NEUTROPHIL %: 0.3 % (ref 0–0.43)
LYMPHOCYTES ABSOLUTE: 0.9 K/CU MM
LYMPHOCYTES RELATIVE PERCENT: 15.6 % (ref 24–44)
MAGNESIUM: 1.4 MG/DL (ref 1.8–2.4)
MCH RBC QN AUTO: 32.5 PG (ref 27–31)
MCHC RBC AUTO-ENTMCNC: 30.4 % (ref 32–36)
MCV RBC AUTO: 106.8 FL (ref 78–100)
MONOCYTES ABSOLUTE: 0.8 K/CU MM
MONOCYTES RELATIVE PERCENT: 12.9 % (ref 0–4)
NUCLEATED RBC %: 0 %
PDW BLD-RTO: 17.5 % (ref 11.7–14.9)
PHOSPHORUS: 3.8 MG/DL (ref 2.5–4.9)
PLATELET # BLD: 315 K/CU MM (ref 140–440)
PMV BLD AUTO: 10.4 FL (ref 7.5–11.1)
POTASSIUM SERPL-SCNC: 3.2 MMOL/L (ref 3.5–5.1)
RBC # BLD: 2.65 M/CU MM (ref 4.2–5.4)
SEGMENTED NEUTROPHILS ABSOLUTE COUNT: 4.2 K/CU MM
SEGMENTED NEUTROPHILS RELATIVE PERCENT: 69.7 % (ref 36–66)
SODIUM BLD-SCNC: 140 MMOL/L (ref 135–145)
TOTAL IMMATURE NEUTOROPHIL: 0.02 K/CU MM
TOTAL NUCLEATED RBC: 0 K/CU MM
TOTAL PROTEIN: 5.2 GM/DL (ref 6.4–8.2)
TROPONIN T: <0.01 NG/ML
TSH HIGH SENSITIVITY: 0.39 UIU/ML (ref 0.27–4.2)
WBC # BLD: 6 K/CU MM (ref 4–10.5)

## 2019-05-28 PROCEDURE — 96376 TX/PRO/DX INJ SAME DRUG ADON: CPT

## 2019-05-28 PROCEDURE — 83735 ASSAY OF MAGNESIUM: CPT

## 2019-05-28 PROCEDURE — 96374 THER/PROPH/DIAG INJ IV PUSH: CPT

## 2019-05-28 PROCEDURE — 71046 X-RAY EXAM CHEST 2 VIEWS: CPT

## 2019-05-28 PROCEDURE — 2000000000 HC ICU R&B

## 2019-05-28 PROCEDURE — 84443 ASSAY THYROID STIM HORMONE: CPT

## 2019-05-28 PROCEDURE — 6360000002 HC RX W HCPCS

## 2019-05-28 PROCEDURE — 80053 COMPREHEN METABOLIC PANEL: CPT

## 2019-05-28 PROCEDURE — 99285 EMERGENCY DEPT VISIT HI MDM: CPT

## 2019-05-28 PROCEDURE — 6360000002 HC RX W HCPCS: Performed by: EMERGENCY MEDICINE

## 2019-05-28 PROCEDURE — 6360000002 HC RX W HCPCS: Performed by: HOSPITALIST

## 2019-05-28 PROCEDURE — 93005 ELECTROCARDIOGRAM TRACING: CPT | Performed by: EMERGENCY MEDICINE

## 2019-05-28 PROCEDURE — 6370000000 HC RX 637 (ALT 250 FOR IP): Performed by: EMERGENCY MEDICINE

## 2019-05-28 PROCEDURE — 84100 ASSAY OF PHOSPHORUS: CPT

## 2019-05-28 PROCEDURE — 85025 COMPLETE CBC W/AUTO DIFF WBC: CPT

## 2019-05-28 PROCEDURE — 2580000003 HC RX 258: Performed by: EMERGENCY MEDICINE

## 2019-05-28 PROCEDURE — 84484 ASSAY OF TROPONIN QUANT: CPT

## 2019-05-28 PROCEDURE — 2580000003 HC RX 258

## 2019-05-28 RX ORDER — CLOPIDOGREL BISULFATE 75 MG/1
75 TABLET ORAL DAILY
Status: DISCONTINUED | OUTPATIENT
Start: 2019-05-29 | End: 2019-05-29 | Stop reason: HOSPADM

## 2019-05-28 RX ORDER — ALBUTEROL SULFATE 90 UG/1
2 AEROSOL, METERED RESPIRATORY (INHALATION) EVERY 6 HOURS PRN
Status: DISCONTINUED | OUTPATIENT
Start: 2019-05-28 | End: 2019-05-29 | Stop reason: HOSPADM

## 2019-05-28 RX ORDER — SODIUM CHLORIDE 0.9 % (FLUSH) 0.9 %
10 SYRINGE (ML) INJECTION PRN
Status: DISCONTINUED | OUTPATIENT
Start: 2019-05-28 | End: 2019-05-29 | Stop reason: HOSPADM

## 2019-05-28 RX ORDER — 0.9 % SODIUM CHLORIDE 0.9 %
500 INTRAVENOUS SOLUTION INTRAVENOUS ONCE
Status: DISCONTINUED | OUTPATIENT
Start: 2019-05-28 | End: 2019-05-28

## 2019-05-28 RX ORDER — MORPHINE SULFATE 4 MG/ML
4 INJECTION, SOLUTION INTRAMUSCULAR; INTRAVENOUS EVERY 30 MIN PRN
Status: DISCONTINUED | OUTPATIENT
Start: 2019-05-28 | End: 2019-05-29

## 2019-05-28 RX ORDER — MAGNESIUM SULFATE IN WATER 40 MG/ML
2 INJECTION, SOLUTION INTRAVENOUS ONCE
Status: DISCONTINUED | OUTPATIENT
Start: 2019-05-29 | End: 2019-05-29 | Stop reason: SDUPTHER

## 2019-05-28 RX ORDER — POTASSIUM CHLORIDE 7.45 MG/ML
10 INJECTION INTRAVENOUS PRN
Status: DISCONTINUED | OUTPATIENT
Start: 2019-05-28 | End: 2019-05-29 | Stop reason: HOSPADM

## 2019-05-28 RX ORDER — MEXILETINE HYDROCHLORIDE 150 MG/1
150 CAPSULE ORAL 3 TIMES DAILY
Status: DISCONTINUED | OUTPATIENT
Start: 2019-05-29 | End: 2019-05-29

## 2019-05-28 RX ORDER — OMEGA-3-ACID ETHYL ESTERS 1 G/1
1000 CAPSULE, LIQUID FILLED ORAL NIGHTLY
Status: DISCONTINUED | OUTPATIENT
Start: 2019-05-29 | End: 2019-05-29 | Stop reason: HOSPADM

## 2019-05-28 RX ORDER — FAMOTIDINE 20 MG/1
20 TABLET, FILM COATED ORAL 2 TIMES DAILY
Status: DISCONTINUED | OUTPATIENT
Start: 2019-05-29 | End: 2019-05-29

## 2019-05-28 RX ORDER — MAGNESIUM SULFATE IN WATER 40 MG/ML
2 INJECTION, SOLUTION INTRAVENOUS ONCE
Status: COMPLETED | OUTPATIENT
Start: 2019-05-29 | End: 2019-05-29

## 2019-05-28 RX ORDER — 0.9 % SODIUM CHLORIDE 0.9 %
500 INTRAVENOUS SOLUTION INTRAVENOUS ONCE
Status: COMPLETED | OUTPATIENT
Start: 2019-05-28 | End: 2019-05-28

## 2019-05-28 RX ORDER — 0.9 % SODIUM CHLORIDE 0.9 %
1000 INTRAVENOUS SOLUTION INTRAVENOUS ONCE
Status: DISCONTINUED | OUTPATIENT
Start: 2019-05-28 | End: 2019-05-28

## 2019-05-28 RX ORDER — ONDANSETRON 2 MG/ML
4 INJECTION INTRAMUSCULAR; INTRAVENOUS EVERY 6 HOURS PRN
Status: DISCONTINUED | OUTPATIENT
Start: 2019-05-28 | End: 2019-05-29 | Stop reason: HOSPADM

## 2019-05-28 RX ORDER — ONDANSETRON 4 MG/1
4 TABLET, ORALLY DISINTEGRATING ORAL ONCE
Status: COMPLETED | OUTPATIENT
Start: 2019-05-28 | End: 2019-05-28

## 2019-05-28 RX ORDER — ASPIRIN 81 MG/1
81 TABLET, CHEWABLE ORAL DAILY
Status: DISCONTINUED | OUTPATIENT
Start: 2019-05-29 | End: 2019-05-29 | Stop reason: HOSPADM

## 2019-05-28 RX ORDER — POTASSIUM CHLORIDE 1.5 G/1.77G
40 POWDER, FOR SOLUTION ORAL PRN
Status: DISCONTINUED | OUTPATIENT
Start: 2019-05-28 | End: 2019-05-29 | Stop reason: HOSPADM

## 2019-05-28 RX ORDER — HYDROCODONE BITARTRATE AND ACETAMINOPHEN 5; 325 MG/1; MG/1
1 TABLET ORAL EVERY 6 HOURS PRN
Status: DISCONTINUED | OUTPATIENT
Start: 2019-05-28 | End: 2019-05-29 | Stop reason: HOSPADM

## 2019-05-28 RX ORDER — DOCUSATE SODIUM 100 MG/1
100 CAPSULE, LIQUID FILLED ORAL 2 TIMES DAILY PRN
Status: DISCONTINUED | OUTPATIENT
Start: 2019-05-28 | End: 2019-05-29 | Stop reason: HOSPADM

## 2019-05-28 RX ORDER — SODIUM CHLORIDE 0.9 % (FLUSH) 0.9 %
10 SYRINGE (ML) INJECTION EVERY 12 HOURS SCHEDULED
Status: DISCONTINUED | OUTPATIENT
Start: 2019-05-29 | End: 2019-05-29 | Stop reason: HOSPADM

## 2019-05-28 RX ORDER — MAGNESIUM SULFATE 1 G/100ML
1 INJECTION INTRAVENOUS PRN
Status: DISCONTINUED | OUTPATIENT
Start: 2019-05-28 | End: 2019-05-29

## 2019-05-28 RX ORDER — GABAPENTIN 300 MG/1
300 CAPSULE ORAL 3 TIMES DAILY
Status: DISCONTINUED | OUTPATIENT
Start: 2019-05-29 | End: 2019-05-29 | Stop reason: HOSPADM

## 2019-05-28 RX ORDER — DULOXETIN HYDROCHLORIDE 30 MG/1
60 CAPSULE, DELAYED RELEASE ORAL 2 TIMES DAILY
Status: DISCONTINUED | OUTPATIENT
Start: 2019-05-29 | End: 2019-05-29 | Stop reason: HOSPADM

## 2019-05-28 RX ORDER — RANOLAZINE 500 MG/1
1000 TABLET, EXTENDED RELEASE ORAL 2 TIMES DAILY
Status: DISCONTINUED | OUTPATIENT
Start: 2019-05-29 | End: 2019-05-29 | Stop reason: HOSPADM

## 2019-05-28 RX ORDER — POTASSIUM CHLORIDE 20 MEQ/1
40 TABLET, EXTENDED RELEASE ORAL PRN
Status: DISCONTINUED | OUTPATIENT
Start: 2019-05-28 | End: 2019-05-29 | Stop reason: HOSPADM

## 2019-05-28 RX ORDER — FERROUS GLUCONATE 324(37.5)
324 TABLET ORAL 2 TIMES DAILY WITH MEALS
Status: DISCONTINUED | OUTPATIENT
Start: 2019-05-29 | End: 2019-05-29 | Stop reason: HOSPADM

## 2019-05-28 RX ORDER — ATORVASTATIN CALCIUM 40 MG/1
80 TABLET, FILM COATED ORAL NIGHTLY
Status: DISCONTINUED | OUTPATIENT
Start: 2019-05-29 | End: 2019-05-29 | Stop reason: HOSPADM

## 2019-05-28 RX ORDER — ACETAMINOPHEN 325 MG/1
650 TABLET ORAL EVERY 4 HOURS PRN
Status: DISCONTINUED | OUTPATIENT
Start: 2019-05-28 | End: 2019-05-29 | Stop reason: HOSPADM

## 2019-05-28 RX ORDER — SODIUM CHLORIDE 9 MG/ML
INJECTION, SOLUTION INTRAVENOUS CONTINUOUS
Status: DISCONTINUED | OUTPATIENT
Start: 2019-05-29 | End: 2019-05-29 | Stop reason: HOSPADM

## 2019-05-28 RX ADMIN — HYDROMORPHONE HYDROCHLORIDE 0.5 MG: 1 INJECTION, SOLUTION INTRAMUSCULAR; INTRAVENOUS; SUBCUTANEOUS at 23:50

## 2019-05-28 RX ADMIN — MORPHINE SULFATE 4 MG: 4 INJECTION INTRAVENOUS at 21:20

## 2019-05-28 RX ADMIN — ONDANSETRON 4 MG: 4 TABLET, ORALLY DISINTEGRATING ORAL at 20:12

## 2019-05-28 RX ADMIN — SODIUM CHLORIDE 500 ML: 9 INJECTION, SOLUTION INTRAVENOUS at 20:12

## 2019-05-28 RX ADMIN — MORPHINE SULFATE 4 MG: 4 INJECTION INTRAVENOUS at 22:54

## 2019-05-28 ASSESSMENT — PAIN SCALES - GENERAL
PAINLEVEL_OUTOF10: 9
PAINLEVEL_OUTOF10: 7

## 2019-05-28 ASSESSMENT — PAIN DESCRIPTION - ONSET: ONSET: GRADUAL

## 2019-05-28 ASSESSMENT — PAIN DESCRIPTION - LOCATION: LOCATION: CHEST

## 2019-05-28 ASSESSMENT — PAIN DESCRIPTION - DESCRIPTORS: DESCRIPTORS: SHARP;STABBING

## 2019-05-28 ASSESSMENT — PAIN DESCRIPTION - FREQUENCY: FREQUENCY: CONTINUOUS

## 2019-05-28 ASSESSMENT — PAIN DESCRIPTION - PAIN TYPE: TYPE: ACUTE PAIN

## 2019-05-28 NOTE — ED NOTES
Bed: 02TR-02  Expected date:   Expected time:   Means of arrival:   Comments:  Ems defibb fired     Sreekanth Basurto RN  05/28/19 4017

## 2019-05-29 ENCOUNTER — APPOINTMENT (OUTPATIENT)
Dept: GENERAL RADIOLOGY | Age: 59
DRG: 311 | End: 2019-05-29
Payer: MEDICARE

## 2019-05-29 ENCOUNTER — ANESTHESIA (OUTPATIENT)
Dept: ICU | Age: 59
DRG: 311 | End: 2019-05-29
Payer: MEDICARE

## 2019-05-29 ENCOUNTER — ANESTHESIA EVENT (OUTPATIENT)
Dept: ICU | Age: 59
DRG: 311 | End: 2019-05-29
Payer: MEDICARE

## 2019-05-29 VITALS
SYSTOLIC BLOOD PRESSURE: 100 MMHG | DIASTOLIC BLOOD PRESSURE: 71 MMHG | WEIGHT: 125.88 LBS | TEMPERATURE: 99.1 F | RESPIRATION RATE: 14 BRPM | HEART RATE: 87 BPM | OXYGEN SATURATION: 100 % | HEIGHT: 66 IN | BODY MASS INDEX: 20.23 KG/M2

## 2019-05-29 PROBLEM — I49.9 SHOCKABLE CARDIAC RHYTHM DETECTED BY AUTOMATED EXTERNAL DEFIBRILLATOR: Status: ACTIVE | Noted: 2019-05-29

## 2019-05-29 LAB
EKG ATRIAL RATE: 105 BPM
EKG ATRIAL RATE: 78 BPM
EKG DIAGNOSIS: NORMAL
EKG DIAGNOSIS: NORMAL
EKG P AXIS: 15 DEGREES
EKG P AXIS: 36 DEGREES
EKG P-R INTERVAL: 124 MS
EKG P-R INTERVAL: 128 MS
EKG Q-T INTERVAL: 386 MS
EKG Q-T INTERVAL: 444 MS
EKG QRS DURATION: 102 MS
EKG QRS DURATION: 112 MS
EKG QTC CALCULATION (BAZETT): 506 MS
EKG QTC CALCULATION (BAZETT): 510 MS
EKG R AXIS: -34 DEGREES
EKG R AXIS: -36 DEGREES
EKG T AXIS: 17 DEGREES
EKG T AXIS: 83 DEGREES
EKG VENTRICULAR RATE: 105 BPM
EKG VENTRICULAR RATE: 78 BPM

## 2019-05-29 PROCEDURE — 84484 ASSAY OF TROPONIN QUANT: CPT

## 2019-05-29 PROCEDURE — 6360000002 HC RX W HCPCS: Performed by: INTERNAL MEDICINE

## 2019-05-29 PROCEDURE — 6360000002 HC RX W HCPCS

## 2019-05-29 PROCEDURE — 36620 INSERTION CATHETER ARTERY: CPT | Performed by: NURSE ANESTHETIST, CERTIFIED REGISTERED

## 2019-05-29 PROCEDURE — 0BH17EZ INSERTION OF ENDOTRACHEAL AIRWAY INTO TRACHEA, VIA NATURAL OR ARTIFICIAL OPENING: ICD-10-PCS | Performed by: HOSPITALIST

## 2019-05-29 PROCEDURE — 71045 X-RAY EXAM CHEST 1 VIEW: CPT

## 2019-05-29 PROCEDURE — 31500 INSERT EMERGENCY AIRWAY: CPT | Performed by: NURSE ANESTHETIST, CERTIFIED REGISTERED

## 2019-05-29 PROCEDURE — 6360000002 HC RX W HCPCS: Performed by: HOSPITALIST

## 2019-05-29 PROCEDURE — 2580000003 HC RX 258: Performed by: HOSPITALIST

## 2019-05-29 PROCEDURE — 36620 INSERTION CATHETER ARTERY: CPT

## 2019-05-29 PROCEDURE — 2500000003 HC RX 250 WO HCPCS

## 2019-05-29 PROCEDURE — 6370000000 HC RX 637 (ALT 250 FOR IP): Performed by: INTERNAL MEDICINE

## 2019-05-29 PROCEDURE — 5A1935Z RESPIRATORY VENTILATION, LESS THAN 24 CONSECUTIVE HOURS: ICD-10-PCS | Performed by: HOSPITALIST

## 2019-05-29 PROCEDURE — 2580000003 HC RX 258: Performed by: INTERNAL MEDICINE

## 2019-05-29 PROCEDURE — 94002 VENT MGMT INPAT INIT DAY: CPT

## 2019-05-29 PROCEDURE — 93010 ELECTROCARDIOGRAM REPORT: CPT | Performed by: INTERNAL MEDICINE

## 2019-05-29 RX ORDER — MAGNESIUM SULFATE IN WATER 40 MG/ML
2 INJECTION, SOLUTION INTRAVENOUS ONCE
Status: DISCONTINUED | OUTPATIENT
Start: 2019-05-29 | End: 2019-05-29 | Stop reason: SDUPTHER

## 2019-05-29 RX ORDER — LIDOCAINE HYDROCHLORIDE ANHYDROUS AND DEXTROSE MONOHYDRATE .4; 5 G/100ML; G/100ML
2 INJECTION, SOLUTION INTRAVENOUS CONTINUOUS
Status: DISCONTINUED | OUTPATIENT
Start: 2019-05-29 | End: 2019-05-29 | Stop reason: HOSPADM

## 2019-05-29 RX ORDER — PROPOFOL 10 MG/ML
INJECTION, EMULSION INTRAVENOUS
Status: DISCONTINUED
Start: 2019-05-29 | End: 2019-05-29 | Stop reason: HOSPADM

## 2019-05-29 RX ORDER — CHLORHEXIDINE GLUCONATE 0.12 MG/ML
15 RINSE ORAL 2 TIMES DAILY
Status: DISCONTINUED | OUTPATIENT
Start: 2019-05-29 | End: 2019-05-29 | Stop reason: HOSPADM

## 2019-05-29 RX ORDER — PROPOFOL 10 MG/ML
10 INJECTION, EMULSION INTRAVENOUS CONTINUOUS
Status: DISCONTINUED | OUTPATIENT
Start: 2019-05-29 | End: 2019-05-29 | Stop reason: HOSPADM

## 2019-05-29 RX ADMIN — AMIODARONE HYDROCHLORIDE 1 MG/MIN: 50 INJECTION, SOLUTION INTRAVENOUS at 00:11

## 2019-05-29 RX ADMIN — AMIODARONE HYDROCHLORIDE 300 MG: 50 INJECTION, SOLUTION INTRAVENOUS at 00:15

## 2019-05-29 RX ADMIN — MAGNESIUM SULFATE 2 G: 2 INJECTION INTRAVENOUS at 00:21

## 2019-05-29 RX ADMIN — LIDOCAINE HYDROCHLORIDE 4 MG/MIN: 4 INJECTION, SOLUTION INTRAVENOUS at 00:14

## 2019-05-29 RX ADMIN — HYDROMORPHONE HYDROCHLORIDE 1 MG: 1 INJECTION, SOLUTION INTRAMUSCULAR; INTRAVENOUS; SUBCUTANEOUS at 00:15

## 2019-05-29 RX ADMIN — SODIUM CHLORIDE: 9 INJECTION, SOLUTION INTRAVENOUS at 00:12

## 2019-05-29 ASSESSMENT — PULMONARY FUNCTION TESTS: PIF_VALUE: 20

## 2019-05-29 ASSESSMENT — PAIN SCALES - GENERAL: PAINLEVEL_OUTOF10: 7

## 2019-05-29 NOTE — DISCHARGE SUMMARY
aspirin 81 MG chewable tablet  Take 81 mg by mouth daily. atorvastatin (LIPITOR) 80 MG tablet  Take 80 mg by mouth nightly             chlorhexidine gluconate (ANTISEPTIC SKIN CLEANSER) 4 % SOLN external solution  Apply topically daily             clopidogrel (PLAVIX) 75 MG tablet  Take 75 mg by mouth daily             docusate sodium (COLACE) 100 MG capsule  Take 100 mg by mouth 2 times daily as needed for Constipation             DULoxetine (CYMBALTA) 60 MG extended release capsule  Take 60 mg by mouth 2 times daily             famotidine (PEPCID) 20 MG tablet  Take 1 tablet by mouth 2 times daily             ferrous gluconate (FERGON) 324 (38 FE) MG tablet  Take 324 mg by mouth 2 times daily              gabapentin (NEURONTIN) 300 MG capsule  Take 300 mg by mouth 3 times daily.  .             interferon beta-1b (BETASERON) 0.3 MG injection  Inject 0.25 mg into the skin every 30 days 04/25/18 Last known dose given 04/19/18 per MAR             lactobacillus (CULTURELLE) capsule  Take 1 capsule by mouth 3 times daily (with meals)             levETIRAcetam (KEPPRA) 750 MG tablet  Take 750 mg by mouth 2 times daily             levothyroxine (SYNTHROID) 175 MCG tablet  Take 1 tablet by mouth Daily             magnesium oxide (MAG-OX) 400 MG tablet  Take 800 mg by mouth 2 times daily             mexiletine (MEXITIL) 150 MG capsule  Take 150 mg by mouth 3 times daily             nystatin (MYCOSTATIN) 653154 UNIT/GM powder  Apply 1 g topically daily To stomach             Omega-3 1000 MG CAPS  Take 1,000 mg by mouth nightly             ondansetron (ZOFRAN) 4 MG tablet  Take 1 tablet by mouth every 6 hours as needed for Nausea or Vomiting             ondansetron (ZOFRAN-ODT) 4 MG disintegrating tablet  Take 1 tablet by mouth 3 times daily as needed for Nausea or Vomiting             Pediatric Multivitamins-Iron (FLINTSTONES PLUS IRON) CHEW  Take 1 tablet by mouth 4 times daily             ranolazine (RANEXA) 1000 MG extended release tablet  Take 1,000 mg by mouth 2 times daily             vitamin B-12 (CYANOCOBALAMIN) 1000 MCG tablet  Inject 1,000 mcg into the muscle every 30 days 04/25/18 Given on the 28 th of each month             vitamin D (ERGOCALCIFEROL) 98992 UNIT CAPS capsule  Take 50,000 Units by mouth Twice a Week Take on Mondays and on Fridays             white petrolatum-corn starch-lanolin (TRIPLE PASTE) 12.8 % ointment  Apply topically twice a day with dressing changes. Diet:  Diet NPO, After Midnight  DIET CARDIAC;     Activity:   bedrest     Discharge Condition:   stable    Disposition:   [facility]    Follow-up    As per [facility]    Time spent on discharge in the examination, evaluation, counseling and review of medications and discharge plan: 40 minutes       Discharge Physician Signed: Greg Lee

## 2019-05-29 NOTE — PROGRESS NOTES
Pt had a rapid due to her going into VT repeatedly with her ICD shocking her repeatedly, transferred to ICU, talked to , recommended amiodarone, lidocaine, transfer to [address] [place] [address] if continues, given dilaudid, amiodarone, lidocaine, Magnesium sulfate, versed, pt ended up needing to be intubated and started on propofol and fentanyl, Shun Milton in the process of contacting [address] [place] [address] for transfer to [address] [place] [address] ( pt's cardiologist at [address] [place] [address], was transferred there for same 12/2018, had ablation).

## 2019-05-29 NOTE — PROGRESS NOTES
Patient on unit and assisted to bathroom x1 assist.  Patient requesting gown to be placed at this time. Patient removing telemetry pads and EKG stickers from chest.  Informed patient this nurse would have to replace leads and place on monitor. Patient voiced understanding. Patient then yelled out very loudly. Patient stated her defibrillator shocked her again. Informed patient this nurse was calling for assistance to help patient back to bed. This nurse called charge nurse and informed her that patient's defibrillator shocked her again and may need to call rapid response if continues. Patient screamed out and stood up from the commode when this nurse grabbed patient and lowered her to the floor, patient yelled that she was too weak to walk and was going to die. Patient did not hit her head and there was no injury after this nurse lowered patient to the floor. This nurse and another nurse carried patient to bed at this time and Rapid Response was called. Doctor at bedside.

## 2019-05-29 NOTE — ED PROVIDER NOTES
Emergency Department Encounter    Patient: Anayeli Kan  MRN: [id number]  : 1960  Date of Evaluation: 2019  ED Provider:  Margarita Muller    Triage Chief Complaint:   Dizziness (Pt c/o chest pain and dizziness after her defibrillator fired around ) and Chest Pain    [place]:  Anayeli Kan is a 63 y.o. female that presents With lightheadedness and dizziness, a few hours ago her defibrillator fired, now she has chest pain in that area so she came in for evaluation. Through the day today she's had some lightheadedness and dizziness that's been positional.  No cough or fevers. She states she's had her defibrillator fire in the past, this is about the fifth time. No cough or fevers recently has been doing well the last few weeks.     ROS:  General:  No fevers, + weakness  Eyes:  no discharge  ENT:  No sore throat, no nasal congestion  Cardiovascular:  + chest pain, no palpitations  Respiratory:  No shortness of breath, no cough, no wheezing  Gastrointestinal:  No pain, no nausea, no vomiting, no diarrhea  Musculoskeletal:  No muscle pain, no joint pain  Skin:  No rash, no pruritis  Neurologic:  No speech problems, no headache, no extremity numbness, no extremity tingling, no extremity weakness  Psychiatric:  No anxiety  Genitourinary:  No dysuria, no hematuria  Endocrine:  No unexpected weight gain, no unexpected weight loss  Extremities:  no edema, no pain    Past Medical History:   Diagnosis Date    Acute delirium     Arrhythmia     Arthritis     Asplenia     Asthma     CAD (coronary artery disease)     1st stent at age 46    Cardiomyopathy [facility])     [facility] with [facility]     CHF (congestive heart failure) ([facility]     Enlarged liver     GERD (gastroesophageal reflux disease)     H/O echocardiogram 16    EF 55%, trivial TR & MR, stage 1 diastolic dysfunction    History of blood transfusion     Hx of cardiovascular stress test 2015    Alessia: EF 45%. Pharmacologic stress myocardial perfusion scan shows a fixed inferior-lateral defect w/ no inducible ischemia. No evidence of ischemia. Inferior-lateral infarction.  Normal LVSF    Hyperlipidemia     Hypertension     Lymphoma ([facility]     MI, old     Movement disorder     MS (multiple sclerosis) ([facility]     OP (osteoporosis)     PE (pulmonary embolism)     trapese filter    Pneumonia     Seizures ([facility]     Spleen enlarged     Thyroid disease      Past Surgical History:   Procedure Laterality Date    ABDOMEN SURGERY      APPENDECTOMY      CARDIAC DEFIBRILLATOR PLACEMENT      CHOLECYSTECTOMY      COLONOSCOPY      CORONARY ANGIOPLASTY WITH STENT PLACEMENT      3 stents    CORONARY ARTERY BYPASS GRAFT      Age 49    ENDOSCOPY, COLON, DIAGNOSTIC      GASTRIC BYPASS SURGERY      HERNIA REPAIR      HIP SURGERY      HYSTERECTOMY      JOINT REPLACEMENT      SKIN BIOPSY      TONSILLECTOMY      VASCULAR SURGERY       Family History   Problem Relation Age of Onset    High Blood Pressure Mother     High Cholesterol Mother     Heart Disease Mother     Diabetes Mother     Heart Disease Father     Cancer Father     Other Sister         Multiple Sclerosis    Heart Disease Maternal Grandmother     High Blood Pressure Maternal Grandmother     High Cholesterol Maternal Grandmother      Social History     Socioeconomic History    Marital status: Single     Spouse name: Not on file    Number of children: Not on file    Years of education: Not on file    Highest education level: Not on file   Occupational History    Not on file   Social Needs    Financial resource strain: Not on file    Food insecurity:     Worry: Not on file     Inability: Not on file    Transportation needs:     Medical: Not on file     Non-medical: Not on file   Tobacco Use    Smoking status: Never Smoker    Smokeless tobacco: Never Used   Substance and Sexual Activity    Alcohol use: No    Drug use: No    Sexual activity: Not Currently   Lifestyle    Physical activity:     Days per week: Not on file     Minutes per session: Not on file    Stress: Not on file   Relationships    Social connections:     Talks on phone: Not on file     Gets together: Not on file     Attends Jainism service: Not on file     Active member of club or organization: Not on file     Attends meetings of clubs or organizations: Not on file     Relationship status: Not on file    Intimate partner violence:     Fear of current or ex partner: Not on file     Emotionally abused: Not on file     Physically abused: Not on file     Forced sexual activity: Not on file   Other Topics Concern    Not on file   Social History Narrative    Not on file     No current facility-administered medications for this encounter. Current Outpatient Medications   Medication Sig Dispense Refill    ondansetron (ZOFRAN-ODT) 4 MG disintegrating tablet Take 1 tablet by mouth 3 times daily as needed for Nausea or Vomiting 21 tablet 0    levothyroxine (SYNTHROID) 175 MCG tablet Take 1 tablet by mouth Daily 30 tablet 0    white petrolatum-corn starch-lanolin (TRIPLE PASTE) 12.8 % ointment Apply topically twice a day with dressing changes.  56.7 g 0    ondansetron (ZOFRAN) 4 MG tablet Take 1 tablet by mouth every 6 hours as needed for Nausea or Vomiting 20 tablet 0    lactobacillus (CULTURELLE) capsule Take 1 capsule by mouth 3 times daily (with meals) 90 capsule 0    chlorhexidine gluconate (ANTISEPTIC SKIN CLEANSER) 4 % SOLN external solution Apply topically daily 473 mL 1    magnesium oxide (MAG-OX) 400 MG tablet Take 800 mg by mouth 2 times daily      nystatin (MYCOSTATIN) 476198 UNIT/GM powder Apply 1 g topically daily To stomach      mexiletine (MEXITIL) 150 MG capsule Take 150 mg by mouth 3 times daily      docusate sodium (COLACE) 100 MG capsule Take 100 mg by mouth 2 times daily as needed for Constipation      famotidine (PEPCID) 20 MG tablet Take 1 tablet by mouth 2 times daily 60 tablet 3    levETIRAcetam (KEPPRA) 750 MG tablet Take 750 mg by mouth 2 times daily      atorvastatin (LIPITOR) 80 MG tablet Take 80 mg by mouth nightly      DULoxetine (CYMBALTA) 60 MG extended release capsule Take 60 mg by mouth 2 times daily      Pediatric Multivitamins-Iron (FLINTSTONES PLUS IRON) CHEW Take 1 tablet by mouth 4 times daily      Omega-3 1000 MG CAPS Take 1,000 mg by mouth nightly      ranolazine (RANEXA) 1000 MG extended release tablet Take 1,000 mg by mouth 2 times daily      gabapentin (NEURONTIN) 300 MG capsule Take 300 mg by mouth 3 times daily. Malachy Arena clopidogrel (PLAVIX) 75 MG tablet Take 75 mg by mouth daily      acetaminophen 650 MG TABS Take 650 mg by mouth every 4 hours as needed 120 tablet 3    ferrous gluconate (FERGON) 324 (38 FE) MG tablet Take 324 mg by mouth 2 times daily       aspirin 81 MG chewable tablet Take 81 mg by mouth daily.  interferon beta-1b (BETASERON) 0.3 MG injection Inject 0.25 mg into the skin every 30 days 04/25/18 Last known dose given 04/19/18 per MAR      vitamin B-12 (CYANOCOBALAMIN) 1000 MCG tablet Inject 1,000 mcg into the muscle every 30 days 04/25/18 Given on the 28 th of each month      albuterol (PROVENTIL HFA;VENTOLIN HFA) 108 (90 BASE) MCG/ACT inhaler Inhale 2 puffs into the lungs every 6 hours as needed.         vitamin D (ERGOCALCIFEROL) 53792 UNIT CAPS capsule Take 50,000 Units by mouth Twice a Week Take on Mondays and on Fridays       Facility-Administered Medications Ordered in Other Encounters   Medication Dose Route Frequency Provider Last Rate Last Dose    oxyCODONE (ROXICODONE) immediate release tablet 15 mg  15 mg Oral Once Dom Ashford MD         Allergies   Allergen Reactions    Baclofen     Fentanyl Swelling    Ibuprofen      \"Due to heart problems\"    Methocarbamol      Worsening edema    Nsaids      \"Due to heart problems\"    Tizanidine     Tolmetin      \"Due to heart problems\"  Tramadol Other (See Comments)     Doctor doesn't her to take due to heart problems  Seizures   Doctor doesn't her to take due to lowers seizure threshold.  Avelox [Moxifloxacin Hcl In Nacl] Swelling and Rash    Betadine [Povidone Iodine] Rash    Moxifloxacin Rash and Swelling     Lips swell and tingling in face        Nursing Notes Reviewed    Physical Exam:  Triage VS:    ED Triage Vitals [05/28/19 1941]   Enc Vitals Group      BP 92/63      Pulse 85      Resp 12      Temp 99.5 °F (37.5 °C)      Temp Source Oral      SpO2 99 %      Weight 122 lb (55.3 kg)      Height 5' 6\" (1.676 m)      Head Circumference       Peak Flow       Pain Score       Pain Loc       Pain Edu? Excl. in [address]? My pulse ox interpretation is - normal    General appearance:  No acute distress. Skin:  Warm. Dry. Eye:  Extraocular movements intact. Ears, nose, mouth and throat:  Oral mucosa moist   Neck:  Trachea midline. Extremity:  No swelling. Normal ROM     Heart:  Regular rate and rhythm, normal S1 & S2, no extra heart sounds. Perfusion:  intact  Respiratory:  Lungs clear to auscultation bilaterally. Respirations nonlabored. Abdominal:  Normal bowel sounds. Soft. Nontender. Non distended. Neurological:  Alert and oriented times 3.              I have reviewed and interpreted all of the currently available lab results from this visit (if applicable):  Results for orders placed or performed during the hospital encounter of 05/28/19   CBC Auto Differential   Result Value Ref Range    WBC 6.0 4.0 - 10.5 K/CU MM    RBC 2.65 (L) 4.2 - 5.4 M/CU MM    Hemoglobin 8.6 (L) 12.5 - 16.0 GM/DL    Hematocrit 28.3 (L) 37 - 47 %    .8 (H) 78 - 100 FL    MCH 32.5 (H) 27 - 31 PG    MCHC 30.4 (L) 32.0 - 36.0 %    RDW 17.5 (H) 11.7 - 14.9 %    Platelets 338 512 - 377 K/CU MM    MPV 10.4 7.5 - 11.1 FL    Differential Type AUTOMATED DIFFERENTIAL     Segs Relative 69.7 (H) 36 - 66 %    Lymphocytes % 15.6 (L) 24 - 44 %    Monocytes % 12.9 (H) 0 - 4 %    Eosinophils % 0.8 0 - 3 %    Basophils % 0.7 0 - 1 %    Segs Absolute 4.2 K/CU MM    Lymphocytes # 0.9 K/CU MM    Monocytes # 0.8 K/CU MM    Eosinophils # 0.1 K/CU MM    Basophils # 0.0 K/CU MM    Nucleated RBC % 0.0 %    Total Nucleated RBC 0.0 K/CU MM    Total Immature Neutrophil 0.02 K/CU MM    Immature Neutrophil % 0.3 0 - 0.43 %   Comprehensive Metabolic Panel   Result Value Ref Range    Sodium 140 135 - 145 MMOL/L    Potassium 3.2 (L) 3.5 - 5.1 MMOL/L    Chloride 98 (L) 99 - 110 mMol/L    CO2 32 21 - 32 MMOL/L    BUN 11 6 - 23 MG/DL    CREATININE 0.7 0.6 - 1.1 MG/DL    Glucose 172 (H) 70 - 99 MG/DL    Calcium 8.0 (L) 8.3 - 10.6 MG/DL    Alb 3.5 3.4 - 5.0 GM/DL    Total Protein 5.2 (L) 6.4 - 8.2 GM/DL    Total Bilirubin 0.2 0.0 - 1.0 MG/DL    ALT 9 (L) 10 - 40 U/L    AST 13 (L) 15 - 37 IU/L    Alkaline Phosphatase 127 40 - 129 IU/L    GFR Non-African American >60 >60 mL/min/1.73m2    GFR African American >60 >60 mL/min/1.73m2    Anion Gap 10 4 - 16   Troponin   Result Value Ref Range    Troponin T <0.010 <0.01 NG/ML   TSH without Reflex   Result Value Ref Range    TSH, High Sensitivity 0.390 0.270 - 4.20 uIu/ml   Magnesium   Result Value Ref Range    Magnesium 1.4 (L) 1.8 - 2.4 mg/dl   Phosphorus   Result Value Ref Range    Phosphorus 3.8 2.5 - 4.9 MG/DL   EKG 12 Lead   Result Value Ref Range    Ventricular Rate 78 BPM    Atrial Rate 78 BPM    P-R Interval 124 ms    QRS Duration 112 ms    Q-T Interval 444 ms    QTc Calculation (Bazett) 506 ms    P Axis 15 degrees    R Axis -36 degrees    T Axis 17 degrees    Diagnosis       Normal sinus rhythm  Left axis deviation  Minimal voltage criteria for LVH, may be normal variant  Lateral infarct (cited on or before 12-FEB-2017)  Inferior infarct (cited on or before 12-FEB-2017)  Prolonged QT  Abnormal ECG  When compared with ECG of 30-MAR-2019 20:25,  No significant change was found  Confirmed by [id number], SAYED ([id number]) on 5/29/2019 6:55:58 PM     EKG 12 Lead   Result Value Ref Range    Ventricular Rate 105 BPM    Atrial Rate 105 BPM    P-R Interval 128 ms    QRS Duration 102 ms    Q-T Interval 386 ms    QTc Calculation (Bazett) 510 ms    P Axis 36 degrees    R Axis -34 degrees    T Axis 83 degrees    Diagnosis       Sinus tachycardia with premature atrial complexes  Left axis deviation  Inferior infarct (cited on or before 12-FEB-2017)  Abnormal ECG  When compared with ECG of 28-MAY-2019 19:45,  premature atrial complexes are now present  Criteria for Lateral infarct are no longer present  ST elevation now present in Inferior leads  Confirmed by SHAHNAZ Petersen ([id number]) on 5/29/2019 6:56:30 PM        Radiographs (if obtained):    [] Radiologist's Report Reviewed:  XR CHEST PORTABLE   Final Result   Life support devices as above. Consider slight retraction of the   endotracheal tube. Patchy bilateral pulmonary opacities could relate to atelectasis or mild   pulmonary edema. Underlying infection is not excluded. Cardiomegaly. XR CHEST STANDARD (2 VW)   Final Result   No acute findings. No significant change in appearance of the chest.               EKG (if obtained): (All EKG's are interpreted by myself in the absence of a cardiologist)  EKG shows sinus rhythm rate of 78 normal IL interval QRS slightly prolonged no ST elevation, EKG is similar in appearance to an EKG that was performed in March 2019  Chart review shows recent radiographs:  Xr Chest Standard (2 Vw)    Result Date: 5/28/2019  EXAMINATION: TWO XRAY VIEWS OF THE CHEST 5/28/2019 8:40 pm COMPARISON: April 7, 2019 HISTORY: ORDERING SYSTEM PROVIDED HISTORY: Chest pain TECHNOLOGIST PROVIDED HISTORY: Reason for exam:->Chest pain Ordering Physician Provided Reason for Exam: chest pain FINDINGS: Cardiac silhouette is enlarged. Right central venous catheter with tip projecting in the region lower SVC. No pneumothorax. No pleural effusion. No consolidation. No acute bony abnormality. No acute findings. No significant change in appearance of the chest.         MDM:  Patient presented with chest pain. Given history and presentation cardiac workup initiated. Patient had labs, EKG, x-ray and troponin ordered. Patient was placed on monitor. Patient's pulse ox is normal.      Patient's defibrillator did fire we were unable to interrogate the defibrillator due to technical issues, troponin is negative, hemoglobin is 8.6 which is a drop from recent, no active bleeding,Chest x-ray does not have acute findings, patient's magnesium is 1.4 given combination of defibrillator firing and not able to interrogated, magnesium of 1.4, 2 g hemoglobin drop into bleed the patient needs to be brought into the hospital, hospitalist notified    Clinical Impression:  1. Acute anemia    2. Hypomagnesemia    3. Chest pain, unspecified type      Disposition referral (if applicable):  Jerilyn Garcia MD  [address] [place] [id number]  [phone]          Disposition medications (if applicable):  Discharge Medication List as of 5/29/2019  3:31 AM          Comment: Please note this report has been produced using speech recognition software and may contain errors related to that system including errors in grammar, punctuation, and spelling, as well as words and phrases that may be inappropriate. Efforts were made to edit the dictations.        Pavan Salazar MD  05/31/19 0117

## 2019-05-29 NOTE — H&P
Hospitalist H&P Note:   Date of Service: 5/28/2019   ? CHIEF COMPLAINT:   Dizziness, chest pain and defibrillator shock    HISTORY OF PRESENT ILLNESS (HPI):   Ms. Sara Rose, a 63y.o. year old female who  has a past medical history of Acute delirium, Arrhythmia, Arthritis, Asplenia, Asthma, CAD (coronary artery disease), Cardiomyopathy ([address] Cerebral artery occlusion with cerebral infarction ([address] CHF (congestive heart failure) ([address] Enlarged liver, GERD (gastroesophageal reflux disease), H/O echocardiogram, History of blood transfusion, Hx of cardiovascular stress test, Hyperlipidemia, Hypertension, Lymphoma ([address] MI, old, Movement disorder, MS (multiple sclerosis) ([address] OP (osteoporosis), PE (pulmonary embolism), Pneumonia, Seizures ([address] Spleen enlarged, and Thyroid disease. Presented with complaints of multiple  complaints as per above. Patient describes her chest pain started after her defibrillator fired around 7:15 pm today. She currently rates her pain as 5/10 radiating to left arm. Patient denies cough and phlegm, SOB, fever or chills. Average appetite. Denies nausea,vomiting, diarrhea or constipation. Denies headache,vision changes, numbness or tingling in extremities. Denies dysuria, frequent urination. On presentation, Blood pressure 106/69, pulse 83, temperature 99.5 °F (37.5 °C), temperature source Oral, resp. rate 15, height 5' 6\" (1.676 m), weight 122 lb (55.3 kg), SpO2 94 %.      PAST MEDICAL HISTORY   Past Medical History:   Diagnosis Date    Acute delirium     Arrhythmia     Arthritis     Asplenia     Asthma     CAD (coronary artery disease)     1st stent at age 46    Cardiomyopathy [facility])     Cerebral artery occlusion with cerebral infarction ([address]     CHF (congestive heart failure) ([address]     Enlarged liver     GERD (gastroesophageal reflux disease)     H/O echocardiogram 4/6/16    EF 55%, trivial TR & MR, stage 1 diastolic dysfunction    History of blood Take 650 mg by mouth every 4 hours as needed 120 tablet 3    ferrous gluconate (FERGON) 324 (38 FE) MG tablet Take 324 mg by mouth 2 times daily       aspirin 81 MG chewable tablet Take 81 mg by mouth daily.  interferon beta-1b (BETASERON) 0.3 MG injection Inject 0.25 mg into the skin every 30 days 04/25/18 Last known dose given 04/19/18 per MAR      vitamin B-12 (CYANOCOBALAMIN) 1000 MCG tablet Inject 1,000 mcg into the muscle every 30 days 04/25/18 Given on the 28 th of each month      albuterol (PROVENTIL HFA;VENTOLIN HFA) 108 (90 BASE) MCG/ACT inhaler Inhale 2 puffs into the lungs every 6 hours as needed.  vitamin D (ERGOCALCIFEROL) 86954 UNIT CAPS capsule Take 50,000 Units by mouth Twice a Week Take on Mondays and on Fridays       Facility-Administered Medications Ordered in Other Encounters   Medication Dose Route Frequency Provider Last Rate Last Dose    oxyCODONE (ROXICODONE) immediate release tablet 15 mg  15 mg Oral Once Livia Patricio MD          ?   ? REVIEW OF SYSTEMS:   All systems were reviewed and all were negative except for those mentioned in HPI. PHYSICAL EXAM:   Blood pressure 106/69, pulse 83, temperature 99.5 °F (37.5 °C), temperature source Oral, resp. rate 15, height 5' 6\" (1.676 m), weight 122 lb (55.3 kg), SpO2 94 %. . Body mass index is 19.69 kg/m². CONSTITUTIONAL: Mild distress 2/2 pain,  HENT: NC/AT Ear: normal, patent without effusion Nose: no deformities, nares patent  EYES:Conjunctiva normal. No discharge. NECK: Neck supple,No JVD /Thyromegaly/LAD   RESP:No chest wall deformities or tenderness. No wheezing or rales. B/L air entry positive+  CVS: Regular rate and rhythm. S1 and S2 normal, no murmurs, clicks, gallops or rubs  GI: Soft, ND/NT,No guarding/rebound/mass/organomegaly. Bowel sounds are normal.    MUSCULAR/EXT:  no pedal edema, no clubbing or cyanosis,Pulses 2+ B/L  CNS: Awake, alert. Cranial nerves intact, no focal neurological deficits. - 4.9 MG/DL     XR CHEST STANDARD (2 VW)   Final Result   No acute findings. No significant change in appearance of the chest.              EKG: Nonspecific ST/T-wave changes. Normal WA and QT intervals    ASSESSMENT/IMPRESSION:      Chest pain/dizziness: Rule out ACS. Continue home aspirin, Plavix and statin. Recent echo with EF 35% and G2 DD. Cardio consult for further evaluation.  Defibrillator shock: Continue mexiletine. Potassium and magnesium replacement    Protocol. Repeat labs tomorrow. Possibly will need pacemaker interrogation tomorrow    ? DVT prophylaxis: Lovenox. Old records reviewed. Medications reviewed with patient. Patient is a Full Code-discussed   Admit under observation status.     Yesy Sheehan MD   Hospitalist at [facility]

## 2019-05-29 NOTE — PROGRESS NOTES
Received report from ER nurse Santa Daigle RN on patient. Was informed that patient's defibrillator was firing at home and then in the ER and was noted patient to be in V-Tach/SVT when defibrillator would fire. Informed nurse that patient's Magnesium was 1.4 and Potassium 3.2 and was informed meds were not replaced d/t no physician orders. Patient received bolus while in the ER and BP runs low systolic in the 85'I and diastolic in the 28'P. Informed patient on her way to room.

## 2019-05-29 NOTE — ED NOTES
Report called and given to [facility] on 3N. Pt to be transported to room 3101.      Rodger Amador RN  05/28/19 5729

## 2019-05-29 NOTE — PROGRESS NOTES
Pt arrived to floor 2350 after rapid was called, as pt transfering beds I received report from brody GARCIA from , Dr Stacy Garcia began to direct meds to be given, pt was in distress with heart rhythem VT that continued to cause ICD to respond correctly. meds given amio bolus, dilauded, mag, lidocaine gtt 2mg then to 4 mg, diladid again. Dr Reva Finn arrived, started initiation of transfer to [address] [place] [address] by [facility] [employer]. Anesthesia called, versed given, respiratory Ana RT prepared vent to protect airway, Geoffrey Padgett CRNA initiated venting 23 @ lip and 7.5, propofal started. Order for art line received and initiated in left wrist. Patient responding to treatment with more normal rhythem no longer firing ICD. Report called by writer to Pasha Duran RN @ [address] 2022,Pt family was educated on events and pending transfer to [address] [place] [address], family was given hospital contact info @ [facility], chest X-ray received, wood inserted, family now at bedside. [employer] arrived, report given to team of pt history, medications, vent settings, assisted with preparations for travel. Family asked if all information was provided and if any questions persisted, family satisfied asked for escort to ED and  was provided, [employer] [facility] escorted same.

## 2019-07-03 ENCOUNTER — HOSPITAL ENCOUNTER (EMERGENCY)
Age: 59
Discharge: HOME | End: 2019-07-04
Attending: EMERGENCY MEDICINE
Payer: MEDICARE

## 2019-07-03 DIAGNOSIS — I48.92 ATRIAL FLUTTER WITH RAPID VENTRICULAR RESPONSE (HCC): Primary | ICD-10-CM

## 2019-07-03 DIAGNOSIS — I47.20 V TACH: ICD-10-CM

## 2019-07-03 PROCEDURE — 93005 ELECTROCARDIOGRAM TRACING: CPT | Performed by: EMERGENCY MEDICINE

## 2019-07-03 PROCEDURE — 99285 EMERGENCY DEPT VISIT HI MDM: CPT

## 2019-07-03 PROCEDURE — 4500000029 HC MAJOR PROCEDURE

## 2019-07-03 ASSESSMENT — PAIN DESCRIPTION - ORIENTATION: ORIENTATION: LEFT

## 2019-07-03 ASSESSMENT — PAIN SCALES - GENERAL: PAINLEVEL_OUTOF10: 9

## 2019-07-03 ASSESSMENT — PAIN DESCRIPTION - DESCRIPTORS: DESCRIPTORS: CRUSHING;DISCOMFORT

## 2019-07-03 ASSESSMENT — PAIN DESCRIPTION - LOCATION: LOCATION: CHEST

## 2019-07-03 ASSESSMENT — PAIN DESCRIPTION - PAIN TYPE: TYPE: ACUTE PAIN

## 2019-07-04 ENCOUNTER — APPOINTMENT (OUTPATIENT)
Dept: GENERAL RADIOLOGY | Age: 59
End: 2019-07-04
Payer: MEDICARE

## 2019-07-04 VITALS
OXYGEN SATURATION: 98 % | HEIGHT: 66 IN | RESPIRATION RATE: 14 BRPM | TEMPERATURE: 98.2 F | BODY MASS INDEX: 17.68 KG/M2 | SYSTOLIC BLOOD PRESSURE: 121 MMHG | WEIGHT: 110 LBS | HEART RATE: 154 BPM | DIASTOLIC BLOOD PRESSURE: 70 MMHG

## 2019-07-04 LAB
ALBUMIN SERPL-MCNC: 3.8 GM/DL (ref 3.4–5)
ALP BLD-CCNC: 96 IU/L (ref 40–129)
ALT SERPL-CCNC: 11 U/L (ref 10–40)
ANION GAP SERPL CALCULATED.3IONS-SCNC: 13 MMOL/L (ref 4–16)
AST SERPL-CCNC: 18 IU/L (ref 15–37)
BASOPHILS ABSOLUTE: 0 K/CU MM
BASOPHILS RELATIVE PERCENT: 0.6 % (ref 0–1)
BILIRUB SERPL-MCNC: 0.3 MG/DL (ref 0–1)
BUN BLDV-MCNC: 9 MG/DL (ref 6–23)
CALCIUM SERPL-MCNC: 8.7 MG/DL (ref 8.3–10.6)
CHLORIDE BLD-SCNC: 108 MMOL/L (ref 99–110)
CO2: 21 MMOL/L (ref 21–32)
CREAT SERPL-MCNC: 0.6 MG/DL (ref 0.6–1.1)
DIFFERENTIAL TYPE: ABNORMAL
EOSINOPHILS ABSOLUTE: 0 K/CU MM
EOSINOPHILS RELATIVE PERCENT: 0 % (ref 0–3)
GFR AFRICAN AMERICAN: >60 ML/MIN/1.73M2
GFR NON-AFRICAN AMERICAN: >60 ML/MIN/1.73M2
GLUCOSE BLD-MCNC: 80 MG/DL (ref 70–99)
HCT VFR BLD CALC: 37.8 % (ref 37–47)
HEMOGLOBIN: 11.7 GM/DL (ref 12.5–16)
IMMATURE NEUTROPHIL %: 0.3 % (ref 0–0.43)
LYMPHOCYTES ABSOLUTE: 0.8 K/CU MM
LYMPHOCYTES RELATIVE PERCENT: 12.7 % (ref 24–44)
MAGNESIUM: 2.4 MG/DL (ref 1.8–2.4)
MCH RBC QN AUTO: 31.5 PG (ref 27–31)
MCHC RBC AUTO-ENTMCNC: 31 % (ref 32–36)
MCV RBC AUTO: 101.6 FL (ref 78–100)
MONOCYTES ABSOLUTE: 0.7 K/CU MM
MONOCYTES RELATIVE PERCENT: 10.7 % (ref 0–4)
NUCLEATED RBC %: 0 %
PDW BLD-RTO: 17.2 % (ref 11.7–14.9)
PLATELET # BLD: 333 K/CU MM (ref 140–440)
PMV BLD AUTO: 9.9 FL (ref 7.5–11.1)
POTASSIUM SERPL-SCNC: 4.1 MMOL/L (ref 3.5–5.1)
PRO-BNP: 2514 PG/ML
RBC # BLD: 3.72 M/CU MM (ref 4.2–5.4)
SEGMENTED NEUTROPHILS ABSOLUTE COUNT: 5 K/CU MM
SEGMENTED NEUTROPHILS RELATIVE PERCENT: 75.7 % (ref 36–66)
SODIUM BLD-SCNC: 142 MMOL/L (ref 135–145)
TOTAL IMMATURE NEUTOROPHIL: 0.02 K/CU MM
TOTAL NUCLEATED RBC: 0 K/CU MM
TOTAL PROTEIN: 6.1 GM/DL (ref 6.4–8.2)
TROPONIN T: <0.01 NG/ML
WBC # BLD: 6.6 K/CU MM (ref 4–10.5)

## 2019-07-04 PROCEDURE — 96374 THER/PROPH/DIAG INJ IV PUSH: CPT

## 2019-07-04 PROCEDURE — 83735 ASSAY OF MAGNESIUM: CPT

## 2019-07-04 PROCEDURE — 85025 COMPLETE CBC W/AUTO DIFF WBC: CPT

## 2019-07-04 PROCEDURE — 71045 X-RAY EXAM CHEST 1 VIEW: CPT

## 2019-07-04 PROCEDURE — 96375 TX/PRO/DX INJ NEW DRUG ADDON: CPT

## 2019-07-04 PROCEDURE — 96376 TX/PRO/DX INJ SAME DRUG ADON: CPT

## 2019-07-04 PROCEDURE — 84484 ASSAY OF TROPONIN QUANT: CPT

## 2019-07-04 PROCEDURE — 83880 ASSAY OF NATRIURETIC PEPTIDE: CPT

## 2019-07-04 PROCEDURE — 6360000002 HC RX W HCPCS: Performed by: EMERGENCY MEDICINE

## 2019-07-04 PROCEDURE — 80053 COMPREHEN METABOLIC PANEL: CPT

## 2019-07-04 RX ORDER — DIGOXIN 0.25 MG/ML
250 INJECTION INTRAMUSCULAR; INTRAVENOUS ONCE
Status: COMPLETED | OUTPATIENT
Start: 2019-07-04 | End: 2019-07-04

## 2019-07-04 RX ORDER — HYDROMORPHONE HCL 110MG/55ML
PATIENT CONTROLLED ANALGESIA SYRINGE INTRAVENOUS
Status: DISCONTINUED
Start: 2019-07-04 | End: 2019-07-04 | Stop reason: HOSPADM

## 2019-07-04 RX ORDER — HYDROMORPHONE HYDROCHLORIDE 2 MG/1
4 TABLET ORAL ONCE
Status: DISCONTINUED | OUTPATIENT
Start: 2019-07-04 | End: 2019-07-04

## 2019-07-04 RX ORDER — HYDROMORPHONE HCL 110MG/55ML
0.3 PATIENT CONTROLLED ANALGESIA SYRINGE INTRAVENOUS EVERY 30 MIN PRN
Status: DISCONTINUED | OUTPATIENT
Start: 2019-07-04 | End: 2019-07-04 | Stop reason: HOSPADM

## 2019-07-04 RX ORDER — DIGOXIN 0.25 MG/ML
500 INJECTION INTRAMUSCULAR; INTRAVENOUS ONCE
Status: DISCONTINUED | OUTPATIENT
Start: 2019-07-04 | End: 2019-07-04

## 2019-07-04 RX ORDER — 0.9 % SODIUM CHLORIDE 0.9 %
250 INTRAVENOUS SOLUTION INTRAVENOUS ONCE
Status: DISCONTINUED | OUTPATIENT
Start: 2019-07-04 | End: 2019-07-04

## 2019-07-04 RX ADMIN — HYDROMORPHONE HYDROCHLORIDE 0.3 MG: 2 INJECTION, SOLUTION INTRAMUSCULAR; INTRAVENOUS; SUBCUTANEOUS at 03:09

## 2019-07-04 RX ADMIN — HYDROMORPHONE HYDROCHLORIDE 0.3 MG: 2 INJECTION, SOLUTION INTRAMUSCULAR; INTRAVENOUS; SUBCUTANEOUS at 02:09

## 2019-07-04 RX ADMIN — DIGOXIN 250 MCG: 0.25 INJECTION INTRAMUSCULAR; INTRAVENOUS at 01:53

## 2019-07-04 ASSESSMENT — PAIN SCALES - GENERAL
PAINLEVEL_OUTOF10: 10
PAINLEVEL_OUTOF10: 10

## 2019-07-04 NOTE — ED NOTES
Bedside report received from [facility]. Pt heart rate 170-200. Dr. Alice Lloyd notified. Pt feeling warm.       Clinton Mi RN  07/04/19 2884

## 2019-07-04 NOTE — ED PROVIDER NOTES
infarction.  Normal LVSF    Hyperlipidemia     Hypertension     [facility]     MI, old     Movement disorder     MS (multiple sclerosis) ([facility]     OP (osteoporosis)     PE (pulmonary embolism)     trapese filter    Pneumonia     [facility]     Spleen enlarged     Thyroid disease      Past Surgical History:   Procedure Laterality Date    ABDOMEN SURGERY      APPENDECTOMY      CARDIAC DEFIBRILLATOR PLACEMENT      CHOLECYSTECTOMY      COLONOSCOPY      CORONARY ANGIOPLASTY WITH STENT PLACEMENT      3 stents    CORONARY ARTERY BYPASS GRAFT      Age 49    ENDOSCOPY, COLON, DIAGNOSTIC      GASTRIC BYPASS SURGERY      HERNIA REPAIR      HIP SURGERY      HYSTERECTOMY      JOINT REPLACEMENT      SKIN BIOPSY      TONSILLECTOMY      VASCULAR SURGERY       Family History   Problem Relation Age of Onset    High Blood Pressure Mother     High Cholesterol Mother     Heart Disease Mother     Diabetes Mother     Heart Disease Father     Cancer Father     Other Sister         Multiple Sclerosis    Heart Disease Maternal Grandmother     High Blood Pressure Maternal Grandmother     High Cholesterol Maternal Grandmother      Social History     Socioeconomic History    Marital status: Single     Spouse name: Not on file    Number of children: Not on file    Years of education: Not on file    Highest education level: Not on file   Occupational History    Not on file   Social Needs    Financial resource strain: Not on file    Food insecurity:     Worry: Not on file     Inability: Not on file    Transportation needs:     Medical: Not on file     Non-medical: Not on file   Tobacco Use    Smoking status: Never Smoker    Smokeless tobacco: Never Used   Substance and Sexual Activity    Alcohol use: No    Drug use: No    Sexual activity: Not Currently   Lifestyle    Physical activity:     Days per week: Not on file     Minutes per session: Not on file    Stress: Not on file

## 2019-07-05 LAB
EKG ATRIAL RATE: 227 BPM
EKG DIAGNOSIS: NORMAL
EKG Q-T INTERVAL: 264 MS
EKG QRS DURATION: 104 MS
EKG QTC CALCULATION (BAZETT): 406 MS
EKG R AXIS: -41 DEGREES
EKG T AXIS: 141 DEGREES
EKG VENTRICULAR RATE: 142 BPM

## 2019-08-26 ENCOUNTER — HOSPITAL ENCOUNTER (INPATIENT)
Age: 59
LOS: 1 days | Discharge: HOME HEALTH | DRG: 092 | End: 2019-08-29
Attending: EMERGENCY MEDICINE | Admitting: INTERNAL MEDICINE
Payer: MEDICARE

## 2019-08-26 ENCOUNTER — APPOINTMENT (OUTPATIENT)
Dept: CT IMAGING | Age: 59
DRG: 092 | End: 2019-08-26
Payer: MEDICARE

## 2019-08-26 ENCOUNTER — APPOINTMENT (OUTPATIENT)
Dept: GENERAL RADIOLOGY | Age: 59
DRG: 092 | End: 2019-08-26
Payer: MEDICARE

## 2019-08-26 DIAGNOSIS — R79.89 KETONEMIA: ICD-10-CM

## 2019-08-26 DIAGNOSIS — R41.82 ALTERED MENTAL STATUS, UNSPECIFIED ALTERED MENTAL STATUS TYPE: Primary | ICD-10-CM

## 2019-08-26 LAB
ALBUMIN SERPL-MCNC: 3.2 GM/DL (ref 3.4–5)
ALP BLD-CCNC: 101 IU/L (ref 40–128)
ALT SERPL-CCNC: 24 U/L (ref 10–40)
AMMONIA: 18 UMOL/L (ref 11–51)
ANION GAP SERPL CALCULATED.3IONS-SCNC: 13 MMOL/L (ref 4–16)
ANISOCYTOSIS: ABNORMAL
AST SERPL-CCNC: 36 IU/L (ref 15–37)
BASE EXCESS MIXED: ABNORMAL (ref 0–2.3)
BASOPHILS ABSOLUTE: 0.1 K/CU MM
BASOPHILS RELATIVE PERCENT: 1 % (ref 0–1)
BETA-HYDROXYBUTYRATE: >20.8 MG/DL (ref 0–3)
BILIRUB SERPL-MCNC: 0.2 MG/DL (ref 0–1)
BUN BLDV-MCNC: 10 MG/DL (ref 6–23)
BURR CELLS: ABNORMAL
CALCIUM SERPL-MCNC: 9.1 MG/DL (ref 8.3–10.6)
CHLORIDE BLD-SCNC: 102 MMOL/L (ref 99–110)
CO2: 23 MMOL/L (ref 21–32)
COMMENT: ABNORMAL
CREAT SERPL-MCNC: 0.8 MG/DL (ref 0.6–1.1)
DIFFERENTIAL TYPE: ABNORMAL
DIFFERENTIAL TYPE: ABNORMAL
ELLIPTOCYTES: ABNORMAL
EOSINOPHILS ABSOLUTE: 0 K/CU MM
EOSINOPHILS RELATIVE PERCENT: 0 % (ref 0–3)
GFR AFRICAN AMERICAN: >60 ML/MIN/1.73M2
GFR NON-AFRICAN AMERICAN: >60 ML/MIN/1.73M2
GLUCOSE BLD-MCNC: 91 MG/DL (ref 70–99)
HCO3 VENOUS: 27.8 MMOL/L (ref 19–25)
HCT VFR BLD CALC: 38.3 % (ref 37–47)
HEMOGLOBIN: 11.4 GM/DL (ref 12.5–16)
HIGH SENSITIVE C-REACTIVE PROTEIN: 39.2 MG/L
IMMATURE NEUTROPHIL %: 0.4 % (ref 0–0.43)
LACTATE: 0.7 MMOL/L (ref 0.4–2)
LYMPHOCYTES ABSOLUTE: 0.9 K/CU MM
LYMPHOCYTES RELATIVE PERCENT: 18.7 % (ref 24–44)
MACROCYTES: ABNORMAL
MCH RBC QN AUTO: 33.6 PG (ref 27–31)
MCHC RBC AUTO-ENTMCNC: 29.8 % (ref 32–36)
MCV RBC AUTO: 113 FL (ref 78–100)
MONOCYTES ABSOLUTE: 0.5 K/CU MM
MONOCYTES RELATIVE PERCENT: 9.8 % (ref 0–4)
NUCLEATED RBC %: 0 %
O2 SAT, VEN: 63.4 % (ref 50–70)
PCO2, VEN: 40 MMHG (ref 38–52)
PDW BLD-RTO: 18.6 % (ref 11.7–14.9)
PH VENOUS: 7.45 (ref 7.32–7.42)
PLATELET # BLD: 336 K/CU MM (ref 140–440)
PMV BLD AUTO: 10.3 FL (ref 7.5–11.1)
PO2, VEN: 33 MMHG (ref 28–48)
POLYCHROMASIA: ABNORMAL
POTASSIUM SERPL-SCNC: 4.4 MMOL/L (ref 3.5–5.1)
PRO-BNP: 1000 PG/ML
RBC # BLD: 3.39 M/CU MM (ref 4.2–5.4)
REASON FOR REJECTION: NORMAL
REASON FOR REJECTION: NORMAL
REJECTED TEST: NORMAL
SEGMENTED NEUTROPHILS ABSOLUTE COUNT: 3.5 K/CU MM
SEGMENTED NEUTROPHILS RELATIVE PERCENT: 70.1 % (ref 36–66)
SODIUM BLD-SCNC: 138 MMOL/L (ref 135–145)
TOTAL IMMATURE NEUTOROPHIL: 0.02 K/CU MM
TOTAL NUCLEATED RBC: 0 K/CU MM
TOTAL PROTEIN: 6 GM/DL (ref 6.4–8.2)
TROPONIN T: <0.01 NG/ML
TSH HIGH SENSITIVITY: 1.53 UIU/ML (ref 0.27–4.2)
WBC # BLD: 5 K/CU MM (ref 4–10.5)

## 2019-08-26 PROCEDURE — 99285 EMERGENCY DEPT VISIT HI MDM: CPT

## 2019-08-26 PROCEDURE — 83880 ASSAY OF NATRIURETIC PEPTIDE: CPT

## 2019-08-26 PROCEDURE — 90714 TD VACC NO PRESV 7 YRS+ IM: CPT | Performed by: EMERGENCY MEDICINE

## 2019-08-26 PROCEDURE — 90471 IMMUNIZATION ADMIN: CPT | Performed by: EMERGENCY MEDICINE

## 2019-08-26 PROCEDURE — 71045 X-RAY EXAM CHEST 1 VIEW: CPT

## 2019-08-26 PROCEDURE — 82805 BLOOD GASES W/O2 SATURATION: CPT

## 2019-08-26 PROCEDURE — 86141 C-REACTIVE PROTEIN HS: CPT

## 2019-08-26 PROCEDURE — 93005 ELECTROCARDIOGRAM TRACING: CPT | Performed by: EMERGENCY MEDICINE

## 2019-08-26 PROCEDURE — 85025 COMPLETE CBC W/AUTO DIFF WBC: CPT

## 2019-08-26 PROCEDURE — 6360000002 HC RX W HCPCS: Performed by: EMERGENCY MEDICINE

## 2019-08-26 PROCEDURE — 80053 COMPREHEN METABOLIC PANEL: CPT

## 2019-08-26 PROCEDURE — 36415 COLL VENOUS BLD VENIPUNCTURE: CPT

## 2019-08-26 PROCEDURE — 84443 ASSAY THYROID STIM HORMONE: CPT

## 2019-08-26 PROCEDURE — 84484 ASSAY OF TROPONIN QUANT: CPT

## 2019-08-26 PROCEDURE — 83605 ASSAY OF LACTIC ACID: CPT

## 2019-08-26 PROCEDURE — 82140 ASSAY OF AMMONIA: CPT

## 2019-08-26 PROCEDURE — 82010 KETONE BODYS QUAN: CPT

## 2019-08-26 PROCEDURE — 70450 CT HEAD/BRAIN W/O DYE: CPT

## 2019-08-26 PROCEDURE — 85652 RBC SED RATE AUTOMATED: CPT

## 2019-08-26 RX ORDER — TETANUS AND DIPHTHERIA TOXOIDS ADSORBED 2; 2 [LF]/.5ML; [LF]/.5ML
0.5 INJECTION INTRAMUSCULAR ONCE
Status: COMPLETED | OUTPATIENT
Start: 2019-08-26 | End: 2019-08-26

## 2019-08-26 RX ADMIN — TETANUS AND DIPHTHERIA TOXOIDS ADSORBED 0.5 ML: 2; 2 INJECTION INTRAMUSCULAR at 21:28

## 2019-08-27 LAB
AMORPHOUS: ABNORMAL /HPF
AMPHETAMINES: NEGATIVE
BACTERIA: NEGATIVE /HPF
BARBITURATE SCREEN URINE: NEGATIVE
BENZODIAZEPINE SCREEN, URINE: NEGATIVE
BILIRUBIN URINE: NEGATIVE MG/DL
BLOOD, URINE: NEGATIVE
CANNABINOID SCREEN URINE: NEGATIVE
CLARITY: ABNORMAL
COCAINE METABOLITE: NEGATIVE
COLOR: YELLOW
ERYTHROCYTE SEDIMENTATION RATE: 30 MM/HR (ref 0–30)
GLUCOSE, URINE: NEGATIVE MG/DL
KETONES, URINE: ABNORMAL MG/DL
LEUKOCYTE ESTERASE, URINE: NEGATIVE
NITRITE URINE, QUANTITATIVE: NEGATIVE
OPIATES, URINE: NEGATIVE
OXYCODONE: ABNORMAL
PH, URINE: 8 (ref 5–8)
PHENCYCLIDINE, URINE: NEGATIVE
PROTEIN UA: NEGATIVE MG/DL
RBC URINE: ABNORMAL /HPF (ref 0–6)
SPECIFIC GRAVITY UA: 1.02 (ref 1–1.03)
SQUAMOUS EPITHELIAL: 1 /HPF
TRICHOMONAS: ABNORMAL /HPF
UROBILINOGEN, URINE: 1 MG/DL (ref 0.2–1)
WBC UA: <1 /HPF (ref 0–5)

## 2019-08-27 PROCEDURE — 2580000003 HC RX 258: Performed by: PHYSICIAN ASSISTANT

## 2019-08-27 PROCEDURE — 94761 N-INVAS EAR/PLS OXIMETRY MLT: CPT

## 2019-08-27 PROCEDURE — 97535 SELF CARE MNGMENT TRAINING: CPT

## 2019-08-27 PROCEDURE — 96372 THER/PROPH/DIAG INJ SC/IM: CPT

## 2019-08-27 PROCEDURE — G0378 HOSPITAL OBSERVATION PER HR: HCPCS

## 2019-08-27 PROCEDURE — 6370000000 HC RX 637 (ALT 250 FOR IP): Performed by: INTERNAL MEDICINE

## 2019-08-27 PROCEDURE — 81001 URINALYSIS AUTO W/SCOPE: CPT

## 2019-08-27 PROCEDURE — 97530 THERAPEUTIC ACTIVITIES: CPT

## 2019-08-27 PROCEDURE — 97162 PT EVAL MOD COMPLEX 30 MIN: CPT

## 2019-08-27 PROCEDURE — 6360000002 HC RX W HCPCS: Performed by: INTERNAL MEDICINE

## 2019-08-27 PROCEDURE — 80307 DRUG TEST PRSMV CHEM ANLYZR: CPT

## 2019-08-27 PROCEDURE — 87040 BLOOD CULTURE FOR BACTERIA: CPT

## 2019-08-27 PROCEDURE — 36415 COLL VENOUS BLD VENIPUNCTURE: CPT

## 2019-08-27 PROCEDURE — 93010 ELECTROCARDIOGRAM REPORT: CPT | Performed by: INTERNAL MEDICINE

## 2019-08-27 PROCEDURE — 97168 OT RE-EVAL EST PLAN CARE: CPT

## 2019-08-27 PROCEDURE — 99219 PR INITIAL OBSERVATION CARE/DAY 50 MINUTES: CPT | Performed by: INTERNAL MEDICINE

## 2019-08-27 RX ORDER — CLOPIDOGREL BISULFATE 75 MG/1
75 TABLET ORAL DAILY
Status: DISCONTINUED | OUTPATIENT
Start: 2019-08-27 | End: 2019-08-29 | Stop reason: HOSPADM

## 2019-08-27 RX ORDER — DOCUSATE SODIUM 100 MG/1
100 CAPSULE, LIQUID FILLED ORAL 2 TIMES DAILY PRN
Status: DISCONTINUED | OUTPATIENT
Start: 2019-08-27 | End: 2019-08-29 | Stop reason: HOSPADM

## 2019-08-27 RX ORDER — FERROUS GLUCONATE 324(37.5)
324 TABLET ORAL 2 TIMES DAILY
Status: DISCONTINUED | OUTPATIENT
Start: 2019-08-27 | End: 2019-08-29 | Stop reason: HOSPADM

## 2019-08-27 RX ORDER — ASPIRIN 81 MG/1
81 TABLET, CHEWABLE ORAL DAILY
Status: DISCONTINUED | OUTPATIENT
Start: 2019-08-27 | End: 2019-08-29 | Stop reason: HOSPADM

## 2019-08-27 RX ORDER — SODIUM CHLORIDE 9 MG/ML
INJECTION, SOLUTION INTRAVENOUS CONTINUOUS
Status: DISCONTINUED | OUTPATIENT
Start: 2019-08-27 | End: 2019-08-29 | Stop reason: HOSPADM

## 2019-08-27 RX ORDER — MEXILETINE HYDROCHLORIDE 150 MG/1
150 CAPSULE ORAL 3 TIMES DAILY
Status: DISCONTINUED | OUTPATIENT
Start: 2019-08-27 | End: 2019-08-29 | Stop reason: HOSPADM

## 2019-08-27 RX ORDER — ACETAMINOPHEN 325 MG/1
650 TABLET ORAL EVERY 4 HOURS PRN
Status: DISCONTINUED | OUTPATIENT
Start: 2019-08-27 | End: 2019-08-29 | Stop reason: HOSPADM

## 2019-08-27 RX ORDER — ALBUTEROL SULFATE 90 UG/1
2 AEROSOL, METERED RESPIRATORY (INHALATION) EVERY 6 HOURS PRN
Status: DISCONTINUED | OUTPATIENT
Start: 2019-08-27 | End: 2019-08-29 | Stop reason: HOSPADM

## 2019-08-27 RX ORDER — FAMOTIDINE 20 MG/1
20 TABLET, FILM COATED ORAL 2 TIMES DAILY
Status: DISCONTINUED | OUTPATIENT
Start: 2019-08-27 | End: 2019-08-29 | Stop reason: HOSPADM

## 2019-08-27 RX ORDER — DEXTROSE, SODIUM CHLORIDE, AND POTASSIUM CHLORIDE 5; .45; .15 G/100ML; G/100ML; G/100ML
INJECTION INTRAVENOUS ONCE
Status: DISCONTINUED | OUTPATIENT
Start: 2019-08-27 | End: 2019-08-29 | Stop reason: HOSPADM

## 2019-08-27 RX ORDER — ERGOCALCIFEROL 1.25 MG/1
50000 CAPSULE ORAL
Status: DISCONTINUED | OUTPATIENT
Start: 2019-08-27 | End: 2019-08-29 | Stop reason: HOSPADM

## 2019-08-27 RX ORDER — RANOLAZINE 500 MG/1
1000 TABLET, EXTENDED RELEASE ORAL 2 TIMES DAILY
Status: DISCONTINUED | OUTPATIENT
Start: 2019-08-27 | End: 2019-08-29 | Stop reason: HOSPADM

## 2019-08-27 RX ORDER — LANOLIN ALCOHOL/MO/W.PET/CERES
1000 CREAM (GRAM) TOPICAL DAILY
Status: DISCONTINUED | OUTPATIENT
Start: 2019-08-27 | End: 2019-08-29 | Stop reason: HOSPADM

## 2019-08-27 RX ADMIN — RANOLAZINE 1000 MG: 500 TABLET, FILM COATED, EXTENDED RELEASE ORAL at 03:14

## 2019-08-27 RX ADMIN — LEVETIRACETAM 750 MG: 500 TABLET, FILM COATED ORAL at 21:53

## 2019-08-27 RX ADMIN — RANOLAZINE 1000 MG: 500 TABLET, FILM COATED, EXTENDED RELEASE ORAL at 09:28

## 2019-08-27 RX ADMIN — MAGNESIUM OXIDE TAB 400 MG (241.3 MG ELEMENTAL MG) 800 MG: 400 (241.3 MG) TAB at 03:14

## 2019-08-27 RX ADMIN — ENOXAPARIN SODIUM 40 MG: 40 INJECTION SUBCUTANEOUS at 09:26

## 2019-08-27 RX ADMIN — CLOPIDOGREL BISULFATE 75 MG: 75 TABLET ORAL at 09:27

## 2019-08-27 RX ADMIN — ERGOCALCIFEROL 50000 UNITS: 1.25 CAPSULE ORAL at 09:27

## 2019-08-27 RX ADMIN — FERROUS GLUCONATE TAB 324 MG (37.5 MG ELEMENTAL IRON) 324 MG: 324 (37.5 FE) TAB at 21:53

## 2019-08-27 RX ADMIN — SODIUM CHLORIDE: 9 INJECTION, SOLUTION INTRAVENOUS at 15:57

## 2019-08-27 RX ADMIN — MAGNESIUM OXIDE TAB 400 MG (241.3 MG ELEMENTAL MG) 800 MG: 400 (241.3 MG) TAB at 09:26

## 2019-08-27 RX ADMIN — MEXILETINE HYDROCHLORIDE 150 MG: 150 CAPSULE ORAL at 09:27

## 2019-08-27 RX ADMIN — FAMOTIDINE 20 MG: 20 TABLET ORAL at 21:52

## 2019-08-27 RX ADMIN — MEXILETINE HYDROCHLORIDE 150 MG: 150 CAPSULE ORAL at 21:53

## 2019-08-27 RX ADMIN — FERROUS GLUCONATE TAB 324 MG (37.5 MG ELEMENTAL IRON) 324 MG: 324 (37.5 FE) TAB at 09:27

## 2019-08-27 RX ADMIN — LEVETIRACETAM 750 MG: 500 TABLET, FILM COATED ORAL at 09:27

## 2019-08-27 RX ADMIN — LEVETIRACETAM 750 MG: 500 TABLET, FILM COATED ORAL at 03:22

## 2019-08-27 RX ADMIN — ASPIRIN 81 MG 81 MG: 81 TABLET ORAL at 09:27

## 2019-08-27 RX ADMIN — RANOLAZINE 1000 MG: 500 TABLET, FILM COATED, EXTENDED RELEASE ORAL at 21:52

## 2019-08-27 RX ADMIN — LEVOTHYROXINE SODIUM 175 MCG: 100 TABLET ORAL at 09:27

## 2019-08-27 RX ADMIN — CYANOCOBALAMIN TAB 1000 MCG 1000 MCG: 1000 TAB at 09:28

## 2019-08-27 RX ADMIN — MEXILETINE HYDROCHLORIDE 150 MG: 150 CAPSULE ORAL at 15:57

## 2019-08-27 RX ADMIN — SODIUM CHLORIDE: 9 INJECTION, SOLUTION INTRAVENOUS at 03:39

## 2019-08-27 RX ADMIN — FAMOTIDINE 20 MG: 20 TABLET ORAL at 03:15

## 2019-08-27 RX ADMIN — MAGNESIUM OXIDE TAB 400 MG (241.3 MG ELEMENTAL MG) 800 MG: 400 (241.3 MG) TAB at 21:52

## 2019-08-27 RX ADMIN — FAMOTIDINE 20 MG: 20 TABLET ORAL at 09:27

## 2019-08-28 LAB
ALBUMIN SERPL-MCNC: 3 GM/DL (ref 3.4–5)
ALP BLD-CCNC: 85 IU/L (ref 40–129)
ALT SERPL-CCNC: 19 U/L (ref 10–40)
ANION GAP SERPL CALCULATED.3IONS-SCNC: 14 MMOL/L (ref 4–16)
AST SERPL-CCNC: 32 IU/L (ref 15–37)
BASOPHILS ABSOLUTE: 0.1 K/CU MM
BASOPHILS RELATIVE PERCENT: 1.4 % (ref 0–1)
BILIRUB SERPL-MCNC: 0.2 MG/DL (ref 0–1)
BILIRUBIN DIRECT: 0.2 MG/DL (ref 0–0.3)
BILIRUBIN, INDIRECT: 0 MG/DL (ref 0–0.7)
BUN BLDV-MCNC: 10 MG/DL (ref 6–23)
CALCIUM SERPL-MCNC: 8.4 MG/DL (ref 8.3–10.6)
CHLORIDE BLD-SCNC: 109 MMOL/L (ref 99–110)
CO2: 17 MMOL/L (ref 21–32)
CREAT SERPL-MCNC: 0.7 MG/DL (ref 0.6–1.1)
DIFFERENTIAL TYPE: ABNORMAL
EOSINOPHILS ABSOLUTE: 0 K/CU MM
EOSINOPHILS RELATIVE PERCENT: 0 % (ref 0–3)
GFR AFRICAN AMERICAN: >60 ML/MIN/1.73M2
GFR NON-AFRICAN AMERICAN: >60 ML/MIN/1.73M2
GLUCOSE BLD-MCNC: 81 MG/DL (ref 70–99)
HCT VFR BLD CALC: 39.3 % (ref 37–47)
HEMOGLOBIN: 11.4 GM/DL (ref 12.5–16)
IMMATURE NEUTROPHIL %: 0.2 % (ref 0–0.43)
LACTATE: 1.1 MMOL/L (ref 0.4–2)
LYMPHOCYTES ABSOLUTE: 0.9 K/CU MM
LYMPHOCYTES RELATIVE PERCENT: 21.4 % (ref 24–44)
MAGNESIUM: 2.7 MG/DL (ref 1.8–2.4)
MCH RBC QN AUTO: 33.7 PG (ref 27–31)
MCHC RBC AUTO-ENTMCNC: 29 % (ref 32–36)
MCV RBC AUTO: 116.3 FL (ref 78–100)
MONOCYTES ABSOLUTE: 0.6 K/CU MM
MONOCYTES RELATIVE PERCENT: 13.1 % (ref 0–4)
NUCLEATED RBC %: 0 %
PDW BLD-RTO: 18.8 % (ref 11.7–14.9)
PLATELET # BLD: 328 K/CU MM (ref 140–440)
PMV BLD AUTO: 9.9 FL (ref 7.5–11.1)
POTASSIUM SERPL-SCNC: 4.6 MMOL/L (ref 3.5–5.1)
RBC # BLD: 3.38 M/CU MM (ref 4.2–5.4)
SEGMENTED NEUTROPHILS ABSOLUTE COUNT: 2.7 K/CU MM
SEGMENTED NEUTROPHILS RELATIVE PERCENT: 63.9 % (ref 36–66)
SODIUM BLD-SCNC: 140 MMOL/L (ref 135–145)
TOTAL IMMATURE NEUTOROPHIL: 0.01 K/CU MM
TOTAL NUCLEATED RBC: 0 K/CU MM
TOTAL PROTEIN: 4.7 GM/DL (ref 6.4–8.2)
WBC # BLD: 4.3 K/CU MM (ref 4–10.5)

## 2019-08-28 PROCEDURE — 80053 COMPREHEN METABOLIC PANEL: CPT

## 2019-08-28 PROCEDURE — 83735 ASSAY OF MAGNESIUM: CPT

## 2019-08-28 PROCEDURE — 1200000000 HC SEMI PRIVATE

## 2019-08-28 PROCEDURE — 36415 COLL VENOUS BLD VENIPUNCTURE: CPT

## 2019-08-28 PROCEDURE — 99226 PR SBSQ OBSERVATION CARE/DAY 35 MINUTES: CPT | Performed by: INTERNAL MEDICINE

## 2019-08-28 PROCEDURE — 6370000000 HC RX 637 (ALT 250 FOR IP): Performed by: INTERNAL MEDICINE

## 2019-08-28 PROCEDURE — 82248 BILIRUBIN DIRECT: CPT

## 2019-08-28 PROCEDURE — 96372 THER/PROPH/DIAG INJ SC/IM: CPT

## 2019-08-28 PROCEDURE — 6360000002 HC RX W HCPCS: Performed by: INTERNAL MEDICINE

## 2019-08-28 PROCEDURE — 2580000003 HC RX 258: Performed by: PHYSICIAN ASSISTANT

## 2019-08-28 PROCEDURE — 83605 ASSAY OF LACTIC ACID: CPT

## 2019-08-28 PROCEDURE — G0378 HOSPITAL OBSERVATION PER HR: HCPCS

## 2019-08-28 PROCEDURE — 85025 COMPLETE CBC W/AUTO DIFF WBC: CPT

## 2019-08-28 RX ADMIN — FERROUS GLUCONATE TAB 324 MG (37.5 MG ELEMENTAL IRON) 324 MG: 324 (37.5 FE) TAB at 08:24

## 2019-08-28 RX ADMIN — CLOPIDOGREL BISULFATE 75 MG: 75 TABLET ORAL at 08:21

## 2019-08-28 RX ADMIN — RANOLAZINE 1000 MG: 500 TABLET, FILM COATED, EXTENDED RELEASE ORAL at 08:21

## 2019-08-28 RX ADMIN — FAMOTIDINE 20 MG: 20 TABLET ORAL at 08:21

## 2019-08-28 RX ADMIN — ACETAMINOPHEN 650 MG: 325 TABLET ORAL at 21:39

## 2019-08-28 RX ADMIN — LEVOTHYROXINE SODIUM 175 MCG: 100 TABLET ORAL at 06:51

## 2019-08-28 RX ADMIN — LEVETIRACETAM 750 MG: 500 TABLET, FILM COATED ORAL at 08:22

## 2019-08-28 RX ADMIN — CYANOCOBALAMIN TAB 1000 MCG 1000 MCG: 1000 TAB at 08:21

## 2019-08-28 RX ADMIN — ASPIRIN 81 MG 81 MG: 81 TABLET ORAL at 08:21

## 2019-08-28 RX ADMIN — SODIUM CHLORIDE: 9 INJECTION, SOLUTION INTRAVENOUS at 21:32

## 2019-08-28 RX ADMIN — FERROUS GLUCONATE TAB 324 MG (37.5 MG ELEMENTAL IRON) 324 MG: 324 (37.5 FE) TAB at 21:31

## 2019-08-28 RX ADMIN — MEXILETINE HYDROCHLORIDE 150 MG: 150 CAPSULE ORAL at 08:24

## 2019-08-28 RX ADMIN — ENOXAPARIN SODIUM 40 MG: 40 INJECTION SUBCUTANEOUS at 08:21

## 2019-08-28 RX ADMIN — RANOLAZINE 1000 MG: 500 TABLET, FILM COATED, EXTENDED RELEASE ORAL at 21:31

## 2019-08-28 RX ADMIN — LEVETIRACETAM 750 MG: 500 TABLET, FILM COATED ORAL at 21:31

## 2019-08-28 RX ADMIN — MEXILETINE HYDROCHLORIDE 150 MG: 150 CAPSULE ORAL at 21:31

## 2019-08-28 RX ADMIN — MEXILETINE HYDROCHLORIDE 150 MG: 150 CAPSULE ORAL at 14:32

## 2019-08-28 RX ADMIN — FAMOTIDINE 20 MG: 20 TABLET ORAL at 21:31

## 2019-08-28 RX ADMIN — MAGNESIUM OXIDE TAB 400 MG (241.3 MG ELEMENTAL MG) 800 MG: 400 (241.3 MG) TAB at 21:31

## 2019-08-28 RX ADMIN — MAGNESIUM OXIDE TAB 400 MG (241.3 MG ELEMENTAL MG) 800 MG: 400 (241.3 MG) TAB at 08:21

## 2019-08-28 RX ADMIN — SODIUM CHLORIDE: 9 INJECTION, SOLUTION INTRAVENOUS at 05:13

## 2019-08-28 ASSESSMENT — PAIN SCALES - GENERAL
PAINLEVEL_OUTOF10: 7
PAINLEVEL_OUTOF10: 0
PAINLEVEL_OUTOF10: 8

## 2019-08-28 ASSESSMENT — PAIN DESCRIPTION - LOCATION
LOCATION: HIP;BACK
LOCATION: HIP;BACK

## 2019-08-28 ASSESSMENT — PAIN DESCRIPTION - ORIENTATION
ORIENTATION: RIGHT;LEFT;LOWER
ORIENTATION: RIGHT;LEFT

## 2019-08-28 ASSESSMENT — PAIN DESCRIPTION - PAIN TYPE
TYPE: ACUTE PAIN
TYPE: ACUTE PAIN

## 2019-08-28 ASSESSMENT — PAIN DESCRIPTION - DESCRIPTORS
DESCRIPTORS: DISCOMFORT
DESCRIPTORS: DISCOMFORT

## 2019-08-29 VITALS
TEMPERATURE: 97.9 F | HEIGHT: 66 IN | WEIGHT: 115.4 LBS | RESPIRATION RATE: 16 BRPM | SYSTOLIC BLOOD PRESSURE: 123 MMHG | BODY MASS INDEX: 18.54 KG/M2 | DIASTOLIC BLOOD PRESSURE: 72 MMHG | HEART RATE: 60 BPM | OXYGEN SATURATION: 99 %

## 2019-08-29 PROCEDURE — 6370000000 HC RX 637 (ALT 250 FOR IP): Performed by: INTERNAL MEDICINE

## 2019-08-29 PROCEDURE — 99233 SBSQ HOSP IP/OBS HIGH 50: CPT | Performed by: INTERNAL MEDICINE

## 2019-08-29 PROCEDURE — 6360000002 HC RX W HCPCS: Performed by: INTERNAL MEDICINE

## 2019-08-29 PROCEDURE — 2580000003 HC RX 258: Performed by: PHYSICIAN ASSISTANT

## 2019-08-29 PROCEDURE — 94761 N-INVAS EAR/PLS OXIMETRY MLT: CPT

## 2019-08-29 RX ORDER — OXYCODONE HYDROCHLORIDE AND ACETAMINOPHEN 5; 325 MG/1; MG/1
1 TABLET ORAL EVERY 8 HOURS PRN
Status: DISCONTINUED | OUTPATIENT
Start: 2019-08-29 | End: 2019-08-29 | Stop reason: HOSPADM

## 2019-08-29 RX ORDER — DULOXETIN HYDROCHLORIDE 60 MG/1
60 CAPSULE, DELAYED RELEASE ORAL DAILY
Qty: 30 CAPSULE | Refills: 3 | Status: ON HOLD | OUTPATIENT
Start: 2019-08-29 | End: 2020-10-14

## 2019-08-29 RX ADMIN — FAMOTIDINE 20 MG: 20 TABLET ORAL at 08:43

## 2019-08-29 RX ADMIN — ENOXAPARIN SODIUM 40 MG: 40 INJECTION SUBCUTANEOUS at 08:44

## 2019-08-29 RX ADMIN — ASPIRIN 81 MG 81 MG: 81 TABLET ORAL at 08:42

## 2019-08-29 RX ADMIN — RANOLAZINE 1000 MG: 500 TABLET, FILM COATED, EXTENDED RELEASE ORAL at 08:42

## 2019-08-29 RX ADMIN — MAGNESIUM OXIDE TAB 400 MG (241.3 MG ELEMENTAL MG) 800 MG: 400 (241.3 MG) TAB at 08:42

## 2019-08-29 RX ADMIN — CYANOCOBALAMIN TAB 1000 MCG 1000 MCG: 1000 TAB at 08:43

## 2019-08-29 RX ADMIN — FERROUS GLUCONATE TAB 324 MG (37.5 MG ELEMENTAL IRON) 324 MG: 324 (37.5 FE) TAB at 08:42

## 2019-08-29 RX ADMIN — CLOPIDOGREL BISULFATE 75 MG: 75 TABLET ORAL at 08:42

## 2019-08-29 RX ADMIN — LEVOTHYROXINE SODIUM 175 MCG: 100 TABLET ORAL at 05:10

## 2019-08-29 RX ADMIN — ACETAMINOPHEN 650 MG: 325 TABLET ORAL at 10:39

## 2019-08-29 RX ADMIN — ACETAMINOPHEN 650 MG: 325 TABLET ORAL at 06:14

## 2019-08-29 RX ADMIN — SODIUM CHLORIDE: 9 INJECTION, SOLUTION INTRAVENOUS at 09:34

## 2019-08-29 RX ADMIN — MEXILETINE HYDROCHLORIDE 150 MG: 150 CAPSULE ORAL at 08:43

## 2019-08-29 RX ADMIN — LEVETIRACETAM 750 MG: 500 TABLET, FILM COATED ORAL at 08:43

## 2019-08-29 RX ADMIN — ERGOCALCIFEROL 50000 UNITS: 1.25 CAPSULE ORAL at 09:27

## 2019-08-29 RX ADMIN — MEXILETINE HYDROCHLORIDE 150 MG: 150 CAPSULE ORAL at 14:11

## 2019-08-29 RX ADMIN — OXYCODONE HYDROCHLORIDE AND ACETAMINOPHEN 1 TABLET: 5; 325 TABLET ORAL at 15:26

## 2019-08-29 ASSESSMENT — PAIN DESCRIPTION - DESCRIPTORS
DESCRIPTORS: DISCOMFORT
DESCRIPTORS: DISCOMFORT

## 2019-08-29 ASSESSMENT — PAIN SCALES - GENERAL
PAINLEVEL_OUTOF10: 10
PAINLEVEL_OUTOF10: 4
PAINLEVEL_OUTOF10: 9
PAINLEVEL_OUTOF10: 4

## 2019-08-29 ASSESSMENT — PAIN DESCRIPTION - PAIN TYPE
TYPE: ACUTE PAIN
TYPE: ACUTE PAIN

## 2019-08-29 ASSESSMENT — PAIN DESCRIPTION - LOCATION
LOCATION: BACK;HIP
LOCATION: HIP;BACK

## 2019-08-29 ASSESSMENT — PAIN DESCRIPTION - ORIENTATION: ORIENTATION: RIGHT;LEFT;LOWER

## 2019-08-30 LAB
EKG ATRIAL RATE: 63 BPM
EKG DIAGNOSIS: NORMAL
EKG P AXIS: 68 DEGREES
EKG P-R INTERVAL: 194 MS
EKG Q-T INTERVAL: 452 MS
EKG QRS DURATION: 102 MS
EKG QTC CALCULATION (BAZETT): 462 MS
EKG R AXIS: -42 DEGREES
EKG T AXIS: -21 DEGREES
EKG VENTRICULAR RATE: 63 BPM

## 2019-09-01 LAB
CULTURE: NORMAL
CULTURE: NORMAL
Lab: NORMAL
Lab: NORMAL
SPECIMEN: NORMAL
SPECIMEN: NORMAL

## 2019-09-23 ENCOUNTER — APPOINTMENT (OUTPATIENT)
Dept: GENERAL RADIOLOGY | Age: 59
End: 2019-09-23
Payer: MEDICARE

## 2019-09-23 ENCOUNTER — APPOINTMENT (OUTPATIENT)
Dept: CT IMAGING | Age: 59
End: 2019-09-23
Payer: MEDICARE

## 2019-09-23 ENCOUNTER — HOSPITAL ENCOUNTER (OUTPATIENT)
Age: 59
Setting detail: OBSERVATION
Discharge: HOME | End: 2019-09-25
Attending: EMERGENCY MEDICINE | Admitting: INTERNAL MEDICINE
Payer: MEDICARE

## 2019-09-23 DIAGNOSIS — Z86.79 HISTORY OF ISCHEMIC CARDIOMYOPATHY: ICD-10-CM

## 2019-09-23 DIAGNOSIS — R07.9 CHEST PAIN, UNSPECIFIED TYPE: Primary | ICD-10-CM

## 2019-09-23 DIAGNOSIS — I15.9 SECONDARY HYPERTENSION: ICD-10-CM

## 2019-09-23 LAB
ALBUMIN SERPL-MCNC: 3.5 GM/DL (ref 3.4–5)
ALP BLD-CCNC: 75 IU/L (ref 40–129)
ALT SERPL-CCNC: 10 U/L (ref 10–40)
ANION GAP SERPL CALCULATED.3IONS-SCNC: 10 MMOL/L (ref 4–16)
AST SERPL-CCNC: 13 IU/L (ref 15–37)
BASOPHILS ABSOLUTE: 0 K/CU MM
BASOPHILS RELATIVE PERCENT: 0.8 % (ref 0–1)
BILIRUB SERPL-MCNC: 0.2 MG/DL (ref 0–1)
BUN BLDV-MCNC: 12 MG/DL (ref 6–23)
CALCIUM SERPL-MCNC: 8.2 MG/DL (ref 8.3–10.6)
CHLORIDE BLD-SCNC: 106 MMOL/L (ref 99–110)
CO2: 23 MMOL/L (ref 21–32)
CREAT SERPL-MCNC: 0.8 MG/DL (ref 0.6–1.1)
DIFFERENTIAL TYPE: ABNORMAL
EOSINOPHILS ABSOLUTE: 0 K/CU MM
EOSINOPHILS RELATIVE PERCENT: 0 % (ref 0–3)
GFR AFRICAN AMERICAN: >60 ML/MIN/1.73M2
GFR NON-AFRICAN AMERICAN: >60 ML/MIN/1.73M2
GLUCOSE BLD-MCNC: 101 MG/DL (ref 70–99)
HCT VFR BLD CALC: 36.7 % (ref 37–47)
HEMOGLOBIN: 11.7 GM/DL (ref 12.5–16)
IMMATURE NEUTROPHIL %: 0.8 % (ref 0–0.43)
LYMPHOCYTES ABSOLUTE: 0.6 K/CU MM
LYMPHOCYTES RELATIVE PERCENT: 16.4 % (ref 24–44)
MCH RBC QN AUTO: 34.4 PG (ref 27–31)
MCHC RBC AUTO-ENTMCNC: 31.9 % (ref 32–36)
MCV RBC AUTO: 107.9 FL (ref 78–100)
MONOCYTES ABSOLUTE: 0.3 K/CU MM
MONOCYTES RELATIVE PERCENT: 7.8 % (ref 0–4)
NUCLEATED RBC %: 0 %
PDW BLD-RTO: 15.9 % (ref 11.7–14.9)
PLATELET # BLD: 285 K/CU MM (ref 140–440)
PMV BLD AUTO: 9.7 FL (ref 7.5–11.1)
POTASSIUM SERPL-SCNC: 4.3 MMOL/L (ref 3.5–5.1)
RBC # BLD: 3.4 M/CU MM (ref 4.2–5.4)
SEGMENTED NEUTROPHILS ABSOLUTE COUNT: 2.7 K/CU MM
SEGMENTED NEUTROPHILS RELATIVE PERCENT: 74.2 % (ref 36–66)
SODIUM BLD-SCNC: 139 MMOL/L (ref 135–145)
TOTAL IMMATURE NEUTOROPHIL: 0.03 K/CU MM
TOTAL NUCLEATED RBC: 0 K/CU MM
TOTAL PROTEIN: 5.6 GM/DL (ref 6.4–8.2)
TROPONIN T: <0.01 NG/ML
WBC # BLD: 3.6 K/CU MM (ref 4–10.5)

## 2019-09-23 PROCEDURE — 6370000000 HC RX 637 (ALT 250 FOR IP): Performed by: EMERGENCY MEDICINE

## 2019-09-23 PROCEDURE — 71046 X-RAY EXAM CHEST 2 VIEWS: CPT

## 2019-09-23 PROCEDURE — 84484 ASSAY OF TROPONIN QUANT: CPT

## 2019-09-23 PROCEDURE — 85025 COMPLETE CBC W/AUTO DIFF WBC: CPT

## 2019-09-23 PROCEDURE — 36415 COLL VENOUS BLD VENIPUNCTURE: CPT

## 2019-09-23 PROCEDURE — 99285 EMERGENCY DEPT VISIT HI MDM: CPT

## 2019-09-23 PROCEDURE — 70450 CT HEAD/BRAIN W/O DYE: CPT

## 2019-09-23 PROCEDURE — 80053 COMPREHEN METABOLIC PANEL: CPT

## 2019-09-23 PROCEDURE — 6370000000 HC RX 637 (ALT 250 FOR IP): Performed by: PHYSICIAN ASSISTANT

## 2019-09-23 PROCEDURE — 93005 ELECTROCARDIOGRAM TRACING: CPT | Performed by: EMERGENCY MEDICINE

## 2019-09-23 RX ORDER — ASPIRIN 81 MG/1
324 TABLET, CHEWABLE ORAL ONCE
Status: COMPLETED | OUTPATIENT
Start: 2019-09-23 | End: 2019-09-23

## 2019-09-23 RX ORDER — OXYCODONE HYDROCHLORIDE AND ACETAMINOPHEN 5; 325 MG/1; MG/1
1 TABLET ORAL ONCE
Status: COMPLETED | OUTPATIENT
Start: 2019-09-23 | End: 2019-09-23

## 2019-09-23 RX ORDER — OXYCODONE HYDROCHLORIDE AND ACETAMINOPHEN 5; 325 MG/1; MG/1
2 TABLET ORAL ONCE
Status: COMPLETED | OUTPATIENT
Start: 2019-09-23 | End: 2019-09-23

## 2019-09-23 RX ADMIN — OXYCODONE HYDROCHLORIDE AND ACETAMINOPHEN 1 TABLET: 5; 325 TABLET ORAL at 20:14

## 2019-09-23 RX ADMIN — OXYCODONE HYDROCHLORIDE AND ACETAMINOPHEN 2 TABLET: 5; 325 TABLET ORAL at 22:37

## 2019-09-23 RX ADMIN — ASPIRIN 81 MG CHEWABLE TABLET 324 MG: 81 TABLET CHEWABLE at 22:37

## 2019-09-23 ASSESSMENT — PAIN SCALES - GENERAL
PAINLEVEL_OUTOF10: 8
PAINLEVEL_OUTOF10: 6

## 2019-09-23 ASSESSMENT — PAIN DESCRIPTION - PAIN TYPE
TYPE: ACUTE PAIN
TYPE: ACUTE PAIN

## 2019-09-23 ASSESSMENT — PAIN DESCRIPTION - LOCATION: LOCATION: CHEST

## 2019-09-24 ENCOUNTER — APPOINTMENT (OUTPATIENT)
Dept: NUCLEAR MEDICINE | Age: 59
End: 2019-09-24
Payer: MEDICARE

## 2019-09-24 LAB
LV EF: 30 %
LVEF MODALITY: NORMAL
PRO-BNP: 1625 PG/ML
TROPONIN T: <0.01 NG/ML
TROPONIN T: <0.01 NG/ML

## 2019-09-24 PROCEDURE — 83880 ASSAY OF NATRIURETIC PEPTIDE: CPT

## 2019-09-24 PROCEDURE — 93017 CV STRESS TEST TRACING ONLY: CPT

## 2019-09-24 PROCEDURE — G0378 HOSPITAL OBSERVATION PER HR: HCPCS

## 2019-09-24 PROCEDURE — 6370000000 HC RX 637 (ALT 250 FOR IP): Performed by: PHYSICIAN ASSISTANT

## 2019-09-24 PROCEDURE — 84484 ASSAY OF TROPONIN QUANT: CPT

## 2019-09-24 PROCEDURE — 99219 PR INITIAL OBSERVATION CARE/DAY 50 MINUTES: CPT | Performed by: INTERNAL MEDICINE

## 2019-09-24 PROCEDURE — 93308 TTE F-UP OR LMTD: CPT

## 2019-09-24 PROCEDURE — 6360000002 HC RX W HCPCS: Performed by: INTERNAL MEDICINE

## 2019-09-24 PROCEDURE — 96374 THER/PROPH/DIAG INJ IV PUSH: CPT

## 2019-09-24 PROCEDURE — 78452 HT MUSCLE IMAGE SPECT MULT: CPT

## 2019-09-24 PROCEDURE — 2580000003 HC RX 258: Performed by: PHYSICIAN ASSISTANT

## 2019-09-24 PROCEDURE — 6360000002 HC RX W HCPCS: Performed by: PHYSICIAN ASSISTANT

## 2019-09-24 PROCEDURE — 36415 COLL VENOUS BLD VENIPUNCTURE: CPT

## 2019-09-24 PROCEDURE — A9500 TC99M SESTAMIBI: HCPCS | Performed by: INTERNAL MEDICINE

## 2019-09-24 PROCEDURE — 96376 TX/PRO/DX INJ SAME DRUG ADON: CPT

## 2019-09-24 PROCEDURE — 3430000000 HC RX DIAGNOSTIC RADIOPHARMACEUTICAL: Performed by: INTERNAL MEDICINE

## 2019-09-24 RX ORDER — LACTOBACILLUS RHAMNOSUS GG 10B CELL
1 CAPSULE ORAL
Status: DISCONTINUED | OUTPATIENT
Start: 2019-09-24 | End: 2019-09-25 | Stop reason: HOSPADM

## 2019-09-24 RX ORDER — DULOXETIN HYDROCHLORIDE 30 MG/1
60 CAPSULE, DELAYED RELEASE ORAL DAILY
Status: DISCONTINUED | OUTPATIENT
Start: 2019-09-24 | End: 2019-09-25 | Stop reason: HOSPADM

## 2019-09-24 RX ORDER — ONDANSETRON 2 MG/ML
4 INJECTION INTRAMUSCULAR; INTRAVENOUS EVERY 6 HOURS PRN
Status: DISCONTINUED | OUTPATIENT
Start: 2019-09-24 | End: 2019-09-25 | Stop reason: HOSPADM

## 2019-09-24 RX ORDER — DOCUSATE SODIUM 100 MG/1
100 CAPSULE, LIQUID FILLED ORAL 2 TIMES DAILY PRN
Status: DISCONTINUED | OUTPATIENT
Start: 2019-09-24 | End: 2019-09-25 | Stop reason: HOSPADM

## 2019-09-24 RX ORDER — SODIUM CHLORIDE 0.9 % (FLUSH) 0.9 %
10 SYRINGE (ML) INJECTION PRN
Status: DISCONTINUED | OUTPATIENT
Start: 2019-09-24 | End: 2019-09-25 | Stop reason: HOSPADM

## 2019-09-24 RX ORDER — RANOLAZINE 500 MG/1
1000 TABLET, EXTENDED RELEASE ORAL 2 TIMES DAILY
Status: DISCONTINUED | OUTPATIENT
Start: 2019-09-24 | End: 2019-09-25 | Stop reason: HOSPADM

## 2019-09-24 RX ORDER — ATORVASTATIN CALCIUM 40 MG/1
80 TABLET, FILM COATED ORAL NIGHTLY
Status: DISCONTINUED | OUTPATIENT
Start: 2019-09-24 | End: 2019-09-25 | Stop reason: HOSPADM

## 2019-09-24 RX ORDER — SODIUM CHLORIDE 0.9 % (FLUSH) 0.9 %
10 SYRINGE (ML) INJECTION EVERY 12 HOURS SCHEDULED
Status: DISCONTINUED | OUTPATIENT
Start: 2019-09-24 | End: 2019-09-25 | Stop reason: HOSPADM

## 2019-09-24 RX ORDER — MEXILETINE HYDROCHLORIDE 150 MG/1
150 CAPSULE ORAL 3 TIMES DAILY
Status: DISCONTINUED | OUTPATIENT
Start: 2019-09-24 | End: 2019-09-25 | Stop reason: HOSPADM

## 2019-09-24 RX ORDER — AMIODARONE HYDROCHLORIDE 200 MG/1
200 TABLET ORAL DAILY
COMMUNITY
End: 2021-01-22

## 2019-09-24 RX ORDER — FERROUS GLUCONATE 324(37.5)
324 TABLET ORAL 2 TIMES DAILY
Status: DISCONTINUED | OUTPATIENT
Start: 2019-09-24 | End: 2019-09-25 | Stop reason: HOSPADM

## 2019-09-24 RX ORDER — AMIODARONE HYDROCHLORIDE 200 MG/1
200 TABLET ORAL 3 TIMES DAILY
Status: DISCONTINUED | OUTPATIENT
Start: 2019-09-24 | End: 2019-09-25 | Stop reason: SDUPTHER

## 2019-09-24 RX ORDER — CLOPIDOGREL BISULFATE 75 MG/1
75 TABLET ORAL DAILY
Status: DISCONTINUED | OUTPATIENT
Start: 2019-09-24 | End: 2019-09-25 | Stop reason: HOSPADM

## 2019-09-24 RX ORDER — GABAPENTIN 300 MG/1
300 CAPSULE ORAL 3 TIMES DAILY
Status: DISCONTINUED | OUTPATIENT
Start: 2019-09-24 | End: 2019-09-25 | Stop reason: HOSPADM

## 2019-09-24 RX ORDER — ASPIRIN 81 MG/1
81 TABLET, CHEWABLE ORAL DAILY
Status: DISCONTINUED | OUTPATIENT
Start: 2019-09-25 | End: 2019-09-25 | Stop reason: HOSPADM

## 2019-09-24 RX ORDER — MAGNESIUM OXIDE 400 MG/1
800 TABLET ORAL 2 TIMES DAILY
Status: DISCONTINUED | OUTPATIENT
Start: 2019-09-24 | End: 2019-09-24

## 2019-09-24 RX ORDER — FAMOTIDINE 20 MG/1
20 TABLET, FILM COATED ORAL 2 TIMES DAILY
Status: DISCONTINUED | OUTPATIENT
Start: 2019-09-24 | End: 2019-09-25 | Stop reason: HOSPADM

## 2019-09-24 RX ADMIN — ATORVASTATIN CALCIUM 80 MG: 20 TABLET, FILM COATED ORAL at 20:17

## 2019-09-24 RX ADMIN — MEXILETINE HYDROCHLORIDE 150 MG: 150 CAPSULE ORAL at 22:31

## 2019-09-24 RX ADMIN — GABAPENTIN 300 MG: 300 CAPSULE ORAL at 20:17

## 2019-09-24 RX ADMIN — Medication 30 MILLICURIE: at 12:46

## 2019-09-24 RX ADMIN — ONDANSETRON 4 MG: 2 INJECTION INTRAMUSCULAR; INTRAVENOUS at 15:53

## 2019-09-24 RX ADMIN — Medication 10 MILLICURIE: at 12:46

## 2019-09-24 RX ADMIN — ONDANSETRON 4 MG: 2 INJECTION INTRAMUSCULAR; INTRAVENOUS at 22:31

## 2019-09-24 RX ADMIN — APIXABAN 5 MG: 5 TABLET, FILM COATED ORAL at 02:23

## 2019-09-24 RX ADMIN — FAMOTIDINE 20 MG: 20 TABLET ORAL at 02:23

## 2019-09-24 RX ADMIN — RANOLAZINE 1000 MG: 500 TABLET, FILM COATED, EXTENDED RELEASE ORAL at 12:57

## 2019-09-24 RX ADMIN — AMIODARONE HYDROCHLORIDE 200 MG: 200 TABLET ORAL at 12:56

## 2019-09-24 RX ADMIN — OXYCODONE HYDROCHLORIDE 15 MG: 5 TABLET ORAL at 09:12

## 2019-09-24 RX ADMIN — LEVETIRACETAM 750 MG: 500 TABLET, FILM COATED ORAL at 12:56

## 2019-09-24 RX ADMIN — RANOLAZINE 1000 MG: 500 TABLET, FILM COATED, EXTENDED RELEASE ORAL at 02:24

## 2019-09-24 RX ADMIN — OXYCODONE HYDROCHLORIDE 15 MG: 5 TABLET ORAL at 12:55

## 2019-09-24 RX ADMIN — FERROUS GLUCONATE TAB 324 MG (37.5 MG ELEMENTAL IRON) 324 MG: 324 (37.5 FE) TAB at 20:17

## 2019-09-24 RX ADMIN — DULOXETINE HYDROCHLORIDE 60 MG: 30 CAPSULE, DELAYED RELEASE ORAL at 12:54

## 2019-09-24 RX ADMIN — GABAPENTIN 300 MG: 300 CAPSULE ORAL at 12:56

## 2019-09-24 RX ADMIN — AMIODARONE HYDROCHLORIDE 200 MG: 200 TABLET ORAL at 20:18

## 2019-09-24 RX ADMIN — Medication 1 CAPSULE: at 17:02

## 2019-09-24 RX ADMIN — LEVOTHYROXINE SODIUM 175 MCG: 150 TABLET ORAL at 06:05

## 2019-09-24 RX ADMIN — CLOPIDOGREL BISULFATE 75 MG: 75 TABLET ORAL at 12:55

## 2019-09-24 RX ADMIN — APIXABAN 5 MG: 5 TABLET, FILM COATED ORAL at 20:17

## 2019-09-24 RX ADMIN — OXYCODONE HYDROCHLORIDE 15 MG: 5 TABLET ORAL at 06:05

## 2019-09-24 RX ADMIN — REGADENOSON 0.4 MG: 0.08 INJECTION, SOLUTION INTRAVENOUS at 11:10

## 2019-09-24 RX ADMIN — MEXILETINE HYDROCHLORIDE 150 MG: 150 CAPSULE ORAL at 12:56

## 2019-09-24 RX ADMIN — APIXABAN 5 MG: 5 TABLET, FILM COATED ORAL at 12:56

## 2019-09-24 RX ADMIN — OXYCODONE HYDROCHLORIDE 15 MG: 5 TABLET ORAL at 20:17

## 2019-09-24 RX ADMIN — LEVETIRACETAM 750 MG: 500 TABLET, FILM COATED ORAL at 20:17

## 2019-09-24 RX ADMIN — LEVETIRACETAM 750 MG: 500 TABLET, FILM COATED ORAL at 02:23

## 2019-09-24 RX ADMIN — FAMOTIDINE 20 MG: 20 TABLET ORAL at 12:56

## 2019-09-24 RX ADMIN — OXYCODONE HYDROCHLORIDE 15 MG: 5 TABLET ORAL at 23:35

## 2019-09-24 RX ADMIN — Medication 1 CAPSULE: at 12:55

## 2019-09-24 RX ADMIN — Medication 10 ML: at 20:18

## 2019-09-24 RX ADMIN — OXYCODONE HYDROCHLORIDE 15 MG: 5 TABLET ORAL at 17:02

## 2019-09-24 RX ADMIN — Medication 10 ML: at 12:57

## 2019-09-24 RX ADMIN — FAMOTIDINE 20 MG: 20 TABLET ORAL at 20:17

## 2019-09-24 RX ADMIN — OXYCODONE HYDROCHLORIDE 15 MG: 5 TABLET ORAL at 02:23

## 2019-09-24 RX ADMIN — RANOLAZINE 1000 MG: 500 TABLET, FILM COATED, EXTENDED RELEASE ORAL at 20:17

## 2019-09-24 RX ADMIN — FERROUS GLUCONATE TAB 324 MG (37.5 MG ELEMENTAL IRON) 324 MG: 324 (37.5 FE) TAB at 02:23

## 2019-09-24 RX ADMIN — AMIODARONE HYDROCHLORIDE 200 MG: 200 TABLET ORAL at 02:23

## 2019-09-24 ASSESSMENT — PAIN DESCRIPTION - LOCATION
LOCATION: CHEST;BACK
LOCATION_2: CHEST
LOCATION: BACK

## 2019-09-24 ASSESSMENT — PAIN SCALES - GENERAL
PAINLEVEL_OUTOF10: 8
PAINLEVEL_OUTOF10: 9
PAINLEVEL_OUTOF10: 9
PAINLEVEL_OUTOF10: 8

## 2019-09-24 ASSESSMENT — PAIN DESCRIPTION - INTENSITY: RATING_2: 4

## 2019-09-24 ASSESSMENT — PAIN DESCRIPTION - ORIENTATION
ORIENTATION: LOWER;UPPER
ORIENTATION_2: MID;LEFT;RIGHT
ORIENTATION: LOWER

## 2019-09-24 ASSESSMENT — PAIN DESCRIPTION - PAIN TYPE
TYPE_2: ACUTE PAIN
TYPE: ACUTE PAIN
TYPE: ACUTE PAIN

## 2019-09-24 NOTE — ED NOTES
Pt reports taking 15mg IR q3h at time and that percocet is going to do nothing. Prashanth MAGALLON made aware.       Lazarus Chappell RN  09/23/19 2039

## 2019-09-24 NOTE — ED PROVIDER NOTES
I independently examined and evaluated Efren Barros. In brief,-year-old female with a known history of CAD. States she had an episode of chest pain that radiated into both sides of her neck. Did get nauseated and dyspneic with this. No diaphoresis. States it started after she got upset with her dog. Also complains of a headache that has been present all day. States it feels like prior stroke and \"brain bleed. \" Has residual right side weakness following last stroke and denies noticing any new weakness or loss of sensation today. Is on eliquis. No recent falls. Last fever was 4 days ago. Focused exam revealed the patient appears well-hydrated, well-nourished. Ears chronically ill but comfortable. Mucous membranes are moist. Speech is clear. Breathing is unlabored. Skin is dry. Patient is alert and oriented with clear speech and mentation. Face and smile are symmetric. Decreased to right upper and lower extremities which patient states is but baseline. intact sensation in all extremities. EKG shows atrial paced rhythm. LAD with poor R wave progression. Nonspecific T wave changes. Low voltage noted. Appears similar overall from prior tracing. ED course: Patient appears to be at her baseline neurologically. CT head is nonacute. Her initial EKG is nonacute, and initial troponin is undetected. Given her history and known severe disease, agree with plan for admission. All diagnostic, treatment, and disposition decisions were made by myself in conjunction with the advanced practice provider. For all further details of the patient's emergency department visit, please see the advanced practice provider's documentation. Comment: Please note this report has been produced using speech recognition software and may contain errors related to that system including errors in grammar, punctuation, and spelling, as well as words and phrases that may be inappropriate.  If there are any questions or

## 2019-09-24 NOTE — H&P
mouth Daily 4/6/19   Ivelisse Wood MD   white petrolatum-corn starch-lanolin (TRIPLE PASTE) 12.8 % ointment Apply topically twice a day with dressing changes. 3/31/19   Camille Francisco MD   ondansetron [facility]) 4 MG tablet Take 1 tablet by mouth every 6 hours as needed for Nausea or Vomiting 2/26/19   Hue Campos MD   lactobacillus (CULTURELLE) capsule Take 1 capsule by mouth 3 times daily (with meals) 2/26/19   Hue Campos MD   chlorhexidine gluconate (ANTISEPTIC SKIN CLEANSER) 4 % SOLN external solution Apply topically daily 2/23/19   Jac Celis MD   magnesium oxide (MAG-OX) 400 MG tablet Take 800 mg by mouth 2 times daily    Historical Provider, MD   nystatin (MYCOSTATIN) 887043 UNIT/GM powder Apply 1 g topically daily To stomach    Historical Provider, MD   mexiletine (MEXITIL) 150 MG capsule Take 150 mg by mouth 3 times daily    Historical Provider, MD   docusate sodium (COLACE) 100 MG capsule Take 100 mg by mouth 2 times daily as needed for Constipation    Historical Provider, MD   famotidine (PEPCID) 20 MG tablet Take 1 tablet by mouth 2 times daily 2/7/19   Nneka Garcia MD   levETIRAcetam (KEPPRA) 750 MG tablet Take 750 mg by mouth 2 times daily    Historical Provider, MD   atorvastatin (LIPITOR) 80 MG tablet Take 80 mg by mouth nightly    Historical Provider, MD   Pediatric Multivitamins-Iron (FLINTSTONES PLUS IRON) CHEW Take 1 tablet by mouth 4 times daily    Historical Provider, MD   Jackson-3 1000 MG CAPS Take 1,000 mg by mouth nightly    Historical Provider, MD   ranolazine (RANEXA) 1000 MG extended release tablet Take 1,000 mg by mouth 2 times daily    Historical Provider, MD   gabapentin (NEURONTIN) 300 MG capsule Take 300 mg by mouth 3 times daily. Frutoso Golder     Historical Provider, MD   clopidogrel (PLAVIX) 75 MG tablet Take 75 mg by mouth daily    Historical Provider, MD   acetaminophen 650 MG TABS Take 650 mg by mouth every 4 hours as needed 4/9/16   Alfredo Lucero MD   ferrous gluconate mA/kV was utilized to reduce the radiation dose to as low as reasonably achievable. COMPARISON: August 26, 2019 HISTORY: ORDERING SYSTEM PROVIDED HISTORY: pain TECHNOLOGIST PROVIDED HISTORY: Has a \"code stroke\" or \"stroke alert\" been called? ->No FINDINGS: BRAIN/VENTRICLES: There is volume loss with mild chronic white matter microvascular ischemic disease characterized by periventricular white matter hypodensity. A chronic infarct is present within the left frontal lobe. There is no mass, shift, or bleed. ORBITS: The visualized portion of the orbits demonstrate no acute abnormality. SINUSES: The visualized paranasal sinuses and mastoid air cells demonstrate no acute abnormality. SOFT TISSUES/SKULL:  No acute abnormality of the visualized skull or soft tissues. No acute intracranial abnormality. Ct Head Wo Contrast    Result Date: 8/26/2019  EXAMINATION: CT OF THE HEAD WITHOUT CONTRAST  8/26/2019 9:09 pm TECHNIQUE: CT of the head was performed without the administration of intravenous contrast. Dose modulation, iterative reconstruction, and/or weight based adjustment of the mA/kV was utilized to reduce the radiation dose to as low as reasonably achievable. COMPARISON: CT head 04/05/2019, 03/30/2019 unchanged focal left frontal encephalomalacia. Otherwise, HISTORY: ORDERING SYSTEM PROVIDED HISTORY: head trauma TECHNOLOGIST PROVIDED HISTORY: Has a \"code stroke\" or \"stroke alert\" been called? ->No Reason for Exam: AMS Acuity: Acute Type of Exam: Initial Additional signs and symptoms: no Relevant Medical/Surgical History: none FINDINGS: BRAIN/VENTRICLES: There is no acute intracranial hemorrhage, mass effect or midline shift. No abnormal extra-axial fluid collection. The gray-white differentiation is maintained without evidence of an acute infarct. There is no evidence of hydrocephalus. Stable global cerebral atrophy and mild patchy chronic microvascular white matter ischemic disease.  ORBITS: The visualized Medical Decision Making:  Chest pain r/o ACS    - History of significant CAD, s/p CABP and multiple PCI, currently on ASA and plavix. - Initial troponin neg. EKG shows no new changes              - Telemetry monitoring               - Serial troponin level and repeat EKG in AM              - Cardiology consult - Patient of Dr. Nathaniel Kim              - Pending labs               - Antiplatelet/BB/Statin/sublingual Nitro   - BNP pending, echo pending  Generalized headache   -Negative CT head, headache has improved. Residual right arm and leg weakness is unchanged from previous stroke. 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 On amiodarone, mexiletine, and Eliquis.    Remote DVT / PE (s/p IVC filter)\   -On Eliquis  Multiple sclerosis  Seizure disorder   -Continue keppra  Bilateral hip AVN   -Continue oxycodone  Hypothyroidism   -Continue synthroid 125  Chronic pain   -She reportedly takes 15 mg oxycodone q3h prn, I have verified with [facility]  Splenomegaly (s/p splenectomy)   GERD    Patient was discussed with attending physician Dr. Akilah Morrison     DVT Prophylaxis: Eliquis  Diet: Cardiac  Code Status: Prior Full    NAUN Green-NINA  [facility] Hospitalist PA

## 2019-09-24 NOTE — PROGRESS NOTES
Interim Note      Chart reviewed  Cardiology ruling out CP/NSTEMI  Talked with RN about treatment plan  Poss discharge in AM if cleared from cardio POV      Dr. Gerry Vogel.  [facility]  Internal Medicine Hospitalist  [facility]  9/24/2019 8:35 AM

## 2019-09-24 NOTE — ED PROVIDER NOTES
Smokeless tobacco: Never Used   Substance and Sexual Activity    Alcohol use: No    Drug use: No    Sexual activity: Not Currently   Lifestyle    Physical activity:     Days per week: Not on file     Minutes per session: Not on file    Stress: Not on file   Relationships    Social connections:     Talks on phone: Not on file     Gets together: Not on file     Attends [employer] service: Not on file     Active member of club or organization: Not on file     Attends meetings of clubs or organizations: Not on file     Relationship status: Not on file    Intimate partner violence:     Fear of current or ex partner: Not on file     Emotionally abused: Not on file     Physically abused: Not on file     Forced sexual activity: Not on file   Other Topics Concern    Not on file   Social History Narrative    Not on file     Current Facility-Administered Medications   Medication Dose Route Frequency Provider Last Rate Last Dose    aspirin chewable tablet 324 mg  324 mg Oral Once Vidhya Carroll DO        oxyCODONE-acetaminophen (PERCOCET) 5-325 MG per tablet 2 tablet  2 tablet Oral Once Vani Boyd PA-C         Current Outpatient Medications   Medication Sig Dispense Refill    DULoxetine (CYMBALTA) 60 MG extended release capsule Take 1 capsule by mouth daily 30 capsule 3    ondansetron (ZOFRAN-ODT) 4 MG disintegrating tablet Take 1 tablet by mouth 3 times daily as needed for Nausea or Vomiting 21 tablet 0    levothyroxine (SYNTHROID) 175 MCG tablet Take 1 tablet by mouth Daily 30 tablet 0    white petrolatum-corn starch-lanolin (TRIPLE PASTE) 12.8 % ointment Apply topically twice a day with dressing changes.  56.7 g 0    ondansetron (ZOFRAN) 4 MG tablet Take 1 tablet by mouth every 6 hours as needed for Nausea or Vomiting 20 tablet 0    lactobacillus (CULTURELLE) capsule Take 1 capsule by mouth 3 times daily (with meals) 90 capsule 0    chlorhexidine gluconate (ANTISEPTIC SKIN CLEANSER) 4 % SOLN Baclofen     Fentanyl Swelling    Ibuprofen      \"Due to heart problems\"    Methocarbamol      Worsening edema    Nsaids      \"Due to heart problems\"    Tizanidine     Tolmetin      \"Due to heart problems\"    Tramadol Other (See Comments)     Doctor doesn't her to take due to heart problems  Seizures   Doctor doesn't her to take due to lowers seizure threshold.  Avelox [Moxifloxacin Hcl In Nacl] Swelling and Rash    Betadine [Povidone Iodine] Rash    Moxifloxacin Rash and Swelling     Lips swell and tingling in face        Nursing Notes Reviewed    Physical Exam:  ED Triage Vitals   Enc Vitals Group      BP 09/23/19 1927 121/81      Pulse 09/23/19 1927 60      Resp 09/23/19 1927 18      Temp 09/23/19 1927 98.6 °F (37 °C)      Temp Source 09/23/19 1927 Oral      SpO2 09/23/19 1927 99 %      Weight 09/23/19 1924 115 lb (52.2 kg)      Height 09/23/19 1924 5' 6\" (1.676 m)      Head Circumference --       Peak Flow --       Pain Score --       Pain Loc --       Pain Edu? --       Excl. in [address]? --      General :Patient is awake alert oriented person place and time no acute distress nontoxic appearing  HEENT: Pupils are equally round and reactive to light extraocular motors are intact conjunctivae clear sclerae white there is no injection no icterus. Nose without any rhinorrhea or epistaxis. Oral mucosa is moist no exudate buccal mucosa shows no ulcerations. Uvula is midline    Neck: Neck is supple full range of motion trachea midline thyroid nonpalpable  Cardiac: Heart regular rate rhythm no murmurs rubs clicks or gallops  Lungs: Lungs are clear to auscultation there is no wheezing rhonchi or rales. There is no use of accessory muscles no nasal flaring identified. Chest wall: There is no tenderness to palpation over the chest wall or over ribs  Abdomen: Abdomen is soft nontender nondistended.  There is no firm or pulsatile masses no rebound rigidity or guarding negative Otto's negative McBurney, no peritoneal signs  Suprapubic:  there is no tenderness to palpation over the external bladder   Musculoskeletal: 5 out of 5 strength in all 4 extremities full flexion extension abduction and adduction supination pronation of all extremities and all digits. No obvious muscle atrophy is noted. No focal muscle deficits are appreciated  Dermatology: Skin is warm and dry there is no obvious abscesses lacerations or lesions noted  Psych: Mentation is grossly normal cognition is grossly normal. Affect is appropriate  Neuro: Motor intact sensory intact cranial nerves II through XII are intact level of consciousness is normal cerebellar function is normal reflexes are grossly normal. No evidence of incontinence or loss of bowel or bladder no saddle anesthesia noted Lymphatic: There is no submandibular or cervical adenopathy appreciated.         I have reviewed and interpreted all of the currently available lab results from this visit (if applicable):  Results for orders placed or performed during the hospital encounter of 09/23/19   CBC auto diff   Result Value Ref Range    WBC 3.6 (L) 4.0 - 10.5 K/CU MM    RBC 3.40 (L) 4.2 - 5.4 M/CU MM    Hemoglobin 11.7 (L) 12.5 - 16.0 GM/DL    Hematocrit 36.7 (L) 37 - 47 %    .9 (H) 78 - 100 FL    MCH 34.4 (H) 27 - 31 PG    MCHC 31.9 (L) 32.0 - 36.0 %    RDW 15.9 (H) 11.7 - 14.9 %    Platelets 699 988 - 369 K/CU MM    MPV 9.7 7.5 - 11.1 FL    Differential Type AUTOMATED DIFFERENTIAL     Segs Relative 74.2 (H) 36 - 66 %    Lymphocytes % 16.4 (L) 24 - 44 %    Monocytes % 7.8 (H) 0 - 4 %    Eosinophils % 0.0 0 - 3 %    Basophils % 0.8 0 - 1 %    Segs Absolute 2.7 K/CU MM    Lymphocytes Absolute 0.6 K/CU MM    Monocytes Absolute 0.3 K/CU MM    Eosinophils Absolute 0.0 K/CU MM    Basophils Absolute 0.0 K/CU MM    Nucleated RBC % 0.0 %    Total Nucleated RBC 0.0 K/CU MM    Total Immature Neutrophil 0.03 K/CU MM    Immature Neutrophil % 0.8 (H) 0 - 0.43 %   CMP   Result Value Ref Range Sodium 139 135 - 145 MMOL/L    Potassium 4.3 3.5 - 5.1 MMOL/L    Chloride 106 99 - 110 mMol/L    CO2 23 21 - 32 MMOL/L    BUN 12 6 - 23 MG/DL    CREATININE 0.8 0.6 - 1.1 MG/DL    Glucose 101 (H) 70 - 99 MG/DL    Calcium 8.2 (L) 8.3 - 10.6 MG/DL    Alb 3.5 3.4 - 5.0 GM/DL    Total Protein 5.6 (L) 6.4 - 8.2 GM/DL    Total Bilirubin 0.2 0.0 - 1.0 MG/DL    ALT 10 10 - 40 U/L    AST 13 (L) 15 - 37 IU/L    Alkaline Phosphatase 75 40 - 129 IU/L    GFR Non-African American >60 >60 mL/min/1.73m2    GFR African American >60 >60 mL/min/1.73m2    Anion Gap 10 4 - 16   Troponin   Result Value Ref Range    Troponin T <0.010 <0.01 NG/ML   EKG 12 Lead   Result Value Ref Range    Ventricular Rate 61 BPM    Atrial Rate 61 BPM    P-R Interval 190 ms    QRS Duration 76 ms    Q-T Interval 406 ms    QTc Calculation (Bazett) 408 ms    P Axis 18 degrees    R Axis -43 degrees    T Axis -57 degrees    Diagnosis       Atrial-paced rhythm  Left axis deviation  Low voltage QRS  Inferior infarct (cited on or before 12-FEB-2017)  Anterolateral infarct (cited on or before 12-FEB-2017)  Abnormal ECG  When compared with ECG of 26-AUG-2019 21:28,  Questionable change in QRS duration  Questionable change in initial forces of Anterolateral leads        Radiographs (if obtained):  [] The following radiograph was interpreted by myself in the absence of a radiologist:   [] Radiologist's Report Reviewed:  XR CHEST STANDARD (2 VW)   Final Result   No acute process. EKG (if obtained):   Please See Note of attending physician for EKG interpretation.      Chart review shows recent radiograph(s):  Xr Chest Standard (2 Vw)    Result Date: 9/23/2019  EXAMINATION: TWO XRAY VIEWS OF THE CHEST 9/23/2019 7:51 pm COMPARISON: 08/26/2019 HISTORY: ORDERING SYSTEM PROVIDED HISTORY: Chest pain TECHNOLOGIST PROVIDED HISTORY: Reason for exam:->Chest pain Reason for Exam: Chest pain Acuity: Acute Type of Exam: Initial Additional signs and symptoms: na Relevant Medical/Surgical History: cad, chf, lymphoma FINDINGS: Status post median sternotomy. Transvenous pacer remains in place. The lungs are without acute focal process. There is no effusion or pneumothorax. The cardiomediastinal silhouette is stable. The osseous structures are stable. No acute process. Ct Head Wo Contrast    Result Date: 8/26/2019  EXAMINATION: CT OF THE HEAD WITHOUT CONTRAST  8/26/2019 9:09 pm TECHNIQUE: CT of the head was performed without the administration of intravenous contrast. Dose modulation, iterative reconstruction, and/or weight based adjustment of the mA/kV was utilized to reduce the radiation dose to as low as reasonably achievable. COMPARISON: CT head 04/05/2019, 03/30/2019 unchanged focal left frontal encephalomalacia. Otherwise, HISTORY: ORDERING SYSTEM PROVIDED HISTORY: head trauma TECHNOLOGIST PROVIDED HISTORY: Has a \"code stroke\" or \"stroke alert\" been called? ->No Reason for Exam: AMS Acuity: Acute Type of Exam: Initial Additional signs and symptoms: no Relevant Medical/Surgical History: none FINDINGS: BRAIN/VENTRICLES: There is no acute intracranial hemorrhage, mass effect or midline shift. No abnormal extra-axial fluid collection. The gray-white differentiation is maintained without evidence of an acute infarct. There is no evidence of hydrocephalus. Stable global cerebral atrophy and mild patchy chronic microvascular white matter ischemic disease. ORBITS: The visualized portion of the orbits demonstrate no acute abnormality. Dysconjugate gaze. SINUSES: The visualized paranasal sinuses and mastoid air cells demonstrate no acute abnormality. SOFT TISSUES/SKULL:  No acute abnormality of the visualized skull or soft tissues. No acute intracranial abnormality. Stable global cerebral atrophy and chronic microvascular ischemic disease. Focal left frontal encephalomalacia is unchanged.      Xr Chest Portable    Result Date: 8/26/2019  EXAMINATION: ONE XRAY VIEW OF THE

## 2019-09-24 NOTE — PLAN OF CARE
Nutrition Problem: Severe malnutrition, In context of chronic illness  Intervention: Food and/or Nutrient Delivery: Continue current diet, Mineral Supplement, Vitamin Supplement, Start ONS  Nutritional Goals: Patient will meet at least 75% of estimated nutrient needs during los with meals and supplements provided

## 2019-09-24 NOTE — CONSULTS
of CAD, STROKE of DM      atorvastatin (LIPITOR) tablet 80 mg Nightly   clopidogrel (PLAVIX) tablet 75 mg Daily   DULoxetine (CYMBALTA) extended release capsule 60 mg Daily   docusate sodium (COLACE) capsule 100 mg BID PRN   famotidine (PEPCID) tablet 20 mg BID   ferrous gluconate 324 (37.5 Fe) MG tablet 324 mg BID   gabapentin (NEURONTIN) capsule 300 mg TID   lactobacillus (CULTURELLE) capsule 1 capsule TID WC   levETIRAcetam (KEPPRA) tablet 750 mg BID   levothyroxine (SYNTHROID) tablet 175 mcg Daily   mexiletine (MEXITIL) capsule 150 mg TID   ranolazine (RANEXA) extended release tablet 1,000 mg BID   sodium chloride flush 0.9 % injection 10 mL 2 times per day   sodium chloride flush 0.9 % injection 10 mL PRN   magnesium hydroxide (MILK OF MAGNESIA) 400 MG/5ML suspension 30 mL Daily PRN   ondansetron (ZOFRAN) injection 4 mg Q6H PRN   [START ON 9/25/2019] aspirin chewable tablet 81 mg Daily   apixaban (ELIQUIS) tablet 5 mg BID   amiodarone (CORDARONE) tablet 200 mg TID   oxyCODONE (ROXICODONE) immediate release tablet 15 mg Q3H PRN     oxyCODONE (ROXICODONE) immediate release tablet 15 mg Once     Current Facility-Administered Medications   Medication Dose Route Frequency Provider Last Rate Last Dose    atorvastatin (LIPITOR) tablet 80 mg  80 mg Oral Nightly Shira Bender PA-C        clopidogrel (PLAVIX) tablet 75 mg  75 mg Oral Daily Shira Bender PA-C        DULoxetine (CYMBALTA) extended release capsule 60 mg  60 mg Oral Daily Shira Bender PA-C        docusate sodium (COLACE) capsule 100 mg  100 mg Oral BID PRN Sudha Sharif PA-C        famotidine (PEPCID) tablet 20 mg  20 mg Oral BID Shira Bender PA-C   20 mg at 09/24/19 0223    ferrous gluconate 324 (37.5 Fe) MG tablet 324 mg  324 mg Oral BID Shira Bender PA-C   324 mg at 09/24/19 0223    gabapentin (NEURONTIN) capsule 300 mg  300 mg Oral TID Sudha Sharif PA-C        lactobacillus (CULTURELLE) capsule 1 capsule  1 capsule Oral TID WC Sudha Keepers, joint complaints  · Integumentary: No rash or pruritis  · Neurological: No TIA or stroke symptoms  · Psychiatric: No anxiety or depression  · Endocrine: No malaise, fatigue or temperature intolerance  · Hematologic/Lymphatic: No bleeding problems, blood clots or swollen lymph nodes  · Allergic/Immunologic: No nasal congestion or hives  All systems negative except as marked.      ·    ·    ·      Physical Examination:    Vitals:    09/24/19 0145   BP: (!) 139/92   Pulse: 85   Resp: 14   Temp: 98.8 °F (37.1 °C)   SpO2:       Wt Readings from Last 3 Encounters:   09/24/19 106 lb 11.2 oz (48.4 kg)   08/29/19 115 lb 6.4 oz (52.3 kg)   07/03/19 110 lb (49.9 kg)     Body mass index is 17.22 kg/m².        General Appearance: No distress, conversant     Constitutional: Well developed, Well nourished, No acute distress, Non-toxic appearance.    HENT:  Normocephalic, Atraumatic, Bilateral external ears normal, Oropharynx moist, No oral exudates, Nose normal. Neck- Normal range of motion, No tenderness, Supple, No stridor,no apical-carotid delay, no carotid bruit  Eyes: PERRL, EOMI, Conjunctiva normal, No discharge.    Respiratory:  Normal breath sounds, No respiratory distress, No wheezing, No chest tenderness. ,no use of accessory muscles, diaphragm movement is normal  Cardiovascular: (PMI) apex non displaced,no lifts no thrills, no s3,no s4, Normal heart rate, Normal rhythm, No murmurs, No rubs, No gallops. Carotid arteries pulse and amplitude are normal no bruit, no abdominal bruit noted ( normal abdominal aorta ausculation), femoral arteries pulse and amplitude are normal no bruit, pedal pulses are normal.  GI: Bowel sounds normal, Soft, No tenderness, No masses, No pulsatile masses, no hepatosplenomegally, no bruits  : External genitalia appear normal, No masses or lesions. No discharge. No CVA tenderness.    Musculoskeletal: Intact distal pulses, No edema, No tenderness, No cyanosis, No clubbing.  Good range of motion in

## 2019-09-25 VITALS
BODY MASS INDEX: 18.39 KG/M2 | WEIGHT: 114.4 LBS | TEMPERATURE: 98.3 F | OXYGEN SATURATION: 95 % | RESPIRATION RATE: 10 BRPM | HEART RATE: 60 BPM | HEIGHT: 66 IN | DIASTOLIC BLOOD PRESSURE: 75 MMHG | SYSTOLIC BLOOD PRESSURE: 107 MMHG

## 2019-09-25 LAB
ANION GAP SERPL CALCULATED.3IONS-SCNC: 8 MMOL/L (ref 4–16)
BUN BLDV-MCNC: 13 MG/DL (ref 6–23)
CALCIUM SERPL-MCNC: 8.3 MG/DL (ref 8.3–10.6)
CHLORIDE BLD-SCNC: 106 MMOL/L (ref 99–110)
CHOLESTEROL: 189 MG/DL
CO2: 24 MMOL/L (ref 21–32)
CREAT SERPL-MCNC: 0.7 MG/DL (ref 0.6–1.1)
EKG ATRIAL RATE: 61 BPM
EKG ATRIAL RATE: 64 BPM
EKG DIAGNOSIS: NORMAL
EKG DIAGNOSIS: NORMAL
EKG P AXIS: 18 DEGREES
EKG P-R INTERVAL: 190 MS
EKG P-R INTERVAL: 210 MS
EKG Q-T INTERVAL: 362 MS
EKG Q-T INTERVAL: 406 MS
EKG QRS DURATION: 76 MS
EKG QRS DURATION: 96 MS
EKG QTC CALCULATION (BAZETT): 373 MS
EKG QTC CALCULATION (BAZETT): 408 MS
EKG R AXIS: -39 DEGREES
EKG R AXIS: -43 DEGREES
EKG T AXIS: -57 DEGREES
EKG T AXIS: 25 DEGREES
EKG VENTRICULAR RATE: 61 BPM
EKG VENTRICULAR RATE: 64 BPM
GFR AFRICAN AMERICAN: >60 ML/MIN/1.73M2
GFR NON-AFRICAN AMERICAN: >60 ML/MIN/1.73M2
GLUCOSE BLD-MCNC: 91 MG/DL (ref 70–99)
HCT VFR BLD CALC: 36.4 % (ref 37–47)
HDLC SERPL-MCNC: 69 MG/DL
HEMOGLOBIN: 11.2 GM/DL (ref 12.5–16)
LDL CHOLESTEROL DIRECT: 114 MG/DL
MCH RBC QN AUTO: 33.6 PG (ref 27–31)
MCHC RBC AUTO-ENTMCNC: 30.8 % (ref 32–36)
MCV RBC AUTO: 109.3 FL (ref 78–100)
PDW BLD-RTO: 15.7 % (ref 11.7–14.9)
PLATELET # BLD: 262 K/CU MM (ref 140–440)
PMV BLD AUTO: 9.8 FL (ref 7.5–11.1)
POTASSIUM SERPL-SCNC: 3.6 MMOL/L (ref 3.5–5.1)
RBC # BLD: 3.33 M/CU MM (ref 4.2–5.4)
SODIUM BLD-SCNC: 138 MMOL/L (ref 135–145)
TRIGL SERPL-MCNC: 99 MG/DL
WBC # BLD: 4.7 K/CU MM (ref 4–10.5)

## 2019-09-25 PROCEDURE — 36415 COLL VENOUS BLD VENIPUNCTURE: CPT

## 2019-09-25 PROCEDURE — 85027 COMPLETE CBC AUTOMATED: CPT

## 2019-09-25 PROCEDURE — 96375 TX/PRO/DX INJ NEW DRUG ADDON: CPT

## 2019-09-25 PROCEDURE — 6360000002 HC RX W HCPCS: Performed by: STUDENT IN AN ORGANIZED HEALTH CARE EDUCATION/TRAINING PROGRAM

## 2019-09-25 PROCEDURE — 80061 LIPID PANEL: CPT

## 2019-09-25 PROCEDURE — 6370000000 HC RX 637 (ALT 250 FOR IP): Performed by: STUDENT IN AN ORGANIZED HEALTH CARE EDUCATION/TRAINING PROGRAM

## 2019-09-25 PROCEDURE — 93010 ELECTROCARDIOGRAM REPORT: CPT | Performed by: INTERNAL MEDICINE

## 2019-09-25 PROCEDURE — 80048 BASIC METABOLIC PNL TOTAL CA: CPT

## 2019-09-25 PROCEDURE — 83721 ASSAY OF BLOOD LIPOPROTEIN: CPT

## 2019-09-25 PROCEDURE — 6360000002 HC RX W HCPCS: Performed by: PHYSICIAN ASSISTANT

## 2019-09-25 PROCEDURE — 96376 TX/PRO/DX INJ SAME DRUG ADON: CPT

## 2019-09-25 PROCEDURE — 2580000003 HC RX 258: Performed by: PHYSICIAN ASSISTANT

## 2019-09-25 PROCEDURE — 6370000000 HC RX 637 (ALT 250 FOR IP): Performed by: PHYSICIAN ASSISTANT

## 2019-09-25 PROCEDURE — G0378 HOSPITAL OBSERVATION PER HR: HCPCS

## 2019-09-25 PROCEDURE — 94761 N-INVAS EAR/PLS OXIMETRY MLT: CPT

## 2019-09-25 PROCEDURE — 93005 ELECTROCARDIOGRAM TRACING: CPT | Performed by: PHYSICIAN ASSISTANT

## 2019-09-25 RX ORDER — ONDANSETRON 4 MG/1
4 TABLET, ORALLY DISINTEGRATING ORAL EVERY 6 HOURS PRN
Status: DISCONTINUED | OUTPATIENT
Start: 2019-09-25 | End: 2019-09-25 | Stop reason: HOSPADM

## 2019-09-25 RX ORDER — ONDANSETRON 4 MG/1
4 TABLET, ORALLY DISINTEGRATING ORAL 3 TIMES DAILY PRN
Status: DISCONTINUED | OUTPATIENT
Start: 2019-09-25 | End: 2019-09-25 | Stop reason: SDUPTHER

## 2019-09-25 RX ORDER — PROMETHAZINE HYDROCHLORIDE 25 MG/ML
6.25 INJECTION, SOLUTION INTRAMUSCULAR; INTRAVENOUS EVERY 6 HOURS PRN
Status: DISCONTINUED | OUTPATIENT
Start: 2019-09-25 | End: 2019-09-25 | Stop reason: HOSPADM

## 2019-09-25 RX ORDER — AMIODARONE HYDROCHLORIDE 200 MG/1
200 TABLET ORAL DAILY
Status: DISCONTINUED | OUTPATIENT
Start: 2019-09-25 | End: 2019-09-25 | Stop reason: HOSPADM

## 2019-09-25 RX ADMIN — MEXILETINE HYDROCHLORIDE 150 MG: 150 CAPSULE ORAL at 09:45

## 2019-09-25 RX ADMIN — ASPIRIN 81 MG 81 MG: 81 TABLET ORAL at 09:44

## 2019-09-25 RX ADMIN — OXYCODONE HYDROCHLORIDE 15 MG: 5 TABLET ORAL at 18:46

## 2019-09-25 RX ADMIN — DULOXETINE HYDROCHLORIDE 60 MG: 30 CAPSULE, DELAYED RELEASE ORAL at 09:45

## 2019-09-25 RX ADMIN — GABAPENTIN 300 MG: 300 CAPSULE ORAL at 09:44

## 2019-09-25 RX ADMIN — LEVOTHYROXINE SODIUM 175 MCG: 150 TABLET ORAL at 06:13

## 2019-09-25 RX ADMIN — PROMETHAZINE HYDROCHLORIDE 6.25 MG: 25 INJECTION INTRAMUSCULAR; INTRAVENOUS at 15:21

## 2019-09-25 RX ADMIN — Medication 1 CAPSULE: at 09:44

## 2019-09-25 RX ADMIN — OXYCODONE HYDROCHLORIDE 15 MG: 5 TABLET ORAL at 12:45

## 2019-09-25 RX ADMIN — AMIODARONE HYDROCHLORIDE 200 MG: 200 TABLET ORAL at 09:45

## 2019-09-25 RX ADMIN — FAMOTIDINE 20 MG: 20 TABLET ORAL at 09:45

## 2019-09-25 RX ADMIN — OXYCODONE HYDROCHLORIDE 15 MG: 5 TABLET ORAL at 15:45

## 2019-09-25 RX ADMIN — OXYCODONE HYDROCHLORIDE 15 MG: 5 TABLET ORAL at 03:06

## 2019-09-25 RX ADMIN — GABAPENTIN 300 MG: 300 CAPSULE ORAL at 15:20

## 2019-09-25 RX ADMIN — FERROUS GLUCONATE TAB 324 MG (37.5 MG ELEMENTAL IRON) 324 MG: 324 (37.5 FE) TAB at 09:44

## 2019-09-25 RX ADMIN — RANOLAZINE 1000 MG: 500 TABLET, FILM COATED, EXTENDED RELEASE ORAL at 09:45

## 2019-09-25 RX ADMIN — APIXABAN 5 MG: 5 TABLET, FILM COATED ORAL at 09:45

## 2019-09-25 RX ADMIN — OXYCODONE HYDROCHLORIDE 15 MG: 5 TABLET ORAL at 06:13

## 2019-09-25 RX ADMIN — Medication 1 CAPSULE: at 18:46

## 2019-09-25 RX ADMIN — MEXILETINE HYDROCHLORIDE 150 MG: 150 CAPSULE ORAL at 15:21

## 2019-09-25 RX ADMIN — ONDANSETRON 4 MG: 4 TABLET, ORALLY DISINTEGRATING ORAL at 12:45

## 2019-09-25 RX ADMIN — LEVETIRACETAM 750 MG: 500 TABLET, FILM COATED ORAL at 09:44

## 2019-09-25 RX ADMIN — ONDANSETRON 4 MG: 2 INJECTION INTRAMUSCULAR; INTRAVENOUS at 09:40

## 2019-09-25 RX ADMIN — OXYCODONE HYDROCHLORIDE 15 MG: 5 TABLET ORAL at 09:45

## 2019-09-25 RX ADMIN — Medication 10 ML: at 09:41

## 2019-09-25 RX ADMIN — Medication 1 CAPSULE: at 15:20

## 2019-09-25 RX ADMIN — CLOPIDOGREL BISULFATE 75 MG: 75 TABLET ORAL at 09:45

## 2019-09-25 ASSESSMENT — PAIN DESCRIPTION - FREQUENCY
FREQUENCY: CONTINUOUS

## 2019-09-25 ASSESSMENT — PAIN - FUNCTIONAL ASSESSMENT
PAIN_FUNCTIONAL_ASSESSMENT: PREVENTS OR INTERFERES SOME ACTIVE ACTIVITIES AND ADLS

## 2019-09-25 ASSESSMENT — PAIN DESCRIPTION - LOCATION
LOCATION: HIP;LEG
LOCATION: BACK;HIP
LOCATION: HIP;LEG
LOCATION: HIP;LEG
LOCATION: BACK

## 2019-09-25 ASSESSMENT — PAIN DESCRIPTION - DIRECTION
RADIATING_TOWARDS: HIPS AND LEGS
RADIATING_TOWARDS: LEGS
RADIATING_TOWARDS: LEGS

## 2019-09-25 ASSESSMENT — PAIN DESCRIPTION - ORIENTATION
ORIENTATION: RIGHT;LEFT
ORIENTATION: RIGHT;LEFT
ORIENTATION: LOWER
ORIENTATION: RIGHT;LEFT

## 2019-09-25 ASSESSMENT — PAIN DESCRIPTION - ONSET
ONSET: ON-GOING

## 2019-09-25 ASSESSMENT — PAIN SCALES - GENERAL
PAINLEVEL_OUTOF10: 7
PAINLEVEL_OUTOF10: 7
PAINLEVEL_OUTOF10: 4
PAINLEVEL_OUTOF10: 7
PAINLEVEL_OUTOF10: 9
PAINLEVEL_OUTOF10: 4
PAINLEVEL_OUTOF10: 5
PAINLEVEL_OUTOF10: 9
PAINLEVEL_OUTOF10: 7
PAINLEVEL_OUTOF10: 4

## 2019-09-25 ASSESSMENT — PAIN DESCRIPTION - PAIN TYPE
TYPE: CHRONIC PAIN

## 2019-09-25 ASSESSMENT — PAIN DESCRIPTION - PROGRESSION
CLINICAL_PROGRESSION: NOT CHANGED

## 2019-09-25 ASSESSMENT — PAIN DESCRIPTION - DESCRIPTORS
DESCRIPTORS: CONSTANT;DISCOMFORT
DESCRIPTORS: CONSTANT;SHARP;SHOOTING
DESCRIPTORS: SHARP;CONSTANT
DESCRIPTORS: CONSTANT;DISCOMFORT
DESCRIPTORS: CONSTANT
DESCRIPTORS: ACHING;CONSTANT;SHARP
DESCRIPTORS: CONSTANT;SHARP;SHOOTING
DESCRIPTORS: CONSTANT;SHARP

## 2019-09-25 NOTE — DISCHARGE SUMMARY
mg by mouth nightly             clopidogrel (PLAVIX) 75 MG tablet  Take 75 mg by mouth daily             docusate sodium (COLACE) 100 MG capsule  Take 100 mg by mouth 2 times daily as needed for Constipation             DULoxetine (CYMBALTA) 60 MG extended release capsule  Take 1 capsule by mouth daily             famotidine (PEPCID) 20 MG tablet  Take 1 tablet by mouth 2 times daily             ferrous gluconate (FERGON) 324 (38 FE) MG tablet  Take 324 mg by mouth 2 times daily              gabapentin (NEURONTIN) 300 MG capsule  Take 300 mg by mouth 3 times daily. Annamary Ripa              lactobacillus (CULTURELLE) capsule  Take 1 capsule by mouth 3 times daily (with meals)             levETIRAcetam (KEPPRA) 750 MG tablet  Take 750 mg by mouth 2 times daily             levothyroxine (SYNTHROID) 175 MCG tablet  Take 1 tablet by mouth Daily             magnesium oxide (MAG-OX) 400 MG tablet  Take 800 mg by mouth 2 times daily             mexiletine (MEXITIL) 150 MG capsule  Take 150 mg by mouth 3 times daily             Omega-3 1000 MG CAPS  Take 1,000 mg by mouth nightly             ondansetron (ZOFRAN) 4 MG tablet  Take 1 tablet by mouth every 6 hours as needed for Nausea or Vomiting             ondansetron (ZOFRAN-ODT) 4 MG disintegrating tablet  Take 1 tablet by mouth 3 times daily as needed for Nausea or Vomiting             Pediatric Multivitamins-Iron (FLINTSTONES PLUS IRON) CHEW  Take 1 tablet by mouth 4 times daily             ranolazine (RANEXA) 1000 MG extended release tablet  Take 1,000 mg by mouth 2 times daily             vitamin B-12 (CYANOCOBALAMIN) 1000 MCG tablet  Inject 1,000 mcg into the muscle every 30 days 04/25/18 Given on the 28 th of each month             vitamin D (ERGOCALCIFEROL) 41081 UNIT CAPS capsule  Take 50,000 Units by mouth Twice a Week Take on Mondays and on Fridays                 Objective Findings at Discharge:   /72   Pulse 60   Temp 98.1 °F (36.7 °C) (Oral)   Resp 14   Ht 5' 6\"

## 2019-09-26 ENCOUNTER — OFFICE VISIT (OUTPATIENT)
Dept: CARDIOLOGY CLINIC | Age: 59
End: 2019-09-26
Payer: MEDICARE

## 2019-09-26 VITALS
HEART RATE: 56 BPM | WEIGHT: 114 LBS | SYSTOLIC BLOOD PRESSURE: 98 MMHG | BODY MASS INDEX: 18.32 KG/M2 | DIASTOLIC BLOOD PRESSURE: 58 MMHG | HEIGHT: 66 IN

## 2019-09-26 DIAGNOSIS — R07.9 CHEST PAIN, UNSPECIFIED TYPE: Primary | ICD-10-CM

## 2019-09-26 DIAGNOSIS — E78.41 ELEVATED LIPOPROTEIN(A): Chronic | ICD-10-CM

## 2019-09-26 DIAGNOSIS — I25.118 CORONARY ARTERY DISEASE INVOLVING NATIVE CORONARY ARTERY OF NATIVE HEART WITH OTHER FORM OF ANGINA PECTORIS (HCC): Chronic | ICD-10-CM

## 2019-09-26 DIAGNOSIS — Z59.9 FINANCIAL DIFFICULTIES: ICD-10-CM

## 2019-09-26 DIAGNOSIS — I25.5 ISCHEMIC CARDIOMYOPATHY: ICD-10-CM

## 2019-09-26 PROCEDURE — G8598 ASA/ANTIPLAT THER USED: HCPCS | Performed by: NURSE PRACTITIONER

## 2019-09-26 PROCEDURE — 1036F TOBACCO NON-USER: CPT | Performed by: NURSE PRACTITIONER

## 2019-09-26 PROCEDURE — 93000 ELECTROCARDIOGRAM COMPLETE: CPT | Performed by: NURSE PRACTITIONER

## 2019-09-26 PROCEDURE — 99214 OFFICE O/P EST MOD 30 MIN: CPT | Performed by: NURSE PRACTITIONER

## 2019-09-26 PROCEDURE — 1111F DSCHRG MED/CURRENT MED MERGE: CPT | Performed by: NURSE PRACTITIONER

## 2019-09-26 PROCEDURE — G8427 DOCREV CUR MEDS BY ELIG CLIN: HCPCS | Performed by: NURSE PRACTITIONER

## 2019-09-26 PROCEDURE — 3017F COLORECTAL CA SCREEN DOC REV: CPT | Performed by: NURSE PRACTITIONER

## 2019-09-26 PROCEDURE — G8419 CALC BMI OUT NRM PARAM NOF/U: HCPCS | Performed by: NURSE PRACTITIONER

## 2019-09-26 RX ORDER — TORSEMIDE 20 MG/1
20 TABLET ORAL DAILY PRN
COMMUNITY
End: 2021-01-22

## 2019-09-26 RX ORDER — SPIRONOLACTONE 25 MG/1
12.5 TABLET ORAL DAILY
Status: ON HOLD | COMMUNITY
End: 2020-06-04 | Stop reason: HOSPADM

## 2019-09-26 RX ORDER — OXYCODONE HYDROCHLORIDE 15 MG/1
15 TABLET ORAL
Status: ON HOLD | COMMUNITY
End: 2019-11-01 | Stop reason: SDUPTHER

## 2019-09-26 RX ORDER — POTASSIUM CHLORIDE 1.5 G/1.77G
20 POWDER, FOR SOLUTION ORAL 2 TIMES DAILY
Status: ON HOLD | COMMUNITY
End: 2019-11-18 | Stop reason: HOSPADM

## 2019-09-26 SDOH — ECONOMIC STABILITY - INCOME SECURITY: PROBLEM RELATED TO HOUSING AND ECONOMIC CIRCUMSTANCES, UNSPECIFIED: Z59.9

## 2019-09-27 PROBLEM — Z59.9 FINANCIAL DIFFICULTIES: Status: ACTIVE | Noted: 2019-09-27

## 2019-09-27 NOTE — ASSESSMENT & PLAN NOTE
· referral made to med assist and will check with our prior auth staff to see if additional help can be made for her medications

## 2019-09-29 ENCOUNTER — HOSPITAL ENCOUNTER (OUTPATIENT)
Age: 59
Setting detail: OBSERVATION
Discharge: HOME | End: 2019-09-30
Attending: EMERGENCY MEDICINE | Admitting: INTERNAL MEDICINE
Payer: MEDICARE

## 2019-09-29 ENCOUNTER — APPOINTMENT (OUTPATIENT)
Dept: GENERAL RADIOLOGY | Age: 59
End: 2019-09-29
Payer: MEDICARE

## 2019-09-29 DIAGNOSIS — R07.9 CHEST PAIN, UNSPECIFIED TYPE: Primary | ICD-10-CM

## 2019-09-29 LAB
ALBUMIN SERPL-MCNC: 3.7 GM/DL (ref 3.4–5)
ALP BLD-CCNC: 78 IU/L (ref 40–129)
ALT SERPL-CCNC: 10 U/L (ref 10–40)
ANION GAP SERPL CALCULATED.3IONS-SCNC: 10 MMOL/L (ref 4–16)
AST SERPL-CCNC: 15 IU/L (ref 15–37)
BASOPHILS ABSOLUTE: 0 K/CU MM
BASOPHILS RELATIVE PERCENT: 1 % (ref 0–1)
BILIRUB SERPL-MCNC: 0.2 MG/DL (ref 0–1)
BUN BLDV-MCNC: 13 MG/DL (ref 6–23)
CALCIUM SERPL-MCNC: 8.3 MG/DL (ref 8.3–10.6)
CHLORIDE BLD-SCNC: 101 MMOL/L (ref 99–110)
CO2: 27 MMOL/L (ref 21–32)
CREAT SERPL-MCNC: 0.6 MG/DL (ref 0.6–1.1)
DIFFERENTIAL TYPE: ABNORMAL
EOSINOPHILS ABSOLUTE: 0 K/CU MM
EOSINOPHILS RELATIVE PERCENT: 0 % (ref 0–3)
GFR AFRICAN AMERICAN: >60 ML/MIN/1.73M2
GFR NON-AFRICAN AMERICAN: >60 ML/MIN/1.73M2
GLUCOSE BLD-MCNC: 108 MG/DL (ref 70–99)
HCT VFR BLD CALC: 33.9 % (ref 37–47)
HEMOGLOBIN: 10.7 GM/DL (ref 12.5–16)
IMMATURE NEUTROPHIL %: 0.7 % (ref 0–0.43)
LYMPHOCYTES ABSOLUTE: 0.6 K/CU MM
LYMPHOCYTES RELATIVE PERCENT: 13.8 % (ref 24–44)
MCH RBC QN AUTO: 34 PG (ref 27–31)
MCHC RBC AUTO-ENTMCNC: 31.6 % (ref 32–36)
MCV RBC AUTO: 107.6 FL (ref 78–100)
MONOCYTES ABSOLUTE: 0.3 K/CU MM
MONOCYTES RELATIVE PERCENT: 7.8 % (ref 0–4)
NUCLEATED RBC %: 0 %
PDW BLD-RTO: 15 % (ref 11.7–14.9)
PLATELET # BLD: 303 K/CU MM (ref 140–440)
PMV BLD AUTO: 9.9 FL (ref 7.5–11.1)
POTASSIUM SERPL-SCNC: 4.2 MMOL/L (ref 3.5–5.1)
PRO-BNP: 1303 PG/ML
RBC # BLD: 3.15 M/CU MM (ref 4.2–5.4)
SEGMENTED NEUTROPHILS ABSOLUTE COUNT: 3.2 K/CU MM
SEGMENTED NEUTROPHILS RELATIVE PERCENT: 76.7 % (ref 36–66)
SODIUM BLD-SCNC: 138 MMOL/L (ref 135–145)
TOTAL IMMATURE NEUTOROPHIL: 0.03 K/CU MM
TOTAL NUCLEATED RBC: 0 K/CU MM
TOTAL PROTEIN: 5.1 GM/DL (ref 6.4–8.2)
TROPONIN T: <0.01 NG/ML
TROPONIN T: <0.01 NG/ML
WBC # BLD: 4.1 K/CU MM (ref 4–10.5)

## 2019-09-29 PROCEDURE — 93005 ELECTROCARDIOGRAM TRACING: CPT | Performed by: EMERGENCY MEDICINE

## 2019-09-29 PROCEDURE — 83880 ASSAY OF NATRIURETIC PEPTIDE: CPT

## 2019-09-29 PROCEDURE — 99285 EMERGENCY DEPT VISIT HI MDM: CPT

## 2019-09-29 PROCEDURE — 85025 COMPLETE CBC W/AUTO DIFF WBC: CPT

## 2019-09-29 PROCEDURE — G0378 HOSPITAL OBSERVATION PER HR: HCPCS

## 2019-09-29 PROCEDURE — 6370000000 HC RX 637 (ALT 250 FOR IP): Performed by: INTERNAL MEDICINE

## 2019-09-29 PROCEDURE — 6370000000 HC RX 637 (ALT 250 FOR IP): Performed by: EMERGENCY MEDICINE

## 2019-09-29 PROCEDURE — 84484 ASSAY OF TROPONIN QUANT: CPT

## 2019-09-29 PROCEDURE — 71046 X-RAY EXAM CHEST 2 VIEWS: CPT

## 2019-09-29 PROCEDURE — 80053 COMPREHEN METABOLIC PANEL: CPT

## 2019-09-29 RX ORDER — ALBUTEROL SULFATE 90 UG/1
2 AEROSOL, METERED RESPIRATORY (INHALATION) EVERY 6 HOURS PRN
Status: DISCONTINUED | OUTPATIENT
Start: 2019-09-29 | End: 2019-09-30 | Stop reason: HOSPADM

## 2019-09-29 RX ORDER — FAMOTIDINE 20 MG/1
20 TABLET, FILM COATED ORAL 2 TIMES DAILY
Status: DISCONTINUED | OUTPATIENT
Start: 2019-09-29 | End: 2019-09-30 | Stop reason: HOSPADM

## 2019-09-29 RX ORDER — RANOLAZINE 500 MG/1
1000 TABLET, EXTENDED RELEASE ORAL 2 TIMES DAILY
Status: DISCONTINUED | OUTPATIENT
Start: 2019-09-29 | End: 2019-09-30 | Stop reason: HOSPADM

## 2019-09-29 RX ORDER — FERROUS GLUCONATE 324(37.5)
324 TABLET ORAL 2 TIMES DAILY WITH MEALS
Status: DISCONTINUED | OUTPATIENT
Start: 2019-09-29 | End: 2019-09-30 | Stop reason: HOSPADM

## 2019-09-29 RX ORDER — AMIODARONE HYDROCHLORIDE 200 MG/1
200 TABLET ORAL DAILY
Status: DISCONTINUED | OUTPATIENT
Start: 2019-09-30 | End: 2019-09-30 | Stop reason: HOSPADM

## 2019-09-29 RX ORDER — TORSEMIDE 20 MG/1
20 TABLET ORAL DAILY
Status: DISCONTINUED | OUTPATIENT
Start: 2019-09-30 | End: 2019-09-30 | Stop reason: HOSPADM

## 2019-09-29 RX ORDER — DULOXETIN HYDROCHLORIDE 30 MG/1
60 CAPSULE, DELAYED RELEASE ORAL DAILY
Status: DISCONTINUED | OUTPATIENT
Start: 2019-09-30 | End: 2019-09-30 | Stop reason: HOSPADM

## 2019-09-29 RX ORDER — ONDANSETRON 2 MG/ML
4 INJECTION INTRAMUSCULAR; INTRAVENOUS EVERY 6 HOURS PRN
Status: DISCONTINUED | OUTPATIENT
Start: 2019-09-29 | End: 2019-09-30 | Stop reason: HOSPADM

## 2019-09-29 RX ORDER — SODIUM CHLORIDE 0.9 % (FLUSH) 0.9 %
10 SYRINGE (ML) INJECTION PRN
Status: DISCONTINUED | OUTPATIENT
Start: 2019-09-29 | End: 2019-09-30 | Stop reason: HOSPADM

## 2019-09-29 RX ORDER — ATORVASTATIN CALCIUM 40 MG/1
40 TABLET, FILM COATED ORAL NIGHTLY
Status: DISCONTINUED | OUTPATIENT
Start: 2019-09-29 | End: 2019-09-29

## 2019-09-29 RX ORDER — MEXILETINE HYDROCHLORIDE 150 MG/1
150 CAPSULE ORAL 3 TIMES DAILY
Status: DISCONTINUED | OUTPATIENT
Start: 2019-09-29 | End: 2019-09-30 | Stop reason: HOSPADM

## 2019-09-29 RX ORDER — ATORVASTATIN CALCIUM 40 MG/1
80 TABLET, FILM COATED ORAL NIGHTLY
Status: DISCONTINUED | OUTPATIENT
Start: 2019-09-29 | End: 2019-09-30 | Stop reason: HOSPADM

## 2019-09-29 RX ORDER — OXYCODONE HYDROCHLORIDE 5 MG/1
15 TABLET ORAL ONCE
Status: COMPLETED | OUTPATIENT
Start: 2019-09-29 | End: 2019-09-29

## 2019-09-29 RX ORDER — DOCUSATE SODIUM 100 MG/1
100 CAPSULE, LIQUID FILLED ORAL 2 TIMES DAILY PRN
Status: DISCONTINUED | OUTPATIENT
Start: 2019-09-29 | End: 2019-09-30 | Stop reason: HOSPADM

## 2019-09-29 RX ORDER — CHLORAL HYDRATE 500 MG
1000 CAPSULE ORAL NIGHTLY
Status: DISCONTINUED | OUTPATIENT
Start: 2019-09-29 | End: 2019-09-29 | Stop reason: CLARIF

## 2019-09-29 RX ORDER — ASPIRIN 81 MG/1
81 TABLET, CHEWABLE ORAL DAILY
Status: DISCONTINUED | OUTPATIENT
Start: 2019-09-30 | End: 2019-09-30 | Stop reason: HOSPADM

## 2019-09-29 RX ORDER — POTASSIUM CHLORIDE 1.5 G/1.77G
20 POWDER, FOR SOLUTION ORAL 2 TIMES DAILY
Status: DISCONTINUED | OUTPATIENT
Start: 2019-09-29 | End: 2019-09-30 | Stop reason: HOSPADM

## 2019-09-29 RX ORDER — SODIUM CHLORIDE 0.9 % (FLUSH) 0.9 %
10 SYRINGE (ML) INJECTION EVERY 12 HOURS SCHEDULED
Status: DISCONTINUED | OUTPATIENT
Start: 2019-09-29 | End: 2019-09-30 | Stop reason: HOSPADM

## 2019-09-29 RX ORDER — LACTOBACILLUS RHAMNOSUS GG 10B CELL
1 CAPSULE ORAL
Status: DISCONTINUED | OUTPATIENT
Start: 2019-09-30 | End: 2019-09-30 | Stop reason: HOSPADM

## 2019-09-29 RX ORDER — OXYCODONE HYDROCHLORIDE 5 MG/1
15 TABLET ORAL EVERY 4 HOURS PRN
Status: DISCONTINUED | OUTPATIENT
Start: 2019-09-29 | End: 2019-09-29

## 2019-09-29 RX ORDER — LEVOTHYROXINE SODIUM 0.12 MG/1
125 TABLET ORAL DAILY
Status: DISCONTINUED | OUTPATIENT
Start: 2019-09-30 | End: 2019-09-30 | Stop reason: HOSPADM

## 2019-09-29 RX ORDER — GABAPENTIN 300 MG/1
300 CAPSULE ORAL 3 TIMES DAILY
Status: DISCONTINUED | OUTPATIENT
Start: 2019-09-29 | End: 2019-09-30 | Stop reason: HOSPADM

## 2019-09-29 RX ORDER — SPIRONOLACTONE 25 MG/1
25 TABLET ORAL 2 TIMES DAILY
Status: DISCONTINUED | OUTPATIENT
Start: 2019-09-29 | End: 2019-09-30 | Stop reason: HOSPADM

## 2019-09-29 RX ORDER — CLOPIDOGREL BISULFATE 75 MG/1
75 TABLET ORAL DAILY
Status: DISCONTINUED | OUTPATIENT
Start: 2019-09-30 | End: 2019-09-30 | Stop reason: HOSPADM

## 2019-09-29 RX ADMIN — OXYCODONE HYDROCHLORIDE 15 MG: 5 TABLET ORAL at 22:33

## 2019-09-29 RX ADMIN — OXYCODONE HYDROCHLORIDE 15 MG: 5 TABLET ORAL at 16:03

## 2019-09-29 RX ADMIN — OXYCODONE HYDROCHLORIDE 15 MG: 5 TABLET ORAL at 19:32

## 2019-09-29 ASSESSMENT — PAIN SCALES - GENERAL
PAINLEVEL_OUTOF10: 9
PAINLEVEL_OUTOF10: 5
PAINLEVEL_OUTOF10: 4
PAINLEVEL_OUTOF10: 6
PAINLEVEL_OUTOF10: 9
PAINLEVEL_OUTOF10: 9

## 2019-09-29 ASSESSMENT — PAIN DESCRIPTION - LOCATION: LOCATION: CHEST

## 2019-09-29 ASSESSMENT — PAIN DESCRIPTION - PAIN TYPE: TYPE: ACUTE PAIN

## 2019-09-29 ASSESSMENT — PAIN DESCRIPTION - DESCRIPTORS: DESCRIPTORS: PRESSURE

## 2019-09-29 ASSESSMENT — PAIN DESCRIPTION - ORIENTATION: ORIENTATION: RIGHT;LEFT

## 2019-09-30 VITALS
WEIGHT: 115.7 LBS | SYSTOLIC BLOOD PRESSURE: 120 MMHG | OXYGEN SATURATION: 99 % | TEMPERATURE: 98.4 F | DIASTOLIC BLOOD PRESSURE: 72 MMHG | BODY MASS INDEX: 18.59 KG/M2 | RESPIRATION RATE: 14 BRPM | HEIGHT: 66 IN | HEART RATE: 61 BPM

## 2019-09-30 LAB
EKG ATRIAL RATE: 61 BPM
EKG ATRIAL RATE: 65 BPM
EKG DIAGNOSIS: NORMAL
EKG DIAGNOSIS: NORMAL
EKG P AXIS: -13 DEGREES
EKG P AXIS: -15 DEGREES
EKG P-R INTERVAL: 186 MS
EKG P-R INTERVAL: 210 MS
EKG Q-T INTERVAL: 428 MS
EKG Q-T INTERVAL: 448 MS
EKG QRS DURATION: 106 MS
EKG QRS DURATION: 92 MS
EKG QTC CALCULATION (BAZETT): 445 MS
EKG QTC CALCULATION (BAZETT): 450 MS
EKG R AXIS: -40 DEGREES
EKG R AXIS: -43 DEGREES
EKG T AXIS: -44 DEGREES
EKG T AXIS: -59 DEGREES
EKG VENTRICULAR RATE: 61 BPM
EKG VENTRICULAR RATE: 65 BPM
TROPONIN T: <0.01 NG/ML

## 2019-09-30 PROCEDURE — 36415 COLL VENOUS BLD VENIPUNCTURE: CPT

## 2019-09-30 PROCEDURE — G0378 HOSPITAL OBSERVATION PER HR: HCPCS

## 2019-09-30 PROCEDURE — 2580000003 HC RX 258: Performed by: INTERNAL MEDICINE

## 2019-09-30 PROCEDURE — 84484 ASSAY OF TROPONIN QUANT: CPT

## 2019-09-30 PROCEDURE — 6370000000 HC RX 637 (ALT 250 FOR IP): Performed by: INTERNAL MEDICINE

## 2019-09-30 PROCEDURE — 93005 ELECTROCARDIOGRAM TRACING: CPT | Performed by: INTERNAL MEDICINE

## 2019-09-30 PROCEDURE — 99218 PR INITIAL OBSERVATION CARE/DAY 30 MINUTES: CPT | Performed by: INTERNAL MEDICINE

## 2019-09-30 PROCEDURE — 93010 ELECTROCARDIOGRAM REPORT: CPT | Performed by: INTERNAL MEDICINE

## 2019-09-30 RX ORDER — ISOSORBIDE MONONITRATE 30 MG/1
30 TABLET, EXTENDED RELEASE ORAL DAILY
Status: DISCONTINUED | OUTPATIENT
Start: 2019-09-30 | End: 2019-09-30 | Stop reason: HOSPADM

## 2019-09-30 RX ORDER — TRAZODONE HYDROCHLORIDE 50 MG/1
50 TABLET ORAL NIGHTLY PRN
Qty: 30 TABLET | Refills: 0 | Status: SHIPPED | OUTPATIENT
Start: 2019-09-30 | End: 2019-10-24

## 2019-09-30 RX ORDER — GABAPENTIN 300 MG/1
300 CAPSULE ORAL 4 TIMES DAILY
Qty: 120 CAPSULE | Refills: 0 | Status: ON HOLD | OUTPATIENT
Start: 2019-09-30 | End: 2019-11-18 | Stop reason: HOSPADM

## 2019-09-30 RX ADMIN — GABAPENTIN 300 MG: 300 CAPSULE ORAL at 11:53

## 2019-09-30 RX ADMIN — FERROUS GLUCONATE TAB 324 MG (37.5 MG ELEMENTAL IRON) 324 MG: 324 (37.5 FE) TAB at 00:47

## 2019-09-30 RX ADMIN — Medication 1 CAPSULE: at 11:53

## 2019-09-30 RX ADMIN — OXYCODONE HYDROCHLORIDE 15 MG: 5 TABLET ORAL at 04:35

## 2019-09-30 RX ADMIN — Medication 1 CAPSULE: at 08:04

## 2019-09-30 RX ADMIN — FERROUS GLUCONATE TAB 324 MG (37.5 MG ELEMENTAL IRON) 324 MG: 324 (37.5 FE) TAB at 08:04

## 2019-09-30 RX ADMIN — APIXABAN 5 MG: 5 TABLET, FILM COATED ORAL at 08:04

## 2019-09-30 RX ADMIN — Medication 10 ML: at 08:05

## 2019-09-30 RX ADMIN — ASPIRIN 81 MG 81 MG: 81 TABLET ORAL at 08:05

## 2019-09-30 RX ADMIN — ATORVASTATIN CALCIUM 80 MG: 40 TABLET, FILM COATED ORAL at 00:48

## 2019-09-30 RX ADMIN — OXYCODONE HYDROCHLORIDE 15 MG: 5 TABLET ORAL at 08:03

## 2019-09-30 RX ADMIN — LEVOTHYROXINE SODIUM 125 MCG: 125 TABLET ORAL at 06:32

## 2019-09-30 RX ADMIN — DULOXETINE HYDROCHLORIDE 60 MG: 30 CAPSULE, DELAYED RELEASE ORAL at 08:03

## 2019-09-30 RX ADMIN — ISOSORBIDE MONONITRATE 30 MG: 30 TABLET, EXTENDED RELEASE ORAL at 08:04

## 2019-09-30 RX ADMIN — FAMOTIDINE 20 MG: 20 TABLET, FILM COATED ORAL at 00:47

## 2019-09-30 RX ADMIN — RANOLAZINE 1000 MG: 500 TABLET, FILM COATED, EXTENDED RELEASE ORAL at 00:47

## 2019-09-30 RX ADMIN — MEXILETINE HYDROCHLORIDE 150 MG: 150 CAPSULE ORAL at 00:48

## 2019-09-30 RX ADMIN — Medication 10 ML: at 00:48

## 2019-09-30 RX ADMIN — SPIRONOLACTONE 25 MG: 25 TABLET ORAL at 08:04

## 2019-09-30 RX ADMIN — OXYCODONE HYDROCHLORIDE 15 MG: 5 TABLET ORAL at 01:36

## 2019-09-30 RX ADMIN — LEVETIRACETAM 750 MG: 500 TABLET, FILM COATED ORAL at 00:47

## 2019-09-30 RX ADMIN — AMIODARONE HYDROCHLORIDE 200 MG: 200 TABLET ORAL at 08:04

## 2019-09-30 RX ADMIN — GABAPENTIN 300 MG: 300 CAPSULE ORAL at 08:04

## 2019-09-30 RX ADMIN — LEVETIRACETAM 750 MG: 500 TABLET, FILM COATED ORAL at 08:04

## 2019-09-30 RX ADMIN — RANOLAZINE 1000 MG: 500 TABLET, FILM COATED, EXTENDED RELEASE ORAL at 08:03

## 2019-09-30 RX ADMIN — APIXABAN 5 MG: 5 TABLET, FILM COATED ORAL at 00:48

## 2019-09-30 RX ADMIN — FAMOTIDINE 20 MG: 20 TABLET, FILM COATED ORAL at 08:04

## 2019-09-30 RX ADMIN — TORSEMIDE 20 MG: 20 TABLET ORAL at 08:04

## 2019-09-30 RX ADMIN — MEXILETINE HYDROCHLORIDE 150 MG: 150 CAPSULE ORAL at 08:05

## 2019-09-30 RX ADMIN — OXYCODONE HYDROCHLORIDE 15 MG: 5 TABLET ORAL at 11:53

## 2019-09-30 RX ADMIN — CLOPIDOGREL BISULFATE 75 MG: 75 TABLET ORAL at 08:04

## 2019-09-30 RX ADMIN — GABAPENTIN 300 MG: 300 CAPSULE ORAL at 00:48

## 2019-09-30 ASSESSMENT — PAIN SCALES - GENERAL
PAINLEVEL_OUTOF10: 6
PAINLEVEL_OUTOF10: 0
PAINLEVEL_OUTOF10: 6
PAINLEVEL_OUTOF10: 8
PAINLEVEL_OUTOF10: 4

## 2019-09-30 ASSESSMENT — PAIN DESCRIPTION - FREQUENCY
FREQUENCY: CONTINUOUS
FREQUENCY: CONTINUOUS

## 2019-09-30 ASSESSMENT — PAIN DESCRIPTION - PROGRESSION
CLINICAL_PROGRESSION: GRADUALLY WORSENING
CLINICAL_PROGRESSION: GRADUALLY WORSENING

## 2019-09-30 ASSESSMENT — PAIN DESCRIPTION - ORIENTATION
ORIENTATION: RIGHT;LEFT
ORIENTATION: RIGHT;LEFT

## 2019-09-30 ASSESSMENT — PAIN DESCRIPTION - LOCATION
LOCATION: BACK;HIP
LOCATION: HIP;BACK

## 2019-09-30 ASSESSMENT — PAIN DESCRIPTION - ONSET
ONSET: ON-GOING
ONSET: GRADUAL

## 2019-09-30 ASSESSMENT — PAIN DESCRIPTION - PAIN TYPE
TYPE: CHRONIC PAIN
TYPE: CHRONIC PAIN

## 2019-09-30 ASSESSMENT — PAIN DESCRIPTION - DESCRIPTORS
DESCRIPTORS: CONSTANT;CRAMPING
DESCRIPTORS: CONSTANT;CRAMPING

## 2019-10-18 ENCOUNTER — APPOINTMENT (OUTPATIENT)
Dept: GENERAL RADIOLOGY | Age: 59
End: 2019-10-18
Payer: MEDICARE

## 2019-10-18 ENCOUNTER — HOSPITAL ENCOUNTER (EMERGENCY)
Age: 59
Discharge: HOME | End: 2019-10-18
Attending: EMERGENCY MEDICINE
Payer: MEDICARE

## 2019-10-18 VITALS
BODY MASS INDEX: 18.48 KG/M2 | OXYGEN SATURATION: 97 % | DIASTOLIC BLOOD PRESSURE: 71 MMHG | TEMPERATURE: 99.7 F | HEIGHT: 66 IN | RESPIRATION RATE: 13 BRPM | SYSTOLIC BLOOD PRESSURE: 118 MMHG | HEART RATE: 61 BPM | WEIGHT: 115 LBS

## 2019-10-18 DIAGNOSIS — R07.9 CHEST PAIN, UNSPECIFIED TYPE: Primary | ICD-10-CM

## 2019-10-18 LAB
ALBUMIN SERPL-MCNC: 3.4 GM/DL (ref 3.4–5)
ALP BLD-CCNC: 97 IU/L (ref 40–129)
ALT SERPL-CCNC: 12 U/L (ref 10–40)
ANION GAP SERPL CALCULATED.3IONS-SCNC: 10 MMOL/L (ref 4–16)
AST SERPL-CCNC: 16 IU/L (ref 15–37)
BASOPHILS ABSOLUTE: 0.1 K/CU MM
BASOPHILS RELATIVE PERCENT: 0.9 % (ref 0–1)
BILIRUB SERPL-MCNC: 0.2 MG/DL (ref 0–1)
BUN BLDV-MCNC: 13 MG/DL (ref 6–23)
CALCIUM SERPL-MCNC: 7.8 MG/DL (ref 8.3–10.6)
CHLORIDE BLD-SCNC: 105 MMOL/L (ref 99–110)
CO2: 24 MMOL/L (ref 21–32)
CREAT SERPL-MCNC: 0.7 MG/DL (ref 0.6–1.1)
DIFFERENTIAL TYPE: ABNORMAL
EOSINOPHILS ABSOLUTE: 0 K/CU MM
EOSINOPHILS RELATIVE PERCENT: 0 % (ref 0–3)
GFR AFRICAN AMERICAN: >60 ML/MIN/1.73M2
GFR NON-AFRICAN AMERICAN: >60 ML/MIN/1.73M2
GLUCOSE BLD-MCNC: 74 MG/DL (ref 70–99)
HCT VFR BLD CALC: 32.3 % (ref 37–47)
HEMOGLOBIN: 9.7 GM/DL (ref 12.5–16)
HYPOCHROMIA: ABNORMAL
IMMATURE NEUTROPHIL %: 0.3 % (ref 0–0.43)
LYMPHOCYTES ABSOLUTE: 0.9 K/CU MM
LYMPHOCYTES RELATIVE PERCENT: 15.1 % (ref 24–44)
MACROCYTES: ABNORMAL
MCH RBC QN AUTO: 35.1 PG (ref 27–31)
MCHC RBC AUTO-ENTMCNC: 30 % (ref 32–36)
MCV RBC AUTO: 117 FL (ref 78–100)
MONOCYTES ABSOLUTE: 0.5 K/CU MM
MONOCYTES RELATIVE PERCENT: 9.2 % (ref 0–4)
PDW BLD-RTO: 15.2 % (ref 11.7–14.9)
PLATELET # BLD: 332 K/CU MM (ref 140–440)
PMV BLD AUTO: 9.6 FL (ref 7.5–11.1)
POTASSIUM SERPL-SCNC: 3.8 MMOL/L (ref 3.5–5.1)
PRO-BNP: 1407 PG/ML
RBC # BLD: 2.76 M/CU MM (ref 4.2–5.4)
RBC # BLD: ABNORMAL 10*6/UL
SEGMENTED NEUTROPHILS ABSOLUTE COUNT: 4.3 K/CU MM
SEGMENTED NEUTROPHILS RELATIVE PERCENT: 74.5 % (ref 36–66)
SODIUM BLD-SCNC: 139 MMOL/L (ref 135–145)
TOTAL IMMATURE NEUTOROPHIL: 0.02 K/CU MM
TOTAL PROTEIN: 5.4 GM/DL (ref 6.4–8.2)
TROPONIN T: <0.01 NG/ML
TROPONIN T: <0.01 NG/ML
WBC # BLD: 5.8 K/CU MM (ref 4–10.5)

## 2019-10-18 PROCEDURE — 85025 COMPLETE CBC W/AUTO DIFF WBC: CPT

## 2019-10-18 PROCEDURE — 99285 EMERGENCY DEPT VISIT HI MDM: CPT

## 2019-10-18 PROCEDURE — 93005 ELECTROCARDIOGRAM TRACING: CPT | Performed by: EMERGENCY MEDICINE

## 2019-10-18 PROCEDURE — 36415 COLL VENOUS BLD VENIPUNCTURE: CPT

## 2019-10-18 PROCEDURE — 83880 ASSAY OF NATRIURETIC PEPTIDE: CPT

## 2019-10-18 PROCEDURE — 6370000000 HC RX 637 (ALT 250 FOR IP): Performed by: EMERGENCY MEDICINE

## 2019-10-18 PROCEDURE — 71045 X-RAY EXAM CHEST 1 VIEW: CPT

## 2019-10-18 PROCEDURE — 73502 X-RAY EXAM HIP UNI 2-3 VIEWS: CPT

## 2019-10-18 PROCEDURE — 80053 COMPREHEN METABOLIC PANEL: CPT

## 2019-10-18 PROCEDURE — 84484 ASSAY OF TROPONIN QUANT: CPT

## 2019-10-18 RX ORDER — HYDROCODONE BITARTRATE AND ACETAMINOPHEN 5; 325 MG/1; MG/1
1 TABLET ORAL ONCE
Status: DISCONTINUED | OUTPATIENT
Start: 2019-10-18 | End: 2019-10-18

## 2019-10-18 RX ORDER — OXYCODONE HYDROCHLORIDE AND ACETAMINOPHEN 5; 325 MG/1; MG/1
2 TABLET ORAL ONCE
Status: COMPLETED | OUTPATIENT
Start: 2019-10-18 | End: 2019-10-18

## 2019-10-18 RX ORDER — OXYCODONE HYDROCHLORIDE 5 MG/1
15 TABLET ORAL ONCE
Status: COMPLETED | OUTPATIENT
Start: 2019-10-18 | End: 2019-10-18

## 2019-10-18 RX ADMIN — OXYCODONE HYDROCHLORIDE AND ACETAMINOPHEN 2 TABLET: 5; 325 TABLET ORAL at 21:04

## 2019-10-18 RX ADMIN — OXYCODONE HYDROCHLORIDE 15 MG: 5 TABLET ORAL at 22:05

## 2019-10-18 ASSESSMENT — PAIN SCALES - GENERAL
PAINLEVEL_OUTOF10: 10

## 2019-10-18 ASSESSMENT — PAIN DESCRIPTION - LOCATION: LOCATION: CHEST;HIP

## 2019-10-18 ASSESSMENT — PAIN DESCRIPTION - PAIN TYPE
TYPE: CHRONIC PAIN
TYPE: ACUTE PAIN

## 2019-10-18 ASSESSMENT — PAIN DESCRIPTION - ORIENTATION: ORIENTATION: RIGHT

## 2019-10-20 PROCEDURE — 93010 ELECTROCARDIOGRAM REPORT: CPT | Performed by: INTERNAL MEDICINE

## 2019-10-21 LAB
EKG ATRIAL RATE: 61 BPM
EKG DIAGNOSIS: NORMAL
EKG P AXIS: -17 DEGREES
EKG P-R INTERVAL: 196 MS
EKG Q-T INTERVAL: 484 MS
EKG QRS DURATION: 102 MS
EKG QTC CALCULATION (BAZETT): 487 MS
EKG R AXIS: -39 DEGREES
EKG T AXIS: -7 DEGREES
EKG VENTRICULAR RATE: 61 BPM

## 2019-10-23 ENCOUNTER — APPOINTMENT (OUTPATIENT)
Dept: GENERAL RADIOLOGY | Age: 59
End: 2019-10-23
Payer: MEDICARE

## 2019-10-23 ENCOUNTER — APPOINTMENT (OUTPATIENT)
Dept: CT IMAGING | Age: 59
End: 2019-10-23
Payer: MEDICARE

## 2019-10-23 ENCOUNTER — HOSPITAL ENCOUNTER (EMERGENCY)
Age: 59
Discharge: HOME | End: 2019-10-24
Payer: MEDICARE

## 2019-10-23 VITALS
WEIGHT: 115 LBS | BODY MASS INDEX: 18.48 KG/M2 | TEMPERATURE: 97.8 F | HEART RATE: 61 BPM | RESPIRATION RATE: 16 BRPM | OXYGEN SATURATION: 96 % | SYSTOLIC BLOOD PRESSURE: 99 MMHG | DIASTOLIC BLOOD PRESSURE: 61 MMHG | HEIGHT: 66 IN

## 2019-10-23 DIAGNOSIS — S00.83XA CONTUSION OF FACE, INITIAL ENCOUNTER: Primary | ICD-10-CM
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
## 2019-10-23 DIAGNOSIS — S82.002A CLOSED NONDISPLACED FRACTURE OF LEFT PATELLA, UNSPECIFIED FRACTURE MORPHOLOGY, INITIAL ENCOUNTER: ICD-10-CM

## 2019-10-23 PROCEDURE — 73552 X-RAY EXAM OF FEMUR 2/>: CPT

## 2019-10-23 PROCEDURE — 99284 EMERGENCY DEPT VISIT MOD MDM: CPT

## 2019-10-23 PROCEDURE — 72125 CT NECK SPINE W/O DYE: CPT

## 2019-10-23 PROCEDURE — 70450 CT HEAD/BRAIN W/O DYE: CPT

## 2019-10-23 PROCEDURE — 73700 CT LOWER EXTREMITY W/O DYE: CPT

## 2019-10-23 PROCEDURE — 73560 X-RAY EXAM OF KNEE 1 OR 2: CPT

## 2019-10-23 PROCEDURE — 6370000000 HC RX 637 (ALT 250 FOR IP): Performed by: PHYSICIAN ASSISTANT

## 2019-10-23 RX ORDER — HYDROCODONE BITARTRATE AND ACETAMINOPHEN 5; 325 MG/1; MG/1
1 TABLET ORAL ONCE
Status: COMPLETED | OUTPATIENT
Start: 2019-10-23 | End: 2019-10-23

## 2019-10-23 RX ORDER — BACITRACIN, NEOMYCIN, POLYMYXIN B 400; 3.5; 5 [USP'U]/G; MG/G; [USP'U]/G
OINTMENT TOPICAL ONCE
Status: COMPLETED | OUTPATIENT
Start: 2019-10-24 | End: 2019-10-24

## 2019-10-23 RX ORDER — OXYCODONE HYDROCHLORIDE AND ACETAMINOPHEN 5; 325 MG/1; MG/1
1 TABLET ORAL ONCE
Status: COMPLETED | OUTPATIENT
Start: 2019-10-23 | End: 2019-10-23

## 2019-10-23 RX ADMIN — HYDROCODONE BITARTRATE AND ACETAMINOPHEN 1 TABLET: 5; 325 TABLET ORAL at 20:03

## 2019-10-23 RX ADMIN — OXYCODONE HYDROCHLORIDE AND ACETAMINOPHEN 1 TABLET: 5; 325 TABLET ORAL at 22:19

## 2019-10-23 ASSESSMENT — PAIN DESCRIPTION - PAIN TYPE
TYPE: ACUTE PAIN
TYPE: ACUTE PAIN

## 2019-10-23 ASSESSMENT — PAIN DESCRIPTION - ORIENTATION: ORIENTATION: RIGHT

## 2019-10-23 ASSESSMENT — PAIN SCALES - GENERAL
PAINLEVEL_OUTOF10: 10
PAINLEVEL_OUTOF10: 8

## 2019-10-23 ASSESSMENT — PAIN DESCRIPTION - LOCATION: LOCATION: KNEE;HIP

## 2019-10-24 ENCOUNTER — OFFICE VISIT (OUTPATIENT)
Dept: ORTHOPEDIC SURGERY | Age: 59
End: 2019-10-24
Payer: MEDICARE

## 2019-10-24 VITALS — RESPIRATION RATE: 16 BRPM | BODY MASS INDEX: 18 KG/M2 | WEIGHT: 112 LBS | HEIGHT: 66 IN

## 2019-10-24 DIAGNOSIS — S82.035A CLOSED NONDISPLACED TRANSVERSE FRACTURE OF LEFT PATELLA, INITIAL ENCOUNTER: ICD-10-CM

## 2019-10-24 DIAGNOSIS — S80.01XA CONTUSION OF RIGHT KNEE, INITIAL ENCOUNTER: ICD-10-CM

## 2019-10-24 PROCEDURE — 6370000000 HC RX 637 (ALT 250 FOR IP): Performed by: PHYSICIAN ASSISTANT

## 2019-10-24 PROCEDURE — G8598 ASA/ANTIPLAT THER USED: HCPCS | Performed by: PHYSICIAN ASSISTANT

## 2019-10-24 PROCEDURE — 27520 TREAT KNEECAP FRACTURE: CPT | Performed by: PHYSICIAN ASSISTANT

## 2019-10-24 PROCEDURE — G8427 DOCREV CUR MEDS BY ELIG CLIN: HCPCS | Performed by: PHYSICIAN ASSISTANT

## 2019-10-24 PROCEDURE — 3017F COLORECTAL CA SCREEN DOC REV: CPT | Performed by: PHYSICIAN ASSISTANT

## 2019-10-24 PROCEDURE — 1036F TOBACCO NON-USER: CPT | Performed by: PHYSICIAN ASSISTANT

## 2019-10-24 PROCEDURE — G8484 FLU IMMUNIZE NO ADMIN: HCPCS | Performed by: PHYSICIAN ASSISTANT

## 2019-10-24 PROCEDURE — G8419 CALC BMI OUT NRM PARAM NOF/U: HCPCS | Performed by: PHYSICIAN ASSISTANT

## 2019-10-24 PROCEDURE — 99203 OFFICE O/P NEW LOW 30 MIN: CPT | Performed by: PHYSICIAN ASSISTANT

## 2019-10-24 RX ORDER — PAROXETINE 10 MG/1
TABLET, FILM COATED ORAL
Refills: 1 | COMMUNITY
Start: 2019-10-23 | End: 2020-05-24

## 2019-10-24 RX ORDER — PROCHLORPERAZINE MALEATE 5 MG/1
TABLET ORAL
Refills: 10 | Status: ON HOLD | COMMUNITY
Start: 2019-09-27 | End: 2019-11-18 | Stop reason: HOSPADM

## 2019-10-24 RX ORDER — LEVOTHYROXINE SODIUM 0.15 MG/1
TABLET ORAL
Refills: 1 | Status: ON HOLD | COMMUNITY
Start: 2019-10-22 | End: 2020-01-03 | Stop reason: HOSPADM

## 2019-10-24 RX ORDER — CYANOCOBALAMIN 1000 UG/ML
INJECTION INTRAMUSCULAR; SUBCUTANEOUS
Refills: 11 | COMMUNITY
Start: 2019-09-27 | End: 2021-01-22

## 2019-10-24 RX ORDER — BACITRACIN, NEOMYCIN, POLYMYXIN B 400; 3.5; 5 [USP'U]/G; MG/G; [USP'U]/G
OINTMENT TOPICAL
COMMUNITY
Start: 2019-10-24 | End: 2019-10-24

## 2019-10-24 RX ORDER — ISOSORBIDE MONONITRATE 30 MG/1
TABLET, EXTENDED RELEASE ORAL
Refills: 3 | Status: ON HOLD | COMMUNITY
Start: 2019-10-22 | End: 2019-12-19 | Stop reason: SDUPTHER

## 2019-10-24 RX ADMIN — BACITRACIN, NEOMYCIN, POLYMYXIN B: 400; 3.5; 5 OINTMENT TOPICAL at 00:31

## 2019-10-24 ASSESSMENT — PAIN SCALES - GENERAL: PAINLEVEL_OUTOF10: 8

## 2019-10-24 ASSESSMENT — PAIN DESCRIPTION - LOCATION: LOCATION: KNEE

## 2019-10-24 ASSESSMENT — PAIN DESCRIPTION - ORIENTATION: ORIENTATION: LEFT;RIGHT

## 2019-10-24 ASSESSMENT — PAIN DESCRIPTION - PAIN TYPE: TYPE: ACUTE PAIN

## 2019-10-27 ENCOUNTER — HOSPITAL ENCOUNTER (INPATIENT)
Age: 59
LOS: 4 days | Discharge: NURSING, REHAB OR LONG-TERM CARE | DRG: 246 | End: 2019-11-01
Attending: EMERGENCY MEDICINE | Admitting: INTERNAL MEDICINE
Payer: MEDICARE

## 2019-10-27 ENCOUNTER — APPOINTMENT (OUTPATIENT)
Dept: GENERAL RADIOLOGY | Age: 59
DRG: 246 | End: 2019-10-27
Payer: MEDICARE

## 2019-10-27 DIAGNOSIS — S80.01XA CONTUSION OF RIGHT KNEE, INITIAL ENCOUNTER: ICD-10-CM

## 2019-10-27 DIAGNOSIS — S82.035A CLOSED NONDISPLACED TRANSVERSE FRACTURE OF LEFT PATELLA, INITIAL ENCOUNTER: ICD-10-CM

## 2019-10-27 DIAGNOSIS — R07.9 CHEST PAIN, UNSPECIFIED TYPE: Primary | ICD-10-CM

## 2019-10-27 LAB
ALBUMIN SERPL-MCNC: 3.7 GM/DL (ref 3.4–5)
ALP BLD-CCNC: 103 IU/L (ref 40–128)
ALT SERPL-CCNC: 11 U/L (ref 10–40)
ANION GAP SERPL CALCULATED.3IONS-SCNC: 11 MMOL/L (ref 4–16)
AST SERPL-CCNC: 16 IU/L (ref 15–37)
BASE EXCESS: ABNORMAL (ref 0–2.4)
BASOPHILS ABSOLUTE: 0 K/CU MM
BASOPHILS RELATIVE PERCENT: 0.7 % (ref 0–1)
BILIRUB SERPL-MCNC: 0.3 MG/DL (ref 0–1)
BUN BLDV-MCNC: 14 MG/DL (ref 6–23)
CALCIUM SERPL-MCNC: 9 MG/DL (ref 8.3–10.6)
CHLORIDE BLD-SCNC: 103 MMOL/L (ref 99–110)
CO2: 25 MMOL/L (ref 21–32)
COMMENT: ABNORMAL
CREAT SERPL-MCNC: 0.8 MG/DL (ref 0.6–1.1)
DIFFERENTIAL TYPE: ABNORMAL
EOSINOPHILS ABSOLUTE: 0 K/CU MM
EOSINOPHILS RELATIVE PERCENT: 0 % (ref 0–3)
GFR AFRICAN AMERICAN: >60 ML/MIN/1.73M2
GFR NON-AFRICAN AMERICAN: >60 ML/MIN/1.73M2
GLUCOSE BLD-MCNC: 91 MG/DL (ref 70–99)
HCO3 VENOUS: 22.9 MMOL/L (ref 19–25)
HCT VFR BLD CALC: 39.2 % (ref 37–47)
HEMOGLOBIN: 12.2 GM/DL (ref 12.5–16)
IMMATURE NEUTROPHIL %: 0.3 % (ref 0–0.43)
LYMPHOCYTES ABSOLUTE: 0.5 K/CU MM
LYMPHOCYTES RELATIVE PERCENT: 7.8 % (ref 24–44)
MCH RBC QN AUTO: 35 PG (ref 27–31)
MCHC RBC AUTO-ENTMCNC: 31.1 % (ref 32–36)
MCV RBC AUTO: 112.3 FL (ref 78–100)
MONOCYTES ABSOLUTE: 0.3 K/CU MM
MONOCYTES RELATIVE PERCENT: 5.8 % (ref 0–4)
NUCLEATED RBC %: 0 %
O2 SAT, VEN: 87.2 % (ref 50–70)
PCO2, VEN: 33 MMHG (ref 38–52)
PDW BLD-RTO: 14.9 % (ref 11.7–14.9)
PH VENOUS: 7.45 (ref 7.32–7.42)
PLATELET # BLD: 392 K/CU MM (ref 140–440)
PMV BLD AUTO: 9.8 FL (ref 7.5–11.1)
PO2, VEN: 58 MMHG (ref 28–48)
POTASSIUM SERPL-SCNC: 3.9 MMOL/L (ref 3.5–5.1)
PRO-BNP: 919.7 PG/ML
RBC # BLD: 3.49 M/CU MM (ref 4.2–5.4)
SEGMENTED NEUTROPHILS ABSOLUTE COUNT: 5.1 K/CU MM
SEGMENTED NEUTROPHILS RELATIVE PERCENT: 85.4 % (ref 36–66)
SODIUM BLD-SCNC: 139 MMOL/L (ref 135–145)
TOTAL IMMATURE NEUTOROPHIL: 0.02 K/CU MM
TOTAL NUCLEATED RBC: 0 K/CU MM
TOTAL PROTEIN: 6.3 GM/DL (ref 6.4–8.2)
TROPONIN T: <0.01 NG/ML
TROPONIN T: <0.01 NG/ML
WBC # BLD: 5.9 K/CU MM (ref 4–10.5)

## 2019-10-27 PROCEDURE — 83880 ASSAY OF NATRIURETIC PEPTIDE: CPT

## 2019-10-27 PROCEDURE — G0378 HOSPITAL OBSERVATION PER HR: HCPCS

## 2019-10-27 PROCEDURE — 84484 ASSAY OF TROPONIN QUANT: CPT

## 2019-10-27 PROCEDURE — 36415 COLL VENOUS BLD VENIPUNCTURE: CPT

## 2019-10-27 PROCEDURE — 6370000000 HC RX 637 (ALT 250 FOR IP): Performed by: EMERGENCY MEDICINE

## 2019-10-27 PROCEDURE — 71045 X-RAY EXAM CHEST 1 VIEW: CPT

## 2019-10-27 PROCEDURE — 93005 ELECTROCARDIOGRAM TRACING: CPT | Performed by: EMERGENCY MEDICINE

## 2019-10-27 PROCEDURE — 82805 BLOOD GASES W/O2 SATURATION: CPT

## 2019-10-27 PROCEDURE — 85025 COMPLETE CBC W/AUTO DIFF WBC: CPT

## 2019-10-27 PROCEDURE — 99285 EMERGENCY DEPT VISIT HI MDM: CPT

## 2019-10-27 PROCEDURE — 6370000000 HC RX 637 (ALT 250 FOR IP): Performed by: NURSE PRACTITIONER

## 2019-10-27 PROCEDURE — 2580000003 HC RX 258: Performed by: NURSE PRACTITIONER

## 2019-10-27 PROCEDURE — 80053 COMPREHEN METABOLIC PANEL: CPT

## 2019-10-27 RX ORDER — ATORVASTATIN CALCIUM 40 MG/1
80 TABLET, FILM COATED ORAL NIGHTLY
Status: DISCONTINUED | OUTPATIENT
Start: 2019-10-27 | End: 2019-11-02 | Stop reason: HOSPADM

## 2019-10-27 RX ORDER — ASPIRIN 81 MG/1
324 TABLET, CHEWABLE ORAL ONCE
Status: DISCONTINUED | OUTPATIENT
Start: 2019-10-27 | End: 2019-10-31

## 2019-10-27 RX ORDER — LEVETIRACETAM 500 MG/1
1000 TABLET ORAL 2 TIMES DAILY
Status: DISCONTINUED | OUTPATIENT
Start: 2019-10-27 | End: 2019-11-02 | Stop reason: HOSPADM

## 2019-10-27 RX ORDER — POTASSIUM CHLORIDE 20 MEQ/1
20 TABLET, EXTENDED RELEASE ORAL 2 TIMES DAILY
Status: DISCONTINUED | OUTPATIENT
Start: 2019-10-27 | End: 2019-10-29

## 2019-10-27 RX ORDER — SODIUM CHLORIDE 0.9 % (FLUSH) 0.9 %
10 SYRINGE (ML) INJECTION EVERY 12 HOURS SCHEDULED
Status: DISCONTINUED | OUTPATIENT
Start: 2019-10-27 | End: 2019-10-28 | Stop reason: SDUPTHER

## 2019-10-27 RX ORDER — DULOXETIN HYDROCHLORIDE 30 MG/1
60 CAPSULE, DELAYED RELEASE ORAL DAILY
Status: DISCONTINUED | OUTPATIENT
Start: 2019-10-28 | End: 2019-11-02 | Stop reason: HOSPADM

## 2019-10-27 RX ORDER — PROCHLORPERAZINE MALEATE 5 MG/1
5 TABLET ORAL
Status: DISCONTINUED | OUTPATIENT
Start: 2019-10-28 | End: 2019-11-02 | Stop reason: HOSPADM

## 2019-10-27 RX ORDER — PAROXETINE 10 MG/1
10 TABLET, FILM COATED ORAL DAILY
Status: DISCONTINUED | OUTPATIENT
Start: 2019-10-28 | End: 2019-11-02 | Stop reason: HOSPADM

## 2019-10-27 RX ORDER — MEXILETINE HYDROCHLORIDE 150 MG/1
150 CAPSULE ORAL 3 TIMES DAILY
Status: DISCONTINUED | OUTPATIENT
Start: 2019-10-27 | End: 2019-11-02 | Stop reason: HOSPADM

## 2019-10-27 RX ORDER — ASPIRIN 81 MG/1
81 TABLET, CHEWABLE ORAL DAILY
Status: DISCONTINUED | OUTPATIENT
Start: 2019-10-28 | End: 2019-10-28 | Stop reason: SDUPTHER

## 2019-10-27 RX ORDER — DOCUSATE SODIUM 100 MG/1
100 CAPSULE, LIQUID FILLED ORAL 2 TIMES DAILY PRN
Status: DISCONTINUED | OUTPATIENT
Start: 2019-10-27 | End: 2019-11-02 | Stop reason: HOSPADM

## 2019-10-27 RX ORDER — AMIODARONE HYDROCHLORIDE 200 MG/1
200 TABLET ORAL DAILY
Status: DISCONTINUED | OUTPATIENT
Start: 2019-10-28 | End: 2019-11-02 | Stop reason: HOSPADM

## 2019-10-27 RX ORDER — FAMOTIDINE 20 MG/1
20 TABLET, FILM COATED ORAL 2 TIMES DAILY
Status: DISCONTINUED | OUTPATIENT
Start: 2019-10-27 | End: 2019-11-02 | Stop reason: HOSPADM

## 2019-10-27 RX ORDER — ISOSORBIDE MONONITRATE 30 MG/1
30 TABLET, EXTENDED RELEASE ORAL DAILY
Status: DISCONTINUED | OUTPATIENT
Start: 2019-10-28 | End: 2019-11-02 | Stop reason: HOSPADM

## 2019-10-27 RX ORDER — RANOLAZINE 500 MG/1
1000 TABLET, EXTENDED RELEASE ORAL 2 TIMES DAILY
Status: DISCONTINUED | OUTPATIENT
Start: 2019-10-27 | End: 2019-10-30

## 2019-10-27 RX ORDER — OXYCODONE HYDROCHLORIDE 5 MG/1
15 TABLET ORAL ONCE
Status: COMPLETED | OUTPATIENT
Start: 2019-10-27 | End: 2019-10-27

## 2019-10-27 RX ORDER — CLOPIDOGREL BISULFATE 75 MG/1
75 TABLET ORAL DAILY
Status: DISCONTINUED | OUTPATIENT
Start: 2019-10-28 | End: 2019-10-28 | Stop reason: SDUPTHER

## 2019-10-27 RX ORDER — ACETAMINOPHEN 325 MG/1
650 TABLET ORAL EVERY 4 HOURS PRN
Status: DISCONTINUED | OUTPATIENT
Start: 2019-10-27 | End: 2019-10-28 | Stop reason: SDUPTHER

## 2019-10-27 RX ORDER — LEVOTHYROXINE SODIUM 0.15 MG/1
150 TABLET ORAL DAILY
Status: DISCONTINUED | OUTPATIENT
Start: 2019-10-28 | End: 2019-11-02 | Stop reason: HOSPADM

## 2019-10-27 RX ORDER — ALBUTEROL SULFATE 90 UG/1
2 AEROSOL, METERED RESPIRATORY (INHALATION) EVERY 6 HOURS PRN
Status: DISCONTINUED | OUTPATIENT
Start: 2019-10-27 | End: 2019-11-02 | Stop reason: HOSPADM

## 2019-10-27 RX ORDER — LACTOBACILLUS RHAMNOSUS GG 10B CELL
1 CAPSULE ORAL
Status: DISCONTINUED | OUTPATIENT
Start: 2019-10-28 | End: 2019-11-02 | Stop reason: HOSPADM

## 2019-10-27 RX ORDER — SODIUM CHLORIDE 0.9 % (FLUSH) 0.9 %
10 SYRINGE (ML) INJECTION PRN
Status: DISCONTINUED | OUTPATIENT
Start: 2019-10-27 | End: 2019-10-28 | Stop reason: SDUPTHER

## 2019-10-27 RX ORDER — ONDANSETRON 2 MG/ML
4 INJECTION INTRAMUSCULAR; INTRAVENOUS EVERY 6 HOURS PRN
Status: DISCONTINUED | OUTPATIENT
Start: 2019-10-27 | End: 2019-10-28 | Stop reason: SDUPTHER

## 2019-10-27 RX ORDER — GABAPENTIN 300 MG/1
300 CAPSULE ORAL 4 TIMES DAILY
Status: DISCONTINUED | OUTPATIENT
Start: 2019-10-27 | End: 2019-11-02 | Stop reason: HOSPADM

## 2019-10-27 RX ORDER — TORSEMIDE 20 MG/1
20 TABLET ORAL DAILY
Status: DISCONTINUED | OUTPATIENT
Start: 2019-10-28 | End: 2019-10-29

## 2019-10-27 RX ORDER — SPIRONOLACTONE 25 MG/1
25 TABLET ORAL 2 TIMES DAILY
Status: DISCONTINUED | OUTPATIENT
Start: 2019-10-27 | End: 2019-10-29

## 2019-10-27 RX ORDER — NITROGLYCERIN 0.4 MG/1
0.4 TABLET SUBLINGUAL EVERY 5 MIN PRN
Status: DISCONTINUED | OUTPATIENT
Start: 2019-10-27 | End: 2019-11-02 | Stop reason: HOSPADM

## 2019-10-27 RX ORDER — FERROUS GLUCONATE 324(37.5)
324 TABLET ORAL 2 TIMES DAILY
Status: DISCONTINUED | OUTPATIENT
Start: 2019-10-27 | End: 2019-11-02 | Stop reason: HOSPADM

## 2019-10-27 RX ADMIN — POTASSIUM CHLORIDE 20 MEQ: 20 TABLET, EXTENDED RELEASE ORAL at 21:20

## 2019-10-27 RX ADMIN — LEVETIRACETAM 1000 MG: 500 TABLET, FILM COATED ORAL at 21:20

## 2019-10-27 RX ADMIN — ATORVASTATIN CALCIUM 80 MG: 40 TABLET, FILM COATED ORAL at 21:20

## 2019-10-27 RX ADMIN — FAMOTIDINE 20 MG: 20 TABLET ORAL at 21:21

## 2019-10-27 RX ADMIN — OXYCODONE HYDROCHLORIDE 15 MG: 5 TABLET ORAL at 19:15

## 2019-10-27 RX ADMIN — GABAPENTIN 300 MG: 300 CAPSULE ORAL at 21:20

## 2019-10-27 RX ADMIN — APIXABAN 5 MG: 5 TABLET, FILM COATED ORAL at 21:21

## 2019-10-27 RX ADMIN — Medication 800 MG: at 21:21

## 2019-10-27 RX ADMIN — OXYCODONE HYDROCHLORIDE 15 MG: 5 TABLET ORAL at 22:15

## 2019-10-27 RX ADMIN — RANOLAZINE 1000 MG: 500 TABLET, FILM COATED, EXTENDED RELEASE ORAL at 21:20

## 2019-10-27 RX ADMIN — MEXILETINE HYDROCHLORIDE 150 MG: 150 CAPSULE ORAL at 21:24

## 2019-10-27 RX ADMIN — Medication 10 ML: at 21:21

## 2019-10-27 RX ADMIN — FERROUS GLUCONATE TAB 324 MG (37.5 MG ELEMENTAL IRON) 324 MG: 324 (37.5 FE) TAB at 21:21

## 2019-10-27 ASSESSMENT — ENCOUNTER SYMPTOMS
SHORTNESS OF BREATH: 0
VOMITING: 0
NAUSEA: 0
WHEEZING: 0
CONSTIPATION: 0
DIARRHEA: 0
SINUS PRESSURE: 0
COUGH: 0
RHINORRHEA: 0
SORE THROAT: 0
ABDOMINAL PAIN: 0

## 2019-10-27 ASSESSMENT — PAIN DESCRIPTION - LOCATION
LOCATION: CHEST
LOCATION: CHEST

## 2019-10-27 ASSESSMENT — PAIN DESCRIPTION - ORIENTATION: ORIENTATION: LEFT

## 2019-10-27 ASSESSMENT — PAIN SCALES - GENERAL
PAINLEVEL_OUTOF10: 9
PAINLEVEL_OUTOF10: 10
PAINLEVEL_OUTOF10: 10

## 2019-10-27 ASSESSMENT — PAIN DESCRIPTION - PAIN TYPE
TYPE: ACUTE PAIN
TYPE: ACUTE PAIN

## 2019-10-28 ENCOUNTER — TELEPHONE (OUTPATIENT)
Dept: CARDIOLOGY CLINIC | Age: 59
End: 2019-10-28

## 2019-10-28 ENCOUNTER — APPOINTMENT (OUTPATIENT)
Dept: GENERAL RADIOLOGY | Age: 59
DRG: 246 | End: 2019-10-28
Payer: MEDICARE

## 2019-10-28 LAB
ACTIVATED CLOTTING TIME, LOW RANGE: 153 SEC
ACTIVATED CLOTTING TIME, LOW RANGE: 196 SEC
ACTIVATED CLOTTING TIME, LOW RANGE: 254 SEC
ACTIVATED CLOTTING TIME, LOW RANGE: 372 SEC
BACTERIA: ABNORMAL /HPF
BILIRUBIN URINE: NEGATIVE MG/DL
BLOOD, URINE: NEGATIVE
CHOLESTEROL: 165 MG/DL
CLARITY: CLEAR
COLOR: YELLOW
EKG ATRIAL RATE: 61 BPM
EKG ATRIAL RATE: 65 BPM
EKG DIAGNOSIS: NORMAL
EKG DIAGNOSIS: NORMAL
EKG P AXIS: 18 DEGREES
EKG P-R INTERVAL: 192 MS
EKG P-R INTERVAL: 88 MS
EKG Q-T INTERVAL: 360 MS
EKG Q-T INTERVAL: 448 MS
EKG QRS DURATION: 84 MS
EKG QRS DURATION: 98 MS
EKG QTC CALCULATION (BAZETT): 374 MS
EKG QTC CALCULATION (BAZETT): 450 MS
EKG R AXIS: -35 DEGREES
EKG R AXIS: -47 DEGREES
EKG T AXIS: -57 DEGREES
EKG T AXIS: 116 DEGREES
EKG VENTRICULAR RATE: 61 BPM
EKG VENTRICULAR RATE: 65 BPM
GLUCOSE, URINE: NEGATIVE MG/DL
HCT VFR BLD CALC: 35 % (ref 37–47)
HDLC SERPL-MCNC: 66 MG/DL
HEMOGLOBIN: 11 GM/DL (ref 12.5–16)
KETONES, URINE: NEGATIVE MG/DL
LDL CHOLESTEROL DIRECT: 89 MG/DL
LEUKOCYTE ESTERASE, URINE: ABNORMAL
MCH RBC QN AUTO: 34.7 PG (ref 27–31)
MCHC RBC AUTO-ENTMCNC: 31.4 % (ref 32–36)
MCV RBC AUTO: 110.4 FL (ref 78–100)
NITRITE URINE, QUANTITATIVE: POSITIVE
PDW BLD-RTO: 14.8 % (ref 11.7–14.9)
PH, URINE: 7 (ref 5–8)
PLATELET # BLD: 318 K/CU MM (ref 140–440)
PMV BLD AUTO: 9.1 FL (ref 7.5–11.1)
PROTEIN UA: NEGATIVE MG/DL
RBC # BLD: 3.17 M/CU MM (ref 4.2–5.4)
RBC URINE: 1 /HPF (ref 0–6)
SPECIFIC GRAVITY UA: 1.01 (ref 1–1.03)
SQUAMOUS EPITHELIAL: <1 /HPF
TRICHOMONAS: ABNORMAL /HPF
TRIGL SERPL-MCNC: 80 MG/DL
TROPONIN T: <0.01 NG/ML
UROBILINOGEN, URINE: NORMAL MG/DL (ref 0.2–1)
WBC # BLD: 5.2 K/CU MM (ref 4–10.5)
WBC UA: 25 /HPF (ref 0–5)

## 2019-10-28 PROCEDURE — B2111ZZ FLUOROSCOPY OF MULTIPLE CORONARY ARTERIES USING LOW OSMOLAR CONTRAST: ICD-10-PCS | Performed by: INTERNAL MEDICINE

## 2019-10-28 PROCEDURE — C1887 CATHETER, GUIDING: HCPCS

## 2019-10-28 PROCEDURE — 99222 1ST HOSP IP/OBS MODERATE 55: CPT | Performed by: INTERNAL MEDICINE

## 2019-10-28 PROCEDURE — 93459 L HRT ART/GRFT ANGIO: CPT | Performed by: INTERNAL MEDICINE

## 2019-10-28 PROCEDURE — 81001 URINALYSIS AUTO W/SCOPE: CPT

## 2019-10-28 PROCEDURE — B2151ZZ FLUOROSCOPY OF LEFT HEART USING LOW OSMOLAR CONTRAST: ICD-10-PCS | Performed by: INTERNAL MEDICINE

## 2019-10-28 PROCEDURE — 93005 ELECTROCARDIOGRAM TRACING: CPT | Performed by: NURSE PRACTITIONER

## 2019-10-28 PROCEDURE — 87077 CULTURE AEROBIC IDENTIFY: CPT

## 2019-10-28 PROCEDURE — 84484 ASSAY OF TROPONIN QUANT: CPT

## 2019-10-28 PROCEDURE — 87040 BLOOD CULTURE FOR BACTERIA: CPT

## 2019-10-28 PROCEDURE — 2709999900 HC NON-CHARGEABLE SUPPLY

## 2019-10-28 PROCEDURE — 83721 ASSAY OF BLOOD LIPOPROTEIN: CPT

## 2019-10-28 PROCEDURE — C1894 INTRO/SHEATH, NON-LASER: HCPCS

## 2019-10-28 PROCEDURE — B2131ZZ FLUOROSCOPY OF MULTIPLE CORONARY ARTERY BYPASS GRAFTS USING LOW OSMOLAR CONTRAST: ICD-10-PCS | Performed by: INTERNAL MEDICINE

## 2019-10-28 PROCEDURE — 85027 COMPLETE CBC AUTOMATED: CPT

## 2019-10-28 PROCEDURE — 6370000000 HC RX 637 (ALT 250 FOR IP): Performed by: INTERNAL MEDICINE

## 2019-10-28 PROCEDURE — 027034Z DILATION OF CORONARY ARTERY, ONE ARTERY WITH DRUG-ELUTING INTRALUMINAL DEVICE, PERCUTANEOUS APPROACH: ICD-10-PCS | Performed by: INTERNAL MEDICINE

## 2019-10-28 PROCEDURE — 93459 L HRT ART/GRFT ANGIO: CPT

## 2019-10-28 PROCEDURE — 2580000003 HC RX 258: Performed by: INTERNAL MEDICINE

## 2019-10-28 PROCEDURE — 2500000003 HC RX 250 WO HCPCS

## 2019-10-28 PROCEDURE — 93010 ELECTROCARDIOGRAM REPORT: CPT | Performed by: INTERNAL MEDICINE

## 2019-10-28 PROCEDURE — 80061 LIPID PANEL: CPT

## 2019-10-28 PROCEDURE — C1874 STENT, COATED/COV W/DEL SYS: HCPCS

## 2019-10-28 PROCEDURE — 6360000004 HC RX CONTRAST MEDICATION

## 2019-10-28 PROCEDURE — 2580000003 HC RX 258

## 2019-10-28 PROCEDURE — C1725 CATH, TRANSLUMIN NON-LASER: HCPCS

## 2019-10-28 PROCEDURE — 6370000000 HC RX 637 (ALT 250 FOR IP): Performed by: NURSE PRACTITIONER

## 2019-10-28 PROCEDURE — 87086 URINE CULTURE/COLONY COUNT: CPT

## 2019-10-28 PROCEDURE — 4A023N7 MEASUREMENT OF CARDIAC SAMPLING AND PRESSURE, LEFT HEART, PERCUTANEOUS APPROACH: ICD-10-PCS | Performed by: INTERNAL MEDICINE

## 2019-10-28 PROCEDURE — 2140000000 HC CCU INTERMEDIATE R&B

## 2019-10-28 PROCEDURE — 6360000002 HC RX W HCPCS

## 2019-10-28 PROCEDURE — C1769 GUIDE WIRE: HCPCS

## 2019-10-28 PROCEDURE — 85347 COAGULATION TIME ACTIVATED: CPT

## 2019-10-28 PROCEDURE — 92937 PRQ TRLUML REVSC CAB GRF 1: CPT | Performed by: INTERNAL MEDICINE

## 2019-10-28 PROCEDURE — 73560 X-RAY EXAM OF KNEE 1 OR 2: CPT

## 2019-10-28 PROCEDURE — 87186 SC STD MICRODIL/AGAR DIL: CPT

## 2019-10-28 PROCEDURE — 36415 COLL VENOUS BLD VENIPUNCTURE: CPT

## 2019-10-28 PROCEDURE — 6370000000 HC RX 637 (ALT 250 FOR IP)

## 2019-10-28 PROCEDURE — C9604 PERC D-E COR REVASC T CABG S: HCPCS

## 2019-10-28 RX ORDER — SODIUM CHLORIDE 0.9 % (FLUSH) 0.9 %
10 SYRINGE (ML) INJECTION PRN
Status: DISCONTINUED | OUTPATIENT
Start: 2019-10-28 | End: 2019-11-02 | Stop reason: HOSPADM

## 2019-10-28 RX ORDER — ACETAMINOPHEN 325 MG/1
650 TABLET ORAL EVERY 4 HOURS PRN
Status: DISCONTINUED | OUTPATIENT
Start: 2019-10-28 | End: 2019-11-02 | Stop reason: HOSPADM

## 2019-10-28 RX ORDER — SODIUM CHLORIDE 0.9 % (FLUSH) 0.9 %
10 SYRINGE (ML) INJECTION EVERY 12 HOURS SCHEDULED
Status: DISCONTINUED | OUTPATIENT
Start: 2019-10-28 | End: 2019-10-28 | Stop reason: SDUPTHER

## 2019-10-28 RX ORDER — SODIUM CHLORIDE 9 MG/ML
INJECTION, SOLUTION INTRAVENOUS CONTINUOUS
Status: DISCONTINUED | OUTPATIENT
Start: 2019-10-28 | End: 2019-10-28 | Stop reason: SDUPTHER

## 2019-10-28 RX ORDER — OXYCODONE HYDROCHLORIDE 10 MG/1
20 TABLET ORAL
Status: DISCONTINUED | OUTPATIENT
Start: 2019-10-28 | End: 2019-11-02 | Stop reason: HOSPADM

## 2019-10-28 RX ORDER — SODIUM CHLORIDE 0.9 % (FLUSH) 0.9 %
10 SYRINGE (ML) INJECTION PRN
Status: DISCONTINUED | OUTPATIENT
Start: 2019-10-28 | End: 2019-10-28 | Stop reason: SDUPTHER

## 2019-10-28 RX ORDER — SODIUM CHLORIDE 9 MG/ML
1000 INJECTION, SOLUTION INTRAVENOUS CONTINUOUS
Status: DISCONTINUED | OUTPATIENT
Start: 2019-10-28 | End: 2019-10-29

## 2019-10-28 RX ORDER — ONDANSETRON 2 MG/ML
4 INJECTION INTRAMUSCULAR; INTRAVENOUS EVERY 6 HOURS PRN
Status: DISCONTINUED | OUTPATIENT
Start: 2019-10-28 | End: 2019-11-02 | Stop reason: HOSPADM

## 2019-10-28 RX ORDER — CLOPIDOGREL BISULFATE 75 MG/1
75 TABLET ORAL DAILY
Status: DISCONTINUED | OUTPATIENT
Start: 2019-10-29 | End: 2019-11-02 | Stop reason: HOSPADM

## 2019-10-28 RX ORDER — SODIUM CHLORIDE 0.9 % (FLUSH) 0.9 %
10 SYRINGE (ML) INJECTION EVERY 12 HOURS SCHEDULED
Status: DISCONTINUED | OUTPATIENT
Start: 2019-10-28 | End: 2019-11-02 | Stop reason: HOSPADM

## 2019-10-28 RX ORDER — OXYCODONE HYDROCHLORIDE 10 MG/1
20 TABLET ORAL EVERY 4 HOURS PRN
Status: DISCONTINUED | OUTPATIENT
Start: 2019-10-28 | End: 2019-10-28

## 2019-10-28 RX ORDER — SODIUM CHLORIDE 9 MG/ML
INJECTION, SOLUTION INTRAVENOUS
Status: COMPLETED
Start: 2019-10-28 | End: 2019-10-28

## 2019-10-28 RX ORDER — ASPIRIN 81 MG/1
81 TABLET ORAL DAILY
Status: DISCONTINUED | OUTPATIENT
Start: 2019-10-29 | End: 2019-11-02 | Stop reason: HOSPADM

## 2019-10-28 RX ADMIN — PROCHLORPERAZINE MALEATE 5 MG: 5 TABLET ORAL at 23:29

## 2019-10-28 RX ADMIN — OXYCODONE HYDROCHLORIDE 15 MG: 5 TABLET ORAL at 05:00

## 2019-10-28 RX ADMIN — SODIUM CHLORIDE: 9 INJECTION, SOLUTION INTRAVENOUS at 19:15

## 2019-10-28 RX ADMIN — OXYCODONE HYDROCHLORIDE 15 MG: 5 TABLET ORAL at 01:29

## 2019-10-28 RX ADMIN — ACETAMINOPHEN 650 MG: 325 TABLET ORAL at 05:00

## 2019-10-28 RX ADMIN — SPIRONOLACTONE 25 MG: 25 TABLET ORAL at 19:11

## 2019-10-28 RX ADMIN — SPIRONOLACTONE 25 MG: 25 TABLET ORAL at 09:35

## 2019-10-28 RX ADMIN — PROCHLORPERAZINE MALEATE 5 MG: 5 TABLET ORAL at 06:02

## 2019-10-28 RX ADMIN — FAMOTIDINE 20 MG: 20 TABLET ORAL at 09:33

## 2019-10-28 RX ADMIN — OXYCODONE HYDROCHLORIDE 15 MG: 5 TABLET ORAL at 08:14

## 2019-10-28 RX ADMIN — LEVOTHYROXINE SODIUM 150 MCG: 150 TABLET ORAL at 06:02

## 2019-10-28 RX ADMIN — FERROUS GLUCONATE TAB 324 MG (37.5 MG ELEMENTAL IRON) 324 MG: 324 (37.5 FE) TAB at 19:12

## 2019-10-28 RX ADMIN — Medication 800 MG: at 22:40

## 2019-10-28 RX ADMIN — RANOLAZINE 1000 MG: 500 TABLET, FILM COATED, EXTENDED RELEASE ORAL at 22:40

## 2019-10-28 RX ADMIN — OXYCODONE HYDROCHLORIDE 20 MG: 10 TABLET ORAL at 15:49

## 2019-10-28 RX ADMIN — OXYCODONE HYDROCHLORIDE 20 MG: 10 TABLET ORAL at 19:11

## 2019-10-28 RX ADMIN — GABAPENTIN 300 MG: 300 CAPSULE ORAL at 22:41

## 2019-10-28 RX ADMIN — Medication 1 CAPSULE: at 19:12

## 2019-10-28 RX ADMIN — TORSEMIDE 20 MG: 20 TABLET ORAL at 09:35

## 2019-10-28 RX ADMIN — AMIODARONE HYDROCHLORIDE 200 MG: 200 TABLET ORAL at 09:34

## 2019-10-28 RX ADMIN — OXYCODONE HYDROCHLORIDE 20 MG: 10 TABLET ORAL at 22:40

## 2019-10-28 RX ADMIN — POTASSIUM CHLORIDE 20 MEQ: 20 TABLET, EXTENDED RELEASE ORAL at 09:33

## 2019-10-28 RX ADMIN — LEVETIRACETAM 1000 MG: 500 TABLET, FILM COATED ORAL at 22:40

## 2019-10-28 RX ADMIN — FAMOTIDINE 20 MG: 20 TABLET ORAL at 22:40

## 2019-10-28 RX ADMIN — GABAPENTIN 300 MG: 300 CAPSULE ORAL at 19:11

## 2019-10-28 RX ADMIN — FERROUS GLUCONATE TAB 324 MG (37.5 MG ELEMENTAL IRON) 324 MG: 324 (37.5 FE) TAB at 09:34

## 2019-10-28 RX ADMIN — SODIUM CHLORIDE 1000 ML: 9 INJECTION, SOLUTION INTRAVENOUS at 18:59

## 2019-10-28 RX ADMIN — POTASSIUM CHLORIDE 20 MEQ: 20 TABLET, EXTENDED RELEASE ORAL at 22:40

## 2019-10-28 RX ADMIN — RANOLAZINE 1000 MG: 500 TABLET, FILM COATED, EXTENDED RELEASE ORAL at 09:33

## 2019-10-28 RX ADMIN — MEXILETINE HYDROCHLORIDE 150 MG: 150 CAPSULE ORAL at 09:33

## 2019-10-28 RX ADMIN — CLOPIDOGREL BISULFATE 75 MG: 75 TABLET ORAL at 09:34

## 2019-10-28 RX ADMIN — MEXILETINE HYDROCHLORIDE 150 MG: 150 CAPSULE ORAL at 23:29

## 2019-10-28 RX ADMIN — GABAPENTIN 300 MG: 300 CAPSULE ORAL at 09:35

## 2019-10-28 RX ADMIN — ATORVASTATIN CALCIUM 80 MG: 40 TABLET, FILM COATED ORAL at 22:40

## 2019-10-28 RX ADMIN — DULOXETINE HYDROCHLORIDE 60 MG: 30 CAPSULE, DELAYED RELEASE ORAL at 09:35

## 2019-10-28 RX ADMIN — PAROXETINE 10 MG: 10 TABLET, FILM COATED ORAL at 09:34

## 2019-10-28 RX ADMIN — LEVETIRACETAM 1000 MG: 500 TABLET, FILM COATED ORAL at 09:33

## 2019-10-28 RX ADMIN — Medication 800 MG: at 09:34

## 2019-10-28 RX ADMIN — ISOSORBIDE MONONITRATE 30 MG: 30 TABLET, EXTENDED RELEASE ORAL at 09:35

## 2019-10-28 RX ADMIN — ASPIRIN 81 MG 81 MG: 81 TABLET ORAL at 09:34

## 2019-10-28 RX ADMIN — Medication 1 CAPSULE: at 09:35

## 2019-10-28 ASSESSMENT — PAIN SCALES - GENERAL
PAINLEVEL_OUTOF10: 8
PAINLEVEL_OUTOF10: 10
PAINLEVEL_OUTOF10: 8
PAINLEVEL_OUTOF10: 8
PAINLEVEL_OUTOF10: 9
PAINLEVEL_OUTOF10: 8
PAINLEVEL_OUTOF10: 9
PAINLEVEL_OUTOF10: 8
PAINLEVEL_OUTOF10: 5
PAINLEVEL_OUTOF10: 5

## 2019-10-28 ASSESSMENT — PAIN DESCRIPTION - PAIN TYPE
TYPE: CHRONIC PAIN
TYPE: ACUTE PAIN
TYPE: CHRONIC PAIN

## 2019-10-28 ASSESSMENT — PAIN DESCRIPTION - PROGRESSION
CLINICAL_PROGRESSION: NOT CHANGED
CLINICAL_PROGRESSION: GRADUALLY IMPROVING
CLINICAL_PROGRESSION: NOT CHANGED

## 2019-10-28 ASSESSMENT — PAIN DESCRIPTION - DESCRIPTORS
DESCRIPTORS: PRESSURE
DESCRIPTORS: PRESSURE
DESCRIPTORS: ACHING

## 2019-10-28 ASSESSMENT — PAIN DESCRIPTION - ONSET
ONSET: PROGRESSIVE

## 2019-10-28 ASSESSMENT — PAIN DESCRIPTION - LOCATION
LOCATION: HIP
LOCATION: HIP

## 2019-10-28 ASSESSMENT — PAIN DESCRIPTION - ORIENTATION
ORIENTATION: RIGHT
ORIENTATION: LEFT
ORIENTATION: LEFT

## 2019-10-28 ASSESSMENT — PAIN DESCRIPTION - FREQUENCY
FREQUENCY: INTERMITTENT
FREQUENCY: CONTINUOUS

## 2019-10-29 LAB
ANION GAP SERPL CALCULATED.3IONS-SCNC: 14 MMOL/L (ref 4–16)
BUN BLDV-MCNC: 17 MG/DL (ref 6–23)
CALCIUM SERPL-MCNC: 7.7 MG/DL (ref 8.3–10.6)
CHLORIDE BLD-SCNC: 100 MMOL/L (ref 99–110)
CO2: 20 MMOL/L (ref 21–32)
CREAT SERPL-MCNC: 0.8 MG/DL (ref 0.6–1.1)
EKG ATRIAL RATE: 61 BPM
EKG DIAGNOSIS: NORMAL
EKG P AXIS: -27 DEGREES
EKG P-R INTERVAL: 188 MS
EKG Q-T INTERVAL: 406 MS
EKG QRS DURATION: 102 MS
EKG QTC CALCULATION (BAZETT): 408 MS
EKG R AXIS: -35 DEGREES
EKG T AXIS: 197 DEGREES
EKG VENTRICULAR RATE: 61 BPM
GFR AFRICAN AMERICAN: >60 ML/MIN/1.73M2
GFR NON-AFRICAN AMERICAN: >60 ML/MIN/1.73M2
GLUCOSE BLD-MCNC: 99 MG/DL (ref 70–99)
HCT VFR BLD CALC: 35.2 % (ref 37–47)
HEMOGLOBIN: 10.5 GM/DL (ref 12.5–16)
LACTATE: 1.7 MMOL/L (ref 0.4–2)
MAGNESIUM: 2 MG/DL (ref 1.8–2.4)
MCH RBC QN AUTO: 35 PG (ref 27–31)
MCHC RBC AUTO-ENTMCNC: 29.8 % (ref 32–36)
MCV RBC AUTO: 117.3 FL (ref 78–100)
PDW BLD-RTO: 15.1 % (ref 11.7–14.9)
PLATELET # BLD: 304 K/CU MM (ref 140–440)
PMV BLD AUTO: 9.7 FL (ref 7.5–11.1)
POTASSIUM SERPL-SCNC: 4 MMOL/L (ref 3.5–5.1)
RBC # BLD: 3 M/CU MM (ref 4.2–5.4)
SODIUM BLD-SCNC: 134 MMOL/L (ref 135–145)
WBC # BLD: 6 K/CU MM (ref 4–10.5)

## 2019-10-29 PROCEDURE — 6360000002 HC RX W HCPCS: Performed by: INTERNAL MEDICINE

## 2019-10-29 PROCEDURE — 93005 ELECTROCARDIOGRAM TRACING: CPT | Performed by: INTERNAL MEDICINE

## 2019-10-29 PROCEDURE — 6370000000 HC RX 637 (ALT 250 FOR IP): Performed by: INTERNAL MEDICINE

## 2019-10-29 PROCEDURE — 83735 ASSAY OF MAGNESIUM: CPT

## 2019-10-29 PROCEDURE — 97530 THERAPEUTIC ACTIVITIES: CPT

## 2019-10-29 PROCEDURE — 36415 COLL VENOUS BLD VENIPUNCTURE: CPT

## 2019-10-29 PROCEDURE — 93010 ELECTROCARDIOGRAM REPORT: CPT | Performed by: INTERNAL MEDICINE

## 2019-10-29 PROCEDURE — 2580000003 HC RX 258: Performed by: INTERNAL MEDICINE

## 2019-10-29 PROCEDURE — 99232 SBSQ HOSP IP/OBS MODERATE 35: CPT | Performed by: INTERNAL MEDICINE

## 2019-10-29 PROCEDURE — 97166 OT EVAL MOD COMPLEX 45 MIN: CPT

## 2019-10-29 PROCEDURE — APPSS30 APP SPLIT SHARED TIME 16-30 MINUTES: Performed by: NURSE PRACTITIONER

## 2019-10-29 PROCEDURE — 2140000000 HC CCU INTERMEDIATE R&B

## 2019-10-29 PROCEDURE — 80048 BASIC METABOLIC PNL TOTAL CA: CPT

## 2019-10-29 PROCEDURE — 83605 ASSAY OF LACTIC ACID: CPT

## 2019-10-29 PROCEDURE — 97162 PT EVAL MOD COMPLEX 30 MIN: CPT

## 2019-10-29 PROCEDURE — 99211 OFF/OP EST MAY X REQ PHY/QHP: CPT

## 2019-10-29 PROCEDURE — 85027 COMPLETE CBC AUTOMATED: CPT

## 2019-10-29 RX ORDER — 0.9 % SODIUM CHLORIDE 0.9 %
250 INTRAVENOUS SOLUTION INTRAVENOUS ONCE
Status: COMPLETED | OUTPATIENT
Start: 2019-10-29 | End: 2019-10-29

## 2019-10-29 RX ADMIN — SPIRONOLACTONE 25 MG: 25 TABLET ORAL at 09:08

## 2019-10-29 RX ADMIN — FAMOTIDINE 20 MG: 20 TABLET ORAL at 09:07

## 2019-10-29 RX ADMIN — GABAPENTIN 300 MG: 300 CAPSULE ORAL at 09:02

## 2019-10-29 RX ADMIN — PIPERACILLIN AND TAZOBACTAM 3.38 G: 3; .375 INJECTION, POWDER, LYOPHILIZED, FOR SOLUTION INTRAVENOUS at 23:08

## 2019-10-29 RX ADMIN — PIPERACILLIN AND TAZOBACTAM 3.38 G: 3; .375 INJECTION, POWDER, LYOPHILIZED, FOR SOLUTION INTRAVENOUS at 16:10

## 2019-10-29 RX ADMIN — ATORVASTATIN CALCIUM 80 MG: 40 TABLET, FILM COATED ORAL at 21:26

## 2019-10-29 RX ADMIN — ISOSORBIDE MONONITRATE 30 MG: 30 TABLET, EXTENDED RELEASE ORAL at 09:08

## 2019-10-29 RX ADMIN — PROCHLORPERAZINE MALEATE 5 MG: 5 TABLET ORAL at 06:34

## 2019-10-29 RX ADMIN — GABAPENTIN 300 MG: 300 CAPSULE ORAL at 12:45

## 2019-10-29 RX ADMIN — LEVOTHYROXINE SODIUM 150 MCG: 150 TABLET ORAL at 06:34

## 2019-10-29 RX ADMIN — PROCHLORPERAZINE MALEATE 5 MG: 5 TABLET ORAL at 16:10

## 2019-10-29 RX ADMIN — Medication 800 MG: at 09:06

## 2019-10-29 RX ADMIN — OXYCODONE HYDROCHLORIDE 20 MG: 10 TABLET ORAL at 01:49

## 2019-10-29 RX ADMIN — MEXILETINE HYDROCHLORIDE 150 MG: 150 CAPSULE ORAL at 14:18

## 2019-10-29 RX ADMIN — LEVETIRACETAM 1000 MG: 500 TABLET, FILM COATED ORAL at 09:05

## 2019-10-29 RX ADMIN — RANOLAZINE 1000 MG: 500 TABLET, FILM COATED, EXTENDED RELEASE ORAL at 09:02

## 2019-10-29 RX ADMIN — AMIODARONE HYDROCHLORIDE 200 MG: 200 TABLET ORAL at 09:04

## 2019-10-29 RX ADMIN — FERROUS GLUCONATE TAB 324 MG (37.5 MG ELEMENTAL IRON) 324 MG: 324 (37.5 FE) TAB at 16:10

## 2019-10-29 RX ADMIN — GABAPENTIN 300 MG: 300 CAPSULE ORAL at 16:10

## 2019-10-29 RX ADMIN — CEFTRIAXONE 1 G: 1 INJECTION, POWDER, FOR SOLUTION INTRAMUSCULAR; INTRAVENOUS at 12:59

## 2019-10-29 RX ADMIN — PAROXETINE 10 MG: 10 TABLET, FILM COATED ORAL at 09:02

## 2019-10-29 RX ADMIN — OXYCODONE HYDROCHLORIDE 20 MG: 10 TABLET ORAL at 06:34

## 2019-10-29 RX ADMIN — MEXILETINE HYDROCHLORIDE 150 MG: 150 CAPSULE ORAL at 11:34

## 2019-10-29 RX ADMIN — CLOPIDOGREL BISULFATE 75 MG: 75 TABLET ORAL at 09:04

## 2019-10-29 RX ADMIN — ONDANSETRON 4 MG: 2 INJECTION INTRAMUSCULAR; INTRAVENOUS at 09:17

## 2019-10-29 RX ADMIN — ONDANSETRON 4 MG: 2 INJECTION INTRAMUSCULAR; INTRAVENOUS at 21:54

## 2019-10-29 RX ADMIN — Medication 1 CAPSULE: at 16:10

## 2019-10-29 RX ADMIN — LEVETIRACETAM 1000 MG: 500 TABLET, FILM COATED ORAL at 21:25

## 2019-10-29 RX ADMIN — Medication 1 CAPSULE: at 09:07

## 2019-10-29 RX ADMIN — Medication 1 CAPSULE: at 12:45

## 2019-10-29 RX ADMIN — ASPIRIN 81 MG: 81 TABLET, COATED ORAL at 09:04

## 2019-10-29 RX ADMIN — FERROUS GLUCONATE TAB 324 MG (37.5 MG ELEMENTAL IRON) 324 MG: 324 (37.5 FE) TAB at 09:04

## 2019-10-29 RX ADMIN — LEVOTHYROXINE SODIUM 150 MCG: 150 TABLET ORAL at 09:03

## 2019-10-29 RX ADMIN — MEXILETINE HYDROCHLORIDE 150 MG: 150 CAPSULE ORAL at 21:54

## 2019-10-29 RX ADMIN — SODIUM CHLORIDE 250 ML: 9 INJECTION, SOLUTION INTRAVENOUS at 16:11

## 2019-10-29 RX ADMIN — FAMOTIDINE 20 MG: 20 TABLET ORAL at 21:26

## 2019-10-29 RX ADMIN — TORSEMIDE 20 MG: 20 TABLET ORAL at 09:08

## 2019-10-29 RX ADMIN — RANOLAZINE 1000 MG: 500 TABLET, FILM COATED, EXTENDED RELEASE ORAL at 21:25

## 2019-10-29 RX ADMIN — OXYCODONE HYDROCHLORIDE 20 MG: 10 TABLET ORAL at 09:40

## 2019-10-29 RX ADMIN — Medication 10 ML: at 21:27

## 2019-10-29 RX ADMIN — OXYCODONE HYDROCHLORIDE 20 MG: 10 TABLET ORAL at 21:26

## 2019-10-29 RX ADMIN — POTASSIUM CHLORIDE 20 MEQ: 20 TABLET, EXTENDED RELEASE ORAL at 09:06

## 2019-10-29 RX ADMIN — OXYCODONE HYDROCHLORIDE 20 MG: 10 TABLET ORAL at 13:02

## 2019-10-29 RX ADMIN — GABAPENTIN 300 MG: 300 CAPSULE ORAL at 21:25

## 2019-10-29 RX ADMIN — DULOXETINE HYDROCHLORIDE 60 MG: 30 CAPSULE, DELAYED RELEASE ORAL at 09:08

## 2019-10-29 RX ADMIN — PROCHLORPERAZINE MALEATE 5 MG: 5 TABLET ORAL at 11:35

## 2019-10-29 RX ADMIN — OXYCODONE HYDROCHLORIDE 20 MG: 10 TABLET ORAL at 17:22

## 2019-10-29 ASSESSMENT — PAIN SCALES - GENERAL
PAINLEVEL_OUTOF10: 8
PAINLEVEL_OUTOF10: 8
PAINLEVEL_OUTOF10: 9
PAINLEVEL_OUTOF10: 9
PAINLEVEL_OUTOF10: 10
PAINLEVEL_OUTOF10: 9

## 2019-10-29 ASSESSMENT — PAIN DESCRIPTION - PROGRESSION
CLINICAL_PROGRESSION: NOT CHANGED

## 2019-10-29 ASSESSMENT — PAIN DESCRIPTION - DESCRIPTORS
DESCRIPTORS: ACHING
DESCRIPTORS: ACHING

## 2019-10-29 ASSESSMENT — PAIN DESCRIPTION - ORIENTATION
ORIENTATION: RIGHT;LEFT

## 2019-10-29 ASSESSMENT — PAIN DESCRIPTION - LOCATION
LOCATION: HIP;KNEE
LOCATION: HIP
LOCATION: HIP;KNEE

## 2019-10-29 ASSESSMENT — PAIN - FUNCTIONAL ASSESSMENT: PAIN_FUNCTIONAL_ASSESSMENT: PREVENTS OR INTERFERES SOME ACTIVE ACTIVITIES AND ADLS

## 2019-10-29 ASSESSMENT — PAIN DESCRIPTION - PAIN TYPE
TYPE: CHRONIC PAIN
TYPE: CHRONIC PAIN

## 2019-10-29 ASSESSMENT — PAIN DESCRIPTION - ONSET
ONSET: PROGRESSIVE
ONSET: PROGRESSIVE

## 2019-10-29 ASSESSMENT — PAIN DESCRIPTION - FREQUENCY
FREQUENCY: CONTINUOUS
FREQUENCY: CONTINUOUS

## 2019-10-30 ENCOUNTER — APPOINTMENT (OUTPATIENT)
Dept: CT IMAGING | Age: 59
DRG: 246 | End: 2019-10-30
Payer: MEDICARE

## 2019-10-30 LAB
ANION GAP SERPL CALCULATED.3IONS-SCNC: 12 MMOL/L (ref 4–16)
BASOPHILS ABSOLUTE: 0 K/CU MM
BASOPHILS RELATIVE PERCENT: 0.4 % (ref 0–1)
BUN BLDV-MCNC: 18 MG/DL (ref 6–23)
CALCIUM SERPL-MCNC: 7.9 MG/DL (ref 8.3–10.6)
CHLORIDE BLD-SCNC: 100 MMOL/L (ref 99–110)
CO2: 21 MMOL/L (ref 21–32)
CREAT SERPL-MCNC: 0.8 MG/DL (ref 0.6–1.1)
DIFFERENTIAL TYPE: ABNORMAL
EOSINOPHILS ABSOLUTE: 0 K/CU MM
EOSINOPHILS RELATIVE PERCENT: 0 % (ref 0–3)
FERRITIN: 74 NG/ML (ref 15–150)
FOLATE: 12.2 NG/ML (ref 3.1–17.5)
GFR AFRICAN AMERICAN: >60 ML/MIN/1.73M2
GFR NON-AFRICAN AMERICAN: >60 ML/MIN/1.73M2
GLUCOSE BLD-MCNC: 88 MG/DL (ref 70–99)
HCT VFR BLD CALC: 31.4 % (ref 37–47)
HEMOGLOBIN: 9.7 GM/DL (ref 12.5–16)
IMMATURE NEUTROPHIL %: 0.4 % (ref 0–0.43)
IRON: 13 UG/DL (ref 37–145)
LYMPHOCYTES ABSOLUTE: 0.9 K/CU MM
LYMPHOCYTES RELATIVE PERCENT: 19 % (ref 24–44)
MAGNESIUM: 2.1 MG/DL (ref 1.8–2.4)
MCH RBC QN AUTO: 34.6 PG (ref 27–31)
MCHC RBC AUTO-ENTMCNC: 30.9 % (ref 32–36)
MCV RBC AUTO: 112.1 FL (ref 78–100)
MONOCYTES ABSOLUTE: 0.5 K/CU MM
MONOCYTES RELATIVE PERCENT: 11.1 % (ref 0–4)
NUCLEATED RBC %: 0 %
PCT TRANSFERRIN: 6 % (ref 10–44)
PDW BLD-RTO: 14.8 % (ref 11.7–14.9)
PLATELET # BLD: 249 K/CU MM (ref 140–440)
PMV BLD AUTO: 9.5 FL (ref 7.5–11.1)
POTASSIUM SERPL-SCNC: 3.8 MMOL/L (ref 3.5–5.1)
RBC # BLD: 2.8 M/CU MM (ref 4.2–5.4)
SEGMENTED NEUTROPHILS ABSOLUTE COUNT: 3.1 K/CU MM
SEGMENTED NEUTROPHILS RELATIVE PERCENT: 69.1 % (ref 36–66)
SODIUM BLD-SCNC: 133 MMOL/L (ref 135–145)
TOTAL IMMATURE NEUTOROPHIL: 0.02 K/CU MM
TOTAL IRON BINDING CAPACITY: 202 UG/DL (ref 250–450)
TOTAL NUCLEATED RBC: 0 K/CU MM
UNSATURATED IRON BINDING CAPACITY: 189 UG/DL (ref 110–370)
VITAMIN B-12: 744.9 PG/ML (ref 211–911)
WBC # BLD: 4.5 K/CU MM (ref 4–10.5)

## 2019-10-30 PROCEDURE — 6370000000 HC RX 637 (ALT 250 FOR IP): Performed by: INTERNAL MEDICINE

## 2019-10-30 PROCEDURE — 85025 COMPLETE CBC W/AUTO DIFF WBC: CPT

## 2019-10-30 PROCEDURE — 82746 ASSAY OF FOLIC ACID SERUM: CPT

## 2019-10-30 PROCEDURE — 2140000000 HC CCU INTERMEDIATE R&B

## 2019-10-30 PROCEDURE — 80048 BASIC METABOLIC PNL TOTAL CA: CPT

## 2019-10-30 PROCEDURE — 2580000003 HC RX 258: Performed by: INTERNAL MEDICINE

## 2019-10-30 PROCEDURE — 99232 SBSQ HOSP IP/OBS MODERATE 35: CPT | Performed by: INTERNAL MEDICINE

## 2019-10-30 PROCEDURE — 6360000002 HC RX W HCPCS: Performed by: INTERNAL MEDICINE

## 2019-10-30 PROCEDURE — 99024 POSTOP FOLLOW-UP VISIT: CPT | Performed by: PHYSICIAN ASSISTANT

## 2019-10-30 PROCEDURE — APPSS30 APP SPLIT SHARED TIME 16-30 MINUTES: Performed by: NURSE PRACTITIONER

## 2019-10-30 PROCEDURE — 73700 CT LOWER EXTREMITY W/O DYE: CPT

## 2019-10-30 PROCEDURE — 82607 VITAMIN B-12: CPT

## 2019-10-30 PROCEDURE — 36415 COLL VENOUS BLD VENIPUNCTURE: CPT

## 2019-10-30 PROCEDURE — 83540 ASSAY OF IRON: CPT

## 2019-10-30 PROCEDURE — 82728 ASSAY OF FERRITIN: CPT

## 2019-10-30 PROCEDURE — 83550 IRON BINDING TEST: CPT

## 2019-10-30 PROCEDURE — 99231 SBSQ HOSP IP/OBS SF/LOW 25: CPT | Performed by: PHYSICIAN ASSISTANT

## 2019-10-30 PROCEDURE — 83735 ASSAY OF MAGNESIUM: CPT

## 2019-10-30 PROCEDURE — 6370000000 HC RX 637 (ALT 250 FOR IP): Performed by: NURSE PRACTITIONER

## 2019-10-30 RX ORDER — RANOLAZINE 500 MG/1
500 TABLET, EXTENDED RELEASE ORAL 2 TIMES DAILY
Status: DISCONTINUED | OUTPATIENT
Start: 2019-10-30 | End: 2019-11-02 | Stop reason: HOSPADM

## 2019-10-30 RX ORDER — TORSEMIDE 20 MG/1
20 TABLET ORAL DAILY
Status: DISCONTINUED | OUTPATIENT
Start: 2019-10-30 | End: 2019-10-30

## 2019-10-30 RX ORDER — SPIRONOLACTONE 25 MG/1
25 TABLET ORAL DAILY
Status: DISCONTINUED | OUTPATIENT
Start: 2019-10-30 | End: 2019-10-30

## 2019-10-30 RX ORDER — AMOXICILLIN AND CLAVULANATE POTASSIUM 875; 125 MG/1; MG/1
1 TABLET, FILM COATED ORAL EVERY 12 HOURS SCHEDULED
Status: DISCONTINUED | OUTPATIENT
Start: 2019-10-30 | End: 2019-11-02 | Stop reason: HOSPADM

## 2019-10-30 RX ADMIN — CLOPIDOGREL BISULFATE 75 MG: 75 TABLET ORAL at 09:22

## 2019-10-30 RX ADMIN — OXYCODONE HYDROCHLORIDE 20 MG: 10 TABLET ORAL at 09:23

## 2019-10-30 RX ADMIN — APIXABAN 5 MG: 5 TABLET, FILM COATED ORAL at 09:24

## 2019-10-30 RX ADMIN — DULOXETINE HYDROCHLORIDE 60 MG: 30 CAPSULE, DELAYED RELEASE ORAL at 09:22

## 2019-10-30 RX ADMIN — Medication 1 CAPSULE: at 15:43

## 2019-10-30 RX ADMIN — ATORVASTATIN CALCIUM 80 MG: 40 TABLET, FILM COATED ORAL at 22:48

## 2019-10-30 RX ADMIN — PIPERACILLIN AND TAZOBACTAM 3.38 G: 3; .375 INJECTION, POWDER, LYOPHILIZED, FOR SOLUTION INTRAVENOUS at 09:22

## 2019-10-30 RX ADMIN — GABAPENTIN 300 MG: 300 CAPSULE ORAL at 15:43

## 2019-10-30 RX ADMIN — LEVETIRACETAM 1000 MG: 500 TABLET, FILM COATED ORAL at 22:48

## 2019-10-30 RX ADMIN — OXYCODONE HYDROCHLORIDE 20 MG: 10 TABLET ORAL at 18:43

## 2019-10-30 RX ADMIN — ASPIRIN 81 MG: 81 TABLET, COATED ORAL at 09:30

## 2019-10-30 RX ADMIN — FAMOTIDINE 20 MG: 20 TABLET ORAL at 22:49

## 2019-10-30 RX ADMIN — FERROUS GLUCONATE TAB 324 MG (37.5 MG ELEMENTAL IRON) 324 MG: 324 (37.5 FE) TAB at 15:43

## 2019-10-30 RX ADMIN — FAMOTIDINE 20 MG: 20 TABLET ORAL at 09:23

## 2019-10-30 RX ADMIN — MEXILETINE HYDROCHLORIDE 150 MG: 150 CAPSULE ORAL at 09:23

## 2019-10-30 RX ADMIN — OXYCODONE HYDROCHLORIDE 20 MG: 10 TABLET ORAL at 12:37

## 2019-10-30 RX ADMIN — FERROUS GLUCONATE TAB 324 MG (37.5 MG ELEMENTAL IRON) 324 MG: 324 (37.5 FE) TAB at 09:23

## 2019-10-30 RX ADMIN — PROCHLORPERAZINE MALEATE 5 MG: 5 TABLET ORAL at 05:28

## 2019-10-30 RX ADMIN — RANOLAZINE 500 MG: 500 TABLET, FILM COATED, EXTENDED RELEASE ORAL at 22:48

## 2019-10-30 RX ADMIN — PROCHLORPERAZINE MALEATE 5 MG: 5 TABLET ORAL at 09:23

## 2019-10-30 RX ADMIN — Medication 10 ML: at 23:22

## 2019-10-30 RX ADMIN — OXYCODONE HYDROCHLORIDE 20 MG: 10 TABLET ORAL at 15:43

## 2019-10-30 RX ADMIN — MEXILETINE HYDROCHLORIDE 150 MG: 150 CAPSULE ORAL at 23:21

## 2019-10-30 RX ADMIN — GABAPENTIN 300 MG: 300 CAPSULE ORAL at 22:49

## 2019-10-30 RX ADMIN — LEVOTHYROXINE SODIUM 150 MCG: 150 TABLET ORAL at 05:17

## 2019-10-30 RX ADMIN — AMIODARONE HYDROCHLORIDE 200 MG: 200 TABLET ORAL at 09:23

## 2019-10-30 RX ADMIN — PROCHLORPERAZINE MALEATE 5 MG: 5 TABLET ORAL at 15:42

## 2019-10-30 RX ADMIN — GABAPENTIN 300 MG: 300 CAPSULE ORAL at 09:24

## 2019-10-30 RX ADMIN — MEXILETINE HYDROCHLORIDE 150 MG: 150 CAPSULE ORAL at 12:34

## 2019-10-30 RX ADMIN — ONDANSETRON 4 MG: 2 INJECTION INTRAMUSCULAR; INTRAVENOUS at 20:30

## 2019-10-30 RX ADMIN — AMOXICILLIN AND CLAVULANATE POTASSIUM 1 TABLET: 875; 125 TABLET, FILM COATED ORAL at 22:48

## 2019-10-30 RX ADMIN — PAROXETINE 10 MG: 10 TABLET, FILM COATED ORAL at 09:24

## 2019-10-30 RX ADMIN — ISOSORBIDE MONONITRATE 30 MG: 30 TABLET, EXTENDED RELEASE ORAL at 09:23

## 2019-10-30 RX ADMIN — RANOLAZINE 1000 MG: 500 TABLET, FILM COATED, EXTENDED RELEASE ORAL at 09:22

## 2019-10-30 RX ADMIN — OXYCODONE HYDROCHLORIDE 20 MG: 10 TABLET ORAL at 22:13

## 2019-10-30 RX ADMIN — AMOXICILLIN AND CLAVULANATE POTASSIUM 1 TABLET: 875; 125 TABLET, FILM COATED ORAL at 12:34

## 2019-10-30 RX ADMIN — Medication 1 CAPSULE: at 12:34

## 2019-10-30 RX ADMIN — LEVETIRACETAM 1000 MG: 500 TABLET, FILM COATED ORAL at 09:23

## 2019-10-30 RX ADMIN — Medication 1 CAPSULE: at 09:24

## 2019-10-30 RX ADMIN — RANOLAZINE 500 MG: 500 TABLET, FILM COATED, EXTENDED RELEASE ORAL at 12:34

## 2019-10-30 RX ADMIN — OXYCODONE HYDROCHLORIDE 20 MG: 10 TABLET ORAL at 05:17

## 2019-10-30 RX ADMIN — GABAPENTIN 300 MG: 300 CAPSULE ORAL at 12:34

## 2019-10-30 RX ADMIN — APIXABAN 5 MG: 5 TABLET, FILM COATED ORAL at 22:48

## 2019-10-30 ASSESSMENT — PAIN DESCRIPTION - PROGRESSION

## 2019-10-30 ASSESSMENT — PAIN DESCRIPTION - DESCRIPTORS: DESCRIPTORS: ACHING

## 2019-10-30 ASSESSMENT — PAIN SCALES - GENERAL
PAINLEVEL_OUTOF10: 9

## 2019-10-30 ASSESSMENT — PAIN DESCRIPTION - FREQUENCY: FREQUENCY: CONTINUOUS

## 2019-10-30 ASSESSMENT — PAIN DESCRIPTION - ONSET: ONSET: ON-GOING

## 2019-10-30 ASSESSMENT — PAIN DESCRIPTION - PAIN TYPE: TYPE: CHRONIC PAIN

## 2019-10-30 ASSESSMENT — PAIN DESCRIPTION - LOCATION: LOCATION: BACK;HIP;KNEE

## 2019-10-30 ASSESSMENT — PAIN DESCRIPTION - ORIENTATION: ORIENTATION: RIGHT;LEFT

## 2019-10-31 ENCOUNTER — APPOINTMENT (OUTPATIENT)
Dept: ULTRASOUND IMAGING | Age: 59
DRG: 246 | End: 2019-10-31
Payer: MEDICARE

## 2019-10-31 LAB
ANION GAP SERPL CALCULATED.3IONS-SCNC: 8 MMOL/L (ref 4–16)
BASOPHILS ABSOLUTE: 0 K/CU MM
BASOPHILS RELATIVE PERCENT: 0.4 % (ref 0–1)
BUN BLDV-MCNC: 12 MG/DL (ref 6–23)
CALCIUM SERPL-MCNC: 8 MG/DL (ref 8.3–10.6)
CHLORIDE BLD-SCNC: 106 MMOL/L (ref 99–110)
CO2: 25 MMOL/L (ref 21–32)
CREAT SERPL-MCNC: 0.7 MG/DL (ref 0.6–1.1)
CULTURE: ABNORMAL
CULTURE: ABNORMAL
DIFFERENTIAL TYPE: ABNORMAL
EOSINOPHILS ABSOLUTE: 0 K/CU MM
EOSINOPHILS RELATIVE PERCENT: 0 % (ref 0–3)
GFR AFRICAN AMERICAN: >60 ML/MIN/1.73M2
GFR NON-AFRICAN AMERICAN: >60 ML/MIN/1.73M2
GLUCOSE BLD-MCNC: 82 MG/DL (ref 70–99)
HCT VFR BLD CALC: 29.5 % (ref 37–47)
HEMOGLOBIN: 8.9 GM/DL (ref 12.5–16)
IMMATURE NEUTROPHIL %: 0.8 % (ref 0–0.43)
LYMPHOCYTES ABSOLUTE: 0.9 K/CU MM
LYMPHOCYTES RELATIVE PERCENT: 18.7 % (ref 24–44)
Lab: ABNORMAL
MAGNESIUM: 2 MG/DL (ref 1.8–2.4)
MCH RBC QN AUTO: 34.4 PG (ref 27–31)
MCHC RBC AUTO-ENTMCNC: 30.2 % (ref 32–36)
MCV RBC AUTO: 113.9 FL (ref 78–100)
MONOCYTES ABSOLUTE: 0.6 K/CU MM
MONOCYTES RELATIVE PERCENT: 12 % (ref 0–4)
NUCLEATED RBC %: 0 %
PDW BLD-RTO: 14.7 % (ref 11.7–14.9)
PLATELET # BLD: 245 K/CU MM (ref 140–440)
PMV BLD AUTO: 10.1 FL (ref 7.5–11.1)
POTASSIUM SERPL-SCNC: 4.1 MMOL/L (ref 3.5–5.1)
RBC # BLD: 2.59 M/CU MM (ref 4.2–5.4)
SEGMENTED NEUTROPHILS ABSOLUTE COUNT: 3.3 K/CU MM
SEGMENTED NEUTROPHILS RELATIVE PERCENT: 68.1 % (ref 36–66)
SODIUM BLD-SCNC: 139 MMOL/L (ref 135–145)
SPECIMEN: ABNORMAL
TOTAL COLONY COUNT: ABNORMAL
TOTAL IMMATURE NEUTOROPHIL: 0.04 K/CU MM
TOTAL NUCLEATED RBC: 0 K/CU MM
WBC # BLD: 4.8 K/CU MM (ref 4–10.5)

## 2019-10-31 PROCEDURE — 2140000000 HC CCU INTERMEDIATE R&B

## 2019-10-31 PROCEDURE — 6370000000 HC RX 637 (ALT 250 FOR IP): Performed by: INTERNAL MEDICINE

## 2019-10-31 PROCEDURE — 97110 THERAPEUTIC EXERCISES: CPT

## 2019-10-31 PROCEDURE — 97530 THERAPEUTIC ACTIVITIES: CPT

## 2019-10-31 PROCEDURE — 93926 LOWER EXTREMITY STUDY: CPT

## 2019-10-31 PROCEDURE — APPSS30 APP SPLIT SHARED TIME 16-30 MINUTES: Performed by: NURSE PRACTITIONER

## 2019-10-31 PROCEDURE — 80048 BASIC METABOLIC PNL TOTAL CA: CPT

## 2019-10-31 PROCEDURE — 97116 GAIT TRAINING THERAPY: CPT

## 2019-10-31 PROCEDURE — 99233 SBSQ HOSP IP/OBS HIGH 50: CPT | Performed by: INTERNAL MEDICINE

## 2019-10-31 PROCEDURE — 85025 COMPLETE CBC W/AUTO DIFF WBC: CPT

## 2019-10-31 PROCEDURE — 83735 ASSAY OF MAGNESIUM: CPT

## 2019-10-31 PROCEDURE — 94761 N-INVAS EAR/PLS OXIMETRY MLT: CPT

## 2019-10-31 PROCEDURE — 36415 COLL VENOUS BLD VENIPUNCTURE: CPT

## 2019-10-31 PROCEDURE — 6370000000 HC RX 637 (ALT 250 FOR IP): Performed by: NURSE PRACTITIONER

## 2019-10-31 PROCEDURE — 2580000003 HC RX 258: Performed by: INTERNAL MEDICINE

## 2019-10-31 RX ADMIN — OXYCODONE HYDROCHLORIDE 20 MG: 10 TABLET ORAL at 04:35

## 2019-10-31 RX ADMIN — APIXABAN 5 MG: 5 TABLET, FILM COATED ORAL at 21:10

## 2019-10-31 RX ADMIN — FERROUS GLUCONATE TAB 324 MG (37.5 MG ELEMENTAL IRON) 324 MG: 324 (37.5 FE) TAB at 17:04

## 2019-10-31 RX ADMIN — MEXILETINE HYDROCHLORIDE 150 MG: 150 CAPSULE ORAL at 08:54

## 2019-10-31 RX ADMIN — LEVETIRACETAM 1000 MG: 500 TABLET, FILM COATED ORAL at 08:55

## 2019-10-31 RX ADMIN — Medication 1 CAPSULE: at 08:55

## 2019-10-31 RX ADMIN — FAMOTIDINE 20 MG: 20 TABLET ORAL at 08:55

## 2019-10-31 RX ADMIN — PROCHLORPERAZINE MALEATE 5 MG: 5 TABLET ORAL at 11:24

## 2019-10-31 RX ADMIN — GABAPENTIN 300 MG: 300 CAPSULE ORAL at 21:10

## 2019-10-31 RX ADMIN — ISOSORBIDE MONONITRATE 30 MG: 30 TABLET, EXTENDED RELEASE ORAL at 08:52

## 2019-10-31 RX ADMIN — RANOLAZINE 500 MG: 500 TABLET, FILM COATED, EXTENDED RELEASE ORAL at 08:53

## 2019-10-31 RX ADMIN — GABAPENTIN 300 MG: 300 CAPSULE ORAL at 08:53

## 2019-10-31 RX ADMIN — LEVOTHYROXINE SODIUM 150 MCG: 150 TABLET ORAL at 07:01

## 2019-10-31 RX ADMIN — MEXILETINE HYDROCHLORIDE 150 MG: 150 CAPSULE ORAL at 14:19

## 2019-10-31 RX ADMIN — GABAPENTIN 300 MG: 300 CAPSULE ORAL at 17:03

## 2019-10-31 RX ADMIN — OXYCODONE HYDROCHLORIDE 20 MG: 10 TABLET ORAL at 07:53

## 2019-10-31 RX ADMIN — ASPIRIN 81 MG: 81 TABLET, COATED ORAL at 09:12

## 2019-10-31 RX ADMIN — MEXILETINE HYDROCHLORIDE 150 MG: 150 CAPSULE ORAL at 21:09

## 2019-10-31 RX ADMIN — OXYCODONE HYDROCHLORIDE 20 MG: 10 TABLET ORAL at 14:26

## 2019-10-31 RX ADMIN — Medication 1 CAPSULE: at 17:03

## 2019-10-31 RX ADMIN — AMIODARONE HYDROCHLORIDE 200 MG: 200 TABLET ORAL at 08:54

## 2019-10-31 RX ADMIN — CLOPIDOGREL BISULFATE 75 MG: 75 TABLET ORAL at 08:54

## 2019-10-31 RX ADMIN — AMOXICILLIN AND CLAVULANATE POTASSIUM 1 TABLET: 875; 125 TABLET, FILM COATED ORAL at 08:53

## 2019-10-31 RX ADMIN — APIXABAN 5 MG: 5 TABLET, FILM COATED ORAL at 08:54

## 2019-10-31 RX ADMIN — ATORVASTATIN CALCIUM 80 MG: 40 TABLET, FILM COATED ORAL at 21:10

## 2019-10-31 RX ADMIN — LEVETIRACETAM 1000 MG: 500 TABLET, FILM COATED ORAL at 21:10

## 2019-10-31 RX ADMIN — AMOXICILLIN AND CLAVULANATE POTASSIUM 1 TABLET: 875; 125 TABLET, FILM COATED ORAL at 21:10

## 2019-10-31 RX ADMIN — OXYCODONE HYDROCHLORIDE 20 MG: 10 TABLET ORAL at 17:55

## 2019-10-31 RX ADMIN — Medication 10 ML: at 11:24

## 2019-10-31 RX ADMIN — FAMOTIDINE 20 MG: 20 TABLET ORAL at 21:10

## 2019-10-31 RX ADMIN — PROCHLORPERAZINE MALEATE 5 MG: 5 TABLET ORAL at 17:03

## 2019-10-31 RX ADMIN — PAROXETINE 10 MG: 10 TABLET, FILM COATED ORAL at 08:52

## 2019-10-31 RX ADMIN — RANOLAZINE 500 MG: 500 TABLET, FILM COATED, EXTENDED RELEASE ORAL at 21:10

## 2019-10-31 RX ADMIN — GABAPENTIN 300 MG: 300 CAPSULE ORAL at 13:01

## 2019-10-31 RX ADMIN — FERROUS GLUCONATE TAB 324 MG (37.5 MG ELEMENTAL IRON) 324 MG: 324 (37.5 FE) TAB at 08:54

## 2019-10-31 RX ADMIN — Medication 10 ML: at 21:11

## 2019-10-31 RX ADMIN — Medication 1 CAPSULE: at 11:24

## 2019-10-31 RX ADMIN — OXYCODONE HYDROCHLORIDE 20 MG: 10 TABLET ORAL at 11:24

## 2019-10-31 RX ADMIN — OXYCODONE HYDROCHLORIDE 20 MG: 10 TABLET ORAL at 01:19

## 2019-10-31 RX ADMIN — PROCHLORPERAZINE MALEATE 5 MG: 5 TABLET ORAL at 07:01

## 2019-10-31 RX ADMIN — DULOXETINE HYDROCHLORIDE 60 MG: 30 CAPSULE, DELAYED RELEASE ORAL at 08:54

## 2019-10-31 RX ADMIN — OXYCODONE HYDROCHLORIDE 20 MG: 10 TABLET ORAL at 21:10

## 2019-10-31 ASSESSMENT — PAIN DESCRIPTION - PROGRESSION
CLINICAL_PROGRESSION: NOT CHANGED

## 2019-10-31 ASSESSMENT — PAIN SCALES - GENERAL
PAINLEVEL_OUTOF10: 9
PAINLEVEL_OUTOF10: 9
PAINLEVEL_OUTOF10: 8
PAINLEVEL_OUTOF10: 9

## 2019-11-01 ENCOUNTER — APPOINTMENT (OUTPATIENT)
Dept: GENERAL RADIOLOGY | Age: 59
DRG: 246 | End: 2019-11-01
Payer: MEDICARE

## 2019-11-01 VITALS
HEIGHT: 66 IN | OXYGEN SATURATION: 100 % | DIASTOLIC BLOOD PRESSURE: 83 MMHG | BODY MASS INDEX: 19.19 KG/M2 | HEART RATE: 66 BPM | SYSTOLIC BLOOD PRESSURE: 124 MMHG | WEIGHT: 119.4 LBS | RESPIRATION RATE: 12 BRPM | TEMPERATURE: 97.9 F

## 2019-11-01 LAB
ALBUMIN SERPL-MCNC: 3.1 GM/DL (ref 3.4–5)
ALP BLD-CCNC: 76 IU/L (ref 40–128)
ALT SERPL-CCNC: 10 U/L (ref 10–40)
ANION GAP SERPL CALCULATED.3IONS-SCNC: 8 MMOL/L (ref 4–16)
AST SERPL-CCNC: 17 IU/L (ref 15–37)
BASOPHILS ABSOLUTE: 0 K/CU MM
BASOPHILS RELATIVE PERCENT: 0.5 % (ref 0–1)
BILIRUB SERPL-MCNC: 0.2 MG/DL (ref 0–1)
BUN BLDV-MCNC: 7 MG/DL (ref 6–23)
CALCIUM SERPL-MCNC: 8.4 MG/DL (ref 8.3–10.6)
CHLORIDE BLD-SCNC: 107 MMOL/L (ref 99–110)
CO2: 26 MMOL/L (ref 21–32)
CREAT SERPL-MCNC: 0.7 MG/DL (ref 0.6–1.1)
DIFFERENTIAL TYPE: ABNORMAL
EOSINOPHILS ABSOLUTE: 0 K/CU MM
EOSINOPHILS RELATIVE PERCENT: 0 % (ref 0–3)
GFR AFRICAN AMERICAN: >60 ML/MIN/1.73M2
GFR NON-AFRICAN AMERICAN: >60 ML/MIN/1.73M2
GLUCOSE BLD-MCNC: 90 MG/DL (ref 70–99)
HCT VFR BLD CALC: 31 % (ref 37–47)
HEMOGLOBIN: 9.4 GM/DL (ref 12.5–16)
IMMATURE NEUTROPHIL %: 0.7 % (ref 0–0.43)
LYMPHOCYTES ABSOLUTE: 1.3 K/CU MM
LYMPHOCYTES RELATIVE PERCENT: 29.6 % (ref 24–44)
MAGNESIUM: 2.2 MG/DL (ref 1.8–2.4)
MCH RBC QN AUTO: 34.4 PG (ref 27–31)
MCHC RBC AUTO-ENTMCNC: 30.3 % (ref 32–36)
MCV RBC AUTO: 113.6 FL (ref 78–100)
MONOCYTES ABSOLUTE: 0.6 K/CU MM
MONOCYTES RELATIVE PERCENT: 13 % (ref 0–4)
NUCLEATED RBC %: 0 %
PDW BLD-RTO: 14.6 % (ref 11.7–14.9)
PHOSPHORUS: 3.8 MG/DL (ref 2.5–4.9)
PLATELET # BLD: 273 K/CU MM (ref 140–440)
PMV BLD AUTO: 9.5 FL (ref 7.5–11.1)
POTASSIUM SERPL-SCNC: 4.3 MMOL/L (ref 3.5–5.1)
RBC # BLD: 2.73 M/CU MM (ref 4.2–5.4)
SEGMENTED NEUTROPHILS ABSOLUTE COUNT: 2.4 K/CU MM
SEGMENTED NEUTROPHILS RELATIVE PERCENT: 56.2 % (ref 36–66)
SODIUM BLD-SCNC: 141 MMOL/L (ref 135–145)
TOTAL IMMATURE NEUTOROPHIL: 0.03 K/CU MM
TOTAL NUCLEATED RBC: 0 K/CU MM
TOTAL PROTEIN: 4.8 GM/DL (ref 6.4–8.2)
WBC # BLD: 4.3 K/CU MM (ref 4–10.5)

## 2019-11-01 PROCEDURE — 2580000003 HC RX 258: Performed by: INTERNAL MEDICINE

## 2019-11-01 PROCEDURE — 97530 THERAPEUTIC ACTIVITIES: CPT

## 2019-11-01 PROCEDURE — APPSS30 APP SPLIT SHARED TIME 16-30 MINUTES: Performed by: NURSE PRACTITIONER

## 2019-11-01 PROCEDURE — 6370000000 HC RX 637 (ALT 250 FOR IP): Performed by: NURSE PRACTITIONER

## 2019-11-01 PROCEDURE — 80053 COMPREHEN METABOLIC PANEL: CPT

## 2019-11-01 PROCEDURE — 94761 N-INVAS EAR/PLS OXIMETRY MLT: CPT

## 2019-11-01 PROCEDURE — 71045 X-RAY EXAM CHEST 1 VIEW: CPT

## 2019-11-01 PROCEDURE — 36415 COLL VENOUS BLD VENIPUNCTURE: CPT

## 2019-11-01 PROCEDURE — 6370000000 HC RX 637 (ALT 250 FOR IP): Performed by: INTERNAL MEDICINE

## 2019-11-01 PROCEDURE — 85025 COMPLETE CBC W/AUTO DIFF WBC: CPT

## 2019-11-01 PROCEDURE — 84100 ASSAY OF PHOSPHORUS: CPT

## 2019-11-01 PROCEDURE — 97116 GAIT TRAINING THERAPY: CPT

## 2019-11-01 PROCEDURE — 83735 ASSAY OF MAGNESIUM: CPT

## 2019-11-01 RX ORDER — OXYCODONE HYDROCHLORIDE 15 MG/1
15 TABLET ORAL
Qty: 15 TABLET | Refills: 0 | Status: SHIPPED | OUTPATIENT
Start: 2019-11-01 | End: 2019-11-04

## 2019-11-01 RX ORDER — NALOXONE HYDROCHLORIDE 4 MG/.1ML
1 SPRAY NASAL PRN
Qty: 1 EACH | Refills: 5 | Status: ON HOLD | OUTPATIENT
Start: 2019-11-01 | End: 2020-10-14

## 2019-11-01 RX ORDER — NITROGLYCERIN 0.4 MG/1
TABLET SUBLINGUAL
Qty: 25 TABLET | Refills: 3 | Status: SHIPPED | OUTPATIENT
Start: 2019-11-01 | End: 2021-01-22

## 2019-11-01 RX ORDER — AMOXICILLIN AND CLAVULANATE POTASSIUM 875; 125 MG/1; MG/1
1 TABLET, FILM COATED ORAL EVERY 12 HOURS SCHEDULED
Qty: 10 TABLET | Refills: 0 | Status: SHIPPED | OUTPATIENT
Start: 2019-11-01 | End: 2019-11-06

## 2019-11-01 RX ORDER — RANOLAZINE 500 MG/1
500 TABLET, EXTENDED RELEASE ORAL 2 TIMES DAILY
Qty: 60 TABLET | Refills: 3 | Status: ON HOLD | OUTPATIENT
Start: 2019-11-01 | End: 2019-12-19 | Stop reason: HOSPADM

## 2019-11-01 RX ADMIN — LEVOTHYROXINE SODIUM 150 MCG: 150 TABLET ORAL at 07:00

## 2019-11-01 RX ADMIN — MEXILETINE HYDROCHLORIDE 150 MG: 150 CAPSULE ORAL at 09:01

## 2019-11-01 RX ADMIN — OXYCODONE HYDROCHLORIDE 20 MG: 10 TABLET ORAL at 21:00

## 2019-11-01 RX ADMIN — AMOXICILLIN AND CLAVULANATE POTASSIUM 1 TABLET: 875; 125 TABLET, FILM COATED ORAL at 08:58

## 2019-11-01 RX ADMIN — MEXILETINE HYDROCHLORIDE 150 MG: 150 CAPSULE ORAL at 14:12

## 2019-11-01 RX ADMIN — FERROUS GLUCONATE TAB 324 MG (37.5 MG ELEMENTAL IRON) 324 MG: 324 (37.5 FE) TAB at 17:27

## 2019-11-01 RX ADMIN — PROCHLORPERAZINE MALEATE 5 MG: 5 TABLET ORAL at 11:18

## 2019-11-01 RX ADMIN — OXYCODONE HYDROCHLORIDE 20 MG: 10 TABLET ORAL at 17:27

## 2019-11-01 RX ADMIN — APIXABAN 5 MG: 5 TABLET, FILM COATED ORAL at 08:58

## 2019-11-01 RX ADMIN — DULOXETINE HYDROCHLORIDE 60 MG: 30 CAPSULE, DELAYED RELEASE ORAL at 08:58

## 2019-11-01 RX ADMIN — RANOLAZINE 500 MG: 500 TABLET, FILM COATED, EXTENDED RELEASE ORAL at 08:58

## 2019-11-01 RX ADMIN — ISOSORBIDE MONONITRATE 30 MG: 30 TABLET, EXTENDED RELEASE ORAL at 08:58

## 2019-11-01 RX ADMIN — PROCHLORPERAZINE MALEATE 5 MG: 5 TABLET ORAL at 07:00

## 2019-11-01 RX ADMIN — BENZOCAINE, MENTHOL 1 LOZENGE: 15; 3.6 LOZENGE ORAL at 15:44

## 2019-11-01 RX ADMIN — OXYCODONE HYDROCHLORIDE 20 MG: 10 TABLET ORAL at 00:48

## 2019-11-01 RX ADMIN — ASPIRIN 81 MG: 81 TABLET, COATED ORAL at 08:58

## 2019-11-01 RX ADMIN — FAMOTIDINE 20 MG: 20 TABLET ORAL at 08:58

## 2019-11-01 RX ADMIN — Medication 1 CAPSULE: at 11:16

## 2019-11-01 RX ADMIN — FERROUS GLUCONATE TAB 324 MG (37.5 MG ELEMENTAL IRON) 324 MG: 324 (37.5 FE) TAB at 08:58

## 2019-11-01 RX ADMIN — Medication 10 ML: at 08:58

## 2019-11-01 RX ADMIN — OXYCODONE HYDROCHLORIDE 20 MG: 10 TABLET ORAL at 04:25

## 2019-11-01 RX ADMIN — GABAPENTIN 300 MG: 300 CAPSULE ORAL at 17:27

## 2019-11-01 RX ADMIN — AMIODARONE HYDROCHLORIDE 200 MG: 200 TABLET ORAL at 08:58

## 2019-11-01 RX ADMIN — Medication 1 CAPSULE: at 17:27

## 2019-11-01 RX ADMIN — LEVETIRACETAM 1000 MG: 500 TABLET, FILM COATED ORAL at 08:58

## 2019-11-01 RX ADMIN — OXYCODONE HYDROCHLORIDE 20 MG: 10 TABLET ORAL at 14:12

## 2019-11-01 RX ADMIN — PAROXETINE 10 MG: 10 TABLET, FILM COATED ORAL at 08:58

## 2019-11-01 RX ADMIN — OXYCODONE HYDROCHLORIDE 20 MG: 10 TABLET ORAL at 11:16

## 2019-11-01 RX ADMIN — CLOPIDOGREL BISULFATE 75 MG: 75 TABLET ORAL at 08:58

## 2019-11-01 RX ADMIN — ACETAMINOPHEN 650 MG: 325 TABLET ORAL at 03:42

## 2019-11-01 RX ADMIN — OXYCODONE HYDROCHLORIDE 20 MG: 10 TABLET ORAL at 07:45

## 2019-11-01 RX ADMIN — GABAPENTIN 300 MG: 300 CAPSULE ORAL at 08:58

## 2019-11-01 RX ADMIN — GABAPENTIN 300 MG: 300 CAPSULE ORAL at 14:12

## 2019-11-01 RX ADMIN — Medication 1 CAPSULE: at 08:58

## 2019-11-01 RX ADMIN — PROCHLORPERAZINE MALEATE 5 MG: 5 TABLET ORAL at 17:27

## 2019-11-01 ASSESSMENT — PAIN SCALES - GENERAL
PAINLEVEL_OUTOF10: 9

## 2019-11-01 ASSESSMENT — PAIN DESCRIPTION - PROGRESSION
CLINICAL_PROGRESSION: NOT CHANGED

## 2019-11-02 LAB
CULTURE: NORMAL
CULTURE: NORMAL
Lab: NORMAL
Lab: NORMAL
SPECIMEN: NORMAL
SPECIMEN: NORMAL

## 2019-11-05 ENCOUNTER — HOSPITAL ENCOUNTER (OUTPATIENT)
Age: 59
Discharge: HOME | End: 2019-11-05

## 2019-11-05 LAB
ANION GAP SERPL CALCULATED.3IONS-SCNC: 19 MMOL/L (ref 4–16)
BUN BLDV-MCNC: 8 MG/DL (ref 6–23)
CALCIUM SERPL-MCNC: 8.5 MG/DL (ref 8.3–10.6)
CHLORIDE BLD-SCNC: 97 MMOL/L (ref 99–110)
CO2: 22 MMOL/L (ref 21–32)
CREAT SERPL-MCNC: 0.6 MG/DL (ref 0.6–1.1)
GFR AFRICAN AMERICAN: >60 ML/MIN/1.73M2
GFR NON-AFRICAN AMERICAN: >60 ML/MIN/1.73M2
GLUCOSE BLD-MCNC: 61 MG/DL (ref 70–99)
HCT VFR BLD CALC: 36.8 % (ref 37–47)
HEMOGLOBIN: 11 GM/DL (ref 12.5–16)
MCH RBC QN AUTO: 34.8 PG (ref 27–31)
MCHC RBC AUTO-ENTMCNC: 29.9 % (ref 32–36)
MCV RBC AUTO: 116.5 FL (ref 78–100)
PDW BLD-RTO: 14.9 % (ref 11.7–14.9)
PLATELET # BLD: 383 K/CU MM (ref 140–440)
PMV BLD AUTO: 10.1 FL (ref 7.5–11.1)
POTASSIUM SERPL-SCNC: 5 MMOL/L (ref 3.5–5.1)
RBC # BLD: 3.16 M/CU MM (ref 4.2–5.4)
SODIUM BLD-SCNC: 138 MMOL/L (ref 135–145)
WBC # BLD: 8.5 K/CU MM (ref 4–10.5)

## 2019-11-05 PROCEDURE — 80048 BASIC METABOLIC PNL TOTAL CA: CPT

## 2019-11-05 PROCEDURE — 85027 COMPLETE CBC AUTOMATED: CPT

## 2019-11-05 PROCEDURE — 36415 COLL VENOUS BLD VENIPUNCTURE: CPT

## 2019-11-08 ENCOUNTER — HOSPITAL ENCOUNTER (OUTPATIENT)
Age: 59
Discharge: HOME | End: 2019-11-08

## 2019-11-08 LAB
ANION GAP SERPL CALCULATED.3IONS-SCNC: 19 MMOL/L (ref 4–16)
BUN BLDV-MCNC: 15 MG/DL (ref 6–23)
CALCIUM SERPL-MCNC: 8.6 MG/DL (ref 8.3–10.6)
CHLORIDE BLD-SCNC: 91 MMOL/L (ref 99–110)
CO2: 24 MMOL/L (ref 21–32)
CREAT SERPL-MCNC: 0.7 MG/DL (ref 0.6–1.1)
GFR AFRICAN AMERICAN: >60 ML/MIN/1.73M2
GFR NON-AFRICAN AMERICAN: >60 ML/MIN/1.73M2
GLUCOSE BLD-MCNC: 94 MG/DL (ref 70–99)
POTASSIUM SERPL-SCNC: 4.5 MMOL/L (ref 3.5–5.1)
SODIUM BLD-SCNC: 134 MMOL/L (ref 135–145)

## 2019-11-08 PROCEDURE — 36415 COLL VENOUS BLD VENIPUNCTURE: CPT

## 2019-11-08 PROCEDURE — 80048 BASIC METABOLIC PNL TOTAL CA: CPT

## 2019-11-12 ENCOUNTER — HOSPITAL ENCOUNTER (OUTPATIENT)
Age: 59
Discharge: HOME | End: 2019-11-12

## 2019-11-14 ENCOUNTER — APPOINTMENT (OUTPATIENT)
Dept: GENERAL RADIOLOGY | Age: 59
DRG: 193 | End: 2019-11-14
Payer: MEDICARE

## 2019-11-14 ENCOUNTER — APPOINTMENT (OUTPATIENT)
Dept: CT IMAGING | Age: 59
DRG: 193 | End: 2019-11-14
Payer: MEDICARE

## 2019-11-14 ENCOUNTER — HOSPITAL ENCOUNTER (INPATIENT)
Age: 59
LOS: 4 days | Discharge: NURSING, REHAB OR LONG-TERM CARE | DRG: 193 | End: 2019-11-18
Attending: EMERGENCY MEDICINE | Admitting: INTERNAL MEDICINE
Payer: MEDICARE

## 2019-11-14 DIAGNOSIS — G89.4 CHRONIC PAIN SYNDROME: ICD-10-CM

## 2019-11-14 DIAGNOSIS — I25.9 CHEST PAIN DUE TO MYOCARDIAL ISCHEMIA, UNSPECIFIED ISCHEMIC CHEST PAIN TYPE: ICD-10-CM

## 2019-11-14 DIAGNOSIS — J18.9 PNEUMONIA DUE TO ORGANISM: Primary | ICD-10-CM

## 2019-11-14 LAB
ALBUMIN SERPL-MCNC: 2.9 GM/DL (ref 3.4–5)
ALP BLD-CCNC: 70 IU/L (ref 40–129)
ALT SERPL-CCNC: 9 U/L (ref 10–40)
ANION GAP SERPL CALCULATED.3IONS-SCNC: 9 MMOL/L (ref 4–16)
AST SERPL-CCNC: 12 IU/L (ref 15–37)
BASOPHILS ABSOLUTE: 0 K/CU MM
BASOPHILS RELATIVE PERCENT: 1.5 % (ref 0–1)
BILIRUB SERPL-MCNC: 0.1 MG/DL (ref 0–1)
BUN BLDV-MCNC: 10 MG/DL (ref 6–23)
CALCIUM SERPL-MCNC: 8.2 MG/DL (ref 8.3–10.6)
CHLORIDE BLD-SCNC: 105 MMOL/L (ref 99–110)
CO2: 23 MMOL/L (ref 21–32)
CREAT SERPL-MCNC: 0.7 MG/DL (ref 0.6–1.1)
DIFFERENTIAL TYPE: ABNORMAL
EOSINOPHILS ABSOLUTE: 0 K/CU MM
EOSINOPHILS RELATIVE PERCENT: 0 % (ref 0–3)
GFR AFRICAN AMERICAN: >60 ML/MIN/1.73M2
GFR NON-AFRICAN AMERICAN: >60 ML/MIN/1.73M2
GLUCOSE BLD-MCNC: 92 MG/DL (ref 70–99)
HCT VFR BLD CALC: 31.2 % (ref 37–47)
HEMOGLOBIN: 9.4 GM/DL (ref 12.5–16)
HYPOCHROMIA: ABNORMAL
IMMATURE NEUTROPHIL %: 0.6 % (ref 0–0.43)
LYMPHOCYTES ABSOLUTE: 0.9 K/CU MM
LYMPHOCYTES RELATIVE PERCENT: 27.2 % (ref 24–44)
MACROCYTES: ABNORMAL
MCH RBC QN AUTO: 34.2 PG (ref 27–31)
MCHC RBC AUTO-ENTMCNC: 30.1 % (ref 32–36)
MCV RBC AUTO: 113.5 FL (ref 78–100)
MONOCYTES ABSOLUTE: 0.3 K/CU MM
MONOCYTES RELATIVE PERCENT: 10.5 % (ref 0–4)
OVALOCYTES: ABNORMAL
PDW BLD-RTO: 14.5 % (ref 11.7–14.9)
PLATELET # BLD: 378 K/CU MM (ref 140–440)
PMV BLD AUTO: 9.5 FL (ref 7.5–11.1)
POTASSIUM SERPL-SCNC: 3.8 MMOL/L (ref 3.5–5.1)
RBC # BLD: 2.75 M/CU MM (ref 4.2–5.4)
RBC # BLD: ABNORMAL 10*6/UL
SEGMENTED NEUTROPHILS ABSOLUTE COUNT: 2 K/CU MM
SEGMENTED NEUTROPHILS RELATIVE PERCENT: 60.2 % (ref 36–66)
SODIUM BLD-SCNC: 137 MMOL/L (ref 135–145)
TOTAL IMMATURE NEUTOROPHIL: 0.02 K/CU MM
TOTAL PROTEIN: 5.5 GM/DL (ref 6.4–8.2)
TROPONIN T: <0.01 NG/ML
WBC # BLD: 3.2 K/CU MM (ref 4–10.5)

## 2019-11-14 PROCEDURE — 80053 COMPREHEN METABOLIC PANEL: CPT

## 2019-11-14 PROCEDURE — 71275 CT ANGIOGRAPHY CHEST: CPT

## 2019-11-14 PROCEDURE — 6360000002 HC RX W HCPCS: Performed by: EMERGENCY MEDICINE

## 2019-11-14 PROCEDURE — 84484 ASSAY OF TROPONIN QUANT: CPT

## 2019-11-14 PROCEDURE — 99285 EMERGENCY DEPT VISIT HI MDM: CPT

## 2019-11-14 PROCEDURE — 6370000000 HC RX 637 (ALT 250 FOR IP): Performed by: EMERGENCY MEDICINE

## 2019-11-14 PROCEDURE — 83605 ASSAY OF LACTIC ACID: CPT

## 2019-11-14 PROCEDURE — 2580000003 HC RX 258: Performed by: EMERGENCY MEDICINE

## 2019-11-14 PROCEDURE — 84145 PROCALCITONIN (PCT): CPT

## 2019-11-14 PROCEDURE — 6360000004 HC RX CONTRAST MEDICATION: Performed by: EMERGENCY MEDICINE

## 2019-11-14 PROCEDURE — 93005 ELECTROCARDIOGRAM TRACING: CPT | Performed by: EMERGENCY MEDICINE

## 2019-11-14 PROCEDURE — 1200000000 HC SEMI PRIVATE

## 2019-11-14 PROCEDURE — 71045 X-RAY EXAM CHEST 1 VIEW: CPT

## 2019-11-14 PROCEDURE — 85025 COMPLETE CBC W/AUTO DIFF WBC: CPT

## 2019-11-14 RX ORDER — LEVOTHYROXINE SODIUM 0.15 MG/1
150 TABLET ORAL DAILY
Status: DISCONTINUED | OUTPATIENT
Start: 2019-11-15 | End: 2019-11-18 | Stop reason: HOSPADM

## 2019-11-14 RX ORDER — VANCOMYCIN HYDROCHLORIDE 1 G/200ML
20 INJECTION, SOLUTION INTRAVENOUS ONCE
Status: COMPLETED | OUTPATIENT
Start: 2019-11-14 | End: 2019-11-15

## 2019-11-14 RX ORDER — ACETAMINOPHEN 500 MG
1000 TABLET ORAL ONCE
Status: DISCONTINUED | OUTPATIENT
Start: 2019-11-14 | End: 2019-11-18 | Stop reason: HOSPADM

## 2019-11-14 RX ORDER — OXYCODONE HYDROCHLORIDE 5 MG/1
15 TABLET ORAL
Status: DISCONTINUED | OUTPATIENT
Start: 2019-11-15 | End: 2019-11-17 | Stop reason: SDUPTHER

## 2019-11-14 RX ORDER — OXYCODONE HYDROCHLORIDE 5 MG/1
5 TABLET ORAL ONCE
Status: COMPLETED | OUTPATIENT
Start: 2019-11-14 | End: 2019-11-14

## 2019-11-14 RX ORDER — OXYCODONE HYDROCHLORIDE 5 MG/1
10 TABLET ORAL ONCE
Status: COMPLETED | OUTPATIENT
Start: 2019-11-14 | End: 2019-11-14

## 2019-11-14 RX ADMIN — OXYCODONE HYDROCHLORIDE 10 MG: 5 TABLET ORAL at 23:28

## 2019-11-14 RX ADMIN — OXYCODONE HYDROCHLORIDE 5 MG: 5 TABLET ORAL at 21:00

## 2019-11-14 RX ADMIN — CEFEPIME 2 G: 2 INJECTION, POWDER, FOR SOLUTION INTRAVENOUS at 23:29

## 2019-11-14 RX ADMIN — IOPAMIDOL 100 ML: 755 INJECTION, SOLUTION INTRAVENOUS at 21:04

## 2019-11-14 ASSESSMENT — PAIN DESCRIPTION - PAIN TYPE: TYPE: ACUTE PAIN

## 2019-11-14 ASSESSMENT — ENCOUNTER SYMPTOMS
VOMITING: 0
DIARRHEA: 0
SHORTNESS OF BREATH: 1
ABDOMINAL PAIN: 0
CONSTIPATION: 0
COUGH: 0
RHINORRHEA: 0
SORE THROAT: 0
NAUSEA: 1
WHEEZING: 0
EYE REDNESS: 0
BACK PAIN: 0

## 2019-11-14 ASSESSMENT — PAIN DESCRIPTION - PROGRESSION: CLINICAL_PROGRESSION: NOT CHANGED

## 2019-11-14 ASSESSMENT — PAIN DESCRIPTION - ORIENTATION: ORIENTATION: MID;RIGHT

## 2019-11-14 ASSESSMENT — PAIN SCALES - GENERAL
PAINLEVEL_OUTOF10: 10
PAINLEVEL_OUTOF10: 9
PAINLEVEL_OUTOF10: 10

## 2019-11-14 ASSESSMENT — PAIN DESCRIPTION - FREQUENCY: FREQUENCY: CONTINUOUS

## 2019-11-14 ASSESSMENT — PAIN DESCRIPTION - LOCATION: LOCATION: CHEST

## 2019-11-14 ASSESSMENT — PAIN DESCRIPTION - ONSET: ONSET: ON-GOING

## 2019-11-15 LAB
ANION GAP SERPL CALCULATED.3IONS-SCNC: 9 MMOL/L (ref 4–16)
BUN BLDV-MCNC: 10 MG/DL (ref 6–23)
CALCIUM SERPL-MCNC: 8.1 MG/DL (ref 8.3–10.6)
CHLORIDE BLD-SCNC: 105 MMOL/L (ref 99–110)
CO2: 25 MMOL/L (ref 21–32)
CREAT SERPL-MCNC: 0.6 MG/DL (ref 0.6–1.1)
GFR AFRICAN AMERICAN: >60 ML/MIN/1.73M2
GFR NON-AFRICAN AMERICAN: >60 ML/MIN/1.73M2
GLUCOSE BLD-MCNC: 81 MG/DL (ref 70–99)
HCT VFR BLD CALC: 30.5 % (ref 37–47)
HEMOGLOBIN: 9.3 GM/DL (ref 12.5–16)
MAGNESIUM: 2.2 MG/DL (ref 1.8–2.4)
MCH RBC QN AUTO: 33.7 PG (ref 27–31)
MCHC RBC AUTO-ENTMCNC: 30.5 % (ref 32–36)
MCV RBC AUTO: 110.5 FL (ref 78–100)
PDW BLD-RTO: 14.3 % (ref 11.7–14.9)
PLATELET # BLD: 398 K/CU MM (ref 140–440)
PMV BLD AUTO: 9.5 FL (ref 7.5–11.1)
POTASSIUM SERPL-SCNC: 3.5 MMOL/L (ref 3.5–5.1)
PROCALCITONIN: 0.03
RAPID INFLUENZA  B AGN: NEGATIVE
RAPID INFLUENZA A AGN: NEGATIVE
RBC # BLD: 2.76 M/CU MM (ref 4.2–5.4)
SODIUM BLD-SCNC: 139 MMOL/L (ref 135–145)
WBC # BLD: 3.9 K/CU MM (ref 4–10.5)

## 2019-11-15 PROCEDURE — 99222 1ST HOSP IP/OBS MODERATE 55: CPT | Performed by: INTERNAL MEDICINE

## 2019-11-15 PROCEDURE — 87581 M.PNEUMON DNA AMP PROBE: CPT

## 2019-11-15 PROCEDURE — 6360000002 HC RX W HCPCS: Performed by: PHYSICIAN ASSISTANT

## 2019-11-15 PROCEDURE — 94761 N-INVAS EAR/PLS OXIMETRY MLT: CPT

## 2019-11-15 PROCEDURE — 1200000000 HC SEMI PRIVATE

## 2019-11-15 PROCEDURE — 99221 1ST HOSP IP/OBS SF/LOW 40: CPT | Performed by: INTERNAL MEDICINE

## 2019-11-15 PROCEDURE — 87804 INFLUENZA ASSAY W/OPTIC: CPT

## 2019-11-15 PROCEDURE — APPSS60 APP SPLIT SHARED TIME 46-60 MINUTES: Performed by: NURSE PRACTITIONER

## 2019-11-15 PROCEDURE — 6360000002 HC RX W HCPCS: Performed by: INTERNAL MEDICINE

## 2019-11-15 PROCEDURE — 6370000000 HC RX 637 (ALT 250 FOR IP): Performed by: PHYSICIAN ASSISTANT

## 2019-11-15 PROCEDURE — 6370000000 HC RX 637 (ALT 250 FOR IP): Performed by: INTERNAL MEDICINE

## 2019-11-15 PROCEDURE — 36415 COLL VENOUS BLD VENIPUNCTURE: CPT

## 2019-11-15 PROCEDURE — 87798 DETECT AGENT NOS DNA AMP: CPT

## 2019-11-15 PROCEDURE — 83735 ASSAY OF MAGNESIUM: CPT

## 2019-11-15 PROCEDURE — 6360000002 HC RX W HCPCS: Performed by: EMERGENCY MEDICINE

## 2019-11-15 PROCEDURE — 85027 COMPLETE CBC AUTOMATED: CPT

## 2019-11-15 PROCEDURE — 87486 CHLMYD PNEUM DNA AMP PROBE: CPT

## 2019-11-15 PROCEDURE — 87081 CULTURE SCREEN ONLY: CPT

## 2019-11-15 PROCEDURE — 87633 RESP VIRUS 12-25 TARGETS: CPT

## 2019-11-15 PROCEDURE — 94640 AIRWAY INHALATION TREATMENT: CPT

## 2019-11-15 PROCEDURE — 80048 BASIC METABOLIC PNL TOTAL CA: CPT

## 2019-11-15 PROCEDURE — 93010 ELECTROCARDIOGRAM REPORT: CPT | Performed by: INTERNAL MEDICINE

## 2019-11-15 PROCEDURE — 2580000003 HC RX 258: Performed by: PHYSICIAN ASSISTANT

## 2019-11-15 RX ORDER — DULOXETIN HYDROCHLORIDE 30 MG/1
60 CAPSULE, DELAYED RELEASE ORAL DAILY
Status: DISCONTINUED | OUTPATIENT
Start: 2019-11-15 | End: 2019-11-18 | Stop reason: HOSPADM

## 2019-11-15 RX ORDER — MEXILETINE HYDROCHLORIDE 150 MG/1
150 CAPSULE ORAL 3 TIMES DAILY
Status: DISCONTINUED | OUTPATIENT
Start: 2019-11-15 | End: 2019-11-18 | Stop reason: HOSPADM

## 2019-11-15 RX ORDER — CLOPIDOGREL BISULFATE 75 MG/1
75 TABLET ORAL DAILY
Status: DISCONTINUED | OUTPATIENT
Start: 2019-11-15 | End: 2019-11-18 | Stop reason: HOSPADM

## 2019-11-15 RX ORDER — VANCOMYCIN HYDROCHLORIDE 1 G/200ML
1000 INJECTION, SOLUTION INTRAVENOUS EVERY 12 HOURS
Status: DISCONTINUED | OUTPATIENT
Start: 2019-11-15 | End: 2019-11-17

## 2019-11-15 RX ORDER — SODIUM CHLORIDE 0.9 % (FLUSH) 0.9 %
10 SYRINGE (ML) INJECTION EVERY 12 HOURS SCHEDULED
Status: DISCONTINUED | OUTPATIENT
Start: 2019-11-15 | End: 2019-11-18 | Stop reason: HOSPADM

## 2019-11-15 RX ORDER — ASPIRIN 81 MG/1
81 TABLET, CHEWABLE ORAL DAILY
Status: DISCONTINUED | OUTPATIENT
Start: 2019-11-15 | End: 2019-11-18 | Stop reason: HOSPADM

## 2019-11-15 RX ORDER — LEVETIRACETAM 500 MG/1
1000 TABLET ORAL 2 TIMES DAILY
Status: DISCONTINUED | OUTPATIENT
Start: 2019-11-15 | End: 2019-11-18 | Stop reason: HOSPADM

## 2019-11-15 RX ORDER — SODIUM CHLORIDE 0.9 % (FLUSH) 0.9 %
10 SYRINGE (ML) INJECTION PRN
Status: DISCONTINUED | OUTPATIENT
Start: 2019-11-15 | End: 2019-11-18 | Stop reason: HOSPADM

## 2019-11-15 RX ORDER — AMIODARONE HYDROCHLORIDE 200 MG/1
200 TABLET ORAL DAILY
Status: DISCONTINUED | OUTPATIENT
Start: 2019-11-15 | End: 2019-11-18 | Stop reason: HOSPADM

## 2019-11-15 RX ORDER — FAMOTIDINE 20 MG/1
20 TABLET, FILM COATED ORAL 2 TIMES DAILY
Status: DISCONTINUED | OUTPATIENT
Start: 2019-11-15 | End: 2019-11-18 | Stop reason: HOSPADM

## 2019-11-15 RX ORDER — ONDANSETRON 2 MG/ML
4 INJECTION INTRAMUSCULAR; INTRAVENOUS EVERY 6 HOURS PRN
Status: DISCONTINUED | OUTPATIENT
Start: 2019-11-15 | End: 2019-11-18 | Stop reason: HOSPADM

## 2019-11-15 RX ORDER — LACTOBACILLUS RHAMNOSUS GG 10B CELL
1 CAPSULE ORAL
Status: DISCONTINUED | OUTPATIENT
Start: 2019-11-15 | End: 2019-11-18 | Stop reason: HOSPADM

## 2019-11-15 RX ORDER — SPIRONOLACTONE 25 MG/1
25 TABLET ORAL 2 TIMES DAILY
Status: DISCONTINUED | OUTPATIENT
Start: 2019-11-15 | End: 2019-11-18 | Stop reason: HOSPADM

## 2019-11-15 RX ORDER — IPRATROPIUM BROMIDE AND ALBUTEROL SULFATE 2.5; .5 MG/3ML; MG/3ML
1 SOLUTION RESPIRATORY (INHALATION)
Status: DISCONTINUED | OUTPATIENT
Start: 2019-11-15 | End: 2019-11-18 | Stop reason: HOSPADM

## 2019-11-15 RX ORDER — RANOLAZINE 500 MG/1
500 TABLET, EXTENDED RELEASE ORAL 2 TIMES DAILY
Status: DISCONTINUED | OUTPATIENT
Start: 2019-11-15 | End: 2019-11-18 | Stop reason: HOSPADM

## 2019-11-15 RX ORDER — TORSEMIDE 20 MG/1
20 TABLET ORAL DAILY
Status: DISCONTINUED | OUTPATIENT
Start: 2019-11-15 | End: 2019-11-18 | Stop reason: HOSPADM

## 2019-11-15 RX ORDER — FERROUS GLUCONATE 324(37.5)
324 TABLET ORAL 2 TIMES DAILY
Status: DISCONTINUED | OUTPATIENT
Start: 2019-11-15 | End: 2019-11-18 | Stop reason: HOSPADM

## 2019-11-15 RX ORDER — ATORVASTATIN CALCIUM 40 MG/1
80 TABLET, FILM COATED ORAL NIGHTLY
Status: DISCONTINUED | OUTPATIENT
Start: 2019-11-15 | End: 2019-11-18 | Stop reason: HOSPADM

## 2019-11-15 RX ADMIN — VANCOMYCIN HYDROCHLORIDE 1000 MG: 1 INJECTION, SOLUTION INTRAVENOUS at 12:30

## 2019-11-15 RX ADMIN — MEXILETINE HYDROCHLORIDE 150 MG: 150 CAPSULE ORAL at 09:09

## 2019-11-15 RX ADMIN — LEVOTHYROXINE SODIUM 150 MCG: 150 TABLET ORAL at 05:55

## 2019-11-15 RX ADMIN — LEVETIRACETAM 1000 MG: 500 TABLET, FILM COATED ORAL at 21:00

## 2019-11-15 RX ADMIN — OXYCODONE HYDROCHLORIDE 15 MG: 5 TABLET ORAL at 09:29

## 2019-11-15 RX ADMIN — FAMOTIDINE 20 MG: 20 TABLET ORAL at 20:59

## 2019-11-15 RX ADMIN — IPRATROPIUM BROMIDE AND ALBUTEROL SULFATE 1 AMPULE: .5; 3 SOLUTION RESPIRATORY (INHALATION) at 19:16

## 2019-11-15 RX ADMIN — Medication 1 CAPSULE: at 15:35

## 2019-11-15 RX ADMIN — APIXABAN 5 MG: 5 TABLET, FILM COATED ORAL at 09:00

## 2019-11-15 RX ADMIN — Medication 2 PUFF: at 19:24

## 2019-11-15 RX ADMIN — TORSEMIDE 20 MG: 20 TABLET ORAL at 09:00

## 2019-11-15 RX ADMIN — OXYCODONE HYDROCHLORIDE 15 MG: 5 TABLET ORAL at 15:35

## 2019-11-15 RX ADMIN — OXYCODONE HYDROCHLORIDE 15 MG: 5 TABLET ORAL at 18:50

## 2019-11-15 RX ADMIN — IPRATROPIUM BROMIDE AND ALBUTEROL SULFATE 1 AMPULE: .5; 3 SOLUTION RESPIRATORY (INHALATION) at 14:56

## 2019-11-15 RX ADMIN — DULOXETINE HYDROCHLORIDE 60 MG: 30 CAPSULE, DELAYED RELEASE ORAL at 09:00

## 2019-11-15 RX ADMIN — CLOPIDOGREL BISULFATE 75 MG: 75 TABLET ORAL at 09:00

## 2019-11-15 RX ADMIN — IPRATROPIUM BROMIDE AND ALBUTEROL SULFATE 1 AMPULE: .5; 3 SOLUTION RESPIRATORY (INHALATION) at 07:40

## 2019-11-15 RX ADMIN — OXYCODONE HYDROCHLORIDE 15 MG: 5 TABLET ORAL at 12:31

## 2019-11-15 RX ADMIN — AMIODARONE HYDROCHLORIDE 200 MG: 200 TABLET ORAL at 09:00

## 2019-11-15 RX ADMIN — CEFEPIME 2 G: 2 INJECTION, POWDER, FOR SOLUTION INTRAVENOUS at 09:30

## 2019-11-15 RX ADMIN — MEXILETINE HYDROCHLORIDE 150 MG: 150 CAPSULE ORAL at 14:20

## 2019-11-15 RX ADMIN — ASPIRIN 81 MG 81 MG: 81 TABLET ORAL at 09:01

## 2019-11-15 RX ADMIN — FAMOTIDINE 20 MG: 20 TABLET ORAL at 09:01

## 2019-11-15 RX ADMIN — OXYCODONE HYDROCHLORIDE 15 MG: 5 TABLET ORAL at 05:55

## 2019-11-15 RX ADMIN — CEFEPIME 2 G: 2 INJECTION, POWDER, FOR SOLUTION INTRAVENOUS at 22:01

## 2019-11-15 RX ADMIN — Medication 10 ML: at 21:07

## 2019-11-15 RX ADMIN — OXYCODONE HYDROCHLORIDE 15 MG: 5 TABLET ORAL at 02:09

## 2019-11-15 RX ADMIN — RANOLAZINE 500 MG: 500 TABLET, FILM COATED, EXTENDED RELEASE ORAL at 20:59

## 2019-11-15 RX ADMIN — MEXILETINE HYDROCHLORIDE 150 MG: 150 CAPSULE ORAL at 20:59

## 2019-11-15 RX ADMIN — VANCOMYCIN HYDROCHLORIDE 1000 MG: 1 INJECTION, SOLUTION INTRAVENOUS at 01:05

## 2019-11-15 RX ADMIN — OXYCODONE HYDROCHLORIDE 15 MG: 5 TABLET ORAL at 22:07

## 2019-11-15 RX ADMIN — Medication 1 CAPSULE: at 09:01

## 2019-11-15 RX ADMIN — RANOLAZINE 500 MG: 500 TABLET, FILM COATED, EXTENDED RELEASE ORAL at 09:00

## 2019-11-15 RX ADMIN — LEVETIRACETAM 1000 MG: 500 TABLET, FILM COATED ORAL at 09:00

## 2019-11-15 RX ADMIN — ATORVASTATIN CALCIUM 80 MG: 40 TABLET, FILM COATED ORAL at 20:59

## 2019-11-15 RX ADMIN — Medication 10 ML: at 09:01

## 2019-11-15 RX ADMIN — Medication 1 CAPSULE: at 12:18

## 2019-11-15 RX ADMIN — FERROUS GLUCONATE TAB 324 MG (37.5 MG ELEMENTAL IRON) 324 MG: 324 (37.5 FE) TAB at 21:00

## 2019-11-15 RX ADMIN — SPIRONOLACTONE 25 MG: 25 TABLET ORAL at 21:06

## 2019-11-15 RX ADMIN — FERROUS GLUCONATE TAB 324 MG (37.5 MG ELEMENTAL IRON) 324 MG: 324 (37.5 FE) TAB at 09:00

## 2019-11-15 RX ADMIN — APIXABAN 5 MG: 5 TABLET, FILM COATED ORAL at 20:59

## 2019-11-15 RX ADMIN — SPIRONOLACTONE 25 MG: 25 TABLET ORAL at 09:01

## 2019-11-15 ASSESSMENT — PAIN SCALES - GENERAL
PAINLEVEL_OUTOF10: 9
PAINLEVEL_OUTOF10: 5
PAINLEVEL_OUTOF10: 8
PAINLEVEL_OUTOF10: 9
PAINLEVEL_OUTOF10: 4
PAINLEVEL_OUTOF10: 9
PAINLEVEL_OUTOF10: 8
PAINLEVEL_OUTOF10: 9
PAINLEVEL_OUTOF10: 5
PAINLEVEL_OUTOF10: 4
PAINLEVEL_OUTOF10: 8
PAINLEVEL_OUTOF10: 4
PAINLEVEL_OUTOF10: 5

## 2019-11-15 ASSESSMENT — PAIN DESCRIPTION - FREQUENCY: FREQUENCY: INTERMITTENT

## 2019-11-15 ASSESSMENT — PAIN DESCRIPTION - ORIENTATION: ORIENTATION: RIGHT;LEFT

## 2019-11-15 ASSESSMENT — PAIN DESCRIPTION - ONSET: ONSET: ON-GOING

## 2019-11-15 ASSESSMENT — PAIN DESCRIPTION - DESCRIPTORS: DESCRIPTORS: ACHING;SHARP

## 2019-11-15 ASSESSMENT — PAIN DESCRIPTION - LOCATION: LOCATION: HIP

## 2019-11-15 ASSESSMENT — PAIN DESCRIPTION - PAIN TYPE: TYPE: ACUTE PAIN

## 2019-11-15 ASSESSMENT — PAIN DESCRIPTION - PROGRESSION: CLINICAL_PROGRESSION: NOT CHANGED

## 2019-11-16 LAB
ADENOVIRUS DETECTION BY PCR: NOT DETECTED
ANION GAP SERPL CALCULATED.3IONS-SCNC: 12 MMOL/L (ref 4–16)
ANISOCYTOSIS: ABNORMAL
BASOPHILS ABSOLUTE: 0.1 K/CU MM
BASOPHILS RELATIVE PERCENT: 1.8 % (ref 0–1)
BORDETELLA PERTUSSIS PCR: NOT DETECTED
BUN BLDV-MCNC: 10 MG/DL (ref 6–23)
CALCIUM SERPL-MCNC: 8.5 MG/DL (ref 8.3–10.6)
CHLAMYDOPHILA PNEUMONIA PCR: NOT DETECTED
CHLORIDE BLD-SCNC: 101 MMOL/L (ref 99–110)
CO2: 25 MMOL/L (ref 21–32)
CORONAVIRUS 229E PCR: NOT DETECTED
CORONAVIRUS HKU1 PCR: NOT DETECTED
CORONAVIRUS NL63 PCR: NOT DETECTED
CORONAVIRUS OC43 PCR: NOT DETECTED
CREAT SERPL-MCNC: 0.7 MG/DL (ref 0.6–1.1)
CULTURE: ABNORMAL
CULTURE: ABNORMAL
DIFFERENTIAL TYPE: ABNORMAL
EOSINOPHILS ABSOLUTE: 0 K/CU MM
EOSINOPHILS RELATIVE PERCENT: 0 % (ref 0–3)
GFR AFRICAN AMERICAN: >60 ML/MIN/1.73M2
GFR NON-AFRICAN AMERICAN: >60 ML/MIN/1.73M2
GLUCOSE BLD-MCNC: 116 MG/DL (ref 70–99)
HCT VFR BLD CALC: 33.3 % (ref 37–47)
HEMOGLOBIN: 10 GM/DL (ref 12.5–16)
HUMAN METAPNEUMOVIRUS PCR: NOT DETECTED
IMMATURE NEUTROPHIL %: 0.3 % (ref 0–0.43)
INFLUENZA A BY PCR: NOT DETECTED
INFLUENZA A H1 (2009) PCR: NOT DETECTED
INFLUENZA A H1 PANDEMIC PCR: NOT DETECTED
INFLUENZA A H3 PCR: NOT DETECTED
INFLUENZA B BY PCR: NOT DETECTED
LACTATE: 1.8 MMOL/L (ref 0.4–2)
LEGIONELLA URINARY AG: NEGATIVE
LYMPHOCYTES ABSOLUTE: 1 K/CU MM
LYMPHOCYTES ABSOLUTE: ABNORMAL K/CU MM
LYMPHOCYTES RELATIVE PERCENT: 30.8 % (ref 24–44)
Lab: ABNORMAL
MACROCYTES: ABNORMAL
MAGNESIUM: 2.2 MG/DL (ref 1.8–2.4)
MCH RBC QN AUTO: 34 PG (ref 27–31)
MCHC RBC AUTO-ENTMCNC: 30 % (ref 32–36)
MCV RBC AUTO: 113.3 FL (ref 78–100)
MONOCYTES ABSOLUTE: 0.4 K/CU MM
MONOCYTES RELATIVE PERCENT: 11.4 % (ref 0–4)
MYCOPLASMA PNEUMONIAE PCR: NOT DETECTED
NUCLEATED RBC %: 0 %
OVALOCYTES: ABNORMAL
PARAINFLUENZA 1 PCR: NOT DETECTED
PARAINFLUENZA 2 PCR: NOT DETECTED
PARAINFLUENZA 3 PCR: NOT DETECTED
PARAINFLUENZA 4 PCR: NOT DETECTED
PDW BLD-RTO: 14.6 % (ref 11.7–14.9)
PLATELET # BLD: 386 K/CU MM (ref 140–440)
PMV BLD AUTO: 9.5 FL (ref 7.5–11.1)
POLYCHROMASIA: ABNORMAL
POTASSIUM SERPL-SCNC: 4.1 MMOL/L (ref 3.5–5.1)
PROCALCITONIN: 0.05
RBC # BLD: 2.94 M/CU MM (ref 4.2–5.4)
RHINOVIRUS ENTEROVIRUS PCR: ABNORMAL
RSV PCR: NOT DETECTED
SEGMENTED NEUTROPHILS ABSOLUTE COUNT: 1.9 K/CU MM
SEGMENTED NEUTROPHILS RELATIVE PERCENT: 55.7 % (ref 36–66)
SODIUM BLD-SCNC: 138 MMOL/L (ref 135–145)
SPECIMEN: ABNORMAL
STREP PNEUMONIAE ANTIGEN: NORMAL
TOTAL IMMATURE NEUTOROPHIL: 0.01 K/CU MM
TOTAL NUCLEATED RBC: 0 K/CU MM
WBC # BLD: 3.4 K/CU MM (ref 4–10.5)

## 2019-11-16 PROCEDURE — 85025 COMPLETE CBC W/AUTO DIFF WBC: CPT

## 2019-11-16 PROCEDURE — 84145 PROCALCITONIN (PCT): CPT

## 2019-11-16 PROCEDURE — 6360000002 HC RX W HCPCS: Performed by: PHYSICIAN ASSISTANT

## 2019-11-16 PROCEDURE — 6370000000 HC RX 637 (ALT 250 FOR IP): Performed by: PHYSICIAN ASSISTANT

## 2019-11-16 PROCEDURE — 1200000000 HC SEMI PRIVATE

## 2019-11-16 PROCEDURE — 87899 AGENT NOS ASSAY W/OPTIC: CPT

## 2019-11-16 PROCEDURE — 6370000000 HC RX 637 (ALT 250 FOR IP): Performed by: INTERNAL MEDICINE

## 2019-11-16 PROCEDURE — 92610 EVALUATE SWALLOWING FUNCTION: CPT

## 2019-11-16 PROCEDURE — 83735 ASSAY OF MAGNESIUM: CPT

## 2019-11-16 PROCEDURE — 6360000002 HC RX W HCPCS: Performed by: INTERNAL MEDICINE

## 2019-11-16 PROCEDURE — 87449 NOS EACH ORGANISM AG IA: CPT

## 2019-11-16 PROCEDURE — 97162 PT EVAL MOD COMPLEX 30 MIN: CPT

## 2019-11-16 PROCEDURE — 83605 ASSAY OF LACTIC ACID: CPT

## 2019-11-16 PROCEDURE — 36415 COLL VENOUS BLD VENIPUNCTURE: CPT

## 2019-11-16 PROCEDURE — 2580000003 HC RX 258: Performed by: PHYSICIAN ASSISTANT

## 2019-11-16 PROCEDURE — 97530 THERAPEUTIC ACTIVITIES: CPT

## 2019-11-16 PROCEDURE — 80202 ASSAY OF VANCOMYCIN: CPT

## 2019-11-16 PROCEDURE — 80048 BASIC METABOLIC PNL TOTAL CA: CPT

## 2019-11-16 PROCEDURE — 94761 N-INVAS EAR/PLS OXIMETRY MLT: CPT

## 2019-11-16 PROCEDURE — 94640 AIRWAY INHALATION TREATMENT: CPT

## 2019-11-16 PROCEDURE — 87040 BLOOD CULTURE FOR BACTERIA: CPT

## 2019-11-16 PROCEDURE — 99232 SBSQ HOSP IP/OBS MODERATE 35: CPT | Performed by: INTERNAL MEDICINE

## 2019-11-16 RX ORDER — DOXYCYCLINE HYCLATE 100 MG
100 TABLET ORAL EVERY 12 HOURS SCHEDULED
Status: DISCONTINUED | OUTPATIENT
Start: 2019-11-16 | End: 2019-11-18 | Stop reason: HOSPADM

## 2019-11-16 RX ADMIN — ASPIRIN 81 MG 81 MG: 81 TABLET ORAL at 09:15

## 2019-11-16 RX ADMIN — Medication 10 ML: at 22:02

## 2019-11-16 RX ADMIN — OXYCODONE HYDROCHLORIDE 15 MG: 5 TABLET ORAL at 12:17

## 2019-11-16 RX ADMIN — TORSEMIDE 20 MG: 20 TABLET ORAL at 09:14

## 2019-11-16 RX ADMIN — Medication 2 PUFF: at 20:50

## 2019-11-16 RX ADMIN — FERROUS GLUCONATE TAB 324 MG (37.5 MG ELEMENTAL IRON) 324 MG: 324 (37.5 FE) TAB at 09:14

## 2019-11-16 RX ADMIN — MEXILETINE HYDROCHLORIDE 150 MG: 150 CAPSULE ORAL at 09:14

## 2019-11-16 RX ADMIN — Medication 1 CAPSULE: at 09:14

## 2019-11-16 RX ADMIN — Medication 10 ML: at 09:16

## 2019-11-16 RX ADMIN — IPRATROPIUM BROMIDE AND ALBUTEROL SULFATE 1 AMPULE: .5; 3 SOLUTION RESPIRATORY (INHALATION) at 15:42

## 2019-11-16 RX ADMIN — RANOLAZINE 500 MG: 500 TABLET, FILM COATED, EXTENDED RELEASE ORAL at 09:14

## 2019-11-16 RX ADMIN — FERROUS GLUCONATE TAB 324 MG (37.5 MG ELEMENTAL IRON) 324 MG: 324 (37.5 FE) TAB at 22:03

## 2019-11-16 RX ADMIN — DOXYCYCLINE HYCLATE 100 MG: 100 TABLET, COATED ORAL at 09:15

## 2019-11-16 RX ADMIN — LEVETIRACETAM 1000 MG: 500 TABLET, FILM COATED ORAL at 09:14

## 2019-11-16 RX ADMIN — Medication: at 23:36

## 2019-11-16 RX ADMIN — VANCOMYCIN HYDROCHLORIDE 1000 MG: 1 INJECTION, SOLUTION INTRAVENOUS at 00:44

## 2019-11-16 RX ADMIN — OXYCODONE HYDROCHLORIDE 15 MG: 5 TABLET ORAL at 22:02

## 2019-11-16 RX ADMIN — Medication 1 CAPSULE: at 18:32

## 2019-11-16 RX ADMIN — OXYCODONE HYDROCHLORIDE 15 MG: 5 TABLET ORAL at 09:14

## 2019-11-16 RX ADMIN — FAMOTIDINE 20 MG: 20 TABLET ORAL at 22:04

## 2019-11-16 RX ADMIN — OXYCODONE HYDROCHLORIDE 15 MG: 5 TABLET ORAL at 15:28

## 2019-11-16 RX ADMIN — SPIRONOLACTONE 25 MG: 25 TABLET ORAL at 09:14

## 2019-11-16 RX ADMIN — CEFEPIME 2 G: 2 INJECTION, POWDER, FOR SOLUTION INTRAVENOUS at 22:01

## 2019-11-16 RX ADMIN — CEFEPIME 2 G: 2 INJECTION, POWDER, FOR SOLUTION INTRAVENOUS at 09:15

## 2019-11-16 RX ADMIN — OXYCODONE HYDROCHLORIDE 15 MG: 5 TABLET ORAL at 02:38

## 2019-11-16 RX ADMIN — MEXILETINE HYDROCHLORIDE 150 MG: 150 CAPSULE ORAL at 15:29

## 2019-11-16 RX ADMIN — OXYCODONE HYDROCHLORIDE 15 MG: 5 TABLET ORAL at 18:32

## 2019-11-16 RX ADMIN — SPIRONOLACTONE 25 MG: 25 TABLET ORAL at 22:03

## 2019-11-16 RX ADMIN — MEXILETINE HYDROCHLORIDE 150 MG: 150 CAPSULE ORAL at 22:02

## 2019-11-16 RX ADMIN — FAMOTIDINE 20 MG: 20 TABLET ORAL at 09:14

## 2019-11-16 RX ADMIN — LEVETIRACETAM 1000 MG: 500 TABLET, FILM COATED ORAL at 22:03

## 2019-11-16 RX ADMIN — OXYCODONE HYDROCHLORIDE 15 MG: 5 TABLET ORAL at 05:47

## 2019-11-16 RX ADMIN — Medication 1 CAPSULE: at 12:17

## 2019-11-16 RX ADMIN — RANOLAZINE 500 MG: 500 TABLET, FILM COATED, EXTENDED RELEASE ORAL at 22:03

## 2019-11-16 RX ADMIN — LEVOTHYROXINE SODIUM 150 MCG: 150 TABLET ORAL at 05:03

## 2019-11-16 RX ADMIN — APIXABAN 5 MG: 5 TABLET, FILM COATED ORAL at 09:14

## 2019-11-16 RX ADMIN — DULOXETINE HYDROCHLORIDE 60 MG: 30 CAPSULE, DELAYED RELEASE ORAL at 09:14

## 2019-11-16 RX ADMIN — DOXYCYCLINE HYCLATE 100 MG: 100 TABLET, COATED ORAL at 22:02

## 2019-11-16 RX ADMIN — CLOPIDOGREL BISULFATE 75 MG: 75 TABLET ORAL at 09:14

## 2019-11-16 RX ADMIN — AMIODARONE HYDROCHLORIDE 200 MG: 200 TABLET ORAL at 09:15

## 2019-11-16 RX ADMIN — ATORVASTATIN CALCIUM 80 MG: 40 TABLET, FILM COATED ORAL at 22:03

## 2019-11-16 RX ADMIN — VANCOMYCIN HYDROCHLORIDE 1000 MG: 1 INJECTION, SOLUTION INTRAVENOUS at 12:22

## 2019-11-16 RX ADMIN — IPRATROPIUM BROMIDE AND ALBUTEROL SULFATE 1 AMPULE: .5; 3 SOLUTION RESPIRATORY (INHALATION) at 20:50

## 2019-11-16 RX ADMIN — APIXABAN 5 MG: 5 TABLET, FILM COATED ORAL at 22:03

## 2019-11-16 ASSESSMENT — PAIN DESCRIPTION - PROGRESSION
CLINICAL_PROGRESSION: NOT CHANGED
CLINICAL_PROGRESSION: NOT CHANGED

## 2019-11-16 ASSESSMENT — PAIN SCALES - GENERAL
PAINLEVEL_OUTOF10: 8
PAINLEVEL_OUTOF10: 8
PAINLEVEL_OUTOF10: 9
PAINLEVEL_OUTOF10: 8
PAINLEVEL_OUTOF10: 9
PAINLEVEL_OUTOF10: 9

## 2019-11-16 ASSESSMENT — PAIN DESCRIPTION - LOCATION
LOCATION: HIP;LEG

## 2019-11-16 ASSESSMENT — PAIN DESCRIPTION - PAIN TYPE
TYPE: ACUTE PAIN

## 2019-11-16 ASSESSMENT — PAIN DESCRIPTION - DESCRIPTORS
DESCRIPTORS: SHARP;CONSTANT;ACHING
DESCRIPTORS: SHARP;ACHING

## 2019-11-16 ASSESSMENT — PAIN DESCRIPTION - ONSET
ONSET: ON-GOING

## 2019-11-16 ASSESSMENT — PAIN DESCRIPTION - ORIENTATION
ORIENTATION: RIGHT;LEFT
ORIENTATION: RIGHT
ORIENTATION: RIGHT;LEFT
ORIENTATION: RIGHT;LEFT

## 2019-11-16 ASSESSMENT — PAIN DESCRIPTION - FREQUENCY
FREQUENCY: INTERMITTENT
FREQUENCY: INTERMITTENT

## 2019-11-17 LAB
CREAT SERPL-MCNC: 0.6 MG/DL (ref 0.6–1.1)
GFR AFRICAN AMERICAN: >60 ML/MIN/1.73M2
GFR NON-AFRICAN AMERICAN: >60 ML/MIN/1.73M2

## 2019-11-17 PROCEDURE — 6370000000 HC RX 637 (ALT 250 FOR IP): Performed by: INTERNAL MEDICINE

## 2019-11-17 PROCEDURE — 82565 ASSAY OF CREATININE: CPT

## 2019-11-17 PROCEDURE — 6370000000 HC RX 637 (ALT 250 FOR IP): Performed by: PHYSICIAN ASSISTANT

## 2019-11-17 PROCEDURE — 94640 AIRWAY INHALATION TREATMENT: CPT

## 2019-11-17 PROCEDURE — 36415 COLL VENOUS BLD VENIPUNCTURE: CPT

## 2019-11-17 PROCEDURE — 80202 ASSAY OF VANCOMYCIN: CPT

## 2019-11-17 PROCEDURE — 6360000002 HC RX W HCPCS: Performed by: PHYSICIAN ASSISTANT

## 2019-11-17 PROCEDURE — 6360000002 HC RX W HCPCS: Performed by: INTERNAL MEDICINE

## 2019-11-17 PROCEDURE — 2580000003 HC RX 258: Performed by: PHYSICIAN ASSISTANT

## 2019-11-17 PROCEDURE — 99232 SBSQ HOSP IP/OBS MODERATE 35: CPT | Performed by: INTERNAL MEDICINE

## 2019-11-17 PROCEDURE — 1200000000 HC SEMI PRIVATE

## 2019-11-17 PROCEDURE — 94761 N-INVAS EAR/PLS OXIMETRY MLT: CPT

## 2019-11-17 RX ORDER — OXYCODONE HYDROCHLORIDE 10 MG/1
20 TABLET ORAL
Status: DISCONTINUED | OUTPATIENT
Start: 2019-11-17 | End: 2019-11-18 | Stop reason: HOSPADM

## 2019-11-17 RX ORDER — OXYCODONE HYDROCHLORIDE 5 MG/1
5 TABLET ORAL
Status: DISCONTINUED | OUTPATIENT
Start: 2019-11-17 | End: 2019-11-17

## 2019-11-17 RX ORDER — OXYCODONE HYDROCHLORIDE 10 MG/1
20 TABLET ORAL
Status: DISCONTINUED | OUTPATIENT
Start: 2019-11-17 | End: 2019-11-17

## 2019-11-17 RX ADMIN — Medication 1 CAPSULE: at 10:00

## 2019-11-17 RX ADMIN — APIXABAN 5 MG: 5 TABLET, FILM COATED ORAL at 10:02

## 2019-11-17 RX ADMIN — MEXILETINE HYDROCHLORIDE 150 MG: 150 CAPSULE ORAL at 17:22

## 2019-11-17 RX ADMIN — DULOXETINE HYDROCHLORIDE 60 MG: 30 CAPSULE, DELAYED RELEASE ORAL at 10:02

## 2019-11-17 RX ADMIN — CLOPIDOGREL BISULFATE 75 MG: 75 TABLET ORAL at 10:01

## 2019-11-17 RX ADMIN — RANOLAZINE 500 MG: 500 TABLET, FILM COATED, EXTENDED RELEASE ORAL at 10:01

## 2019-11-17 RX ADMIN — OXYCODONE HYDROCHLORIDE 15 MG: 5 TABLET ORAL at 04:13

## 2019-11-17 RX ADMIN — MEXILETINE HYDROCHLORIDE 150 MG: 150 CAPSULE ORAL at 23:18

## 2019-11-17 RX ADMIN — Medication 1 CAPSULE: at 17:23

## 2019-11-17 RX ADMIN — FERROUS GLUCONATE TAB 324 MG (37.5 MG ELEMENTAL IRON) 324 MG: 324 (37.5 FE) TAB at 22:08

## 2019-11-17 RX ADMIN — LEVETIRACETAM 1000 MG: 500 TABLET, FILM COATED ORAL at 22:09

## 2019-11-17 RX ADMIN — MEXILETINE HYDROCHLORIDE 150 MG: 150 CAPSULE ORAL at 10:02

## 2019-11-17 RX ADMIN — VANCOMYCIN HYDROCHLORIDE 1000 MG: 1 INJECTION, SOLUTION INTRAVENOUS at 00:59

## 2019-11-17 RX ADMIN — Medication: at 10:00

## 2019-11-17 RX ADMIN — Medication 1 CAPSULE: at 12:56

## 2019-11-17 RX ADMIN — ASPIRIN 81 MG 81 MG: 81 TABLET ORAL at 10:02

## 2019-11-17 RX ADMIN — RANOLAZINE 500 MG: 500 TABLET, FILM COATED, EXTENDED RELEASE ORAL at 22:09

## 2019-11-17 RX ADMIN — CEFEPIME 2 G: 2 INJECTION, POWDER, FOR SOLUTION INTRAVENOUS at 09:59

## 2019-11-17 RX ADMIN — OXYCODONE HYDROCHLORIDE 15 MG: 5 TABLET ORAL at 07:44

## 2019-11-17 RX ADMIN — Medication: at 23:18

## 2019-11-17 RX ADMIN — DOXYCYCLINE HYCLATE 100 MG: 100 TABLET, COATED ORAL at 22:09

## 2019-11-17 RX ADMIN — SPIRONOLACTONE 25 MG: 25 TABLET ORAL at 10:01

## 2019-11-17 RX ADMIN — FAMOTIDINE 20 MG: 20 TABLET ORAL at 10:02

## 2019-11-17 RX ADMIN — LEVOTHYROXINE SODIUM 150 MCG: 150 TABLET ORAL at 06:15

## 2019-11-17 RX ADMIN — LEVETIRACETAM 1000 MG: 500 TABLET, FILM COATED ORAL at 10:01

## 2019-11-17 RX ADMIN — AMIODARONE HYDROCHLORIDE 200 MG: 200 TABLET ORAL at 10:02

## 2019-11-17 RX ADMIN — ATORVASTATIN CALCIUM 80 MG: 40 TABLET, FILM COATED ORAL at 22:08

## 2019-11-17 RX ADMIN — ONDANSETRON 4 MG: 2 INJECTION INTRAMUSCULAR; INTRAVENOUS at 15:39

## 2019-11-17 RX ADMIN — DOXYCYCLINE HYCLATE 100 MG: 100 TABLET, COATED ORAL at 10:02

## 2019-11-17 RX ADMIN — OXYCODONE HYDROCHLORIDE 15 MG: 5 TABLET ORAL at 22:09

## 2019-11-17 RX ADMIN — TORSEMIDE 20 MG: 20 TABLET ORAL at 10:02

## 2019-11-17 RX ADMIN — FERROUS GLUCONATE TAB 324 MG (37.5 MG ELEMENTAL IRON) 324 MG: 324 (37.5 FE) TAB at 10:01

## 2019-11-17 RX ADMIN — Medication 10 ML: at 22:08

## 2019-11-17 RX ADMIN — Medication 2 PUFF: at 07:34

## 2019-11-17 RX ADMIN — OXYCODONE HYDROCHLORIDE 20 MG: 10 TABLET ORAL at 12:38

## 2019-11-17 RX ADMIN — OXYCODONE HYDROCHLORIDE 15 MG: 5 TABLET ORAL at 15:43

## 2019-11-17 RX ADMIN — OXYCODONE HYDROCHLORIDE 5 MG: 5 TABLET ORAL at 10:48

## 2019-11-17 RX ADMIN — FAMOTIDINE 20 MG: 20 TABLET ORAL at 22:08

## 2019-11-17 RX ADMIN — OXYCODONE HYDROCHLORIDE 15 MG: 5 TABLET ORAL at 18:47

## 2019-11-17 RX ADMIN — OXYCODONE HYDROCHLORIDE 15 MG: 5 TABLET ORAL at 01:06

## 2019-11-17 RX ADMIN — IPRATROPIUM BROMIDE AND ALBUTEROL SULFATE 1 AMPULE: .5; 3 SOLUTION RESPIRATORY (INHALATION) at 11:20

## 2019-11-17 RX ADMIN — APIXABAN 5 MG: 5 TABLET, FILM COATED ORAL at 22:09

## 2019-11-17 RX ADMIN — IPRATROPIUM BROMIDE AND ALBUTEROL SULFATE 1 AMPULE: .5; 3 SOLUTION RESPIRATORY (INHALATION) at 07:33

## 2019-11-17 RX ADMIN — SPIRONOLACTONE 25 MG: 25 TABLET ORAL at 22:08

## 2019-11-17 RX ADMIN — Medication 10 ML: at 10:03

## 2019-11-17 ASSESSMENT — PAIN DESCRIPTION - FREQUENCY
FREQUENCY: CONTINUOUS

## 2019-11-17 ASSESSMENT — PAIN DESCRIPTION - PAIN TYPE
TYPE: ACUTE PAIN

## 2019-11-17 ASSESSMENT — PAIN DESCRIPTION - LOCATION
LOCATION: OTHER (COMMENT)
LOCATION: OTHER (COMMENT)
LOCATION: HIP;LEG
LOCATION: OTHER (COMMENT)
LOCATION: KNEE;LEG;HIP
LOCATION: HIP;LEG
LOCATION: OTHER (COMMENT)
LOCATION: OTHER (COMMENT)

## 2019-11-17 ASSESSMENT — PAIN SCALES - GENERAL
PAINLEVEL_OUTOF10: 9
PAINLEVEL_OUTOF10: 8
PAINLEVEL_OUTOF10: 4
PAINLEVEL_OUTOF10: 9
PAINLEVEL_OUTOF10: 9
PAINLEVEL_OUTOF10: 5
PAINLEVEL_OUTOF10: 9
PAINLEVEL_OUTOF10: 4
PAINLEVEL_OUTOF10: 9
PAINLEVEL_OUTOF10: 8
PAINLEVEL_OUTOF10: 9

## 2019-11-17 ASSESSMENT — PAIN DESCRIPTION - PROGRESSION
CLINICAL_PROGRESSION: NOT CHANGED
CLINICAL_PROGRESSION: GRADUALLY IMPROVING
CLINICAL_PROGRESSION: NOT CHANGED
CLINICAL_PROGRESSION: NOT CHANGED
CLINICAL_PROGRESSION: GRADUALLY WORSENING
CLINICAL_PROGRESSION: GRADUALLY IMPROVING

## 2019-11-17 ASSESSMENT — PAIN DESCRIPTION - DESCRIPTORS
DESCRIPTORS: ACHING;CONSTANT;SHARP
DESCRIPTORS: SHARP;STABBING
DESCRIPTORS: ACHING;CONSTANT;SHARP
DESCRIPTORS: ACHING;CONSTANT;SHARP
DESCRIPTORS: ACHING;CONSTANT;SHARP;SHOOTING
DESCRIPTORS: ACHING;CONSTANT;SHARP
DESCRIPTORS: ACHING;CONSTANT;SHARP
DESCRIPTORS: SHARP;STABBING
DESCRIPTORS: ACHING;CONSTANT;SHARP

## 2019-11-17 ASSESSMENT — PAIN - FUNCTIONAL ASSESSMENT

## 2019-11-17 ASSESSMENT — PAIN DESCRIPTION - ONSET
ONSET: ON-GOING

## 2019-11-17 ASSESSMENT — PAIN DESCRIPTION - ORIENTATION
ORIENTATION: RIGHT;LEFT
ORIENTATION: RIGHT;LEFT

## 2019-11-18 VITALS
HEART RATE: 60 BPM | DIASTOLIC BLOOD PRESSURE: 64 MMHG | HEIGHT: 66 IN | OXYGEN SATURATION: 97 % | SYSTOLIC BLOOD PRESSURE: 109 MMHG | RESPIRATION RATE: 16 BRPM | WEIGHT: 117 LBS | BODY MASS INDEX: 18.8 KG/M2 | TEMPERATURE: 98.5 F

## 2019-11-18 PROCEDURE — 6370000000 HC RX 637 (ALT 250 FOR IP): Performed by: PHYSICIAN ASSISTANT

## 2019-11-18 PROCEDURE — 82565 ASSAY OF CREATININE: CPT

## 2019-11-18 PROCEDURE — 6370000000 HC RX 637 (ALT 250 FOR IP): Performed by: INTERNAL MEDICINE

## 2019-11-18 PROCEDURE — 94640 AIRWAY INHALATION TREATMENT: CPT

## 2019-11-18 PROCEDURE — 2580000003 HC RX 258: Performed by: PHYSICIAN ASSISTANT

## 2019-11-18 PROCEDURE — 36415 COLL VENOUS BLD VENIPUNCTURE: CPT

## 2019-11-18 PROCEDURE — 94761 N-INVAS EAR/PLS OXIMETRY MLT: CPT

## 2019-11-18 RX ORDER — NALOXONE HYDROCHLORIDE 4 MG/.1ML
1 SPRAY NASAL PRN
Qty: 1 EACH | Refills: 5 | Status: SHIPPED | OUTPATIENT
Start: 2019-11-18 | End: 2020-06-08 | Stop reason: SDUPTHER

## 2019-11-18 RX ORDER — DOXYCYCLINE HYCLATE 100 MG
100 TABLET ORAL EVERY 12 HOURS SCHEDULED
Qty: 8 TABLET | Refills: 0 | Status: SHIPPED
Start: 2019-11-18 | End: 2019-11-22

## 2019-11-18 RX ORDER — OXYCODONE HYDROCHLORIDE 15 MG/1
15 TABLET ORAL
Qty: 10 TABLET | Refills: 0 | Status: SHIPPED | OUTPATIENT
Start: 2019-11-18 | End: 2019-11-20

## 2019-11-18 RX ORDER — IPRATROPIUM BROMIDE AND ALBUTEROL SULFATE 2.5; .5 MG/3ML; MG/3ML
3 SOLUTION RESPIRATORY (INHALATION)
Qty: 360 ML | Refills: 0 | Status: SHIPPED
Start: 2019-11-18

## 2019-11-18 RX ADMIN — LEVETIRACETAM 1000 MG: 500 TABLET, FILM COATED ORAL at 09:54

## 2019-11-18 RX ADMIN — DOXYCYCLINE HYCLATE 100 MG: 100 TABLET, COATED ORAL at 09:55

## 2019-11-18 RX ADMIN — CLOPIDOGREL BISULFATE 75 MG: 75 TABLET ORAL at 09:55

## 2019-11-18 RX ADMIN — IPRATROPIUM BROMIDE AND ALBUTEROL SULFATE 1 AMPULE: .5; 3 SOLUTION RESPIRATORY (INHALATION) at 08:15

## 2019-11-18 RX ADMIN — FAMOTIDINE 20 MG: 20 TABLET ORAL at 09:55

## 2019-11-18 RX ADMIN — Medication 1 CAPSULE: at 11:47

## 2019-11-18 RX ADMIN — APIXABAN 5 MG: 5 TABLET, FILM COATED ORAL at 09:55

## 2019-11-18 RX ADMIN — RANOLAZINE 500 MG: 500 TABLET, FILM COATED, EXTENDED RELEASE ORAL at 09:55

## 2019-11-18 RX ADMIN — ASPIRIN 81 MG 81 MG: 81 TABLET ORAL at 09:55

## 2019-11-18 RX ADMIN — IPRATROPIUM BROMIDE AND ALBUTEROL SULFATE 1 AMPULE: .5; 3 SOLUTION RESPIRATORY (INHALATION) at 11:54

## 2019-11-18 RX ADMIN — FERROUS GLUCONATE TAB 324 MG (37.5 MG ELEMENTAL IRON) 324 MG: 324 (37.5 FE) TAB at 09:55

## 2019-11-18 RX ADMIN — OXYCODONE HYDROCHLORIDE 20 MG: 10 TABLET ORAL at 08:39

## 2019-11-18 RX ADMIN — Medication 2 PUFF: at 08:15

## 2019-11-18 RX ADMIN — OXYCODONE HYDROCHLORIDE 20 MG: 10 TABLET ORAL at 01:15

## 2019-11-18 RX ADMIN — Medication 10 ML: at 09:58

## 2019-11-18 RX ADMIN — DULOXETINE HYDROCHLORIDE 60 MG: 30 CAPSULE, DELAYED RELEASE ORAL at 09:54

## 2019-11-18 RX ADMIN — TORSEMIDE 20 MG: 20 TABLET ORAL at 09:55

## 2019-11-18 RX ADMIN — LEVOTHYROXINE SODIUM 150 MCG: 150 TABLET ORAL at 06:32

## 2019-11-18 RX ADMIN — OXYCODONE HYDROCHLORIDE 20 MG: 10 TABLET ORAL at 11:47

## 2019-11-18 RX ADMIN — SPIRONOLACTONE 25 MG: 25 TABLET ORAL at 09:55

## 2019-11-18 RX ADMIN — MEXILETINE HYDROCHLORIDE 150 MG: 150 CAPSULE ORAL at 09:55

## 2019-11-18 RX ADMIN — OXYCODONE HYDROCHLORIDE 20 MG: 10 TABLET ORAL at 04:50

## 2019-11-18 RX ADMIN — Medication 1 CAPSULE: at 09:55

## 2019-11-18 RX ADMIN — AMIODARONE HYDROCHLORIDE 200 MG: 200 TABLET ORAL at 09:55

## 2019-11-18 ASSESSMENT — PAIN SCALES - GENERAL
PAINLEVEL_OUTOF10: 9

## 2019-11-18 ASSESSMENT — PAIN DESCRIPTION - LOCATION: LOCATION: KNEE;HIP;LEG

## 2019-11-18 ASSESSMENT — PAIN DESCRIPTION - PAIN TYPE: TYPE: ACUTE PAIN;CHRONIC PAIN

## 2019-11-18 ASSESSMENT — PAIN DESCRIPTION - FREQUENCY: FREQUENCY: CONTINUOUS

## 2019-11-18 ASSESSMENT — PAIN DESCRIPTION - DESCRIPTORS: DESCRIPTORS: ACHING;CONSTANT

## 2019-11-18 ASSESSMENT — PAIN DESCRIPTION - ORIENTATION: ORIENTATION: RIGHT;LEFT

## 2019-11-20 LAB
EKG ATRIAL RATE: 60 BPM
EKG DIAGNOSIS: NORMAL
EKG P-R INTERVAL: 188 MS
EKG Q-T INTERVAL: 434 MS
EKG QRS DURATION: 94 MS
EKG QTC CALCULATION (BAZETT): 434 MS
EKG R AXIS: -38 DEGREES
EKG T AXIS: 2 DEGREES
EKG VENTRICULAR RATE: 60 BPM

## 2019-11-26 ENCOUNTER — OFFICE VISIT (OUTPATIENT)
Dept: ORTHOPEDIC SURGERY | Age: 59
End: 2019-11-26
Payer: MEDICARE

## 2019-11-26 VITALS
WEIGHT: 102 LBS | HEART RATE: 69 BPM | OXYGEN SATURATION: 99 % | HEIGHT: 66 IN | RESPIRATION RATE: 16 BRPM | BODY MASS INDEX: 16.39 KG/M2

## 2019-11-26 DIAGNOSIS — S82.024A CLOSED NONDISPLACED LONGITUDINAL FRACTURE OF RIGHT PATELLA, INITIAL ENCOUNTER: ICD-10-CM

## 2019-11-26 PROCEDURE — 1111F DSCHRG MED/CURRENT MED MERGE: CPT | Performed by: ORTHOPAEDIC SURGERY

## 2019-11-26 PROCEDURE — G8484 FLU IMMUNIZE NO ADMIN: HCPCS | Performed by: ORTHOPAEDIC SURGERY

## 2019-11-26 PROCEDURE — 99024 POSTOP FOLLOW-UP VISIT: CPT | Performed by: ORTHOPAEDIC SURGERY

## 2019-11-26 PROCEDURE — G8419 CALC BMI OUT NRM PARAM NOF/U: HCPCS | Performed by: ORTHOPAEDIC SURGERY

## 2019-11-26 PROCEDURE — G8598 ASA/ANTIPLAT THER USED: HCPCS | Performed by: ORTHOPAEDIC SURGERY

## 2019-11-26 PROCEDURE — 3017F COLORECTAL CA SCREEN DOC REV: CPT | Performed by: ORTHOPAEDIC SURGERY

## 2019-11-26 PROCEDURE — 1036F TOBACCO NON-USER: CPT | Performed by: ORTHOPAEDIC SURGERY

## 2019-11-26 PROCEDURE — G8427 DOCREV CUR MEDS BY ELIG CLIN: HCPCS | Performed by: ORTHOPAEDIC SURGERY

## 2019-11-26 PROCEDURE — 73560 X-RAY EXAM OF KNEE 1 OR 2: CPT | Performed by: ORTHOPAEDIC SURGERY

## 2019-12-15 ENCOUNTER — HOSPITAL ENCOUNTER (INPATIENT)
Age: 59
LOS: 2 days | Discharge: HOME HEALTH | DRG: 193 | End: 2019-12-19
Attending: EMERGENCY MEDICINE | Admitting: INTERNAL MEDICINE
Payer: MEDICARE

## 2019-12-15 ENCOUNTER — APPOINTMENT (OUTPATIENT)
Dept: GENERAL RADIOLOGY | Age: 59
DRG: 193 | End: 2019-12-15
Payer: MEDICARE

## 2019-12-15 ENCOUNTER — APPOINTMENT (OUTPATIENT)
Dept: CT IMAGING | Age: 59
DRG: 193 | End: 2019-12-15
Payer: MEDICARE

## 2019-12-15 DIAGNOSIS — R07.9 CHEST PAIN, UNSPECIFIED TYPE: Primary | ICD-10-CM

## 2019-12-15 LAB
ALBUMIN SERPL-MCNC: 3.4 GM/DL (ref 3.4–5)
ALP BLD-CCNC: 99 IU/L (ref 40–128)
ALT SERPL-CCNC: 11 U/L (ref 10–40)
ANION GAP SERPL CALCULATED.3IONS-SCNC: 11 MMOL/L (ref 4–16)
AST SERPL-CCNC: 14 IU/L (ref 15–37)
BASOPHILS ABSOLUTE: 0.1 K/CU MM
BASOPHILS RELATIVE PERCENT: 0.7 % (ref 0–1)
BILIRUB SERPL-MCNC: 0.2 MG/DL (ref 0–1)
BUN BLDV-MCNC: 13 MG/DL (ref 6–23)
CALCIUM SERPL-MCNC: 8.3 MG/DL (ref 8.3–10.6)
CHLORIDE BLD-SCNC: 104 MMOL/L (ref 99–110)
CO2: 24 MMOL/L (ref 21–32)
CREAT SERPL-MCNC: 0.6 MG/DL (ref 0.6–1.1)
DIFFERENTIAL TYPE: ABNORMAL
EOSINOPHILS ABSOLUTE: 0 K/CU MM
EOSINOPHILS RELATIVE PERCENT: 0 % (ref 0–3)
GFR AFRICAN AMERICAN: >60 ML/MIN/1.73M2
GFR NON-AFRICAN AMERICAN: >60 ML/MIN/1.73M2
GLUCOSE BLD-MCNC: 96 MG/DL (ref 70–99)
HCT VFR BLD CALC: 37.6 % (ref 37–47)
HEMOGLOBIN: 11.8 GM/DL (ref 12.5–16)
IMMATURE NEUTROPHIL %: 0.4 % (ref 0–0.43)
LYMPHOCYTES ABSOLUTE: 0.8 K/CU MM
LYMPHOCYTES RELATIVE PERCENT: 10.7 % (ref 24–44)
MCH RBC QN AUTO: 34.4 PG (ref 27–31)
MCHC RBC AUTO-ENTMCNC: 31.4 % (ref 32–36)
MCV RBC AUTO: 109.6 FL (ref 78–100)
MONOCYTES ABSOLUTE: 0.4 K/CU MM
MONOCYTES RELATIVE PERCENT: 5.1 % (ref 0–4)
NUCLEATED RBC %: 0 %
PDW BLD-RTO: 15 % (ref 11.7–14.9)
PLATELET # BLD: 265 K/CU MM (ref 140–440)
PMV BLD AUTO: 9.7 FL (ref 7.5–11.1)
POTASSIUM SERPL-SCNC: 3.7 MMOL/L (ref 3.5–5.1)
PRO-BNP: 1136 PG/ML
RBC # BLD: 3.43 M/CU MM (ref 4.2–5.4)
SEGMENTED NEUTROPHILS ABSOLUTE COUNT: 6.1 K/CU MM
SEGMENTED NEUTROPHILS RELATIVE PERCENT: 83.1 % (ref 36–66)
SODIUM BLD-SCNC: 139 MMOL/L (ref 135–145)
TOTAL IMMATURE NEUTOROPHIL: 0.03 K/CU MM
TOTAL NUCLEATED RBC: 0 K/CU MM
TOTAL PROTEIN: 5.7 GM/DL (ref 6.4–8.2)
TROPONIN T: <0.01 NG/ML
WBC # BLD: 7.3 K/CU MM (ref 4–10.5)

## 2019-12-15 PROCEDURE — 6360000002 HC RX W HCPCS: Performed by: INTERNAL MEDICINE

## 2019-12-15 PROCEDURE — 93005 ELECTROCARDIOGRAM TRACING: CPT | Performed by: EMERGENCY MEDICINE

## 2019-12-15 PROCEDURE — G0378 HOSPITAL OBSERVATION PER HR: HCPCS

## 2019-12-15 PROCEDURE — 2580000003 HC RX 258: Performed by: INTERNAL MEDICINE

## 2019-12-15 PROCEDURE — 96376 TX/PRO/DX INJ SAME DRUG ADON: CPT

## 2019-12-15 PROCEDURE — 6360000002 HC RX W HCPCS

## 2019-12-15 PROCEDURE — 71045 X-RAY EXAM CHEST 1 VIEW: CPT

## 2019-12-15 PROCEDURE — 6370000000 HC RX 637 (ALT 250 FOR IP): Performed by: INTERNAL MEDICINE

## 2019-12-15 PROCEDURE — 96374 THER/PROPH/DIAG INJ IV PUSH: CPT

## 2019-12-15 PROCEDURE — 85025 COMPLETE CBC W/AUTO DIFF WBC: CPT

## 2019-12-15 PROCEDURE — 6360000004 HC RX CONTRAST MEDICATION: Performed by: EMERGENCY MEDICINE

## 2019-12-15 PROCEDURE — 2580000003 HC RX 258: Performed by: EMERGENCY MEDICINE

## 2019-12-15 PROCEDURE — 99285 EMERGENCY DEPT VISIT HI MDM: CPT

## 2019-12-15 PROCEDURE — 6370000000 HC RX 637 (ALT 250 FOR IP): Performed by: PHYSICIAN ASSISTANT

## 2019-12-15 PROCEDURE — 83880 ASSAY OF NATRIURETIC PEPTIDE: CPT

## 2019-12-15 PROCEDURE — 96375 TX/PRO/DX INJ NEW DRUG ADDON: CPT

## 2019-12-15 PROCEDURE — 84484 ASSAY OF TROPONIN QUANT: CPT

## 2019-12-15 PROCEDURE — 6360000002 HC RX W HCPCS: Performed by: EMERGENCY MEDICINE

## 2019-12-15 PROCEDURE — 71275 CT ANGIOGRAPHY CHEST: CPT

## 2019-12-15 PROCEDURE — 80053 COMPREHEN METABOLIC PANEL: CPT

## 2019-12-15 RX ORDER — MORPHINE SULFATE 4 MG/ML
4 INJECTION, SOLUTION INTRAMUSCULAR; INTRAVENOUS ONCE
Status: COMPLETED | OUTPATIENT
Start: 2019-12-15 | End: 2019-12-15

## 2019-12-15 RX ORDER — MAGNESIUM SULFATE 1 G/100ML
1 INJECTION INTRAVENOUS PRN
Status: DISCONTINUED | OUTPATIENT
Start: 2019-12-15 | End: 2019-12-19 | Stop reason: HOSPADM

## 2019-12-15 RX ORDER — SODIUM CHLORIDE 0.9 % (FLUSH) 0.9 %
10 SYRINGE (ML) INJECTION PRN
Status: DISCONTINUED | OUTPATIENT
Start: 2019-12-15 | End: 2019-12-19 | Stop reason: HOSPADM

## 2019-12-15 RX ORDER — TORSEMIDE 20 MG/1
20 TABLET ORAL DAILY
Status: DISCONTINUED | OUTPATIENT
Start: 2019-12-16 | End: 2019-12-19 | Stop reason: HOSPADM

## 2019-12-15 RX ORDER — ASPIRIN 81 MG/1
81 TABLET, CHEWABLE ORAL DAILY
Status: DISCONTINUED | OUTPATIENT
Start: 2019-12-16 | End: 2019-12-19 | Stop reason: HOSPADM

## 2019-12-15 RX ORDER — HYDROMORPHONE HCL 110MG/55ML
0.25 PATIENT CONTROLLED ANALGESIA SYRINGE INTRAVENOUS
Status: DISCONTINUED | OUTPATIENT
Start: 2019-12-15 | End: 2019-12-17

## 2019-12-15 RX ORDER — IPRATROPIUM BROMIDE AND ALBUTEROL SULFATE 2.5; .5 MG/3ML; MG/3ML
3 SOLUTION RESPIRATORY (INHALATION) EVERY 4 HOURS PRN
Status: DISCONTINUED | OUTPATIENT
Start: 2019-12-15 | End: 2019-12-19 | Stop reason: HOSPADM

## 2019-12-15 RX ORDER — ONDANSETRON 2 MG/ML
INJECTION INTRAMUSCULAR; INTRAVENOUS
Status: COMPLETED
Start: 2019-12-15 | End: 2019-12-15

## 2019-12-15 RX ORDER — OXYCODONE HYDROCHLORIDE 10 MG/1
20 TABLET ORAL 3 TIMES DAILY
Status: DISCONTINUED | OUTPATIENT
Start: 2019-12-15 | End: 2019-12-15

## 2019-12-15 RX ORDER — ATORVASTATIN CALCIUM 40 MG/1
80 TABLET, FILM COATED ORAL NIGHTLY
Status: DISCONTINUED | OUTPATIENT
Start: 2019-12-15 | End: 2019-12-19 | Stop reason: HOSPADM

## 2019-12-15 RX ORDER — SODIUM CHLORIDE 0.9 % (FLUSH) 0.9 %
10 SYRINGE (ML) INJECTION EVERY 12 HOURS SCHEDULED
Status: DISCONTINUED | OUTPATIENT
Start: 2019-12-15 | End: 2019-12-19 | Stop reason: HOSPADM

## 2019-12-15 RX ORDER — SPIRONOLACTONE 25 MG/1
25 TABLET ORAL 2 TIMES DAILY
Status: DISCONTINUED | OUTPATIENT
Start: 2019-12-15 | End: 2019-12-19 | Stop reason: HOSPADM

## 2019-12-15 RX ORDER — ALBUTEROL SULFATE 90 UG/1
2 AEROSOL, METERED RESPIRATORY (INHALATION) EVERY 6 HOURS PRN
Status: DISCONTINUED | OUTPATIENT
Start: 2019-12-15 | End: 2019-12-19 | Stop reason: HOSPADM

## 2019-12-15 RX ORDER — MEXILETINE HYDROCHLORIDE 150 MG/1
150 CAPSULE ORAL 3 TIMES DAILY
Status: DISCONTINUED | OUTPATIENT
Start: 2019-12-15 | End: 2019-12-19 | Stop reason: HOSPADM

## 2019-12-15 RX ORDER — LEVETIRACETAM 500 MG/1
1000 TABLET ORAL 2 TIMES DAILY
Status: DISCONTINUED | OUTPATIENT
Start: 2019-12-15 | End: 2019-12-19 | Stop reason: HOSPADM

## 2019-12-15 RX ORDER — RANOLAZINE 500 MG/1
500 TABLET, EXTENDED RELEASE ORAL 2 TIMES DAILY
Status: DISCONTINUED | OUTPATIENT
Start: 2019-12-15 | End: 2019-12-17

## 2019-12-15 RX ORDER — NALOXONE HYDROCHLORIDE 4 MG/.1ML
1 SPRAY NASAL PRN
Status: DISCONTINUED | OUTPATIENT
Start: 2019-12-15 | End: 2019-12-15

## 2019-12-15 RX ORDER — POTASSIUM CHLORIDE 7.45 MG/ML
10 INJECTION INTRAVENOUS PRN
Status: DISCONTINUED | OUTPATIENT
Start: 2019-12-15 | End: 2019-12-19 | Stop reason: HOSPADM

## 2019-12-15 RX ORDER — OXYCODONE HYDROCHLORIDE 10 MG/1
20 TABLET ORAL
Status: DISCONTINUED | OUTPATIENT
Start: 2019-12-15 | End: 2019-12-19 | Stop reason: HOSPADM

## 2019-12-15 RX ORDER — ONDANSETRON 2 MG/ML
4 INJECTION INTRAMUSCULAR; INTRAVENOUS ONCE
Status: COMPLETED | OUTPATIENT
Start: 2019-12-15 | End: 2019-12-15

## 2019-12-15 RX ORDER — ONDANSETRON 2 MG/ML
4 INJECTION INTRAMUSCULAR; INTRAVENOUS EVERY 6 HOURS PRN
Status: DISCONTINUED | OUTPATIENT
Start: 2019-12-15 | End: 2019-12-19 | Stop reason: HOSPADM

## 2019-12-15 RX ORDER — PAROXETINE 10 MG/1
10 TABLET, FILM COATED ORAL DAILY
Status: DISCONTINUED | OUTPATIENT
Start: 2019-12-16 | End: 2019-12-19 | Stop reason: HOSPADM

## 2019-12-15 RX ORDER — FERROUS GLUCONATE 324(37.5)
324 TABLET ORAL 2 TIMES DAILY
Status: DISCONTINUED | OUTPATIENT
Start: 2019-12-15 | End: 2019-12-19 | Stop reason: HOSPADM

## 2019-12-15 RX ORDER — HYDROMORPHONE HCL 110MG/55ML
0.5 PATIENT CONTROLLED ANALGESIA SYRINGE INTRAVENOUS
Status: DISCONTINUED | OUTPATIENT
Start: 2019-12-15 | End: 2019-12-17

## 2019-12-15 RX ORDER — CLOPIDOGREL BISULFATE 75 MG/1
75 TABLET ORAL DAILY
Status: DISCONTINUED | OUTPATIENT
Start: 2019-12-16 | End: 2019-12-19 | Stop reason: HOSPADM

## 2019-12-15 RX ORDER — AMOXICILLIN AND CLAVULANATE POTASSIUM 875; 125 MG/1; MG/1
1 TABLET, FILM COATED ORAL EVERY 12 HOURS SCHEDULED
Status: DISCONTINUED | OUTPATIENT
Start: 2019-12-15 | End: 2019-12-17

## 2019-12-15 RX ORDER — HYDROMORPHONE HCL 110MG/55ML
0.5 PATIENT CONTROLLED ANALGESIA SYRINGE INTRAVENOUS ONCE
Status: COMPLETED | OUTPATIENT
Start: 2019-12-15 | End: 2019-12-15

## 2019-12-15 RX ORDER — SODIUM CHLORIDE 0.9 % (FLUSH) 0.9 %
10 SYRINGE (ML) INJECTION 2 TIMES DAILY
Status: DISCONTINUED | OUTPATIENT
Start: 2019-12-15 | End: 2019-12-19 | Stop reason: HOSPADM

## 2019-12-15 RX ORDER — POTASSIUM CHLORIDE 20 MEQ/1
40 TABLET, EXTENDED RELEASE ORAL PRN
Status: DISCONTINUED | OUTPATIENT
Start: 2019-12-15 | End: 2019-12-19 | Stop reason: HOSPADM

## 2019-12-15 RX ORDER — AMIODARONE HYDROCHLORIDE 200 MG/1
200 TABLET ORAL DAILY
Status: DISCONTINUED | OUTPATIENT
Start: 2019-12-16 | End: 2019-12-19 | Stop reason: HOSPADM

## 2019-12-15 RX ORDER — FAMOTIDINE 20 MG/1
20 TABLET, FILM COATED ORAL 2 TIMES DAILY
Status: DISCONTINUED | OUTPATIENT
Start: 2019-12-15 | End: 2019-12-17

## 2019-12-15 RX ORDER — MORPHINE SULFATE 4 MG/ML
INJECTION, SOLUTION INTRAMUSCULAR; INTRAVENOUS
Status: COMPLETED
Start: 2019-12-15 | End: 2019-12-15

## 2019-12-15 RX ORDER — ISOSORBIDE MONONITRATE 30 MG/1
30 TABLET, EXTENDED RELEASE ORAL DAILY
Status: DISCONTINUED | OUTPATIENT
Start: 2019-12-16 | End: 2019-12-17

## 2019-12-15 RX ORDER — DULOXETIN HYDROCHLORIDE 30 MG/1
60 CAPSULE, DELAYED RELEASE ORAL DAILY
Status: DISCONTINUED | OUTPATIENT
Start: 2019-12-16 | End: 2019-12-19 | Stop reason: HOSPADM

## 2019-12-15 RX ADMIN — MORPHINE SULFATE 4 MG: 4 INJECTION, SOLUTION INTRAMUSCULAR; INTRAVENOUS at 18:19

## 2019-12-15 RX ADMIN — HYDROMORPHONE HYDROCHLORIDE 0.5 MG: 2 INJECTION, SOLUTION INTRAMUSCULAR; INTRAVENOUS; SUBCUTANEOUS at 21:19

## 2019-12-15 RX ADMIN — IOPAMIDOL 80 ML: 755 INJECTION, SOLUTION INTRAVENOUS at 19:39

## 2019-12-15 RX ADMIN — LEVETIRACETAM 1000 MG: 500 TABLET, FILM COATED ORAL at 23:01

## 2019-12-15 RX ADMIN — MORPHINE SULFATE 4 MG: 4 INJECTION, SOLUTION INTRAMUSCULAR; INTRAVENOUS at 20:30

## 2019-12-15 RX ADMIN — Medication 10 ML: at 19:40

## 2019-12-15 RX ADMIN — OXYCODONE HYDROCHLORIDE 20 MG: 10 TABLET ORAL at 23:02

## 2019-12-15 RX ADMIN — FAMOTIDINE 20 MG: 20 TABLET, FILM COATED ORAL at 23:01

## 2019-12-15 RX ADMIN — ONDANSETRON 4 MG: 2 INJECTION INTRAMUSCULAR; INTRAVENOUS at 18:19

## 2019-12-15 RX ADMIN — APIXABAN 5 MG: 5 TABLET, FILM COATED ORAL at 23:01

## 2019-12-15 RX ADMIN — FERROUS GLUCONATE TAB 324 MG (37.5 MG ELEMENTAL IRON) 324 MG: 324 (37.5 FE) TAB at 23:02

## 2019-12-15 RX ADMIN — Medication 10 ML: at 23:02

## 2019-12-15 RX ADMIN — AMOXICILLIN AND CLAVULANATE POTASSIUM 1 TABLET: 875; 125 TABLET, FILM COATED ORAL at 23:01

## 2019-12-15 RX ADMIN — MEXILETINE HYDROCHLORIDE 150 MG: 150 CAPSULE ORAL at 23:09

## 2019-12-15 ASSESSMENT — PAIN DESCRIPTION - LOCATION
LOCATION: CHEST
LOCATION: CHEST

## 2019-12-15 ASSESSMENT — PAIN DESCRIPTION - DESCRIPTORS: DESCRIPTORS: SQUEEZING

## 2019-12-15 ASSESSMENT — PAIN DESCRIPTION - PAIN TYPE
TYPE: ACUTE PAIN
TYPE: ACUTE PAIN

## 2019-12-15 ASSESSMENT — PAIN SCALES - GENERAL
PAINLEVEL_OUTOF10: 9
PAINLEVEL_OUTOF10: 7
PAINLEVEL_OUTOF10: 9
PAINLEVEL_OUTOF10: 8
PAINLEVEL_OUTOF10: 9

## 2019-12-16 ENCOUNTER — APPOINTMENT (OUTPATIENT)
Dept: NUCLEAR MEDICINE | Age: 59
DRG: 193 | End: 2019-12-16
Payer: MEDICARE

## 2019-12-16 LAB
ANION GAP SERPL CALCULATED.3IONS-SCNC: 9 MMOL/L (ref 4–16)
BASOPHILS ABSOLUTE: 0.1 K/CU MM
BASOPHILS RELATIVE PERCENT: 1 % (ref 0–1)
BUN BLDV-MCNC: 11 MG/DL (ref 6–23)
CALCIUM SERPL-MCNC: 7.7 MG/DL (ref 8.3–10.6)
CHLORIDE BLD-SCNC: 107 MMOL/L (ref 99–110)
CHOLESTEROL: 173 MG/DL
CO2: 23 MMOL/L (ref 21–32)
CREAT SERPL-MCNC: 0.6 MG/DL (ref 0.6–1.1)
DIFFERENTIAL TYPE: ABNORMAL
EOSINOPHILS ABSOLUTE: 0 K/CU MM
EOSINOPHILS RELATIVE PERCENT: 0 % (ref 0–3)
GFR AFRICAN AMERICAN: >60 ML/MIN/1.73M2
GFR NON-AFRICAN AMERICAN: >60 ML/MIN/1.73M2
GLUCOSE BLD-MCNC: 75 MG/DL (ref 70–99)
HCT VFR BLD CALC: 33.2 % (ref 37–47)
HDLC SERPL-MCNC: 67 MG/DL
HEMOGLOBIN: 10.2 GM/DL (ref 12.5–16)
IMMATURE NEUTROPHIL %: 0.4 % (ref 0–0.43)
LDL CHOLESTEROL DIRECT: 104 MG/DL
LV EF: 30 %
LVEF MODALITY: NORMAL
LYMPHOCYTES ABSOLUTE: 1.5 K/CU MM
LYMPHOCYTES RELATIVE PERCENT: 28.2 % (ref 24–44)
MCH RBC QN AUTO: 33.8 PG (ref 27–31)
MCHC RBC AUTO-ENTMCNC: 30.7 % (ref 32–36)
MCV RBC AUTO: 109.9 FL (ref 78–100)
MONOCYTES ABSOLUTE: 0.6 K/CU MM
MONOCYTES RELATIVE PERCENT: 10.6 % (ref 0–4)
NUCLEATED RBC %: 0 %
PDW BLD-RTO: 14.9 % (ref 11.7–14.9)
PLATELET # BLD: 209 K/CU MM (ref 140–440)
PMV BLD AUTO: 9.2 FL (ref 7.5–11.1)
POTASSIUM SERPL-SCNC: 4 MMOL/L (ref 3.5–5.1)
RBC # BLD: 3.02 M/CU MM (ref 4.2–5.4)
SEGMENTED NEUTROPHILS ABSOLUTE COUNT: 3.1 K/CU MM
SEGMENTED NEUTROPHILS RELATIVE PERCENT: 59.8 % (ref 36–66)
SODIUM BLD-SCNC: 139 MMOL/L (ref 135–145)
TOTAL IMMATURE NEUTOROPHIL: 0.02 K/CU MM
TOTAL NUCLEATED RBC: 0 K/CU MM
TRIGL SERPL-MCNC: 63 MG/DL
TROPONIN T: <0.01 NG/ML
TROPONIN T: <0.01 NG/ML
WBC # BLD: 5.2 K/CU MM (ref 4–10.5)

## 2019-12-16 PROCEDURE — 78452 HT MUSCLE IMAGE SPECT MULT: CPT

## 2019-12-16 PROCEDURE — 99222 1ST HOSP IP/OBS MODERATE 55: CPT | Performed by: INTERNAL MEDICINE

## 2019-12-16 PROCEDURE — 3430000000 HC RX DIAGNOSTIC RADIOPHARMACEUTICAL: Performed by: INTERNAL MEDICINE

## 2019-12-16 PROCEDURE — 93005 ELECTROCARDIOGRAM TRACING: CPT | Performed by: INTERNAL MEDICINE

## 2019-12-16 PROCEDURE — 36415 COLL VENOUS BLD VENIPUNCTURE: CPT

## 2019-12-16 PROCEDURE — 80048 BASIC METABOLIC PNL TOTAL CA: CPT

## 2019-12-16 PROCEDURE — 6360000002 HC RX W HCPCS: Performed by: INTERNAL MEDICINE

## 2019-12-16 PROCEDURE — G0378 HOSPITAL OBSERVATION PER HR: HCPCS

## 2019-12-16 PROCEDURE — 84484 ASSAY OF TROPONIN QUANT: CPT

## 2019-12-16 PROCEDURE — 6370000000 HC RX 637 (ALT 250 FOR IP): Performed by: INTERNAL MEDICINE

## 2019-12-16 PROCEDURE — 6370000000 HC RX 637 (ALT 250 FOR IP): Performed by: PHYSICIAN ASSISTANT

## 2019-12-16 PROCEDURE — 2580000003 HC RX 258: Performed by: INTERNAL MEDICINE

## 2019-12-16 PROCEDURE — 93017 CV STRESS TEST TRACING ONLY: CPT

## 2019-12-16 PROCEDURE — 80061 LIPID PANEL: CPT

## 2019-12-16 PROCEDURE — 85025 COMPLETE CBC W/AUTO DIFF WBC: CPT

## 2019-12-16 PROCEDURE — A9500 TC99M SESTAMIBI: HCPCS | Performed by: INTERNAL MEDICINE

## 2019-12-16 PROCEDURE — 96376 TX/PRO/DX INJ SAME DRUG ADON: CPT

## 2019-12-16 PROCEDURE — 83721 ASSAY OF BLOOD LIPOPROTEIN: CPT

## 2019-12-16 RX ADMIN — AMOXICILLIN AND CLAVULANATE POTASSIUM 1 TABLET: 875; 125 TABLET, FILM COATED ORAL at 20:33

## 2019-12-16 RX ADMIN — HYDROMORPHONE HYDROCHLORIDE 0.5 MG: 2 INJECTION, SOLUTION INTRAMUSCULAR; INTRAVENOUS; SUBCUTANEOUS at 03:43

## 2019-12-16 RX ADMIN — HYDROMORPHONE HYDROCHLORIDE 0.5 MG: 2 INJECTION, SOLUTION INTRAMUSCULAR; INTRAVENOUS; SUBCUTANEOUS at 10:53

## 2019-12-16 RX ADMIN — HYDROMORPHONE HYDROCHLORIDE 0.5 MG: 2 INJECTION, SOLUTION INTRAMUSCULAR; INTRAVENOUS; SUBCUTANEOUS at 00:32

## 2019-12-16 RX ADMIN — SPIRONOLACTONE 25 MG: 25 TABLET ORAL at 20:33

## 2019-12-16 RX ADMIN — HYDROMORPHONE HYDROCHLORIDE 0.5 MG: 2 INJECTION, SOLUTION INTRAMUSCULAR; INTRAVENOUS; SUBCUTANEOUS at 07:52

## 2019-12-16 RX ADMIN — MEXILETINE HYDROCHLORIDE 150 MG: 150 CAPSULE ORAL at 07:54

## 2019-12-16 RX ADMIN — HYDROMORPHONE HYDROCHLORIDE 0.5 MG: 2 INJECTION, SOLUTION INTRAMUSCULAR; INTRAVENOUS; SUBCUTANEOUS at 15:29

## 2019-12-16 RX ADMIN — APIXABAN 5 MG: 5 TABLET, FILM COATED ORAL at 20:33

## 2019-12-16 RX ADMIN — ISOSORBIDE MONONITRATE 30 MG: 30 TABLET, EXTENDED RELEASE ORAL at 07:54

## 2019-12-16 RX ADMIN — OXYCODONE HYDROCHLORIDE 20 MG: 10 TABLET ORAL at 13:29

## 2019-12-16 RX ADMIN — AMOXICILLIN AND CLAVULANATE POTASSIUM 1 TABLET: 875; 125 TABLET, FILM COATED ORAL at 07:53

## 2019-12-16 RX ADMIN — OXYCODONE HYDROCHLORIDE 20 MG: 10 TABLET ORAL at 20:33

## 2019-12-16 RX ADMIN — FAMOTIDINE 20 MG: 20 TABLET, FILM COATED ORAL at 20:33

## 2019-12-16 RX ADMIN — SPIRONOLACTONE 25 MG: 25 TABLET ORAL at 07:55

## 2019-12-16 RX ADMIN — MEXILETINE HYDROCHLORIDE 150 MG: 150 CAPSULE ORAL at 20:33

## 2019-12-16 RX ADMIN — FERROUS GLUCONATE TAB 324 MG (37.5 MG ELEMENTAL IRON) 324 MG: 324 (37.5 FE) TAB at 20:33

## 2019-12-16 RX ADMIN — RANOLAZINE 500 MG: 500 TABLET, FILM COATED, EXTENDED RELEASE ORAL at 07:54

## 2019-12-16 RX ADMIN — ASPIRIN 81 MG 81 MG: 81 TABLET ORAL at 07:54

## 2019-12-16 RX ADMIN — ATORVASTATIN CALCIUM 80 MG: 40 TABLET, FILM COATED ORAL at 20:33

## 2019-12-16 RX ADMIN — TORSEMIDE 20 MG: 20 TABLET ORAL at 07:55

## 2019-12-16 RX ADMIN — OXYCODONE HYDROCHLORIDE 20 MG: 10 TABLET ORAL at 05:17

## 2019-12-16 RX ADMIN — DULOXETINE HYDROCHLORIDE 60 MG: 30 CAPSULE, DELAYED RELEASE ORAL at 07:54

## 2019-12-16 RX ADMIN — OXYCODONE HYDROCHLORIDE 20 MG: 10 TABLET ORAL at 17:09

## 2019-12-16 RX ADMIN — HYDROMORPHONE HYDROCHLORIDE 0.5 MG: 2 INJECTION, SOLUTION INTRAMUSCULAR; INTRAVENOUS; SUBCUTANEOUS at 18:51

## 2019-12-16 RX ADMIN — OXYCODONE HYDROCHLORIDE 20 MG: 10 TABLET ORAL at 02:11

## 2019-12-16 RX ADMIN — FERROUS GLUCONATE TAB 324 MG (37.5 MG ELEMENTAL IRON) 324 MG: 324 (37.5 FE) TAB at 07:54

## 2019-12-16 RX ADMIN — LEVETIRACETAM 1000 MG: 500 TABLET, FILM COATED ORAL at 07:54

## 2019-12-16 RX ADMIN — MEXILETINE HYDROCHLORIDE 150 MG: 150 CAPSULE ORAL at 15:29

## 2019-12-16 RX ADMIN — FAMOTIDINE 20 MG: 20 TABLET, FILM COATED ORAL at 07:54

## 2019-12-16 RX ADMIN — LEVETIRACETAM 1000 MG: 500 TABLET, FILM COATED ORAL at 20:33

## 2019-12-16 RX ADMIN — Medication 10 MILLICURIE: at 13:55

## 2019-12-16 RX ADMIN — ONDANSETRON 4 MG: 2 INJECTION INTRAMUSCULAR; INTRAVENOUS at 17:09

## 2019-12-16 RX ADMIN — AMIODARONE HYDROCHLORIDE 200 MG: 200 TABLET ORAL at 07:54

## 2019-12-16 RX ADMIN — REGADENOSON 0.4 MG: 0.08 INJECTION, SOLUTION INTRAVENOUS at 12:19

## 2019-12-16 RX ADMIN — Medication 30 MILLICURIE: at 13:55

## 2019-12-16 RX ADMIN — Medication 10 ML: at 07:55

## 2019-12-16 RX ADMIN — HYDROMORPHONE HYDROCHLORIDE 0.5 MG: 2 INJECTION, SOLUTION INTRAMUSCULAR; INTRAVENOUS; SUBCUTANEOUS at 22:54

## 2019-12-16 RX ADMIN — OXYCODONE HYDROCHLORIDE 20 MG: 10 TABLET ORAL at 09:23

## 2019-12-16 RX ADMIN — Medication 10 ML: at 20:33

## 2019-12-16 RX ADMIN — RANOLAZINE 500 MG: 500 TABLET, FILM COATED, EXTENDED RELEASE ORAL at 20:33

## 2019-12-16 ASSESSMENT — PAIN SCALES - GENERAL
PAINLEVEL_OUTOF10: 9
PAINLEVEL_OUTOF10: 8
PAINLEVEL_OUTOF10: 9
PAINLEVEL_OUTOF10: 8
PAINLEVEL_OUTOF10: 9
PAINLEVEL_OUTOF10: 8
PAINLEVEL_OUTOF10: 9
PAINLEVEL_OUTOF10: 9
PAINLEVEL_OUTOF10: 8
PAINLEVEL_OUTOF10: 9

## 2019-12-16 ASSESSMENT — PAIN DESCRIPTION - PROGRESSION
CLINICAL_PROGRESSION: NOT CHANGED
CLINICAL_PROGRESSION: GRADUALLY WORSENING
CLINICAL_PROGRESSION: NOT CHANGED
CLINICAL_PROGRESSION: NOT CHANGED
CLINICAL_PROGRESSION: GRADUALLY WORSENING
CLINICAL_PROGRESSION: NOT CHANGED
CLINICAL_PROGRESSION: GRADUALLY WORSENING
CLINICAL_PROGRESSION: NOT CHANGED
CLINICAL_PROGRESSION: GRADUALLY WORSENING

## 2019-12-16 ASSESSMENT — PAIN DESCRIPTION - ORIENTATION: ORIENTATION: RIGHT

## 2019-12-16 ASSESSMENT — PAIN DESCRIPTION - ONSET
ONSET: ON-GOING
ONSET: ON-GOING

## 2019-12-16 ASSESSMENT — PAIN DESCRIPTION - LOCATION
LOCATION: HIP;KNEE;GENERALIZED
LOCATION: HIP;KNEE;GENERALIZED
LOCATION: HIP

## 2019-12-16 ASSESSMENT — PAIN DESCRIPTION - PAIN TYPE
TYPE: ACUTE PAIN
TYPE: CHRONIC PAIN
TYPE: CHRONIC PAIN

## 2019-12-16 ASSESSMENT — PAIN DESCRIPTION - FREQUENCY
FREQUENCY: CONTINUOUS
FREQUENCY: CONTINUOUS

## 2019-12-16 ASSESSMENT — PAIN - FUNCTIONAL ASSESSMENT: PAIN_FUNCTIONAL_ASSESSMENT: PREVENTS OR INTERFERES SOME ACTIVE ACTIVITIES AND ADLS

## 2019-12-16 ASSESSMENT — PAIN DESCRIPTION - DESCRIPTORS
DESCRIPTORS: CONSTANT;ACHING
DESCRIPTORS: CONSTANT;ACHING
DESCRIPTORS: STABBING;SHARP

## 2019-12-17 PROBLEM — R94.30 ABNORMAL CARDIAC FUNCTION TEST: Status: ACTIVE | Noted: 2019-12-17

## 2019-12-17 LAB
ANION GAP SERPL CALCULATED.3IONS-SCNC: 7 MMOL/L (ref 4–16)
BUN BLDV-MCNC: 9 MG/DL (ref 6–23)
CALCIUM SERPL-MCNC: 8.3 MG/DL (ref 8.3–10.6)
CHLORIDE BLD-SCNC: 104 MMOL/L (ref 99–110)
CO2: 27 MMOL/L (ref 21–32)
CREAT SERPL-MCNC: 0.7 MG/DL (ref 0.6–1.1)
EKG ATRIAL RATE: 63 BPM
EKG ATRIAL RATE: 76 BPM
EKG DIAGNOSIS: NORMAL
EKG DIAGNOSIS: NORMAL
EKG P AXIS: -1 DEGREES
EKG P AXIS: 38 DEGREES
EKG P-R INTERVAL: 126 MS
EKG P-R INTERVAL: 208 MS
EKG Q-T INTERVAL: 412 MS
EKG Q-T INTERVAL: 448 MS
EKG QRS DURATION: 86 MS
EKG QRS DURATION: 86 MS
EKG QTC CALCULATION (BAZETT): 458 MS
EKG QTC CALCULATION (BAZETT): 463 MS
EKG R AXIS: -38 DEGREES
EKG R AXIS: -45 DEGREES
EKG T AXIS: -30 DEGREES
EKG T AXIS: -45 DEGREES
EKG VENTRICULAR RATE: 63 BPM
EKG VENTRICULAR RATE: 76 BPM
GFR AFRICAN AMERICAN: >60 ML/MIN/1.73M2
GFR NON-AFRICAN AMERICAN: >60 ML/MIN/1.73M2
GLUCOSE BLD-MCNC: 88 MG/DL (ref 70–99)
HCT VFR BLD CALC: 36.3 % (ref 37–47)
HEMOGLOBIN: 10.8 GM/DL (ref 12.5–16)
MCH RBC QN AUTO: 34 PG (ref 27–31)
MCHC RBC AUTO-ENTMCNC: 29.8 % (ref 32–36)
MCV RBC AUTO: 114.2 FL (ref 78–100)
PDW BLD-RTO: 14.9 % (ref 11.7–14.9)
PLATELET # BLD: 259 K/CU MM (ref 140–440)
PMV BLD AUTO: 9.7 FL (ref 7.5–11.1)
POTASSIUM SERPL-SCNC: 4.3 MMOL/L (ref 3.5–5.1)
PROCALCITONIN: 0.04
RBC # BLD: 3.18 M/CU MM (ref 4.2–5.4)
SODIUM BLD-SCNC: 138 MMOL/L (ref 135–145)
WBC # BLD: 4.4 K/CU MM (ref 4–10.5)

## 2019-12-17 PROCEDURE — 1200000000 HC SEMI PRIVATE

## 2019-12-17 PROCEDURE — 93010 ELECTROCARDIOGRAM REPORT: CPT | Performed by: INTERNAL MEDICINE

## 2019-12-17 PROCEDURE — 6370000000 HC RX 637 (ALT 250 FOR IP): Performed by: PHYSICIAN ASSISTANT

## 2019-12-17 PROCEDURE — 99232 SBSQ HOSP IP/OBS MODERATE 35: CPT | Performed by: INTERNAL MEDICINE

## 2019-12-17 PROCEDURE — 2580000003 HC RX 258: Performed by: HOSPITALIST

## 2019-12-17 PROCEDURE — 84145 PROCALCITONIN (PCT): CPT

## 2019-12-17 PROCEDURE — APPSS60 APP SPLIT SHARED TIME 46-60 MINUTES: Performed by: NURSE PRACTITIONER

## 2019-12-17 PROCEDURE — 6370000000 HC RX 637 (ALT 250 FOR IP): Performed by: INTERNAL MEDICINE

## 2019-12-17 PROCEDURE — 36415 COLL VENOUS BLD VENIPUNCTURE: CPT

## 2019-12-17 PROCEDURE — 2580000003 HC RX 258: Performed by: INTERNAL MEDICINE

## 2019-12-17 PROCEDURE — 6360000002 HC RX W HCPCS: Performed by: INTERNAL MEDICINE

## 2019-12-17 PROCEDURE — 85027 COMPLETE CBC AUTOMATED: CPT

## 2019-12-17 PROCEDURE — G0378 HOSPITAL OBSERVATION PER HR: HCPCS

## 2019-12-17 PROCEDURE — 96376 TX/PRO/DX INJ SAME DRUG ADON: CPT

## 2019-12-17 PROCEDURE — 80048 BASIC METABOLIC PNL TOTAL CA: CPT

## 2019-12-17 PROCEDURE — 6360000002 HC RX W HCPCS: Performed by: HOSPITALIST

## 2019-12-17 PROCEDURE — 2580000003 HC RX 258: Performed by: EMERGENCY MEDICINE

## 2019-12-17 RX ORDER — PANTOPRAZOLE SODIUM 40 MG/1
40 TABLET, DELAYED RELEASE ORAL
Status: DISCONTINUED | OUTPATIENT
Start: 2019-12-18 | End: 2019-12-19 | Stop reason: HOSPADM

## 2019-12-17 RX ORDER — ISOSORBIDE MONONITRATE 60 MG/1
60 TABLET, EXTENDED RELEASE ORAL DAILY
Status: DISCONTINUED | OUTPATIENT
Start: 2019-12-18 | End: 2019-12-19 | Stop reason: HOSPADM

## 2019-12-17 RX ADMIN — MEXILETINE HYDROCHLORIDE 150 MG: 150 CAPSULE ORAL at 08:10

## 2019-12-17 RX ADMIN — ATORVASTATIN CALCIUM 80 MG: 40 TABLET, FILM COATED ORAL at 21:07

## 2019-12-17 RX ADMIN — CLOPIDOGREL BISULFATE 75 MG: 75 TABLET ORAL at 08:09

## 2019-12-17 RX ADMIN — APIXABAN 5 MG: 5 TABLET, FILM COATED ORAL at 21:07

## 2019-12-17 RX ADMIN — AMOXICILLIN AND CLAVULANATE POTASSIUM 1 TABLET: 875; 125 TABLET, FILM COATED ORAL at 08:09

## 2019-12-17 RX ADMIN — APIXABAN 5 MG: 5 TABLET, FILM COATED ORAL at 08:10

## 2019-12-17 RX ADMIN — Medication 10 ML: at 21:08

## 2019-12-17 RX ADMIN — Medication 10 ML: at 08:10

## 2019-12-17 RX ADMIN — OXYCODONE HYDROCHLORIDE 20 MG: 10 TABLET ORAL at 03:46

## 2019-12-17 RX ADMIN — FERROUS GLUCONATE TAB 324 MG (37.5 MG ELEMENTAL IRON) 324 MG: 324 (37.5 FE) TAB at 08:09

## 2019-12-17 RX ADMIN — MEXILETINE HYDROCHLORIDE 150 MG: 150 CAPSULE ORAL at 21:11

## 2019-12-17 RX ADMIN — SPIRONOLACTONE 25 MG: 25 TABLET ORAL at 08:09

## 2019-12-17 RX ADMIN — OXYCODONE HYDROCHLORIDE 20 MG: 10 TABLET ORAL at 15:00

## 2019-12-17 RX ADMIN — MEXILETINE HYDROCHLORIDE 150 MG: 150 CAPSULE ORAL at 15:01

## 2019-12-17 RX ADMIN — ISOSORBIDE MONONITRATE 30 MG: 30 TABLET, EXTENDED RELEASE ORAL at 08:09

## 2019-12-17 RX ADMIN — LEVETIRACETAM 1000 MG: 500 TABLET, FILM COATED ORAL at 21:07

## 2019-12-17 RX ADMIN — FERROUS GLUCONATE TAB 324 MG (37.5 MG ELEMENTAL IRON) 324 MG: 324 (37.5 FE) TAB at 21:07

## 2019-12-17 RX ADMIN — PAROXETINE 10 MG: 10 TABLET, FILM COATED ORAL at 08:09

## 2019-12-17 RX ADMIN — ASPIRIN 81 MG 81 MG: 81 TABLET ORAL at 08:10

## 2019-12-17 RX ADMIN — CEFTRIAXONE 1 G: 1 INJECTION, POWDER, FOR SOLUTION INTRAMUSCULAR; INTRAVENOUS at 11:44

## 2019-12-17 RX ADMIN — DULOXETINE HYDROCHLORIDE 60 MG: 30 CAPSULE, DELAYED RELEASE ORAL at 08:09

## 2019-12-17 RX ADMIN — HYDROMORPHONE HYDROCHLORIDE 0.5 MG: 2 INJECTION, SOLUTION INTRAMUSCULAR; INTRAVENOUS; SUBCUTANEOUS at 02:08

## 2019-12-17 RX ADMIN — TORSEMIDE 20 MG: 20 TABLET ORAL at 08:10

## 2019-12-17 RX ADMIN — OXYCODONE HYDROCHLORIDE 20 MG: 10 TABLET ORAL at 21:07

## 2019-12-17 RX ADMIN — HYDROMORPHONE HYDROCHLORIDE 0.5 MG: 2 INJECTION, SOLUTION INTRAMUSCULAR; INTRAVENOUS; SUBCUTANEOUS at 05:37

## 2019-12-17 RX ADMIN — AZITHROMYCIN MONOHYDRATE 500 MG: 500 INJECTION, POWDER, LYOPHILIZED, FOR SOLUTION INTRAVENOUS at 11:44

## 2019-12-17 RX ADMIN — AMIODARONE HYDROCHLORIDE 200 MG: 200 TABLET ORAL at 08:09

## 2019-12-17 RX ADMIN — LEVETIRACETAM 1000 MG: 500 TABLET, FILM COATED ORAL at 08:09

## 2019-12-17 RX ADMIN — SPIRONOLACTONE 25 MG: 25 TABLET ORAL at 21:07

## 2019-12-17 RX ADMIN — Medication 10 ML: at 21:07

## 2019-12-17 RX ADMIN — RANOLAZINE 500 MG: 500 TABLET, FILM COATED, EXTENDED RELEASE ORAL at 08:09

## 2019-12-17 RX ADMIN — OXYCODONE HYDROCHLORIDE 20 MG: 10 TABLET ORAL at 18:02

## 2019-12-17 RX ADMIN — OXYCODONE HYDROCHLORIDE 20 MG: 10 TABLET ORAL at 00:32

## 2019-12-17 RX ADMIN — OXYCODONE HYDROCHLORIDE 20 MG: 10 TABLET ORAL at 11:44

## 2019-12-17 RX ADMIN — FAMOTIDINE 20 MG: 20 TABLET, FILM COATED ORAL at 08:09

## 2019-12-17 RX ADMIN — OXYCODONE HYDROCHLORIDE 20 MG: 10 TABLET ORAL at 08:09

## 2019-12-17 ASSESSMENT — PAIN SCALES - GENERAL
PAINLEVEL_OUTOF10: 4
PAINLEVEL_OUTOF10: 8
PAINLEVEL_OUTOF10: 8
PAINLEVEL_OUTOF10: 9
PAINLEVEL_OUTOF10: 5
PAINLEVEL_OUTOF10: 9
PAINLEVEL_OUTOF10: 9
PAINLEVEL_OUTOF10: 7
PAINLEVEL_OUTOF10: 8
PAINLEVEL_OUTOF10: 9
PAINLEVEL_OUTOF10: 5
PAINLEVEL_OUTOF10: 9

## 2019-12-17 ASSESSMENT — PAIN DESCRIPTION - PAIN TYPE: TYPE: CHRONIC PAIN

## 2019-12-17 ASSESSMENT — PAIN DESCRIPTION - PROGRESSION
CLINICAL_PROGRESSION: NOT CHANGED
CLINICAL_PROGRESSION: NOT CHANGED

## 2019-12-17 ASSESSMENT — PAIN DESCRIPTION - LOCATION: LOCATION: HIP;BACK;KNEE

## 2019-12-18 LAB
ALBUMIN SERPL-MCNC: 3.5 GM/DL (ref 3.4–5)
ALP BLD-CCNC: 82 IU/L (ref 40–128)
ALT SERPL-CCNC: 11 U/L (ref 10–40)
ANION GAP SERPL CALCULATED.3IONS-SCNC: 8 MMOL/L (ref 4–16)
AST SERPL-CCNC: 20 IU/L (ref 15–37)
BILIRUB SERPL-MCNC: 0.2 MG/DL (ref 0–1)
BUN BLDV-MCNC: 7 MG/DL (ref 6–23)
CALCIUM SERPL-MCNC: 8.4 MG/DL (ref 8.3–10.6)
CHLORIDE BLD-SCNC: 105 MMOL/L (ref 99–110)
CO2: 25 MMOL/L (ref 21–32)
CREAT SERPL-MCNC: 0.7 MG/DL (ref 0.6–1.1)
GFR AFRICAN AMERICAN: >60 ML/MIN/1.73M2
GFR NON-AFRICAN AMERICAN: >60 ML/MIN/1.73M2
GLUCOSE BLD-MCNC: 78 MG/DL (ref 70–99)
HCT VFR BLD CALC: 35.3 % (ref 37–47)
HEMOGLOBIN: 10.6 GM/DL (ref 12.5–16)
MAGNESIUM: 2.2 MG/DL (ref 1.8–2.4)
MCH RBC QN AUTO: 33.8 PG (ref 27–31)
MCHC RBC AUTO-ENTMCNC: 30 % (ref 32–36)
MCV RBC AUTO: 112.4 FL (ref 78–100)
PDW BLD-RTO: 14.8 % (ref 11.7–14.9)
PHOSPHORUS: 4.5 MG/DL (ref 2.5–4.9)
PLATELET # BLD: 262 K/CU MM (ref 140–440)
PMV BLD AUTO: 9.9 FL (ref 7.5–11.1)
POTASSIUM SERPL-SCNC: 4.9 MMOL/L (ref 3.5–5.1)
RBC # BLD: 3.14 M/CU MM (ref 4.2–5.4)
SODIUM BLD-SCNC: 138 MMOL/L (ref 135–145)
TOTAL PROTEIN: 5.2 GM/DL (ref 6.4–8.2)
WBC # BLD: 3.6 K/CU MM (ref 4–10.5)

## 2019-12-18 PROCEDURE — 6370000000 HC RX 637 (ALT 250 FOR IP): Performed by: PHYSICIAN ASSISTANT

## 2019-12-18 PROCEDURE — 1200000000 HC SEMI PRIVATE

## 2019-12-18 PROCEDURE — 94761 N-INVAS EAR/PLS OXIMETRY MLT: CPT

## 2019-12-18 PROCEDURE — 6370000000 HC RX 637 (ALT 250 FOR IP): Performed by: INTERNAL MEDICINE

## 2019-12-18 PROCEDURE — 6360000002 HC RX W HCPCS: Performed by: HOSPITALIST

## 2019-12-18 PROCEDURE — 2580000003 HC RX 258: Performed by: HOSPITALIST

## 2019-12-18 PROCEDURE — 36415 COLL VENOUS BLD VENIPUNCTURE: CPT

## 2019-12-18 PROCEDURE — 85027 COMPLETE CBC AUTOMATED: CPT

## 2019-12-18 PROCEDURE — 84100 ASSAY OF PHOSPHORUS: CPT

## 2019-12-18 PROCEDURE — 83735 ASSAY OF MAGNESIUM: CPT

## 2019-12-18 PROCEDURE — 6370000000 HC RX 637 (ALT 250 FOR IP): Performed by: HOSPITALIST

## 2019-12-18 PROCEDURE — 6370000000 HC RX 637 (ALT 250 FOR IP): Performed by: NURSE PRACTITIONER

## 2019-12-18 PROCEDURE — 2580000003 HC RX 258: Performed by: INTERNAL MEDICINE

## 2019-12-18 PROCEDURE — 80053 COMPREHEN METABOLIC PANEL: CPT

## 2019-12-18 RX ADMIN — AZITHROMYCIN MONOHYDRATE 500 MG: 500 INJECTION, POWDER, LYOPHILIZED, FOR SOLUTION INTRAVENOUS at 12:33

## 2019-12-18 RX ADMIN — SPIRONOLACTONE 25 MG: 25 TABLET ORAL at 09:09

## 2019-12-18 RX ADMIN — FERROUS GLUCONATE TAB 324 MG (37.5 MG ELEMENTAL IRON) 324 MG: 324 (37.5 FE) TAB at 09:10

## 2019-12-18 RX ADMIN — OXYCODONE HYDROCHLORIDE 20 MG: 10 TABLET ORAL at 16:15

## 2019-12-18 RX ADMIN — APIXABAN 5 MG: 5 TABLET, FILM COATED ORAL at 21:54

## 2019-12-18 RX ADMIN — OXYCODONE HYDROCHLORIDE 20 MG: 10 TABLET ORAL at 03:32

## 2019-12-18 RX ADMIN — MEXILETINE HYDROCHLORIDE 150 MG: 150 CAPSULE ORAL at 09:18

## 2019-12-18 RX ADMIN — AMIODARONE HYDROCHLORIDE 200 MG: 200 TABLET ORAL at 09:09

## 2019-12-18 RX ADMIN — OXYCODONE HYDROCHLORIDE 20 MG: 10 TABLET ORAL at 06:33

## 2019-12-18 RX ADMIN — CLOPIDOGREL BISULFATE 75 MG: 75 TABLET ORAL at 09:09

## 2019-12-18 RX ADMIN — MEXILETINE HYDROCHLORIDE 150 MG: 150 CAPSULE ORAL at 13:06

## 2019-12-18 RX ADMIN — FERROUS GLUCONATE TAB 324 MG (37.5 MG ELEMENTAL IRON) 324 MG: 324 (37.5 FE) TAB at 21:54

## 2019-12-18 RX ADMIN — SPIRONOLACTONE 25 MG: 25 TABLET ORAL at 21:54

## 2019-12-18 RX ADMIN — ISOSORBIDE MONONITRATE 60 MG: 60 TABLET, EXTENDED RELEASE ORAL at 09:10

## 2019-12-18 RX ADMIN — OXYCODONE HYDROCHLORIDE 20 MG: 10 TABLET ORAL at 23:01

## 2019-12-18 RX ADMIN — Medication 10 ML: at 09:10

## 2019-12-18 RX ADMIN — OXYCODONE HYDROCHLORIDE 20 MG: 10 TABLET ORAL at 19:42

## 2019-12-18 RX ADMIN — LEVETIRACETAM 1000 MG: 500 TABLET, FILM COATED ORAL at 21:54

## 2019-12-18 RX ADMIN — CEFTRIAXONE 1 G: 1 INJECTION, POWDER, FOR SOLUTION INTRAMUSCULAR; INTRAVENOUS at 11:27

## 2019-12-18 RX ADMIN — TORSEMIDE 20 MG: 20 TABLET ORAL at 09:10

## 2019-12-18 RX ADMIN — PANTOPRAZOLE SODIUM 40 MG: 40 TABLET, DELAYED RELEASE ORAL at 06:33

## 2019-12-18 RX ADMIN — PAROXETINE 10 MG: 10 TABLET, FILM COATED ORAL at 09:10

## 2019-12-18 RX ADMIN — Medication 10 ML: at 21:55

## 2019-12-18 RX ADMIN — OXYCODONE HYDROCHLORIDE 20 MG: 10 TABLET ORAL at 13:04

## 2019-12-18 RX ADMIN — APIXABAN 5 MG: 5 TABLET, FILM COATED ORAL at 09:10

## 2019-12-18 RX ADMIN — ATORVASTATIN CALCIUM 80 MG: 40 TABLET, FILM COATED ORAL at 21:54

## 2019-12-18 RX ADMIN — DULOXETINE HYDROCHLORIDE 60 MG: 30 CAPSULE, DELAYED RELEASE ORAL at 09:10

## 2019-12-18 RX ADMIN — LEVETIRACETAM 1000 MG: 500 TABLET, FILM COATED ORAL at 09:10

## 2019-12-18 RX ADMIN — OXYCODONE HYDROCHLORIDE 20 MG: 10 TABLET ORAL at 00:16

## 2019-12-18 RX ADMIN — MEXILETINE HYDROCHLORIDE 150 MG: 150 CAPSULE ORAL at 21:54

## 2019-12-18 RX ADMIN — ASPIRIN 81 MG 81 MG: 81 TABLET ORAL at 09:09

## 2019-12-18 RX ADMIN — OXYCODONE HYDROCHLORIDE 20 MG: 10 TABLET ORAL at 09:59

## 2019-12-18 ASSESSMENT — PAIN SCALES - GENERAL
PAINLEVEL_OUTOF10: 9
PAINLEVEL_OUTOF10: 8
PAINLEVEL_OUTOF10: 9
PAINLEVEL_OUTOF10: 8
PAINLEVEL_OUTOF10: 9
PAINLEVEL_OUTOF10: 9
PAINLEVEL_OUTOF10: 8

## 2019-12-18 ASSESSMENT — PAIN DESCRIPTION - PAIN TYPE
TYPE: CHRONIC PAIN

## 2019-12-18 ASSESSMENT — PAIN DESCRIPTION - PROGRESSION
CLINICAL_PROGRESSION: NOT CHANGED

## 2019-12-18 ASSESSMENT — PAIN DESCRIPTION - LOCATION
LOCATION: GENERALIZED

## 2019-12-18 ASSESSMENT — PAIN DESCRIPTION - ONSET: ONSET: ON-GOING

## 2019-12-18 ASSESSMENT — PAIN DESCRIPTION - FREQUENCY: FREQUENCY: CONTINUOUS

## 2019-12-18 ASSESSMENT — PAIN DESCRIPTION - DESCRIPTORS: DESCRIPTORS: CONSTANT;ACHING

## 2019-12-19 VITALS
OXYGEN SATURATION: 95 % | WEIGHT: 117.8 LBS | TEMPERATURE: 97 F | RESPIRATION RATE: 11 BRPM | SYSTOLIC BLOOD PRESSURE: 106 MMHG | HEART RATE: 73 BPM | DIASTOLIC BLOOD PRESSURE: 70 MMHG | HEIGHT: 66 IN | BODY MASS INDEX: 18.93 KG/M2

## 2019-12-19 LAB
ALBUMIN SERPL-MCNC: 3.5 GM/DL (ref 3.4–5)
ALP BLD-CCNC: 85 IU/L (ref 40–128)
ALT SERPL-CCNC: 12 U/L (ref 10–40)
ANION GAP SERPL CALCULATED.3IONS-SCNC: 8 MMOL/L (ref 4–16)
AST SERPL-CCNC: 19 IU/L (ref 15–37)
BILIRUB SERPL-MCNC: 0.2 MG/DL (ref 0–1)
BUN BLDV-MCNC: 6 MG/DL (ref 6–23)
CALCIUM SERPL-MCNC: 8.4 MG/DL (ref 8.3–10.6)
CHLORIDE BLD-SCNC: 103 MMOL/L (ref 99–110)
CO2: 25 MMOL/L (ref 21–32)
CREAT SERPL-MCNC: 0.7 MG/DL (ref 0.6–1.1)
GFR AFRICAN AMERICAN: >60 ML/MIN/1.73M2
GFR NON-AFRICAN AMERICAN: >60 ML/MIN/1.73M2
GLUCOSE BLD-MCNC: 85 MG/DL (ref 70–99)
HCT VFR BLD CALC: 38.6 % (ref 37–47)
HEMOGLOBIN: 11.2 GM/DL (ref 12.5–16)
MAGNESIUM: 2.2 MG/DL (ref 1.8–2.4)
MCH RBC QN AUTO: 34.5 PG (ref 27–31)
MCHC RBC AUTO-ENTMCNC: 29 % (ref 32–36)
MCV RBC AUTO: 118.8 FL (ref 78–100)
PDW BLD-RTO: 14.6 % (ref 11.7–14.9)
PHOSPHORUS: 4.3 MG/DL (ref 2.5–4.9)
PLATELET # BLD: 259 K/CU MM (ref 140–440)
PMV BLD AUTO: 10 FL (ref 7.5–11.1)
POTASSIUM SERPL-SCNC: 4.6 MMOL/L (ref 3.5–5.1)
POTASSIUM SERPL-SCNC: 5.3 MMOL/L (ref 3.5–5.1)
RBC # BLD: 3.25 M/CU MM (ref 4.2–5.4)
SODIUM BLD-SCNC: 136 MMOL/L (ref 135–145)
TOTAL PROTEIN: 5.4 GM/DL (ref 6.4–8.2)
WBC # BLD: 4.6 K/CU MM (ref 4–10.5)

## 2019-12-19 PROCEDURE — 83735 ASSAY OF MAGNESIUM: CPT

## 2019-12-19 PROCEDURE — 6370000000 HC RX 637 (ALT 250 FOR IP): Performed by: NURSE PRACTITIONER

## 2019-12-19 PROCEDURE — 84132 ASSAY OF SERUM POTASSIUM: CPT

## 2019-12-19 PROCEDURE — 85027 COMPLETE CBC AUTOMATED: CPT

## 2019-12-19 PROCEDURE — 6370000000 HC RX 637 (ALT 250 FOR IP): Performed by: PHYSICIAN ASSISTANT

## 2019-12-19 PROCEDURE — 6370000000 HC RX 637 (ALT 250 FOR IP): Performed by: INTERNAL MEDICINE

## 2019-12-19 PROCEDURE — 84100 ASSAY OF PHOSPHORUS: CPT

## 2019-12-19 PROCEDURE — 2580000003 HC RX 258: Performed by: INTERNAL MEDICINE

## 2019-12-19 PROCEDURE — 80053 COMPREHEN METABOLIC PANEL: CPT

## 2019-12-19 PROCEDURE — 6370000000 HC RX 637 (ALT 250 FOR IP): Performed by: HOSPITALIST

## 2019-12-19 PROCEDURE — 36415 COLL VENOUS BLD VENIPUNCTURE: CPT

## 2019-12-19 RX ORDER — ISOSORBIDE MONONITRATE 30 MG/1
60 TABLET, EXTENDED RELEASE ORAL DAILY
Qty: 30 TABLET | Refills: 0 | Status: ON HOLD
Start: 2019-12-19 | End: 2020-06-04 | Stop reason: HOSPADM

## 2019-12-19 RX ORDER — POLYETHYLENE GLYCOL 3350 17 G/17G
17 POWDER, FOR SOLUTION ORAL ONCE
Status: COMPLETED | OUTPATIENT
Start: 2019-12-19 | End: 2019-12-19

## 2019-12-19 RX ORDER — AMOXICILLIN AND CLAVULANATE POTASSIUM 500; 125 MG/1; MG/1
1 TABLET, FILM COATED ORAL EVERY 8 HOURS SCHEDULED
Status: DISCONTINUED | OUTPATIENT
Start: 2019-12-19 | End: 2019-12-19 | Stop reason: HOSPADM

## 2019-12-19 RX ORDER — AMOXICILLIN AND CLAVULANATE POTASSIUM 500; 125 MG/1; MG/1
1 TABLET, FILM COATED ORAL EVERY 8 HOURS SCHEDULED
Qty: 9 TABLET | Refills: 0 | Status: SHIPPED | OUTPATIENT
Start: 2019-12-19 | End: 2019-12-22

## 2019-12-19 RX ADMIN — LEVETIRACETAM 1000 MG: 500 TABLET, FILM COATED ORAL at 09:59

## 2019-12-19 RX ADMIN — POLYETHYLENE GLYCOL (3350) 17 G: 17 POWDER, FOR SOLUTION ORAL at 09:59

## 2019-12-19 RX ADMIN — OXYCODONE HYDROCHLORIDE 20 MG: 10 TABLET ORAL at 13:12

## 2019-12-19 RX ADMIN — MEXILETINE HYDROCHLORIDE 150 MG: 150 CAPSULE ORAL at 10:10

## 2019-12-19 RX ADMIN — AMOXICILLIN AND CLAVULANATE POTASSIUM 1 TABLET: 500; 125 TABLET, FILM COATED ORAL at 10:10

## 2019-12-19 RX ADMIN — SPIRONOLACTONE 25 MG: 25 TABLET ORAL at 09:59

## 2019-12-19 RX ADMIN — CLOPIDOGREL BISULFATE 75 MG: 75 TABLET ORAL at 09:59

## 2019-12-19 RX ADMIN — APIXABAN 5 MG: 5 TABLET, FILM COATED ORAL at 09:59

## 2019-12-19 RX ADMIN — FERROUS GLUCONATE TAB 324 MG (37.5 MG ELEMENTAL IRON) 324 MG: 324 (37.5 FE) TAB at 09:59

## 2019-12-19 RX ADMIN — AMIODARONE HYDROCHLORIDE 200 MG: 200 TABLET ORAL at 09:59

## 2019-12-19 RX ADMIN — ISOSORBIDE MONONITRATE 60 MG: 60 TABLET, EXTENDED RELEASE ORAL at 09:59

## 2019-12-19 RX ADMIN — ASPIRIN 81 MG 81 MG: 81 TABLET ORAL at 09:59

## 2019-12-19 RX ADMIN — PANTOPRAZOLE SODIUM 40 MG: 40 TABLET, DELAYED RELEASE ORAL at 06:50

## 2019-12-19 RX ADMIN — OXYCODONE HYDROCHLORIDE 20 MG: 10 TABLET ORAL at 10:10

## 2019-12-19 RX ADMIN — PAROXETINE 10 MG: 10 TABLET, FILM COATED ORAL at 09:59

## 2019-12-19 RX ADMIN — OXYCODONE HYDROCHLORIDE 20 MG: 10 TABLET ORAL at 06:50

## 2019-12-19 RX ADMIN — DULOXETINE HYDROCHLORIDE 60 MG: 30 CAPSULE, DELAYED RELEASE ORAL at 09:59

## 2019-12-19 RX ADMIN — OXYCODONE HYDROCHLORIDE 20 MG: 10 TABLET ORAL at 02:05

## 2019-12-19 RX ADMIN — TORSEMIDE 20 MG: 20 TABLET ORAL at 09:59

## 2019-12-19 RX ADMIN — Medication 10 ML: at 09:59

## 2019-12-19 ASSESSMENT — PAIN SCALES - GENERAL
PAINLEVEL_OUTOF10: 9
PAINLEVEL_OUTOF10: 8
PAINLEVEL_OUTOF10: 9

## 2019-12-19 ASSESSMENT — PAIN DESCRIPTION - LOCATION: LOCATION: GENERALIZED

## 2019-12-19 ASSESSMENT — PAIN DESCRIPTION - PAIN TYPE: TYPE: CHRONIC PAIN

## 2019-12-20 ENCOUNTER — CARE COORDINATION (OUTPATIENT)
Dept: CASE MANAGEMENT | Age: 59
End: 2019-12-20

## 2019-12-20 ENCOUNTER — HOSPITAL ENCOUNTER (EMERGENCY)
Age: 59
Discharge: HOME | End: 2019-12-20
Attending: EMERGENCY MEDICINE
Payer: MEDICARE

## 2019-12-20 ENCOUNTER — APPOINTMENT (OUTPATIENT)
Dept: GENERAL RADIOLOGY | Age: 59
End: 2019-12-20
Payer: MEDICARE

## 2019-12-20 VITALS
TEMPERATURE: 98.1 F | SYSTOLIC BLOOD PRESSURE: 112 MMHG | DIASTOLIC BLOOD PRESSURE: 74 MMHG | WEIGHT: 102 LBS | BODY MASS INDEX: 16.39 KG/M2 | HEART RATE: 91 BPM | HEIGHT: 66 IN | RESPIRATION RATE: 17 BRPM | OXYGEN SATURATION: 97 %

## 2019-12-20 DIAGNOSIS — S69.92XA INJURY OF LEFT WRIST, INITIAL ENCOUNTER: Primary | ICD-10-CM

## 2019-12-20 PROCEDURE — 6370000000 HC RX 637 (ALT 250 FOR IP): Performed by: EMERGENCY MEDICINE

## 2019-12-20 PROCEDURE — 73110 X-RAY EXAM OF WRIST: CPT

## 2019-12-20 PROCEDURE — 99284 EMERGENCY DEPT VISIT MOD MDM: CPT

## 2019-12-20 RX ORDER — HYDROCODONE BITARTRATE AND ACETAMINOPHEN 5; 325 MG/1; MG/1
1 TABLET ORAL EVERY 6 HOURS PRN
Qty: 7 TABLET | Refills: 0 | Status: SHIPPED | OUTPATIENT
Start: 2019-12-20 | End: 2019-12-23

## 2019-12-20 RX ORDER — HYDROCODONE BITARTRATE AND ACETAMINOPHEN 5; 325 MG/1; MG/1
1 TABLET ORAL ONCE
Status: COMPLETED | OUTPATIENT
Start: 2019-12-20 | End: 2019-12-20

## 2019-12-20 RX ADMIN — HYDROCODONE BITARTRATE AND ACETAMINOPHEN 1 TABLET: 5; 325 TABLET ORAL at 17:18

## 2019-12-20 ASSESSMENT — PAIN DESCRIPTION - ORIENTATION: ORIENTATION: LEFT

## 2019-12-20 ASSESSMENT — PAIN SCALES - GENERAL
PAINLEVEL_OUTOF10: 9
PAINLEVEL_OUTOF10: 9

## 2019-12-20 ASSESSMENT — PAIN DESCRIPTION - PAIN TYPE: TYPE: ACUTE PAIN

## 2019-12-20 ASSESSMENT — PAIN DESCRIPTION - DESCRIPTORS: DESCRIPTORS: CONSTANT

## 2019-12-20 ASSESSMENT — PAIN DESCRIPTION - LOCATION: LOCATION: WRIST

## 2019-12-23 ENCOUNTER — CARE COORDINATION (OUTPATIENT)
Dept: CASE MANAGEMENT | Age: 59
End: 2019-12-23

## 2019-12-27 ENCOUNTER — CARE COORDINATION (OUTPATIENT)
Dept: CASE MANAGEMENT | Age: 59
End: 2019-12-27

## 2019-12-29 ENCOUNTER — HOSPITAL ENCOUNTER (INPATIENT)
Age: 59
LOS: 3 days | Discharge: HOME HEALTH | DRG: 204 | End: 2020-01-03
Attending: EMERGENCY MEDICINE | Admitting: INTERNAL MEDICINE
Payer: MEDICARE

## 2019-12-29 ENCOUNTER — APPOINTMENT (OUTPATIENT)
Dept: GENERAL RADIOLOGY | Age: 59
DRG: 204 | End: 2019-12-29
Payer: MEDICARE

## 2019-12-29 PROBLEM — I50.22 CHRONIC SYSTOLIC HEART FAILURE (HCC): Status: ACTIVE | Noted: 2019-12-29

## 2019-12-29 PROBLEM — E03.9 HYPOTHYROIDISM: Status: ACTIVE | Noted: 2019-12-29

## 2019-12-29 PROBLEM — G40.909 SEIZURE DISORDER (HCC): Status: ACTIVE | Noted: 2019-12-29

## 2019-12-29 LAB
ALBUMIN SERPL-MCNC: 3.3 GM/DL (ref 3.4–5)
ALP BLD-CCNC: 83 IU/L (ref 40–129)
ALT SERPL-CCNC: 13 U/L (ref 10–40)
ANION GAP SERPL CALCULATED.3IONS-SCNC: 11 MMOL/L (ref 4–16)
AST SERPL-CCNC: 20 IU/L (ref 15–37)
BASOPHILS ABSOLUTE: 0 K/CU MM
BASOPHILS RELATIVE PERCENT: 0.8 % (ref 0–1)
BILIRUB SERPL-MCNC: 0.2 MG/DL (ref 0–1)
BUN BLDV-MCNC: 9 MG/DL (ref 6–23)
CALCIUM SERPL-MCNC: 8.7 MG/DL (ref 8.3–10.6)
CHLORIDE BLD-SCNC: 101 MMOL/L (ref 99–110)
CO2: 24 MMOL/L (ref 21–32)
CREAT SERPL-MCNC: 0.6 MG/DL (ref 0.6–1.1)
DIFFERENTIAL TYPE: ABNORMAL
EOSINOPHILS ABSOLUTE: 0 K/CU MM
EOSINOPHILS RELATIVE PERCENT: 0 % (ref 0–3)
GFR AFRICAN AMERICAN: >60 ML/MIN/1.73M2
GFR NON-AFRICAN AMERICAN: >60 ML/MIN/1.73M2
GLUCOSE BLD-MCNC: 103 MG/DL (ref 70–99)
HCT VFR BLD CALC: 39 % (ref 37–47)
HEMOGLOBIN: 12.6 GM/DL (ref 12.5–16)
IMMATURE NEUTROPHIL %: 0.6 % (ref 0–0.43)
INR BLD: 0.94 INDEX
LIPASE: 11 IU/L (ref 13–60)
LYMPHOCYTES ABSOLUTE: 1 K/CU MM
LYMPHOCYTES RELATIVE PERCENT: 20.8 % (ref 24–44)
MCH RBC QN AUTO: 33.8 PG (ref 27–31)
MCHC RBC AUTO-ENTMCNC: 32.3 % (ref 32–36)
MCV RBC AUTO: 104.6 FL (ref 78–100)
MONOCYTES ABSOLUTE: 0.3 K/CU MM
MONOCYTES RELATIVE PERCENT: 6.6 % (ref 0–4)
NUCLEATED RBC %: 0 %
PDW BLD-RTO: 14.5 % (ref 11.7–14.9)
PLATELET # BLD: 375 K/CU MM (ref 140–440)
PMV BLD AUTO: 9.3 FL (ref 7.5–11.1)
POTASSIUM SERPL-SCNC: 3.9 MMOL/L (ref 3.5–5.1)
PROTHROMBIN TIME: 11.4 SECONDS (ref 11.7–14.5)
RBC # BLD: 3.73 M/CU MM (ref 4.2–5.4)
SEGMENTED NEUTROPHILS ABSOLUTE COUNT: 3.5 K/CU MM
SEGMENTED NEUTROPHILS RELATIVE PERCENT: 71.2 % (ref 36–66)
SODIUM BLD-SCNC: 136 MMOL/L (ref 135–145)
TOTAL IMMATURE NEUTOROPHIL: 0.03 K/CU MM
TOTAL NUCLEATED RBC: 0 K/CU MM
TOTAL PROTEIN: 6 GM/DL (ref 6.4–8.2)
TROPONIN T: <0.01 NG/ML
WBC # BLD: 4.9 K/CU MM (ref 4–10.5)

## 2019-12-29 PROCEDURE — 6370000000 HC RX 637 (ALT 250 FOR IP): Performed by: INTERNAL MEDICINE

## 2019-12-29 PROCEDURE — 85610 PROTHROMBIN TIME: CPT

## 2019-12-29 PROCEDURE — 96376 TX/PRO/DX INJ SAME DRUG ADON: CPT

## 2019-12-29 PROCEDURE — 96374 THER/PROPH/DIAG INJ IV PUSH: CPT

## 2019-12-29 PROCEDURE — 85025 COMPLETE CBC W/AUTO DIFF WBC: CPT

## 2019-12-29 PROCEDURE — 80053 COMPREHEN METABOLIC PANEL: CPT

## 2019-12-29 PROCEDURE — 99285 EMERGENCY DEPT VISIT HI MDM: CPT

## 2019-12-29 PROCEDURE — 83690 ASSAY OF LIPASE: CPT

## 2019-12-29 PROCEDURE — G0378 HOSPITAL OBSERVATION PER HR: HCPCS

## 2019-12-29 PROCEDURE — 6370000000 HC RX 637 (ALT 250 FOR IP): Performed by: EMERGENCY MEDICINE

## 2019-12-29 PROCEDURE — 2580000003 HC RX 258: Performed by: INTERNAL MEDICINE

## 2019-12-29 PROCEDURE — 84484 ASSAY OF TROPONIN QUANT: CPT

## 2019-12-29 PROCEDURE — 71045 X-RAY EXAM CHEST 1 VIEW: CPT

## 2019-12-29 PROCEDURE — 93005 ELECTROCARDIOGRAM TRACING: CPT | Performed by: EMERGENCY MEDICINE

## 2019-12-29 PROCEDURE — 6370000000 HC RX 637 (ALT 250 FOR IP): Performed by: PHYSICIAN ASSISTANT

## 2019-12-29 PROCEDURE — 6360000002 HC RX W HCPCS: Performed by: EMERGENCY MEDICINE

## 2019-12-29 RX ORDER — MORPHINE SULFATE 4 MG/ML
4 INJECTION, SOLUTION INTRAMUSCULAR; INTRAVENOUS ONCE
Status: COMPLETED | OUTPATIENT
Start: 2019-12-29 | End: 2019-12-29

## 2019-12-29 RX ORDER — IPRATROPIUM BROMIDE AND ALBUTEROL SULFATE 2.5; .5 MG/3ML; MG/3ML
3 SOLUTION RESPIRATORY (INHALATION)
Status: DISCONTINUED | OUTPATIENT
Start: 2019-12-30 | End: 2020-01-01

## 2019-12-29 RX ORDER — ATORVASTATIN CALCIUM 40 MG/1
80 TABLET, FILM COATED ORAL NIGHTLY
Status: DISCONTINUED | OUTPATIENT
Start: 2019-12-29 | End: 2020-01-03 | Stop reason: HOSPADM

## 2019-12-29 RX ORDER — NITROGLYCERIN 0.4 MG/1
0.4 TABLET SUBLINGUAL EVERY 5 MIN PRN
Status: DISCONTINUED | OUTPATIENT
Start: 2019-12-29 | End: 2020-01-03 | Stop reason: HOSPADM

## 2019-12-29 RX ORDER — MEXILETINE HYDROCHLORIDE 150 MG/1
150 CAPSULE ORAL 3 TIMES DAILY
Status: DISCONTINUED | OUTPATIENT
Start: 2019-12-29 | End: 2020-01-03 | Stop reason: HOSPADM

## 2019-12-29 RX ORDER — ISOSORBIDE MONONITRATE 30 MG/1
60 TABLET, EXTENDED RELEASE ORAL DAILY
Status: DISCONTINUED | OUTPATIENT
Start: 2019-12-30 | End: 2020-01-03 | Stop reason: HOSPADM

## 2019-12-29 RX ORDER — CLOPIDOGREL BISULFATE 75 MG/1
75 TABLET ORAL DAILY
Status: DISCONTINUED | OUTPATIENT
Start: 2019-12-30 | End: 2020-01-03 | Stop reason: HOSPADM

## 2019-12-29 RX ORDER — ALBUTEROL SULFATE 90 UG/1
2 AEROSOL, METERED RESPIRATORY (INHALATION) EVERY 6 HOURS PRN
Status: DISCONTINUED | OUTPATIENT
Start: 2019-12-29 | End: 2020-01-03 | Stop reason: HOSPADM

## 2019-12-29 RX ORDER — SODIUM CHLORIDE 0.9 % (FLUSH) 0.9 %
10 SYRINGE (ML) INJECTION EVERY 12 HOURS SCHEDULED
Status: DISCONTINUED | OUTPATIENT
Start: 2019-12-29 | End: 2020-01-03 | Stop reason: HOSPADM

## 2019-12-29 RX ORDER — ONDANSETRON 4 MG/1
4 TABLET, ORALLY DISINTEGRATING ORAL EVERY 6 HOURS PRN
Status: DISCONTINUED | OUTPATIENT
Start: 2019-12-29 | End: 2020-01-03 | Stop reason: HOSPADM

## 2019-12-29 RX ORDER — FAMOTIDINE 20 MG/1
20 TABLET, FILM COATED ORAL 2 TIMES DAILY
Status: DISCONTINUED | OUTPATIENT
Start: 2019-12-29 | End: 2020-01-03 | Stop reason: HOSPADM

## 2019-12-29 RX ORDER — SPIRONOLACTONE 25 MG/1
25 TABLET ORAL 2 TIMES DAILY
Status: DISCONTINUED | OUTPATIENT
Start: 2019-12-29 | End: 2020-01-03 | Stop reason: HOSPADM

## 2019-12-29 RX ORDER — DOCUSATE SODIUM 100 MG/1
100 CAPSULE, LIQUID FILLED ORAL 2 TIMES DAILY PRN
Status: DISCONTINUED | OUTPATIENT
Start: 2019-12-29 | End: 2020-01-03 | Stop reason: HOSPADM

## 2019-12-29 RX ORDER — FERROUS GLUCONATE 324(37.5)
324 TABLET ORAL 2 TIMES DAILY
Status: DISCONTINUED | OUTPATIENT
Start: 2019-12-29 | End: 2020-01-03 | Stop reason: HOSPADM

## 2019-12-29 RX ORDER — TORSEMIDE 20 MG/1
20 TABLET ORAL DAILY
Status: DISCONTINUED | OUTPATIENT
Start: 2019-12-30 | End: 2020-01-03 | Stop reason: HOSPADM

## 2019-12-29 RX ORDER — ASPIRIN 81 MG/1
81 TABLET, CHEWABLE ORAL DAILY
Status: DISCONTINUED | OUTPATIENT
Start: 2019-12-30 | End: 2020-01-03 | Stop reason: HOSPADM

## 2019-12-29 RX ORDER — MORPHINE SULFATE 4 MG/ML
2 INJECTION, SOLUTION INTRAMUSCULAR; INTRAVENOUS ONCE
Status: COMPLETED | OUTPATIENT
Start: 2019-12-29 | End: 2019-12-29

## 2019-12-29 RX ORDER — LEVETIRACETAM 500 MG/1
1000 TABLET ORAL 2 TIMES DAILY
Status: DISCONTINUED | OUTPATIENT
Start: 2019-12-29 | End: 2020-01-03 | Stop reason: HOSPADM

## 2019-12-29 RX ORDER — MORPHINE SULFATE 2 MG/ML
2 INJECTION, SOLUTION INTRAMUSCULAR; INTRAVENOUS
Status: DISCONTINUED | OUTPATIENT
Start: 2019-12-29 | End: 2019-12-30

## 2019-12-29 RX ORDER — DULOXETIN HYDROCHLORIDE 30 MG/1
60 CAPSULE, DELAYED RELEASE ORAL DAILY
Status: DISCONTINUED | OUTPATIENT
Start: 2019-12-30 | End: 2020-01-03 | Stop reason: HOSPADM

## 2019-12-29 RX ORDER — LEVOTHYROXINE SODIUM 0.15 MG/1
150 TABLET ORAL DAILY
Status: DISCONTINUED | OUTPATIENT
Start: 2019-12-30 | End: 2020-01-01

## 2019-12-29 RX ORDER — SODIUM CHLORIDE 0.9 % (FLUSH) 0.9 %
10 SYRINGE (ML) INJECTION PRN
Status: DISCONTINUED | OUTPATIENT
Start: 2019-12-29 | End: 2020-01-03 | Stop reason: HOSPADM

## 2019-12-29 RX ORDER — AMIODARONE HYDROCHLORIDE 200 MG/1
200 TABLET ORAL DAILY
Status: DISCONTINUED | OUTPATIENT
Start: 2019-12-30 | End: 2020-01-03 | Stop reason: HOSPADM

## 2019-12-29 RX ORDER — OXYCODONE HYDROCHLORIDE 10 MG/1
20 TABLET ORAL
Status: DISCONTINUED | OUTPATIENT
Start: 2019-12-29 | End: 2020-01-03 | Stop reason: HOSPADM

## 2019-12-29 RX ADMIN — MEXILETINE HYDROCHLORIDE 150 MG: 150 CAPSULE ORAL at 23:49

## 2019-12-29 RX ADMIN — SODIUM CHLORIDE, PRESERVATIVE FREE 10 ML: 5 INJECTION INTRAVENOUS at 23:53

## 2019-12-29 RX ADMIN — OXYCODONE HYDROCHLORIDE 20 MG: 10 TABLET ORAL at 23:49

## 2019-12-29 RX ADMIN — SPIRONOLACTONE 25 MG: 25 TABLET ORAL at 23:50

## 2019-12-29 RX ADMIN — MORPHINE SULFATE 2 MG: 4 INJECTION, SOLUTION INTRAMUSCULAR; INTRAVENOUS at 18:56

## 2019-12-29 RX ADMIN — LEVETIRACETAM 1000 MG: 500 TABLET, FILM COATED ORAL at 23:49

## 2019-12-29 RX ADMIN — APIXABAN 5 MG: 5 TABLET, FILM COATED ORAL at 23:49

## 2019-12-29 RX ADMIN — FAMOTIDINE 20 MG: 20 TABLET, FILM COATED ORAL at 23:49

## 2019-12-29 RX ADMIN — FERROUS GLUCONATE TAB 324 MG (37.5 MG ELEMENTAL IRON) 324 MG: 324 (37.5 FE) TAB at 23:50

## 2019-12-29 RX ADMIN — MORPHINE SULFATE 4 MG: 4 INJECTION, SOLUTION INTRAMUSCULAR; INTRAVENOUS at 21:44

## 2019-12-29 RX ADMIN — NITROGLYCERIN 0.5 INCH: 20 OINTMENT TOPICAL at 18:55

## 2019-12-29 RX ADMIN — ATORVASTATIN CALCIUM 80 MG: 40 TABLET, FILM COATED ORAL at 23:50

## 2019-12-29 ASSESSMENT — PAIN DESCRIPTION - LOCATION: LOCATION: CHEST

## 2019-12-29 ASSESSMENT — PAIN SCALES - GENERAL
PAINLEVEL_OUTOF10: 9
PAINLEVEL_OUTOF10: 9
PAINLEVEL_OUTOF10: 8

## 2019-12-29 ASSESSMENT — PAIN DESCRIPTION - DESCRIPTORS: DESCRIPTORS: PRESSURE

## 2019-12-29 ASSESSMENT — PAIN DESCRIPTION - PAIN TYPE: TYPE: ACUTE PAIN

## 2019-12-29 ASSESSMENT — PAIN DESCRIPTION - FREQUENCY: FREQUENCY: CONTINUOUS

## 2019-12-29 NOTE — ED PROVIDER NOTES
has a history of coronary artery disease status post CABG in 2018, she had a stent in the LAD in October 2019. Patient states that her chest pain started today approximately 2 hours prior to arrival she was sitting down when the pain started, she states that the pressure pain that radiates to her shoulder, jaw, and to her left arm. She has associated shortness of breath, nausea. No diaphoresis. There is no tearing or ripping pain. No pleuritic component, no leg pain or leg swelling. No fevers, chills, sweats. Patient says he was recently admitted for similar event. Patient took 4 nitro at home, she says the first nitro helped out a little bit, and the additional nitro had no improvement of symptoms. Patient was given full-strength aspirin in route by EMS. Symptoms are moderate, and continuous. Exacerbated by exertion, alleviated by nothing currently. ROS - see HPI, otherwise 10 point review of systems is negative    Past Medical History:   Diagnosis Date    Acute delirium     Arrhythmia     Arthritis     Asplenia     Asthma     CAD (coronary artery disease)     1st stent at age 46    Cardiomyopathy [facility])     Cerebral artery occlusion with cerebral infarction ([facility] [address]     CHF (congestive heart failure) ([address]     Enlarged liver     GERD (gastroesophageal reflux disease)     H/O echocardiogram 4/6/16    EF 55%, trivial TR & MR, stage 1 diastolic dysfunction    History of blood transfusion     Hx of cardiovascular stress test 12/29/2015    Alessia: EF 45%. Pharmacologic stress myocardial perfusion scan shows a fixed inferior-lateral defect w/ no inducible ischemia. No evidence of ischemia. Inferior-lateral infarction.  Normal LVSF    Hyperlipidemia     Hypertension     Lymphoma ([address]     MI, old     Movement disorder     MS (multiple sclerosis) ([address]     OP (osteoporosis)     PE (pulmonary embolism)     trapese filter    Pneumonia     Seizures (HCC)     Spleen enlarged     Thyroid Oral TID Michelle Santoro MD   150 mg at 12/29/19 2349    ondansetron (ZOFRAN-ODT) disintegrating tablet 4 mg  4 mg Oral Q6H PRN Michelle Santoro MD        spironolactone (ALDACTONE) tablet 25 mg  25 mg Oral BID Michelle Santoro MD   25 mg at 12/29/19 2350    torsemide (DEMADEX) tablet 20 mg  20 mg Oral Daily Michelle Santoro MD        sodium chloride flush 0.9 % injection 10 mL  10 mL Intravenous 2 times per day Michelle Santoro MD   10 mL at 12/29/19 2353    sodium chloride flush 0.9 % injection 10 mL  10 mL Intravenous PRN Michelle Santoro MD        magnesium hydroxide (MILK OF MAGNESIA) 400 MG/5ML suspension 30 mL  30 mL Oral Daily PRN Michelle Santoro MD        nitroGLYCERIN (NITROSTAT) SL tablet 0.4 mg  0.4 mg Sublingual Q5 Min PRN Michelle Santoro MD       [facility] [place] by provider] oxyCODONE HCl (OXY-IR) immediate release tablet 20 mg  20 mg Oral Q3H PRN Gerldine Sero, PA-C   20 mg at 12/29/19 2349    morphine injection 2 mg  2 mg Intravenous Q3H PRN Gerldine Seredel PA-C         Allergies   Allergen Reactions    Fentanyl Swelling     Other reaction(s): Angioedema (swelling)    Moxifloxacin Hcl In Nacl Swelling and Rash    Povidone-Iodine Rash     Other reaction(s): AOF      Cyclobenzaprine      Other reaction(s): Other - comment required  \" it make my muscle very weak because of my MS\"  \" it make my muscle very weak because of my MS\"      Methocarbamol      Worsening edema  Other reaction(s): Other - comment required  Worsening edema  Worsening edema  Worsening edema      Tramadol Other (See Comments)     Doctor doesn't her to take due to heart problems  Seizures   Doctor doesn't her to take due to lowers seizure threshold.  Baclofen     Ibuprofen      \"Due to heart problems\"    Naproxen     Nsaids      \"Due to heart problems\"    Tizanidine     Tolmetin      \"Due to heart problems\"    Tramadol Hcl      Other reaction(s):  Other - comment required  Told not to take due to history of seizures    Betadine [Povidone Iodine] Rash    Moxifloxacin Rash and Swelling     Lips swell and tingling in face   Lips swell and tingling in face   Lips swell and tingling in face   Lips swell and tingling in face   Around mouth       Nursing Notes Reviewed    Physical Exam:  Triage VS:    ED Triage Vitals [12/29/19 1804]   Enc Vitals Group      /85      Pulse 62      Resp 16      Temp 98.5 °F (36.9 °C)      Temp Source Oral      SpO2 96 %      Weight       Height       Head Circumference       Peak Flow       Pain Score       Pain Loc       Pain Edu? Excl. in [address]? Physical Exam  Constitutional:       Appearance: Normal appearance. Comments: Well-appearing 61-year-old female sitting in bed no acute distress   HENT:      Head: Normocephalic. Mouth/Throat:      Mouth: Mucous membranes are moist.      Pharynx: Oropharynx is clear. Eyes:      Pupils: Pupils are equal, round, and reactive to light. Cardiovascular:      Rate and Rhythm: Normal rate and regular rhythm. Comments: Regular rate and rhythm, no murmurs, rubs, gallops  Pulmonary:      Effort: Pulmonary effort is normal.      Breath sounds: Normal breath sounds. Comments: Breath sounds are clear bilaterally, no wheezing, rales, rhonchi, no respiratory distress. Abdominal:      Palpations: Abdomen is soft. Musculoskeletal: Normal range of motion. Comments: No lower extremity edema, no palpable cord, negative Homans sign   Skin:     General: Skin is warm. Neurological:      General: No focal deficit present. Mental Status: She is alert.           I have reviewed and interpreted all of the currently available lab results from this visit (if applicable):  Results for orders placed or performed during the hospital encounter of 12/29/19   CBC Auto Differential   Result Value Ref Range    WBC 4.9 4.0 - 10.5 K/CU MM    RBC 3.73 (L) 4.2 - 5.4 M/CU MM    Hemoglobin 12.6 12.5 - 16.0 GM/DL    Hematocrit 39.0 37 - 47 %    .6 (H) 78 - 100 FL    MCH 33.8 (H) 27 - 31 PG    MCHC 32.3 32.0 - 36.0 %    RDW 14.5 11.7 - 14.9 %    Platelets 104 420 - 805 K/CU MM    MPV 9.3 7.5 - 11.1 FL    Differential Type AUTOMATED DIFFERENTIAL     Segs Relative 71.2 (H) 36 - 66 %    Lymphocytes % 20.8 (L) 24 - 44 %    Monocytes % 6.6 (H) 0 - 4 %    Eosinophils % 0.0 0 - 3 %    Basophils % 0.8 0 - 1 %    Segs Absolute 3.5 K/CU MM    Lymphocytes Absolute 1.0 K/CU MM    Monocytes Absolute 0.3 K/CU MM    Eosinophils Absolute 0.0 K/CU MM    Basophils Absolute 0.0 K/CU MM    Nucleated RBC % 0.0 %    Total Nucleated RBC 0.0 K/CU MM    Total Immature Neutrophil 0.03 K/CU MM    Immature Neutrophil % 0.6 (H) 0 - 0.43 %   Comprehensive Metabolic Panel w/ Reflex to MG   Result Value Ref Range    Sodium 136 135 - 145 MMOL/L    Potassium 3.9 3.5 - 5.1 MMOL/L    Chloride 101 99 - 110 mMol/L    CO2 24 21 - 32 MMOL/L    BUN 9 6 - 23 MG/DL    CREATININE 0.6 0.6 - 1.1 MG/DL    Glucose 103 (H) 70 - 99 MG/DL    Calcium 8.7 8.3 - 10.6 MG/DL    Alb 3.3 (L) 3.4 - 5.0 GM/DL    Total Protein 6.0 (L) 6.4 - 8.2 GM/DL    Total Bilirubin 0.2 0.0 - 1.0 MG/DL    ALT 13 10 - 40 U/L    AST 20 15 - 37 IU/L    Alkaline Phosphatase 83 40 - 129 IU/L    GFR Non-African American >60 >60 mL/min/1.73m2    GFR African American >60 >60 mL/min/1.73m2    Anion Gap 11 4 - 16   Troponin   Result Value Ref Range    Troponin T <0.010 <0.01 NG/ML   Lipase   Result Value Ref Range    Lipase 11 (L) 13 - 60 IU/L   Protime-INR   Result Value Ref Range    Protime 11.4 (L) 11.7 - 14.5 SECONDS    INR 0.94 INDEX      Radiographs (if obtained):  Radiologist's Report Reviewed:  No results found.     EKG (if obtained): (All EKG's are interpreted by myself in the absence of a cardiologist)    Disposition medications (if applicable):  Current Discharge Medication List          Disposition referral (if applicable):  Gaby Lambert MD  [address] [place] [address] [phone]            Comment: Please note this report has been produced using speech recognition software and may contain errors related to that system including errors in grammar, punctuation, and spelling, as well as words and phrases that may be inappropriate. Efforts were made to edit the dictations.         Bebo Curtis MD  12/30/19 [id number]

## 2019-12-29 NOTE — ED NOTES
Bed: ED-32  Expected date:   Expected time:   Means of arrival:   Comments:  HALL BED8 Everrett Dance  12/29/19 0273

## 2019-12-29 NOTE — ED NOTES
Patient presents to ED via EMS complaints of chest pressure which woke her up. Patient reportedly does have a significant cardiac history. Took 4 nitro SL at home with minimal relief.  Was given 324mg ASA PO via EMS prior to arrival.      Jessica Jorge, EMT-P  12/29/19 305 [facility], EMT-P  12/29/19 2845

## 2019-12-30 PROBLEM — E44.0 MODERATE MALNUTRITION (HCC): Chronic | Status: ACTIVE | Noted: 2019-12-30

## 2019-12-30 LAB
HCT VFR BLD CALC: 32.2 % (ref 37–47)
HEMOGLOBIN: 10.1 GM/DL (ref 12.5–16)
MCH RBC QN AUTO: 33.7 PG (ref 27–31)
MCHC RBC AUTO-ENTMCNC: 31.4 % (ref 32–36)
MCV RBC AUTO: 107.3 FL (ref 78–100)
PDW BLD-RTO: 14.6 % (ref 11.7–14.9)
PLATELET # BLD: 327 K/CU MM (ref 140–440)
PMV BLD AUTO: 9.6 FL (ref 7.5–11.1)
RBC # BLD: 3 M/CU MM (ref 4.2–5.4)
TROPONIN T: <0.01 NG/ML
TROPONIN T: <0.01 NG/ML
WBC # BLD: 5.3 K/CU MM (ref 4–10.5)

## 2019-12-30 PROCEDURE — 93010 ELECTROCARDIOGRAM REPORT: CPT | Performed by: INTERNAL MEDICINE

## 2019-12-30 PROCEDURE — 99214 OFFICE O/P EST MOD 30 MIN: CPT | Performed by: INTERNAL MEDICINE

## 2019-12-30 PROCEDURE — G0378 HOSPITAL OBSERVATION PER HR: HCPCS

## 2019-12-30 PROCEDURE — 85027 COMPLETE CBC AUTOMATED: CPT

## 2019-12-30 PROCEDURE — 84484 ASSAY OF TROPONIN QUANT: CPT

## 2019-12-30 PROCEDURE — 6370000000 HC RX 637 (ALT 250 FOR IP): Performed by: INTERNAL MEDICINE

## 2019-12-30 PROCEDURE — 96376 TX/PRO/DX INJ SAME DRUG ADON: CPT

## 2019-12-30 PROCEDURE — 94761 N-INVAS EAR/PLS OXIMETRY MLT: CPT

## 2019-12-30 PROCEDURE — 94640 AIRWAY INHALATION TREATMENT: CPT

## 2019-12-30 PROCEDURE — 6360000002 HC RX W HCPCS: Performed by: PHYSICIAN ASSISTANT

## 2019-12-30 PROCEDURE — 6370000000 HC RX 637 (ALT 250 FOR IP): Performed by: PSYCHIATRY & NEUROLOGY

## 2019-12-30 PROCEDURE — 2580000003 HC RX 258: Performed by: INTERNAL MEDICINE

## 2019-12-30 PROCEDURE — 36415 COLL VENOUS BLD VENIPUNCTURE: CPT

## 2019-12-30 RX ORDER — COLCHICINE 0.6 MG/1
0.6 TABLET ORAL DAILY
Status: DISCONTINUED | OUTPATIENT
Start: 2019-12-30 | End: 2020-01-03 | Stop reason: HOSPADM

## 2019-12-30 RX ORDER — MORPHINE SULFATE 4 MG/ML
4 INJECTION, SOLUTION INTRAMUSCULAR; INTRAVENOUS
Status: DISCONTINUED | OUTPATIENT
Start: 2019-12-30 | End: 2020-01-03 | Stop reason: HOSPADM

## 2019-12-30 RX ORDER — PRIMIDONE 50 MG/1
50 TABLET ORAL 2 TIMES DAILY
Status: DISCONTINUED | OUTPATIENT
Start: 2019-12-30 | End: 2019-12-31

## 2019-12-30 RX ADMIN — MORPHINE SULFATE 4 MG: 4 INJECTION INTRAVENOUS at 14:16

## 2019-12-30 RX ADMIN — SPIRONOLACTONE 25 MG: 25 TABLET ORAL at 11:19

## 2019-12-30 RX ADMIN — DULOXETINE HYDROCHLORIDE 60 MG: 30 CAPSULE, DELAYED RELEASE ORAL at 11:13

## 2019-12-30 RX ADMIN — TORSEMIDE 20 MG: 20 TABLET ORAL at 11:12

## 2019-12-30 RX ADMIN — LEVETIRACETAM 1000 MG: 500 TABLET, FILM COATED ORAL at 20:14

## 2019-12-30 RX ADMIN — SODIUM CHLORIDE, PRESERVATIVE FREE 10 ML: 5 INJECTION INTRAVENOUS at 12:22

## 2019-12-30 RX ADMIN — FERROUS GLUCONATE TAB 324 MG (37.5 MG ELEMENTAL IRON) 324 MG: 324 (37.5 FE) TAB at 11:13

## 2019-12-30 RX ADMIN — LEVETIRACETAM 1000 MG: 500 TABLET, FILM COATED ORAL at 11:13

## 2019-12-30 RX ADMIN — MORPHINE SULFATE 4 MG: 4 INJECTION INTRAVENOUS at 11:01

## 2019-12-30 RX ADMIN — APIXABAN 5 MG: 5 TABLET, FILM COATED ORAL at 12:22

## 2019-12-30 RX ADMIN — ASPIRIN 81 MG 81 MG: 81 TABLET ORAL at 12:20

## 2019-12-30 RX ADMIN — SODIUM CHLORIDE, PRESERVATIVE FREE 10 ML: 5 INJECTION INTRAVENOUS at 20:17

## 2019-12-30 RX ADMIN — ATORVASTATIN CALCIUM 80 MG: 40 TABLET, FILM COATED ORAL at 20:14

## 2019-12-30 RX ADMIN — FAMOTIDINE 20 MG: 20 TABLET, FILM COATED ORAL at 11:12

## 2019-12-30 RX ADMIN — ONDANSETRON 4 MG: 4 TABLET, ORALLY DISINTEGRATING ORAL at 11:01

## 2019-12-30 RX ADMIN — ISOSORBIDE MONONITRATE 60 MG: 30 TABLET, EXTENDED RELEASE ORAL at 11:12

## 2019-12-30 RX ADMIN — SODIUM CHLORIDE, PRESERVATIVE FREE 10 ML: 5 INJECTION INTRAVENOUS at 18:26

## 2019-12-30 RX ADMIN — MORPHINE SULFATE 2 MG: 2 INJECTION, SOLUTION INTRAMUSCULAR; INTRAVENOUS at 03:00

## 2019-12-30 RX ADMIN — PRIMIDONE 50 MG: 50 TABLET ORAL at 20:15

## 2019-12-30 RX ADMIN — SPIRONOLACTONE 25 MG: 25 TABLET ORAL at 21:00

## 2019-12-30 RX ADMIN — AMIODARONE HYDROCHLORIDE 200 MG: 200 TABLET ORAL at 12:20

## 2019-12-30 RX ADMIN — FERROUS GLUCONATE TAB 324 MG (37.5 MG ELEMENTAL IRON) 324 MG: 324 (37.5 FE) TAB at 20:15

## 2019-12-30 RX ADMIN — Medication 800 MG: at 11:19

## 2019-12-30 RX ADMIN — MEXILETINE HYDROCHLORIDE 150 MG: 150 CAPSULE ORAL at 14:16

## 2019-12-30 RX ADMIN — APIXABAN 5 MG: 5 TABLET, FILM COATED ORAL at 20:15

## 2019-12-30 RX ADMIN — MEXILETINE HYDROCHLORIDE 150 MG: 150 CAPSULE ORAL at 20:17

## 2019-12-30 RX ADMIN — Medication 800 MG: at 20:16

## 2019-12-30 RX ADMIN — CLOPIDOGREL BISULFATE 75 MG: 75 TABLET ORAL at 12:22

## 2019-12-30 RX ADMIN — MORPHINE SULFATE 4 MG: 4 INJECTION INTRAVENOUS at 19:06

## 2019-12-30 RX ADMIN — MORPHINE SULFATE 4 MG: 4 INJECTION INTRAVENOUS at 07:17

## 2019-12-30 RX ADMIN — MORPHINE SULFATE 4 MG: 4 INJECTION INTRAVENOUS at 22:54

## 2019-12-30 RX ADMIN — IPRATROPIUM BROMIDE AND ALBUTEROL SULFATE 3 ML: .5; 3 SOLUTION RESPIRATORY (INHALATION) at 09:01

## 2019-12-30 RX ADMIN — PRIMIDONE 50 MG: 50 TABLET ORAL at 14:16

## 2019-12-30 RX ADMIN — COLCHICINE 0.6 MG: 0.6 TABLET, FILM COATED ORAL at 12:20

## 2019-12-30 RX ADMIN — MORPHINE SULFATE 4 MG: 4 INJECTION INTRAVENOUS at 04:04

## 2019-12-30 RX ADMIN — FAMOTIDINE 20 MG: 20 TABLET, FILM COATED ORAL at 20:15

## 2019-12-30 RX ADMIN — IPRATROPIUM BROMIDE AND ALBUTEROL SULFATE 3 ML: .5; 3 SOLUTION RESPIRATORY (INHALATION) at 12:50

## 2019-12-30 RX ADMIN — IPRATROPIUM BROMIDE AND ALBUTEROL SULFATE 3 ML: .5; 3 SOLUTION RESPIRATORY (INHALATION) at 16:55

## 2019-12-30 RX ADMIN — MEXILETINE HYDROCHLORIDE 150 MG: 150 CAPSULE ORAL at 11:13

## 2019-12-30 ASSESSMENT — PAIN DESCRIPTION - FREQUENCY
FREQUENCY: CONTINUOUS

## 2019-12-30 ASSESSMENT — PAIN DESCRIPTION - PROGRESSION
CLINICAL_PROGRESSION: GRADUALLY IMPROVING
CLINICAL_PROGRESSION: NOT CHANGED
CLINICAL_PROGRESSION: GRADUALLY IMPROVING
CLINICAL_PROGRESSION: NOT CHANGED
CLINICAL_PROGRESSION: NOT CHANGED

## 2019-12-30 ASSESSMENT — PAIN SCALES - GENERAL
PAINLEVEL_OUTOF10: 9
PAINLEVEL_OUTOF10: 8
PAINLEVEL_OUTOF10: 9
PAINLEVEL_OUTOF10: 10
PAINLEVEL_OUTOF10: 7
PAINLEVEL_OUTOF10: 8
PAINLEVEL_OUTOF10: 7
PAINLEVEL_OUTOF10: 9
PAINLEVEL_OUTOF10: 10
PAINLEVEL_OUTOF10: 9

## 2019-12-30 ASSESSMENT — PAIN DESCRIPTION - ONSET
ONSET: ON-GOING

## 2019-12-30 ASSESSMENT — PAIN DESCRIPTION - ORIENTATION
ORIENTATION: RIGHT

## 2019-12-30 ASSESSMENT — PAIN DESCRIPTION - DESCRIPTORS
DESCRIPTORS: ACHING
DESCRIPTORS: ACHING;CONSTANT

## 2019-12-30 ASSESSMENT — PAIN DESCRIPTION - LOCATION
LOCATION: KNEE
LOCATION: HIP;KNEE
LOCATION: KNEE;HIP
LOCATION: HIP;KNEE

## 2019-12-30 ASSESSMENT — PAIN DESCRIPTION - PAIN TYPE
TYPE: CHRONIC PAIN

## 2019-12-30 ASSESSMENT — PAIN - FUNCTIONAL ASSESSMENT
PAIN_FUNCTIONAL_ASSESSMENT: PREVENTS OR INTERFERES WITH MANY ACTIVE NOT PASSIVE ACTIVITIES
PAIN_FUNCTIONAL_ASSESSMENT: PREVENTS OR INTERFERES SOME ACTIVE ACTIVITIES AND ADLS

## 2019-12-30 NOTE — CONSULTS
crackles Absent   Cardiovascular: (PMI) apex non displaced,no lifts no thrills, ankle swelling Absent  , 1+, s1 and s2 audible,Murmur. Absent , JVD not noted    Abdomen /GI:  Bowel sounds normal, Soft, No tenderness, No masses, No gross visceromegaly   :  No costovertebral angle tenderness   Musculoskeletal:  No edema, no tenderness, no deformities. Back- no tenderness  Integument:  Well hydrated, no rash   Lymphatic:  No lymphadenopathy noted   Neurologic:  Alert & oriented x 3, CN 2-12 normal, normal motor function, normal sensory function, no focal deficits noted           Medical decision making and Data review:    Lab Review   Recent Labs     12/30/19 0207   WBC 5.3   HGB 10.1*   HCT 32.2*         Recent Labs     12/29/19 2000      K 3.9      CO2 24   BUN 9   CREATININE 0.6     Recent Labs     12/29/19 2000   AST 20   ALT 13   BILITOT 0.2   ALKPHOS 83     Recent Labs     12/29/19 2000 12/30/19 0207   TROPONINT <0.010 <0.010       No results for input(s): PROBNP in the last 72 hours. Lab Results   Component Value Date    INR 0.94 12/29/2019    PROTIME 11.4 (L) 12/29/2019       EKG: (reviewed by myself)    ECHO:(reviewed by myself)    Chest Xray:(reviewed by myself)  Xr Wrist Left (min 3 Views)    Result Date: 12/20/2019  EXAMINATION: 3 XRAY VIEWS OF THE LEFT WRIST 12/20/2019 3:28 pm COMPARISON: None. HISTORY: ORDERING SYSTEM [facility] HISTORY: Reason for exam:->foosh Reason for Exam: foosh FINDINGS: Osteopenia. Anatomic alignment. No fracture. Osteoarthritic changes most pronounced in the 1st [facility] joint.      No acute bony or joint abnormality     Xr Chest Portable    Result Date: 12/29/2019  EXAMINATION: ONE XRAY VIEW OF THE CHEST 12/29/2019 6:32 pm COMPARISON: Chest x-ray December 15, 2019 HISTORY: ORDERING SYSTEM PROVIDED HISTORY:  TECHNOLOGIST PROVIDED HISTORY: Reason for exam:->cp Reason for Exam: cp Relevant Medical/Surgical History: CAD, CHF FINDINGS: Cardiac silhouette appears stable. Right-sided AICD in place. Atherosclerotic changes of the aorta. Coronary stent in place. Postoperative changes of median sternotomy. No focal consolidation, pleural effusion, or pneumothorax identified. Osseous structures appear intact. 1. No acute cardiopulmonary process identified. Xr Chest Portable    Result Date: 12/15/2019  EXAMINATION: ONE XRAY VIEW OF THE CHEST 12/15/2019 5:14 pm COMPARISON: None. HISTORY: ORDERING SYSTEM PROVIDED HISTORY: chest pain TECHNOLOGIST PROVIDED HISTORY: Reason for exam:->chest pain Reason for Exam: chest pain Acuity: Acute Type of Exam: Initial FINDINGS: No infiltrate or consolidation or effusion is identified. The heart size is normal.  The patient is status post CABG and coronary artery stent placement. There is a pulse generator over the right anterior chest wall with 2 leads projecting over the heart. An IVC filter is partially visualized. Surgical clips are present within the left upper quadrant of the abdomen. No acute intrathoracic abnormality identified. Cta Pulmonary W Contrast    Result Date: 12/15/2019  EXAMINATION: CTA OF THE CHEST 12/15/2019 7:38 pm TECHNIQUE: CTA of the chest was performed after the administration of intravenous contrast.  Multiplanar reformatted images are provided for review. MIP images are provided for review. Dose modulation, iterative reconstruction, and/or weight based adjustment of the mA/kV was utilized to reduce the radiation dose to as low as reasonably achievable. COMPARISON: CTA pulmonary artery November 14, 2019 and April 6, 2016. HISTORY: ORDERING SYSTEM PROVIDED HISTORY: Chest pain, history of PE TECHNOLOGIST PROVIDED HISTORY: Reason for exam:->Chest pain, history of PE Reason for Exam: cp Acuity: Acute Type of Exam: Initial Additional signs and symptoms: Pt presents to the ED via medic from home for chest pain rated 9/10.  Pt states it is a squeezing pain in her chest. PT took 3 nitro at home. Pt also states that she had a fever pta and took tylenol for it. Relevant Medical/Surgical History: isovue 370 80 ml FINDINGS: Pulmonary Arteries: Pulmonary arteries are adequately opacified for evaluation. No evidence of intraluminal filling defect to suggest pulmonary embolism. Main pulmonary artery is normal in caliber. Mediastinum: No evidence of mediastinal lymphadenopathy. The patient is status post median sternotomy. There are cardiac leads in place. The heart is mildly enlarged. The pericardium demonstrates no acute abnormality. There is no acute abnormality of the thoracic aorta. Lungs/pleura: There are small bilateral pleural effusions. There is mild dependent atelectasis in the bilateral posterior lungs. There is no pneumothorax. There is minimal patchy ground-glass attenuation in the bilateral lungs, likely related to resolving pneumonia, improved. There is a 7 mm lung nodule in the right lower lobe (series 5, image 39). Upper Abdomen: There is mild ascites in the fei hepatis region and in the left upper abdomen. Soft Tissues/Bones: No acute bone or soft tissue abnormality. No evidence of pulmonary embolism. Minimal patchy ground-glass attenuation in the bilateral lungs, likely related to resolving pneumonia, improved. Mild dependent atelectasis and small bilateral pleural effusions. Mild ascites in the upper abdomen. 7 mm lung nodule in the right lower lobe, mildly increased in size since April 6, 2016 when it measured up to 6 mm, likely benign. RECOMMENDATIONS: Fleischner Society guidelines for follow-up and management of incidentally detected pulmonary nodules: Single Solid Nodule: Nodule size equals 6-8 mm In a low-risk patient, CT at 6-12 months, then consider CT at 18-24 months. In a high-risk patient, CT at 6-12 months, then CT at 18-24 months.  - Low risk patients include individuals with minimal or absent history of smoking and other known risk factors. - High risk patients include individuals with a history or smoking or known risk factors. Radiology 2017 http://pubs. rsna.org/doi/full/10.1148/radiol. 6778950416     Nm Myocardial Spect Rest Exercise Or Rx    Result Date: 12/16/2019  Cardiac Perfusion Imaging   Demographics   Patient Name      Lizzette Zhu   Date of study        12/16/2019   Date of Birth     1960         Gender               Female   Age               63 year(s)         Race                    Patient Number    [id number]         Room Number          9664   Visit Number      [id number]          Height               66 inches   Corporate ID      [id number]           Weight               124 pounds   Accession Number  [id number]                                        NM Technologist      Azra Munoz RT   Ordering          [facility]   Interpreting         [facility]  Physician         Erlinda Parham MD      Cardiologist         Erlinda Parham MD   Conclusions   Summary  abnormal stress test, depressed LVEF, Myocardial perfusion scan shows large  size, severe intensity, non reversible perfusion defect in inferior wall. Recommendation  OPTIMIZE MEDICATIONS, ANTERIOR WALL PERFUSION IS NORMAL. SHE RECENTLY HAD  DISTAL LAD STENT VIA KOCH GRAFT   Signatures   ------------------------------------------------------------------  Electronically signed by Janine Schulte MD  (Interpreting cardiologist) on 12/16/2019 at 14:04  ------------------------------------------------------------------  Procedure Procedure Type:   Nuclear Stress Test:Pharmacological, Myocardial Perfusion Imaging with  Pharm, NM MYOCARDIAL SPECT REST EXERCISE OR RX  Indications: CAD and Chest pain. Risk Factors   The patient risk factors include:prior CABG;cerebrovascular disease,  hypercholesterolemia, hypertension and prior heart failure .   Stress Protocols   Resting ECG  nsr   Resting HR:60 bpm  Resting BP:110/74 mmHg  Stress Protocol:Pharmacologic - Lexiscan  Peak HR:67 bpm                                HR/BP product:7370  Peak BP:110/74 mmHg  Predicted HR: 162 bpm  % of predicted HR: 41   Exercise duration: 01:10 min  Reason for termination:Completed   ECG Findings  nsr   Symptoms  No symptoms with Lexiscan infusion. Stress Interpretation  ECG portion of stress test is negative for ischemia by diagnostic criteria. Procedure Medications   - Lexiscan I.V. bolus (over 15sec.) 0.4 mg admininstered @ 12/16/2019 12:20. Imaging Protocols   Rest                             Stress   Isotope:Sestamibi 99mTc          Isotope: Sestamibi 99mTc  Isotope dose:9.6 mCi             Isotope dose:30.5 mCi  Administration route: I.V. Administration route: I.V. Injection Date:01/06/2019 11:10  Injection Date:12/16/2019 12:20  Scan Date:12/16/2019 11:55       Scan Date:12/16/2019 13:20   Technique:        SPECT          Technique:        Gated                                                     SPECT   Procedure Description   Upon patient arrival, the patient is identified using two identifiers and  the physician order is verified. An IV is established and 8-11mCi of 99mTc  Sestamibi is intravenously injected and followed with 10mL 0.9% Normal  Saline flush. A circulation period of 45 minutes occurs prior to resting  SPECT imaging. After imaging is complete the patient is escorted to the  stress lab. The patient is connected to the ECG and blood pressure is  measured. The RN starts the stress portion of the exam and rapidly  intravenously injects Lexiscan (regadenosine) 0.4mg over a period of 10 to15  seconds and follows with 5mL 0.9% Normal Saline flush. Immediately following  the Nuclear Technologist intravenously injects 22-33mCi of 99mTc Sestamibi  and 5mL 0.9% Normal Saline flush. After completion, recovery, and removal of  the IV, the patient rests during the second circulation period of 45  minutes.  Final stress SPECT gated imaging is performed. The patient may  return home or to their room after stress imaging. The images are processed  and final charting is completed and sent to the appropriate cardiologist for  interpretation and reporting. Perfusion Interpretation   Myocardial perfusion scan shows large size, severe intensity, non reversible  perfusion defect in inferior wall. Imaging Results    Summed scores     - Summed stress score: 39     - Summed rest score: 24     - Summed difference score:    11   Rest ejection  Ejection fraction:30 %  EDV :132 ml  ESV :92 ml  Stroke volume :40 ml  Medical History   Accession#:  [id number]  Admission Data Admission date: 12/15/2019 Admission Time: 16:36 Hospital Status: Inpatient. All labs, medications and tests reviewed by myself including data  from outside source , patient and available family . Continue all other medications of all above medical condition listed as is. Impression:  Active Problems:    Chest pain    Chest pain    CAD (coronary artery disease)    History of Maru-en-Y gastric bypass    Chronic systolic heart failure (HCC)    Seizure disorder (HCC)    Hypothyroidism  Resolved Problems:    * No resolved hospital problems. *      Assessment: 63 y. o.year old with PMH of  has a past medical history of Acute delirium, Arrhythmia, Arthritis, Asplenia, Asthma, CAD (coronary artery disease), Cardiomyopathy ([address] Cerebral artery occlusion with cerebral infarction ([address] CHF (congestive heart failure) ([address] Enlarged liver, GERD (gastroesophageal reflux disease), H/O echocardiogram, History of blood transfusion, Hx of cardiovascular stress test, Hyperlipidemia, Hypertension, Lymphoma ([address] MI, old, Movement disorder, MS (multiple sclerosis) ([address] OP (osteoporosis), PE (pulmonary embolism), Pneumonia, Seizures ([address] Spleen enlarged, and Thyroid disease.       Plan and Recommendations:    Her chest pain is somewhat reproducible and pleuritic in nature gets worse on deep breaths stress test recently showed no ischemia serial troponins are negative she already had high risk PCI to her LAD through [facility]  At this stage I will start her on colchicine for possible pleuritic inflammatory pain I do not think she is having acute coronary syndrome  We will have her pacer interrogated as she is feeling pacer spikes? She of ischemic cardiomyopathy ejection fraction 2025% currently appears euvolemic  CHF: Systolic compensated heart failure. Please continue [facility] recommended medical thearpy including Coreg/ Toprol XL  , ACE/ARB  and Aldactone 25 mg daily. Strict Is and Os and Daily weights. chf nurse consult  History of VT ablation status post ICD she is on amiodarone  She has been on Eliquis although I do not find any history of atrial fibrillation? HTN: stable, continue present medications   She is concerned about possible pacer getting infected follow-up on blood cultures as per primary team  Clinically does not appear to have infection at this time  DVT prophylaxis if no contraindication  6. Dyslipidemia: continue statins           Thank you  much for consult and giving us the opportunity in contributing in the care of this patient. Please feel free to call me for any questions.        Karely Edward MD, 12/30/2019 8:42 AM

## 2019-12-30 NOTE — CARE COORDINATION
Patient identified as potential readmission. Last admission 12/22-12/24 for chest pain at [facility]. Patient here today for chest pain. Patient states she lives in a mobile home with her significant other. Patient states she has a PCP and insurance which assists with medication affordability. Patient states she has all of the DME that she needs at home including: wheelchair, shower chair and grab bars. Patient able to drive, but states she typically does not and has significant other transport her to and from doctor's appointments. Patient active with [facility] palliative care. States she has [facility], but has not had home health visits since last admission. Patient had a stent placed at [facility] October 2019 with Dr Carmelo Jacobson. Patient states she takes Oxycodone 20mg every 3 hours for chronic pain. Advises that when she is admitted to the hospital she normally receives Morphine 6mg or Dilaudid. Patient requesting pain medication at this time. Dr Travis notified.  spoke to Dr. Travis regarding patient status. States patient had recent abnormal stress test and needs cardiac workup. Upon discharge patient plan to return home with significant other. Patient is requiring readmission and there were no alternatives to admission to explore at this time.

## 2019-12-30 NOTE — PROGRESS NOTES
Pt had Morphine 2mg at 0300 and reporting \"that is like baby ASA to me\". Mattie Richmond LPN obtained order to give Morphine 4mg and given the okay to give it one hour after giving the 2mg of Morphine. Pt's blood pressure 115/71 before giving 4mg of Morphine. Educated patient to make us aware if she begins to feel dizzy or lightheaded. Pt voices understanding.       Electronically signed by Ida Zaman RN on 12/30/19 at 4:11 AM

## 2019-12-30 NOTE — CARE COORDINATION
[address] Transitions Interview     2019    Patient: David Arndt Patient : 1960   MRN: [id number]  Reason for Admission: Chest Pain/CAD; Hx- CHF, MS  RARS: Readmission Risk Score: 52    Spoke with: Patient    Inpatient Assessment  Care Transitions Summary    Care Transitions Inpatient Review  Medication Review  Are you able to afford your medications?:  Yes  How often do you have difficulty taking your medications?:  I always take them as prescribed. Housing Review  Who do you live with?:  Partner/Spouse/SO  Are you an active caregiver in your home?:  No  Social Support  Do you have a ?:  No  Do you have a [facility]?:  Yes  Katie [address] Name:  Sabiha Syed  [address] [place] [address]  S:  [address]  Patient DME:  [employer], Wheelchair, [facility] chair, Other, [employer]  Other Patient DME:  Toilet seat riser, toilet riser  Functional Review  Ability to seek help/take action for Emergent/Urgent situations i.e. fire, crime, inclement weather or health crisis.:  Needs Assistance  Ability handle personal hygiene needs (bathing/dressing/grooming):  Needs Assistance  Ability to manage medications:  Needs Assistance  Ability to prepare food:  Needs Assistance  Ability to maintain home (clean home, laundry):  Needs Assistance  Ability to drive and/or has transportation:  Dependent  Ability to do shopping:  Needs Assistance  Ability to manage finances:  Needs Assistance  Is patient able to live independently?:  No  Hearing and Vision  Visual Impairment:  Reading glasses  Hearing Impairment:  None  Care Transitions Interventions                                 Follow Up: Currently in BPCI-A Bundle with  from 19 thru 20. Received BPCI letter on 19. Currently in observation status with c/o chest pain.  Noted to have the following recent admissions:  12/15/19 - 19 [facility]- PNA; d/c'd home w/ Sabiha Syed  19 [facility] ED- Fall, wrist pain  12/22/19 -12/24/19 [facility]; chest pain  [facility] staff and [facility] had not been able to make contact with Patient as she was in/out of medical facilities. Re-oriented Patient to [facility] [place] program, agreeable to ongoing follow up. Patient gives BPCI/CT staff permission to speak with Iesha Puckett SO and Marlyn Sanchez, Dtr as needed. Patient noted to have flat affect and not very engaged in conversation. Patient lives with SO/Partner and adult Dtr in mobile home; can manage steps into home. SO and Dtr assist Patient with adls as needed. DME- w/c, shower chair, grab bars, toilet riser, bsc, walker. Denies need for additional dme. Follows w/ Dr Mandi Aponte provides transportation for appts. Uses [facility] [employer] and can afford cost of rx copays. Active w/ [facility]. Tentative discharge plan is to return home w/ SO and Dtr with resumption of services per [facility]. Denies any other resource needs at this time. Voicemail update to Orlando Vaughn Mgr (covering for [employer] per [address] [place] [address]).      Future Appointments   Date Time Provider Amairani Sheikh   12/30/2019  2:40 PM Attila Diamond MD [facility]   1/28/2020  1:00 PM Cruz Orellana MD [facility] Kaylee Scott RN

## 2019-12-30 NOTE — PROGRESS NOTES
ipratropium-albuterol (DUONEB) nebulizer solution 3 mL  3 mL Inhalation Q4H WA Lance Kenney MD   3 mL at 12/30/19 1250    isosorbide mononitrate (IMDUR) extended release tablet 60 mg  60 mg Oral Daily Lance Kenney MD   60 mg at 12/30/19 1112    levETIRAcetam (KEPPRA) tablet 1,000 mg  1,000 mg Oral BID Lance Kenney MD   1,000 mg at 12/30/19 1113    levothyroxine (SYNTHROID) tablet 150 mcg  150 mcg Oral Daily Lance Kenney MD        magnesium oxide (MAG-OX) tablet 800 mg  800 mg Oral BID Lance Kenney MD   800 mg at 12/30/19 1119    mexiletine (MEXITIL) capsule 150 mg  150 mg Oral TID Lance Kenney MD   150 mg at 12/30/19 1416    ondansetron (ZOFRAN-ODT) disintegrating tablet 4 mg  4 mg Oral Q6H PRN Lance Kenney MD   4 mg at 12/30/19 1101    spironolactone (ALDACTONE) tablet 25 mg  25 mg Oral BID Lance Kenney MD   25 mg at 12/30/19 1119    torsemide (DEMADEX) tablet 20 mg  20 mg Oral Daily Lance Kenney MD   20 mg at 12/30/19 1112    sodium chloride flush 0.9 % injection 10 mL  10 mL Intravenous 2 times per day Lance Kenney MD   10 mL at 12/30/19 1222    sodium chloride flush 0.9 % injection 10 mL  10 mL Intravenous PRN Lance Kenney MD        magnesium hydroxide (MILK OF MAGNESIA) 400 MG/5ML suspension 30 mL  30 mL Oral Daily PRN Lance Kenney MD        nitroGLYCERIN (NITROSTAT) SL tablet 0.4 mg  0.4 mg Sublingual Q5 Min PRN Lance Kenney MD       [facility] by provider] oxyCODONE HCl (OXY-IR) immediate release tablet 20 mg  20 mg Oral Q3H PRN Lavaughn Sever, PA-C   20 mg at 12/29/19 1085      ? PHYSICAL EXAM:   Blood pressure 99/61, pulse 62, temperature 98.3 °F (36.8 °C), temperature source Oral, resp. rate 16, height 5' 6\" (1.676 m), weight 117 lb (53.1 kg), SpO2 97 %. . Body mass index is 18.88 kg/m².      CONSTITUTIONAL: Mild distress 2/2 pain,  HENT: NC/AT Ear: normal, patent without effusion Nose: no deformities, nares patent  EYES:Conjunctiva normal. No discharge. NECK: Neck supple,No JVD /Thyromegaly/LAD   RESP:No chest wall deformities or tenderness. No wheezing or rales. B/L air entry positive+  CVS: Regular rate and rhythm. S1 and S2 normal, no murmurs, clicks, gallops or rubs  GI: Soft, ND/NT,No guarding/rebound/mass/organomegaly. Bowel sounds are normal.    MUSCULAR/EXT:  no pedal edema, no clubbing or cyanosis,Pulses 2+ B/L  CNS: Awake, alert. Cranial nerves intact, no focal neurological deficits.    MOOD/PSYCH: Normal mood and affect  SKIN: Warm and dry,No rashes    Lab results:   Results for orders placed or performed during the hospital encounter of 12/29/19   CBC Auto Differential   Result Value Ref Range    WBC 4.9 4.0 - 10.5 K/CU MM    RBC 3.73 (L) 4.2 - 5.4 M/CU MM    Hemoglobin 12.6 12.5 - 16.0 GM/DL    Hematocrit 39.0 37 - 47 %    .6 (H) 78 - 100 FL    MCH 33.8 (H) 27 - 31 PG    MCHC 32.3 32.0 - 36.0 %    RDW 14.5 11.7 - 14.9 %    Platelets 295 080 - 011 K/CU MM    MPV 9.3 7.5 - 11.1 FL    Differential Type AUTOMATED DIFFERENTIAL     Segs Relative 71.2 (H) 36 - 66 %    Lymphocytes % 20.8 (L) 24 - 44 %    Monocytes % 6.6 (H) 0 - 4 %    Eosinophils % 0.0 0 - 3 %    Basophils % 0.8 0 - 1 %    Segs Absolute 3.5 K/CU MM    Lymphocytes Absolute 1.0 K/CU MM    Monocytes Absolute 0.3 K/CU MM    Eosinophils Absolute 0.0 K/CU MM    Basophils Absolute 0.0 K/CU MM    Nucleated RBC % 0.0 %    Total Nucleated RBC 0.0 K/CU MM    Total Immature Neutrophil 0.03 K/CU MM    Immature Neutrophil % 0.6 (H) 0 - 0.43 %   Comprehensive Metabolic Panel w/ Reflex to MG   Result Value Ref Range    Sodium 136 135 - 145 MMOL/L    Potassium 3.9 3.5 - 5.1 MMOL/L    Chloride 101 99 - 110 mMol/L    CO2 24 21 - 32 MMOL/L    BUN 9 6 - 23 MG/DL    CREATININE 0.6 0.6 - 1.1 MG/DL    Glucose 103 (H) 70 - 99 MG/DL    Calcium 8.7 8.3 - 10.6 MG/DL    Alb 3.3 (L) 3.4 - 5.0 GM/DL    Total Protein 6.0 (L) 6.4 - 8.2 GM/DL    Total Bilirubin 0.2 0.0 - 1.0 MG/DL    ALT 13 10 - 40 U/L    AST Cardio on board. She had AICD in June at [address] [place] [address]. Currently on amiodarone/mexiletine.  Seizure disorder ([address] continue home Keppra. As per neuro-started on Mysoline. MRI pending   Hypothyroidism: Continue home Synthroid. Recent normal TSH   History of Maru-en-Y gastric bypass     ? DVT prophylaxis: Already on full therapeutic anticoagulation. Possible DC home tomorrow after work-up.     MD Khadijah   Hospitalist at [facility]

## 2019-12-30 NOTE — CONSULTS
PRN  DULoxetine (CYMBALTA) extended release capsule 60 mg, 60 mg, Oral, Daily  famotidine (PEPCID) tablet 20 mg, 20 mg, Oral, BID  ferrous gluconate 324 (37.5 Fe) MG tablet 324 mg, 324 mg, Oral, BID  Icosapent Ethyl CAPS 500 mg, 500 mg, Oral, BID  ipratropium-albuterol (DUONEB) nebulizer solution 3 mL, 3 mL, Inhalation, Q4H WA  isosorbide mononitrate (IMDUR) extended release tablet 60 mg, 60 mg, Oral, Daily  levETIRAcetam (KEPPRA) tablet 1,000 mg, 1,000 mg, Oral, BID  levothyroxine (SYNTHROID) tablet 150 mcg, 150 mcg, Oral, Daily  magnesium oxide (MAG-OX) tablet 800 mg, 800 mg, Oral, BID  mexiletine (MEXITIL) capsule 150 mg, 150 mg, Oral, TID  ondansetron (ZOFRAN-ODT) disintegrating tablet 4 mg, 4 mg, Oral, Q6H PRN  spironolactone (ALDACTONE) tablet 25 mg, 25 mg, Oral, BID  torsemide (DEMADEX) tablet 20 mg, 20 mg, Oral, Daily  sodium chloride flush 0.9 % injection 10 mL, 10 mL, Intravenous, 2 times per day  sodium chloride flush 0.9 % injection 10 mL, 10 mL, Intravenous, PRN  magnesium hydroxide (MILK OF MAGNESIA) 400 MG/5ML suspension 30 mL, 30 mL, Oral, Daily PRN  nitroGLYCERIN (NITROSTAT) SL tablet 0.4 mg, 0.4 mg, Sublingual, Q5 Min PRN  [Held by provider] oxyCODONE HCl (OXY-IR) immediate release tablet 20 mg, 20 mg, Oral, Q3H PRN  Allergies:  Fentanyl; Moxifloxacin hcl in nacl; Povidone-iodine; Cyclobenzaprine; Methocarbamol; Tramadol; Baclofen; Ibuprofen; Naproxen; Nsaids; Tizanidine; Tolmetin; Tramadol hcl; Betadine [povidone iodine]; and Moxifloxacin    Social History:  TOBACCO:   reports that she has never smoked. She has never used smokeless tobacco.  ETOH:   reports no history of alcohol use. DRUGS:   reports no history of drug use.   Family History:       Problem Relation Age of Onset    High Blood Pressure Mother     High Cholesterol Mother     Heart Disease Mother     Diabetes Mother     Heart Disease Father     Cancer Father     Other Sister         Multiple Sclerosis    Heart Disease Maternal Grandmother     High Blood Pressure Maternal Grandmother     High Cholesterol Maternal Grandmother        REVIEW OF SYSTEMS:  CONSTITUTIONAL:  negative  HEENT:  negative  RESPIRATORY:  negative  CARDIOVASCULAR:  negative  GASTROINTESTINAL:  negative  GENITOURINARY:  negative  MUSCULOSKELETAL:  negative  BEHAVIOR/PSYCH:  Negative    ROS neg    Family hx negf    PHYSICAL EXAM  ------------------------  Vitals:  /72   Pulse 60   Temp 98.1 °F (36.7 °C) (Oral)   Resp 16   Ht 5' 6\" (1.676 m)   Wt 117 lb (53.1 kg)   SpO2 96%   BMI 18.88 kg/m²      General:  Awake, alert, oriented X 3. Well developed, well nourished, well groomed. No apparent distress. HEENT:  Normocephalic, atraumatic. Pupils equal, round, reactive to light. No scleral icterus. No conjunctival injection. Normal lips, teeth, and gums. No nasal discharge. Neck:  Supple  Heart:  RRR, no murmurs, gallops, rubs  Lungs:  CTA bilaterally, bilat symmetrical expansion, no wheeze, rales, or rhonchi  Abdomen: Bowel sounds present, soft, nontender, no masses, no organomegaly, no peritoneal signs  Extremities:  No clubbing, cyanosis, or edema  Skin:  Warm and dry, no open lesions or rash  Breast: deferred  Rectal: deferred  Genitalia:  deferred    NEUROLOGICAL EXAM  ---------------------------------    Mental Status Exam:             Alert and oriented times three,follows commands,speech and language intact    Cranial [id number] Intact.         Cranial nerve II           Visual acuity:  normal                 Cranial nerve III           Pupils:  equal, round, reactive to light      Cranial nerves III, IV, VI           Extraocular Movements: intact      Cranial nerve V           Facial sensation:  intact      Cranial nerve VII           Facial strength: intact      Cranial nerve VIII           Hearing:  intact      Cranial nerve IX           Palate:  intact      Cranial nerve XI         Shoulder shrug:  intact      Cranial nerve XII WBCUA 25 10/28/2019    RBCUA 1 10/28/2019    MUCUS RARE 03/30/2019    TRICHOMONAS NONE SEEN 10/28/2019    BACTERIA MANY 10/28/2019    CLARITYU CLEAR 10/28/2019    SPECGRAV 1.006 10/28/2019    LEUKOCYTESUR LARGE 10/28/2019    UROBILINOGEN NORMAL 10/28/2019    BILIRUBINUR NEGATIVE 10/28/2019    BLOODU NEGATIVE 10/28/2019    AMORPHOUS OCCASIONAL 08/27/2019     TSH:  No results found for: TSH  VITAMIN B12: No components found for: B12  FOLATE:    Lab Results   Component Value Date    FOLATE 12.2 10/30/2019     RPR:  No results found for: RPR  JOVANNI:    Lab Results   Component Value Date    JOVANNI None Detected 04/26/2018     Urine Toxicology:  No components found for: IAMMENTA, IBARBIT, IBENZO, ICOCAINE, IMARTHC, IOPIATES, IPHENCYC     IMPRESSION:    Essential tremors    seizures    PLAN:    Mri brain    B 12 folate TSH    Mysoline    Discussed dx prognosis meds side effects and above with pt and answered all questions. Trish Esposito MD  [facility] CERTIFIED-NEUROLOGY.

## 2019-12-30 NOTE — PROGRESS NOTES
Needs:  · Estimated Daily Total Kcal: 6323-3458 (30-35 kcal/kg current BW)  · Estimated Daily Protein (g): 74-89 (1.25-1.5 g/kg IBW)  · Estimated Daily Total Fluid (ml/day): 2695-6390 (1 mL/kcal)    Nutrition Diagnosis:   · Problem:  Moderate malnutrition, In context of chronic illness  · Etiology: related to Alteration in GI function, Insufficient energy/nutrient consumption     Signs and symptoms:  as evidenced by Patient report of, Diet history of poor intake, Weight loss, Mild loss of subcutaneous fat, Moderate muscle loss    Objective Information:  · Wound Type: Pressure Ulcer  · Current Nutrition Therapies:  · Oral Diet Orders: General   · Oral Diet intake: %  · Oral Nutrition Supplement (ONS) Orders: None  · Anthropometric Measures:  · Ht: 5' 6\" (167.6 cm)   · Current Body Wt: 117 lb (53.1 kg)  · Admission Body Wt: 117 lb (53.1 kg)  · Usual Body Wt: 145 lb (65.8 kg)(2/26/2019)  · % Weight Change: -19% x 1 yr  · Ideal Body Wt: 130 lb (59 kg), % Ideal Body 90%  · BMI Classification: BMI 18.5 - 24.9 Normal Weight(18.9)    Nutrition Interventions:   Start oral diet, Start ONS  Continued Inpatient Monitoring, Education Completed, Coordination of Care    Nutrition Evaluation:   · Evaluation: Goals set   · Goals: Pt will consume >75% of all meals and supplements provided    · Monitoring: Nutrition Progression, Meal Intake, Supplement Intake, Diet Tolerance, Weight, Pertinent Labs, Nausea or Vomiting, Monitor Bowel Function    Electronically signed by Delta Hashimoto, RD, LD on 12/30/19 at 12:32 PM    Contact Number: [phone]

## 2019-12-30 NOTE — H&P
Hospitalist H&P Note:   Date of Service: 12/29/2019   ? CHIEF COMPLAINT:   Chest pain    HISTORY OF PRESENT ILLNESS (HPI):   Ms. Emily Enriquez, a 63y.o. year old female who  has a past medical history of Acute delirium, Arrhythmia, Arthritis, Asplenia, Asthma, CAD (coronary artery disease), Cardiomyopathy ([address] Cerebral artery occlusion with cerebral infarction ([address] CHF (congestive heart failure) ([address] Enlarged liver, GERD (gastroesophageal reflux disease), H/O echocardiogram, History of blood transfusion, Hx of cardiovascular stress test, Hyperlipidemia, Hypertension, Lymphoma ([address] MI, old, Movement disorder, MS (multiple sclerosis) ([address] OP (osteoporosis), PE (pulmonary embolism), Pneumonia, Seizures ([address] Spleen enlarged, and Thyroid disease. Presented with complaints of sudden onset of 9/10 left-sided chest pain, squeezing, radiating to left shoulder and jaw. Her pain partially improved in the ED with Nitropaste. complains of associated shortness of breath. She was recently discharged from the hospital after being treated for pneumonia. Otherwise she did not report any cough, fever/chills, palpitations, orthopnea or paroxysmal nocturnal dyspnea or worsening leg swelling. Average appetite. Denies nausea,vomiting, diarrhea or constipation. Denies headache,vision changes, numbness or tingling in extremities. On the side note she also complains of multiple episodes of uncontrolled shaking of legs and hands with no loss of consciousness during the recent past.  During those episodes she never lost her consciousness. On presentation, Blood pressure 121/85, pulse 60, temperature 98.5 °F (36.9 °C), temperature source Oral, resp. rate 30, height 5' 6\" (1.676 m), weight 145 lb (65.8 kg), SpO2 97 %.      PAST MEDICAL HISTORY   Past Medical History:   Diagnosis Date    Acute delirium     Arrhythmia     Arthritis     Asplenia     Asthma     CAD (coronary artery disease)     1st stent at age 41    [facility]     Cerebral artery occlusion with cerebral infarction ([facility]     CHF (congestive heart failure) (HCC)     Enlarged liver     GERD (gastroesophageal reflux disease)     H/O echocardiogram 4/6/16    EF 55%, trivial TR & MR, stage 1 diastolic dysfunction    History of blood transfusion     Hx of cardiovascular stress test 12/29/2015    Alessia: EF 45%. Pharmacologic stress myocardial perfusion scan shows a fixed inferior-lateral defect w/ no inducible ischemia. No evidence of ischemia. Inferior-lateral infarction. Normal LVSF    Hyperlipidemia     Hypertension     Lymphoma (HCC)     MI, old     Movement disorder     MS (multiple sclerosis) [facility]     OP (osteoporosis)     PE (pulmonary embolism)     trapese filter    Pneumonia     Seizures (HCC)     Spleen enlarged     Thyroid disease           SURGICAL HISTORY:   Past Surgical History:   Procedure Laterality Date    ABDOMEN SURGERY      APPENDECTOMY      CARDIAC DEFIBRILLATOR PLACEMENT      CHOLECYSTECTOMY      COLONOSCOPY      CORONARY ANGIOPLASTY WITH STENT PLACEMENT      3 stents    CORONARY ARTERY BYPASS GRAFT      Age 49    ENDOSCOPY, COLON, DIAGNOSTIC      GASTRIC BYPASS SURGERY      HERNIA REPAIR      HIP SURGERY      HYSTERECTOMY      JOINT REPLACEMENT      SKIN BIOPSY      TONSILLECTOMY      VASCULAR SURGERY          ALLERGIES:   Fentanyl; Moxifloxacin hcl in nacl; Povidone-iodine; Cyclobenzaprine; Methocarbamol; Tramadol; Baclofen; Ibuprofen; Naproxen; Nsaids; Tizanidine; Tolmetin; Tramadol hcl; Betadine [povidone iodine]; and Moxifloxacin   ? SOCIAL HISTORY:    reports that she has never smoked. She has never used smokeless tobacco. She reports that she does not drink alcohol or use drugs.    ?     FAMILY HISTORY:   Family History   Problem Relation Age of Onset    High Blood Pressure Mother     High Cholesterol Mother     Heart Disease Mother     Diabetes Mother     Heart Disease Father    Suzi Weiner Cancer Father     Other Sister         Multiple Sclerosis    Heart Disease Maternal Grandmother     High Blood Pressure Maternal Grandmother     High Cholesterol Maternal Grandmother       ? MEDICATIONS:   No current facility-administered medications for this encounter. Current Outpatient Medications   Medication Sig Dispense Refill    isosorbide mononitrate (IMDUR) 30 MG extended release tablet Take 2 tablets by mouth daily 30 tablet 0    ipratropium-albuterol (DUONEB) 0.5-2.5 (3) MG/3ML SOLN nebulizer solution Inhale 3 mLs into the lungs every 4 hours (while awake) 360 mL 0    naloxone 4 MG/0.1ML LIQD nasal spray 1 spray by Nasal route as needed for Opioid Reversal 1 each 5    nitroGLYCERIN (NITROSTAT) 0.4 MG SL tablet up to max of 3 total doses.  If no relief after 1 dose, call 911. 25 tablet 3    naloxone 4 MG/0.1ML LIQD nasal spray 1 spray by Nasal route as needed for Opioid Reversal 1 each 5    benzocaine-menthol (CEPACOL SORE THROAT) 15-3.6 MG lozenge Take 1 lozenge by mouth every 2 hours as needed for Sore Throat 18 lozenge 1    B-D TB SYRINGE 1CC/25GX5/8\" 25G X 5/8\" 1 ML MISC USE AS DIRECTED WITH B-12 INJECTION  9    PARoxetine (PAXIL) 10 MG tablet   1    levothyroxine (SYNTHROID) 150 MCG tablet   1    cyanocobalamin 1000 MCG/ML injection INJECT 1 ML INTO THE MUSCLE ON THE FIRST OF EACH MONTH  11    spironolactone (ALDACTONE) 25 MG tablet Take 25 mg by mouth 2 times daily      torsemide (DEMADEX) 20 MG tablet Take 20 mg by mouth daily Indications: pt takes 2 tablets daily      Icosapent Ethyl (VASCEPA) 0.5 g CAPS Take 500 mg by mouth 2 times daily 180 capsule 2    amiodarone (CORDARONE) 200 MG tablet Take 200 mg by mouth daily      apixaban (ELIQUIS) 5 MG TABS tablet Take 5 mg by mouth 2 times daily      DULoxetine (CYMBALTA) 60 MG extended release capsule Take 1 capsule by mouth daily 30 capsule 3    ondansetron (ZOFRAN) 4 MG tablet Take 1 tablet by mouth every 6 hours as needed cyanosis,Pulses 2+ B/L  CNS: Awake, alert. Cranial nerves intact, no focal neurological deficits.    MOOD/PSYCH: Normal mood and affect  SKIN: Warm and dry,No rashes    Lab results:   Results for orders placed or performed during the hospital encounter of 12/29/19   CBC Auto Differential   Result Value Ref Range    WBC 4.9 4.0 - 10.5 K/CU MM    RBC 3.73 (L) 4.2 - 5.4 M/CU MM    Hemoglobin 12.6 12.5 - 16.0 GM/DL    Hematocrit 39.0 37 - 47 %    .6 (H) 78 - 100 FL    MCH 33.8 (H) 27 - 31 PG    MCHC 32.3 32.0 - 36.0 %    RDW 14.5 11.7 - 14.9 %    Platelets 376 338 - 105 K/CU MM    MPV 9.3 7.5 - 11.1 FL    Differential Type AUTOMATED DIFFERENTIAL     Segs Relative 71.2 (H) 36 - 66 %    Lymphocytes % 20.8 (L) 24 - 44 %    Monocytes % 6.6 (H) 0 - 4 %    Eosinophils % 0.0 0 - 3 %    Basophils % 0.8 0 - 1 %    Segs Absolute 3.5 K/CU MM    Lymphocytes Absolute 1.0 K/CU MM    Monocytes Absolute 0.3 K/CU MM    Eosinophils Absolute 0.0 K/CU MM    Basophils Absolute 0.0 K/CU MM    Nucleated RBC % 0.0 %    Total Nucleated RBC 0.0 K/CU MM    Total Immature Neutrophil 0.03 K/CU MM    Immature Neutrophil % 0.6 (H) 0 - 0.43 %   Comprehensive Metabolic Panel w/ Reflex to MG   Result Value Ref Range    Sodium 136 135 - 145 MMOL/L    Potassium 3.9 3.5 - 5.1 MMOL/L    Chloride 101 99 - 110 mMol/L    CO2 24 21 - 32 MMOL/L    BUN 9 6 - 23 MG/DL    CREATININE 0.6 0.6 - 1.1 MG/DL    Glucose 103 (H) 70 - 99 MG/DL    Calcium 8.7 8.3 - 10.6 MG/DL    Alb 3.3 (L) 3.4 - 5.0 GM/DL    Total Protein 6.0 (L) 6.4 - 8.2 GM/DL    Total Bilirubin 0.2 0.0 - 1.0 MG/DL    ALT 13 10 - 40 U/L    AST 20 15 - 37 IU/L    Alkaline Phosphatase 83 40 - 129 IU/L    GFR Non-African American >60 >60 mL/min/1.73m2    GFR African American >60 >60 mL/min/1.73m2    Anion Gap 11 4 - 16   Troponin   Result Value Ref Range    Troponin T <0.010 <0.01 NG/ML   Lipase   Result Value Ref Range    Lipase 11 (L) 13 - 60 IU/L   Protime-INR   Result Value Ref Range    Protime 11.4 (L) 11.7 - 14.5 SECONDS    INR 0.94 INDEX     XR CHEST PORTABLE   Final Result   1. No acute cardiopulmonary process identified. EKG: NSR with nonspecific ST/T wave changes. Normal VA/QT intervals. ASSESSMENT/IMPRESSION:      Chest pain/CAD (coronary artery disease) rule out ACS. Cardio consult. N.p.o. past midnight. Continue home aspirin, Plavix, statin and Imdur. Not on beta-blocker from home.  Chronic systolic heart failure (HCC) not volume overloaded. Continue home torsemide. Recent EF 20 to 25%. Not on optimal cardiac regimen. Cardio consult for med recommendation. She had AICD in June at [facility]. Currently on amiodarone/mexiletine.  Seizure disorder ([address] continue home Keppra and consult neurology due to tremors.  Hypothyroidism: Continue home Synthroid. Recent normal TSH   History of Maru-en-Y gastric bypass     ? DVT prophylaxis: Already on full therapeutic anticoagulation. Old records reviewed. Medications reviewed with patient. Patient is a Full Code-discussed. Admit under observation status. All questions and concerns addressed at this time, and patient is in agreement with current treatment plan.      Lance Kenney MD   Hospitalist at [facility]

## 2019-12-30 NOTE — PLAN OF CARE
Nutrition Problem:  Moderate malnutrition, In context of chronic illness  Intervention: Food and/or Nutrient Delivery: Start oral diet, Start ONS  Nutritional Goals: Pt will consume >75% of all meals and supplements provided

## 2019-12-31 ENCOUNTER — APPOINTMENT (OUTPATIENT)
Dept: MRI IMAGING | Age: 59
DRG: 204 | End: 2019-12-31
Payer: MEDICARE

## 2019-12-31 LAB
FOLATE: 12.8 NG/ML (ref 3.1–17.5)
REASON FOR REJECTION: NORMAL
REASON FOR REJECTION: NORMAL
REJECTED TEST: NORMAL
TSH HIGH SENSITIVITY: 42.03 UIU/ML (ref 0.27–4.2)
VITAMIN B-12: 497.7 PG/ML (ref 211–911)

## 2019-12-31 PROCEDURE — 36415 COLL VENOUS BLD VENIPUNCTURE: CPT

## 2019-12-31 PROCEDURE — 96376 TX/PRO/DX INJ SAME DRUG ADON: CPT

## 2019-12-31 PROCEDURE — 6370000000 HC RX 637 (ALT 250 FOR IP): Performed by: PSYCHIATRY & NEUROLOGY

## 2019-12-31 PROCEDURE — 84443 ASSAY THYROID STIM HORMONE: CPT

## 2019-12-31 PROCEDURE — APPSS45 APP SPLIT SHARED TIME 31-45 MINUTES: Performed by: NURSE PRACTITIONER

## 2019-12-31 PROCEDURE — 6370000000 HC RX 637 (ALT 250 FOR IP): Performed by: INTERNAL MEDICINE

## 2019-12-31 PROCEDURE — 6360000002 HC RX W HCPCS: Performed by: PHYSICIAN ASSISTANT

## 2019-12-31 PROCEDURE — 82607 VITAMIN B-12: CPT

## 2019-12-31 PROCEDURE — 1200000000 HC SEMI PRIVATE

## 2019-12-31 PROCEDURE — 82746 ASSAY OF FOLIC ACID SERUM: CPT

## 2019-12-31 PROCEDURE — 99232 SBSQ HOSP IP/OBS MODERATE 35: CPT | Performed by: INTERNAL MEDICINE

## 2019-12-31 PROCEDURE — 2580000003 HC RX 258: Performed by: INTERNAL MEDICINE

## 2019-12-31 RX ORDER — PRIMIDONE 50 MG/1
100 TABLET ORAL 2 TIMES DAILY
Status: DISCONTINUED | OUTPATIENT
Start: 2019-12-31 | End: 2020-01-03 | Stop reason: HOSPADM

## 2019-12-31 RX ADMIN — SODIUM CHLORIDE, PRESERVATIVE FREE 10 ML: 5 INJECTION INTRAVENOUS at 19:50

## 2019-12-31 RX ADMIN — CLOPIDOGREL BISULFATE 75 MG: 75 TABLET ORAL at 10:34

## 2019-12-31 RX ADMIN — MEXILETINE HYDROCHLORIDE 150 MG: 150 CAPSULE ORAL at 10:35

## 2019-12-31 RX ADMIN — ATORVASTATIN CALCIUM 80 MG: 40 TABLET, FILM COATED ORAL at 20:00

## 2019-12-31 RX ADMIN — MORPHINE SULFATE 4 MG: 4 INJECTION INTRAVENOUS at 02:07

## 2019-12-31 RX ADMIN — PRIMIDONE 100 MG: 50 TABLET ORAL at 20:02

## 2019-12-31 RX ADMIN — Medication 800 MG: at 10:34

## 2019-12-31 RX ADMIN — FAMOTIDINE 20 MG: 20 TABLET, FILM COATED ORAL at 10:35

## 2019-12-31 RX ADMIN — LEVOTHYROXINE SODIUM 150 MCG: 150 TABLET ORAL at 06:55

## 2019-12-31 RX ADMIN — DULOXETINE HYDROCHLORIDE 60 MG: 30 CAPSULE, DELAYED RELEASE ORAL at 10:34

## 2019-12-31 RX ADMIN — SODIUM CHLORIDE, PRESERVATIVE FREE 10 ML: 5 INJECTION INTRAVENOUS at 13:33

## 2019-12-31 RX ADMIN — MORPHINE SULFATE 4 MG: 4 INJECTION INTRAVENOUS at 19:49

## 2019-12-31 RX ADMIN — MORPHINE SULFATE 4 MG: 4 INJECTION INTRAVENOUS at 16:46

## 2019-12-31 RX ADMIN — COLCHICINE 0.6 MG: 0.6 TABLET, FILM COATED ORAL at 10:35

## 2019-12-31 RX ADMIN — SPIRONOLACTONE 25 MG: 25 TABLET ORAL at 20:00

## 2019-12-31 RX ADMIN — ASPIRIN 81 MG 81 MG: 81 TABLET ORAL at 10:34

## 2019-12-31 RX ADMIN — MEXILETINE HYDROCHLORIDE 150 MG: 150 CAPSULE ORAL at 14:21

## 2019-12-31 RX ADMIN — Medication 800 MG: at 20:01

## 2019-12-31 RX ADMIN — LEVETIRACETAM 1000 MG: 500 TABLET, FILM COATED ORAL at 20:00

## 2019-12-31 RX ADMIN — FERROUS GLUCONATE TAB 324 MG (37.5 MG ELEMENTAL IRON) 324 MG: 324 (37.5 FE) TAB at 10:34

## 2019-12-31 RX ADMIN — MORPHINE SULFATE 4 MG: 4 INJECTION INTRAVENOUS at 10:31

## 2019-12-31 RX ADMIN — FERROUS GLUCONATE TAB 324 MG (37.5 MG ELEMENTAL IRON) 324 MG: 324 (37.5 FE) TAB at 20:00

## 2019-12-31 RX ADMIN — PRIMIDONE 50 MG: 50 TABLET ORAL at 10:34

## 2019-12-31 RX ADMIN — LEVETIRACETAM 1000 MG: 500 TABLET, FILM COATED ORAL at 10:34

## 2019-12-31 RX ADMIN — AMIODARONE HYDROCHLORIDE 200 MG: 200 TABLET ORAL at 10:34

## 2019-12-31 RX ADMIN — ISOSORBIDE MONONITRATE 60 MG: 30 TABLET, EXTENDED RELEASE ORAL at 10:34

## 2019-12-31 RX ADMIN — FAMOTIDINE 20 MG: 20 TABLET, FILM COATED ORAL at 20:00

## 2019-12-31 RX ADMIN — MORPHINE SULFATE 4 MG: 4 INJECTION INTRAVENOUS at 13:32

## 2019-12-31 RX ADMIN — SODIUM CHLORIDE, PRESERVATIVE FREE 10 ML: 5 INJECTION INTRAVENOUS at 10:31

## 2019-12-31 RX ADMIN — MORPHINE SULFATE 4 MG: 4 INJECTION INTRAVENOUS at 23:00

## 2019-12-31 RX ADMIN — APIXABAN 5 MG: 5 TABLET, FILM COATED ORAL at 20:00

## 2019-12-31 RX ADMIN — MEXILETINE HYDROCHLORIDE 150 MG: 150 CAPSULE ORAL at 20:02

## 2019-12-31 RX ADMIN — MORPHINE SULFATE 4 MG: 4 INJECTION INTRAVENOUS at 06:55

## 2019-12-31 RX ADMIN — APIXABAN 5 MG: 5 TABLET, FILM COATED ORAL at 10:34

## 2019-12-31 ASSESSMENT — PAIN DESCRIPTION - PROGRESSION
CLINICAL_PROGRESSION: GRADUALLY WORSENING
CLINICAL_PROGRESSION: NOT CHANGED
CLINICAL_PROGRESSION: GRADUALLY WORSENING

## 2019-12-31 ASSESSMENT — PAIN DESCRIPTION - DESCRIPTORS
DESCRIPTORS: ACHING;CONSTANT

## 2019-12-31 ASSESSMENT — PAIN - FUNCTIONAL ASSESSMENT
PAIN_FUNCTIONAL_ASSESSMENT: PREVENTS OR INTERFERES SOME ACTIVE ACTIVITIES AND ADLS

## 2019-12-31 ASSESSMENT — PAIN DESCRIPTION - ONSET
ONSET: ON-GOING

## 2019-12-31 ASSESSMENT — PAIN SCALES - GENERAL
PAINLEVEL_OUTOF10: 8
PAINLEVEL_OUTOF10: 7
PAINLEVEL_OUTOF10: 8
PAINLEVEL_OUTOF10: 9
PAINLEVEL_OUTOF10: 7
PAINLEVEL_OUTOF10: 9
PAINLEVEL_OUTOF10: 6
PAINLEVEL_OUTOF10: 9
PAINLEVEL_OUTOF10: 8

## 2019-12-31 ASSESSMENT — PAIN DESCRIPTION - PAIN TYPE
TYPE: CHRONIC PAIN
TYPE: CHRONIC PAIN
TYPE: ACUTE PAIN
TYPE: CHRONIC PAIN
TYPE: CHRONIC PAIN

## 2019-12-31 ASSESSMENT — PAIN DESCRIPTION - LOCATION
LOCATION: HIP;KNEE

## 2019-12-31 ASSESSMENT — PAIN DESCRIPTION - ORIENTATION
ORIENTATION: RIGHT
ORIENTATION: RIGHT;LEFT
ORIENTATION: RIGHT

## 2019-12-31 ASSESSMENT — PAIN DESCRIPTION - FREQUENCY
FREQUENCY: CONTINUOUS

## 2019-12-31 NOTE — PROGRESS NOTES
Dylan Nicole  [facility] [address], [address] [facility],Third Floor  Phone: [phone]    Fax [phone]                  Sheng Ng MD, Danielle Olivera MD, Bean Clemons MD, MD David Olivier MD Peder Maxwell, MD Rheba Huguenin, APRN      Courtney Michel, APRN  Sultana Holly, APRN    Sudhir Dougherty, APRN    Cardiology Progress Note     Today's Plan: continue colchicine    Admit Date:  12/29/2019    Consult reason/ Seen today for: chest pain    Subjective and  Overnight Events:  She states that she is feeling better. She denies any new issues. She states that her pain is less frequent since starting . Colchicine      Assessment / Plan / Recommendation:     1. Her chest pain is somewhat reproducible and pleuritic in nature gets worse on deep breaths stress test recently showed no ischemia serial troponins are negative. she already had high risk PCI to her LAD through [facility]  2. Possible pericarditis? Inflammatory pain , pleurisy \"> colchicine for possible pleuritic inflammatory pain I do not think she is having acute coronary syndrome. Believe the colchicine is helping, will continue to watch. 3. Patient interrogated did not reveal any abnormality she was placed on ventricular pacing as well did not notice any palpitations  4. History of ischemic cardiomyopathy ejection fraction 20-25% currently appears euvolemic  5. CHF: Systolic compensated heart failure. Please continue Gudelines recommended medical thearpy including Coreg/ Toprol XL  , ACE/ARB  and Aldactone 25 mg daily. Strict Is and Os and Daily weights. chf nurse consult  6. History of VT ablation status post ICD she is on amiodarone  7. She has been on Eliquis although I do not find any history of atrial fibrillation? She say she was started on it at [facility]   8. HTN: stable, continue present medications   9. DVT prophylaxis if no contraindication  10. Dyslipidemia: continue statins   11.  Will sign off       History of Presenting Illness:    Chief complain on admission : 63 y. o.year old who is admitted for  Chief Complaint   Patient presents with    Chest Pain     chest pressure, significant cardiac history        Past medical history:    has a past medical history of Acute delirium, Arrhythmia, Arthritis, Asplenia, Asthma, CAD (coronary artery disease), Cardiomyopathy ([address] Cerebral artery occlusion with cerebral infarction ([address] CHF (congestive heart failure) ([address] Enlarged liver, GERD (gastroesophageal reflux disease), H/O echocardiogram, History of blood transfusion, Hx of cardiovascular stress test, Hyperlipidemia, Hypertension, Lymphoma ([address] MI, old, Movement disorder, MS (multiple sclerosis) ([address] OP (osteoporosis), PE (pulmonary embolism), Pneumonia, Seizures ([address] Spleen enlarged, and Thyroid disease. Past surgical history:   has a past surgical history that includes Coronary angioplasty with stent; Hysterectomy; Appendectomy; Cholecystectomy; Tonsillectomy; Gastric bypass surgery; Cardiac defibrillator placement; hip surgery; Abdomen surgery; Colonoscopy; Endoscopy, colon, diagnostic; hernia repair; joint replacement; skin biopsy; vascular surgery; and Coronary artery bypass graft. Social History:   reports that she has never smoked. She has never used smokeless tobacco. She reports that she does not drink alcohol or use drugs. Family history:  family history includes Cancer in her father; Diabetes in her mother; Heart Disease in her father, maternal grandmother, and mother; High Blood Pressure in her maternal grandmother and mother; High Cholesterol in her maternal grandmother and mother; Other in her sister. Allergies   Allergen Reactions    Fentanyl Swelling     Other reaction(s): Angioedema (swelling)    Moxifloxacin Hcl In Nacl Swelling and Rash    Povidone-Iodine Rash     Other reaction(s): AOF      Cyclobenzaprine      Other reaction(s):  Other - comment required  \" it make my muscle very weak because of my MS\"  \" it make my muscle very weak because of my MS\"      Methocarbamol      Worsening edema  Other reaction(s): Other - comment required  Worsening edema  Worsening edema  Worsening edema      Tramadol Other (See Comments)     Doctor doesn't her to take due to heart problems  Seizures   Doctor doesn't her to take due to lowers seizure threshold.  Baclofen     Ibuprofen      \"Due to heart problems\"    Naproxen     Nsaids      \"Due to heart problems\"    Tizanidine     Tolmetin      \"Due to heart problems\"    Tramadol Hcl      Other reaction(s): Other - comment required  Told not to take due to history of seizures    Betadine [Povidone Iodine] Rash    Moxifloxacin Rash and Swelling     Lips swell and tingling in face   Lips swell and tingling in face   Lips swell and tingling in face   Lips swell and tingling in face   Around mouth       Review of Systems:   All 14 systems were reviewed and are negative  Except for the positive findings  which as documented     /76   Pulse 63   Temp 98.2 °F (36.8 °C) (Oral)   Resp 16   Ht 5' 6\" (1.676 m)   Wt 121 lb 8 oz (55.1 kg)   SpO2 99%   BMI 19.61 kg/m²       Intake/Output Summary (Last 24 hours) at 12/31/2019 1727  Last data filed at 12/31/2019 1649  Gross per 24 hour   Intake 140 ml   Output 650 ml   Net -510 ml       Physical Exam:  Constitutional:  Well developed, Well nourished, No acute distress, Non-toxic appearance. HENT:  Normocephalic, Atraumatic, Bilateral external ears normal, Oropharynx moist  Eyes:  PERRL, Conjunctiva normal, No discharge. Respiratory:  Normal breath sounds, No respiratory distress, No wheezing, No chest tenderness. Cardiovascular:  Normal heart rate, Normal rhythm, No murmurs appreciated, No rubs appreciated, No gallops appreciated, JVP not elevated  Abdomen/GI:  Bowel sounds normal, Soft, No tenderness, No masses, No pulsatile masses.    Musculoskeletal:  Intact distal pulses, No edema, No tenderness, No cyanosis, No clubbing. Integument:  Warm, Dry, No erythema, No rash. Lymphatic:  No lymphadenopathy noted. Neurologic:  Alert & oriented x 3. Psychiatric:  Affect  and  Mood :no change    Telemetry Reviewed:   V Paced Rate 60    Medications:    primidone  100 mg Oral BID    colchicine  0.6 mg Oral Daily    amiodarone  200 mg Oral Daily    apixaban  5 mg Oral BID    aspirin  81 mg Oral Daily    atorvastatin  80 mg Oral Nightly    clopidogrel  75 mg Oral Daily    DULoxetine  60 mg Oral Daily    famotidine  20 mg Oral BID    ferrous gluconate  324 mg Oral BID    Icosapent Ethyl  500 mg Oral BID    ipratropium-albuterol  3 mL Inhalation Q4H WA    isosorbide mononitrate  60 mg Oral Daily    levETIRAcetam  1,000 mg Oral BID    levothyroxine  150 mcg Oral Daily    magnesium oxide  800 mg Oral BID    mexiletine  150 mg Oral TID    spironolactone  25 mg Oral BID    torsemide  20 mg Oral Daily    sodium chloride flush  10 mL Intravenous 2 times per day       morphine, albuterol sulfate HFA, docusate sodium, ondansetron, sodium chloride flush, magnesium hydroxide, nitroGLYCERIN, [Held by provider] oxyCODONE    Lab Data:  CBC:   Recent Labs     12/29/19 2000 12/30/19 0207   WBC 4.9 5.3   HGB 12.6 10.1*   HCT 39.0 32.2*   .6* 107.3*    327     BMP:   Recent Labs     12/29/19 2000      K 3.9      CO2 24   BUN 9   CREATININE 0.6     PT/INR:   Recent Labs     12/29/19 2000   PROTIME 11.4*   INR 0.94     BNP:  No results for input(s): PROBNP in the last 72 hours. TROPONIN:   Recent Labs     12/29/19 2000 12/30/19 0207 12/30/19 0208   TROPONINT <0.010 <0.010 <0.010        ECHO :   Echocardiogram 9/24/2019     Summary   This is a limited echo to assess ejection fraction and regional wall motion. Left ventricular function is severely abnormal , EF is estimated at 20-25%. Global hypokinesis noted.        All labs, medications and tests reviewed by myself , continue all other medications of all above medical condition listed as is except for changes mentioned above. Thank you very much for consult , please call with questions. Electronically signed by Glen Councilman, APRN - CNP on 12/31/2019 at 5:27 PM        [address] [facility]    I have seen ,spoken to  and examined this patient personally, independently of the nurse practitioner. I have reviewed the hospital care given to date and reviewed all pertinent labs and imaging. The plan was developed mutually at the time of the visit with the patient,  NP   and myself. I have spoken with patient, nursing staff and provided written and verbal instructions . The above note has been reviewed and I agree with the assessment, diagnosis, and treatment plan with changes made by me as follows     HPI:  I have reviewed the above HPI  And agree with above   Edelmira Herzog is a 63 y. o.year old who and presents with had concerns including Chest Pain (chest pressure, significant cardiac history). Chief Complaint   Patient presents with    Chest Pain     chest pressure, significant cardiac history     Please review addendum/changes made to note above   Interval history:  Chest pain improved    Physical Exam:  General:  Awake, alert, NAD  Head:normal  Eye:normal  Neck:  No JVD   Chest:  Clear to auscultation, respiration easy  Cardiovascular:  S1 and S2 audible, No added heart sounds, No signs of ankle edema, or volume overload, No evidence of JVD, No crackles  Abdomen:   nontender  Extremities:  No edema  Pulses; palpable  Neuro: grossly normal      MEDICAL DECISION MAKING;    I agree with the above plan, which was planned by myself and discussed with NP.     Continue colchicine for 2 weeks  Will sign off       Airam Jurado MD [facility]

## 2019-12-31 NOTE — PROGRESS NOTES
Hospitalist Progress Note      Name:  Chantal Rose /Age/Sex: 1960  (63 y.o. female)   MRN & CSN:  [id number] & [id number] Admission Date/Time: 2019  5:51 PM   Location:  [address] [place] [address] PCP: Junior Abbott MD         Hospital Day: 3    Assessment and Plan:   Chantal Rose is a 63 y.o.  female  who presents with:     Chest pain/CAD (coronary artery disease) no ACS. Colchicine as per cardio. Continue home aspirin, Plavix, statin and Imdur. Not on beta-blocker from home. - - CP is better today   Chronic systolic heart failure (HCC) not volume overloaded. Continue home torsemide. Recent EF 20 to 25%. Not on optimal cardiac regimen. Cardio on board. She had AICD in  at [facility]. Currently on amiodarone/mexiletine.  Seizure disorder ([address] continue home Keppra. As per neuro-started on Mysoline. - - MRI to be done, noted hx of Seizures/PNES per [facility] records, also noted she is followed by a palliative care clinic in [address] Hypothyroidism: Continue home Synthroid. Recent normal TSH   History of Maru-en-Y gastric bypass       History of Present Illness:        - CP is better, but needs to have MRI, no dyspnea at rest   -changed to inpatient today    Objective: Intake/Output Summary (Last 24 hours) at 2019 1053  Last data filed at 2019 1031  Gross per 24 hour   Intake 120 ml   Output 2850 ml   Net -2730 ml      Vitals:   Vitals:    19 1029   BP: 118/80   Pulse: 63   Resp: 16   Temp: 97.6 °F (36.4 °C)   SpO2: 99%     Physical Exam:      Physical Exam  Cardiovascular:      Rate and Rhythm: Normal rate. Skin:     General: Skin is dry. Neurological:      Mental Status: She is alert.            Medications:   Medications:    colchicine  0.6 mg Oral Daily    primidone  50 mg Oral BID    amiodarone  200 mg Oral Daily    apixaban  5 mg Oral BID    aspirin  81 mg Oral Daily    atorvastatin  80 mg Oral Nightly    clopidogrel  75 mg Oral Daily    DULoxetine  60 mg Oral Daily    famotidine  20 mg Oral BID    ferrous gluconate  324 mg Oral BID    Icosapent Ethyl  500 mg Oral BID    ipratropium-albuterol  3 mL Inhalation Q4H WA    isosorbide mononitrate  60 mg Oral Daily    levETIRAcetam  1,000 mg Oral BID    levothyroxine  150 mcg Oral Daily    magnesium oxide  800 mg Oral BID    mexiletine  150 mg Oral TID    spironolactone  25 mg Oral BID    torsemide  20 mg Oral Daily    sodium chloride flush  10 mL Intravenous 2 times per day      Infusions:   PRN Meds: morphine, 4 mg, Q3H PRN  albuterol sulfate HFA, 2 puff, Q6H PRN  docusate sodium, 100 mg, BID PRN  ondansetron, 4 mg, Q6H PRN  sodium chloride flush, 10 mL, PRN  magnesium hydroxide, 30 mL, Daily PRN  nitroGLYCERIN, 0.4 mg, Q5 Min PRN  [Held by provider] oxyCODONE, 20 mg, Q3H PRN          Electronically signed by Trudy Stauffer MD on 12/31/2019 at 10:53 AM

## 2019-12-31 NOTE — PROGRESS NOTES
Unable to get approval for MRI from patient's cardiologist per [facility] policy. His practice is [facility]. He has declined signing off on his patients in the past.  Spoke with [employer] rep and he agreed that this is common for this cardiologist. Notified Nurse, Heaven Fallon.

## 2019-12-31 NOTE — PROGRESS NOTES
Pt alert and oriented  No complain of chest pain this evening but has chronic right side pan to hip and knee. Morphine is helpful. Pt stand and pivot to [facility] with one assist.  Continues on isolation for ESBL. Deniesl nausea.

## 2019-12-31 NOTE — PLAN OF CARE
Problem: Risk for Impaired Skin Integrity  Goal: Tissue integrity - skin and mucous membranes  Description  Structural intactness and normal physiological function of skin and  mucous membranes.   12/31/2019 1513 by Jenny Pugh RN  Outcome: Ongoing  12/31/2019 0235 by Yesica Nugent RN  Outcome: Ongoing     Problem: Falls - Risk of:  Goal: Will remain free from falls  Description  Will remain free from falls  12/31/2019 1513 by Jenny Pugh RN  Outcome: Ongoing  12/31/2019 0235 by Yesica Nugent RN  Outcome: Ongoing  Goal: Absence of physical injury  Description  Absence of physical injury  12/31/2019 1513 by Jenny Pugh RN  Outcome: Ongoing  12/31/2019 0235 by Yesica Nugent RN  Outcome: Ongoing     Problem: Pain:  Goal: Pain level will decrease  Description  Pain level will decrease  12/31/2019 1513 by Jenny Pugh RN  Outcome: Ongoing  12/31/2019 0235 by Yesica Nugent RN  Outcome: Ongoing  Goal: Control of acute pain  Description  Control of acute pain  12/31/2019 1513 by Jenny Pugh RN  Outcome: Ongoing  12/31/2019 0235 by Yesica Nugent RN  Outcome: Ongoing  Goal: Control of chronic pain  Description  Control of chronic pain  12/31/2019 1513 by Jenny Pugh RN  Outcome: Ongoing  12/31/2019 0235 by Yesica Nugent RN  Outcome: Ongoing     Problem: Nutrition  Goal: Optimal nutrition therapy  12/31/2019 1513 by Jenny Pugh RN  Outcome: Ongoing  12/31/2019 0235 by Yesica Nugent RN  Outcome: Ongoing

## 2020-01-01 LAB
ANION GAP SERPL CALCULATED.3IONS-SCNC: 13 MMOL/L (ref 4–16)
BUN BLDV-MCNC: 9 MG/DL (ref 6–23)
CALCIUM SERPL-MCNC: 8.7 MG/DL (ref 8.3–10.6)
CHLORIDE BLD-SCNC: 98 MMOL/L (ref 99–110)
CO2: 23 MMOL/L (ref 21–32)
CREAT SERPL-MCNC: 0.8 MG/DL (ref 0.6–1.1)
GFR AFRICAN AMERICAN: >60 ML/MIN/1.73M2
GFR NON-AFRICAN AMERICAN: >60 ML/MIN/1.73M2
GLUCOSE BLD-MCNC: 85 MG/DL (ref 70–99)
POTASSIUM SERPL-SCNC: 4.8 MMOL/L (ref 3.5–5.1)
SODIUM BLD-SCNC: 134 MMOL/L (ref 135–145)

## 2020-01-01 PROCEDURE — 94761 N-INVAS EAR/PLS OXIMETRY MLT: CPT

## 2020-01-01 PROCEDURE — 6370000000 HC RX 637 (ALT 250 FOR IP): Performed by: INTERNAL MEDICINE

## 2020-01-01 PROCEDURE — 6370000000 HC RX 637 (ALT 250 FOR IP): Performed by: PSYCHIATRY & NEUROLOGY

## 2020-01-01 PROCEDURE — 80048 BASIC METABOLIC PNL TOTAL CA: CPT

## 2020-01-01 PROCEDURE — 36415 COLL VENOUS BLD VENIPUNCTURE: CPT

## 2020-01-01 PROCEDURE — 2580000003 HC RX 258: Performed by: INTERNAL MEDICINE

## 2020-01-01 PROCEDURE — 1200000000 HC SEMI PRIVATE

## 2020-01-01 PROCEDURE — 6360000002 HC RX W HCPCS: Performed by: PHYSICIAN ASSISTANT

## 2020-01-01 RX ORDER — LEVOTHYROXINE SODIUM 0.07 MG/1
75 TABLET ORAL ONCE
Status: COMPLETED | OUTPATIENT
Start: 2020-01-01 | End: 2020-01-01

## 2020-01-01 RX ORDER — POTASSIUM CHLORIDE 7.45 MG/ML
10 INJECTION INTRAVENOUS PRN
Status: DISCONTINUED | OUTPATIENT
Start: 2020-01-01 | End: 2020-01-03 | Stop reason: HOSPADM

## 2020-01-01 RX ORDER — POTASSIUM CHLORIDE 20 MEQ/1
40 TABLET, EXTENDED RELEASE ORAL PRN
Status: DISCONTINUED | OUTPATIENT
Start: 2020-01-01 | End: 2020-01-03 | Stop reason: HOSPADM

## 2020-01-01 RX ORDER — LEVOTHYROXINE SODIUM 112 UG/1
224 TABLET ORAL DAILY
Status: DISCONTINUED | OUTPATIENT
Start: 2020-01-02 | End: 2020-01-03 | Stop reason: HOSPADM

## 2020-01-01 RX ADMIN — ATORVASTATIN CALCIUM 80 MG: 40 TABLET, FILM COATED ORAL at 21:10

## 2020-01-01 RX ADMIN — MORPHINE SULFATE 4 MG: 4 INJECTION INTRAVENOUS at 02:00

## 2020-01-01 RX ADMIN — ISOSORBIDE MONONITRATE 60 MG: 30 TABLET, EXTENDED RELEASE ORAL at 08:42

## 2020-01-01 RX ADMIN — MORPHINE SULFATE 4 MG: 4 INJECTION INTRAVENOUS at 21:11

## 2020-01-01 RX ADMIN — MEXILETINE HYDROCHLORIDE 150 MG: 150 CAPSULE ORAL at 21:11

## 2020-01-01 RX ADMIN — MORPHINE SULFATE 4 MG: 4 INJECTION INTRAVENOUS at 18:07

## 2020-01-01 RX ADMIN — Medication 800 MG: at 08:41

## 2020-01-01 RX ADMIN — APIXABAN 5 MG: 5 TABLET, FILM COATED ORAL at 21:10

## 2020-01-01 RX ADMIN — PRIMIDONE 100 MG: 50 TABLET ORAL at 21:10

## 2020-01-01 RX ADMIN — MORPHINE SULFATE 4 MG: 4 INJECTION INTRAVENOUS at 05:42

## 2020-01-01 RX ADMIN — MEXILETINE HYDROCHLORIDE 150 MG: 150 CAPSULE ORAL at 08:41

## 2020-01-01 RX ADMIN — LEVOTHYROXINE SODIUM 75 MCG: 75 TABLET ORAL at 19:03

## 2020-01-01 RX ADMIN — ASPIRIN 81 MG 81 MG: 81 TABLET ORAL at 08:42

## 2020-01-01 RX ADMIN — SPIRONOLACTONE 25 MG: 25 TABLET ORAL at 21:09

## 2020-01-01 RX ADMIN — MORPHINE SULFATE 4 MG: 4 INJECTION INTRAVENOUS at 15:00

## 2020-01-01 RX ADMIN — CLOPIDOGREL BISULFATE 75 MG: 75 TABLET ORAL at 08:42

## 2020-01-01 RX ADMIN — FAMOTIDINE 20 MG: 20 TABLET, FILM COATED ORAL at 21:09

## 2020-01-01 RX ADMIN — TORSEMIDE 20 MG: 20 TABLET ORAL at 08:41

## 2020-01-01 RX ADMIN — APIXABAN 5 MG: 5 TABLET, FILM COATED ORAL at 08:42

## 2020-01-01 RX ADMIN — LEVOTHYROXINE SODIUM 150 MCG: 150 TABLET ORAL at 05:42

## 2020-01-01 RX ADMIN — MEXILETINE HYDROCHLORIDE 150 MG: 150 CAPSULE ORAL at 13:15

## 2020-01-01 RX ADMIN — SPIRONOLACTONE 25 MG: 25 TABLET ORAL at 08:42

## 2020-01-01 RX ADMIN — AMIODARONE HYDROCHLORIDE 200 MG: 200 TABLET ORAL at 08:42

## 2020-01-01 RX ADMIN — SODIUM CHLORIDE, PRESERVATIVE FREE 10 ML: 5 INJECTION INTRAVENOUS at 08:41

## 2020-01-01 RX ADMIN — FAMOTIDINE 20 MG: 20 TABLET, FILM COATED ORAL at 08:42

## 2020-01-01 RX ADMIN — DULOXETINE HYDROCHLORIDE 60 MG: 30 CAPSULE, DELAYED RELEASE ORAL at 08:41

## 2020-01-01 RX ADMIN — MORPHINE SULFATE 4 MG: 4 INJECTION INTRAVENOUS at 08:37

## 2020-01-01 RX ADMIN — SODIUM CHLORIDE, PRESERVATIVE FREE 10 ML: 5 INJECTION INTRAVENOUS at 21:13

## 2020-01-01 RX ADMIN — COLCHICINE 0.6 MG: 0.6 TABLET, FILM COATED ORAL at 08:42

## 2020-01-01 RX ADMIN — MORPHINE SULFATE 4 MG: 4 INJECTION INTRAVENOUS at 11:44

## 2020-01-01 RX ADMIN — FERROUS GLUCONATE TAB 324 MG (37.5 MG ELEMENTAL IRON) 324 MG: 324 (37.5 FE) TAB at 21:09

## 2020-01-01 RX ADMIN — LEVETIRACETAM 1000 MG: 500 TABLET, FILM COATED ORAL at 21:09

## 2020-01-01 RX ADMIN — LEVETIRACETAM 1000 MG: 500 TABLET, FILM COATED ORAL at 08:42

## 2020-01-01 RX ADMIN — SODIUM CHLORIDE, PRESERVATIVE FREE 10 ML: 5 INJECTION INTRAVENOUS at 01:59

## 2020-01-01 RX ADMIN — Medication 800 MG: at 21:11

## 2020-01-01 RX ADMIN — PRIMIDONE 100 MG: 50 TABLET ORAL at 08:42

## 2020-01-01 RX ADMIN — FERROUS GLUCONATE TAB 324 MG (37.5 MG ELEMENTAL IRON) 324 MG: 324 (37.5 FE) TAB at 08:42

## 2020-01-01 ASSESSMENT — PAIN DESCRIPTION - LOCATION
LOCATION: HIP;KNEE
LOCATION: GENERALIZED
LOCATION: HIP
LOCATION: GENERALIZED
LOCATION: GENERALIZED
LOCATION: BACK;OTHER (COMMENT)

## 2020-01-01 ASSESSMENT — PAIN DESCRIPTION - ONSET
ONSET: ON-GOING

## 2020-01-01 ASSESSMENT — PAIN SCALES - GENERAL
PAINLEVEL_OUTOF10: 8
PAINLEVEL_OUTOF10: 6
PAINLEVEL_OUTOF10: 8
PAINLEVEL_OUTOF10: 4
PAINLEVEL_OUTOF10: 8
PAINLEVEL_OUTOF10: 9
PAINLEVEL_OUTOF10: 5
PAINLEVEL_OUTOF10: 8
PAINLEVEL_OUTOF10: 8
PAINLEVEL_OUTOF10: 6
PAINLEVEL_OUTOF10: 6
PAINLEVEL_OUTOF10: 8
PAINLEVEL_OUTOF10: 9

## 2020-01-01 ASSESSMENT — PAIN DESCRIPTION - PAIN TYPE
TYPE: CHRONIC PAIN

## 2020-01-01 ASSESSMENT — PAIN DESCRIPTION - FREQUENCY
FREQUENCY: INTERMITTENT
FREQUENCY: CONTINUOUS

## 2020-01-01 ASSESSMENT — PAIN DESCRIPTION - DESCRIPTORS
DESCRIPTORS: CONSTANT;ACHING
DESCRIPTORS: ACHING
DESCRIPTORS: CONSTANT;ACHING;THROBBING
DESCRIPTORS: ACHING;CONSTANT

## 2020-01-01 ASSESSMENT — PAIN DESCRIPTION - PROGRESSION
CLINICAL_PROGRESSION: NOT CHANGED

## 2020-01-01 ASSESSMENT — PAIN DESCRIPTION - ORIENTATION
ORIENTATION: RIGHT;LEFT
ORIENTATION: RIGHT

## 2020-01-01 NOTE — PROGRESS NOTES
samanta   Corporate ID      [id number]           Weight               124 pounds   Accession Number  [id number]                                        NM Technologist      Kasia Hutchins RT   Ordering          Poli Elaine   Interpreting         Poli Elaine  Physician         Robert Vasquez MD      Cardiologist         Robert Vasquez MD   Conclusions   Summary  abnormal stress test, depressed LVEF, Myocardial perfusion scan shows large  size, severe intensity, non reversible perfusion defect in inferior wall. Recommendation  OPTIMIZE MEDICATIONS, ANTERIOR WALL PERFUSION IS NORMAL. SHE RECENTLY HAD  DISTAL LAD STENT VIA KOCH GRAFT   Signatures   ------------------------------------------------------------------  Electronically signed by Clinton Horn MD  (Interpreting cardiologist) on 12/16/2019 at 14:04  ------------------------------------------------------------------  Procedure Procedure Type:   Nuclear Stress Test:Pharmacological, Myocardial Perfusion Imaging with  Pharm, NM MYOCARDIAL SPECT REST EXERCISE OR RX  Indications: CAD and Chest pain. Risk Factors   The patient risk factors include:prior CABG;cerebrovascular disease,  hypercholesterolemia, hypertension and prior heart failure . Stress Protocols   Resting ECG  nsr   Resting HR:60 bpm  Resting BP:110/74 mmHg  Stress Protocol:Pharmacologic - Lexiscan  Peak HR:67 bpm                                HR/BP product:7370  Peak BP:110/74 mmHg  Predicted HR: 162 bpm  % of predicted HR: 41   Exercise duration: 01:10 min  Reason for termination:Completed   ECG Findings  nsr   Symptoms  No symptoms with Lexiscan infusion. Stress Interpretation  ECG portion of stress test is negative for ischemia by diagnostic criteria. Procedure Medications   - Lexiscan I.V. bolus (over 15sec.) 0.4 mg admininstered @ 12/16/2019 12:20.   Imaging Protocols   Rest                             Stress   Isotope:Sestamibi 99mTc          Isotope: Sestamibi 99mTc  Isotope Admission date: 12/15/2019 Admission Time: 16:36 Hospital Status: Inpatient.       LAB RESULTS  --------------------    Recent Results (from the past 24 hour(s))   SPECIMEN REJECTION    Collection Time: 12/31/19  5:04 AM   Result Value Ref Range    Rejected Test B12FL TSHS     Reason for Rejection UNABLE TO PERFORM TESTING:     Reason for Rejection NO SPECIMEN RECEIVED    Vitamin B12 & Folate    Collection Time: 12/31/19 10:44 AM   Result Value Ref Range    Vitamin B-12 497.7 211 - 911 pg/ml    Folate 12.8 3.1 - 17.5 NG/ML   TSH without Reflex    Collection Time: 12/31/19 10:44 AM   Result Value Ref Range    TSH, High Sensitivity 42.030 (H) 0.270 - 4.20 uIu/ml         Medical problems    Patient Active Problem List:     Chest pain     Chest pain     CAD (coronary artery disease)     Hyperlipidemia     Thyroid disease     Acute exacerbation of CHF (congestive heart failure) ([facility])     CHF exacerbation ([facility])     Congestive heart failure ([facility])     Left upper quadrant pain     Pneumonia of left lower lobe due to infectious organism ([address]     Angina at rest [facility])     Coronary artery disease involving coronary bypass graft of native heart with angina pectoris ([address]     Shortness of breath     Ischemic cardiomyopathy     Acute metabolic encephalopathy     Acute delirium     Pneumonia     Sacral pain     Acute on chronic systolic CHF (congestive heart failure), NYHA class 3 (HCC)     Severe malnutrition (HCC)     Cellulitis     Cellulitis at gastrostomy tube site [facility])     History of Maru-en-Y gastric bypass     Gastroparesis     Abdominal pain     Feeding tube dysfunction     Abdominal pain, epigastric     Gastrojejunostomy tube status ([address]     Chronic malnutrition ([address]     Asplenia     Shockable cardiac rhythm detected by automated external defibrillator     Financial difficulties     Chest pain at rest     Closed nondisplaced transverse fracture of left patella     Contusion of right knee     Multifocal pneumonia

## 2020-01-01 NOTE — PROGRESS NOTES
NEUROLOGY NOTE  DR. Conrad Phalen MD.  -------------------------------------------------  Subjective:    Pt s tremors are doing better    Tolerating mysoline well    Doing better. Denies any new symptoms. Denies headache nausea vomiting dizziness    Denies numbness weakness extremities    Denies blurring of vision double vision    Objective:    /82   Pulse 60   Temp 97.9 °F (36.6 °C) (Oral)   Resp 16   Ht 5' 6\" (1.676 m)   Wt 121 lb 7 oz (55.1 kg)   SpO2 99%   BMI 19.60 kg/m²   HEENT nl      Neuro exam    Alert Oriented  X 3  Follow simple commands  EOMI Pupils 3 mm huber  5/5 all 4 extremities      RADIOLOGY  -----------------    Xr Wrist Left (min 3 Views)    Result Date: 12/20/2019  EXAMINATION: 3 XRAY VIEWS OF THE LEFT WRIST 12/20/2019 3:28 pm COMPARISON: None. HISTORY: ORDERING SYSTEM PROVIDED HISTORY: foosh TECHNOLOGIST PROVIDED HISTORY: Reason for exam:->[facility] Reason for Exam: foosh FINDINGS: Osteopenia. Anatomic alignment. No fracture. Osteoarthritic changes most pronounced in the 1st [facility] joint. No acute bony or joint abnormality     Xr Chest Portable    Result Date: 12/29/2019  EXAMINATION: ONE XRAY VIEW OF THE CHEST 12/29/2019 6:32 pm COMPARISON: Chest x-ray December 15, 2019 HISTORY: ORDERING SYSTEM PROVIDED HISTORY:  TECHNOLOGIST PROVIDED HISTORY: Reason for exam:->cp Reason for Exam: cp Relevant Medical/Surgical History: CAD, CHF FINDINGS: Cardiac silhouette appears stable. Right-sided AICD in place. Atherosclerotic changes of the aorta. Coronary stent in place. Postoperative changes of median sternotomy. No focal consolidation, pleural effusion, or pneumothorax identified. Osseous structures appear intact. 1. No acute cardiopulmonary process identified. Xr Chest Portable    Result Date: 12/15/2019  EXAMINATION: ONE XRAY VIEW OF THE CHEST 12/15/2019 5:14 pm COMPARISON: None.  HISTORY: ORDERING SYSTEM PROVIDED HISTORY: chest pain TECHNOLOGIST PROVIDED HISTORY:

## 2020-01-01 NOTE — PROGRESS NOTES
Rested in bed throughout shift with the exception of getting up to bedside commode. Able to stand and pivot independently. IV pain medication given as ordered and requested (approximately every three hours) for complaints of chronic pain in joints.

## 2020-01-01 NOTE — CONSULTS
12.6 10.1*    327    CMP:  Recent Labs     12/29/19 2000 01/01/20  1606    134*   K 3.9 4.8    98*   CO2 24 23   BUN 9 9   CREATININE 0.6 0.8   CALCIUM 8.7 8.7   PROT 6.0*  --    LABALBU 3.3*  --    BILITOT 0.2  --    ALKPHOS 83  --    AST 20  --    ALT 13  --      Lipids:   Lab Results   Component Value Date    CHOL 173 12/16/2019    HDL 67 12/16/2019    TRIG 63 12/16/2019     Glucose: No results for input(s): POCGLU in the last 72 hours. Hemoglobin A1C: No results found for: LABA1C  Free T4:   Lab Results   Component Value Date    T4FREE 0.67 04/03/2019     Free T3: No results found for: FT3  TSH High Sensitivity:   Lab Results   Component Value Date    TSHHS 42.030 12/31/2019       Xr Chest Portable    Result Date: 12/29/2019  EXAMINATION: ONE XRAY VIEW OF THE CHEST 12/29/2019 6:32 pm COMPARISON: Chest x-ray December 15, 2019 HISTORY: ORDERING SYSTEM PROVIDED HISTORY: cp TECHNOLOGIST PROVIDED HISTORY: Reason for exam:->cp Reason for Exam: cp Relevant Medical/Surgical History: CAD, CHF FINDINGS: Cardiac silhouette appears stable. Right-sided AICD in place. Atherosclerotic changes of the aorta. Coronary stent in place. Postoperative changes of median sternotomy. No focal consolidation, pleural effusion, or pneumothorax identified. Osseous structures appear intact. 1. No acute cardiopulmonary process identified.        Scheduled Medicines   Medications:    [START ON 1/2/2020] levothyroxine  224 mcg Oral Daily    levothyroxine  75 mcg Oral Once    primidone  100 mg Oral BID    colchicine  0.6 mg Oral Daily    amiodarone  200 mg Oral Daily    apixaban  5 mg Oral BID    aspirin  81 mg Oral Daily    atorvastatin  80 mg Oral Nightly    clopidogrel  75 mg Oral Daily    DULoxetine  60 mg Oral Daily    famotidine  20 mg Oral BID    ferrous gluconate  324 mg Oral BID    Icosapent Ethyl  500 mg Oral BID    ipratropium-albuterol  3 mL Inhalation Q4H WA    isosorbide mononitrate  60 mg water and should take immediately with the Synthroid tablet     Will follow with you  Again thank you for sharing pt's care with me.      Truly yours,       Cleve Mancilla MD

## 2020-01-01 NOTE — PLAN OF CARE
Problem: Risk for Impaired Skin Integrity  Goal: Tissue integrity - skin and mucous membranes  Description  Structural intactness and normal physiological function of skin and  mucous membranes.   1/1/2020 0402 by Yessi Garcia RN  Outcome: Ongoing  12/31/2019 1513 by Yuni Desai RN  Outcome: Ongoing     Problem: Falls - Risk of:  Goal: Will remain free from falls  Description  Will remain free from falls  1/1/2020 0402 by Yessi Garcia RN  Outcome: Ongoing  12/31/2019 1513 by Yuni Desai RN  Outcome: Ongoing  Goal: Absence of physical injury  Description  Absence of physical injury  1/1/2020 0402 by Yessi Garcia RN  Outcome: Ongoing  12/31/2019 1513 by Yuni Desai RN  Outcome: Ongoing     Problem: Pain:  Goal: Pain level will decrease  Description  Pain level will decrease  1/1/2020 0402 by Yessi Garcia RN  Outcome: Ongoing  12/31/2019 1513 by uYni Desai RN  Outcome: Ongoing  Goal: Control of acute pain  Description  Control of acute pain  1/1/2020 0402 by Yessi Garcia RN  Outcome: Ongoing  12/31/2019 1513 by Yuni Desai RN  Outcome: Ongoing  Goal: Control of chronic pain  Description  Control of chronic pain  1/1/2020 0402 by Yessi Garcia RN  Outcome: Ongoing  12/31/2019 1513 by Yuni Desai RN  Outcome: Ongoing     Problem: Nutrition  Goal: Optimal nutrition therapy  1/1/2020 0402 by Yessi Garcia RN  Outcome: Ongoing  12/31/2019 1513 by Yuni Desai RN  Outcome: Ongoing

## 2020-01-02 ENCOUNTER — APPOINTMENT (OUTPATIENT)
Dept: ULTRASOUND IMAGING | Age: 60
DRG: 204 | End: 2020-01-02
Payer: MEDICARE

## 2020-01-02 LAB
ANION GAP SERPL CALCULATED.3IONS-SCNC: 12 MMOL/L (ref 4–16)
BUN BLDV-MCNC: 15 MG/DL (ref 6–23)
CALCIUM SERPL-MCNC: 8.3 MG/DL (ref 8.3–10.6)
CHLORIDE BLD-SCNC: 100 MMOL/L (ref 99–110)
CO2: 25 MMOL/L (ref 21–32)
CREAT SERPL-MCNC: 0.7 MG/DL (ref 0.6–1.1)
GFR AFRICAN AMERICAN: >60 ML/MIN/1.73M2
GFR NON-AFRICAN AMERICAN: >60 ML/MIN/1.73M2
GLUCOSE BLD-MCNC: 85 MG/DL (ref 70–99)
POTASSIUM SERPL-SCNC: 4.5 MMOL/L (ref 3.5–5.1)
SODIUM BLD-SCNC: 137 MMOL/L (ref 135–145)
T4 FREE: 1.12 NG/DL (ref 0.9–1.8)

## 2020-01-02 PROCEDURE — 86376 MICROSOMAL ANTIBODY EACH: CPT

## 2020-01-02 PROCEDURE — 94761 N-INVAS EAR/PLS OXIMETRY MLT: CPT

## 2020-01-02 PROCEDURE — 1200000000 HC SEMI PRIVATE

## 2020-01-02 PROCEDURE — 6370000000 HC RX 637 (ALT 250 FOR IP): Performed by: PSYCHIATRY & NEUROLOGY

## 2020-01-02 PROCEDURE — 86800 THYROGLOBULIN ANTIBODY: CPT

## 2020-01-02 PROCEDURE — 84439 ASSAY OF FREE THYROXINE: CPT

## 2020-01-02 PROCEDURE — 80048 BASIC METABOLIC PNL TOTAL CA: CPT

## 2020-01-02 PROCEDURE — 6360000002 HC RX W HCPCS: Performed by: PHYSICIAN ASSISTANT

## 2020-01-02 PROCEDURE — 6370000000 HC RX 637 (ALT 250 FOR IP): Performed by: INTERNAL MEDICINE

## 2020-01-02 PROCEDURE — 2580000003 HC RX 258: Performed by: INTERNAL MEDICINE

## 2020-01-02 PROCEDURE — 36415 COLL VENOUS BLD VENIPUNCTURE: CPT

## 2020-01-02 PROCEDURE — 76536 US EXAM OF HEAD AND NECK: CPT

## 2020-01-02 RX ADMIN — ASPIRIN 81 MG 81 MG: 81 TABLET ORAL at 10:05

## 2020-01-02 RX ADMIN — LEVETIRACETAM 1000 MG: 500 TABLET, FILM COATED ORAL at 21:25

## 2020-01-02 RX ADMIN — LEVETIRACETAM 1000 MG: 500 TABLET, FILM COATED ORAL at 10:04

## 2020-01-02 RX ADMIN — MORPHINE SULFATE 4 MG: 4 INJECTION INTRAVENOUS at 04:32

## 2020-01-02 RX ADMIN — DULOXETINE HYDROCHLORIDE 60 MG: 30 CAPSULE, DELAYED RELEASE ORAL at 10:06

## 2020-01-02 RX ADMIN — Medication 800 MG: at 21:31

## 2020-01-02 RX ADMIN — ATORVASTATIN CALCIUM 80 MG: 40 TABLET, FILM COATED ORAL at 21:26

## 2020-01-02 RX ADMIN — SPIRONOLACTONE 25 MG: 25 TABLET ORAL at 10:06

## 2020-01-02 RX ADMIN — FERROUS GLUCONATE TAB 324 MG (37.5 MG ELEMENTAL IRON) 324 MG: 324 (37.5 FE) TAB at 21:25

## 2020-01-02 RX ADMIN — MORPHINE SULFATE 4 MG: 4 INJECTION INTRAVENOUS at 00:52

## 2020-01-02 RX ADMIN — PRIMIDONE 100 MG: 50 TABLET ORAL at 10:05

## 2020-01-02 RX ADMIN — AMIODARONE HYDROCHLORIDE 200 MG: 200 TABLET ORAL at 10:06

## 2020-01-02 RX ADMIN — MORPHINE SULFATE 4 MG: 4 INJECTION INTRAVENOUS at 17:59

## 2020-01-02 RX ADMIN — MEXILETINE HYDROCHLORIDE 150 MG: 150 CAPSULE ORAL at 21:31

## 2020-01-02 RX ADMIN — SPIRONOLACTONE 25 MG: 25 TABLET ORAL at 21:26

## 2020-01-02 RX ADMIN — PRIMIDONE 100 MG: 50 TABLET ORAL at 21:25

## 2020-01-02 RX ADMIN — MORPHINE SULFATE 4 MG: 4 INJECTION INTRAVENOUS at 08:12

## 2020-01-02 RX ADMIN — APIXABAN 5 MG: 5 TABLET, FILM COATED ORAL at 21:26

## 2020-01-02 RX ADMIN — SODIUM CHLORIDE, PRESERVATIVE FREE 10 ML: 5 INJECTION INTRAVENOUS at 21:27

## 2020-01-02 RX ADMIN — ONDANSETRON 4 MG: 4 TABLET, ORALLY DISINTEGRATING ORAL at 04:33

## 2020-01-02 RX ADMIN — FERROUS GLUCONATE TAB 324 MG (37.5 MG ELEMENTAL IRON) 324 MG: 324 (37.5 FE) TAB at 10:05

## 2020-01-02 RX ADMIN — FAMOTIDINE 20 MG: 20 TABLET, FILM COATED ORAL at 10:05

## 2020-01-02 RX ADMIN — ISOSORBIDE MONONITRATE 60 MG: 30 TABLET, EXTENDED RELEASE ORAL at 10:04

## 2020-01-02 RX ADMIN — MEXILETINE HYDROCHLORIDE 150 MG: 150 CAPSULE ORAL at 14:11

## 2020-01-02 RX ADMIN — SODIUM CHLORIDE, PRESERVATIVE FREE 10 ML: 5 INJECTION INTRAVENOUS at 10:06

## 2020-01-02 RX ADMIN — LEVOTHYROXINE SODIUM 224 MCG: 112 TABLET ORAL at 06:02

## 2020-01-02 RX ADMIN — MEXILETINE HYDROCHLORIDE 150 MG: 150 CAPSULE ORAL at 10:04

## 2020-01-02 RX ADMIN — MORPHINE SULFATE 4 MG: 4 INJECTION INTRAVENOUS at 11:34

## 2020-01-02 RX ADMIN — FAMOTIDINE 20 MG: 20 TABLET, FILM COATED ORAL at 21:25

## 2020-01-02 RX ADMIN — TORSEMIDE 20 MG: 20 TABLET ORAL at 10:05

## 2020-01-02 RX ADMIN — COLCHICINE 0.6 MG: 0.6 TABLET, FILM COATED ORAL at 10:04

## 2020-01-02 RX ADMIN — MORPHINE SULFATE 4 MG: 4 INJECTION INTRAVENOUS at 14:39

## 2020-01-02 RX ADMIN — CLOPIDOGREL BISULFATE 75 MG: 75 TABLET ORAL at 10:04

## 2020-01-02 RX ADMIN — MORPHINE SULFATE 4 MG: 4 INJECTION INTRAVENOUS at 21:26

## 2020-01-02 RX ADMIN — APIXABAN 5 MG: 5 TABLET, FILM COATED ORAL at 10:05

## 2020-01-02 RX ADMIN — Medication 800 MG: at 10:04

## 2020-01-02 ASSESSMENT — PAIN DESCRIPTION - FREQUENCY
FREQUENCY: CONTINUOUS
FREQUENCY: INTERMITTENT
FREQUENCY: INTERMITTENT
FREQUENCY: CONTINUOUS
FREQUENCY: CONTINUOUS

## 2020-01-02 ASSESSMENT — PAIN DESCRIPTION - PAIN TYPE
TYPE: CHRONIC PAIN

## 2020-01-02 ASSESSMENT — PAIN DESCRIPTION - DESCRIPTORS
DESCRIPTORS: ACHING;SHARP
DESCRIPTORS: ACHING;SHARP
DESCRIPTORS: ACHING
DESCRIPTORS: ACHING;SHARP
DESCRIPTORS: ACHING;SHARP
DESCRIPTORS: ACHING
DESCRIPTORS: ACHING;SHARP

## 2020-01-02 ASSESSMENT — PAIN DESCRIPTION - LOCATION
LOCATION: GENERALIZED
LOCATION: HIP;KNEE
LOCATION: GENERALIZED
LOCATION: HIP
LOCATION: HIP;KNEE

## 2020-01-02 ASSESSMENT — PAIN DESCRIPTION - PROGRESSION
CLINICAL_PROGRESSION: NOT CHANGED

## 2020-01-02 ASSESSMENT — PAIN SCALES - GENERAL
PAINLEVEL_OUTOF10: 9
PAINLEVEL_OUTOF10: 6
PAINLEVEL_OUTOF10: 8
PAINLEVEL_OUTOF10: 9
PAINLEVEL_OUTOF10: 6
PAINLEVEL_OUTOF10: 8
PAINLEVEL_OUTOF10: 9
PAINLEVEL_OUTOF10: 8
PAINLEVEL_OUTOF10: 8

## 2020-01-02 ASSESSMENT — PAIN DESCRIPTION - ONSET
ONSET: ON-GOING

## 2020-01-02 ASSESSMENT — PAIN DESCRIPTION - ORIENTATION
ORIENTATION: RIGHT;LEFT

## 2020-01-02 ASSESSMENT — PAIN - FUNCTIONAL ASSESSMENT: PAIN_FUNCTIONAL_ASSESSMENT: PREVENTS OR INTERFERES WITH MANY ACTIVE NOT PASSIVE ACTIVITIES

## 2020-01-02 NOTE — PROGRESS NOTES
Rested in bed throughout shift with the exception of getting up to bedside commode. IV pain medication given as ordered and requested (approximately every 3 hrs) for complaints of chronic pain in joints. Telemetry discontinued as ordered.

## 2020-01-02 NOTE — PROGRESS NOTES
Progress Note( Dr. Alberta Borges)  1/2/2020  Subjective:   Admit Date: 12/29/2019  PCP: Tegan Garcia MD    Admitted For : Chest pain and not feeling well only hypothyroid    Consulted For: Better control of thyroid problem    Interval History: Seen by cardiology and neurology active intervention needed at this time    Still has some chest pains,   Stable SOB . Denies nausea or vomiting. No new bowel or bladder symptoms. Intake/Output Summary (Last 24 hours) at 1/2/2020 0620  Last data filed at 1/2/2020 9988  Gross per 24 hour   Intake 930 ml   Output 500 ml   Net 430 ml       DATA    CBC: No results for input(s): WBC, HGB, PLT in the last 72 hours. CMP:  Recent Labs     01/01/20  1606   *   K 4.8   CL 98*   CO2 23   BUN 9   CREATININE 0.8   CALCIUM 8.7     Lipids:   Lab Results   Component Value Date    CHOL 173 12/16/2019    HDL 67 12/16/2019    TRIG 63 12/16/2019     Glucose:No results for input(s): POCGLU in the last 72 hours. [id number]:No results found for: LABA1C  High Sensitivity TSH:   Lab Results   Component Value Date    TSHHS 42.030 12/31/2019     Free T3: No results found for: FT3  Free T4:  Lab Results   Component Value Date    T4FREE 0.67 04/03/2019       Xr Chest Portable    Result Date: 12/29/2019  EXAMINATION: ONE XRAY VIEW OF THE CHEST 12/29/2019 6:32 pm COMPARISON: Chest x-ray December 15, 2019 HISTORY: ORDERING SYSTEM PROVIDED HISTORY: cp TECHNOLOGIST PROVIDED HISTORY: Reason for exam:->cp Reason for Exam: cp Relevant Medical/Surgical History: CAD, CHF FINDINGS: Cardiac silhouette appears stable. Right-sided AICD in place. Atherosclerotic changes of the aorta. Coronary stent in place. Postoperative changes of median sternotomy. No focal consolidation, pleural effusion, or pneumothorax identified. Osseous structures appear intact. 1. No acute cardiopulmonary process identified.        Scheduled Medicines   Medications:    levothyroxine  224 mcg Oral Daily    primidone  100 mg Oral BID    colchicine  0.6 mg Oral Daily    amiodarone  200 mg Oral Daily    apixaban  5 mg Oral BID    aspirin  81 mg Oral Daily    atorvastatin  80 mg Oral Nightly    clopidogrel  75 mg Oral Daily    DULoxetine  60 mg Oral Daily    famotidine  20 mg Oral BID    ferrous gluconate  324 mg Oral BID    Icosapent Ethyl  500 mg Oral BID    isosorbide mononitrate  60 mg Oral Daily    levETIRAcetam  1,000 mg Oral BID    magnesium oxide  800 mg Oral BID    mexiletine  150 mg Oral TID    spironolactone  25 mg Oral BID    torsemide  20 mg Oral Daily    sodium chloride flush  10 mL Intravenous 2 times per day      Infusions:       Objective:   Vitals: /84   Pulse 61   Temp 98.6 °F (37 °C)   Resp 16   Ht 5' 6\" (1.676 m)   Wt 121 lb 3.2 oz (55 kg)   SpO2 98%   BMI 19.56 kg/m²   General appearance: alert and cooperative with exam  Neck: no JVD or bruit  Thyroid : Normal lobes   Lungs: Has Vesicular Breath sounds   Heart:  regular rate and rhythm  Abdomen: soft, non-tender; bowel sounds normal; no masses,  no organomegaly  Musculoskeletal: Normal  Extremities: extremities normal, , no edema  Neurologic:  Awake, alert, oriented to name, place and time. Cranial nerves II-XII are grossly intact. Motor is  intact. Sensory is intact. ,  and gait is normal.    Assessment:     Patient Active Problem List:       Chest pain     CAD (coronary artery disease)     Hyperlipidemia     Thyroid disease     Acute exacerbation of CHF (congestive heart failure) (HCC)     Congestive heart failure (HCC)     Left upper quadrant pain     Shortness of breath     Ischemic cardiomyopathy     Acute delirium     Sacral pain     Cellulitis at gastrostomy tube site [facility])     History of Maru-en-Y gastric bypass     Gastroparesis     Asplenia     Hypothyroidism     Moderate malnutrition ([facility] [address]      Plan:     1. Reviewed POC blood glucose . Labs and X ray results   2. Reviewed Current Medicines   3.  On increased

## 2020-01-02 NOTE — PLAN OF CARE
Problem: Risk for Impaired Skin Integrity  Goal: Tissue integrity - skin and mucous membranes  Description  Structural intactness and normal physiological function of skin and  mucous membranes.   Outcome: Ongoing     Problem: Falls - Risk of:  Goal: Will remain free from falls  Description  Will remain free from falls  Outcome: Ongoing  Goal: Absence of physical injury  Description  Absence of physical injury  Outcome: Ongoing     Problem: Pain:  Goal: Pain level will decrease  Description  Pain level will decrease  Outcome: Ongoing  Goal: Control of acute pain  Description  Control of acute pain  Outcome: Ongoing  Goal: Control of chronic pain  Description  Control of chronic pain  Outcome: Ongoing     Problem: Nutrition  Goal: Optimal nutrition therapy  Outcome: Ongoing     Problem: Safety:  Goal: Free from accidental physical injury  Description  Free from accidental physical injury  Outcome: Ongoing     Problem: Daily Care:  Goal: Daily care needs are met  Description  Daily care needs are met  Outcome: Ongoing     Problem: Discharge Planning:  Goal: Patients continuum of care needs are met  Description  Patients continuum of care needs are met  Outcome: Ongoing

## 2020-01-03 ENCOUNTER — CARE COORDINATION (OUTPATIENT)
Dept: CASE MANAGEMENT | Age: 60
End: 2020-01-03

## 2020-01-03 ENCOUNTER — APPOINTMENT (OUTPATIENT)
Dept: MRI IMAGING | Age: 60
DRG: 204 | End: 2020-01-03
Payer: MEDICARE

## 2020-01-03 VITALS
RESPIRATION RATE: 18 BRPM | SYSTOLIC BLOOD PRESSURE: 99 MMHG | TEMPERATURE: 97.8 F | HEIGHT: 66 IN | OXYGEN SATURATION: 97 % | HEART RATE: 60 BPM | DIASTOLIC BLOOD PRESSURE: 61 MMHG | WEIGHT: 121.2 LBS | BODY MASS INDEX: 19.48 KG/M2

## 2020-01-03 LAB
ANION GAP SERPL CALCULATED.3IONS-SCNC: 10 MMOL/L (ref 4–16)
ANTITHYROGLOBULIN AB: 1.7 IU/ML (ref 0–4)
ANTITHYROGLOBULIN AB: NORMAL IU/ML (ref 0–4)
ANTITHYROID MICORSOMAL: 0.6 IU/ML (ref 0–9)
ANTITHYROID MICORSOMAL: NORMAL IU/ML (ref 0–9)
BUN BLDV-MCNC: 16 MG/DL (ref 6–23)
CALCIUM SERPL-MCNC: 8.4 MG/DL (ref 8.3–10.6)
CHLORIDE BLD-SCNC: 98 MMOL/L (ref 99–110)
CO2: 25 MMOL/L (ref 21–32)
CREAT SERPL-MCNC: 0.8 MG/DL (ref 0.6–1.1)
GFR AFRICAN AMERICAN: >60 ML/MIN/1.73M2
GFR NON-AFRICAN AMERICAN: >60 ML/MIN/1.73M2
GLUCOSE BLD-MCNC: 93 MG/DL (ref 70–99)
POTASSIUM SERPL-SCNC: 4.5 MMOL/L (ref 3.5–5.1)
SODIUM BLD-SCNC: 133 MMOL/L (ref 135–145)

## 2020-01-03 PROCEDURE — 80048 BASIC METABOLIC PNL TOTAL CA: CPT

## 2020-01-03 PROCEDURE — 72146 MRI CHEST SPINE W/O DYE: CPT

## 2020-01-03 PROCEDURE — 70551 MRI BRAIN STEM W/O DYE: CPT

## 2020-01-03 PROCEDURE — 2580000003 HC RX 258: Performed by: INTERNAL MEDICINE

## 2020-01-03 PROCEDURE — 72141 MRI NECK SPINE W/O DYE: CPT

## 2020-01-03 PROCEDURE — 6360000002 HC RX W HCPCS: Performed by: PHYSICIAN ASSISTANT

## 2020-01-03 PROCEDURE — 6370000000 HC RX 637 (ALT 250 FOR IP): Performed by: INTERNAL MEDICINE

## 2020-01-03 PROCEDURE — 94761 N-INVAS EAR/PLS OXIMETRY MLT: CPT

## 2020-01-03 PROCEDURE — 6370000000 HC RX 637 (ALT 250 FOR IP): Performed by: PSYCHIATRY & NEUROLOGY

## 2020-01-03 PROCEDURE — 36415 COLL VENOUS BLD VENIPUNCTURE: CPT

## 2020-01-03 RX ORDER — PRIMIDONE 50 MG/1
100 TABLET ORAL 2 TIMES DAILY
Qty: 60 TABLET | Refills: 0 | Status: SHIPPED | OUTPATIENT
Start: 2020-01-03 | End: 2020-06-08

## 2020-01-03 RX ORDER — LEVOTHYROXINE SODIUM 112 UG/1
224 TABLET ORAL DAILY
Qty: 30 TABLET | Refills: 1 | Status: ON HOLD | OUTPATIENT
Start: 2020-01-04 | End: 2020-09-30 | Stop reason: SDUPTHER

## 2020-01-03 RX ORDER — COLCHICINE 0.6 MG/1
0.6 TABLET ORAL DAILY
Qty: 9 TABLET | Refills: 0 | Status: ON HOLD | OUTPATIENT
Start: 2020-01-04 | End: 2020-05-24 | Stop reason: ALTCHOICE

## 2020-01-03 RX ORDER — LANOLIN ALCOHOL/MO/W.PET/CERES
800 CREAM (GRAM) TOPICAL 2 TIMES DAILY
Status: DISCONTINUED | OUTPATIENT
Start: 2020-01-03 | End: 2020-01-03 | Stop reason: HOSPADM

## 2020-01-03 RX ADMIN — ISOSORBIDE MONONITRATE 60 MG: 30 TABLET, EXTENDED RELEASE ORAL at 12:23

## 2020-01-03 RX ADMIN — PRIMIDONE 100 MG: 50 TABLET ORAL at 12:24

## 2020-01-03 RX ADMIN — MORPHINE SULFATE 4 MG: 4 INJECTION INTRAVENOUS at 00:39

## 2020-01-03 RX ADMIN — LEVOTHYROXINE SODIUM 224 MCG: 112 TABLET ORAL at 06:40

## 2020-01-03 RX ADMIN — SODIUM CHLORIDE, PRESERVATIVE FREE 10 ML: 5 INJECTION INTRAVENOUS at 06:41

## 2020-01-03 RX ADMIN — MORPHINE SULFATE 4 MG: 4 INJECTION INTRAVENOUS at 06:44

## 2020-01-03 RX ADMIN — COLCHICINE 0.6 MG: 0.6 TABLET, FILM COATED ORAL at 12:21

## 2020-01-03 RX ADMIN — ASPIRIN 81 MG 81 MG: 81 TABLET ORAL at 12:20

## 2020-01-03 RX ADMIN — FAMOTIDINE 20 MG: 20 TABLET, FILM COATED ORAL at 12:22

## 2020-01-03 RX ADMIN — MORPHINE SULFATE 4 MG: 4 INJECTION INTRAVENOUS at 03:47

## 2020-01-03 RX ADMIN — MEXILETINE HYDROCHLORIDE 150 MG: 150 CAPSULE ORAL at 12:34

## 2020-01-03 RX ADMIN — DULOXETINE HYDROCHLORIDE 60 MG: 30 CAPSULE, DELAYED RELEASE ORAL at 12:21

## 2020-01-03 RX ADMIN — MEXILETINE HYDROCHLORIDE 150 MG: 150 CAPSULE ORAL at 15:58

## 2020-01-03 RX ADMIN — AMIODARONE HYDROCHLORIDE 200 MG: 200 TABLET ORAL at 12:20

## 2020-01-03 RX ADMIN — SPIRONOLACTONE 25 MG: 25 TABLET ORAL at 12:25

## 2020-01-03 RX ADMIN — SODIUM CHLORIDE, PRESERVATIVE FREE 10 ML: 5 INJECTION INTRAVENOUS at 00:39

## 2020-01-03 RX ADMIN — CLOPIDOGREL BISULFATE 75 MG: 75 TABLET ORAL at 12:20

## 2020-01-03 RX ADMIN — LEVETIRACETAM 1000 MG: 500 TABLET, FILM COATED ORAL at 12:24

## 2020-01-03 RX ADMIN — Medication 800 MG: at 14:18

## 2020-01-03 RX ADMIN — MORPHINE SULFATE 4 MG: 4 INJECTION INTRAVENOUS at 08:46

## 2020-01-03 RX ADMIN — FERROUS GLUCONATE TAB 324 MG (37.5 MG ELEMENTAL IRON) 324 MG: 324 (37.5 FE) TAB at 12:22

## 2020-01-03 RX ADMIN — MORPHINE SULFATE 4 MG: 4 INJECTION INTRAVENOUS at 12:18

## 2020-01-03 RX ADMIN — APIXABAN 5 MG: 5 TABLET, FILM COATED ORAL at 12:20

## 2020-01-03 RX ADMIN — MORPHINE SULFATE 4 MG: 4 INJECTION INTRAVENOUS at 15:58

## 2020-01-03 RX ADMIN — SODIUM CHLORIDE, PRESERVATIVE FREE 10 ML: 5 INJECTION INTRAVENOUS at 12:25

## 2020-01-03 RX ADMIN — SODIUM CHLORIDE, PRESERVATIVE FREE 10 ML: 5 INJECTION INTRAVENOUS at 03:47

## 2020-01-03 ASSESSMENT — PAIN SCALES - GENERAL
PAINLEVEL_OUTOF10: 8
PAINLEVEL_OUTOF10: 4
PAINLEVEL_OUTOF10: 8
PAINLEVEL_OUTOF10: 9
PAINLEVEL_OUTOF10: 9
PAINLEVEL_OUTOF10: 4
PAINLEVEL_OUTOF10: 9
PAINLEVEL_OUTOF10: 8
PAINLEVEL_OUTOF10: 4
PAINLEVEL_OUTOF10: 8

## 2020-01-03 ASSESSMENT — PAIN DESCRIPTION - DESCRIPTORS
DESCRIPTORS: ACHING

## 2020-01-03 ASSESSMENT — PAIN DESCRIPTION - DIRECTION
RADIATING_TOWARDS: LEGS

## 2020-01-03 ASSESSMENT — PAIN DESCRIPTION - PAIN TYPE
TYPE: CHRONIC PAIN

## 2020-01-03 ASSESSMENT — PAIN DESCRIPTION - PROGRESSION
CLINICAL_PROGRESSION: GRADUALLY WORSENING

## 2020-01-03 ASSESSMENT — PAIN DESCRIPTION - ONSET
ONSET: ON-GOING

## 2020-01-03 ASSESSMENT — PAIN - FUNCTIONAL ASSESSMENT
PAIN_FUNCTIONAL_ASSESSMENT: ACTIVITIES ARE NOT PREVENTED

## 2020-01-03 ASSESSMENT — PAIN DESCRIPTION - FREQUENCY
FREQUENCY: CONTINUOUS

## 2020-01-03 ASSESSMENT — PAIN DESCRIPTION - LOCATION
LOCATION: GENERALIZED
LOCATION: BACK

## 2020-01-03 ASSESSMENT — PAIN DESCRIPTION - ORIENTATION
ORIENTATION: LOWER
ORIENTATION: LOWER

## 2020-01-03 NOTE — PLAN OF CARE
Problem: Risk for Impaired Skin Integrity  Goal: Tissue integrity - skin and mucous membranes  Description  Structural intactness and normal physiological function of skin and  mucous membranes.   1/3/2020 1329 by Mindy Rogel RN  Outcome: Ongoing  1/3/2020 0223 by Jared Denis RN  Outcome: Ongoing

## 2020-01-03 NOTE — PROGRESS NOTES
Nutrition Assessment    Type and Reason for Visit: Reassess    Nutrition Recommendations:   · Continue current diet and supplements  · Consider appetite stimulant as intake remains <50%    Nutrition Assessment: Pt remains high nutrition risk with inadequate oral intake. Little doc'd intake recorded in [facility]. Pt would benefit from appetite stimulant at this time. Malnutrition Assessment:  · Malnutrition Status: Meets the criteria for moderate malnutrition  · Context: Chronic illness  · Findings of the 6 clinical characteristics of malnutrition (Minimum of 2 out of 6 clinical characteristics is required to make the diagnosis of moderate or severe Protein Calorie Malnutrition based on AND/ASPEN Guidelines):  1. Energy Intake-Less than or equal to 75% of estimated energy requirement, Greater than or equal to 3 months    2. Weight Loss-No significant weight loss, in 1 year  3. Fat Loss-Mild subcutaneous fat loss, Orbital, Triceps  4. Muscle Loss-Moderate muscle mass loss, Temples (temporalis muscle), Clavicles (pectoralis and deltoids)  5. Fluid Accumulation-No significant fluid accumulation,    6.  Strength-Not measured    Nutrition Risk Level: High    Nutrient Needs:  · Estimated Daily Total Kcal: 2146-0421 (30-35 kcal/kg current BW)  · Estimated Daily Protein (g): 74-89 (1.25-1.5 g/kg IBW)  · Estimated Daily Total Fluid (ml/day): 5423-3511 (1 mL/kcal)    Nutrition Diagnosis:   · Problem:  Moderate malnutrition, In context of chronic illness  · Etiology: related to Alteration in GI function, Insufficient energy/nutrient consumption     Signs and symptoms:  as evidenced by Patient report of, Diet history of poor intake, Weight loss, Mild loss of subcutaneous fat, Moderate muscle loss    Objective Information:  · Wound Type: Pressure Ulcer  · Current Nutrition Therapies:  · Oral Diet Orders: General   · Oral Diet intake: 1-25%, %  · Oral Nutrition Supplement (ONS) Orders: Wound Healing Oral

## 2020-01-03 NOTE — PROGRESS NOTES
NEUROLOGY NOTE  DR. Conrad Phalen MD.  -------------------------------------------------  Subjective:    Doing better. Denies any new symptoms. Denies headache nausea vomiting dizziness    Denies numbness weakness extremities    Denies blurring of vision double vision    Objective:    /71   Pulse 60   Temp 97.7 °F (36.5 °C) (Oral)   Resp 16   Ht 5' 6\" (1.676 m)   Wt 121 lb 3.2 oz (55 kg)   SpO2 98%   BMI 19.56 kg/m²   HEENT nl      Neuro exam    Alert Oriented  X 3  Follow simple commands  EOMI Pupils 3 mm huber  5/5 all 4 extremities      RADIOLOGY  -----------------    Xr Wrist Left (min 3 Views)    Result Date: 12/20/2019  EXAMINATION: 3 XRAY VIEWS OF THE LEFT WRIST 12/20/2019 3:28 pm COMPARISON: None. HISTORY: ORDERING SYSTEM PROVIDED HISTORY: foosh TECHNOLOGIST PROVIDED HISTORY: Reason for exam:->foosh Reason for Exam: foosh FINDINGS: Osteopenia. Anatomic alignment. No fracture. Osteoarthritic changes most pronounced in the 1st [facility] joint. No acute bony or joint abnormality     Xr Chest Portable    Result Date: 12/29/2019  EXAMINATION: ONE XRAY VIEW OF THE CHEST 12/29/2019 6:32 pm COMPARISON: Chest x-ray December 15, 2019 HISTORY: ORDERING SYSTEM PROVIDED HISTORY:  TECHNOLOGIST PROVIDED HISTORY: Reason for exam:->cp Reason for Exam: cp Relevant Medical/Surgical History: CAD, CHF FINDINGS: Cardiac silhouette appears stable. Right-sided AICD in place. Atherosclerotic changes of the aorta. Coronary stent in place. Postoperative changes of median sternotomy. No focal consolidation, pleural effusion, or pneumothorax identified. Osseous structures appear intact. 1. No acute cardiopulmonary process identified. Xr Chest Portable    Result Date: 12/15/2019  EXAMINATION: ONE XRAY VIEW OF THE CHEST 12/15/2019 5:14 pm COMPARISON: None.  HISTORY: ORDERING SYSTEM PROVIDED HISTORY: chest pain TECHNOLOGIST PROVIDED HISTORY: Reason for exam:->chest pain Reason for Exam: chest pain Acuity: samanta   Corporate ID      [id number]           Weight               124 pounds   Accession Number  [id number]                                        NM Technologist      Carlos Jimenez RT   Ordering          Joseph Ray   Interpreting         Joseph Ray  Physician         Eulogio Sousa MD      Cardiologist         Eulogio Sousa MD   Conclusions   Summary  abnormal stress test, depressed LVEF, Myocardial perfusion scan shows large  size, severe intensity, non reversible perfusion defect in inferior wall. Recommendation  OPTIMIZE MEDICATIONS, ANTERIOR WALL PERFUSION IS NORMAL. SHE RECENTLY HAD  DISTAL LAD STENT VIA KOCH GRAFT   Signatures   ------------------------------------------------------------------  Electronically signed by Matilda Pop MD  (Interpreting cardiologist) on 12/16/2019 at 14:04  ------------------------------------------------------------------  Procedure Procedure Type:   Nuclear Stress Test:Pharmacological, Myocardial Perfusion Imaging with  Pharm, NM MYOCARDIAL SPECT REST EXERCISE OR RX  Indications: CAD and Chest pain. Risk Factors   The patient risk factors include:prior CABG;cerebrovascular disease,  hypercholesterolemia, hypertension and prior heart failure . Stress Protocols   Resting ECG  nsr   Resting HR:60 bpm  Resting BP:110/74 mmHg  Stress Protocol:Pharmacologic - Lexiscan  Peak HR:67 bpm                                HR/BP product:7370  Peak BP:110/74 mmHg  Predicted HR: 162 bpm  % of predicted HR: 41   Exercise duration: 01:10 min  Reason for termination:Completed   ECG Findings  nsr   Symptoms  No symptoms with Lexiscan infusion. Stress Interpretation  ECG portion of stress test is negative for ischemia by diagnostic criteria. Procedure Medications   - Lexiscan I.V. bolus (over 15sec.) 0.4 mg admininstered @ 12/16/2019 12:20.   Imaging Protocols   Rest                             Stress   Isotope:Sestamibi 99mTc          Isotope: Sestamibi 99mTc  Isotope Admission date: 12/15/2019 Admission Time: 16:36 Hospital Status: Inpatient.       LAB RESULTS  --------------------    Recent Results (from the past 24 hour(s))   Basic metabolic panel    Collection Time: 01/02/20  5:26 AM   Result Value Ref Range    Sodium 137 135 - 145 MMOL/L    Potassium 4.5 3.5 - 5.1 MMOL/L    Chloride 100 99 - 110 mMol/L    CO2 25 21 - 32 MMOL/L    Anion Gap 12 4 - 16    BUN 15 6 - 23 MG/DL    CREATININE 0.7 0.6 - 1.1 MG/DL    Glucose 85 70 - 99 MG/DL    Calcium 8.3 8.3 - 10.6 MG/DL    GFR Non-African American >60 >60 mL/min/1.73m2    GFR African American >60 >60 mL/min/1.73m2   T4, Free    Collection Time: 01/02/20  5:26 AM   Result Value Ref Range    T4 Free 1.12 0.9 - 1.8 NG/DL         Medical problems    Patient Active Problem List:     Chest pain     Chest pain     CAD (coronary artery disease)     Hyperlipidemia     Thyroid disease     Acute exacerbation of CHF (congestive heart failure) (HCC)     CHF exacerbation (HCC)     Congestive heart failure ([facility])     Left upper quadrant pain     Pneumonia of left lower lobe due to infectious organism ([facility] [address]     Angina at rest [facility])     Coronary artery disease involving coronary bypass graft of native heart with angina pectoris ([facility])     Shortness of breath     Ischemic cardiomyopathy     Acute metabolic encephalopathy     Acute delirium     Pneumonia     Sacral pain     Acute on chronic systolic CHF (congestive heart failure), NYHA class 3 ([facility])     Severe malnutrition (HCC)     Cellulitis     Cellulitis at gastrostomy tube site [facility])     History of Maru-en-Y gastric bypass     Gastroparesis     Abdominal pain     Feeding tube dysfunction     Abdominal pain, epigastric     Gastrojejunostomy tube status (HCC)     Chronic malnutrition ([facility])     Asplenia     Shockable cardiac rhythm detected by automated external defibrillator     Financial difficulties     Chest pain at rest     Closed nondisplaced transverse fracture of left patella     Contusion of right knee     Multifocal pneumonia     Abnormal cardiac function test     Chronic systolic heart failure (HCC)     Seizure disorder (HCC)     Hypothyroidism     Moderate malnutrition (HCC)      ASSESSMENT:  ---------------------    Essential tremors     seizures     PLAN:     Mri brain pend     B 12 folate nl    Elevated  TSH per hospitalist pt on synthroid     Mysoline continue same dose     Discussed dx prognosis meds side effects and above with pt and answered all questions.         Electronically signed by Shaheen Perera MD on 1/2/2020 at 11:58 PM

## 2020-01-04 NOTE — PROGRESS NOTES
Acute Type of Exam: Initial FINDINGS: No infiltrate or consolidation or effusion is identified. The heart size is normal.  The patient is status post CABG and coronary artery stent placement. There is a pulse generator over the right anterior chest wall with 2 leads projecting over the heart. An IVC filter is partially visualized. Surgical clips are present within the left upper quadrant of the abdomen. No acute intrathoracic abnormality identified. Cta Pulmonary W Contrast    Result Date: 12/15/2019  EXAMINATION: CTA OF THE CHEST 12/15/2019 7:38 pm TECHNIQUE: CTA of the chest was performed after the administration of intravenous contrast.  Multiplanar reformatted images are provided for review. MIP images are provided for review. Dose modulation, iterative reconstruction, and/or weight based adjustment of the mA/kV was utilized to reduce the radiation dose to as low as reasonably achievable. COMPARISON: CTA pulmonary artery November 14, 2019 and April 6, 2016. HISTORY: ORDERING SYSTEM PROVIDED HISTORY: Chest pain, history of PE TECHNOLOGIST PROVIDED HISTORY: Reason for exam:->Chest pain, history of PE Reason for Exam: cp Acuity: Acute Type of Exam: Initial Additional signs and symptoms: Pt presents to the ED via medic from home for chest pain rated 9/10. Pt states it is a squeezing pain in her chest. PT took 3 nitro at home. Pt also states that she had a fever pta and took tylenol for it. Relevant Medical/Surgical History: isovue 370 80 ml FINDINGS: Pulmonary Arteries: Pulmonary arteries are adequately opacified for evaluation. No evidence of intraluminal filling defect to suggest pulmonary embolism. Main pulmonary artery is normal in caliber. Mediastinum: No evidence of mediastinal lymphadenopathy. The patient is status post median sternotomy. There are cardiac leads in place. The heart is mildly enlarged. The pericardium demonstrates no acute abnormality.  There is no acute abnormality of the thoracic aorta. Lungs/pleura: There are small bilateral pleural effusions. There is mild dependent atelectasis in the bilateral posterior lungs. There is no pneumothorax. There is minimal patchy ground-glass attenuation in the bilateral lungs, likely related to resolving pneumonia, improved. There is a 7 mm lung nodule in the right lower lobe (series 5, image 39). Upper Abdomen: There is mild ascites in the fei hepatis region and in the left upper abdomen. Soft Tissues/Bones: No acute bone or soft tissue abnormality. No evidence of pulmonary embolism. Minimal patchy ground-glass attenuation in the bilateral lungs, likely related to resolving pneumonia, improved. Mild dependent atelectasis and small bilateral pleural effusions. Mild ascites in the upper abdomen. 7 mm lung nodule in the right lower lobe, mildly increased in size since April 6, 2016 when it measured up to 6 mm, likely benign. RECOMMENDATIONS: Fleischner Society guidelines for follow-up and management of incidentally detected pulmonary nodules: Single Solid Nodule: Nodule size equals 6-8 mm In a low-risk patient, CT at 6-12 months, then consider CT at 18-24 months. In a high-risk patient, CT at 6-12 months, then CT at 18-24 months. - Low risk patients include individuals with minimal or absent history of smoking and other known risk factors. - High risk patients include individuals with a history or smoking or known risk factors. Radiology 2017 http://pubs. rsna.org/doi/full/10.1148/radiol. 8395316451     Nm Myocardial Spect Rest Exercise Or Rx    Result Date: 12/16/2019  Cardiac Perfusion Imaging   Demographics   Patient Name      Clemente Jackman   Date of study        12/16/2019   Date of Birth     1960         Gender               Female   Age               63 year(s)         Race                    Patient Number    [id number]         Room Number          4447   Visit Number      [id number]          Height               66 samanta   Corporate ID      [id number]           Weight               124 pounds   Accession Number  [id number]                                        NM Technologist      Carlos Jimenez RT   Ordering          Joseph Ray   Interpreting         Joseph Ray  Physician         Eulogio Sousa MD      Cardiologist         Eulogio Sousa MD   Conclusions   Summary  abnormal stress test, depressed LVEF, Myocardial perfusion scan shows large  size, severe intensity, non reversible perfusion defect in inferior wall. Recommendation  OPTIMIZE MEDICATIONS, ANTERIOR WALL PERFUSION IS NORMAL. SHE RECENTLY HAD  DISTAL LAD STENT VIA KOCH GRAFT   Signatures   ------------------------------------------------------------------  Electronically signed by Matilda Pop MD  (Interpreting cardiologist) on 12/16/2019 at 14:04  ------------------------------------------------------------------  Procedure Procedure Type:   Nuclear Stress Test:Pharmacological, Myocardial Perfusion Imaging with  Pharm, NM MYOCARDIAL SPECT REST EXERCISE OR RX  Indications: CAD and Chest pain. Risk Factors   The patient risk factors include:prior CABG;cerebrovascular disease,  hypercholesterolemia, hypertension and prior heart failure . Stress Protocols   Resting ECG  nsr   Resting HR:60 bpm  Resting BP:110/74 mmHg  Stress Protocol:Pharmacologic - Lexiscan  Peak HR:67 bpm                                HR/BP product:7370  Peak BP:110/74 mmHg  Predicted HR: 162 bpm  % of predicted HR: 41   Exercise duration: 01:10 min  Reason for termination:Completed   ECG Findings  nsr   Symptoms  No symptoms with Lexiscan infusion. Stress Interpretation  ECG portion of stress test is negative for ischemia by diagnostic criteria. Procedure Medications   - Lexiscan I.V. bolus (over 15sec.) 0.4 mg admininstered @ 12/16/2019 12:20.   Imaging Protocols   Rest                             Stress   Isotope:Sestamibi 99mTc          Isotope: Sestamibi 99mTc  Isotope dose:9.6 mCi             Isotope dose:30.5 mCi  Administration route: I.V. Administration route: I.V. Injection Date:01/06/2019 11:10  Injection Date:12/16/2019 12:20  Scan Date:12/16/2019 11:55       Scan Date:12/16/2019 13:20   Technique:        SPECT          Technique:        Gated                                                     SPECT   Procedure Description   Upon patient arrival, the patient is identified using two identifiers and  the physician order is verified. An IV is established and 8-11mCi of 99mTc  Sestamibi is intravenously injected and followed with 10mL 0.9% Normal  Saline flush. A circulation period of 45 minutes occurs prior to resting  SPECT imaging. After imaging is complete the patient is escorted to the  stress lab. The patient is connected to the ECG and blood pressure is  measured. The RN starts the stress portion of the exam and rapidly  intravenously injects Lexiscan (regadenosine) 0.4mg over a period of 10 to15  seconds and follows with 5mL 0.9% Normal Saline flush. Immediately following  the Nuclear Technologist intravenously injects 22-33mCi of 99mTc Sestamibi  and 5mL 0.9% Normal Saline flush. After completion, recovery, and removal of  the IV, the patient rests during the second circulation period of 45  minutes. Final stress SPECT gated imaging is performed. The patient may  return home or to their room after stress imaging. The images are processed  and final charting is completed and sent to the appropriate cardiologist for  interpretation and reporting. Perfusion Interpretation   Myocardial perfusion scan shows large size, severe intensity, non reversible  perfusion defect in inferior wall.   Imaging Results    Summed scores     - Summed stress score: 39     - Summed rest score: 24     - Summed difference score:    11   Rest ejection  Ejection fraction:30 %  EDV :132 ml  ESV :92 ml  Stroke volume :40 ml  Medical History   Accession#:  [id number]  Admission Data Admission date: 12/15/2019 Admission Time: 16:36 Hospital Status: Inpatient.       LAB RESULTS  --------------------    Recent Results (from the past 24 hour(s))   Basic metabolic panel    Collection Time: 01/03/20  6:28 AM   Result Value Ref Range    Sodium 133 (L) 135 - 145 MMOL/L    Potassium 4.5 3.5 - 5.1 MMOL/L    Chloride 98 (L) 99 - 110 mMol/L    CO2 25 21 - 32 MMOL/L    Anion Gap 10 4 - 16    BUN 16 6 - 23 MG/DL    CREATININE 0.8 0.6 - 1.1 MG/DL    Glucose 93 70 - 99 MG/DL    Calcium 8.4 8.3 - 10.6 MG/DL    GFR Non-African American >60 >60 mL/min/1.73m2    GFR African American >60 >60 mL/min/1.73m2         Medical problems    Patient Active Problem List:     Chest pain     Chest pain     CAD (coronary artery disease)     Hyperlipidemia     Thyroid disease     Acute exacerbation of CHF (congestive heart failure) ([facility])     CHF exacerbation ([facility])     Congestive heart failure (HCC)     Left upper quadrant pain     Pneumonia of left lower lobe due to infectious organism ([facility])     Angina at rest [facility])     Coronary artery disease involving coronary bypass graft of native heart with angina pectoris ([facility])     Shortness of breath     Ischemic cardiomyopathy     Acute metabolic encephalopathy     Acute delirium     Pneumonia     Sacral pain     Acute on chronic systolic CHF (congestive heart failure), NYHA class 3 (HCC)     Severe malnutrition ([facility])     Cellulitis     Cellulitis at gastrostomy tube site [facility])     History of Maru-en-Y gastric bypass     Gastroparesis     Abdominal pain     Feeding tube dysfunction     Abdominal pain, epigastric     Gastrojejunostomy tube status (HCC)     Chronic malnutrition ([facility])     Asplenia     Shockable cardiac rhythm detected by automated external defibrillator     Financial difficulties     Chest pain at rest     Closed nondisplaced transverse fracture of left patella     Contusion of right knee     Multifocal pneumonia     Abnormal cardiac function test     Chronic systolic heart

## 2020-01-04 NOTE — PLAN OF CARE
Problem: Risk for Impaired Skin Integrity  Goal: Tissue integrity - skin and mucous membranes  Description  Structural intactness and normal physiological function of skin and  mucous membranes.   1/3/2020 2019 by Gary Bourne RN  Outcome: Completed  1/3/2020 1329 by Gary Bourne RN  Outcome: Ongoing

## 2020-01-06 ENCOUNTER — CARE COORDINATION (OUTPATIENT)
Dept: CASE MANAGEMENT | Age: 60
End: 2020-01-06

## 2020-01-06 NOTE — DISCHARGE SUMMARY
2019 and had extensive admits and interventions at [facility] in 2019  -she states she had had 7 ICD's most removed due to infection - this is per her report  -her last ICD was done Octavia 15, 2019 at [facility] and is [employer] DC-ICD - I spoke to Dr. Niraj Montes of EP at [facility]    CAD  -Her last stent was done here by Dr. Waqas Thrasher in October 28 with prior CABG in 2014 at [address]  -Patient thinks this is for atrial fibrillation however this needs to be confirmed, stated [facility] started    Essential tremors - neuro started mysoline this admit    History of seizures, and per [facility] also history of nonepileptic seizures - aryane Keppra    Reports history of MS - MRI brain, cervical, and thoracic spine did not show evidence of MS at this time per neurology - her ICD was turned off to have the MRI done    Hx RNY gastric bypass  -She states she had complications including a GJ tube which kept getting infected, so it has been removed    Severe malnutrition  -supportive care    MRI of the brain showed a small chronic infarct in the left frontal lobe this admit    Macrocytic anemia without B12 deficiency    History of VTE status post IVC filter    Patient reports she was diagnosed with MS in the 1990s by a neurologist in [place] she also states that it is on record that she has MS per [place].  She was on Betaseron but apparently has not had it in 3 years she states. Status post total right total hip replacements and needs another one on the left apparently    Chronic pain syndrome in part from bilateral hip AVN, on oxycodone    Splenomegaly status post spleen ectomy in March 2019 at [facility]- pathology did not show any lymphoma    Palliative care: she goes to an outpatient palliative care clinic in [facility]    The patient expressed appropriate understanding of and agreement with the discharge recommendations, medications, and plan.      Consults this admission:  IP CONSULT TO HOSPITALIST  IP CONSULT TO CARDIOLOGY  IP CONSULT TO CAPS  Take 500 mg by mouth 2 times daily             ipratropium-albuterol (DUONEB) 0.5-2.5 (3) MG/3ML SOLN nebulizer solution  Inhale 3 mLs into the lungs every 4 hours (while awake)             isosorbide mononitrate (IMDUR) 30 MG extended release tablet  Take 2 tablets by mouth daily             lactobacillus (CULTURELLE) capsule  Take 1 capsule by mouth 3 times daily (with meals)             levETIRAcetam (KEPPRA) 1000 MG tablet  Take 1,000 mg by mouth 2 times daily              levothyroxine (SYNTHROID) 112 MCG tablet  Take 2 tablets by mouth Daily             magnesium oxide (MAG-OX) 400 MG tablet  Take 800 mg by mouth 2 times daily             mexiletine (MEXITIL) 150 MG capsule  Take 150 mg by mouth 3 times daily             naloxone 4 MG/0.1ML LIQD nasal spray  1 spray by Nasal route as needed for Opioid Reversal             naloxone 4 MG/0.1ML LIQD nasal spray  1 spray by Nasal route as needed for Opioid Reversal             nitroGLYCERIN (NITROSTAT) 0.4 MG SL tablet  up to max of 3 total doses. If no relief after 1 dose, call 911. ondansetron (ZOFRAN) 4 MG tablet  Take 1 tablet by mouth every 6 hours as needed for Nausea or Vomiting             PARoxetine (PAXIL) 10 MG tablet               primidone (MYSOLINE) 50 MG tablet  Take 2 tablets by mouth 2 times daily             spironolactone (ALDACTONE) 25 MG tablet  Take 25 mg by mouth 2 times daily             torsemide (DEMADEX) 20 MG tablet  Take 20 mg by mouth daily Indications: pt takes 2 tablets daily                 Objective Findings at Discharge:   BP 99/61   Pulse 60   Temp 97.8 °F (36.6 °C) (Oral)   Resp 18   Ht 5' 6\" (1.676 m)   Wt 121 lb 3.2 oz (55 kg)   SpO2 97%   BMI 19.56 kg/m²            PHYSICAL EXAM   GEN Awake female, sitting upright in bed in no apparent distress. Appears given age.     LABS:    CBC:   Lab Results   Component Value Date    WBC 5.3 12/30/2019    HGB 10.1 12/30/2019    HCT 32.2 12/30/2019    .3 12/30/2019     12/30/2019     BMP:   Lab Results   Component Value Date     01/03/2020    K 4.5 01/03/2020    CL 98 01/03/2020    CO2 25 01/03/2020    PHOS 4.3 12/19/2019    BUN 16 01/03/2020    CREATININE 0.8 01/03/2020    CALCIUM 8.4 01/03/2020       IMAGING:    MRI Thoracic Spine WO Contrast [[id number]] Collected: 01/03/20 1213      Order Status: Completed Updated: 01/03/20 1245     Narrative:       EXAMINATION:  MRI OF THE THORACIC SPINE WITHOUT CONTRAST,  1/3/2020 10:29 am    TECHNIQUE:  Multiplanar multisequence MRI of the thoracic spine was performed without the  administration of intravenous contrast.    COMPARISON:  CT on 12/15/2019    HISTORY:  ORDERING SYSTEM PROVIDED HISTORY: r/o disc-DJD  TECHNOLOGIST PROVIDED HISTORY:  Reason for exam:->r/o disc-DJD  Reason for exam:->Pt s cardiolgist Dr. Briseyda Carbajal  states pt has medtronic ICD in  chest.  Reason for exam:->Please call [employer]  Reason for Exam: r/o spinal, DDD  Acuity: Acute  Type of Exam: Initial    FINDINGS:  BONES/ALIGNMENT: There is normal alignment of the thoracic spine other than  an exaggerated kyphosis, apex at T8-T9. No hany or retrolisthesis. No  areas of marrow edema to suggest an acute fracture. SPINAL CORD: The thoracic spinal cord demonstrates normal signal intensity  without evidence of an expansile mass lesion. SOFT TISSUES: There is cardiomegaly. The previously noted pleural effusions  have resolved. No other paraspinal masses. DEGENERATIVE CHANGES: There is mild degenerative disc disease, greatest at  C5-C6, partially imaged. There is no significant disc herniation, foraminal  narrowing, or central spinal canal stenosis. Impression:       1. Unremarkable MRI of the thoracic spine. No critical central spinal canal  narrowing or acute fracture.      MRI Cervical Spine WO Contrast [[id number]] Collected: 01/03/20 1147     Order Status: Completed Updated: 01/03/20 1223     Narrative: EXAMINATION:  MRI OF THE CERVICAL SPINE WITHOUT CONTRAST 1/3/2020 10:28 am    TECHNIQUE:  Multiplanar multisequence MRI of the cervical spine was performed without the  administration of intravenous contrast.    COMPARISON:  None. HISTORY:  ORDERING SYSTEM PROVIDED HISTORY: r/o disc-spinal stenosis  TECHNOLOGIST PROVIDED HISTORY:  Reason for exam:->r/o disc-spinal stenosis    Initial evaluation. FINDINGS:  BONES/ALIGNMENT: There appears to be ankylosis involving the C4 and C5  vertebral bodies. This is either related to a prior fusion versus a  congenital segmentation anomaly. Otherwise, the vertebral body heights  appear maintained. There is minimal retrolisthesis at C3-C4 and C5-C6. There is loss of disc space height with mild endplate irregularity at C5-C6. Mild Modic type 2 degenerative endplate changes at W8-U5. The bone marrow  signal demonstrates no acute abnormality. SPINAL CORD: No abnormal cord signal is seen. SOFT TISSUES: No paraspinal mass identified. C2-C3: There is a central disc protrusion indenting on the ventral thecal  sac. No significant spinal canal stenosis. No neural foraminal narrowing. C3-C4: There is a disc bulge with buckling of the ligamentum flavum. No  significant spinal canal stenosis. No neural foraminal narrowing. Uncovertebral joint and facet arthrosis contribute to minimal bilateral  neural foraminal narrowing. C4-C5: There is no significant disc bulge, spinal canal stenosis or neural  foraminal narrowing. C5-C6: There is a posterior disc osteophyte complex along with buckling of  the ligamentum flavum. Minimal spinal canal stenosis. No significant neural  foraminal narrowing. C6-C7: There is a disc bulge with buckling of the ligamentum flavum. No  significant spinal canal stenosis. No neural foraminal narrowing. C7-T1: There is no significant disc bulge or spinal canal stenosis. There is  buckling of the ligamentum flavum.   No

## 2020-01-07 ENCOUNTER — CARE COORDINATION (OUTPATIENT)
Dept: CASE MANAGEMENT | Age: 60
End: 2020-01-07

## 2020-01-07 LAB
EKG ATRIAL RATE: 63 BPM
EKG DIAGNOSIS: NORMAL
EKG P AXIS: 76 DEGREES
EKG P-R INTERVAL: 186 MS
EKG Q-T INTERVAL: 450 MS
EKG QRS DURATION: 88 MS
EKG QTC CALCULATION (BAZETT): 460 MS
EKG R AXIS: -39 DEGREES
EKG T AXIS: 259 DEGREES
EKG VENTRICULAR RATE: 63 BPM

## 2020-01-07 NOTE — CARE COORDINATION
Soni [facility] Transitions Initial Follow Up Call    Call within 2 business days of discharge: Yes    Patient: Gus Monday Patient : 1960   MRN: [id number]  Reason for Admission:   Chest pain  Discharge Date: 1/3/20 RARS: Readmission Risk Score: 52      Last Discharge [facility]       Complaint Diagnosis Description Type Department Provider    19 Chest Pain Other chest pain ED to Hosp-Admission (Discharged) [facility]AD[facility]ITTED[facility] Janna Delaney MD; Rosanna Perdomo ... Spoke with:  An[address]:   [facility]    Non-face-to-face services provided:  Communication with home health agencies or other community services the patient is currently using-1    Care Transitions 24 Hour Call    Patient DME:  Laine Uribe, Wheelchair, Shower chair, Other, Commode  Other Patient DME:  Toilet seat riser, toilet riser  Do you have support at home?:  Partner/Spouse/SO  Are you an active caregiver in your home?:  No  Care Transitions Interventions                                 Follow Up: Attempted to reach patient for Care Transition follow up x 2. No answer to phone. Message left on first number with CTN contact information and request for call back. Second contact rings gee. T.C. to [facility]. Spoke with Barbara Victoria. Reports that patient orders were not received. Requesting information be faxed to [facility] at [phone]. Information faxed as per request.     Follow up call made to [facility]. Spoke with Barbara Victoria. CTN contact information provided with request for call back if additional information is needed.    Future Appointments   Date Time Provider Amairani Sheikh   2020  1:00 PM Carla Villafuerte MD [address] [facility] ON CALL KUN Belcher, JOSE

## 2020-01-08 ENCOUNTER — CARE COORDINATION (OUTPATIENT)
Dept: CASE MANAGEMENT | Age: 60
End: 2020-01-08

## 2020-01-08 NOTE — CARE COORDINATION
Soni 45 Transitions Follow Up Call    2020    Patient: Ruiz Briscoe  Patient : 1960   MRN: [id number]  Reason for Admission:   Chest pain  Discharge Date: 1/3/20 RARS: Readmission Risk Score: 46         Spoke with:   [address] Transitions Subsequent and Final Call    Subsequent and Final Calls  Care Transitions Interventions                          Other Interventions: Follow Up: Attempted to reach patient for Care Transition/ BPCI follow up x 2. No answer to phone. Message left on  with CTN contact information and request for call back. Second number rings busy. Call back received from [facility] ; Andrew Fink. Confirms that he received orders as faxed. Requesting clarification of patient service needs. CTN to contact provider to clarify. CTN contact information provided for further needs arise. [facility]. Updated in r/t patient status/ need for clarification of [facility] orders. MA reports that patient was scheduled for hospital follow up yesterday and was a no show. Provider was informed that patient was admitted to [facility] yesterday. Informed of CTN plan to update [facility]. [facility]  Updated in r/t patient status/ recent admission. [facility] to follow patient at [facility] to assess needs at discharge.        Future Appointments   Date Time Provider Amairani Sheikh   2020  1:00 PM Azra Cohen MD [facility] ON CALL KUN Chavez RN

## 2020-01-08 NOTE — PROGRESS NOTES
HOSPITALIST      While here to patient was treated by cardiology with colchicine for possible pleuritic inflammatory pain to cover the possibility of pericarditis. She did well on this treatment.

## 2020-01-25 ENCOUNTER — APPOINTMENT (OUTPATIENT)
Dept: GENERAL RADIOLOGY | Age: 60
DRG: 313 | End: 2020-01-25
Payer: MEDICARE

## 2020-01-25 ENCOUNTER — HOSPITAL ENCOUNTER (INPATIENT)
Age: 60
LOS: 1 days | Discharge: HOME | DRG: 313 | End: 2020-01-28
Attending: EMERGENCY MEDICINE | Admitting: INTERNAL MEDICINE
Payer: MEDICARE

## 2020-01-25 LAB
ALBUMIN SERPL-MCNC: 3.9 GM/DL (ref 3.4–5)
ALP BLD-CCNC: 87 IU/L (ref 40–129)
ALT SERPL-CCNC: 12 U/L (ref 10–40)
ANION GAP SERPL CALCULATED.3IONS-SCNC: 12 MMOL/L (ref 4–16)
AST SERPL-CCNC: 15 IU/L (ref 15–37)
BASOPHILS ABSOLUTE: 0 K/CU MM
BASOPHILS RELATIVE PERCENT: 0.9 % (ref 0–1)
BILIRUB SERPL-MCNC: 0.2 MG/DL (ref 0–1)
BUN BLDV-MCNC: 10 MG/DL (ref 6–23)
CALCIUM SERPL-MCNC: 8.7 MG/DL (ref 8.3–10.6)
CHLORIDE BLD-SCNC: 99 MMOL/L (ref 99–110)
CO2: 25 MMOL/L (ref 21–32)
CREAT SERPL-MCNC: 0.7 MG/DL (ref 0.6–1.1)
DIFFERENTIAL TYPE: ABNORMAL
EOSINOPHILS ABSOLUTE: 0 K/CU MM
EOSINOPHILS RELATIVE PERCENT: 0 % (ref 0–3)
GFR AFRICAN AMERICAN: >60 ML/MIN/1.73M2
GFR NON-AFRICAN AMERICAN: >60 ML/MIN/1.73M2
GLUCOSE BLD-MCNC: 104 MG/DL (ref 70–99)
HCT VFR BLD CALC: 38.5 % (ref 37–47)
HEMOGLOBIN: 12 GM/DL (ref 12.5–16)
IMMATURE NEUTROPHIL %: 0.2 % (ref 0–0.43)
LYMPHOCYTES ABSOLUTE: 0.8 K/CU MM
LYMPHOCYTES RELATIVE PERCENT: 18.6 % (ref 24–44)
MCH RBC QN AUTO: 33.3 PG (ref 27–31)
MCHC RBC AUTO-ENTMCNC: 31.2 % (ref 32–36)
MCV RBC AUTO: 106.9 FL (ref 78–100)
MONOCYTES ABSOLUTE: 0.2 K/CU MM
MONOCYTES RELATIVE PERCENT: 4.2 % (ref 0–4)
NUCLEATED RBC %: 0 %
PDW BLD-RTO: 15.3 % (ref 11.7–14.9)
PLATELET # BLD: 347 K/CU MM (ref 140–440)
PMV BLD AUTO: 9.4 FL (ref 7.5–11.1)
POTASSIUM SERPL-SCNC: 3.7 MMOL/L (ref 3.5–5.1)
RBC # BLD: 3.6 M/CU MM (ref 4.2–5.4)
SEGMENTED NEUTROPHILS ABSOLUTE COUNT: 3.2 K/CU MM
SEGMENTED NEUTROPHILS RELATIVE PERCENT: 76.1 % (ref 36–66)
SODIUM BLD-SCNC: 136 MMOL/L (ref 135–145)
TOTAL IMMATURE NEUTOROPHIL: 0.01 K/CU MM
TOTAL NUCLEATED RBC: 0 K/CU MM
TOTAL PROTEIN: 6.4 GM/DL (ref 6.4–8.2)
TROPONIN T: <0.01 NG/ML
WBC # BLD: 4.3 K/CU MM (ref 4–10.5)

## 2020-01-25 PROCEDURE — 99285 EMERGENCY DEPT VISIT HI MDM: CPT

## 2020-01-25 PROCEDURE — 6370000000 HC RX 637 (ALT 250 FOR IP): Performed by: EMERGENCY MEDICINE

## 2020-01-25 PROCEDURE — G0378 HOSPITAL OBSERVATION PER HR: HCPCS

## 2020-01-25 PROCEDURE — 80053 COMPREHEN METABOLIC PANEL: CPT

## 2020-01-25 PROCEDURE — 6370000000 HC RX 637 (ALT 250 FOR IP): Performed by: INTERNAL MEDICINE

## 2020-01-25 PROCEDURE — 93005 ELECTROCARDIOGRAM TRACING: CPT | Performed by: EMERGENCY MEDICINE

## 2020-01-25 PROCEDURE — 85025 COMPLETE CBC W/AUTO DIFF WBC: CPT

## 2020-01-25 PROCEDURE — 84484 ASSAY OF TROPONIN QUANT: CPT

## 2020-01-25 PROCEDURE — 71045 X-RAY EXAM CHEST 1 VIEW: CPT

## 2020-01-25 RX ORDER — FERROUS GLUCONATE 324(37.5)
324 TABLET ORAL 2 TIMES DAILY
Status: DISCONTINUED | OUTPATIENT
Start: 2020-01-26 | End: 2020-01-28 | Stop reason: HOSPADM

## 2020-01-25 RX ORDER — MAGNESIUM SULFATE 1 G/100ML
1 INJECTION INTRAVENOUS PRN
Status: DISCONTINUED | OUTPATIENT
Start: 2020-01-25 | End: 2020-01-28 | Stop reason: HOSPADM

## 2020-01-25 RX ORDER — SPIRONOLACTONE 25 MG/1
25 TABLET ORAL EVERY OTHER DAY
Status: DISCONTINUED | OUTPATIENT
Start: 2020-01-26 | End: 2020-01-28 | Stop reason: HOSPADM

## 2020-01-25 RX ORDER — SODIUM CHLORIDE 0.9 % (FLUSH) 0.9 %
10 SYRINGE (ML) INJECTION EVERY 12 HOURS SCHEDULED
Status: DISCONTINUED | OUTPATIENT
Start: 2020-01-26 | End: 2020-01-28 | Stop reason: HOSPADM

## 2020-01-25 RX ORDER — FAMOTIDINE 20 MG/1
20 TABLET, FILM COATED ORAL 2 TIMES DAILY
Status: DISCONTINUED | OUTPATIENT
Start: 2020-01-26 | End: 2020-01-28 | Stop reason: HOSPADM

## 2020-01-25 RX ORDER — BISACODYL 10 MG
10 SUPPOSITORY, RECTAL RECTAL DAILY PRN
Status: DISCONTINUED | OUTPATIENT
Start: 2020-01-25 | End: 2020-01-28 | Stop reason: HOSPADM

## 2020-01-25 RX ORDER — POTASSIUM CHLORIDE 7.45 MG/ML
10 INJECTION INTRAVENOUS PRN
Status: DISCONTINUED | OUTPATIENT
Start: 2020-01-25 | End: 2020-01-28 | Stop reason: HOSPADM

## 2020-01-25 RX ORDER — SODIUM CHLORIDE 0.9 % (FLUSH) 0.9 %
10 SYRINGE (ML) INJECTION PRN
Status: DISCONTINUED | OUTPATIENT
Start: 2020-01-25 | End: 2020-01-28 | Stop reason: HOSPADM

## 2020-01-25 RX ORDER — OXYCODONE HYDROCHLORIDE 5 MG/1
20 TABLET ORAL ONCE
Status: COMPLETED | OUTPATIENT
Start: 2020-01-25 | End: 2020-01-25

## 2020-01-25 RX ORDER — PRIMIDONE 50 MG/1
100 TABLET ORAL 2 TIMES DAILY
Status: DISCONTINUED | OUTPATIENT
Start: 2020-01-26 | End: 2020-01-28 | Stop reason: HOSPADM

## 2020-01-25 RX ORDER — MORPHINE SULFATE 2 MG/ML
2 INJECTION, SOLUTION INTRAMUSCULAR; INTRAVENOUS
Status: DISCONTINUED | OUTPATIENT
Start: 2020-01-25 | End: 2020-01-26

## 2020-01-25 RX ORDER — NALOXONE HYDROCHLORIDE 4 MG/.1ML
1 SPRAY NASAL PRN
Status: DISCONTINUED | OUTPATIENT
Start: 2020-01-25 | End: 2020-01-25

## 2020-01-25 RX ORDER — ISOSORBIDE MONONITRATE 60 MG/1
60 TABLET, EXTENDED RELEASE ORAL DAILY
Status: DISCONTINUED | OUTPATIENT
Start: 2020-01-26 | End: 2020-01-26

## 2020-01-25 RX ORDER — ONDANSETRON 2 MG/ML
4 INJECTION INTRAMUSCULAR; INTRAVENOUS EVERY 6 HOURS PRN
Status: DISCONTINUED | OUTPATIENT
Start: 2020-01-25 | End: 2020-01-28 | Stop reason: HOSPADM

## 2020-01-25 RX ORDER — POLYETHYLENE GLYCOL 3350 17 G/17G
17 POWDER, FOR SOLUTION ORAL DAILY PRN
Status: DISCONTINUED | OUTPATIENT
Start: 2020-01-25 | End: 2020-01-28 | Stop reason: HOSPADM

## 2020-01-25 RX ORDER — CLOPIDOGREL BISULFATE 75 MG/1
75 TABLET ORAL ONCE
Status: DISCONTINUED | OUTPATIENT
Start: 2020-01-25 | End: 2020-01-28 | Stop reason: HOSPADM

## 2020-01-25 RX ORDER — LEVOTHYROXINE SODIUM 112 UG/1
224 TABLET ORAL DAILY
Status: DISCONTINUED | OUTPATIENT
Start: 2020-01-26 | End: 2020-01-28 | Stop reason: HOSPADM

## 2020-01-25 RX ORDER — ALBUTEROL SULFATE 90 UG/1
2 AEROSOL, METERED RESPIRATORY (INHALATION) EVERY 6 HOURS PRN
Status: DISCONTINUED | OUTPATIENT
Start: 2020-01-25 | End: 2020-01-28 | Stop reason: HOSPADM

## 2020-01-25 RX ORDER — ACETAMINOPHEN 500 MG
1000 TABLET ORAL ONCE
Status: COMPLETED | OUTPATIENT
Start: 2020-01-25 | End: 2020-01-25

## 2020-01-25 RX ORDER — ACETAMINOPHEN 325 MG/1
650 TABLET ORAL EVERY 4 HOURS PRN
Status: DISCONTINUED | OUTPATIENT
Start: 2020-01-25 | End: 2020-01-26

## 2020-01-25 RX ORDER — TORSEMIDE 20 MG/1
20 TABLET ORAL EVERY OTHER DAY
Status: DISCONTINUED | OUTPATIENT
Start: 2020-01-26 | End: 2020-01-28 | Stop reason: HOSPADM

## 2020-01-25 RX ORDER — ATORVASTATIN CALCIUM 40 MG/1
80 TABLET, FILM COATED ORAL NIGHTLY
Status: DISCONTINUED | OUTPATIENT
Start: 2020-01-26 | End: 2020-01-28 | Stop reason: HOSPADM

## 2020-01-25 RX ORDER — MORPHINE SULFATE 4 MG/ML
4 INJECTION, SOLUTION INTRAMUSCULAR; INTRAVENOUS ONCE
Status: COMPLETED | OUTPATIENT
Start: 2020-01-25 | End: 2020-01-26

## 2020-01-25 RX ORDER — AMIODARONE HYDROCHLORIDE 200 MG/1
200 TABLET ORAL DAILY
Status: DISCONTINUED | OUTPATIENT
Start: 2020-01-26 | End: 2020-01-28 | Stop reason: HOSPADM

## 2020-01-25 RX ORDER — POTASSIUM CHLORIDE 20 MEQ/1
40 TABLET, EXTENDED RELEASE ORAL PRN
Status: DISCONTINUED | OUTPATIENT
Start: 2020-01-25 | End: 2020-01-28 | Stop reason: HOSPADM

## 2020-01-25 RX ORDER — DULOXETIN HYDROCHLORIDE 30 MG/1
60 CAPSULE, DELAYED RELEASE ORAL DAILY
Status: DISCONTINUED | OUTPATIENT
Start: 2020-01-26 | End: 2020-01-28 | Stop reason: HOSPADM

## 2020-01-25 RX ORDER — LACTOBACILLUS RHAMNOSUS GG 10B CELL
1 CAPSULE ORAL
Status: DISCONTINUED | OUTPATIENT
Start: 2020-01-26 | End: 2020-01-28 | Stop reason: HOSPADM

## 2020-01-25 RX ORDER — ASPIRIN 81 MG/1
81 TABLET, CHEWABLE ORAL DAILY
Status: DISCONTINUED | OUTPATIENT
Start: 2020-01-26 | End: 2020-01-26

## 2020-01-25 RX ORDER — CLOPIDOGREL BISULFATE 75 MG/1
75 TABLET ORAL DAILY
Status: DISCONTINUED | OUTPATIENT
Start: 2020-01-26 | End: 2020-01-28 | Stop reason: HOSPADM

## 2020-01-25 RX ORDER — MORPHINE SULFATE 4 MG/ML
4 INJECTION, SOLUTION INTRAMUSCULAR; INTRAVENOUS
Status: DISCONTINUED | OUTPATIENT
Start: 2020-01-25 | End: 2020-01-26

## 2020-01-25 RX ORDER — PAROXETINE 10 MG/1
10 TABLET, FILM COATED ORAL NIGHTLY
Status: DISCONTINUED | OUTPATIENT
Start: 2020-01-26 | End: 2020-01-28 | Stop reason: HOSPADM

## 2020-01-25 RX ORDER — LANOLIN ALCOHOL/MO/W.PET/CERES
800 CREAM (GRAM) TOPICAL 2 TIMES DAILY
Status: DISCONTINUED | OUTPATIENT
Start: 2020-01-26 | End: 2020-01-28 | Stop reason: HOSPADM

## 2020-01-25 RX ORDER — ASPIRIN 81 MG/1
324 TABLET, CHEWABLE ORAL ONCE
Status: DISCONTINUED | OUTPATIENT
Start: 2020-01-25 | End: 2020-01-26

## 2020-01-25 RX ORDER — LEVETIRACETAM 500 MG/1
1000 TABLET ORAL 2 TIMES DAILY
Status: DISCONTINUED | OUTPATIENT
Start: 2020-01-26 | End: 2020-01-28 | Stop reason: HOSPADM

## 2020-01-25 RX ADMIN — ACETAMINOPHEN 1000 MG: 500 TABLET ORAL at 21:34

## 2020-01-25 RX ADMIN — OXYCODONE HYDROCHLORIDE 20 MG: 5 TABLET ORAL at 21:34

## 2020-01-25 ASSESSMENT — PAIN DESCRIPTION - PAIN TYPE: TYPE: ACUTE PAIN

## 2020-01-25 ASSESSMENT — PAIN SCALES - GENERAL
PAINLEVEL_OUTOF10: 6
PAINLEVEL_OUTOF10: 9

## 2020-01-25 ASSESSMENT — PAIN DESCRIPTION - LOCATION: LOCATION: CHEST

## 2020-01-26 LAB
BASOPHILS ABSOLUTE: 0 K/CU MM
BASOPHILS RELATIVE PERCENT: 0.7 % (ref 0–1)
DIFFERENTIAL TYPE: ABNORMAL
EKG ATRIAL RATE: 60 BPM
EKG ATRIAL RATE: 64 BPM
EKG ATRIAL RATE: 67 BPM
EKG DIAGNOSIS: NORMAL
EKG P AXIS: -11 DEGREES
EKG P AXIS: -2 DEGREES
EKG P AXIS: -23 DEGREES
EKG P-R INTERVAL: 192 MS
EKG P-R INTERVAL: 196 MS
EKG P-R INTERVAL: 216 MS
EKG Q-T INTERVAL: 442 MS
EKG Q-T INTERVAL: 462 MS
EKG Q-T INTERVAL: 464 MS
EKG QRS DURATION: 100 MS
EKG QRS DURATION: 100 MS
EKG QRS DURATION: 94 MS
EKG QTC CALCULATION (BAZETT): 455 MS
EKG QTC CALCULATION (BAZETT): 462 MS
EKG QTC CALCULATION (BAZETT): 490 MS
EKG R AXIS: -40 DEGREES
EKG R AXIS: -40 DEGREES
EKG R AXIS: -42 DEGREES
EKG T AXIS: -59 DEGREES
EKG T AXIS: -77 DEGREES
EKG T AXIS: 261 DEGREES
EKG VENTRICULAR RATE: 60 BPM
EKG VENTRICULAR RATE: 64 BPM
EKG VENTRICULAR RATE: 67 BPM
EOSINOPHILS ABSOLUTE: 0 K/CU MM
EOSINOPHILS RELATIVE PERCENT: 0 % (ref 0–3)
HCT VFR BLD CALC: 30.2 % (ref 37–47)
HEMOGLOBIN: 9.5 GM/DL (ref 12.5–16)
IMMATURE NEUTROPHIL %: 0.5 % (ref 0–0.43)
LYMPHOCYTES ABSOLUTE: 1.1 K/CU MM
LYMPHOCYTES RELATIVE PERCENT: 26.2 % (ref 24–44)
MCH RBC QN AUTO: 33.9 PG (ref 27–31)
MCHC RBC AUTO-ENTMCNC: 31.5 % (ref 32–36)
MCV RBC AUTO: 107.9 FL (ref 78–100)
MONOCYTES ABSOLUTE: 0.4 K/CU MM
MONOCYTES RELATIVE PERCENT: 8.7 % (ref 0–4)
NUCLEATED RBC %: 0 %
PDW BLD-RTO: 15.2 % (ref 11.7–14.9)
PLATELET # BLD: 284 K/CU MM (ref 140–440)
PMV BLD AUTO: 9.5 FL (ref 7.5–11.1)
RBC # BLD: 2.8 M/CU MM (ref 4.2–5.4)
SEGMENTED NEUTROPHILS ABSOLUTE COUNT: 2.6 K/CU MM
SEGMENTED NEUTROPHILS RELATIVE PERCENT: 63.9 % (ref 36–66)
TOTAL IMMATURE NEUTOROPHIL: 0.02 K/CU MM
TOTAL NUCLEATED RBC: 0 K/CU MM
TROPONIN T: <0.01 NG/ML
TROPONIN T: <0.01 NG/ML
WBC # BLD: 4.1 K/CU MM (ref 4–10.5)

## 2020-01-26 PROCEDURE — 93010 ELECTROCARDIOGRAM REPORT: CPT | Performed by: INTERNAL MEDICINE

## 2020-01-26 PROCEDURE — 99222 1ST HOSP IP/OBS MODERATE 55: CPT | Performed by: INTERNAL MEDICINE

## 2020-01-26 PROCEDURE — 84484 ASSAY OF TROPONIN QUANT: CPT

## 2020-01-26 PROCEDURE — 94761 N-INVAS EAR/PLS OXIMETRY MLT: CPT

## 2020-01-26 PROCEDURE — G0378 HOSPITAL OBSERVATION PER HR: HCPCS

## 2020-01-26 PROCEDURE — 36415 COLL VENOUS BLD VENIPUNCTURE: CPT

## 2020-01-26 PROCEDURE — 96376 TX/PRO/DX INJ SAME DRUG ADON: CPT

## 2020-01-26 PROCEDURE — 6370000000 HC RX 637 (ALT 250 FOR IP): Performed by: PHYSICIAN ASSISTANT

## 2020-01-26 PROCEDURE — 96375 TX/PRO/DX INJ NEW DRUG ADDON: CPT

## 2020-01-26 PROCEDURE — 6360000002 HC RX W HCPCS: Performed by: INTERNAL MEDICINE

## 2020-01-26 PROCEDURE — 96374 THER/PROPH/DIAG INJ IV PUSH: CPT

## 2020-01-26 PROCEDURE — 6370000000 HC RX 637 (ALT 250 FOR IP): Performed by: INTERNAL MEDICINE

## 2020-01-26 PROCEDURE — 93005 ELECTROCARDIOGRAM TRACING: CPT | Performed by: INTERNAL MEDICINE

## 2020-01-26 PROCEDURE — 85025 COMPLETE CBC W/AUTO DIFF WBC: CPT

## 2020-01-26 PROCEDURE — 2580000003 HC RX 258: Performed by: INTERNAL MEDICINE

## 2020-01-26 RX ORDER — OXYCODONE HYDROCHLORIDE 10 MG/1
20 TABLET ORAL EVERY 4 HOURS PRN
Status: DISCONTINUED | OUTPATIENT
Start: 2020-01-26 | End: 2020-01-27

## 2020-01-26 RX ORDER — ACETAMINOPHEN 325 MG/1
650 TABLET ORAL EVERY 4 HOURS PRN
Status: DISCONTINUED | OUTPATIENT
Start: 2020-01-26 | End: 2020-01-28 | Stop reason: HOSPADM

## 2020-01-26 RX ORDER — MORPHINE SULFATE 2 MG/ML
2 INJECTION, SOLUTION INTRAMUSCULAR; INTRAVENOUS EVERY 4 HOURS PRN
Status: DISCONTINUED | OUTPATIENT
Start: 2020-01-26 | End: 2020-01-26

## 2020-01-26 RX ORDER — ACETAMINOPHEN 325 MG/1
650 TABLET ORAL EVERY 4 HOURS PRN
Status: DISCONTINUED | OUTPATIENT
Start: 2020-01-26 | End: 2020-01-26 | Stop reason: SDUPTHER

## 2020-01-26 RX ORDER — ISOSORBIDE MONONITRATE 60 MG/1
120 TABLET, EXTENDED RELEASE ORAL DAILY
Status: DISCONTINUED | OUTPATIENT
Start: 2020-01-26 | End: 2020-01-28 | Stop reason: HOSPADM

## 2020-01-26 RX ORDER — MEXILETINE HYDROCHLORIDE 150 MG/1
150 CAPSULE ORAL EVERY 8 HOURS SCHEDULED
Status: DISCONTINUED | OUTPATIENT
Start: 2020-01-26 | End: 2020-01-28 | Stop reason: HOSPADM

## 2020-01-26 RX ORDER — OXYCODONE HYDROCHLORIDE 10 MG/1
10 TABLET ORAL EVERY 4 HOURS PRN
Status: DISCONTINUED | OUTPATIENT
Start: 2020-01-26 | End: 2020-01-26

## 2020-01-26 RX ADMIN — FAMOTIDINE 20 MG: 20 TABLET ORAL at 00:16

## 2020-01-26 RX ADMIN — MORPHINE SULFATE 4 MG: 4 INJECTION, SOLUTION INTRAMUSCULAR; INTRAVENOUS at 00:17

## 2020-01-26 RX ADMIN — OXYCODONE HYDROCHLORIDE 10 MG: 10 TABLET ORAL at 14:42

## 2020-01-26 RX ADMIN — OXYCODONE HYDROCHLORIDE 20 MG: 10 TABLET ORAL at 23:27

## 2020-01-26 RX ADMIN — LEVETIRACETAM 1000 MG: 500 TABLET, FILM COATED ORAL at 21:46

## 2020-01-26 RX ADMIN — APIXABAN 5 MG: 5 TABLET, FILM COATED ORAL at 00:16

## 2020-01-26 RX ADMIN — ATORVASTATIN CALCIUM 80 MG: 40 TABLET, FILM COATED ORAL at 00:16

## 2020-01-26 RX ADMIN — MORPHINE SULFATE 4 MG: 4 INJECTION, SOLUTION INTRAMUSCULAR; INTRAVENOUS at 09:12

## 2020-01-26 RX ADMIN — APIXABAN 5 MG: 5 TABLET, FILM COATED ORAL at 21:47

## 2020-01-26 RX ADMIN — PRIMIDONE 100 MG: 50 TABLET ORAL at 21:47

## 2020-01-26 RX ADMIN — ONDANSETRON 4 MG: 2 INJECTION INTRAMUSCULAR; INTRAVENOUS at 09:54

## 2020-01-26 RX ADMIN — LEVOTHYROXINE SODIUM 224 MCG: 112 TABLET ORAL at 06:15

## 2020-01-26 RX ADMIN — MEXILETINE HYDROCHLORIDE 150 MG: 150 CAPSULE ORAL at 21:46

## 2020-01-26 RX ADMIN — MORPHINE SULFATE 2 MG: 2 INJECTION, SOLUTION INTRAMUSCULAR; INTRAVENOUS at 12:51

## 2020-01-26 RX ADMIN — Medication 1 CAPSULE: at 17:32

## 2020-01-26 RX ADMIN — MEXILETINE HYDROCHLORIDE 150 MG: 150 CAPSULE ORAL at 12:51

## 2020-01-26 RX ADMIN — Medication 800 MG: at 00:16

## 2020-01-26 RX ADMIN — FERROUS GLUCONATE TAB 324 MG (37.5 MG ELEMENTAL IRON) 324 MG: 324 (37.5 FE) TAB at 21:47

## 2020-01-26 RX ADMIN — ONDANSETRON 4 MG: 2 INJECTION INTRAMUSCULAR; INTRAVENOUS at 22:53

## 2020-01-26 RX ADMIN — SODIUM CHLORIDE, PRESERVATIVE FREE 10 ML: 5 INJECTION INTRAVENOUS at 09:12

## 2020-01-26 RX ADMIN — LEVETIRACETAM 1000 MG: 500 TABLET, FILM COATED ORAL at 00:15

## 2020-01-26 RX ADMIN — MORPHINE SULFATE 2 MG: 2 INJECTION, SOLUTION INTRAMUSCULAR; INTRAVENOUS at 17:32

## 2020-01-26 RX ADMIN — Medication 1 CAPSULE: at 12:51

## 2020-01-26 RX ADMIN — FERROUS GLUCONATE TAB 324 MG (37.5 MG ELEMENTAL IRON) 324 MG: 324 (37.5 FE) TAB at 00:15

## 2020-01-26 RX ADMIN — PAROXETINE 10 MG: 10 TABLET, FILM COATED ORAL at 00:15

## 2020-01-26 RX ADMIN — SODIUM CHLORIDE, PRESERVATIVE FREE 10 ML: 5 INJECTION INTRAVENOUS at 21:48

## 2020-01-26 RX ADMIN — Medication 800 MG: at 21:47

## 2020-01-26 RX ADMIN — FAMOTIDINE 20 MG: 20 TABLET ORAL at 21:47

## 2020-01-26 RX ADMIN — MORPHINE SULFATE 4 MG: 4 INJECTION, SOLUTION INTRAMUSCULAR; INTRAVENOUS at 05:21

## 2020-01-26 RX ADMIN — MORPHINE SULFATE 4 MG: 4 INJECTION, SOLUTION INTRAMUSCULAR; INTRAVENOUS at 02:46

## 2020-01-26 RX ADMIN — OXYCODONE HYDROCHLORIDE 10 MG: 10 TABLET ORAL at 19:26

## 2020-01-26 RX ADMIN — PAROXETINE 10 MG: 10 TABLET, FILM COATED ORAL at 21:47

## 2020-01-26 RX ADMIN — MORPHINE SULFATE 2 MG: 2 INJECTION, SOLUTION INTRAMUSCULAR; INTRAVENOUS at 21:48

## 2020-01-26 RX ADMIN — PRIMIDONE 100 MG: 50 TABLET ORAL at 00:15

## 2020-01-26 RX ADMIN — ATORVASTATIN CALCIUM 80 MG: 40 TABLET, FILM COATED ORAL at 21:47

## 2020-01-26 ASSESSMENT — PAIN SCALES - GENERAL
PAINLEVEL_OUTOF10: 9
PAINLEVEL_OUTOF10: 8
PAINLEVEL_OUTOF10: 8
PAINLEVEL_OUTOF10: 9
PAINLEVEL_OUTOF10: 3
PAINLEVEL_OUTOF10: 8
PAINLEVEL_OUTOF10: 9
PAINLEVEL_OUTOF10: 9
PAINLEVEL_OUTOF10: 6
PAINLEVEL_OUTOF10: 9
PAINLEVEL_OUTOF10: 8
PAINLEVEL_OUTOF10: 6
PAINLEVEL_OUTOF10: 10

## 2020-01-26 ASSESSMENT — PAIN DESCRIPTION - PAIN TYPE: TYPE: CHRONIC PAIN

## 2020-01-26 ASSESSMENT — PAIN DESCRIPTION - LOCATION: LOCATION: GENERALIZED

## 2020-01-26 NOTE — CONSULTS
her mother; Heart Disease in her father, maternal grandmother, and mother; High Blood Pressure in her maternal grandmother and mother; High Cholesterol in her maternal grandmother and mother; Other in her sister. Allergies   Allergen Reactions    Fentanyl Swelling     Other reaction(s): Angioedema (swelling)    Moxifloxacin Hcl In Nacl Swelling and Rash    Povidone-Iodine Rash     Other reaction(s): AOF      Cyclobenzaprine      Other reaction(s): Other - comment required  \" it make my muscle very weak because of my MS\"  \" it make my muscle very weak because of my MS\"      Methocarbamol      Worsening edema  Other reaction(s): Other - comment required  Worsening edema  Worsening edema  Worsening edema      Tramadol Other (See Comments)     Doctor doesn't her to take due to heart problems  Seizures   Doctor doesn't her to take due to lowers seizure threshold.  Baclofen     Ibuprofen      \"Due to heart problems\"    Naproxen     Nsaids      \"Due to heart problems\"    Tizanidine     Tolmetin      \"Due to heart problems\"    Tramadol Hcl      Other reaction(s):  Other - comment required  Told not to take due to history of seizures    Betadine [Povidone Iodine] Rash    Moxifloxacin Rash and Swelling     Lips swell and tingling in face   Lips swell and tingling in face   Lips swell and tingling in face   Lips swell and tingling in face   Around mouth       albuterol sulfate  (90 Base) MCG/ACT inhaler 2 puff, Q6H PRN  amiodarone (CORDARONE) tablet 200 mg, Daily  apixaban (ELIQUIS) tablet 5 mg, BID  aspirin chewable tablet 81 mg, Daily  atorvastatin (LIPITOR) tablet 80 mg, Nightly  clopidogrel (PLAVIX) tablet 75 mg, Daily  DULoxetine (CYMBALTA) extended release capsule 60 mg, Daily  famotidine (PEPCID) tablet 20 mg, BID  ferrous gluconate 324 (37.5 Fe) MG tablet 324 mg, BID  Icosapent Ethyl CAPS 500 mg, BID  isosorbide mononitrate (IMDUR) extended release tablet 60 mg, Daily  lactobacillus

## 2020-01-26 NOTE — PLAN OF CARE
Problem: Falls - Risk of:  Goal: Will remain free from falls  Description  Will remain free from falls  Outcome: Ongoing  Goal: Absence of physical injury  Description  Absence of physical injury  Outcome: Ongoing     Problem: Pain:  Description  Pain management should include both nonpharmacologic and pharmacologic interventions.   Goal: Pain level will decrease  Description  Pain level will decrease  Outcome: Ongoing  Goal: Control of acute pain  Description  Control of acute pain  Outcome: Ongoing  Goal: Control of chronic pain  Description  Control of chronic pain  Outcome: Ongoing  Goal: Patient's pain/discomfort is manageable  Description  Patient's pain/discomfort is manageable  Outcome: Ongoing     Problem: Discharge Planning:  Goal: Discharged to appropriate level of care  Description  Discharged to appropriate level of care  Outcome: Ongoing     Problem: Infection:  Goal: Will remain free from infection  Description  Will remain free from infection  Outcome: Ongoing     Problem: Safety:  Goal: Free from accidental physical injury  Description  Free from accidental physical injury  Outcome: Ongoing  Goal: Free from intentional harm  Description  Free from intentional harm  Outcome: Ongoing     Problem: Daily Care:  Goal: Daily care needs are met  Description  Daily care needs are met  Outcome: Ongoing     Problem: Skin Integrity:  Goal: Skin integrity will stabilize  Description  Skin integrity will stabilize  Outcome: Ongoing     Problem: Discharge Planning:  Goal: Patients continuum of care needs are met  Description  Patients continuum of care needs are met  Outcome: Ongoing

## 2020-01-26 NOTE — CARE COORDINATION
CM review of pt chart for readmission risk, last admission 12/29-1/3/2020, CP with cardiac history. Pt discharged w/significant other, active with [facility] palliative care, has [facility], DME,wheelchair, shower chair and grab bars. Patient able to drive but significant other usually drives . Pt admitted to [facility] Cardiovascular unit 1/8/20 for coronary angiogram.    Pt returns to ER with c/o CP. Dr Tabatha Rizvi ER provider, Presents with ongoing chest pain with extensive cardiac history, recent cardiac catheterization in 2019 with stenting and diffuse disease noticed at that time. Pending results with pt cardiac history, No alternatives to admission.  NAVDEEP,RN/CM

## 2020-01-26 NOTE — ED PROVIDER NOTES
Triage Chief Complaint:   Chest Pain (4 nitro taken at home without relief. pain rated 9/10 upon arival. Atrially paced)    [place]:  Nora Ormond is a 63 y.o. female that presents with chest pain. The patient states that over the last 2 hours she has had diffuse anterior chest pain that is pressure-like in nature radiating to her neck and arms. States that she has pain like this on a regular basis but it has been a few weeks. She has not had any relief at home with nitroglycerin. She is on aspirin, Plavix and Eliquis. States that she has associated worsening pain with exertion, shortness of breath, nausea, lightheadedness, sweating. Denies any fever, cough, abdominal pain, vomiting. ROS:  At least 14 systems reviewed and otherwise acutely negative except as in the [address] Past Medical History:   Diagnosis Date    Acute delirium     Arrhythmia     Arthritis     Asplenia     Asthma     CAD (coronary artery disease)     1st stent at age 46    Cardiomyopathy [facility])     Cerebral artery occlusion with cerebral infarction ([address]     CHF (congestive heart failure) (HCC)     Enlarged liver     GERD (gastroesophageal reflux disease)     H/O echocardiogram 4/6/16    EF 55%, trivial TR & MR, stage 1 diastolic dysfunction    History of blood transfusion     Hx of cardiovascular stress test 12/29/2015    Alessia: EF 45%. Pharmacologic stress myocardial perfusion scan shows a fixed inferior-lateral defect w/ no inducible ischemia. No evidence of ischemia. Inferior-lateral infarction.  Normal LVSF    Hyperlipidemia     Hypertension     Lymphoma ([address]     MI, old     Movement disorder     MS (multiple sclerosis) ([address]     OP (osteoporosis)     PE (pulmonary embolism)     trapese filter    Pneumonia     Seizures ([address]     Spleen enlarged     Thyroid disease      Past Surgical History:   Procedure Laterality Date    ABDOMEN SURGERY      APPENDECTOMY      CARDIAC DEFIBRILLATOR PLACEMENT      \"Due to heart problems\"    Tizanidine     Tolmetin      \"Due to heart problems\"    Tramadol Hcl      Other reaction(s): Other - comment required  Told not to take due to history of seizures    Betadine [Povidone Iodine] Rash    Moxifloxacin Rash and Swelling     Lips swell and tingling in face   Lips swell and tingling in face   Lips swell and tingling in face   Lips swell and tingling in face   Around mouth       Nursing Notes Reviewed    Physical Exam:  ED Triage Vitals [01/25/20 2052]   Enc Vitals Group      BP (!) 116/93      Pulse 68      Resp 18      Temp 98.8 °F (37.1 °C)      Temp Source Oral      SpO2 100 %      Weight 104 lb (47.2 kg)      Height 5' 6\" (1.676 m)      Head Circumference       Peak Flow       Pain Score       Pain Loc       Pain Edu? Excl. in [address]? GENERAL APPEARANCE: Awake and alert. Cooperative. No acute distress. HEAD: Normocephalic. Atraumatic. EYES: EOM's grossly intact. Sclera anicteric. ENT: Mucous membranes are moist. Tolerates saliva. No trismus. NECK: Supple. Trachea midline. HEART: RRR. Radial pulses 2+. No murmurs  LUNGS: Respirations unlabored. CTAB  ABDOMEN: Soft. Non-tender. No guarding or rebound. EXTREMITIES: No acute deformities. No edema  SKIN: Warm and dry. NEUROLOGICAL: No gross facial drooping. Moves all 4 extremities spontaneously. PSYCHIATRIC: Normal mood.     I have reviewed and interpreted all of the currently available lab results from this visit (if applicable):  Results for orders placed or performed during the hospital encounter of 01/25/20   CBC auto diff   Result Value Ref Range    WBC 4.3 4.0 - 10.5 K/CU MM    RBC 3.60 (L) 4.2 - 5.4 M/CU MM    Hemoglobin 12.0 (L) 12.5 - 16.0 GM/DL    Hematocrit 38.5 37 - 47 %    .9 (H) 78 - 100 FL    MCH 33.3 (H) 27 - 31 PG    MCHC 31.2 (L) 32.0 - 36.0 %    RDW 15.3 (H) 11.7 - 14.9 %    Platelets 382 296 - 812 K/CU MM    MPV 9.4 7.5 - 11.1 FL    Differential Type AUTOMATED DIFFERENTIAL     Segs

## 2020-01-26 NOTE — ED NOTES
Report called to [facility] RN for bed [address], [address] [place] [address]  01/25/20 9417

## 2020-01-26 NOTE — H&P
ending 01/25/20 2256   Vitals:   Vitals:    01/25/20 2238   BP:    Pulse: 61   Resp: 15   Temp:    SpO2: 96%     Physical Exam:   GEN Awake female, sitting upright in bed in no apparent distress. Appears given age. EYES Pupils are equally round. No scleral erythema, discharge, or conjunctivitis. HENT Mucous membranes are moist. Oral pharynx without exudates, no evidence of thrush. NECK Supple, no apparent thyromegaly or masses. RESP Clear to auscultation, no wheezes, rales or rhonchi. Symmetric chest movement while on room air. CARDIO/VASC S1/S2 auscultated. Regular rate without appreciable murmurs, rubs, or gallops. No JVD or carotid bruits. Peripheral pulses equal bilaterally and palpable. No peripheral edema. GI Abdomen is soft without significant tenderness, masses, or guarding. Bowel sounds are normoactive. Rectal exam deferred.  No costovertebral angle tenderness. Coppola catheter is not present. HEME/LYMPH No palpable cervical lymphadenopathy and no hepatosplenomegaly. No petechiae or ecchymoses. MSK No gross joint deformities. SKIN Normal coloration, warm, dry. NEURO Cranial nerves appear grossly intact, normal speech, no lateralizing weakness. PSYCH Awake, alert, oriented x 4. Affect appropriate. Past Medical History:      Past Medical History:   Diagnosis Date    Acute delirium     Arrhythmia     Arthritis     Asplenia     Asthma     CAD (coronary artery disease)     1st stent at age 46    Cardiomyopathy [facility])     [facility] with [facility]     CHF (congestive heart failure) (HCC)     Enlarged liver     GERD (gastroesophageal reflux disease)     H/O echocardiogram 4/6/16    EF 55%, trivial TR & MR, stage 1 diastolic dysfunction    History of blood transfusion     Hx of cardiovascular stress test 12/29/2015    Alessia: EF 45%. Pharmacologic stress myocardial perfusion scan shows a fixed inferior-lateral defect w/ no inducible ischemia.  No evidence

## 2020-01-27 PROBLEM — E44.0 MODERATE MALNUTRITION (HCC): Chronic | Status: RESOLVED | Noted: 2019-12-30 | Resolved: 2020-01-27

## 2020-01-27 LAB
ANION GAP SERPL CALCULATED.3IONS-SCNC: 8 MMOL/L (ref 4–16)
BUN BLDV-MCNC: 9 MG/DL (ref 6–23)
CALCIUM SERPL-MCNC: 8.2 MG/DL (ref 8.3–10.6)
CHLORIDE BLD-SCNC: 104 MMOL/L (ref 99–110)
CO2: 26 MMOL/L (ref 21–32)
CREAT SERPL-MCNC: 0.7 MG/DL (ref 0.6–1.1)
GFR AFRICAN AMERICAN: >60 ML/MIN/1.73M2
GFR NON-AFRICAN AMERICAN: >60 ML/MIN/1.73M2
GLUCOSE BLD-MCNC: 100 MG/DL (ref 70–99)
POTASSIUM SERPL-SCNC: 3.9 MMOL/L (ref 3.5–5.1)
SODIUM BLD-SCNC: 138 MMOL/L (ref 135–145)

## 2020-01-27 PROCEDURE — 6370000000 HC RX 637 (ALT 250 FOR IP): Performed by: INTERNAL MEDICINE

## 2020-01-27 PROCEDURE — 36415 COLL VENOUS BLD VENIPUNCTURE: CPT

## 2020-01-27 PROCEDURE — APPSS60 APP SPLIT SHARED TIME 46-60 MINUTES: Performed by: NURSE PRACTITIONER

## 2020-01-27 PROCEDURE — G0378 HOSPITAL OBSERVATION PER HR: HCPCS

## 2020-01-27 PROCEDURE — 99222 1ST HOSP IP/OBS MODERATE 55: CPT | Performed by: OBSTETRICS & GYNECOLOGY

## 2020-01-27 PROCEDURE — 1200000000 HC SEMI PRIVATE

## 2020-01-27 PROCEDURE — 6370000000 HC RX 637 (ALT 250 FOR IP): Performed by: OBSTETRICS & GYNECOLOGY

## 2020-01-27 PROCEDURE — 99232 SBSQ HOSP IP/OBS MODERATE 35: CPT | Performed by: INTERNAL MEDICINE

## 2020-01-27 PROCEDURE — 80048 BASIC METABOLIC PNL TOTAL CA: CPT

## 2020-01-27 PROCEDURE — 6370000000 HC RX 637 (ALT 250 FOR IP): Performed by: PHYSICIAN ASSISTANT

## 2020-01-27 PROCEDURE — 96376 TX/PRO/DX INJ SAME DRUG ADON: CPT

## 2020-01-27 PROCEDURE — 6360000002 HC RX W HCPCS: Performed by: INTERNAL MEDICINE

## 2020-01-27 PROCEDURE — 2580000003 HC RX 258: Performed by: INTERNAL MEDICINE

## 2020-01-27 RX ORDER — RANOLAZINE 500 MG/1
500 TABLET, EXTENDED RELEASE ORAL 2 TIMES DAILY
Status: DISCONTINUED | OUTPATIENT
Start: 2020-01-27 | End: 2020-01-27

## 2020-01-27 RX ORDER — OXYCODONE HYDROCHLORIDE 10 MG/1
20 TABLET ORAL
Status: DISCONTINUED | OUTPATIENT
Start: 2020-01-27 | End: 2020-01-28 | Stop reason: HOSPADM

## 2020-01-27 RX ADMIN — LEVETIRACETAM 1000 MG: 500 TABLET, FILM COATED ORAL at 10:38

## 2020-01-27 RX ADMIN — FAMOTIDINE 20 MG: 20 TABLET ORAL at 22:00

## 2020-01-27 RX ADMIN — Medication 800 MG: at 21:59

## 2020-01-27 RX ADMIN — PRIMIDONE 100 MG: 50 TABLET ORAL at 21:58

## 2020-01-27 RX ADMIN — OXYCODONE HYDROCHLORIDE 20 MG: 10 TABLET ORAL at 21:58

## 2020-01-27 RX ADMIN — OXYCODONE HYDROCHLORIDE 20 MG: 10 TABLET ORAL at 12:36

## 2020-01-27 RX ADMIN — MEXILETINE HYDROCHLORIDE 150 MG: 150 CAPSULE ORAL at 14:09

## 2020-01-27 RX ADMIN — MEXILETINE HYDROCHLORIDE 150 MG: 150 CAPSULE ORAL at 06:30

## 2020-01-27 RX ADMIN — OXYCODONE HYDROCHLORIDE 20 MG: 10 TABLET ORAL at 03:56

## 2020-01-27 RX ADMIN — CLOPIDOGREL BISULFATE 75 MG: 75 TABLET ORAL at 10:38

## 2020-01-27 RX ADMIN — OXYCODONE HYDROCHLORIDE 20 MG: 10 TABLET ORAL at 15:38

## 2020-01-27 RX ADMIN — MEXILETINE HYDROCHLORIDE 150 MG: 150 CAPSULE ORAL at 22:05

## 2020-01-27 RX ADMIN — APIXABAN 5 MG: 5 TABLET, FILM COATED ORAL at 21:59

## 2020-01-27 RX ADMIN — PAROXETINE 10 MG: 10 TABLET, FILM COATED ORAL at 21:59

## 2020-01-27 RX ADMIN — FERROUS GLUCONATE TAB 324 MG (37.5 MG ELEMENTAL IRON) 324 MG: 324 (37.5 FE) TAB at 22:00

## 2020-01-27 RX ADMIN — ISOSORBIDE MONONITRATE 120 MG: 60 TABLET, EXTENDED RELEASE ORAL at 10:39

## 2020-01-27 RX ADMIN — DULOXETINE HYDROCHLORIDE 60 MG: 30 CAPSULE, DELAYED RELEASE ORAL at 10:37

## 2020-01-27 RX ADMIN — LEVOTHYROXINE SODIUM 224 MCG: 112 TABLET ORAL at 07:47

## 2020-01-27 RX ADMIN — FAMOTIDINE 20 MG: 20 TABLET ORAL at 10:38

## 2020-01-27 RX ADMIN — ATORVASTATIN CALCIUM 80 MG: 40 TABLET, FILM COATED ORAL at 21:59

## 2020-01-27 RX ADMIN — LEVETIRACETAM 1000 MG: 500 TABLET, FILM COATED ORAL at 21:59

## 2020-01-27 RX ADMIN — APIXABAN 5 MG: 5 TABLET, FILM COATED ORAL at 10:39

## 2020-01-27 RX ADMIN — FERROUS GLUCONATE TAB 324 MG (37.5 MG ELEMENTAL IRON) 324 MG: 324 (37.5 FE) TAB at 10:39

## 2020-01-27 RX ADMIN — Medication 800 MG: at 10:37

## 2020-01-27 RX ADMIN — Medication 1 CAPSULE: at 15:38

## 2020-01-27 RX ADMIN — SODIUM CHLORIDE, PRESERVATIVE FREE 10 ML: 5 INJECTION INTRAVENOUS at 10:37

## 2020-01-27 RX ADMIN — PRIMIDONE 100 MG: 50 TABLET ORAL at 10:38

## 2020-01-27 RX ADMIN — SODIUM CHLORIDE, PRESERVATIVE FREE 10 ML: 5 INJECTION INTRAVENOUS at 22:01

## 2020-01-27 RX ADMIN — OXYCODONE HYDROCHLORIDE 20 MG: 10 TABLET ORAL at 08:53

## 2020-01-27 RX ADMIN — AMIODARONE HYDROCHLORIDE 200 MG: 200 TABLET ORAL at 10:38

## 2020-01-27 RX ADMIN — ONDANSETRON 4 MG: 2 INJECTION INTRAMUSCULAR; INTRAVENOUS at 10:37

## 2020-01-27 RX ADMIN — Medication 1 CAPSULE: at 10:38

## 2020-01-27 RX ADMIN — OXYCODONE HYDROCHLORIDE 20 MG: 10 TABLET ORAL at 18:43

## 2020-01-27 ASSESSMENT — PAIN DESCRIPTION - PAIN TYPE
TYPE: CHRONIC PAIN
TYPE: CHRONIC PAIN

## 2020-01-27 ASSESSMENT — PAIN SCALES - GENERAL
PAINLEVEL_OUTOF10: 9
PAINLEVEL_OUTOF10: 3
PAINLEVEL_OUTOF10: 8
PAINLEVEL_OUTOF10: 9
PAINLEVEL_OUTOF10: 8
PAINLEVEL_OUTOF10: 9
PAINLEVEL_OUTOF10: 8
PAINLEVEL_OUTOF10: 9

## 2020-01-27 ASSESSMENT — PAIN DESCRIPTION - LOCATION
LOCATION: GENERALIZED
LOCATION: GENERALIZED

## 2020-01-27 ASSESSMENT — PAIN - FUNCTIONAL ASSESSMENT: PAIN_FUNCTIONAL_ASSESSMENT: ACTIVITIES ARE NOT PREVENTED

## 2020-01-27 ASSESSMENT — PAIN DESCRIPTION - ONSET: ONSET: ON-GOING

## 2020-01-27 ASSESSMENT — PAIN DESCRIPTION - PROGRESSION: CLINICAL_PROGRESSION: NOT CHANGED

## 2020-01-27 ASSESSMENT — PAIN DESCRIPTION - FREQUENCY: FREQUENCY: CONTINUOUS

## 2020-01-27 ASSESSMENT — PAIN DESCRIPTION - DESCRIPTORS: DESCRIPTORS: CONSTANT

## 2020-01-27 NOTE — CARE COORDINATION
[facility] reviewed chart and saw pt. Pt is a pleasant 64 yo whom lives at home w/ her significant other and adopted 35 yo daughter w/autism. Pt report she is independent w/ ADL's, uses a WC to assist w/ mobility. Pt con't to drives and her significant other drives if she can not. Pt was seen by Dr. Lola Smart and hospice was discussed. CM discussed hospice further w/ pt, pt is agreeable to gain more information and feels that hospice may benefit her.   [facility] called in referral.  CM will con't to follow

## 2020-01-27 NOTE — PROGRESS NOTES
Hospitalist Progress Note      Name:  Sharonda Purcell /Age/Sex: 1960  (63 y.o. female)   MRN & CSN:  [id number] & [id number] Admission Date/Time: 2020  8:44 PM   Location:  [facility]3012-A PCP: Didier Chow MD         Hospital Day: 3    Assessment and Plan:   Sharonda Purcell is a 63 y.o.  female  who presents with chest pain     -Recurrent chest and upper back pain: Etiology is not very clear. There is no acute finding on ECG and serial cardiac enzyme have been negative. Cardiology does not believe the pain is cardiac and does not propose any further work up. She also complains of upper back pain for which she had MRI T and C-spine on 1/3/2020. Nothing significant was found. She only has mild C-spine DJD. She follows with palliative medicine at [address] [place] [address] for chronic pain. She currently takes oxycodone 10 mg Q4H, PDMP reviewed. PE is unlikely as she is on anticoagulation and recent CTPA's for same presentation were negative. Palliative medicine (Dr. Lydia Wilkinson) on board and has recommended oxycodone 20 mg Q3H PRN. She also broached the idea of hospice with patient. Plan:  Continue oxycodone per palliative medicine recommendation  Await patient decision regarding home with hospice.     Chronic issues  CAD s/p CABG, s/p LAD stent Oct 2019. On plavix. Off ASA to prevent triple therapy. Hypothyroidism: on synthroid. Seizures: on Keppra  Multiple sclerosis  Pulmonary embolism in the past   Atrial flutter/fibrillation s/p ablation 19 by [facility] EP. On Amiodarone. Chronic AC with apixaban. VT S/p dual chamber AICD; s/p VT ablations at [address] [place] [address] in 2019. On Mexiletine. Chronic systolic HF due to Ischemic CMP, EF 25%; well compensated. On IMDUR, aldactone, torsemide  B12 deficiency: on cyanocobalamin  Depression: on duloxetine and paroxetine  HLD: Lipitor.    Iron deficiency: on ferrous gluconate  Severe malnutrition due to chronic disease: general diet, NS, Mineral Supplement, Vitamin 1,000 mg Oral BID    levothyroxine  224 mcg Oral Daily    magnesium oxide  800 mg Oral BID    PARoxetine  10 mg Oral Nightly    primidone  100 mg Oral BID    spironolactone  25 mg Oral Every Other Day    torsemide  20 mg Oral Every Other Day    clopidogrel  75 mg Oral Once    sodium chloride flush  10 mL Intravenous 2 times per day      Infusions:   PRN Meds: oxyCODONE, 20 mg, Q3H PRN  acetaminophen, 650 mg, Q4H PRN  albuterol sulfate HFA, 2 puff, Q6H PRN  sodium chloride flush, 10 mL, PRN  ondansetron, 4 mg, Q6H PRN  potassium chloride, 40 mEq, PRN    Or  potassium alternative oral replacement, 40 mEq, PRN    Or  potassium chloride, 10 mEq, PRN  magnesium sulfate, 1 g, PRN  polyethylene glycol, 17 g, Daily PRN  bisacodyl, 10 mg, Daily PRN        CBC   Recent Labs     01/25/20  2200 01/26/20  0321   WBC 4.3 4.1   HGB 12.0* 9.5*   HCT 38.5 30.2*    284      BMP   Recent Labs     01/25/20  2200      K 3.7   CL 99   CO2 25   BUN 10   CREATININE 0.7       Radiology report reviewed    Electronically signed by Geetha Flores MD on 1/27/2020 at 12:11 PM

## 2020-01-27 NOTE — PROGRESS NOTES
(pulmonary embolism), Pneumonia, Seizures ([address] Spleen enlarged, and Thyroid disease. Past surgical history:   has a past surgical history that includes Coronary angioplasty with stent; Hysterectomy; Appendectomy; Cholecystectomy; Tonsillectomy; Gastric bypass surgery; Cardiac defibrillator placement; hip surgery; Abdomen surgery; Colonoscopy; Endoscopy, colon, diagnostic; hernia repair; joint replacement; skin biopsy; vascular surgery; and Coronary artery bypass graft. Social History:   reports that she has never smoked. She has never used smokeless tobacco. She reports that she does not drink alcohol or use drugs. Family history:  family history includes Cancer in her father; Diabetes in her mother; Heart Disease in her father, maternal grandmother, and mother; High Blood Pressure in her maternal grandmother and mother; High Cholesterol in her maternal grandmother and mother; Other in her sister. Allergies   Allergen Reactions    Fentanyl Swelling     Other reaction(s): Angioedema (swelling)    Moxifloxacin Hcl In Nacl Swelling and Rash    Povidone-Iodine Rash     Other reaction(s): AOF      Cyclobenzaprine      Other reaction(s): Other - comment required  \" it make my muscle very weak because of my MS\"  \" it make my muscle very weak because of my MS\"      Methocarbamol      Worsening edema  Other reaction(s): Other - comment required  Worsening edema  Worsening edema  Worsening edema      Tramadol Other (See Comments)     Doctor doesn't her to take due to heart problems  Seizures   Doctor doesn't her to take due to lowers seizure threshold.  Baclofen     Ibuprofen      \"Due to heart problems\"    Naproxen     Nsaids      \"Due to heart problems\"    Tizanidine     Tolmetin      \"Due to heart problems\"    Tramadol Hcl      Other reaction(s):  Other - comment required  Told not to take due to history of seizures    Betadine [Povidone Iodine] Rash    Moxifloxacin Rash and Swelling     Lips swell

## 2020-01-27 NOTE — PLAN OF CARE
Problem: Falls - Risk of:  Goal: Will remain free from falls  Description  Will remain free from falls  1/27/2020 1343 by Roma Pinedo RN  Outcome: Ongoing  1/27/2020 0352 by Lexus Denis RN  Outcome: Ongoing  Goal: Absence of physical injury  Description  Absence of physical injury  1/27/2020 1343 by Roma Pinedo RN  Outcome: Ongoing  1/27/2020 0352 by Lexus Denis RN  Outcome: Ongoing     Problem: Pain:  Goal: Pain level will decrease  Description  Pain level will decrease  1/27/2020 1343 by Roma Pinedo RN  Outcome: Ongoing  1/27/2020 0352 by Lexus Denis RN  Outcome: Ongoing  Goal: Control of acute pain  Description  Control of acute pain  1/27/2020 1343 by Roma Pinedo RN  Outcome: Ongoing  1/27/2020 0352 by Lexus Denis RN  Outcome: Ongoing  Goal: Control of chronic pain  Description  Control of chronic pain  1/27/2020 1343 by Roma Pinedo RN  Outcome: Ongoing  1/27/2020 0352 by Lexus Denis RN  Outcome: Ongoing  Goal: Patient's pain/discomfort is manageable  Description  Patient's pain/discomfort is manageable  1/27/2020 1343 by Roma Pinedo RN  Outcome: Ongoing  1/27/2020 0352 by Lexus Denis RN  Outcome: Ongoing     Problem: Discharge Planning:  Goal: Discharged to appropriate level of care  Description  Discharged to appropriate level of care  1/27/2020 1343 by Roma Pinedo RN  Outcome: Ongoing  1/27/2020 0352 by Lexus Denis RN  Outcome: Ongoing     Problem: Infection:  Goal: Will remain free from infection  Description  Will remain free from infection  1/27/2020 1343 by Roma Pinedo RN  Outcome: Ongoing  1/27/2020 0352 by Lexus Denis RN  Outcome: Ongoing     Problem: Safety:  Goal: Free from accidental physical injury  Description  Free from accidental physical injury  1/27/2020 1343 by Roma Pinedo RN  Outcome: Ongoing  1/27/2020 0352 by Lexus Denis RN  Outcome: Ongoing  Goal: Free from intentional harm  Description  Free from intentional harm  1/27/2020 1343 by Ely Maldonado RN  Outcome: Ongoing  1/27/2020 0352 by April Valverde RN  Outcome: Ongoing     Problem: Daily Care:  Goal: Daily care needs are met  Description  Daily care needs are met  1/27/2020 1343 by Ely Maldonado RN  Outcome: Ongoing  1/27/2020 0352 by April Valverde RN  Outcome: Ongoing     Problem: Skin Integrity:  Goal: Skin integrity will stabilize  Description  Skin integrity will stabilize  1/27/2020 1343 by Ely Maldonado RN  Outcome: Ongoing  1/27/2020 0352 by April Valverde RN  Outcome: Ongoing     Problem: Discharge Planning:  Goal: Patients continuum of care needs are met  Description  Patients continuum of care needs are met  1/27/2020 1343 by Ely Maldonado RN  Outcome: Ongoing  1/27/2020 0352 by April Valverde RN  Outcome: Ongoing     Problem: Nutrition  Goal: Optimal nutrition therapy  Outcome: Ongoing

## 2020-01-27 NOTE — PROGRESS NOTES
loss of subcutaneous fat, Severe muscle loss    Objective Information:  · Wound Type: None  · Current Nutrition Therapies:  · Oral Diet Orders: General(no caffeine)   · Oral Diet intake: 1-25%  · Oral Nutrition Supplement (ONS) Orders: None  · Anthropometric Measures:  · Ht: 5' 6\" (167.6 cm)   · Current Body Wt: 104 lb 1.6 oz (47.2 kg)  · Admission Body Wt: 104 lb (47.2 kg)  · Usual Body Wt: 161 lb 1.6 oz (73.1 kg)(1/25/19)  · % Weight Change:    -36% over past yr  · Ideal Body Wt: 130 lb (59 kg), % Ideal Body 80  · BMI Classification: BMI <18.5 Underweight(16.8)    Nutrition Interventions:   Continue current diet, Start ONS, Mineral Supplement, Vitamin Supplement  Continued Inpatient Monitoring, Education Initiated, Coordination of Care    Nutrition Evaluation:   · Evaluation: Goals set   · Goals: Pt will consume greater than 75% of all meals and snacks provided      · Monitoring: Meal Intake, Supplement Intake, Diet Tolerance, Skin Integrity, Weight, Pertinent Labs      Electronically signed by Ester Cowden, RD, LD on 1/27/20 at 12:27 PM    Contact Number: [id number]

## 2020-01-27 NOTE — CONSULTS
12/29/2015    Alessia: EF 45%. Pharmacologic stress myocardial perfusion scan shows a fixed inferior-lateral defect w/ no inducible ischemia. No evidence of ischemia. Inferior-lateral infarction.  Normal LVSF    Hyperlipidemia     Hypertension     [facility]oma ([facility]     MI, old     Movement disorder     MS (multiple sclerosis) ([facility]     OP (osteoporosis)     PE (pulmonary embolism)     trapese filter    Pneumonia     Seizures ([facility]     Spleen enlarged     Thyroid disease        Past Surgical History:        Procedure Laterality Date    ABDOMEN SURGERY      APPENDECTOMY      CARDIAC DEFIBRILLATOR PLACEMENT      CHOLECYSTECTOMY      COLONOSCOPY      CORONARY ANGIOPLASTY WITH STENT PLACEMENT      3 stents    CORONARY ARTERY BYPASS GRAFT      Age 49    ENDOSCOPY, COLON, DIAGNOSTIC      GASTRIC BYPASS SURGERY      HERNIA REPAIR      HIP SURGERY      HYSTERECTOMY      JOINT REPLACEMENT      SKIN BIOPSY      TONSILLECTOMY      VASCULAR SURGERY         Current Medications:    Current Facility-Administered Medications: oxyCODONE HCl (OXY-IR) immediate release tablet 20 mg, 20 mg, Oral, Q3H PRN  isosorbide mononitrate (IMDUR) extended release tablet 120 mg, 120 mg, Oral, Daily  mexiletine (MEXITIL) capsule 150 mg, 150 mg, Oral, 3 times per day  acetaminophen (TYLENOL) tablet 650 mg, 650 mg, Oral, Q4H PRN  albuterol sulfate  (90 Base) MCG/ACT inhaler 2 puff, 2 puff, Inhalation, Q6H PRN  amiodarone (CORDARONE) tablet 200 mg, 200 mg, Oral, Daily  apixaban (ELIQUIS) tablet 5 mg, 5 mg, Oral, BID  atorvastatin (LIPITOR) tablet 80 mg, 80 mg, Oral, Nightly  clopidogrel (PLAVIX) tablet 75 mg, 75 mg, Oral, Daily  DULoxetine (CYMBALTA) extended release capsule 60 mg, 60 mg, Oral, Daily  famotidine (PEPCID) tablet 20 mg, 20 mg, Oral, BID  ferrous gluconate 324 (37.5 Fe) MG tablet 324 mg, 324 mg, Oral, BID  Icosapent Ethyl CAPS 500 mg, 500 mg, Oral, BID  lactobacillus (CULTURELLE) capsule 1 capsule, 1 capsule, Oral, TID WC  levETIRAcetam (KEPPRA) tablet 1,000 mg, 1,000 mg, Oral, BID  levothyroxine (SYNTHROID) tablet 224 mcg, 224 mcg, Oral, Daily  magnesium oxide (MAG-OX) tablet 800 mg, 800 mg, Oral, BID  PARoxetine (PAXIL) tablet 10 mg, 10 mg, Oral, Nightly  primidone (MYSOLINE) tablet 100 mg, 100 mg, Oral, BID  spironolactone (ALDACTONE) tablet 25 mg, 25 mg, Oral, Every Other Day  torsemide (DEMADEX) tablet 20 mg, 20 mg, Oral, Every Other Day  clopidogrel (PLAVIX) tablet 75 mg, 75 mg, Oral, Once  sodium chloride flush 0.9 % injection 10 mL, 10 mL, Intravenous, 2 times per day  sodium chloride flush 0.9 % injection 10 mL, 10 mL, Intravenous, PRN  ondansetron (ZOFRAN) injection 4 mg, 4 mg, Intravenous, Q6H PRN  potassium chloride (KLOR-CON M) extended release tablet 40 mEq, 40 mEq, Oral, PRN **OR** potassium bicarb-citric acid (EFFER-K) effervescent tablet 40 mEq, 40 mEq, Oral, PRN **OR** potassium chloride 10 mEq/100 mL IVPB (Peripheral Line), 10 mEq, Intravenous, PRN  magnesium sulfate 1 g in dextrose 5% 100 mL IVPB, 1 g, Intravenous, PRN  polyethylene glycol (GLYCOLAX) packet 17 g, 17 g, Oral, Daily PRN  bisacodyl (DULCOLAX) suppository 10 mg, 10 mg, Rectal, Daily PRN    Allergies:  Fentanyl; Moxifloxacin hcl in nacl; Povidone-iodine; Cyclobenzaprine; Methocarbamol; Tramadol; Baclofen; Ibuprofen; Naproxen; Nsaids; Tizanidine; Tolmetin; Tramadol hcl; Betadine [povidone iodine]; and Moxifloxacin    Social History:    Currently lives with her partner, Dortha Hodgkin, and an adopted daughter. She has one other adopted daughter as well.   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    Family History:       Problem Relation Age of Onset    High Blood Pressure Mother     High Cholesterol Mother     Heart Disease Mother     Diabetes Mother     Heart Disease Father     Cancer Father     Other Sister         Multiple Sclerosis    Heart Disease Maternal Grandmother     High Blood Pressure Maternal

## 2020-01-28 VITALS
HEIGHT: 66 IN | RESPIRATION RATE: 20 BRPM | OXYGEN SATURATION: 92 % | WEIGHT: 119.5 LBS | DIASTOLIC BLOOD PRESSURE: 62 MMHG | BODY MASS INDEX: 19.2 KG/M2 | SYSTOLIC BLOOD PRESSURE: 90 MMHG | HEART RATE: 65 BPM | TEMPERATURE: 97.8 F

## 2020-01-28 PROCEDURE — 6370000000 HC RX 637 (ALT 250 FOR IP): Performed by: INTERNAL MEDICINE

## 2020-01-28 PROCEDURE — 2580000003 HC RX 258: Performed by: INTERNAL MEDICINE

## 2020-01-28 PROCEDURE — 6360000002 HC RX W HCPCS: Performed by: INTERNAL MEDICINE

## 2020-01-28 PROCEDURE — 6370000000 HC RX 637 (ALT 250 FOR IP): Performed by: OBSTETRICS & GYNECOLOGY

## 2020-01-28 RX ADMIN — FAMOTIDINE 20 MG: 20 TABLET ORAL at 08:49

## 2020-01-28 RX ADMIN — AMIODARONE HYDROCHLORIDE 200 MG: 200 TABLET ORAL at 08:48

## 2020-01-28 RX ADMIN — CLOPIDOGREL BISULFATE 75 MG: 75 TABLET ORAL at 08:48

## 2020-01-28 RX ADMIN — Medication 1 CAPSULE: at 11:53

## 2020-01-28 RX ADMIN — PRIMIDONE 100 MG: 50 TABLET ORAL at 08:48

## 2020-01-28 RX ADMIN — OXYCODONE HYDROCHLORIDE 20 MG: 10 TABLET ORAL at 14:56

## 2020-01-28 RX ADMIN — SODIUM CHLORIDE, PRESERVATIVE FREE 10 ML: 5 INJECTION INTRAVENOUS at 08:51

## 2020-01-28 RX ADMIN — Medication 1 CAPSULE: at 08:49

## 2020-01-28 RX ADMIN — LEVOTHYROXINE SODIUM 224 MCG: 112 TABLET ORAL at 05:33

## 2020-01-28 RX ADMIN — Medication 800 MG: at 08:48

## 2020-01-28 RX ADMIN — OXYCODONE HYDROCHLORIDE 20 MG: 10 TABLET ORAL at 05:32

## 2020-01-28 RX ADMIN — MEXILETINE HYDROCHLORIDE 150 MG: 150 CAPSULE ORAL at 14:13

## 2020-01-28 RX ADMIN — APIXABAN 5 MG: 5 TABLET, FILM COATED ORAL at 08:49

## 2020-01-28 RX ADMIN — ONDANSETRON 4 MG: 2 INJECTION INTRAMUSCULAR; INTRAVENOUS at 12:45

## 2020-01-28 RX ADMIN — MEXILETINE HYDROCHLORIDE 150 MG: 150 CAPSULE ORAL at 05:33

## 2020-01-28 RX ADMIN — OXYCODONE HYDROCHLORIDE 20 MG: 10 TABLET ORAL at 08:48

## 2020-01-28 RX ADMIN — LEVETIRACETAM 1000 MG: 500 TABLET, FILM COATED ORAL at 08:48

## 2020-01-28 RX ADMIN — ISOSORBIDE MONONITRATE 120 MG: 60 TABLET, EXTENDED RELEASE ORAL at 08:48

## 2020-01-28 RX ADMIN — SPIRONOLACTONE 25 MG: 25 TABLET ORAL at 08:49

## 2020-01-28 RX ADMIN — DULOXETINE HYDROCHLORIDE 60 MG: 30 CAPSULE, DELAYED RELEASE ORAL at 08:48

## 2020-01-28 RX ADMIN — FERROUS GLUCONATE TAB 324 MG (37.5 MG ELEMENTAL IRON) 324 MG: 324 (37.5 FE) TAB at 08:48

## 2020-01-28 RX ADMIN — OXYCODONE HYDROCHLORIDE 20 MG: 10 TABLET ORAL at 11:53

## 2020-01-28 RX ADMIN — OXYCODONE HYDROCHLORIDE 20 MG: 10 TABLET ORAL at 02:21

## 2020-01-28 RX ADMIN — TORSEMIDE 20 MG: 20 TABLET ORAL at 08:48

## 2020-01-28 ASSESSMENT — PAIN SCALES - GENERAL
PAINLEVEL_OUTOF10: 8
PAINLEVEL_OUTOF10: 9
PAINLEVEL_OUTOF10: 8

## 2020-01-28 ASSESSMENT — PAIN DESCRIPTION - PAIN TYPE: TYPE: CHRONIC PAIN

## 2020-01-28 ASSESSMENT — PAIN DESCRIPTION - DESCRIPTORS: DESCRIPTORS: CONSTANT

## 2020-01-28 ASSESSMENT — PAIN DESCRIPTION - ONSET: ONSET: ON-GOING

## 2020-01-28 ASSESSMENT — PAIN DESCRIPTION - FREQUENCY: FREQUENCY: CONTINUOUS

## 2020-01-28 ASSESSMENT — PAIN DESCRIPTION - LOCATION: LOCATION: GENERALIZED

## 2020-01-28 ASSESSMENT — PAIN - FUNCTIONAL ASSESSMENT: PAIN_FUNCTIONAL_ASSESSMENT: ACTIVITIES ARE NOT PREVENTED

## 2020-01-28 ASSESSMENT — PAIN DESCRIPTION - PROGRESSION: CLINICAL_PROGRESSION: NOT CHANGED

## 2020-01-28 NOTE — CONSULTS
[facility] Consult Note    Date: 1/28/2020  Name: Ann Blue  MRN: [id number]  YOB: 1960   Patient's PCP: Terry Morel MD  Referring Physician: Dr. Lloyd Lujan  Acute care admission date: 1/25/2020 (one of multipl admissions locally and at [facility])     Informant: Chart reviewed, discussed with BAILEY Stone RN, and I met with the patient at the bedside. I have reviewed the [facility] records from [facility], the local records, the Palliative care out patient records and the [address] records from short duration of home and inpatient unit Hospice Care in August and September 2016. With the patient's permission, I have talked to the out patient palliative care physician to discuss. CC: chest pain, hip pain    [place]: This is a 63 y.o. female with multiple medical problems including coronary artery disease, prior CABG and PCI, ischemic cardiomyopathy with LVEF 30% by recent nuclear study, active AICD, CVA, MS, avascular necrosis of hip with chronic pain, fibromyalgia, seizures, hypothyroidism, and history of gastric bypass. The patient was admitted on 1/25/2020 from home with chest pain. Serial troponins are negative. The patient has been seen by Cardiology. The patient had an admission at [facility] earlier in January 2020 with cardiac catheterization but no intervention. She had cardiac catheterization per Dr Carole Lui with PCI to LAD through LIMA graft at [facility] on 10/27/19. Myoview performed on 12/15/19 showed non reversible perfusion defect in the inferior wall. The patient's pain is chronic and diffuse, involving hips, knees primarily, and she has episodes of chest pain. The patient has been followed by out patient Palliative Medicine in [facility], [facility] and has been on Oxycodone IR 20 mg po every 3 hours prn pain, utilizing most doses.  She had a brief admission on Hospice of discussed with the patient that based on exam and chart review and discussion with treating physicians, I believe that the patient would be best served by continuing with Palliative Medicine. I am not convinced that she has a 6 month life expectancy, although she has multiple significant illnesses. The patient is not yet ready to forego continued aggressive care and management. We discussed that hospice can follow up in the future if the situation changes or Palliative Medicine feels it is appropriate. Past Medical History:   Diagnosis Date    Acute delirium     Arrhythmia     Arthritis     Asplenia     Asthma     CAD (coronary artery disease)     1st stent at age 46    Cardiomyopathy [facility])     Cerebral artery occlusion with cerebral infarction ([facility]     CHF (congestive heart failure) (HCC)     Enlarged liver     GERD (gastroesophageal reflux disease)     H/O echocardiogram 4/6/16    EF 55%, trivial TR & MR, stage 1 diastolic dysfunction    History of blood transfusion     Hx of cardiovascular stress test 12/29/2015    Alessia: EF 45%. Pharmacologic stress myocardial perfusion scan shows a fixed inferior-lateral defect w/ no inducible ischemia. No evidence of ischemia. Inferior-lateral infarction.  Normal LVSF    Hyperlipidemia     Hypertension     Lymphoma (HCC)     MI, old     Movement disorder     MS (multiple sclerosis) ([address]     OP (osteoporosis)     PE (pulmonary embolism)     trapese filter    Pneumonia     Seizures ([address]     Spleen enlarged     Thyroid disease        Past Surgical History:   Procedure Laterality Date    ABDOMEN SURGERY      APPENDECTOMY      CARDIAC DEFIBRILLATOR PLACEMENT      CHOLECYSTECTOMY      COLONOSCOPY      CORONARY ANGIOPLASTY WITH STENT PLACEMENT      3 stents    CORONARY ARTERY BYPASS GRAFT      Age 49    ENDOSCOPY, COLON, DIAGNOSTIC      GASTRIC BYPASS SURGERY      HERNIA REPAIR      HIP SURGERY      HYSTERECTOMY      JOINT REPLACEMENT      Povidone-Iodine Rash     Other reaction(s): AOF      Cyclobenzaprine      Other reaction(s): Other - comment required  \" it make my muscle very weak because of my MS\"  \" it make my muscle very weak because of my MS\"      Methocarbamol      Worsening edema  Other reaction(s): Other - comment required  Worsening edema  Worsening edema  Worsening edema      Tramadol Other (See Comments)     Doctor doesn't her to take due to heart problems  Seizures   Doctor doesn't her to take due to lowers seizure threshold.  Baclofen     Ibuprofen      \"Due to heart problems\"    Naproxen     Nsaids      \"Due to heart problems\"    Tizanidine     Tolmetin      \"Due to heart problems\"    Tramadol Hcl      Other reaction(s): Other - comment required  Told not to take due to history of seizures    Betadine [Povidone Iodine] Rash    Moxifloxacin Rash and Swelling     Lips swell and tingling in face   Lips swell and tingling in face   Lips swell and tingling in face   Lips swell and tingling in face   Around mouth       Medication list reviewed  Prior to Admission medications    Medication Sig Start Date End Date Taking? Authorizing Provider   primidone (MYSOLINE) 50 MG tablet Take 2 tablets by mouth 2 times daily 1/3/20   Mimi King MD   levothyroxine (SYNTHROID) 112 MCG tablet Take 2 tablets by mouth Daily 1/4/20   Mimi King MD   colchicine (COLCRYS) 0.6 MG tablet Take 1 tablet by mouth daily 1/4/20   Mimi King MD   isosorbide mononitrate (IMDUR) 30 MG extended release tablet Take 2 tablets by mouth daily 12/19/19   Sandra Gerardo MD   ipratropium-albuterol (DUONEB) 0.5-2.5 (3) MG/3ML SOLN nebulizer solution Inhale 3 mLs into the lungs every 4 hours (while awake) 11/18/19   Brett Mccray MD   naloxone 4 MG/0.1ML LIQD nasal spray 1 spray by Nasal route as needed for Opioid Reversal 11/18/19   Brett Mccray MD   nitroGLYCERIN (NITROSTAT) 0.4 MG SL tablet up to max of 3 total doses.  If no relief after 1 dose, call 911. 11/1/19   Axel Reddy MD   naloxone 4 MG/0.1ML LIQD nasal spray 1 spray by Nasal route as needed for Opioid Reversal 11/1/19   Axel Reddy MD   benzocaine-menthol (CEPACOL SORE THROAT) 15-3.6 MG lozenge Take 1 lozenge by mouth every 2 hours as needed for Sore Throat 11/1/19   Axel Reddy MD   B-D TB SYRINGE 1CC/25GX5/8\" 25G X 5/8\" 1 ML MISC USE AS DIRECTED WITH B-12 INJECTION 9/27/19   Historical Provider, MD   PARoxetine (PAXIL) 10 MG tablet  10/23/19   Historical Provider, MD   cyanocobalamin 1000 MCG/ML injection INJECT 1 ML INTO THE MUSCLE ON THE FIRST OF [facility] MONTH 9/27/19   Historical Provider, MD   spironolactone (ALDACTONE) 25 MG tablet Take 25 mg by mouth 2 times daily    Historical Provider, MD   torsemide (DEMADEX) 20 MG tablet Take 20 mg by mouth daily Indications: pt takes 2 tablets daily    Historical Provider, MD   Icosapent Ethyl (VASCEPA) 0.5 g CAPS Take 500 mg by mouth 2 times daily 9/26/19   JESSE Moon - CNP   amiodarone (CORDARONE) 200 MG tablet Take 200 mg by mouth daily    Historical Provider, MD   apixaban (ELIQUIS) 5 MG TABS tablet Take 5 mg by mouth 2 times daily    Historical Provider, MD   DULoxetine (CYMBALTA) 60 MG extended release capsule Take 1 capsule by mouth daily 8/29/19   Jenn Marrufo MD   ondansetron (ZOFRAN) 4 MG tablet Take 1 tablet by mouth every 6 hours as needed for Nausea or Vomiting 2/26/19   Jose A Arnold MD   lactobacillus (CULTURELLE) capsule Take 1 capsule by mouth 3 times daily (with meals) 2/26/19   Jose A Arnold MD   magnesium oxide (MAG-OX) 400 MG tablet Take 800 mg by mouth 2 times daily    Historical Provider, MD   mexiletine (MEXITIL) 150 MG capsule Take 150 mg by mouth 3 times daily    Historical Provider, MD   docusate sodium (COLACE) 100 MG capsule Take 100 mg by mouth 2 times daily as needed for Constipation    Historical Provider, MD   famotidine (PEPCID) 20 MG tablet Take 1 tablet by mouth 2 times daily 2/7/19   Unknown Foil, MD   levETIRAcetam (KEPPRA) 1000 MG tablet Take 1,000 mg by mouth 2 times daily     Historical Provider, MD   atorvastatin (LIPITOR) 80 MG tablet Take 80 mg by mouth nightly    Historical Provider, MD   clopidogrel (PLAVIX) 75 MG tablet Take 75 mg by mouth daily    Historical Provider, MD   acetaminophen 650 MG TABS Take 650 mg by mouth every 4 hours as needed 4/9/16   Nisa Woods MD   ferrous gluconate (FERGON) 324 (38 FE) MG tablet Take 324 mg by mouth 2 times daily     Historical Provider, MD   aspirin 81 MG tablet Take 81 mg by mouth daily     Historical Provider, MD   albuterol (PROVENTIL HFA;VENTOLIN HFA) 108 (90 BASE) MCG/ACT inhaler Inhale 2 puffs into the lungs every 6 hours as needed. Historical Provider, MD       ROS: As noted in [address], all other systems are negative or as follows:  Constitutional: generally weak, no fever, chills, + weight loss since her bariatric surgery, but fairly stable since  HEENT:  No acute visual changes, nasal drainage,   CV:  Frequent chest pain, has ICD  PULM:  No cough, shortness of breath, hemoptysis  GI:  history of bariatric surgery  :  No dysuria, hematuria  Musculoskeletal:  Chronic myofascial pain, hip pain with history of Avascular necrosis of hip, and prior patellar fractures  Neuro: no seizures      Weight:    Wt Readings from Last 3 Encounters:   01/28/20 119 lb 8 oz (54.2 kg)   01/02/20 121 lb 3.2 oz (55 kg)   12/20/19 102 lb (46.3 kg)       Data reviewed 1/28/2020:  EKG: paced    chest X-ray 1/25/20: No acute process    Cardiac catheterization with PCI per Dr Carole Lui 10/27/2019:   Procedure Summary   Indication : class IV angina   Access : Right Femoral   1. PCI to LAD distal lesion after LIMA touchdown through LIMA   graft using guide liner support for 90 % calcified lesion using   2.5 X 12 MIGUEL ANGEL inflated to 16 tao ( 2.7m mm) , lesion was reduced   from 90 % to 0 % .  2.0 X 15 Compliant balloon was used for POBA before stent was placed   2. Om has proximal 70 -80 % lesion   3. RCA stent is patent with 10-20 % iin stent Restenosis   4. LVEDP was 8 MMHG      Recommendations   Risk modification   Aspirin , Plavix and Eliquis for 1 mth and then continue nly   Plavix and Eliquis to avoid triple RX   Risk modification   PCI of OM 1 if symptoms persist or if there is large area of   lateral wall ischemia on stress    Hepatic Function Panel:    Lab Results   Component Value Date    ALKPHOS 87 01/25/2020    ALT 12 01/25/2020    AST 15 01/25/2020    PROT 6.4 01/25/2020    PROT 6.3 02/21/2013    BILITOT 0.2 01/25/2020    BILIDIR 0.2 08/28/2019    IBILI 0.0 08/28/2019    LABALBU 3.9 01/25/2020     CBC:   Recent Labs     01/25/20 2200 01/26/20  0321   WBC 4.3 4.1   HGB 12.0* 9.5*   HCT 38.5 30.2*   .9* 107.9*    284     BMP:   Recent Labs     01/25/20 2200 01/27/20  1219    138   K 3.7 3.9   CL 99 104   CO2 25 26   BUN 10 9   CREATININE 0.7 0.7   GLUCOSE 104* 100*       Physical Exam:   BP 90/62   Pulse 65   Temp 97.8 °F (36.6 °C) (Oral)   Resp 20   Ht 5' 6\" (1.676 m)   Wt 119 lb 8 oz (54.2 kg)   SpO2 92%   BMI 19.29 kg/m²   General: Comfortable, sitting up in bed, had just ambulated to the restroom, cooperative, in no distress  HEENT: Mucous membranes are moist, sclerae are clear  Neck: no JVD, no thyromegaly, no carotid bruit  Chest: healed sternotomy scar, ICD on right   Heart: RRR, S1S2, no murmurs  Lungs:  Equal breath sounds bilaterally without rales, rhonchi   Abdomen: soft, bowel sounds present, no tenderness, nondistended  Extremities:  No mottling or edema  Neurologic: alert, cooperative, no focal cranial nerve or motor deficit    Assessment/Plan:  1. Chronic nonmalignant pain on high dose Oxycodone IR. I have discussed with Palliative Medicine and we agree that the patient would be best served by continuing with palliative care.  The patient certainly has many medical issues, but based on recent evaluations, the coronary disease seems stable. The patient wants to remain a full code, with active AICD, and wants to see children and grandchildren grow up, which is certainly understandable. I am not sure that I can state that the patient has a less than 6 month life expectancy. I discussed with the patient that the Palliative care physician can make the referral when she needs it. The patient seemed a bit upset with me about not admitting to hospice. 2. Advanced coronary artery disease  3. Systolic Heart Failure with ischemic cardiomyopathy, compensated  4. History of multiple sclerosis  5. History of CVA  6. Seizure disorder   7. History of gastric bypass  8. Hyperlipidemia  9.  Hypothyroidism      Patient Active Problem List   Diagnosis Code    Chest pain R07.9    Chest pain R07.9    CAD (coronary artery disease) I25.10    Hyperlipidemia E78.5    Thyroid disease E07.9    Acute exacerbation of CHF (congestive heart failure) ([facility] I50.9    CHF exacerbation ([facility]) I50.9    Congestive heart failure ([facility]) I50.9    Left upper quadrant pain R10.12    Pneumonia of left lower lobe due to infectious organism ([facility] J18.1    Angina at rest [facility]) I20.8    Coronary artery disease involving coronary bypass graft of native heart with angina pectoris ([facility]) I25.709    Shortness of breath R06.02    Ischemic cardiomyopathy I25.5    Acute metabolic encephalopathy B29.04    Acute delirium R41.0    Pneumonia J18.9    Sacral pain M53.3    Acute on chronic systolic CHF (congestive heart failure), NYHA class 3 ([facility]) I50.23    Severe malnutrition ([facility]) E43    Cellulitis L03.90    Cellulitis at gastrostomy tube site ([facility] [address] K94.22, L03.319    History of Maru-en-Y gastric bypass Z98.84    Gastroparesis K31.84    Abdominal pain R10.9    Feeding tube dysfunction T85.598A    Abdominal pain, epigastric R10.13    Gastrojejunostomy tube status ([facility]) Z93.4    Chronic malnutrition ([facility]) E46    Asplenia Q89.01   

## 2020-01-28 NOTE — PLAN OF CARE
Problem: Falls - Risk of:  Goal: Will remain free from falls  Description  Will remain free from falls  1/28/2020 1103 by Precious Mason RN  Outcome: Ongoing  1/28/2020 0503 by Brown Marlow RN  Outcome: Ongoing  Goal: Absence of physical injury  Description  Absence of physical injury  1/28/2020 1103 by Precious Mason RN  Outcome: Ongoing  1/28/2020 0503 by Brown Marlow RN  Outcome: Ongoing     Problem: Pain:  Goal: Pain level will decrease  Description  Pain level will decrease  1/28/2020 1103 by Precious Mason RN  Outcome: Ongoing  1/28/2020 0503 by Brown Marlow RN  Outcome: Ongoing  Goal: Control of acute pain  Description  Control of acute pain  1/28/2020 1103 by Precious Mason RN  Outcome: Ongoing  1/28/2020 0503 by Brown Marlow RN  Outcome: Ongoing  Goal: Control of chronic pain  Description  Control of chronic pain  1/28/2020 1103 by Precious Mason RN  Outcome: Ongoing  1/28/2020 0503 by Brown Marlow RN  Outcome: Ongoing  Goal: Patient's pain/discomfort is manageable  Description  Patient's pain/discomfort is manageable  1/28/2020 1103 by Precious Mason RN  Outcome: Ongoing  1/28/2020 0503 by Brown Marlow RN  Outcome: Ongoing     Problem: Discharge Planning:  Goal: Discharged to appropriate level of care  Description  Discharged to appropriate level of care  1/28/2020 1103 by Precious Mason RN  Outcome: Ongoing  1/28/2020 0503 by Brown Marlow RN  Outcome: Ongoing     Problem: Infection:  Goal: Will remain free from infection  Description  Will remain free from infection  1/28/2020 1103 by Precious Mason RN  Outcome: Ongoing  1/28/2020 0503 by Brown Marlow RN  Outcome: Ongoing     Problem: Safety:  Goal: Free from accidental physical injury  Description  Free from accidental physical injury  1/28/2020 1103 by Precious Mason RN  Outcome: Ongoing  1/28/2020 0503 by Brown Marlow RN  Outcome: Ongoing  Goal: Free from intentional harm  Description  Free from intentional harm  1/28/2020 1103 by Tammi Soliz RN  Outcome: Ongoing  1/28/2020 0503 by Rafi Leija RN  Outcome: Ongoing     Problem: Daily Care:  Goal: Daily care needs are met  Description  Daily care needs are met  1/28/2020 1103 by Tammi Soliz RN  Outcome: Ongoing  1/28/2020 0503 by Rafi Leija RN  Outcome: Ongoing     Problem: Skin Integrity:  Goal: Skin integrity will stabilize  Description  Skin integrity will stabilize  1/28/2020 1103 by Tammi Soliz RN  Outcome: Ongoing  1/28/2020 0503 by Rafi Leija RN  Outcome: Ongoing     Problem: Discharge Planning:  Goal: Patients continuum of care needs are met  Description  Patients continuum of care needs are met  1/28/2020 1103 by Tammi Soliz RN  Outcome: Ongoing  1/28/2020 0503 by Rafi Leija RN  Outcome: Ongoing     Problem: Nutrition  Goal: Optimal nutrition therapy  1/28/2020 1103 by Tammi Soliz RN  Outcome: Ongoing  1/28/2020 0503 by Rafi Leija RN  Outcome: Ongoing

## 2020-01-28 NOTE — DISCHARGE SUMMARY
[address] Hospitalist     Discharge Summary    Name:  Wesley Hardy /Age/Sex: 1960  (63 y.o. female)   MRN & CSN:  [id number] & [id number] Admission Date/Time: 2020  8:44 PM   Attending:  Jessica Gavin MD Discharging Physician: Jessica Gavin MD     HPI:     Please, see admission HPI in [address] and patient's hospital course below    Hospital Course: Wesley Hardy is a 63 y.o.  female  who presents with chest pain and the following assessments reflect patient's hospital course     Recurrent chest and upper back pain: Etiology is not very clear. There is no acute finding on ECG and serial cardiac enzyme have been negative. Cardiology does not believe the pain is cardiac and does not propose any further work up. She also complains of upper back pain for which she had MRI T and C-spine on 1/3/2020. Nothing significant was found. She only has mild C-spine DJD. She follows with palliative medicine at [facility] for chronic pain. She currently takes oxycodone 20 mg Q4H, PDMP reviewed. Deemed no cardiac by cardiology team     Palliative medicine (Dr. Amor Burger) on board and has recommended oxycodone 20 mg Q3H PRN. She also broached the idea of hospice with patient. Continue oxycodone per palliative medicine recommendation  Patient is not appropriate for inpatient hospice and will follow [facility] with [facility] regarding her pain medications      Chronic issues  CAD s/p CABG, s/p LAD stent Oct 2019. On plavix. Off ASA to prevent triple therapy. Hypothyroidism: on synthroid. Seizures: on Keppra  Multiple sclerosis  Pulmonary embolism in the past   Atrial flutter/fibrillation s/p ablation 19 by [facility] EP. On Amiodarone. Chronic AC with apixaban. VT S/p dual chamber AICD; s/p VT ablations at [facility] in 2019. On Mexiletine. Chronic systolic CHF - not in exacerbation   Ischemic CMP, EF 25%;  On IMDUR, aldactone, torsemide  B12 deficiency: on cyanocobalamin  Depression: on takes 2 tablets daily                 Subjective _ patient is resting in bed in no acute pain or distress - dull chest pain not different from previous days - no SOB    Objective Findings at Discharge:   BP 90/62   Pulse 65   Temp 97.8 °F (36.6 °C) (Oral)   Resp 20   Ht 5' 6\" (1.676 m)   Wt 119 lb 8 oz (54.2 kg)   SpO2 92%   BMI 19.29 kg/m²            PHYSICAL EXAM   GEN Awake female, resting in bed in no apparent distress. Appears given age. HEENT Mucous membranes are moist.  RESP Clear to auscultation, no wheezes, rales or rhonchi. CARDIO/VAS - S1/S2 auscultated. Regular rate without appreciable murmurs, rubs, or gallops. Peripheral pulses equal bilaterally and palpable. No peripheral edema. GI Abdomen is soft without significant tenderness, masses, or guarding. Bowel sounds are normoactive  MSK No gross joint deformities. Spontaneous movement of all extremities  SKIN Normal coloration, warm, dry. NEURO Cranial nerves appear grossly intact, normal speech, no lateralizing weakness.     BMP/CBC  Recent Labs     01/25/20  2200 01/26/20  0321 01/27/20  1219     --  138   K 3.7  --  3.9   CL 99  --  104   CO2 25  --  26   BUN 10  --  9   CREATININE 0.7  --  0.7   WBC 4.3 4.1  --    HCT 38.5 30.2*  --     284  --          Discharge Time of 33 minutes    Electronically signed by Alonzo Clayton MD on 1/28/2020 at 12:36 PM

## 2020-02-02 ENCOUNTER — HOSPITAL ENCOUNTER (EMERGENCY)
Age: 60
Discharge: HOME | End: 2020-02-02
Attending: EMERGENCY MEDICINE
Payer: MEDICARE

## 2020-02-02 ENCOUNTER — APPOINTMENT (OUTPATIENT)
Dept: GENERAL RADIOLOGY | Age: 60
End: 2020-02-02
Payer: MEDICARE

## 2020-02-02 VITALS
HEART RATE: 60 BPM | DIASTOLIC BLOOD PRESSURE: 77 MMHG | TEMPERATURE: 97.8 F | WEIGHT: 110 LBS | OXYGEN SATURATION: 96 % | BODY MASS INDEX: 17.68 KG/M2 | HEIGHT: 66 IN | SYSTOLIC BLOOD PRESSURE: 125 MMHG | RESPIRATION RATE: 16 BRPM

## 2020-02-02 LAB
ALBUMIN SERPL-MCNC: 2.8 GM/DL (ref 3.4–5)
ALP BLD-CCNC: 75 IU/L (ref 40–129)
ALT SERPL-CCNC: 10 U/L (ref 10–40)
ANION GAP SERPL CALCULATED.3IONS-SCNC: 11 MMOL/L (ref 4–16)
AST SERPL-CCNC: 24 IU/L (ref 15–37)
BASOPHILS ABSOLUTE: 0 K/CU MM
BASOPHILS RELATIVE PERCENT: 1.1 % (ref 0–1)
BILIRUB SERPL-MCNC: 0.2 MG/DL (ref 0–1)
BUN BLDV-MCNC: 8 MG/DL (ref 6–23)
CALCIUM SERPL-MCNC: 8.3 MG/DL (ref 8.3–10.6)
CHLORIDE BLD-SCNC: 104 MMOL/L (ref 99–110)
CO2: 21 MMOL/L (ref 21–32)
CREAT SERPL-MCNC: 0.6 MG/DL (ref 0.6–1.1)
DIFFERENTIAL TYPE: ABNORMAL
EOSINOPHILS ABSOLUTE: 0 K/CU MM
EOSINOPHILS RELATIVE PERCENT: 0 % (ref 0–3)
GFR AFRICAN AMERICAN: >60 ML/MIN/1.73M2
GFR NON-AFRICAN AMERICAN: >60 ML/MIN/1.73M2
GLUCOSE BLD-MCNC: 102 MG/DL (ref 70–99)
HCT VFR BLD CALC: 37.6 % (ref 37–47)
HEMOGLOBIN: 11.5 GM/DL (ref 12.5–16)
IMMATURE NEUTROPHIL %: 0.5 % (ref 0–0.43)
LYMPHOCYTES ABSOLUTE: 0.7 K/CU MM
LYMPHOCYTES RELATIVE PERCENT: 18.9 % (ref 24–44)
MCH RBC QN AUTO: 33.3 PG (ref 27–31)
MCHC RBC AUTO-ENTMCNC: 30.6 % (ref 32–36)
MCV RBC AUTO: 109 FL (ref 78–100)
MONOCYTES ABSOLUTE: 0.3 K/CU MM
MONOCYTES RELATIVE PERCENT: 8.6 % (ref 0–4)
NUCLEATED RBC %: 0 %
PDW BLD-RTO: 15.4 % (ref 11.7–14.9)
PLATELET # BLD: 309 K/CU MM (ref 140–440)
PMV BLD AUTO: 10.1 FL (ref 7.5–11.1)
POTASSIUM SERPL-SCNC: 5.1 MMOL/L (ref 3.5–5.1)
PRO-BNP: 2858 PG/ML
RBC # BLD: 3.45 M/CU MM (ref 4.2–5.4)
SEGMENTED NEUTROPHILS ABSOLUTE COUNT: 2.6 K/CU MM
SEGMENTED NEUTROPHILS RELATIVE PERCENT: 70.9 % (ref 36–66)
SODIUM BLD-SCNC: 136 MMOL/L (ref 135–145)
TOTAL IMMATURE NEUTOROPHIL: 0.02 K/CU MM
TOTAL NUCLEATED RBC: 0 K/CU MM
TOTAL PROTEIN: 5.5 GM/DL (ref 6.4–8.2)
TROPONIN T: <0.01 NG/ML
WBC # BLD: 3.7 K/CU MM (ref 4–10.5)

## 2020-02-02 PROCEDURE — 99285 EMERGENCY DEPT VISIT HI MDM: CPT

## 2020-02-02 PROCEDURE — 96374 THER/PROPH/DIAG INJ IV PUSH: CPT

## 2020-02-02 PROCEDURE — 85025 COMPLETE CBC W/AUTO DIFF WBC: CPT

## 2020-02-02 PROCEDURE — 6360000002 HC RX W HCPCS: Performed by: EMERGENCY MEDICINE

## 2020-02-02 PROCEDURE — 84484 ASSAY OF TROPONIN QUANT: CPT

## 2020-02-02 PROCEDURE — 93010 ELECTROCARDIOGRAM REPORT: CPT | Performed by: INTERNAL MEDICINE

## 2020-02-02 PROCEDURE — 93005 ELECTROCARDIOGRAM TRACING: CPT | Performed by: EMERGENCY MEDICINE

## 2020-02-02 PROCEDURE — 83880 ASSAY OF NATRIURETIC PEPTIDE: CPT

## 2020-02-02 PROCEDURE — 6370000000 HC RX 637 (ALT 250 FOR IP): Performed by: EMERGENCY MEDICINE

## 2020-02-02 PROCEDURE — 71045 X-RAY EXAM CHEST 1 VIEW: CPT

## 2020-02-02 PROCEDURE — 80053 COMPREHEN METABOLIC PANEL: CPT

## 2020-02-02 RX ORDER — MORPHINE SULFATE 4 MG/ML
4 INJECTION, SOLUTION INTRAMUSCULAR; INTRAVENOUS ONCE
Status: COMPLETED | OUTPATIENT
Start: 2020-02-02 | End: 2020-02-02

## 2020-02-02 RX ORDER — OXYCODONE HYDROCHLORIDE 5 MG/1
20 TABLET ORAL ONCE
Status: COMPLETED | OUTPATIENT
Start: 2020-02-02 | End: 2020-02-02

## 2020-02-02 RX ORDER — AZITHROMYCIN 250 MG/1
250 TABLET, FILM COATED ORAL DAILY
Qty: 4 TABLET | Refills: 0 | Status: SHIPPED | OUTPATIENT
Start: 2020-02-02 | End: 2020-02-06

## 2020-02-02 RX ORDER — AZITHROMYCIN 250 MG/1
500 TABLET, FILM COATED ORAL ONCE
Status: COMPLETED | OUTPATIENT
Start: 2020-02-02 | End: 2020-02-02

## 2020-02-02 RX ADMIN — AZITHROMYCIN MONOHYDRATE 500 MG: 250 TABLET ORAL at 06:52

## 2020-02-02 RX ADMIN — OXYCODONE HYDROCHLORIDE 20 MG: 5 TABLET ORAL at 04:49

## 2020-02-02 RX ADMIN — MORPHINE SULFATE 4 MG: 4 INJECTION, SOLUTION INTRAMUSCULAR; INTRAVENOUS at 06:52

## 2020-02-02 ASSESSMENT — PAIN SCALES - GENERAL
PAINLEVEL_OUTOF10: 9

## 2020-02-02 NOTE — ED PROVIDER NOTES
Triage Chief Complaint:   Chest Pain and Shortness of Breath    Jamul:  Lillian Montgomery is a 63 y.o. female that presents via EMS with 1 day of fatigue, diffuse body aches and joint pains, increasing shortness of breath, lightheadedness, cough. States that she has broken out some sweats. Denies sick contacts or recent travel. Denies abdominal pain, vomiting, diarrhea. She states that she has not had much of an appetite. She has not taken her oxycodone in the last day because she has been sleeping for most of the time. States that she feels like she has crackles in her lungs. ROS:  At least 14 systems reviewed and otherwise acutely negative except as in the [address] Past Medical History:   Diagnosis Date    Acute delirium     Arrhythmia     Arthritis     Asplenia     Asthma     CAD (coronary artery disease)     1st stent at age 46    Cardiomyopathy [facility])     Cerebral artery occlusion with cerebral infarction ([address]     CHF (congestive heart failure) (HCC)     Enlarged liver     GERD (gastroesophageal reflux disease)     H/O echocardiogram 4/6/16    EF 55%, trivial TR & MR, stage 1 diastolic dysfunction    History of blood transfusion     Hx of cardiovascular stress test 12/29/2015    Alessia: EF 45%. Pharmacologic stress myocardial perfusion scan shows a fixed inferior-lateral defect w/ no inducible ischemia. No evidence of ischemia. Inferior-lateral infarction.  Normal LVSF    Hyperlipidemia     Hypertension     Lymphoma ([address]     MI, old     Movement disorder     MS (multiple sclerosis) ([address]     OP (osteoporosis)     PE (pulmonary embolism)     trapese filter    Pneumonia     Seizures ([address]     Spleen enlarged     Thyroid disease      Past Surgical History:   Procedure Laterality Date    ABDOMEN SURGERY      APPENDECTOMY      CARDIAC DEFIBRILLATOR PLACEMENT      CHOLECYSTECTOMY      COLONOSCOPY      CORONARY ANGIOPLASTY WITH STENT PLACEMENT      3 stents    CORONARY ARTERY file     Current Facility-Administered Medications   Medication Dose Route Frequency Provider Last Rate Last Dose    oxyCODONE (ROXICODONE) immediate release tablet 20 mg  20 mg Oral Once Leigha Rangel MD         Current Outpatient Medications   Medication Sig Dispense Refill    primidone (MYSOLINE) 50 MG tablet Take 2 tablets by mouth 2 times daily 60 tablet 0    levothyroxine (SYNTHROID) 112 MCG tablet Take 2 tablets by mouth Daily 30 tablet 1    colchicine (COLCRYS) 0.6 MG tablet Take 1 tablet by mouth daily 9 tablet 0    isosorbide mononitrate (IMDUR) 30 MG extended release tablet Take 2 tablets by mouth daily 30 tablet 0    ipratropium-albuterol (DUONEB) 0.5-2.5 (3) MG/3ML SOLN nebulizer solution Inhale 3 mLs into the lungs every 4 hours (while awake) 360 mL 0    naloxone 4 MG/0.1ML LIQD nasal spray 1 spray by Nasal route as needed for Opioid Reversal 1 each 5    nitroGLYCERIN (NITROSTAT) 0.4 MG SL tablet up to max of 3 total doses.  If no relief after 1 dose, call 911. 25 tablet 3    naloxone 4 MG/0.1ML LIQD nasal spray 1 spray by Nasal route as needed for Opioid Reversal 1 each 5    benzocaine-menthol (CEPACOL SORE THROAT) 15-3.6 MG lozenge Take 1 lozenge by mouth every 2 hours as needed for Sore Throat 18 lozenge 1    B-D TB SYRINGE 1CC/25GX5/8\" 25G X 5/8\" 1 ML MISC USE AS DIRECTED WITH B-12 INJECTION  9    PARoxetine (PAXIL) 10 MG tablet   1    cyanocobalamin 1000 MCG/ML injection INJECT 1 ML INTO THE MUSCLE ON THE FIRST OF EACH MONTH  11    spironolactone (ALDACTONE) 25 MG tablet Take 25 mg by mouth 2 times daily      torsemide (DEMADEX) 20 MG tablet Take 20 mg by mouth daily Indications: pt takes 2 tablets daily      Icosapent Ethyl (VASCEPA) 0.5 g CAPS Take 500 mg by mouth 2 times daily 180 capsule 2    amiodarone (CORDARONE) 200 MG tablet Take 200 mg by mouth daily      apixaban (ELIQUIS) 5 MG TABS tablet Take 5 mg by mouth 2 times daily      DULoxetine (CYMBALTA) 60 MG extended release capsule Take 1 capsule by mouth daily 30 capsule 3    ondansetron (ZOFRAN) 4 MG tablet Take 1 tablet by mouth every 6 hours as needed for Nausea or Vomiting 20 tablet 0    lactobacillus (CULTURELLE) capsule Take 1 capsule by mouth 3 times daily (with meals) 90 capsule 0    magnesium oxide (MAG-OX) 400 MG tablet Take 800 mg by mouth 2 times daily      mexiletine (MEXITIL) 150 MG capsule Take 150 mg by mouth 3 times daily      docusate sodium (COLACE) 100 MG capsule Take 100 mg by mouth 2 times daily as needed for Constipation      famotidine (PEPCID) 20 MG tablet Take 1 tablet by mouth 2 times daily 60 tablet 3    levETIRAcetam (KEPPRA) 1000 MG tablet Take 1,000 mg by mouth 2 times daily       atorvastatin (LIPITOR) 80 MG tablet Take 80 mg by mouth nightly      clopidogrel (PLAVIX) 75 MG tablet Take 75 mg by mouth daily      acetaminophen 650 MG TABS Take 650 mg by mouth every 4 hours as needed 120 tablet 3    ferrous gluconate (FERGON) 324 (38 FE) MG tablet Take 324 mg by mouth 2 times daily       aspirin 81 MG tablet Take 81 mg by mouth daily       albuterol (PROVENTIL HFA;VENTOLIN HFA) 108 (90 BASE) MCG/ACT inhaler Inhale 2 puffs into the lungs every 6 hours as needed. Allergies   Allergen Reactions    Fentanyl Swelling     Other reaction(s): Angioedema (swelling)    Moxifloxacin Hcl In Nacl Swelling and Rash    Povidone-Iodine Rash     Other reaction(s): AOF      Cyclobenzaprine      Other reaction(s): Other - comment required  \" it make my muscle very weak because of my MS\"  \" it make my muscle very weak because of my MS\"      Methocarbamol      Worsening edema  Other reaction(s): Other - comment required  Worsening edema  Worsening edema  Worsening edema      Tramadol Other (See Comments)     Doctor doesn't her to take due to heart problems  Seizures   Doctor doesn't her to take due to lowers seizure threshold.     Baclofen     Ibuprofen      \"Due to heart problems\"    Naproxen     Nsaids      \"Due to heart problems\"    Tizanidine     Tolmetin      \"Due to heart problems\"    Tramadol Hcl      Other reaction(s): Other - comment required  Told not to take due to history of seizures    Betadine [Povidone Iodine] Rash    Moxifloxacin Rash and Swelling     Lips swell and tingling in face   Lips swell and tingling in face   Lips swell and tingling in face   Lips swell and tingling in face   Around mouth       Nursing Notes Reviewed    Physical Exam:  ED Triage Vitals   Enc Vitals Group      BP --       Pulse --       Resp --       Temp 02/02/20 0434 97.8 °F (36.6 °C)      Temp Source 02/02/20 0434 Oral      SpO2 --       Weight 02/02/20 0423 110 lb (49.9 kg)      Height 02/02/20 0423 5' 6\" (1.676 m)      Head Circumference --       Peak Flow --       Pain Score --       Pain Loc --       Pain Edu? --       Excl. in [address]? --      GENERAL APPEARANCE: Awake and alert. Cooperative. No acute distress. Appears fatigued. HEAD: Normocephalic. Atraumatic. EYES: EOM's grossly intact. Sclera anicteric. ENT: Mucous membranes are moist. Tolerates saliva. No trismus. NECK: Supple. No meningismus. Trachea midline. HEART: RRR. Radial pulses 2+. LUNGS: Respirations unlabored. CTAB  ABDOMEN: Soft. Non-tender. No guarding or rebound. EXTREMITIES: No acute deformities. No edema  SKIN: Warm and dry. NEUROLOGICAL: No gross facial drooping. Moves all 4 extremities spontaneously. PSYCHIATRIC: Normal mood.     I have reviewed and interpreted all of the currently available lab results from this visit (if applicable):  Results for orders placed or performed during the hospital encounter of 02/02/20   EKG 12 Lead   Result Value Ref Range    Ventricular Rate 66 BPM    Atrial Rate 66 BPM    P-R Interval 110 ms    QRS Duration 98 ms    Q-T Interval 422 ms    QTc Calculation (Bazett) 442 ms    P Axis 26 degrees    R Axis -47 degrees    T Axis 245 degrees    Diagnosis       Sinus rhythm with short [place] with premature ventricular complexes or fusion complexes  Left axis deviation  Low voltage QRS  Inferior-posterior infarct (cited on or before 12-FEB-2017)  Anterolateral infarct (cited on or before 12-FEB-2017)  Abnormal ECG  When compared with ECG of 26-JAN-2020 05:58,  Sinus rhythm has replaced Electronic atrial pacemaker  Questionable change in initial forces of Anterolateral leads  QT has shortened        Radiographs (if obtained):  [] The following radiograph was interpreted by myself in the absence of a radiologist:  [x] Radiologist's Report Reviewed:    EKG (if obtained): (All EKG's are interpreted by myself in the absence of a cardiologist)  Sinus rhythm with left axis deviation. Short MA interval.  Occasional PVCs. Age-indeterminate inferior infarct. Age-indeterminate lateral infarct. QTc is normal.  No significant change when compared with prior EKG 1/26/2020    MDM:  Plan of care is discussed thoroughly with the patient and family if present. If performed, all imaging and lab work also discussed with patient. All relevant prior results and chart reviewed if available. Patient presents as above. She is in no acute distress. Presents with signs and symptoms most consistent with likely viral syndrome. She is in no respiratory distress and has fairly clear lung sounds bilaterally. She does not appear hypervolemic. EKG does not show any new ischemic changes. Presentation not consistent with ACS, dissection, PE or other acute cardiopulmonary emergent process. Patient was given home dose of oxycodone. Plan for metabolic work-up, troponin. If this is unremarkable, I feel that the patient can safely be discharged home with outpatient follow-up. X-ray did not show any evidence of pneumonia here. 0600:a.m.  I have signed out [facility] Emergency Department care to Dr. Michael Chaudhary.  We discussed the pertinent history, physical exam, completed/pending test results (if applicable) and current

## 2020-02-02 NOTE — ED PROVIDER NOTES
Patient was signed out to me to follow labs and for ultimate disposition. She came to the emergency department complaining of some cough, shortness of breath, myalgias as well as chest pain. She does have a cardiac history. She been having symptoms for a few days. Labs are obtained and were overall nonacute. Troponin was nondetectable. No leukocytosis. Her BNP was a bit more elevated than previously. Her chest x-ray was nonacute. No signs of fluid or consolidation. Results are discussed with patient. She is treated for pain in the emergency department. She is concerned about possibly having a bronchitis or pneumonia that did show on the chest x-ray. She be prescribed azithromycin given her concern for respiratory infection. She will follow-up outpatient. Return precautions for worsening concerning signs are discussed. She is discharged home in stable condition.     Dante White MD  02/02/20 [id number]

## 2020-02-02 NOTE — ED TRIAGE NOTES
Pt states she was in bed iglesia yesterday not feeling well, woke up an hour before calling [facility] with chest pains and SOB

## 2020-02-03 ENCOUNTER — HOSPITAL ENCOUNTER (EMERGENCY)
Age: 60
Discharge: HOME | End: 2020-02-03
Attending: EMERGENCY MEDICINE
Payer: MEDICARE

## 2020-02-03 ENCOUNTER — APPOINTMENT (OUTPATIENT)
Dept: GENERAL RADIOLOGY | Age: 60
End: 2020-02-03
Payer: MEDICARE

## 2020-02-03 VITALS
TEMPERATURE: 98.1 F | DIASTOLIC BLOOD PRESSURE: 74 MMHG | WEIGHT: 110 LBS | HEIGHT: 66 IN | BODY MASS INDEX: 17.68 KG/M2 | OXYGEN SATURATION: 97 % | SYSTOLIC BLOOD PRESSURE: 128 MMHG | HEART RATE: 71 BPM | RESPIRATION RATE: 9 BRPM

## 2020-02-03 LAB
RAPID INFLUENZA  B AGN: NEGATIVE
RAPID INFLUENZA A AGN: NEGATIVE
TROPONIN T: <0.01 NG/ML

## 2020-02-03 PROCEDURE — 99285 EMERGENCY DEPT VISIT HI MDM: CPT

## 2020-02-03 PROCEDURE — 71045 X-RAY EXAM CHEST 1 VIEW: CPT

## 2020-02-03 PROCEDURE — 93010 ELECTROCARDIOGRAM REPORT: CPT | Performed by: INTERNAL MEDICINE

## 2020-02-03 PROCEDURE — 93005 ELECTROCARDIOGRAM TRACING: CPT | Performed by: EMERGENCY MEDICINE

## 2020-02-03 PROCEDURE — 87804 INFLUENZA ASSAY W/OPTIC: CPT

## 2020-02-03 PROCEDURE — 36415 COLL VENOUS BLD VENIPUNCTURE: CPT

## 2020-02-03 PROCEDURE — 6370000000 HC RX 637 (ALT 250 FOR IP): Performed by: EMERGENCY MEDICINE

## 2020-02-03 PROCEDURE — 84484 ASSAY OF TROPONIN QUANT: CPT

## 2020-02-03 RX ORDER — ACETAMINOPHEN 500 MG
1000 TABLET ORAL ONCE
Status: COMPLETED | OUTPATIENT
Start: 2020-02-03 | End: 2020-02-03

## 2020-02-03 RX ORDER — OXYCODONE HYDROCHLORIDE 5 MG/1
20 TABLET ORAL ONCE
Status: COMPLETED | OUTPATIENT
Start: 2020-02-03 | End: 2020-02-03

## 2020-02-03 RX ADMIN — OXYCODONE HYDROCHLORIDE 20 MG: 5 TABLET ORAL at 01:03

## 2020-02-03 RX ADMIN — ACETAMINOPHEN 1000 MG: 500 TABLET ORAL at 01:03

## 2020-02-03 ASSESSMENT — PAIN DESCRIPTION - LOCATION: LOCATION: CHEST

## 2020-02-03 ASSESSMENT — PAIN SCALES - GENERAL
PAINLEVEL_OUTOF10: 10
PAINLEVEL_OUTOF10: 10

## 2020-02-03 ASSESSMENT — PAIN DESCRIPTION - PAIN TYPE: TYPE: ACUTE PAIN

## 2020-02-03 ASSESSMENT — PAIN DESCRIPTION - ORIENTATION: ORIENTATION: MID;UPPER
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
## 2020-02-05 LAB
EKG ATRIAL RATE: 66 BPM
EKG DIAGNOSIS: NORMAL
EKG P AXIS: 26 DEGREES
EKG P-R INTERVAL: 110 MS
EKG Q-T INTERVAL: 422 MS
EKG QRS DURATION: 98 MS
EKG QTC CALCULATION (BAZETT): 442 MS
EKG R AXIS: -47 DEGREES
EKG T AXIS: 245 DEGREES
EKG VENTRICULAR RATE: 66 BPM

## 2020-02-10 ENCOUNTER — HOSPITAL ENCOUNTER (EMERGENCY)
Age: 60
Discharge: HOME | End: 2020-02-11
Attending: EMERGENCY MEDICINE
Payer: MEDICARE

## 2020-02-10 ENCOUNTER — CARE COORDINATION (OUTPATIENT)
Dept: CASE MANAGEMENT | Age: 60
End: 2020-02-10

## 2020-02-10 ENCOUNTER — APPOINTMENT (OUTPATIENT)
Dept: GENERAL RADIOLOGY | Age: 60
End: 2020-02-10
Payer: MEDICARE

## 2020-02-10 LAB
BASOPHILS ABSOLUTE: 0 K/CU MM
BASOPHILS RELATIVE PERCENT: 0.6 % (ref 0–1)
DIFFERENTIAL TYPE: ABNORMAL
EOSINOPHILS ABSOLUTE: 0 K/CU MM
EOSINOPHILS RELATIVE PERCENT: 0 % (ref 0–3)
HCT VFR BLD CALC: 37.6 % (ref 37–47)
HEMOGLOBIN: 11.2 GM/DL (ref 12.5–16)
IMMATURE NEUTROPHIL %: 0.2 % (ref 0–0.43)
LYMPHOCYTES ABSOLUTE: 1.3 K/CU MM
LYMPHOCYTES RELATIVE PERCENT: 27 % (ref 24–44)
MCH RBC QN AUTO: 33.1 PG (ref 27–31)
MCHC RBC AUTO-ENTMCNC: 29.8 % (ref 32–36)
MCV RBC AUTO: 111.2 FL (ref 78–100)
MONOCYTES ABSOLUTE: 0.4 K/CU MM
MONOCYTES RELATIVE PERCENT: 8 % (ref 0–4)
NUCLEATED RBC %: 0 %
PDW BLD-RTO: 15.5 % (ref 11.7–14.9)
PLATELET # BLD: 329 K/CU MM (ref 140–440)
PMV BLD AUTO: 9.5 FL (ref 7.5–11.1)
RBC # BLD: 3.38 M/CU MM (ref 4.2–5.4)
SEGMENTED NEUTROPHILS ABSOLUTE COUNT: 3 K/CU MM
SEGMENTED NEUTROPHILS RELATIVE PERCENT: 64.2 % (ref 36–66)
TOTAL IMMATURE NEUTOROPHIL: 0.01 K/CU MM
TOTAL NUCLEATED RBC: 0 K/CU MM
WBC # BLD: 4.7 K/CU MM (ref 4–10.5)

## 2020-02-10 PROCEDURE — 99285 EMERGENCY DEPT VISIT HI MDM: CPT

## 2020-02-10 PROCEDURE — 71045 X-RAY EXAM CHEST 1 VIEW: CPT

## 2020-02-10 PROCEDURE — 93005 ELECTROCARDIOGRAM TRACING: CPT | Performed by: EMERGENCY MEDICINE

## 2020-02-10 PROCEDURE — 83880 ASSAY OF NATRIURETIC PEPTIDE: CPT

## 2020-02-10 PROCEDURE — 84484 ASSAY OF TROPONIN QUANT: CPT

## 2020-02-10 PROCEDURE — 85025 COMPLETE CBC W/AUTO DIFF WBC: CPT

## 2020-02-10 PROCEDURE — 80053 COMPREHEN METABOLIC PANEL: CPT

## 2020-02-10 PROCEDURE — 36415 COLL VENOUS BLD VENIPUNCTURE: CPT

## 2020-02-10 PROCEDURE — 83735 ASSAY OF MAGNESIUM: CPT

## 2020-02-10 RX ORDER — OXYCODONE HYDROCHLORIDE 5 MG/1
20 TABLET ORAL ONCE
Status: COMPLETED | OUTPATIENT
Start: 2020-02-10 | End: 2020-02-11

## 2020-02-11 VITALS
OXYGEN SATURATION: 96 % | HEART RATE: 65 BPM | WEIGHT: 205 LBS | DIASTOLIC BLOOD PRESSURE: 77 MMHG | BODY MASS INDEX: 32.95 KG/M2 | HEIGHT: 66 IN | TEMPERATURE: 98.1 F | SYSTOLIC BLOOD PRESSURE: 127 MMHG | RESPIRATION RATE: 16 BRPM

## 2020-02-11 LAB
ALBUMIN SERPL-MCNC: 3.1 GM/DL (ref 3.4–5)
ALP BLD-CCNC: 84 IU/L (ref 40–129)
ALT SERPL-CCNC: 8 U/L (ref 10–40)
ANION GAP SERPL CALCULATED.3IONS-SCNC: 13 MMOL/L (ref 4–16)
AST SERPL-CCNC: 13 IU/L (ref 15–37)
BILIRUB SERPL-MCNC: 0.2 MG/DL (ref 0–1)
BUN BLDV-MCNC: 9 MG/DL (ref 6–23)
CALCIUM SERPL-MCNC: 8.2 MG/DL (ref 8.3–10.6)
CHLORIDE BLD-SCNC: 103 MMOL/L (ref 99–110)
CO2: 20 MMOL/L (ref 21–32)
CREAT SERPL-MCNC: 0.7 MG/DL (ref 0.6–1.1)
EKG ATRIAL RATE: 61 BPM
EKG DIAGNOSIS: NORMAL
EKG P AXIS: -24 DEGREES
EKG P-R INTERVAL: 192 MS
EKG Q-T INTERVAL: 456 MS
EKG QRS DURATION: 98 MS
EKG QTC CALCULATION (BAZETT): 459 MS
EKG R AXIS: -42 DEGREES
EKG T AXIS: 270 DEGREES
EKG VENTRICULAR RATE: 61 BPM
GFR AFRICAN AMERICAN: >60 ML/MIN/1.73M2
GFR NON-AFRICAN AMERICAN: >60 ML/MIN/1.73M2
GLUCOSE BLD-MCNC: 98 MG/DL (ref 70–99)
MAGNESIUM: 2.2 MG/DL (ref 1.8–2.4)
POTASSIUM SERPL-SCNC: 3.3 MMOL/L (ref 3.5–5.1)
PRO-BNP: 2651 PG/ML
SODIUM BLD-SCNC: 136 MMOL/L (ref 135–145)
TOTAL PROTEIN: 5.7 GM/DL (ref 6.4–8.2)
TROPONIN T: <0.01 NG/ML
TROPONIN T: <0.01 NG/ML

## 2020-02-11 PROCEDURE — 93010 ELECTROCARDIOGRAM REPORT: CPT | Performed by: INTERNAL MEDICINE

## 2020-02-11 PROCEDURE — 84484 ASSAY OF TROPONIN QUANT: CPT

## 2020-02-11 PROCEDURE — 6370000000 HC RX 637 (ALT 250 FOR IP): Performed by: EMERGENCY MEDICINE

## 2020-02-11 PROCEDURE — 96374 THER/PROPH/DIAG INJ IV PUSH: CPT

## 2020-02-11 PROCEDURE — 6360000002 HC RX W HCPCS: Performed by: EMERGENCY MEDICINE

## 2020-02-11 RX ORDER — MORPHINE SULFATE 4 MG/ML
2 INJECTION, SOLUTION INTRAMUSCULAR; INTRAVENOUS ONCE
Status: COMPLETED | OUTPATIENT
Start: 2020-02-11 | End: 2020-02-11

## 2020-02-11 RX ORDER — OXYCODONE HYDROCHLORIDE 5 MG/1
20 TABLET ORAL ONCE
Status: COMPLETED | OUTPATIENT
Start: 2020-02-11 | End: 2020-02-11

## 2020-02-11 RX ADMIN — MORPHINE SULFATE 2 MG: 4 INJECTION, SOLUTION INTRAMUSCULAR; INTRAVENOUS at 04:37

## 2020-02-11 RX ADMIN — OXYCODONE HYDROCHLORIDE 20 MG: 5 TABLET ORAL at 00:35

## 2020-02-11 RX ADMIN — OXYCODONE HYDROCHLORIDE 20 MG: 5 TABLET ORAL at 03:47

## 2020-02-11 ASSESSMENT — PAIN SCALES - GENERAL
PAINLEVEL_OUTOF10: 9
PAINLEVEL_OUTOF10: 9
PAINLEVEL_OUTOF10: 10
PAINLEVEL_OUTOF10: 9

## 2020-02-11 ASSESSMENT — PAIN DESCRIPTION - PAIN TYPE: TYPE: ACUTE PAIN;CHRONIC PAIN

## 2020-02-11 ASSESSMENT — PAIN DESCRIPTION - LOCATION: LOCATION: CHEST;BACK

## 2020-02-11 NOTE — ED PROVIDER NOTES
3 stents    CORONARY ARTERY BYPASS GRAFT      Age 49    ENDOSCOPY, COLON, DIAGNOSTIC      GASTRIC BYPASS SURGERY      HERNIA REPAIR      HIP SURGERY      HYSTERECTOMY      JOINT REPLACEMENT      SKIN BIOPSY      TONSILLECTOMY      VASCULAR SURGERY       Family History   Problem Relation Age of Onset    High Blood Pressure Mother     High Cholesterol Mother     Heart Disease Mother     Diabetes Mother     Heart Disease Father     Cancer Father     Other Sister         Multiple Sclerosis    Heart Disease Maternal Grandmother     High Blood Pressure Maternal Grandmother     High Cholesterol Maternal Grandmother      Social History     Socioeconomic History    Marital status: Single     Spouse name: Not on file    Number of children: Not on file    Years of education: Not on file    Highest education level: Not on file   Occupational History    Not on file   Social Needs    Financial resource strain: Not on file    Food insecurity:     Worry: Not on file     Inability: Not on file    Transportation needs:     Medical: Not on file     Non-medical: Not on file   Tobacco Use    Smoking status: Never Smoker    Smokeless tobacco: Never Used   Substance and Sexual Activity    Alcohol use: No    Drug use: No    Sexual activity: Not Currently   Lifestyle    Physical activity:     Days per week: Not on file     Minutes per session: Not on file    Stress: Not on file   Relationships    Social connections:     Talks on phone: Not on file     Gets together: Not on file     Attends Voodoo service: Not on file     Active member of club or organization: Not on file     Attends meetings of clubs or organizations: Not on file     Relationship status: Not on file    Intimate partner violence:     Fear of current or ex partner: Not on file     Emotionally abused: Not on file     Physically abused: Not on file     Forced sexual activity: Not on file   Other Topics Concern    Not on file   Social History Narrative    Not on file     Current Facility-Administered Medications   Medication Dose Route Frequency Provider Last Rate Last Dose    morphine sulfate (PF) injection 2 mg  2 mg Intravenous Once Piercy MD Esteban         Current Outpatient Medications   Medication Sig Dispense Refill    primidone (MYSOLINE) 50 MG tablet Take 2 tablets by mouth 2 times daily 60 tablet 0    levothyroxine (SYNTHROID) 112 MCG tablet Take 2 tablets by mouth Daily 30 tablet 1    colchicine (COLCRYS) 0.6 MG tablet Take 1 tablet by mouth daily 9 tablet 0    isosorbide mononitrate (IMDUR) 30 MG extended release tablet Take 2 tablets by mouth daily 30 tablet 0    ipratropium-albuterol (DUONEB) 0.5-2.5 (3) MG/3ML SOLN nebulizer solution Inhale 3 mLs into the lungs every 4 hours (while awake) 360 mL 0    naloxone 4 MG/0.1ML LIQD nasal spray 1 spray by Nasal route as needed for Opioid Reversal 1 each 5    nitroGLYCERIN (NITROSTAT) 0.4 MG SL tablet up to max of 3 total doses.  If no relief after 1 dose, call 911. 25 tablet 3    naloxone 4 MG/0.1ML LIQD nasal spray 1 spray by Nasal route as needed for Opioid Reversal 1 each 5    benzocaine-menthol (CEPACOL SORE THROAT) 15-3.6 MG lozenge Take 1 lozenge by mouth every 2 hours as needed for Sore Throat 18 lozenge 1    B-D TB SYRINGE 1CC/25GX5/8\" 25G X 5/8\" 1 ML MISC USE AS DIRECTED WITH B-12 INJECTION  9    PARoxetine (PAXIL) 10 MG tablet   1    cyanocobalamin 1000 MCG/ML injection INJECT 1 ML INTO THE MUSCLE ON THE FIRST OF EACH MONTH  11    spironolactone (ALDACTONE) 25 MG tablet Take 25 mg by mouth 2 times daily      torsemide (DEMADEX) 20 MG tablet Take 20 mg by mouth daily Indications: pt takes 2 tablets daily      Icosapent Ethyl (VASCEPA) 0.5 g CAPS Take 500 mg by mouth 2 times daily 180 capsule 2    amiodarone (CORDARONE) 200 MG tablet Take 200 mg by mouth daily      apixaban (ELIQUIS) 5 MG TABS tablet Take 5 mg by mouth 2 times daily      DULoxetine (CYMBALTA) 60 MG extended release capsule Take 1 capsule by mouth daily 30 capsule 3    ondansetron (ZOFRAN) 4 MG tablet Take 1 tablet by mouth every 6 hours as needed for Nausea or Vomiting 20 tablet 0    lactobacillus (CULTURELLE) capsule Take 1 capsule by mouth 3 times daily (with meals) 90 capsule 0    magnesium oxide (MAG-OX) 400 MG tablet Take 800 mg by mouth 2 times daily      mexiletine (MEXITIL) 150 MG capsule Take 150 mg by mouth 3 times daily      docusate sodium (COLACE) 100 MG capsule Take 100 mg by mouth 2 times daily as needed for Constipation      famotidine (PEPCID) 20 MG tablet Take 1 tablet by mouth 2 times daily 60 tablet 3    levETIRAcetam (KEPPRA) 1000 MG tablet Take 1,000 mg by mouth 2 times daily       atorvastatin (LIPITOR) 80 MG tablet Take 80 mg by mouth nightly      clopidogrel (PLAVIX) 75 MG tablet Take 75 mg by mouth daily      acetaminophen 650 MG TABS Take 650 mg by mouth every 4 hours as needed 120 tablet 3    ferrous gluconate (FERGON) 324 (38 FE) MG tablet Take 324 mg by mouth 2 times daily       aspirin 81 MG tablet Take 81 mg by mouth daily       albuterol (PROVENTIL HFA;VENTOLIN HFA) 108 (90 BASE) MCG/ACT inhaler Inhale 2 puffs into the lungs every 6 hours as needed. Allergies   Allergen Reactions    Fentanyl Swelling     Other reaction(s): Angioedema (swelling)    Moxifloxacin Hcl In Nacl Swelling and Rash    Povidone-Iodine Rash     Other reaction(s): AOF      Cyclobenzaprine      Other reaction(s): Other - comment required  \" it make my muscle very weak because of my MS\"  \" it make my muscle very weak because of my MS\"      Methocarbamol      Worsening edema  Other reaction(s): Other - comment required  Worsening edema  Worsening edema  Worsening edema      Tramadol Other (See Comments)     Doctor doesn't her to take due to heart problems  Seizures   Doctor doesn't her to take due to lowers seizure threshold.     Baclofen     Ibuprofen      \"Due to heart problems\"  Naproxen     Nsaids      \"Due to heart problems\"    Tizanidine     Tolmetin      \"Due to heart problems\"    Tramadol Hcl      Other reaction(s): Other - comment required  Told not to take due to history of seizures    Betadine [Povidone Iodine] Rash    Moxifloxacin Rash and Swelling     Lips swell and tingling in face   Lips swell and tingling in face   Lips swell and tingling in face   Lips swell and tingling in face   Around mouth       Nursing Notes Reviewed    Physical Exam:  ED Triage Vitals [02/10/20 2319]   Enc Vitals Group      BP (!) 146/86      Pulse 73      Resp 15      Temp 98.1 °F (36.7 °C)      Temp Source Oral      SpO2 100 %      Weight 205 lb (93 kg)      Height 5' 6\" (1.676 m)      Head Circumference       Peak Flow       Pain Score       Pain Loc       Pain Edu? Excl. in [address]? GENERAL APPEARANCE: Awake and alert. Cooperative. No acute distress. HEAD: Normocephalic. Atraumatic. EYES: EOM's grossly intact. Sclera anicteric. ENT: Mucous membranes are moist. Tolerates saliva. No trismus. NECK: Supple. Trachea midline. HEART: RRR. Radial pulses 2+. LUNGS: Respirations unlabored. CTAB  ABDOMEN: Soft. Non-tender. No guarding or rebound. EXTREMITIES: No acute deformities. SKIN: Warm and dry. NEUROLOGICAL: No gross facial drooping. Moves all 4 extremities spontaneously. PSYCHIATRIC: Normal mood.     I have reviewed and interpreted all of the currently available lab results from this visit (if applicable):  Results for orders placed or performed during the hospital encounter of 02/10/20   CBC Auto Differential   Result Value Ref Range    WBC 4.7 4.0 - 10.5 K/CU MM    RBC 3.38 (L) 4.2 - 5.4 M/CU MM    Hemoglobin 11.2 (L) 12.5 - 16.0 GM/DL    Hematocrit 37.6 37 - 47 %    .2 (H) 78 - 100 FL    MCH 33.1 (H) 27 - 31 PG    MCHC 29.8 (L) 32.0 - 36.0 %    RDW 15.5 (H) 11.7 - 14.9 %    Platelets 136 812 - 321 K/CU MM    MPV 9.5 7.5 - 11.1 FL    Differential Type AUTOMATED DIFFERENTIAL     Segs Relative 64.2 36 - 66 %    Lymphocytes % 27.0 24 - 44 %    Monocytes % 8.0 (H) 0 - 4 %    Eosinophils % 0.0 0 - 3 %    Basophils % 0.6 0 - 1 %    Segs Absolute 3.0 K/CU MM    Lymphocytes Absolute 1.3 K/CU MM    Monocytes Absolute 0.4 K/CU MM    Eosinophils Absolute 0.0 K/CU MM    Basophils Absolute 0.0 K/CU MM    Nucleated RBC % 0.0 %    Total Nucleated RBC 0.0 K/CU MM    Total Immature Neutrophil 0.01 K/CU MM    Immature Neutrophil % 0.2 0 - 0.43 %   Comprehensive Metabolic Panel w/ Reflex to MG   Result Value Ref Range    Sodium 136 135 - 145 MMOL/L    Potassium 3.3 (L) 3.5 - 5.1 MMOL/L    Chloride 103 99 - 110 mMol/L    CO2 20 (L) 21 - 32 MMOL/L    BUN 9 6 - 23 MG/DL    CREATININE 0.7 0.6 - 1.1 MG/DL    Glucose 98 70 - 99 MG/DL    Calcium 8.2 (L) 8.3 - 10.6 MG/DL    Alb 3.1 (L) 3.4 - 5.0 GM/DL    Total Protein 5.7 (L) 6.4 - 8.2 GM/DL    Total Bilirubin 0.2 0.0 - 1.0 MG/DL    ALT 8 (L) 10 - 40 U/L    AST 13 (L) 15 - 37 IU/L    Alkaline Phosphatase 84 40 - 129 IU/L    GFR Non-African American >60 >60 mL/min/1.73m2    GFR African American >60 >60 mL/min/1.73m2    Anion Gap 13 4 - 16   Troponin   Result Value Ref Range    Troponin T <0.010 <0.01 NG/ML   Brain Natriuretic Peptide   Result Value Ref Range    Pro-BNP 2,651 (H) <300 PG/ML   Magnesium   Result Value Ref Range    Magnesium 2.2 1.8 - 2.4 mg/dl   Troponin   Result Value Ref Range    Troponin T <0.010 <0.01 NG/ML   EKG 12 Lead   Result Value Ref Range    Ventricular Rate 62 BPM    Atrial Rate 62 BPM    P-R Interval 204 ms    QRS Duration 98 ms    Q-T Interval 430 ms    QTc Calculation (Bazett) 436 ms    P Axis -17 degrees    R Axis -42 degrees    T Axis 258 degrees    Diagnosis       Atrial-paced rhythm  Left axis deviation  Possible Lateral infarct (cited on or before 12-FEB-2017)  Inferior infarct (cited on or before 12-FEB-2017)  Abnormal ECG  When compared with ECG of 03-FEB-2020 00:31,  Questionable change in initial forces of Anterolateral leads        Radiographs (if obtained):  [] The following radiograph was interpreted by myself in the absence of a radiologist:  [x] Radiologist's Report Reviewed:    EKG (if obtained): (All EKG's are interpreted by myself in the absence of a cardiologist)  Atrial paced rhythm. Left axis deviation. Age-indeterminate inferior infarct. No specific ST or T wave changes. QTc is normal.  No significant change when compared with 2/3/2020    MDM:  Plan of care is discussed thoroughly with the patient and family if present. If performed, all imaging and lab work also discussed with patient. All relevant prior results and chart reviewed if available. Patient presents as above. She is in no acute distress and has normal vital signs. She presents with acute on chronic chest pain. Patient given home dose of oxycodone which she is due for at this time. Patient does have history of extensive cardiac disease, however, this pain is chronic in nature and does not deem to be significantly and is from patient's baseline. Suspect that this is likely stable angina. Patient is on Ranexa and Imdur. I have low suspicion for PE, dissection or other emergent process. Chest x-ray does not show any acute abnormality. Patient's EKG is unchanged from prior. Initial troponin is negative. Patient was given another home dose of oxycodone here without improvement. Repeat troponin is also normal.  Patient given small dose of morphine. She states that this always helps. She is discharged and agrees to call cardiologist this morning. Clinical Impression:  1.  Chest pain, unspecified type      (Please note that portions of this note may have been completed with a voice recognition program. Efforts were made to edit the dictations but occasionally words are mis-transcribed.)    MD Dominique Aldridge MD  02/11/20 Prashant Valero MD  02/11/20 [id number]

## 2020-02-11 NOTE — ED NOTES
Bed: ED-33  Expected date:   Expected time:   Means of arrival:   Comments:  ems     Dayron Downs RN  02/10/20 [id number]

## 2020-02-13 LAB
EKG ATRIAL RATE: 62 BPM
EKG DIAGNOSIS: NORMAL
EKG P AXIS: -17 DEGREES
EKG P-R INTERVAL: 204 MS
EKG Q-T INTERVAL: 430 MS
EKG QRS DURATION: 98 MS
EKG QTC CALCULATION (BAZETT): 436 MS
EKG R AXIS: -42 DEGREES
EKG T AXIS: 258 DEGREES
EKG VENTRICULAR RATE: 62 BPM

## 2020-02-14 ENCOUNTER — CARE COORDINATION (OUTPATIENT)
Dept: CASE MANAGEMENT | Age: 60
End: 2020-02-14

## 2020-02-14 NOTE — CARE COORDINATION
[facility] Transitions Follow Up Call    2020    Patient: jovany Monday  Patient : 1960   MRN: <[id number]>  Reason for Admission:   Discharge Date: 20 RARS: Readmission Risk Score: 61         Attempted to contact patient for follow up BPCI-A transition call. Left voicemail message to return call with an update on condition since discharge. Contact information provided. Will continue to follow up. Care Transitions Subsequent and Final Call    Subsequent and Final Calls  Care Transitions Interventions                          Other Interventions: Follow Up  No future appointments.     Luisa Westfall LPN

## 2020-02-16 NOTE — CARE COORDINATION
9:30 - Faxed [facility] prior auth paperwork to [facility] for the Linezolid (Zyvox). 11:30 - call from Timi Paige that if Pt can get the generic there would not need any prior auth, if Pt needs name brand. They will need to know why she can not have there generic. Call to the pharmacy and they can give her the generic. Notified hospitalist. Pt is asking for pain medication and per hospital policy Pt has to stay for an additional hour after receiving a narcotic. Per weekend case management notes from the weekend. Pt has to be out of the hospital by noon today or she will be billed privately.   Electronically signed by ARIANA Parker on 4/8/2019 at 11:54 AM
CM - room # 15 --pt at risk for a readmission---pt has been seen for same issue multiple time without some resolution - the labs and workup didn't appear to be acute --Pt is admitted for further investigation / care. -pt has a PCP and other needs met -CM otherwise unable to contribute any more  _Tom/ CM / Rn
CM provided pt with copy of IMM and DNOD. Cm faxed medical record to [facility] at [phone]. CM notified pt and staff that appeal determination can take up to 24 hrs. [facility] can be called at [phone] with questions or concerns. Please note, her Zyvox will warrant a preauthorization approval prior to dc home, however, if pt goes to rehab a preauthorizaiton will not be needed.   Electronically signed by ARIANA Weber on 4/6/2019 at 9:26 AM
LSW informed that Pt does not want to be discharged. Pt is medically ready for discharge. Pt family believes that the hospital is trying to push Pt out. LSW asked RN to walk Pt. Pt stated that she wanted to take a nap and she has MS and does not walk. LSW in the room to speak with Pt and family. Pt is at xray. LSW spoke with Pt dgtr and s/o. S/O stated pt has been in and out of the hospital. S/o is not able to take care of pt. LSW stated that Pt has HC. HC is only in the home for tube feeds and wound care. LSW offered to have them do Pt/OT. S/o wants Pt to go to rehab. LSW explained that Pt can not change her discharge plan on the last day. Pt d/c plan was home with home care services. LSw will talk to Pt about d/c plans when Pt returns to room. LSW into Pt room to speak with Pt. Pt is wanting to appeal her discharge. LSW gave her the paperwork and instructions on how to appeal. Pt also wanted to speak with her hospitlist. LSW notified him through PS.      Electronically signed by ARIANA Christianson on 4/5/2019 at 5:11 PM
Received VM from Danny[employer] stating pt has lost her appeal with last covered day being 4/7 and pt must dc 4/8 by noon before she starts to get billed privately. Met with pt, who was aware of above info after receiving a call from [facility] in her room. Pt reported her dc plan is to return home with her sig other and support from her dtr. Pt reported [facility]. will cont to provide wound care and assistance with TF. Reviewed pt's concerns re: weakness and discussed poss for rehab, but pt states she now feels better and can return home. Pt reported she now has pneumonia and is awaiting further w/u. Spoke to Dr. America Skiff, who is awaiting re-eval from ID before dc. Requested Dr. America Skiff f/u with Yany Lu Monday at  with dc plan/abx needs (if pt goes home on Zyvox CM will need to perform PA that can take several hours).   Electronically signed by ARIANA Cordero on 4/7/2019 at 6:04 PM
None

## 2020-03-15 ENCOUNTER — HOSPITAL ENCOUNTER (EMERGENCY)
Age: 60
Discharge: HOME | End: 2020-03-16
Attending: EMERGENCY MEDICINE
Payer: MEDICARE

## 2020-03-15 ENCOUNTER — APPOINTMENT (OUTPATIENT)
Dept: GENERAL RADIOLOGY | Age: 60
End: 2020-03-15
Payer: MEDICARE

## 2020-03-15 LAB
ALBUMIN SERPL-MCNC: 3.3 GM/DL (ref 3.4–5)
ALP BLD-CCNC: 95 IU/L (ref 40–129)
ALT SERPL-CCNC: 7 U/L (ref 10–40)
ANION GAP SERPL CALCULATED.3IONS-SCNC: 9 MMOL/L (ref 4–16)
AST SERPL-CCNC: 12 IU/L (ref 15–37)
BASOPHILS ABSOLUTE: 0.1 K/CU MM
BASOPHILS RELATIVE PERCENT: 1.2 % (ref 0–1)
BILIRUB SERPL-MCNC: 0.2 MG/DL (ref 0–1)
BUN BLDV-MCNC: 14 MG/DL (ref 6–23)
CALCIUM SERPL-MCNC: 8 MG/DL (ref 8.3–10.6)
CHLORIDE BLD-SCNC: 110 MMOL/L (ref 99–110)
CO2: 20 MMOL/L (ref 21–32)
CREAT SERPL-MCNC: 0.7 MG/DL (ref 0.6–1.1)
DIFFERENTIAL TYPE: ABNORMAL
EOSINOPHILS ABSOLUTE: 0 K/CU MM
EOSINOPHILS RELATIVE PERCENT: 0.2 % (ref 0–3)
GFR AFRICAN AMERICAN: >60 ML/MIN/1.73M2
GFR NON-AFRICAN AMERICAN: >60 ML/MIN/1.73M2
GLUCOSE BLD-MCNC: 70 MG/DL (ref 70–99)
HCT VFR BLD CALC: 33 % (ref 37–47)
HEMOGLOBIN: 9.8 GM/DL (ref 12.5–16)
IMMATURE NEUTROPHIL %: 0.2 % (ref 0–0.43)
LYMPHOCYTES ABSOLUTE: 1.8 K/CU MM
LYMPHOCYTES RELATIVE PERCENT: 36 % (ref 24–44)
MCH RBC QN AUTO: 33.4 PG (ref 27–31)
MCHC RBC AUTO-ENTMCNC: 29.7 % (ref 32–36)
MCV RBC AUTO: 112.6 FL (ref 78–100)
MONOCYTES ABSOLUTE: 0.6 K/CU MM
MONOCYTES RELATIVE PERCENT: 13.1 % (ref 0–4)
NUCLEATED RBC %: 0 %
PDW BLD-RTO: 16.2 % (ref 11.7–14.9)
PLATELET # BLD: 296 K/CU MM (ref 140–440)
PMV BLD AUTO: 9.5 FL (ref 7.5–11.1)
POTASSIUM SERPL-SCNC: 4.5 MMOL/L (ref 3.5–5.1)
RBC # BLD: 2.93 M/CU MM (ref 4.2–5.4)
SEGMENTED NEUTROPHILS ABSOLUTE COUNT: 2.4 K/CU MM
SEGMENTED NEUTROPHILS RELATIVE PERCENT: 49.3 % (ref 36–66)
SODIUM BLD-SCNC: 139 MMOL/L (ref 135–145)
TOTAL IMMATURE NEUTOROPHIL: 0.01 K/CU MM
TOTAL NUCLEATED RBC: 0 K/CU MM
TOTAL PROTEIN: 5.6 GM/DL (ref 6.4–8.2)
TROPONIN T: <0.01 NG/ML
WBC # BLD: 4.9 K/CU MM (ref 4–10.5)

## 2020-03-15 PROCEDURE — 99285 EMERGENCY DEPT VISIT HI MDM: CPT

## 2020-03-15 PROCEDURE — 85025 COMPLETE CBC W/AUTO DIFF WBC: CPT

## 2020-03-15 PROCEDURE — 80053 COMPREHEN METABOLIC PANEL: CPT

## 2020-03-15 PROCEDURE — 6370000000 HC RX 637 (ALT 250 FOR IP): Performed by: EMERGENCY MEDICINE

## 2020-03-15 PROCEDURE — 84484 ASSAY OF TROPONIN QUANT: CPT

## 2020-03-15 PROCEDURE — 71046 X-RAY EXAM CHEST 2 VIEWS: CPT

## 2020-03-15 RX ORDER — NITROGLYCERIN 0.4 MG/1
0.4 TABLET SUBLINGUAL EVERY 5 MIN PRN
Status: COMPLETED | OUTPATIENT
Start: 2020-03-15 | End: 2020-03-15

## 2020-03-15 RX ORDER — OXYCODONE HYDROCHLORIDE 5 MG/1
20 TABLET ORAL ONCE
Status: COMPLETED | OUTPATIENT
Start: 2020-03-15 | End: 2020-03-15

## 2020-03-15 RX ORDER — ASPIRIN 81 MG/1
324 TABLET, CHEWABLE ORAL ONCE
Status: DISCONTINUED | OUTPATIENT
Start: 2020-03-15 | End: 2020-03-15

## 2020-03-15 RX ORDER — ASPIRIN 81 MG/1
243 TABLET, CHEWABLE ORAL ONCE
Status: COMPLETED | OUTPATIENT
Start: 2020-03-15 | End: 2020-03-15

## 2020-03-15 RX ADMIN — ASPIRIN 81 MG 243 MG: 81 TABLET ORAL at 22:13

## 2020-03-15 RX ADMIN — NITROGLYCERIN 0.4 MG: 0.4 TABLET, ORALLY DISINTEGRATING SUBLINGUAL at 22:56

## 2020-03-15 RX ADMIN — NITROGLYCERIN 0.4 MG: 0.4 TABLET, ORALLY DISINTEGRATING SUBLINGUAL at 22:50

## 2020-03-15 RX ADMIN — NITROGLYCERIN 0.4 MG: 0.4 TABLET, ORALLY DISINTEGRATING SUBLINGUAL at 23:01

## 2020-03-15 RX ADMIN — OXYCODONE HYDROCHLORIDE 20 MG: 5 TABLET ORAL at 22:49

## 2020-03-15 ASSESSMENT — PAIN DESCRIPTION - PAIN TYPE: TYPE: ACUTE PAIN

## 2020-03-15 ASSESSMENT — PAIN DESCRIPTION - FREQUENCY: FREQUENCY: CONTINUOUS

## 2020-03-15 ASSESSMENT — PAIN SCALES - GENERAL
PAINLEVEL_OUTOF10: 9
PAINLEVEL_OUTOF10: 7
PAINLEVEL_OUTOF10: 4
PAINLEVEL_OUTOF10: 7
PAINLEVEL_OUTOF10: 9

## 2020-03-15 ASSESSMENT — PAIN DESCRIPTION - LOCATION: LOCATION: CHEST;SHOULDER

## 2020-03-15 ASSESSMENT — PAIN DESCRIPTION - ONSET: ONSET: ON-GOING

## 2020-03-15 ASSESSMENT — PAIN DESCRIPTION - PROGRESSION: CLINICAL_PROGRESSION: GRADUALLY WORSENING

## 2020-03-15 ASSESSMENT — PAIN DESCRIPTION - DESCRIPTORS: DESCRIPTORS: PRESSURE

## 2020-03-16 ENCOUNTER — HOSPITAL ENCOUNTER (EMERGENCY)
Age: 60
Discharge: LEFT BEFORE DISCHARGE | End: 2020-03-16
Payer: MEDICARE

## 2020-03-16 VITALS
HEIGHT: 66 IN | DIASTOLIC BLOOD PRESSURE: 84 MMHG | HEART RATE: 64 BPM | OXYGEN SATURATION: 100 % | RESPIRATION RATE: 14 BRPM | BODY MASS INDEX: 16.55 KG/M2 | SYSTOLIC BLOOD PRESSURE: 133 MMHG | WEIGHT: 103 LBS | TEMPERATURE: 98.6 F

## 2020-03-16 LAB
EKG ATRIAL RATE: 62 BPM
EKG DIAGNOSIS: NORMAL
EKG P AXIS: 0 DEGREES
EKG P-R INTERVAL: 228 MS
EKG Q-T INTERVAL: 442 MS
EKG QRS DURATION: 114 MS
EKG QTC CALCULATION (BAZETT): 448 MS
EKG R AXIS: -40 DEGREES
EKG T AXIS: -35 DEGREES
EKG VENTRICULAR RATE: 62 BPM
TROPONIN T: <0.01 NG/ML

## 2020-03-16 PROCEDURE — 6370000000 HC RX 637 (ALT 250 FOR IP): Performed by: EMERGENCY MEDICINE

## 2020-03-16 PROCEDURE — 93005 ELECTROCARDIOGRAM TRACING: CPT | Performed by: EMERGENCY MEDICINE

## 2020-03-16 PROCEDURE — 93010 ELECTROCARDIOGRAM REPORT: CPT | Performed by: INTERNAL MEDICINE

## 2020-03-16 PROCEDURE — 84484 ASSAY OF TROPONIN QUANT: CPT

## 2020-03-16 RX ORDER — ACETAMINOPHEN 500 MG
1000 TABLET ORAL ONCE
Status: COMPLETED | OUTPATIENT
Start: 2020-03-16 | End: 2020-03-16

## 2020-03-16 RX ADMIN — ACETAMINOPHEN 1000 MG: 500 TABLET ORAL at 02:17

## 2020-03-16 ASSESSMENT — PAIN SCALES - GENERAL
PAINLEVEL_OUTOF10: 0
PAINLEVEL_OUTOF10: 9
PAINLEVEL_OUTOF10: 4

## 2020-03-16 NOTE — ED PROVIDER NOTES
 Stress: Not on file   Relationships    Social connections     Talks on phone: Not on file     Gets together: Not on file     Attends Sabianist service: Not on file     Active member of club or organization: Not on file     Attends meetings of clubs or organizations: Not on file     Relationship status: Not on file    Intimate partner violence     Fear of current or ex partner: Not on file     Emotionally abused: Not on file     Physically abused: Not on file     Forced sexual activity: Not on file   Other Topics Concern    Not on file   Social History Narrative    Not on file     No current facility-administered medications for this encounter. Current Outpatient Medications   Medication Sig Dispense Refill    primidone (MYSOLINE) 50 MG tablet Take 2 tablets by mouth 2 times daily 60 tablet 0    levothyroxine (SYNTHROID) 112 MCG tablet Take 2 tablets by mouth Daily 30 tablet 1    colchicine (COLCRYS) 0.6 MG tablet Take 1 tablet by mouth daily 9 tablet 0    isosorbide mononitrate (IMDUR) 30 MG extended release tablet Take 2 tablets by mouth daily 30 tablet 0    ipratropium-albuterol (DUONEB) 0.5-2.5 (3) MG/3ML SOLN nebulizer solution Inhale 3 mLs into the lungs every 4 hours (while awake) 360 mL 0    naloxone 4 MG/0.1ML LIQD nasal spray 1 spray by Nasal route as needed for Opioid Reversal 1 each 5    nitroGLYCERIN (NITROSTAT) 0.4 MG SL tablet up to max of 3 total doses.  If no relief after 1 dose, call 911. 25 tablet 3    naloxone 4 MG/0.1ML LIQD nasal spray 1 spray by Nasal route as needed for Opioid Reversal 1 each 5    benzocaine-menthol (CEPACOL SORE THROAT) 15-3.6 MG lozenge Take 1 lozenge by mouth every 2 hours as needed for Sore Throat 18 lozenge 1    B-D TB SYRINGE 1CC/25GX5/8\" 25G X 5/8\" 1 ML MISC USE AS DIRECTED WITH B-12 INJECTION  9    PARoxetine (PAXIL) 10 MG tablet   1    cyanocobalamin 1000 MCG/ML injection INJECT 1 ML INTO THE MUSCLE ON THE FIRST OF EACH MONTH  11    placed or performed during the hospital encounter of 03/15/20   CBC Auto Differential   Result Value Ref Range    WBC 4.9 4.0 - 10.5 K/CU MM    RBC 2.93 (L) 4.2 - 5.4 M/CU MM    Hemoglobin 9.8 (L) 12.5 - 16.0 GM/DL    Hematocrit 33.0 (L) 37 - 47 %    .6 (H) 78 - 100 FL    MCH 33.4 (H) 27 - 31 PG    MCHC 29.7 (L) 32.0 - 36.0 %    RDW 16.2 (H) 11.7 - 14.9 %    Platelets 900 204 - 273 K/CU MM    MPV 9.5 7.5 - 11.1 FL    Differential Type AUTOMATED DIFFERENTIAL     Segs Relative 49.3 36 - 66 %    Lymphocytes % 36.0 24 - 44 %    Monocytes % 13.1 (H) 0 - 4 %    Eosinophils % 0.2 0 - 3 %    Basophils % 1.2 (H) 0 - 1 %    Segs Absolute 2.4 K/CU MM    Lymphocytes Absolute 1.8 K/CU MM    Monocytes Absolute 0.6 K/CU MM    Eosinophils Absolute 0.0 K/CU MM    Basophils Absolute 0.1 K/CU MM    Nucleated RBC % 0.0 %    Total Nucleated RBC 0.0 K/CU MM    Total Immature Neutrophil 0.01 K/CU MM    Immature Neutrophil % 0.2 0 - 0.43 %   Comprehensive Metabolic Panel w/ Reflex to MG   Result Value Ref Range    Sodium 139 135 - 145 MMOL/L    Potassium 4.5 3.5 - 5.1 MMOL/L    Chloride 110 99 - 110 mMol/L    CO2 20 (L) 21 - 32 MMOL/L    BUN 14 6 - 23 MG/DL    CREATININE 0.7 0.6 - 1.1 MG/DL    Glucose 70 70 - 99 MG/DL    Calcium 8.0 (L) 8.3 - 10.6 MG/DL    Alb 3.3 (L) 3.4 - 5.0 GM/DL    Total Protein 5.6 (L) 6.4 - 8.2 GM/DL    Total Bilirubin 0.2 0.0 - 1.0 MG/DL    ALT 7 (L) 10 - 40 U/L    AST 12 (L) 15 - 37 IU/L    Alkaline Phosphatase 95 40 - 129 IU/L    GFR Non-African American >60 >60 mL/min/1.73m2    GFR African American >60 >60 mL/min/1.73m2    Anion Gap 9 4 - 16   Troponin   Result Value Ref Range    Troponin T <0.010 <0.01 NG/ML   Troponin   Result Value Ref Range    Troponin T <0.010 <0.01 NG/ML   EKG 12 Lead   Result Value Ref Range    Ventricular Rate 63 BPM    Atrial Rate 63 BPM    P-R Interval 228 ms    QRS Duration 104 ms    Q-T Interval 452 ms    QTc Calculation (Bazett) 462 ms    R Axis -34 degrees    T Axis -50

## 2020-03-25 LAB
EKG ATRIAL RATE: 63 BPM
EKG DIAGNOSIS: NORMAL
EKG P-R INTERVAL: 228 MS
EKG Q-T INTERVAL: 452 MS
EKG QRS DURATION: 104 MS
EKG QTC CALCULATION (BAZETT): 462 MS
EKG R AXIS: -34 DEGREES
EKG T AXIS: -50 DEGREES
EKG VENTRICULAR RATE: 63 BPM

## 2020-05-23 ENCOUNTER — APPOINTMENT (OUTPATIENT)
Dept: GENERAL RADIOLOGY | Age: 60
DRG: 981 | End: 2020-05-23
Payer: MEDICARE

## 2020-05-23 ENCOUNTER — HOSPITAL ENCOUNTER (INPATIENT)
Age: 60
LOS: 12 days | Discharge: HOME HEALTH | DRG: 981 | End: 2020-06-05
Attending: INTERNAL MEDICINE | Admitting: INTERNAL MEDICINE
Payer: MEDICARE

## 2020-05-23 LAB
ALBUMIN SERPL-MCNC: 3.7 GM/DL (ref 3.4–5)
ALP BLD-CCNC: 83 IU/L (ref 40–128)
ALT SERPL-CCNC: 14 U/L (ref 10–40)
ANION GAP SERPL CALCULATED.3IONS-SCNC: 12 MMOL/L (ref 4–16)
AST SERPL-CCNC: 28 IU/L (ref 15–37)
BACTERIA: ABNORMAL /HPF
BASOPHILS ABSOLUTE: 0.1 K/CU MM
BASOPHILS RELATIVE PERCENT: 0.4 % (ref 0–1)
BILIRUB SERPL-MCNC: 0.2 MG/DL (ref 0–1)
BILIRUBIN URINE: NEGATIVE MG/DL
BLOOD, URINE: ABNORMAL
BUN BLDV-MCNC: 17 MG/DL (ref 6–23)
CALCIUM SERPL-MCNC: 8.6 MG/DL (ref 8.3–10.6)
CHLORIDE BLD-SCNC: 102 MMOL/L (ref 99–110)
CLARITY: ABNORMAL
CO2: 24 MMOL/L (ref 21–32)
COLOR: ABNORMAL
CREAT SERPL-MCNC: 0.9 MG/DL (ref 0.6–1.1)
DIFFERENTIAL TYPE: ABNORMAL
EOSINOPHILS ABSOLUTE: 0 K/CU MM
EOSINOPHILS RELATIVE PERCENT: 0.1 % (ref 0–3)
GFR AFRICAN AMERICAN: >60 ML/MIN/1.73M2
GFR NON-AFRICAN AMERICAN: >60 ML/MIN/1.73M2
GLUCOSE BLD-MCNC: 114 MG/DL (ref 70–99)
GLUCOSE, URINE: NEGATIVE MG/DL
HCT VFR BLD CALC: 37.3 % (ref 37–47)
HEMOGLOBIN: 11.8 GM/DL (ref 12.5–16)
IMMATURE NEUTROPHIL %: 0.8 % (ref 0–0.43)
KETONES, URINE: NEGATIVE MG/DL
LACTATE: 1.4 MMOL/L (ref 0.4–2)
LEUKOCYTE ESTERASE, URINE: ABNORMAL
LYMPHOCYTES ABSOLUTE: 0.6 K/CU MM
LYMPHOCYTES RELATIVE PERCENT: 3.9 % (ref 24–44)
MCH RBC QN AUTO: 35.6 PG (ref 27–31)
MCHC RBC AUTO-ENTMCNC: 31.6 % (ref 32–36)
MCV RBC AUTO: 112.7 FL (ref 78–100)
MONOCYTES ABSOLUTE: 0.9 K/CU MM
MONOCYTES RELATIVE PERCENT: 6 % (ref 0–4)
NITRITE URINE, QUANTITATIVE: NEGATIVE
NON SQUAM EPI CELLS: 1 /HPF
NUCLEATED RBC %: 0 %
PDW BLD-RTO: 16.6 % (ref 11.7–14.9)
PH, URINE: 7 (ref 5–8)
PLATELET # BLD: 244 K/CU MM (ref 140–440)
PMV BLD AUTO: 10.7 FL (ref 7.5–11.1)
POTASSIUM SERPL-SCNC: 4.4 MMOL/L (ref 3.5–5.1)
PROTEIN UA: 30 MG/DL
RBC # BLD: 3.31 M/CU MM (ref 4.2–5.4)
RBC URINE: 31 /HPF (ref 0–6)
SEGMENTED NEUTROPHILS ABSOLUTE COUNT: 12.7 K/CU MM
SEGMENTED NEUTROPHILS RELATIVE PERCENT: 88.8 % (ref 36–66)
SODIUM BLD-SCNC: 138 MMOL/L (ref 135–145)
SPECIFIC GRAVITY UA: 1.02 (ref 1–1.03)
SQUAMOUS EPITHELIAL: 5 /HPF
TOTAL IMMATURE NEUTOROPHIL: 0.11 K/CU MM
TOTAL NUCLEATED RBC: 0 K/CU MM
TOTAL PROTEIN: 6.5 GM/DL (ref 6.4–8.2)
TRICHOMONAS: ABNORMAL /HPF
UROBILINOGEN, URINE: NORMAL MG/DL (ref 0.2–1)
WBC # BLD: 14.2 K/CU MM (ref 4–10.5)
WBC CLUMP: ABNORMAL /HPF
WBC UA: 807 /HPF (ref 0–5)

## 2020-05-23 PROCEDURE — 99285 EMERGENCY DEPT VISIT HI MDM: CPT

## 2020-05-23 PROCEDURE — 6370000000 HC RX 637 (ALT 250 FOR IP): Performed by: PHYSICIAN ASSISTANT

## 2020-05-23 PROCEDURE — 80053 COMPREHEN METABOLIC PANEL: CPT

## 2020-05-23 PROCEDURE — 83605 ASSAY OF LACTIC ACID: CPT

## 2020-05-23 PROCEDURE — 85025 COMPLETE CBC W/AUTO DIFF WBC: CPT

## 2020-05-23 PROCEDURE — 81001 URINALYSIS AUTO W/SCOPE: CPT

## 2020-05-23 PROCEDURE — 71045 X-RAY EXAM CHEST 1 VIEW: CPT

## 2020-05-23 PROCEDURE — U0002 COVID-19 LAB TEST NON-CDC: HCPCS

## 2020-05-23 RX ORDER — OXYCODONE HYDROCHLORIDE AND ACETAMINOPHEN 5; 325 MG/1; MG/1
1 TABLET ORAL ONCE
Status: COMPLETED | OUTPATIENT
Start: 2020-05-23 | End: 2020-05-23

## 2020-05-23 RX ORDER — 0.9 % SODIUM CHLORIDE 0.9 %
1000 INTRAVENOUS SOLUTION INTRAVENOUS ONCE
Status: DISCONTINUED | OUTPATIENT
Start: 2020-05-23 | End: 2020-05-23

## 2020-05-23 RX ORDER — ACETAMINOPHEN 500 MG
1000 TABLET ORAL ONCE
Status: COMPLETED | OUTPATIENT
Start: 2020-05-23 | End: 2020-05-23

## 2020-05-23 RX ADMIN — OXYCODONE HYDROCHLORIDE AND ACETAMINOPHEN 1 TABLET: 5; 325 TABLET ORAL at 23:25

## 2020-05-23 RX ADMIN — ACETAMINOPHEN 1000 MG: 500 TABLET, FILM COATED ORAL at 23:16

## 2020-05-23 ASSESSMENT — PAIN DESCRIPTION - LOCATION: LOCATION: ARM

## 2020-05-23 ASSESSMENT — PAIN SCALES - GENERAL
PAINLEVEL_OUTOF10: 9
PAINLEVEL_OUTOF10: 9
PAINLEVEL_OUTOF10: 10

## 2020-05-24 ENCOUNTER — APPOINTMENT (OUTPATIENT)
Dept: ULTRASOUND IMAGING | Age: 60
DRG: 981 | End: 2020-05-24
Payer: MEDICARE

## 2020-05-24 ENCOUNTER — APPOINTMENT (OUTPATIENT)
Dept: CT IMAGING | Age: 60
DRG: 981 | End: 2020-05-24
Payer: MEDICARE

## 2020-05-24 PROBLEM — R50.9 FEVER IN ADULT: Status: ACTIVE | Noted: 2020-05-24

## 2020-05-24 LAB
ANION GAP SERPL CALCULATED.3IONS-SCNC: 13 MMOL/L (ref 4–16)
BUN BLDV-MCNC: 15 MG/DL (ref 6–23)
CALCIUM SERPL-MCNC: 8.2 MG/DL (ref 8.3–10.6)
CHLORIDE BLD-SCNC: 102 MMOL/L (ref 99–110)
CO2: 24 MMOL/L (ref 21–32)
CREAT SERPL-MCNC: 0.8 MG/DL (ref 0.6–1.1)
GFR AFRICAN AMERICAN: >60 ML/MIN/1.73M2
GFR NON-AFRICAN AMERICAN: >60 ML/MIN/1.73M2
GLUCOSE BLD-MCNC: 108 MG/DL (ref 70–99)
GLUCOSE BLD-MCNC: 84 MG/DL (ref 70–99)
GLUCOSE BLD-MCNC: 97 MG/DL (ref 70–99)
GLUCOSE BLD-MCNC: 98 MG/DL (ref 70–99)
POTASSIUM SERPL-SCNC: 3.7 MMOL/L (ref 3.5–5.1)
SODIUM BLD-SCNC: 139 MMOL/L (ref 135–145)
TSH HIGH SENSITIVITY: 5.52 UIU/ML (ref 0.27–4.2)

## 2020-05-24 PROCEDURE — 96365 THER/PROPH/DIAG IV INF INIT: CPT

## 2020-05-24 PROCEDURE — 2580000003 HC RX 258: Performed by: PHYSICIAN ASSISTANT

## 2020-05-24 PROCEDURE — 6370000000 HC RX 637 (ALT 250 FOR IP): Performed by: INTERNAL MEDICINE

## 2020-05-24 PROCEDURE — 80048 BASIC METABOLIC PNL TOTAL CA: CPT

## 2020-05-24 PROCEDURE — 87186 SC STD MICRODIL/AGAR DIL: CPT

## 2020-05-24 PROCEDURE — 76937 US GUIDE VASCULAR ACCESS: CPT

## 2020-05-24 PROCEDURE — 36410 VNPNXR 3YR/> PHY/QHP DX/THER: CPT

## 2020-05-24 PROCEDURE — 87081 CULTURE SCREEN ONLY: CPT

## 2020-05-24 PROCEDURE — 2580000003 HC RX 258: Performed by: INTERNAL MEDICINE

## 2020-05-24 PROCEDURE — 94761 N-INVAS EAR/PLS OXIMETRY MLT: CPT

## 2020-05-24 PROCEDURE — 87086 URINE CULTURE/COLONY COUNT: CPT

## 2020-05-24 PROCEDURE — 6360000002 HC RX W HCPCS: Performed by: INTERNAL MEDICINE

## 2020-05-24 PROCEDURE — 87040 BLOOD CULTURE FOR BACTERIA: CPT

## 2020-05-24 PROCEDURE — 2100000000 HC CCU R&B

## 2020-05-24 PROCEDURE — 99223 1ST HOSP IP/OBS HIGH 75: CPT | Performed by: INTERNAL MEDICINE

## 2020-05-24 PROCEDURE — 74150 CT ABDOMEN W/O CONTRAST: CPT

## 2020-05-24 PROCEDURE — 2500000003 HC RX 250 WO HCPCS: Performed by: INTERNAL MEDICINE

## 2020-05-24 PROCEDURE — 87150 DNA/RNA AMPLIFIED PROBE: CPT

## 2020-05-24 PROCEDURE — 84443 ASSAY THYROID STIM HORMONE: CPT

## 2020-05-24 PROCEDURE — 6360000002 HC RX W HCPCS: Performed by: PHYSICIAN ASSISTANT

## 2020-05-24 PROCEDURE — C1751 CATH, INF, PER/CENT/MIDLINE: HCPCS

## 2020-05-24 PROCEDURE — 76882 US LMTD JT/FCL EVL NVASC XTR: CPT

## 2020-05-24 PROCEDURE — 82962 GLUCOSE BLOOD TEST: CPT

## 2020-05-24 RX ORDER — FERROUS GLUCONATE 324(37.5)
324 TABLET ORAL EVERY OTHER DAY
Status: DISCONTINUED | OUTPATIENT
Start: 2020-05-24 | End: 2020-06-05 | Stop reason: HOSPADM

## 2020-05-24 RX ORDER — LACTOBACILLUS RHAMNOSUS GG 10B CELL
1 CAPSULE ORAL
Status: DISCONTINUED | OUTPATIENT
Start: 2020-05-24 | End: 2020-06-05 | Stop reason: HOSPADM

## 2020-05-24 RX ORDER — MIDODRINE HYDROCHLORIDE 5 MG/1
10 TABLET ORAL
Status: DISCONTINUED | OUTPATIENT
Start: 2020-05-24 | End: 2020-05-24

## 2020-05-24 RX ORDER — SPIRONOLACTONE 25 MG/1
12.5 TABLET ORAL 2 TIMES DAILY
Status: DISCONTINUED | OUTPATIENT
Start: 2020-05-24 | End: 2020-05-28

## 2020-05-24 RX ORDER — MIRTAZAPINE 30 MG/1
30 TABLET, FILM COATED ORAL NIGHTLY
Status: ON HOLD | COMMUNITY
End: 2020-10-14

## 2020-05-24 RX ORDER — CLOPIDOGREL BISULFATE 75 MG/1
75 TABLET ORAL DAILY
Status: DISCONTINUED | OUTPATIENT
Start: 2020-05-24 | End: 2020-06-05 | Stop reason: HOSPADM

## 2020-05-24 RX ORDER — ACETAMINOPHEN 325 MG/1
650 TABLET ORAL EVERY 6 HOURS PRN
Status: DISCONTINUED | OUTPATIENT
Start: 2020-05-24 | End: 2020-06-05 | Stop reason: HOSPADM

## 2020-05-24 RX ORDER — SODIUM CHLORIDE 9 MG/ML
INJECTION, SOLUTION INTRAVENOUS CONTINUOUS
Status: ACTIVE | OUTPATIENT
Start: 2020-05-24 | End: 2020-05-24

## 2020-05-24 RX ORDER — LEVOTHYROXINE SODIUM 112 UG/1
224 TABLET ORAL DAILY
Status: DISCONTINUED | OUTPATIENT
Start: 2020-05-24 | End: 2020-06-05 | Stop reason: HOSPADM

## 2020-05-24 RX ORDER — ACETAMINOPHEN 650 MG/1
650 SUPPOSITORY RECTAL EVERY 6 HOURS PRN
Status: DISCONTINUED | OUTPATIENT
Start: 2020-05-24 | End: 2020-06-05 | Stop reason: HOSPADM

## 2020-05-24 RX ORDER — PROMETHAZINE HYDROCHLORIDE 12.5 MG/1
12.5 TABLET ORAL EVERY 6 HOURS PRN
Status: DISCONTINUED | OUTPATIENT
Start: 2020-05-24 | End: 2020-06-05 | Stop reason: HOSPADM

## 2020-05-24 RX ORDER — NITROGLYCERIN 0.4 MG/1
0.4 TABLET SUBLINGUAL EVERY 5 MIN PRN
Status: DISCONTINUED | OUTPATIENT
Start: 2020-05-24 | End: 2020-06-05 | Stop reason: HOSPADM

## 2020-05-24 RX ORDER — FAMOTIDINE 20 MG/1
20 TABLET, FILM COATED ORAL DAILY
Status: DISCONTINUED | OUTPATIENT
Start: 2020-05-24 | End: 2020-06-05 | Stop reason: HOSPADM

## 2020-05-24 RX ORDER — DOCUSATE SODIUM 100 MG/1
100 CAPSULE, LIQUID FILLED ORAL 2 TIMES DAILY PRN
Status: DISCONTINUED | OUTPATIENT
Start: 2020-05-24 | End: 2020-06-05 | Stop reason: HOSPADM

## 2020-05-24 RX ORDER — LIDOCAINE HYDROCHLORIDE 10 MG/ML
5 INJECTION, SOLUTION EPIDURAL; INFILTRATION; INTRACAUDAL; PERINEURAL ONCE
Status: DISCONTINUED | OUTPATIENT
Start: 2020-05-24 | End: 2020-06-05 | Stop reason: HOSPADM

## 2020-05-24 RX ORDER — ASPIRIN 81 MG/1
81 TABLET, CHEWABLE ORAL DAILY
Status: DISCONTINUED | OUTPATIENT
Start: 2020-05-24 | End: 2020-05-24

## 2020-05-24 RX ORDER — OXYCODONE HYDROCHLORIDE 20 MG/1
20 TABLET ORAL
Status: ON HOLD | COMMUNITY
Start: 2020-03-10 | End: 2020-08-27 | Stop reason: SDUPTHER

## 2020-05-24 RX ORDER — SODIUM CHLORIDE 0.9 % (FLUSH) 0.9 %
10 SYRINGE (ML) INJECTION PRN
Status: DISCONTINUED | OUTPATIENT
Start: 2020-05-24 | End: 2020-05-24 | Stop reason: SDUPTHER

## 2020-05-24 RX ORDER — ONDANSETRON 4 MG/1
4 TABLET, ORALLY DISINTEGRATING ORAL EVERY 6 HOURS PRN
Status: DISCONTINUED | OUTPATIENT
Start: 2020-05-24 | End: 2020-06-05 | Stop reason: HOSPADM

## 2020-05-24 RX ORDER — SODIUM CHLORIDE 0.9 % (FLUSH) 0.9 %
10 SYRINGE (ML) INJECTION PRN
Status: DISCONTINUED | OUTPATIENT
Start: 2020-05-24 | End: 2020-06-05 | Stop reason: HOSPADM

## 2020-05-24 RX ORDER — POLYETHYLENE GLYCOL 3350 17 G/17G
17 POWDER, FOR SOLUTION ORAL DAILY PRN
Status: DISCONTINUED | OUTPATIENT
Start: 2020-05-24 | End: 2020-06-05 | Stop reason: HOSPADM

## 2020-05-24 RX ORDER — TORSEMIDE 20 MG/1
20 TABLET ORAL DAILY PRN
Status: DISCONTINUED | OUTPATIENT
Start: 2020-05-24 | End: 2020-06-05 | Stop reason: HOSPADM

## 2020-05-24 RX ORDER — ISOSORBIDE MONONITRATE 30 MG/1
15 TABLET, EXTENDED RELEASE ORAL NIGHTLY
Status: DISCONTINUED | OUTPATIENT
Start: 2020-05-24 | End: 2020-05-28

## 2020-05-24 RX ORDER — OXYCODONE HYDROCHLORIDE 10 MG/1
20 TABLET ORAL
Status: DISCONTINUED | OUTPATIENT
Start: 2020-05-24 | End: 2020-06-05 | Stop reason: HOSPADM

## 2020-05-24 RX ORDER — SODIUM CHLORIDE 0.9 % (FLUSH) 0.9 %
10 SYRINGE (ML) INJECTION EVERY 12 HOURS SCHEDULED
Status: DISCONTINUED | OUTPATIENT
Start: 2020-05-24 | End: 2020-06-05 | Stop reason: HOSPADM

## 2020-05-24 RX ORDER — ATORVASTATIN CALCIUM 80 MG/1
80 TABLET, FILM COATED ORAL NIGHTLY
Status: DISCONTINUED | OUTPATIENT
Start: 2020-05-24 | End: 2020-06-05 | Stop reason: HOSPADM

## 2020-05-24 RX ORDER — LEVETIRACETAM 500 MG/1
1000 TABLET ORAL 2 TIMES DAILY
Status: DISCONTINUED | OUTPATIENT
Start: 2020-05-24 | End: 2020-06-05 | Stop reason: HOSPADM

## 2020-05-24 RX ORDER — AMIODARONE HYDROCHLORIDE 200 MG/1
200 TABLET ORAL DAILY
Status: DISCONTINUED | OUTPATIENT
Start: 2020-05-24 | End: 2020-06-05 | Stop reason: HOSPADM

## 2020-05-24 RX ORDER — ONDANSETRON 2 MG/ML
4 INJECTION INTRAMUSCULAR; INTRAVENOUS EVERY 6 HOURS PRN
Status: DISCONTINUED | OUTPATIENT
Start: 2020-05-24 | End: 2020-06-05 | Stop reason: HOSPADM

## 2020-05-24 RX ORDER — DULOXETIN HYDROCHLORIDE 30 MG/1
60 CAPSULE, DELAYED RELEASE ORAL DAILY
Status: DISCONTINUED | OUTPATIENT
Start: 2020-05-24 | End: 2020-06-05 | Stop reason: HOSPADM

## 2020-05-24 RX ORDER — MIRTAZAPINE 15 MG/1
30 TABLET, FILM COATED ORAL NIGHTLY
Status: DISCONTINUED | OUTPATIENT
Start: 2020-05-24 | End: 2020-06-05 | Stop reason: HOSPADM

## 2020-05-24 RX ORDER — LANOLIN ALCOHOL/MO/W.PET/CERES
800 CREAM (GRAM) TOPICAL 2 TIMES DAILY
Status: DISCONTINUED | OUTPATIENT
Start: 2020-05-24 | End: 2020-06-05 | Stop reason: HOSPADM

## 2020-05-24 RX ORDER — SODIUM CHLORIDE 0.9 % (FLUSH) 0.9 %
10 SYRINGE (ML) INJECTION EVERY 12 HOURS SCHEDULED
Status: DISCONTINUED | OUTPATIENT
Start: 2020-05-24 | End: 2020-05-24 | Stop reason: SDUPTHER

## 2020-05-24 RX ADMIN — Medication 1 CAPSULE: at 08:44

## 2020-05-24 RX ADMIN — ASPIRIN 81 MG 81 MG: 81 TABLET ORAL at 08:43

## 2020-05-24 RX ADMIN — FERROUS GLUCONATE TAB 324 MG (37.5 MG ELEMENTAL IRON) 324 MG: 324 (37.5 FE) TAB at 08:44

## 2020-05-24 RX ADMIN — Medication 2 MCG/MIN: at 09:27

## 2020-05-24 RX ADMIN — OXYCODONE HYDROCHLORIDE 20 MG: 10 TABLET ORAL at 22:21

## 2020-05-24 RX ADMIN — APIXABAN 5 MG: 5 TABLET, FILM COATED ORAL at 22:21

## 2020-05-24 RX ADMIN — Medication 800 MG: at 22:22

## 2020-05-24 RX ADMIN — CLOPIDOGREL BISULFATE 75 MG: 75 TABLET ORAL at 08:43

## 2020-05-24 RX ADMIN — MEROPENEM 1 G: 1 INJECTION, POWDER, FOR SOLUTION INTRAVENOUS at 17:08

## 2020-05-24 RX ADMIN — FAMOTIDINE 20 MG: 20 TABLET, FILM COATED ORAL at 08:51

## 2020-05-24 RX ADMIN — LEVETIRACETAM 1000 MG: 500 TABLET, FILM COATED ORAL at 22:22

## 2020-05-24 RX ADMIN — MIRTAZAPINE 30 MG: 15 TABLET, FILM COATED ORAL at 22:22

## 2020-05-24 RX ADMIN — OXYCODONE HYDROCHLORIDE 20 MG: 10 TABLET ORAL at 03:40

## 2020-05-24 RX ADMIN — SODIUM CHLORIDE: 9 INJECTION, SOLUTION INTRAVENOUS at 03:42

## 2020-05-24 RX ADMIN — OXYCODONE HYDROCHLORIDE 20 MG: 10 TABLET ORAL at 18:17

## 2020-05-24 RX ADMIN — APIXABAN 5 MG: 5 TABLET, FILM COATED ORAL at 08:44

## 2020-05-24 RX ADMIN — SPIRONOLACTONE 12.5 MG: 25 TABLET ORAL at 08:44

## 2020-05-24 RX ADMIN — MEROPENEM 1 G: 1 INJECTION, POWDER, FOR SOLUTION INTRAVENOUS at 03:42

## 2020-05-24 RX ADMIN — OXYCODONE HYDROCHLORIDE 20 MG: 10 TABLET ORAL at 09:47

## 2020-05-24 RX ADMIN — AMIODARONE HYDROCHLORIDE 200 MG: 200 TABLET ORAL at 08:44

## 2020-05-24 RX ADMIN — VANCOMYCIN HYDROCHLORIDE 750 MG: 5 INJECTION, POWDER, LYOPHILIZED, FOR SOLUTION INTRAVENOUS at 17:56

## 2020-05-24 RX ADMIN — CEFTRIAXONE 1 G: 1 INJECTION, POWDER, FOR SOLUTION INTRAMUSCULAR; INTRAVENOUS at 00:55

## 2020-05-24 RX ADMIN — OXYCODONE HYDROCHLORIDE 20 MG: 10 TABLET ORAL at 06:47

## 2020-05-24 RX ADMIN — Medication 800 MG: at 08:44

## 2020-05-24 RX ADMIN — ACETAMINOPHEN 650 MG: 325 TABLET ORAL at 06:48

## 2020-05-24 RX ADMIN — LEVOTHYROXINE SODIUM 224 MCG: 112 TABLET ORAL at 06:47

## 2020-05-24 RX ADMIN — Medication 1 CAPSULE: at 17:08

## 2020-05-24 RX ADMIN — Medication 1 CAPSULE: at 12:37

## 2020-05-24 RX ADMIN — LEVETIRACETAM 1000 MG: 500 TABLET, FILM COATED ORAL at 08:43

## 2020-05-24 RX ADMIN — ATORVASTATIN CALCIUM 80 MG: 80 TABLET, FILM COATED ORAL at 23:30

## 2020-05-24 RX ADMIN — SODIUM CHLORIDE, PRESERVATIVE FREE 10 ML: 5 INJECTION INTRAVENOUS at 22:25

## 2020-05-24 RX ADMIN — VANCOMYCIN HYDROCHLORIDE 750 MG: 5 INJECTION, POWDER, LYOPHILIZED, FOR SOLUTION INTRAVENOUS at 23:29

## 2020-05-24 RX ADMIN — OXYCODONE HYDROCHLORIDE 20 MG: 10 TABLET ORAL at 14:46

## 2020-05-24 RX ADMIN — DULOXETINE 60 MG: 30 CAPSULE, DELAYED RELEASE ORAL at 08:44

## 2020-05-24 RX ADMIN — ACETAMINOPHEN 650 MG: 325 TABLET ORAL at 18:17

## 2020-05-24 ASSESSMENT — PAIN DESCRIPTION - FREQUENCY: FREQUENCY: CONTINUOUS

## 2020-05-24 ASSESSMENT — PAIN SCALES - GENERAL
PAINLEVEL_OUTOF10: 8
PAINLEVEL_OUTOF10: 9
PAINLEVEL_OUTOF10: 7
PAINLEVEL_OUTOF10: 6
PAINLEVEL_OUTOF10: 8
PAINLEVEL_OUTOF10: 5
PAINLEVEL_OUTOF10: 8
PAINLEVEL_OUTOF10: 7
PAINLEVEL_OUTOF10: 8
PAINLEVEL_OUTOF10: 9
PAINLEVEL_OUTOF10: 6
PAINLEVEL_OUTOF10: 5
PAINLEVEL_OUTOF10: 6
PAINLEVEL_OUTOF10: 8
PAINLEVEL_OUTOF10: 8
PAINLEVEL_OUTOF10: 4
PAINLEVEL_OUTOF10: 8
PAINLEVEL_OUTOF10: 9
PAINLEVEL_OUTOF10: 5
PAINLEVEL_OUTOF10: 5
PAINLEVEL_OUTOF10: 8
PAINLEVEL_OUTOF10: 5
PAINLEVEL_OUTOF10: 8

## 2020-05-24 ASSESSMENT — PAIN DESCRIPTION - PROGRESSION

## 2020-05-24 ASSESSMENT — PAIN DESCRIPTION - ORIENTATION: ORIENTATION: LEFT;RIGHT;MID

## 2020-05-24 ASSESSMENT — PAIN DESCRIPTION - LOCATION
LOCATION: ABDOMEN;BACK
LOCATION: HIP;KNEE;BACK

## 2020-05-24 ASSESSMENT — PAIN DESCRIPTION - PAIN TYPE
TYPE: CHRONIC PAIN
TYPE: ACUTE PAIN

## 2020-05-24 ASSESSMENT — PAIN DESCRIPTION - ONSET: ONSET: ON-GOING

## 2020-05-24 ASSESSMENT — PAIN DESCRIPTION - DESCRIPTORS: DESCRIPTORS: BURNING;SHARP

## 2020-05-24 ASSESSMENT — PAIN DESCRIPTION - DIRECTION: RADIATING_TOWARDS: BACK

## 2020-05-24 ASSESSMENT — PAIN - FUNCTIONAL ASSESSMENT: PAIN_FUNCTIONAL_ASSESSMENT: ACTIVITIES ARE NOT PREVENTED

## 2020-05-24 NOTE — H&P
History and Physical      Name:  Marie Donahue /Age/Sex: 1960  (63 y.o. female)   MRN & CSN:  [id number] & [id number] Admission Date/Time: 2020 10:29 PM   Location:  ED05/ED-05 PCP: Mary Amaya MD       Hospital Day: 2    Assessment and Plan:   Marie Donahue is a 63 y.o.  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 coli will need to cover with IV meropenem. - De-escalate based on urine culture results. Severe ischemic cardiomyopathy EF 20 to 25% as of 2019 per echo with admit at [address] [place] [address] few days ago for acute CHF  -Released from [address] [place] [address] on May 22, and now readmitted within 2 days  -Imdur dose reduced at [address] [place] [address] recently, same for metoprolol and Aldactone  -Lisinopril discontinued during recent admission at [facility] advised her to take torsemide only as needed  -We will give her half a liter normal saline over 10 hours  She is supposed to follow up in telemedicine HF NP clinic in about 1 week has appointment with Dr. Jaylen Barr (CHF team at [address] [place] [address]) 20     Discomfort at right antecubital area at the site of an IV she had prior to admission  -Check ultrasound to rule out abscess  -Follow-up blood cultures     Hypotension  -This was an issue during her admission at [address] [place] [address] recently. See above for medications.      Chronic pain syndrome  -She is on a high-dose of narcotics, which can affect her blood pressure  -Recommend trying to taper her dose down as outpatient.  -I have verified her oxycodone dose which is 20 mg every 3 hours as needed for pain     Multiple admissions  -This is her 2nd one here this year + had 6 others at [address] [place] [address] in grandmother, and mother; High Blood Pressure in her maternal grandmother and mother; High Cholesterol in her maternal grandmother and mother; Other in her sister.   SHx:   Social History     Socioeconomic History    Marital status: Single     Spouse name: None    Number of children: None    Years of education: None    Highest education level: None   Occupational History    None   Social Needs    Financial resource strain: None    Food insecurity     Worry: None     Inability: None    Transportation needs     Medical: None     Non-medical: None   Tobacco Use    Smoking status: Never Smoker    Smokeless tobacco: Never Used   Substance and Sexual Activity    Alcohol use: No    Drug use: No    Sexual activity: Not Currently   Lifestyle    Physical activity     Days per week: None     Minutes per session: None    Stress: None   Relationships    Social connections     Talks on phone: None     Gets together: None     Attends Synagogue service: None     Active member of club or organization: None     Attends meetings of clubs or organizations: None     Relationship status: None    Intimate partner violence     Fear of current or ex partner: None     Emotionally abused: None     Physically abused: None     Forced sexual activity: None   Other Topics Concern    None   Social History Narrative    None     I spoke with the ED provider, and we decided to admit her    Hemoglobin is 11.8    Electronically signed by Nain Payne MD on 5/24/2020 at 1:38 AM

## 2020-05-24 NOTE — PROGRESS NOTES
[id number] asked Ana if patient can come down; says she will try to get a tech to bring patient and will call us back  does not have a tech available to bring patient down at this time [phone]

## 2020-05-24 NOTE — ED NOTES
Bed: ED-05  Expected date:   Expected time:   Means of arrival:   Comments:  [place] [address], 63 F fever 103.6     Radha Herbert Si, RN  05/23/20 9153

## 2020-05-24 NOTE — ED PROVIDER NOTES
Triage Chief Complaint:   Fever (103-105 since 3pm) and Tremors    [place]:  Today in the ED I had the pleasure of caring for Hunter Daigle who is a 63 y.o. female that presents today to the emergency department complaining of fever, generalized weakness, tremors. Context this patient has a history of CHF, multiple sclerosis, coronary artery disease, asplenia he had recently discharged from the [facility] yesterday after going in for fluid overload and requiring paracentesis. Patient states that she began having some generalized weakness yesterday and a bit of a tremor however she was in the wheelchair so she did not notice it at the time. However today she has been having significant difficulty walking and is fell today. Secondary to this tremor and weakness. She denies any shortness of breath. Aside from her baseline shortness of breath and dyspnea on exertion no chest pain no palpitations no changes in vision dizziness no headache. She denies any urinary symptoms or vaginal bleeding. No abdominal pain. She denies any syncopal episodes or lightheadedness. She states that she does not tremor when she is at rest however when she tries to walk it is the worst.    ROS:  REVIEW OF SYSTEMS    At least 10 systems reviewed      All other review of systems are negative  See HPI and nursing notes for additional information       Past Medical History:   Diagnosis Date    Acute delirium     Arrhythmia     Arthritis     Asplenia     Asthma     CAD (coronary artery disease)     1st stent at age 46    Cardiomyopathy [facility])     Cerebral artery occlusion with cerebral infarction [facility]     CHF (congestive heart failure) ([facility]     Enlarged liver     GERD (gastroesophageal reflux disease)     H/O echocardiogram 4/6/16    EF 55%, trivial TR & MR, stage 1 diastolic dysfunction    History of blood transfusion     Hx of cardiovascular stress test 12/29/2015    Alessia: EF 45%.  Pharmacologic Lifestyle    Physical activity     Days per week: Not on file     Minutes per session: Not on file    Stress: Not on file   Relationships    Social connections     Talks on phone: Not on file     Gets together: Not on file     Attends Bahai service: Not on file     Active member of club or organization: Not on file     Attends meetings of clubs or organizations: Not on file     Relationship status: Not on file    Intimate partner violence     Fear of current or ex partner: Not on file     Emotionally abused: Not on file     Physically abused: Not on file     Forced sexual activity: Not on file   Other Topics Concern    Not on file   Social History Narrative    Not on file     Current Facility-Administered Medications   Medication Dose Route Frequency Provider Last Rate Last Dose    acetaminophen (TYLENOL) tablet 1,000 mg  1,000 mg Oral Once Vish Pratt PA-C         Current Outpatient Medications   Medication Sig Dispense Refill    primidone (MYSOLINE) 50 MG tablet Take 2 tablets by mouth 2 times daily 60 tablet 0    levothyroxine (SYNTHROID) 112 MCG tablet Take 2 tablets by mouth Daily 30 tablet 1    colchicine (COLCRYS) 0.6 MG tablet Take 1 tablet by mouth daily 9 tablet 0    isosorbide mononitrate (IMDUR) 30 MG extended release tablet Take 2 tablets by mouth daily 30 tablet 0    ipratropium-albuterol (DUONEB) 0.5-2.5 (3) MG/3ML SOLN nebulizer solution Inhale 3 mLs into the lungs every 4 hours (while awake) 360 mL 0    naloxone 4 MG/0.1ML LIQD nasal spray 1 spray by Nasal route as needed for Opioid Reversal 1 each 5    nitroGLYCERIN (NITROSTAT) 0.4 MG SL tablet up to max of 3 total doses.  If no relief after 1 dose, call 911. 25 tablet 3    naloxone 4 MG/0.1ML LIQD nasal spray 1 spray by Nasal route as needed for Opioid Reversal 1 each 5    benzocaine-menthol (CEPACOL SORE THROAT) 15-3.6 MG lozenge Take 1 lozenge by mouth every 2 hours as needed for Sore Throat 18 lozenge 1    B-D TB thyroid nonpalpable  Cardiac: Heart regular rate rhythm no murmurs rubs clicks or gallops  Lungs: Lungs are clear to auscultation there is no wheezing rhonchi or rales. There is no use of accessory muscles no nasal flaring identified. Chest wall: There is no tenderness to palpation over the chest wall or over ribs  Abdomen: Abdomen is soft nontender nondistended. There is no firm or pulsatile masses no rebound rigidity or guarding negative Otto's negative McBurney, no peritoneal signs  Suprapubic:  there is no tenderness to palpation over the external bladder   Musculoskeletal: 5 out of 5 strength in all 4 extremities full flexion extension abduction and adduction supination pronation of all extremities and all digits. No obvious muscle atrophy is noted. No focal muscle deficits are appreciated  Dermatology: Skin is warm and dry there is no obvious abscesses lacerations or lesions noted  Psych: Mentation is grossly normal cognition is grossly normal. Affect is appropriate  Neuro: Motor intact sensory intact cranial nerves II through XII are intact level of consciousness is normal cerebellar function is normal reflexes are grossly normal. No evidence of incontinence or loss of bowel or bladder no saddle anesthesia noted Lymphatic: There is no submandibular or cervical adenopathy appreciated. I have reviewed and interpreted all of the currently available lab results from this visit (if applicable):  No results found for this visit on 05/23/20. Radiographs (if obtained):  [] The following radiograph was interpreted by myself in the absence of a radiologist:   [] Radiologist's Report Reviewed:  XR CHEST PORTABLE    (Results Pending)       EKG (if obtained):   Please See Note of attending physician for EKG interpretation. Chart review shows recent radiograph(s):  No results found. MDM:   Pt is febrile marked leukocytosis with leukocyte positive large white blood cell marked urinary tract infection.   IV fluids, IV antibiotics, analgesics are provided here in the emergency department. Patient remained stable and comfortable here. Blood pressure is a bit soft. However does respond well with fluids. Patient will require admission to the hospital.  For systemic urinary tract infection. On-call physician  Was consulted regarding patient. After thorough discussion regarding patient's history, physical exam.  laboratory values, radiographic evidence (if applicable  theymyself as well as my attending physician agreed Given the patient's presenting concerns, medical history and clinical findings, the patient will be admitted at this time to undergo further evaluation and disposition. . During patient's entire stay in the ED patient remained stable and comfortable. Analgesia is well-controlled. Patient will be admitted for all information regarding ongoing management and care of patient please see note of Admitting physician. All EKG interpretations are performed by Attending physician       I independently managed patient today in the ED        BP (!) 125/100   Pulse 114   Temp 103.2 °F (39.6 °C) (Oral)   Resp 16   Ht 5' 6\" (1.676 m)   Wt 103 lb (46.7 kg)   SpO2 97%   BMI 16.62 kg/m²       Clinical Impression:  1. Pyelonephritis        Disposition referral (if applicable):  No follow-up provider specified. Disposition medications (if applicable):  New Prescriptions    No medications on file         Comment: Please note this report has been produced using speech recognition software and may contain errors related to that system including errors in grammar, punctuation, and spelling, as well as words and phrases that may be inappropriate. If there are any questions or concerns please feel free to contact the dictating provider for clarification.       [address]  05/29/20 2537

## 2020-05-24 NOTE — ED NOTES
PT assisted to the bedside commode. Pt able to stand on her own. This RN did not need to assist patient down from the bed. No signs of distress.      Louis Bhatia RN  05/24/20 0002

## 2020-05-24 NOTE — PROGRESS NOTES
Pt arrived to unit at 0230, transported on stretcher by ED nurse, Pippa Shi. Pt was taken to room 2020, via stretcher and assisted into bed. Pt was connected to telemetry. No s/s of distress at this time. Pt is being admitted as a covid rule out.

## 2020-05-24 NOTE — CONSULTS
CARDIOLOGY CONSULT NOTE   Reason for consultation:  Ischemic cardiomyopathy    Referring physician:  Barbara Wu MD     Primary care physician: Harshad Fairchild MD      Dear  Dr. Barbara Wu MD   Thanks for the consult. Chief Complaints :  Chief Complaint   Patient presents with    Fever     103-105 since 3pm    Tremors        History of present illness:Demetria is a 63 y. o.year old who presents with fever with possible UTI. She has h/o ischemic cardiomyopathy well known to service but followed by [facility] Dr Devin Hager. She was admitted at [facility] for a week and got discharged 2 days ago for decompensated heart failure. He also follows up with palliative care due to chronic pain she is to be on TPN but does not have a port anymore. Currently she complains of pain in the back and blood in urine she has no shortness of breath  Her cardiac history is complicated with history of CABG and PCI to LAD through LIMA graft in October 2018 by myself she has had multiple admissions between different hospitals her EF is known to be 2025% she also has history of pacemaker ICD VT and she is on chronic amiodarone therapy and anticoagulation due to history of pulmonary embolism      Past medical history:    has a past medical history of Acute delirium, Arrhythmia, Arthritis, Asplenia, Asthma, CAD (coronary artery disease), Cardiomyopathy ([address] Cerebral artery occlusion with cerebral infarction ([address] CHF (congestive heart failure) ([address] Enlarged liver, GERD (gastroesophageal reflux disease), H/O echocardiogram, History of blood transfusion, Hx of cardiovascular stress test, Hyperlipidemia, Hypertension, Lymphoma ([address] MI, old, Movement disorder, MS (multiple sclerosis) ([address] OP (osteoporosis), PE (pulmonary embolism), Pneumonia, Seizures ([address] Spleen enlarged, and Thyroid disease. Past surgical history:   has a past surgical history that includes Coronary angioplasty with stent; Hysterectomy;  Appendectomy;

## 2020-05-24 NOTE — ED NOTES
Dr Clifford Brown returned call @ [phone] -- transferred call to [facility]  Waiting orders     Daniel  05/24/20 0108

## 2020-05-25 LAB
ANION GAP SERPL CALCULATED.3IONS-SCNC: 8 MMOL/L (ref 4–16)
BANDED NEUTROPHILS ABSOLUTE COUNT: 0.1 K/CU MM
BANDED NEUTROPHILS RELATIVE PERCENT: 1 % (ref 5–11)
BUN BLDV-MCNC: 11 MG/DL (ref 6–23)
CALCIUM SERPL-MCNC: 7.7 MG/DL (ref 8.3–10.6)
CHLORIDE BLD-SCNC: 99 MMOL/L (ref 99–110)
CO2: 28 MMOL/L (ref 21–32)
CREAT SERPL-MCNC: 0.6 MG/DL (ref 0.6–1.1)
CULTURE: ABNORMAL
CULTURE: NORMAL
DIFFERENTIAL TYPE: ABNORMAL
GFR AFRICAN AMERICAN: >60 ML/MIN/1.73M2
GFR NON-AFRICAN AMERICAN: >60 ML/MIN/1.73M2
GLUCOSE BLD-MCNC: 116 MG/DL (ref 70–99)
GLUCOSE BLD-MCNC: 131 MG/DL (ref 70–99)
GLUCOSE BLD-MCNC: 181 MG/DL (ref 70–99)
GLUCOSE BLD-MCNC: 207 MG/DL (ref 70–99)
GLUCOSE BLD-MCNC: 67 MG/DL (ref 70–99)
GLUCOSE BLD-MCNC: 99 MG/DL (ref 70–99)
HCT VFR BLD CALC: 31.7 % (ref 37–47)
HEMOGLOBIN: 9.9 GM/DL (ref 12.5–16)
HIGH SENSITIVE C-REACTIVE PROTEIN: >300 MG/L
LYMPHOCYTES ABSOLUTE: 0.5 K/CU MM
LYMPHOCYTES RELATIVE PERCENT: 5 % (ref 24–44)
Lab: ABNORMAL
Lab: NORMAL
MCH RBC QN AUTO: 35.4 PG (ref 27–31)
MCHC RBC AUTO-ENTMCNC: 31.2 % (ref 32–36)
MCV RBC AUTO: 113.2 FL (ref 78–100)
MONOCYTES ABSOLUTE: 0.2 K/CU MM
MONOCYTES RELATIVE PERCENT: 2 % (ref 0–4)
PDW BLD-RTO: 16.8 % (ref 11.7–14.9)
PLATELET # BLD: 175 K/CU MM (ref 140–440)
PMV BLD AUTO: 10.4 FL (ref 7.5–11.1)
POTASSIUM SERPL-SCNC: 4.4 MMOL/L (ref 3.5–5.1)
PROCALCITONIN: 0.49
RBC # BLD: 2.8 M/CU MM (ref 4.2–5.4)
SARS-COV-2: NOT DETECTED
SEGMENTED NEUTROPHILS ABSOLUTE COUNT: 8.8 K/CU MM
SEGMENTED NEUTROPHILS RELATIVE PERCENT: 92 % (ref 36–66)
SODIUM BLD-SCNC: 135 MMOL/L (ref 135–145)
SOURCE: NORMAL
SPECIMEN: ABNORMAL
SPECIMEN: NORMAL
WBC # BLD: 9.6 K/CU MM (ref 4–10.5)

## 2020-05-25 PROCEDURE — 87147 CULTURE TYPE IMMUNOLOGIC: CPT

## 2020-05-25 PROCEDURE — 2580000003 HC RX 258: Performed by: INTERNAL MEDICINE

## 2020-05-25 PROCEDURE — 87077 CULTURE AEROBIC IDENTIFY: CPT

## 2020-05-25 PROCEDURE — 82962 GLUCOSE BLOOD TEST: CPT

## 2020-05-25 PROCEDURE — 87040 BLOOD CULTURE FOR BACTERIA: CPT

## 2020-05-25 PROCEDURE — 36592 COLLECT BLOOD FROM PICC: CPT

## 2020-05-25 PROCEDURE — 6360000002 HC RX W HCPCS: Performed by: INTERNAL MEDICINE

## 2020-05-25 PROCEDURE — 2100000000 HC CCU R&B

## 2020-05-25 PROCEDURE — 99233 SBSQ HOSP IP/OBS HIGH 50: CPT | Performed by: INTERNAL MEDICINE

## 2020-05-25 PROCEDURE — 6370000000 HC RX 637 (ALT 250 FOR IP): Performed by: INTERNAL MEDICINE

## 2020-05-25 PROCEDURE — 85027 COMPLETE CBC AUTOMATED: CPT

## 2020-05-25 PROCEDURE — 80048 BASIC METABOLIC PNL TOTAL CA: CPT

## 2020-05-25 PROCEDURE — 94761 N-INVAS EAR/PLS OXIMETRY MLT: CPT

## 2020-05-25 PROCEDURE — 94640 AIRWAY INHALATION TREATMENT: CPT

## 2020-05-25 PROCEDURE — 85007 BL SMEAR W/DIFF WBC COUNT: CPT

## 2020-05-25 PROCEDURE — 84145 PROCALCITONIN (PCT): CPT

## 2020-05-25 PROCEDURE — 86141 C-REACTIVE PROTEIN HS: CPT

## 2020-05-25 RX ORDER — NICOTINE POLACRILEX 4 MG
15 LOZENGE BUCCAL PRN
Status: DISCONTINUED | OUTPATIENT
Start: 2020-05-25 | End: 2020-06-05 | Stop reason: HOSPADM

## 2020-05-25 RX ORDER — DEXTROSE MONOHYDRATE 25 G/50ML
12.5 INJECTION, SOLUTION INTRAVENOUS PRN
Status: DISCONTINUED | OUTPATIENT
Start: 2020-05-25 | End: 2020-06-05 | Stop reason: HOSPADM

## 2020-05-25 RX ORDER — ALBUTEROL SULFATE 90 UG/1
2 AEROSOL, METERED RESPIRATORY (INHALATION) 4 TIMES DAILY
Status: DISCONTINUED | OUTPATIENT
Start: 2020-05-25 | End: 2020-06-05 | Stop reason: HOSPADM

## 2020-05-25 RX ORDER — DEXTROSE MONOHYDRATE 50 MG/ML
100 INJECTION, SOLUTION INTRAVENOUS PRN
Status: DISCONTINUED | OUTPATIENT
Start: 2020-05-25 | End: 2020-06-05 | Stop reason: HOSPADM

## 2020-05-25 RX ADMIN — LEVETIRACETAM 1000 MG: 500 TABLET, FILM COATED ORAL at 20:48

## 2020-05-25 RX ADMIN — LEVOTHYROXINE SODIUM 224 MCG: 112 TABLET ORAL at 09:21

## 2020-05-25 RX ADMIN — ACETAMINOPHEN 650 MG: 325 TABLET ORAL at 22:52

## 2020-05-25 RX ADMIN — MIRTAZAPINE 30 MG: 15 TABLET, FILM COATED ORAL at 20:48

## 2020-05-25 RX ADMIN — LEVETIRACETAM 1000 MG: 500 TABLET, FILM COATED ORAL at 09:18

## 2020-05-25 RX ADMIN — OXYCODONE HYDROCHLORIDE 20 MG: 10 TABLET ORAL at 09:17

## 2020-05-25 RX ADMIN — Medication 800 MG: at 20:47

## 2020-05-25 RX ADMIN — OXYCODONE HYDROCHLORIDE 20 MG: 10 TABLET ORAL at 01:45

## 2020-05-25 RX ADMIN — ATORVASTATIN CALCIUM 80 MG: 80 TABLET, FILM COATED ORAL at 21:10

## 2020-05-25 RX ADMIN — APIXABAN 5 MG: 5 TABLET, FILM COATED ORAL at 20:48

## 2020-05-25 RX ADMIN — MEROPENEM 1 G: 1 INJECTION, POWDER, FOR SOLUTION INTRAVENOUS at 16:45

## 2020-05-25 RX ADMIN — VANCOMYCIN HYDROCHLORIDE 750 MG: 5 INJECTION, POWDER, LYOPHILIZED, FOR SOLUTION INTRAVENOUS at 10:45

## 2020-05-25 RX ADMIN — MEROPENEM 1 G: 1 INJECTION, POWDER, FOR SOLUTION INTRAVENOUS at 05:06

## 2020-05-25 RX ADMIN — FAMOTIDINE 20 MG: 20 TABLET, FILM COATED ORAL at 09:22

## 2020-05-25 RX ADMIN — CLOPIDOGREL BISULFATE 75 MG: 75 TABLET ORAL at 09:22

## 2020-05-25 RX ADMIN — SODIUM CHLORIDE, PRESERVATIVE FREE 10 ML: 5 INJECTION INTRAVENOUS at 20:51

## 2020-05-25 RX ADMIN — Medication 800 MG: at 09:21

## 2020-05-25 RX ADMIN — ACETAMINOPHEN 650 MG: 325 TABLET ORAL at 09:17

## 2020-05-25 RX ADMIN — Medication 1 CAPSULE: at 16:46

## 2020-05-25 RX ADMIN — OXYCODONE HYDROCHLORIDE 20 MG: 10 TABLET ORAL at 20:47

## 2020-05-25 RX ADMIN — OXYCODONE HYDROCHLORIDE 20 MG: 10 TABLET ORAL at 05:06

## 2020-05-25 RX ADMIN — Medication 1 CAPSULE: at 09:31

## 2020-05-25 RX ADMIN — Medication 1 CAPSULE: at 13:50

## 2020-05-25 RX ADMIN — ALBUTEROL SULFATE 2 PUFF: 90 AEROSOL, METERED RESPIRATORY (INHALATION) at 19:04

## 2020-05-25 RX ADMIN — DULOXETINE 60 MG: 30 CAPSULE, DELAYED RELEASE ORAL at 09:21

## 2020-05-25 RX ADMIN — IPRATROPIUM BROMIDE 2 PUFF: 17 AEROSOL, METERED RESPIRATORY (INHALATION) at 19:05

## 2020-05-25 RX ADMIN — AMIODARONE HYDROCHLORIDE 200 MG: 200 TABLET ORAL at 09:18

## 2020-05-25 RX ADMIN — VANCOMYCIN HYDROCHLORIDE 750 MG: 5 INJECTION, POWDER, LYOPHILIZED, FOR SOLUTION INTRAVENOUS at 22:52

## 2020-05-25 RX ADMIN — OXYCODONE HYDROCHLORIDE 20 MG: 10 TABLET ORAL at 13:08

## 2020-05-25 RX ADMIN — OXYCODONE HYDROCHLORIDE 20 MG: 10 TABLET ORAL at 23:55

## 2020-05-25 RX ADMIN — ACETAMINOPHEN 650 MG: 325 TABLET ORAL at 01:45

## 2020-05-25 RX ADMIN — OXYCODONE HYDROCHLORIDE 20 MG: 10 TABLET ORAL at 16:46

## 2020-05-25 RX ADMIN — APIXABAN 5 MG: 5 TABLET, FILM COATED ORAL at 09:22

## 2020-05-25 RX ADMIN — ACETAMINOPHEN 650 MG: 325 TABLET ORAL at 16:46

## 2020-05-25 ASSESSMENT — PAIN DESCRIPTION - DIRECTION: RADIATING_TOWARDS: BACK

## 2020-05-25 ASSESSMENT — PAIN DESCRIPTION - PAIN TYPE
TYPE: CHRONIC PAIN

## 2020-05-25 ASSESSMENT — PAIN - FUNCTIONAL ASSESSMENT: PAIN_FUNCTIONAL_ASSESSMENT: ACTIVITIES ARE NOT PREVENTED

## 2020-05-25 ASSESSMENT — PAIN DESCRIPTION - FREQUENCY: FREQUENCY: CONTINUOUS

## 2020-05-25 ASSESSMENT — PAIN DESCRIPTION - LOCATION
LOCATION: GENERALIZED
LOCATION: BACK;HIP
LOCATION: GENERALIZED
LOCATION: BACK;HIP
LOCATION: BACK;HIP;KNEE

## 2020-05-25 ASSESSMENT — PAIN SCALES - GENERAL
PAINLEVEL_OUTOF10: 9
PAINLEVEL_OUTOF10: 8
PAINLEVEL_OUTOF10: 0
PAINLEVEL_OUTOF10: 5
PAINLEVEL_OUTOF10: 0
PAINLEVEL_OUTOF10: 0
PAINLEVEL_OUTOF10: 9
PAINLEVEL_OUTOF10: 8
PAINLEVEL_OUTOF10: 0
PAINLEVEL_OUTOF10: 0
PAINLEVEL_OUTOF10: 9
PAINLEVEL_OUTOF10: 0
PAINLEVEL_OUTOF10: 8
PAINLEVEL_OUTOF10: 0
PAINLEVEL_OUTOF10: 5
PAINLEVEL_OUTOF10: 3
PAINLEVEL_OUTOF10: 8
PAINLEVEL_OUTOF10: 9
PAINLEVEL_OUTOF10: 0
PAINLEVEL_OUTOF10: 8
PAINLEVEL_OUTOF10: 3
PAINLEVEL_OUTOF10: 7

## 2020-05-25 ASSESSMENT — PAIN DESCRIPTION - PROGRESSION
CLINICAL_PROGRESSION: NOT CHANGED

## 2020-05-25 ASSESSMENT — PAIN DESCRIPTION - ONSET: ONSET: ON-GOING

## 2020-05-25 ASSESSMENT — PAIN DESCRIPTION - ORIENTATION: ORIENTATION: LEFT;RIGHT;MID

## 2020-05-25 ASSESSMENT — PAIN DESCRIPTION - DESCRIPTORS: DESCRIPTORS: BURNING;SHARP

## 2020-05-25 NOTE — PROGRESS NOTES
1040  Blood glucose 207. Pt denies any discomfort at this time. Will continue to monitor. 1050  Dr Carollynn Mortimer notified of lab results. Awaiting response. Will continue to monitor.

## 2020-05-25 NOTE — PROGRESS NOTES
 lactobacillus  1 capsule Oral TID     levETIRAcetam  1,000 mg Oral BID    levothyroxine  224 mcg Oral Daily    magnesium oxide  800 mg Oral BID    [Held by provider] spironolactone  12.5 mg Oral BID    [Held by provider] isosorbide mononitrate  15 mg Oral Nightly    mirtazapine  30 mg Oral Nightly    meropenem  1 g Intravenous Q12H    lidocaine PF  5 mL Intradermal Once    sodium chloride flush  10 mL Intravenous 2 times per day    vancomycin  15 mg/kg Intravenous Q12H      dextrose      norepinephrine 5 mcg/min (05/24/20 1245)     glucose, dextrose, glucagon (rDNA), dextrose, benzocaine-menthol, docusate sodium, nitroGLYCERIN, ondansetron, torsemide, acetaminophen **OR** acetaminophen, polyethylene glycol, promethazine **OR** ondansetron, oxyCODONE, sodium chloride flush    Lab Data:  CBC:   Recent Labs     05/23/20  2300 05/25/20  0500   WBC 14.2* 9.6   HGB 11.8* 9.9*   HCT 37.3 31.7*   .7* 113.2*    175     BMP:   Recent Labs     05/23/20  2300 05/24/20  0620 05/25/20  0905    139 135   K 4.4 3.7 4.4    102 99   CO2 24 24 28   BUN 17 15 11   CREATININE 0.9 0.8 0.6     PT/INR: No results for input(s): PROTIME, INR in the last 72 hours. BNP:  No results for input(s): PROBNP in the last 72 hours. TROPONIN: No results for input(s): TROPONINT in the last 72 hours. ECHO :   echocardiogram    Assessment:  63 y. o.year old who is admitted for  Chief Complaint   Patient presents with    Fever     103-105 since 3pm    Tremors    , active issues as noted below:  Impression:  Active Problems:    Coronary artery disease involving coronary bypass graft of native heart with angina pectoris (HCC)    Ischemic cardiomyopathy    CAD (coronary artery disease)    History of Maru-en-Y gastric bypass    Fever in adult  Resolved Problems:    * No resolved hospital problems.  *            All labs, medications and tests reviewed by myself , continue all other medications of all above

## 2020-05-25 NOTE — PROGRESS NOTES
0682  Dr Gal Seo here to evaluate. States pt has chronic SBP in 80's at home, and ok for SBP to be 90 or above on Levophed IV gtt. Orders received. Pt continues to refuse breakfast, but drinks orange juice. Refuses anything else for blood glucose. \"It will work. Just wait. \"  Will continue to monitor.

## 2020-05-26 LAB
ANION GAP SERPL CALCULATED.3IONS-SCNC: 11 MMOL/L (ref 4–16)
BUN BLDV-MCNC: 8 MG/DL (ref 6–23)
CALCIUM SERPL-MCNC: 7.8 MG/DL (ref 8.3–10.6)
CHLORIDE BLD-SCNC: 103 MMOL/L (ref 99–110)
CO2: 27 MMOL/L (ref 21–32)
CREAT SERPL-MCNC: 0.5 MG/DL (ref 0.6–1.1)
CULTURE: ABNORMAL
DOSE AMOUNT: ABNORMAL
DOSE TIME: ABNORMAL
GFR AFRICAN AMERICAN: >60 ML/MIN/1.73M2
GFR NON-AFRICAN AMERICAN: >60 ML/MIN/1.73M2
GLUCOSE BLD-MCNC: 134 MG/DL (ref 70–99)
GLUCOSE BLD-MCNC: 136 MG/DL (ref 70–99)
GLUCOSE BLD-MCNC: 70 MG/DL (ref 70–99)
LV EF: 33 %
LVEF MODALITY: NORMAL
Lab: ABNORMAL
POTASSIUM SERPL-SCNC: 4.1 MMOL/L (ref 3.5–5.1)
SODIUM BLD-SCNC: 141 MMOL/L (ref 135–145)
SPECIMEN: ABNORMAL
VANCOMYCIN TROUGH: 9.2 UG/ML (ref 10–20)

## 2020-05-26 PROCEDURE — 6370000000 HC RX 637 (ALT 250 FOR IP): Performed by: INTERNAL MEDICINE

## 2020-05-26 PROCEDURE — 80048 BASIC METABOLIC PNL TOTAL CA: CPT

## 2020-05-26 PROCEDURE — 93306 TTE W/DOPPLER COMPLETE: CPT

## 2020-05-26 PROCEDURE — 2580000003 HC RX 258: Performed by: INTERNAL MEDICINE

## 2020-05-26 PROCEDURE — 80202 ASSAY OF VANCOMYCIN: CPT

## 2020-05-26 PROCEDURE — 6360000002 HC RX W HCPCS: Performed by: INTERNAL MEDICINE

## 2020-05-26 PROCEDURE — 82962 GLUCOSE BLOOD TEST: CPT

## 2020-05-26 PROCEDURE — 99233 SBSQ HOSP IP/OBS HIGH 50: CPT | Performed by: INTERNAL MEDICINE

## 2020-05-26 PROCEDURE — 94640 AIRWAY INHALATION TREATMENT: CPT

## 2020-05-26 PROCEDURE — 94761 N-INVAS EAR/PLS OXIMETRY MLT: CPT

## 2020-05-26 PROCEDURE — 2000000000 HC ICU R&B

## 2020-05-26 RX ORDER — VANCOMYCIN HYDROCHLORIDE 1 G/200ML
1000 INJECTION, SOLUTION INTRAVENOUS EVERY 12 HOURS
Status: DISCONTINUED | OUTPATIENT
Start: 2020-05-26 | End: 2020-06-05 | Stop reason: HOSPADM

## 2020-05-26 RX ADMIN — FAMOTIDINE 20 MG: 20 TABLET, FILM COATED ORAL at 09:05

## 2020-05-26 RX ADMIN — ACETAMINOPHEN 650 MG: 325 TABLET ORAL at 15:27

## 2020-05-26 RX ADMIN — LEVETIRACETAM 1000 MG: 500 TABLET, FILM COATED ORAL at 21:32

## 2020-05-26 RX ADMIN — LEVOTHYROXINE SODIUM 224 MCG: 112 TABLET ORAL at 09:04

## 2020-05-26 RX ADMIN — Medication 1 CAPSULE: at 09:04

## 2020-05-26 RX ADMIN — MEROPENEM 1 G: 1 INJECTION, POWDER, FOR SOLUTION INTRAVENOUS at 05:13

## 2020-05-26 RX ADMIN — APIXABAN 5 MG: 5 TABLET, FILM COATED ORAL at 21:33

## 2020-05-26 RX ADMIN — Medication 800 MG: at 09:02

## 2020-05-26 RX ADMIN — APIXABAN 5 MG: 5 TABLET, FILM COATED ORAL at 09:04

## 2020-05-26 RX ADMIN — DULOXETINE 60 MG: 30 CAPSULE, DELAYED RELEASE ORAL at 09:02

## 2020-05-26 RX ADMIN — VANCOMYCIN HYDROCHLORIDE 1000 MG: 1 INJECTION, SOLUTION INTRAVENOUS at 21:32

## 2020-05-26 RX ADMIN — Medication 800 MG: at 21:32

## 2020-05-26 RX ADMIN — IPRATROPIUM BROMIDE 2 PUFF: 17 AEROSOL, METERED RESPIRATORY (INHALATION) at 07:47

## 2020-05-26 RX ADMIN — Medication 1 CAPSULE: at 17:00

## 2020-05-26 RX ADMIN — ACETAMINOPHEN 650 MG: 325 TABLET ORAL at 21:32

## 2020-05-26 RX ADMIN — Medication 1 CAPSULE: at 12:06

## 2020-05-26 RX ADMIN — FERROUS GLUCONATE TAB 324 MG (37.5 MG ELEMENTAL IRON) 324 MG: 324 (37.5 FE) TAB at 09:04

## 2020-05-26 RX ADMIN — ACETAMINOPHEN 650 MG: 325 TABLET ORAL at 08:56

## 2020-05-26 RX ADMIN — IPRATROPIUM BROMIDE 2 PUFF: 17 AEROSOL, METERED RESPIRATORY (INHALATION) at 11:57

## 2020-05-26 RX ADMIN — OXYCODONE HYDROCHLORIDE 20 MG: 10 TABLET ORAL at 08:56

## 2020-05-26 RX ADMIN — MIRTAZAPINE 30 MG: 15 TABLET, FILM COATED ORAL at 21:32

## 2020-05-26 RX ADMIN — OXYCODONE HYDROCHLORIDE 20 MG: 10 TABLET ORAL at 12:09

## 2020-05-26 RX ADMIN — OXYCODONE HYDROCHLORIDE 20 MG: 10 TABLET ORAL at 15:28

## 2020-05-26 RX ADMIN — OXYCODONE HYDROCHLORIDE 20 MG: 10 TABLET ORAL at 18:35

## 2020-05-26 RX ADMIN — SODIUM CHLORIDE, PRESERVATIVE FREE 10 ML: 5 INJECTION INTRAVENOUS at 21:45

## 2020-05-26 RX ADMIN — ALBUTEROL SULFATE 2 PUFF: 90 AEROSOL, METERED RESPIRATORY (INHALATION) at 11:58

## 2020-05-26 RX ADMIN — VANCOMYCIN HYDROCHLORIDE 750 MG: 5 INJECTION, POWDER, LYOPHILIZED, FOR SOLUTION INTRAVENOUS at 10:58

## 2020-05-26 RX ADMIN — LEVETIRACETAM 1000 MG: 500 TABLET, FILM COATED ORAL at 09:02

## 2020-05-26 RX ADMIN — CLOPIDOGREL BISULFATE 75 MG: 75 TABLET ORAL at 09:04

## 2020-05-26 RX ADMIN — OXYCODONE HYDROCHLORIDE 20 MG: 10 TABLET ORAL at 03:23

## 2020-05-26 RX ADMIN — OXYCODONE HYDROCHLORIDE 20 MG: 10 TABLET ORAL at 21:39

## 2020-05-26 RX ADMIN — MEROPENEM 1 G: 1 INJECTION, POWDER, FOR SOLUTION INTRAVENOUS at 15:28

## 2020-05-26 RX ADMIN — AMIODARONE HYDROCHLORIDE 200 MG: 200 TABLET ORAL at 09:04

## 2020-05-26 RX ADMIN — ALBUTEROL SULFATE 2 PUFF: 90 AEROSOL, METERED RESPIRATORY (INHALATION) at 07:47

## 2020-05-26 RX ADMIN — ONDANSETRON HYDROCHLORIDE 4 MG: 2 SOLUTION INTRAMUSCULAR; INTRAVENOUS at 18:35

## 2020-05-26 ASSESSMENT — PAIN DESCRIPTION - FREQUENCY
FREQUENCY: CONTINUOUS

## 2020-05-26 ASSESSMENT — PAIN SCALES - GENERAL
PAINLEVEL_OUTOF10: 8
PAINLEVEL_OUTOF10: 8
PAINLEVEL_OUTOF10: 0
PAINLEVEL_OUTOF10: 6
PAINLEVEL_OUTOF10: 8
PAINLEVEL_OUTOF10: 8
PAINLEVEL_OUTOF10: 0
PAINLEVEL_OUTOF10: 5
PAINLEVEL_OUTOF10: 0
PAINLEVEL_OUTOF10: 3
PAINLEVEL_OUTOF10: 7
PAINLEVEL_OUTOF10: 5
PAINLEVEL_OUTOF10: 7
PAINLEVEL_OUTOF10: 8
PAINLEVEL_OUTOF10: 0
PAINLEVEL_OUTOF10: 0
PAINLEVEL_OUTOF10: 9
PAINLEVEL_OUTOF10: 8
PAINLEVEL_OUTOF10: 3
PAINLEVEL_OUTOF10: 9
PAINLEVEL_OUTOF10: 0
PAINLEVEL_OUTOF10: 0
PAINLEVEL_OUTOF10: 5
PAINLEVEL_OUTOF10: 3
PAINLEVEL_OUTOF10: 4
PAINLEVEL_OUTOF10: 3

## 2020-05-26 ASSESSMENT — PAIN DESCRIPTION - PROGRESSION
CLINICAL_PROGRESSION: NOT CHANGED
CLINICAL_PROGRESSION: NOT CHANGED
CLINICAL_PROGRESSION: GRADUALLY WORSENING
CLINICAL_PROGRESSION: NOT CHANGED
CLINICAL_PROGRESSION: GRADUALLY IMPROVING
CLINICAL_PROGRESSION: NOT CHANGED

## 2020-05-26 ASSESSMENT — PAIN DESCRIPTION - PAIN TYPE
TYPE: CHRONIC PAIN

## 2020-05-26 ASSESSMENT — PAIN DESCRIPTION - ONSET
ONSET: PROGRESSIVE

## 2020-05-26 ASSESSMENT — PAIN DESCRIPTION - DIRECTION
RADIATING_TOWARDS: BACK

## 2020-05-26 ASSESSMENT — PAIN DESCRIPTION - LOCATION
LOCATION: HIP
LOCATION: OTHER (COMMENT)
LOCATION: HIP
LOCATION: HIP;KNEE
LOCATION: HIP
LOCATION: HIP

## 2020-05-26 ASSESSMENT — PAIN DESCRIPTION - ORIENTATION
ORIENTATION: RIGHT;LEFT

## 2020-05-26 ASSESSMENT — PAIN - FUNCTIONAL ASSESSMENT
PAIN_FUNCTIONAL_ASSESSMENT: ACTIVITIES ARE NOT PREVENTED

## 2020-05-26 ASSESSMENT — PAIN DESCRIPTION - DESCRIPTORS
DESCRIPTORS: BURNING;DISCOMFORT
DESCRIPTORS: DISCOMFORT;BURNING

## 2020-05-26 NOTE — PROGRESS NOTES
Dr. Avelina Nix sent perfect serve message with update from lab: blood culture  gram + cocci. Orders placed:      Cardiology consult for DULCE: possible infected device.  Dr. Akin Del Rosario following  Vanco see [facility]

## 2020-05-26 NOTE — PROGRESS NOTES
Pt received from Covid unit. Patient exchange in dirty covid room. Bed wiped down with germicidal wipes. Updates received at bedside from [facility]. Transported on bed with cardiac monitor to ICU per Yolanda Villar and Fede ZAYAS. Pt oriented to 2123, call light in reach. Complete assessment done.

## 2020-05-26 NOTE — PLAN OF CARE
Problem: Falls - Risk of:  Goal: Will remain free from falls  Description: Will remain free from falls  5/26/2020 0000 by Sybil Martínez RN  Outcome: Ongoing  5/25/2020 1806 by Cecilia Bolton RN  Outcome: Ongoing  Goal: Absence of physical injury  Description: Absence of physical injury  5/26/2020 0000 by Sybil Martínez RN  Outcome: Ongoing  5/25/2020 1806 by Cecilia Bolton RN  Outcome: Ongoing     Problem: Safety:  Goal: Free from accidental physical injury  Description: Free from accidental physical injury  5/26/2020 0000 by Sybil Martínez RN  Outcome: Ongoing  5/25/2020 1806 by Cecilia Bolton RN  Outcome: Ongoing  Goal: Free from intentional harm  Description: Free from intentional harm  5/26/2020 0000 by Sybil Martínez RN  Outcome: Ongoing  5/25/2020 1806 by Cecilia Bolton RN  Outcome: Ongoing     Problem: Daily Care:  Goal: Daily care needs are met  Description: Daily care needs are met  5/26/2020 0000 by Sybil Martínez RN  Outcome: Ongoing  5/25/2020 1806 by Cecilia Bolton RN  Outcome: Ongoing     Problem: Pain:  Goal: Patient's pain/discomfort is manageable  Description: Patient's pain/discomfort is manageable  5/26/2020 0000 by Sybil Martínez RN  Outcome: Ongoing  5/25/2020 1806 by Cecilia Bolton RN  Outcome: Ongoing  Goal: Pain level will decrease  Description: Pain level will decrease  5/26/2020 0000 by Sybil Martínez RN  Outcome: Ongoing  5/25/2020 1806 by Cecilia Bolton RN  Outcome: Ongoing  Goal: Control of acute pain  Description: Control of acute pain  5/26/2020 0000 by Sybil Martínez RN  Outcome: Ongoing  5/25/2020 1806 by Cecilia Bolton RN  Outcome: Ongoing  Goal: Control of chronic pain  Description: Control of chronic pain  5/26/2020 0000 by Sybil Martínez RN  Outcome: Ongoing  5/25/2020 1806 by Cecilia Bolton RN  Outcome: Ongoing     Problem: Skin Integrity:  Goal: Skin integrity will stabilize  Description: Skin integrity will stabilize  5/26/2020 0000 by Sybil Martínez RN  Outcome: Ongoing  5/25/2020 1806 by Jennie Plaza RN  Outcome: Ongoing     Problem: Discharge Planning:  Goal: Patients continuum of care needs are met  Description: Patients continuum of care needs are met  5/26/2020 0000 by Vince Mcdonough RN  Outcome: Ongoing  5/25/2020 1806 by Jennie Plaza RN  Outcome: Ongoing

## 2020-05-26 NOTE — PROGRESS NOTES
([facility] [address] MI, old, Movement disorder, MS (multiple sclerosis) ([facility] [address] [facility] OP (osteoporosis), PE (pulmonary embolism), Pneumonia, Seizures ([facility] [facility] Spleen enlarged, and Thyroid disease. Past surgical history:   has a past surgical history that includes Coronary angioplasty with stent; Hysterectomy; Appendectomy; Cholecystectomy; Tonsillectomy; Gastric bypass surgery; Cardiac defibrillator placement; hip surgery; Abdomen surgery; Colonoscopy; Endoscopy, colon, diagnostic; hernia repair; joint replacement; skin biopsy; vascular surgery; and Coronary artery bypass graft. Social History:   reports that she has never smoked. She has never used smokeless tobacco. She reports that she does not drink alcohol or use drugs. Family history:  family history includes Cancer in her father; Diabetes in her mother; Heart Disease in her father, maternal grandmother, and mother; High Blood Pressure in her maternal grandmother and mother; High Cholesterol in her maternal grandmother and mother; Other in her sister. Allergies   Allergen Reactions    Fentanyl Swelling     Other reaction(s): Angioedema (swelling)    Moxifloxacin Hcl In Nacl Swelling and Rash    Povidone-Iodine Rash     Other reaction(s): AOF      Cyclobenzaprine      Other reaction(s): Other - comment required  \" it make my muscle very weak because of my MS\"  \" it make my muscle very weak because of my MS\"      Methocarbamol      Worsening edema  Other reaction(s): Other - comment required  Worsening edema  Worsening edema  Worsening edema      Tramadol Other (See Comments)     Doctor doesn't her to take due to heart problems  Seizures   Doctor doesn't her to take due to lowers seizure threshold.  Baclofen     Ibuprofen      \"Due to heart problems\"    Naproxen     Nsaids      \"Due to heart problems\"    Tizanidine     Tolmetin      \"Due to heart problems\"    Tramadol Hcl      Other reaction(s):  Other - comment required  Told not to take due to history of

## 2020-05-26 NOTE — PROGRESS NOTES
05/26/2020    GLUCOSE 136 05/26/2020    PROT 6.5 05/23/2020    PROT 6.3 02/21/2013    LABALBU 3.7 05/23/2020    CALCIUM 7.8 05/26/2020    BILITOT 0.2 05/23/2020    ALKPHOS 83 05/23/2020    AST 28 05/23/2020    ALT 14 05/23/2020     Ct Kidney Wo Contrast    Result Date: 5/24/2020  EXAMINATION: CT OF THE ABDOMEN WITHOUT CONTRAST 5/24/2020 4:59 pm TECHNIQUE: CT of the abdomen was performed without the administration of intravenous contrast. Oral contrast was administered. Multiplanar reformatted images are provided for review. Dose modulation, iterative reconstruction, and/or weight based adjustment of the mA/kV was utilized to reduce the radiation dose to as low as reasonably achievable. COMPARISON: 04/17/2019 HISTORY: Sepsis and flank pain. FINDINGS: Image quality degraded by motion artifact. Lower Chest: Post CABG changes. Coronary stents. Atelectasis/scarring at the lung bases. Organs: 2 mm left renal stone. No hydronephrosis bilaterally. In the upper to mid right kidney, there is a 1.5 cm hyperdense lesion. Spleen is not visualized and may have been removed. Remainder of the solid organs are unremarkable. Cholecystectomy. GI/Bowel: Status post gastric bypass. No evidence for bowel obstruction. Moderate to large amount of stool within the colon. The appendix is surgically absent. Peritoneum/Retroperitoneum: No lymphadenopathy or ascites. Severe atherosclerotic disease without abdominal aortic aneurysm. IVC filter. Bones/Soft Tissues: Right hip arthroplasty. Degenerative disc disease at L5-S1. Osteopenia. 2 mm left renal stone. No obstructive uropathy. A 1.5 cm hyperdense lesion in the upper to mid right kidney is incompletely evaluated without intravenous contrast and was not seen on prior CT from 2019. If patient is not able to receive intravenous contrast, a noncontrast MRI could provide some additional information.      Xr Chest Portable    Result Date: 5/23/2020  EXAMINATION: ONE XRAY VIEW

## 2020-05-27 ENCOUNTER — APPOINTMENT (OUTPATIENT)
Dept: GENERAL RADIOLOGY | Age: 60
DRG: 981 | End: 2020-05-27
Payer: MEDICARE

## 2020-05-27 LAB
ANION GAP SERPL CALCULATED.3IONS-SCNC: 8 MMOL/L (ref 4–16)
BUN BLDV-MCNC: 7 MG/DL (ref 6–23)
CALCIUM SERPL-MCNC: 8 MG/DL (ref 8.3–10.6)
CHLORIDE BLD-SCNC: 106 MMOL/L (ref 99–110)
CO2: 30 MMOL/L (ref 21–32)
CREAT SERPL-MCNC: 0.6 MG/DL (ref 0.6–1.1)
CULTURE: ABNORMAL
GFR AFRICAN AMERICAN: >60 ML/MIN/1.73M2
GFR NON-AFRICAN AMERICAN: >60 ML/MIN/1.73M2
GLUCOSE BLD-MCNC: 102 MG/DL (ref 70–99)
GLUCOSE BLD-MCNC: 105 MG/DL (ref 70–99)
GLUCOSE BLD-MCNC: 70 MG/DL (ref 70–99)
GLUCOSE BLD-MCNC: 77 MG/DL (ref 70–99)
HCT VFR BLD CALC: 31.3 % (ref 37–47)
HEMOGLOBIN: 9.6 GM/DL (ref 12.5–16)
Lab: ABNORMAL
Lab: ABNORMAL
MCH RBC QN AUTO: 35.4 PG (ref 27–31)
MCHC RBC AUTO-ENTMCNC: 30.7 % (ref 32–36)
MCV RBC AUTO: 115.5 FL (ref 78–100)
PDW BLD-RTO: 16.8 % (ref 11.7–14.9)
PLATELET # BLD: 183 K/CU MM (ref 140–440)
PMV BLD AUTO: 10.6 FL (ref 7.5–11.1)
POTASSIUM SERPL-SCNC: 4.6 MMOL/L (ref 3.5–5.1)
RBC # BLD: 2.71 M/CU MM (ref 4.2–5.4)
SODIUM BLD-SCNC: 144 MMOL/L (ref 135–145)
SPECIMEN: ABNORMAL
SPECIMEN: ABNORMAL
WBC # BLD: 5.5 K/CU MM (ref 4–10.5)

## 2020-05-27 PROCEDURE — 6360000002 HC RX W HCPCS: Performed by: INTERNAL MEDICINE

## 2020-05-27 PROCEDURE — 2580000003 HC RX 258: Performed by: INTERNAL MEDICINE

## 2020-05-27 PROCEDURE — 99233 SBSQ HOSP IP/OBS HIGH 50: CPT | Performed by: INTERNAL MEDICINE

## 2020-05-27 PROCEDURE — 80048 BASIC METABOLIC PNL TOTAL CA: CPT

## 2020-05-27 PROCEDURE — 2000000000 HC ICU R&B

## 2020-05-27 PROCEDURE — 71045 X-RAY EXAM CHEST 1 VIEW: CPT

## 2020-05-27 PROCEDURE — 94640 AIRWAY INHALATION TREATMENT: CPT

## 2020-05-27 PROCEDURE — 85027 COMPLETE CBC AUTOMATED: CPT

## 2020-05-27 PROCEDURE — 6370000000 HC RX 637 (ALT 250 FOR IP): Performed by: INTERNAL MEDICINE

## 2020-05-27 PROCEDURE — 36592 COLLECT BLOOD FROM PICC: CPT

## 2020-05-27 PROCEDURE — 82962 GLUCOSE BLOOD TEST: CPT

## 2020-05-27 RX ADMIN — FAMOTIDINE 20 MG: 20 TABLET, FILM COATED ORAL at 09:04

## 2020-05-27 RX ADMIN — VANCOMYCIN HYDROCHLORIDE 1000 MG: 1 INJECTION, SOLUTION INTRAVENOUS at 20:25

## 2020-05-27 RX ADMIN — IPRATROPIUM BROMIDE 2 PUFF: 17 AEROSOL, METERED RESPIRATORY (INHALATION) at 08:03

## 2020-05-27 RX ADMIN — ACETAMINOPHEN 650 MG: 325 TABLET ORAL at 09:48

## 2020-05-27 RX ADMIN — OXYCODONE HYDROCHLORIDE 20 MG: 10 TABLET ORAL at 06:40

## 2020-05-27 RX ADMIN — ALBUTEROL SULFATE 2 PUFF: 90 AEROSOL, METERED RESPIRATORY (INHALATION) at 15:52

## 2020-05-27 RX ADMIN — OXYCODONE HYDROCHLORIDE 20 MG: 10 TABLET ORAL at 09:48

## 2020-05-27 RX ADMIN — Medication 800 MG: at 20:20

## 2020-05-27 RX ADMIN — MEROPENEM 1 G: 1 INJECTION, POWDER, FOR SOLUTION INTRAVENOUS at 03:37

## 2020-05-27 RX ADMIN — OXYCODONE HYDROCHLORIDE 20 MG: 10 TABLET ORAL at 23:15

## 2020-05-27 RX ADMIN — AMIODARONE HYDROCHLORIDE 200 MG: 200 TABLET ORAL at 09:04

## 2020-05-27 RX ADMIN — ALBUTEROL SULFATE 2 PUFF: 90 AEROSOL, METERED RESPIRATORY (INHALATION) at 20:35

## 2020-05-27 RX ADMIN — APIXABAN 5 MG: 5 TABLET, FILM COATED ORAL at 09:03

## 2020-05-27 RX ADMIN — LEVETIRACETAM 1000 MG: 500 TABLET, FILM COATED ORAL at 20:20

## 2020-05-27 RX ADMIN — IPRATROPIUM BROMIDE 2 PUFF: 17 AEROSOL, METERED RESPIRATORY (INHALATION) at 20:36

## 2020-05-27 RX ADMIN — OXYCODONE HYDROCHLORIDE 20 MG: 10 TABLET ORAL at 03:40

## 2020-05-27 RX ADMIN — ALBUTEROL SULFATE 2 PUFF: 90 AEROSOL, METERED RESPIRATORY (INHALATION) at 08:03

## 2020-05-27 RX ADMIN — CLOPIDOGREL BISULFATE 75 MG: 75 TABLET ORAL at 09:03

## 2020-05-27 RX ADMIN — ATORVASTATIN CALCIUM 80 MG: 80 TABLET, FILM COATED ORAL at 20:23

## 2020-05-27 RX ADMIN — APIXABAN 5 MG: 5 TABLET, FILM COATED ORAL at 20:20

## 2020-05-27 RX ADMIN — IPRATROPIUM BROMIDE 2 PUFF: 17 AEROSOL, METERED RESPIRATORY (INHALATION) at 15:52

## 2020-05-27 RX ADMIN — Medication 1 CAPSULE: at 16:08

## 2020-05-27 RX ADMIN — DULOXETINE 60 MG: 30 CAPSULE, DELAYED RELEASE ORAL at 09:03

## 2020-05-27 RX ADMIN — ONDANSETRON HYDROCHLORIDE 4 MG: 2 SOLUTION INTRAMUSCULAR; INTRAVENOUS at 19:30

## 2020-05-27 RX ADMIN — ACETAMINOPHEN 650 MG: 325 TABLET ORAL at 16:07

## 2020-05-27 RX ADMIN — OXYCODONE HYDROCHLORIDE 20 MG: 10 TABLET ORAL at 19:30

## 2020-05-27 RX ADMIN — OXYCODONE HYDROCHLORIDE 20 MG: 10 TABLET ORAL at 12:48

## 2020-05-27 RX ADMIN — OXYCODONE HYDROCHLORIDE 20 MG: 10 TABLET ORAL at 16:07

## 2020-05-27 RX ADMIN — Medication 1 CAPSULE: at 12:43

## 2020-05-27 RX ADMIN — ACETAMINOPHEN 650 MG: 325 TABLET ORAL at 23:15

## 2020-05-27 RX ADMIN — MIRTAZAPINE 30 MG: 15 TABLET, FILM COATED ORAL at 20:20

## 2020-05-27 RX ADMIN — LEVETIRACETAM 1000 MG: 500 TABLET, FILM COATED ORAL at 09:03

## 2020-05-27 RX ADMIN — SODIUM CHLORIDE, PRESERVATIVE FREE 10 ML: 5 INJECTION INTRAVENOUS at 20:20

## 2020-05-27 RX ADMIN — MEROPENEM 1 G: 1 INJECTION, POWDER, FOR SOLUTION INTRAVENOUS at 15:45

## 2020-05-27 RX ADMIN — Medication 800 MG: at 09:03

## 2020-05-27 RX ADMIN — Medication 1 CAPSULE: at 09:03

## 2020-05-27 RX ADMIN — LEVOTHYROXINE SODIUM 224 MCG: 112 TABLET ORAL at 06:39

## 2020-05-27 RX ADMIN — OXYCODONE HYDROCHLORIDE 20 MG: 10 TABLET ORAL at 00:39

## 2020-05-27 RX ADMIN — VANCOMYCIN HYDROCHLORIDE 1000 MG: 1 INJECTION, SOLUTION INTRAVENOUS at 09:43

## 2020-05-27 RX ADMIN — ACETAMINOPHEN 650 MG: 325 TABLET ORAL at 03:37

## 2020-05-27 ASSESSMENT — PAIN DESCRIPTION - ORIENTATION
ORIENTATION: RIGHT;LEFT
ORIENTATION: LEFT;RIGHT
ORIENTATION: RIGHT;LEFT

## 2020-05-27 ASSESSMENT — PAIN DESCRIPTION - ONSET
ONSET: ON-GOING
ONSET: PROGRESSIVE
ONSET: ON-GOING

## 2020-05-27 ASSESSMENT — PAIN DESCRIPTION - LOCATION
LOCATION: HIP

## 2020-05-27 ASSESSMENT — PAIN DESCRIPTION - DESCRIPTORS
DESCRIPTORS: ACHING
DESCRIPTORS: ACHING;SHARP
DESCRIPTORS: DISCOMFORT;BURNING
DESCRIPTORS: ACHING;SHARP
DESCRIPTORS: DISCOMFORT;BURNING

## 2020-05-27 ASSESSMENT — PAIN SCALES - GENERAL
PAINLEVEL_OUTOF10: 6
PAINLEVEL_OUTOF10: 8
PAINLEVEL_OUTOF10: 9
PAINLEVEL_OUTOF10: 7
PAINLEVEL_OUTOF10: 7
PAINLEVEL_OUTOF10: 9
PAINLEVEL_OUTOF10: 7
PAINLEVEL_OUTOF10: 7
PAINLEVEL_OUTOF10: 5
PAINLEVEL_OUTOF10: 6
PAINLEVEL_OUTOF10: 5
PAINLEVEL_OUTOF10: 8
PAINLEVEL_OUTOF10: 7
PAINLEVEL_OUTOF10: 8
PAINLEVEL_OUTOF10: 10
PAINLEVEL_OUTOF10: 5
PAINLEVEL_OUTOF10: 4

## 2020-05-27 ASSESSMENT — PAIN DESCRIPTION - PROGRESSION
CLINICAL_PROGRESSION: GRADUALLY IMPROVING
CLINICAL_PROGRESSION: GRADUALLY WORSENING
CLINICAL_PROGRESSION: NOT CHANGED
CLINICAL_PROGRESSION: GRADUALLY WORSENING
CLINICAL_PROGRESSION: GRADUALLY IMPROVING
CLINICAL_PROGRESSION: NOT CHANGED
CLINICAL_PROGRESSION: NOT CHANGED
CLINICAL_PROGRESSION: GRADUALLY IMPROVING
CLINICAL_PROGRESSION: NOT CHANGED

## 2020-05-27 ASSESSMENT — PAIN DESCRIPTION - PAIN TYPE
TYPE: CHRONIC PAIN

## 2020-05-27 ASSESSMENT — PAIN - FUNCTIONAL ASSESSMENT
PAIN_FUNCTIONAL_ASSESSMENT: ACTIVITIES ARE NOT PREVENTED

## 2020-05-27 ASSESSMENT — PAIN DESCRIPTION - DIRECTION
RADIATING_TOWARDS: BACK
RADIATING_TOWARDS: BACK

## 2020-05-27 ASSESSMENT — PAIN DESCRIPTION - FREQUENCY
FREQUENCY: CONTINUOUS

## 2020-05-27 NOTE — PROGRESS NOTES
nondisplaced transverse fracture of left patella 10/24/2019    Contusion of right knee 10/24/2019    Chest pain at rest 09/29/2019    Financial difficulties 09/27/2019    Shockable cardiac rhythm detected by automated external defibrillator 05/29/2019    Asplenia     Feeding tube dysfunction     Abdominal pain, epigastric     Gastrojejunostomy tube status (HCC)     Chronic malnutrition ([address]     Abdominal pain 03/31/2019    History of Maru-en-Y gastric bypass     Gastroparesis     Cellulitis at gastrostomy tube site ([address] 03/19/2019    Cellulitis     Severe malnutrition ([address] 02/04/2019    Acute on chronic systolic CHF (congestive heart failure), NYHA class 3 ([address] 02/03/2019    Sacral pain 01/28/2019    Pneumonia 01/26/2019    Acute metabolic encephalopathy 61/99/2663    Pneumonia of left lower lobe due to infectious organism ([address] 05/14/2017    Left upper quadrant pain 05/04/2017    Congestive heart failure (HCC)     CHF exacerbation (HCC)     Acute exacerbation of CHF (congestive heart failure) ([address]     CAD (coronary artery disease) 04/01/2016    Hyperlipidemia 04/01/2016    Thyroid disease 04/01/2016    Chest pain     Chest pain 01/28/2012       Plan:     Problems being addressed this admission:     Septic shock / UTI / MRSA bacteremia  5/27/20- stable will need to continue as before. MAP >60. No fevers noted. Repeat CBC today and in am.  May need to taper pressor off. She is covid 19 negative.   5/26/20-Secondary to MRSA bacteremia/urinary tract infection COVID-19 test negative Continue meropenem and vancomycin Transfer patient to regular ICU Monitor blood pressure, continue to wean off Levophed Likely from thrombophlebitis of right arm versus device infection Continue vancomycin 2D echo with, with no visible evidence of endocarditis If there is persistent bacteremia might need transesophageal echocardiogram Repeat blood culture with 1 out of 2 bottles positive already, hence

## 2020-05-27 NOTE — PLAN OF CARE
1122 by Karthik Salazar, RN  Outcome: Ongoing     Problem: Discharge Planning:  Goal: Patients continuum of care needs are met  Description: Patients continuum of care needs are met  5/27/2020 0038 by Manuela Barber RN  Outcome: Ongoing  5/26/2020 1122 by Karthik Salazar, RN  Outcome: Ongoing

## 2020-05-27 NOTE — PROGRESS NOTES
gluconate  324 mg Oral Every Other Day    Icosapent Ethyl  500 mg Oral BID    lactobacillus  1 capsule Oral TID WC    levETIRAcetam  1,000 mg Oral BID    levothyroxine  224 mcg Oral Daily    magnesium oxide  800 mg Oral BID    [Held by provider] spironolactone  12.5 mg Oral BID    [Held by provider] isosorbide mononitrate  15 mg Oral Nightly    mirtazapine  30 mg Oral Nightly    meropenem  1 g Intravenous Q12H    lidocaine PF  5 mL Intradermal Once    sodium chloride flush  10 mL Intravenous 2 times per day      dextrose      norepinephrine 1 mcg/min (05/27/20 1600)     glucose, dextrose, glucagon (rDNA), dextrose, benzocaine-menthol, docusate sodium, nitroGLYCERIN, ondansetron, torsemide, acetaminophen **OR** acetaminophen, polyethylene glycol, promethazine **OR** ondansetron, oxyCODONE, sodium chloride flush    Lab Data:  CBC:   Recent Labs     05/25/20  0500 05/27/20  0955   WBC 9.6 5.5   HGB 9.9* 9.6*   HCT 31.7* 31.3*   .2* 115.5*    183     BMP:   Recent Labs     05/25/20  0905 05/26/20  1045 05/27/20  0452    141 144   K 4.4 4.1 4.6   CL 99 103 106   CO2 28 27 30   BUN 11 8 7   CREATININE 0.6 0.5* 0.6     PT/INR: No results for input(s): PROTIME, INR in the last 72 hours. BNP:  No results for input(s): PROBNP in the last 72 hours. TROPONIN: No results for input(s): TROPONINT in the last 72 hours. ECHO :   echocardiogram    Assessment:  63 y. o.year old who is admitted for  Chief Complaint   Patient presents with    Fever     103-105 since 3pm    Tremors    , active issues as noted below:  Impression:  Principal Problem:    Fever in adult  Active Problems:    Coronary artery disease involving coronary bypass graft of native heart with angina pectoris (HCC)    Ischemic cardiomyopathy    CAD (coronary artery disease)    History of Maru-en-Y gastric bypass  Resolved Problems:    * No resolved hospital problems.  *            All labs, medications and tests reviewed by myself

## 2020-05-28 LAB
ANION GAP SERPL CALCULATED.3IONS-SCNC: 10 MMOL/L (ref 4–16)
BUN BLDV-MCNC: 8 MG/DL (ref 6–23)
CALCIUM SERPL-MCNC: 8.4 MG/DL (ref 8.3–10.6)
CHLORIDE BLD-SCNC: 103 MMOL/L (ref 99–110)
CO2: 29 MMOL/L (ref 21–32)
CREAT SERPL-MCNC: 0.6 MG/DL (ref 0.6–1.1)
DOSE AMOUNT: NORMAL
DOSE TIME: NORMAL
GFR AFRICAN AMERICAN: >60 ML/MIN/1.73M2
GFR NON-AFRICAN AMERICAN: >60 ML/MIN/1.73M2
GLUCOSE BLD-MCNC: 103 MG/DL (ref 70–99)
POTASSIUM SERPL-SCNC: 4.9 MMOL/L (ref 3.5–5.1)
SODIUM BLD-SCNC: 142 MMOL/L (ref 135–145)
VANCOMYCIN TROUGH: 15.6 UG/ML (ref 10–20)

## 2020-05-28 PROCEDURE — 36592 COLLECT BLOOD FROM PICC: CPT

## 2020-05-28 PROCEDURE — 94640 AIRWAY INHALATION TREATMENT: CPT

## 2020-05-28 PROCEDURE — 2580000003 HC RX 258: Performed by: INTERNAL MEDICINE

## 2020-05-28 PROCEDURE — 2000000000 HC ICU R&B

## 2020-05-28 PROCEDURE — 6370000000 HC RX 637 (ALT 250 FOR IP): Performed by: HOSPITALIST

## 2020-05-28 PROCEDURE — 7100000001 HC PACU RECOVERY - ADDTL 15 MIN

## 2020-05-28 PROCEDURE — 99233 SBSQ HOSP IP/OBS HIGH 50: CPT | Performed by: INTERNAL MEDICINE

## 2020-05-28 PROCEDURE — 7100000000 HC PACU RECOVERY - FIRST 15 MIN

## 2020-05-28 PROCEDURE — 6360000002 HC RX W HCPCS: Performed by: INTERNAL MEDICINE

## 2020-05-28 PROCEDURE — 93312 ECHO TRANSESOPHAGEAL: CPT | Performed by: INTERNAL MEDICINE

## 2020-05-28 PROCEDURE — 87040 BLOOD CULTURE FOR BACTERIA: CPT

## 2020-05-28 PROCEDURE — 93325 DOPPLER ECHO COLOR FLOW MAPG: CPT | Performed by: INTERNAL MEDICINE

## 2020-05-28 PROCEDURE — 80048 BASIC METABOLIC PNL TOTAL CA: CPT

## 2020-05-28 PROCEDURE — 80202 ASSAY OF VANCOMYCIN: CPT

## 2020-05-28 PROCEDURE — 6370000000 HC RX 637 (ALT 250 FOR IP): Performed by: INTERNAL MEDICINE

## 2020-05-28 PROCEDURE — 94761 N-INVAS EAR/PLS OXIMETRY MLT: CPT

## 2020-05-28 PROCEDURE — 93312 ECHO TRANSESOPHAGEAL: CPT

## 2020-05-28 RX ORDER — MIDODRINE HYDROCHLORIDE 5 MG/1
5 TABLET ORAL
Status: DISCONTINUED | OUTPATIENT
Start: 2020-05-28 | End: 2020-05-29

## 2020-05-28 RX ADMIN — Medication 1 CAPSULE: at 11:47

## 2020-05-28 RX ADMIN — SODIUM CHLORIDE, PRESERVATIVE FREE 10 ML: 5 INJECTION INTRAVENOUS at 21:10

## 2020-05-28 RX ADMIN — LEVETIRACETAM 1000 MG: 500 TABLET, FILM COATED ORAL at 08:40

## 2020-05-28 RX ADMIN — LEVETIRACETAM 1000 MG: 500 TABLET, FILM COATED ORAL at 21:16

## 2020-05-28 RX ADMIN — Medication 800 MG: at 21:16

## 2020-05-28 RX ADMIN — ALBUTEROL SULFATE 2 PUFF: 90 AEROSOL, METERED RESPIRATORY (INHALATION) at 15:22

## 2020-05-28 RX ADMIN — OXYCODONE HYDROCHLORIDE 20 MG: 10 TABLET ORAL at 18:09

## 2020-05-28 RX ADMIN — CLOPIDOGREL BISULFATE 75 MG: 75 TABLET ORAL at 08:40

## 2020-05-28 RX ADMIN — APIXABAN 5 MG: 5 TABLET, FILM COATED ORAL at 21:16

## 2020-05-28 RX ADMIN — AMIODARONE HYDROCHLORIDE 200 MG: 200 TABLET ORAL at 08:40

## 2020-05-28 RX ADMIN — FERROUS GLUCONATE TAB 324 MG (37.5 MG ELEMENTAL IRON) 324 MG: 324 (37.5 FE) TAB at 08:40

## 2020-05-28 RX ADMIN — OXYCODONE HYDROCHLORIDE 20 MG: 10 TABLET ORAL at 11:47

## 2020-05-28 RX ADMIN — SODIUM CHLORIDE, PRESERVATIVE FREE 10 ML: 5 INJECTION INTRAVENOUS at 08:41

## 2020-05-28 RX ADMIN — Medication 1 CAPSULE: at 08:40

## 2020-05-28 RX ADMIN — LEVOTHYROXINE SODIUM 224 MCG: 112 TABLET ORAL at 05:18

## 2020-05-28 RX ADMIN — IPRATROPIUM BROMIDE 2 PUFF: 17 AEROSOL, METERED RESPIRATORY (INHALATION) at 20:33

## 2020-05-28 RX ADMIN — VANCOMYCIN HYDROCHLORIDE 1000 MG: 1 INJECTION, SOLUTION INTRAVENOUS at 21:16

## 2020-05-28 RX ADMIN — ACETAMINOPHEN 650 MG: 325 TABLET ORAL at 08:39

## 2020-05-28 RX ADMIN — OXYCODONE HYDROCHLORIDE 20 MG: 10 TABLET ORAL at 08:40

## 2020-05-28 RX ADMIN — ALBUTEROL SULFATE 2 PUFF: 90 AEROSOL, METERED RESPIRATORY (INHALATION) at 20:32

## 2020-05-28 RX ADMIN — MEROPENEM 1 G: 1 INJECTION, POWDER, FOR SOLUTION INTRAVENOUS at 15:47

## 2020-05-28 RX ADMIN — MIDODRINE HYDROCHLORIDE 5 MG: 5 TABLET ORAL at 16:33

## 2020-05-28 RX ADMIN — OXYCODONE HYDROCHLORIDE 20 MG: 10 TABLET ORAL at 04:20

## 2020-05-28 RX ADMIN — FAMOTIDINE 20 MG: 20 TABLET, FILM COATED ORAL at 08:39

## 2020-05-28 RX ADMIN — VANCOMYCIN HYDROCHLORIDE 1000 MG: 1 INJECTION, SOLUTION INTRAVENOUS at 11:02

## 2020-05-28 RX ADMIN — APIXABAN 5 MG: 5 TABLET, FILM COATED ORAL at 08:40

## 2020-05-28 RX ADMIN — DULOXETINE 60 MG: 30 CAPSULE, DELAYED RELEASE ORAL at 08:41

## 2020-05-28 RX ADMIN — Medication 1 CAPSULE: at 16:33

## 2020-05-28 RX ADMIN — MEROPENEM 1 G: 1 INJECTION, POWDER, FOR SOLUTION INTRAVENOUS at 04:25

## 2020-05-28 RX ADMIN — MIRTAZAPINE 30 MG: 15 TABLET, FILM COATED ORAL at 21:16

## 2020-05-28 RX ADMIN — ATORVASTATIN CALCIUM 80 MG: 80 TABLET, FILM COATED ORAL at 21:19

## 2020-05-28 RX ADMIN — Medication 800 MG: at 08:40

## 2020-05-28 RX ADMIN — MIDODRINE HYDROCHLORIDE 5 MG: 5 TABLET ORAL at 11:47

## 2020-05-28 RX ADMIN — OXYCODONE HYDROCHLORIDE 20 MG: 10 TABLET ORAL at 21:16

## 2020-05-28 RX ADMIN — ACETAMINOPHEN 650 MG: 325 TABLET ORAL at 14:53

## 2020-05-28 RX ADMIN — ACETAMINOPHEN 650 MG: 325 TABLET ORAL at 21:16

## 2020-05-28 RX ADMIN — IPRATROPIUM BROMIDE 2 PUFF: 17 AEROSOL, METERED RESPIRATORY (INHALATION) at 15:22

## 2020-05-28 RX ADMIN — OXYCODONE HYDROCHLORIDE 20 MG: 10 TABLET ORAL at 14:54

## 2020-05-28 ASSESSMENT — PAIN DESCRIPTION - LOCATION: LOCATION: HIP

## 2020-05-28 ASSESSMENT — PAIN SCALES - GENERAL
PAINLEVEL_OUTOF10: 7
PAINLEVEL_OUTOF10: 9
PAINLEVEL_OUTOF10: 8
PAINLEVEL_OUTOF10: 9
PAINLEVEL_OUTOF10: 8
PAINLEVEL_OUTOF10: 9
PAINLEVEL_OUTOF10: 9

## 2020-05-28 ASSESSMENT — PAIN DESCRIPTION - PAIN TYPE
TYPE: CHRONIC PAIN
TYPE: CHRONIC PAIN

## 2020-05-28 ASSESSMENT — PAIN DESCRIPTION - PROGRESSION: CLINICAL_PROGRESSION: GRADUALLY WORSENING

## 2020-05-28 ASSESSMENT — PAIN DESCRIPTION - FREQUENCY: FREQUENCY: CONTINUOUS

## 2020-05-28 ASSESSMENT — PAIN DESCRIPTION - DESCRIPTORS: DESCRIPTORS: ACHING

## 2020-05-28 ASSESSMENT — PAIN DESCRIPTION - ORIENTATION: ORIENTATION: RIGHT;LEFT

## 2020-05-28 NOTE — PROGRESS NOTES
Cardiology Progress Note     Admit Date:  5/23/2020    Consult reason/ Seen today for :   chf  Cardiomyopathy EF of 20-25 %  H/o NSVT  Hypotention    Subjective and  Overnight Events : Blood cultures concerning for MRSA probably healthcare associated infection. probable sources right arm thrombophlebitis has icd too, afebrile for last 48 hrs , feels well     Chief complain on admission : 63 y. o.year old who is admitted for  Chief Complaint   Patient presents with    Fever     103-105 since 3pm    Tremors      Assessment / Plan:  MRSA bacteremia as per primary team on antibiotics  Plan giovanny today Alternates and risk of the procedure were dicussed in detail,Patient is in agreement to proceed,Mallampati is 2  ASA is 2   If  Culture remains positive will need icd extracted!!  nonischemic cardiomyopathy ejection fraction 2025% status post ICD currently appears euvolemic   Baseline blood pressure is in 80s and 90s at home currently on Levophed   Most cardiac meds were held due to low blood pressure please restart them   History of pulmonary embolism hence on chronic anticoagulation Eliquis   Continue amiodarone due to VT history   Currently torsemide is held continue Aldactone   ASCVD: Continue Plavix and Imdur no angina, no aspirin to avoid triple therapy she had last PCI to LAD in 2019 through her LIMA all other grafts are down  HTN: stable, continue To titrate up medication as needed  DVT Prophylaxis if no contraindication    Past medical history:    has a past medical history of Acute delirium, Arrhythmia, Arthritis, Asplenia, Asthma, CAD (coronary artery disease), Cardiomyopathy ([address] Cerebral artery occlusion with cerebral infarction ([address] CHF (congestive heart failure) ([address] Enlarged liver, GERD (gastroesophageal reflux disease), H/O echocardiogram, History of blood transfusion, Hx of cardiovascular stress test, Hyperlipidemia, Hypertension, Lymphoma ([facility] MI, old, Movement disorder, MS (multiple sclerosis) ([facility] OP (osteoporosis), PE (pulmonary embolism), Pneumonia, Seizures ([facility] Spleen enlarged, and Thyroid disease. Past surgical history:   has a past surgical history that includes Coronary angioplasty with stent; Hysterectomy; Appendectomy; Cholecystectomy; Tonsillectomy; Gastric bypass surgery; Cardiac defibrillator placement; hip surgery; Abdomen surgery; Colonoscopy; Endoscopy, colon, diagnostic; hernia repair; joint replacement; skin biopsy; vascular surgery; and Coronary artery bypass graft. Social History:   reports that she has never smoked. She has never used smokeless tobacco. She reports that she does not drink alcohol or use drugs. Family history:  family history includes Cancer in her father; Diabetes in her mother; Heart Disease in her father, maternal grandmother, and mother; High Blood Pressure in her maternal grandmother and mother; High Cholesterol in her maternal grandmother and mother; Other in her sister. Allergies   Allergen Reactions    Fentanyl Swelling     Other reaction(s): Angioedema (swelling)    Moxifloxacin Hcl In Nacl Swelling and Rash    Povidone-Iodine Rash     Other reaction(s): AOF      Cyclobenzaprine      Other reaction(s): Other - comment required  \" it make my muscle very weak because of my MS\"  \" it make my muscle very weak because of my MS\"      Methocarbamol      Worsening edema  Other reaction(s): Other - comment required  Worsening edema  Worsening edema  Worsening edema      Tramadol Other (See Comments)     Doctor doesn't her to take due to heart problems  Seizures   Doctor doesn't her to take due to lowers seizure threshold.  Baclofen     Ibuprofen      \"Due to heart problems\"    Naproxen     Nsaids      \"Due to heart problems\"    Tizanidine     Tolmetin      \"Due to heart problems\"    Tramadol Hcl      Other reaction(s):  Other - comment required  Told not to take due to history of seizures    Betadine [Povidone Iodine] Rash    Moxifloxacin Rash and Swelling     Lips swell and tingling in face   Lips swell and tingling in face   Lips swell and tingling in face   Lips swell and tingling in face   Around mouth       Review of Systems:    All 14 systems were reviewed and are negative  Except for the positive findings  which as documented     BP (!) 81/49   Pulse 63   Temp 98.6 °F (37 °C) (Oral)   Resp 10   Ht 5' 6\" (1.676 m)   Wt 101 lb 3.2 oz (45.9 kg)   SpO2 99%   BMI 16.33 kg/m²       Intake/Output Summary (Last 24 hours) at 5/28/2020 1313  Last data filed at 5/28/2020 1152  Gross per 24 hour   Intake 1212 ml   Output 1750 ml   Net -538 ml     Physical Exam:  Constitutional:  Well developed, Well nourished, No acute distress, Non-toxic appearance. HENT:  Normocephalic, Atraumatic, Bilateral external ears normal, Oropharynx moist, No oral exudates, Nose normal. Neck- Normal range of motion, No tenderness, Supple, No stridor. Eyes:  PERRL, EOMI, Conjunctiva normal, No discharge. Respiratory:  Normal breath sounds, No respiratory distress, No wheezing, No chest tenderness. Cardiovascular:  Normal heart rate, Normal rhythm, No murmurs, No rubs, No gallops, JVP not elevated  Abdomen/GI:  Bowel sounds normal, Soft, No tenderness, No masses, No pulsatile masses. Musculoskeletal:  Intact distal pulses, No edema, No tenderness, No cyanosis, No clubbing. Good range of motion in all major joints. No tenderness to palpation or major deformities noted. Back- No tenderness. Integument:  Warm, Dry, No erythema, No rash. Lymphatic:  No lymphadenopathy noted. Neurologic:  Alert & oriented x 3, Normal motor function, Normal sensory function, No focal deficits noted.    Psychiatric:  Affect  and  Mood :no change    Medications:    midodrine  5 mg Oral TID WC    vancomycin  1,000 mg Intravenous Q12H    albuterol sulfate HFA  2 puff Inhalation 4x daily    And    resolved hospital problems. *            All labs, medications and tests reviewed by myself , continue all other medications of all above medical condition listed as is except for changes mentioned above. Thank you very much for consult , please call with questions.     Lui Abrams MD 5/28/2020 1:13 PM

## 2020-05-28 NOTE — PROGRESS NOTES
[address] [facility]  consulted by Dr. Ruelas for monitoring and adjustment. Indication for treatment: Sepsis, MRSA bacteremia, UTI; Covid-19 r/o  Goal trough: 15 mcg/mL     Pertinent Laboratory Values:   Temp Readings from Last 3 Encounters:   05/28/20 98.6 °F (37 °C) (Oral)   03/15/20 98.6 °F (37 °C) (Oral)   02/10/20 98.1 °F (36.7 °C) (Oral)     Recent Labs     05/27/20  0955   WBC 5.5     Recent Labs     05/26/20  1045 05/27/20  0452 05/28/20  0520   BUN 8 7 8   CREATININE 0.5* 0.6 0.6     Estimated Creatinine Clearance: 74 mL/min (based on SCr of 0.6 mg/dL). Intake/Output Summary (Last 24 hours) at 5/28/2020 1046  Last data filed at 5/28/2020 0634  Gross per 24 hour   Intake 1452 ml   Output 1650 ml   Net -198 ml       Pertinent Cultures:  Date    Source    Results  5/24   Blood    MRSA 2/2 5/24   MRSA nasal   Positive  5/24   Urine    Negative  5/24   Covid-19   Negative  5/25   Blood    MRSA 1/2    Vancomycin level:   TROUGH:    Recent Labs     05/26/20  1045   VANCOTROUGH 9.2*     RANDOM:  No results for input(s): VANCORANDOM in the last 72 hours. Assessment:  · WBC and temperature: WNL  · SCr, BUN, and urine output: WNL  · Day(s) of therapy: #4  · Vancomycin level:   · 5/26 - 9.2, subtherapeutic  · 5/28 - 15, therapeutic    Plan:  · Vancomycin dosing increased to 1000 mg IVPB q12h following sub-therapeutic trough. · Repeat trough level obtained this AM is therapeutic. Continue vancomycin 1000 mg IVPB q12h. · Renal trends are stable. · Repeat trough level ordered 5/30/20 @ 0830.   · Pharmacy will continue to monitor patient and adjust therapy as indicated    REPEAT VANCOMYCIN TROUGH SCHEDULED FOR 5/30 @ 0830    Thank you for the consult,  Juve Schaffer, SelenaD, formerly [facility]  5/28/2020 10:46 AM

## 2020-05-28 NOTE — PROGRESS NOTES
05/28/20 0702   BP: (!) 69/47 (!) 63/49 (!) 83/42 (!) 83/41   Pulse: 65 61 60 61   Resp: (!) 6 13 12 14   Temp:       TempSrc:       SpO2: 92% 91% 93% 98%   Weight:       Height:           PHYSICAL EXAM   GEN Awake female, sitting upright in bed in no apparent distress. EYES Pupils are equally round. No scleral erythema, discharge, or conjunctivitis. HENT Mucous membranes are moist. Oral pharynx without exudates, no evidence of thrush. NECK Supple, no apparent thyromegaly or masses. RESP Clear to auscultation, no wheezes, rales or rhonchi. Symmetric chest movement   CARDIO/VASC S1/S2 auscultated. Regular rate without appreciable murmurs, rubs, or gallops. No JVD or carotid bruits. Peripheral pulses equal bilaterally and palpable. No peripheral edema. GI Abdomen is soft without significant tenderness, masses, or guarding. Bowel sounds are normoactive. Rectal exam deferred.  No costovertebral angle tenderness. Normal appearing external genitalia. HEME/LYMPH No palpable cervical lymphadenopathy and no hepatosplenomegaly. No petechiae or ecchymoses. MSK Spontaneous movement of all extremities. No gross joint deformities. SKIN Normal coloration, warm, dry. NEURO Cranial nerves appear grossly intact, normal speech, no lateralizing weakness. PSYCH Awake, alert, oriented x 4. Affect appropriate. INTAKE: In: 6245 [P.O.:240; I.V.:822]  Out: 1350   OUTPUT: In: 1062   Out: 1350 [Urine:1350]    LABS  Recent Labs     05/27/20  0955   WBC 5.5   HGB 9.6*   HCT 31.3*         Recent Labs     05/26/20  1045 05/27/20  0452 05/28/20  0520    144 142   K 4.1 4.6 4.9    106 103   CO2 27 30 29   BUN 8 7 8   CREATININE 0.5* 0.6 0.6     No results for input(s): AST, ALT, ALB, BILIDIR, BILITOT, ALKPHOS in the last 72 hours. No results for input(s): INR in the last 72 hours. No results for input(s): CKTOTAL, CKMB, CKMBINDEX, TROPONINT in the last 72 hours.        Abnormal labs for today

## 2020-05-29 ENCOUNTER — APPOINTMENT (OUTPATIENT)
Dept: CT IMAGING | Age: 60
DRG: 981 | End: 2020-05-29
Payer: MEDICARE

## 2020-05-29 LAB
ANION GAP SERPL CALCULATED.3IONS-SCNC: 10 MMOL/L (ref 4–16)
BUN BLDV-MCNC: 9 MG/DL (ref 6–23)
CALCIUM SERPL-MCNC: 8.3 MG/DL (ref 8.3–10.6)
CHLORIDE BLD-SCNC: 104 MMOL/L (ref 99–110)
CO2: 29 MMOL/L (ref 21–32)
CREAT SERPL-MCNC: 0.6 MG/DL (ref 0.6–1.1)
GFR AFRICAN AMERICAN: >60 ML/MIN/1.73M2
GFR NON-AFRICAN AMERICAN: >60 ML/MIN/1.73M2
GLUCOSE BLD-MCNC: 84 MG/DL (ref 70–99)
HCT VFR BLD CALC: 30.3 % (ref 37–47)
HEMOGLOBIN: 9.1 GM/DL (ref 12.5–16)
MCH RBC QN AUTO: 34.9 PG (ref 27–31)
MCHC RBC AUTO-ENTMCNC: 30 % (ref 32–36)
MCV RBC AUTO: 116.1 FL (ref 78–100)
PDW BLD-RTO: 16.4 % (ref 11.7–14.9)
PLATELET # BLD: 203 K/CU MM (ref 140–440)
PMV BLD AUTO: 10.5 FL (ref 7.5–11.1)
POTASSIUM SERPL-SCNC: 5.1 MMOL/L (ref 3.5–5.1)
RBC # BLD: 2.61 M/CU MM (ref 4.2–5.4)
SODIUM BLD-SCNC: 143 MMOL/L (ref 135–145)
WBC # BLD: 6 K/CU MM (ref 4–10.5)

## 2020-05-29 PROCEDURE — 6360000002 HC RX W HCPCS: Performed by: INTERNAL MEDICINE

## 2020-05-29 PROCEDURE — 6360000004 HC RX CONTRAST MEDICATION: Performed by: INTERNAL MEDICINE

## 2020-05-29 PROCEDURE — 6370000000 HC RX 637 (ALT 250 FOR IP): Performed by: INTERNAL MEDICINE

## 2020-05-29 PROCEDURE — 2580000003 HC RX 258: Performed by: INTERNAL MEDICINE

## 2020-05-29 PROCEDURE — 80048 BASIC METABOLIC PNL TOTAL CA: CPT

## 2020-05-29 PROCEDURE — 2140000000 HC CCU INTERMEDIATE R&B

## 2020-05-29 PROCEDURE — 94640 AIRWAY INHALATION TREATMENT: CPT

## 2020-05-29 PROCEDURE — 71250 CT THORAX DX C-: CPT

## 2020-05-29 PROCEDURE — 99233 SBSQ HOSP IP/OBS HIGH 50: CPT | Performed by: INTERNAL MEDICINE

## 2020-05-29 PROCEDURE — 94761 N-INVAS EAR/PLS OXIMETRY MLT: CPT

## 2020-05-29 PROCEDURE — 74177 CT ABD & PELVIS W/CONTRAST: CPT

## 2020-05-29 PROCEDURE — 85027 COMPLETE CBC AUTOMATED: CPT

## 2020-05-29 RX ORDER — MIDODRINE HYDROCHLORIDE 5 MG/1
10 TABLET ORAL ONCE
Status: COMPLETED | OUTPATIENT
Start: 2020-05-29 | End: 2020-05-29

## 2020-05-29 RX ORDER — MIDODRINE HYDROCHLORIDE 5 MG/1
10 TABLET ORAL
Status: DISCONTINUED | OUTPATIENT
Start: 2020-05-29 | End: 2020-06-05 | Stop reason: HOSPADM

## 2020-05-29 RX ADMIN — APIXABAN 5 MG: 5 TABLET, FILM COATED ORAL at 20:47

## 2020-05-29 RX ADMIN — ATORVASTATIN CALCIUM 80 MG: 80 TABLET, FILM COATED ORAL at 20:47

## 2020-05-29 RX ADMIN — LEVETIRACETAM 1000 MG: 500 TABLET, FILM COATED ORAL at 20:46

## 2020-05-29 RX ADMIN — ACETAMINOPHEN 650 MG: 325 TABLET ORAL at 18:50

## 2020-05-29 RX ADMIN — Medication 800 MG: at 20:46

## 2020-05-29 RX ADMIN — Medication 800 MG: at 09:43

## 2020-05-29 RX ADMIN — ACETAMINOPHEN 650 MG: 325 TABLET ORAL at 12:31

## 2020-05-29 RX ADMIN — IPRATROPIUM BROMIDE 2 PUFF: 17 AEROSOL, METERED RESPIRATORY (INHALATION) at 21:12

## 2020-05-29 RX ADMIN — SODIUM CHLORIDE, PRESERVATIVE FREE 10 ML: 5 INJECTION INTRAVENOUS at 09:48

## 2020-05-29 RX ADMIN — CLOPIDOGREL BISULFATE 75 MG: 75 TABLET ORAL at 09:44

## 2020-05-29 RX ADMIN — APIXABAN 5 MG: 5 TABLET, FILM COATED ORAL at 09:43

## 2020-05-29 RX ADMIN — MIDODRINE HYDROCHLORIDE 10 MG: 5 TABLET ORAL at 17:39

## 2020-05-29 RX ADMIN — VANCOMYCIN HYDROCHLORIDE 1000 MG: 1 INJECTION, SOLUTION INTRAVENOUS at 09:47

## 2020-05-29 RX ADMIN — OXYCODONE HYDROCHLORIDE 20 MG: 10 TABLET ORAL at 01:42

## 2020-05-29 RX ADMIN — MEROPENEM 1 G: 1 INJECTION, POWDER, FOR SOLUTION INTRAVENOUS at 17:12

## 2020-05-29 RX ADMIN — Medication 1 CAPSULE: at 12:55

## 2020-05-29 RX ADMIN — AMIODARONE HYDROCHLORIDE 200 MG: 200 TABLET ORAL at 09:43

## 2020-05-29 RX ADMIN — MEROPENEM 1 G: 1 INJECTION, POWDER, FOR SOLUTION INTRAVENOUS at 05:42

## 2020-05-29 RX ADMIN — OXYCODONE HYDROCHLORIDE 20 MG: 10 TABLET ORAL at 09:32

## 2020-05-29 RX ADMIN — SODIUM CHLORIDE, PRESERVATIVE FREE 10 ML: 5 INJECTION INTRAVENOUS at 20:47

## 2020-05-29 RX ADMIN — MIRTAZAPINE 30 MG: 15 TABLET, FILM COATED ORAL at 20:46

## 2020-05-29 RX ADMIN — OXYCODONE HYDROCHLORIDE 20 MG: 10 TABLET ORAL at 18:50

## 2020-05-29 RX ADMIN — ONDANSETRON 4 MG: 4 TABLET, ORALLY DISINTEGRATING ORAL at 12:32

## 2020-05-29 RX ADMIN — MIDODRINE HYDROCHLORIDE 10 MG: 5 TABLET ORAL at 10:01

## 2020-05-29 RX ADMIN — VANCOMYCIN HYDROCHLORIDE 1000 MG: 1 INJECTION, SOLUTION INTRAVENOUS at 22:15

## 2020-05-29 RX ADMIN — OXYCODONE HYDROCHLORIDE 20 MG: 10 TABLET ORAL at 05:39

## 2020-05-29 RX ADMIN — Medication 1 CAPSULE: at 17:38

## 2020-05-29 RX ADMIN — OXYCODONE HYDROCHLORIDE 20 MG: 10 TABLET ORAL at 16:02

## 2020-05-29 RX ADMIN — IOPAMIDOL 75 ML: 755 INJECTION, SOLUTION INTRAVENOUS at 16:55

## 2020-05-29 RX ADMIN — ACETAMINOPHEN 650 MG: 325 TABLET ORAL at 05:38

## 2020-05-29 RX ADMIN — OXYCODONE HYDROCHLORIDE 20 MG: 10 TABLET ORAL at 12:32

## 2020-05-29 RX ADMIN — Medication 1 CAPSULE: at 10:11

## 2020-05-29 RX ADMIN — LEVETIRACETAM 1000 MG: 500 TABLET, FILM COATED ORAL at 09:43

## 2020-05-29 RX ADMIN — DULOXETINE 60 MG: 30 CAPSULE, DELAYED RELEASE ORAL at 09:43

## 2020-05-29 RX ADMIN — FAMOTIDINE 20 MG: 20 TABLET, FILM COATED ORAL at 09:43

## 2020-05-29 RX ADMIN — OXYCODONE HYDROCHLORIDE 20 MG: 10 TABLET ORAL at 22:15

## 2020-05-29 RX ADMIN — LEVOTHYROXINE SODIUM 224 MCG: 112 TABLET ORAL at 05:38

## 2020-05-29 RX ADMIN — MIDODRINE HYDROCHLORIDE 10 MG: 5 TABLET ORAL at 12:32

## 2020-05-29 RX ADMIN — ALBUTEROL SULFATE 2 PUFF: 90 AEROSOL, METERED RESPIRATORY (INHALATION) at 21:10

## 2020-05-29 ASSESSMENT — PAIN DESCRIPTION - FREQUENCY
FREQUENCY: CONTINUOUS

## 2020-05-29 ASSESSMENT — PAIN SCALES - GENERAL
PAINLEVEL_OUTOF10: 0
PAINLEVEL_OUTOF10: 9
PAINLEVEL_OUTOF10: 8
PAINLEVEL_OUTOF10: 5
PAINLEVEL_OUTOF10: 8
PAINLEVEL_OUTOF10: 9
PAINLEVEL_OUTOF10: 8
PAINLEVEL_OUTOF10: 4
PAINLEVEL_OUTOF10: 8
PAINLEVEL_OUTOF10: 3
PAINLEVEL_OUTOF10: 5
PAINLEVEL_OUTOF10: 8

## 2020-05-29 ASSESSMENT — PAIN DESCRIPTION - LOCATION
LOCATION: OTHER (COMMENT)
LOCATION: GENERALIZED

## 2020-05-29 ASSESSMENT — PAIN DESCRIPTION - PAIN TYPE
TYPE: CHRONIC PAIN

## 2020-05-29 ASSESSMENT — PAIN DESCRIPTION - DESCRIPTORS
DESCRIPTORS: ACHING;SORE

## 2020-05-29 ASSESSMENT — PAIN DESCRIPTION - PROGRESSION
CLINICAL_PROGRESSION: GRADUALLY WORSENING
CLINICAL_PROGRESSION: NOT CHANGED

## 2020-05-29 ASSESSMENT — PAIN DESCRIPTION - ONSET
ONSET: ON-GOING
ONSET: ON-GOING

## 2020-05-29 NOTE — PLAN OF CARE
Problem: Falls - Risk of:  Goal: Will remain free from falls  Description: Will remain free from falls  Outcome: Ongoing  Goal: Absence of physical injury  Description: Absence of physical injury  Outcome: Ongoing     Problem: Safety:  Goal: Free from accidental physical injury  Description: Free from accidental physical injury  Outcome: Ongoing  Goal: Free from intentional harm  Description: Free from intentional harm  Outcome: Ongoing     Problem: Daily Care:  Goal: Daily care needs are met  Description: Daily care needs are met  Outcome: Ongoing     Problem: Pain:  Goal: Patient's pain/discomfort is manageable  Description: Patient's pain/discomfort is manageable  Outcome: Ongoing  Goal: Pain level will decrease  Description: Pain level will decrease  Outcome: Ongoing  Goal: Control of acute pain  Description: Control of acute pain  Outcome: Ongoing  Goal: Control of chronic pain  Description: Control of chronic pain  Outcome: Ongoing     Problem: Skin Integrity:  Goal: Skin integrity will stabilize  Description: Skin integrity will stabilize  Outcome: Ongoing     Problem: Discharge Planning:  Goal: Patients continuum of care needs are met  Description: Patients continuum of care needs are met  Outcome: Ongoing

## 2020-05-29 NOTE — PROGRESS NOTES
Cardiology Progress Note     Admit Date:  5/23/2020    Consult reason/ Seen today for :   chf  Cardiomyopathy EF of 20-25 %  H/o NSVT  Hypotention    Subjective and  Overnight Events :DULCE showed no valve issues but has ICD  Blood cultures  From 25th concerning for MRSA probably healthcare associated infection. probable sources right arm thrombophlebitis has icd too, afebrile for last 72 hrs , feels well     Chief complain on admission : 63 y. o.year old who is admitted for  Chief Complaint   Patient presents with    Fever     103-105 since 3pm    Tremors      Assessment / Plan:  MRSA bacteremia as per primary team on antibiotics  If  Culture remains positive will need icd extracted!!  nonischemic cardiomyopathy ejection fraction 2025% status post ICD currently appears euvolemic   Baseline blood pressure is in 80s and 90s at home currently on Levophed   Most cardiac meds were held due to low blood pressure please restart them   History of pulmonary embolism hence on chronic anticoagulation Eliquis   Continue amiodarone due to VT history   Currently torsemide is held continue Aldactone   ASCVD: Continue Plavix and Imdur no angina, no aspirin to avoid triple therapy she had last PCI to LAD in 2019 through her LIMA all other grafts are down  HTN: stable, continue To titrate up medication as needed  DVT Prophylaxis if no contraindication    Past medical history:    has a past medical history of Acute delirium, Arrhythmia, Arthritis, Asplenia, Asthma, CAD (coronary artery disease), Cardiomyopathy ([address] Cerebral artery occlusion with cerebral infarction ([address] CHF (congestive heart failure) ([address] Enlarged liver, GERD (gastroesophageal reflux disease), H/O echocardiogram, History of blood transfusion, Hx of cardiovascular stress test, Hyperlipidemia, Hypertension, Lymphoma ([address] MI, old, Movement disorder, MS (multiple sclerosis) ([address] OP

## 2020-05-29 NOTE — PROGRESS NOTES
ECHO:    Microbiology:  Blood culture:    Urine culture:    Sputum culture:    Procedures done this admission:    MEDS  Scheduled Meds:   midodrine  10 mg Oral TID WC    vancomycin  1,000 mg Intravenous Q12H    albuterol sulfate HFA  2 puff Inhalation 4x daily    And    ipratropium  2 puff Inhalation 4x daily    amiodarone  200 mg Oral Daily    apixaban  5 mg Oral BID    atorvastatin  80 mg Oral Nightly    clopidogrel  75 mg Oral Daily    DULoxetine  60 mg Oral Daily    famotidine  20 mg Oral Daily    ferrous gluconate  324 mg Oral Every Other Day    Icosapent Ethyl  500 mg Oral BID    lactobacillus  1 capsule Oral TID WC    levETIRAcetam  1,000 mg Oral BID    levothyroxine  224 mcg Oral Daily    magnesium oxide  800 mg Oral BID    mirtazapine  30 mg Oral Nightly    meropenem  1 g Intravenous Q12H    lidocaine PF  5 mL Intradermal Once    sodium chloride flush  10 mL Intravenous 2 times per day     Continuous Infusions:   dextrose      norepinephrine 2 mcg/min (05/29/20 0551)     PRN Meds:glucose, dextrose, glucagon (rDNA), dextrose, benzocaine-menthol, docusate sodium, nitroGLYCERIN, ondansetron, torsemide, acetaminophen **OR** acetaminophen, polyethylene glycol, promethazine **OR** ondansetron, oxyCODONE, sodium chloride flush        ASSESSMENT and PLAN  Hospital Day: 7    1-Septic shock with MRSA bacteremia on 5/24- repeat blood culture ordered, on vancomycin. DULCE with no vegetation- will get CT chest/abdomen/pelvis and MRI of lower spine to look for other sources of infection. Wean levophed as tolerated. Continue both vanc/meropenem for now. 2-?Right arm thrombophlebitis- abx as above- US   3-? UTI- urine culture pending.     Other chronic issues  -Nonischemic cardiomyopathy with EF 30 to 35% / CAD / VT- s/p ICD, s/p CABG   -History of VTE/ PE  -Hypothyroidism  -Seizure Disorder  -Severe PCM- needs to increase calorie intake  -Hx of gastric bypass  -Anemia of chronic

## 2020-05-29 NOTE — PROGRESS NOTES
Nutrition Assessment    Type and Reason for Visit: Initial    Nutrition Recommendations:   · Continue current diet  · Start frozen supplements    Nutrition Assessment: Pt assessed due to a los. Pt has been on a low sodium and general diet and has been consuming % of her meals. Pt BMI is 16.4 and is chronically malnourished 2/2 to a history of bariatric surgery. Will order supplements and continue to follow. Malnutrition Assessment:  · Malnutrition Status: Meets the criteria for severe malnutrition  · Context: Social or environmental circumstances  · Findings of the 6 clinical characteristics of malnutrition (Minimum of 2 out of 6 clinical characteristics is required to make the diagnosis of moderate or severe Protein Calorie Malnutrition based on AND/ASPEN Guidelines):  1. Energy Intake-Less than or equal to 50% of estimated energy requirement, Greater than or equal to 3 months    2. Weight Loss-10% loss or greater, in 6 months  3. Fat Loss-Unable to assess,    4. Muscle Loss-Unable to assess,    5. Fluid Accumulation-Mild fluid accumulation, Generalized  6.   Strength-Not measured    Nutrition Risk Level: High    Nutrient Needs:  · Estimated Daily Total Kcal: 8905-3060 based on 30-35 kcal/kg/current BW  · Estimated Daily Protein (g): 55-64 based on 1.2-1.4 g/kg/current BW  · Estimated Daily Total Fluid (ml/day): 2372-6494 based on 1 mL/kcal    Nutrition Diagnosis:   · Problem: Severe malnutrition, In context of social or environmental circumstances  · Etiology: related to Insufficient energy/nutrient consumption     Signs and symptoms:  as evidenced by BMI, Weight loss greater than or equal to 10% in 6 months    Objective Information:  · Wound Type: None  · Current Nutrition Therapies:  · Oral Diet Orders: General   · Oral Diet intake: %  · Oral Nutrition Supplement (ONS) Orders: None  · Anthropometric Measures:  · Ht: 5' 6\" (167.6 cm)   · Current Body Wt: 101 lb (45.8 kg)  · Admission Body Wt: 101 lb (45.8 kg)  · Usual Body Wt: 117 lb (53.1 kg)  · % Weight Change: -13.6% in 5 months  · Ideal Body Wt: 130 lb (59 kg), % Ideal Body 78%  · BMI Classification: BMI <18.5 Underweight    Nutrition Interventions:   Continue current diet, Start ONS  Continued Inpatient Monitoring, Education Not Indicated, Coordination of Care    Nutrition Evaluation:   · Evaluation: Goals set   · Goals: pt will consume greater than 75% of her meals and supplements    · Monitoring: Supplement Intake, Pertinent Labs, Weight, Meal Intake      Electronically signed by Manoj Ansari RD, LD on 6/97/39 at 11:10 AM EDT    Contact Number: [phone]

## 2020-05-30 ENCOUNTER — APPOINTMENT (OUTPATIENT)
Dept: CT IMAGING | Age: 60
DRG: 981 | End: 2020-05-30
Payer: MEDICARE

## 2020-05-30 LAB
ANION GAP SERPL CALCULATED.3IONS-SCNC: 5 MMOL/L (ref 4–16)
BUN BLDV-MCNC: 9 MG/DL (ref 6–23)
CALCIUM SERPL-MCNC: 7.6 MG/DL (ref 8.3–10.6)
CHLORIDE BLD-SCNC: 104 MMOL/L (ref 99–110)
CO2: 32 MMOL/L (ref 21–32)
CREAT SERPL-MCNC: 0.6 MG/DL (ref 0.6–1.1)
CULTURE: NORMAL
DOSE AMOUNT: NORMAL
DOSE TIME: NORMAL
GFR AFRICAN AMERICAN: >60 ML/MIN/1.73M2
GFR NON-AFRICAN AMERICAN: >60 ML/MIN/1.73M2
GLUCOSE BLD-MCNC: 74 MG/DL (ref 70–99)
HCT VFR BLD CALC: 26.5 % (ref 37–47)
HEMOGLOBIN: 8 GM/DL (ref 12.5–16)
Lab: NORMAL
MCH RBC QN AUTO: 35.2 PG (ref 27–31)
MCHC RBC AUTO-ENTMCNC: 30.2 % (ref 32–36)
MCV RBC AUTO: 116.7 FL (ref 78–100)
PDW BLD-RTO: 16.7 % (ref 11.7–14.9)
PLATELET # BLD: 191 K/CU MM (ref 140–440)
PMV BLD AUTO: 10.4 FL (ref 7.5–11.1)
POTASSIUM SERPL-SCNC: 4.9 MMOL/L (ref 3.5–5.1)
PROCALCITONIN: 0.08
RBC # BLD: 2.27 M/CU MM (ref 4.2–5.4)
SODIUM BLD-SCNC: 141 MMOL/L (ref 135–145)
SPECIMEN: NORMAL
VANCOMYCIN TROUGH: 16.2 UG/ML (ref 10–20)
WBC # BLD: 4.4 K/CU MM (ref 4–10.5)

## 2020-05-30 PROCEDURE — 94761 N-INVAS EAR/PLS OXIMETRY MLT: CPT

## 2020-05-30 PROCEDURE — 6360000002 HC RX W HCPCS: Performed by: INTERNAL MEDICINE

## 2020-05-30 PROCEDURE — APPNB15 APP NON BILLABLE TIME 0-15 MINS: Performed by: NURSE PRACTITIONER

## 2020-05-30 PROCEDURE — 80048 BASIC METABOLIC PNL TOTAL CA: CPT

## 2020-05-30 PROCEDURE — 2140000000 HC CCU INTERMEDIATE R&B

## 2020-05-30 PROCEDURE — 6370000000 HC RX 637 (ALT 250 FOR IP): Performed by: INTERNAL MEDICINE

## 2020-05-30 PROCEDURE — 99233 SBSQ HOSP IP/OBS HIGH 50: CPT | Performed by: INTERNAL MEDICINE

## 2020-05-30 PROCEDURE — 2580000003 HC RX 258: Performed by: INTERNAL MEDICINE

## 2020-05-30 PROCEDURE — 84145 PROCALCITONIN (PCT): CPT

## 2020-05-30 PROCEDURE — 6360000004 HC RX CONTRAST MEDICATION: Performed by: HOSPITALIST

## 2020-05-30 PROCEDURE — 94640 AIRWAY INHALATION TREATMENT: CPT

## 2020-05-30 PROCEDURE — 80202 ASSAY OF VANCOMYCIN: CPT

## 2020-05-30 PROCEDURE — 85027 COMPLETE CBC AUTOMATED: CPT

## 2020-05-30 PROCEDURE — 73202 CT UPPR EXTREMITY W/O&W/DYE: CPT

## 2020-05-30 RX ADMIN — LEVETIRACETAM 1000 MG: 500 TABLET, FILM COATED ORAL at 09:40

## 2020-05-30 RX ADMIN — MIDODRINE HYDROCHLORIDE 10 MG: 5 TABLET ORAL at 08:43

## 2020-05-30 RX ADMIN — OXYCODONE HYDROCHLORIDE 20 MG: 10 TABLET ORAL at 08:43

## 2020-05-30 RX ADMIN — ACETAMINOPHEN 650 MG: 325 TABLET ORAL at 12:13

## 2020-05-30 RX ADMIN — VANCOMYCIN HYDROCHLORIDE 1000 MG: 1 INJECTION, SOLUTION INTRAVENOUS at 21:13

## 2020-05-30 RX ADMIN — SODIUM CHLORIDE, PRESERVATIVE FREE 10 ML: 5 INJECTION INTRAVENOUS at 21:09

## 2020-05-30 RX ADMIN — VANCOMYCIN HYDROCHLORIDE 1000 MG: 1 INJECTION, SOLUTION INTRAVENOUS at 09:52

## 2020-05-30 RX ADMIN — Medication 800 MG: at 09:39

## 2020-05-30 RX ADMIN — AMIODARONE HYDROCHLORIDE 200 MG: 200 TABLET ORAL at 09:41

## 2020-05-30 RX ADMIN — CLOPIDOGREL BISULFATE 75 MG: 75 TABLET ORAL at 09:40

## 2020-05-30 RX ADMIN — Medication 1 CAPSULE: at 17:20

## 2020-05-30 RX ADMIN — MIDODRINE HYDROCHLORIDE 10 MG: 5 TABLET ORAL at 17:20

## 2020-05-30 RX ADMIN — LEVETIRACETAM 1000 MG: 500 TABLET, FILM COATED ORAL at 21:09

## 2020-05-30 RX ADMIN — MEROPENEM 1 G: 1 INJECTION, POWDER, FOR SOLUTION INTRAVENOUS at 03:56

## 2020-05-30 RX ADMIN — APIXABAN 5 MG: 5 TABLET, FILM COATED ORAL at 09:41

## 2020-05-30 RX ADMIN — APIXABAN 5 MG: 5 TABLET, FILM COATED ORAL at 21:09

## 2020-05-30 RX ADMIN — LEVOTHYROXINE SODIUM 224 MCG: 112 TABLET ORAL at 05:51

## 2020-05-30 RX ADMIN — IPRATROPIUM BROMIDE 2 PUFF: 17 AEROSOL, METERED RESPIRATORY (INHALATION) at 08:11

## 2020-05-30 RX ADMIN — MEROPENEM 1 G: 1 INJECTION, POWDER, FOR SOLUTION INTRAVENOUS at 17:20

## 2020-05-30 RX ADMIN — FAMOTIDINE 20 MG: 20 TABLET, FILM COATED ORAL at 09:40

## 2020-05-30 RX ADMIN — OXYCODONE HYDROCHLORIDE 20 MG: 10 TABLET ORAL at 18:36

## 2020-05-30 RX ADMIN — OXYCODONE HYDROCHLORIDE 20 MG: 10 TABLET ORAL at 12:13

## 2020-05-30 RX ADMIN — OXYCODONE HYDROCHLORIDE 20 MG: 10 TABLET ORAL at 21:36

## 2020-05-30 RX ADMIN — MIRTAZAPINE 30 MG: 15 TABLET, FILM COATED ORAL at 21:09

## 2020-05-30 RX ADMIN — IOPAMIDOL 75 ML: 755 INJECTION, SOLUTION INTRAVENOUS at 11:38

## 2020-05-30 RX ADMIN — OXYCODONE HYDROCHLORIDE 20 MG: 10 TABLET ORAL at 03:55

## 2020-05-30 RX ADMIN — SODIUM CHLORIDE, PRESERVATIVE FREE 10 ML: 5 INJECTION INTRAVENOUS at 09:40

## 2020-05-30 RX ADMIN — ATORVASTATIN CALCIUM 80 MG: 80 TABLET, FILM COATED ORAL at 21:09

## 2020-05-30 RX ADMIN — ACETAMINOPHEN 650 MG: 325 TABLET ORAL at 03:56

## 2020-05-30 RX ADMIN — ALBUTEROL SULFATE 2 PUFF: 90 AEROSOL, METERED RESPIRATORY (INHALATION) at 08:11

## 2020-05-30 RX ADMIN — DULOXETINE 60 MG: 30 CAPSULE, DELAYED RELEASE ORAL at 09:40

## 2020-05-30 RX ADMIN — Medication 800 MG: at 21:09

## 2020-05-30 RX ADMIN — ACETAMINOPHEN 650 MG: 325 TABLET ORAL at 18:36

## 2020-05-30 RX ADMIN — Medication 1 CAPSULE: at 09:39

## 2020-05-30 RX ADMIN — OXYCODONE HYDROCHLORIDE 20 MG: 10 TABLET ORAL at 15:26

## 2020-05-30 RX ADMIN — FERROUS GLUCONATE TAB 324 MG (37.5 MG ELEMENTAL IRON) 324 MG: 324 (37.5 FE) TAB at 09:40

## 2020-05-30 ASSESSMENT — PAIN DESCRIPTION - LOCATION
LOCATION: GENERALIZED

## 2020-05-30 ASSESSMENT — PAIN DESCRIPTION - PAIN TYPE
TYPE: CHRONIC PAIN

## 2020-05-30 ASSESSMENT — PAIN SCALES - GENERAL
PAINLEVEL_OUTOF10: 8
PAINLEVEL_OUTOF10: 0
PAINLEVEL_OUTOF10: 8
PAINLEVEL_OUTOF10: 8
PAINLEVEL_OUTOF10: 0
PAINLEVEL_OUTOF10: 9
PAINLEVEL_OUTOF10: 0
PAINLEVEL_OUTOF10: 8
PAINLEVEL_OUTOF10: 0
PAINLEVEL_OUTOF10: 0
PAINLEVEL_OUTOF10: 4
PAINLEVEL_OUTOF10: 8

## 2020-05-30 ASSESSMENT — PAIN DESCRIPTION - PROGRESSION: CLINICAL_PROGRESSION: NOT CHANGED

## 2020-05-30 ASSESSMENT — PAIN DESCRIPTION - ORIENTATION: ORIENTATION: RIGHT;LEFT

## 2020-05-30 ASSESSMENT — PAIN DESCRIPTION - ONSET: ONSET: ON-GOING

## 2020-05-30 ASSESSMENT — PAIN DESCRIPTION - FREQUENCY: FREQUENCY: CONTINUOUS

## 2020-05-30 ASSESSMENT — PAIN DESCRIPTION - DESCRIPTORS: DESCRIPTORS: ACHING;SORE

## 2020-05-30 ASSESSMENT — PAIN - FUNCTIONAL ASSESSMENT: PAIN_FUNCTIONAL_ASSESSMENT: ACTIVITIES ARE NOT PREVENTED

## 2020-05-30 NOTE — PLAN OF CARE
2102 by Fifi Kelsey RN  Outcome: Ongoing  5/29/2020 0833 by Keith Orozco RN  Outcome: Ongoing     Problem: Discharge Planning:  Goal: Patients continuum of care needs are met  Description: Patients continuum of care needs are met  5/29/2020 2102 by Fifi Kelsey RN  Outcome: Ongoing  5/29/2020 0833 by Keith Orozco RN  Outcome: Ongoing     Problem: Skin Integrity:  Goal: Will show no infection signs and symptoms  Description: Will show no infection signs and symptoms  5/29/2020 2102 by Fifi Kelsey RN  Outcome: Ongoing  5/29/2020 0833 by Keith Orozco RN  Outcome: Ongoing  Goal: Absence of new skin breakdown  Description: Absence of new skin breakdown  5/29/2020 2102 by Fifi Kelsey RN  Outcome: Ongoing  5/29/2020 0833 by Keith Orozco RN  Outcome: Ongoing   Skin assessment completed on this shift.     Problem: Nutrition  Goal: Optimal nutrition therapy  Outcome: Ongoing

## 2020-05-30 NOTE — PROGRESS NOTES
for today noted      Imaging:     ECHO:    Microbiology:  Blood culture:    Urine culture:    Sputum culture:    Procedures done this admission:    MEDS  Scheduled Meds:   midodrine  10 mg Oral TID WC    vancomycin  1,000 mg Intravenous Q12H    albuterol sulfate HFA  2 puff Inhalation 4x daily    And    ipratropium  2 puff Inhalation 4x daily    amiodarone  200 mg Oral Daily    apixaban  5 mg Oral BID    atorvastatin  80 mg Oral Nightly    clopidogrel  75 mg Oral Daily    DULoxetine  60 mg Oral Daily    famotidine  20 mg Oral Daily    ferrous gluconate  324 mg Oral Every Other Day    Icosapent Ethyl  500 mg Oral BID    lactobacillus  1 capsule Oral TID WC    levETIRAcetam  1,000 mg Oral BID    levothyroxine  224 mcg Oral Daily    magnesium oxide  800 mg Oral BID    mirtazapine  30 mg Oral Nightly    meropenem  1 g Intravenous Q12H    lidocaine PF  5 mL Intradermal Once    sodium chloride flush  10 mL Intravenous 2 times per day     Continuous Infusions:   dextrose       PRN Meds:glucose, dextrose, glucagon (rDNA), dextrose, benzocaine-menthol, docusate sodium, nitroGLYCERIN, ondansetron, torsemide, acetaminophen **OR** acetaminophen, polyethylene glycol, promethazine **OR** ondansetron, oxyCODONE, sodium chloride flush        ASSESSMENT and PLAN  Hospital Day: 8    1-Septic shock with MRSA bacteremia on 5/24- repeat blood culture pending, on vancomycin. DULCE with no vegetation- CT abd/pelvis negative, CT chest with concerns of pneumonia. Cannot do MRI here apparently as ICD done at [facility]  2-Probable pneumonia on CT- reports some shortness of breath prior to presentation- trend procalcitonin. 3-?Right arm thrombophlebitis- abx as above- check CT  4-? UTI- urine culture pending.     Other chronic issues  -Nonischemic cardiomyopathy with EF 30 to 35% / CAD / VT- s/p ICD, s/p CABG   -History of VTE/ PE  -Hypothyroidism  -Seizure Disorder  -Severe PCM- needs to increase calorie intake  -Hx of gastric

## 2020-05-30 NOTE — PROGRESS NOTES
Patient not in room  Per RN, at [address] [employer]  I did speak with Dima with [facility] today  He states he is aware of patient needing MRI and it waiting to hear back from [facility] to complete paperwork for MRI.

## 2020-05-31 LAB
ANION GAP SERPL CALCULATED.3IONS-SCNC: 8 MMOL/L (ref 4–16)
BUN BLDV-MCNC: 9 MG/DL (ref 6–23)
CALCIUM SERPL-MCNC: 8 MG/DL (ref 8.3–10.6)
CHLORIDE BLD-SCNC: 104 MMOL/L (ref 99–110)
CO2: 29 MMOL/L (ref 21–32)
CREAT SERPL-MCNC: 0.6 MG/DL (ref 0.6–1.1)
GFR AFRICAN AMERICAN: >60 ML/MIN/1.73M2
GFR NON-AFRICAN AMERICAN: >60 ML/MIN/1.73M2
GLUCOSE BLD-MCNC: 73 MG/DL (ref 70–99)
HCT VFR BLD CALC: 28 % (ref 37–47)
HEMOGLOBIN: 8.4 GM/DL (ref 12.5–16)
MCH RBC QN AUTO: 35.1 PG (ref 27–31)
MCHC RBC AUTO-ENTMCNC: 30 % (ref 32–36)
MCV RBC AUTO: 117.2 FL (ref 78–100)
PDW BLD-RTO: 16.4 % (ref 11.7–14.9)
PLATELET # BLD: 234 K/CU MM (ref 140–440)
PMV BLD AUTO: 10.3 FL (ref 7.5–11.1)
POTASSIUM SERPL-SCNC: 4.5 MMOL/L (ref 3.5–5.1)
PROCALCITONIN: 0.05
RBC # BLD: 2.39 M/CU MM (ref 4.2–5.4)
SODIUM BLD-SCNC: 141 MMOL/L (ref 135–145)
WBC # BLD: 5.5 K/CU MM (ref 4–10.5)

## 2020-05-31 PROCEDURE — 94640 AIRWAY INHALATION TREATMENT: CPT

## 2020-05-31 PROCEDURE — 6360000002 HC RX W HCPCS: Performed by: INTERNAL MEDICINE

## 2020-05-31 PROCEDURE — 85027 COMPLETE CBC AUTOMATED: CPT

## 2020-05-31 PROCEDURE — APPSS15 APP SPLIT SHARED TIME 0-15 MINUTES: Performed by: NURSE PRACTITIONER

## 2020-05-31 PROCEDURE — 2580000003 HC RX 258: Performed by: INTERNAL MEDICINE

## 2020-05-31 PROCEDURE — 80048 BASIC METABOLIC PNL TOTAL CA: CPT

## 2020-05-31 PROCEDURE — 99221 1ST HOSP IP/OBS SF/LOW 40: CPT | Performed by: SURGERY

## 2020-05-31 PROCEDURE — 6370000000 HC RX 637 (ALT 250 FOR IP): Performed by: INTERNAL MEDICINE

## 2020-05-31 PROCEDURE — 84145 PROCALCITONIN (PCT): CPT

## 2020-05-31 PROCEDURE — 94761 N-INVAS EAR/PLS OXIMETRY MLT: CPT

## 2020-05-31 PROCEDURE — 6370000000 HC RX 637 (ALT 250 FOR IP): Performed by: SURGERY

## 2020-05-31 PROCEDURE — 2140000000 HC CCU INTERMEDIATE R&B

## 2020-05-31 PROCEDURE — 99232 SBSQ HOSP IP/OBS MODERATE 35: CPT | Performed by: INTERNAL MEDICINE

## 2020-05-31 RX ORDER — IBUPROFEN 200 MG
TABLET ORAL ONCE
Status: COMPLETED | OUTPATIENT
Start: 2020-05-31 | End: 2020-05-31

## 2020-05-31 RX ADMIN — IPRATROPIUM BROMIDE 2 PUFF: 17 AEROSOL, METERED RESPIRATORY (INHALATION) at 11:30

## 2020-05-31 RX ADMIN — LEVOTHYROXINE SODIUM 224 MCG: 112 TABLET ORAL at 06:07

## 2020-05-31 RX ADMIN — VANCOMYCIN HYDROCHLORIDE 1000 MG: 1 INJECTION, SOLUTION INTRAVENOUS at 20:59

## 2020-05-31 RX ADMIN — Medication 1 CAPSULE: at 12:14

## 2020-05-31 RX ADMIN — CLOPIDOGREL BISULFATE 75 MG: 75 TABLET ORAL at 08:50

## 2020-05-31 RX ADMIN — OXYCODONE HYDROCHLORIDE 20 MG: 10 TABLET ORAL at 06:09

## 2020-05-31 RX ADMIN — SODIUM CHLORIDE, PRESERVATIVE FREE 10 ML: 5 INJECTION INTRAVENOUS at 08:50

## 2020-05-31 RX ADMIN — SODIUM CHLORIDE, PRESERVATIVE FREE 10 ML: 5 INJECTION INTRAVENOUS at 20:54

## 2020-05-31 RX ADMIN — ACETAMINOPHEN 650 MG: 325 TABLET ORAL at 20:54

## 2020-05-31 RX ADMIN — MIRTAZAPINE 30 MG: 15 TABLET, FILM COATED ORAL at 20:54

## 2020-05-31 RX ADMIN — OXYCODONE HYDROCHLORIDE 20 MG: 10 TABLET ORAL at 14:10

## 2020-05-31 RX ADMIN — LEVETIRACETAM 1000 MG: 500 TABLET, FILM COATED ORAL at 08:50

## 2020-05-31 RX ADMIN — MIDODRINE HYDROCHLORIDE 10 MG: 5 TABLET ORAL at 16:33

## 2020-05-31 RX ADMIN — ATORVASTATIN CALCIUM 80 MG: 80 TABLET, FILM COATED ORAL at 20:54

## 2020-05-31 RX ADMIN — OXYCODONE HYDROCHLORIDE 20 MG: 10 TABLET ORAL at 09:57

## 2020-05-31 RX ADMIN — BACITRACIN ZINC, NEOMYCIN SULFATE, POLYMYXIN B SULFATE: 3.5; 5000; 4 OINTMENT TOPICAL at 12:14

## 2020-05-31 RX ADMIN — FAMOTIDINE 20 MG: 20 TABLET, FILM COATED ORAL at 08:49

## 2020-05-31 RX ADMIN — Medication 1 CAPSULE: at 08:50

## 2020-05-31 RX ADMIN — ALBUTEROL SULFATE 2 PUFF: 90 AEROSOL, METERED RESPIRATORY (INHALATION) at 11:30

## 2020-05-31 RX ADMIN — AMIODARONE HYDROCHLORIDE 200 MG: 200 TABLET ORAL at 08:49

## 2020-05-31 RX ADMIN — MIDODRINE HYDROCHLORIDE 10 MG: 5 TABLET ORAL at 12:14

## 2020-05-31 RX ADMIN — Medication 800 MG: at 08:50

## 2020-05-31 RX ADMIN — APIXABAN 5 MG: 5 TABLET, FILM COATED ORAL at 08:50

## 2020-05-31 RX ADMIN — Medication 1 CAPSULE: at 16:33

## 2020-05-31 RX ADMIN — ACETAMINOPHEN 650 MG: 325 TABLET ORAL at 13:42

## 2020-05-31 RX ADMIN — ALBUTEROL SULFATE 2 PUFF: 90 AEROSOL, METERED RESPIRATORY (INHALATION) at 08:06

## 2020-05-31 RX ADMIN — MEROPENEM 1 G: 1 INJECTION, POWDER, FOR SOLUTION INTRAVENOUS at 16:25

## 2020-05-31 RX ADMIN — Medication 800 MG: at 20:55

## 2020-05-31 RX ADMIN — OXYCODONE HYDROCHLORIDE 20 MG: 10 TABLET ORAL at 02:00

## 2020-05-31 RX ADMIN — MIDODRINE HYDROCHLORIDE 10 MG: 5 TABLET ORAL at 08:49

## 2020-05-31 RX ADMIN — MEROPENEM 1 G: 1 INJECTION, POWDER, FOR SOLUTION INTRAVENOUS at 06:09

## 2020-05-31 RX ADMIN — LEVETIRACETAM 1000 MG: 500 TABLET, FILM COATED ORAL at 20:54

## 2020-05-31 RX ADMIN — IPRATROPIUM BROMIDE 2 PUFF: 17 AEROSOL, METERED RESPIRATORY (INHALATION) at 08:06

## 2020-05-31 RX ADMIN — OXYCODONE HYDROCHLORIDE 20 MG: 10 TABLET ORAL at 20:54

## 2020-05-31 RX ADMIN — VANCOMYCIN HYDROCHLORIDE 1000 MG: 1 INJECTION, SOLUTION INTRAVENOUS at 08:55

## 2020-05-31 RX ADMIN — DULOXETINE 60 MG: 30 CAPSULE, DELAYED RELEASE ORAL at 08:49

## 2020-05-31 RX ADMIN — OXYCODONE HYDROCHLORIDE 20 MG: 10 TABLET ORAL at 17:12

## 2020-05-31 RX ADMIN — APIXABAN 5 MG: 5 TABLET, FILM COATED ORAL at 20:55

## 2020-05-31 ASSESSMENT — PAIN DESCRIPTION - LOCATION
LOCATION: GENERALIZED
LOCATION: GENERALIZED
LOCATION: KNEE
LOCATION: HIP;LEG
LOCATION: HIP
LOCATION: HIP;KNEE

## 2020-05-31 ASSESSMENT — PAIN DESCRIPTION - ORIENTATION
ORIENTATION: RIGHT;LEFT
ORIENTATION: RIGHT;LEFT
ORIENTATION: RIGHT
ORIENTATION: RIGHT;LEFT

## 2020-05-31 ASSESSMENT — PAIN DESCRIPTION - PAIN TYPE
TYPE: CHRONIC PAIN

## 2020-05-31 ASSESSMENT — PAIN DESCRIPTION - DESCRIPTORS
DESCRIPTORS: ACHING
DESCRIPTORS: ACHING
DESCRIPTORS: ACHING;DISCOMFORT
DESCRIPTORS: ACHING;SORE
DESCRIPTORS: ACHING

## 2020-05-31 ASSESSMENT — PAIN DESCRIPTION - FREQUENCY
FREQUENCY: CONTINUOUS
FREQUENCY: INTERMITTENT
FREQUENCY: CONTINUOUS
FREQUENCY: CONTINUOUS

## 2020-05-31 ASSESSMENT — PAIN DESCRIPTION - ONSET
ONSET: GRADUAL
ONSET: ON-GOING
ONSET: ON-GOING

## 2020-05-31 ASSESSMENT — PAIN SCALES - GENERAL
PAINLEVEL_OUTOF10: 7
PAINLEVEL_OUTOF10: 5
PAINLEVEL_OUTOF10: 9
PAINLEVEL_OUTOF10: 7
PAINLEVEL_OUTOF10: 3
PAINLEVEL_OUTOF10: 8
PAINLEVEL_OUTOF10: 8
PAINLEVEL_OUTOF10: 3
PAINLEVEL_OUTOF10: 3

## 2020-05-31 ASSESSMENT — PAIN DESCRIPTION - PROGRESSION
CLINICAL_PROGRESSION: NOT CHANGED

## 2020-05-31 NOTE — CONSULTS
SURGICAL CONSULTATION    Date of Admission:  5/23/2020  Date of Consultation:  5/31/2020    PCP:  Jeff Salas MD  Thank you Piter Valentine MD very much for your consultation, it's always appreciated. I had the privilege today to see your patient Hunter Daigle. Chief Complaint / History of Present Illness:  Hunter Daigle is a very pleasant 63 y.o. female who presents with sepsis, and bacteremia, and CHF, and malnutrition. . but I was consulted to address her right arm swelling BUT NO erythema, which is clinically impressive, but the CT scan did NOT reveal any abscess, or surgically drain able fluid collection, will follow closely with IV Abx, and manage appropriately. She did have an extravasation of a peripheral IV in her previous hospital, and I believe that this is the sequelae, VERY BENIGN. PMH:   has a past medical history of Acute delirium, Arrhythmia, Arthritis, Asplenia, Asthma, CAD (coronary artery disease), Cardiomyopathy ([facility] [address] Cerebral artery occlusion with cerebral infarction [facility]), CHF (congestive heart failure) ([address] Enlarged liver, GERD (gastroesophageal reflux disease), H/O echocardiogram (4/6/16), History of blood transfusion, cardiovascular stress test (12/29/2015), Hyperlipidemia, Hypertension, Lymphoma ([address] MI, old, Movement disorder, MS (multiple sclerosis) ([address] OP (osteoporosis), PE (pulmonary embolism), Pneumonia, Seizures ([address] Spleen enlarged, and Thyroid disease. PSH:   has a past surgical history that includes Coronary angioplasty with stent; Hysterectomy; Appendectomy; Cholecystectomy; Tonsillectomy; Gastric bypass surgery; Cardiac defibrillator placement; hip surgery; Abdomen surgery; Colonoscopy; Endoscopy, colon, diagnostic; hernia repair; joint replacement; skin biopsy; vascular surgery; and Coronary artery bypass graft. Allergies:     Allergies   Allergen Reactions    Fentanyl Swelling     Other reaction(s): Angioedema (swelling)    Jyoti Markham MD   5 mg at 05/30/20 2109    atorvastatin (LIPITOR) tablet 80 mg  80 mg Oral Nightly Elena Hwang MD   80 mg at 05/30/20 2109    benzocaine-menthol (CEPACOL SORE THROAT) lozenge 1 lozenge  1 lozenge Oral Q2H PRN Elena Hwang MD        clopidogrel (PLAVIX) tablet 75 mg  75 mg Oral Daily Elena Hwang MD   75 mg at 05/30/20 0940    docusate sodium (COLACE) capsule 100 mg  100 mg Oral BID PRN Elena Hwang MD        DULoxetine (CYMBALTA) extended release capsule 60 mg  60 mg Oral Daily Elena Hwang MD   60 mg at 05/30/20 0940    famotidine (PEPCID) tablet 20 mg  20 mg Oral Daily Elena Hwang MD   20 mg at 05/30/20 0940    ferrous gluconate 324 (37.5 Fe) MG tablet 324 mg  324 mg Oral Every Other Day Elena Hwang MD   324 mg at 05/30/20 0940    Icosapent Ethyl CAPS 500 mg  500 mg Oral BID Elena Hwang MD   Stopped at 05/24/20 2332    lactobacillus (CULTURELLE) capsule 1 capsule  1 capsule Oral TID WC Elena Hwang MD   1 capsule at 05/30/20 1720    levETIRAcetam (KEPPRA) tablet 1,000 mg  1,000 mg Oral BID Elena Hwang MD   1,000 mg at 05/30/20 2109    levothyroxine (SYNTHROID) tablet 224 mcg  224 mcg Oral Daily Elena Hwang MD   224 mcg at 05/31/20 4573    magnesium oxide (MAG-OX) tablet 800 mg  800 mg Oral BID Elena Hwang MD   800 mg at 05/30/20 2109    nitroGLYCERIN (NITROSTAT) SL tablet 0.4 mg  0.4 mg Sublingual Q5 Min PRN Elena Hwang MD        ondansetron (ZOFRAN-ODT) disintegrating tablet 4 mg  4 mg Oral Q6H PRN Elena Hwang MD   4 mg at 05/29/20 1232    torsemide (DEMADEX) tablet 20 mg  20 mg Oral Daily PRN Elena Hwang MD        acetaminophen (TYLENOL) tablet 650 mg  650 mg Oral Q6H PRN Elena Hwang MD   650 mg at 05/30/20 1836    Or    acetaminophen (TYLENOL) suppository 650 mg  650 mg Rectal Q6H PRN Elena Hwang MD        polyethylene glycol [facility]) packet 17 g  17 g partner violence     Fear of current or ex partner: None     Emotionally abused: None     Physically abused: None     Forced sexual activity: None   Other Topics Concern    None   Social History Narrative    None      Family History   Problem Relation Age of Onset    High Blood Pressure Mother     High Cholesterol Mother     Heart Disease Mother     Diabetes Mother     Heart Disease Father     Cancer Father     Other Sister         Multiple Sclerosis    Heart Disease Maternal Grandmother     High Blood Pressure Maternal Grandmother     High Cholesterol Maternal Grandmother     otherwise irrelevant to this surgical issue. Review of Systems:  Constitutional: Negative for fever, chills, diaphoresis, appetite change and fatigue. HENT: Negative for sore throat, trouble swallowing and voice change. Respiratory: Negative for cough, positive for shortness of breath no wheezing. Cardiovascular: Negative for chest pain positive for SOB with one flight of stairs exertion, no pitting LE edema. Gastrointestinal:  Negative for nausea, vomiting, abdominal distention, constipation, no diarrhea, blood in stool, anal bleeding or rectal pain. Musculoskeletal: Negative for joint swelling and arthralgias. Skin: Warm and dry, well perfused. Right arm and forearm pain and swelling. Neurological: Negative for seizures, syncope, speech difficulty and weakness. Hematological/Lymphatic: Negative for adenopathy. No history of DVT/PE. Does not bruise/bleed easily. Psychiatric/Behavioral: Negative for agitation. All others reviewed and negative. Physical Exam:  Vital Signs:   Vitals:    05/31/20 0606   BP: 108/61   Pulse: 60   Resp: 20   Temp: 97.5 °F (36.4 °C)   SpO2: 93%     Body mass index is 16.33 kg/m². General appearance: Pt Alert and oriented, in no apparent acute distress. HEENT:  LISSETTE, Conjunctiva clear. EOMI, No JVDs, Bruits, Megalies: neither thyroid nor lymph nodes.   Lungs: Clear to auscultation bilaterally. Heart: Regular rate and rhythm, S1, S2 normal, no murmur, rub or gallop. Abdomen: Non tender. Non distended. Positive bowel sounds. No hernias noted, no masses palpable. Extremities: Warm, well perfused, no cyanosis or edema. Right forearm swollen with edema, looking like extravasated IV. NO ERYTHEMA. Skin: Skin color, texture, turgor normal. Right antecubital fossa swollen, BUT no fluctuance to suggest an abscess. Neurologic: Grossly normal, Cranial nerves from II to XII intact, Deep tendon reflexes normal.  Lymph nodes: No palpable LNs, Cervical, groins, abdominal.  Musculoskeletal: Bilateral Upper and lower extremities ROM within normal limits. Radiologic / Imaging / TESTING  CT Right upper extremity yesterday:     Impression   1. Moderate to severe cellulitis of the right elbow and right forearm.  Soft   tissue swelling and fluid overlying the olecranon. 2. No aggressive osseous destruction.  No acute fracture or dislocation. 3. Mild osteoarthrosis. 4. Small elbow joint effusion. Labs:    Recent Results (from the past 24 hour(s))   Vancomycin, trough    Collection Time: 05/30/20  9:58 AM   Result Value Ref Range    Vancomycin Tr 16.2 10 - 20 UG/ML    DOSE AMOUNT DOSE AMT.  GIVEN - 1000     DOSE TIME DOSE TIME GIVEN - 0900/2100    Procalcitonin    Collection Time: 05/30/20  9:58 AM   Result Value Ref Range    Procalcitonin 0.080    CBC    Collection Time: 05/31/20  6:00 AM   Result Value Ref Range    WBC 5.5 4.0 - 10.5 K/CU MM    RBC 2.39 (L) 4.2 - 5.4 M/CU MM    Hemoglobin 8.4 (L) 12.5 - 16.0 GM/DL    Hematocrit 28.0 (L) 37 - 47 %    .2 (H) 78 - 100 FL    MCH 35.1 (H) 27 - 31 PG    MCHC 30.0 (L) 32.0 - 36.0 %    RDW 16.4 (H) 11.7 - 14.9 %    Platelets 044 232 - 034 K/CU MM    MPV 10.3 7.5 - 11.1 FL   Procalcitonin    Collection Time: 05/31/20  6:00 AM   Result Value Ref Range    Procalcitonin 0.049        Diagnosis:  Patient Active Problem List Katherine Mtz MD, [address], [address]    5/31/2020  7:38 AM      Patient was seen with total face to face time of 45 minutes. More than 50% of this visit was counseling and education as above in my assessment and plan section of my note.

## 2020-05-31 NOTE — PROGRESS NOTES
[address] [facility]  consulted by Dr. Donna Garcia for monitoring and adjustment. Indication for treatment: MRSA bacteremia   Goal trough: 15 mcg/mL     Pertinent Laboratory Values:   Temp Readings from Last 3 Encounters:   05/31/20 98.2 °F (36.8 °C) (Oral)   03/15/20 98.6 °F (37 °C) (Oral)   02/10/20 98.1 °F (36.7 °C) (Oral)     Recent Labs     05/29/20  0545 05/30/20  0650 05/31/20  0600   WBC 6.0 4.4 5.5     Recent Labs     05/29/20  0545 05/30/20  0650 05/31/20  0600   BUN 9 9 9   CREATININE 0.6 0.6 0.6     Estimated Creatinine Clearance: 74 mL/min (based on SCr of 0.6 mg/dL). Intake/Output Summary (Last 24 hours) at 5/31/2020 1417  Last data filed at 5/30/2020 1750  Gross per 24 hour   Intake 340 ml   Output --   Net 340 ml       Pertinent Cultures:  Date    Source    Results  5/24   Blood    MRSA 2/2 5/24   MRSA nasal   Positive  5/24   Urine    Negative  5/24   Covid-19   Negative  5/25   Blood    MRSA 1/2    Vancomycin level:   TROUGH:    Recent Labs     05/30/20  0958   VANCOTROUGH 16.2     RANDOM:  No results for input(s): VANCORANDOM in the last 72 hours. Assessment:  · WBC and temperature:  WNL  · SCr, BUN, and urine output: WNL  · Day(s) of therapy: #8  · Vancomycin level:   · 5/26 - 9.2, subtherapeutic on vancomycin 750 mg q12h  · 5/28 - 15, therapeutic on vancomycin 1000 mg q12h  · 5/30 - 16.2, therapeutic on vancomycin 1000 mg q12h     Plan:  · Continue vancomycin 1000 mg IVPB q12h  · Repeat trough in 4 days   · Pharmacy will continue to monitor patient and adjust therapy as indicated    Thank you for the consult,  Radhika Hebert, PharmD, [facility]  5/31/2020 2:17 PM

## 2020-05-31 NOTE — PROGRESS NOTES
Today's plan:  Continue present care, patient will need MRI of back, [employer] rep notified. Monitor blood pressure. Admit Date:  5/23/2020    Subjective: ok.  Denies cardiac complaints. Chief complaints on admission  Chief Complaint   Patient presents with    Fever     103-105 since 3pm    Tremors         History of present illness:Demetria is a 63 y. o.year old who  presents with had concerns including Fever (103-105 since 3pm) and Tremors. Past medical history:    has a past medical history of Acute delirium, Arrhythmia, Arthritis, Asplenia, Asthma, CAD (coronary artery disease), Cardiomyopathy ([address] Cerebral artery occlusion with cerebral infarction ([address] CHF (congestive heart failure) ([address] Enlarged liver, GERD (gastroesophageal reflux disease), H/O echocardiogram, History of blood transfusion, Hx of cardiovascular stress test, Hyperlipidemia, Hypertension, Lymphoma ([address] MI, old, Movement disorder, MS (multiple sclerosis) ([address] OP (osteoporosis), PE (pulmonary embolism), Pneumonia, Seizures ([address] Spleen enlarged, and Thyroid disease. Past surgical history:   has a past surgical history that includes Coronary angioplasty with stent; Hysterectomy; Appendectomy; Cholecystectomy; Tonsillectomy; Gastric bypass surgery; Cardiac defibrillator placement; hip surgery; Abdomen surgery; Colonoscopy; Endoscopy, colon, diagnostic; hernia repair; joint replacement; skin biopsy; vascular surgery; and Coronary artery bypass graft. Social History:   reports that she has never smoked. She has never used smokeless tobacco. She reports that she does not drink alcohol or use drugs. Family history:  family history includes Cancer in her father; Diabetes in her mother; Heart Disease in her father, maternal grandmother, and mother; High Blood Pressure in her maternal grandmother and mother; High Cholesterol in her maternal grandmother and mother; Other in her sister.     Allergies   Allergen Reactions    Fentanyl Swelling     Other reaction(s): Angioedema (swelling)    Moxifloxacin Hcl In Nacl Swelling and Rash    Povidone-Iodine Rash     Other reaction(s): AOF      Cyclobenzaprine      Other reaction(s): Other - comment required  \" it make my muscle very weak because of my MS\"  \" it make my muscle very weak because of my MS\"      Methocarbamol      Worsening edema  Other reaction(s): Other - comment required  Worsening edema  Worsening edema  Worsening edema      Tramadol Other (See Comments)     Doctor doesn't her to take due to heart problems  Seizures   Doctor doesn't her to take due to lowers seizure threshold.  Baclofen     Ibuprofen      \"Due to heart problems\"    Naproxen     Nsaids      \"Due to heart problems\"    Tizanidine     Tolmetin      \"Due to heart problems\"    Tramadol Hcl      Other reaction(s): Other - comment required  Told not to take due to history of seizures    Betadine [Povidone Iodine] Rash    Moxifloxacin Rash and Swelling     Lips swell and tingling in face   Lips swell and tingling in face   Lips swell and tingling in face   Lips swell and tingling in face   Around mouth         Objective:   BP (!) 87/53   Pulse 60   Temp 98.1 °F (36.7 °C) (Oral)   Resp 14   Ht 5' 6\" (1.676 m)   Wt 101 lb 3.2 oz (45.9 kg)   SpO2 95%   BMI 16.33 kg/m²       Intake/Output Summary (Last 24 hours) at 5/31/2020 1100  Last data filed at 5/30/2020 1750  Gross per 24 hour   Intake 580 ml   Output --   Net 580 ml         Physical Exam:  Constitutional:  Well developed, Well nourished, No acute distress, Non-toxic appearance. HENT:  Normocephalic, Atraumatic, Bilateral external ears normal, Oropharynx moist, No oral exudates, Nose normal. Neck- Normal range of motion, No tenderness, Supple, No stridor. Eyes:  PERRL, Conjunctiva normal, No discharge.    Respiratory:  Normal breath sounds, No respiratory distress, No wheezing,   Cardiovascular: (PMI)  Normal heart rate, Normal rhythm, No 104 104   CO2 29 32 29   BUN 9 9 9   CREATININE 0.6 0.6 0.6       Assessment and Plan:  1. CAD h/o LAD PCI THRU LIMA: Continue aspirin and plavix continue statins, could not tolerate BB. Is on midodrine  2. Bactremia: may need LEAD extraction  3. H/o PE: on eliquis  4. ICM: s/p ICD, h/o vtach and vfib, will , ? afib also, continue eliquis, plavix, and amiodarone. 5. H/o cva: stable  6. HTN: controlled, no current medications  7. MS: stable  8.  Seizure: stable    Horace Handley, APRN-CNP 5/31/2020 11:00 AM

## 2020-05-31 NOTE — PROGRESS NOTES
[address] [place] [address]  HOSPITALIST PROGRESS NOTE                       Name:  Gilbert Jung /Age/Sex: 1960  (63 y.o. female)   MRN & CSN:  [id number] & [id number] Admission Date/Time: 2020 10:29 PM   Location:  [facility]/3112- Attending:  Cristhian Resendiz MD                                                  HPI  Gilbert Jung is a 63 y.o. female who presents with sepsis features    SUBJECTIVE  -no shortness of breath, no fever. Right arm still swollen    10 point review of systems reviewed and negative unless noted above. ALLERGIES:   Allergies   Allergen Reactions    Fentanyl Swelling     Other reaction(s): Angioedema (swelling)    Moxifloxacin Hcl In Nacl Swelling and Rash    Povidone-Iodine Rash     Other reaction(s): AOF      Cyclobenzaprine      Other reaction(s): Other - comment required  \" it make my muscle very weak because of my MS\"  \" it make my muscle very weak because of my MS\"      Methocarbamol      Worsening edema  Other reaction(s): Other - comment required  Worsening edema  Worsening edema  Worsening edema      Tramadol Other (See Comments)     Doctor doesn't her to take due to heart problems  Seizures   Doctor doesn't her to take due to lowers seizure threshold.  Baclofen     Ibuprofen      \"Due to heart problems\"    Naproxen     Nsaids      \"Due to heart problems\"    Tizanidine     Tolmetin      \"Due to heart problems\"    Tramadol Hcl      Other reaction(s): Other - comment required  Told not to take due to history of seizures    Betadine [Povidone Iodine] Rash    Moxifloxacin Rash and Swelling     Lips swell and tingling in face   Lips swell and tingling in face   Lips swell and tingling in face   Lips swell and tingling in face   Around mouth       PCP: Bia Freeman MD    PAST MEDICAL HISTORY, SURGICAL HISTORY, SOCIAL HISTORY and  HOME MEDICATIONS all reviewed.       OBJECTIVE  Vitals:    20 0807 20 0808 20 0841 20 1131   BP:   (!) 87/53 Pulse:   60    Resp: 13 16 14 20   Temp:   98.1 °F (36.7 °C)    TempSrc:   Oral    SpO2:  94% 95% 96%   Weight:       Height:           PHYSICAL EXAM   GEN Awake female, sitting upright in bed in no apparent distress. EYES Pupils are equally round. No scleral erythema, discharge, or conjunctivitis. HENT Mucous membranes are moist. Oral pharynx without exudates, no evidence of thrush. NECK Supple, no apparent thyromegaly or masses. RESP Clear to auscultation, no wheezes, rales or rhonchi. Symmetric chest movement   CARDIO/VASC S1/S2 auscultated. Regular rate without appreciable murmurs, rubs, or gallops. No JVD or carotid bruits. Peripheral pulses equal bilaterally and palpable. No peripheral edema. GI Abdomen is soft without significant tenderness, masses, or guarding. Bowel sounds are normoactive. Rectal exam deferred.  No costovertebral angle tenderness. Normal appearing external genitalia. HEME/LYMPH No palpable cervical lymphadenopathy and no hepatosplenomegaly. No petechiae or ecchymoses. MSK Spontaneous movement of all extremities. No gross joint deformities. SKIN Normal coloration, warm, dry. NEURO Cranial nerves appear grossly intact, normal speech, no lateralizing weakness. PSYCH Awake, alert, oriented x 4. Affect appropriate. INTAKE: In: 340 [P.O.:240]  Out: -   OUTPUT: In: 340   Out: -     LABS  Recent Labs     05/29/20  0545 05/30/20  0650 05/31/20  0600   WBC 6.0 4.4 5.5   HGB 9.1* 8.0* 8.4*   HCT 30.3* 26.5* 28.0*    191 234      Recent Labs     05/29/20  0545 05/30/20  0650 05/31/20  0600    141 141   K 5.1 4.9 4.5    104 104   CO2 29 32 29   BUN 9 9 9   CREATININE 0.6 0.6 0.6     No results for input(s): AST, ALT, ALB, BILIDIR, BILITOT, ALKPHOS in the last 72 hours. No results for input(s): INR in the last 72 hours. No results for input(s): CKTOTAL, CKMB, CKMBINDEX, TROPONINT in the last 72 hours.        Abnormal labs for today noted      Imaging: ECHO:    Microbiology:  Blood culture:    Urine culture:    Sputum culture:    Procedures done this admission:    MEDS  Scheduled Meds:   neomycin-bacitracin-polymyxin   Topical Once    midodrine  10 mg Oral TID WC    vancomycin  1,000 mg Intravenous Q12H    albuterol sulfate HFA  2 puff Inhalation 4x daily    And    ipratropium  2 puff Inhalation 4x daily    amiodarone  200 mg Oral Daily    apixaban  5 mg Oral BID    atorvastatin  80 mg Oral Nightly    clopidogrel  75 mg Oral Daily    DULoxetine  60 mg Oral Daily    famotidine  20 mg Oral Daily    ferrous gluconate  324 mg Oral Every Other Day    Icosapent Ethyl  500 mg Oral BID    lactobacillus  1 capsule Oral TID WC    levETIRAcetam  1,000 mg Oral BID    levothyroxine  224 mcg Oral Daily    magnesium oxide  800 mg Oral BID    mirtazapine  30 mg Oral Nightly    meropenem  1 g Intravenous Q12H    lidocaine PF  5 mL Intradermal Once    sodium chloride flush  10 mL Intravenous 2 times per day     Continuous Infusions:   dextrose       PRN Meds:glucose, dextrose, glucagon (rDNA), dextrose, benzocaine-menthol, docusate sodium, nitroGLYCERIN, ondansetron, torsemide, acetaminophen **OR** acetaminophen, polyethylene glycol, promethazine **OR** ondansetron, oxyCODONE, sodium chloride flush        ASSESSMENT and PLAN  Hospital Day: 9    1-Septic shock with MRSA bacteremia on 5/24- repeat blood culture pending, on vancomycin. DULCE with no vegetation- CT abd/pelvis negative, CT chest with concerns of pneumonia. -MRI of T and L spine pending- has ICD which was done at [facility]  2-Probable pneumonia on CT- reports some shortness of breath prior to presentation- trend procalcitonin. 3-?Right arm thrombophlebitis- abx as above- check CT  4-? UTI- urine culture negative.     Other chronic issues  -Nonischemic cardiomyopathy with EF 30 to 35% / CAD / VT- s/p ICD, s/p CABG   -History of VTE/ PE  -Hypothyroidism  -Seizure Disorder  -Severe PCM- needs to increase calorie intake  -Hx of gastric bypass  -Anemia of chronic disease      -awaiting MRI and will consult ID in am for input on ABX       Disp:     Diet DIET GENERAL;  Dietary Nutrition Supplements: Frozen Oral Supplement   DVT Prophylaxis [] Lovenox, []  Heparin, [] SCDs, [] Ambulation   GI Prophylaxis [] PPI,  [] H2 Blocker,  [] Carafate,  [] Diet/Tube Feeds   Code Status Full Code   Disposition Patient requires continued admission due to eliquis   CMS Level of Risk [] Low, [] Moderate,[x]  High  Patient's risk as above due to ant GALVEZ MD 5/31/2020 12:12 PM

## 2020-06-01 ENCOUNTER — APPOINTMENT (OUTPATIENT)
Dept: MRI IMAGING | Age: 60
DRG: 981 | End: 2020-06-01
Payer: MEDICARE

## 2020-06-01 ENCOUNTER — ANESTHESIA EVENT (OUTPATIENT)
Dept: OPERATING ROOM | Age: 60
DRG: 981 | End: 2020-06-01
Payer: MEDICARE

## 2020-06-01 ENCOUNTER — ANESTHESIA (OUTPATIENT)
Dept: OPERATING ROOM | Age: 60
DRG: 981 | End: 2020-06-01
Payer: MEDICARE

## 2020-06-01 ENCOUNTER — APPOINTMENT (OUTPATIENT)
Dept: GENERAL RADIOLOGY | Age: 60
DRG: 981 | End: 2020-06-01
Payer: MEDICARE

## 2020-06-01 VITALS
OXYGEN SATURATION: 100 % | SYSTOLIC BLOOD PRESSURE: 132 MMHG | DIASTOLIC BLOOD PRESSURE: 72 MMHG | RESPIRATION RATE: 12 BRPM

## 2020-06-01 LAB
ALBUMIN SERPL-MCNC: 3.1 GM/DL (ref 3.4–5)
ANION GAP SERPL CALCULATED.3IONS-SCNC: 7 MMOL/L (ref 4–16)
BUN BLDV-MCNC: 11 MG/DL (ref 6–23)
CALCIUM SERPL-MCNC: 8.2 MG/DL (ref 8.3–10.6)
CHLORIDE BLD-SCNC: 107 MMOL/L (ref 99–110)
CO2: 31 MMOL/L (ref 21–32)
CREAT SERPL-MCNC: 0.6 MG/DL (ref 0.6–1.1)
DOSE AMOUNT: NORMAL
DOSE TIME: NORMAL
GFR AFRICAN AMERICAN: >60 ML/MIN/1.73M2
GFR NON-AFRICAN AMERICAN: >60 ML/MIN/1.73M2
GLUCOSE BLD-MCNC: 71 MG/DL (ref 70–99)
HCT VFR BLD CALC: 26.4 % (ref 37–47)
HCT VFR BLD CALC: 37.4 % (ref 37–47)
HEMOGLOBIN: 10.1 GM/DL (ref 12.5–16)
HEMOGLOBIN: 8.1 GM/DL (ref 12.5–16)
MCH RBC QN AUTO: 36 PG (ref 27–31)
MCH RBC QN AUTO: 36.3 PG (ref 27–31)
MCHC RBC AUTO-ENTMCNC: 27 % (ref 32–36)
MCHC RBC AUTO-ENTMCNC: 30.7 % (ref 32–36)
MCV RBC AUTO: 117.3 FL (ref 78–100)
MCV RBC AUTO: 134.5 FL (ref 78–100)
PDW BLD-RTO: 16.1 % (ref 11.7–14.9)
PDW BLD-RTO: 16.4 % (ref 11.7–14.9)
PHOSPHORUS: 3.5 MG/DL (ref 2.5–4.9)
PLATELET # BLD: 257 K/CU MM (ref 140–440)
PLATELET # BLD: 266 K/CU MM (ref 140–440)
PMV BLD AUTO: 10.2 FL (ref 7.5–11.1)
PMV BLD AUTO: 10.4 FL (ref 7.5–11.1)
POTASSIUM SERPL-SCNC: 4.5 MMOL/L (ref 3.5–5.1)
PROCALCITONIN: 0.04
RBC # BLD: 2.25 M/CU MM (ref 4.2–5.4)
RBC # BLD: 2.78 M/CU MM (ref 4.2–5.4)
SODIUM BLD-SCNC: 145 MMOL/L (ref 135–145)
VANCOMYCIN TROUGH: 15.6 UG/ML (ref 10–20)
WBC # BLD: 4.1 K/CU MM (ref 4–10.5)
WBC # BLD: 5.3 K/CU MM (ref 4–10.5)

## 2020-06-01 PROCEDURE — 2709999900 HC NON-CHARGEABLE SUPPLY: Performed by: SURGERY

## 2020-06-01 PROCEDURE — 3600000003 HC SURGERY LEVEL 3 BASE: Performed by: SURGERY

## 2020-06-01 PROCEDURE — 3700000001 HC ADD 15 MINUTES (ANESTHESIA): Performed by: SURGERY

## 2020-06-01 PROCEDURE — 6370000000 HC RX 637 (ALT 250 FOR IP): Performed by: INTERNAL MEDICINE

## 2020-06-01 PROCEDURE — 3600000013 HC SURGERY LEVEL 3 ADDTL 15MIN: Performed by: SURGERY

## 2020-06-01 PROCEDURE — 76000 FLUOROSCOPY <1 HR PHYS/QHP: CPT

## 2020-06-01 PROCEDURE — 2580000003 HC RX 258: Performed by: INTERNAL MEDICINE

## 2020-06-01 PROCEDURE — 99232 SBSQ HOSP IP/OBS MODERATE 35: CPT | Performed by: SURGERY

## 2020-06-01 PROCEDURE — 3700000000 HC ANESTHESIA ATTENDED CARE: Performed by: SURGERY

## 2020-06-01 PROCEDURE — 6360000002 HC RX W HCPCS: Performed by: INTERNAL MEDICINE

## 2020-06-01 PROCEDURE — 80069 RENAL FUNCTION PANEL: CPT

## 2020-06-01 PROCEDURE — APPSS15 APP SPLIT SHARED TIME 0-15 MINUTES: Performed by: NURSE PRACTITIONER

## 2020-06-01 PROCEDURE — 2580000003 HC RX 258: Performed by: HOSPITALIST

## 2020-06-01 PROCEDURE — 77001 FLUOROGUIDE FOR VEIN DEVICE: CPT | Performed by: SURGERY

## 2020-06-01 PROCEDURE — 36561 INSERT TUNNELED CV CATH: CPT | Performed by: SURGERY

## 2020-06-01 PROCEDURE — 85027 COMPLETE CBC AUTOMATED: CPT

## 2020-06-01 PROCEDURE — 94761 N-INVAS EAR/PLS OXIMETRY MLT: CPT

## 2020-06-01 PROCEDURE — 99232 SBSQ HOSP IP/OBS MODERATE 35: CPT | Performed by: INTERNAL MEDICINE

## 2020-06-01 PROCEDURE — 86141 C-REACTIVE PROTEIN HS: CPT

## 2020-06-01 PROCEDURE — 84145 PROCALCITONIN (PCT): CPT

## 2020-06-01 PROCEDURE — 6360000002 HC RX W HCPCS: Performed by: NURSE ANESTHETIST, CERTIFIED REGISTERED

## 2020-06-01 PROCEDURE — C1894 INTRO/SHEATH, NON-LASER: HCPCS | Performed by: SURGERY

## 2020-06-01 PROCEDURE — 2140000000 HC CCU INTERMEDIATE R&B

## 2020-06-01 PROCEDURE — 2580000003 HC RX 258: Performed by: NURSE ANESTHETIST, CERTIFIED REGISTERED

## 2020-06-01 PROCEDURE — C1788 PORT, INDWELLING, IMP: HCPCS | Performed by: SURGERY

## 2020-06-01 PROCEDURE — 72148 MRI LUMBAR SPINE W/O DYE: CPT

## 2020-06-01 PROCEDURE — 80048 BASIC METABOLIC PNL TOTAL CA: CPT

## 2020-06-01 PROCEDURE — 72146 MRI CHEST SPINE W/O DYE: CPT

## 2020-06-01 PROCEDURE — 6360000002 HC RX W HCPCS: Performed by: SURGERY

## 2020-06-01 PROCEDURE — 2500000003 HC RX 250 WO HCPCS: Performed by: SURGERY

## 2020-06-01 PROCEDURE — 0JH60WZ INSERTION OF TOTALLY IMPLANTABLE VASCULAR ACCESS DEVICE INTO CHEST SUBCUTANEOUS TISSUE AND FASCIA, OPEN APPROACH: ICD-10-PCS | Performed by: SURGERY

## 2020-06-01 PROCEDURE — 6370000000 HC RX 637 (ALT 250 FOR IP): Performed by: SURGERY

## 2020-06-01 PROCEDURE — 2720000010 HC SURG SUPPLY STERILE: Performed by: SURGERY

## 2020-06-01 PROCEDURE — 76937 US GUIDE VASCULAR ACCESS: CPT | Performed by: SURGERY

## 2020-06-01 PROCEDURE — 80202 ASSAY OF VANCOMYCIN: CPT

## 2020-06-01 DEVICE — PORT INFUS L55CM 0.016ML 0.4ML CATH OD2.2MM ID1.4MM INTRO: Type: IMPLANTABLE DEVICE | Site: CHEST | Status: FUNCTIONAL

## 2020-06-01 RX ORDER — HYDRALAZINE HYDROCHLORIDE 20 MG/ML
5 INJECTION INTRAMUSCULAR; INTRAVENOUS EVERY 10 MIN PRN
Status: DISCONTINUED | OUTPATIENT
Start: 2020-06-01 | End: 2020-06-01 | Stop reason: HOSPADM

## 2020-06-01 RX ORDER — SODIUM CHLORIDE 0.9 % (FLUSH) 0.9 %
10 SYRINGE (ML) INJECTION PRN
Status: DISCONTINUED | OUTPATIENT
Start: 2020-06-01 | End: 2020-06-05 | Stop reason: HOSPADM

## 2020-06-01 RX ORDER — PROPOFOL 10 MG/ML
INJECTION, EMULSION INTRAVENOUS PRN
Status: DISCONTINUED | OUTPATIENT
Start: 2020-06-01 | End: 2020-06-01 | Stop reason: SDUPTHER

## 2020-06-01 RX ORDER — CHLORHEXIDINE GLUCONATE 4 G/100ML
SOLUTION TOPICAL ONCE
Status: COMPLETED | OUTPATIENT
Start: 2020-06-01 | End: 2020-06-01

## 2020-06-01 RX ORDER — CEFAZOLIN SODIUM 1 G/3ML
1 INJECTION, POWDER, FOR SOLUTION INTRAMUSCULAR; INTRAVENOUS ONCE
Status: COMPLETED | OUTPATIENT
Start: 2020-06-01 | End: 2020-06-01

## 2020-06-01 RX ORDER — SODIUM CHLORIDE, SODIUM LACTATE, POTASSIUM CHLORIDE, CALCIUM CHLORIDE 600; 310; 30; 20 MG/100ML; MG/100ML; MG/100ML; MG/100ML
INJECTION, SOLUTION INTRAVENOUS CONTINUOUS
Status: CANCELLED | OUTPATIENT
Start: 2020-06-01

## 2020-06-01 RX ORDER — LABETALOL HYDROCHLORIDE 5 MG/ML
5 INJECTION, SOLUTION INTRAVENOUS EVERY 10 MIN PRN
Status: DISCONTINUED | OUTPATIENT
Start: 2020-06-01 | End: 2020-06-01 | Stop reason: HOSPADM

## 2020-06-01 RX ORDER — LIDOCAINE HYDROCHLORIDE 20 MG/ML
INJECTION, SOLUTION INTRAVENOUS PRN
Status: DISCONTINUED | OUTPATIENT
Start: 2020-06-01 | End: 2020-06-01 | Stop reason: SDUPTHER

## 2020-06-01 RX ORDER — PROMETHAZINE HYDROCHLORIDE 25 MG/ML
6.25 INJECTION, SOLUTION INTRAMUSCULAR; INTRAVENOUS
Status: DISCONTINUED | OUTPATIENT
Start: 2020-06-01 | End: 2020-06-01 | Stop reason: HOSPADM

## 2020-06-01 RX ORDER — ONDANSETRON 2 MG/ML
4 INJECTION INTRAMUSCULAR; INTRAVENOUS
Status: DISCONTINUED | OUTPATIENT
Start: 2020-06-01 | End: 2020-06-01 | Stop reason: HOSPADM

## 2020-06-01 RX ORDER — SODIUM CHLORIDE, SODIUM LACTATE, POTASSIUM CHLORIDE, CALCIUM CHLORIDE 600; 310; 30; 20 MG/100ML; MG/100ML; MG/100ML; MG/100ML
INJECTION, SOLUTION INTRAVENOUS CONTINUOUS PRN
Status: DISCONTINUED | OUTPATIENT
Start: 2020-06-01 | End: 2020-06-01 | Stop reason: SDUPTHER

## 2020-06-01 RX ORDER — LIDOCAINE HYDROCHLORIDE 10 MG/ML
5 INJECTION, SOLUTION EPIDURAL; INFILTRATION; INTRACAUDAL; PERINEURAL ONCE
Status: DISCONTINUED | OUTPATIENT
Start: 2020-06-01 | End: 2020-06-02

## 2020-06-01 RX ORDER — SODIUM CHLORIDE 0.9 % (FLUSH) 0.9 %
10 SYRINGE (ML) INJECTION EVERY 12 HOURS SCHEDULED
Status: DISCONTINUED | OUTPATIENT
Start: 2020-06-01 | End: 2020-06-05 | Stop reason: HOSPADM

## 2020-06-01 RX ADMIN — DULOXETINE 60 MG: 30 CAPSULE, DELAYED RELEASE ORAL at 08:28

## 2020-06-01 RX ADMIN — OXYCODONE HYDROCHLORIDE 20 MG: 10 TABLET ORAL at 00:20

## 2020-06-01 RX ADMIN — OXYCODONE HYDROCHLORIDE 20 MG: 10 TABLET ORAL at 22:17

## 2020-06-01 RX ADMIN — SODIUM CHLORIDE, PRESERVATIVE FREE 10 ML: 5 INJECTION INTRAVENOUS at 22:24

## 2020-06-01 RX ADMIN — APIXABAN 5 MG: 5 TABLET, FILM COATED ORAL at 22:17

## 2020-06-01 RX ADMIN — MIRTAZAPINE 30 MG: 15 TABLET, FILM COATED ORAL at 22:17

## 2020-06-01 RX ADMIN — OXYCODONE HYDROCHLORIDE 20 MG: 10 TABLET ORAL at 13:08

## 2020-06-01 RX ADMIN — Medication 800 MG: at 22:17

## 2020-06-01 RX ADMIN — OXYCODONE HYDROCHLORIDE 20 MG: 10 TABLET ORAL at 08:24

## 2020-06-01 RX ADMIN — PROPOFOL 300 MG: 10 INJECTION, EMULSION INTRAVENOUS at 18:41

## 2020-06-01 RX ADMIN — AMIODARONE HYDROCHLORIDE 200 MG: 200 TABLET ORAL at 08:28

## 2020-06-01 RX ADMIN — FERROUS GLUCONATE TAB 324 MG (37.5 MG ELEMENTAL IRON) 324 MG: 324 (37.5 FE) TAB at 08:29

## 2020-06-01 RX ADMIN — FAMOTIDINE 20 MG: 20 TABLET, FILM COATED ORAL at 08:28

## 2020-06-01 RX ADMIN — MIDODRINE HYDROCHLORIDE 10 MG: 5 TABLET ORAL at 16:14

## 2020-06-01 RX ADMIN — Medication 1 CAPSULE: at 08:29

## 2020-06-01 RX ADMIN — LEVETIRACETAM 1000 MG: 500 TABLET, FILM COATED ORAL at 22:17

## 2020-06-01 RX ADMIN — SODIUM CHLORIDE, POTASSIUM CHLORIDE, SODIUM LACTATE AND CALCIUM CHLORIDE: 600; 310; 30; 20 INJECTION, SOLUTION INTRAVENOUS at 18:33

## 2020-06-01 RX ADMIN — CEFAZOLIN 1 G: 1 INJECTION, POWDER, FOR SOLUTION INTRAMUSCULAR; INTRAVENOUS at 15:01

## 2020-06-01 RX ADMIN — APIXABAN 5 MG: 5 TABLET, FILM COATED ORAL at 08:29

## 2020-06-01 RX ADMIN — MEROPENEM 1 G: 1 INJECTION, POWDER, FOR SOLUTION INTRAVENOUS at 04:42

## 2020-06-01 RX ADMIN — ACETAMINOPHEN 650 MG: 325 TABLET ORAL at 13:08

## 2020-06-01 RX ADMIN — ATORVASTATIN CALCIUM 80 MG: 80 TABLET, FILM COATED ORAL at 22:17

## 2020-06-01 RX ADMIN — Medication 1 CAPSULE: at 13:14

## 2020-06-01 RX ADMIN — LEVETIRACETAM 1000 MG: 500 TABLET, FILM COATED ORAL at 08:39

## 2020-06-01 RX ADMIN — MIDODRINE HYDROCHLORIDE 10 MG: 5 TABLET ORAL at 13:14

## 2020-06-01 RX ADMIN — CHLORHEXIDINE GLUCONATE: 213 SOLUTION TOPICAL at 15:00

## 2020-06-01 RX ADMIN — MIDODRINE HYDROCHLORIDE 10 MG: 5 TABLET ORAL at 08:24

## 2020-06-01 RX ADMIN — Medication 800 MG: at 08:39

## 2020-06-01 RX ADMIN — CLOPIDOGREL BISULFATE 75 MG: 75 TABLET ORAL at 08:29

## 2020-06-01 RX ADMIN — OXYCODONE HYDROCHLORIDE 20 MG: 10 TABLET ORAL at 04:42

## 2020-06-01 RX ADMIN — OXYCODONE HYDROCHLORIDE 20 MG: 10 TABLET ORAL at 16:14

## 2020-06-01 RX ADMIN — LEVOTHYROXINE SODIUM 224 MCG: 112 TABLET ORAL at 07:28

## 2020-06-01 RX ADMIN — VANCOMYCIN HYDROCHLORIDE 1000 MG: 1 INJECTION, SOLUTION INTRAVENOUS at 22:16

## 2020-06-01 RX ADMIN — LIDOCAINE HYDROCHLORIDE 100 MG: 20 INJECTION, SOLUTION INTRAVENOUS at 18:41

## 2020-06-01 ASSESSMENT — PULMONARY FUNCTION TESTS
PIF_VALUE: 1
PIF_VALUE: 0
PIF_VALUE: 1
PIF_VALUE: 0
PIF_VALUE: 0
PIF_VALUE: 1
PIF_VALUE: 2
PIF_VALUE: 1
PIF_VALUE: 0
PIF_VALUE: 1
PIF_VALUE: 0
PIF_VALUE: 1
PIF_VALUE: 0
PIF_VALUE: 1

## 2020-06-01 ASSESSMENT — PAIN SCALES - GENERAL
PAINLEVEL_OUTOF10: 5
PAINLEVEL_OUTOF10: 9
PAINLEVEL_OUTOF10: 9
PAINLEVEL_OUTOF10: 5
PAINLEVEL_OUTOF10: 6
PAINLEVEL_OUTOF10: 7
PAINLEVEL_OUTOF10: 9
PAINLEVEL_OUTOF10: 9
PAINLEVEL_OUTOF10: 5
PAINLEVEL_OUTOF10: 8

## 2020-06-01 ASSESSMENT — PAIN DESCRIPTION - LOCATION
LOCATION: HIP
LOCATION: HIP;LEG
LOCATION: HIP;LEG

## 2020-06-01 ASSESSMENT — PAIN DESCRIPTION - PROGRESSION: CLINICAL_PROGRESSION: NOT CHANGED

## 2020-06-01 ASSESSMENT — LIFESTYLE VARIABLES: SMOKING_STATUS: 0

## 2020-06-01 ASSESSMENT — PAIN DESCRIPTION - PAIN TYPE
TYPE: CHRONIC PAIN

## 2020-06-01 ASSESSMENT — PAIN DESCRIPTION - ORIENTATION
ORIENTATION: RIGHT;LEFT
ORIENTATION: RIGHT;LEFT

## 2020-06-01 ASSESSMENT — PAIN DESCRIPTION - ONSET: ONSET: ON-GOING

## 2020-06-01 ASSESSMENT — PAIN DESCRIPTION - FREQUENCY: FREQUENCY: CONTINUOUS

## 2020-06-01 ASSESSMENT — PAIN DESCRIPTION - DESCRIPTORS: DESCRIPTORS: ACHING;DISCOMFORT

## 2020-06-01 NOTE — ANESTHESIA PRE PROCEDURE
needed.       Historical Provider, MD       Current medications:    Current Facility-Administered Medications   Medication Dose Route Frequency Provider Last Rate Last Dose    lidocaine PF 1 % injection 5 mL  5 mL Intradermal Once Kelly Wheeler MD        sodium chloride flush 0.9 % injection 10 mL  10 mL Intravenous 2 times per day Kelly Wheeler MD        sodium chloride flush 0.9 % injection 10 mL  10 mL Intravenous PRN Kelly Wheeler MD        midodrine (PROAMATINE) tablet 10 mg  10 mg Oral TID WC Lily Corbin MD   10 mg at 06/01/20 1314    vancomycin (VANCOCIN) 1000 mg in dextrose 5% 200 mL IVPB  1,000 mg Intravenous Q12H Luan Glynn MD   Stopped at 05/31/20 2232    glucose (GLUTOSE) 40 % oral gel 15 g  15 g Oral PRN Luan Glynn MD        dextrose 50 % IV solution  12.5 g Intravenous PRN Luan Glynn MD        glucagon (rDNA) injection 1 mg  1 mg Intramuscular PRN Luan Glynn MD        dextrose 5 % solution  100 mL/hr Intravenous PRN Luan Glynn MD        albuterol sulfate  (90 Base) MCG/ACT inhaler 2 puff  2 puff Inhalation 4x daily Luan Glynn MD   2 puff at 05/31/20 1130    And    ipratropium (ATROVENT HFA) 17 MCG/ACT inhaler 2 puff  2 puff Inhalation 4x daily Luan Glynn MD   2 puff at 05/31/20 1130    amiodarone (CORDARONE) tablet 200 mg  200 mg Oral Daily Rahel Alejandro MD   200 mg at 06/01/20 1480    apixaban (ELIQUIS) tablet 5 mg  5 mg Oral BID Rahel Alejandro MD   5 mg at 06/01/20 0829    atorvastatin (LIPITOR) tablet 80 mg  80 mg Oral Nightly Rahel Alejandro MD   80 mg at 05/31/20 2054    benzocaine-menthol (CEPACOL SORE THROAT) lozenge 1 lozenge  1 lozenge Oral Q2H PRN Rahel Alejandro MD        clopidogrel (PLAVIX) tablet 75 mg  75 mg Oral Daily Rahel Alejandro MD   75 mg at 06/01/20 8014    docusate sodium (COLACE) capsule 100 mg  100 mg Oral BID PRN Rahel Alejandro MD        DULoxetine (CYMBALTA) 06/01/2020    RBC 2.25 06/01/2020    HGB 8.1 06/01/2020    HCT 26.4 06/01/2020    .3 06/01/2020    RDW 16.4 06/01/2020     06/01/2020       CMP:   Lab Results   Component Value Date     06/01/2020    K 4.5 06/01/2020     06/01/2020    CO2 31 06/01/2020    BUN 11 06/01/2020    CREATININE 0.6 06/01/2020    GFRAA >60 06/01/2020    LABGLOM >60 06/01/2020    GLUCOSE 71 06/01/2020    PROT 6.5 05/23/2020    PROT 6.3 02/21/2013    CALCIUM 8.2 06/01/2020    BILITOT 0.2 05/23/2020    ALKPHOS 83 05/23/2020    AST 28 05/23/2020    ALT 14 05/23/2020       POC Tests: No results for input(s): POCGLU, POCNA, POCK, POCCL, POCBUN, POCHEMO, POCHCT in the last 72 hours. Coags:   Lab Results   Component Value Date    PROTIME 11.4 12/29/2019    PROTIME 10.2 01/28/2012    INR 0.94 12/29/2019    APTT 25.9 04/28/2018       HCG (If Applicable): No results found for: PREGTESTUR, PREGSERUM, HCG, HCGQUANT     ABGs:   Lab Results   Component Value Date    PO2ART 59 04/25/2018    LRX2OJC 36.0 04/25/2018    RZJ1HTB 25.6 04/25/2018        Type & Screen (If Applicable):  No results found for: LABABO, [address]    Drug/Infectious Status (If Applicable):  Lab Results   Component Value Date    HEPCAB NON REACTIVE 04/26/2018       COVID-19 Screening (If Applicable):   Lab Results   Component Value Date    COVID19 NOT DETECTED 05/23/2020         Anesthesia Evaluation  Patient summary reviewed and Nursing notes reviewed  Airway: Mallampati: II  TM distance: >3 FB   Neck ROM: full  Mouth opening: > = 3 FB Dental: normal exam         Pulmonary:   (+) asthma:     (-) not a current smoker          Patient did not smoke on day of surgery.                  Cardiovascular:  Exercise tolerance: poor (<4 METS),   (+) hypertension:, angina:, past MI:, CAD:, CHF:,          Beta Blocker:  Not on Beta Blocker         Neuro/Psych:   (+) CVA:,             GI/Hepatic/Renal:   (+) GERD:, liver disease:,           Endo/Other:    (+)

## 2020-06-01 NOTE — PLAN OF CARE
stabilize  5/31/2020 2324 by Kacey Rob RN  Outcome: Ongoing  5/31/2020 1436 by Sudhakar Shankar RN  Outcome: Ongoing     Problem: Discharge Planning:  Goal: Patients continuum of care needs are met  Description: Patients continuum of care needs are met  5/31/2020 2324 by Kacey Rob RN  Outcome: Ongoing  5/31/2020 1436 by Sudhakar Shankar RN  Outcome: Ongoing     Problem: Skin Integrity:  Goal: Will show no infection signs and symptoms  Description: Will show no infection signs and symptoms  5/31/2020 2324 by Kacey Rob RN  Outcome: Ongoing  5/31/2020 1436 by Sudhakar Shankar RN  Outcome: Ongoing  Goal: Absence of new skin breakdown  Description: Absence of new skin breakdown  5/31/2020 2324 by Kacey Rob RN  Outcome: Ongoing  5/31/2020 1436 by Sudhakar Shankar RN  Outcome: Ongoing     Problem: Nutrition  Goal: Optimal nutrition therapy  5/31/2020 2324 by Kacey Rob RN  Outcome: Ongoing  5/31/2020 1436 by Sudhakar Shankar RN  Outcome: Ongoing

## 2020-06-01 NOTE — PROGRESS NOTES
Ref Range    WBC 4.1 4.0 - 10.5 K/CU MM    RBC 2.25 (L) 4.2 - 5.4 M/CU MM    Hemoglobin 8.1 (L) 12.5 - 16.0 GM/DL    Hematocrit 26.4 (L) 37 - 47 %    .3 (H) 78 - 100 FL    MCH 36.0 (H) 27 - 31 PG    MCHC 30.7 (L) 32.0 - 36.0 %    RDW 16.4 (H) 11.7 - 14.9 %    Platelets 601 240 - 440 K/CU MM    MPV 10.4 7.5 - 11.1 FL   Procalcitonin    Collection Time: 06/01/20  4:25 AM   Result Value Ref Range    Procalcitonin 0.045    Renal function panel    Collection Time: 06/01/20  4:25 AM   Result Value Ref Range    Sodium 145 135 - 145 MMOL/L    Potassium 4.5 3.5 - 5.1 MMOL/L    Chloride 107 99 - 110 mMol/L    CO2 31 21 - 32 MMOL/L    Anion Gap 7 4 - 16    BUN 11 6 - 23 MG/DL    CREATININE 0.6 0.6 - 1.1 MG/DL    Glucose 71 70 - 99 MG/DL    Calcium 8.2 (L) 8.3 - 10.6 MG/DL    GFR Non-African American >60 >60 mL/min/1.73m2    GFR African American >60 >60 mL/min/1.73m2    Alb 3.1 (L) 3.4 - 5.0 GM/DL    Phosphorus 3.5 2.5 - 4.9 MG/DL       Scheduled Meds:   midodrine  10 mg Oral TID     vancomycin  1,000 mg Intravenous Q12H    albuterol sulfate HFA  2 puff Inhalation 4x daily    And    ipratropium  2 puff Inhalation 4x daily    amiodarone  200 mg Oral Daily    apixaban  5 mg Oral BID    atorvastatin  80 mg Oral Nightly    clopidogrel  75 mg Oral Daily    DULoxetine  60 mg Oral Daily    famotidine  20 mg Oral Daily    ferrous gluconate  324 mg Oral Every Other Day    Icosapent Ethyl  500 mg Oral BID    lactobacillus  1 capsule Oral TID     levETIRAcetam  1,000 mg Oral BID    levothyroxine  224 mcg Oral Daily    magnesium oxide  800 mg Oral BID    mirtazapine  30 mg Oral Nightly    meropenem  1 g Intravenous Q12H    lidocaine PF  5 mL Intradermal Once    sodium chloride flush  10 mL Intravenous 2 times per day       Continuous Infusions:   dextrose         Physical Exam:  HEENT: Anicteric sclerae, Oropharyngeal mucosae moist, pink and intact.   Heart:  Normal S1 and S2, RRR  Lungs: Clear to

## 2020-06-01 NOTE — CARE COORDINATION
.CM met with pt for d/c planning. Introduced self and explained reason for visit. Pt lives with her partner and daughter. Pt is usually independent with ADL's. Pt's partner provides her transportation. She has a PCP, has insurance, and is able to afford medication. Pt has a walker, w/c, cane, and a shower seat. Discussed SNF and HHC with pt. Pt states that she will not need to go to a SNF. Pt is agreeable to [facility]. [facility] list offered. Pt states that she has had [facility] in the past and would like to have them when she is discharged. Pt will most likely be discharged on IV atb. Dr Kevon Salter informed CM that he ordered an ID consult. Pt denies any other d/c needs at this time. Referral made to [facility] [place], [facility]. liaison/Mercedes via PS. Informed her that pt will most likely be discharged on an IV atb. .Please notify [facility] upon discharge, [phone], they will provide fax number to send [facility] order & AVS. NOTIFY CM IF ANY NEW D/C NEEDS ARISE.   TE

## 2020-06-01 NOTE — PROGRESS NOTES
Bowel sounds normal, Soft, No tenderness, No masses, No pulsatile masses. Musculoskeletal:  Intact distal pulses, No edema, No tenderness, No cyanosis, No clubbing. Integument:  Warm, Dry, No erythema, No rash. Neurologic:  Alert & oriented x 3, Normal motor function, Normal sensory function, No focal deficits noted.    Psychiatric:  Affect normal, Judgment normal, Mood normal.     Medications:    lidocaine PF  5 mL Intradermal Once    sodium chloride flush  10 mL Intravenous 2 times per day    ceFAZolin  1 g Intramuscular Once    chlorhexidine   Topical Once    midodrine  10 mg Oral TID WC    vancomycin  1,000 mg Intravenous Q12H    albuterol sulfate HFA  2 puff Inhalation 4x daily    And    ipratropium  2 puff Inhalation 4x daily    amiodarone  200 mg Oral Daily    apixaban  5 mg Oral BID    atorvastatin  80 mg Oral Nightly    clopidogrel  75 mg Oral Daily    DULoxetine  60 mg Oral Daily    famotidine  20 mg Oral Daily    ferrous gluconate  324 mg Oral Every Other Day    Icosapent Ethyl  500 mg Oral BID    lactobacillus  1 capsule Oral TID WC    levETIRAcetam  1,000 mg Oral BID    levothyroxine  224 mcg Oral Daily    magnesium oxide  800 mg Oral BID    mirtazapine  30 mg Oral Nightly    lidocaine PF  5 mL Intradermal Once    sodium chloride flush  10 mL Intravenous 2 times per day      dextrose       sodium chloride flush, glucose, dextrose, glucagon (rDNA), dextrose, benzocaine-menthol, docusate sodium, nitroGLYCERIN, ondansetron, torsemide, acetaminophen **OR** acetaminophen, polyethylene glycol, promethazine **OR** ondansetron, oxyCODONE, sodium chloride flush    Lab Data:  CBC:   Recent Labs     05/30/20  0650 05/31/20  0600 06/01/20  0425   WBC 4.4 5.5 4.1   HGB 8.0* 8.4* 8.1*   HCT 26.5* 28.0* 26.4*   .7* 117.2* 117.3*    234 257     BMP:   Recent Labs     05/30/20  0650 05/31/20  0600 06/01/20  0425    141 145   K 4.9 4.5 4.5    104 107   CO2 32 29 31

## 2020-06-01 NOTE — PLAN OF CARE
stabilize  6/1/2020 1551 by Kia Bunn RN  Outcome: Ongoing  6/1/2020 1551 by Kia Bunn RN  Outcome: Ongoing     Problem: Discharge Planning:  Goal: Patients continuum of care needs are met  Description: Patients continuum of care needs are met  6/1/2020 1551 by Kia Bunn RN  Outcome: Ongoing  6/1/2020 1551 by Kia Bunn RN  Outcome: Ongoing     Problem: Skin Integrity:  Goal: Will show no infection signs and symptoms  Description: Will show no infection signs and symptoms  6/1/2020 1551 by Kia Bunn RN  Outcome: Ongoing  6/1/2020 1551 by Kia Bunn RN  Outcome: Ongoing  Goal: Absence of new skin breakdown  Description: Absence of new skin breakdown  6/1/2020 1551 by Kia Bunn RN  Outcome: Ongoing  6/1/2020 1551 by Kia Bunn RN  Outcome: Ongoing     Problem: Nutrition  Goal: Optimal nutrition therapy  6/1/2020 1551 by Kia Bunn RN  Outcome: Ongoing  6/1/2020 1551 by Kia Bunn RN  Outcome: Ongoing

## 2020-06-01 NOTE — PROGRESS NOTES
[address] [place] [address]  HOSPITALIST PROGRESS NOTE                       Name:  Colonel Villatoro /Age/Sex: 1960  (63 y.o. female)   MRN & CSN:  [id number] & [id number] Admission Date/Time: 2020 10:29 PM   Location:  [facility]- Attending:  Meenu Cornejo MD                                                  HPI  Colonel Villatoro is a 63 y.o. female who presents with sepsis features    SUBJECTIVE  -no shortness of breath/fever/chills. No nausea/vomiting    10 point review of systems reviewed and negative unless noted above. ALLERGIES:   Allergies   Allergen Reactions    Fentanyl Swelling     Other reaction(s): Angioedema (swelling)    Moxifloxacin Hcl In Nacl Swelling and Rash    Povidone-Iodine Rash     Other reaction(s): AOF      Cyclobenzaprine      Other reaction(s): Other - comment required  \" it make my muscle very weak because of my MS\"  \" it make my muscle very weak because of my MS\"      Methocarbamol      Worsening edema  Other reaction(s): Other - comment required  Worsening edema  Worsening edema  Worsening edema      Tramadol Other (See Comments)     Doctor doesn't her to take due to heart problems  Seizures   Doctor doesn't her to take due to lowers seizure threshold.  Baclofen     Ibuprofen      \"Due to heart problems\"    Naproxen     Nsaids      \"Due to heart problems\"    Tizanidine     Tolmetin      \"Due to heart problems\"    Tramadol Hcl      Other reaction(s): Other - comment required  Told not to take due to history of seizures    Betadine [Povidone Iodine] Rash    Moxifloxacin Rash and Swelling     Lips swell and tingling in face   Lips swell and tingling in face   Lips swell and tingling in face   Lips swell and tingling in face   Around mouth       PCP: Keny Flores MD    PAST MEDICAL HISTORY, SURGICAL HISTORY, SOCIAL HISTORY and  HOME MEDICATIONS all reviewed.       OBJECTIVE  Vitals:    20 2052 20 0017 20 0439 20 0800   BP: 114/71 107/69 procalcitonin. On room air and no ongoing shortness of brath  3-?Right arm thrombophlebitis- abx as above- CT with no abscess. Seen by surgery  4-? UTI- urine culture negative so far. No need for further treatment.     Other chronic issues  -Nonischemic cardiomyopathy with EF 30 to 35% / CAD / VT- s/p ICD, s/p CABG   -History of VTE/ PE  -Hypothyroidism  -Seizure Disorder  -Severe PCM- needs to increase calorie intake  -Hx of gastric bypass  -Anemia of chronic disease      -awaiting MRI and will consult ID in am for input on ABX       Disp:     Diet DIET GENERAL;  Dietary Nutrition Supplements: Frozen Oral Supplement   DVT Prophylaxis [] Lovenox, []  Heparin, [] SCDs, [] Ambulation   GI Prophylaxis [] PPI,  [] H2 Blocker,  [] Carafate,  [] Diet/Tube Feeds   Code Status Full Code   Disposition Patient requires continued admission due to eliquis   CMS Level of Risk [] Low, [] Moderate,[x]  High  Patient's risk as above due to ant GALVEZ MD 6/1/2020 9:28 AM

## 2020-06-02 LAB
ALBUMIN SERPL-MCNC: 3.4 GM/DL (ref 3.4–5)
ANION GAP SERPL CALCULATED.3IONS-SCNC: 14 MMOL/L (ref 4–16)
BUN BLDV-MCNC: 9 MG/DL (ref 6–23)
CALCIUM SERPL-MCNC: 7.9 MG/DL (ref 8.3–10.6)
CHLORIDE BLD-SCNC: 105 MMOL/L (ref 99–110)
CO2: 24 MMOL/L (ref 21–32)
CREAT SERPL-MCNC: 0.5 MG/DL (ref 0.6–1.1)
CULTURE: NORMAL
CULTURE: NORMAL
GFR AFRICAN AMERICAN: >60 ML/MIN/1.73M2
GFR NON-AFRICAN AMERICAN: >60 ML/MIN/1.73M2
GLUCOSE BLD-MCNC: 82 MG/DL (ref 70–99)
HIGH SENSITIVE C-REACTIVE PROTEIN: 11.6 MG/L
Lab: NORMAL
Lab: NORMAL
PHOSPHORUS: 3.4 MG/DL (ref 2.5–4.9)
POTASSIUM SERPL-SCNC: 4.8 MMOL/L (ref 3.5–5.1)
PROCALCITONIN: 0.06
SODIUM BLD-SCNC: 143 MMOL/L (ref 135–145)
SPECIMEN: NORMAL
SPECIMEN: NORMAL

## 2020-06-02 PROCEDURE — 2580000003 HC RX 258: Performed by: SURGERY

## 2020-06-02 PROCEDURE — 6370000000 HC RX 637 (ALT 250 FOR IP): Performed by: INTERNAL MEDICINE

## 2020-06-02 PROCEDURE — 94761 N-INVAS EAR/PLS OXIMETRY MLT: CPT

## 2020-06-02 PROCEDURE — 6360000002 HC RX W HCPCS: Performed by: SURGERY

## 2020-06-02 PROCEDURE — APPSS60 APP SPLIT SHARED TIME 46-60 MINUTES: Performed by: NURSE PRACTITIONER

## 2020-06-02 PROCEDURE — 99024 POSTOP FOLLOW-UP VISIT: CPT | Performed by: SURGERY

## 2020-06-02 PROCEDURE — 6370000000 HC RX 637 (ALT 250 FOR IP): Performed by: SURGERY

## 2020-06-02 PROCEDURE — 2140000000 HC CCU INTERMEDIATE R&B

## 2020-06-02 PROCEDURE — 2580000003 HC RX 258: Performed by: HOSPITALIST

## 2020-06-02 PROCEDURE — 99223 1ST HOSP IP/OBS HIGH 75: CPT | Performed by: INTERNAL MEDICINE

## 2020-06-02 PROCEDURE — 6360000002 HC RX W HCPCS: Performed by: INTERNAL MEDICINE

## 2020-06-02 PROCEDURE — 99222 1ST HOSP IP/OBS MODERATE 55: CPT | Performed by: INTERNAL MEDICINE

## 2020-06-02 PROCEDURE — 94640 AIRWAY INHALATION TREATMENT: CPT

## 2020-06-02 RX ORDER — OXYCODONE HYDROCHLORIDE AND ACETAMINOPHEN 5; 325 MG/1; MG/1
2 TABLET ORAL EVERY 4 HOURS PRN
Status: DISCONTINUED | OUTPATIENT
Start: 2020-06-02 | End: 2020-06-05 | Stop reason: HOSPADM

## 2020-06-02 RX ADMIN — APIXABAN 5 MG: 5 TABLET, FILM COATED ORAL at 20:23

## 2020-06-02 RX ADMIN — LEVETIRACETAM 1000 MG: 500 TABLET, FILM COATED ORAL at 20:23

## 2020-06-02 RX ADMIN — IPRATROPIUM BROMIDE 2 PUFF: 17 AEROSOL, METERED RESPIRATORY (INHALATION) at 11:25

## 2020-06-02 RX ADMIN — SODIUM CHLORIDE, PRESERVATIVE FREE 10 ML: 5 INJECTION INTRAVENOUS at 23:51

## 2020-06-02 RX ADMIN — CLOPIDOGREL BISULFATE 75 MG: 75 TABLET ORAL at 09:54

## 2020-06-02 RX ADMIN — LEVETIRACETAM 1000 MG: 500 TABLET, FILM COATED ORAL at 09:53

## 2020-06-02 RX ADMIN — ACETAMINOPHEN 650 MG: 325 TABLET ORAL at 13:41

## 2020-06-02 RX ADMIN — APIXABAN 5 MG: 5 TABLET, FILM COATED ORAL at 09:54

## 2020-06-02 RX ADMIN — AMIODARONE HYDROCHLORIDE 200 MG: 200 TABLET ORAL at 09:53

## 2020-06-02 RX ADMIN — MIDODRINE HYDROCHLORIDE 10 MG: 5 TABLET ORAL at 12:13

## 2020-06-02 RX ADMIN — VANCOMYCIN HYDROCHLORIDE 1000 MG: 1 INJECTION, SOLUTION INTRAVENOUS at 09:49

## 2020-06-02 RX ADMIN — IPRATROPIUM BROMIDE 2 PUFF: 17 AEROSOL, METERED RESPIRATORY (INHALATION) at 20:31

## 2020-06-02 RX ADMIN — ALBUTEROL SULFATE 2 PUFF: 90 AEROSOL, METERED RESPIRATORY (INHALATION) at 11:25

## 2020-06-02 RX ADMIN — Medication 1 CAPSULE: at 12:13

## 2020-06-02 RX ADMIN — OXYCODONE HYDROCHLORIDE 20 MG: 10 TABLET ORAL at 03:29

## 2020-06-02 RX ADMIN — SODIUM CHLORIDE, PRESERVATIVE FREE 10 ML: 5 INJECTION INTRAVENOUS at 20:24

## 2020-06-02 RX ADMIN — DULOXETINE 60 MG: 30 CAPSULE, DELAYED RELEASE ORAL at 09:54

## 2020-06-02 RX ADMIN — MIDODRINE HYDROCHLORIDE 10 MG: 5 TABLET ORAL at 09:35

## 2020-06-02 RX ADMIN — Medication 800 MG: at 20:23

## 2020-06-02 RX ADMIN — ACETAMINOPHEN 650 MG: 325 TABLET ORAL at 20:23

## 2020-06-02 RX ADMIN — LEVOTHYROXINE SODIUM 224 MCG: 112 TABLET ORAL at 09:53

## 2020-06-02 RX ADMIN — ALBUTEROL SULFATE 2 PUFF: 90 AEROSOL, METERED RESPIRATORY (INHALATION) at 20:31

## 2020-06-02 RX ADMIN — OXYCODONE HYDROCHLORIDE 20 MG: 10 TABLET ORAL at 13:41

## 2020-06-02 RX ADMIN — SODIUM CHLORIDE, PRESERVATIVE FREE 10 ML: 5 INJECTION INTRAVENOUS at 12:14

## 2020-06-02 RX ADMIN — MIDODRINE HYDROCHLORIDE 10 MG: 5 TABLET ORAL at 17:05

## 2020-06-02 RX ADMIN — OXYCODONE HYDROCHLORIDE 20 MG: 10 TABLET ORAL at 23:34

## 2020-06-02 RX ADMIN — ATORVASTATIN CALCIUM 80 MG: 80 TABLET, FILM COATED ORAL at 20:23

## 2020-06-02 RX ADMIN — IPRATROPIUM BROMIDE 2 PUFF: 17 AEROSOL, METERED RESPIRATORY (INHALATION) at 15:30

## 2020-06-02 RX ADMIN — OXYCODONE HYDROCHLORIDE 20 MG: 10 TABLET ORAL at 20:23

## 2020-06-02 RX ADMIN — MIRTAZAPINE 30 MG: 15 TABLET, FILM COATED ORAL at 20:23

## 2020-06-02 RX ADMIN — Medication 800 MG: at 09:54

## 2020-06-02 RX ADMIN — VANCOMYCIN HYDROCHLORIDE 1000 MG: 1 INJECTION, SOLUTION INTRAVENOUS at 23:50

## 2020-06-02 RX ADMIN — Medication 1 CAPSULE: at 09:53

## 2020-06-02 RX ADMIN — FAMOTIDINE 20 MG: 20 TABLET, FILM COATED ORAL at 09:54

## 2020-06-02 RX ADMIN — OXYCODONE HYDROCHLORIDE 20 MG: 10 TABLET ORAL at 09:55

## 2020-06-02 RX ADMIN — ACETAMINOPHEN 650 MG: 325 TABLET ORAL at 03:29

## 2020-06-02 RX ADMIN — ALBUTEROL SULFATE 2 PUFF: 90 AEROSOL, METERED RESPIRATORY (INHALATION) at 15:30

## 2020-06-02 RX ADMIN — Medication 1 CAPSULE: at 17:05

## 2020-06-02 RX ADMIN — OXYCODONE HYDROCHLORIDE 20 MG: 10 TABLET ORAL at 17:05

## 2020-06-02 ASSESSMENT — PAIN DESCRIPTION - FREQUENCY
FREQUENCY: CONTINUOUS

## 2020-06-02 ASSESSMENT — PAIN DESCRIPTION - PAIN TYPE
TYPE: CHRONIC PAIN

## 2020-06-02 ASSESSMENT — ENCOUNTER SYMPTOMS
CHEST TIGHTNESS: 0
EYE PAIN: 0
ABDOMINAL PAIN: 0
DIARRHEA: 0
BLOOD IN STOOL: 0
BACK PAIN: 0
NAUSEA: 0
WHEEZING: 0
PHOTOPHOBIA: 0
COLOR CHANGE: 0
CONSTIPATION: 0
COUGH: 0
VOMITING: 0
SHORTNESS OF BREATH: 0

## 2020-06-02 ASSESSMENT — PAIN DESCRIPTION - LOCATION
LOCATION: HIP
LOCATION: CHEST;HIP
LOCATION: CHEST;HIP;SHOULDER
LOCATION: CHEST;HIP;SHOULDER
LOCATION: CHEST

## 2020-06-02 ASSESSMENT — PAIN DESCRIPTION - ONSET
ONSET: ON-GOING
ONSET: ON-GOING

## 2020-06-02 ASSESSMENT — PAIN SCALES - GENERAL
PAINLEVEL_OUTOF10: 5
PAINLEVEL_OUTOF10: 9
PAINLEVEL_OUTOF10: 9
PAINLEVEL_OUTOF10: 4
PAINLEVEL_OUTOF10: 5
PAINLEVEL_OUTOF10: 9
PAINLEVEL_OUTOF10: 5
PAINLEVEL_OUTOF10: 8
PAINLEVEL_OUTOF10: 9

## 2020-06-02 ASSESSMENT — PAIN DESCRIPTION - ORIENTATION
ORIENTATION: RIGHT;LEFT
ORIENTATION: RIGHT

## 2020-06-02 ASSESSMENT — PAIN DESCRIPTION - PROGRESSION
CLINICAL_PROGRESSION: NOT CHANGED

## 2020-06-02 NOTE — PLAN OF CARE
by Ruy Suarez RN  Outcome: Ongoing  6/1/2020 1551 by Annmarie Kaiser RN  Outcome: Ongoing  6/1/2020 1551 by Annmarie Kaiser RN  Outcome: Ongoing     Problem: Discharge Planning:  Goal: Patients continuum of care needs are met  6/2/2020 0427 by Ruy Suarez RN  Outcome: Ongoing  6/1/2020 1551 by Annmarie Kaiser RN  Outcome: Ongoing  6/1/2020 1551 by Annmarie Kaiser RN  Outcome: Ongoing     Problem: Skin Integrity:  Goal: Will show no infection signs and symptoms  6/2/2020 0427 by Ruy Suarez RN  Outcome: Ongoing  6/1/2020 1551 by Annmarie Kaiser RN  Outcome: Ongoing  6/1/2020 1551 by Annmarie Kaiser RN  Outcome: Ongoing  Goal: Absence of new skin breakdown  6/2/2020 0427 by Ruy Suarez RN  Outcome: Ongoing  6/1/2020 1551 by Annmarie Kaiser RN  Outcome: Ongoing  6/1/2020 1551 by Annmarie Kaiser RN  Outcome: Ongoing     Problem: Nutrition  Goal: Optimal nutrition therapy  6/2/2020 0427 by Ruy Suarez RN  Outcome: Ongoing  6/1/2020 1551 by Annmarie Kaiser RN  Outcome: Ongoing  6/1/2020 1551 by Annmarie Kaiser RN  Outcome: Ongoing

## 2020-06-02 NOTE — CONSULTS
Electrophysiology Consult Note      Reason for consultation: Need for ICD removal    Chief complaint : Fever    Referring physician: Sara Jensen / [facility]      Primary care physician: Amanda Martínez MD      History of Present Illness:     Patient is a 31-year-old female with history of ischemic cardiomyopathy, CVA, GERD, malnutrition, CABG and PCI of LIMA in 2018, PE with IVC filter, lymphoma, hypertension, hyperlipidemia, splenic enlargement, thyroid disease 3 times ICD extraction, presented to the emergency room with fever and possible UTI. Patient reports that she had fever of 105.9 on the day of admission which she took Tylenol which only brought her temperature to 102.9. She then had a reoccurrence of temperature of 105.5 so she came to the hospital.  Patient reports she had multiple infections and she was worried that she is getting infected again. Patient recently was discharged from [facility] for acute decompensated heart failure 2 days prior to this admission. She had multiple infections in the past with ICD removal x3. Patient has been in palliative care due to chronic pain and malnutrition with TPN infusion. Patient reports last ICD was removed in June 2019    Patient had previous left and right ICD removal then she had a subcutaneous ICD. She reportedly had 41 shocks in June 2019 and that at that time they removed the subcutaneous ICD and put the right-sided ICD. Patient denies any chest pain, shortness of breath, palpitations. Patient noted to have MRSA in the blood cultures and had a DULCE which showed no vegetation. Infectious disease consulted EP for need for ICD removal given the concern of future growth due to recurring infections. Surgery chest placed MediPort on the right internal jugular vein approach.   They attempted to do it on the left side but could not perform it from the left side        Pastmedical history:   Past Medical History: Diagnosis Date    Acute delirium     Arrhythmia     Arthritis     Asplenia     Asthma     CAD (coronary artery disease)     1st stent at age 46    Cardiomyopathy [facility])     [facility]erebral artery occlusion with [facility] infarction [facility]     CHF (congestive heart failure) (HCC)     Enlarged liver     GERD (gastroesophageal reflux disease)     H/O echocardiogram 4/6/16    EF 55%, trivial TR & MR, stage 1 diastolic dysfunction    History of blood transfusion     Hx of cardiovascular stress test 12/29/2015    Alessia: EF 45%. Pharmacologic stress myocardial perfusion scan shows a fixed inferior-lateral defect w/ no inducible ischemia. No evidence of ischemia. Inferior-lateral infarction.  Normal LVSF    Hyperlipidemia     Hypertension     Lymphoma ([address]     Denies    MI, old     Movement disorder     MS (multiple sclerosis) ([facility]     OP (osteoporosis)     PE (pulmonary embolism)     trapese filter    Pneumonia     Pyelonephritis     Seizures ([facility]     Spleen enlarged     Thyroid disease        Surgical history :   Past Surgical History:   Procedure Laterality Date    ABDOMEN SURGERY      APPENDECTOMY      CARDIAC DEFIBRILLATOR PLACEMENT      evera [id number] CONDITIONAL    CHOLECYSTECTOMY      COLONOSCOPY      CORONARY ANGIOPLASTY WITH STENT PLACEMENT      3 stents    CORONARY ARTERY BYPASS GRAFT      Age 49    ENDOSCOPY, COLON, DIAGNOSTIC      GASTRIC BYPASS SURGERY      HERNIA REPAIR      HIP SURGERY      HYSTERECTOMY      JOINT REPLACEMENT      SKIN BIOPSY      TONSILLECTOMY      VASCULAR SURGERY         Family history:   Family History   Problem Relation Age of Onset    High Blood Pressure Mother     High Cholesterol Mother     Heart Disease Mother     Diabetes Mother     Heart Disease Father     Cancer Father     Other Sister         Multiple Sclerosis    Heart Disease Maternal Grandmother     High Blood Pressure Maternal Grandmother     High Cholesterol times daily       atorvastatin (LIPITOR) 80 MG tablet Take 80 mg by mouth nightly      clopidogrel (PLAVIX) 75 MG tablet Take 75 mg by mouth daily      acetaminophen 650 MG TABS Take 650 mg by mouth every 4 hours as needed 120 tablet 3    ferrous gluconate (FERGON) 324 (38 FE) MG tablet Take 324 mg by mouth 2 times daily       aspirin 81 MG tablet Take 81 mg by mouth daily       albuterol (PROVENTIL HFA;VENTOLIN HFA) 108 (90 BASE) MCG/ACT inhaler Inhale 2 puffs into the lungs every 6 hours as needed. Review of Systems:   Review of Systems   Constitutional: Positive for fatigue and fever. Negative for activity change and chills. HENT: Negative for congestion, ear pain and tinnitus. Eyes: Negative for photophobia, pain and visual disturbance. Respiratory: Negative for cough, chest tightness, shortness of breath and wheezing. Cardiovascular: Negative for chest pain, palpitations and leg swelling. Gastrointestinal: Negative for abdominal pain, blood in stool, constipation, diarrhea, nausea and vomiting. Endocrine: Negative for cold intolerance and heat intolerance. Genitourinary: Negative for dysuria, flank pain and hematuria. Musculoskeletal: Positive for arthralgias. Negative for back pain, myalgias and neck stiffness. Skin: Negative for color change and rash. Allergic/Immunologic: Negative for food allergies. Neurological: Positive for weakness. Negative for dizziness, light-headedness, numbness and headaches. Hematological: Does not bruise/bleed easily. Psychiatric/Behavioral: Negative for agitation, behavioral problems and confusion.            Examination:      Vitals:    06/02/20 0936 06/02/20 1100 06/02/20 1226 06/02/20 1235   BP: 113/66  104/63 92/62   Pulse: 63 67 64 60   Resp: 14  18 12   Temp: 98.4 °F (36.9 °C)  98 °F (36.7 °C) 97.9 °F (36.6 °C)   TempSrc: Oral  Axillary Oral   SpO2:   100%    Weight:       Height:            Body mass index is 19.69 6.5 05/23/2020    BILITOT 0.2 05/23/2020    ALKPHOS 83 05/23/2020     TSH:  No results found for: TSH    EKGINTERPRETATION - EKG Interpretation:        IMPRESSION / RECOMMENDATIONS:     MRSA bacteremia    1. ? Device infection  2. Ischemic cardiomyopathy  3. Recurrent VT's despite VT ablation  4. Endocarditis with previous device explantation x 3   5. History of PE and IVC filter  6. Chronic malabsorption syndrome      Patient has a very complex case. Patient has recurrent VT's and since the device placement of she had multiple VT's and she needed to rescue therapies additional to the ATP  She is 94.6% atrial paced  Patient has an older Medtronic ICD model [id number]    Patient DULCE reviewed and there is no vegetation  Repeat blood cultures have been negative but again patient has MRSA several times and high likelihood that eventually this device is also getting going to get infected    Patient also now has a Port-A-Cath on the same side of the device which again increases the risk of infection for the device too. It was placed on the same side as left-sided could not be accessed and she has a very poor IV access overall. Concern is - if the device is removed -she will be high risk for sudden cardiac death    Also patient had subcutaneous ICD previously which did not go really well with the patient and she had 41 ICD shocks some inappropriate. Also patient is 94% atrial paced almost like sick sinus that she is dependent on atrial pacing. I will discuss with ID to see if this patient could be a long-term anti-biotic candidate instead of considering the device removal given that risk of infection either way. I think overall prognosis for her is very poor. Is already had palliative care consult for chronic pain  there should be probably collective discussion with the patient about her overall prognosis and what she chooses      Thanks again for allowing me to participate in care of this patient.  Please call me if you have any questions. With best regards. Emil Abernathy MD, 6/2/2020 2:44 PM     Please note this report has been partially produced using speech recognition software and may contain errors related to that system including errors in grammar, punctuation, and spelling, as well as words and phrases that may be inappropriate. If there are any questions or concerns please feel free to contact the dictating provider for clarification.

## 2020-06-02 NOTE — ANESTHESIA POSTPROCEDURE EVALUATION
Department of Anesthesiology  Postprocedure Note    Patient: Jaylen Weller  MRN: [id number]  YOB: 1960  Date of evaluation: 6/1/2020  Time:  8:22 PM     Procedure Summary     Date:  06/01/20 Room / Location:  [place] [address] 03 / [facility]    Anesthesia Start:  7087 Anesthesia Stop:  2022    Procedure:  PORT INSERTION (N/A ) Diagnosis:  (iv access)    Surgeon:  Joseph Giles MD Responsible Provider:  JESSE Kidd CRNA    Anesthesia Type:  general, MAC ASA Status:  3          Anesthesia Type: No value filed. Jorge Phase I:      Jorge Phase II:      Last vitals: Reviewed and per EMR flowsheets.        Anesthesia Post Evaluation    Patient location during evaluation: bedside  Patient participation: complete - patient participated  Level of consciousness: awake and alert  Pain score: 0  Airway patency: patent  Nausea & Vomiting: no nausea and no vomiting  Complications: no  Cardiovascular status: blood pressure returned to baseline  Respiratory status: acceptable, nonlabored ventilation, spontaneous ventilation and room air  Hydration status: euvolemic

## 2020-06-02 NOTE — CONSULTS
Infectious Disease Consult Note  2020   Patient Name: Lisa De La Torre : 1960   Impression   MRSA Bacteremia: Unknown Source, Suspected ICD Infection  · In hospital fever max 103.2, now afebrile  · Initial leukocytosis 14.2, now no leukocytosis  · Blood cultures -2/2-MRSA  · Blood cultures -1/2-MRSA  · Blood cultures -0/2-NGTD  · DULCE --No vegetation noted  · CRP ->300.0  · Procalcitonin -0.045   Allergy to Moxifloxacin:  Swelling to lips and mouth  · Bilateral Hip Avascular Necrosis:  On chronic pain medications  · ICD:  Consult EP for suspected ICD infection, could be source of MRSA bacteremia even though DULCE was obtained, did not show any significance of vegetation on valves, no mention of vegetation otherwise  · Ischemic Cardiomyopathy/Chronic systolic compensated HF:  Cardio onboard, Dr. Helen Cordova,   · S/P Splenectomy at [facility]  · Malnutrition:  BMI 20.7  · MS  · IVC Filter: On Eliquis, h/o PE   Multi-morbidity: per PMHx:  Asthma, CAD, CJF, GERD, lymphoma, MS, thyroid disease, hysterectomy, appey, choley, Gastric bypass surgery, gastroparesis, cardiac defib placement, CABG,   Plan:   Continue cefazolin and vancomycin   Blood cultures negative on 20  · CRP x 2  · Consult EP, Dr. Curt Angeles, suspected ICD source of MRSA infection  Thank you for allowing me to consult in the care of this patient.  ------------------------  REASON FOR CONSULT: Infective syndrome     Requested by: Dr. Preston Bee is a 63 y.o.  female who was admitted 2020 for further evaluation and management of fever. Patient states she was sleeping and awoke the day of admission with a fever of 105.9 F  She took Tylenol at home which brought her temp down to 102.9 F but her temp elevated back to 105.5 F. She stated she has had a history of past \"kidney\" infections so she presented to the ED with concern for pyelonephritis.     ?  Infectious diseases service was [facility])     Acute exacerbation of CHF (congestive heart failure) [facility]     CAD (coronary artery disease) 04/01/2016    Hyperlipidemia 04/01/2016    Thyroid disease 04/01/2016    Chest pain     Chest pain 01/28/2012     Past Medical History:   Diagnosis Date    Acute delirium     Arrhythmia     Arthritis     Asplenia     Asthma     CAD (coronary artery disease)     1st stent at age 46    Cardiomyopathy [facility])     [facility] with [facility]     CH[facility] failure) [facility]     Enlarged liver     GERD (gastroesophageal reflux disease)     H/O echocardiogram 4/6/16    EF 55%, trivial TR & MR, stage 1 diastolic dysfunction    History of blood transfusion     Hx of cardiovascular stress test 12/29/2015    Alessia: EF 45%. Pharmacologic stress myocardial perfusion scan shows a fixed inferior-lateral defect w/ no inducible ischemia. No evidence of ischemia. Inferior-lateral infarction.  Normal LVSF    Hyperlipidemia     Hypertension     Lymphoma ([facility]     Denies    MI, old     Movement disorder     MS (multiple sclerosis) [facility]     OP (osteoporosis)     PE (pulmonary embolism)     trapese filter    Pneumonia     Pyelonephritis     Seizures ([facility]     Spleen enlarged     Thyroid disease       Past Surgical History:   Procedure Laterality Date    ABDOMEN SURGERY      APPENDECTOMY      CARDIAC DEFIBRILLATOR PLACEMENT      evera [id number] CONDITIONAL    CHOLECYSTECTOMY      COLONOSCOPY      CORONARY ANGIOPLASTY WITH STENT PLACEMENT      3 stents    CORONARY ARTERY BYPASS GRAFT      Age 49    ENDOSCOPY, COLON, DIAGNOSTIC      GASTRIC BYPASS SURGERY      HERNIA REPAIR      HIP SURGERY      HYSTERECTOMY      JOINT REPLACEMENT      SKIN BIOPSY      TONSILLECTOMY      VASCULAR SURGERY        Family History   Problem Relation Age of Onset    High Blood Pressure Mother     High Cholesterol Mother     Heart Disease Mother     Diabetes 5/24  Cefazolin 6/1-once  -------------------------------------------------------------------------------------------------------------------    Vital Signs:  Vitals:    06/02/20 0421   BP:    Pulse: 60   Resp: 14   Temp:    SpO2:          Exam:    VS: noted; wt 127 lb (58 kg) 5'6\" Height  Gen: alert and oriented X3, no distress  Skin: no stigmata of endocarditis  Wounds: C/D/I Mediport site right chest (surgical glue intact)  HEMT: AT/NC Oropharynx pink, moist, and without lesions or exudates; dentition in good state of repair  Eyes: PERRLA, EOMI, conjunctiva pink, sclera anicteric. Neck: Supple. Trachea midline. No LAD. Chest: no distress and CTA. Good air movement. Heart: Pacer capture and no MRG. Abd: soft, non-distended, no tenderness, no hepatomegaly. Normoactive bowel sounds. Ext: no clubbing, cyanosis, bilateral upper extremities with non-pitting edema  Neuro: Mental status intact. CN 2-12 intact and no focal sensory or motor deficits    ? Diagnostic Studies: reviewed  5/23/20 XR Chest Portable:  Impression   No acute finding or interval change. 5/24/20 US Extremity Right Non Vasc Limited:  Impression   Soft tissue edema at the right antecubital fossa, without definable   collection. 5/24/20 CT Kidney WO Contrast:  Impression   2 mm left renal stone.  No obstructive uropathy.  A 1.5 cm hyperdense lesion   in the upper to mid right kidney is incompletely evaluated without   intravenous contrast and was not seen on prior CT from 2019.  If patient is   not able to receive intravenous contrast, a noncontrast MRI could provide   some additional information.         5/27/20 XR Chest Portable:  Impression   Lungs appear clear.         5/29/20 CT Chest WO Contrast:  Impression   1. Mild patchy ground-glass opacities in the right middle lobe and to a   lesser degree the bilateral lower lobes likely representing multifocal   pneumonia.    2. 4 mm nodule in the left lower lobe not seen on the previous less than 1 hour:  Impression   Right subcutaneous medical infusion port placement with the catheter tip   extending to the atrial-caval junction. I have examined this patient and available medical records on this date and have made the above observations, conclusions and recommendations. Electronically signed by: Electronically signed by Mike Potts.  JESSE Poe CNP on 6/2/2020 at 9:03 AM

## 2020-06-02 NOTE — PROGRESS NOTES
[address] [place] [address]  HOSPITALIST PROGRESS NOTE                       Name:  Jaylen Weller /Age/Sex: 1960  (63 y.o. female)   MRN & CSN:  [id number] & [id number] Admission Date/Time: 2020 10:29 PM   Location:  [facility]/3112-A Attending:  Savanna Cherry MD                                                  HPI  Jaylen Weller is a 63 y.o. female who presents with sepsis features    SUBJECTIVE  -stated she has some chest soreness from procedure yesterday, otherwise no other complaints    10 point review of systems reviewed and negative unless noted above. ALLERGIES:   Allergies   Allergen Reactions    Fentanyl Swelling     Other reaction(s): Angioedema (swelling)    Moxifloxacin Hcl In Nacl Swelling and Rash    Povidone-Iodine Rash     Other reaction(s): AOF      Cyclobenzaprine      Other reaction(s): Other - comment required  \" it make my muscle very weak because of my MS\"  \" it make my muscle very weak because of my MS\"      Methocarbamol      Worsening edema  Other reaction(s): Other - comment required  Worsening edema  Worsening edema  Worsening edema      Tramadol Other (See Comments)     Doctor doesn't her to take due to heart problems  Seizures   Doctor doesn't her to take due to lowers seizure threshold.  Baclofen     Ibuprofen      \"Due to heart problems\"    Naproxen     Nsaids      \"Due to heart problems\"    Tizanidine     Tolmetin      \"Due to heart problems\"    Tramadol Hcl      Other reaction(s): Other - comment required  Told not to take due to history of seizures    Betadine [Povidone Iodine] Rash    Moxifloxacin Rash and Swelling     Lips swell and tingling in face   Lips swell and tingling in face   Lips swell and tingling in face   Lips swell and tingling in face   Around mouth       PCP: Ha Ibanez MD    PAST MEDICAL HISTORY, SURGICAL HISTORY, SOCIAL HISTORY and  HOME MEDICATIONS all reviewed.       OBJECTIVE  Vitals:    20 0936 20 1100 20 1226 amiodarone  200 mg Oral Daily    apixaban  5 mg Oral BID    atorvastatin  80 mg Oral Nightly    clopidogrel  75 mg Oral Daily    DULoxetine  60 mg Oral Daily    famotidine  20 mg Oral Daily    ferrous gluconate  324 mg Oral Every Other Day    Icosapent Ethyl  500 mg Oral BID    lactobacillus  1 capsule Oral TID WC    levETIRAcetam  1,000 mg Oral BID    levothyroxine  224 mcg Oral Daily    magnesium oxide  800 mg Oral BID    mirtazapine  30 mg Oral Nightly    lidocaine PF  5 mL Intradermal Once    sodium chloride flush  10 mL Intravenous 2 times per day     Continuous Infusions:   dextrose       PRN Meds:sodium chloride flush, glucose, dextrose, glucagon (rDNA), dextrose, benzocaine-menthol, docusate sodium, nitroGLYCERIN, ondansetron, torsemide, acetaminophen **OR** acetaminophen, polyethylene glycol, promethazine **OR** ondansetron, oxyCODONE, sodium chloride flush        ASSESSMENT and PLAN  Hospital Day: 11    Septic shock with MRSA bacteremia on 5/24- repeat blood culture on 5/28 negative to date, on vancomycin. DULCE with no vegetation- CT abd/pelvis negative, CT chest with concerns of pneumonia. with chronic hypotension- now on midodrne  -Blood cultures 5/28: Negative. MRI T-spine unremarkable. MRI L-spine: Central disc protrusion at L5-S1; otherwise unremarkable. Off meropenem. Continue vancomycin. Mediport placed 6/1. Consult IP due to ICD being suspected source of MRSA infection. EP discussed with ID about removal of ICD. ICD has been removed 3 times. Patient at risk for sudden cardiac death    Probable pneumonia   on CT- reports some shortness of breath prior to presentation- trend procalcitonin. On room air and no ongoing shortness of brath  -procalcitonin improved. ?Right arm thrombophlebitis  abx as above- CT with no abscess. Seen by surgery    ? UTI  urine culture negative so far. No need for further treatment.     Nonischemic cardiomyopathy with EF 30 to 35% / CAD / VT- s/p

## 2020-06-02 NOTE — PROGRESS NOTES
GENERAL SURGERY PROGRESS NOTE    CC/HPI:           Patient feels better, no c/o. Vitals:    06/02/20 1226 06/02/20 1235 06/02/20 1605 06/02/20 1628   BP: 104/63 92/62 107/64    Pulse: 64 60 60 60   Resp: 18 12 10    Temp: 98 °F (36.7 °C) 97.9 °F (36.6 °C) 97.7 °F (36.5 °C)    TempSrc: Axillary Oral Oral    SpO2: 100%      Weight:       Height:         I/O last 3 completed shifts: In: 840 [P.O.:240; I.V.:600]  Out: 450 [Urine:450]  I/O this shift:  In: -   Out: 900 [Urine:900]    DIET GENERAL;  Dietary Nutrition Supplements: Frozen Oral Supplement    Recent Results (from the past 48 hour(s))   CBC    Collection Time: 06/01/20  4:25 AM   Result Value Ref Range    WBC 4.1 4.0 - 10.5 K/CU MM    RBC 2.25 (L) 4.2 - 5.4 M/CU MM    Hemoglobin 8.1 (L) 12.5 - 16.0 GM/DL    Hematocrit 26.4 (L) 37 - 47 %    .3 (H) 78 - 100 FL    MCH 36.0 (H) 27 - 31 PG    MCHC 30.7 (L) 32.0 - 36.0 %    RDW 16.4 (H) 11.7 - 14.9 %    Platelets 088 583 - 748 K/CU MM    MPV 10.4 7.5 - 11.1 FL   Procalcitonin    Collection Time: 06/01/20  4:25 AM   Result Value Ref Range    Procalcitonin 0.045    Renal function panel    Collection Time: 06/01/20  4:25 AM   Result Value Ref Range    Sodium 145 135 - 145 MMOL/L    Potassium 4.5 3.5 - 5.1 MMOL/L    Chloride 107 99 - 110 mMol/L    CO2 31 21 - 32 MMOL/L    Anion Gap 7 4 - 16    BUN 11 6 - 23 MG/DL    CREATININE 0.6 0.6 - 1.1 MG/DL    Glucose 71 70 - 99 MG/DL    Calcium 8.2 (L) 8.3 - 10.6 MG/DL    GFR Non-African American >60 >60 mL/min/1.73m2    GFR African American >60 >60 mL/min/1.73m2    Alb 3.1 (L) 3.4 - 5.0 GM/DL    Phosphorus 3.5 2.5 - 4.9 MG/DL   Vancomycin, trough    Collection Time: 06/01/20  9:16 PM   Result Value Ref Range    Vancomycin Tr 15.6 10 - 20 UG/ML    DOSE AMOUNT DOSE AMT.  GIVEN - 1GM     DOSE TIME DOSE TIME GIVEN - 2100    CBC    Collection Time: 06/01/20  9:16 PM   Result Value Ref Range    WBC 5.3 4.0 - 10.5 K/CU MM    RBC 2.78 (L) 4.2 - 5.4 M/CU MM

## 2020-06-02 NOTE — PROGRESS NOTES
Nutrition Assessment    Type and Reason for Visit: Reassess    Nutrition Recommendations:   · Continue current diet  · Restart frozen supplements    Nutrition Assessment: Pt intake has remains at greater than 76% of most of her meals. Will continue current diet and reorder supplements. Pt is now at moderate risk    Malnutrition Assessment:  · Malnutrition Status: Meets the criteria for severe malnutrition  · Context: Social or environmental circumstances  · Findings of the 6 clinical characteristics of malnutrition (Minimum of 2 out of 6 clinical characteristics is required to make the diagnosis of moderate or severe Protein Calorie Malnutrition based on AND/ASPEN Guidelines):  1. Energy Intake-Less than or equal to 50% of estimated energy requirement, Greater than or equal to 3 months    2. Weight Loss-10% loss or greater, in 6 months  3. Fat Loss-Unable to assess  4. Muscle Loss-Unable to assess  5. Fluid Accumulation-Mild fluid accumulation, Generalized  6.  Strength-Not measured    Nutrition Risk Level:  Moderate    Nutrient Needs:  · Estimated Daily Total Kcal: 5487-3553 based on 30-35 kcal/kg/current BW  · Estimated Daily Protein (g): 55-64 based on 1.2-1.4 g/kg/current BW  · Estimated Daily Total Fluid (ml/day): 1605-7977 based on 1 mL/kcal    Nutrition Diagnosis:   · Problem: Severe malnutrition, In context of social or environmental circumstances  · Etiology: related to Insufficient energy/nutrient consumption     Signs and symptoms:  as evidenced by BMI, Weight loss greater than or equal to 10% in 6 months    Objective Information:  · Wound Type: None  · Current Nutrition Therapies:  · Oral Diet Orders: General   · Oral Diet intake: 51-75%, %  · Oral Nutrition Supplement (ONS) Orders: None  · Anthropometric Measures:  · Ht: 5' 6\" (167.6 cm)   · Current Body Wt: 101 lb (45.8 kg)  · Admission Body Wt: 101 lb (45.8 kg)  · Usual Body Wt: 117 lb (53.1 kg)  · % Weight Change: -13.6% in 5

## 2020-06-03 LAB
ALBUMIN SERPL-MCNC: 3.1 GM/DL (ref 3.4–5)
ANION GAP SERPL CALCULATED.3IONS-SCNC: 12 MMOL/L (ref 4–16)
BUN BLDV-MCNC: 9 MG/DL (ref 6–23)
CALCIUM SERPL-MCNC: 7.7 MG/DL (ref 8.3–10.6)
CHLORIDE BLD-SCNC: 104 MMOL/L (ref 99–110)
CO2: 26 MMOL/L (ref 21–32)
CREAT SERPL-MCNC: 0.6 MG/DL (ref 0.6–1.1)
DOSE AMOUNT: NORMAL
DOSE TIME: NORMAL
GFR AFRICAN AMERICAN: >60 ML/MIN/1.73M2
GFR NON-AFRICAN AMERICAN: >60 ML/MIN/1.73M2
GLUCOSE BLD-MCNC: 100 MG/DL (ref 70–99)
HCT VFR BLD CALC: 26.1 % (ref 37–47)
HEMOGLOBIN: 7.9 GM/DL (ref 12.5–16)
HIGH SENSITIVE C-REACTIVE PROTEIN: 13.1 MG/L
MCH RBC QN AUTO: 35.6 PG (ref 27–31)
MCHC RBC AUTO-ENTMCNC: 30.3 % (ref 32–36)
MCV RBC AUTO: 117.6 FL (ref 78–100)
PDW BLD-RTO: 16.4 % (ref 11.7–14.9)
PHOSPHORUS: 4.2 MG/DL (ref 2.5–4.9)
PLATELET # BLD: 241 K/CU MM (ref 140–440)
PMV BLD AUTO: 10 FL (ref 7.5–11.1)
POTASSIUM SERPL-SCNC: 4.2 MMOL/L (ref 3.5–5.1)
PROCALCITONIN: 0.04
RBC # BLD: 2.22 M/CU MM (ref 4.2–5.4)
SODIUM BLD-SCNC: 142 MMOL/L (ref 135–145)
VANCOMYCIN TROUGH: 19.5 UG/ML (ref 10–20)
WBC # BLD: 5.1 K/CU MM (ref 4–10.5)

## 2020-06-03 PROCEDURE — 80069 RENAL FUNCTION PANEL: CPT

## 2020-06-03 PROCEDURE — 86141 C-REACTIVE PROTEIN HS: CPT

## 2020-06-03 PROCEDURE — 80202 ASSAY OF VANCOMYCIN: CPT

## 2020-06-03 PROCEDURE — 99232 SBSQ HOSP IP/OBS MODERATE 35: CPT | Performed by: NURSE PRACTITIONER

## 2020-06-03 PROCEDURE — 2580000003 HC RX 258: Performed by: SURGERY

## 2020-06-03 PROCEDURE — 6360000002 HC RX W HCPCS: Performed by: SURGERY

## 2020-06-03 PROCEDURE — APPSS15 APP SPLIT SHARED TIME 0-15 MINUTES: Performed by: NURSE PRACTITIONER

## 2020-06-03 PROCEDURE — 2580000003 HC RX 258: Performed by: INTERNAL MEDICINE

## 2020-06-03 PROCEDURE — 99024 POSTOP FOLLOW-UP VISIT: CPT | Performed by: SURGERY

## 2020-06-03 PROCEDURE — 99232 SBSQ HOSP IP/OBS MODERATE 35: CPT | Performed by: INTERNAL MEDICINE

## 2020-06-03 PROCEDURE — 6360000002 HC RX W HCPCS: Performed by: INTERNAL MEDICINE

## 2020-06-03 PROCEDURE — 99222 1ST HOSP IP/OBS MODERATE 55: CPT | Performed by: OBSTETRICS & GYNECOLOGY

## 2020-06-03 PROCEDURE — 85027 COMPLETE CBC AUTOMATED: CPT

## 2020-06-03 PROCEDURE — 6370000000 HC RX 637 (ALT 250 FOR IP): Performed by: SURGERY

## 2020-06-03 PROCEDURE — 80048 BASIC METABOLIC PNL TOTAL CA: CPT

## 2020-06-03 PROCEDURE — 94640 AIRWAY INHALATION TREATMENT: CPT

## 2020-06-03 PROCEDURE — 84145 PROCALCITONIN (PCT): CPT

## 2020-06-03 PROCEDURE — 94761 N-INVAS EAR/PLS OXIMETRY MLT: CPT

## 2020-06-03 PROCEDURE — 2140000000 HC CCU INTERMEDIATE R&B

## 2020-06-03 RX ADMIN — ACETAMINOPHEN 650 MG: 325 TABLET ORAL at 15:15

## 2020-06-03 RX ADMIN — DULOXETINE 60 MG: 30 CAPSULE, DELAYED RELEASE ORAL at 08:40

## 2020-06-03 RX ADMIN — SODIUM CHLORIDE, PRESERVATIVE FREE 10 ML: 5 INJECTION INTRAVENOUS at 21:04

## 2020-06-03 RX ADMIN — Medication 1 CAPSULE: at 16:34

## 2020-06-03 RX ADMIN — CLOPIDOGREL BISULFATE 75 MG: 75 TABLET ORAL at 08:41

## 2020-06-03 RX ADMIN — VANCOMYCIN HYDROCHLORIDE 1000 MG: 1 INJECTION, SOLUTION INTRAVENOUS at 10:03

## 2020-06-03 RX ADMIN — SODIUM CHLORIDE, PRESERVATIVE FREE 10 ML: 5 INJECTION INTRAVENOUS at 08:41

## 2020-06-03 RX ADMIN — ACETAMINOPHEN 650 MG: 325 TABLET ORAL at 08:39

## 2020-06-03 RX ADMIN — APIXABAN 5 MG: 5 TABLET, FILM COATED ORAL at 08:41

## 2020-06-03 RX ADMIN — FERROUS GLUCONATE TAB 324 MG (37.5 MG ELEMENTAL IRON) 324 MG: 324 (37.5 FE) TAB at 08:41

## 2020-06-03 RX ADMIN — MIRTAZAPINE 30 MG: 15 TABLET, FILM COATED ORAL at 21:04

## 2020-06-03 RX ADMIN — LEVETIRACETAM 1000 MG: 500 TABLET, FILM COATED ORAL at 21:03

## 2020-06-03 RX ADMIN — FAMOTIDINE 20 MG: 20 TABLET, FILM COATED ORAL at 08:41

## 2020-06-03 RX ADMIN — VANCOMYCIN HYDROCHLORIDE 1000 MG: 1 INJECTION, SOLUTION INTRAVENOUS at 22:48

## 2020-06-03 RX ADMIN — Medication 1 CAPSULE: at 12:08

## 2020-06-03 RX ADMIN — GENTAMICIN SULFATE 55 MG: 40 INJECTION, SOLUTION INTRAMUSCULAR; INTRAVENOUS at 08:53

## 2020-06-03 RX ADMIN — MIDODRINE HYDROCHLORIDE 10 MG: 5 TABLET ORAL at 12:07

## 2020-06-03 RX ADMIN — Medication 1 CAPSULE: at 08:40

## 2020-06-03 RX ADMIN — Medication 800 MG: at 21:04

## 2020-06-03 RX ADMIN — Medication 800 MG: at 08:41

## 2020-06-03 RX ADMIN — OXYCODONE HYDROCHLORIDE 20 MG: 10 TABLET ORAL at 02:45

## 2020-06-03 RX ADMIN — AMIODARONE HYDROCHLORIDE 200 MG: 200 TABLET ORAL at 08:41

## 2020-06-03 RX ADMIN — MIDODRINE HYDROCHLORIDE 10 MG: 5 TABLET ORAL at 08:40

## 2020-06-03 RX ADMIN — ATORVASTATIN CALCIUM 80 MG: 80 TABLET, FILM COATED ORAL at 21:04

## 2020-06-03 RX ADMIN — GENTAMICIN SULFATE 55 MG: 40 INJECTION, SOLUTION INTRAMUSCULAR; INTRAVENOUS at 01:54

## 2020-06-03 RX ADMIN — GENTAMICIN SULFATE 55 MG: 40 INJECTION, SOLUTION INTRAMUSCULAR; INTRAVENOUS at 16:34

## 2020-06-03 RX ADMIN — OXYCODONE HYDROCHLORIDE 20 MG: 10 TABLET ORAL at 15:15

## 2020-06-03 RX ADMIN — OXYCODONE HYDROCHLORIDE 20 MG: 10 TABLET ORAL at 08:39

## 2020-06-03 RX ADMIN — APIXABAN 5 MG: 5 TABLET, FILM COATED ORAL at 21:04

## 2020-06-03 RX ADMIN — IPRATROPIUM BROMIDE 2 PUFF: 17 AEROSOL, METERED RESPIRATORY (INHALATION) at 16:43

## 2020-06-03 RX ADMIN — LEVOTHYROXINE SODIUM 224 MCG: 112 TABLET ORAL at 08:55

## 2020-06-03 RX ADMIN — OXYCODONE HYDROCHLORIDE 20 MG: 10 TABLET ORAL at 18:31

## 2020-06-03 RX ADMIN — MIDODRINE HYDROCHLORIDE 10 MG: 5 TABLET ORAL at 16:34

## 2020-06-03 RX ADMIN — ALBUTEROL SULFATE 2 PUFF: 90 AEROSOL, METERED RESPIRATORY (INHALATION) at 16:42

## 2020-06-03 RX ADMIN — OXYCODONE HYDROCHLORIDE 20 MG: 10 TABLET ORAL at 21:55

## 2020-06-03 RX ADMIN — ACETAMINOPHEN 650 MG: 325 TABLET ORAL at 21:04

## 2020-06-03 RX ADMIN — LEVETIRACETAM 1000 MG: 500 TABLET, FILM COATED ORAL at 08:41

## 2020-06-03 RX ADMIN — OXYCODONE HYDROCHLORIDE 20 MG: 10 TABLET ORAL at 12:07

## 2020-06-03 ASSESSMENT — PAIN DESCRIPTION - ORIENTATION
ORIENTATION: RIGHT
ORIENTATION: RIGHT

## 2020-06-03 ASSESSMENT — PAIN DESCRIPTION - LOCATION
LOCATION: CHEST;HIP;SHOULDER

## 2020-06-03 ASSESSMENT — PAIN DESCRIPTION - PROGRESSION
CLINICAL_PROGRESSION: NOT CHANGED

## 2020-06-03 ASSESSMENT — PAIN SCALES - GENERAL
PAINLEVEL_OUTOF10: 9
PAINLEVEL_OUTOF10: 8
PAINLEVEL_OUTOF10: 5
PAINLEVEL_OUTOF10: 9
PAINLEVEL_OUTOF10: 10
PAINLEVEL_OUTOF10: 0
PAINLEVEL_OUTOF10: 0
PAINLEVEL_OUTOF10: 5
PAINLEVEL_OUTOF10: 0
PAINLEVEL_OUTOF10: 9
PAINLEVEL_OUTOF10: 5
PAINLEVEL_OUTOF10: 7
PAINLEVEL_OUTOF10: 8

## 2020-06-03 ASSESSMENT — PAIN DESCRIPTION - ONSET
ONSET: ON-GOING

## 2020-06-03 ASSESSMENT — PAIN DESCRIPTION - DESCRIPTORS
DESCRIPTORS: ACHING;BURNING
DESCRIPTORS: ACHING;BURNING
DESCRIPTORS: ACHING;BURNING;DISCOMFORT
DESCRIPTORS: ACHING;BURNING;DISCOMFORT

## 2020-06-03 ASSESSMENT — PAIN DESCRIPTION - PAIN TYPE
TYPE: CHRONIC PAIN

## 2020-06-03 ASSESSMENT — PAIN DESCRIPTION - FREQUENCY
FREQUENCY: CONTINUOUS

## 2020-06-03 ASSESSMENT — PAIN DESCRIPTION - DIRECTION
RADIATING_TOWARDS: BACK

## 2020-06-03 NOTE — PROGRESS NOTES
Electrophysiology Inpatient Follow Up Note    Admit Date:  5/23/2020    Admission diagnosis / Complaint : ICD removal?      Subjective:  Ms. Carol Chin denies any chest pain, no shortness of breath, no palpitations. Objective:   /64   Pulse 60   Temp 98.7 °F (37.1 °C) (Axillary)   Resp 12   Ht 5' 6\" (1.676 m)   Wt 122 lb (55.3 kg)   SpO2 95%   BMI 19.69 kg/m²       Intake/Output Summary (Last 24 hours) at 6/3/2020 6525  Last data filed at 6/3/2020 0524  Gross per 24 hour   Intake 600 ml   Output 1975 ml   Net -1375 ml       TELEMETRY: A-paced    has a past medical history of Acute delirium, Arrhythmia, Arthritis, Asplenia, Asthma, CAD (coronary artery disease), Cardiomyopathy ([address] Cerebral artery occlusion with cerebral infarction ([address] CHF (congestive heart failure) ([address] Enlarged liver, GERD (gastroesophageal reflux disease), H/O echocardiogram, History of blood transfusion, Hx of cardiovascular stress test, Hyperlipidemia, Hypertension, Lymphoma ([address] MI, old, Movement disorder, MS (multiple sclerosis) ([address] OP (osteoporosis), PE (pulmonary embolism), Pneumonia, Pyelonephritis, Seizures ([address] Spleen enlarged, and Thyroid disease. has a past surgical history that includes Coronary angioplasty with stent; Hysterectomy; Appendectomy; Cholecystectomy; Tonsillectomy; Gastric bypass surgery; Cardiac defibrillator placement; hip surgery; Abdomen surgery; Colonoscopy; Endoscopy, colon, diagnostic; hernia repair; joint replacement; skin biopsy; vascular surgery; Coronary artery bypass graft; and Port Surgery (N/A, 6/1/2020). Physical Exam:  Physical Exam  Constitutional:       Comments: malnourished    HENT:      Head: Normocephalic. Mouth/Throat:      Mouth: Mucous membranes are moist.      Pharynx: Oropharynx is clear. Eyes:      Pupils: Pupils are equal, round, and reactive to light. Cardiovascular:      Heart sounds: Murmur present. Pulmonary:      Breath sounds:  No wheezing or rhonchi. Abdominal:      General: Abdomen is flat. Palpations: Abdomen is soft. Musculoskeletal:      Comments: Decreased ROM of lower extremities    Skin:     General: Skin is warm and dry. Neurological:      Mental Status: She is alert and oriented to person, place, and time. Motor: Weakness present. Medications:    gentamicin  55 mg Intravenous Q8H    sodium chloride flush  10 mL Intravenous 2 times per day    midodrine  10 mg Oral TID WC    vancomycin  1,000 mg Intravenous Q12H    albuterol sulfate HFA  2 puff Inhalation 4x daily    And    ipratropium  2 puff Inhalation 4x daily    amiodarone  200 mg Oral Daily    apixaban  5 mg Oral BID    atorvastatin  80 mg Oral Nightly    clopidogrel  75 mg Oral Daily    DULoxetine  60 mg Oral Daily    famotidine  20 mg Oral Daily    ferrous gluconate  324 mg Oral Every Other Day    Icosapent Ethyl  500 mg Oral BID    lactobacillus  1 capsule Oral TID WC    levETIRAcetam  1,000 mg Oral BID    levothyroxine  224 mcg Oral Daily    magnesium oxide  800 mg Oral BID    mirtazapine  30 mg Oral Nightly    lidocaine PF  5 mL Intradermal Once    sodium chloride flush  10 mL Intravenous 2 times per day      dextrose       oxyCODONE-acetaminophen, sodium chloride flush, glucose, dextrose, glucagon (rDNA), dextrose, benzocaine-menthol, docusate sodium, nitroGLYCERIN, ondansetron, torsemide, acetaminophen **OR** acetaminophen, polyethylene glycol, promethazine **OR** ondansetron, oxyCODONE, sodium chloride flush    Lab Data:  CBC:   Recent Labs     06/01/20  0425 06/01/20  2116   WBC 4.1 5.3   HGB 8.1* 10.1*   HCT 26.4* 37.4   .3* 134.5*    266     BMP:   Recent Labs     06/01/20  0425 06/01/20  2116    143   K 4.5 4.8    105   CO2 31 24   PHOS 3.5 3.4   BUN 11 9   CREATININE 0.6 0.5*     LIVER PROFILE: No results for input(s): AST, ALT, LIPASE, BILIDIR, BILITOT, ALKPHOS in the last 72 hours.     Invalid input(s):

## 2020-06-03 NOTE — PROGRESS NOTES
Today's plan: Appreciate EP input. Not recommended for device removal.  Continue present care cardiology to sign off please call if further consultation is needed. Admit Date:  5/23/2020    Subjective: Resting in bed denies complaints. Chief complaints on admission  Chief Complaint   Patient presents with    Fever     103-105 since 3pm    Tremors         History of present illness:Demetria is a 63 y. o.year old who  presents with had concerns including Fever (103-105 since 3pm) and Tremors. Past medical history:    has a past medical history of Acute delirium, Arrhythmia, Arthritis, Asplenia, Asthma, CAD (coronary artery disease), Cardiomyopathy ([address] Cerebral artery occlusion with cerebral infarction ([address] CHF (congestive heart failure) ([address] Enlarged liver, GERD (gastroesophageal reflux disease), H/O echocardiogram, History of blood transfusion, Hx of cardiovascular stress test, Hyperlipidemia, Hypertension, Lymphoma ([address] MI, old, Movement disorder, MS (multiple sclerosis) ([address] OP (osteoporosis), PE (pulmonary embolism), Pneumonia, Pyelonephritis, Seizures ([address] Spleen enlarged, and Thyroid disease. Past surgical history:   has a past surgical history that includes Coronary angioplasty with stent; Hysterectomy; Appendectomy; Cholecystectomy; Tonsillectomy; Gastric bypass surgery; Cardiac defibrillator placement; hip surgery; Abdomen surgery; Colonoscopy; Endoscopy, colon, diagnostic; hernia repair; joint replacement; skin biopsy; vascular surgery; Coronary artery bypass graft; and Port Surgery (N/A, 6/1/2020). Social History:   reports that she has never smoked. She has never used smokeless tobacco. She reports that she does not drink alcohol or use drugs.   Family history:  family history includes Cancer in her father; Diabetes in her mother; Heart Disease in her father, maternal grandmother, and mother; High Blood Pressure in her maternal grandmother and mother; High Cholesterol in her K 4.5 4.8    105   CO2 31 24   PHOS 3.5 3.4   BUN 11 9   CREATININE 0.6 0.5*     Assessment and Plan:  1. CAD h/o LAD PCI THRU LIMA: Continue aspirin and plavix continue statins, could not tolerate BB. Continue Midodrine  2. Bactremia: MRI completed . Appreciate EP input. Will not remove device. ID following recommended long-term antibiotic therapy  3. h/o PE: on eliquis  4. ICM: s/p ICD, h/o vtach and vfib, will , ? afib also, continue eliquis, plavix, and amiodarone. 5. H/o cva: stable  6. HTN: controlled, no current medications  7. MS: stable  8. Seizure: stable    JHON Pace 6/3/2020 11:12 AM     I have seen ,spoken to  and examined this patient personally, independently of the nurse practitioner. I have reviewed the hospital care given to date and reviewed all pertinent labs and imaging. The plan was developed mutually at the time of the visit with the patient,  NP  and myself. I have spoken with patient, nursing staff and provided written and verbal instructions . The above note has been reviewed and I agree with the assessment, diagnosis, and treatment plan with changes made by me as follows     CARDIOLOGY ATTENDING ADDENDUM    HPI:  I have reviewed the above HPI  And agree with above   Alai Sheikh is a 63 y. o.year old who and presents with had concerns including Fever (103-105 since 3pm) and Tremors.   Chief Complaint   Patient presents with    Fever     103-105 since 3pm    Tremors     Interval history:  Patient had MRI of spine, EP did not recommend to d/c PPM    Physical Exam:  General:  Awake, alert, NAD  Head:normal  Eye:normal  Neck:  No JVD   Chest:  Clear to auscultation, respiration easy  Cardiovascular:  RRR S1S2  Abdomen:   nontender  Extremities:  RT forearm mild errythema,no edema  Pulses; palpable  Neuro: grossly normal      MEDICAL DECISION MAKING;    I agree with the above plan, which was planned by myself and discussed with NP.

## 2020-06-03 NOTE — CONSULTS
Palliative Medicine Consultation    Reason for Consult:      __X___ Advance Care Planning  __X___Transition of Care Planning  __X___ Psychosocial Support  _____ Symptom Management:    Recommendations:    1. Continue with the excellent care of this patient with multiple medical problems. 2.  Patient would consider hospice but will not deactivate her defibrillator. 3.  She is happy with the way things are going at this time with [employer] and her pain control. 4.  Please call us if we can be of further assistance. Requesting Physician:   Dr. Lamar Woodard    CHIEF COMPLAINT:  sepsis    History Obtained From:  patient, electronic medical record    HISTORY OF PRESENT ILLNESS:    63year old admitted with sepsis and found to have MRSA bacteremia and was placed on antibiotics. She has a history of ischemic cardiomyopathy with an EF 25-35%. She also has an AICD in place. The patient has chronic chest pain and is seen by [facility] as an outpatient and takes care of her pain. Palliative Care was asked to see the patient for goals of care and support. Past Medical History:        Diagnosis Date    Acute delirium     Arrhythmia     Arthritis     Asplenia     Asthma     CAD (coronary artery disease)     1st stent at age 46    Cardiomyopathy [facility])     Cerebral artery occlusion with cerebral infarction ([facility] [address]     CHF (congestive heart failure) (HCC)     Enlarged liver     GERD (gastroesophageal reflux disease)     H/O echocardiogram 4/6/16    EF 55%, trivial TR & MR, stage 1 diastolic dysfunction    History of blood transfusion     Hx of cardiovascular stress test 12/29/2015    Alessia: EF 45%. Pharmacologic stress myocardial perfusion scan shows a fixed inferior-lateral defect w/ no inducible ischemia. No evidence of ischemia. Inferior-lateral infarction.  Normal LVSF    Hyperlipidemia     Hypertension     Lymphoma (HCC)     Denies    MI, old     Movement BID  atorvastatin (LIPITOR) tablet 80 mg, 80 mg, Oral, Nightly  benzocaine-menthol (CEPACOL SORE THROAT) lozenge 1 lozenge, 1 lozenge, Oral, Q2H PRN  clopidogrel (PLAVIX) tablet 75 mg, 75 mg, Oral, Daily  docusate sodium (COLACE) capsule 100 mg, 100 mg, Oral, BID PRN  DULoxetine (CYMBALTA) extended release capsule 60 mg, 60 mg, Oral, Daily  famotidine (PEPCID) tablet 20 mg, 20 mg, Oral, Daily  ferrous gluconate 324 (37.5 Fe) MG tablet 324 mg, 324 mg, Oral, Every Other Day  Icosapent Ethyl CAPS 500 mg, 500 mg, Oral, BID  lactobacillus (CULTURELLE) capsule 1 capsule, 1 capsule, Oral, TID WC  levETIRAcetam (KEPPRA) tablet 1,000 mg, 1,000 mg, Oral, BID  levothyroxine (SYNTHROID) tablet 224 mcg, 224 mcg, Oral, Daily  magnesium oxide (MAG-OX) tablet 800 mg, 800 mg, Oral, BID  nitroGLYCERIN (NITROSTAT) SL tablet 0.4 mg, 0.4 mg, Sublingual, Q5 Min PRN  ondansetron (ZOFRAN-ODT) disintegrating tablet 4 mg, 4 mg, Oral, Q6H PRN  torsemide (DEMADEX) tablet 20 mg, 20 mg, Oral, Daily PRN  acetaminophen (TYLENOL) tablet 650 mg, 650 mg, Oral, Q6H PRN **OR** acetaminophen (TYLENOL) suppository 650 mg, 650 mg, Rectal, Q6H PRN  polyethylene glycol (GLYCOLAX) packet 17 g, 17 g, Oral, Daily PRN  promethazine (PHENERGAN) tablet 12.5 mg, 12.5 mg, Oral, Q6H PRN **OR** ondansetron (ZOFRAN) injection 4 mg, 4 mg, Intravenous, Q6H PRN  oxyCODONE HCl (OXY-IR) immediate release tablet 20 mg, 20 mg, Oral, Q3H PRN  mirtazapine (REMERON) tablet 30 mg, 30 mg, Oral, Nightly  lidocaine PF 1 % injection 5 mL, 5 mL, Intradermal, Once  sodium chloride flush 0.9 % injection 10 mL, 10 mL, Intravenous, 2 times per day  sodium chloride flush 0.9 % injection 10 mL, 10 mL, Intravenous, PRN    Allergies:  Fentanyl; Moxifloxacin hcl in nacl; Povidone-iodine; Cyclobenzaprine; Methocarbamol; Tramadol; Baclofen; Ibuprofen; Naproxen; Nsaids; Tizanidine;  Tolmetin; Tramadol hcl; Betadine [povidone iodine]; and Moxifloxacin    Social History:    Currently lives with her

## 2020-06-03 NOTE — PROGRESS NOTES
lobe and to a   lesser degree the bilateral lower lobes likely representing multifocal   pneumonia. 2. 4 mm nodule in the left lower lobe not seen on the previous exam.  7 mm   nodule in the right lower lobe unchanged from at least 2017.       RECOMMENDATIONS:   Fleischner Society guidelines for follow-up and management of incidentally   detected pulmonary nodules:       Multiple Solid Nodules:       Nodule size less than 6 mm   In a low-risk patient, no routine follow-up. In a high-risk patient, optional CT at 12 months.       - Low risk patients include individuals with minimal or absent history of   smoking and other known risk factors.       - High risk patients include individuals with a history or smoking or known   risk factors.       Radiology 2017 http://pubs. rsna.org/doi/full/10.1148/radiol. 2480691233      5/29/20 CT Abdomen Pelvis W IV Contrast:  Impression   No evidence of intra-abdominal abscess or other signs of infection.  Status   post splenectomy.  Stable IVC filter.  Constipation.      5/30/20 CT Radius Ulna Right W WO Contrast:  Impression   1. Moderate to severe cellulitis of the right elbow and right forearm.  Soft   tissue swelling and fluid overlying the olecranon. 2. No aggressive osseous destruction.  No acute fracture or dislocation. 3. Mild osteoarthrosis. 4. Small elbow joint effusion.      6/1/20 MRI Thoracic Spine WO Contrast;  Impression   1. Unremarkable MRI of the thoracic spine.  No evidence of infection.      6/1/20 MRI Lumbar Spine WO Contrast:  Impression   1. There is a central disc protrusion at L5-S1 with minimal central spinal   canal narrowing and mild right foraminal narrowing.    2. Otherwise unremarkable MRI of the lumbar spine.      5/28/20 DULCE:  Summary   Left ventricular function is decreased.   Trace aortic regurgitation is noted.   Mild to moderate mitral regurgitation is present.   Device wiring visualized in the right heart.   There was no thrombus noted in the left atrial appendage.   Positive bubble study. PFO suggested.   No significant valvular disease , No vegetation noted   Clinical correlation needed     6/1/20 FL less than 1 hour:  Impression   Right subcutaneous medical infusion port placement with the catheter tip   extending to the atrial-caval junction. Labs:    No results found for this or any previous visit (from the past 24 hour(s)).   CULTURE results: Invalid input(s): BLOOD CULTURE,  URINE CULTURE, SURGICAL CULTURE    Diagnosis:  Patient Active Problem List   Diagnosis    Chest pain    Chest pain    CAD (coronary artery disease)    Hyperlipidemia    Thyroid disease    Acute exacerbation of CHF (congestive heart failure) (HCC)    CHF exacerbation (HCC)    [facility]estive heart failure ([facility])    Left upper quadrant pain    Pneumonia of left lower lobe due to infectious organism ([facility] [address]    Angina at rest [facility])    Coronary artery disease involving coronary bypass graft of native heart with angina pectoris ([facility])    Shortness of breath    Ischemic cardiomyopathy    Acute metabolic encephalopathy    Acute delirium    Pneumonia    Sacral pain    Acute on chronic systolic CHF (congestive heart failure), NYHA class 3 ([facility])    Severe malnutrition ([facility])    Cellulitis    Cellulitis at gastrostomy tube site ([facility] [address]    History of Maru-en-Y gastric bypass    Gastroparesis    Abdominal pain    Feeding tube dysfunction    Abdominal pain, epigastric    Gastrojejunostomy tube status (HCC)    Chronic malnutrition (HCC)    Asplenia    Shockable cardiac rhythm detected by automated external defibrillator    Financial difficulties    Chest pain at rest    Closed nondisplaced transverse fracture of left patella    Contusion of right knee    Multifocal pneumonia    Abnormal cardiac function test    Chronic systolic heart failure (HCC)    Seizure disorder (HCC)    Hypothyroidism    Cardiomyopathy (HCC)    Acute chest pain    Fever in

## 2020-06-03 NOTE — PROGRESS NOTES
 apixaban  5 mg Oral BID    atorvastatin  80 mg Oral Nightly    clopidogrel  75 mg Oral Daily    DULoxetine  60 mg Oral Daily    famotidine  20 mg Oral Daily    ferrous gluconate  324 mg Oral Every Other Day    Icosapent Ethyl  500 mg Oral BID    lactobacillus  1 capsule Oral TID WC    levETIRAcetam  1,000 mg Oral BID    levothyroxine  224 mcg Oral Daily    magnesium oxide  800 mg Oral BID    mirtazapine  30 mg Oral Nightly    lidocaine PF  5 mL Intradermal Once    sodium chloride flush  10 mL Intravenous 2 times per day     Continuous Infusions:   dextrose       PRN Meds:oxyCODONE-acetaminophen, sodium chloride flush, glucose, dextrose, glucagon (rDNA), dextrose, benzocaine-menthol, docusate sodium, nitroGLYCERIN, ondansetron, torsemide, acetaminophen **OR** acetaminophen, polyethylene glycol, promethazine **OR** ondansetron, oxyCODONE, sodium chloride flush        ASSESSMENT and PLAN  Hospital Day: 12    Septic shock with MRSA bacteremia on 5/24, suspect ICD infection  Repeat blood culture on 5/28 negative to date, on vancomycin. DULCE with no vegetation- CT abd/pelvis negative, CT chest with concerns of pneumonia. with chronic hypotension- now on midodrne  Blood cultures 5/28: Negative. MRI T-spine unremarkable. MRI L-spine: Central disc protrusion at L5-S1; otherwise unremarkable. Off meropenem. Continue vancomycin. Mediport placed 6/1. ICD has been removed 3 times. Patient at risk for sudden cardiac death  - Patient refusing to have ICD removed. On vancomycin. ID starting gentamicin. Cannot use rifampin due to medication reactions. IV antibiotics for 6 weeks. Patient will need 6 weeks of IV vancomycin and 2 weeks of IV gentamicin. She will need outpatient follow-up in 1 week with ID. Probable pneumonia   on CT- reports some shortness of breath prior to presentation- trend procalcitonin. On room air and no ongoing shortness of brath  Procalcitonin improved.     ?Right arm thrombophlebitis  abx as above- CT with no abscess. Seen by surgery    ? UTI  urine culture negative so far. No need for further treatment. Nonischemic cardiomyopathy with EF 30 to 35% / CAD / VT- s/p ICD, s/p CABG  Patient has had multiple shocks from ICD in the past.  High risk for sudden cardiac death if ICD removed. EP did discuss with ID about ICD removal.    Anemia of chronic disease  -stable    Other chronic issues     -History of VTE/ PE  -Hypothyroidism  -Seizure Disorder  -Severe PCM- needs to increase calorie intake  -Hx of gastric bypass              Diet DIET GENERAL;  Dietary Nutrition Supplements: Frozen Oral Supplement   DVT Prophylaxis [] Lovenox, []  Heparin, [] SCDs, [] Ambulation   GI Prophylaxis [] PPI,  [] H2 Blocker,  [] Carafate,  [] Diet/Tube Feeds   Code Status Full Code   Disposition  hopeful for DC tomorrow with IV antibiotics with home health care. Check with case management for arrangement.    CMS Level of Risk [] Low, [] Moderate,[x]  High  Patient's risk as above due to ant Rubio MD 6/3/2020 5:25 PM

## 2020-06-03 NOTE — PROGRESS NOTES
Patient refused bath at this time. Stated Afshan Rise took one last night and bed linen was changed. \"

## 2020-06-04 LAB
ALBUMIN SERPL-MCNC: 3.2 GM/DL (ref 3.4–5)
ANION GAP SERPL CALCULATED.3IONS-SCNC: 10 MMOL/L (ref 4–16)
BUN BLDV-MCNC: 9 MG/DL (ref 6–23)
CALCIUM SERPL-MCNC: 8.3 MG/DL (ref 8.3–10.6)
CHLORIDE BLD-SCNC: 107 MMOL/L (ref 99–110)
CO2: 28 MMOL/L (ref 21–32)
CREAT SERPL-MCNC: 0.6 MG/DL (ref 0.6–1.1)
DOSE AMOUNT: NORMAL
DOSE TIME: NORMAL
GENTAMICIN TROUGH: 0.8 UG/ML
GFR AFRICAN AMERICAN: >60 ML/MIN/1.73M2
GFR NON-AFRICAN AMERICAN: >60 ML/MIN/1.73M2
GLUCOSE BLD-MCNC: 118 MG/DL (ref 70–99)
HCT VFR BLD CALC: 27.4 % (ref 37–47)
HEMOGLOBIN: 8 GM/DL (ref 12.5–16)
MCH RBC QN AUTO: 34.6 PG (ref 27–31)
MCHC RBC AUTO-ENTMCNC: 29.2 % (ref 32–36)
MCV RBC AUTO: 118.6 FL (ref 78–100)
PDW BLD-RTO: 16.4 % (ref 11.7–14.9)
PHOSPHORUS: 4 MG/DL (ref 2.5–4.9)
PLATELET # BLD: 245 K/CU MM (ref 140–440)
PMV BLD AUTO: 10 FL (ref 7.5–11.1)
POTASSIUM SERPL-SCNC: 4 MMOL/L (ref 3.5–5.1)
PROCALCITONIN: 0.04
RBC # BLD: 2.31 M/CU MM (ref 4.2–5.4)
SODIUM BLD-SCNC: 145 MMOL/L (ref 135–145)
WBC # BLD: 6.2 K/CU MM (ref 4–10.5)

## 2020-06-04 PROCEDURE — 6370000000 HC RX 637 (ALT 250 FOR IP): Performed by: SURGERY

## 2020-06-04 PROCEDURE — 94761 N-INVAS EAR/PLS OXIMETRY MLT: CPT

## 2020-06-04 PROCEDURE — 6360000002 HC RX W HCPCS: Performed by: SURGERY

## 2020-06-04 PROCEDURE — 99232 SBSQ HOSP IP/OBS MODERATE 35: CPT | Performed by: NURSE PRACTITIONER

## 2020-06-04 PROCEDURE — 80069 RENAL FUNCTION PANEL: CPT

## 2020-06-04 PROCEDURE — 2580000003 HC RX 258: Performed by: SURGERY

## 2020-06-04 PROCEDURE — 80048 BASIC METABOLIC PNL TOTAL CA: CPT

## 2020-06-04 PROCEDURE — 36415 COLL VENOUS BLD VENIPUNCTURE: CPT

## 2020-06-04 PROCEDURE — 85027 COMPLETE CBC AUTOMATED: CPT

## 2020-06-04 PROCEDURE — 94640 AIRWAY INHALATION TREATMENT: CPT

## 2020-06-04 PROCEDURE — 80170 ASSAY OF GENTAMICIN: CPT

## 2020-06-04 PROCEDURE — 99024 POSTOP FOLLOW-UP VISIT: CPT | Performed by: SURGERY

## 2020-06-04 PROCEDURE — 84145 PROCALCITONIN (PCT): CPT

## 2020-06-04 PROCEDURE — 2140000000 HC CCU INTERMEDIATE R&B

## 2020-06-04 RX ORDER — MIDODRINE HYDROCHLORIDE 10 MG/1
10 TABLET ORAL
Qty: 90 TABLET | Refills: 0 | Status: ON HOLD | OUTPATIENT
Start: 2020-06-04 | End: 2021-02-11 | Stop reason: SDUPTHER

## 2020-06-04 RX ORDER — HEPARIN SODIUM (PORCINE) LOCK FLUSH IV SOLN 100 UNIT/ML 100 UNIT/ML
500 SOLUTION INTRAVENOUS PRN
Status: DISCONTINUED | OUTPATIENT
Start: 2020-06-04 | End: 2020-06-05 | Stop reason: HOSPADM

## 2020-06-04 RX ADMIN — ONDANSETRON HYDROCHLORIDE 4 MG: 2 SOLUTION INTRAMUSCULAR; INTRAVENOUS at 21:06

## 2020-06-04 RX ADMIN — DULOXETINE 60 MG: 30 CAPSULE, DELAYED RELEASE ORAL at 09:09

## 2020-06-04 RX ADMIN — IPRATROPIUM BROMIDE 2 PUFF: 17 AEROSOL, METERED RESPIRATORY (INHALATION) at 11:55

## 2020-06-04 RX ADMIN — ALBUTEROL SULFATE 2 PUFF: 90 AEROSOL, METERED RESPIRATORY (INHALATION) at 11:54

## 2020-06-04 RX ADMIN — Medication 800 MG: at 09:09

## 2020-06-04 RX ADMIN — ATORVASTATIN CALCIUM 80 MG: 80 TABLET, FILM COATED ORAL at 21:10

## 2020-06-04 RX ADMIN — Medication 1 CAPSULE: at 15:21

## 2020-06-04 RX ADMIN — LEVOTHYROXINE SODIUM 224 MCG: 112 TABLET ORAL at 05:03

## 2020-06-04 RX ADMIN — Medication 1 CAPSULE: at 09:06

## 2020-06-04 RX ADMIN — OXYCODONE HYDROCHLORIDE 20 MG: 10 TABLET ORAL at 09:07

## 2020-06-04 RX ADMIN — LEVETIRACETAM 1000 MG: 500 TABLET, FILM COATED ORAL at 09:06

## 2020-06-04 RX ADMIN — SODIUM CHLORIDE, PRESERVATIVE FREE 10 ML: 5 INJECTION INTRAVENOUS at 09:15

## 2020-06-04 RX ADMIN — MIDODRINE HYDROCHLORIDE 10 MG: 5 TABLET ORAL at 09:06

## 2020-06-04 RX ADMIN — OXYCODONE HYDROCHLORIDE 20 MG: 10 TABLET ORAL at 05:03

## 2020-06-04 RX ADMIN — APIXABAN 5 MG: 5 TABLET, FILM COATED ORAL at 09:09

## 2020-06-04 RX ADMIN — LEVETIRACETAM 1000 MG: 500 TABLET, FILM COATED ORAL at 21:11

## 2020-06-04 RX ADMIN — OXYCODONE HYDROCHLORIDE 20 MG: 10 TABLET ORAL at 17:43

## 2020-06-04 RX ADMIN — GENTAMICIN SULFATE 55 MG: 40 INJECTION, SOLUTION INTRAMUSCULAR; INTRAVENOUS at 09:44

## 2020-06-04 RX ADMIN — OXYCODONE HYDROCHLORIDE 20 MG: 10 TABLET ORAL at 11:58

## 2020-06-04 RX ADMIN — MIDODRINE HYDROCHLORIDE 10 MG: 5 TABLET ORAL at 18:07

## 2020-06-04 RX ADMIN — APIXABAN 5 MG: 5 TABLET, FILM COATED ORAL at 21:10

## 2020-06-04 RX ADMIN — OXYCODONE HYDROCHLORIDE 20 MG: 10 TABLET ORAL at 01:17

## 2020-06-04 RX ADMIN — CLOPIDOGREL BISULFATE 75 MG: 75 TABLET ORAL at 09:07

## 2020-06-04 RX ADMIN — ACETAMINOPHEN 650 MG: 325 TABLET ORAL at 17:43

## 2020-06-04 RX ADMIN — ACETAMINOPHEN 650 MG: 325 TABLET ORAL at 09:20

## 2020-06-04 RX ADMIN — Medication 800 MG: at 21:12

## 2020-06-04 RX ADMIN — GENTAMICIN SULFATE 55 MG: 40 INJECTION, SOLUTION INTRAMUSCULAR; INTRAVENOUS at 15:21

## 2020-06-04 RX ADMIN — VANCOMYCIN HYDROCHLORIDE 1000 MG: 1 INJECTION, SOLUTION INTRAVENOUS at 23:14

## 2020-06-04 RX ADMIN — Medication 500 UNITS: at 17:43

## 2020-06-04 RX ADMIN — SODIUM CHLORIDE, PRESERVATIVE FREE 10 ML: 5 INJECTION INTRAVENOUS at 09:14

## 2020-06-04 RX ADMIN — OXYCODONE HYDROCHLORIDE 20 MG: 10 TABLET ORAL at 15:21

## 2020-06-04 RX ADMIN — ALBUTEROL SULFATE 2 PUFF: 90 AEROSOL, METERED RESPIRATORY (INHALATION) at 08:04

## 2020-06-04 RX ADMIN — GENTAMICIN SULFATE 55 MG: 40 INJECTION, SOLUTION INTRAMUSCULAR; INTRAVENOUS at 01:17

## 2020-06-04 RX ADMIN — SODIUM CHLORIDE, PRESERVATIVE FREE 10 ML: 5 INJECTION INTRAVENOUS at 21:06

## 2020-06-04 RX ADMIN — AMIODARONE HYDROCHLORIDE 200 MG: 200 TABLET ORAL at 09:07

## 2020-06-04 RX ADMIN — SODIUM CHLORIDE, PRESERVATIVE FREE 10 ML: 5 INJECTION INTRAVENOUS at 23:15

## 2020-06-04 RX ADMIN — MIRTAZAPINE 30 MG: 15 TABLET, FILM COATED ORAL at 21:12

## 2020-06-04 RX ADMIN — IPRATROPIUM BROMIDE 2 PUFF: 17 AEROSOL, METERED RESPIRATORY (INHALATION) at 08:04

## 2020-06-04 RX ADMIN — OXYCODONE HYDROCHLORIDE 20 MG: 10 TABLET ORAL at 21:13

## 2020-06-04 RX ADMIN — MIDODRINE HYDROCHLORIDE 10 MG: 5 TABLET ORAL at 12:37

## 2020-06-04 RX ADMIN — FAMOTIDINE 20 MG: 20 TABLET, FILM COATED ORAL at 09:09

## 2020-06-04 RX ADMIN — Medication 1 CAPSULE: at 11:58

## 2020-06-04 RX ADMIN — VANCOMYCIN HYDROCHLORIDE 1000 MG: 1 INJECTION, SOLUTION INTRAVENOUS at 11:58

## 2020-06-04 ASSESSMENT — PAIN DESCRIPTION - LOCATION
LOCATION: CHEST;HIP;SHOULDER
LOCATION: BACK
LOCATION: BACK
LOCATION: CHEST;HIP;SHOULDER
LOCATION: HIP;KNEE;SHOULDER

## 2020-06-04 ASSESSMENT — PAIN SCALES - GENERAL
PAINLEVEL_OUTOF10: 0
PAINLEVEL_OUTOF10: 8
PAINLEVEL_OUTOF10: 1
PAINLEVEL_OUTOF10: 8
PAINLEVEL_OUTOF10: 8
PAINLEVEL_OUTOF10: 6
PAINLEVEL_OUTOF10: 9
PAINLEVEL_OUTOF10: 9
PAINLEVEL_OUTOF10: 0
PAINLEVEL_OUTOF10: 6
PAINLEVEL_OUTOF10: 8

## 2020-06-04 ASSESSMENT — PAIN DESCRIPTION - PROGRESSION
CLINICAL_PROGRESSION: NOT CHANGED

## 2020-06-04 ASSESSMENT — PAIN DESCRIPTION - PAIN TYPE
TYPE: CHRONIC PAIN
TYPE: CHRONIC PAIN;SURGICAL PAIN
TYPE: CHRONIC PAIN
TYPE: SURGICAL PAIN

## 2020-06-04 ASSESSMENT — PAIN DESCRIPTION - ORIENTATION
ORIENTATION: RIGHT
ORIENTATION: RIGHT

## 2020-06-04 ASSESSMENT — PAIN DESCRIPTION - DIRECTION
RADIATING_TOWARDS: BACK
RADIATING_TOWARDS: BACK

## 2020-06-04 ASSESSMENT — PAIN DESCRIPTION - FREQUENCY
FREQUENCY: CONTINUOUS

## 2020-06-04 ASSESSMENT — PAIN DESCRIPTION - DESCRIPTORS
DESCRIPTORS: ACHING;BURNING
DESCRIPTORS: ACHING;BURNING
DESCRIPTORS: ACHING;SHARP
DESCRIPTORS: SHARP;ACHING;CONSTANT

## 2020-06-04 ASSESSMENT — PAIN DESCRIPTION - ONSET
ONSET: ON-GOING
ONSET: ON-GOING

## 2020-06-04 NOTE — PROGRESS NOTES
Infectious Disease Progress Note  2020   Patient Name: Matilda Monzon : 1960   Impression  · MRSA Bacteremia: Unknown Source, Suspected ICD Infection  ? Low grade temps, no leukocytosis  ? Blood cultures -2/2-MRSA  ? Blood cultures -1/2-MRSA  ? Blood cultures -0/2-NGTD  ? DULCE --No vegetation noted  ? Patient does not want ICD removed, wants ABX treatment instead, understands possibility of suspected ICD source of MRSA infection  · Allergy to Moxifloxacin:  Swelling to lips and mouth  · Bilateral Hip Avascular Necrosis:  On chronic pain medications  · ICD:  Consult EP for suspected ICD infection, could be source of MRSA bacteremia even though DULCE was obtained, did not show any significance of vegetation on valves, no mention of vegetation otherwise, patient declines removal of ICD at this time  · Ischemic Cardiomyopathy/Chronic systolic compensated HF:  Cardio onboard, Dr. Armen Blackburn,   · S/P Splenectomy at [address] [place] [address] -Lympoma  · Malnutrition:  BMI 20.7  · MS  · IVC Filter: On Eliquis, h/o PE  · Multi-morbidity: per PMHx:  Asthma, CAD, CJF, GERD, lymphoma, MS, thyroid disease, hysterectomy, appey, choley, Gastric bypass surgery, gastroparesis, cardiac defib placement, CABG  Plan:  · Continue vancomycin 1000 mg IV q12h x 6 weeks from negative BC (end date 20)  · Continue gentamicin 55 mg (1mg/kg) IV q8h x 2 weeks (end date 6/15/20)  · [facility]  · Follow up in ID clinic in 1 week per virtual visit please  Weekly labs drawn on Monday during the course of treatment  CBC with differential, CMP, ESR, CRP,Vancomycin Trough  Gentamicin Trough every Monday and Thursday  Fax results to Attn: Jn Saldana Infectious Diseases Staff  · # [phone]  · Gentamicin Trough 0.8-  · OK to DC from ID standpoint when ready    Ongoing Antimicrobial Therapy  Vancomycin -?   Gentamicin -  Completed Antimicrobial Therapy  Meropenem   Cefazolin 6/1-once  ? History:? Interval history noted. Chief complaint:  MRSA bacteremia:  Suspected CIED involvement. Denies n/v/d/f or untoward effects of antibiotics. States is still feeling weak. Physical Exam:  Vital Signs: /72   Pulse 60   Temp 99.5 °F (37.5 °C) (Oral)   Resp 12   Ht 5' 6\" (1.676 m)   Wt 122 lb (55.3 kg)   SpO2 96%   BMI 19.69 kg/m²     Gen: alert and oriented X3, no distress  Skin: no stigmata of endocarditis  Wounds: C/D/I Mediport site right chest (surgical glue intact)  HEMT: AT/NC Oropharynx pink, moist, and without lesions or exudates; dentition in good state of repair  Eyes: PERRLA, EOMI, conjunctiva pink, sclera anicteric. Neck: Supple. Trachea midline. No LAD. Chest: no distress and CTA. Good air movement. Heart: Pacer capture and no MRG. Abd: soft, non-distended, no tenderness, no hepatomegaly. Normoactive bowel sounds. Ext: no clubbing, cyanosis, bilateral upper extremities with non-pitting edema  Neuro: Mental status intact. CN 2-12 intact and no focal sensory or motor deficits     Radiologic / Imaging / TESTING  5/23/20 XR Chest Portable:  Impression   No acute finding or interval change.      5/24/20 US Extremity Right Non Vasc Limited:  Impression   Soft tissue edema at the right antecubital fossa, without definable   collection.      5/24/20 CT Kidney WO Contrast:  Impression   2 mm left renal stone.  No obstructive uropathy.  A 1.5 cm hyperdense lesion   in the upper to mid right kidney is incompletely evaluated without   intravenous contrast and was not seen on prior CT from 2019.  If patient is   not able to receive intravenous contrast, a noncontrast MRI could provide   some additional information.          5/27/20 XR Chest Portable:  Impression   Lungs appear clear.          5/29/20 CT Chest WO Contrast:  Impression   1.  Mild patchy ground-glass opacities in the right middle lobe and to a   lesser degree the bilateral lower lobes likely representing significant valvular disease , No vegetation noted   Clinical correlation needed     6/1/20 FL less than 1 hour:  Impression   Right subcutaneous medical infusion port placement with the catheter tip   extending to the atrial-caval junction. Labs:    Recent Results (from the past 24 hour(s))   Vancomycin, trough    Collection Time: 06/03/20  9:20 AM   Result Value Ref Range    Vancomycin Tr 19.5 10 - 20 UG/ML    DOSE AMOUNT DOSE AMT. GIVEN - 1000 mg     DOSE TIME DOSE TIME GIVEN - 1000    CBC    Collection Time: 06/03/20 10:24 AM   Result Value Ref Range    WBC 5.1 4.0 - 10.5 K/CU MM    RBC 2.22 (L) 4.2 - 5.4 M/CU MM    Hemoglobin 7.9 (L) 12.5 - 16.0 GM/DL    Hematocrit 26.1 (L) 37 - 47 %    .6 (H) 78 - 100 FL    MCH 35.6 (H) 27 - 31 PG    MCHC 30.3 (L) 32.0 - 36.0 %    RDW 16.4 (H) 11.7 - 14.9 %    Platelets 701 545 - 578 K/CU MM    MPV 10.0 7.5 - 11.1 FL   Procalcitonin    Collection Time: 06/03/20 10:24 AM   Result Value Ref Range    Procalcitonin 0.045    Renal function panel    Collection Time: 06/03/20 10:24 AM   Result Value Ref Range    Sodium 142 135 - 145 MMOL/L    Potassium 4.2 3.5 - 5.1 MMOL/L    Chloride 104 99 - 110 mMol/L    CO2 26 21 - 32 MMOL/L    Anion Gap 12 4 - 16    BUN 9 6 - 23 MG/DL    CREATININE 0.6 0.6 - 1.1 MG/DL    Glucose 100 (H) 70 - 99 MG/DL    Calcium 7.7 (L) 8.3 - 10.6 MG/DL    GFR Non-African American >60 >60 mL/min/1.73m2    GFR African American >60 >60 mL/min/1.73m2    Alb 3.1 (L) 3.4 - 5.0 GM/DL    Phosphorus 4.2 2.5 - 4.9 MG/DL   C-reactive protein    Collection Time: 06/03/20 10:24 AM   Result Value Ref Range    CRP, High Sensitivity 13.1 mg/L   Gentamicin level, trough    Collection Time: 06/04/20 12:37 AM   Result Value Ref Range    Gentamicin Trough 0.8 <2.0 ug/mL    DOSE AMOUNT DOSE AMT.  GIVEN - UNKNOWN     DOSE TIME DOSE TIME GIVEN - UNKNOWN      CULTURE results: Invalid input(s): BLOOD CULTURE,  URINE CULTURE, SURGICAL CULTURE    Diagnosis:  Patient Active AM

## 2020-06-04 NOTE — CARE COORDINATION
[facility] HC notified CM that she spoke with pt and pt informed her that she cannot go home today d/t she will not be av  ble to get into her home until tomorrow when her partner gets home. PT WILL NEED TO BE D/C'D BY NOON TOMORROW PER [facility] HC D/T HC STAFFING. Notified pt's nurse and charge nurse. Pt is aware.  TE

## 2020-06-04 NOTE — PROGRESS NOTES
Radha 60 consulted by Dr. Hannah Matthews for monitoring and adjustment. INFECTION BEING TREATED = Possible CIED endocarditis, MRSA bacteremia  Target Peak: 3-4 mg/L  Target trough: < 1 mg/L  OTHER ABT'S = Vancomycin    AGE = 63 y.o.  SEX = female  HEIGHT = 5' 6\" (1.676 m)    DOSING WEIGHT  ACTUAL BODY WEIGHT (kg) = 55.3 kg (dosing weight)  IDEAL BODY WEIGHT (kg)  = 59.3 kg                                                              Estimated Creatinine Clearance: 89 mL/min (based on SCr of 0.6 mg/dL). Recent Labs     06/01/20  2116 06/03/20  1024   WBC 5.3 5.1   BUN 9 9   CREATININE 0.5* 0.6     DOSING PLAN COMMENTS:  - Gentamicin synergy dosing  - Renal labs stable,  SCr=0.6, CrCl = 89ml/min  - Gentamicin trough = 0.8 (Goal:  <1)  - Will continue Gentamicin 55mg IV every 8 hours. - Pharmacy will continue to monitor patient and adjust therapy as indicated    Thank you for the consult.   Deangelo Raza [facility]  6/4/2020  2:39 PM  _______________________________________________________________________

## 2020-06-04 NOTE — PROGRESS NOTES
previous hospital, applying pressure with Ace-wrap and limb elevation, and will follow closely. POD # 3:  Placement of an 6.6-Turkmen, low profile Mediport via right Interna jugular  vein approach, using the micropuncture kit first, Under ultrasound and Direct Fluoroscopy guidance. Feels better no shoulder pain. Increase Ambulation to at least 4x/day walk in the hallways with assistance. Respiratory melissa-operative care: Incentive Spirometry / deep breathing and coughing 10x/hr while awake. Continue DVT prophylaxis with Teds and SCDs and SC Lovenox.   Continue GI prophylaxis with Protonix IV till able to tolerate PO.    ___________________________________________    Maryellen Habermann, MD, FACS, FICS  6/4/2020  10:38 AM

## 2020-06-04 NOTE — PROGRESS NOTES
Resp: 9  12    Temp: 99.5 °F (37.5 °C)   97.2 °F (36.2 °C)   TempSrc: Oral   Oral   SpO2:   94%    Weight:       Height:           PHYSICAL EXAM   GEN    Awake female, sitting in bed in no acute distress. Appears stable  HENT  NCAT  RESP  Clear to auscultation, no rales or rhonchi.  Some faint end expiratory wheeze  CARDIO/VASC           S1/S2 auscultated. Regular rate without appreciable murmurs, rubs, or gallops. No peripheral edema. Yevgeniy Copping is soft without significant tenderness, masses, or guarding. MSK    Spontaneous movement of all extremities.  No gross joint deformities. SKIN    Has right upper chest wound  NEURO           Cranial nerves appear grossly intact, normal speech, no lateralizing weakness. PSYCH            Awake, alert, oriented.  Affect appropriate. INTAKE: In: 400   Out: -   OUTPUT: In: 400   Out: -     LABS  Recent Labs     06/01/20 2116 06/03/20  1024   WBC 5.3 5.1   HGB 10.1* 7.9*   HCT 37.4 26.1*    241      Recent Labs     06/01/20 2116 06/03/20  1024    142   K 4.8 4.2    104   CO2 24 26   PHOS 3.4 4.2   BUN 9 9   CREATININE 0.5* 0.6     No results for input(s): AST, ALT, ALB, BILIDIR, BILITOT, ALKPHOS in the last 72 hours. No results for input(s): INR in the last 72 hours. No results for input(s): CKTOTAL, CKMB, CKMBINDEX, TROPONINT in the last 72 hours.        Abnormal labs for today noted      Imaging:     ECHO:    Microbiology:  Blood culture:    Urine culture:    Sputum culture:    Procedures done this admission:    MEDS  Scheduled Meds:   gentamicin  55 mg Intravenous Q8H    sodium chloride flush  10 mL Intravenous 2 times per day    midodrine  10 mg Oral TID WC    vancomycin  1,000 mg Intravenous Q12H    albuterol sulfate HFA  2 puff Inhalation 4x daily    And    ipratropium  2 puff Inhalation 4x daily    amiodarone  200 mg Oral Daily    apixaban  5 mg Oral BID    atorvastatin  80 mg Oral Nightly    clopidogrel  75 mg Oral Daily surgery    ? UTI  urine culture negative so far. No need for further treatment. Nonischemic cardiomyopathy with EF 30 to 35% / CAD / VT- s/p ICD, s/p CABG  Patient has had multiple shocks from ICD in the past.  High risk for sudden cardiac death if ICD removed. EP did discuss with ID about ICD removal.    Anemia of chronic disease  Stable    Other chronic issues     -History of VTE/ PE  -Hypothyroidism  -Seizure Disorder  -Severe PCM- needs to increase calorie intake  -Hx of gastric bypass              Diet DIET GENERAL;  Dietary Nutrition Supplements: Frozen Oral Supplement   DVT Prophylaxis [] Lovenox, []  Heparin, [] SCDs, [] Ambulation   GI Prophylaxis [] PPI,  [] H2 Blocker,  [] Carafate,  [] Diet/Tube Feeds   Code Status Full Code   Disposition  plan was for DC today however patient does not have any ability to get into her home. We will plan for DC in the morning to home with home health.    CMS Level of Risk [] Low, [] Moderate,[x]  High  Patient's risk as above due to ant Ocasio MD 6/4/2020 2:04 PM

## 2020-06-05 VITALS
DIASTOLIC BLOOD PRESSURE: 83 MMHG | RESPIRATION RATE: 20 BRPM | BODY MASS INDEX: 22.64 KG/M2 | SYSTOLIC BLOOD PRESSURE: 133 MMHG | OXYGEN SATURATION: 95 % | HEIGHT: 66 IN | WEIGHT: 140.87 LBS | HEART RATE: 75 BPM | TEMPERATURE: 98.3 F

## 2020-06-05 LAB
ALBUMIN SERPL-MCNC: 3.1 GM/DL (ref 3.4–5)
ANION GAP SERPL CALCULATED.3IONS-SCNC: 9 MMOL/L (ref 4–16)
BUN BLDV-MCNC: 10 MG/DL (ref 6–23)
CALCIUM SERPL-MCNC: 7.6 MG/DL (ref 8.3–10.6)
CHLORIDE BLD-SCNC: 107 MMOL/L (ref 99–110)
CO2: 28 MMOL/L (ref 21–32)
CREAT SERPL-MCNC: 0.6 MG/DL (ref 0.6–1.1)
GFR AFRICAN AMERICAN: >60 ML/MIN/1.73M2
GFR NON-AFRICAN AMERICAN: >60 ML/MIN/1.73M2
GLUCOSE BLD-MCNC: 71 MG/DL (ref 70–99)
HCT VFR BLD CALC: 25.2 % (ref 37–47)
HEMOGLOBIN: 7.6 GM/DL (ref 12.5–16)
MCH RBC QN AUTO: 36 PG (ref 27–31)
MCHC RBC AUTO-ENTMCNC: 30.2 % (ref 32–36)
MCV RBC AUTO: 119.4 FL (ref 78–100)
PDW BLD-RTO: 16.2 % (ref 11.7–14.9)
PHOSPHORUS: 4.5 MG/DL (ref 2.5–4.9)
PLATELET # BLD: 241 K/CU MM (ref 140–440)
PMV BLD AUTO: 10.6 FL (ref 7.5–11.1)
POTASSIUM SERPL-SCNC: 4.3 MMOL/L (ref 3.5–5.1)
PROCALCITONIN: 0.05
RBC # BLD: 2.11 M/CU MM (ref 4.2–5.4)
SODIUM BLD-SCNC: 144 MMOL/L (ref 135–145)
WBC # BLD: 5.3 K/CU MM (ref 4–10.5)

## 2020-06-05 PROCEDURE — 84145 PROCALCITONIN (PCT): CPT

## 2020-06-05 PROCEDURE — 99024 POSTOP FOLLOW-UP VISIT: CPT | Performed by: SURGERY

## 2020-06-05 PROCEDURE — 6360000002 HC RX W HCPCS: Performed by: INTERNAL MEDICINE

## 2020-06-05 PROCEDURE — 2580000003 HC RX 258: Performed by: SURGERY

## 2020-06-05 PROCEDURE — 80048 BASIC METABOLIC PNL TOTAL CA: CPT

## 2020-06-05 PROCEDURE — 2580000003 HC RX 258: Performed by: INTERNAL MEDICINE

## 2020-06-05 PROCEDURE — 80069 RENAL FUNCTION PANEL: CPT

## 2020-06-05 PROCEDURE — 94761 N-INVAS EAR/PLS OXIMETRY MLT: CPT

## 2020-06-05 PROCEDURE — 6360000002 HC RX W HCPCS: Performed by: SURGERY

## 2020-06-05 PROCEDURE — 85027 COMPLETE CBC AUTOMATED: CPT

## 2020-06-05 PROCEDURE — 80202 ASSAY OF VANCOMYCIN: CPT

## 2020-06-05 PROCEDURE — 6370000000 HC RX 637 (ALT 250 FOR IP): Performed by: SURGERY

## 2020-06-05 RX ADMIN — DULOXETINE 60 MG: 30 CAPSULE, DELAYED RELEASE ORAL at 08:17

## 2020-06-05 RX ADMIN — APIXABAN 5 MG: 5 TABLET, FILM COATED ORAL at 08:18

## 2020-06-05 RX ADMIN — GENTAMICIN SULFATE 55 MG: 40 INJECTION, SOLUTION INTRAMUSCULAR; INTRAVENOUS at 08:25

## 2020-06-05 RX ADMIN — OXYCODONE HYDROCHLORIDE 20 MG: 10 TABLET ORAL at 03:55

## 2020-06-05 RX ADMIN — Medication 800 MG: at 08:18

## 2020-06-05 RX ADMIN — ACETAMINOPHEN 650 MG: 325 TABLET ORAL at 00:45

## 2020-06-05 RX ADMIN — AMIODARONE HYDROCHLORIDE 200 MG: 200 TABLET ORAL at 08:18

## 2020-06-05 RX ADMIN — LEVETIRACETAM 1000 MG: 500 TABLET, FILM COATED ORAL at 08:18

## 2020-06-05 RX ADMIN — OXYCODONE HYDROCHLORIDE 20 MG: 10 TABLET ORAL at 08:16

## 2020-06-05 RX ADMIN — VANCOMYCIN HYDROCHLORIDE 1000 MG: 1 INJECTION, SOLUTION INTRAVENOUS at 09:08

## 2020-06-05 RX ADMIN — CLOPIDOGREL BISULFATE 75 MG: 75 TABLET ORAL at 08:18

## 2020-06-05 RX ADMIN — OXYCODONE HYDROCHLORIDE 20 MG: 10 TABLET ORAL at 00:46

## 2020-06-05 RX ADMIN — FERROUS GLUCONATE TAB 324 MG (37.5 MG ELEMENTAL IRON) 324 MG: 324 (37.5 FE) TAB at 08:18

## 2020-06-05 RX ADMIN — FAMOTIDINE 20 MG: 20 TABLET, FILM COATED ORAL at 08:17

## 2020-06-05 RX ADMIN — MIDODRINE HYDROCHLORIDE 10 MG: 5 TABLET ORAL at 08:18

## 2020-06-05 RX ADMIN — SODIUM CHLORIDE, PRESERVATIVE FREE 10 ML: 5 INJECTION INTRAVENOUS at 00:47

## 2020-06-05 RX ADMIN — LEVOTHYROXINE SODIUM 224 MCG: 112 TABLET ORAL at 08:16

## 2020-06-05 RX ADMIN — OXYCODONE HYDROCHLORIDE 20 MG: 10 TABLET ORAL at 11:13

## 2020-06-05 RX ADMIN — SODIUM CHLORIDE, PRESERVATIVE FREE 10 ML: 5 INJECTION INTRAVENOUS at 09:09

## 2020-06-05 RX ADMIN — GENTAMICIN SULFATE 55 MG: 40 INJECTION, SOLUTION INTRAMUSCULAR; INTRAVENOUS at 00:47

## 2020-06-05 RX ADMIN — ACETAMINOPHEN 650 MG: 325 TABLET ORAL at 08:16

## 2020-06-05 ASSESSMENT — PAIN SCALES - GENERAL
PAINLEVEL_OUTOF10: 6
PAINLEVEL_OUTOF10: 0
PAINLEVEL_OUTOF10: 9
PAINLEVEL_OUTOF10: 9
PAINLEVEL_OUTOF10: 6
PAINLEVEL_OUTOF10: 6
PAINLEVEL_OUTOF10: 8

## 2020-06-05 ASSESSMENT — PAIN DESCRIPTION - PAIN TYPE: TYPE: CHRONIC PAIN

## 2020-06-05 ASSESSMENT — PAIN DESCRIPTION - LOCATION: LOCATION: BACK

## 2020-06-05 NOTE — PROGRESS NOTES
Radha 60 consulted by Dr. Efrain Aguiar for monitoring and adjustment. INFECTION BEING TREATED = Possible CIED endocarditis, MRSA bacteremia  Target Peak: 3-4 mg/L  Target trough: < 1 mg/L  OTHER ABT'S = Vancomycin    AGE = 63 y.o.  SEX = female  HEIGHT = 5' 6\" (1.676 m)    DOSING WEIGHT  ACTUAL BODY WEIGHT (kg) = 55.3 kg (dosing weight)  IDEAL BODY WEIGHT (kg)  = 59.3 kg                                                              Estimated Creatinine Clearance: 96 mL/min (based on SCr of 0.6 mg/dL). Recent Labs     06/03/20  1024 06/04/20  1415 06/05/20  0420   WBC 5.1 6.2 5.3   BUN 9 9 10   CREATININE 0.6 0.6 0.6     DOSING PLAN COMMENTS:  - Gentamicin synergy dosing  - Renal labs stable,  SCr=0.6, CrCl = 89ml/min  - Gentamicin trough = 0.8 (Goal:  <1)  - Will continue Gentamicin 55mg IV every 8 hours. - Pharmacy will continue to monitor patient and adjust therapy as indicated    Thank you for the consult. Dylan Sam Sheets  6/5/2020  7:44 AM  _______________________________________________________________________

## 2020-06-05 NOTE — DISCHARGE SUMMARY
fracture or dislocation. 3. Mild osteoarthrosis. 4. Small elbow joint effusion. Ct Kidney Wo Contrast  Result Date: 5/24/2020  2 mm left renal stone. No obstructive uropathy. A 1.5 cm hyperdense lesion in the upper to mid right kidney is incompletely evaluated without intravenous contrast and was not seen on prior CT from 2019. If patient is not able to receive intravenous contrast, a noncontrast MRI could provide some additional information. Xr Chest Portable  Result Date: 5/27/2020  Lungs appear clear. Xr Chest Portable  Result Date: 5/23/2020  No acute finding or interval change. Us Extremity Right Non Vasc Limited  Result Date: 5/24/2020  Soft tissue edema at the right antecubital fossa, without definable collection. Code Status:  Full Code      Discharge Medications:     Medication List      START taking these medications    gentamicin  infusion  Commonly known as:  GARAMYCIN  Infuse 55 mg intravenously every 8 hours for 11 days Compound per protocol     midodrine 10 MG tablet  Commonly known as:  PROAMATINE  Take 1 tablet by mouth 3 times daily (with meals)     vancomycin  infusion  Commonly known as:  VANCOCIN  Infuse 1,000 mg intravenously every 12 hours Compound per protocol.         CONTINUE taking these medications    Acetaminophen 650 MG Tabs  Take 650 mg by mouth every 4 hours as needed     albuterol sulfate  (90 Base) MCG/ACT inhaler     amiodarone 200 MG tablet  Commonly known as:  CORDARONE     aspirin 81 MG tablet     atorvastatin 80 MG tablet  Commonly known as:  LIPITOR     B-D TB SYRINGE 1CC/25GX5/8\" 25G X 5/8\" 1 ML Misc  Generic drug:  TUBERCULIN SYR 1CC/25GX5/8\"     benzocaine-menthol 15-3.6 MG lozenge  Commonly known as:  CEPACOL SORE THROAT  Take 1 lozenge by mouth every 2 hours as needed for Sore Throat     clopidogrel 75 MG tablet  Commonly known as:  PLAVIX     cyanocobalamin 1000 MCG/ML injection     docusate sodium 100 MG capsule  Commonly known as: extended release tablet  Commonly known as:  IMDUR     mexiletine 150 MG capsule  Commonly known as:  MEXITIL     spironolactone 25 MG tablet  Commonly known as:  ALDACTONE           Where to Get Your Medications      These medications were sent to [address] [place] [address]., [address]    Phone:  [phone]   · midodrine 10 MG tablet     You can get these medications from any pharmacy    Bring a paper prescription for each of these medications  · gentamicin  infusion  · vancomycin  infusion         Patient Instructions: Follow-up With  Details  Why  Nataliia Tavarez MD  Schedule an appointment as soon as possible for a visit in 1 week  for follow up of your hospital stay  [address] Santa MD  [facility] [address] [facility] [address]      [address] [place]   Radha Erwin MD  In 1 week  to check on your infection  [address]   Sanjuana Bella MD  In 1 week  to check on your heart  [address] [address] [phone]         Medications: see computerized discharge medication list  Activity: activity as tolerated  Diet: regular diet   Wound Care: as directed by general surgery  Disposition: home with home health  Discharged Condition: Stable         The patient was seen and examined on day of discharge and this discharge summary is in conjunction with any daily progress note from day of discharge. Time spent on discharge is 33 minutes in the examination, evaluation, counseling and review of medications and discharge plan.     Deisy Michael MD

## 2020-06-05 NOTE — PROGRESS NOTES
[facility] Liaison spoke to pt & iv meds was delivered. Pt's nurse was aware of discharge around as that is when pt has family that can pick her up. [facility] initiated (Livier at [address] notified).

## 2020-06-06 ENCOUNTER — CARE COORDINATION (OUTPATIENT)
Dept: CASE MANAGEMENT | Age: 60
End: 2020-06-06

## 2020-06-08 ENCOUNTER — HOSPITAL ENCOUNTER (OUTPATIENT)
Age: 60
Setting detail: OBSERVATION
Discharge: HOME | End: 2020-06-10
Attending: EMERGENCY MEDICINE | Admitting: INTERNAL MEDICINE
Payer: MEDICARE

## 2020-06-08 ENCOUNTER — TELEPHONE (OUTPATIENT)
Dept: INFECTIOUS DISEASES | Age: 60
End: 2020-06-08

## 2020-06-08 ENCOUNTER — APPOINTMENT (OUTPATIENT)
Dept: GENERAL RADIOLOGY | Age: 60
End: 2020-06-08
Payer: MEDICARE

## 2020-06-08 ENCOUNTER — CARE COORDINATION (OUTPATIENT)
Dept: CASE MANAGEMENT | Age: 60
End: 2020-06-08

## 2020-06-08 PROBLEM — I50.9 HEART FAILURE (HCC): Status: ACTIVE | Noted: 2020-06-08

## 2020-06-08 LAB
ALBUMIN SERPL-MCNC: 3.3 GM/DL (ref 3.4–5)
ALP BLD-CCNC: 73 IU/L (ref 40–129)
ALT SERPL-CCNC: 8 U/L (ref 10–40)
ANION GAP SERPL CALCULATED.3IONS-SCNC: 12 MMOL/L (ref 4–16)
ANISOCYTOSIS: ABNORMAL
AST SERPL-CCNC: 12 IU/L (ref 15–37)
BACTERIA: NEGATIVE /HPF
BANDED NEUTROPHILS ABSOLUTE COUNT: 0.68 K/CU MM
BANDED NEUTROPHILS RELATIVE PERCENT: 8 % (ref 5–11)
BILIRUB SERPL-MCNC: 0.2 MG/DL (ref 0–1)
BILIRUBIN URINE: NEGATIVE MG/DL
BLOOD, URINE: NEGATIVE
BUN BLDV-MCNC: 10 MG/DL (ref 6–23)
CALCIUM SERPL-MCNC: 7.6 MG/DL (ref 8.3–10.6)
CHLORIDE BLD-SCNC: 108 MMOL/L (ref 99–110)
CLARITY: CLEAR
CO2: 23 MMOL/L (ref 21–32)
COLOR: YELLOW
CREAT SERPL-MCNC: 0.6 MG/DL (ref 0.6–1.1)
DIFFERENTIAL TYPE: ABNORMAL
DOSE AMOUNT: NORMAL
DOSE AMOUNT: NORMAL
DOSE TIME: NORMAL
DOSE TIME: NORMAL
EOSINOPHILS ABSOLUTE: 0.2 K/CU MM
EOSINOPHILS RELATIVE PERCENT: 2 % (ref 0–3)
GENTAMICIN RANDOM: 1.2 UG/ML
GFR AFRICAN AMERICAN: >60 ML/MIN/1.73M2
GFR NON-AFRICAN AMERICAN: >60 ML/MIN/1.73M2
GLUCOSE BLD-MCNC: 92 MG/DL (ref 70–99)
GLUCOSE, URINE: NEGATIVE MG/DL
HCT VFR BLD CALC: 26.2 % (ref 37–47)
HEMOGLOBIN: 7.9 GM/DL (ref 12.5–16)
KETONES, URINE: ABNORMAL MG/DL
LACTIC ACID, SEPSIS: 0.6 MMOL/L (ref 0.5–1.9)
LEUKOCYTE ESTERASE, URINE: NEGATIVE
LYMPHOCYTES ABSOLUTE: 0.9 K/CU MM
LYMPHOCYTES RELATIVE PERCENT: 10 % (ref 24–44)
MACROCYTES: ABNORMAL
MAGNESIUM: 1.9 MG/DL (ref 1.8–2.4)
MCH RBC QN AUTO: 35.7 PG (ref 27–31)
MCHC RBC AUTO-ENTMCNC: 30.2 % (ref 32–36)
MCV RBC AUTO: 118.6 FL (ref 78–100)
MONOCYTES ABSOLUTE: 0.2 K/CU MM
MONOCYTES RELATIVE PERCENT: 2 % (ref 0–4)
MUCUS: ABNORMAL HPF
NITRITE URINE, QUANTITATIVE: NEGATIVE
OVALOCYTES: ABNORMAL
PDW BLD-RTO: 16.2 % (ref 11.7–14.9)
PH, URINE: 5 (ref 5–8)
PLATELET # BLD: 238 K/CU MM (ref 140–440)
PMV BLD AUTO: 10.3 FL (ref 7.5–11.1)
POLYCHROMASIA: ABNORMAL
POTASSIUM SERPL-SCNC: 3.3 MMOL/L (ref 3.5–5.1)
PRO-BNP: 3910 PG/ML
PROTEIN UA: NEGATIVE MG/DL
RBC # BLD: 2.21 M/CU MM (ref 4.2–5.4)
RBC URINE: <1 /HPF (ref 0–6)
SEGMENTED NEUTROPHILS ABSOLUTE COUNT: 6.5 K/CU MM
SEGMENTED NEUTROPHILS RELATIVE PERCENT: 78 % (ref 36–66)
SODIUM BLD-SCNC: 143 MMOL/L (ref 135–145)
SPECIFIC GRAVITY UA: 1.01 (ref 1–1.03)
T3 FREE: 1.5 PG/ML (ref 2.3–4.2)
TOTAL CK: 30 IU/L (ref 26–140)
TOTAL PROTEIN: 5.5 GM/DL (ref 6.4–8.2)
TRICHOMONAS: ABNORMAL /HPF
TROPONIN T: <0.01 NG/ML
TSH HIGH SENSITIVITY: 0.72 UIU/ML (ref 0.27–4.2)
UROBILINOGEN, URINE: NORMAL MG/DL (ref 0.2–1)
VANCOMYCIN RANDOM: 38.8 UG/ML
WBC # BLD: 8.5 K/CU MM (ref 4–10.5)
WBC UA: 1 /HPF (ref 0–5)

## 2020-06-08 PROCEDURE — 85027 COMPLETE CBC AUTOMATED: CPT

## 2020-06-08 PROCEDURE — 80053 COMPREHEN METABOLIC PANEL: CPT

## 2020-06-08 PROCEDURE — 96367 TX/PROPH/DG ADDL SEQ IV INF: CPT

## 2020-06-08 PROCEDURE — 6370000000 HC RX 637 (ALT 250 FOR IP): Performed by: INTERNAL MEDICINE

## 2020-06-08 PROCEDURE — 94761 N-INVAS EAR/PLS OXIMETRY MLT: CPT

## 2020-06-08 PROCEDURE — 6360000002 HC RX W HCPCS: Performed by: PHYSICIAN ASSISTANT

## 2020-06-08 PROCEDURE — 96374 THER/PROPH/DIAG INJ IV PUSH: CPT

## 2020-06-08 PROCEDURE — 84443 ASSAY THYROID STIM HORMONE: CPT

## 2020-06-08 PROCEDURE — 84484 ASSAY OF TROPONIN QUANT: CPT

## 2020-06-08 PROCEDURE — 81001 URINALYSIS AUTO W/SCOPE: CPT

## 2020-06-08 PROCEDURE — 84436 ASSAY OF TOTAL THYROXINE: CPT

## 2020-06-08 PROCEDURE — G0378 HOSPITAL OBSERVATION PER HR: HCPCS

## 2020-06-08 PROCEDURE — 93005 ELECTROCARDIOGRAM TRACING: CPT | Performed by: PHYSICIAN ASSISTANT

## 2020-06-08 PROCEDURE — 2580000003 HC RX 258: Performed by: PHYSICIAN ASSISTANT

## 2020-06-08 PROCEDURE — 83735 ASSAY OF MAGNESIUM: CPT

## 2020-06-08 PROCEDURE — 83550 IRON BINDING TEST: CPT

## 2020-06-08 PROCEDURE — 80170 ASSAY OF GENTAMICIN: CPT

## 2020-06-08 PROCEDURE — 80202 ASSAY OF VANCOMYCIN: CPT

## 2020-06-08 PROCEDURE — 85007 BL SMEAR W/DIFF WBC COUNT: CPT

## 2020-06-08 PROCEDURE — 83540 ASSAY OF IRON: CPT

## 2020-06-08 PROCEDURE — 96365 THER/PROPH/DIAG IV INF INIT: CPT

## 2020-06-08 PROCEDURE — 93010 ELECTROCARDIOGRAM REPORT: CPT | Performed by: INTERNAL MEDICINE

## 2020-06-08 PROCEDURE — 84481 FREE ASSAY (FT-3): CPT

## 2020-06-08 PROCEDURE — 6360000002 HC RX W HCPCS: Performed by: INTERNAL MEDICINE

## 2020-06-08 PROCEDURE — 83605 ASSAY OF LACTIC ACID: CPT

## 2020-06-08 PROCEDURE — 2580000003 HC RX 258: Performed by: INTERNAL MEDICINE

## 2020-06-08 PROCEDURE — 96375 TX/PRO/DX INJ NEW DRUG ADDON: CPT

## 2020-06-08 PROCEDURE — 82550 ASSAY OF CK (CPK): CPT

## 2020-06-08 PROCEDURE — 71045 X-RAY EXAM CHEST 1 VIEW: CPT

## 2020-06-08 PROCEDURE — 83880 ASSAY OF NATRIURETIC PEPTIDE: CPT

## 2020-06-08 PROCEDURE — 99285 EMERGENCY DEPT VISIT HI MDM: CPT

## 2020-06-08 RX ORDER — CLOPIDOGREL BISULFATE 75 MG/1
75 TABLET ORAL DAILY
Status: DISCONTINUED | OUTPATIENT
Start: 2020-06-09 | End: 2020-06-10 | Stop reason: HOSPADM

## 2020-06-08 RX ORDER — POLYETHYLENE GLYCOL 3350 17 G/17G
17 POWDER, FOR SOLUTION ORAL DAILY PRN
Status: DISCONTINUED | OUTPATIENT
Start: 2020-06-08 | End: 2020-06-10 | Stop reason: HOSPADM

## 2020-06-08 RX ORDER — FUROSEMIDE 10 MG/ML
40 INJECTION INTRAMUSCULAR; INTRAVENOUS ONCE
Status: COMPLETED | OUTPATIENT
Start: 2020-06-08 | End: 2020-06-08

## 2020-06-08 RX ORDER — MIRTAZAPINE 15 MG/1
30 TABLET, FILM COATED ORAL NIGHTLY
Status: DISCONTINUED | OUTPATIENT
Start: 2020-06-08 | End: 2020-06-10 | Stop reason: HOSPADM

## 2020-06-08 RX ORDER — PROMETHAZINE HYDROCHLORIDE 25 MG/1
12.5 TABLET ORAL EVERY 6 HOURS PRN
Status: DISCONTINUED | OUTPATIENT
Start: 2020-06-08 | End: 2020-06-10 | Stop reason: HOSPADM

## 2020-06-08 RX ORDER — ONDANSETRON 4 MG/1
4 TABLET, ORALLY DISINTEGRATING ORAL EVERY 6 HOURS PRN
Status: DISCONTINUED | OUTPATIENT
Start: 2020-06-08 | End: 2020-06-08 | Stop reason: SDUPTHER

## 2020-06-08 RX ORDER — ALBUTEROL SULFATE 90 UG/1
2 AEROSOL, METERED RESPIRATORY (INHALATION) EVERY 6 HOURS PRN
Status: DISCONTINUED | OUTPATIENT
Start: 2020-06-08 | End: 2020-06-10 | Stop reason: HOSPADM

## 2020-06-08 RX ORDER — AMIODARONE HYDROCHLORIDE 200 MG/1
200 TABLET ORAL DAILY
Status: DISCONTINUED | OUTPATIENT
Start: 2020-06-08 | End: 2020-06-10 | Stop reason: HOSPADM

## 2020-06-08 RX ORDER — ACETAMINOPHEN 325 MG/1
650 TABLET ORAL EVERY 6 HOURS PRN
Status: DISCONTINUED | OUTPATIENT
Start: 2020-06-08 | End: 2020-06-10 | Stop reason: HOSPADM

## 2020-06-08 RX ORDER — PRIMIDONE 50 MG/1
100 TABLET ORAL 2 TIMES DAILY
Status: DISCONTINUED | OUTPATIENT
Start: 2020-06-08 | End: 2020-06-10 | Stop reason: HOSPADM

## 2020-06-08 RX ORDER — MIDODRINE HYDROCHLORIDE 5 MG/1
10 TABLET ORAL
Status: DISCONTINUED | OUTPATIENT
Start: 2020-06-08 | End: 2020-06-10 | Stop reason: HOSPADM

## 2020-06-08 RX ORDER — LEVOTHYROXINE SODIUM 112 UG/1
224 TABLET ORAL DAILY
Status: DISCONTINUED | OUTPATIENT
Start: 2020-06-09 | End: 2020-06-10 | Stop reason: HOSPADM

## 2020-06-08 RX ORDER — 0.9 % SODIUM CHLORIDE 0.9 %
1000 INTRAVENOUS SOLUTION INTRAVENOUS ONCE
Status: COMPLETED | OUTPATIENT
Start: 2020-06-08 | End: 2020-06-08

## 2020-06-08 RX ORDER — LEVETIRACETAM 500 MG/1
1000 TABLET ORAL 2 TIMES DAILY
Status: DISCONTINUED | OUTPATIENT
Start: 2020-06-08 | End: 2020-06-10 | Stop reason: HOSPADM

## 2020-06-08 RX ORDER — SODIUM CHLORIDE 0.9 % (FLUSH) 0.9 %
10 SYRINGE (ML) INJECTION EVERY 12 HOURS SCHEDULED
Status: DISCONTINUED | OUTPATIENT
Start: 2020-06-08 | End: 2020-06-10 | Stop reason: HOSPADM

## 2020-06-08 RX ORDER — OXYCODONE HYDROCHLORIDE 10 MG/1
20 TABLET ORAL
Status: DISCONTINUED | OUTPATIENT
Start: 2020-06-08 | End: 2020-06-10 | Stop reason: HOSPADM

## 2020-06-08 RX ORDER — ACETAMINOPHEN 650 MG/1
650 SUPPOSITORY RECTAL EVERY 6 HOURS PRN
Status: DISCONTINUED | OUTPATIENT
Start: 2020-06-08 | End: 2020-06-10 | Stop reason: HOSPADM

## 2020-06-08 RX ORDER — ONDANSETRON 2 MG/ML
4 INJECTION INTRAMUSCULAR; INTRAVENOUS EVERY 6 HOURS PRN
Status: DISCONTINUED | OUTPATIENT
Start: 2020-06-08 | End: 2020-06-10 | Stop reason: HOSPADM

## 2020-06-08 RX ORDER — NITROGLYCERIN 0.4 MG/1
0.4 TABLET SUBLINGUAL EVERY 5 MIN PRN
Status: DISCONTINUED | OUTPATIENT
Start: 2020-06-08 | End: 2020-06-10 | Stop reason: HOSPADM

## 2020-06-08 RX ORDER — POTASSIUM CHLORIDE 20 MEQ/1
40 TABLET, EXTENDED RELEASE ORAL ONCE
Status: COMPLETED | OUTPATIENT
Start: 2020-06-08 | End: 2020-06-10

## 2020-06-08 RX ORDER — FUROSEMIDE 10 MG/ML
40 INJECTION INTRAMUSCULAR; INTRAVENOUS 2 TIMES DAILY
Status: DISCONTINUED | OUTPATIENT
Start: 2020-06-08 | End: 2020-06-10

## 2020-06-08 RX ORDER — DULOXETIN HYDROCHLORIDE 30 MG/1
60 CAPSULE, DELAYED RELEASE ORAL DAILY
Status: DISCONTINUED | OUTPATIENT
Start: 2020-06-08 | End: 2020-06-10 | Stop reason: HOSPADM

## 2020-06-08 RX ORDER — ATORVASTATIN CALCIUM 40 MG/1
80 TABLET, FILM COATED ORAL NIGHTLY
Status: DISCONTINUED | OUTPATIENT
Start: 2020-06-08 | End: 2020-06-10 | Stop reason: HOSPADM

## 2020-06-08 RX ORDER — VANCOMYCIN HYDROCHLORIDE 1 G/200ML
1000 INJECTION, SOLUTION INTRAVENOUS EVERY 12 HOURS
Status: DISCONTINUED | OUTPATIENT
Start: 2020-06-08 | End: 2020-06-10

## 2020-06-08 RX ORDER — SODIUM CHLORIDE 9 MG/ML
INJECTION, SOLUTION INTRAVENOUS CONTINUOUS
Status: DISCONTINUED | OUTPATIENT
Start: 2020-06-08 | End: 2020-06-08

## 2020-06-08 RX ORDER — SODIUM CHLORIDE 0.9 % (FLUSH) 0.9 %
10 SYRINGE (ML) INJECTION PRN
Status: DISCONTINUED | OUTPATIENT
Start: 2020-06-08 | End: 2020-06-10 | Stop reason: HOSPADM

## 2020-06-08 RX ORDER — ONDANSETRON 4 MG/1
4 TABLET, ORALLY DISINTEGRATING ORAL EVERY 6 HOURS PRN
Status: DISCONTINUED | OUTPATIENT
Start: 2020-06-08 | End: 2020-06-10 | Stop reason: HOSPADM

## 2020-06-08 RX ORDER — ASPIRIN 81 MG/1
81 TABLET ORAL DAILY
Status: DISCONTINUED | OUTPATIENT
Start: 2020-06-09 | End: 2020-06-10 | Stop reason: HOSPADM

## 2020-06-08 RX ORDER — OXYCODONE HYDROCHLORIDE 5 MG/1
20 TABLET ORAL EVERY 8 HOURS PRN
Status: DISCONTINUED | OUTPATIENT
Start: 2020-06-08 | End: 2020-06-08

## 2020-06-08 RX ADMIN — LEVETIRACETAM 1000 MG: 500 TABLET, FILM COATED ORAL at 21:34

## 2020-06-08 RX ADMIN — OXYCODONE HYDROCHLORIDE 20 MG: 10 TABLET ORAL at 22:57

## 2020-06-08 RX ADMIN — PRIMIDONE 100 MG: 50 TABLET ORAL at 21:34

## 2020-06-08 RX ADMIN — GENTAMICIN SULFATE 55 MG: 40 INJECTION, SOLUTION INTRAMUSCULAR; INTRAVENOUS at 23:00

## 2020-06-08 RX ADMIN — AMIODARONE HYDROCHLORIDE 200 MG: 200 TABLET ORAL at 21:34

## 2020-06-08 RX ADMIN — MIDODRINE HYDROCHLORIDE 10 MG: 5 TABLET ORAL at 21:34

## 2020-06-08 RX ADMIN — FUROSEMIDE 40 MG: 10 INJECTION, SOLUTION INTRAMUSCULAR; INTRAVENOUS at 21:35

## 2020-06-08 RX ADMIN — APIXABAN 5 MG: 5 TABLET, FILM COATED ORAL at 21:34

## 2020-06-08 RX ADMIN — DULOXETINE HYDROCHLORIDE 60 MG: 30 CAPSULE, DELAYED RELEASE ORAL at 21:34

## 2020-06-08 RX ADMIN — OXYCODONE HYDROCHLORIDE 20 MG: 5 TABLET ORAL at 17:59

## 2020-06-08 RX ADMIN — ATORVASTATIN CALCIUM 80 MG: 40 TABLET, FILM COATED ORAL at 21:34

## 2020-06-08 RX ADMIN — SODIUM CHLORIDE, PRESERVATIVE FREE 10 ML: 5 INJECTION INTRAVENOUS at 21:35

## 2020-06-08 RX ADMIN — SODIUM CHLORIDE 1000 ML: 9 INJECTION, SOLUTION INTRAVENOUS at 13:28

## 2020-06-08 RX ADMIN — MIRTAZAPINE 30 MG: 15 TABLET, FILM COATED ORAL at 21:34

## 2020-06-08 RX ADMIN — FUROSEMIDE 40 MG: 10 INJECTION, SOLUTION INTRAVENOUS at 14:22

## 2020-06-08 RX ADMIN — VANCOMYCIN HYDROCHLORIDE 1000 MG: 1 INJECTION, SOLUTION INTRAVENOUS at 21:35

## 2020-06-08 ASSESSMENT — PAIN SCALES - GENERAL
PAINLEVEL_OUTOF10: 0
PAINLEVEL_OUTOF10: 9
PAINLEVEL_OUTOF10: 8
PAINLEVEL_OUTOF10: 0
PAINLEVEL_OUTOF10: 9

## 2020-06-08 ASSESSMENT — PAIN DESCRIPTION - PAIN TYPE: TYPE: CHRONIC PAIN

## 2020-06-08 NOTE — ED PROVIDER NOTES
EKG:   Nonspecific rhythm with a rate of 63. QRS 74, QTc 403. No ST elevations or depressions. Nonspecific T waves. Q waves in the inferior leads. Impression: Abnormal EKG. When compared to previous EKG from 3/16/2020, there are no significant changes.       Montse Hart MD  06/08/20 0172

## 2020-06-08 NOTE — ED PROVIDER NOTES
EMERGENCY DEPARTMENT ENCOUNTER      PCP: Heather Edgar MD    [address] [facility]    Chief Complaint   Patient presents with    Facial Swelling           This patient was not evaluated by the attending physician. I have independently evaluated this patient. HPI    Ladi Apple is a 63 y.o. female who presents with ongoing edema to bilateral upper extremities and bilateral lower extremities. Patient states she was recently discharged home from the hospital with \"kidney infection\". An IV port was placed and patient is currently on vancomycin IV at home. She states she continues to have progressively worsening swelling of both upper extremities and both lower extremities. This morning she notes \"puffiness to both eyelids\". No tongue or lip swelling. No difficulty breathing. Patient does have history of CHF. She states she recently stopped taking her torsemide and hydralazine per her doctor's order. REVIEW OF SYSTEMS    Constitutional:  Denies fever, chills, weight loss or weakness   HENT:  Denies sore throat or ear pain   Cardiovascular: See HPI. Denies chest pain, palpitations   Respiratory:  Denies cough or shortness of breath    GI:  Denies abdominal pain, nausea, vomiting, or diarrhea  :  Denies any urinary symptoms or vaginal symptoms.    Musculoskeletal:  Denies back pain  Skin:  Denies rash  Neurologic:  Denies headache, focal weakness or sensory changes   Endocrine:  Denies polyuria or polydypsia   Lymphatic:  Denies swollen glands     All other review of systems are negative  See HPI and nursing notes for additional information     PAST MEDICAL AND SURGICAL HISTORY    Past Medical History:   Diagnosis Date    Acute delirium     Arrhythmia     Arthritis     Asplenia     Asthma     CAD (coronary artery disease)     1st stent at age 46    Cardiomyopathy [facility])     [facility]erebral artery occlusion with [facility] infarction [facility]     CHF (congestive heart failure) ([facility]     Enlarged liver     GERD (gastroesophageal reflux disease)     H/O echocardiogram 4/6/16    EF 55%, trivial TR & MR, stage 1 diastolic dysfunction    History of blood transfusion     Hx of cardiovascular stress test 12/29/2015    Alessia: EF 45%. Pharmacologic stress myocardial perfusion scan shows a fixed inferior-lateral defect w/ no inducible ischemia. No evidence of ischemia. Inferior-lateral infarction. Normal LVSF    Hyperlipidemia     Hypertension     Lymphoma ([facility]     Denies    MI, old     Movement disorder     MS (multiple sclerosis) ([facility]     OP (osteoporosis)     PE (pulmonary embolism)     trapese filter    Pneumonia     Pyelonephritis     Seizures ([facility]     Spleen enlarged     Thyroid disease      Past Surgical History:   Procedure Laterality Date    ABDOMEN SURGERY      APPENDECTOMY      CARDIAC DEFIBRILLATOR PLACEMENT      evera [id number] CONDITIONAL    CHOLECYSTECTOMY      COLONOSCOPY      CORONARY ANGIOPLASTY WITH STENT PLACEMENT      3 stents    CORONARY ARTERY BYPASS GRAFT      Age 49    ENDOSCOPY, COLON, DIAGNOSTIC      GASTRIC BYPASS SURGERY      HERNIA REPAIR      HIP SURGERY      HYSTERECTOMY      JOINT REPLACEMENT      PORT SURGERY N/A 6/1/2020    PORT INSERTION performed by Joseph Giles MD at [address] [place] [address]      VASCULAR SURGERY         CURRENT MEDICATIONS    Current Outpatient Rx   Medication Sig Dispense Refill    vancomycin (VANCOCIN) infusion Infuse 1,000 mg intravenously every 12 hours Compound per protocol. 52928 mg 0    gentamicin (GARAMYCIN) infusion Infuse 55 mg intravenously every 8 hours for 11 days Compound per protocol 2 g 0    midodrine (PROAMATINE) 10 MG tablet Take 1 tablet by mouth 3 times daily (with meals) 90 tablet 0    oxyCODONE (ROXICODONE) 20 MG immediate release tablet 20 mg every 3 hours as needed.       mirtazapine (REMERON) 30 MG tablet Take 30 mg by mouth nightly      primidone (MYSOLINE)  Highest education level: None   Occupational History    None   Social Needs    Financial resource strain: None    Food insecurity     Worry: None     Inability: None    Transportation needs     Medical: None     Non-medical: None   Tobacco Use    Smoking status: Never Smoker    Smokeless tobacco: Never Used   Substance and Sexual Activity    Alcohol use: No    Drug use: No    Sexual activity: Not Currently   Lifestyle    Physical activity     Days per week: None     Minutes per session: None    Stress: None   Relationships    Social connections     Talks on phone: None     Gets together: None     Attends Yarsani service: None     Active member of club or organization: None     Attends meetings of clubs or organizations: None     Relationship status: None    Intimate partner violence     Fear of current or ex partner: None     Emotionally abused: None     Physically abused: None     Forced sexual activity: None   Other Topics Concern    None   Social History Narrative    None     Family History   Problem Relation Age of Onset    High Blood Pressure Mother     High Cholesterol Mother     Heart Disease Mother     Diabetes Mother     Heart Disease Father     Cancer Father     Other Sister         Multiple Sclerosis    Heart Disease Maternal Grandmother     High Blood Pressure Maternal Grandmother     High Cholesterol Maternal Grandmother          PHYSICAL EXAM    VITAL SIGNS: /70   Pulse 74   Temp 97.8 °F (36.6 °C)   Resp 16   SpO2 95%    Constitutional:  Well developed, Well nourished  HENT:  Normocephalic, Atraumatic, PERRL. EOMI. Sclera clear. Conjunctiva normal, No discharge. There is edematous appearance to lower eyelid region. Overall no overt facial swelling otherwise. No redness or warmth. Oral cavity clear, no tongue or lip swelling. Neck/Lymphatics: supple, no JVD, no swollen nodes  Cardiovascular:  Rate rate, reg Rhythm,  no murmurs/rubs/gallops.   No JVD  No carotid bruits or murmurs heard in carotids. Respiratory:  Nonlabored breathing. Normal breath sounds, No wheezing  Abdomen: Bowel sounds normal, Soft, No tenderness, no masses. Musculoskeletal:    There is 1+ edema to bilateral upper extremities. There is 1+ pitting edema to bilateral lower extremities, right lower extremity slightly greater than left. There is no edema, asymmetry, or calf / thigh tenderness bilaterally. No cyanosis. No cool or pale-appearing limb. Distal cap refill and pulses intact bilateral upper and lower extremities  Bilateral upper and lower extremity ROM intact without pain or obvious deficit  Integument:  Warm, Dry  Neurologic: Alert & oriented , No focal deficits noted. Cranial nerves II through XII grossly intact. Normal gross motor coordination & motor strength bilateral upper and lower extremities  Sensation intact.   Psychiatric:  Affect normal, Mood normal.       Labs:  Results for orders placed or performed during the hospital encounter of 06/08/20   CBC Auto Differential   Result Value Ref Range    WBC 8.5 4.0 - 10.5 K/CU MM    RBC 2.21 (L) 4.2 - 5.4 M/CU MM    Hemoglobin 7.9 (L) 12.5 - 16.0 GM/DL    Hematocrit 26.2 (L) 37 - 47 %    .6 (H) 78 - 100 FL    MCH 35.7 (H) 27 - 31 PG    MCHC 30.2 (L) 32.0 - 36.0 %    RDW 16.2 (H) 11.7 - 14.9 %    Platelets 641 683 - 117 K/CU MM    MPV 10.3 7.5 - 11.1 FL    Bands Relative 8 5 - 11 %    Segs Relative 78.0 (H) 36 - 66 %    Eosinophils % 2.0 0 - 3 %    Lymphocytes % 10.0 (L) 24 - 44 %    Monocytes % 2.0 0 - 4 %    Bands Absolute 0.68 K/CU MM    Segs Absolute 6.5 K/CU MM    Eosinophils Absolute 0.2 K/CU MM    Lymphocytes Absolute 0.9 K/CU MM    Monocytes Absolute 0.2 K/CU MM    Differential Type MANUAL DIFFERENTIAL     Anisocytosis 1+     Macrocytes 2+     Polychromasia 1+     Ovalocytes 1+    Comprehensive Metabolic Panel   Result Value Ref Range    Sodium 143 135 - 145 MMOL/L    Potassium 3.3 (L) 3.5 - 5.1 MMOL/L Chloride 108 99 - 110 mMol/L    CO2 23 21 - 32 MMOL/L    BUN 10 6 - 23 MG/DL    CREATININE 0.6 0.6 - 1.1 MG/DL    Glucose 92 70 - 99 MG/DL    Calcium 7.6 (L) 8.3 - 10.6 MG/DL    Alb 3.3 (L) 3.4 - 5.0 GM/DL    Total Protein 5.5 (L) 6.4 - 8.2 GM/DL    Total Bilirubin 0.2 0.0 - 1.0 MG/DL    ALT 8 (L) 10 - 40 U/L    AST 12 (L) 15 - 37 IU/L    Alkaline Phosphatase 73 40 - 129 IU/L    GFR Non-African American >60 >60 mL/min/1.73m2    GFR African American >60 >60 mL/min/1.73m2    Anion Gap 12 4 - 16   Urinalysis   Result Value Ref Range    Color, UA YELLOW YELLOW    Clarity, UA CLEAR CLEAR    Glucose, Urine NEGATIVE NEGATIVE MG/DL    Bilirubin Urine NEGATIVE NEGATIVE MG/DL    Ketones, Urine SMALL (A) NEGATIVE MG/DL    Specific Gravity, UA 1.012 1.001 - 1.035    Blood, Urine NEGATIVE NEGATIVE    pH, Urine 5.0 5.0 - 8.0    Protein, UA NEGATIVE NEGATIVE MG/DL    Urobilinogen, Urine NORMAL 0.2 - 1.0 MG/DL    Nitrite Urine, Quantitative NEGATIVE NEGATIVE    Leukocyte Esterase, Urine NEGATIVE NEGATIVE    RBC, UA <1 0 - 6 /HPF    WBC, UA 1 0 - 5 /HPF    Bacteria, UA NEGATIVE NEGATIVE /HPF    Mucus, UA RARE (A) NEGATIVE HPF    Trichomonas, UA NONE SEEN NONE SEEN /HPF   Lactate, Sepsis   Result Value Ref Range    Lactic Acid, Sepsis 0.6 0.5 - 1.9 mMOL/L   Troponin   Result Value Ref Range    Troponin T <0.010 <0.01 NG/ML   Brain Natriuretic Peptide   Result Value Ref Range    Pro-BNP 3,910 (H) <300 PG/ML   EKG 12 Lead   Result Value Ref Range    Ventricular Rate 63 BPM    Atrial Rate 63 BPM    QRS Duration 74 ms    Q-T Interval 394 ms    QTc Calculation (Bazett) 403 ms    R Axis -30 degrees    T Axis -31 degrees    Diagnosis       Junctional rhythm  Left axis deviation  Low voltage QRS  Inferior infarct (cited on or before 12-FEB-2017)  Abnormal ECG  When compared with ECG of 16-MAR-2020 01:06,  Junctional rhythm has replaced Electronic atrial pacemaker  QRS duration has decreased  ST now depressed in Anterior leads  QT has

## 2020-06-08 NOTE — PLAN OF CARE
Problem: Falls - Risk of:  Goal: Will remain free from falls  Description: Will remain free from falls  Outcome: Ongoing  Goal: Absence of physical injury  Description: Absence of physical injury  Outcome: Ongoing     Problem: Pain:  Goal: Pain level will decrease  Description: Pain level will decrease  Outcome: Ongoing  Goal: Control of acute pain  Description: Control of acute pain  Outcome: Ongoing  Goal: Control of chronic pain  Description: Control of chronic pain  Outcome: Ongoing     Problem: Fluid Volume:  Goal: Hemodynamic stability will improve  Description: Hemodynamic stability will improve  Outcome: Ongoing  Goal: Ability to maintain a balanced intake and output will improve  Description: Ability to maintain a balanced intake and output will improve  Outcome: Ongoing     Problem: Health Behavior:  Goal: Identification of resources available to assist in meeting health care needs will improve  Description: Identification of resources available to assist in meeting health care needs will improve  Outcome: Ongoing     Problem: Respiratory:  Goal: Respiratory status will improve  Description: Respiratory status will improve  Outcome: Ongoing

## 2020-06-08 NOTE — CONSULTS
q12h    VANCOMYCIN TROUGH SCHEDULED FOR 6/9/2020 @ 0830    Thank you for the consult.   Kevon Sy RPh  6/8/2020 7:44 PM

## 2020-06-08 NOTE — PROGRESS NOTES
mouth 2 times daily   Yes Historical Provider, MD   docusate sodium (COLACE) 100 MG capsule Take 100 mg by mouth 2 times daily as needed for Constipation   Yes Historical Provider, MD   famotidine (PEPCID) 20 MG tablet Take 1 tablet by mouth 2 times daily 2/7/19  Yes Francisco Javier Cueva MD   atorvastatin (LIPITOR) 80 MG tablet Take 80 mg by mouth nightly   Yes Historical Provider, MD   clopidogrel (PLAVIX) 75 MG tablet Take 75 mg by mouth daily   Yes Historical Provider, MD   ferrous gluconate (FERGON) 324 (38 FE) MG tablet Take 324 mg by mouth 2 times daily    Yes Historical Provider, MD   aspirin 81 MG tablet Take 81 mg by mouth daily    Yes Historical Provider, MD   levothyroxine (SYNTHROID) 150 MCG tablet  Take 150 mcg by mouth Daily  1/4/20   Rahel Alejandro MD   ipratropium-albuterol (DUONEB) 0.5-2.5 (3) MG/3ML SOLN nebulizer solution Inhale 3 mLs into the lungs every 4 hours (while awake) 11/18/19   Guillaume Macedo MD   nitroGLYCERIN (NITROSTAT) 0.4 MG SL tablet up to max of 3 total doses.  If no relief after 1 dose, call 911. 11/1/19   Justice Varghese MD   naloxone 4 MG/0.1ML LIQD nasal spray 1 spray by Nasal route as needed for Opioid Reversal  Patient taking differently: 1 spray by Nasal route as needed for Opioid Reversal 06/08/2020 Patient states she has never used this 11/1/19   Justice Varghese MD   benzocaine-menthol (CEPACOL SORE THROAT) 15-3.6 MG lozenge Take 1 lozenge by mouth every 2 hours as needed for Sore Throat 11/1/19   Justice Varghese MD   cyanocobalamin 1000 MCG/ML injection INJECT 1 ML INTO THE MUSCLE ON THE FIRST OF [facility] MONTH 9/27/19   Historical Provider, MD   torsemide (DEMADEX) 20 MG tablet Take 20 mg by mouth daily as needed (For weight gain or fluid retention) Indications: pt takes 2 tablets daily     Historical Provider, MD   Icosapent Ethyl (VASCEPA) 0.5 g CAPS Take 500 mg by mouth 2 times daily 9/26/19   Corinne Noel, APRN - CNP   ondansetron (ZOFRAN) 4 MG tablet Take 1 tablet by mouth every 6 hours as needed for Nausea or Vomiting 2/26/19   Dharmesh Powers MD   levETIRAcetam (KEPPRA) 750 MG tablet Take 750 mg by mouth 2 times daily     Historical Provider, MD   acetaminophen 650 MG TABS Take 650 mg by mouth every 4 hours as needed 4/9/16   Deanne Frias MD   albuterol (PROVENTIL HFA;VENTOLIN HFA) 108 (90 BASE) MCG/ACT inhaler Inhale 2 puffs into the lungs every 6 hours as needed. Historical Provider, MD     Medications added or changed (ex. new medication, dosage change, interval change, formulation change):  Levothyroxine dosage change from 224 mcg to 150 mcg (224 mcg ordered)  Levetiracetam dosage change from 1000 mg BID to 750 mg BID (1000 mg ordered)    Medications removed from list (include reason, ex. noncompliance, medication cost, therapy complete etc.):   Primidone  no claims for this medication and patient states this does not seem familiar. (ordered)  Culturelle patient states she is not taking this  Naloxone duplicate  Syringe clean up list    Medications requiring reconciliation with provider:    Levothyroxine dosage change from 224 mcg to 150 mcg (224 mcg ordered)  Levetiracetam dosage change from 1000 mg BID to 750 mg BID (1000 mg ordered)  Primidone  no claims for this medication and patient states this does not seem familiar. (ordered)    Comments:  Medication list reviewed with patient. Patient with recent discharge from hospital on 06/05/2020. Has recent Gentamicin and Vanco IV therapy per patient last dose received this am 06/08/2020. Patient states she has not been taking torsemide, reports this was discontinued. Verified dosage changes with retail pharmacy. Patient reports she has taken no oral medications piror to ER visit.     To my knowledge the above medication history is accurate as of 6/8/2020 4:06 PM.   Reva Perdomo, [place]   6/8/2020 4:06 PM

## 2020-06-08 NOTE — H&P
History and Physical      Name:  Ha Knott /Age/Sex: 1960  (63 y.o. female)   MRN & CSN:  [id number] & [id number] Admission Date/Time: 2020 12:07 PM   Location:  [facility] PCP: Avis Jhaveri MD       Hospital Day: 1    Assessment and Plan:   Ha Knott is a 63 y.o.  female  who presents with body swelling    1) Acute on chronic systolic heart failure; mild  -BNP elevated  -CXR Mild  Central pulmonary vascular congestion  -Troponin neg, EKG: No acute ischemic changes  -Recent Echo 2020: EF of 30-35%  -Started on IV Lasix  -Resume other PO meds    2) Recent MRSA Bacteremia concerning for ICD infection  -Was discharged on 2020 on IV Vanc and Gentamycin  -Per ID recommendation: IV Vanc 1g BID to be continued for 6 weeks (End date: 2020) and IV Gentamicin for 2 weeks 55mg TID ( End date 06/15/20)  -Pharmacy consulted for dosing    Other Chronic medical conditions; medication resumed unless contraindicated   -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  female  who presents with generalized body swelling that got worse today. Reported some worsening SOB as well. Reported these symptoms have been ongoing for few weeks prior to her recent admission. Denied any dizziness, palpitations, fever, chills, cough, chest pain.         Ten point ROS reviewed negative, unless as noted above    Objective:   No intake or output data in the 24 hours ending 20 1601   Vitals:   Vitals:    20 1244   BP:    Pulse:    Resp: infarction. Normal LVSF    Hyperlipidemia     Hypertension     Lymphoma ([facility] [address]     Denies    MI, old     Movement disorder     MS (multiple sclerosis) ([facility] [address]     OP (osteoporosis)     PE (pulmonary embolism)     trapese filter    Pneumonia     Pyelonephritis     Seizures ([facility] [address]     Spleen enlarged     Thyroid disease      PSHX:  has a past surgical history that includes Coronary angioplasty with stent; Hysterectomy; Appendectomy; Cholecystectomy; Tonsillectomy; Gastric bypass surgery; Cardiac defibrillator placement; hip surgery; Abdomen surgery; Colonoscopy; Endoscopy, colon, diagnostic; hernia repair; joint replacement; skin biopsy; vascular surgery; Coronary artery bypass graft; and Port Surgery (N/A, 6/1/2020). Allergies: Allergies   Allergen Reactions    Fentanyl Swelling     Other reaction(s): Angioedema (swelling)    Moxifloxacin Hcl In Nacl Swelling and Rash    Povidone-Iodine Rash     Other reaction(s): AOF      Cyclobenzaprine      Other reaction(s): Other - comment required  \" it make my muscle very weak because of my MS\"  \" it make my muscle very weak because of my MS\"      Methocarbamol      Worsening edema  Other reaction(s): Other - comment required  Worsening edema  Worsening edema  Worsening edema      Tramadol Other (See Comments)     Doctor doesn't her to take due to heart problems  Seizures   Doctor doesn't her to take due to lowers seizure threshold.  Baclofen     Ibuprofen      \"Due to heart problems\"    Naproxen     Nsaids      \"Due to heart problems\"    Tizanidine     Tolmetin      \"Due to heart problems\"    Tramadol Hcl      Other reaction(s):  Other - comment required  Told not to take due to history of seizures    Betadine [Povidone Iodine] Rash    Moxifloxacin Rash and Swelling     Lips swell and tingling in face   Lips swell and tingling in face   Lips swell and tingling in face   Lips swell and tingling in face   Around mouth       FAM HX: family history includes Cancer in her father; Diabetes in her mother; Heart Disease in her father, maternal grandmother, and mother; High Blood Pressure in her maternal grandmother and mother; High Cholesterol in her maternal grandmother and mother; Other in her sister.   Soc HX:   Social History     Socioeconomic History    Marital status: Single     Spouse name: None    Number of children: None    Years of education: None    Highest education level: None   Occupational History    None   Social Needs    Financial resource strain: None    Food insecurity     Worry: None     Inability: None    Transportation needs     Medical: None     Non-medical: None   Tobacco Use    Smoking status: Never Smoker    Smokeless tobacco: Never Used   Substance and Sexual Activity    Alcohol use: No    Drug use: No    Sexual activity: Not Currently   Lifestyle    Physical activity     Days per week: None     Minutes per session: None    Stress: None   Relationships    Social connections     Talks on phone: None     Gets together: None     Attends [employer] service: None     Active member of club or organization: None     Attends meetings of clubs or organizations: None     Relationship status: None    Intimate partner violence     Fear of current or ex partner: None     Emotionally abused: None     Physically abused: None     Forced sexual activity: None   Other Topics Concern    None   Social History Narrative    None       Medications:   Medications:    Infusions:    sodium chloride      sodium chloride       PRN Meds:        Electronically signed by Jcarlos Pickering MD on 6/8/2020 at 4:01 PM

## 2020-06-09 LAB
ANION GAP SERPL CALCULATED.3IONS-SCNC: 10 MMOL/L (ref 4–16)
BUN BLDV-MCNC: 9 MG/DL (ref 6–23)
CALCIUM SERPL-MCNC: 7.5 MG/DL (ref 8.3–10.6)
CHLORIDE BLD-SCNC: 98 MMOL/L (ref 99–110)
CHOLESTEROL: 138 MG/DL
CO2: 28 MMOL/L (ref 21–32)
CREAT SERPL-MCNC: 0.7 MG/DL (ref 0.6–1.1)
DOSE AMOUNT: ABNORMAL
DOSE AMOUNT: NORMAL
DOSE TIME: ABNORMAL
DOSE TIME: NORMAL
GENTAMICIN TROUGH: 0.8 UG/ML
GFR AFRICAN AMERICAN: >60 ML/MIN/1.73M2
GFR NON-AFRICAN AMERICAN: >60 ML/MIN/1.73M2
GLUCOSE BLD-MCNC: 56 MG/DL (ref 70–99)
HCT VFR BLD CALC: 26.2 % (ref 37–47)
HDLC SERPL-MCNC: 53 MG/DL
HEMOGLOBIN: 8 GM/DL (ref 12.5–16)
IRON: 38 UG/DL (ref 37–145)
LDL CHOLESTEROL DIRECT: 76 MG/DL
MAGNESIUM: 1.7 MG/DL (ref 1.8–2.4)
MCH RBC QN AUTO: 35.6 PG (ref 27–31)
MCHC RBC AUTO-ENTMCNC: 30.5 % (ref 32–36)
MCV RBC AUTO: 116.4 FL (ref 78–100)
PCT TRANSFERRIN: 16 % (ref 10–44)
PDW BLD-RTO: 16.4 % (ref 11.7–14.9)
PLATELET # BLD: 241 K/CU MM (ref 140–440)
PMV BLD AUTO: 10.4 FL (ref 7.5–11.1)
POTASSIUM SERPL-SCNC: 3.3 MMOL/L (ref 3.5–5.1)
RBC # BLD: 2.25 M/CU MM (ref 4.2–5.4)
SODIUM BLD-SCNC: 136 MMOL/L (ref 135–145)
TOTAL IRON BINDING CAPACITY: 241 UG/DL (ref 250–450)
TRIGL SERPL-MCNC: 76 MG/DL
TROPONIN T: <0.01 NG/ML
UNSATURATED IRON BINDING CAPACITY: 203 UG/DL (ref 110–370)
VANCOMYCIN TROUGH: 21.4 UG/ML (ref 10–20)
WBC # BLD: 3.9 K/CU MM (ref 4–10.5)

## 2020-06-09 PROCEDURE — 80061 LIPID PANEL: CPT

## 2020-06-09 PROCEDURE — 94761 N-INVAS EAR/PLS OXIMETRY MLT: CPT

## 2020-06-09 PROCEDURE — 84484 ASSAY OF TROPONIN QUANT: CPT

## 2020-06-09 PROCEDURE — 99214 OFFICE O/P EST MOD 30 MIN: CPT | Performed by: INTERNAL MEDICINE

## 2020-06-09 PROCEDURE — 6370000000 HC RX 637 (ALT 250 FOR IP): Performed by: HOSPITALIST

## 2020-06-09 PROCEDURE — 2580000003 HC RX 258: Performed by: INTERNAL MEDICINE

## 2020-06-09 PROCEDURE — 83721 ASSAY OF BLOOD LIPOPROTEIN: CPT

## 2020-06-09 PROCEDURE — G0378 HOSPITAL OBSERVATION PER HR: HCPCS

## 2020-06-09 PROCEDURE — 96366 THER/PROPH/DIAG IV INF ADDON: CPT

## 2020-06-09 PROCEDURE — 6370000000 HC RX 637 (ALT 250 FOR IP): Performed by: INTERNAL MEDICINE

## 2020-06-09 PROCEDURE — 83735 ASSAY OF MAGNESIUM: CPT

## 2020-06-09 PROCEDURE — 6360000002 HC RX W HCPCS: Performed by: INTERNAL MEDICINE

## 2020-06-09 PROCEDURE — 85027 COMPLETE CBC AUTOMATED: CPT

## 2020-06-09 PROCEDURE — 80170 ASSAY OF GENTAMICIN: CPT

## 2020-06-09 PROCEDURE — 96376 TX/PRO/DX INJ SAME DRUG ADON: CPT

## 2020-06-09 PROCEDURE — 80202 ASSAY OF VANCOMYCIN: CPT

## 2020-06-09 PROCEDURE — 80048 BASIC METABOLIC PNL TOTAL CA: CPT

## 2020-06-09 RX ORDER — POTASSIUM CHLORIDE 20 MEQ/1
20 TABLET, EXTENDED RELEASE ORAL
Status: DISCONTINUED | OUTPATIENT
Start: 2020-06-09 | End: 2020-06-10 | Stop reason: HOSPADM

## 2020-06-09 RX ADMIN — SODIUM CHLORIDE, PRESERVATIVE FREE 10 ML: 5 INJECTION INTRAVENOUS at 08:34

## 2020-06-09 RX ADMIN — VANCOMYCIN HYDROCHLORIDE 1000 MG: 1 INJECTION, SOLUTION INTRAVENOUS at 22:20

## 2020-06-09 RX ADMIN — ATORVASTATIN CALCIUM 80 MG: 40 TABLET, FILM COATED ORAL at 22:11

## 2020-06-09 RX ADMIN — OXYCODONE HYDROCHLORIDE 20 MG: 10 TABLET ORAL at 12:03

## 2020-06-09 RX ADMIN — ASPIRIN 81 MG: 81 TABLET, COATED ORAL at 08:27

## 2020-06-09 RX ADMIN — ACETAMINOPHEN 650 MG: 325 TABLET ORAL at 02:37

## 2020-06-09 RX ADMIN — APIXABAN 5 MG: 5 TABLET, FILM COATED ORAL at 08:27

## 2020-06-09 RX ADMIN — CLOPIDOGREL BISULFATE 75 MG: 75 TABLET ORAL at 08:28

## 2020-06-09 RX ADMIN — OXYCODONE HYDROCHLORIDE 20 MG: 10 TABLET ORAL at 02:38

## 2020-06-09 RX ADMIN — ONDANSETRON 4 MG: 2 INJECTION INTRAMUSCULAR; INTRAVENOUS at 18:41

## 2020-06-09 RX ADMIN — OXYCODONE HYDROCHLORIDE 20 MG: 10 TABLET ORAL at 15:36

## 2020-06-09 RX ADMIN — MIDODRINE HYDROCHLORIDE 10 MG: 5 TABLET ORAL at 17:23

## 2020-06-09 RX ADMIN — OXYCODONE HYDROCHLORIDE 20 MG: 10 TABLET ORAL at 08:33

## 2020-06-09 RX ADMIN — SODIUM CHLORIDE, PRESERVATIVE FREE 10 ML: 5 INJECTION INTRAVENOUS at 22:08

## 2020-06-09 RX ADMIN — GENTAMICIN SULFATE 55 MG: 40 INJECTION, SOLUTION INTRAMUSCULAR; INTRAVENOUS at 22:08

## 2020-06-09 RX ADMIN — FUROSEMIDE 40 MG: 10 INJECTION, SOLUTION INTRAMUSCULAR; INTRAVENOUS at 08:27

## 2020-06-09 RX ADMIN — LEVETIRACETAM 1000 MG: 500 TABLET, FILM COATED ORAL at 08:28

## 2020-06-09 RX ADMIN — OXYCODONE HYDROCHLORIDE 20 MG: 10 TABLET ORAL at 05:45

## 2020-06-09 RX ADMIN — AMIODARONE HYDROCHLORIDE 200 MG: 200 TABLET ORAL at 08:28

## 2020-06-09 RX ADMIN — PRIMIDONE 100 MG: 50 TABLET ORAL at 08:28

## 2020-06-09 RX ADMIN — POTASSIUM CHLORIDE 20 MEQ: 1500 TABLET, EXTENDED RELEASE ORAL at 17:24

## 2020-06-09 RX ADMIN — LEVOTHYROXINE SODIUM 224 MCG: 112 TABLET ORAL at 05:45

## 2020-06-09 RX ADMIN — PRIMIDONE 100 MG: 50 TABLET ORAL at 22:07

## 2020-06-09 RX ADMIN — POTASSIUM CHLORIDE 20 MEQ: 1500 TABLET, EXTENDED RELEASE ORAL at 10:59

## 2020-06-09 RX ADMIN — DULOXETINE HYDROCHLORIDE 60 MG: 30 CAPSULE, DELAYED RELEASE ORAL at 08:28

## 2020-06-09 RX ADMIN — GENTAMICIN SULFATE 55 MG: 40 INJECTION, SOLUTION INTRAMUSCULAR; INTRAVENOUS at 15:36

## 2020-06-09 RX ADMIN — VANCOMYCIN HYDROCHLORIDE 1000 MG: 1 INJECTION, SOLUTION INTRAVENOUS at 10:49

## 2020-06-09 RX ADMIN — FUROSEMIDE 40 MG: 10 INJECTION, SOLUTION INTRAMUSCULAR; INTRAVENOUS at 17:23

## 2020-06-09 RX ADMIN — LEVETIRACETAM 1000 MG: 500 TABLET, FILM COATED ORAL at 22:08

## 2020-06-09 RX ADMIN — ACETAMINOPHEN 650 MG: 325 TABLET ORAL at 12:03

## 2020-06-09 RX ADMIN — MIDODRINE HYDROCHLORIDE 10 MG: 5 TABLET ORAL at 12:03

## 2020-06-09 RX ADMIN — GENTAMICIN SULFATE 55 MG: 40 INJECTION, SOLUTION INTRAMUSCULAR; INTRAVENOUS at 05:45

## 2020-06-09 RX ADMIN — APIXABAN 5 MG: 5 TABLET, FILM COATED ORAL at 22:07

## 2020-06-09 RX ADMIN — MIRTAZAPINE 30 MG: 15 TABLET, FILM COATED ORAL at 22:07

## 2020-06-09 RX ADMIN — MIDODRINE HYDROCHLORIDE 10 MG: 5 TABLET ORAL at 08:34

## 2020-06-09 RX ADMIN — OXYCODONE HYDROCHLORIDE 20 MG: 10 TABLET ORAL at 22:08

## 2020-06-09 RX ADMIN — OXYCODONE HYDROCHLORIDE 20 MG: 10 TABLET ORAL at 18:42

## 2020-06-09 ASSESSMENT — PAIN SCALES - GENERAL
PAINLEVEL_OUTOF10: 9
PAINLEVEL_OUTOF10: 7
PAINLEVEL_OUTOF10: 8
PAINLEVEL_OUTOF10: 8
PAINLEVEL_OUTOF10: 7
PAINLEVEL_OUTOF10: 9
PAINLEVEL_OUTOF10: 6
PAINLEVEL_OUTOF10: 8

## 2020-06-09 ASSESSMENT — PAIN DESCRIPTION - LOCATION
LOCATION: HIP
LOCATION: HIP

## 2020-06-09 ASSESSMENT — PAIN DESCRIPTION - ORIENTATION
ORIENTATION: RIGHT
ORIENTATION: RIGHT

## 2020-06-09 ASSESSMENT — PAIN DESCRIPTION - DESCRIPTORS: DESCRIPTORS: ACHING

## 2020-06-09 ASSESSMENT — PAIN DESCRIPTION - DIRECTION: RADIATING_TOWARDS: BACK

## 2020-06-09 ASSESSMENT — PAIN DESCRIPTION - FREQUENCY: FREQUENCY: CONTINUOUS

## 2020-06-09 ASSESSMENT — PAIN DESCRIPTION - PROGRESSION: CLINICAL_PROGRESSION: NOT CHANGED

## 2020-06-09 ASSESSMENT — PAIN DESCRIPTION - PAIN TYPE
TYPE: CHRONIC PAIN

## 2020-06-09 ASSESSMENT — PAIN DESCRIPTION - ONSET: ONSET: ON-GOING

## 2020-06-09 NOTE — PROGRESS NOTES
MD    PAST MEDICAL HISTORY, SURGICAL HISTORY, SOCIAL HISTORY and  HOME MEDICATIONS all reviewed. OBJECTIVE  Vitals:    06/08/20 1830 06/08/20 2127 06/09/20 0230 06/09/20 0815   BP: 139/76 126/68 127/77 97/63   Pulse: 73 75 64    Resp: 16 16 18 10   Temp: 98.9 °F (37.2 °C) 98.8 °F (37.1 °C) 98.4 °F (36.9 °C) 98.3 °F (36.8 °C)   TempSrc: Oral Oral Oral Oral   SpO2: 99% 98%  95%   Weight: 113 lb 5.1 oz (51.4 kg)      Height: 5' 6\" (1.676 m)          PHYSICAL EXAM   GEN Awake female, sitting upright in bed in no apparent distress. EYES Pupils are equally round. No scleral erythema, discharge, or conjunctivitis. HENT Mucous membranes are moist. Oral pharynx without exudates, no evidence of thrush. NECK Supple, no apparent thyromegaly or masses. RESP Clear to auscultation, no wheezes, rales or rhonchi. Symmetric chest movement while on room air. CARDIO/VASC S1/S2 auscultated. Regular rate without appreciable murmurs, rubs, or gallops. No JVD or carotid bruits. Peripheral pulses equal bilaterally and palpable. No peripheral edema. GI Abdomen is soft without significant tenderness, masses, or guarding. Bowel sounds are normoactive. Rectal exam deferred.  No costovertebral angle tenderness. Normal appearing external genitalia. Coppola catheter is not present. HEME/LYMPH No palpable cervical lymphadenopathy and no hepatosplenomegaly. No petechiae or ecchymoses. MSK Spontaneous movement of all extremities. No gross joint deformities. SKIN Normal coloration, warm, dry. NEURO Cranial nerves appear grossly intact, normal speech, no lateralizing weakness. PSYCH Awake, alert, oriented x 4. Affect appropriate. INTAKE: No intake/output data recorded. OUTPUT: No intake/output data recorded.     LABS  Recent Labs     06/08/20  1300 06/09/20  0555   WBC 8.5 3.9*   HGB 7.9* 8.0*   HCT 26.2* 26.2*    241      Recent Labs     06/08/20  1300 06/09/20  0555    136   K 3.3* 3.3*    98*   CO2 23 28 BUN 10 9   CREATININE 0.6 0.7     Recent Labs     06/08/20  1300   AST 12*   ALT 8*   BILITOT 0.2   ALKPHOS 73     No results for input(s): INR in the last 72 hours. Recent Labs     06/08/20  1245 06/08/20  1300 06/09/20  0555   CKTOTAL  --  30  --    TROPONINT <0.010  --  <0.010          Abnormal labs for today noted      Imaging:     ECHO:    Microbiology:  Blood culture:    Urine culture:    Sputum culture:    Procedures done this admission:    MEDS  Scheduled Meds:   amiodarone  200 mg Oral Daily    apixaban  5 mg Oral BID    aspirin  81 mg Oral Daily    atorvastatin  80 mg Oral Nightly    clopidogrel  75 mg Oral Daily    DULoxetine  60 mg Oral Daily    levETIRAcetam  1,000 mg Oral BID    levothyroxine  224 mcg Oral Daily    midodrine  10 mg Oral TID WC    mirtazapine  30 mg Oral Nightly    primidone  100 mg Oral BID    sodium chloride flush  10 mL Intravenous 2 times per day    furosemide  40 mg Intravenous BID    potassium chloride  40 mEq Oral Once    vancomycin  1,000 mg Intravenous Q12H    gentamicin  55 mg Intravenous Q8H     Continuous Infusions:  PRN Meds:albuterol sulfate HFA, nitroGLYCERIN, sodium chloride flush, acetaminophen **OR** acetaminophen, polyethylene glycol, promethazine **OR** ondansetron, ondansetron, oxyCODONE        ASSESSMENT and PLAN  Hospital Day: 2    1-Transient facial/throat swelling- ?allergic reaction- do not see any signs of allergic reaction at this time- will resume outpatient abx and medication regimen- and see how she does. 2-Probable mild systolic HF exacerbation- continue IV lasix for today.   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           Disp:     Diet DIET LOW SODIUM 2 GM;   DVT Prophylaxis [] Lovenox, []  Heparin, [] SCDs, [] Ambulation   GI Prophylaxis [] PPI,  [] H2 Blocker,  [] Carafate,  [] Diet/Tube Feeds   Code Status Full Code   Disposition Patient requires continued admission due to edema   CMS Level of Risk [] Low, [x] Moderate,[]  High  Patient's risk as above due to edema     SABINA GALVEZ MD 6/9/2020 9:22 AM

## 2020-06-09 NOTE — PROGRESS NOTES
Occupational Therapy    Attempted to see pt today. Pt stated she was fatigued as she just returned today and has a lot of hip pain and does not want to move. Instructed pt in importance of participating in therapy. Pt indicated understanding and stated she will participate tomorrow. Will re-attempt tomorrow.     Edmond CONNER/L [id number]  1:14 PM,6/9/2020

## 2020-06-09 NOTE — CONSULTS
Maurisio Wilburn is a 63 y.o. female started on vancomycin for MRSA bacteremia. Pharmacy consulted by Dr. Abner Castillo for monitoring and adjustment. Indication for treatment: MRSA Bacteremia  Goal trough: 15 mcg/mL  Other antimicrobials: Gentamicin     Ht Readings from Last 1 Encounters:   06/08/20 5' 6\" (1.676 m)     Wt Readings from Last 3 Encounters:   06/08/20 113 lb 5.1 oz (51.4 kg)   06/05/20 140 lb 14 oz (63.9 kg)   03/15/20 103 lb (46.7 kg)        Pertinent Laboratory Values:   Temp Readings from Last 3 Encounters:   06/09/20 98.3 °F (36.8 °C) (Oral)   06/05/20 98.3 °F (36.8 °C)   03/15/20 98.6 °F (37 °C) (Oral)     Recent Labs     06/08/20  1300 06/09/20  0555   WBC 8.5 3.9*     Recent Labs     06/08/20  1300 06/09/20  0555   BUN 10 9   CREATININE 0.6 0.7     Estimated Creatinine Clearance: 71 mL/min (based on SCr of 0.7 mg/dL). No intake or output data in the 24 hours ending 06/09/20 1142    Pertinent Cultures:  Date    Source    Results  5/25/2020  Blood    MRSA  5/28/2020  Blood    NGTD (1st negative blood culture)    Previous vancomycin levels:  TROUGH:  No results for input(s): VANCOTROUGH in the last 72 hours. RANDOM:    Recent Labs     06/08/20  1300   VANCORANDOM 38.8       Assessment/Plan:  · Patient has long history of vancomycin dosing:  · Vancomycin level:   ? 5/26 - 9.2, subtherapeutic on vancomycin 750 mg q12h  ? 5/28 - 15, therapeutic on vancomycin 1000 mg q12h  ? 5/30 - 16.2, therapeutic on vancomycin 1000 mg q12h   ? 6/01 - Inaccurate level. Patient missed morning dose on 06/01  ? 6/03 - 19.5, therapeutic. Vancomycin 1000mg every 12 hours  ? · Patient has been receiving vancomycin since 5/24/2020  · Initial vancomycin plan was to receive 6 weeks of vancomycin ending on approx 7/8/2020  · Patient states that she did receive a dose of vancomycin this morning prior to ED admission.   · Trough level was scheduled for this AM, but Vancomycin was

## 2020-06-09 NOTE — CONSULTS
tablet 1,000 mg  1,000 mg Oral BID Mak CHAMPION MD   1,000 mg at 06/09/20 4828    levothyroxine (SYNTHROID) tablet 224 mcg  224 mcg Oral Daily Mak CHAMPION MD   224 mcg at 06/09/20 0545    midodrine (PROAMATINE) tablet 10 mg  10 mg Oral TID WC Mak CHAMPION MD   10 mg at 06/09/20 1203    mirtazapine (REMERON) tablet 30 mg  30 mg Oral Nightly Mak CHAMPION MD   30 mg at 06/08/20 2134    nitroGLYCERIN (NITROSTAT) SL tablet 0.4 mg  0.4 mg Sublingual Q5 Min PRN Zahida Payne MD        primidone (MYSOLINE) tablet 100 mg  100 mg Oral BID Mak CHAMPION MD   100 mg at 06/09/20 0828    sodium chloride flush 0.9 % injection 10 mL  10 mL Intravenous 2 times per day Zahida Payne MD   10 mL at 06/09/20 0834    sodium chloride flush 0.9 % injection 10 mL  10 mL Intravenous PRN Zahida Payne MD        acetaminophen (TYLENOL) tablet 650 mg  650 mg Oral Q6H PRN Zahida Payne MD   650 mg at 06/09/20 1203    Or    acetaminophen (TYLENOL) suppository 650 mg  650 mg Rectal Q6H PRN Zahida Payne MD        polyethylene glycol (GLYCOLAX) packet 17 g  17 g Oral Daily PRN Zahida Payne MD        promethazine (PHENERGAN) tablet 12.5 mg  12.5 mg Oral Q6H PRN Zahida Payne MD        Or    ondansetron (ZOFRAN) injection 4 mg  4 mg Intravenous Q6H PRN Zahida Payne MD        furosemide (LASIX) injection 40 mg  40 mg Intravenous BID Mak CHAMPION MD   40 mg at 06/09/20 0827    potassium chloride (KLOR-CON M) extended release tablet 40 mEq  40 mEq Oral Once Zahida Payne MD        vancomycin (VANCOCIN) 1000 mg in dextrose 5% 200 mL IVPB  1,000 mg Intravenous Q12H Zahida Payne MD   Stopped at 06/09/20 1145    gentamicin (GARAMYCIN) 55 mg in dextrose 5 % 100 mL IVPB  55 mg Intravenous Q8H Zahida Payne MD   Stopped at 06/09/20 0739    ondansetron (ZOFRAN-ODT) disintegrating tablet 4 mg  4 mg Oral Q6H PRN Zahida Payne MD        oxyCODONE HCl (OXY-IR) immediate release tablet 20 mg  20 mg Oral Q3H PRN Gabo Rahel Zamora MD   20 mg at 06/09/20 1203     Review of Systems:  All 14 systems reviewed, all negative except for  sob    Physical Examination:    BP 97/63   Pulse 64   Temp 98.3 °F (36.8 °C) (Oral)   Resp 10   Ht 5' 6\" (1.676 m)   Wt 113 lb 5.1 oz (51.4 kg)   SpO2 95%   BMI 18.29 kg/m²      Wt Readings from Last 3 Encounters:   06/08/20 113 lb 5.1 oz (51.4 kg)   06/05/20 140 lb 14 oz (63.9 kg)   03/15/20 103 lb (46.7 kg)     Body mass index is 18.29 kg/m². General Appearance:  fair  Head: normocephalic     Eyes: normal, noninjected conjunctiva    ENT: normal mucosa, noninjected throat, normal     NECK: No JVP  No thyromegaly        Cardiovascular: No thrills palpated   Auscultation: Normal S1 and S2,  no murmur   carotid bruit no   Abdominal Aorta no bruit    Respiratory:    Breath sounds Diminshed bilaterally     Extremities:  +1 Edema, no  clubbing ,   no cyanosis    SKIN: Warm and well perfused, no pallor or cyanosis    Vascular exam:  Pedal Pulses: palp  bilaterally        Abdomen:  No masses or tenderness. No organomegaly noted. Neurological:  Oriented to time, place, and person   No focal neurological deficit noted. Psychiatric:normal mood, no anxiety    Lab Review   Recent Labs     06/09/20  0555   WBC 3.9*   HGB 8.0*   HCT 26.2*         Recent Labs     06/09/20  0555      K 3.3*   CL 98*   CO2 28   BUN 9   CREATININE 0.7     Recent Labs     06/08/20  1300   AST 12*   ALT 8*   BILITOT 0.2   ALKPHOS 73     No results for input(s): TROPONINI in the last 72 hours. Lab Results   Component Value Date    BNP 48 02/21/2013     Lab Results   Component Value Date    INR 0.94 12/29/2019    PROTIME 11.4 (L) 12/29/2019           Assessment/Recommendations:     - HFrEF: 30% diurese  - CAD  Medical therapy  - Anemia  ?  Causing high output  - Risk factor modification  - hyperlipiudimea  On statin  - PAF on eliquis  - ICD  Check optivol           Pau Alvarado MD, 6/9/2020 2:26 PM

## 2020-06-09 NOTE — CONSULTS
None  · Anthropometric Measures:  · Ht: 5' 6\" (167.6 cm)   · Current Body Wt: 113 lb 5.1 oz (51.4 kg)  · Admission Body Wt: 113 lb 5.1 oz (51.4 kg)  · Usual Body Wt: 121 lb 3.2 oz (55 kg)(12/29/19)  · % Weight Change:  -7% over past 6 months, may be fluid related  · Ideal Body Wt: 130 lb (59 kg), % Ideal Body 87  · BMI Classification: BMI <18.5 Underweight(18.3)    Nutrition Interventions:   Continue current diet, Mineral Supplement, Vitamin Supplement  Continued Inpatient Monitoring, Education Completed, Coordination of Care    Nutrition Evaluation:   · Evaluation: Goals set   · Goals:  Patient will meet at least  50% of estimated nutrient needs with po diet and ONS during los  Patient will meet at least  50% of estimated nutrient needs with po diet and ONS during los   · Monitoring: Meal Intake, Supplement Intake, Diet Tolerance, Weight, Pertinent Labs      Electronically signed by Jasmin More RD, SANDRA on 6/9/20 at 2:16 PM EDT    Contact Number: [phone]

## 2020-06-10 VITALS
BODY MASS INDEX: 18.99 KG/M2 | SYSTOLIC BLOOD PRESSURE: 92 MMHG | RESPIRATION RATE: 11 BRPM | OXYGEN SATURATION: 96 % | TEMPERATURE: 98 F | WEIGHT: 118.2 LBS | DIASTOLIC BLOOD PRESSURE: 58 MMHG | HEIGHT: 66 IN | HEART RATE: 62 BPM

## 2020-06-10 LAB
ALBUMIN SERPL-MCNC: 3.2 GM/DL (ref 3.4–5)
ANION GAP SERPL CALCULATED.3IONS-SCNC: 9 MMOL/L (ref 4–16)
BUN BLDV-MCNC: 8 MG/DL (ref 6–23)
CALCIUM SERPL-MCNC: 8.2 MG/DL (ref 8.3–10.6)
CHLORIDE BLD-SCNC: 101 MMOL/L (ref 99–110)
CO2: 31 MMOL/L (ref 21–32)
CREAT SERPL-MCNC: 0.7 MG/DL (ref 0.6–1.1)
GFR AFRICAN AMERICAN: >60 ML/MIN/1.73M2
GFR NON-AFRICAN AMERICAN: >60 ML/MIN/1.73M2
GLUCOSE BLD-MCNC: 75 MG/DL (ref 70–99)
HCT VFR BLD CALC: 26.5 % (ref 37–47)
HEMOGLOBIN: 8.1 GM/DL (ref 12.5–16)
MAGNESIUM: 1.7 MG/DL (ref 1.8–2.4)
MCH RBC QN AUTO: 35.2 PG (ref 27–31)
MCHC RBC AUTO-ENTMCNC: 30.6 % (ref 32–36)
MCV RBC AUTO: 115.2 FL (ref 78–100)
PDW BLD-RTO: 15.9 % (ref 11.7–14.9)
PHOSPHORUS: 4.4 MG/DL (ref 2.5–4.9)
PLATELET # BLD: 255 K/CU MM (ref 140–440)
PMV BLD AUTO: 10.7 FL (ref 7.5–11.1)
POTASSIUM SERPL-SCNC: 3.5 MMOL/L (ref 3.5–5.1)
RBC # BLD: 2.3 M/CU MM (ref 4.2–5.4)
SODIUM BLD-SCNC: 141 MMOL/L (ref 135–145)
T4 TOTAL: 11.79 UG/DL (ref 5.1–14.1)
WBC # BLD: 3.8 K/CU MM (ref 4–10.5)

## 2020-06-10 PROCEDURE — 83735 ASSAY OF MAGNESIUM: CPT

## 2020-06-10 PROCEDURE — G0378 HOSPITAL OBSERVATION PER HR: HCPCS

## 2020-06-10 PROCEDURE — 96366 THER/PROPH/DIAG IV INF ADDON: CPT

## 2020-06-10 PROCEDURE — 85027 COMPLETE CBC AUTOMATED: CPT

## 2020-06-10 PROCEDURE — 6370000000 HC RX 637 (ALT 250 FOR IP): Performed by: INTERNAL MEDICINE

## 2020-06-10 PROCEDURE — 80069 RENAL FUNCTION PANEL: CPT

## 2020-06-10 PROCEDURE — 6370000000 HC RX 637 (ALT 250 FOR IP): Performed by: HOSPITALIST

## 2020-06-10 PROCEDURE — 96367 TX/PROPH/DG ADDL SEQ IV INF: CPT

## 2020-06-10 PROCEDURE — 94761 N-INVAS EAR/PLS OXIMETRY MLT: CPT

## 2020-06-10 PROCEDURE — 80048 BASIC METABOLIC PNL TOTAL CA: CPT

## 2020-06-10 PROCEDURE — 2580000003 HC RX 258: Performed by: INTERNAL MEDICINE

## 2020-06-10 PROCEDURE — 6360000002 HC RX W HCPCS: Performed by: INTERNAL MEDICINE

## 2020-06-10 PROCEDURE — 97165 OT EVAL LOW COMPLEX 30 MIN: CPT

## 2020-06-10 RX ORDER — POTASSIUM CHLORIDE 20 MEQ/1
20 TABLET, EXTENDED RELEASE ORAL DAILY
Qty: 60 TABLET | Refills: 3 | Status: SHIPPED | OUTPATIENT
Start: 2020-06-10 | End: 2021-01-22

## 2020-06-10 RX ORDER — POTASSIUM CHLORIDE 20 MEQ/1
40 TABLET, EXTENDED RELEASE ORAL ONCE
Status: COMPLETED | OUTPATIENT
Start: 2020-06-10 | End: 2020-06-10

## 2020-06-10 RX ORDER — TORSEMIDE 20 MG/1
20 TABLET ORAL DAILY
Status: DISCONTINUED | OUTPATIENT
Start: 2020-06-10 | End: 2020-06-10 | Stop reason: HOSPADM

## 2020-06-10 RX ADMIN — DULOXETINE HYDROCHLORIDE 60 MG: 30 CAPSULE, DELAYED RELEASE ORAL at 09:28

## 2020-06-10 RX ADMIN — OXYCODONE HYDROCHLORIDE 20 MG: 10 TABLET ORAL at 10:10

## 2020-06-10 RX ADMIN — POTASSIUM CHLORIDE 40 MEQ: 1500 TABLET, EXTENDED RELEASE ORAL at 09:28

## 2020-06-10 RX ADMIN — GENTAMICIN SULFATE 55 MG: 40 INJECTION, SOLUTION INTRAMUSCULAR; INTRAVENOUS at 14:08

## 2020-06-10 RX ADMIN — AMIODARONE HYDROCHLORIDE 200 MG: 200 TABLET ORAL at 09:28

## 2020-06-10 RX ADMIN — PRIMIDONE 100 MG: 50 TABLET ORAL at 09:27

## 2020-06-10 RX ADMIN — MIDODRINE HYDROCHLORIDE 10 MG: 5 TABLET ORAL at 09:27

## 2020-06-10 RX ADMIN — APIXABAN 5 MG: 5 TABLET, FILM COATED ORAL at 09:28

## 2020-06-10 RX ADMIN — GENTAMICIN SULFATE 55 MG: 40 INJECTION, SOLUTION INTRAMUSCULAR; INTRAVENOUS at 05:16

## 2020-06-10 RX ADMIN — VANCOMYCIN HYDROCHLORIDE 750 MG: 5 INJECTION, POWDER, LYOPHILIZED, FOR SOLUTION INTRAVENOUS at 13:59

## 2020-06-10 RX ADMIN — ASPIRIN 81 MG: 81 TABLET, COATED ORAL at 09:28

## 2020-06-10 RX ADMIN — MIDODRINE HYDROCHLORIDE 10 MG: 5 TABLET ORAL at 11:52

## 2020-06-10 RX ADMIN — LEVOTHYROXINE SODIUM 224 MCG: 112 TABLET ORAL at 05:16

## 2020-06-10 RX ADMIN — CLOPIDOGREL BISULFATE 75 MG: 75 TABLET ORAL at 09:28

## 2020-06-10 RX ADMIN — TORSEMIDE 20 MG: 20 TABLET ORAL at 11:52

## 2020-06-10 RX ADMIN — POTASSIUM CHLORIDE 40 MEQ: 1500 TABLET, EXTENDED RELEASE ORAL at 11:52

## 2020-06-10 RX ADMIN — OXYCODONE HYDROCHLORIDE 20 MG: 10 TABLET ORAL at 06:50

## 2020-06-10 RX ADMIN — POTASSIUM CHLORIDE 20 MEQ: 1500 TABLET, EXTENDED RELEASE ORAL at 11:53

## 2020-06-10 RX ADMIN — OXYCODONE HYDROCHLORIDE 20 MG: 10 TABLET ORAL at 13:08

## 2020-06-10 RX ADMIN — ACETAMINOPHEN 650 MG: 325 TABLET ORAL at 01:17

## 2020-06-10 RX ADMIN — POTASSIUM CHLORIDE 20 MEQ: 1500 TABLET, EXTENDED RELEASE ORAL at 09:31

## 2020-06-10 RX ADMIN — ACETAMINOPHEN 650 MG: 325 TABLET ORAL at 13:08

## 2020-06-10 RX ADMIN — SODIUM CHLORIDE, PRESERVATIVE FREE 10 ML: 5 INJECTION INTRAVENOUS at 10:10

## 2020-06-10 RX ADMIN — LEVETIRACETAM 1000 MG: 500 TABLET, FILM COATED ORAL at 09:28

## 2020-06-10 RX ADMIN — OXYCODONE HYDROCHLORIDE 20 MG: 10 TABLET ORAL at 01:17

## 2020-06-10 ASSESSMENT — PAIN DESCRIPTION - PAIN TYPE
TYPE: CHRONIC PAIN
TYPE: CHRONIC PAIN

## 2020-06-10 ASSESSMENT — PAIN SCALES - GENERAL
PAINLEVEL_OUTOF10: 9
PAINLEVEL_OUTOF10: 6
PAINLEVEL_OUTOF10: 9
PAINLEVEL_OUTOF10: 6
PAINLEVEL_OUTOF10: 7
PAINLEVEL_OUTOF10: 8
PAINLEVEL_OUTOF10: 9

## 2020-06-10 ASSESSMENT — PAIN DESCRIPTION - FREQUENCY: FREQUENCY: CONTINUOUS

## 2020-06-10 ASSESSMENT — PAIN DESCRIPTION - LOCATION: LOCATION: ELBOW;HIP;KNEE

## 2020-06-10 ASSESSMENT — PAIN DESCRIPTION - DESCRIPTORS: DESCRIPTORS: ACHING

## 2020-06-10 NOTE — CONSULTS
Tessa Overton is a 63 y.o. female started on vancomycin for MRSA bacteremia. Pharmacy consulted by Dr. Alejandra De Santiago for monitoring and adjustment. Indication for treatment: MRSA Bacteremia  Goal trough: 15 mcg/mL  Other antimicrobials: Gentamicin     Ht Readings from Last 1 Encounters:   06/08/20 5' 6\" (1.676 m)     Wt Readings from Last 3 Encounters:   06/10/20 118 lb 3.2 oz (53.6 kg)   06/05/20 140 lb 14 oz (63.9 kg)   03/15/20 103 lb (46.7 kg)        Pertinent Laboratory Values:   Temp Readings from Last 3 Encounters:   06/10/20 98.1 °F (36.7 °C) (Oral)   06/05/20 98.3 °F (36.8 °C)   03/15/20 98.6 °F (37 °C) (Oral)     Recent Labs     06/08/20  1300 06/09/20  0555 06/10/20  0416   WBC 8.5 3.9* 3.8*     Recent Labs     06/08/20  1300 06/09/20  0555 06/10/20  0416   BUN 10 9 8   CREATININE 0.6 0.7 0.7     Estimated Creatinine Clearance: 74 mL/min (based on SCr of 0.7 mg/dL). Intake/Output Summary (Last 24 hours) at 6/10/2020 0844  Last data filed at 6/10/2020 0119  Gross per 24 hour   Intake 10 ml   Output 1000 ml   Net -990 ml       Pertinent Cultures:  Date    Source    Results  5/25/2020  Blood    MRSA  5/28/2020  Blood    NGTD (1st negative blood culture)    Previous vancomycin levels:  TROUGH:    Recent Labs     06/09/20  2048   VANCOTROUGH 21.4*     RANDOM:    Recent Labs     06/08/20  1300   VANCORANDOM 38.8       Assessment/Plan:  · Patient has long history of vancomycin dosing:  · Vancomycin level:   ? 5/26 - 9.2, subtherapeutic on vancomycin 750 mg q12h  ? 5/28 - 15, therapeutic on vancomycin 1000 mg q12h  ? 5/30 - 16.2, therapeutic on vancomycin 1000 mg q12h   ? 6/01 - Inaccurate level. Patient missed morning dose on 06/01  ? 6/03 - 19.5, therapeutic. Vancomycin 1000mg every 12 hours  ? 6/09 - 21.4, supra-therapeutic on Vancomycin 1000mg every 12 hours.   ?   · Patient has been receiving vancomycin since 5/24/2020  · Initial vancomycin plan was to receive 6 weeks of vancomycin ending on approx 7/8/2020  · Renal labs stable  SCr @ 0.7, WBC low @ 3.8, patient remains afebrile. · Trough supra-therapeutic @ 21.4. Dose reduced to Vancomycin 750mg IV every 12 hours. · Repeat Trough level on 6/11 @ 1130. · Pharmacy will continue to follow and adjust the dose as indicated. VANCOMYCIN TROUGH SCHEDULED FOR 6/11/2020 @ 1130. Thank you for the consult.   Gab Lr  6/10/2020 8:44 AM

## 2020-06-10 NOTE — DISCHARGE SUMMARY
discharge: Stable    Discharge Medications:      Av Vital   Home Medication Instructions QFR:[id number]    Printed on:06/10/20 1052   Medication Information                      acetaminophen 650 MG TABS  Take 650 mg by mouth every 4 hours as needed             albuterol (PROVENTIL HFA;VENTOLIN HFA) 108 (90 BASE) MCG/ACT inhaler  Inhale 2 puffs into the lungs every 6 hours as needed.                amiodarone (CORDARONE) 200 MG tablet  Take 200 mg by mouth daily             apixaban (ELIQUIS) 5 MG TABS tablet  Take 5 mg by mouth 2 times daily             aspirin 81 MG tablet  Take 81 mg by mouth daily              atorvastatin (LIPITOR) 80 MG tablet  Take 80 mg by mouth nightly             benzocaine-menthol (CEPACOL SORE THROAT) 15-3.6 MG lozenge  Take 1 lozenge by mouth every 2 hours as needed for Sore Throat             clopidogrel (PLAVIX) 75 MG tablet  Take 75 mg by mouth daily             cyanocobalamin 1000 MCG/ML injection  INJECT 1 ML INTO THE MUSCLE ON THE FIRST OF EACH MONTH             docusate sodium (COLACE) 100 MG capsule  Take 100 mg by mouth 2 times daily as needed for Constipation             DULoxetine (CYMBALTA) 60 MG extended release capsule  Take 1 capsule by mouth daily             famotidine (PEPCID) 20 MG tablet  Take 1 tablet by mouth 2 times daily             ferrous gluconate (FERGON) 324 (38 FE) MG tablet  Take 324 mg by mouth 2 times daily              gentamicin (GARAMYCIN) infusion  Infuse 55 mg intravenously every 8 hours for 11 days Compound per protocol             Icosapent Ethyl (VASCEPA) 0.5 g CAPS  Take 500 mg by mouth 2 times daily             ipratropium-albuterol (DUONEB) 0.5-2.5 (3) MG/3ML SOLN nebulizer solution  Inhale 3 mLs into the lungs every 4 hours (while awake)             levETIRAcetam (KEPPRA) 750 MG tablet  Take 750 mg by mouth 2 times daily              levothyroxine (SYNTHROID) 112 MCG tablet  Take 2 tablets by mouth Daily             magnesium oxide (MAG-OX) 400 MG tablet  Take 800 mg by mouth 2 times daily             midodrine (PROAMATINE) 10 MG tablet  Take 1 tablet by mouth 3 times daily (with meals)             mirtazapine (REMERON) 30 MG tablet  Take 30 mg by mouth nightly             naloxone 4 MG/0.1ML LIQD nasal spray  1 spray by Nasal route as needed for Opioid Reversal             nitroGLYCERIN (NITROSTAT) 0.4 MG SL tablet  up to max of 3 total doses. If no relief after 1 dose, call 911. ondansetron (ZOFRAN) 4 MG tablet  Take 1 tablet by mouth every 6 hours as needed for Nausea or Vomiting             oxyCODONE (ROXICODONE) 20 MG immediate release tablet  20 mg every 3 hours as needed. potassium chloride (KLOR-CON M) 20 MEQ extended release tablet  Take 1 tablet by mouth daily Take only if taking torsemide             torsemide (DEMADEX) 20 MG tablet  Take 20 mg by mouth daily as needed (For weight gain or fluid retention) Indications: pt takes 2 tablets daily              vancomycin (VANCOCIN) infusion  Infuse 1,000 mg intravenously every 12 hours Compound per protocol. Objective Findings at Discharge:   BP (!) 92/58   Pulse 62   Temp 98 °F (36.7 °C) (Oral)   Resp 11   Ht 5' 6\" (1.676 m)   Wt 118 lb 3.2 oz (53.6 kg)   SpO2 96%   BMI 19.08 kg/m²            PHYSICAL EXAM   GEN Awake female, laying in bed in no apparent distress  EYES Pupils are equally round. No scleral discharge  HENT Atraumatic and symmetric head  NECK No apparent thyromegaly  RESP Symmetric chest movement while on room air. CARDIO/VASC Peripheral pulses equal bilaterally and palpable. GI Abdomen is not distended. Rectal exam deferred.  Coppola catheter is not present. HEME/LYMPH No petechiae or ecchymoses. MSK Spontaneous movement of BL upper extremities  SKIN Normal coloration, warm, dry. NEURO Cranial nerves appear grossly intact  PSYCH Awake, alert.     BMP/CBC  Recent Labs     06/08/20  1300 06/09/20  0555 06/10/20  [id number]

## 2020-06-10 NOTE — CARE COORDINATION
CM review of pt chart for readmission risk, last admission 5/23-6/5/20 discharged home after treatment of Sepsis, UTI, MRSA, discharged home with Med Port and Iv antibiotics. [address] [place] [address] Rd. Pt has  Medicare and Medicaid insurance. PCP Heather Edgar MD.     Into speak with pt who is alert and oriented Pt lives at home with S/O Hira Juna # [phone] cell, who is in the hospital her self at this time. Pt states she lives at home with [address] [place] [address] Street and dtr/Lien Buck # [phone]. Pt feels she may need to go to rehab facility at discharge from hospital stating she is not doing well managing at home at this time. Pt states she has been to [facility] in the past, that is her first choice. Pt has given consent to have facesheet and packet when time to start pre-cert. ACP completed for this patient. CM to follow for discharge plan.  NAVDEEPRN/CM
JUSTICEW informed that pt has a discharge. Order for home with [facility]. and to continue with her IV anti-bio. ARIANA PS Mercedes with HC and informed her of the discharge and pt will need to continue with her HC.
JUSTICEW read GILMA Bueno notes from the ED stating pt would like placement to [facility]. JUSTICEW has requested PT/OT orders. Pt has Medicare and will not need precert. JUSTICEW placed a note in the computer white board asking for therapy to see pt once orders are in the computer.
LSW followed up with pt about her discharge plasn to [facility]. Pt informed this LSW that she told the CM in the ED that she would think about it. LSW informed pt that LSW has requested PT/Ot to evaluate her to see if she will qualify for SNF. Pt stated understanding. Pt is active with [facility] for IV anti-bio. WB placed. CM will continue to follow.
with patient; no changes to patient's previously recorded wishes  [] New Advance Directive completed  [] Portable Do Not Rescitate prepared for Provider review and signature  [] POLST/POST/MOLST/MOST prepared for Provider review and signature      Follow-up plan:    [] Schedule follow-up conversation to continue planning  [] Referred individual to Provider for additional questions/concerns   [] Advised patient/agent/surrogate to review completed ACP document and update if needed with changes in condition, patient preferences or care setting    [] This note routed to one or more involved healthcare providers

## 2020-06-10 NOTE — PROGRESS NOTES
[facility] Liaison spoke with pt and she states she has suplies  For atb's and doesn't want a nurse visit today. Explained will make [address] aware of discharge & will have Katie 78 call pt tomorrow. Spoke with Lulu Tang at [employer] and she stated pt has enough supplies at home and next delivery should be on or before Sat.

## 2020-06-10 NOTE — PROGRESS NOTES
[address] [place] [address]  HOSPITALIST PROGRESS NOTE                       Name:  Nirav Chaudhry /Age/Sex: 1960  (63 y.o. female)   MRN & CSN:  [id number] & [id number] Admission Date/Time: 2020 12:07 PM   Location:  [id number] Attending:  Avni Candelario MD                                                  HPI  Nirav Chaudhry is a 63 y.o. female who presents with edema    SUBJECTIVE  - denies shortness of breath/chest pain, no throat swelling, feels weak generally and wants rehab placement    10 point review of systems reviewed and negative unless noted above. ALLERGIES:   Allergies   Allergen Reactions    Fentanyl Swelling     Other reaction(s): Angioedema (swelling)    Moxifloxacin Hcl In Nacl Swelling and Rash    Povidone-Iodine Rash     Other reaction(s): AOF      Cyclobenzaprine      Other reaction(s): Other - comment required  \" it make my muscle very weak because of my MS\"  \" it make my muscle very weak because of my MS\"      Methocarbamol      Worsening edema  Other reaction(s): Other - comment required  Worsening edema  Worsening edema  Worsening edema      Tramadol Other (See Comments)     Doctor doesn't her to take due to heart problems  Seizures   Doctor doesn't her to take due to lowers seizure threshold.  Baclofen     Ibuprofen      \"Due to heart problems\"    Naproxen     Nsaids      \"Due to heart problems\"    Tizanidine     Tolmetin      \"Due to heart problems\"    Tramadol Hcl      Other reaction(s): Other - comment required  Told not to take due to history of seizures    Betadine [Povidone Iodine] Rash    Moxifloxacin Rash and Swelling     Lips swell and tingling in face   Lips swell and tingling in face   Lips swell and tingling in face   Lips swell and tingling in face   Around mouth       PCP: Tamanna Barbour MD    PAST MEDICAL HISTORY, SURGICAL HISTORY, SOCIAL HISTORY and  HOME MEDICATIONS all reviewed.       OBJECTIVE  Vitals:    20 1645 20 2045 20 2212 06/10/20 0440   BP: (!) 142/78 (!) 74/54 123/74 91/63   Pulse: 59 60 60 60   Resp: 12 15 16 11   Temp: 98.9 °F (37.2 °C) 98.4 °F (36.9 °C) 98.4 °F (36.9 °C) 98.1 °F (36.7 °C)   TempSrc: Oral Oral Oral Oral   SpO2:  96%  96%   Weight:    118 lb 3.2 oz (53.6 kg)   Height:           PHYSICAL EXAM   GEN Awake female, sitting upright in bed in no apparent distress. EYES Pupils are equally round. No scleral erythema, discharge, or conjunctivitis. HENT Mucous membranes are moist. Oral pharynx without exudates, no evidence of thrush. NECK Supple, no apparent thyromegaly or masses. RESP Clear to auscultation, no wheezes, rales or rhonchi. Symmetric chest movement while on room air. CARDIO/VASC S1/S2 auscultated. Regular rate without appreciable murmurs, rubs, or gallops. No JVD or carotid bruits. Peripheral pulses equal bilaterally and palpable. No peripheral edema. GI Abdomen is soft without significant tenderness, masses, or guarding. Bowel sounds are normoactive. Rectal exam deferred.  No costovertebral angle tenderness. Normal appearing external genitalia. Coppola catheter is not present. HEME/LYMPH No palpable cervical lymphadenopathy and no hepatosplenomegaly. No petechiae or ecchymoses. MSK Spontaneous movement of all extremities. No gross joint deformities. SKIN Normal coloration, warm, dry. NEURO Cranial nerves appear grossly intact, normal speech, no lateralizing weakness. PSYCH Awake, alert, oriented x 4. Affect appropriate.     INTAKE: In: 10 [I.V.:10]  Out: 1000   OUTPUT: In: 10   Out: 1000 [Urine:1000]    LABS  Recent Labs     06/08/20  1300 06/09/20  0555 06/10/20  0416   WBC 8.5 3.9* 3.8*   HGB 7.9* 8.0* 8.1*   HCT 26.2* 26.2* 26.5*    241 255      Recent Labs     06/08/20  1300 06/09/20  0555 06/10/20  0416    136 141   K 3.3* 3.3* 3.5    98* 101   CO2 23 28 31   PHOS  --   --  4.4   BUN 10 9 8   CREATININE 0.6 0.7 0.7     Recent Labs     06/08/20  1300   AST 12*

## 2020-06-10 NOTE — PROGRESS NOTES
Stairs - Number of Steps: 5  Entrance Stairs - Rails: Right  Bathroom Shower/Tub: Tub/Shower unit, Walk-in shower, Shower chair with back(uses walk in shower more often)  Bathroom Toilet: Standard  Bathroom Accessibility: Accessible  Home Equipment: Rolling walker, [employer]  ADL Assistance: Independent  Homemaking Assistance: Independent  Homemaking Responsibilities: Yes  Ambulation Assistance: (only takes a few steps for transfers)  Transfer Assistance: Independent  Occupation: Retired  Additional Comments: Pt reports one fall last month reporting dizziness beforehand, pt states she falls quite often    Examination of body systems (includes body structures/functions, activity/participation limitations):  · Observation:  Supine in bed upon arrival, agreeable to therapy  · Vision:  Glasses  · Hearing:  Refresh Body  · Cardiopulmonary:  No 02 needs      Body Systems and functions:  · ROM R/L:  ~90 degrees shoulder flexion bilaterally limited by arthritis, distal WFL   · Strength R/L:  4/5,   · Sensation: Nueropathy from fingers to elbow and from toes up to thighs  · Tone: Normal  · Coordination: WFL  · Perception: WNL    Activities of Daily Living (ADLs):  · Feeding: Independent  · Grooming: Independent (washing face w/ warm washcloth sitting EOB)  · UB bathing: Independent  · LB bathing: SBA (washing melissa area after toileting)  · UB dressing: Indepndent  · LB dressing: SBA (donning socks sitting EOB)  · Toileting: SBA (toileted on [facility] [place] [place] this session, managing melissa care by self)    Cognitive and Psychosocial Functioning:  · Overall cognitive status: WFL  · Affect: Normal        Mobility:  · Supine to sit: Independent  · Transfers: SBA stand step from EOB to reclining chair  · Sitting balance:  Independent. · Standing balance:  SBA.   · Toilet Transfers: SBA to/from [facility]    · Sit to supine: Independent           AM-PAC Daily Activity Inpatient   How much help for putting on and taking off regular lower body clothing?: increase functional activity tolerance and dynamic standing balance    Treatment plan:  Pt will perform functional task in stand reaching in all 3 planes in order to increase dynamic standing balance and functional activity tolerance    Recommendations for NURSING activity: Up to chair for all 3 meals and up to [facility] for all toileting needs    Time:   Time in: 0858  Time out: 0912  Timed treatment minutes: 0 minutes  Total time: 14 minutes    Electronically signed by:    Darwin CONNER/L [id number]  9:16 AM,6/10/2020

## 2020-06-12 ENCOUNTER — HOSPITAL ENCOUNTER (OUTPATIENT)
Age: 60
Setting detail: SPECIMEN
Discharge: HOME | End: 2020-06-12
Payer: MEDICARE

## 2020-06-12 LAB
ALBUMIN SERPL-MCNC: 3.3 GM/DL (ref 3.4–5)
ALP BLD-CCNC: 71 IU/L (ref 40–128)
ALT SERPL-CCNC: 9 U/L (ref 10–40)
ANION GAP SERPL CALCULATED.3IONS-SCNC: 9 MMOL/L (ref 4–16)
AST SERPL-CCNC: 14 IU/L (ref 15–37)
BASOPHILS ABSOLUTE: 0.1 K/CU MM
BASOPHILS RELATIVE PERCENT: 2.1 % (ref 0–1)
BILIRUB SERPL-MCNC: 0.2 MG/DL (ref 0–1)
BUN BLDV-MCNC: 9 MG/DL (ref 6–23)
C-REACTIVE PROTEIN, HIGH SENSITIVITY: 5.2 MG/L
CALCIUM SERPL-MCNC: 8.2 MG/DL (ref 8.3–10.6)
CHLORIDE BLD-SCNC: 107 MMOL/L (ref 99–110)
CO2: 24 MMOL/L (ref 21–32)
CREAT SERPL-MCNC: 0.6 MG/DL (ref 0.6–1.1)
DIFFERENTIAL TYPE: ABNORMAL
DOSE AMOUNT: NORMAL
DOSE TIME: NORMAL
EOSINOPHILS ABSOLUTE: 0 K/CU MM
EOSINOPHILS RELATIVE PERCENT: 1.6 % (ref 0–3)
ERYTHROCYTE SEDIMENTATION RATE: 20 MM/HR (ref 0–30)
GFR AFRICAN AMERICAN: >60 ML/MIN/1.73M2
GFR NON-AFRICAN AMERICAN: >60 ML/MIN/1.73M2
GLUCOSE BLD-MCNC: 75 MG/DL (ref 70–99)
HCT VFR BLD CALC: 28.2 % (ref 37–47)
HEMOGLOBIN: 9 GM/DL (ref 12.5–16)
IMMATURE NEUTROPHIL %: 0 % (ref 0–0.43)
LYMPHOCYTES ABSOLUTE: 0.8 K/CU MM
LYMPHOCYTES RELATIVE PERCENT: 32.9 % (ref 24–44)
MCH RBC QN AUTO: 37 PG (ref 27–31)
MCHC RBC AUTO-ENTMCNC: 31.9 % (ref 32–36)
MCV RBC AUTO: 116 FL (ref 78–100)
MONOCYTES ABSOLUTE: 0.2 K/CU MM
MONOCYTES RELATIVE PERCENT: 9.5 % (ref 0–4)
NUCLEATED RBC %: 0 %
PDW BLD-RTO: 15.6 % (ref 11.7–14.9)
PLATELET # BLD: 315 K/CU MM (ref 140–440)
PMV BLD AUTO: 10.5 FL (ref 7.5–11.1)
POTASSIUM SERPL-SCNC: 3.7 MMOL/L (ref 3.5–5.1)
RBC # BLD: 2.43 M/CU MM (ref 4.2–5.4)
SEGMENTED NEUTROPHILS ABSOLUTE COUNT: 1.3 K/CU MM
SEGMENTED NEUTROPHILS RELATIVE PERCENT: 53.9 % (ref 36–66)
SODIUM BLD-SCNC: 140 MMOL/L (ref 135–145)
TOTAL IMMATURE NEUTOROPHIL: 0 K/CU MM
TOTAL NUCLEATED RBC: 0 K/CU MM
TOTAL PROTEIN: 5.5 GM/DL (ref 6.4–8.2)
VANCOMYCIN TROUGH: 19.8 UG/ML (ref 10–20)
WBC # BLD: 2.4 K/CU MM (ref 4–10.5)

## 2020-06-12 PROCEDURE — 85025 COMPLETE CBC W/AUTO DIFF WBC: CPT

## 2020-06-12 PROCEDURE — 85652 RBC SED RATE AUTOMATED: CPT

## 2020-06-12 PROCEDURE — 80053 COMPREHEN METABOLIC PANEL: CPT

## 2020-06-12 PROCEDURE — 80202 ASSAY OF VANCOMYCIN: CPT

## 2020-06-12 PROCEDURE — 86140 C-REACTIVE PROTEIN: CPT

## 2020-06-17 LAB
EKG ATRIAL RATE: 63 BPM
EKG DIAGNOSIS: NORMAL
EKG Q-T INTERVAL: 394 MS
EKG QRS DURATION: 74 MS
EKG QTC CALCULATION (BAZETT): 403 MS
EKG R AXIS: -30 DEGREES
EKG T AXIS: -31 DEGREES
EKG VENTRICULAR RATE: 63 BPM

## 2020-07-06 ENCOUNTER — HOSPITAL ENCOUNTER (OUTPATIENT)
Age: 60
Setting detail: SPECIMEN
Discharge: HOME | End: 2020-07-06
Payer: MEDICARE

## 2020-07-06 LAB
ANION GAP SERPL CALCULATED.3IONS-SCNC: 11 MMOL/L (ref 4–16)
BASOPHILS ABSOLUTE: 0.1 K/CU MM
BASOPHILS RELATIVE PERCENT: 1.6 % (ref 0–1)
BUN BLDV-MCNC: 11 MG/DL (ref 6–23)
CALCIUM SERPL-MCNC: 7.9 MG/DL (ref 8.3–10.6)
CHLORIDE BLD-SCNC: 108 MMOL/L (ref 99–110)
CO2: 23 MMOL/L (ref 21–32)
CREAT SERPL-MCNC: 0.6 MG/DL (ref 0.6–1.1)
DIFFERENTIAL TYPE: ABNORMAL
DOSE AMOUNT: ABNORMAL
DOSE TIME: ABNORMAL
EOSINOPHILS ABSOLUTE: 0.5 K/CU MM
EOSINOPHILS RELATIVE PERCENT: 11.7 % (ref 0–3)
GFR AFRICAN AMERICAN: >60 ML/MIN/1.73M2
GFR NON-AFRICAN AMERICAN: >60 ML/MIN/1.73M2
GLUCOSE BLD-MCNC: 94 MG/DL (ref 70–99)
HCT VFR BLD CALC: 28.5 % (ref 37–47)
HEMOGLOBIN: 8.4 GM/DL (ref 12.5–16)
IMMATURE NEUTROPHIL %: 0 % (ref 0–0.43)
LYMPHOCYTES ABSOLUTE: 1.7 K/CU MM
LYMPHOCYTES RELATIVE PERCENT: 39.4 % (ref 24–44)
MCH RBC QN AUTO: 35.9 PG (ref 27–31)
MCHC RBC AUTO-ENTMCNC: 29.5 % (ref 32–36)
MCV RBC AUTO: 121.8 FL (ref 78–100)
MONOCYTES ABSOLUTE: 0.7 K/CU MM
MONOCYTES RELATIVE PERCENT: 14.9 % (ref 0–4)
NUCLEATED RBC %: 0 %
PDW BLD-RTO: 17.4 % (ref 11.7–14.9)
PLATELET # BLD: 296 K/CU MM (ref 140–440)
PMV BLD AUTO: 10.5 FL (ref 7.5–11.1)
POTASSIUM SERPL-SCNC: 3.5 MMOL/L (ref 3.5–5.1)
RBC # BLD: 2.34 M/CU MM (ref 4.2–5.4)
SEGMENTED NEUTROPHILS ABSOLUTE COUNT: 1.4 K/CU MM
SEGMENTED NEUTROPHILS RELATIVE PERCENT: 32.4 % (ref 36–66)
SODIUM BLD-SCNC: 142 MMOL/L (ref 135–145)
TOTAL IMMATURE NEUTOROPHIL: 0 K/CU MM
TOTAL NUCLEATED RBC: 0 K/CU MM
VANCOMYCIN TROUGH: 20.6 UG/ML (ref 10–20)
WBC # BLD: 4.4 K/CU MM (ref 4–10.5)

## 2020-07-06 PROCEDURE — 80048 BASIC METABOLIC PNL TOTAL CA: CPT

## 2020-07-06 PROCEDURE — 80202 ASSAY OF VANCOMYCIN: CPT

## 2020-07-06 PROCEDURE — 85025 COMPLETE CBC W/AUTO DIFF WBC: CPT

## 2020-08-22 ENCOUNTER — HOSPITAL ENCOUNTER (INPATIENT)
Age: 60
LOS: 4 days | Discharge: HOME | DRG: 314 | End: 2020-08-27
Attending: EMERGENCY MEDICINE | Admitting: FAMILY MEDICINE
Payer: MEDICARE

## 2020-08-22 ENCOUNTER — APPOINTMENT (OUTPATIENT)
Dept: GENERAL RADIOLOGY | Age: 60
DRG: 314 | End: 2020-08-22
Payer: MEDICARE

## 2020-08-22 ENCOUNTER — APPOINTMENT (OUTPATIENT)
Dept: CT IMAGING | Age: 60
DRG: 314 | End: 2020-08-22
Payer: MEDICARE

## 2020-08-22 ENCOUNTER — APPOINTMENT (OUTPATIENT)
Dept: ULTRASOUND IMAGING | Age: 60
DRG: 314 | End: 2020-08-22
Payer: MEDICARE

## 2020-08-22 PROBLEM — L08.9: Status: ACTIVE | Noted: 2020-08-22

## 2020-08-22 PROBLEM — S20.311D: Status: ACTIVE | Noted: 2020-08-22

## 2020-08-22 LAB
ALBUMIN SERPL-MCNC: 3.2 GM/DL (ref 3.4–5)
ALP BLD-CCNC: 90 IU/L (ref 40–129)
ALT SERPL-CCNC: 10 U/L (ref 10–40)
ANION GAP SERPL CALCULATED.3IONS-SCNC: 10 MMOL/L (ref 4–16)
ANISOCYTOSIS: ABNORMAL
AST SERPL-CCNC: 16 IU/L (ref 15–37)
BASOPHILS ABSOLUTE: 0.1 K/CU MM
BASOPHILS RELATIVE PERCENT: 1.7 % (ref 0–1)
BILIRUB SERPL-MCNC: 0.1 MG/DL (ref 0–1)
BUN BLDV-MCNC: 10 MG/DL (ref 6–23)
CALCIUM SERPL-MCNC: 8.4 MG/DL (ref 8.3–10.6)
CHLORIDE BLD-SCNC: 103 MMOL/L (ref 99–110)
CO2: 28 MMOL/L (ref 21–32)
CREAT SERPL-MCNC: 0.6 MG/DL (ref 0.6–1.1)
DIFFERENTIAL TYPE: ABNORMAL
EOSINOPHILS ABSOLUTE: 0.2 K/CU MM
EOSINOPHILS RELATIVE PERCENT: 3.8 % (ref 0–3)
GFR AFRICAN AMERICAN: >60 ML/MIN/1.73M2
GFR NON-AFRICAN AMERICAN: >60 ML/MIN/1.73M2
GLUCOSE BLD-MCNC: 94 MG/DL (ref 70–99)
HCT VFR BLD CALC: 32.3 % (ref 37–47)
HEMOGLOBIN: 9.9 GM/DL (ref 12.5–16)
IMMATURE NEUTROPHIL %: 0.2 % (ref 0–0.43)
LACTATE: 1.1 MMOL/L (ref 0.4–2)
LYMPHOCYTES ABSOLUTE: 1 K/CU MM
LYMPHOCYTES RELATIVE PERCENT: 20.7 % (ref 24–44)
MACROCYTES: ABNORMAL
MCH RBC QN AUTO: 34 PG (ref 27–31)
MCHC RBC AUTO-ENTMCNC: 30.7 % (ref 32–36)
MCV RBC AUTO: 111 FL (ref 78–100)
MONOCYTES ABSOLUTE: 0.7 K/CU MM
MONOCYTES RELATIVE PERCENT: 14 % (ref 0–4)
NUCLEATED RBC %: 0 %
OVALOCYTES: ABNORMAL
PDW BLD-RTO: 15.7 % (ref 11.7–14.9)
PLATELET # BLD: 358 K/CU MM (ref 140–440)
PMV BLD AUTO: 10.1 FL (ref 7.5–11.1)
POLYCHROMASIA: ABNORMAL
POTASSIUM SERPL-SCNC: 3.2 MMOL/L (ref 3.5–5.1)
PRO-BNP: 2365 PG/ML
RBC # BLD: 2.91 M/CU MM (ref 4.2–5.4)
REASON FOR REJECTION: NORMAL
REASON FOR REJECTION: NORMAL
REJECTED TEST: NORMAL
REJECTED TEST: NORMAL
SEGMENTED NEUTROPHILS ABSOLUTE COUNT: 2.8 K/CU MM
SEGMENTED NEUTROPHILS RELATIVE PERCENT: 59.6 % (ref 36–66)
SODIUM BLD-SCNC: 141 MMOL/L (ref 135–145)
TOTAL CK: 46 IU/L (ref 26–140)
TOTAL IMMATURE NEUTOROPHIL: 0.01 K/CU MM
TOTAL NUCLEATED RBC: 0 K/CU MM
TOTAL PROTEIN: 5.5 GM/DL (ref 6.4–8.2)
TROPONIN T: <0.01 NG/ML
WBC # BLD: 4.8 K/CU MM (ref 4–10.5)

## 2020-08-22 PROCEDURE — 87081 CULTURE SCREEN ONLY: CPT

## 2020-08-22 PROCEDURE — 87147 CULTURE TYPE IMMUNOLOGIC: CPT

## 2020-08-22 PROCEDURE — 85025 COMPLETE CBC W/AUTO DIFF WBC: CPT

## 2020-08-22 PROCEDURE — 6360000002 HC RX W HCPCS: Performed by: PHYSICIAN ASSISTANT

## 2020-08-22 PROCEDURE — 71045 X-RAY EXAM CHEST 1 VIEW: CPT

## 2020-08-22 PROCEDURE — G0378 HOSPITAL OBSERVATION PER HR: HCPCS

## 2020-08-22 PROCEDURE — 87077 CULTURE AEROBIC IDENTIFY: CPT

## 2020-08-22 PROCEDURE — 96367 TX/PROPH/DG ADDL SEQ IV INF: CPT

## 2020-08-22 PROCEDURE — 6360000004 HC RX CONTRAST MEDICATION: Performed by: PHYSICIAN ASSISTANT

## 2020-08-22 PROCEDURE — 83880 ASSAY OF NATRIURETIC PEPTIDE: CPT

## 2020-08-22 PROCEDURE — 99285 EMERGENCY DEPT VISIT HI MDM: CPT

## 2020-08-22 PROCEDURE — 84484 ASSAY OF TROPONIN QUANT: CPT

## 2020-08-22 PROCEDURE — 96365 THER/PROPH/DIAG IV INF INIT: CPT

## 2020-08-22 PROCEDURE — 93970 EXTREMITY STUDY: CPT

## 2020-08-22 PROCEDURE — 94761 N-INVAS EAR/PLS OXIMETRY MLT: CPT

## 2020-08-22 PROCEDURE — 82550 ASSAY OF CK (CPK): CPT

## 2020-08-22 PROCEDURE — 93005 ELECTROCARDIOGRAM TRACING: CPT | Performed by: PHYSICIAN ASSISTANT

## 2020-08-22 PROCEDURE — 36415 COLL VENOUS BLD VENIPUNCTURE: CPT

## 2020-08-22 PROCEDURE — 2580000003 HC RX 258: Performed by: NURSE PRACTITIONER

## 2020-08-22 PROCEDURE — 99221 1ST HOSP IP/OBS SF/LOW 40: CPT | Performed by: SURGERY

## 2020-08-22 PROCEDURE — 6370000000 HC RX 637 (ALT 250 FOR IP): Performed by: PHYSICIAN ASSISTANT

## 2020-08-22 PROCEDURE — 87075 CULTR BACTERIA EXCEPT BLOOD: CPT

## 2020-08-22 PROCEDURE — 2580000003 HC RX 258: Performed by: PHYSICIAN ASSISTANT

## 2020-08-22 PROCEDURE — 87070 CULTURE OTHR SPECIMN AEROBIC: CPT

## 2020-08-22 PROCEDURE — 96375 TX/PRO/DX INJ NEW DRUG ADDON: CPT

## 2020-08-22 PROCEDURE — 87040 BLOOD CULTURE FOR BACTERIA: CPT

## 2020-08-22 PROCEDURE — 6370000000 HC RX 637 (ALT 250 FOR IP): Performed by: NURSE PRACTITIONER

## 2020-08-22 PROCEDURE — 83605 ASSAY OF LACTIC ACID: CPT

## 2020-08-22 PROCEDURE — 71275 CT ANGIOGRAPHY CHEST: CPT

## 2020-08-22 PROCEDURE — 87186 SC STD MICRODIL/AGAR DIL: CPT

## 2020-08-22 PROCEDURE — 80053 COMPREHEN METABOLIC PANEL: CPT

## 2020-08-22 RX ORDER — PROMETHAZINE HYDROCHLORIDE 25 MG/1
12.5 TABLET ORAL EVERY 6 HOURS PRN
Status: DISCONTINUED | OUTPATIENT
Start: 2020-08-22 | End: 2020-08-27 | Stop reason: HOSPADM

## 2020-08-22 RX ORDER — ACETAMINOPHEN 650 MG/1
650 SUPPOSITORY RECTAL EVERY 6 HOURS PRN
Status: DISCONTINUED | OUTPATIENT
Start: 2020-08-22 | End: 2020-08-27 | Stop reason: HOSPADM

## 2020-08-22 RX ORDER — FAMOTIDINE 20 MG/1
20 TABLET, FILM COATED ORAL 2 TIMES DAILY
Status: DISCONTINUED | OUTPATIENT
Start: 2020-08-22 | End: 2020-08-27 | Stop reason: HOSPADM

## 2020-08-22 RX ORDER — TORSEMIDE 20 MG/1
20 TABLET ORAL DAILY PRN
Status: DISCONTINUED | OUTPATIENT
Start: 2020-08-22 | End: 2020-08-27 | Stop reason: HOSPADM

## 2020-08-22 RX ORDER — FERROUS GLUCONATE 324(37.5)
324 TABLET ORAL 2 TIMES DAILY
Status: DISCONTINUED | OUTPATIENT
Start: 2020-08-22 | End: 2020-08-27 | Stop reason: HOSPADM

## 2020-08-22 RX ORDER — NITROGLYCERIN 0.4 MG/1
0.4 TABLET SUBLINGUAL EVERY 5 MIN PRN
Status: DISCONTINUED | OUTPATIENT
Start: 2020-08-22 | End: 2020-08-27 | Stop reason: HOSPADM

## 2020-08-22 RX ORDER — ONDANSETRON 2 MG/ML
4 INJECTION INTRAMUSCULAR; INTRAVENOUS EVERY 6 HOURS PRN
Status: DISCONTINUED | OUTPATIENT
Start: 2020-08-22 | End: 2020-08-27 | Stop reason: HOSPADM

## 2020-08-22 RX ORDER — MORPHINE SULFATE 4 MG/ML
4 INJECTION, SOLUTION INTRAMUSCULAR; INTRAVENOUS ONCE
Status: COMPLETED | OUTPATIENT
Start: 2020-08-22 | End: 2020-08-22

## 2020-08-22 RX ORDER — CLOPIDOGREL BISULFATE 75 MG/1
75 TABLET ORAL DAILY
Status: DISCONTINUED | OUTPATIENT
Start: 2020-08-22 | End: 2020-08-27 | Stop reason: HOSPADM

## 2020-08-22 RX ORDER — OXYCODONE HYDROCHLORIDE 5 MG/1
20 TABLET ORAL ONCE
Status: COMPLETED | OUTPATIENT
Start: 2020-08-22 | End: 2020-08-22

## 2020-08-22 RX ORDER — SODIUM CHLORIDE 0.9 % (FLUSH) 0.9 %
10 SYRINGE (ML) INJECTION PRN
Status: DISCONTINUED | OUTPATIENT
Start: 2020-08-22 | End: 2020-08-27 | Stop reason: HOSPADM

## 2020-08-22 RX ORDER — MIRTAZAPINE 15 MG/1
30 TABLET, FILM COATED ORAL NIGHTLY
Status: DISCONTINUED | OUTPATIENT
Start: 2020-08-22 | End: 2020-08-27 | Stop reason: HOSPADM

## 2020-08-22 RX ORDER — OXYCODONE HYDROCHLORIDE 10 MG/1
20 TABLET ORAL
Status: DISCONTINUED | OUTPATIENT
Start: 2020-08-22 | End: 2020-08-27 | Stop reason: HOSPADM

## 2020-08-22 RX ORDER — LEVOTHYROXINE SODIUM 0.07 MG/1
150 TABLET ORAL DAILY
Status: DISCONTINUED | OUTPATIENT
Start: 2020-08-22 | End: 2020-08-27 | Stop reason: HOSPADM

## 2020-08-22 RX ORDER — AMIODARONE HYDROCHLORIDE 200 MG/1
200 TABLET ORAL DAILY
Status: DISCONTINUED | OUTPATIENT
Start: 2020-08-22 | End: 2020-08-27 | Stop reason: HOSPADM

## 2020-08-22 RX ORDER — DULOXETIN HYDROCHLORIDE 30 MG/1
60 CAPSULE, DELAYED RELEASE ORAL DAILY
Status: DISCONTINUED | OUTPATIENT
Start: 2020-08-22 | End: 2020-08-27 | Stop reason: HOSPADM

## 2020-08-22 RX ORDER — OXYCODONE HYDROCHLORIDE AND ACETAMINOPHEN 5; 325 MG/1; MG/1
1 TABLET ORAL ONCE
Status: DISCONTINUED | OUTPATIENT
Start: 2020-08-22 | End: 2020-08-22

## 2020-08-22 RX ORDER — ACETAMINOPHEN 325 MG/1
650 TABLET ORAL EVERY 6 HOURS PRN
Status: DISCONTINUED | OUTPATIENT
Start: 2020-08-22 | End: 2020-08-27 | Stop reason: HOSPADM

## 2020-08-22 RX ORDER — POLYETHYLENE GLYCOL 3350 17 G/17G
17 POWDER, FOR SOLUTION ORAL DAILY PRN
Status: DISCONTINUED | OUTPATIENT
Start: 2020-08-22 | End: 2020-08-27 | Stop reason: HOSPADM

## 2020-08-22 RX ORDER — MAGNESIUM OXIDE 400 MG/1
800 TABLET ORAL 2 TIMES DAILY
Status: DISCONTINUED | OUTPATIENT
Start: 2020-08-22 | End: 2020-08-27 | Stop reason: HOSPADM

## 2020-08-22 RX ORDER — DOCUSATE SODIUM 100 MG/1
100 CAPSULE, LIQUID FILLED ORAL 2 TIMES DAILY PRN
Status: DISCONTINUED | OUTPATIENT
Start: 2020-08-22 | End: 2020-08-27 | Stop reason: HOSPADM

## 2020-08-22 RX ORDER — ATORVASTATIN CALCIUM 40 MG/1
80 TABLET, FILM COATED ORAL NIGHTLY
Status: DISCONTINUED | OUTPATIENT
Start: 2020-08-22 | End: 2020-08-27 | Stop reason: HOSPADM

## 2020-08-22 RX ORDER — POTASSIUM CHLORIDE 20 MEQ/1
40 TABLET, EXTENDED RELEASE ORAL ONCE
Status: COMPLETED | OUTPATIENT
Start: 2020-08-22 | End: 2020-08-22

## 2020-08-22 RX ORDER — MIDODRINE HYDROCHLORIDE 5 MG/1
10 TABLET ORAL
Status: DISCONTINUED | OUTPATIENT
Start: 2020-08-22 | End: 2020-08-27 | Stop reason: HOSPADM

## 2020-08-22 RX ORDER — ASPIRIN 81 MG/1
81 TABLET, CHEWABLE ORAL DAILY
Status: DISCONTINUED | OUTPATIENT
Start: 2020-08-22 | End: 2020-08-27 | Stop reason: HOSPADM

## 2020-08-22 RX ORDER — POTASSIUM CHLORIDE 20 MEQ/1
20 TABLET, EXTENDED RELEASE ORAL DAILY
Status: DISCONTINUED | OUTPATIENT
Start: 2020-08-22 | End: 2020-08-27 | Stop reason: HOSPADM

## 2020-08-22 RX ORDER — IPRATROPIUM BROMIDE AND ALBUTEROL SULFATE 2.5; .5 MG/3ML; MG/3ML
3 SOLUTION RESPIRATORY (INHALATION)
Status: DISCONTINUED | OUTPATIENT
Start: 2020-08-22 | End: 2020-08-23

## 2020-08-22 RX ORDER — SODIUM CHLORIDE 0.9 % (FLUSH) 0.9 %
10 SYRINGE (ML) INJECTION EVERY 12 HOURS SCHEDULED
Status: DISCONTINUED | OUTPATIENT
Start: 2020-08-22 | End: 2020-08-27 | Stop reason: HOSPADM

## 2020-08-22 RX ADMIN — IOPAMIDOL 100 ML: 755 INJECTION, SOLUTION INTRAVENOUS at 15:50

## 2020-08-22 RX ADMIN — OXYCODONE HYDROCHLORIDE 20 MG: 5 TABLET ORAL at 12:49

## 2020-08-22 RX ADMIN — LEVOTHYROXINE SODIUM 150 MCG: 75 TABLET ORAL at 20:42

## 2020-08-22 RX ADMIN — SODIUM CHLORIDE, PRESERVATIVE FREE 10 ML: 5 INJECTION INTRAVENOUS at 20:39

## 2020-08-22 RX ADMIN — APIXABAN 5 MG: 5 TABLET, FILM COATED ORAL at 20:38

## 2020-08-22 RX ADMIN — MIRTAZAPINE 30 MG: 15 TABLET, FILM COATED ORAL at 20:39

## 2020-08-22 RX ADMIN — ASPIRIN 81 MG CHEWABLE TABLET 81 MG: 81 TABLET CHEWABLE at 18:16

## 2020-08-22 RX ADMIN — FAMOTIDINE 20 MG: 20 TABLET ORAL at 20:38

## 2020-08-22 RX ADMIN — LEVETIRACETAM 750 MG: 500 TABLET ORAL at 23:10

## 2020-08-22 RX ADMIN — OXYCODONE HYDROCHLORIDE 20 MG: 10 TABLET ORAL at 23:35

## 2020-08-22 RX ADMIN — OXYCODONE HYDROCHLORIDE 20 MG: 10 TABLET ORAL at 20:38

## 2020-08-22 RX ADMIN — FERROUS GLUCONATE TAB 324 MG (37.5 MG ELEMENTAL IRON) 324 MG: 324 (37.5 FE) TAB at 20:43

## 2020-08-22 RX ADMIN — ACETAMINOPHEN 650 MG: 325 TABLET ORAL at 17:22

## 2020-08-22 RX ADMIN — POTASSIUM CHLORIDE 40 MEQ: 1500 TABLET, EXTENDED RELEASE ORAL at 17:22

## 2020-08-22 RX ADMIN — AMIODARONE HYDROCHLORIDE 200 MG: 200 TABLET ORAL at 20:39

## 2020-08-22 RX ADMIN — ATORVASTATIN CALCIUM 80 MG: 40 TABLET, FILM COATED ORAL at 20:38

## 2020-08-22 RX ADMIN — CLOPIDOGREL BISULFATE 75 MG: 75 TABLET ORAL at 18:16

## 2020-08-22 RX ADMIN — MAGNESIUM OXIDE 400 MG (241.3 MG MAGNESIUM) TABLET 800 MG: TABLET at 20:38

## 2020-08-22 RX ADMIN — ACETAMINOPHEN 650 MG: 325 TABLET ORAL at 23:35

## 2020-08-22 RX ADMIN — CEFEPIME 2 G: 2 INJECTION, POWDER, FOR SOLUTION INTRAVENOUS at 14:21

## 2020-08-22 RX ADMIN — DULOXETINE HYDROCHLORIDE 60 MG: 30 CAPSULE, DELAYED RELEASE ORAL at 20:39

## 2020-08-22 RX ADMIN — MORPHINE SULFATE 4 MG: 4 INJECTION, SOLUTION INTRAMUSCULAR; INTRAVENOUS at 14:55

## 2020-08-22 RX ADMIN — VANCOMYCIN HYDROCHLORIDE 1000 MG: 1 INJECTION, POWDER, LYOPHILIZED, FOR SOLUTION INTRAVENOUS at 15:00

## 2020-08-22 RX ADMIN — OXYCODONE HYDROCHLORIDE 20 MG: 10 TABLET ORAL at 17:22

## 2020-08-22 ASSESSMENT — PAIN DESCRIPTION - LOCATION
LOCATION: LEG
LOCATION: HIP
LOCATION: GENERALIZED
LOCATION: CHEST

## 2020-08-22 ASSESSMENT — PAIN DESCRIPTION - PAIN TYPE
TYPE: ACUTE PAIN
TYPE: CHRONIC PAIN

## 2020-08-22 ASSESSMENT — PAIN - FUNCTIONAL ASSESSMENT: PAIN_FUNCTIONAL_ASSESSMENT: ACTIVITIES ARE NOT PREVENTED

## 2020-08-22 ASSESSMENT — PAIN SCALES - GENERAL
PAINLEVEL_OUTOF10: 9
PAINLEVEL_OUTOF10: 0
PAINLEVEL_OUTOF10: 8
PAINLEVEL_OUTOF10: 8
PAINLEVEL_OUTOF10: 9
PAINLEVEL_OUTOF10: 4
PAINLEVEL_OUTOF10: 8
PAINLEVEL_OUTOF10: 0
PAINLEVEL_OUTOF10: 10
PAINLEVEL_OUTOF10: 0

## 2020-08-22 ASSESSMENT — PAIN DESCRIPTION - ORIENTATION
ORIENTATION: RIGHT;LEFT
ORIENTATION: RIGHT;LEFT

## 2020-08-22 ASSESSMENT — PAIN DESCRIPTION - DESCRIPTORS: DESCRIPTORS: ACHING;DISCOMFORT

## 2020-08-22 ASSESSMENT — PAIN DESCRIPTION - FREQUENCY: FREQUENCY: INTERMITTENT

## 2020-08-22 ASSESSMENT — PAIN DESCRIPTION - ONSET: ONSET: ON-GOING

## 2020-08-22 NOTE — ED PROVIDER NOTES
As physician-in-triage, I performed a medical screening history and physical exam on this patient. I also cared for and evaluated the patient in conjunction with the ED Advanced Practice Provider    HISTORY OF PRESENT ILLNESS  Gogo Thomas is a 63 y.o. female who presents in short due to pain from her chronic wound of her right upper chest.  She did have a defibrillator in place as well as a PICC line and both of them got infected. She has had multiple admissions for this chronic wound. Patient has had subjective fevers. She has pain surrounding the site that she states does occasionally radiate down her arm. She also does have bilateral leg swelling and states it feels as if she has DVTs like she has had in the past.      PHYSICAL EXAM  BP (!) 141/71   Pulse 68   Temp 98.5 °F (36.9 °C) (Oral)   Resp 17   Ht 5' 6\" (1.676 m)   Wt 126 lb (57.2 kg)   SpO2 98%   BMI 20.34 kg/m²     On exam, the patient appears in no acute distress. Speech is clear. Breathing is unlabored. Moves all extremities. The wound does have drainage from it in her right upper chest.  There is surrounding erythema localized to the wound however does not appear to be spreading. Lungs are clear to auscultation bilaterally. Abdomen is soft and nontender. All diagnostic, treatment, and disposition decisions were made by myself in conjunction with the advanced practice provider. EKG: normal sinus rhythm, low voltage, Q waves in leads II, III, and aVF. No acute changes. Normal intervals. For all further details of the patient's emergency department visit, please see the advanced practice provider's documentation. Comment: Please note this report has been produced using speech recognition software and may contain errors related to that system including errors in grammar, punctuation, and spelling, as well as words and phrases that may be inappropriate.  If there are any questions or concerns please feel free to contact the dictating provider for clarification.      Everette Williamson, DO  08/22/20 6[address] Ember Aguilar, DO  08/22/20 1127

## 2020-08-22 NOTE — H&P
History and Physical      Name:  Ruma Henderson /Age/Sex: 1960  (63 y.o. female)   MRN & CSN:  [id number] & [id number] Admission Date/Time: 2020 11:40 AM   Location:  IWR/NONE PCP: Samara Rutledge MD       Admitting Physicians : Dr. Kenji Reina is a 63 y.o.  female  who presents with Wound Check (states she has an open wound on her chest); Palpitations; and Leg Pain (c/o pain to bilat calves)    Assessment and Plan:   Right chest wound infection S/P ICD infection  Recently diagnosed with MRSA bacteremia. Patient was treated with vancomycin for 6 weeks and 2 weeks of gentamicin. CXR: There is subtle, ill-defined asymmetric opacity projecting over the right upper lobe region.  This could represent an evolving pneumonia  -Vanco and Cefepime IV empirically  -MRSA screening  -CTA pulmonary pending  -Wound culture  -General surgery consulted in ED      Chronic HFrEF, compensated  Chronic elevated BNP, improving. Last echo with EF 30-35% (2020)  -Continue Demadex  -Daily weight and strict I&O    PE/VTE,hx  -Continue Eliquis and Amiodarone    Hypothyroidism  -Continue levothyroxine      Other chronic issues   -Seizure disorders  -Depression/anxiety  -CAD S/P CABG  -S/P gastric bypass  -Chronic pain syndrome  -Anemia of chronic disease      DVT-PPX: Eliquis  Diet: General    Medications:   Medications:    Infusions:   PRN Meds:   No current facility-administered medications for this encounter. Current Outpatient Medications:     potassium chloride (KLOR-CON M) 20 MEQ extended release tablet, Take 1 tablet by mouth daily Take only if taking torsemide, Disp: 60 tablet, Rfl: 3    midodrine (PROAMATINE) 10 MG tablet, Take 1 tablet by mouth 3 times daily (with meals), Disp: 90 tablet, Rfl: 0    oxyCODONE (ROXICODONE) 20 MG immediate release tablet, 20 mg every 3 hours as needed. , Disp: , Rfl:     mirtazapine (REMERON) 30 MG tablet, Take 30 mg by mouth nightly, Disp: , Rfl:    Disp: 60 tablet, Rfl: 3    levETIRAcetam (KEPPRA) 750 MG tablet, Take 750 mg by mouth 2 times daily , Disp: , Rfl:     atorvastatin (LIPITOR) 80 MG tablet, Take 80 mg by mouth nightly, Disp: , Rfl:     clopidogrel (PLAVIX) 75 MG tablet, Take 75 mg by mouth daily, Disp: , Rfl:     acetaminophen 650 MG TABS, Take 650 mg by mouth every 4 hours as needed, Disp: 120 tablet, Rfl: 3    ferrous gluconate (FERGON) 324 (38 FE) MG tablet, Take 324 mg by mouth 2 times daily , Disp: , Rfl:     aspirin 81 MG tablet, Take 81 mg by mouth daily , Disp: , Rfl:     albuterol (PROVENTIL HFA;VENTOLIN HFA) 108 (90 BASE) MCG/ACT inhaler, Inhale 2 puffs into the lungs every 6 hours as needed. , Disp: , Rfl:     History of present illness     Chief Complaint: Wound Check (states she has an open wound on her chest); Palpitations; and Leg Pain (c/o pain to bilat calves)      Kim Saenz is a 63 y.o.  female  who presents with increased redness to the chronic wound on her right chest for the past 4 to 5 days. Patient stated she had a defibrillator and port in this region which became infected in May. Patient was put on gentamicin and vancomycin which she completed last month. [facility] Other associated symptoms are subjective fever, body ache, and increased bilateral upper and lower extremity swelling. Patient is a frequent flyer. History of CHF, CAD, HTN, HLD, seizures, hypothyroidism, MS, PE, GERD, and S/P gastric bypass. Review of Systems   Ten point ROS reviewed and negative, unless as noted below.        Objective:   No intake or output data in the 24 hours ending 08/22/20 1612   Vitals:   Vitals:    08/22/20 1401   BP: (!) 141/71   Pulse:    Resp:    Temp:    SpO2:      Physical Exam:   Gen:  awake, alert, cooperative, no apparent distress  EYES:Lids and lashes normal, pupils equal, round ,extra ocular muscles intact, sclera clear, conjunctiva normal  ENT:  Normocephalic, oral pharynx with moist mucus membranes  NECK:  Supple, symmetrical, trachea midline, no adenopathy,  LUNGS:  Clear to auscultate bilaterally, no rales ronchi or wheezing noted. CARDIOVASCULAR:  regular rate and rhythm, normal S1 and S2,no murmur noted, peripheral pulses 2+, no pitting edema  ABDOMEN: Normal BS, Non tender, non distended, no HSM noted. MUSCULOSKELETAL:  ROM of all extremities grossly wnl  NEUROLOGIC: AOx 3,  Cranial nerves II-XII are grossly intact. Motor is 5 out of 5 bilaterally. Sensory is intact, no lateralizing findings. SKIN: Right upper chest with scant amount of purulent drainage no surrounding erythema or swelling noted. Past Medical History:      Past Medical History:   Diagnosis Date    Acute delirium     Arrhythmia     Arthritis     Asplenia     Asthma     CAD (coronary artery disease)     1st stent at age 46    Cardiomyopathy [facility])     Cerebral artery occlusion with cerebral infarction ([address]     CHF (congestive heart failure) (HCC)     Enlarged liver     GERD (gastroesophageal reflux disease)     H/O echocardiogram 4/6/16    EF 55%, trivial TR & MR, stage 1 diastolic dysfunction    History of blood transfusion     Hx of cardiovascular stress test 12/29/2015    Alessia: EF 45%. Pharmacologic stress myocardial perfusion scan shows a fixed inferior-lateral defect w/ no inducible ischemia. No evidence of ischemia. Inferior-lateral infarction. Normal LVSF    Hyperlipidemia     Hypertension     Lymphoma ([address]     Denies    MI, old     Movement disorder     MS (multiple sclerosis) ([address]     OP (osteoporosis)     PE (pulmonary embolism)     trapese filter    Pneumonia     Pyelonephritis     Seizures ([address]     Spleen enlarged     Thyroid disease      PSHX:  has a past surgical history that includes Coronary angioplasty with stent; Hysterectomy; Appendectomy; Cholecystectomy; Tonsillectomy; Gastric bypass surgery; Cardiac defibrillator placement; hip surgery;  Abdomen

## 2020-08-22 NOTE — PROGRESS NOTES
[facility]  consulted by Dr. Rich Gibbs [facility] Kimberly for monitoring and adjustment. Indication for treatment: wound infection, recent MRSA bacteremia  Goal trough: 15-20 mcg/mL     Pertinent Laboratory Values:   Temp Readings from Last 3 Encounters:   08/22/20 98.9 °F (37.2 °C) (Oral)   06/10/20 98 °F (36.7 °C) (Oral)   06/05/20 98.3 °F (36.8 °C)     Recent Labs     08/22/20  1215   WBC 4.8   LACTATE 1.1     Recent Labs     08/22/20  1308   BUN 10   CREATININE 0.6     Estimated Creatinine Clearance: 92 mL/min (based on SCr of 0.6 mg/dL). No intake or output data in the 24 hours ending 08/22/20 1743    Pertinent Cultures:  Date    Source    Results  8/22   Blood    pending  8/22   MRSA screen   pending    Vancomycin level:   TROUGH:  No results for input(s): VANCOTROUGH in the last 72 hours. RANDOM:  No results for input(s): VANCORANDOM in the last 72 hours. Assessment:  WBC and temperature: afebrile  SCr, BUN, and urine output: WNL  Day(s) of therapy:1  Vancomycin level: scheduled for 8/24    Plan:  Dosing comments: vancomycin 1000 mg x 1 dose, followed by vancomycin 750 mg (15mg/kg) every 12 hrs  Pharmacy will continue to monitor patient and adjust therapy as indicated     VANCOMYCIN TROUGH SCHEDULED FOR 8/24/20 @01:30    Thank you for the consult.   Lani Campbell, [address]  8/22/2020 5:43 PM

## 2020-08-22 NOTE — ED NOTES
Report given to [address] [place] [address], 1N assigned nurse, [id number]. All questions answered.      Melissa Dowling RN  08/22/20 9053

## 2020-08-22 NOTE — ED PROVIDER NOTES
EMERGENCY DEPARTMENT ENCOUNTER      PCP: MD Bessy Lovett Neither    Chief Complaint   Patient presents with    Wound Check     states she has an open wound on her chest    Palpitations    Leg Pain     c/o pain to bilat calves     Patient staffed with supervising physician Dr. Demetria Turner    HPI    Julieth Bishop is a 63 y.o. female who presents with increased upper and lower extremity swelling. Onset 4 to 5 days ago. Patient states she has been taking her water pill as prescribed however is not urinating as frequently. Patient denies any abdominal pain, vomiting or diarrhea. Patient states for the past day she has had increased redness to chronic wound on her right chest.  Patient had a defibrillator and port in this region which had become infected back in May. Patient had these removed and is required repeat visits to [facility] for IV antibiotics. Patient is followed with Dr. Cristy Lomax and Dr. Alex Avelar in [facility]. Patient states she has had associated fever, body aches. Patient has pain to bilateral calves. Patient has pain to her right chest at site of wound, denies any other chest pain. Patient states he has mild shortness of breath. Patient is at associate palpitations.       REVIEW OF SYSTEMS    Constitutional:  Denies fever  HENT:  Denies sore throat or ear pain   Cardiovascular: see HPI  Respiratory: see HPI   GI:  Denies abdominal pain, vomiting, or diarrhea  :  Denies any urinary symptoms  Musculoskeletal:  See HPI   Skin: see HPI  Neurologic:  Denies focal weakness or sensory changes   Lymphatic:  Denies swollen glands     All other review of systems are negative  See HPI and nursing notes for additional information     PAST MEDICAL AND SURGICAL HISTORY    Past Medical History:   Diagnosis Date    Acute delirium     Arrhythmia     Arthritis     Asplenia     Asthma     CAD (coronary artery disease)     1st stent at age 46    Cardiomyopathy [facility])     Cerebral artery occlusion with [facility] infarction [facility]     CHF (congestive heart failure) (HCC)     Enlarged liver     GERD (gastroesophageal reflux disease)     H/O echocardiogram 4/6/16    EF 55%, trivial TR & MR, stage 1 diastolic dysfunction    History of blood transfusion     Hx of cardiovascular stress test 12/29/2015    Alessia: EF 45%. Pharmacologic stress myocardial perfusion scan shows a fixed inferior-lateral defect w/ no inducible ischemia. No evidence of ischemia. Inferior-lateral infarction. Normal LVSF    Hyperlipidemia     Hypertension     Lymphoma ([facility]     Denies    MI, old     Movement disorder     MS (multiple sclerosis) ([facility]     OP (osteoporosis)     PE (pulmonary embolism)     trapese filter    Pneumonia     Pyelonephritis     Seizures ([facility]     Spleen enlarged     Thyroid disease      Past Surgical History:   Procedure Laterality Date    ABDOMEN SURGERY      APPENDECTOMY      CARDIAC DEFIBRILLATOR PLACEMENT      evera [id number] CONDITIONAL    CHOLECYSTECTOMY      COLONOSCOPY      CORONARY ANGIOPLASTY WITH STENT PLACEMENT      3 stents    CORONARY ARTERY BYPASS GRAFT      Age 49    ENDOSCOPY, COLON, DIAGNOSTIC      GASTRIC BYPASS SURGERY      HERNIA REPAIR      HIP SURGERY      HYSTERECTOMY      JOINT REPLACEMENT      PORT SURGERY N/A 6/1/2020    PORT INSERTION performed by Zachary Griffith MD at [address] [place] [address]      VASCULAR SURGERY         CURRENT MEDICATIONS    Current Outpatient Rx   Medication Sig Dispense Refill    potassium chloride (KLOR-CON M) 20 MEQ extended release tablet Take 1 tablet by mouth daily Take only if taking torsemide 60 tablet 3    midodrine (PROAMATINE) 10 MG tablet Take 1 tablet by mouth 3 times daily (with meals) 90 tablet 0    oxyCODONE (ROXICODONE) 20 MG immediate release tablet 20 mg every 3 hours as needed.       mirtazapine (REMERON) 30 MG tablet Take 30 mg by mouth nightly      levothyroxine (SYNTHROID) 112 MCG tablet Take 2 tablets by mouth Daily (Patient taking differently: Take 150 mcg by mouth Daily ) 30 tablet 1    ipratropium-albuterol (DUONEB) 0.5-2.5 (3) MG/3ML SOLN nebulizer solution Inhale 3 mLs into the lungs every 4 hours (while awake) 360 mL 0    nitroGLYCERIN (NITROSTAT) 0.4 MG SL tablet up to max of 3 total doses.  If no relief after 1 dose, call 911. 25 tablet 3    naloxone 4 MG/0.1ML LIQD nasal spray 1 spray by Nasal route as needed for Opioid Reversal (Patient taking differently: 1 spray by Nasal route as needed for Opioid Reversal 06/08/2020 Patient states she has never used this) 1 each 5    benzocaine-menthol (CEPACOL SORE THROAT) 15-3.6 MG lozenge Take 1 lozenge by mouth every 2 hours as needed for Sore Throat 18 lozenge 1    cyanocobalamin 1000 MCG/ML injection INJECT 1 ML INTO THE MUSCLE ON THE FIRST OF EACH MONTH  11    torsemide (DEMADEX) 20 MG tablet Take 20 mg by mouth daily as needed (For weight gain or fluid retention) Indications: pt takes 2 tablets daily       Icosapent Ethyl (VASCEPA) 0.5 g CAPS Take 500 mg by mouth 2 times daily 180 capsule 2    amiodarone (CORDARONE) 200 MG tablet Take 200 mg by mouth daily      apixaban (ELIQUIS) 5 MG TABS tablet Take 5 mg by mouth 2 times daily      DULoxetine (CYMBALTA) 60 MG extended release capsule Take 1 capsule by mouth daily 30 capsule 3    ondansetron (ZOFRAN) 4 MG tablet Take 1 tablet by mouth every 6 hours as needed for Nausea or Vomiting 20 tablet 0    magnesium oxide (MAG-OX) 400 MG tablet Take 800 mg by mouth 2 times daily      docusate sodium (COLACE) 100 MG capsule Take 100 mg by mouth 2 times daily as needed for Constipation      famotidine (PEPCID) 20 MG tablet Take 1 tablet by mouth 2 times daily 60 tablet 3    levETIRAcetam (KEPPRA) 750 MG tablet Take 750 mg by mouth 2 times daily       atorvastatin (LIPITOR) 80 MG tablet Take 80 mg by mouth nightly      clopidogrel (PLAVIX) 75 MG tablet Take 75 mg by mouth daily      acetaminophen 650 MG TABS Take 650 mg by mouth every 4 hours as needed 120 tablet 3    ferrous gluconate (FERGON) 324 (38 FE) MG tablet Take 324 mg by mouth 2 times daily       aspirin 81 MG tablet Take 81 mg by mouth daily       albuterol (PROVENTIL HFA;VENTOLIN HFA) 108 (90 BASE) MCG/ACT inhaler Inhale 2 puffs into the lungs every 6 hours as needed. ALLERGIES    Allergies   Allergen Reactions    Fentanyl Swelling     Other reaction(s): Angioedema (swelling)    Moxifloxacin Hcl In Nacl Swelling and Rash    Povidone-Iodine Rash     Other reaction(s): AOF      Cyclobenzaprine      Other reaction(s): Other - comment required  \" it make my muscle very weak because of my MS\"  \" it make my muscle very weak because of my MS\"      Methocarbamol      Worsening edema  Other reaction(s): Other - comment required  Worsening edema  Worsening edema  Worsening edema      Tramadol Other (See Comments)     Doctor doesn't her to take due to heart problems  Seizures   Doctor doesn't her to take due to lowers seizure threshold.  Baclofen     Ibuprofen      \"Due to heart problems\"    Naproxen     Nsaids      \"Due to heart problems\"    Tizanidine     Tolmetin      \"Due to heart problems\"    Tramadol Hcl      Other reaction(s):  Other - comment required  Told not to take due to history of seizures    Betadine [Povidone Iodine] Rash    Moxifloxacin Rash and Swelling     Lips swell and tingling in face   Lips swell and tingling in face   Lips swell and tingling in face   Lips swell and tingling in face   Around mouth       SOCIAL AND FAMILY HISTORY    Social History     Socioeconomic History    Marital status: Single     Spouse name: None    Number of children: None    Years of education: None    Highest education level: None   Occupational History    None   Social Needs    Financial resource strain: None    Food insecurity     Worry: None     Inability: None    Transportation needs Medical: None     Non-medical: None   Tobacco Use    Smoking status: Never Smoker    Smokeless tobacco: Never Used   Substance and Sexual Activity    Alcohol use: No    Drug use: No    Sexual activity: Not Currently   Lifestyle    Physical activity     Days per week: None     Minutes per session: None    Stress: None   Relationships    Social connections     Talks on phone: None     Gets together: None     Attends Nondenominational service: None     Active member of club or organization: None     Attends meetings of clubs or organizations: None     Relationship status: None    Intimate partner violence     Fear of current or ex partner: None     Emotionally abused: None     Physically abused: None     Forced sexual activity: None   Other Topics Concern    None   Social History Narrative    None     Family History   Problem Relation Age of Onset    High Blood Pressure Mother     High Cholesterol Mother     Heart Disease Mother     Diabetes Mother     Heart Disease Father     Cancer Father     Other Sister         Multiple Sclerosis    Heart Disease Maternal Grandmother     High Blood Pressure Maternal Grandmother     High Cholesterol Maternal Grandmother          PHYSICAL EXAM    VITAL SIGNS: BP (!) 141/71   Pulse 68   Temp 98.5 °F (36.9 °C) (Oral)   Resp 17   SpO2 98%    Constitutional: Frail female, no acute distress, comfortably sitting in exam bed  HENT:  Normocephalic, Atraumatic, PERRL. Neck/Lymphatics: supple, no swollen nodes  Cardiovascular:  Normal heart rate, Normal rhythm, No murmurs. Chest wall with open wound to right upper chest with small amount of purulent drainage. Mild surrounding erythema. No palpable fluctuance. Respiratory:  Nonlabored breathing. Normal breath sounds, No wheezing  Abdomen: Bowel sounds normal, Soft, No tenderness, no masses. Musculoskeletal: Mild bilateral lower extremity edema. Mild tenderness to bilateral calves.   Distal sensation and pulses intact. Integument: Chest wall with open wound to right upper chest with small amount of purulent drainage. Mild surrounding erythema. No palpable fluctuance. Neurologic:  Alert & oriented , No focal deficits noted.   Psychiatric:  Affect normal, Mood normal.       Labs:  Results for orders placed or performed during the hospital encounter of 08/22/20   CBC Auto Differential   Result Value Ref Range    WBC 4.8 4.0 - 10.5 K/CU MM    RBC 2.91 (L) 4.2 - 5.4 M/CU MM    Hemoglobin 9.9 (L) 12.5 - 16.0 GM/DL    Hematocrit 32.3 (L) 37 - 47 %    .0 (H) 78 - 100 FL    MCH 34.0 (H) 27 - 31 PG    MCHC 30.7 (L) 32.0 - 36.0 %    RDW 15.7 (H) 11.7 - 14.9 %    Platelets 110 633 - 529 K/CU MM    MPV 10.1 7.5 - 11.1 FL    Differential Type AUTOMATED DIFFERENTIAL     Segs Relative 59.6 36 - 66 %    Lymphocytes % 20.7 (L) 24 - 44 %    Monocytes % 14.0 (H) 0 - 4 %    Eosinophils % 3.8 (H) 0 - 3 %    Basophils % 1.7 (H) 0 - 1 %    Segs Absolute 2.8 K/CU MM    Lymphocytes Absolute 1.0 K/CU MM    Monocytes Absolute 0.7 K/CU MM    Eosinophils Absolute 0.2 K/CU MM    Basophils Absolute 0.1 K/CU MM    Nucleated RBC % 0.0 %    Total Nucleated RBC 0.0 K/CU MM    Total Immature Neutrophil 0.01 K/CU MM    Immature Neutrophil % 0.2 0 - 0.43 %    Anisocytosis 1+     Macrocytes 1+     Polychromasia 1+     Ovalocytes 1+    Lactic Acid, Plasma   Result Value Ref Range    Lactate 1.1 0.4 - 2.0 mMOL/L   SPECIMEN REJECTION   Result Value Ref Range    Rejected Test TROT     Reason for Rejection UNABLE TO PERFORM TESTING:    Troponin   Result Value Ref Range    Troponin T <0.010 <0.01 NG/ML   SPECIMEN REJECTION   Result Value Ref Range    Rejected Test CMPR CPK PBNP     Reason for Rejection UNABLE TO PERFORM TESTING:    Comprehensive Metabolic Panel   Result Value Ref Range    Sodium 141 135 - 145 MMOL/L    Potassium 3.2 (L) 3.5 - 5.1 MMOL/L    Chloride 103 99 - 110 mMol/L    CO2 28 21 - 32 MMOL/L    BUN 10 6 - 23 MG/DL    CREATININE 0.6 0.6 - 1.1 MG/DL    Glucose 94 70 - 99 MG/DL    Calcium 8.4 8.3 - 10.6 MG/DL    Alb 3.2 (L) 3.4 - 5.0 GM/DL    Total Protein 5.5 (L) 6.4 - 8.2 GM/DL    Total Bilirubin 0.1 0.0 - 1.0 MG/DL    ALT 10 10 - 40 U/L    AST 16 15 - 37 IU/L    Alkaline Phosphatase 90 40 - 129 IU/L    GFR Non-African American >60 >60 mL/min/1.73m2    GFR African American >60 >60 mL/min/1.73m2    Anion Gap 10 4 - 16   CK   Result Value Ref Range    Total CK 46 26 - 140 IU/L   Brain Natriuretic Peptide   Result Value Ref Range    Pro-BNP 2,365 (H) <300 PG/ML   EKG 12 Lead   Result Value Ref Range    Ventricular Rate 64 BPM    Atrial Rate 64 BPM    P-R Interval 148 ms    QRS Duration 88 ms    Q-T Interval 394 ms    QTc Calculation (Bazett) 406 ms    P Axis 26 degrees    R Axis -37 degrees    T Axis -36 degrees    Diagnosis       Normal sinus rhythm  Left axis deviation  Low voltage QRS  Inferior infarct (cited on or before 12-FEB-2017)  Anterolateral infarct , age undetermined  Abnormal ECG  When compared with ECG of 08-JUN-2020 13:19,  Previous ECG has undetermined rhythm, needs review  Anterior infarct is now present  Anterolateral infarct is now present  ST no longer depressed in Anterior leads             EKG      EKG Interpretation  Please see ED physician's note for EKG interpretation        RADIOLOGY    VL DUP LOWER EXTREMITY VENOUS BILATERAL   Preliminary Result   No evidence of DVT in either lower extremity. XR CHEST PORTABLE   Preliminary Result   There is subtle, ill-defined asymmetric opacity projecting over the right   upper lobe region. This could represent an evolving pneumonia. Radiographic   follow-up recommended to assure resolution. CTA PULMONARY W CONTRAST    (Results Pending)             ED [address] [facility]      Patient presents as above. Patient provided her home pain medication 20 mg oxycodone. See physician note for EKG reading. Troponin negative. CK 46. CMP shows mild hypokalemia 3.2. BNP is elevated at 2365. CBC with normal white blood cell count, hemoglobin 9.9 hematocrit of 32.3. Lactic acid is 1.1. Blood cultures were ordered. Bilateral lower extremity venous ultrasound is negative for DVT. Chest x-ray shows subtle ill-defined asymmetric opacity projecting over the right upper lobe region this could represent evolving pneumonia. Patient denies any new cough, this is in the region of patient's wound. IV vancomycin and cefepime as ordered with patient's history of complicated infections due to wound in this region. Patient states she would like to stay in [facility] if possible, consult is made to general surgeon Dr. Radha Perez covering for Dr. Mary Robert who evaluated patient in emergency department believes patient is appropriate for admission here and does not require transfer. Dr. Radha Perez will follow along with admission, does request CT imaging of chest be ordered. CTA Pulmonary with contrast is ordered for further evaluation. Consult to hospitalist who accepts admission. CT imaging of chest pending at time of admission. Clinical  IMPRESSION    1. Open wound of right chest wall, initial encounter    2. Bilateral leg pain    3. Elevated brain natriuretic peptide (BNP) level        Patient admitted    Comment: Please note this report has been produced using speech recognition software and may contain errors related to that system including errors in grammar, punctuation, and spelling, as well as words and phrases that may be inappropriate. If there are any questions or concerns please feel free to contact the dictating provider for clarification.           Lashanda Rajput PA-C  08/22/20 [id number]

## 2020-08-22 NOTE — CONSULTS
Department of General Surgery   Surgical Service Dr. Darlene Jones   Consult Note    Date of Consult: 8/22/20    Reason for Consult:  Chest wall wound  Requesting Physician:  Lilian Mack PA-C    CHIEF COMPLAINT:  Leg pain, chest wall wound, shortness of breath    History Obtained From:  patient    HISTORY OF PRESENT ILLNESS:    The patient is a 63 y.o. female who presented with multiple complaints including leg pain, pain at a wound on her right chest, mild shortness of breath and fever up to 100.5 at home. She reports worsening of symptoms starting yesterday. She has a history of infected mediport and pacemaker that had to be removed and has a right chest wound secondary to this for which she has been doing wet to dry dressings. She denies nausea or vomiting. Workup is significant for normal WBC without left shift. Temp is 98.5 in ED. Saturating well on room air. Past Medical History:    Past Medical History:   Diagnosis Date    Acute delirium     Arrhythmia     Arthritis     Asplenia     Asthma     CAD (coronary artery disease)     1st stent at age 46    Cardiomyopathy [facility])     Cerebral artery occlusion with cerebral infarction ([facility]     CHF (congestive heart failure) (HCC)     Enlarged liver     GERD (gastroesophageal reflux disease)     H/O echocardiogram 4/6/16    EF 55%, trivial TR & MR, stage 1 diastolic dysfunction    History of blood transfusion     Hx of cardiovascular stress test 12/29/2015    Alessia: EF 45%. Pharmacologic stress myocardial perfusion scan shows a fixed inferior-lateral defect w/ no inducible ischemia. No evidence of ischemia. Inferior-lateral infarction.  Normal LVSF    Hyperlipidemia     Hypertension     Lymphoma ([address]     Denies    MI, old     Movement disorder     MS (multiple sclerosis) ([address]     OP (osteoporosis)     PE (pulmonary embolism)     trapese filter    Pneumonia     Pyelonephritis     Seizures (HCC)     Spleen enlarged     Thyroid disease        Past Surgical History:    Past Surgical History:   Procedure Laterality Date    ABDOMEN SURGERY      APPENDECTOMY      CARDIAC DEFIBRILLATOR PLACEMENT      evera [id number] CONDITIONAL    CHOLECYSTECTOMY      COLONOSCOPY      CORONARY ANGIOPLASTY WITH STENT PLACEMENT      3 stents    CORONARY ARTERY BYPASS GRAFT      Age 49    ENDOSCOPY, COLON, DIAGNOSTIC      GASTRIC BYPASS SURGERY      HERNIA REPAIR      HIP SURGERY      HYSTERECTOMY      JOINT REPLACEMENT      PORT SURGERY N/A 6/1/2020    PORT INSERTION performed by Josh Mejia MD at [address] [place] [address]      VASCULAR SURGERY         Current Medications:   No current facility-administered medications for this encounter. Allergies:  Fentanyl; Moxifloxacin hcl in nacl; Povidone-iodine; Cyclobenzaprine; Methocarbamol; Tramadol; Baclofen; Ibuprofen; Naproxen; Nsaids; Tizanidine;  Tolmetin; Tramadol hcl; Betadine [povidone iodine]; and Moxifloxacin    Social History:   Social History     Socioeconomic History    Marital status: Single     Spouse name: None    Number of children: None    Years of education: None    Highest education level: None   Occupational History    None   Social Needs    Financial resource strain: None    Food insecurity     Worry: None     Inability: None    Transportation needs     Medical: None     Non-medical: None   Tobacco Use    Smoking status: Never Smoker    Smokeless tobacco: Never Used   Substance and Sexual Activity    Alcohol use: No    Drug use: No    Sexual activity: Not Currently   Lifestyle    Physical activity     Days per week: None     Minutes per session: None    Stress: None   Relationships    Social connections     Talks on phone: None     Gets together: None     Attends [employer] service: None     Active member of club or organization: None     Attends meetings of clubs or organizations: None     Relationship status: None    Intimate partner violence     Fear of current or ex partner: None     Emotionally abused: None     Physically abused: None     Forced sexual activity: None   Other Topics Concern    None   Social History Narrative    None       Family History:   Family History   Problem Relation Age of Onset    High Blood Pressure Mother     High Cholesterol Mother     Heart Disease Mother     Diabetes Mother     Heart Disease Father     Cancer Father     Other Sister         Multiple Sclerosis    Heart Disease Maternal Grandmother     High Blood Pressure Maternal Grandmother     High Cholesterol Maternal Grandmother        REVIEW OFSYSTEMS:    Review of Systems   Constitutional: Negative for chills. Reports fever at home to 100.5  HENT: Negative for congestion. Negative for rhinorrhea. Respiratory: reports mild cough and shortness of breath   Cardiovascular: Negative for chest pain. Has external defibrillator and chest wall wound from pacemaker removal  Gastrointestinal:  Negative for nausea and vomiting. Genitourinary: Negative for difficulty urinating. Neurological: Negative for dizziness, syncope and numbness. Hematological: on ASA/plavix      PHYSICAL EXAM:  Vitals:    08/22/20 1302 08/22/20 1331 08/22/20 1401 08/22/20 1449   BP: 126/74 131/79 (!) 141/71    Pulse:       Resp:       Temp:       TempSrc:       SpO2:       Weight:    126 lb (57.2 kg)   Height:    5' 6\" (1.676 m)       Physical Exam  General: awake, alert, in no acute distress  HEENT: mucous membranes moist  Respiratory: normal effort, no wheezes appreciated  CV: appears well perfused  Chest: right upper chest wall with superficial wound approximately 4x3cm in size, 80% granulated with a small amount of fibrinous material medially, no melissa-wound erythema, no purulence or crepitus  Abdomen: Soft, non-tender, non-distended.    Skin: warm and dry  Extremities: atraumatic  Neuro: no focal deficits noted  Psych: mood normal        DATA:    Lab Results Component Value Date    WBC 4.8 08/22/2020    HGB 9.9 (L) 08/22/2020    HCT 32.3 (L) 08/22/2020    .0 (H) 08/22/2020     08/22/2020     Lab Results   Component Value Date     08/22/2020    K 3.2 08/22/2020     08/22/2020    CO2 28 08/22/2020    BUN 10 08/22/2020    CREATININE 0.6 08/22/2020    GLUCOSE 94 08/22/2020    CALCIUM 8.4 08/22/2020          IMPRESSION:    63 y.o. female with multiple medical problems who presents with reported fever, leg pain and chest wall wound. The chest wall wound is mostly granulated and does not appear infected clinically.     Patient Active Problem List:     Chest pain     Chest pain     CAD (coronary artery disease)     Hyperlipidemia     Thyroid disease     Acute exacerbation of CHF (congestive heart failure) (HCC)     CHF exacerbation ([facility])     Congestive heart failure ([facility])     Left upper quadrant pain     Pneumonia of left lower lobe due to infectious organism ([address]     Angina at rest [facility])     Coronary artery disease involving coronary bypass graft of native heart with angina pectoris ([address]     Shortness of breath     Ischemic cardiomyopathy     Acute metabolic encephalopathy     Acute delirium     Pneumonia     Sacral pain     Acute on chronic systolic CHF (congestive heart failure), NYHA class 3 ([facility])     Severe malnutrition ([facility])     Cellulitis     Cellulitis at gastrostomy tube site [facility])     History of Maru-en-Y gastric bypass     Gastroparesis     Abdominal pain     Feeding tube dysfunction     Abdominal pain, epigastric     Gastrojejunostomy tube status ([address]     Chronic malnutrition ([address]     Asplenia     Shockable cardiac rhythm detected by automated external defibrillator     Financial difficulties     Chest pain at rest     Closed nondisplaced transverse fracture of left patella     Contusion of right knee     Multifocal pneumonia     Abnormal cardiac function test     Chronic systolic heart failure ([address]     Seizure disorder ([address] Hypothyroidism     Cardiomyopathy ([facility]     Acute chest pain     Fever in adult     Pyelonephritis     MRSA bacteremia     Heart failure (HCC)     Infected chest wall abrasion, right, subsequent encounter        PLAN:  - recommend continued local wound cares  - will continue to follow        Electronically signed by Darell Munoz MD on 8/22/2020 at 4:35 PM

## 2020-08-23 LAB
HCT VFR BLD CALC: 29 % (ref 37–47)
HEMOGLOBIN: 8.7 GM/DL (ref 12.5–16)
MCH RBC QN AUTO: 34.8 PG (ref 27–31)
MCHC RBC AUTO-ENTMCNC: 30 % (ref 32–36)
MCV RBC AUTO: 116 FL (ref 78–100)
PDW BLD-RTO: 15.9 % (ref 11.7–14.9)
PLATELET # BLD: 280 K/CU MM (ref 140–440)
PMV BLD AUTO: 10 FL (ref 7.5–11.1)
RBC # BLD: 2.5 M/CU MM (ref 4.2–5.4)
WBC # BLD: 3.7 K/CU MM (ref 4–10.5)

## 2020-08-23 PROCEDURE — 36415 COLL VENOUS BLD VENIPUNCTURE: CPT

## 2020-08-23 PROCEDURE — 6370000000 HC RX 637 (ALT 250 FOR IP): Performed by: NURSE PRACTITIONER

## 2020-08-23 PROCEDURE — 99232 SBSQ HOSP IP/OBS MODERATE 35: CPT | Performed by: SURGERY

## 2020-08-23 PROCEDURE — 6360000002 HC RX W HCPCS: Performed by: NURSE PRACTITIONER

## 2020-08-23 PROCEDURE — 85027 COMPLETE CBC AUTOMATED: CPT

## 2020-08-23 PROCEDURE — G0378 HOSPITAL OBSERVATION PER HR: HCPCS

## 2020-08-23 PROCEDURE — 6360000002 HC RX W HCPCS: Performed by: FAMILY MEDICINE

## 2020-08-23 PROCEDURE — 2580000003 HC RX 258: Performed by: FAMILY MEDICINE

## 2020-08-23 PROCEDURE — 94761 N-INVAS EAR/PLS OXIMETRY MLT: CPT

## 2020-08-23 PROCEDURE — 93010 ELECTROCARDIOGRAM REPORT: CPT | Performed by: INTERNAL MEDICINE

## 2020-08-23 PROCEDURE — 2580000003 HC RX 258: Performed by: NURSE PRACTITIONER

## 2020-08-23 PROCEDURE — 1200000000 HC SEMI PRIVATE

## 2020-08-23 RX ORDER — IPRATROPIUM BROMIDE AND ALBUTEROL SULFATE 2.5; .5 MG/3ML; MG/3ML
3 SOLUTION RESPIRATORY (INHALATION) EVERY 4 HOURS PRN
Status: DISCONTINUED | OUTPATIENT
Start: 2020-08-23 | End: 2020-08-27 | Stop reason: HOSPADM

## 2020-08-23 RX ADMIN — DULOXETINE HYDROCHLORIDE 60 MG: 30 CAPSULE, DELAYED RELEASE ORAL at 08:48

## 2020-08-23 RX ADMIN — LEVETIRACETAM 750 MG: 500 TABLET ORAL at 08:47

## 2020-08-23 RX ADMIN — MIDODRINE HYDROCHLORIDE 10 MG: 5 TABLET ORAL at 11:26

## 2020-08-23 RX ADMIN — OXYCODONE HYDROCHLORIDE 20 MG: 10 TABLET ORAL at 05:40

## 2020-08-23 RX ADMIN — LEVETIRACETAM 750 MG: 500 TABLET ORAL at 21:14

## 2020-08-23 RX ADMIN — ACETAMINOPHEN 650 MG: 325 TABLET ORAL at 05:40

## 2020-08-23 RX ADMIN — CEFEPIME 2 G: 2 INJECTION, POWDER, FOR SOLUTION INTRAVENOUS at 02:25

## 2020-08-23 RX ADMIN — POTASSIUM CHLORIDE 20 MEQ: 1500 TABLET, EXTENDED RELEASE ORAL at 08:48

## 2020-08-23 RX ADMIN — OXYCODONE HYDROCHLORIDE 20 MG: 10 TABLET ORAL at 08:47

## 2020-08-23 RX ADMIN — OXYCODONE HYDROCHLORIDE 20 MG: 10 TABLET ORAL at 02:37

## 2020-08-23 RX ADMIN — OXYCODONE HYDROCHLORIDE 20 MG: 10 TABLET ORAL at 14:59

## 2020-08-23 RX ADMIN — ACETAMINOPHEN 650 MG: 325 TABLET ORAL at 18:12

## 2020-08-23 RX ADMIN — OXYCODONE HYDROCHLORIDE 20 MG: 10 TABLET ORAL at 18:12

## 2020-08-23 RX ADMIN — MAGNESIUM OXIDE 400 MG (241.3 MG MAGNESIUM) TABLET 800 MG: TABLET at 08:47

## 2020-08-23 RX ADMIN — ATORVASTATIN CALCIUM 80 MG: 40 TABLET, FILM COATED ORAL at 21:14

## 2020-08-23 RX ADMIN — FERROUS GLUCONATE TAB 324 MG (37.5 MG ELEMENTAL IRON) 324 MG: 324 (37.5 FE) TAB at 08:47

## 2020-08-23 RX ADMIN — MIDODRINE HYDROCHLORIDE 10 MG: 5 TABLET ORAL at 08:48

## 2020-08-23 RX ADMIN — MIDODRINE HYDROCHLORIDE 10 MG: 5 TABLET ORAL at 18:12

## 2020-08-23 RX ADMIN — FERROUS GLUCONATE TAB 324 MG (37.5 MG ELEMENTAL IRON) 324 MG: 324 (37.5 FE) TAB at 21:14

## 2020-08-23 RX ADMIN — ONDANSETRON 4 MG: 2 INJECTION INTRAMUSCULAR; INTRAVENOUS at 13:47

## 2020-08-23 RX ADMIN — SODIUM CHLORIDE, PRESERVATIVE FREE 10 ML: 5 INJECTION INTRAVENOUS at 08:49

## 2020-08-23 RX ADMIN — ACETAMINOPHEN 650 MG: 325 TABLET ORAL at 11:47

## 2020-08-23 RX ADMIN — FAMOTIDINE 20 MG: 20 TABLET ORAL at 21:14

## 2020-08-23 RX ADMIN — MIRTAZAPINE 30 MG: 15 TABLET, FILM COATED ORAL at 21:14

## 2020-08-23 RX ADMIN — MAGNESIUM OXIDE 400 MG (241.3 MG MAGNESIUM) TABLET 800 MG: TABLET at 21:14

## 2020-08-23 RX ADMIN — OXYCODONE HYDROCHLORIDE 20 MG: 10 TABLET ORAL at 21:18

## 2020-08-23 RX ADMIN — APIXABAN 5 MG: 5 TABLET, FILM COATED ORAL at 08:48

## 2020-08-23 RX ADMIN — CLOPIDOGREL BISULFATE 75 MG: 75 TABLET ORAL at 08:48

## 2020-08-23 RX ADMIN — LEVOTHYROXINE SODIUM 150 MCG: 75 TABLET ORAL at 05:41

## 2020-08-23 RX ADMIN — VANCOMYCIN HYDROCHLORIDE 750 MG: 5 INJECTION, POWDER, LYOPHILIZED, FOR SOLUTION INTRAVENOUS at 02:27

## 2020-08-23 RX ADMIN — OXYCODONE HYDROCHLORIDE 20 MG: 10 TABLET ORAL at 11:47

## 2020-08-23 RX ADMIN — ASPIRIN 81 MG CHEWABLE TABLET 81 MG: 81 TABLET CHEWABLE at 08:48

## 2020-08-23 RX ADMIN — AMIODARONE HYDROCHLORIDE 200 MG: 200 TABLET ORAL at 08:48

## 2020-08-23 RX ADMIN — FAMOTIDINE 20 MG: 20 TABLET ORAL at 08:48

## 2020-08-23 ASSESSMENT — PAIN SCALES - GENERAL
PAINLEVEL_OUTOF10: 8
PAINLEVEL_OUTOF10: 0
PAINLEVEL_OUTOF10: 8
PAINLEVEL_OUTOF10: 0
PAINLEVEL_OUTOF10: 8
PAINLEVEL_OUTOF10: 0
PAINLEVEL_OUTOF10: 8
PAINLEVEL_OUTOF10: 0
PAINLEVEL_OUTOF10: 0
PAINLEVEL_OUTOF10: 9

## 2020-08-23 ASSESSMENT — PAIN DESCRIPTION - ONSET
ONSET: ON-GOING
ONSET: ON-GOING

## 2020-08-23 ASSESSMENT — PAIN DESCRIPTION - FREQUENCY
FREQUENCY: CONTINUOUS
FREQUENCY: INTERMITTENT
FREQUENCY: CONTINUOUS

## 2020-08-23 ASSESSMENT — PAIN DESCRIPTION - ORIENTATION
ORIENTATION: RIGHT
ORIENTATION: LEFT;RIGHT;LOWER

## 2020-08-23 ASSESSMENT — PAIN DESCRIPTION - DESCRIPTORS
DESCRIPTORS: ACHING;DISCOMFORT
DESCRIPTORS: ACHING;DISCOMFORT
DESCRIPTORS: ACHING;SHARP

## 2020-08-23 ASSESSMENT — PAIN - FUNCTIONAL ASSESSMENT: PAIN_FUNCTIONAL_ASSESSMENT: ACTIVITIES ARE NOT PREVENTED

## 2020-08-23 ASSESSMENT — PAIN DESCRIPTION - PAIN TYPE
TYPE: CHRONIC PAIN

## 2020-08-23 ASSESSMENT — PAIN DESCRIPTION - LOCATION
LOCATION: BACK;HIP
LOCATION: HIP;LEG
LOCATION: GENERALIZED

## 2020-08-23 ASSESSMENT — PAIN DESCRIPTION - PROGRESSION: CLINICAL_PROGRESSION: NOT CHANGED

## 2020-08-23 NOTE — PROGRESS NOTES
Hospitalist Progress Note      Name:  Kavon Lima /Age/Sex: 1960  (63 y.o. female)   MRN & CSN:  [id number] & [id number] Admission Date/Time: 2020 11:40 AM   Location:  [address] [place] [address] PCP: Dodie Overton MD         Hospital Day: 2    History of Present Illness:     Chief Complaint: Chest wall wound, concern for infection  Kavon Lima is a 63 y.o.  female  who presents with chest wall wound. The patient seen and examined at bed side. Patient denies having SOB, chest pain. Ten point ROS reviewed negative, unless as noted above    Objective:   No intake or output data in the 24 hours ending 20 1325   Vitals:   Vitals:    20 0904   BP:    Pulse:    Resp:    Temp:    SpO2: 96%     Physical Exam:   General Appearance: alert and oriented to person, place and time, in no acute distress  Cardiovascular: normal rate, regular rhythm, normal S1 and S2  Pulmonary/Chest: clear to auscultation bilaterally, Wound in the right upper chest with dressing.  Occasional bilateral crackles  Abdomen: soft, non-tender, non-distended, normal bowel sounds, no masses   Extremities: no cyanosis, clubbing or edema, pulse   Skin: warm and dry, no rash or erythema  Head: normocephalic and atraumatic  Eyes: pupils equal, round, and reactive to light  Neck: supple and non-tender without mass, no thyromegaly   Musculoskeletal: normal range of motion, no joint swelling, deformity or tenderness  Neurological: alert, oriented, normal speech, no focal findings or movement disorder noted    Medications:   Medications:    amiodarone  200 mg Oral Daily    apixaban  5 mg Oral BID    aspirin  81 mg Oral Daily    atorvastatin  80 mg Oral Nightly    clopidogrel  75 mg Oral Daily    potassium chloride  20 mEq Oral Daily    mirtazapine  30 mg Oral Nightly    midodrine  10 mg Oral TID WC    magnesium oxide  800 mg Oral BID    levothyroxine  150 mcg Oral Daily    levETIRAcetam  750 mg Oral BID    Icosapent HISTORY: chest pain chest wound TECHNOLOGIST PROVIDED HISTORY: Reason for exam:->chest pain chest wound Reason for Exam: chest pain, chest wound Acuity: Unknown Type of Exam: Unknown Additional signs and symptoms: recent port and defibrillator removal FINDINGS: Metallic device external to the patient with extensive streak artifact limiting evaluation of the lung bases and upper abdomen. Pulmonary Arteries: Pulmonary arteries are adequately opacified for evaluation. No evidence of intraluminal filling defect to suggest pulmonary embolism. Main pulmonary artery is normal in caliber. Mediastinum: The heart is enlarged without pericardial effusion. Coronary atherosclerosis. Thoracic aorta is nondilated. No mediastinal adenopathy. Lungs/pleura: There is ground-glass opacity within the right upper lobe and right lower lobe, left upper lobe, and left lower lobe. Small bilateral pleural effusions. No pneumothorax. 6 mm noncalcified right lower lobe nodule. Upper Abdomen: Limited images of the upper abdomen are unremarkable. Soft Tissues/Bones: No acute bone or soft tissue abnormality. 1. No evidence of pulmonary embolism. 2. Bilateral pulmonary ground-glass opacities, suggestive of infection, possibly atypical. 3. Small bilateral pleural effusions. 4. Cardiomegaly. 5. 6 mm noncalcified right lower lobe nodule, unchanged. Consider 1 year follow up for further evaluation. Imaging features can be seen with viral pneumonia, though are nonspecific and can occur with a variety of infectious and noninfectious processes. PneInd RECOMMENDATIONS: Fleischner Society guidelines for follow-up and management of incidentally detected pulmonary nodules: Single Solid Nodule: Nodule size equals 6-8 mm In a low-risk patient, CT at 6-12 months, then consider CT at 18-24 months. In a high-risk patient, CT at 6-12 months, then CT at 18-24 months.  - Low risk patients include individuals with minimal or absent history of smoking and

## 2020-08-23 NOTE — PROGRESS NOTES
Night Shift RN Notes:   Wound dressing was done per protocol to patient's right chest wound. Wound was cleansed with  with saline and wet to dry dressing was placed, dated, timed and initialed. No drainage was noted. Surrounding line is reddened, wound was well approximated and warm to touch, edges are reddened and swollen. Miroslava Ac is aware of the patient's situation and the patient is on antibiotics. Patient tolerated the procedure with minimal to no discomforts.

## 2020-08-23 NOTE — PROGRESS NOTES
[facility]  consulted by Dr. Lemuel Baxter for monitoring and adjustment. Indication for treatment: hx of MRSA bacteremia 2/2 ICD infection, possible pneumonia  Goal trough: 15 mcg/mL     Pertinent Laboratory Values:   Temp Readings from Last 3 Encounters:   08/23/20 97.6 °F (36.4 °C) (Oral)   06/10/20 98 °F (36.7 °C) (Oral)   06/05/20 98.3 °F (36.8 °C)     Recent Labs     08/22/20  1215 08/23/20  0707   WBC 4.8 3.7*   LACTATE 1.1  --      Recent Labs     08/22/20  1308   BUN 10   CREATININE 0.6     Estimated Creatinine Clearance: 96 mL/min (based on SCr of 0.6 mg/dL). No intake or output data in the 24 hours ending 08/23/20 1030    Pertinent Cultures:  Date    Source    Results  8/22   Blood    Pending  8/22   MRSA screen   Pending  8/22   Chest wound   Pending    Vancomycin level:   TROUGH:  No results for input(s): VANCOTROUGH in the last 72 hours. RANDOM:  No results for input(s): VANCORANDOM in the last 72 hours. Assessment:  · WBC and temperature: WNL, afebrile  · SCr, BUN, and urine output: at baseline   · Day(s) of therapy: 2  · Vancomycin level: to be collected    Plan:  · Vancomycin 1000 mg x 1 followed by 750 mg q12h based on previous dosing requirements   · Previously experienced accumulation on a regimen of 1000 mg q12h  · Pharmacy will continue to monitor patient and adjust therapy as indicated     Willi 8/24/20 @6356    Thank you for the consult.   Camila Marinelli, [address]  8/23/2020 10:30 AM

## 2020-08-23 NOTE — PROGRESS NOTES
GENERAL SURGERY PROGRESS NOTE    David Quiñones is a 63 y.o. female with multiple medical problems who presented with reported fever, leg pain and chest wall wound.                   Subjective:  Doing ok this morning. Reports improvement in leg pain. Felt sweaty overnight, no fever on vitals. Denies other complaints. Objective:    Vitals: VITALS:  BP (!) 112/57   Pulse 61   Temp 97.6 °F (36.4 °C) (Oral)   Resp 16   Ht 5' 6\" (1.676 m)   Wt 139 lb (63 kg)   SpO2 96%   BMI 22.44 kg/m²     I/O: No intake/output data recorded. Labs/Imaging Results:   Lab Results   Component Value Date     08/22/2020    K 3.2 08/22/2020     08/22/2020    CO2 28 08/22/2020    BUN 10 08/22/2020    CREATININE 0.6 08/22/2020    GLUCOSE 94 08/22/2020    CALCIUM 8.4 08/22/2020      Lab Results   Component Value Date    WBC 3.7 (L) 08/23/2020    HGB 8.7 (L) 08/23/2020    HCT 29.0 (L) 08/23/2020    .0 (H) 08/23/2020     08/23/2020         IV Fluids:     Scheduled Meds: amiodarone, 200 mg, Oral, Daily    apixaban, 5 mg, Oral, BID    aspirin, 81 mg, Oral, Daily    atorvastatin, 80 mg, Oral, Nightly    clopidogrel, 75 mg, Oral, Daily    potassium chloride, 20 mEq, Oral, Daily    mirtazapine, 30 mg, Oral, Nightly    midodrine, 10 mg, Oral, TID WC    magnesium oxide, 800 mg, Oral, BID    levothyroxine, 150 mcg, Oral, Daily    levETIRAcetam, 750 mg, Oral, BID    Icosapent Ethyl, 500 mg, Oral, BID    ferrous gluconate, 324 mg, Oral, BID    famotidine, 20 mg, Oral, BID    DULoxetine, 60 mg, Oral, Daily    sodium chloride flush, 10 mL, Intravenous, 2 times per day    cefepime, 2 g, Intravenous, Q12H    vancomycin, 15 mg/kg, Intravenous, Q12H    Physical Exam:  General: A&O x 3, no distress. HEENT: Anicteric sclerae, MMM. Extremities: No edema bilat LE.   Chest: right upper chest wall with superficial wound approximately 4x3cm in size, 80% granulated with a small amount of fibrinous material medially, no melissa-wound erythema, no purulence or crepitus      Assessment and Plan:  63 y.o. female with multiple medical problems who presents with reported fever, leg pain and chest wall wound.     The chest wall wound is mostly granulated and does not appear infected clinically.  Superficial wound cultures pending.     Patient Active Problem List:     Chest pain     Chest pain     CAD (coronary artery disease)     Hyperlipidemia     Thyroid disease     Acute exacerbation of CHF (congestive heart failure) ([facility])     CHF exacerbation ([facility])     Congestive heart failure ([facility])     Left upper quadrant pain     Pneumonia of left lower lobe due to infectious organism ([facility] [address]     Angina at rest [facility])     Coronary artery disease involving coronary bypass graft of native heart with angina pectoris ([facility])     Shortness of breath     Ischemic cardiomyopathy     Acute metabolic encephalopathy     Acute delirium     Pneumonia     Sacral pain     Acute on chronic systolic CHF (congestive heart failure), NYHA class 3 ([facility])     Severe malnutrition ([facility])     Cellulitis     Cellulitis at gastrostomy tube site [facility])     History of Maru-en-Y gastric bypass     Gastroparesis     Abdominal pain     Feeding tube dysfunction     Abdominal pain, epigastric     Gastrojejunostomy tube status ([facility])     Chronic malnutrition ([facility])     Asplenia     Shockable cardiac rhythm detected by automated external defibrillator     Financial difficulties     Chest pain at rest     Closed nondisplaced transverse fracture of left patella     Contusion of right knee     Multifocal pneumonia     Abnormal cardiac function test     Chronic systolic heart failure ([facility])     Seizure disorder ([facility])     Hypothyroidism     Cardiomyopathy ([facility])     Acute chest pain     Fever in adult     Pyelonephritis     MRSA bacteremia     Heart failure ([facility])     Infected chest wall abrasion, right, subsequent encounter      - recommend continued local wound cares  - will continue to follow    Anisha Gomez MD

## 2020-08-24 LAB
ANION GAP SERPL CALCULATED.3IONS-SCNC: 8 MMOL/L (ref 4–16)
BUN BLDV-MCNC: 8 MG/DL (ref 6–23)
CALCIUM SERPL-MCNC: 8.8 MG/DL (ref 8.3–10.6)
CHLORIDE BLD-SCNC: 107 MMOL/L (ref 99–110)
CO2: 27 MMOL/L (ref 21–32)
CREAT SERPL-MCNC: 0.6 MG/DL (ref 0.6–1.1)
DOSE AMOUNT: ABNORMAL
DOSE TIME: ABNORMAL
GFR AFRICAN AMERICAN: >60 ML/MIN/1.73M2
GFR NON-AFRICAN AMERICAN: >60 ML/MIN/1.73M2
GLUCOSE BLD-MCNC: 79 MG/DL (ref 70–99)
POTASSIUM SERPL-SCNC: 4.3 MMOL/L (ref 3.5–5.1)
SODIUM BLD-SCNC: 142 MMOL/L (ref 135–145)
VANCOMYCIN TROUGH: <4 UG/ML (ref 10–20)

## 2020-08-24 PROCEDURE — 80048 BASIC METABOLIC PNL TOTAL CA: CPT

## 2020-08-24 PROCEDURE — 99232 SBSQ HOSP IP/OBS MODERATE 35: CPT | Performed by: SURGERY

## 2020-08-24 PROCEDURE — 6370000000 HC RX 637 (ALT 250 FOR IP): Performed by: NURSE PRACTITIONER

## 2020-08-24 PROCEDURE — 99211 OFF/OP EST MAY X REQ PHY/QHP: CPT

## 2020-08-24 PROCEDURE — 94761 N-INVAS EAR/PLS OXIMETRY MLT: CPT

## 2020-08-24 PROCEDURE — 80202 ASSAY OF VANCOMYCIN: CPT

## 2020-08-24 PROCEDURE — 36415 COLL VENOUS BLD VENIPUNCTURE: CPT

## 2020-08-24 PROCEDURE — 2580000003 HC RX 258: Performed by: NURSE PRACTITIONER

## 2020-08-24 PROCEDURE — 1200000000 HC SEMI PRIVATE

## 2020-08-24 RX ADMIN — FERROUS GLUCONATE TAB 324 MG (37.5 MG ELEMENTAL IRON) 324 MG: 324 (37.5 FE) TAB at 21:08

## 2020-08-24 RX ADMIN — ACETAMINOPHEN 650 MG: 325 TABLET ORAL at 15:08

## 2020-08-24 RX ADMIN — ACETAMINOPHEN 650 MG: 325 TABLET ORAL at 21:06

## 2020-08-24 RX ADMIN — OXYCODONE HYDROCHLORIDE 20 MG: 10 TABLET ORAL at 08:59

## 2020-08-24 RX ADMIN — ACETAMINOPHEN 650 MG: 325 TABLET ORAL at 02:20

## 2020-08-24 RX ADMIN — AMIODARONE HYDROCHLORIDE 200 MG: 200 TABLET ORAL at 09:03

## 2020-08-24 RX ADMIN — OXYCODONE HYDROCHLORIDE 20 MG: 10 TABLET ORAL at 12:03

## 2020-08-24 RX ADMIN — POTASSIUM CHLORIDE 20 MEQ: 1500 TABLET, EXTENDED RELEASE ORAL at 09:02

## 2020-08-24 RX ADMIN — SODIUM CHLORIDE, PRESERVATIVE FREE 10 ML: 5 INJECTION INTRAVENOUS at 09:04

## 2020-08-24 RX ADMIN — ATORVASTATIN CALCIUM 80 MG: 40 TABLET, FILM COATED ORAL at 21:06

## 2020-08-24 RX ADMIN — FERROUS GLUCONATE TAB 324 MG (37.5 MG ELEMENTAL IRON) 324 MG: 324 (37.5 FE) TAB at 09:03

## 2020-08-24 RX ADMIN — MAGNESIUM OXIDE 400 MG (241.3 MG MAGNESIUM) TABLET 800 MG: TABLET at 09:03

## 2020-08-24 RX ADMIN — OXYCODONE HYDROCHLORIDE 20 MG: 10 TABLET ORAL at 15:08

## 2020-08-24 RX ADMIN — ASPIRIN 81 MG CHEWABLE TABLET 81 MG: 81 TABLET CHEWABLE at 09:03

## 2020-08-24 RX ADMIN — OXYCODONE HYDROCHLORIDE 20 MG: 10 TABLET ORAL at 21:06

## 2020-08-24 RX ADMIN — MAGNESIUM OXIDE 400 MG (241.3 MG MAGNESIUM) TABLET 800 MG: TABLET at 21:05

## 2020-08-24 RX ADMIN — MIDODRINE HYDROCHLORIDE 10 MG: 5 TABLET ORAL at 17:21

## 2020-08-24 RX ADMIN — OXYCODONE HYDROCHLORIDE 20 MG: 10 TABLET ORAL at 00:18

## 2020-08-24 RX ADMIN — MIDODRINE HYDROCHLORIDE 10 MG: 5 TABLET ORAL at 09:03

## 2020-08-24 RX ADMIN — APIXABAN 5 MG: 5 TABLET, FILM COATED ORAL at 21:06

## 2020-08-24 RX ADMIN — DULOXETINE HYDROCHLORIDE 60 MG: 30 CAPSULE, DELAYED RELEASE ORAL at 09:03

## 2020-08-24 RX ADMIN — FAMOTIDINE 20 MG: 20 TABLET ORAL at 21:05

## 2020-08-24 RX ADMIN — FAMOTIDINE 20 MG: 20 TABLET ORAL at 09:02

## 2020-08-24 RX ADMIN — MIDODRINE HYDROCHLORIDE 10 MG: 5 TABLET ORAL at 11:17

## 2020-08-24 RX ADMIN — MIRTAZAPINE 30 MG: 15 TABLET, FILM COATED ORAL at 21:06

## 2020-08-24 RX ADMIN — LEVETIRACETAM 750 MG: 500 TABLET ORAL at 09:04

## 2020-08-24 RX ADMIN — OXYCODONE HYDROCHLORIDE 20 MG: 10 TABLET ORAL at 18:11

## 2020-08-24 RX ADMIN — CLOPIDOGREL BISULFATE 75 MG: 75 TABLET ORAL at 09:03

## 2020-08-24 RX ADMIN — APIXABAN 5 MG: 5 TABLET, FILM COATED ORAL at 09:03

## 2020-08-24 RX ADMIN — LEVETIRACETAM 750 MG: 500 TABLET ORAL at 21:07

## 2020-08-24 RX ADMIN — OXYCODONE HYDROCHLORIDE 20 MG: 10 TABLET ORAL at 05:30

## 2020-08-24 RX ADMIN — ACETAMINOPHEN 650 MG: 325 TABLET ORAL at 08:58

## 2020-08-24 RX ADMIN — LEVOTHYROXINE SODIUM 150 MCG: 75 TABLET ORAL at 05:30

## 2020-08-24 ASSESSMENT — PAIN SCALES - GENERAL
PAINLEVEL_OUTOF10: 7
PAINLEVEL_OUTOF10: 8
PAINLEVEL_OUTOF10: 9
PAINLEVEL_OUTOF10: 8
PAINLEVEL_OUTOF10: 8
PAINLEVEL_OUTOF10: 9
PAINLEVEL_OUTOF10: 9
PAINLEVEL_OUTOF10: 0
PAINLEVEL_OUTOF10: 5
PAINLEVEL_OUTOF10: 8
PAINLEVEL_OUTOF10: 8
PAINLEVEL_OUTOF10: 0
PAINLEVEL_OUTOF10: 5
PAINLEVEL_OUTOF10: 5
PAINLEVEL_OUTOF10: 8
PAINLEVEL_OUTOF10: 8

## 2020-08-24 ASSESSMENT — PAIN DESCRIPTION - PROGRESSION
CLINICAL_PROGRESSION: NOT CHANGED
CLINICAL_PROGRESSION: NOT CHANGED

## 2020-08-24 ASSESSMENT — PAIN DESCRIPTION - DESCRIPTORS
DESCRIPTORS: ACHING;SHARP

## 2020-08-24 ASSESSMENT — PAIN DESCRIPTION - ORIENTATION
ORIENTATION: RIGHT;LEFT;MID
ORIENTATION: RIGHT;LEFT;LOWER
ORIENTATION: RIGHT;LEFT;LOWER

## 2020-08-24 ASSESSMENT — PAIN DESCRIPTION - LOCATION
LOCATION: BACK;HIP;LEG
LOCATION: BACK;HIP

## 2020-08-24 ASSESSMENT — PAIN DESCRIPTION - FREQUENCY
FREQUENCY: CONTINUOUS

## 2020-08-24 ASSESSMENT — PAIN DESCRIPTION - PAIN TYPE
TYPE: CHRONIC PAIN

## 2020-08-24 ASSESSMENT — PAIN DESCRIPTION - ONSET
ONSET: ON-GOING
ONSET: ON-GOING

## 2020-08-24 ASSESSMENT — PAIN - FUNCTIONAL ASSESSMENT: PAIN_FUNCTIONAL_ASSESSMENT: ACTIVITIES ARE NOT PREVENTED

## 2020-08-24 NOTE — PROGRESS NOTES
GENERAL SURGERY PROGRESS NOTE    CC/HPI:           Patient feels ok. Vitals:    08/23/20 2104 08/24/20 0326 08/24/20 0531 08/24/20 0858   BP: 130/63 (!) 96/52  (!) 127/58   Pulse: 56 54  58   Resp: 16 16  18   Temp: 98.4 °F (36.9 °C) 98.6 °F (37 °C)  96.8 °F (36 °C)   TempSrc: Oral Oral  Oral   SpO2: 98% 94%  95%   Weight:   143 lb (64.9 kg)    Height:         No intake/output data recorded.   I/O this shift:  In: 10 [I.V.:10]  Out: -     DIET GENERAL;    Recent Results (from the past 48 hour(s))   Troponin    Collection Time: 08/22/20  1:08 PM   Result Value Ref Range    Troponin T <0.010 <0.01 NG/ML   Comprehensive Metabolic Panel    Collection Time: 08/22/20  1:08 PM   Result Value Ref Range    Sodium 141 135 - 145 MMOL/L    Potassium 3.2 (L) 3.5 - 5.1 MMOL/L    Chloride 103 99 - 110 mMol/L    CO2 28 21 - 32 MMOL/L    BUN 10 6 - 23 MG/DL    CREATININE 0.6 0.6 - 1.1 MG/DL    Glucose 94 70 - 99 MG/DL    Calcium 8.4 8.3 - 10.6 MG/DL    Alb 3.2 (L) 3.4 - 5.0 GM/DL    Total Protein 5.5 (L) 6.4 - 8.2 GM/DL    Total Bilirubin 0.1 0.0 - 1.0 MG/DL    ALT 10 10 - 40 U/L    AST 16 15 - 37 IU/L    Alkaline Phosphatase 90 40 - 129 IU/L    GFR Non-African American >60 >60 mL/min/1.73m2    GFR African American >60 >60 mL/min/1.73m2    Anion Gap 10 4 - 16   CK    Collection Time: 08/22/20  1:08 PM   Result Value Ref Range    Total CK 46 26 - 140 IU/L   Brain Natriuretic Peptide    Collection Time: 08/22/20  1:08 PM   Result Value Ref Range    Pro-BNP 2,365 (H) <300 PG/ML   Culture, Wound    Collection Time: 08/22/20  3:08 PM    Specimen: Chest   Result Value Ref Range    Specimen CHEST     Special Requests NONE     Culture       Prelim Report  Anaerobic culture further report to follow      Culture (A)      STAPH AUREUS MRSA Light growth Sensitivity to follow CONTACT PRECAUTIONS INDICATED PBP2= POSITIVE    Culture (A)      KLEBSIELLA PNEUMONIAE Light growth Sensitivity to follow   Culture, MRSA, Screening Collection Time: 08/22/20  6:11 PM    Specimen: Nasal   Result Value Ref Range    Specimen NASAL     Special Requests NONE     Culture Prelim Report  Ruling out MRSA      CBC    Collection Time: 08/23/20  7:07 AM   Result Value Ref Range    WBC 3.7 (L) 4.0 - 10.5 K/CU MM    RBC 2.50 (L) 4.2 - 5.4 M/CU MM    Hemoglobin 8.7 (L) 12.5 - 16.0 GM/DL    Hematocrit 29.0 (L) 37 - 47 %    .0 (H) 78 - 100 FL    MCH 34.8 (H) 27 - 31 PG    MCHC 30.0 (L) 32.0 - 36.0 %    RDW 15.9 (H) 11.7 - 14.9 %    Platelets 263 144 - 153 K/CU MM    MPV 10.0 7.5 - 11.1 FL   Basic Metabolic Panel    Collection Time: 08/24/20  6:09 AM   Result Value Ref Range    Sodium 142 135 - 145 MMOL/L    Potassium 4.3 3.5 - 5.1 MMOL/L    Chloride 107 99 - 110 mMol/L    CO2 27 21 - 32 MMOL/L    Anion Gap 8 4 - 16    BUN 8 6 - 23 MG/DL    CREATININE 0.6 0.6 - 1.1 MG/DL    Glucose 79 70 - 99 MG/DL    Calcium 8.8 8.3 - 10.6 MG/DL    GFR Non-African American >60 >60 mL/min/1.73m2    GFR African American >60 >60 mL/min/1.73m2       Scheduled Meds:   amiodarone  200 mg Oral Daily    apixaban  5 mg Oral BID    aspirin  81 mg Oral Daily    atorvastatin  80 mg Oral Nightly    clopidogrel  75 mg Oral Daily    potassium chloride  20 mEq Oral Daily    mirtazapine  30 mg Oral Nightly    midodrine  10 mg Oral TID WC    magnesium oxide  800 mg Oral BID    levothyroxine  150 mcg Oral Daily    levETIRAcetam  750 mg Oral BID    Icosapent Ethyl  500 mg Oral BID    ferrous gluconate  324 mg Oral BID    famotidine  20 mg Oral BID    DULoxetine  60 mg Oral Daily    sodium chloride flush  10 mL Intravenous 2 times per day    cefepime  2 g Intravenous Q12H    vancomycin  15 mg/kg Intravenous Q12H       Continuous Infusions:    Physical Exam:  HEENT: Anicteric sclerae, Oropharyngeal mucosae moist, pink and intact. Heart:  Normal S1 and S2, RRR  Lungs: Clear to auscultation bilaterally, No audible Wheezes or Rales. Extremities: No edema.   Neuro: Alert and Oriented x 3, Non focal.  Abdomen: Soft, Benign, Non tender, Non distended, Positive bowel sounds. Incision: Nicely healing: No erythema, No discharge. Active Problems:    Pneumonia    Infected chest wall abrasion, right, subsequent encounter  Resolved Problems:    * No resolved hospital problems. *      Assessment and Plan:  Abel Dominguez is a 63 y.o. female on Eliquis and Amiodarone, with a chest wound granuloma. .  S/p 6/1/2020:  Placement of an 6.6-Bengali, low profile Mediport via right Interna jugular  vein approach, using the micropuncture kit first, Under ultrasound and Direct Fluoroscopy guidance.     The Mediport got infected and removed with now a granulation tissue healing. .    Needs ID to clear her for surgery, and then will place another Mediport in the LEFT side. Will follow.     ___________________________________________    Jocy Ospina MD, FACS, FICS  8/24/2020  12:34 PM

## 2020-08-24 NOTE — PROGRESS NOTES
[facility]  consulted by Dr. Sangita Chiang for monitoring and adjustment. Indication for treatment: hx of MRSA bacteremia 2/2 ICD infection, possible pneumonia  Goal trough: 15 mcg/mL     Pertinent Laboratory Values:   Temp Readings from Last 3 Encounters:   08/24/20 96.8 °F (36 °C) (Oral)   06/10/20 98 °F (36.7 °C) (Oral)   06/05/20 98.3 °F (36.8 °C)     Recent Labs     08/22/20  1215 08/23/20  0707   WBC 4.8 3.7*   LACTATE 1.1  --      Recent Labs     08/22/20  1308 08/24/20  0609   BUN 10 8   CREATININE 0.6 0.6     Estimated Creatinine Clearance: 96 mL/min (based on SCr of 0.6 mg/dL). Intake/Output Summary (Last 24 hours) at 8/24/2020 1437  Last data filed at 8/24/2020 0902  Gross per 24 hour   Intake 10 ml   Output --   Net 10 ml       Pertinent Cultures:  Date    Source    Results  8/22   Blood    NGTD  8/22   MRSA screen   Pending  8/22   Chest wound   MRSA, Klebsiella    Vancomycin level:   TROUGH:    Recent Labs     08/24/20  1330   VANCOTROUGH <4.0*     RANDOM:  No results for input(s): VANCORANDOM in the last 72 hours.     Assessment:  · WBC and temperature: WNL, afebrile  · SCr, BUN, and urine output: at baseline   · Day(s) of therapy: 3  · Vancomycin level:   · <4, falsely low, no vancomycin administered in last 36h    Plan:  · Vancomycin 1000 mg x 1 followed by 750 mg q12h based on previous dosing requirements   · Previously experienced accumulation on a regimen of 1000 mg q12h  · Level collected this afternoon was not at steady state  · No vancomycin doses were administered yesterday afternoon or this morning, it has been ~36h since last vancomycin dose and patient was not yet at steady state (has only received 2 vancomycin doses since admission) so level is falsely low  · Continue current dosing and repeat a true trough level  · Next level will be scheduled when patient has received at least 3 consecutive doses  · Pharmacy will continue to monitor patient and adjust therapy as indicated    Thank you for the consult,  Azul Morin, [address]  8/24/2020 2:37 PM

## 2020-08-24 NOTE — CONSULTS
[facility] Continence Nurse  Consult Note       Julieth Bishop  AGE: 63 y.o. GENDER: female  : 1960  TODAY'S DATE:  2020    Subjective:     Reason for  Evaluation and Assessment: wound assessment      Julieth Bishop is a 63 y.o. female referred by:   [x] Physician  [] Nursing  [] Other:     Wound Identification:  Wound Type: non-healing surgical  Contributing Factors: malnutrition        PAST MEDICAL HISTORY        Diagnosis Date    Acute delirium     Arrhythmia     Arthritis     Asplenia     Asthma     CAD (coronary artery disease)     1st stent at age 46    Cardiomyopathy [facility])     [facility]erebral [facility] occlusion with [facility]     CHF (congestive heart failure) [facility]     Enlarged liver     GERD (gastroesophageal reflux disease)     H/O echocardiogram 16    EF 55%, trivial TR & MR, stage 1 diastolic dysfunction    History of blood transfusion     Hx of cardiovascular stress test 2015    Alessia: EF 45%. Pharmacologic stress myocardial perfusion scan shows a fixed inferior-lateral defect w/ no inducible ischemia. No evidence of ischemia. Inferior-lateral infarction.  Normal LVSF    Hyperlipidemia     Hypertension     Lymphoma ([facility]     Denies    MI, old     Movement disorder     MS (multiple sclerosis) ([facility]     OP (osteoporosis)     PE (pulmonary embolism)     trapese filter    Pneumonia     Pyelonephritis     Seizures ([facility]     Spleen enlarged     Thyroid disease        PAST SURGICAL HISTORY    Past Surgical History:   Procedure Laterality Date    ABDOMEN SURGERY      APPENDECTOMY      CARDIAC DEFIBRILLATOR PLACEMENT      adelaida [id number] CONDITIONAL    CHOLECYSTECTOMY      COLONOSCOPY      CORONARY ANGIOPLASTY WITH STENT PLACEMENT      3 stents    CORONARY ARTERY BYPASS GRAFT      Age 49    ENDOSCOPY, COLON, DIAGNOSTIC      GASTRIC BYPASS SURGERY      HERNIA REPAIR      HIP SURGERY      HYSTERECTOMY      JOINT REPLACEMENT      PORT SURGERY N/A 6/1/2020    PORT INSERTION performed by Milan Oconnor MD at [address] [place] [address]      VASCULAR SURGERY         FAMILY HISTORY    Family History   Problem Relation Age of Onset    High Blood Pressure Mother     High Cholesterol Mother     Heart Disease Mother     Diabetes Mother     Heart Disease Father     Cancer Father     Other Sister         Multiple Sclerosis    Heart Disease Maternal Grandmother     High Blood Pressure Maternal Grandmother     High Cholesterol Maternal Grandmother        SOCIAL HISTORY    Social History     Tobacco Use    Smoking status: Never Smoker    Smokeless tobacco: Never Used   Substance Use Topics    Alcohol use: No    Drug use: No       ALLERGIES    Allergies   Allergen Reactions    Fentanyl Swelling     Other reaction(s): Angioedema (swelling)    Moxifloxacin Hcl In Nacl Swelling and Rash    Povidone-Iodine Rash     Other reaction(s): AOF      Cyclobenzaprine      Other reaction(s): Other - comment required  \" it make my muscle very weak because of my MS\"  \" it make my muscle very weak because of my MS\"      Methocarbamol      Worsening edema  Other reaction(s): Other - comment required  Worsening edema  Worsening edema  Worsening edema      Tramadol Other (See Comments)     Doctor doesn't her to take due to heart problems  Seizures   Doctor doesn't her to take due to lowers seizure threshold.  Baclofen     Ibuprofen      \"Due to heart problems\"    Naproxen     Nsaids      \"Due to heart problems\"    Tizanidine     Tolmetin      \"Due to heart problems\"    Tramadol Hcl      Other reaction(s):  Other - comment required  Told not to take due to history of seizures    Betadine [Povidone Iodine] Rash    Moxifloxacin Rash and Swelling     Lips swell and tingling in face   Lips swell and tingling in face   Lips swell and tingling in face   Lips swell and tingling in face   Around mouth MEDICATIONS    No current facility-administered medications on file prior to encounter. Current Outpatient Medications on File Prior to Encounter   Medication Sig Dispense Refill    potassium chloride (KLOR-CON M) 20 MEQ extended release tablet Take 1 tablet by mouth daily Take only if taking torsemide 60 tablet 3    midodrine (PROAMATINE) 10 MG tablet Take 1 tablet by mouth 3 times daily (with meals) 90 tablet 0    oxyCODONE (ROXICODONE) 20 MG immediate release tablet 20 mg every 3 hours as needed.       mirtazapine (REMERON) 30 MG tablet Take 30 mg by mouth nightly      levothyroxine (SYNTHROID) 112 MCG tablet Take 2 tablets by mouth Daily (Patient taking differently: Take 150 mcg by mouth Daily ) 30 tablet 1    benzocaine-menthol (CEPACOL SORE THROAT) 15-3.6 MG lozenge Take 1 lozenge by mouth every 2 hours as needed for Sore Throat 18 lozenge 1    cyanocobalamin 1000 MCG/ML injection INJECT 1 ML INTO THE MUSCLE ON THE FIRST OF EACH MONTH  11    torsemide (DEMADEX) 20 MG tablet Take 20 mg by mouth daily as needed (For weight gain or fluid retention) Indications: pt takes 2 tablets daily       Icosapent Ethyl (VASCEPA) 0.5 g CAPS Take 500 mg by mouth 2 times daily 180 capsule 2    amiodarone (CORDARONE) 200 MG tablet Take 200 mg by mouth daily      apixaban (ELIQUIS) 5 MG TABS tablet Take 5 mg by mouth 2 times daily      DULoxetine (CYMBALTA) 60 MG extended release capsule Take 1 capsule by mouth daily 30 capsule 3    ondansetron (ZOFRAN) 4 MG tablet Take 1 tablet by mouth every 6 hours as needed for Nausea or Vomiting 20 tablet 0    magnesium oxide (MAG-OX) 400 MG tablet Take 800 mg by mouth 2 times daily      docusate sodium (COLACE) 100 MG capsule Take 100 mg by mouth 2 times daily as needed for Constipation      famotidine (PEPCID) 20 MG tablet Take 1 tablet by mouth 2 times daily 60 tablet 3    levETIRAcetam (KEPPRA) 750 MG tablet Take 750 mg by mouth 2 times daily       atorvastatin (LIPITOR) 80 MG tablet Take 80 mg by mouth nightly      clopidogrel (PLAVIX) 75 MG tablet Take 75 mg by mouth daily      acetaminophen 650 MG TABS Take 650 mg by mouth every 4 hours as needed 120 tablet 3    ferrous gluconate (FERGON) 324 (38 FE) MG tablet Take 324 mg by mouth 2 times daily       aspirin 81 MG tablet Take 81 mg by mouth daily       ipratropium-albuterol (DUONEB) 0.5-2.5 (3) MG/3ML SOLN nebulizer solution Inhale 3 mLs into the lungs every 4 hours (while awake) 360 mL 0    nitroGLYCERIN (NITROSTAT) 0.4 MG SL tablet up to max of 3 total doses. If no relief after 1 dose, call 911. 25 tablet 3    naloxone 4 MG/0.1ML LIQD nasal spray 1 spray by Nasal route as needed for Opioid Reversal (Patient taking differently: 1 spray by Nasal route as needed for Opioid Reversal 06/08/2020 Patient states she has never used this) 1 each 5    albuterol (PROVENTIL HFA;VENTOLIN HFA) 108 (90 BASE) MCG/ACT inhaler Inhale 2 puffs into the lungs every 6 hours as needed.              Objective:      BP (!) 115/56   Pulse 52   Temp 98.2 °F (36.8 °C) (Oral)   Resp 16   Ht 5' 6\" (1.676 m)   Wt 143 lb (64.9 kg)   SpO2 95%   BMI 23.08 kg/m²   Donal Risk Score: Donal Scale Score: 21    LABS    CBC:   Lab Results   Component Value Date    WBC 3.7 08/23/2020    RBC 2.50 08/23/2020    HGB 8.7 08/23/2020    HCT 29.0 08/23/2020    .0 08/23/2020    MCH 34.8 08/23/2020    MCHC 30.0 08/23/2020    RDW 15.9 08/23/2020     08/23/2020    MPV 10.0 08/23/2020     CMP:    Lab Results   Component Value Date     08/24/2020    K 4.3 08/24/2020     08/24/2020    CO2 27 08/24/2020    BUN 8 08/24/2020    CREATININE 0.6 08/24/2020    GFRAA >60 08/24/2020    LABGLOM >60 08/24/2020    GLUCOSE 79 08/24/2020    PROT 5.5 08/22/2020    PROT 6.3 02/21/2013    LABALBU 3.2 08/22/2020    CALCIUM 8.8 08/24/2020    BILITOT 0.1 08/22/2020    ALKPHOS 90 08/22/2020    AST 16 08/22/2020    ALT 10 08/22/2020 Albumin:    Lab Results   Component Value Date    LABALBU 3.2 08/22/2020     PT/INR:    Lab Results   Component Value Date    PROTIME 11.4 12/29/2019    PROTIME 10.2 01/28/2012    INR 0.94 12/29/2019     HgBA1c:  No results found for: LABA1C      Assessment:     Patient Active Problem List   Diagnosis    Chest pain    Chest pain    CAD (coronary artery disease)    Hyperlipidemia    Thyroid disease    Acute exacerbation of CHF (congestive heart failure) (HCC)    CHF exacerbation (HCC)    Congestive heart failure (HCC)    Left upper quadrant pain    Pneumonia of left lower lobe due to infectious organism ([address]    Angina at rest [facility])    Coronary artery disease involving coronary bypass graft of native heart with angina pectoris ([facility])    Shortness of breath    Ischemic cardiomyopathy    Acute metabolic encephalopathy    Acute delirium    Pneumonia    Sacral pain    Acute on chronic systolic CHF (congestive heart failure), NYHA class 3 (HCC)    Severe malnutrition (HCC)    Cellulitis    Cellulitis at gastrostomy tube site ([address]    History of Maru-en-Y gastric bypass    Gastroparesis    Abdominal pain    Feeding tube dysfunction    Abdominal pain, epigastric    Gastrojejunostomy tube status (HCC)    Chronic malnutrition (HCC)    Asplenia    Shockable cardiac rhythm detected by automated external defibrillator    Financial difficulties    Chest pain at rest    Closed nondisplaced transverse fracture of left patella    Contusion of right knee    Multifocal pneumonia    Abnormal cardiac function test    Chronic systolic heart failure (HCC)    Seizure disorder (HCC)    Hypothyroidism    Cardiomyopathy ([address]    Acute chest pain    Fever in adult    Pyelonephritis    MRSA bacteremia    Heart failure ([address]    Infected chest wall abrasion, right, subsequent encounter    Granuloma of skin       Measurements:  Wound 11/15/19 Coccyx Mid;Upper (Active)   Number of days: 283

## 2020-08-24 NOTE — PROGRESS NOTES
Hospitalist Progress Note      Name:  Yakov Mccauley /Age/Sex: 1960  (63 y.o. female)   MRN & CSN:  [id number] & [id number] Admission Date/Time: 2020 11:40 AM   Location:  [address] [place] [address] PCP: Tommy Kinney MD         Hospital Day: 3    History of Present Illness:     Chief Complaint: Chest wall wound, concern for infection  Yakov Mccauley is a 63 y.o.  female  who presents with chest wall wound. The patient seen and examined at bed side. Patient denies having SOB, chest pain. Ten point ROS reviewed negative, unless as noted above    Objective: Intake/Output Summary (Last 24 hours) at 2020 1336  Last data filed at 2020 0902  Gross per 24 hour   Intake 10 ml   Output --   Net 10 ml      Vitals:   Vitals:    20 0858   BP: (!) 127/58   Pulse: 58   Resp: 18   Temp: 96.8 °F (36 °C)   SpO2: 95%     Physical Exam:   General Appearance: alert and oriented to person, place and time, in no acute distress  Cardiovascular: normal rate, regular rhythm, normal S1 and S2  Pulmonary/Chest: clear to auscultation bilaterally, Wound in the right upper chest with dressing.  Occasional bilateral crackles  Abdomen: soft, non-tender, non-distended, normal bowel sounds, no masses   Extremities: no cyanosis, clubbing or edema, pulse   Skin: warm and dry, no rash or erythema  Head: normocephalic and atraumatic  Eyes: pupils equal, round, and reactive to light  Neck: supple and non-tender without mass, no thyromegaly   Musculoskeletal: normal range of motion, no joint swelling, deformity or tenderness  Neurological: alert, oriented, normal speech, no focal findings or movement disorder noted    Medications:   Medications:    amiodarone  200 mg Oral Daily    apixaban  5 mg Oral BID    aspirin  81 mg Oral Daily    atorvastatin  80 mg Oral Nightly    clopidogrel  75 mg Oral Daily    potassium chloride  20 mEq Oral Daily    mirtazapine  30 mg Oral Nightly    midodrine  10 mg Oral TID   magnesium oxide  800 mg Oral BID    levothyroxine  150 mcg Oral Daily    levETIRAcetam  750 mg Oral BID    Icosapent Ethyl  500 mg Oral BID    ferrous gluconate  324 mg Oral BID    famotidine  20 mg Oral BID    DULoxetine  60 mg Oral Daily    sodium chloride flush  10 mL Intravenous 2 times per day    cefepime  2 g Intravenous Q12H    vancomycin  15 mg/kg Intravenous Q12H      Infusions:   PRN Meds: ipratropium-albuterol, 3 mL, Q4H PRN  benzocaine-menthol, 1 lozenge, Q2H PRN  torsemide, 20 mg, Daily PRN  oxyCODONE, 20 mg, Q3H PRN  nitroGLYCERIN, 0.4 mg, Q5 Min PRN  docusate sodium, 100 mg, BID PRN  sodium chloride flush, 10 mL, PRN  acetaminophen, 650 mg, Q6H PRN    Or  acetaminophen, 650 mg, Q6H PRN  polyethylene glycol, 17 g, Daily PRN  promethazine, 12.5 mg, Q6H PRN    Or  ondansetron, 4 mg, Q6H PRN            Pertinent New Labs & Imaging Studies     CBC with Differential:    Lab Results   Component Value Date    WBC 3.7 08/23/2020    RBC 2.50 08/23/2020    HGB 8.7 08/23/2020    HCT 29.0 08/23/2020     08/23/2020    .0 08/23/2020    MCH 34.8 08/23/2020    MCHC 30.0 08/23/2020    RDW 15.9 08/23/2020    SEGSPCT 59.6 08/22/2020    BANDSPCT 8 06/08/2020    LYMPHOPCT 20.7 08/22/2020    MONOPCT 14.0 08/22/2020    EOSPCT 0.0 01/28/2012    BASOPCT 1.7 08/22/2020    MONOSABS 0.7 08/22/2020    LYMPHSABS 1.0 08/22/2020    EOSABS 0.2 08/22/2020    BASOSABS 0.1 08/22/2020    DIFFTYPE AUTOMATED DIFFERENTIAL 08/22/2020     CMP:    Lab Results   Component Value Date     08/24/2020    K 4.3 08/24/2020     08/24/2020    CO2 27 08/24/2020    BUN 8 08/24/2020    CREATININE 0.6 08/24/2020    GFRAA >60 08/24/2020    LABGLOM >60 08/24/2020    GLUCOSE 79 08/24/2020    PROT 5.5 08/22/2020    PROT 6.3 02/21/2013    LABALBU 3.2 08/22/2020    CALCIUM 8.8 08/24/2020    BILITOT 0.1 08/22/2020    ALKPHOS 90 08/22/2020    AST 16 08/22/2020    ALT 10 08/22/2020     Xr Chest Portable    Result Date: 8/22/2020  EXAMINATION: ONE XRAY VIEW OF THE CHEST 8/22/2020 12:06 pm COMPARISON: June 8, 2020 HISTORY: ORDERING SYSTEM PROVIDED HISTORY: right chest wound TECHNOLOGIST PROVIDED HISTORY: Reason for exam:->right chest wound Reason for Exam: right chest wound Acuity: Acute Type of Exam: Initial FINDINGS: The cardiomediastinal silhouette is normal in size. There are stable post sternotomy changes present. Ill-defined opacity present involving the right upper lobe. No pleural effusion or pneumothorax. External artifacts are present. There is subtle, ill-defined asymmetric opacity projecting over the right upper lobe region. This could represent an evolving pneumonia. Radiographic follow-up recommended to assure resolution. Vl Dup Lower Extremity Venous Bilateral    Result Date: 8/22/2020  EXAMINATION: DUPLEX VENOUS ULTRASOUND OF THE BILATERAL LOWER EXTREMITIES, 8/22/2020 12:33 pm TECHNIQUE: Duplex ultrasound and Doppler images were obtained of the bilateral lower extremities COMPARISON: None. HISTORY: ORDERING SYSTEM PROVIDED HISTORY: pain and swelling TECHNOLOGIST PROVIDED HISTORY: Reason for exam:->pain and swelling Reason for Exam: bialteral leg swelling Acuity: Acute Type of Exam: Initial Additional signs and symptoms: leg pain Relevant Medical/Surgical History: nk Initial evaluation, pain and swelling FINDINGS: The visualized veins of the bilateral lower extremities are patent and free of echogenic thrombus. The veins are normally compressible and have normal phasic flow. No evidence of DVT in either lower extremity. Cta Pulmonary W Contrast    Result Date: 8/22/2020  EXAMINATION: CTA OF THE CHEST 8/22/2020 3:48 pm TECHNIQUE: CTA of the chest was performed after the administration of intravenous contrast.  Multiplanar reformatted images are provided for review. MIP images are provided for review.  Dose modulation, iterative reconstruction, and/or weight based adjustment of the mA/kV was utilized to reduce the radiation dose to as low as reasonably achievable. COMPARISON: 12/13/2019 HISTORY: ORDERING SYSTEM PROVIDED HISTORY: chest pain chest wound TECHNOLOGIST PROVIDED HISTORY: Reason for exam:->chest pain chest wound Reason for Exam: chest pain, chest wound Acuity: Unknown Type of Exam: Unknown Additional signs and symptoms: recent port and defibrillator removal FINDINGS: Metallic device external to the patient with extensive streak artifact limiting evaluation of the lung bases and upper abdomen. Pulmonary Arteries: Pulmonary arteries are adequately opacified for evaluation. No evidence of intraluminal filling defect to suggest pulmonary embolism. Main pulmonary artery is normal in caliber. Mediastinum: The heart is enlarged without pericardial effusion. Coronary atherosclerosis. Thoracic aorta is nondilated. No mediastinal adenopathy. Lungs/pleura: There is ground-glass opacity within the right upper lobe and right lower lobe, left upper lobe, and left lower lobe. Small bilateral pleural effusions. No pneumothorax. 6 mm noncalcified right lower lobe nodule. Upper Abdomen: Limited images of the upper abdomen are unremarkable. Soft Tissues/Bones: No acute bone or soft tissue abnormality. 1. No evidence of pulmonary embolism. 2. Bilateral pulmonary ground-glass opacities, suggestive of infection, possibly atypical. 3. Small bilateral pleural effusions. 4. Cardiomegaly. 5. 6 mm noncalcified right lower lobe nodule, unchanged. Consider 1 year follow up for further evaluation. Imaging features can be seen with viral pneumonia, though are nonspecific and can occur with a variety of infectious and noninfectious processes. PneInd RECOMMENDATIONS: Fleischner Society guidelines for follow-up and management of incidentally detected pulmonary nodules: Single Solid Nodule: Nodule size equals 6-8 mm In a low-risk patient, CT at 6-12 months, then consider CT at 18-24 months.  In a high-risk patient, CT at 6-12 months, then CT at 18-24 months. - Low risk patients include individuals with minimal or absent history of smoking and other known risk factors. - High risk patients include individuals with a history or smoking or known risk factors. Radiology 2017 http://pubs. rsna.org/doi/full/10.1148/radiol. 9007563959       Assessment and Plan:   Briseyda Garcia is a 63 y.o.  female  who presents with chest wound infection    Right chest wound infection S/P ICD infection  Pneumonia  Recently diagnosed with MRSA bacteremia. Patient was treated with vancomycin for 6 weeks and 2 weeks of gentamicin. CXR: There is subtle, ill-defined asymmetric opacity projecting over the right upper lobe region.  This could represent an evolving pneumonia  -Vanco and Cefepime IV empirically  -CTA pulmonary with bilateral ground glass opacities  -Wound culture with MRSA and Klebsiella  -General surgery recommendations appreciated, planning for port placement today  -ID consult placed     Chronic HFrEF, compensated  Chronic elevated BNP, improving.  Last echo with EF 30-35% (5/2020)  -Continue Demadex  -Daily weight and strict I&O     PE/VTE,hx  -Continue Eliquis and Amiodarone     Hypothyroidism  -Continue levothyroxine     -Seizure disorders  -Depression/anxiety  -CAD S/P CABG  -S/P gastric bypass  -Chronic pain syndrome  -Anemia of chronic disease    Diet DIET GENERAL;   DVT Prophylaxis [] Lovenox, []  Heparin, [] SCDs, [x] Eliquis   GI Prophylaxis [] PPI,  [x] H2 Blocker,  [] Carafate,  [] Diet/Tube Feeds   Code Status Full Code   Disposition Patient requires continued admission due to Pneumonia and wound   MDM [] Low, [x] Moderate,[]  High     Electronically signed by Luly Bennett MD on 8/24/2020 at 1:36 PM

## 2020-08-24 NOTE — CONSULTS
Infectious Disease Consult Note  2020   Patient Name: David Quiñones : 1960   Impression   Right Chest Past ICD/Mediport Pocket Wound Recurrent Infection:   · Afebrile, no leukocytosis  · Blood cultures -0/2-NGTD  · MRSA/MSSA screen pending  · -Chest Wound culture-MRSA, Klebsiella pneumoniae  · CTA Pulmonary W contrast: No evidence of PE, Bilateral pulmonary GGO, suggestive of infection, possibly atypical.  Small bilateral pleural effusions, cardiomegaly. 6 mm noncalcified RLL nodule, unchanged. · Dr. Ladi Chen, general surgery, onboard, rec local wound care at this time, appears granulated and not infected   Allergy to moxifloxicin:  Lips and facial swelling  · Recent Infected ICD/Mediport-Removal-[facility]   · CAD/HFrEF  · CVA  · HTN  · Lymphoma   Multi-morbidity: per PMHx:  Arthritis, asplenia, asthma, CAD with PCI, cardiomyopathy, CVA, CHF, GERD, HLD, HTN, Lymphoma, MS, Seizures, hyster, appey, choley, Gastric bypass  Plan:   Continue cefepime and vancomycin X 2 weeks   CRP x 3  · Follow with Dr. Lin Nixon, General surgery  · Decolonize MRSA with Bactroban and Hibiclens (orders placed  · Midline PICC (orders placed)    Thank you for allowing me to consult in the care of this patient.  ------------------------  REASON FOR CONSULT: Infective syndrome     Requested by: Dr. Ena Gonsalez is a 63 y.o.  female who was admitted 2020 for further evaluation and management of open chest wound, palpitations, and bilateral lower leg pain. She developed a temp of 100.5 max with chills and nausea. The onset of symptoms was 20. States she noticed her chronic right chest wound began to be malodorous and severely tender.      She was recently admitted to [facility] with an ICD/Mediport pocket dehiscence with cellulitis and wound infection, blood cultures showed NGTD, cultures from pocket drainage grew ESBL, kleb, MRSA and yeast.  S/p daptomycin, ertapenem and caspofungin x 7 days (end date was 8/14/20). States she never had a PICC line, she had multiple IV sites for therapy, which had IV infiltrations multiple times. Infectious diseases service was consulted to evaluate the pt, and recommend further investigative and therapeutic measures. ROS: Other systems reviewed Including eyes, ENT, respiratory, cardiovascular, GI, , dermatologic, neurologic, psych, hem/lymphatic, musculoskeletal and endocrine were negative other than what is mentioned above.      Patient Active Problem List    Diagnosis Date Noted    Acute delirium      Priority: High    Coronary artery disease involving coronary bypass graft of native heart with angina pectoris (HCC)      Priority: High    Shortness of breath      Priority: High    Ischemic cardiomyopathy      Priority: High    Angina at rest [facility])      Priority: High    Granuloma of skin     Infected chest wall abrasion, right, subsequent encounter 08/22/2020    Heart failure ([address] 06/08/2020    MRSA bacteremia     Pyelonephritis     Fever in adult 05/24/2020    Cardiomyopathy ([address]     Acute chest pain     Chronic systolic heart failure ([address] 12/29/2019    Seizure disorder ([address] 12/29/2019    Hypothyroidism 12/29/2019    Abnormal cardiac function test 12/17/2019    Multifocal pneumonia 11/14/2019    Closed nondisplaced transverse fracture of left patella 10/24/2019    Contusion of right knee 10/24/2019    Chest pain at rest 09/29/2019    Financial difficulties 09/27/2019    Shockable cardiac rhythm detected by automated external defibrillator 05/29/2019    Asplenia     Feeding tube dysfunction     Abdominal pain, epigastric     Gastrojejunostomy tube status ([address]     Chronic malnutrition ([address]     Abdominal pain 03/31/2019    History of Maru-en-Y gastric bypass     Gastroparesis     Cellulitis at gastrostomy tube site ([address] 03/19/2019    Cellulitis     Severe malnutrition ([address] 02/04/2019    Acute on ENDOSCOPY, COLON, DIAGNOSTIC      GASTRIC BYPASS SURGERY      HERNIA REPAIR      HIP SURGERY      HYSTERECTOMY      JOINT REPLACEMENT      PORT SURGERY N/A 6/1/2020    PORT INSERTION performed by Opal Louis MD at [address] [place] [address]      VASCULAR SURGERY        Family History   Problem Relation Age of Onset    High Blood Pressure Mother     High Cholesterol Mother     Heart Disease Mother     Diabetes Mother     Heart Disease Father     Cancer Father     Other Sister         Multiple Sclerosis    Heart Disease Maternal Grandmother     High Blood Pressure Maternal Grandmother     High Cholesterol Maternal Grandmother       Infectious disease related family history - not contibutory. SOCIAL HISTORY  Social History     Tobacco Use    Smoking status: Never Smoker    Smokeless tobacco: Never Used   Substance Use Topics    Alcohol use: No       Born:[place]   Lives:[place] with female partner, and daughter   Occupation:Disabled   No recent travel of significance.  No recent unusual exposures.  NO pets   ? ALLERGIES  Allergies   Allergen Reactions    Fentanyl Swelling     Other reaction(s): Angioedema (swelling)    Moxifloxacin Hcl In Nacl Swelling and Rash    Povidone-Iodine Rash     Other reaction(s): AOF      Cyclobenzaprine      Other reaction(s): Other - comment required  \" it make my muscle very weak because of my MS\"  \" it make my muscle very weak because of my MS\"      Methocarbamol      Worsening edema  Other reaction(s): Other - comment required  Worsening edema  Worsening edema  Worsening edema      Tramadol Other (See Comments)     Doctor doesn't her to take due to heart problems  Seizures   Doctor doesn't her to take due to lowers seizure threshold.     Baclofen     Ibuprofen      \"Due to heart problems\"    Naproxen     Nsaids      \"Due to heart problems\"    Tizanidine     Tolmetin      \"Due to heart problems\"    Tramadol atypical.    3. Small bilateral pleural effusions. 4. Cardiomegaly. 5. 6 mm noncalcified right lower lobe nodule, unchanged.  Consider 1 year    follow up for further evaluation. Imaging features can be seen with viral pneumonia, though are nonspecific and    can occur with a variety of infectious and noninfectious processes. PneInd         RECOMMENDATIONS:    Fleischner Society guidelines for follow-up and management of incidentally    detected pulmonary nodules:        ?? I have examined this patient and available medical records on this date and have made the above observations, conclusions and recommendations. Electronically signed by: Electronically signed by Mason Bhatia.  JESSE Poe CNP on 8/24/2020 at 4:20 PM

## 2020-08-24 NOTE — PROGRESS NOTES
Comprehensive Nutrition Assessment    Type and Reason for Visit:  Initial, Positive Nutrition Screen, Wound    Nutrition Recommendations/Plan:   Continue general diet at this time  Encourage intake, snacks as desires     Nutrition Assessment:  Admit with infected chest wall wound, ICD site. Currently on general diet, limited po data at this time. Per prior admits dislikes available nutrition supplements. Moderate nutrition risk at this time with increased needs. Malnutrition Assessment:  Malnutrition Status:  Insufficient data(not available on visit, noted hx malnutrition in chronic illness)    Context:  Chronic Illness       Estimated Daily Nutrient Needs:  Energy (kcal):  6571-7164 (25-30 juancho/kg); Weight Used for Energy Requirements:  Current     Protein (g):  65-78 (1-1.2 g/kg); Weight Used for Protein Requirements:  Current        Fluid (ml/day):  1900 (1ml/juancho); Weight Used for Fluid Requirements:  Current      Nutrition Related Findings:  not available, contact isolation, pain 8/10 at times hx chronic pain, gastric bypass, heart failure dislikes low sodium diet      Wounds:  Surgical Wound       Current Nutrition Therapies:    DIET GENERAL; Anthropometric Measures:  · Height: 5' 6\" (167.6 cm)  · Current Body Weight: 143 lb (64.9 kg)   · Admission Body Weight: 139 lb 15.9 oz (63.5 kg)    · Usual Body Weight: 121 lb 4.1 oz (55 kg)(per hx)     · Ideal Body Weight: 130 lbs; % Ideal Body Weight 110 %   · BMI: 23.1  · Adjusted Body Weight:  ; No Adjustment   · BMI Categories: Normal Weight (BMI 18.5-24. 9)       Nutrition Diagnosis:   · Predicted inadequate energy intake related to increase demand for energy/nutrients as evidenced by wounds      Nutrition Interventions:   Food and/or Nutrient Delivery:  Continue Current Diet, Snacks (Comment)  Nutrition Education/Counseling:  No recommendation at this time   Coordination of Nutrition Care:  Continued Inpatient Monitoring    Goals:  Patient will tolerate diet to consume at least 50-75% at meals and snacks       Nutrition Monitoring and Evaluation:   Food/Nutrient Intake Outcomes:  Diet Advancement/Tolerance, Food and Nutrient Intake  Physical Signs/Symptoms Outcomes:  Skin, Weight, Biochemical Data     Discharge Planning:    Continue current diet     Electronically signed by Lalitha Mercado RD, LD on 8/24/20 at 1:36 PM EDT    Contact: [phone]

## 2020-08-24 NOTE — PLAN OF CARE
Problem: Pain:  Goal: Pain level will decrease  Description: Pain level will decrease  8/24/2020 1658 by Josee Tinoco RN  Outcome: Ongoing  8/24/2020 0519 by Yeimy Castillo RN  Outcome: Ongoing  Goal: Control of acute pain  Description: Control of acute pain  8/24/2020 1658 by Josee Tinoco RN  Outcome: Ongoing  8/24/2020 0519 by Yeimy Castillo RN  Outcome: Ongoing  Goal: Control of chronic pain  Description: Control of chronic pain  8/24/2020 1658 by Josee Tinoco RN  Outcome: Ongoing  8/24/2020 0519 by Yeimy Castillo RN  Outcome: Ongoing     Problem: Falls - Risk of:  Goal: Will remain free from falls  Description: Will remain free from falls  8/24/2020 1658 by Josee Tinoco RN  Outcome: Ongoing  8/24/2020 0519 by Yeimy Castillo RN  Outcome: Ongoing  Goal: Absence of physical injury  Description: Absence of physical injury  8/24/2020 1658 by Josee Tinoco RN  Outcome: Ongoing  8/24/2020 0519 by Yeimy Castillo RN  Outcome: Ongoing

## 2020-08-25 LAB
ANION GAP SERPL CALCULATED.3IONS-SCNC: 6 MMOL/L (ref 4–16)
BUN BLDV-MCNC: 8 MG/DL (ref 6–23)
CALCIUM SERPL-MCNC: 8.2 MG/DL (ref 8.3–10.6)
CHLORIDE BLD-SCNC: 107 MMOL/L (ref 99–110)
CO2: 28 MMOL/L (ref 21–32)
CREAT SERPL-MCNC: 0.6 MG/DL (ref 0.6–1.1)
CULTURE: ABNORMAL
CULTURE: ABNORMAL
GFR AFRICAN AMERICAN: >60 ML/MIN/1.73M2
GFR NON-AFRICAN AMERICAN: >60 ML/MIN/1.73M2
GLUCOSE BLD-MCNC: 104 MG/DL (ref 70–99)
HIGH SENSITIVE C-REACTIVE PROTEIN: 10.4 MG/L
Lab: ABNORMAL
POTASSIUM SERPL-SCNC: 4 MMOL/L (ref 3.5–5.1)
SODIUM BLD-SCNC: 141 MMOL/L (ref 135–145)
SPECIMEN: ABNORMAL

## 2020-08-25 PROCEDURE — 6370000000 HC RX 637 (ALT 250 FOR IP): Performed by: NURSE PRACTITIONER

## 2020-08-25 PROCEDURE — 99221 1ST HOSP IP/OBS SF/LOW 40: CPT | Performed by: INTERNAL MEDICINE

## 2020-08-25 PROCEDURE — 2580000003 HC RX 258: Performed by: NURSE PRACTITIONER

## 2020-08-25 PROCEDURE — 36415 COLL VENOUS BLD VENIPUNCTURE: CPT

## 2020-08-25 PROCEDURE — 2580000003 HC RX 258: Performed by: FAMILY MEDICINE

## 2020-08-25 PROCEDURE — 99232 SBSQ HOSP IP/OBS MODERATE 35: CPT | Performed by: SURGERY

## 2020-08-25 PROCEDURE — 6360000002 HC RX W HCPCS: Performed by: NURSE PRACTITIONER

## 2020-08-25 PROCEDURE — 6360000002 HC RX W HCPCS: Performed by: FAMILY MEDICINE

## 2020-08-25 PROCEDURE — 86141 C-REACTIVE PROTEIN HS: CPT

## 2020-08-25 PROCEDURE — 1200000000 HC SEMI PRIVATE

## 2020-08-25 PROCEDURE — 80048 BASIC METABOLIC PNL TOTAL CA: CPT

## 2020-08-25 PROCEDURE — APPSS60 APP SPLIT SHARED TIME 46-60 MINUTES: Performed by: NURSE PRACTITIONER

## 2020-08-25 PROCEDURE — 94761 N-INVAS EAR/PLS OXIMETRY MLT: CPT

## 2020-08-25 RX ORDER — LIDOCAINE HYDROCHLORIDE 10 MG/ML
5 INJECTION, SOLUTION EPIDURAL; INFILTRATION; INTRACAUDAL; PERINEURAL ONCE
Status: COMPLETED | OUTPATIENT
Start: 2020-08-25 | End: 2020-08-26

## 2020-08-25 RX ORDER — SODIUM CHLORIDE 0.9 % (FLUSH) 0.9 %
10 SYRINGE (ML) INJECTION PRN
Status: DISCONTINUED | OUTPATIENT
Start: 2020-08-25 | End: 2020-08-27 | Stop reason: HOSPADM

## 2020-08-25 RX ORDER — SODIUM CHLORIDE 0.9 % (FLUSH) 0.9 %
10 SYRINGE (ML) INJECTION EVERY 12 HOURS SCHEDULED
Status: DISCONTINUED | OUTPATIENT
Start: 2020-08-25 | End: 2020-08-27 | Stop reason: HOSPADM

## 2020-08-25 RX ADMIN — ACETAMINOPHEN 650 MG: 325 TABLET ORAL at 10:00

## 2020-08-25 RX ADMIN — CEFEPIME 2 G: 2 INJECTION, POWDER, FOR SOLUTION INTRAVENOUS at 13:30

## 2020-08-25 RX ADMIN — AMIODARONE HYDROCHLORIDE 200 MG: 200 TABLET ORAL at 10:01

## 2020-08-25 RX ADMIN — LEVETIRACETAM 750 MG: 500 TABLET ORAL at 21:39

## 2020-08-25 RX ADMIN — MAGNESIUM OXIDE 400 MG (241.3 MG MAGNESIUM) TABLET 800 MG: TABLET at 10:00

## 2020-08-25 RX ADMIN — OXYCODONE HYDROCHLORIDE 20 MG: 10 TABLET ORAL at 10:00

## 2020-08-25 RX ADMIN — Medication: at 21:40

## 2020-08-25 RX ADMIN — LEVOTHYROXINE SODIUM 150 MCG: 75 TABLET ORAL at 06:37

## 2020-08-25 RX ADMIN — FAMOTIDINE 20 MG: 20 TABLET ORAL at 21:40

## 2020-08-25 RX ADMIN — ACETAMINOPHEN 650 MG: 325 TABLET ORAL at 21:42

## 2020-08-25 RX ADMIN — VANCOMYCIN HYDROCHLORIDE 750 MG: 5 INJECTION, POWDER, LYOPHILIZED, FOR SOLUTION INTRAVENOUS at 01:53

## 2020-08-25 RX ADMIN — APIXABAN 5 MG: 5 TABLET, FILM COATED ORAL at 10:01

## 2020-08-25 RX ADMIN — MIDODRINE HYDROCHLORIDE 10 MG: 5 TABLET ORAL at 10:01

## 2020-08-25 RX ADMIN — POTASSIUM CHLORIDE 20 MEQ: 1500 TABLET, EXTENDED RELEASE ORAL at 10:00

## 2020-08-25 RX ADMIN — CEFEPIME 2 G: 2 INJECTION, POWDER, FOR SOLUTION INTRAVENOUS at 01:19

## 2020-08-25 RX ADMIN — DULOXETINE HYDROCHLORIDE 60 MG: 30 CAPSULE, DELAYED RELEASE ORAL at 10:01

## 2020-08-25 RX ADMIN — ACETAMINOPHEN 650 MG: 325 TABLET ORAL at 13:30

## 2020-08-25 RX ADMIN — OXYCODONE HYDROCHLORIDE 20 MG: 10 TABLET ORAL at 17:07

## 2020-08-25 RX ADMIN — APIXABAN 5 MG: 5 TABLET, FILM COATED ORAL at 21:39

## 2020-08-25 RX ADMIN — OXYCODONE HYDROCHLORIDE 20 MG: 10 TABLET ORAL at 13:30

## 2020-08-25 RX ADMIN — LEVETIRACETAM 750 MG: 500 TABLET ORAL at 10:01

## 2020-08-25 RX ADMIN — ASPIRIN 81 MG CHEWABLE TABLET 81 MG: 81 TABLET CHEWABLE at 10:00

## 2020-08-25 RX ADMIN — OXYCODONE HYDROCHLORIDE 20 MG: 10 TABLET ORAL at 03:07

## 2020-08-25 RX ADMIN — CLOPIDOGREL BISULFATE 75 MG: 75 TABLET ORAL at 10:00

## 2020-08-25 RX ADMIN — OXYCODONE HYDROCHLORIDE 20 MG: 10 TABLET ORAL at 00:11

## 2020-08-25 RX ADMIN — OXYCODONE HYDROCHLORIDE 20 MG: 10 TABLET ORAL at 21:39

## 2020-08-25 RX ADMIN — ACETAMINOPHEN 650 MG: 325 TABLET ORAL at 03:07

## 2020-08-25 RX ADMIN — MIRTAZAPINE 30 MG: 15 TABLET, FILM COATED ORAL at 21:39

## 2020-08-25 RX ADMIN — ATORVASTATIN CALCIUM 80 MG: 40 TABLET, FILM COATED ORAL at 21:38

## 2020-08-25 RX ADMIN — OXYCODONE HYDROCHLORIDE 20 MG: 10 TABLET ORAL at 06:40

## 2020-08-25 RX ADMIN — MAGNESIUM OXIDE 400 MG (241.3 MG MAGNESIUM) TABLET 800 MG: TABLET at 21:39

## 2020-08-25 RX ADMIN — VANCOMYCIN HYDROCHLORIDE 750 MG: 5 INJECTION, POWDER, LYOPHILIZED, FOR SOLUTION INTRAVENOUS at 14:36

## 2020-08-25 RX ADMIN — SODIUM CHLORIDE, PRESERVATIVE FREE 10 ML: 5 INJECTION INTRAVENOUS at 10:04

## 2020-08-25 RX ADMIN — MIDODRINE HYDROCHLORIDE 10 MG: 5 TABLET ORAL at 13:33

## 2020-08-25 RX ADMIN — FAMOTIDINE 20 MG: 20 TABLET ORAL at 10:00

## 2020-08-25 RX ADMIN — FERROUS GLUCONATE TAB 324 MG (37.5 MG ELEMENTAL IRON) 324 MG: 324 (37.5 FE) TAB at 10:01

## 2020-08-25 RX ADMIN — MIDODRINE HYDROCHLORIDE 10 MG: 5 TABLET ORAL at 17:08

## 2020-08-25 RX ADMIN — SODIUM CHLORIDE, PRESERVATIVE FREE 10 ML: 5 INJECTION INTRAVENOUS at 21:40

## 2020-08-25 ASSESSMENT — PAIN DESCRIPTION - ORIENTATION
ORIENTATION: RIGHT;LEFT

## 2020-08-25 ASSESSMENT — PAIN DESCRIPTION - LOCATION
LOCATION: HIP
LOCATION: HIP
LOCATION: CHEST;HIP
LOCATION: HIP

## 2020-08-25 ASSESSMENT — PAIN DESCRIPTION - ONSET
ONSET: ON-GOING

## 2020-08-25 ASSESSMENT — PAIN SCALES - GENERAL
PAINLEVEL_OUTOF10: 9
PAINLEVEL_OUTOF10: 4
PAINLEVEL_OUTOF10: 8
PAINLEVEL_OUTOF10: 6
PAINLEVEL_OUTOF10: 7
PAINLEVEL_OUTOF10: 8
PAINLEVEL_OUTOF10: 8
PAINLEVEL_OUTOF10: 4
PAINLEVEL_OUTOF10: 8
PAINLEVEL_OUTOF10: 8
PAINLEVEL_OUTOF10: 4

## 2020-08-25 ASSESSMENT — PAIN DESCRIPTION - PAIN TYPE
TYPE: CHRONIC PAIN
TYPE: CHRONIC PAIN
TYPE: ACUTE PAIN;CHRONIC PAIN
TYPE: CHRONIC PAIN

## 2020-08-25 ASSESSMENT — PAIN DESCRIPTION - FREQUENCY
FREQUENCY: CONTINUOUS

## 2020-08-25 ASSESSMENT — PAIN DESCRIPTION - DESCRIPTORS
DESCRIPTORS: ACHING

## 2020-08-25 ASSESSMENT — PAIN DESCRIPTION - PROGRESSION
CLINICAL_PROGRESSION: NOT CHANGED

## 2020-08-25 NOTE — PROGRESS NOTES
GENERAL SURGERY PROGRESS NOTE    CC/HPI:           Patient feels ok - cultures back! Vitals:    08/24/20 1530 08/24/20 2100 08/25/20 0430 08/25/20 0848   BP: (!) 115/56 (!) 115/55 133/70    Pulse: 52 53 58    Resp: 16 16 16    Temp: 98.2 °F (36.8 °C) 98 °F (36.7 °C) 98.7 °F (37.1 °C)    TempSrc: Oral Oral Oral    SpO2: 95% 100% 100% 99%   Weight:       Height:         I/O last 3 completed shifts: In: 500 [P.O.:180; I.V.:20; IV Piggyback:300]  Out: -   No intake/output data recorded. DIET GENERAL;  Dietary Nutrition Supplements: Standard Oral Supplement    Recent Results (from the past 48 hour(s))   Basic Metabolic Panel    Collection Time: 08/24/20  6:09 AM   Result Value Ref Range    Sodium 142 135 - 145 MMOL/L    Potassium 4.3 3.5 - 5.1 MMOL/L    Chloride 107 99 - 110 mMol/L    CO2 27 21 - 32 MMOL/L    Anion Gap 8 4 - 16    BUN 8 6 - 23 MG/DL    CREATININE 0.6 0.6 - 1.1 MG/DL    Glucose 79 70 - 99 MG/DL    Calcium 8.8 8.3 - 10.6 MG/DL    GFR Non-African American >60 >60 mL/min/1.73m2    GFR African American >60 >60 mL/min/1.73m2   Vancomycin, trough    Collection Time: 08/24/20  1:30 PM   Result Value Ref Range    Vancomycin Tr <4.0 (L) 10 - 20 UG/ML    DOSE AMOUNT DOSE AMT.  GIVEN - 750 mg     DOSE TIME DOSE TIME GIVEN - 1331    Basic Metabolic Panel    Collection Time: 08/25/20  6:34 AM   Result Value Ref Range    Sodium 141 135 - 145 MMOL/L    Potassium 4.0 3.5 - 5.1 MMOL/L    Chloride 107 99 - 110 mMol/L    CO2 28 21 - 32 MMOL/L    Anion Gap 6 4 - 16    BUN 8 6 - 23 MG/DL    CREATININE 0.6 0.6 - 1.1 MG/DL    Glucose 104 (H) 70 - 99 MG/DL    Calcium 8.2 (L) 8.3 - 10.6 MG/DL    GFR Non-African American >60 >60 mL/min/1.73m2    GFR African American >60 >60 mL/min/1.73m2   C-Reactive Protein    Collection Time: 08/25/20  6:34 AM   Result Value Ref Range    CRP, High Sensitivity 10.4 mg/L       Scheduled Meds:   amiodarone  200 mg Oral Daily    apixaban  5 mg Oral BID    aspirin  81 mg Oral Daily    atorvastatin  80 mg Oral Nightly    clopidogrel  75 mg Oral Daily    potassium chloride  20 mEq Oral Daily    mirtazapine  30 mg Oral Nightly    midodrine  10 mg Oral TID WC    magnesium oxide  800 mg Oral BID    levothyroxine  150 mcg Oral Daily    levETIRAcetam  750 mg Oral BID    Icosapent Ethyl  500 mg Oral BID    ferrous gluconate  324 mg Oral BID    famotidine  20 mg Oral BID    DULoxetine  60 mg Oral Daily    sodium chloride flush  10 mL Intravenous 2 times per day    cefepime  2 g Intravenous Q12H    vancomycin  15 mg/kg Intravenous Q12H       Continuous Infusions:    Physical Exam:  HEENT: Anicteric sclerae, Oropharyngeal mucosae moist, pink and intact. Heart:  Normal S1 and S2, RRR  Lungs: Clear to auscultation bilaterally, No audible Wheezes or Rales. Extremities: No edema. Neuro: Alert and Oriented x 3, Non focal.  Abdomen: Soft, Benign, Non tender, Non distended, Positive bowel sounds. Incision: Nicely healing: No erythema, No discharge. Active Problems:    Pneumonia    Infected chest wall abrasion, right, subsequent encounter    Granuloma of skin    Open wound of right chest wall  Resolved Problems:    * No resolved hospital problems. *      Assessment and Plan:  David Quiñones is a 63 y.o. female on Eliquis and Amiodarone, with a chest wound granuloma. .  S/p 6/1/2020:  Placement of an 6.6-Bermudian, low profile Mediport via right Interna jugular  vein approach, using the micropuncture kit first, Under ultrasound and Direct Fluoroscopy guidance.     The Mediport got infected and removed with now a granulation tissue healing. .    Cultures from the wound POSITIVE for 2:  Culture  08/22/2020  3:08 PM  [facility]    Prelim Report   Anaerobic culture further report to follow   No anaerobes isolated so far, Further report to follow    Culture Abnormal    08/22/2020  3:08 PM  [address] [address] MRSA Light growth CONTACT PRECAUTIONS INDICATED PBP2= POSITIVE    Culture Abnormal    08/22/2020  3:08 PM  [address] [facility] ESBL Light growth Carbapenem antibiotics are the best option for infections caused by ESBL producing organisms.  Other antibiotic classes are likely to result in treatment failure, even for organisms demonstrating in vitro susceptibility. CONTACT PRECAUTIONS INDICATED    Testing Performed By     Lab - Abbreviation  Name  Director  Address  Valid Date Range    19- - Lori Osorio M.D., Ph.D.  [address] [facility] [address]  08/30/17 0817-Present    [address] [address]  Andrew Foreman MD  [address] [facility] [address] [facility] [address]  09/05/17 1510-Present    Narrative   Performed by: Basia Doyle DATE/TIME:  08/22/2020 1808    Susceptibility     Staph aureus mrsa (2)     Antibiotic  Interpretation  CHUCKIE  Status     erythromycin  Resistant  >=8  Preliminary     gentamicin  Sensitive  <=0.5  Preliminary     moxifloxacin  Resistant  >=8  Preliminary     oxacillin  Resistant  >=4  Preliminary     tetracycline  Sensitive  <=1  Preliminary     trimethoprim-sulfamethoxazole  Resistant  >=320  Preliminary     vancomycin  Sensitive  1  Preliminary     Klebsiella pneumoniae (esbl) (3)     Antibiotic  Interpretation  CHUCKIE  Status     ampicillin  Resistant  >=32  Preliminary     ceFAZolin  Resistant  >=64  Preliminary     ciprofloxacin  Sensitive  <=0.25  Preliminary     ertapenem  Sensitive  <=0.12  Preliminary     cefepime  Resistant  >=32  Preliminary     gentamicin  Sensitive  <=1  Preliminary     levofloxacin  Sensitive  <=0.12  Preliminary     piperacillin-tazobactam  Sensitive  <=4  Preliminary     trimethoprim-sulfamethoxazole  Resistant  >=320  Preliminary           Needs ID to clear her for surgery, discussed with him, and will treat x 10 days, and then will place another Mediport in the LEFT side. Will follow.     ___________________________________________    Ember Crane MD, FACS, FICS  8/25/2020  9:30 AM

## 2020-08-25 NOTE — PLAN OF CARE
Problem: Pain:  Goal: Pain level will decrease  Description: Pain level will decrease  8/25/2020 1010 by Michelle Prince RN  Outcome: Ongoing  8/25/2020 1010 by Michelle Prince RN  Outcome: Ongoing  8/25/2020 0339 by Noe Black RN  Outcome: Ongoing  Goal: Control of acute pain  Description: Control of acute pain  8/25/2020 1010 by Michelle Prince RN  Outcome: Ongoing  8/25/2020 1010 by Michelle Prince RN  Outcome: Ongoing  8/25/2020 0339 by Noe Black RN  Outcome: Ongoing  Goal: Control of chronic pain  Description: Control of chronic pain  8/25/2020 1010 by Michelle Prince RN  Outcome: Ongoing  8/25/2020 1010 by Michelle Prince RN  Outcome: Ongoing  8/25/2020 0339 by Noe Black RN  Outcome: Ongoing     Problem: Falls - Risk of:  Goal: Will remain free from falls  Description: Will remain free from falls  8/25/2020 1010 by Michelle Prince RN  Outcome: Ongoing  8/25/2020 1010 by Michelle Prince RN  Outcome: Ongoing  8/25/2020 0339 by Noe Black RN  Outcome: Ongoing  Goal: Absence of physical injury  Description: Absence of physical injury  8/25/2020 1010 by Michelle Prince RN  Outcome: Ongoing  8/25/2020 1010 by Michelle Prince RN  Outcome: Ongoing  8/25/2020 0339 by Noe Black RN  Outcome: Ongoing

## 2020-08-25 NOTE — PLAN OF CARE
Problem: Pain:  Goal: Pain level will decrease  Description: Pain level will decrease  8/25/2020 1010 by Anthony Cassidy RN  Outcome: Ongoing  8/25/2020 0339 by India Uribe RN  Outcome: Ongoing  Goal: Control of acute pain  Description: Control of acute pain  8/25/2020 1010 by Anthony Cassidy RN  Outcome: Ongoing  8/25/2020 0339 by India Uribe RN  Outcome: Ongoing  Goal: Control of chronic pain  Description: Control of chronic pain  8/25/2020 1010 by Anthony Cassidy RN  Outcome: Ongoing  8/25/2020 0339 by India Uribe RN  Outcome: Ongoing     Problem: Falls - Risk of:  Goal: Will remain free from falls  Description: Will remain free from falls  8/25/2020 1010 by Anthony Cassidy RN  Outcome: Ongoing  8/25/2020 0339 by India Uribe RN  Outcome: Ongoing  Goal: Absence of physical injury  Description: Absence of physical injury  8/25/2020 1010 by Anthony Cassidy RN  Outcome: Ongoing  8/25/2020 0339 by India Uribe RN  Outcome: Ongoing

## 2020-08-25 NOTE — PROGRESS NOTES
Hospitalist Progress Note      Name:  Delores Sharif /Age/Sex: 1960  (63 y.o. female)   MRN & CSN:  [id number] & [id number] Admission Date/Time: 2020 11:40 AM   Location:  [facility] PCP: Marv Fisher MD         Hospital Day: 4    History of Present Illness:     Chief Complaint: Chest wall wound, concern for infection  Delores Sharif is a 63 y.o.  female  who presents with chest wall wound. The patient seen and examined at bed side. Patient denies having SOB, chest pain. Ten point ROS reviewed negative, unless as noted above    Objective: Intake/Output Summary (Last 24 hours) at 2020 1140  Last data filed at 2020 0657  Gross per 24 hour   Intake 490 ml   Output --   Net 490 ml      Vitals:   Vitals:    20 0945   BP: (!) 110/55   Pulse: 56   Resp: 20   Temp: 98.3 °F (36.8 °C)   SpO2: 100%     Physical Exam:   General Appearance: alert and oriented to person, place and time, in no acute distress  Cardiovascular: normal rate, regular rhythm, normal S1 and S2  Pulmonary/Chest: clear to auscultation bilaterally, Wound in the right upper chest with dressing.  Occasional bilateral crackles  Abdomen: soft, non-tender, non-distended, normal bowel sounds, no masses   Extremities: no cyanosis, clubbing or edema, pulse   Skin: warm and dry, no rash or erythema  Head: normocephalic and atraumatic  Eyes: pupils equal, round, and reactive to light  Neck: supple and non-tender without mass, no thyromegaly   Musculoskeletal: normal range of motion, no joint swelling, deformity or tenderness  Neurological: alert, oriented, normal speech, no focal findings or movement disorder noted    Medications:   Medications:    amiodarone  200 mg Oral Daily    apixaban  5 mg Oral BID    aspirin  81 mg Oral Daily    atorvastatin  80 mg Oral Nightly    clopidogrel  75 mg Oral Daily    potassium chloride  20 mEq Oral Daily    mirtazapine  30 mg Oral Nightly    midodrine  10 mg Oral TID WC    magnesium oxide  800 mg Oral BID    levothyroxine  150 mcg Oral Daily    levETIRAcetam  750 mg Oral BID    Icosapent Ethyl  500 mg Oral BID    ferrous gluconate  324 mg Oral BID    famotidine  20 mg Oral BID    DULoxetine  60 mg Oral Daily    sodium chloride flush  10 mL Intravenous 2 times per day    cefepime  2 g Intravenous Q12H    vancomycin  15 mg/kg Intravenous Q12H      Infusions:   PRN Meds: ipratropium-albuterol, 3 mL, Q4H PRN  benzocaine-menthol, 1 lozenge, Q2H PRN  torsemide, 20 mg, Daily PRN  oxyCODONE, 20 mg, Q3H PRN  nitroGLYCERIN, 0.4 mg, Q5 Min PRN  docusate sodium, 100 mg, BID PRN  sodium chloride flush, 10 mL, PRN  acetaminophen, 650 mg, Q6H PRN    Or  acetaminophen, 650 mg, Q6H PRN  polyethylene glycol, 17 g, Daily PRN  promethazine, 12.5 mg, Q6H PRN    Or  ondansetron, 4 mg, Q6H PRN            Pertinent New Labs & Imaging Studies     CBC with Differential:    Lab Results   Component Value Date    WBC 3.7 08/23/2020    RBC 2.50 08/23/2020    HGB 8.7 08/23/2020    HCT 29.0 08/23/2020     08/23/2020    .0 08/23/2020    MCH 34.8 08/23/2020    MCHC 30.0 08/23/2020    RDW 15.9 08/23/2020    SEGSPCT 59.6 08/22/2020    BANDSPCT 8 06/08/2020    LYMPHOPCT 20.7 08/22/2020    MONOPCT 14.0 08/22/2020    EOSPCT 0.0 01/28/2012    BASOPCT 1.7 08/22/2020    MONOSABS 0.7 08/22/2020    LYMPHSABS 1.0 08/22/2020    EOSABS 0.2 08/22/2020    BASOSABS 0.1 08/22/2020    DIFFTYPE AUTOMATED DIFFERENTIAL 08/22/2020     CMP:    Lab Results   Component Value Date     08/25/2020    K 4.0 08/25/2020     08/25/2020    CO2 28 08/25/2020    BUN 8 08/25/2020    CREATININE 0.6 08/25/2020    GFRAA >60 08/25/2020    LABGLOM >60 08/25/2020    GLUCOSE 104 08/25/2020    PROT 5.5 08/22/2020    PROT 6.3 02/21/2013    LABALBU 3.2 08/22/2020    CALCIUM 8.2 08/25/2020    BILITOT 0.1 08/22/2020    ALKPHOS 90 08/22/2020    AST 16 08/22/2020    ALT 10 08/22/2020     Xr Chest Portable    Result Date: 8/22/2020  EXAMINATION: ONE XRAY VIEW OF THE CHEST 8/22/2020 12:06 pm COMPARISON: June 8, 2020 HISTORY: ORDERING SYSTEM PROVIDED HISTORY: right chest wound TECHNOLOGIST PROVIDED HISTORY: Reason for exam:->right chest wound Reason for Exam: right chest wound Acuity: Acute Type of Exam: Initial FINDINGS: The cardiomediastinal silhouette is normal in size. There are stable post sternotomy changes present. Ill-defined opacity present involving the right upper lobe. No pleural effusion or pneumothorax. External artifacts are present. There is subtle, ill-defined asymmetric opacity projecting over the right upper lobe region. This could represent an evolving pneumonia. Radiographic follow-up recommended to assure resolution. Vl Dup Lower Extremity Venous Bilateral    Result Date: 8/22/2020  EXAMINATION: DUPLEX VENOUS ULTRASOUND OF THE BILATERAL LOWER EXTREMITIES, 8/22/2020 12:33 pm TECHNIQUE: Duplex ultrasound and Doppler images were obtained of the bilateral lower extremities COMPARISON: None. HISTORY: ORDERING SYSTEM PROVIDED HISTORY: pain and swelling TECHNOLOGIST PROVIDED HISTORY: Reason for exam:->pain and swelling Reason for Exam: bialteral leg swelling Acuity: Acute Type of Exam: Initial Additional signs and symptoms: leg pain Relevant Medical/Surgical History: nk Initial evaluation, pain and swelling FINDINGS: The visualized veins of the bilateral lower extremities are patent and free of echogenic thrombus. The veins are normally compressible and have normal phasic flow. No evidence of DVT in either lower extremity. Cta Pulmonary W Contrast    Result Date: 8/22/2020  EXAMINATION: CTA OF THE CHEST 8/22/2020 3:48 pm TECHNIQUE: CTA of the chest was performed after the administration of intravenous contrast.  Multiplanar reformatted images are provided for review. MIP images are provided for review.  Dose modulation, iterative reconstruction, and/or weight based adjustment of the mA/kV was utilized 6-12 months, then CT at 18-24 months. - Low risk patients include individuals with minimal or absent history of smoking and other known risk factors. - High risk patients include individuals with a history or smoking or known risk factors. Radiology 2017 http://pubs. rsna.org/doi/full/10.1148/radiol. 4960726778       Assessment and Plan:   Iesha Herrera is a 63 y.o.  female  who presents with chest wound infection    Right chest wound infection S/P ICD infection  Pneumonia  Recently diagnosed with MRSA bacteremia. Patient was treated with vancomycin for 6 weeks and 2 weeks of gentamicin. CXR: There is subtle, ill-defined asymmetric opacity projecting over the right upper lobe region.  This could represent an evolving pneumonia  -Vanco and Cefepime IV empirically  -CTA pulmonary with bilateral ground glass opacities  -Wound culture with MRSA and Klebsiella  -General surgery recommendations appreciated, planning for port placement after completion of antibiotics for 10 days as ID suggested per his notes  -ID consult placed     Chronic HFrEF, compensated  Chronic elevated BNP, improving.  Last echo with EF 30-35% (5/2020)  -Continue Demadex  -Daily weight and strict I&O     PE/VTE,hx  -Continue Eliquis and Amiodarone     Hypothyroidism  -Continue levothyroxine     -Seizure disorders  -Depression/anxiety  -CAD S/P CABG  -S/P gastric bypass  -Chronic pain syndrome  -Anemia of chronic disease    Diet DIET GENERAL;  Dietary Nutrition Supplements: Standard Oral Supplement   DVT Prophylaxis [] Lovenox, []  Heparin, [] SCDs, [x] Eliquis   GI Prophylaxis [] PPI,  [x] H2 Blocker,  [] Carafate,  [] Diet/Tube Feeds   Code Status Full Code   Disposition Patient requires continued admission due to Pneumonia and wound   MDM [] Low, [x] Moderate,[]  High     Electronically signed by Eric Ruiz MD on 8/25/2020 at 11:40 AM

## 2020-08-25 NOTE — PLAN OF CARE
Problem: Pain:  Goal: Pain level will decrease  Description: Pain level will decrease  8/25/2020 0339 by Yefri Arambula RN  Outcome: Ongoing  8/24/2020 1658 by Nicole Zaidi RN  Outcome: Ongoing  Goal: Control of acute pain  Description: Control of acute pain  8/25/2020 0339 by Yefri Arambula RN  Outcome: Ongoing  8/24/2020 1658 by Nicole Zaidi RN  Outcome: Ongoing  Goal: Control of chronic pain  Description: Control of chronic pain  8/25/2020 0339 by Yefri Arambula RN  Outcome: Ongoing  8/24/2020 1658 by Nicole Zaidi RN  Outcome: Ongoing     Problem: Falls - Risk of:  Goal: Will remain free from falls  Description: Will remain free from falls  8/25/2020 0339 by Yefri Arambula RN  Outcome: Ongoing  8/24/2020 1658 by Nicole Zaidi RN  Outcome: Ongoing  Goal: Absence of physical injury  Description: Absence of physical injury  8/25/2020 0339 by Yefri Arambula RN  Outcome: Ongoing  8/24/2020 1658 by Nicole Zaidi RN  Outcome: Ongoing

## 2020-08-25 NOTE — CARE COORDINATION
JUSTICEW reviewed the chart/call to Pt room to initiate discharge planning. JUSTICEW introduced self and role of LSW. Pt is from home in a trailer with S/O and adult dgt. Pt has access to transportation. Pt has \"all the DME that she needs\". Pt is active with [facility] prior to admission. Pt stated her discharge plan is to return home with [address].  Electronically signed by ARIANA Mccoy on 8/25/2020 at 3:48 PM

## 2020-08-26 LAB
ANION GAP SERPL CALCULATED.3IONS-SCNC: 7 MMOL/L (ref 4–16)
BUN BLDV-MCNC: 8 MG/DL (ref 6–23)
CALCIUM SERPL-MCNC: 8.3 MG/DL (ref 8.3–10.6)
CHLORIDE BLD-SCNC: 107 MMOL/L (ref 99–110)
CO2: 29 MMOL/L (ref 21–32)
CREAT SERPL-MCNC: 0.6 MG/DL (ref 0.6–1.1)
GFR AFRICAN AMERICAN: >60 ML/MIN/1.73M2
GFR NON-AFRICAN AMERICAN: >60 ML/MIN/1.73M2
GLUCOSE BLD-MCNC: 92 MG/DL (ref 70–99)
HIGH SENSITIVE C-REACTIVE PROTEIN: 6 MG/L
POTASSIUM SERPL-SCNC: 4.1 MMOL/L (ref 3.5–5.1)
SODIUM BLD-SCNC: 143 MMOL/L (ref 135–145)

## 2020-08-26 PROCEDURE — 2500000003 HC RX 250 WO HCPCS: Performed by: NURSE PRACTITIONER

## 2020-08-26 PROCEDURE — 6360000002 HC RX W HCPCS: Performed by: NURSE PRACTITIONER

## 2020-08-26 PROCEDURE — 6370000000 HC RX 637 (ALT 250 FOR IP): Performed by: NURSE PRACTITIONER

## 2020-08-26 PROCEDURE — 80048 BASIC METABOLIC PNL TOTAL CA: CPT

## 2020-08-26 PROCEDURE — 99232 SBSQ HOSP IP/OBS MODERATE 35: CPT | Performed by: NURSE PRACTITIONER

## 2020-08-26 PROCEDURE — 02HV33Z INSERTION OF INFUSION DEVICE INTO SUPERIOR VENA CAVA, PERCUTANEOUS APPROACH: ICD-10-PCS | Performed by: INTERNAL MEDICINE

## 2020-08-26 PROCEDURE — 36415 COLL VENOUS BLD VENIPUNCTURE: CPT

## 2020-08-26 PROCEDURE — 2580000003 HC RX 258: Performed by: FAMILY MEDICINE

## 2020-08-26 PROCEDURE — 76937 US GUIDE VASCULAR ACCESS: CPT

## 2020-08-26 PROCEDURE — 94761 N-INVAS EAR/PLS OXIMETRY MLT: CPT

## 2020-08-26 PROCEDURE — C1751 CATH, INF, PER/CENT/MIDLINE: HCPCS

## 2020-08-26 PROCEDURE — 36410 VNPNXR 3YR/> PHY/QHP DX/THER: CPT

## 2020-08-26 PROCEDURE — 6360000002 HC RX W HCPCS: Performed by: FAMILY MEDICINE

## 2020-08-26 PROCEDURE — 99231 SBSQ HOSP IP/OBS SF/LOW 25: CPT | Performed by: SURGERY

## 2020-08-26 PROCEDURE — 2580000003 HC RX 258: Performed by: NURSE PRACTITIONER

## 2020-08-26 PROCEDURE — 1200000000 HC SEMI PRIVATE

## 2020-08-26 PROCEDURE — 86141 C-REACTIVE PROTEIN HS: CPT

## 2020-08-26 RX ORDER — MEROPENEM 1 G/1
1 INJECTION, POWDER, FOR SOLUTION INTRAVENOUS EVERY 8 HOURS
Qty: 42 G | Refills: 0 | Status: SHIPPED | OUTPATIENT
Start: 2020-08-26 | End: 2020-08-27 | Stop reason: SDUPTHER

## 2020-08-26 RX ADMIN — POTASSIUM CHLORIDE 20 MEQ: 1500 TABLET, EXTENDED RELEASE ORAL at 08:43

## 2020-08-26 RX ADMIN — LIDOCAINE HYDROCHLORIDE 5 ML: 10 INJECTION, SOLUTION EPIDURAL; INFILTRATION; INTRACAUDAL; PERINEURAL at 12:49

## 2020-08-26 RX ADMIN — FERROUS GLUCONATE TAB 324 MG (37.5 MG ELEMENTAL IRON) 324 MG: 324 (37.5 FE) TAB at 08:42

## 2020-08-26 RX ADMIN — CEFEPIME 2 G: 2 INJECTION, POWDER, FOR SOLUTION INTRAVENOUS at 01:38

## 2020-08-26 RX ADMIN — OXYCODONE HYDROCHLORIDE 20 MG: 10 TABLET ORAL at 04:36

## 2020-08-26 RX ADMIN — APIXABAN 5 MG: 5 TABLET, FILM COATED ORAL at 21:36

## 2020-08-26 RX ADMIN — MIRTAZAPINE 30 MG: 15 TABLET, FILM COATED ORAL at 21:36

## 2020-08-26 RX ADMIN — SODIUM CHLORIDE, PRESERVATIVE FREE 10 ML: 5 INJECTION INTRAVENOUS at 01:38

## 2020-08-26 RX ADMIN — OXYCODONE HYDROCHLORIDE 20 MG: 10 TABLET ORAL at 12:03

## 2020-08-26 RX ADMIN — LEVOTHYROXINE SODIUM 150 MCG: 75 TABLET ORAL at 06:02

## 2020-08-26 RX ADMIN — OXYCODONE HYDROCHLORIDE 20 MG: 10 TABLET ORAL at 21:38

## 2020-08-26 RX ADMIN — ACETAMINOPHEN 650 MG: 325 TABLET ORAL at 18:11

## 2020-08-26 RX ADMIN — FAMOTIDINE 20 MG: 20 TABLET ORAL at 08:43

## 2020-08-26 RX ADMIN — ATORVASTATIN CALCIUM 80 MG: 40 TABLET, FILM COATED ORAL at 21:36

## 2020-08-26 RX ADMIN — AMIODARONE HYDROCHLORIDE 200 MG: 200 TABLET ORAL at 08:43

## 2020-08-26 RX ADMIN — OXYCODONE HYDROCHLORIDE 20 MG: 10 TABLET ORAL at 18:11

## 2020-08-26 RX ADMIN — FERROUS GLUCONATE TAB 324 MG (37.5 MG ELEMENTAL IRON) 324 MG: 324 (37.5 FE) TAB at 21:38

## 2020-08-26 RX ADMIN — SODIUM CHLORIDE, PRESERVATIVE FREE 10 ML: 5 INJECTION INTRAVENOUS at 21:39

## 2020-08-26 RX ADMIN — MIDODRINE HYDROCHLORIDE 10 MG: 5 TABLET ORAL at 12:03

## 2020-08-26 RX ADMIN — SODIUM CHLORIDE, PRESERVATIVE FREE 10 ML: 5 INJECTION INTRAVENOUS at 08:44

## 2020-08-26 RX ADMIN — VANCOMYCIN HYDROCHLORIDE 750 MG: 5 INJECTION, POWDER, LYOPHILIZED, FOR SOLUTION INTRAVENOUS at 15:04

## 2020-08-26 RX ADMIN — Medication: at 08:43

## 2020-08-26 RX ADMIN — ASPIRIN 81 MG CHEWABLE TABLET 81 MG: 81 TABLET CHEWABLE at 08:43

## 2020-08-26 RX ADMIN — MEROPENEM 1 G: 1 INJECTION, POWDER, FOR SOLUTION INTRAVENOUS at 13:58

## 2020-08-26 RX ADMIN — FAMOTIDINE 20 MG: 20 TABLET ORAL at 21:36

## 2020-08-26 RX ADMIN — OXYCODONE HYDROCHLORIDE 20 MG: 10 TABLET ORAL at 01:37

## 2020-08-26 RX ADMIN — APIXABAN 5 MG: 5 TABLET, FILM COATED ORAL at 08:43

## 2020-08-26 RX ADMIN — LEVETIRACETAM 750 MG: 500 TABLET ORAL at 21:37

## 2020-08-26 RX ADMIN — MEROPENEM 1 G: 1 INJECTION, POWDER, FOR SOLUTION INTRAVENOUS at 21:37

## 2020-08-26 RX ADMIN — MAGNESIUM OXIDE 400 MG (241.3 MG MAGNESIUM) TABLET 800 MG: TABLET at 21:39

## 2020-08-26 RX ADMIN — Medication: at 21:36

## 2020-08-26 RX ADMIN — VANCOMYCIN HYDROCHLORIDE 750 MG: 5 INJECTION, POWDER, LYOPHILIZED, FOR SOLUTION INTRAVENOUS at 02:53

## 2020-08-26 RX ADMIN — MIDODRINE HYDROCHLORIDE 10 MG: 5 TABLET ORAL at 08:43

## 2020-08-26 RX ADMIN — OXYCODONE HYDROCHLORIDE 20 MG: 10 TABLET ORAL at 15:03

## 2020-08-26 RX ADMIN — CLOPIDOGREL BISULFATE 75 MG: 75 TABLET ORAL at 08:43

## 2020-08-26 RX ADMIN — ACETAMINOPHEN 650 MG: 325 TABLET ORAL at 12:02

## 2020-08-26 RX ADMIN — OXYCODONE HYDROCHLORIDE 20 MG: 10 TABLET ORAL at 08:42

## 2020-08-26 RX ADMIN — LEVETIRACETAM 750 MG: 500 TABLET ORAL at 08:42

## 2020-08-26 RX ADMIN — MAGNESIUM OXIDE 400 MG (241.3 MG MAGNESIUM) TABLET 800 MG: TABLET at 08:42

## 2020-08-26 RX ADMIN — DULOXETINE HYDROCHLORIDE 60 MG: 30 CAPSULE, DELAYED RELEASE ORAL at 08:43

## 2020-08-26 RX ADMIN — ACETAMINOPHEN 650 MG: 325 TABLET ORAL at 04:36

## 2020-08-26 RX ADMIN — MIDODRINE HYDROCHLORIDE 10 MG: 5 TABLET ORAL at 16:53

## 2020-08-26 ASSESSMENT — PAIN DESCRIPTION - ORIENTATION: ORIENTATION: RIGHT;LEFT

## 2020-08-26 ASSESSMENT — PAIN SCALES - GENERAL
PAINLEVEL_OUTOF10: 8
PAINLEVEL_OUTOF10: 8
PAINLEVEL_OUTOF10: 9
PAINLEVEL_OUTOF10: 9
PAINLEVEL_OUTOF10: 8
PAINLEVEL_OUTOF10: 9
PAINLEVEL_OUTOF10: 9
PAINLEVEL_OUTOF10: 8

## 2020-08-26 ASSESSMENT — PAIN DESCRIPTION - PAIN TYPE: TYPE: CHRONIC PAIN

## 2020-08-26 ASSESSMENT — PAIN DESCRIPTION - DESCRIPTORS: DESCRIPTORS: ACHING

## 2020-08-26 ASSESSMENT — PAIN DESCRIPTION - PROGRESSION: CLINICAL_PROGRESSION: NOT CHANGED

## 2020-08-26 ASSESSMENT — PAIN DESCRIPTION - ONSET: ONSET: ON-GOING

## 2020-08-26 ASSESSMENT — PAIN DESCRIPTION - LOCATION: LOCATION: HIP

## 2020-08-26 ASSESSMENT — PAIN - FUNCTIONAL ASSESSMENT: PAIN_FUNCTIONAL_ASSESSMENT: ACTIVITIES ARE NOT PREVENTED

## 2020-08-26 ASSESSMENT — PAIN DESCRIPTION - FREQUENCY: FREQUENCY: CONTINUOUS

## 2020-08-26 NOTE — PROGRESS NOTES
[address] [facility]  consulted by Dr. Yuliet Newton for monitoring and adjustment. Indication for treatment: Mediport pocket wound recurrent infection  Goal trough: 15 mcg/mL (MRSA)     Pertinent Laboratory Values:   Temp Readings from Last 3 Encounters:   08/26/20 98.7 °F (37.1 °C) (Oral)   06/10/20 98 °F (36.7 °C) (Oral)   06/05/20 98.3 °F (36.8 °C)     No results for input(s): WBC, LACTATE in the last 72 hours. Recent Labs     08/24/20  0609 08/25/20  0634 08/26/20  0602   BUN 8 8 8   CREATININE 0.6 0.6 0.6     Estimated Creatinine Clearance: 96 mL/min (based on SCr of 0.6 mg/dL). Intake/Output Summary (Last 24 hours) at 8/26/2020 1013  Last data filed at 8/25/2020 1822  Gross per 24 hour   Intake 540 ml   Output --   Net 540 ml       Pertinent Cultures:  Date    Source    Results  8/22   Blood    NGTD  8/22   MRSA screen   MRSA  8/22   Chest wound   MRSA, Klebsiella ESBL    Vancomycin level:   TROUGH:    Recent Labs     08/24/20  1330   VANCOTROUGH <4.0*     RANDOM:  No results for input(s): VANCORANDOM in the last 72 hours.     Assessment:  · WBC and temperature: WNL, afebrile  · SCr, BUN, and urine output: at baseline   · Day(s) of therapy: 5  · Vancomycin level:   · 8/24: <4, not a true trough, missed vancomycin doses    Plan:  · Vancomycin 1000 mg x 1 followed by 750 mg q12h   · Historically experienced accumulation on a regimen of 1000 mg q12h  · No vancomycin doses were administered 8/23 & 8/24 therefore trough was not accurate   · Repeat trough 8/27 @ 1330   · Pharmacy will continue to monitor patient and adjust therapy as indicated    Thank you for the consult,  Yfn Mathew, [address]  8/26/2020 10:13 AM

## 2020-08-26 NOTE — PROGRESS NOTES
Infectious Disease Progress Note  2020   Patient Name: Thomas Grande : 1960   Impression  · Right Chest Past ICD/Mediport Pocket Wound Recurrent Infection:   ? Afebrile, no leukocytosis  ? Blood cultures -0/2-NGTD  ? MRSA/MSSA screen pending  ? -Chest Wound culture-MRSA, Klebsiella pneumoniae  ? CTA Pulmonary W contrast: No evidence of PE, Bilateral pulmonary GGO, suggestive of infection, possibly atypical.  Small bilateral pleural effusions, cardiomegaly. 6 mm noncalcified RLL nodule, unchanged. ? Dr. Fredrick Rocha, general surgery, onboard, rec local wound care at this time, appears granulated and not infected  · Allergy to moxifloxicin:  Lips and facial swelling  ? Recent Infected ICD/Mediport-Removal-[facility]   ? CAD/HFrEF  ? CVA  ? HTN  ? Lymphoma  · Multi-morbidity: per PMHx:  Arthritis, asplenia, asthma, CAD with PCI, cardiomyopathy, CVA, CHF, GERD, HLD, HTN, Lymphoma, MS, Seizures, hyster, appey, choley, Gastric bypass  Plan:  · DC cefepime   · Continue vancomycin until DC  · Start meropenem 1 gm IV q8h x 14 days (end date 20) per midline PICC (orders placed)  · Upon DC, DC vancomycin, then arrange for dalbavancin 1500 mg IV x 1 doses 1 week apart per infusion clinic as OP per peripheral IV, not Midline  · CRP x 3, consistently low  ? Follow with Dr. Ike Mclaughlin, General surgery, plan for ABX therapy for 10 days, then place new mediport on left side  · Follow up with ID in clinic 1 week please  Weekly labs drawn on Monday during the course of treatment  CBC with differential, CMP, ESR, CRP,Vancomycin Trough  Fax results to Attn: Jn Saldana Infectious Diseases Staff  · # [phone]       Ongoing Antimicrobial Therapy  Meropenem -  Vancomycin -? Completed Antimicrobial Therapy  Cefepime -? History:? Interval history noted. Chief complaint:    Denies n/v/d/f or untoward effects of antibiotics. Resting quietly.      Physical Exam:  Vital Signs: BP (!) 152/67   Pulse 56   Temp 98.7 °F (37.1 °C) (Oral)   Resp 18   Ht 5' 6\" (1.676 m)   Wt 147 lb 12.8 oz (67 kg)   SpO2 98%   BMI 23.86 kg/m²     Gen: alert and oriented X3, no distress  Skin: no stigmata of endocarditis  Wounds: right chest open wound draining yellowish drainage, malodorous  HEMT: AT/NC Oropharynx pink, moist, and without lesions or exudates; dentition in good state of repair  Eyes: PERRLA, EOMI, conjunctiva pink, sclera anicteric. Neck: Supple. Trachea midline. No LAD. Chest: no distress and CTA. Good air movement. Heart: RRR and no MRG. Abd: soft, non-distended, no tenderness, no hepatomegaly. Normoactive bowel sounds. Ext: no clubbing, cyanosis, or edema  Neuro: Mental status intact. CN 2-12 intact and no focal sensory or motor deficits        Radiologic / Imaging / TESTING  8/22/20 XR Chest Portable:  Impression    There is subtle, ill-defined asymmetric opacity projecting over the right    upper lobe region.  This could represent an evolving pneumonia.  Radiographic    follow-up recommended to assure resolution.       8/22/20 VL Dup Lower Extremity Venous Bilateral:  Impression    No evidence of DVT in either lower extremity.       8/22/20 CTA Pulmonary W Contrast:  Impression    1. No evidence of pulmonary embolism. 2. Bilateral pulmonary ground-glass opacities, suggestive of infection,    possibly atypical.    3. Small bilateral pleural effusions. 4. Cardiomegaly. 5. 6 mm noncalcified right lower lobe nodule, unchanged.  Consider 1 year    follow up for further evaluation. Imaging features can be seen with viral pneumonia, though are nonspecific and    can occur with a variety of infectious and noninfectious processes.  PneInd         RECOMMENDATIONS:    Fleischner Society guidelines for follow-up and management of incidentally    detected pulmonary nodules:           Labs:    Recent Results (from the past 24 hour(s))   Basic Metabolic Panel    Collection Time: 08/26/20  6:02 AM   Result Value Ref Range    Sodium 143 135 - 145 MMOL/L    Potassium 4.1 3.5 - 5.1 MMOL/L    Chloride 107 99 - 110 mMol/L    CO2 29 21 - 32 MMOL/L    Anion Gap 7 4 - 16    BUN 8 6 - 23 MG/DL    CREATININE 0.6 0.6 - 1.1 MG/DL    Glucose 92 70 - 99 MG/DL    Calcium 8.3 8.3 - 10.6 MG/DL    GFR Non-African American >60 >60 mL/min/1.73m2    GFR African American >60 >60 mL/min/1.73m2   C-Reactive Protein    Collection Time: 08/26/20  6:02 AM   Result Value Ref Range    CRP, High Sensitivity 6.0 mg/L     CULTURE results: Invalid input(s): BLOOD CULTURE,  URINE CULTURE, SURGICAL CULTURE    Diagnosis:  Patient Active Problem List   Diagnosis    Chest pain    Chest pain    CAD (coronary artery disease)    Hyperlipidemia    Thyroid disease    Acute exacerbation of CHF (congestive heart failure) ([facility])    CHF exacerbation ([facility])    Congestive heart failure ([facility])    Left upper quadrant pain    Pneumonia of left lower lobe due to infectious organism ([facility])    Angina at rest [facility])    Coronary artery disease involving coronary bypass graft of native heart with angina pectoris ([facility])    Shortness of breath    Ischemic cardiomyopathy    Acute metabolic encephalopathy    Acute delirium    Pneumonia    Sacral pain    Acute on chronic systolic CHF (congestive heart failure), NYHA class 3 ([facility])    Severe malnutrition ([facility])    Cellulitis    Cellulitis at gastrostomy tube site ([facility] [address]    History of Maru-en-Y gastric bypass    Gastroparesis    Abdominal pain    Feeding tube dysfunction    Abdominal pain, epigastric    Gastrojejunostomy tube status ([facility])    Chronic malnutrition ([address]    Asplenia    Shockable cardiac rhythm detected by automated external defibrillator    Financial difficulties    Chest pain at rest    Closed nondisplaced transverse fracture of left patella    Contusion of right knee    Multifocal pneumonia    Abnormal cardiac function test    Chronic systolic heart failure (HCC)    Seizure disorder ([address]    Hypothyroidism    Cardiomyopathy ([facility] [facility]    Acute chest pain    Fever in adult    Pyelonephritis    MRSA bacteremia    Heart failure (HCC)    Infected chest wall abrasion, right, subsequent encounter    Granuloma of skin    Open wound of right chest wall       Active Problems  Active Problems:    Pneumonia    Infected chest wall abrasion, right, subsequent encounter    Granuloma of skin    Open wound of right chest wall  Resolved Problems:    * No resolved hospital problems. *    Electronically signed by: Electronically signed by JESSE Davis CNP on 8/26/2020 at 11:25 AM

## 2020-08-26 NOTE — CONSULTS
PICC/midline consult on hold at this time. Vancomycin can not be given through a midline if anticipated longer than acute use of 2-3 days. Patient has history of not being able to advance PICC lines past either shoulder. She also has history of known scar tissue in left subclavian. Any attempts at central venous access should be made by Interventional Radiologist.  Nelsy MOLINA notified.

## 2020-08-26 NOTE — DISCHARGE SUMMARY
Discharge Summary Note  Patient ID:  Louise Ge  [id number]  78 y.o.  1960    Admit date: 8/22/2020    Discharge date and time: No discharge date for patient encounter. Admitting Physician: No admitting provider for patient encounter. Discharge Physician: Mae Vidal MD    Admission Diagnoses:   Bilateral leg pain [M79.604, M79.605]  Elevated brain natriuretic peptide (BNP) level [R79.89]  Infected chest wall abrasion, right, subsequent encounter [S20.311D, L08.9]  Open wound of right chest wall, initial encounter [S21.101A]  Pneumonia [J18.9]    Discharge Diagnoses and Hospital Course:   Right chest wound infection S/P ICD infection  Pneumonia  Recently diagnosed with MRSA bacteremia.   Patient was treated with vancomycin for 6 weeks and 2 weeks of gentamicin. CXR: There is subtle, ill-defined asymmetric opacity projecting over the right upper lobe region.  This could represent an evolving pneumonia  -To DC vancomycin and setting up for dalbavancin 1500 mg IV x 2 doses 1 week   -To Start meropenem 1 gm IV q8h x 14 days (end date 9/9/20) per midline PICC   -CTA pulmonary with bilateral ground glass opacities  -Wound culture with MRSA and Klebsiella  -General surgery recommendations appreciated, planning for port placement today  -ID recommendations appreciated     Chronic HFrEF, compensated  Chronic elevated BNP, improving.  Last echo with EF 30-35% (5/2020)  -Continue Demadex  -Daily weight and strict I&O     PE/VTE,hx  -Continue Eliquis and Amiodarone     Hypothyroidism  -Continue levothyroxine     -Seizure disorders  -Depression/anxiety  -CAD S/P CABG  -S/P gastric bypass  -Chronic pain syndrome  -Anemia of chronic disease     Admission Condition: fair    Discharged Condition: stable    Consults: ID and general surgery    Significant Diagnostic Studies:   CBC with Differential:    Lab Results   Component Value Date    WBC 3.7 08/23/2020    RBC 2.50 08/23/2020    HGB 8.7 08/23/2020 HISTORY: ORDERING SYSTEM PROVIDED HISTORY: pain and swelling TECHNOLOGIST PROVIDED HISTORY: Reason for exam:->pain and swelling Reason for Exam: bialteral leg swelling Acuity: Acute Type of Exam: Initial Additional signs and symptoms: leg pain Relevant Medical/Surgical History: nk Initial evaluation, pain and swelling FINDINGS: The visualized veins of the bilateral lower extremities are patent and free of echogenic thrombus. The veins are normally compressible and have normal phasic flow. No evidence of DVT in either lower extremity. Cta Pulmonary W Contrast    Result Date: 8/22/2020  EXAMINATION: CTA OF THE CHEST 8/22/2020 3:48 pm TECHNIQUE: CTA of the chest was performed after the administration of intravenous contrast.  Multiplanar reformatted images are provided for review. MIP images are provided for review. Dose modulation, iterative reconstruction, and/or weight based adjustment of the mA/kV was utilized to reduce the radiation dose to as low as reasonably achievable. COMPARISON: 12/13/2019 HISTORY: ORDERING SYSTEM PROVIDED HISTORY: chest pain chest wound TECHNOLOGIST PROVIDED HISTORY: Reason for exam:->chest pain chest wound Reason for Exam: chest pain, chest wound Acuity: Unknown Type of Exam: Unknown Additional signs and symptoms: recent port and defibrillator removal FINDINGS: Metallic device external to the patient with extensive streak artifact limiting evaluation of the lung bases and upper abdomen. Pulmonary Arteries: Pulmonary arteries are adequately opacified for evaluation. No evidence of intraluminal filling defect to suggest pulmonary embolism. Main pulmonary artery is normal in caliber. Mediastinum: The heart is enlarged without pericardial effusion. Coronary atherosclerosis. Thoracic aorta is nondilated. No mediastinal adenopathy. Lungs/pleura: There is ground-glass opacity within the right upper lobe and right lower lobe, left upper lobe, and left lower lobe.   Small bilateral pleural effusions. No pneumothorax. 6 mm noncalcified right lower lobe nodule. Upper Abdomen: Limited images of the upper abdomen are unremarkable. Soft Tissues/Bones: No acute bone or soft tissue abnormality. 1. No evidence of pulmonary embolism. 2. Bilateral pulmonary ground-glass opacities, suggestive of infection, possibly atypical. 3. Small bilateral pleural effusions. 4. Cardiomegaly. 5. 6 mm noncalcified right lower lobe nodule, unchanged. Consider 1 year follow up for further evaluation. Imaging features can be seen with viral pneumonia, though are nonspecific and can occur with a variety of infectious and noninfectious processes. PneInd RECOMMENDATIONS: Fleischner Society guidelines for follow-up and management of incidentally detected pulmonary nodules: Single Solid Nodule: Nodule size equals 6-8 mm In a low-risk patient, CT at 6-12 months, then consider CT at 18-24 months. In a high-risk patient, CT at 6-12 months, then CT at 18-24 months. - Low risk patients include individuals with minimal or absent history of smoking and other known risk factors. - High risk patients include individuals with a history or smoking or known risk factors. Radiology 2017 http://pubs. rsna.org/doi/full/10.1148/radiol. 9455729985       Discharge Exam:  General Appearance: alert and oriented to person, place and time, in no acute distress  Cardiovascular: normal rate, regular rhythm, normal S1 and S2  Pulmonary/Chest: clear to auscultation bilaterally, Wound in the right upper chest with dressing.  Occasional bilateral crackles  Abdomen: soft, non-tender, non-distended, normal bowel sounds, no masses   Extremities: no cyanosis, clubbing or edema, pulse   Skin: warm and dry, no rash or erythema  Head: normocephalic and atraumatic  Eyes: pupils equal, round, and reactive to light  Neck: supple and non-tender without mass, no thyromegaly   Musculoskeletal: normal range of motion, no joint swelling, deformity or tenderness  Neurological: alert, oriented, normal speech, no focal findings or movement disorder noted    Disposition: home    Patient Instructions:     Discharge Medications:   Damon Collins   Home Medication Instructions JAM:[id number]    Printed on:08/26/20 1206   Medication Information                      acetaminophen 650 MG TABS  Take 650 mg by mouth every 4 hours as needed             albuterol (PROVENTIL HFA;VENTOLIN HFA) 108 (90 BASE) MCG/ACT inhaler  Inhale 2 puffs into the lungs every 6 hours as needed.                amiodarone (CORDARONE) 200 MG tablet  Take 200 mg by mouth daily             apixaban (ELIQUIS) 5 MG TABS tablet  Take 5 mg by mouth 2 times daily             aspirin 81 MG tablet  Take 81 mg by mouth daily              atorvastatin (LIPITOR) 80 MG tablet  Take 80 mg by mouth nightly             benzocaine-menthol (CEPACOL SORE THROAT) 15-3.6 MG lozenge  Take 1 lozenge by mouth every 2 hours as needed for Sore Throat             chlorhexidine gluconate (ANTISEPTIC SKIN CLEANSER) 4 % SOLN external solution  Apply topically daily for 10 days             clopidogrel (PLAVIX) 75 MG tablet  Take 75 mg by mouth daily             cyanocobalamin 1000 MCG/ML injection  INJECT 1 ML INTO THE MUSCLE ON THE FIRST OF EACH MONTH             docusate sodium (COLACE) 100 MG capsule  Take 100 mg by mouth 2 times daily as needed for Constipation             DULoxetine (CYMBALTA) 60 MG extended release capsule  Take 1 capsule by mouth daily             famotidine (PEPCID) 20 MG tablet  Take 1 tablet by mouth 2 times daily             ferrous gluconate (FERGON) 324 (38 FE) MG tablet  Take 324 mg by mouth 2 times daily              Icosapent Ethyl (VASCEPA) 0.5 g CAPS  Take 500 mg by mouth 2 times daily             ipratropium-albuterol (DUONEB) 0.5-2.5 (3) MG/3ML SOLN nebulizer solution  Inhale 3 mLs into the lungs every 4 hours (while awake)             levETIRAcetam (KEPPRA) 750 MG tablet  Take 750 mg by mouth 2 times daily              levothyroxine (SYNTHROID) 112 MCG tablet  Take 2 tablets by mouth Daily             magnesium oxide (MAG-OX) 400 MG tablet  Take 800 mg by mouth 2 times daily             meropenem (MERREM) 1 g injection  Infuse 1 g intravenously every 8 hours for 14 days             midodrine (PROAMATINE) 10 MG tablet  Take 1 tablet by mouth 3 times daily (with meals)             mirtazapine (REMERON) 30 MG tablet  Take 30 mg by mouth nightly             mupirocin (BACTROBAN) 2 % ointment  Apply topically 3 times daily. naloxone 4 MG/0.1ML LIQD nasal spray  1 spray by Nasal route as needed for Opioid Reversal             nitroGLYCERIN (NITROSTAT) 0.4 MG SL tablet  up to max of 3 total doses. If no relief after 1 dose, call 911. ondansetron (ZOFRAN) 4 MG tablet  Take 1 tablet by mouth every 6 hours as needed for Nausea or Vomiting             oxyCODONE (ROXICODONE) 20 MG immediate release tablet  20 mg every 3 hours as needed.              potassium chloride (KLOR-CON M) 20 MEQ extended release tablet  Take 1 tablet by mouth daily Take only if taking torsemide             torsemide (DEMADEX) 20 MG tablet  Take 20 mg by mouth daily as needed (For weight gain or fluid retention) Indications: pt takes 2 tablets daily                  Activity: activity as tolerated    Diet: cardiac diet    Wound Care: as directed    Follow-up:  PCP 1-2 weeks  Infusion center once weakly  Dr Danelle Alvarenga in 10 days    Time Spent Doing discharge 38 min  Electronically signed by Julia Solo MD  on 8/26/20 at 12:01 PM EDT

## 2020-08-26 NOTE — FLOWSHEET NOTE
Pt stated again that she has no access to her home at this time and is concerned about d/c order.  Hold d/c until tomorrow per Dr Librado Handley

## 2020-08-26 NOTE — CARE COORDINATION
[facility] notified that Pt will also need a 1x dose of dalbacancin given at the Infusion clinic. [facility] faxed the referral to the Infusion clinic. [facility] notified that Pt is not able to gain access to her home today. Pt S/O is out of town and pt does not have any keys to the home with her. Pt S/O will not be back until tomorrow.      Electronically signed by ARIANA Howe on 8/26/2020 at 3:38 PM

## 2020-08-26 NOTE — PROGRESS NOTES
GENERAL SURGERY PROGRESS NOTE    CC/HPI:           Patient feels ok - cultures back! Vitals:    08/25/20 1400 08/25/20 1530 08/25/20 2142 08/26/20 0409   BP: (!) 121/58 (!) 121/58 (!) 156/70 (!) 130/58   Pulse: 53 53 58 55   Resp: 16 16 16 16   Temp: 98.6 °F (37 °C) 98.6 °F (37 °C) 100 °F (37.8 °C) 97.7 °F (36.5 °C)   TempSrc: Oral Oral Oral Oral   SpO2: 92%  99% 95%   Weight:    147 lb 12.8 oz (67 kg)   Height:         I/O last 3 completed shifts: In: 80 [P.O.:480; IV Piggyback:300]  Out: -   No intake/output data recorded. DIET GENERAL;  Dietary Nutrition Supplements: Standard Oral Supplement    Recent Results (from the past 48 hour(s))   Vancomycin, trough    Collection Time: 08/24/20  1:30 PM   Result Value Ref Range    Vancomycin Tr <4.0 (L) 10 - 20 UG/ML    DOSE AMOUNT DOSE AMT.  GIVEN - 750 mg     DOSE TIME DOSE TIME GIVEN - 7912    Basic Metabolic Panel    Collection Time: 08/25/20  6:34 AM   Result Value Ref Range    Sodium 141 135 - 145 MMOL/L    Potassium 4.0 3.5 - 5.1 MMOL/L    Chloride 107 99 - 110 mMol/L    CO2 28 21 - 32 MMOL/L    Anion Gap 6 4 - 16    BUN 8 6 - 23 MG/DL    CREATININE 0.6 0.6 - 1.1 MG/DL    Glucose 104 (H) 70 - 99 MG/DL    Calcium 8.2 (L) 8.3 - 10.6 MG/DL    GFR Non-African American >60 >60 mL/min/1.73m2    GFR African American >60 >60 mL/min/1.73m2   C-Reactive Protein    Collection Time: 08/25/20  6:34 AM   Result Value Ref Range    CRP, High Sensitivity 10.4 mg/L   Basic Metabolic Panel    Collection Time: 08/26/20  6:02 AM   Result Value Ref Range    Sodium 143 135 - 145 MMOL/L    Potassium 4.1 3.5 - 5.1 MMOL/L    Chloride 107 99 - 110 mMol/L    CO2 29 21 - 32 MMOL/L    Anion Gap 7 4 - 16    BUN 8 6 - 23 MG/DL    CREATININE 0.6 0.6 - 1.1 MG/DL    Glucose 92 70 - 99 MG/DL    Calcium 8.3 8.3 - 10.6 MG/DL    GFR Non-African American >60 >60 mL/min/1.73m2    GFR African American >60 >60 mL/min/1.73m2   C-Reactive Protein    Collection Time: 08/26/20  6:02 AM from the wound POSITIVE for 2:  Culture  08/22/2020  3:08 PM  [facility]    Prelim Report   Anaerobic culture further report to follow   No anaerobes isolated so far, Further report to follow    Culture Abnormal    08/22/2020  3:08 PM  [id number] MRSA Light growth CONTACT PRECAUTIONS INDICATED PBP2= POSITIVE    Culture Abnormal    08/22/2020  3:08 PM  [address] [facility] [address] ESBL Light growth Carbapenem antibiotics are the best option for infections caused by ESBL producing organisms.  Other antibiotic classes are likely to result in treatment failure, even for organisms demonstrating in vitro susceptibility. CONTACT PRECAUTIONS INDICATED    Testing Performed By     Lab - Abbreviation  Name  Director  Address  Valid Date Range    19- - Livier Hager M.D., Ph.D.  [address] [facility].    [facility] [address]  08/30/17 0817-Present    [facility] [address]  Alen Germain MD  [address] [facility] [address] [place] [address]  09/05/17 1510-Present    Narrative   Performed by: Bright Todd DATE/TIME:  08/22/2020 1808    Susceptibility     Staph aureus mrsa (2)     Antibiotic  Interpretation  CHUCKIE  Status     erythromycin  Resistant  >=8  Preliminary     gentamicin  Sensitive  <=0.5  Preliminary     moxifloxacin  Resistant  >=8  Preliminary     oxacillin  Resistant  >=4  Preliminary     tetracycline  Sensitive  <=1  Preliminary     trimethoprim-sulfamethoxazole  Resistant  >=320  Preliminary     vancomycin  Sensitive  1  Preliminary     Klebsiella pneumoniae (esbl) (3)     Antibiotic  Interpretation  CHUCKIE  Status     ampicillin  Resistant  >=32  Preliminary     ceFAZolin  Resistant  >=64  Preliminary     ciprofloxacin  Sensitive  <=0.25  Preliminary     ertapenem  Sensitive  <=0.12  Preliminary     cefepime  Resistant  >=32  Preliminary gentamicin  Sensitive  <=1  Preliminary     levofloxacin  Sensitive  <=0.12  Preliminary     piperacillin-tazobactam  Sensitive  <=4  Preliminary     trimethoprim-sulfamethoxazole  Resistant  >=320  Preliminary           D/w ID yesterday, and will treat x 10 days, and then will place another Mediport in the LEFT side. Will follow.     ___________________________________________    Pedro Rodrigez MD, FACS, FICS  8/26/2020  7:27 AM

## 2020-08-26 NOTE — PROGRESS NOTES
[address] Liaison spoke with pt and pt is agreeable to [facility] at discharge. Pt aware that she will have approx $4 dollar a wk co-pay with her IV atb's at [facility]. Pt is agreeable. Aware MD is today and initiatied [facility] to see pt tonight.

## 2020-08-26 NOTE — CONSULTS
IV Consult complete. Procedure rational, risks/benefits reviewed with patient, verbalizes understanding and consents to midline placement. Arrow Single Lumen 4Fr Antimicrobial Power Midline inserted in Left Upper Arm Brachial vein using sterile ultrasound guided technique. Brisk blood return, flushes easy. Pressure dressing applied to insertion site. OK to use midline.

## 2020-08-27 VITALS
RESPIRATION RATE: 18 BRPM | TEMPERATURE: 98.2 F | HEART RATE: 59 BPM | DIASTOLIC BLOOD PRESSURE: 65 MMHG | BODY MASS INDEX: 23.75 KG/M2 | OXYGEN SATURATION: 99 % | WEIGHT: 147.8 LBS | SYSTOLIC BLOOD PRESSURE: 142 MMHG | HEIGHT: 66 IN

## 2020-08-27 LAB
ANION GAP SERPL CALCULATED.3IONS-SCNC: 9 MMOL/L (ref 4–16)
BUN BLDV-MCNC: 8 MG/DL (ref 6–23)
CALCIUM SERPL-MCNC: 8.5 MG/DL (ref 8.3–10.6)
CHLORIDE BLD-SCNC: 106 MMOL/L (ref 99–110)
CO2: 28 MMOL/L (ref 21–32)
CREAT SERPL-MCNC: 0.7 MG/DL (ref 0.6–1.1)
CULTURE: ABNORMAL
DOSE AMOUNT: NORMAL
DOSE TIME: NORMAL
GFR AFRICAN AMERICAN: >60 ML/MIN/1.73M2
GFR NON-AFRICAN AMERICAN: >60 ML/MIN/1.73M2
GLUCOSE BLD-MCNC: 111 MG/DL (ref 70–99)
HIGH SENSITIVE C-REACTIVE PROTEIN: 4.8 MG/L
Lab: ABNORMAL
POTASSIUM SERPL-SCNC: 4.5 MMOL/L (ref 3.5–5.1)
REASON FOR REJECTION: NORMAL
REJECTED TEST: NORMAL
SODIUM BLD-SCNC: 143 MMOL/L (ref 135–145)
SPECIMEN: ABNORMAL
VANCOMYCIN TROUGH: 12.1 UG/ML (ref 10–20)

## 2020-08-27 PROCEDURE — 2580000003 HC RX 258: Performed by: NURSE PRACTITIONER

## 2020-08-27 PROCEDURE — 86141 C-REACTIVE PROTEIN HS: CPT

## 2020-08-27 PROCEDURE — 6360000002 HC RX W HCPCS: Performed by: FAMILY MEDICINE

## 2020-08-27 PROCEDURE — 36415 COLL VENOUS BLD VENIPUNCTURE: CPT

## 2020-08-27 PROCEDURE — 80048 BASIC METABOLIC PNL TOTAL CA: CPT

## 2020-08-27 PROCEDURE — 6370000000 HC RX 637 (ALT 250 FOR IP): Performed by: NURSE PRACTITIONER

## 2020-08-27 PROCEDURE — 6360000002 HC RX W HCPCS: Performed by: NURSE PRACTITIONER

## 2020-08-27 PROCEDURE — 99232 SBSQ HOSP IP/OBS MODERATE 35: CPT | Performed by: SURGERY

## 2020-08-27 PROCEDURE — 2580000003 HC RX 258: Performed by: FAMILY MEDICINE

## 2020-08-27 PROCEDURE — 80202 ASSAY OF VANCOMYCIN: CPT

## 2020-08-27 RX ORDER — OXYCODONE HYDROCHLORIDE 20 MG/1
20 TABLET ORAL
Qty: 5 TABLET | Refills: 0 | Status: SHIPPED | OUTPATIENT
Start: 2020-08-27 | End: 2020-08-28

## 2020-08-27 RX ORDER — MEROPENEM 1 G/1
1 INJECTION, POWDER, FOR SOLUTION INTRAVENOUS EVERY 8 HOURS
Qty: 42 G | Refills: 0 | Status: SHIPPED | OUTPATIENT
Start: 2020-08-27 | End: 2020-09-10

## 2020-08-27 RX ORDER — NALOXONE HYDROCHLORIDE 4 MG/.1ML
1 SPRAY NASAL PRN
Qty: 1 EACH | Refills: 5 | Status: ON HOLD | OUTPATIENT
Start: 2020-08-27 | End: 2020-10-14

## 2020-08-27 RX ADMIN — MEROPENEM 1 G: 1 INJECTION, POWDER, FOR SOLUTION INTRAVENOUS at 12:02

## 2020-08-27 RX ADMIN — DULOXETINE HYDROCHLORIDE 60 MG: 30 CAPSULE, DELAYED RELEASE ORAL at 08:45

## 2020-08-27 RX ADMIN — APIXABAN 5 MG: 5 TABLET, FILM COATED ORAL at 08:45

## 2020-08-27 RX ADMIN — ACETAMINOPHEN 650 MG: 325 TABLET ORAL at 08:46

## 2020-08-27 RX ADMIN — AMIODARONE HYDROCHLORIDE 200 MG: 200 TABLET ORAL at 08:45

## 2020-08-27 RX ADMIN — MEROPENEM 1 G: 1 INJECTION, POWDER, FOR SOLUTION INTRAVENOUS at 03:55

## 2020-08-27 RX ADMIN — OXYCODONE HYDROCHLORIDE 20 MG: 10 TABLET ORAL at 11:59

## 2020-08-27 RX ADMIN — CLOPIDOGREL BISULFATE 75 MG: 75 TABLET ORAL at 08:45

## 2020-08-27 RX ADMIN — OXYCODONE HYDROCHLORIDE 20 MG: 10 TABLET ORAL at 04:03

## 2020-08-27 RX ADMIN — FERROUS GLUCONATE TAB 324 MG (37.5 MG ELEMENTAL IRON) 324 MG: 324 (37.5 FE) TAB at 08:46

## 2020-08-27 RX ADMIN — ASPIRIN 81 MG CHEWABLE TABLET 81 MG: 81 TABLET CHEWABLE at 08:45

## 2020-08-27 RX ADMIN — ACETAMINOPHEN 650 MG: 325 TABLET ORAL at 01:16

## 2020-08-27 RX ADMIN — VANCOMYCIN HYDROCHLORIDE 750 MG: 5 INJECTION, POWDER, LYOPHILIZED, FOR SOLUTION INTRAVENOUS at 02:35

## 2020-08-27 RX ADMIN — SODIUM CHLORIDE, PRESERVATIVE FREE 10 ML: 5 INJECTION INTRAVENOUS at 08:53

## 2020-08-27 RX ADMIN — POTASSIUM CHLORIDE 20 MEQ: 1500 TABLET, EXTENDED RELEASE ORAL at 08:46

## 2020-08-27 RX ADMIN — LEVETIRACETAM 750 MG: 500 TABLET ORAL at 08:46

## 2020-08-27 RX ADMIN — OXYCODONE HYDROCHLORIDE 20 MG: 10 TABLET ORAL at 08:46

## 2020-08-27 RX ADMIN — MAGNESIUM OXIDE 400 MG (241.3 MG MAGNESIUM) TABLET 800 MG: TABLET at 08:44

## 2020-08-27 RX ADMIN — SODIUM CHLORIDE, PRESERVATIVE FREE 10 ML: 5 INJECTION INTRAVENOUS at 08:52

## 2020-08-27 RX ADMIN — LEVOTHYROXINE SODIUM 150 MCG: 75 TABLET ORAL at 05:51

## 2020-08-27 RX ADMIN — Medication: at 08:45

## 2020-08-27 RX ADMIN — OXYCODONE HYDROCHLORIDE 20 MG: 10 TABLET ORAL at 01:16

## 2020-08-27 RX ADMIN — FAMOTIDINE 20 MG: 20 TABLET ORAL at 08:45

## 2020-08-27 ASSESSMENT — PAIN SCALES - GENERAL
PAINLEVEL_OUTOF10: 8
PAINLEVEL_OUTOF10: 9
PAINLEVEL_OUTOF10: 8
PAINLEVEL_OUTOF10: 8
PAINLEVEL_OUTOF10: 9
PAINLEVEL_OUTOF10: 5

## 2020-08-27 ASSESSMENT — PAIN DESCRIPTION - DESCRIPTORS
DESCRIPTORS: ACHING;DISCOMFORT
DESCRIPTORS: ACHING

## 2020-08-27 ASSESSMENT — PAIN DESCRIPTION - LOCATION
LOCATION: HIP
LOCATION: GENERALIZED

## 2020-08-27 ASSESSMENT — PAIN DESCRIPTION - FREQUENCY
FREQUENCY: CONTINUOUS
FREQUENCY: CONTINUOUS

## 2020-08-27 ASSESSMENT — PAIN DESCRIPTION - PROGRESSION
CLINICAL_PROGRESSION: NOT CHANGED
CLINICAL_PROGRESSION: NOT CHANGED

## 2020-08-27 ASSESSMENT — PAIN DESCRIPTION - PAIN TYPE
TYPE: CHRONIC PAIN
TYPE: ACUTE PAIN;CHRONIC PAIN
TYPE: ACUTE PAIN;CHRONIC PAIN

## 2020-08-27 ASSESSMENT — PAIN - FUNCTIONAL ASSESSMENT: PAIN_FUNCTIONAL_ASSESSMENT: PREVENTS OR INTERFERES SOME ACTIVE ACTIVITIES AND ADLS

## 2020-08-27 ASSESSMENT — PAIN DESCRIPTION - ONSET: ONSET: ON-GOING

## 2020-08-27 ASSESSMENT — PAIN DESCRIPTION - ORIENTATION: ORIENTATION: RIGHT;LEFT

## 2020-08-27 NOTE — PROGRESS NOTES
[address] [facility]  consulted by Dr. Sangita Chiang for monitoring and adjustment. Indication for treatment: Mediport pocket wound recurrent infection  Goal trough: 15 mcg/mL (MRSA)     Pertinent Laboratory Values:   Temp Readings from Last 3 Encounters:   08/27/20 98.2 °F (36.8 °C) (Oral)   06/10/20 98 °F (36.7 °C) (Oral)   06/05/20 98.3 °F (36.8 °C)     No results for input(s): WBC, LACTATE in the last 72 hours. Recent Labs     08/25/20  0634 08/26/20  0602   BUN 8 8   CREATININE 0.6 0.6     Estimated Creatinine Clearance: 96 mL/min (based on SCr of 0.6 mg/dL). Intake/Output Summary (Last 24 hours) at 8/27/2020 0948  Last data filed at 8/26/2020 1644  Gross per 24 hour   Intake 360 ml   Output --   Net 360 ml       Pertinent Cultures:  Date    Source    Results  8/22   Blood    NGTD  8/22   MRSA screen   MRSA  8/22   Chest wound   MRSA, Klebsiella ESBL    Vancomycin level:   TROUGH:    Recent Labs     08/27/20  1300   VANCOTROUGH 12.1     RANDOM:  No results for input(s): VANCORANDOM in the last 72 hours. Assessment:  · WBC and temperature: WNL, afebrile  · SCr, BUN, and urine output: 12.1   · Day(s) of therapy: 6  · Vancomycin level:   · 8/24: <4, not a true trough, missed vancomycin doses  · 8/27: 12.1, slightly below goal     Plan:  · Vancomycin 1000 mg x 1 followed by 750 mg q12h   · Trough slight below goal  · Ms. Belinda Cruz was on vancomycin 1000 mg q12h back in June of this year, renal function was stable and vancomycin accumulation (trough 15-->16.2-->19.5-->21.4), dose was adjusted to 750 mg q12h at that time   · In light of this, will continue 750 mg q12h and repeat trough in 48h   · If AUC/CHUCKIE remains <400 will increase dose to 1000 mg q12h   · Pharmacy will continue to monitor patient and adjust therapy as indicated    Thank you for the consult,  Zenda Merlin, [address]  8/27/2020 9:48 AM

## 2020-08-27 NOTE — PROGRESS NOTES
GENERAL SURGERY PROGRESS NOTE    CC/HPI:           Patient feels ok - cultures back! And being discharged today, will follow her next week in clinic and plan her port placement. Vitals:    08/26/20 2145 08/27/20 0349 08/27/20 0844 08/27/20 0845   BP: (!) 142/65 109/60  (!) 142/65   Pulse: 52 52  59   Resp: 16 19  18   Temp: 98.2 °F (36.8 °C) 97.9 °F (36.6 °C)  98.2 °F (36.8 °C)   TempSrc: Oral Oral  Oral   SpO2:  92% 99%    Weight:       Height:         I/O last 3 completed shifts:   In: 360 [P.O.:360]  Out: -   I/O this shift:  In: 280 [P.O.:180; IV Piggyback:100]  Out: -     DIET GENERAL;  Dietary Nutrition Supplements: Standard Oral Supplement    Recent Results (from the past 48 hour(s))   Basic Metabolic Panel    Collection Time: 08/26/20  6:02 AM   Result Value Ref Range    Sodium 143 135 - 145 MMOL/L    Potassium 4.1 3.5 - 5.1 MMOL/L    Chloride 107 99 - 110 mMol/L    CO2 29 21 - 32 MMOL/L    Anion Gap 7 4 - 16    BUN 8 6 - 23 MG/DL    CREATININE 0.6 0.6 - 1.1 MG/DL    Glucose 92 70 - 99 MG/DL    Calcium 8.3 8.3 - 10.6 MG/DL    GFR Non-African American >60 >60 mL/min/1.73m2    GFR African American >60 >60 mL/min/1.73m2   C-Reactive Protein    Collection Time: 08/26/20  6:02 AM   Result Value Ref Range    CRP, High Sensitivity 6.0 mg/L   SPECIMEN REJECTION    Collection Time: 08/27/20  5:24 AM   Result Value Ref Range    Rejected Test CRPQ EBCG     Reason for Rejection SPECIMEN QUANTITY NOT SUFFICIENT    C-Reactive Protein    Collection Time: 08/27/20  9:14 AM   Result Value Ref Range    CRP, High Sensitivity 4.8 mg/L   Basic Metabolic Panel    Collection Time: 08/27/20  9:14 AM   Result Value Ref Range    Sodium 143 135 - 145 MMOL/L    Potassium 4.5 3.5 - 5.1 MMOL/L    Chloride 106 99 - 110 mMol/L    CO2 28 21 - 32 MMOL/L    Anion Gap 9 4 - 16    BUN 8 6 - 23 MG/DL    CREATININE 0.7 0.6 - 1.1 MG/DL    Glucose 111 (H) 70 - 99 MG/DL    Calcium 8.5 8.3 - 10.6 MG/DL    GFR Non- >60 >60 mL/min/1.73m2    GFR African American >60 >60 mL/min/1.73m2       Scheduled Meds:   meropenem  1 g Intravenous Q8H    mupirocin   Nasal BID    chlorhexidine gluconate   Topical Daily    sodium chloride flush  10 mL Intravenous 2 times per day    amiodarone  200 mg Oral Daily    apixaban  5 mg Oral BID    aspirin  81 mg Oral Daily    atorvastatin  80 mg Oral Nightly    clopidogrel  75 mg Oral Daily    potassium chloride  20 mEq Oral Daily    mirtazapine  30 mg Oral Nightly    midodrine  10 mg Oral TID WC    magnesium oxide  800 mg Oral BID    levothyroxine  150 mcg Oral Daily    levETIRAcetam  750 mg Oral BID    Icosapent Ethyl  500 mg Oral BID    ferrous gluconate  324 mg Oral BID    famotidine  20 mg Oral BID    DULoxetine  60 mg Oral Daily    sodium chloride flush  10 mL Intravenous 2 times per day    vancomycin  15 mg/kg Intravenous Q12H       Continuous Infusions:    Physical Exam:  HEENT: Anicteric sclerae, Oropharyngeal mucosae moist, pink and intact. Heart:  Normal S1 and S2, RRR  Lungs: Clear to auscultation bilaterally, No audible Wheezes or Rales. Extremities: No edema. Neuro: Alert and Oriented x 3, Non focal.  Abdomen: Soft, Benign, Non tender, Non distended, Positive bowel sounds. Incision: Nicely healing: No erythema, No discharge. Active Problems:    Pneumonia    Infected chest wall abrasion, right, subsequent encounter    Granuloma of skin    Open wound of right chest wall    MRSA infection    Klebsiella pneumoniae infection  Resolved Problems:    * No resolved hospital problems. *      Assessment and Plan:  Delores Sharif is a 63 y.o. female on Eliquis and Amiodarone, with a chest wound granuloma. .  S/p 6/1/2020:  Placement of an 6.6-Thai, low profile Mediport via right Interna jugular  vein approach, using the micropuncture kit first, Under ultrasound and Direct Fluoroscopy guidance.     The Mediport got infected and removed with now a granulation tissue healing. .    Cultures from the wound POSITIVE for 2:  Culture  08/22/2020  3:08 PM  [facility]    Prelim Report   Anaerobic culture further report to follow   No anaerobes isolated so far, Further report to follow    Culture Abnormal    08/22/2020  3:08 PM  [id number] MRSA Light growth CONTACT PRECAUTIONS INDICATED PBP2= POSITIVE    Culture Abnormal    08/22/2020  3:08 PM  [address] [facility] [address] ESBL Light growth Carbapenem antibiotics are the best option for infections caused by ESBL producing organisms.  Other antibiotic classes are likely to result in treatment failure, even for organisms demonstrating in vitro susceptibility. CONTACT PRECAUTIONS INDICATED    Testing Performed By     Lab - Abbreviation  Name  Director  Address  Valid Date Range    19- - Livier Hager M.D., Ph.D.  [address] [facility].    [facility] [address]  08/30/17 0817-Present    [facility] - [address]  Alen Germain MD  [address] [facility] [address]  09/05/17 1510-Present    Narrative   Performed by: Bright Todd DATE/TIME:  08/22/2020 1808    Susceptibility     Staph aureus mrsa (2)     Antibiotic  Interpretation  CHUCKIE  Status     erythromycin  Resistant  >=8  Preliminary     gentamicin  Sensitive  <=0.5  Preliminary     moxifloxacin  Resistant  >=8  Preliminary     oxacillin  Resistant  >=4  Preliminary     tetracycline  Sensitive  <=1  Preliminary     trimethoprim-sulfamethoxazole  Resistant  >=320  Preliminary     vancomycin  Sensitive  1  Preliminary     Klebsiella pneumoniae (esbl) (3)     Antibiotic  Interpretation  CHUCKIE  Status     ampicillin  Resistant  >=32  Preliminary     ceFAZolin  Resistant  >=64  Preliminary     ciprofloxacin  Sensitive  <=0.25  Preliminary     ertapenem  Sensitive  <=0.12  Preliminary     cefepime  Resistant  >=32 Preliminary     gentamicin  Sensitive  <=1  Preliminary     levofloxacin  Sensitive  <=0.12  Preliminary     piperacillin-tazobactam  Sensitive  <=4  Preliminary     trimethoprim-sulfamethoxazole  Resistant  >=320  Preliminary           D/w ID yesterday, and will treat x 10 days, and then will place another Mediport in the LEFT side. Patient feels ok - cultures back! And being discharged today, will follow her next week in clinic and plan her port placement. Will follow.     ___________________________________________    Alli Torres MD, FACS, FICS  8/27/2020  2:01 PM

## 2020-08-27 NOTE — DISCHARGE SUMMARY
Discharge Summary    Name:  Briseyda Garcia /Age/Sex: 1960  (63 y.o. female)   MRN & CSN:  [id number] & [id number] Admission Date/Time: 2020 11:40 AM   Attending:  Eva Curry MD Discharging Physician: Angella Amador MD     Hospital Course: Briseyda Garcia is a 63 y.o.  female  who presents with chest infection    Right chest wound infection S/P ICD infection  Pneumonia  Recently diagnosed with MRSA bacteremia.   Patient was treated with vancomycin for 6 weeks and 2 weeks of gentamicin. CXR: There is subtle, ill-defined asymmetric opacity projecting over the right upper lobe region.  This could represent an evolving pneumonia  -ID on board;IV dalbavancin 1500 mg x 2 doses 1 week and IV meropenem 1 gm IV q8h x 14 days (end date 20) per midline PICC   -CTA pulmonary with bilateral ground glass opacities  -Wound culture with MRSA and Klebsiella  -General surgery recommendations appreciated, planning for port placement today  -OP follow up advised     Chronic HFrEF, compensated  Chronic elevated BNP, improving. Last echo with EF 30-35% (2020)  -Continue Demadex  -Daily weight and strict I&O     PE/VTE,hx  -Continue Eliquis and Amiodarone     Hypothyroidism  -Continue levothyroxine     -Seizure disorders  -Depression/anxiety  -CAD S/P CABG  -S/P gastric bypass  -Chronic pain syndrome  -Anemia of chronic disease      The patient expressed appropriate understanding of and agreement with the discharge recommendations, medications, and plan.      Consults this admission:  IP CONSULT TO GENERAL SURGERY  IP CONSULT TO HOSPITALIST  IP CONSULT TO PHARMACY  IP CONSULT TO INFECTIOUS DISEASES  IP CONSULT TO IV TEAM  IP CONSULT TO HOME CARE NEEDS    Discharge Instruction:   Follow up appointments: ID in 1 week  Primary care physician:  within 2 weeks    Diet:  cardiac diet   Activity: activity as tolerated  Disposition: Discharged to:   []Home, [x]HHC, []SNF, []Acute Rehab, []Hospice   Condition on discharge: Stable    Discharge Medications:      [facility] Medication Instructions JAM:[id number]    Printed on:08/27/20 1030   Medication Information                      acetaminophen 650 MG TABS  Take 650 mg by mouth every 4 hours as needed             albuterol (PROVENTIL HFA;VENTOLIN HFA) 108 (90 BASE) MCG/ACT inhaler  Inhale 2 puffs into the lungs every 6 hours as needed.                amiodarone (CORDARONE) 200 MG tablet  Take 200 mg by mouth daily             apixaban (ELIQUIS) 5 MG TABS tablet  Take 5 mg by mouth 2 times daily             aspirin 81 MG tablet  Take 81 mg by mouth daily              atorvastatin (LIPITOR) 80 MG tablet  Take 80 mg by mouth nightly             benzocaine-menthol (CEPACOL SORE THROAT) 15-3.6 MG lozenge  Take 1 lozenge by mouth every 2 hours as needed for Sore Throat             chlorhexidine gluconate (ANTISEPTIC SKIN CLEANSER) 4 % SOLN external solution  Apply topically daily for 10 days             clopidogrel (PLAVIX) 75 MG tablet  Take 75 mg by mouth daily             cyanocobalamin 1000 MCG/ML injection  INJECT 1 ML INTO THE MUSCLE ON THE FIRST OF EACH MONTH             docusate sodium (COLACE) 100 MG capsule  Take 100 mg by mouth 2 times daily as needed for Constipation             DULoxetine (CYMBALTA) 60 MG extended release capsule  Take 1 capsule by mouth daily             famotidine (PEPCID) 20 MG tablet  Take 1 tablet by mouth 2 times daily             ferrous gluconate (FERGON) 324 (38 FE) MG tablet  Take 324 mg by mouth 2 times daily              Icosapent Ethyl (VASCEPA) 0.5 g CAPS  Take 500 mg by mouth 2 times daily             ipratropium-albuterol (DUONEB) 0.5-2.5 (3) MG/3ML SOLN nebulizer solution  Inhale 3 mLs into the lungs every 4 hours (while awake)             levETIRAcetam (KEPPRA) 750 MG tablet  Take 750 mg by mouth 2 times daily              levothyroxine (SYNTHROID) 112 MCG tablet  Take 2 tablets by mouth Daily magnesium oxide (MAG-OX) 400 MG tablet  Take 800 mg by mouth 2 times daily             meropenem (MERREM) 1 g injection  Infuse 1 g intravenously every 8 hours for 14 days             midodrine (PROAMATINE) 10 MG tablet  Take 1 tablet by mouth 3 times daily (with meals)             mirtazapine (REMERON) 30 MG tablet  Take 30 mg by mouth nightly             mupirocin (BACTROBAN) 2 % ointment  Apply topically 3 times daily. naloxone 4 MG/0.1ML LIQD nasal spray  1 spray by Nasal route as needed for Opioid Reversal             nitroGLYCERIN (NITROSTAT) 0.4 MG SL tablet  up to max of 3 total doses. If no relief after 1 dose, call 911. ondansetron (ZOFRAN) 4 MG tablet  Take 1 tablet by mouth every 6 hours as needed for Nausea or Vomiting             oxyCODONE (ROXICODONE) 20 MG immediate release tablet  20 mg every 3 hours as needed. potassium chloride (KLOR-CON M) 20 MEQ extended release tablet  Take 1 tablet by mouth daily Take only if taking torsemide             torsemide (DEMADEX) 20 MG tablet  Take 20 mg by mouth daily as needed (For weight gain or fluid retention) Indications: pt takes 2 tablets daily                  Objective Findings at Discharge:   BP (!) 142/65   Pulse 59   Temp 98.2 °F (36.8 °C) (Oral)   Resp 18   Ht 5' 6\" (1.676 m)   Wt 147 lb 12.8 oz (67 kg)   SpO2 99%   BMI 23.86 kg/m²            PHYSICAL EXAM   GEN Awake female, sitting upright in bed in no apparent distress. Appears given age. EYES Pupils are equally round. No scleral erythema, discharge, or conjunctivitis. HENT Mucous membranes are moist. Oral pharynx without exudates, no evidence of thrush. NECK Supple, no apparent thyromegaly or masses. RESP Clear to auscultation, no wheezes, rales or rhonchi. Symmetric chest movement while on room air. CARDIO/VASC S1/S2 auscultated. Regular rate without appreciable murmurs, rubs, or gallops. No JVD or carotid bruits.  Peripheral pulses equal bilaterally and palpable. No peripheral edema. GI Abdomen is soft without significant tenderness, masses, or guarding. Bowel sounds are normoactive. Rectal exam deferred.  No costovertebral angle tenderness. Normal appearing external genitalia. Coppola catheter is not present. HEME/LYMPH No palpable cervical lymphadenopathy and no hepatosplenomegaly. No petechiae or ecchymoses. MSK No gross joint deformities. SKIN Normal coloration, warm, dry. NEURO Cranial nerves appear grossly intact, normal speech, no lateralizing weakness. PSYCH Awake, alert, oriented x 4. Affect appropriate.     BMP/CBC  Recent Labs     08/25/20  0634 08/26/20  0602    143   K 4.0 4.1    107   CO2 28 29   BUN 8 8   CREATININE 0.6 0.6       IMAGING:  As above    Discharge Time of 35 minutes    Electronically signed by Cara Steinberg MD on 8/27/2020 at 10:30 AM

## 2020-08-28 LAB
CULTURE: NORMAL
CULTURE: NORMAL
EKG ATRIAL RATE: 64 BPM
EKG DIAGNOSIS: NORMAL
EKG P AXIS: 26 DEGREES
EKG P-R INTERVAL: 148 MS
EKG Q-T INTERVAL: 394 MS
EKG QRS DURATION: 88 MS
EKG QTC CALCULATION (BAZETT): 406 MS
EKG R AXIS: -37 DEGREES
EKG T AXIS: -36 DEGREES
EKG VENTRICULAR RATE: 64 BPM
Lab: NORMAL
Lab: NORMAL
SPECIMEN: NORMAL
SPECIMEN: NORMAL

## 2020-09-12 ENCOUNTER — HOSPITAL ENCOUNTER (EMERGENCY)
Age: 60
Discharge: HOME | End: 2020-09-13
Payer: MEDICARE

## 2020-09-12 ENCOUNTER — APPOINTMENT (OUTPATIENT)
Dept: GENERAL RADIOLOGY | Age: 60
End: 2020-09-12
Payer: MEDICARE

## 2020-09-12 PROCEDURE — 71046 X-RAY EXAM CHEST 2 VIEWS: CPT

## 2020-09-12 PROCEDURE — 99285 EMERGENCY DEPT VISIT HI MDM: CPT

## 2020-09-12 PROCEDURE — 93005 ELECTROCARDIOGRAM TRACING: CPT | Performed by: EMERGENCY MEDICINE

## 2020-09-12 RX ORDER — MORPHINE SULFATE 4 MG/ML
4 INJECTION, SOLUTION INTRAMUSCULAR; INTRAVENOUS EVERY 30 MIN PRN
Status: DISCONTINUED | OUTPATIENT
Start: 2020-09-12 | End: 2020-09-13 | Stop reason: HOSPADM

## 2020-09-12 RX ORDER — ONDANSETRON 2 MG/ML
4 INJECTION INTRAMUSCULAR; INTRAVENOUS EVERY 30 MIN PRN
Status: COMPLETED | OUTPATIENT
Start: 2020-09-12 | End: 2020-09-13

## 2020-09-12 RX ORDER — SODIUM CHLORIDE, SODIUM LACTATE, POTASSIUM CHLORIDE, CALCIUM CHLORIDE 600; 310; 30; 20 MG/100ML; MG/100ML; MG/100ML; MG/100ML
1000 INJECTION, SOLUTION INTRAVENOUS ONCE
Status: COMPLETED | OUTPATIENT
Start: 2020-09-12 | End: 2020-09-13

## 2020-09-12 ASSESSMENT — PAIN SCALES - GENERAL: PAINLEVEL_OUTOF10: 9

## 2020-09-13 ENCOUNTER — APPOINTMENT (OUTPATIENT)
Dept: CT IMAGING | Age: 60
End: 2020-09-13
Payer: MEDICARE

## 2020-09-13 VITALS
SYSTOLIC BLOOD PRESSURE: 125 MMHG | RESPIRATION RATE: 11 BRPM | DIASTOLIC BLOOD PRESSURE: 65 MMHG | BODY MASS INDEX: 17.68 KG/M2 | HEART RATE: 65 BPM | HEIGHT: 66 IN | OXYGEN SATURATION: 98 % | TEMPERATURE: 98.2 F | WEIGHT: 110 LBS

## 2020-09-13 LAB
ALBUMIN SERPL-MCNC: 4.1 GM/DL (ref 3.4–5)
ALP BLD-CCNC: 106 IU/L (ref 40–128)
ALT SERPL-CCNC: 14 U/L (ref 10–40)
AMORPHOUS: ABNORMAL /HPF
ANION GAP SERPL CALCULATED.3IONS-SCNC: 7 MMOL/L (ref 4–16)
AST SERPL-CCNC: 23 IU/L (ref 15–37)
BACTERIA: NEGATIVE /HPF
BASOPHILS ABSOLUTE: 0.1 K/CU MM
BASOPHILS RELATIVE PERCENT: 1.6 % (ref 0–1)
BILIRUB SERPL-MCNC: 0.2 MG/DL (ref 0–1)
BILIRUBIN URINE: NEGATIVE MG/DL
BLOOD, URINE: NEGATIVE
BUN BLDV-MCNC: 10 MG/DL (ref 6–23)
CALCIUM SERPL-MCNC: 8.9 MG/DL (ref 8.3–10.6)
CHLORIDE BLD-SCNC: 101 MMOL/L (ref 99–110)
CLARITY: ABNORMAL
CO2: 32 MMOL/L (ref 21–32)
COLOR: YELLOW
CREAT SERPL-MCNC: 0.7 MG/DL (ref 0.6–1.1)
CULTURE: NORMAL
DIFFERENTIAL TYPE: ABNORMAL
EOSINOPHILS ABSOLUTE: 0.4 K/CU MM
EOSINOPHILS RELATIVE PERCENT: 5.9 % (ref 0–3)
GFR AFRICAN AMERICAN: >60 ML/MIN/1.73M2
GFR NON-AFRICAN AMERICAN: >60 ML/MIN/1.73M2
GLUCOSE BLD-MCNC: 84 MG/DL (ref 70–99)
GLUCOSE, URINE: NEGATIVE MG/DL
HCT VFR BLD CALC: 33.9 % (ref 37–47)
HEMOGLOBIN: 10.5 GM/DL (ref 12.5–16)
IMMATURE NEUTROPHIL %: 0.2 % (ref 0–0.43)
KETONES, URINE: NEGATIVE MG/DL
LACTATE: 0.6 MMOL/L (ref 0.4–2)
LEUKOCYTE ESTERASE, URINE: ABNORMAL
LIPASE: 11 IU/L (ref 13–60)
LYMPHOCYTES ABSOLUTE: 1 K/CU MM
LYMPHOCYTES RELATIVE PERCENT: 15.1 % (ref 24–44)
Lab: NORMAL
MCH RBC QN AUTO: 34 PG (ref 27–31)
MCHC RBC AUTO-ENTMCNC: 31 % (ref 32–36)
MCV RBC AUTO: 109.7 FL (ref 78–100)
MONOCYTES ABSOLUTE: 0.6 K/CU MM
MONOCYTES RELATIVE PERCENT: 9.6 % (ref 0–4)
NITRITE URINE, QUANTITATIVE: NEGATIVE
NUCLEATED RBC %: 0 %
PDW BLD-RTO: 15.1 % (ref 11.7–14.9)
PH, URINE: 7 (ref 5–8)
PLATELET # BLD: 277 K/CU MM (ref 140–440)
PMV BLD AUTO: 9.4 FL (ref 7.5–11.1)
POTASSIUM SERPL-SCNC: 3.7 MMOL/L (ref 3.5–5.1)
PRO-BNP: 538.1 PG/ML
PROTEIN UA: NEGATIVE MG/DL
RBC # BLD: 3.09 M/CU MM (ref 4.2–5.4)
RBC URINE: ABNORMAL /HPF (ref 0–6)
SEGMENTED NEUTROPHILS ABSOLUTE COUNT: 4.4 K/CU MM
SEGMENTED NEUTROPHILS RELATIVE PERCENT: 67.6 % (ref 36–66)
SODIUM BLD-SCNC: 140 MMOL/L (ref 135–145)
SPECIFIC GRAVITY UA: 1.01 (ref 1–1.03)
SPECIMEN: NORMAL
TOTAL IMMATURE NEUTOROPHIL: 0.01 K/CU MM
TOTAL NUCLEATED RBC: 0 K/CU MM
TOTAL PROTEIN: 6.2 GM/DL (ref 6.4–8.2)
TRICHOMONAS: ABNORMAL /HPF
TROPONIN T: <0.01 NG/ML
TROPONIN T: <0.01 NG/ML
UROBILINOGEN, URINE: NORMAL MG/DL (ref 0.2–1)
WBC # BLD: 6.4 K/CU MM (ref 4–10.5)
WBC UA: 2 /HPF (ref 0–5)

## 2020-09-13 PROCEDURE — 83690 ASSAY OF LIPASE: CPT

## 2020-09-13 PROCEDURE — 93010 ELECTROCARDIOGRAM REPORT: CPT | Performed by: INTERNAL MEDICINE

## 2020-09-13 PROCEDURE — 85025 COMPLETE CBC W/AUTO DIFF WBC: CPT

## 2020-09-13 PROCEDURE — 6360000004 HC RX CONTRAST MEDICATION: Performed by: PHYSICIAN ASSISTANT

## 2020-09-13 PROCEDURE — 83605 ASSAY OF LACTIC ACID: CPT

## 2020-09-13 PROCEDURE — 80053 COMPREHEN METABOLIC PANEL: CPT

## 2020-09-13 PROCEDURE — 84484 ASSAY OF TROPONIN QUANT: CPT

## 2020-09-13 PROCEDURE — 71260 CT THORAX DX C+: CPT

## 2020-09-13 PROCEDURE — 6370000000 HC RX 637 (ALT 250 FOR IP): Performed by: PHYSICIAN ASSISTANT

## 2020-09-13 PROCEDURE — 2580000003 HC RX 258: Performed by: EMERGENCY MEDICINE

## 2020-09-13 PROCEDURE — 6360000002 HC RX W HCPCS: Performed by: EMERGENCY MEDICINE

## 2020-09-13 PROCEDURE — 96376 TX/PRO/DX INJ SAME DRUG ADON: CPT

## 2020-09-13 PROCEDURE — 96366 THER/PROPH/DIAG IV INF ADDON: CPT

## 2020-09-13 PROCEDURE — 81001 URINALYSIS AUTO W/SCOPE: CPT

## 2020-09-13 PROCEDURE — 96375 TX/PRO/DX INJ NEW DRUG ADDON: CPT

## 2020-09-13 PROCEDURE — 83880 ASSAY OF NATRIURETIC PEPTIDE: CPT

## 2020-09-13 PROCEDURE — 87086 URINE CULTURE/COLONY COUNT: CPT

## 2020-09-13 PROCEDURE — 87070 CULTURE OTHR SPECIMN AEROBIC: CPT

## 2020-09-13 PROCEDURE — 87040 BLOOD CULTURE FOR BACTERIA: CPT

## 2020-09-13 PROCEDURE — 96365 THER/PROPH/DIAG IV INF INIT: CPT

## 2020-09-13 PROCEDURE — 87075 CULTR BACTERIA EXCEPT BLOOD: CPT

## 2020-09-13 RX ORDER — SODIUM CHLORIDE 0.9 % (FLUSH) 0.9 %
10 SYRINGE (ML) INJECTION 2 TIMES DAILY
Status: DISCONTINUED | OUTPATIENT
Start: 2020-09-13 | End: 2020-09-13 | Stop reason: HOSPADM

## 2020-09-13 RX ORDER — OXYCODONE HYDROCHLORIDE 5 MG/1
20 TABLET ORAL ONCE
Status: COMPLETED | OUTPATIENT
Start: 2020-09-13 | End: 2020-09-13

## 2020-09-13 RX ADMIN — MORPHINE SULFATE 4 MG: 4 INJECTION, SOLUTION INTRAMUSCULAR; INTRAVENOUS at 00:31

## 2020-09-13 RX ADMIN — SODIUM CHLORIDE, POTASSIUM CHLORIDE, SODIUM LACTATE AND CALCIUM CHLORIDE 1000 ML: 600; 310; 30; 20 INJECTION, SOLUTION INTRAVENOUS at 00:26

## 2020-09-13 RX ADMIN — ONDANSETRON 4 MG: 2 INJECTION INTRAMUSCULAR; INTRAVENOUS at 02:07

## 2020-09-13 RX ADMIN — MORPHINE SULFATE 4 MG: 4 INJECTION, SOLUTION INTRAMUSCULAR; INTRAVENOUS at 01:03

## 2020-09-13 RX ADMIN — ONDANSETRON 4 MG: 2 INJECTION INTRAMUSCULAR; INTRAVENOUS at 00:28

## 2020-09-13 RX ADMIN — IOPAMIDOL 75 ML: 755 INJECTION, SOLUTION INTRAVENOUS at 04:32

## 2020-09-13 RX ADMIN — MORPHINE SULFATE 4 MG: 4 INJECTION, SOLUTION INTRAMUSCULAR; INTRAVENOUS at 02:01

## 2020-09-13 RX ADMIN — ONDANSETRON 4 MG: 2 INJECTION INTRAMUSCULAR; INTRAVENOUS at 01:20

## 2020-09-13 RX ADMIN — OXYCODONE HYDROCHLORIDE 20 MG: 5 TABLET ORAL at 04:51

## 2020-09-13 ASSESSMENT — PAIN SCALES - GENERAL
PAINLEVEL_OUTOF10: 7
PAINLEVEL_OUTOF10: 9
PAINLEVEL_OUTOF10: 7
PAINLEVEL_OUTOF10: 9
PAINLEVEL_OUTOF10: 6
PAINLEVEL_OUTOF10: 9
PAINLEVEL_OUTOF10: 9
PAINLEVEL_OUTOF10: 8

## 2020-09-13 ASSESSMENT — PAIN DESCRIPTION - LOCATION
LOCATION: BACK;CHEST
LOCATION: CHEST

## 2020-09-13 ASSESSMENT — PAIN DESCRIPTION - ORIENTATION: ORIENTATION: RIGHT

## 2020-09-13 ASSESSMENT — PAIN DESCRIPTION - FREQUENCY: FREQUENCY: CONTINUOUS

## 2020-09-13 NOTE — ED PROVIDER NOTES
Emergency [address] EMERGENCY DEPARTMENT    Patient: Jaquan Yoon  MRN: [id number]  : 1960  Date of Evaluation: 2020  ED Provider: José Miguel Hackett PA-C    Chief Complaint       Chief Complaint   Patient presents with    Palpitations    Chest Pain       Lynda Slaughter is a 62 y.o. female who presents to the emergency department for a wound check and palpitations. Patient reports infection of her defibrillator and Mediport sites which resulted in an open wound to the right upper chest.  She states she has been following with Dr. Justyn Ingram and had been on Meropenem via Midline. She was admitted to [facility] last week for chest pain and they removed the Midline upon discharge (it appears ABX were completed on ). She is supposed to follow with Dr. Justyn Ingram this coming week. Patient states when she changed the dressing on the site earlier today, there was green drainage on the dressing. She then went to a double header baseball game for her grandson. When she changed the dressing again tonight, she reported bloody drainage on the dressing. She also reports she felt like she was running a fever, so she took some Tylenol and her daughter brought her here. She has a Life Vest but isn't currently wearing it because it needed to charge, so she started having palpitations about an hour ago. She states she is unsure if it's just because she is anxious about the wound. Denies any chest pain except around wound site. Denies sob. Denies n/v/d.      ROS     CONSTITUTIONAL:  + subjective fever. EYES:  Denies visual changes. HEAD:  Denies headache. ENT:  Denies earache, nasal congestion, sore throat. NECK:  Denies neck pain. RESPIRATORY:  Denies any shortness of breath. CARDIOVASCULAR:  Denies chest pain. + palpitations. GI:  Denies nausea or vomiting. :  Denies urinary symptoms.   MUSCULOSKELETAL:  Denies extremity pain or swelling. BACK:  Denies back pain. INTEGUMENT:  + open chest wound. LYMPHATIC:  Denies lymphadenopathy. NEUROLOGIC:  Denies any numbness/tingling. PSYCHIATRIC:  Denies SI/HI. Past History     Past Medical History:   Diagnosis Date    Acute delirium     Arrhythmia     Arthritis     Asplenia     Asthma     CAD (coronary artery disease)     1st stent at age 46    Cardiomyopathy [facility])     Cerebral artery occlusion with cerebral infarction ([address]     CHF (congestive heart failure) (HCC)     Enlarged liver     GERD (gastroesophageal reflux disease)     H/O echocardiogram 4/6/16    EF 55%, trivial TR & MR, stage 1 diastolic dysfunction    History of blood transfusion     Hx of cardiovascular stress test 12/29/2015    Alessia: EF 45%. Pharmacologic stress myocardial perfusion scan shows a fixed inferior-lateral defect w/ no inducible ischemia. No evidence of ischemia. Inferior-lateral infarction.  Normal LVSF    Hyperlipidemia     Hypertension     Lymphoma ([address]     Denies    MI, old     Movement disorder     MS (multiple sclerosis) ([address]     OP (osteoporosis)     PE (pulmonary embolism)     trapese filter    Pneumonia     Pyelonephritis     Seizures ([address]     Spleen enlarged     Thyroid disease      Past Surgical History:   Procedure Laterality Date    ABDOMEN SURGERY      APPENDECTOMY      CARDIAC DEFIBRILLATOR PLACEMENT      evera [id number] CONDITIONAL    CHOLECYSTECTOMY      COLONOSCOPY      CORONARY ANGIOPLASTY WITH STENT PLACEMENT      3 stents    CORONARY ARTERY BYPASS GRAFT      Age 49    ENDOSCOPY, COLON, DIAGNOSTIC      GASTRIC BYPASS SURGERY      HERNIA REPAIR      HIP SURGERY      HYSTERECTOMY      JOINT REPLACEMENT      PORT SURGERY N/A 6/1/2020    PORT INSERTION performed by Patrice Hope [facility] at [facility] History     Socioeconomic History    Marital status: Single     Spouse name: None    Number of children: None    Years of education: None    Highest education level: None   Occupational History    None   Social Needs    Financial resource strain: None    Food insecurity     Worry: None     Inability: None    Transportation needs     Medical: None     Non-medical: None   Tobacco Use    Smoking status: Never Smoker    Smokeless tobacco: Never Used   Substance and Sexual Activity    Alcohol use: No    Drug use: No    Sexual activity: Not Currently   Lifestyle    Physical activity     Days per week: None     Minutes per session: None    Stress: None   Relationships    Social connections     Talks on phone: None     Gets together: None     Attends Yarsanism service: None     Active member of club or organization: None     Attends meetings of clubs or organizations: None     Relationship status: None    Intimate partner violence     Fear of current or ex partner: None     Emotionally abused: None     Physically abused: None     Forced sexual activity: None   Other Topics Concern    None   Social History Narrative    None       Medications/Allergies     Previous Medications    ACETAMINOPHEN 650 MG TABS    Take 650 mg by mouth every 4 hours as needed    ALBUTEROL (PROVENTIL HFA;VENTOLIN HFA) 108 (90 BASE) MCG/ACT INHALER    Inhale 2 puffs into the lungs every 6 hours as needed.       AMIODARONE (CORDARONE) 200 MG TABLET    Take 200 mg by mouth daily    APIXABAN (ELIQUIS) 5 MG TABS TABLET    Take 5 mg by mouth 2 times daily    ASPIRIN 81 MG TABLET    Take 81 mg by mouth daily     ATORVASTATIN (LIPITOR) 80 MG TABLET    Take 80 mg by mouth nightly    BENZOCAINE-MENTHOL (CEPACOL SORE THROAT) 15-3.6 MG LOZENGE    Take 1 lozenge by mouth every 2 hours as needed for Sore Throat    CLOPIDOGREL (PLAVIX) 75 MG TABLET    Take 75 mg by mouth daily    CYANOCOBALAMIN 1000 MCG/ML INJECTION    INJECT 1 ML INTO THE MUSCLE ON THE FIRST OF EACH MONTH    DOCUSATE SODIUM (COLACE) 100 MG CAPSULE Take 100 mg by mouth 2 times daily as needed for Constipation    DULOXETINE (CYMBALTA) 60 MG EXTENDED RELEASE CAPSULE    Take 1 capsule by mouth daily    FAMOTIDINE (PEPCID) 20 MG TABLET    Take 1 tablet by mouth 2 times daily    FERROUS GLUCONATE (FERGON) 324 (38 FE) MG TABLET    Take 324 mg by mouth 2 times daily     ICOSAPENT ETHYL (VASCEPA) 0.5 G CAPS    Take 500 mg by mouth 2 times daily    IPRATROPIUM-ALBUTEROL (DUONEB) 0.5-2.5 (3) MG/3ML SOLN NEBULIZER SOLUTION    Inhale 3 mLs into the lungs every 4 hours (while awake)    LEVETIRACETAM (KEPPRA) 750 MG TABLET    Take 750 mg by mouth 2 times daily     LEVOTHYROXINE (SYNTHROID) 112 MCG TABLET    Take 2 tablets by mouth Daily    MAGNESIUM OXIDE (MAG-OX) 400 MG TABLET    Take 800 mg by mouth 2 times daily    MIDODRINE (PROAMATINE) 10 MG TABLET    Take 1 tablet by mouth 3 times daily (with meals)    MIRTAZAPINE (REMERON) 30 MG TABLET    Take 30 mg by mouth nightly    NALOXONE 4 MG/0.1ML LIQD NASAL SPRAY    1 spray by Nasal route as needed for Opioid Reversal    NALOXONE 4 MG/0.1ML LIQD NASAL SPRAY    1 spray by Nasal route as needed for Opioid Reversal    NITROGLYCERIN (NITROSTAT) 0.4 MG SL TABLET    up to max of 3 total doses. If no relief after 1 dose, call 911. ONDANSETRON (ZOFRAN) 4 MG TABLET    Take 1 tablet by mouth every 6 hours as needed for Nausea or Vomiting    POTASSIUM CHLORIDE (KLOR-CON M) 20 MEQ EXTENDED RELEASE TABLET    Take 1 tablet by mouth daily Take only if taking torsemide    TORSEMIDE (DEMADEX) 20 MG TABLET    Take 20 mg by mouth daily as needed (For weight gain or fluid retention) Indications: pt takes 2 tablets daily      Allergies   Allergen Reactions    Fentanyl Swelling     Other reaction(s): Angioedema (swelling)    Moxifloxacin Hcl In Nacl Swelling and Rash    Povidone-Iodine Rash     Other reaction(s): AOF      Cyclobenzaprine      Other reaction(s):  Other - comment required  \" it make my muscle very weak because of my MS\"  \" it make my muscle very weak because of my MS\"      Methocarbamol      Worsening edema  Other reaction(s): Other - comment required  Worsening edema  Worsening edema  Worsening edema      Tramadol Other (See Comments)     Doctor doesn't her to take due to heart problems  Seizures   Doctor doesn't her to take due to lowers seizure threshold.  Baclofen     Ibuprofen      \"Due to heart problems\"    Naproxen     Nsaids      \"Due to heart problems\"    Tizanidine     Tolmetin      \"Due to heart problems\"    Tramadol Hcl      Other reaction(s): Other - comment required  Told not to take due to history of seizures    Betadine [Povidone Iodine] Rash    Moxifloxacin Rash and Swelling     Lips swell and tingling in face   Lips swell and tingling in face   Lips swell and tingling in face   Lips swell and tingling in face   Around mouth        Physical Exam       ED Triage Vitals   BP Temp Temp Source Pulse Resp SpO2 Height Weight   09/12/20 2314 09/12/20 2314 09/12/20 2314 09/12/20 2314 09/12/20 2314 09/12/20 2314 09/12/20 2258 09/12/20 2258   118/88 98.2 °F (36.8 °C) Oral 70 16 98 % 5' 6\" (1.676 m) 110 lb (49.9 kg)     GENERAL APPEARANCE:  Well-developed, well-nourished, no acute distress. HEAD:  NC/AT. EYES:  Sclera anicteric. ENT:  Ears, nose, mouth normal.     NECK:  Supple. CARDIO:  RRR. Open wound to the right upper chest with healthy appearing granulation tissue. No surrounding erythema, induration, or fluctuance. There is no active bleeding or drainage from site, even with pressure. Small amount serous drainage on dressing. LUNGS:   CTAB. Respirations unlabored. ABDOMEN:  Soft, non-distended, non-tender. BS active. EXTREMITIES:  No acute deformities. SKIN:  Warm and dry. NEUROLOGICAL:  Alert and oriented. PSYCHIATRIC:  Normal mood.      Diagnostics     Labs:  Results for orders placed or performed during the hospital encounter of 09/12/20   CBC Auto Differential   Result Value Ref Range    WBC 6.4 4.0 - 10.5 K/CU MM    RBC 3.09 (L) 4.2 - 5.4 M/CU MM    Hemoglobin 10.5 (L) 12.5 - 16.0 GM/DL    Hematocrit 33.9 (L) 37 - 47 %    .7 (H) 78 - 100 FL    MCH 34.0 (H) 27 - 31 PG    MCHC 31.0 (L) 32.0 - 36.0 %    RDW 15.1 (H) 11.7 - 14.9 %    Platelets 077 661 - 653 K/CU MM    MPV 9.4 7.5 - 11.1 FL    Differential Type AUTOMATED DIFFERENTIAL     Segs Relative 67.6 (H) 36 - 66 %    Lymphocytes % 15.1 (L) 24 - 44 %    Monocytes % 9.6 (H) 0 - 4 %    Eosinophils % 5.9 (H) 0 - 3 %    Basophils % 1.6 (H) 0 - 1 %    Segs Absolute 4.4 K/CU MM    Lymphocytes Absolute 1.0 K/CU MM    Monocytes Absolute 0.6 K/CU MM    Eosinophils Absolute 0.4 K/CU MM    Basophils Absolute 0.1 K/CU MM    Nucleated RBC % 0.0 %    Total Nucleated RBC 0.0 K/CU MM    Total Immature Neutrophil 0.01 K/CU MM    Immature Neutrophil % 0.2 0 - 0.43 %   Comprehensive Metabolic Panel w/ Reflex to MG   Result Value Ref Range    Sodium 140 135 - 145 MMOL/L    Potassium 3.7 3.5 - 5.1 MMOL/L    Chloride 101 99 - 110 mMol/L    CO2 32 21 - 32 MMOL/L    BUN 10 6 - 23 MG/DL    CREATININE 0.7 0.6 - 1.1 MG/DL    Glucose 84 70 - 99 MG/DL    Calcium 8.9 8.3 - 10.6 MG/DL    Alb 4.1 3.4 - 5.0 GM/DL    Total Protein 6.2 (L) 6.4 - 8.2 GM/DL    Total Bilirubin 0.2 0.0 - 1.0 MG/DL    ALT 14 10 - 40 U/L    AST 23 15 - 37 IU/L    Alkaline Phosphatase 106 40 - 128 IU/L    GFR Non-African American >60 >60 mL/min/1.73m2    GFR African American >60 >60 mL/min/1.73m2    Anion Gap 7 4 - 16   Lipase   Result Value Ref Range    Lipase 11 (L) 13 - 60 IU/L   Troponin   Result Value Ref Range    Troponin T <0.010 <0.01 NG/ML   Brain Natriuretic Peptide   Result Value Ref Range    Pro-.1 (H) <300 PG/ML   Lactic Acid, Plasma   Result Value Ref Range    Lactate 0.6 0.4 - 2.0 mMOL/L   EKG 12 Lead   Result Value Ref Range    Ventricular Rate 67 BPM    Atrial Rate 67 BPM    P-R Interval 158 ms    QRS Duration 84 ms    Q-T Interval 384 ms    QTc Calculation (Bazett) 405 ms    P Axis 4 degrees    R Axis -41 degrees    T Axis -8 degrees    Diagnosis       Normal sinus rhythm  Left axis deviation  Moderate voltage criteria for LVH, may be normal variant  Lateral infarct (cited on or before 12-FEB-2017)  Inferior infarct (cited on or before 12-FEB-2017)  Abnormal ECG  When compared with ECG of 22-AUG-2020 12:17,  No significant change was found         Radiographs:  Xr Chest (2 Vw)    Result Date: 9/13/2020  EXAMINATION: TWO XRAY VIEWS OF THE CHEST 9/12/2020 11:18 pm COMPARISON: Chest radiograph August 22, 2020 HISTORY: ORDERING SYSTEM PROVIDED HISTORY: Pain TECHNOLOGIST PROVIDED HISTORY: Reason for exam:->Pain Reason for Exam: Pain Acuity: Acute Type of Exam: Initial Mechanism of Injury: Pain Relevant Medical/Surgical History: Pain FINDINGS: Median sternotomy wires are noted. The cardiomediastinal silhouette is within normal limits. There is no focal consolidation, pleural effusion, or pneumothorax. There is no evidence of edema. There is evidence of COPD. COPD. No acute pulmonary process. Ct Chest W Contrast    Result Date: 9/13/2020  EXAMINATION: CT OF THE CHEST WITH CONTRAST 9/13/2020 4:33 am TECHNIQUE: CT of the chest was performed with the administration of intravenous contrast. Multiplanar reformatted images are provided for review. Dose modulation, iterative reconstruction, and/or weight based adjustment of the mA/kV was utilized to reduce the radiation dose to as low as reasonably achievable. COMPARISON: Chest radiograph 09/12/2020; CT chest 08/22/2020, 05/29/2020 HISTORY: ORDERING SYSTEM PROVIDED HISTORY: concern for right upper chest wound infection TECHNOLOGIST PROVIDED HISTORY: Reason for exam:->concern for right upper chest wound infection Reason for Exam: Palpitations; Chest Pain Acuity: Acute Type of Exam: Initial FINDINGS: Mediastinum: 14 mm short axis mediastinal lymph nodes. No appreciable hilar lymphadenopathy. Thomasena Dilling Upper normal caliber ascending thoracic aorta. Normal caliber main pulmonary artery. No filling defect within the central pulmonary arterial tree. Mild four-chamber cardiomegaly. Multivessel coronary calcifications status post PCI and CABG. No pericardial effusion. Unremarkable esophagus. Lungs/pleura: No pneumothorax, pleural effusion or consolidative airspace disease. Previously seen ground-glass opacities have resolved. Stable 6 mm triangular peribronchovascular nodule in the right lower lobe. No suspicious pulmonary nodule or mass. Clear central airways. Upper Abdomen: Enhancing round masses in the left hemiabdomen, possibly splenules given lack of in the left upper quadrant spleen. Postsurgical changes along the stomach consistent with Maru-en-Y gastric bypass. Soft Tissues/Bones: Skin thickening and loss of the fat planes overlying the right pectoralis muscle, superiorly however there is no drainable fluid collection, soft tissue gas or rim enhancing abscess. The deep fat planes are preserved. No enlarged axillary supraclavicular lymph nodes. Relatively symmetric skin thickening along the breasts. This is similar to decreased from prior exams. Decreased generalized subcutaneous fat stranding which may reflect improved body wall anasarca. No acute osseous abnormality. MUSCULOSKELETAL Mild skin thickening and fat stranding in the area prior right chest port placement. No soft tissue gas, drainable fluid collection or rim enhancing abscess. Improved body wall anasarca. CARDIOPULMONARY Previously seen bilateral ground-glass opacities have resolved. Stable 6 mm peribronchovascular nodule is favored to represent a benign parenchymal lymph node given location and morphology. See recommendations. Stable mild cardiomegaly. Mild mediastinal lymphadenopathy, presumably reactive. OTHER Probably splenules in the upper abdomen as the spleen is not seen in the left upper quadrant.  RECOMMENDATIONS: The previous CT chest exam recommended a 1 year follow-up for the right lower lobe nodule. Xr Chest Portable    Result Date: 8/23/2020  EXAMINATION: ONE XRAY VIEW OF THE CHEST 8/22/2020 12:06 pm COMPARISON: June 8, 2020 HISTORY: ORDERING SYSTEM PROVIDED HISTORY: right chest wound TECHNOLOGIST PROVIDED HISTORY: Reason for exam:->right chest wound Reason for Exam: right chest wound Acuity: Acute Type of Exam: Initial FINDINGS: The cardiomediastinal silhouette is normal in size. There are stable post sternotomy changes present. Ill-defined opacity present involving the right upper lobe. No pleural effusion or pneumothorax. External artifacts are present. There is subtle, ill-defined asymmetric opacity projecting over the right upper lobe region. This could represent an evolving pneumonia. Radiographic follow-up recommended to assure resolution. Vl Dup Lower Extremity Venous Bilateral    Result Date: 8/23/2020  EXAMINATION: DUPLEX VENOUS ULTRASOUND OF THE BILATERAL LOWER EXTREMITIES, 8/22/2020 12:33 pm TECHNIQUE: Duplex ultrasound and Doppler images were obtained of the bilateral lower extremities COMPARISON: None. HISTORY: ORDERING SYSTEM PROVIDED HISTORY: pain and swelling TECHNOLOGIST PROVIDED HISTORY: Reason for exam:->pain and swelling Reason for Exam: bialteral leg swelling Acuity: Acute Type of Exam: Initial Additional signs and symptoms: leg pain Relevant Medical/Surgical History: nk Initial evaluation, pain and swelling FINDINGS: The visualized veins of the bilateral lower extremities are patent and free of echogenic thrombus. The veins are normally compressible and have normal phasic flow. No evidence of DVT in either lower extremity. Cta Pulmonary W Contrast    Result Date: 8/22/2020  EXAMINATION: CTA OF THE CHEST 8/22/2020 3:48 pm TECHNIQUE: CTA of the chest was performed after the administration of intravenous contrast.  Multiplanar reformatted images are provided for review. MIP images are provided for review.  Dose modulation, iterative reconstruction, and/or weight based adjustment of the mA/kV was utilized to reduce the radiation dose to as low as reasonably achievable. COMPARISON: 12/13/2019 HISTORY: ORDERING SYSTEM PROVIDED HISTORY: chest pain chest wound TECHNOLOGIST PROVIDED HISTORY: Reason for exam:->chest pain chest wound Reason for Exam: chest pain, chest wound Acuity: Unknown Type of Exam: Unknown Additional signs and symptoms: recent port and defibrillator removal FINDINGS: Metallic device external to the patient with extensive streak artifact limiting evaluation of the lung bases and upper abdomen. Pulmonary Arteries: Pulmonary arteries are adequately opacified for evaluation. No evidence of intraluminal filling defect to suggest pulmonary embolism. Main pulmonary artery is normal in caliber. Mediastinum: The heart is enlarged without pericardial effusion. Coronary atherosclerosis. Thoracic aorta is nondilated. No mediastinal adenopathy. Lungs/pleura: There is ground-glass opacity within the right upper lobe and right lower lobe, left upper lobe, and left lower lobe. Small bilateral pleural effusions. No pneumothorax. 6 mm noncalcified right lower lobe nodule. Upper Abdomen: Limited images of the upper abdomen are unremarkable. Soft Tissues/Bones: No acute bone or soft tissue abnormality. 1. No evidence of pulmonary embolism. 2. Bilateral pulmonary ground-glass opacities, suggestive of infection, possibly atypical. 3. Small bilateral pleural effusions. 4. Cardiomegaly. 5. 6 mm noncalcified right lower lobe nodule, unchanged. Consider 1 year follow up for further evaluation. Imaging features can be seen with viral pneumonia, though are nonspecific and can occur with a variety of infectious and noninfectious processes.  PneInd RECOMMENDATIONS: Fleischner Society guidelines for follow-up and management of incidentally detected pulmonary nodules: Single Solid Nodule: Nodule size equals 6-8 mm In a low-risk patient, CT at 6-12 months, then consider CT at 18-24 months. In a high-risk patient, CT at 6-12 months, then CT at 18-24 months. - Low risk patients include individuals with minimal or absent history of smoking and other known risk factors. - High risk patients include individuals with a history or smoking or known risk factors. Radiology 2017 http://pubs. rsna.org/doi/full/10.1148/radiol. 5613255090     ED Course and MDM   -  Patient seen and evaluated in the emergency department. -  Triage and nursing notes reviewed and incorporated. -  Old chart records reviewed and incorporated. -  Supervising physician was Dr. Leda Garcia. Patient was seen independently. -  Work-up included:  See above  -  ED medications:  Morphine, Zofran, Oxycodone, LR infusion  -  Results discussed with patient. Lab work is unremarkable. No leukocytosis, lactic is normal.  We did obtain a wound culture. Delta troponin is negative. Awaiting results of CT chest at the end of my shift. I spoke with [facility] and apparently patient's images were mixed up with another patient with similar name, so this is being remedied. I discussed case with Joel Mulligan PA-C. Please see his note for further details, including final disposition. In light of current events, I did utilize appropriate PPE (including surgical face mask, safety glasses, and gloves, as recommended by the health facility/national standard best practice, during my bedside interactions with the patient. Patient was also masked throughout ED course. Final Impression      1.  Open wound of right chest wall, initial encounter            Kristen [address], [address] [place], [place]  09/13/20 1917

## 2020-09-13 NOTE — ED NOTES
Dressing over wound to rt shoulder area changed m telfa placed and abd pad,      Vannessa Rees, JOSE  09/13/20 0715

## 2020-09-13 NOTE — ED NOTES
Pt aware of nurse change at this time. Care taken over skin w/d.       Tyrone Mabry RN  09/13/20 [id number]

## 2020-09-13 NOTE — ED PROVIDER NOTES
0600  Emerson Emmanuel was checked out to me at bedside by Anand Salgado . Please see his/her initial documentation for details of the patient's ED presentation, physical exam and completed studies. At time of patient signout, CT chest pending. In brief, Emerson Emmanuel presented for chest pain. Patient has wound over right chest related to Mediport and had defibrillator and Mediport removed and was treated with midline IV antibiotics, meropenem for infection. She recently finished this medication. She notes \"green drainage\" from the wound site over the past day or so. Otherwise no redness or swelling that is new. No fevers or constitutional symptoms. REVIEW OF SYSTEMS    Constitutional:  Denies fever, chills, weight loss or weakness   HENT:  Denies sore throat or ear pain   Cardiovascular: See HPI. Respiratory:  Denies cough or shortness of breath    GI:  Denies abdominal pain, nausea, vomiting, or diarrhea  Musculoskeletal:  Denies back pain,   Skin:  Denies rash  Neurologic:  Denies headache, focal weakness or sensory changes   Endocrine:  Denies polyuria or polydypsia   Lymphatic:  Denies swollen glands     All other review of systems are negative  See HPI and nursing notes for additional information       PHYSICAL EXAM    VITAL SIGNS: /65   Pulse 65   Temp 98.2 °F (36.8 °C) (Oral)   Resp 11   Ht 5' 6\" (1.676 m)   Wt 110 lb (49.9 kg)   SpO2 98%   BMI 17.75 kg/m²    Constitutional:  Well developed, Well nourished  HENT:  Normocephalic, Atraumatic, PERRL. EOMI. Sclera clear. Conjunctiva normal, No discharge. Neck/Lymphatics: supple, no JVD, no swollen nodes  Cardiovascular:  RRR  No JVD  There is chronic appearing wound over the right upper chest region consistent with patient history. There is beefy red granulation tissue present without exudate, underlying induration or fluctuance. No warmth. Respiratory:  Nonlabored breathing.   Normal breath sounds, No wheezing  Abdomen: Bowel sounds normal, Soft, No tenderness, no masses. Musculoskeletal:    There is no edema, asymmetry, or calf / thigh tenderness bilaterally. No cyanosis. No cool or pale-appearing limb. Distal cap refill and pulses intact bilateral upper and lower extremities  Bilateral upper and lower extremity ROM intact without pain or obvious deficit  Integument:  Warm, Dry  Neurologic: Alert & oriented , No focal deficits noted. Cranial nerves II through XII grossly intact. Normal gross motor coordination & motor strength bilateral upper and lower extremities  Sensation intact.   Psychiatric:  Affect normal, Mood normal.       I have reviewed and interpreted all of the currently available lab/imaging results from this visit (if applicable):  LABS:  Results for orders placed or performed during the hospital encounter of 09/12/20   CBC Auto Differential   Result Value Ref Range    WBC 6.4 4.0 - 10.5 K/CU MM    RBC 3.09 (L) 4.2 - 5.4 M/CU MM    Hemoglobin 10.5 (L) 12.5 - 16.0 GM/DL    Hematocrit 33.9 (L) 37 - 47 %    .7 (H) 78 - 100 FL    MCH 34.0 (H) 27 - 31 PG    MCHC 31.0 (L) 32.0 - 36.0 %    RDW 15.1 (H) 11.7 - 14.9 %    Platelets 785 078 - 177 K/CU MM    MPV 9.4 7.5 - 11.1 FL    Differential Type AUTOMATED DIFFERENTIAL     Segs Relative 67.6 (H) 36 - 66 %    Lymphocytes % 15.1 (L) 24 - 44 %    Monocytes % 9.6 (H) 0 - 4 %    Eosinophils % 5.9 (H) 0 - 3 %    Basophils % 1.6 (H) 0 - 1 %    Segs Absolute 4.4 K/CU MM    Lymphocytes Absolute 1.0 K/CU MM    Monocytes Absolute 0.6 K/CU MM    Eosinophils Absolute 0.4 K/CU MM    Basophils Absolute 0.1 K/CU MM    Nucleated RBC % 0.0 %    Total Nucleated RBC 0.0 K/CU MM    Total Immature Neutrophil 0.01 K/CU MM    Immature Neutrophil % 0.2 0 - 0.43 %   Comprehensive Metabolic Panel w/ Reflex to MG   Result Value Ref Range    Sodium 140 135 - 145 MMOL/L    Potassium 3.7 3.5 - 5.1 MMOL/L    Chloride 101 99 - 110 mMol/L    CO2 32 21 - 32 MMOL/L    BUN 10 6 - 23 MG/DL    CREATININE 0.7 0.6 - 1.1 MG/DL    Glucose 84 70 - 99 MG/DL    Calcium 8.9 8.3 - 10.6 MG/DL    Alb 4.1 3.4 - 5.0 GM/DL    Total Protein 6.2 (L) 6.4 - 8.2 GM/DL    Total Bilirubin 0.2 0.0 - 1.0 MG/DL    ALT 14 10 - 40 U/L    AST 23 15 - 37 IU/L    Alkaline Phosphatase 106 40 - 128 IU/L    GFR Non-African American >60 >60 mL/min/1.73m2    GFR African American >60 >60 mL/min/1.73m2    Anion Gap 7 4 - 16   Lipase   Result Value Ref Range    Lipase 11 (L) 13 - 60 IU/L   Troponin   Result Value Ref Range    Troponin T <0.010 <0.01 NG/ML   Brain Natriuretic Peptide   Result Value Ref Range    Pro-.1 (H) <300 PG/ML   Urinalysis, reflex to microscopic   Result Value Ref Range    Color, UA YELLOW YELLOW    Clarity, UA HAZY (A) CLEAR    Glucose, Urine NEGATIVE NEGATIVE MG/DL    Bilirubin Urine NEGATIVE NEGATIVE MG/DL    Ketones, Urine NEGATIVE NEGATIVE MG/DL    Specific Gravity, UA 1.012 1.001 - 1.035    Blood, Urine NEGATIVE NEGATIVE    pH, Urine 7.0 5.0 - 8.0    Protein, UA NEGATIVE NEGATIVE MG/DL    Urobilinogen, Urine NORMAL 0.2 - 1.0 MG/DL    Nitrite Urine, Quantitative NEGATIVE NEGATIVE    Leukocyte Esterase, Urine TRACE (A) NEGATIVE    RBC, UA NONE SEEN 0 - 6 /HPF    WBC, UA 2 0 - 5 /HPF    Bacteria, UA NEGATIVE NEGATIVE /HPF    Trichomonas, UA NONE SEEN NONE SEEN /HPF    Amorphous, UA OCCASIONAL /HPF   Lactic Acid, Plasma   Result Value Ref Range    Lactate 0.6 0.4 - 2.0 mMOL/L   Troponin   Result Value Ref Range    Troponin T <0.010 <0.01 NG/ML   EKG 12 Lead   Result Value Ref Range    Ventricular Rate 67 BPM    Atrial Rate 67 BPM    P-R Interval 158 ms    QRS Duration 84 ms    Q-T Interval 384 ms    QTc Calculation (Bazett) 405 ms    P Axis 4 degrees    R Axis -41 degrees    T Axis -8 degrees    Diagnosis       Normal sinus rhythm  Left axis deviation  Moderate voltage criteria for LVH, may be normal variant  Lateral infarct (cited on or before 12-FEB-2017)  Inferior infarct (cited on or before 12-FEB-2017)  Abnormal ECG  When compared with ECG of 22-AUG-2020 12:17,  No significant change was found           IMAGING:  CT CHEST W CONTRAST   Final Result   MUSCULOSKELETAL      Mild skin thickening and fat stranding in the area prior right chest port   placement. No soft tissue gas, drainable fluid collection or rim enhancing   abscess. Improved body wall anasarca. CARDIOPULMONARY      Previously seen bilateral ground-glass opacities have resolved. Stable 6 mm peribronchovascular nodule is favored to represent a benign   parenchymal lymph node given location and morphology. See recommendations. Stable mild cardiomegaly. Mild mediastinal lymphadenopathy, presumably reactive. OTHER      Probably splenules in the upper abdomen as the spleen is not seen in the left   upper quadrant. RECOMMENDATIONS:   The previous CT chest exam recommended a 1 year follow-up for the right lower   lobe nodule. XR CHEST (2 VW)   Preliminary Result   COPD. No acute pulmonary process. ED COURSE & MEDICAL DECISION MAKING        Patient presents as above with chronic wound to right upper chest, related pain. There is no overt signs of infection or abscess on exam today. Additionally, chest CT does not reveal deep space, deep tissue infection or abscess. Wound culture was obtained today-results pending. Given that patient recently finished IV antibiotics, I do not feel further antibiotic therapy is warranted at this time. I reviewed all labs and imaging results today with patient at bedside. I recommend follow with PCP within a day or so for recheck. Wound care discussed. Sterile dressing placed prior to discharge. Of note, delta troponin was obtained throughout ED course and remains negative. I do not suspect underlying CAD, ACS, other underlying cardiopulmonary abnormality is the cause of patient's symptoms today.   Patient to resume chronic pain

## 2020-09-13 NOTE — ED PROVIDER NOTES
As physician-in-triage, I performed a medical screening history and physical exam on this patient. HISTORY OF PRESENT ILLNESS  Jonathan Bustillo is a 62 y.o. female presents emergency department for chest pain. Patient also has a history of nonhealing wound to the right chest.  Patient was recently taken off antibiotics and PICC line was removed. This wound has previously shown MRSA. Lab work, blood cultures, urine culture, chest x-ray was obtained. Results are currently pending. Patient will be seen by physicians at [facility]. PHYSICAL EXAM  Ht 5' 6\" (1.676 m)   Wt 110 lb (49.9 kg)   BMI 17.75 kg/m²     On exam, the patient appears in no acute distress. Speech is clear. Breathing is unlabored. Moves all extremities    Comment: Please note this report has been produced using speech recognition software and may contain errors related to that system including errors in grammar, punctuation, and spelling, as well as words and phrases that may be inappropriate. If there are any questions or concerns please feel free to contact the dictating provider for clarification.      Leilani Simpson,   09/12/20 1451

## 2020-09-13 NOTE — ED NOTES
Report from rozina duarte , care taken over , pt aware , pt up to restroom , pain back up to 7.  Skin w/d pink a/ox4 no distress     Juliano Weller RN  09/13/20 2049

## 2020-09-15 LAB
EKG ATRIAL RATE: 67 BPM
EKG DIAGNOSIS: NORMAL
EKG P AXIS: 4 DEGREES
EKG P-R INTERVAL: 158 MS
EKG Q-T INTERVAL: 384 MS
EKG QRS DURATION: 84 MS
EKG QTC CALCULATION (BAZETT): 405 MS
EKG R AXIS: -41 DEGREES
EKG T AXIS: -8 DEGREES
EKG VENTRICULAR RATE: 67 BPM

## 2020-09-17 LAB
CULTURE: NORMAL
Lab: NORMAL
SPECIMEN: NORMAL

## 2020-09-19 LAB
CULTURE: NORMAL
CULTURE: NORMAL
Lab: NORMAL
Lab: NORMAL
SPECIMEN: NORMAL
SPECIMEN: NORMAL

## 2020-09-25 ENCOUNTER — APPOINTMENT (OUTPATIENT)
Dept: GENERAL RADIOLOGY | Age: 60
DRG: 313 | End: 2020-09-25
Payer: MEDICARE

## 2020-09-25 ENCOUNTER — HOSPITAL ENCOUNTER (INPATIENT)
Age: 60
LOS: 2 days | Discharge: HOME | DRG: 313 | End: 2020-09-30
Attending: FAMILY MEDICINE | Admitting: HOSPITALIST
Payer: MEDICARE

## 2020-09-25 LAB
BASOPHILS ABSOLUTE: 0 K/CU MM
BASOPHILS RELATIVE PERCENT: 0.6 % (ref 0–1)
DIFFERENTIAL TYPE: ABNORMAL
EOSINOPHILS ABSOLUTE: 0 K/CU MM
EOSINOPHILS RELATIVE PERCENT: 0.6 % (ref 0–3)
HCT VFR BLD CALC: 34.8 % (ref 37–47)
HEMOGLOBIN: 11.1 GM/DL (ref 12.5–16)
IMMATURE NEUTROPHIL %: 0.2 % (ref 0–0.43)
LYMPHOCYTES ABSOLUTE: 1 K/CU MM
LYMPHOCYTES RELATIVE PERCENT: 19.3 % (ref 24–44)
MCH RBC QN AUTO: 33.3 PG (ref 27–31)
MCHC RBC AUTO-ENTMCNC: 31.9 % (ref 32–36)
MCV RBC AUTO: 104.5 FL (ref 78–100)
MONOCYTES ABSOLUTE: 0.4 K/CU MM
MONOCYTES RELATIVE PERCENT: 7.2 % (ref 0–4)
NUCLEATED RBC %: 0 %
PDW BLD-RTO: 15.2 % (ref 11.7–14.9)
PLATELET # BLD: 292 K/CU MM (ref 140–440)
PMV BLD AUTO: 9.7 FL (ref 7.5–11.1)
RBC # BLD: 3.33 M/CU MM (ref 4.2–5.4)
SEGMENTED NEUTROPHILS ABSOLUTE COUNT: 3.9 K/CU MM
SEGMENTED NEUTROPHILS RELATIVE PERCENT: 72.1 % (ref 36–66)
TOTAL IMMATURE NEUTOROPHIL: 0.01 K/CU MM
TOTAL NUCLEATED RBC: 0 K/CU MM
WBC # BLD: 5.4 K/CU MM (ref 4–10.5)

## 2020-09-25 PROCEDURE — 96375 TX/PRO/DX INJ NEW DRUG ADDON: CPT

## 2020-09-25 PROCEDURE — 96374 THER/PROPH/DIAG INJ IV PUSH: CPT

## 2020-09-25 PROCEDURE — 96361 HYDRATE IV INFUSION ADD-ON: CPT

## 2020-09-25 PROCEDURE — 83880 ASSAY OF NATRIURETIC PEPTIDE: CPT

## 2020-09-25 PROCEDURE — 84484 ASSAY OF TROPONIN QUANT: CPT

## 2020-09-25 PROCEDURE — 99285 EMERGENCY DEPT VISIT HI MDM: CPT

## 2020-09-25 PROCEDURE — 2580000003 HC RX 258: Performed by: FAMILY MEDICINE

## 2020-09-25 PROCEDURE — 93005 ELECTROCARDIOGRAM TRACING: CPT | Performed by: EMERGENCY MEDICINE

## 2020-09-25 PROCEDURE — 80053 COMPREHEN METABOLIC PANEL: CPT

## 2020-09-25 PROCEDURE — 83690 ASSAY OF LIPASE: CPT

## 2020-09-25 PROCEDURE — 6360000002 HC RX W HCPCS: Performed by: FAMILY MEDICINE

## 2020-09-25 PROCEDURE — 85025 COMPLETE CBC W/AUTO DIFF WBC: CPT

## 2020-09-25 RX ORDER — 0.9 % SODIUM CHLORIDE 0.9 %
1000 INTRAVENOUS SOLUTION INTRAVENOUS ONCE
Status: DISCONTINUED | OUTPATIENT
Start: 2020-09-25 | End: 2020-09-26

## 2020-09-25 RX ORDER — MORPHINE SULFATE 4 MG/ML
4 INJECTION, SOLUTION INTRAMUSCULAR; INTRAVENOUS ONCE
Status: COMPLETED | OUTPATIENT
Start: 2020-09-26 | End: 2020-09-25

## 2020-09-25 RX ADMIN — MORPHINE SULFATE 4 MG: 4 INJECTION, SOLUTION INTRAMUSCULAR; INTRAVENOUS at 23:59

## 2020-09-25 RX ADMIN — SODIUM CHLORIDE 1000 ML: 9 INJECTION, SOLUTION INTRAVENOUS at 23:59

## 2020-09-25 ASSESSMENT — PAIN DESCRIPTION - PAIN TYPE: TYPE: ACUTE PAIN

## 2020-09-25 ASSESSMENT — PAIN DESCRIPTION - LOCATION: LOCATION: CHEST

## 2020-09-25 ASSESSMENT — PAIN SCALES - GENERAL: PAINLEVEL_OUTOF10: 9

## 2020-09-26 ENCOUNTER — APPOINTMENT (OUTPATIENT)
Dept: GENERAL RADIOLOGY | Age: 60
DRG: 313 | End: 2020-09-26
Payer: MEDICARE

## 2020-09-26 LAB
ALBUMIN SERPL-MCNC: 3.6 GM/DL (ref 3.4–5)
ALP BLD-CCNC: 98 IU/L (ref 40–129)
ALT SERPL-CCNC: 10 U/L (ref 10–40)
ANION GAP SERPL CALCULATED.3IONS-SCNC: 16 MMOL/L (ref 4–16)
AST SERPL-CCNC: 16 IU/L (ref 15–37)
BILIRUB SERPL-MCNC: 0.2 MG/DL (ref 0–1)
BUN BLDV-MCNC: 15 MG/DL (ref 6–23)
CALCIUM SERPL-MCNC: 8.8 MG/DL (ref 8.3–10.6)
CHLORIDE BLD-SCNC: 98 MMOL/L (ref 99–110)
CO2: 20 MMOL/L (ref 21–32)
CREAT SERPL-MCNC: 0.5 MG/DL (ref 0.6–1.1)
EKG ATRIAL RATE: 46 BPM
EKG DIAGNOSIS: NORMAL
EKG Q-T INTERVAL: 548 MS
EKG QRS DURATION: 88 MS
EKG QTC CALCULATION (BAZETT): 499 MS
EKG R AXIS: -42 DEGREES
EKG T AXIS: -46 DEGREES
EKG VENTRICULAR RATE: 50 BPM
GFR AFRICAN AMERICAN: >60 ML/MIN/1.73M2
GFR NON-AFRICAN AMERICAN: >60 ML/MIN/1.73M2
GLUCOSE BLD-MCNC: 113 MG/DL (ref 70–99)
LIPASE: 6 IU/L (ref 13–60)
POTASSIUM SERPL-SCNC: 3.4 MMOL/L (ref 3.5–5.1)
PRO-BNP: 1370 PG/ML
SODIUM BLD-SCNC: 134 MMOL/L (ref 135–145)
TOTAL PROTEIN: 5.5 GM/DL (ref 6.4–8.2)
TROPONIN T: <0.01 NG/ML
TROPONIN T: <0.01 NG/ML

## 2020-09-26 PROCEDURE — 94761 N-INVAS EAR/PLS OXIMETRY MLT: CPT

## 2020-09-26 PROCEDURE — 6360000002 HC RX W HCPCS: Performed by: FAMILY MEDICINE

## 2020-09-26 PROCEDURE — 93010 ELECTROCARDIOGRAM REPORT: CPT | Performed by: INTERNAL MEDICINE

## 2020-09-26 PROCEDURE — 36415 COLL VENOUS BLD VENIPUNCTURE: CPT

## 2020-09-26 PROCEDURE — 6370000000 HC RX 637 (ALT 250 FOR IP): Performed by: HOSPITALIST

## 2020-09-26 PROCEDURE — 96375 TX/PRO/DX INJ NEW DRUG ADDON: CPT

## 2020-09-26 PROCEDURE — 96365 THER/PROPH/DIAG IV INF INIT: CPT

## 2020-09-26 PROCEDURE — 93226 XTRNL ECG REC<48 HR SCAN A/R: CPT

## 2020-09-26 PROCEDURE — 96376 TX/PRO/DX INJ SAME DRUG ADON: CPT

## 2020-09-26 PROCEDURE — 84484 ASSAY OF TROPONIN QUANT: CPT

## 2020-09-26 PROCEDURE — G0378 HOSPITAL OBSERVATION PER HR: HCPCS

## 2020-09-26 PROCEDURE — 6370000000 HC RX 637 (ALT 250 FOR IP): Performed by: INTERNAL MEDICINE

## 2020-09-26 PROCEDURE — 93225 XTRNL ECG REC<48 HRS REC: CPT

## 2020-09-26 PROCEDURE — 6370000000 HC RX 637 (ALT 250 FOR IP): Performed by: FAMILY MEDICINE

## 2020-09-26 PROCEDURE — 2580000003 HC RX 258: Performed by: HOSPITALIST

## 2020-09-26 PROCEDURE — 6360000002 HC RX W HCPCS: Performed by: HOSPITALIST

## 2020-09-26 PROCEDURE — 96366 THER/PROPH/DIAG IV INF ADDON: CPT

## 2020-09-26 PROCEDURE — 71045 X-RAY EXAM CHEST 1 VIEW: CPT

## 2020-09-26 PROCEDURE — 99222 1ST HOSP IP/OBS MODERATE 55: CPT | Performed by: INTERNAL MEDICINE

## 2020-09-26 RX ORDER — ATORVASTATIN CALCIUM 80 MG/1
80 TABLET, FILM COATED ORAL NIGHTLY
Status: DISCONTINUED | OUTPATIENT
Start: 2020-09-26 | End: 2020-09-30 | Stop reason: HOSPADM

## 2020-09-26 RX ORDER — POTASSIUM CHLORIDE 20 MEQ/1
40 TABLET, EXTENDED RELEASE ORAL PRN
Status: DISCONTINUED | OUTPATIENT
Start: 2020-09-26 | End: 2020-09-30 | Stop reason: HOSPADM

## 2020-09-26 RX ORDER — POLYETHYLENE GLYCOL 3350 17 G/17G
17 POWDER, FOR SOLUTION ORAL DAILY PRN
Status: DISCONTINUED | OUTPATIENT
Start: 2020-09-26 | End: 2020-09-30 | Stop reason: HOSPADM

## 2020-09-26 RX ORDER — MIDODRINE HYDROCHLORIDE 5 MG/1
10 TABLET ORAL
Status: DISCONTINUED | OUTPATIENT
Start: 2020-09-26 | End: 2020-09-30 | Stop reason: HOSPADM

## 2020-09-26 RX ORDER — ASPIRIN 81 MG/1
81 TABLET, CHEWABLE ORAL DAILY
Status: DISCONTINUED | OUTPATIENT
Start: 2020-09-26 | End: 2020-09-30 | Stop reason: HOSPADM

## 2020-09-26 RX ORDER — FAMOTIDINE 20 MG/1
20 TABLET, FILM COATED ORAL 2 TIMES DAILY
Status: DISCONTINUED | OUTPATIENT
Start: 2020-09-26 | End: 2020-09-30 | Stop reason: HOSPADM

## 2020-09-26 RX ORDER — MAGNESIUM SULFATE IN WATER 40 MG/ML
2 INJECTION, SOLUTION INTRAVENOUS PRN
Status: DISCONTINUED | OUTPATIENT
Start: 2020-09-26 | End: 2020-09-30 | Stop reason: HOSPADM

## 2020-09-26 RX ORDER — PROMETHAZINE HYDROCHLORIDE 25 MG/1
12.5 TABLET ORAL EVERY 6 HOURS PRN
Status: DISCONTINUED | OUTPATIENT
Start: 2020-09-26 | End: 2020-09-30 | Stop reason: HOSPADM

## 2020-09-26 RX ORDER — POTASSIUM CHLORIDE 7.45 MG/ML
10 INJECTION INTRAVENOUS ONCE
Status: COMPLETED | OUTPATIENT
Start: 2020-09-26 | End: 2020-09-26

## 2020-09-26 RX ORDER — ONDANSETRON 2 MG/ML
4 INJECTION INTRAMUSCULAR; INTRAVENOUS EVERY 6 HOURS PRN
Status: DISCONTINUED | OUTPATIENT
Start: 2020-09-26 | End: 2020-09-30 | Stop reason: HOSPADM

## 2020-09-26 RX ORDER — OXYCODONE HYDROCHLORIDE 10 MG/1
20 TABLET ORAL EVERY 8 HOURS PRN
Status: DISCONTINUED | OUTPATIENT
Start: 2020-09-26 | End: 2020-09-30 | Stop reason: HOSPADM

## 2020-09-26 RX ORDER — MORPHINE SULFATE 4 MG/ML
2 INJECTION, SOLUTION INTRAMUSCULAR; INTRAVENOUS ONCE
Status: COMPLETED | OUTPATIENT
Start: 2020-09-26 | End: 2020-09-26

## 2020-09-26 RX ORDER — SODIUM CHLORIDE 0.9 % (FLUSH) 0.9 %
10 SYRINGE (ML) INJECTION EVERY 12 HOURS SCHEDULED
Status: DISCONTINUED | OUTPATIENT
Start: 2020-09-26 | End: 2020-09-30 | Stop reason: HOSPADM

## 2020-09-26 RX ORDER — POTASSIUM CHLORIDE 20 MEQ/1
40 TABLET, EXTENDED RELEASE ORAL ONCE
Status: COMPLETED | OUTPATIENT
Start: 2020-09-26 | End: 2020-09-26

## 2020-09-26 RX ORDER — CLOPIDOGREL BISULFATE 75 MG/1
75 TABLET ORAL DAILY
Status: DISCONTINUED | OUTPATIENT
Start: 2020-09-26 | End: 2020-09-30 | Stop reason: HOSPADM

## 2020-09-26 RX ORDER — AMIODARONE HYDROCHLORIDE 200 MG/1
200 TABLET ORAL DAILY
Status: DISCONTINUED | OUTPATIENT
Start: 2020-09-26 | End: 2020-09-30 | Stop reason: HOSPADM

## 2020-09-26 RX ORDER — ACETAMINOPHEN 650 MG/1
650 SUPPOSITORY RECTAL EVERY 6 HOURS PRN
Status: DISCONTINUED | OUTPATIENT
Start: 2020-09-26 | End: 2020-09-30 | Stop reason: HOSPADM

## 2020-09-26 RX ORDER — SODIUM CHLORIDE 0.9 % (FLUSH) 0.9 %
10 SYRINGE (ML) INJECTION PRN
Status: DISCONTINUED | OUTPATIENT
Start: 2020-09-26 | End: 2020-09-30 | Stop reason: HOSPADM

## 2020-09-26 RX ORDER — ACETAMINOPHEN 325 MG/1
650 TABLET ORAL EVERY 6 HOURS PRN
Status: DISCONTINUED | OUTPATIENT
Start: 2020-09-26 | End: 2020-09-30 | Stop reason: HOSPADM

## 2020-09-26 RX ORDER — POTASSIUM CHLORIDE 7.45 MG/ML
10 INJECTION INTRAVENOUS PRN
Status: DISCONTINUED | OUTPATIENT
Start: 2020-09-26 | End: 2020-09-30 | Stop reason: HOSPADM

## 2020-09-26 RX ORDER — NITROGLYCERIN 0.4 MG/1
0.4 TABLET SUBLINGUAL EVERY 5 MIN PRN
Status: DISCONTINUED | OUTPATIENT
Start: 2020-09-26 | End: 2020-09-30 | Stop reason: HOSPADM

## 2020-09-26 RX ORDER — LEVOTHYROXINE SODIUM 0.15 MG/1
150 TABLET ORAL DAILY
Status: DISCONTINUED | OUTPATIENT
Start: 2020-09-26 | End: 2020-09-30 | Stop reason: HOSPADM

## 2020-09-26 RX ORDER — ONDANSETRON 2 MG/ML
4 INJECTION INTRAMUSCULAR; INTRAVENOUS ONCE
Status: COMPLETED | OUTPATIENT
Start: 2020-09-26 | End: 2020-09-26

## 2020-09-26 RX ORDER — DULOXETIN HYDROCHLORIDE 30 MG/1
60 CAPSULE, DELAYED RELEASE ORAL DAILY
Status: DISCONTINUED | OUTPATIENT
Start: 2020-09-26 | End: 2020-09-30 | Stop reason: HOSPADM

## 2020-09-26 RX ORDER — OXYCODONE HYDROCHLORIDE 10 MG/1
20 TABLET ORAL
Status: DISCONTINUED | OUTPATIENT
Start: 2020-09-26 | End: 2020-09-30 | Stop reason: HOSPADM

## 2020-09-26 RX ORDER — MIRTAZAPINE 15 MG/1
30 TABLET, FILM COATED ORAL NIGHTLY
Status: DISCONTINUED | OUTPATIENT
Start: 2020-09-26 | End: 2020-09-30 | Stop reason: HOSPADM

## 2020-09-26 RX ORDER — ALBUTEROL SULFATE 90 UG/1
2 AEROSOL, METERED RESPIRATORY (INHALATION) EVERY 6 HOURS PRN
Status: DISCONTINUED | OUTPATIENT
Start: 2020-09-26 | End: 2020-09-30 | Stop reason: HOSPADM

## 2020-09-26 RX ORDER — OXYCODONE HYDROCHLORIDE 10 MG/1
10 TABLET ORAL EVERY 4 HOURS PRN
Status: DISCONTINUED | OUTPATIENT
Start: 2020-09-26 | End: 2020-09-26

## 2020-09-26 RX ADMIN — OXYCODONE HYDROCHLORIDE 20 MG: 10 TABLET ORAL at 23:58

## 2020-09-26 RX ADMIN — FAMOTIDINE 20 MG: 20 TABLET ORAL at 20:39

## 2020-09-26 RX ADMIN — MIRTAZAPINE 30 MG: 15 TABLET, FILM COATED ORAL at 20:38

## 2020-09-26 RX ADMIN — CLOPIDOGREL BISULFATE 75 MG: 75 TABLET ORAL at 09:38

## 2020-09-26 RX ADMIN — DULOXETINE HYDROCHLORIDE 60 MG: 30 CAPSULE, DELAYED RELEASE ORAL at 09:38

## 2020-09-26 RX ADMIN — OXYCODONE HYDROCHLORIDE 20 MG: 10 TABLET ORAL at 16:08

## 2020-09-26 RX ADMIN — FAMOTIDINE 20 MG: 20 TABLET ORAL at 09:38

## 2020-09-26 RX ADMIN — POTASSIUM CHLORIDE 40 MEQ: 20 TABLET, EXTENDED RELEASE ORAL at 00:56

## 2020-09-26 RX ADMIN — LEVETIRACETAM 750 MG: 500 TABLET, FILM COATED ORAL at 09:38

## 2020-09-26 RX ADMIN — LEVOTHYROXINE SODIUM 150 MCG: 150 TABLET ORAL at 06:26

## 2020-09-26 RX ADMIN — ACETAMINOPHEN 650 MG: 325 TABLET ORAL at 20:37

## 2020-09-26 RX ADMIN — OXYCODONE HYDROCHLORIDE 20 MG: 10 TABLET ORAL at 06:26

## 2020-09-26 RX ADMIN — ASPIRIN 81 MG CHEWABLE TABLET 81 MG: 81 TABLET CHEWABLE at 09:38

## 2020-09-26 RX ADMIN — ONDANSETRON 4 MG: 2 INJECTION INTRAMUSCULAR; INTRAVENOUS at 00:57

## 2020-09-26 RX ADMIN — SODIUM CHLORIDE, PRESERVATIVE FREE 10 ML: 5 INJECTION INTRAVENOUS at 20:39

## 2020-09-26 RX ADMIN — POTASSIUM CHLORIDE 10 MEQ: 7.46 INJECTION, SOLUTION INTRAVENOUS at 00:57

## 2020-09-26 RX ADMIN — MIDODRINE HYDROCHLORIDE 10 MG: 5 TABLET ORAL at 06:26

## 2020-09-26 RX ADMIN — MIDODRINE HYDROCHLORIDE 10 MG: 5 TABLET ORAL at 11:29

## 2020-09-26 RX ADMIN — ONDANSETRON 4 MG: 2 INJECTION INTRAMUSCULAR; INTRAVENOUS at 18:26

## 2020-09-26 RX ADMIN — ATORVASTATIN CALCIUM 80 MG: 80 TABLET, FILM COATED ORAL at 20:38

## 2020-09-26 RX ADMIN — MIDODRINE HYDROCHLORIDE 10 MG: 5 TABLET ORAL at 16:08

## 2020-09-26 RX ADMIN — APIXABAN 5 MG: 5 TABLET, FILM COATED ORAL at 20:37

## 2020-09-26 RX ADMIN — OXYCODONE HYDROCHLORIDE 20 MG: 10 TABLET ORAL at 12:58

## 2020-09-26 RX ADMIN — LEVETIRACETAM 750 MG: 500 TABLET, FILM COATED ORAL at 20:37

## 2020-09-26 RX ADMIN — AMIODARONE HYDROCHLORIDE 200 MG: 200 TABLET ORAL at 09:38

## 2020-09-26 RX ADMIN — APIXABAN 5 MG: 5 TABLET, FILM COATED ORAL at 09:38

## 2020-09-26 RX ADMIN — OXYCODONE HYDROCHLORIDE 20 MG: 10 TABLET ORAL at 09:48

## 2020-09-26 RX ADMIN — MORPHINE SULFATE 2 MG: 4 INJECTION, SOLUTION INTRAMUSCULAR; INTRAVENOUS at 00:56

## 2020-09-26 RX ADMIN — OXYCODONE HYDROCHLORIDE 20 MG: 10 TABLET ORAL at 20:38

## 2020-09-26 ASSESSMENT — ENCOUNTER SYMPTOMS
BLOOD IN STOOL: 0
RHINORRHEA: 0
VOMITING: 1
COUGH: 0
ABDOMINAL PAIN: 0
SHORTNESS OF BREATH: 1
DIARRHEA: 0
NAUSEA: 1
WHEEZING: 0
EYE ITCHING: 0
SORE THROAT: 0

## 2020-09-26 ASSESSMENT — PAIN DESCRIPTION - ONSET
ONSET: ON-GOING

## 2020-09-26 ASSESSMENT — PAIN SCALES - GENERAL
PAINLEVEL_OUTOF10: 8
PAINLEVEL_OUTOF10: 9
PAINLEVEL_OUTOF10: 9
PAINLEVEL_OUTOF10: 0
PAINLEVEL_OUTOF10: 0
PAINLEVEL_OUTOF10: 8

## 2020-09-26 ASSESSMENT — PAIN DESCRIPTION - LOCATION
LOCATION: CHEST;HIP;KNEE
LOCATION: GENERALIZED

## 2020-09-26 ASSESSMENT — PAIN DESCRIPTION - FREQUENCY
FREQUENCY: INTERMITTENT

## 2020-09-26 ASSESSMENT — PAIN - FUNCTIONAL ASSESSMENT: PAIN_FUNCTIONAL_ASSESSMENT: PREVENTS OR INTERFERES SOME ACTIVE ACTIVITIES AND ADLS

## 2020-09-26 ASSESSMENT — PAIN DESCRIPTION - PAIN TYPE
TYPE: ACUTE PAIN

## 2020-09-26 ASSESSMENT — PAIN DESCRIPTION - ORIENTATION
ORIENTATION: RIGHT;LEFT

## 2020-09-26 ASSESSMENT — PAIN DESCRIPTION - DESCRIPTORS
DESCRIPTORS: ACHING;DISCOMFORT
DESCRIPTORS: SHARP;DISCOMFORT

## 2020-09-26 ASSESSMENT — PAIN DESCRIPTION - PROGRESSION
CLINICAL_PROGRESSION: NOT CHANGED

## 2020-09-26 NOTE — PLAN OF CARE
Plan of care reviewed with Britney Noyola and patient is requesting physician notification regarding pain medication. No other concerns voiced.

## 2020-09-26 NOTE — PROGRESS NOTES
meet at least  50% of estimated nutrient needs with po diet and supplement during los       Nutrition Monitoring and Evaluation:   Food/Nutrient Intake Outcomes:  Food and Nutrient Intake, Supplement Intake  Physical Signs/Symptoms Outcomes:  Biochemical Data, Fluid Status or Edema, Nutrition Focused Physical Findings, Skin, Weight     Discharge Planning:     Too soon to determine     Electronically signed by Flavia Moran RD, LD on 9/26/20 at 10:51 AM EDT    Contact: [phone]

## 2020-09-26 NOTE — ED PROVIDER NOTES
Triage Chief Complaint:   Chest Pain    [place]:  Angie Hansen is a 62 y.o. female that presents with left-sided chest pain that radiates to the left shoulder that started this evening. Patient states she was in her normal state of health with no fevers chills nausea vomiting diaphoresis or constipation and no coryza or infectious symptoms prior to onset. Patient tried multiple nitros with some relief at home but presents because of continued pain. Patient denies shortness of breath. She denies lower extremity edema. She denies medication noncompliance. Past medical history is reviewed and quite complicated. ROS:  General:  No fevers, no chills, no weakness  Eyes:  No recent vison changes, no discharge  ENT:  No sore throat, no nasal congestion, no hearing changes  Cardiovascular: As above  Respiratory:  No shortness of breath, no cough, no wheezing  Gastrointestinal:  No pain, no nausea, no vomiting, no diarrhea  Musculoskeletal:  No muscle pain, no joint pain  Skin:  No rash, no pruritis, no easy bruising  Neurologic:  No speech problems, no headache, no extremity numbness, no extremity tingling, no extremity weakness  Psychiatric:  No anxiety, no hallucinations or delusions, no suicidal or homicidal ideation  Genitourinary:  No dysuria, no hematuria  Endocrine:  No unexpected weight gain, no unexpected weight loss  Extremities:  no edema, no pain    Past Medical History:   Diagnosis Date    Acute delirium     Arrhythmia     Arthritis     Asplenia     Asthma     CAD (coronary artery disease)     1st stent at age 46    Cardiomyopathy [facility])     Cerebral artery occlusion with cerebral infarction [facility] [address]     CHF (congestive heart failure) ([facility] [address]     Enlarged liver     GERD (gastroesophageal reflux disease)     H/O echocardiogram 4/6/16    EF 55%, trivial TR & MR, stage 1 diastolic dysfunction    History of blood transfusion     Hx of cardiovascular stress test 12/29/2015    Alessia: EF 45%. Pharmacologic stress myocardial perfusion scan shows a fixed inferior-lateral defect w/ no inducible ischemia. No evidence of ischemia. Inferior-lateral infarction.  Normal LVSF    Hyperlipidemia     Hypertension     Lymphoma ([facility] [address]     Denies    MI, old     Movement disorder     MS (multiple sclerosis) ([facility] [address] [facility]     OP (osteoporosis)     PE (pulmonary embolism)     trapese filter    Pneumonia     Pyelonephritis     Seizures ([facility] [address]     Spleen enlarged     Thyroid disease      Past Surgical History:   Procedure Laterality Date    ABDOMEN SURGERY      APPENDECTOMY      CARDIAC DEFIBRILLATOR PLACEMENT      evera [id number] CONDITIONAL    CHOLECYSTECTOMY      COLONOSCOPY      CORONARY ANGIOPLASTY WITH STENT PLACEMENT      3 stents    CORONARY ARTERY BYPASS GRAFT      Age 49    ENDOSCOPY, COLON, DIAGNOSTIC      GASTRIC BYPASS SURGERY      HERNIA REPAIR      HIP SURGERY      HYSTERECTOMY      JOINT REPLACEMENT      PORT SURGERY N/A 6/1/2020    PORT INSERTION performed by Julienne Coleman MD at [address] [place] [address]      VASCULAR SURGERY       Family History   Problem Relation Age of Onset    High Blood Pressure Mother     High Cholesterol Mother     Heart Disease Mother     Diabetes Mother     Heart Disease Father     Cancer Father     Other Sister         Multiple Sclerosis    Heart Disease Maternal Grandmother     High Blood Pressure Maternal Grandmother     High Cholesterol Maternal Grandmother      Social History     Socioeconomic History    Marital status: Single     Spouse name: Not on file    Number of children: Not on file    Years of education: Not on file    Highest education level: Not on file   Occupational History    Not on file   Social Needs    Financial resource strain: Not on file    Food insecurity     Worry: Not on file     Inability: Not on file    Transportation needs     Medical: Not on file     Non-medical: Not on file Tobacco Use    Smoking status: Never Smoker    Smokeless tobacco: Never Used   Substance and Sexual Activity    Alcohol use: No    Drug use: No    Sexual activity: Not Currently   Lifestyle    Physical activity     Days per week: Not on file     Minutes per session: Not on file    Stress: Not on file   Relationships    Social connections     Talks on phone: Not on file     Gets together: Not on file     Attends [employer] service: Not on file     Active member of club or organization: Not on file     Attends meetings of clubs or organizations: Not on file     Relationship status: Not on file    Intimate partner violence     Fear of current or ex partner: Not on file     Emotionally abused: Not on file     Physically abused: Not on file     Forced sexual activity: Not on file   Other Topics Concern    Not on file   Social History Narrative    Not on file     Current Facility-Administered Medications   Medication Dose Route Frequency Provider Last Rate Last Dose    ondansetron (ZOFRAN) injection 4 mg  4 mg Intravenous Once Jessy Andrea MD        morphine sulfate (PF) injection 2 mg  2 mg Intravenous Once Jessy Andrea MD        potassium chloride (KLOR-CON M) extended release tablet 40 mEq  40 mEq Oral Once Jessy Andrea MD        potassium chloride 10 mEq/100 mL IVPB (Peripheral Line)  10 mEq Intravenous Once Jessy Andrea MD         Current Outpatient Medications   Medication Sig Dispense Refill    naloxone 4 MG/0.1ML LIQD nasal spray 1 spray by Nasal route as needed for Opioid Reversal 1 each 5    potassium chloride (KLOR-CON M) 20 MEQ extended release tablet Take 1 tablet by mouth daily Take only if taking torsemide 60 tablet 3    midodrine (PROAMATINE) 10 MG tablet Take 1 tablet by mouth 3 times daily (with meals) 90 tablet 0    mirtazapine (REMERON) 30 MG tablet Take 30 mg by mouth nightly      levothyroxine (SYNTHROID) 112 MCG tablet Take 2 tablets by mouth Daily (Patient taking differently: Take 150 mcg by mouth Daily ) 30 tablet 1    ipratropium-albuterol (DUONEB) 0.5-2.5 (3) MG/3ML SOLN nebulizer solution Inhale 3 mLs into the lungs every 4 hours (while awake) 360 mL 0    nitroGLYCERIN (NITROSTAT) 0.4 MG SL tablet up to max of 3 total doses.  If no relief after 1 dose, call 911. 25 tablet 3    naloxone 4 MG/0.1ML LIQD nasal spray 1 spray by Nasal route as needed for Opioid Reversal (Patient taking differently: 1 spray by Nasal route as needed for Opioid Reversal 06/08/2020 Patient states she has never used this) 1 each 5    benzocaine-menthol (CEPACOL SORE THROAT) 15-3.6 MG lozenge Take 1 lozenge by mouth every 2 hours as needed for Sore Throat 18 lozenge 1    cyanocobalamin 1000 MCG/ML injection INJECT 1 ML INTO THE MUSCLE ON THE FIRST OF EACH MONTH  11    torsemide (DEMADEX) 20 MG tablet Take 20 mg by mouth daily as needed (For weight gain or fluid retention) Indications: pt takes 2 tablets daily       Icosapent Ethyl (VASCEPA) 0.5 g CAPS Take 500 mg by mouth 2 times daily 180 capsule 2    amiodarone (CORDARONE) 200 MG tablet Take 200 mg by mouth daily      apixaban (ELIQUIS) 5 MG TABS tablet Take 5 mg by mouth 2 times daily      DULoxetine (CYMBALTA) 60 MG extended release capsule Take 1 capsule by mouth daily 30 capsule 3    ondansetron (ZOFRAN) 4 MG tablet Take 1 tablet by mouth every 6 hours as needed for Nausea or Vomiting 20 tablet 0    magnesium oxide (MAG-OX) 400 MG tablet Take 800 mg by mouth 2 times daily      docusate sodium (COLACE) 100 MG capsule Take 100 mg by mouth 2 times daily as needed for Constipation      famotidine (PEPCID) 20 MG tablet Take 1 tablet by mouth 2 times daily 60 tablet 3    levETIRAcetam (KEPPRA) 750 MG tablet Take 750 mg by mouth 2 times daily       atorvastatin (LIPITOR) 80 MG tablet Take 80 mg by mouth nightly      clopidogrel (PLAVIX) 75 MG tablet Take 75 mg by mouth daily      acetaminophen 650 MG TABS Take 650 mg by mouth every 4 hours as needed 120 tablet 3    ferrous gluconate (FERGON) 324 (38 FE) MG tablet Take 324 mg by mouth 2 times daily       aspirin 81 MG tablet Take 81 mg by mouth daily       albuterol (PROVENTIL HFA;VENTOLIN HFA) 108 (90 BASE) MCG/ACT inhaler Inhale 2 puffs into the lungs every 6 hours as needed. Allergies   Allergen Reactions    Fentanyl Swelling     Other reaction(s): Angioedema (swelling)    Moxifloxacin Hcl In Nacl Swelling and Rash    Povidone-Iodine Rash     Other reaction(s): AOF      Cyclobenzaprine      Other reaction(s): Other - comment required  \" it make my muscle very weak because of my MS\"  \" it make my muscle very weak because of my MS\"      Methocarbamol      Worsening edema  Other reaction(s): Other - comment required  Worsening edema  Worsening edema  Worsening edema      Tramadol Other (See Comments)     Doctor doesn't her to take due to heart problems  Seizures   Doctor doesn't her to take due to lowers seizure threshold.  Baclofen     Ibuprofen      \"Due to heart problems\"    Naproxen     Nsaids      \"Due to heart problems\"    Tizanidine     Tolmetin      \"Due to heart problems\"    Tramadol Hcl      Other reaction(s):  Other - comment required  Told not to take due to history of seizures    Betadine [Povidone Iodine] Rash    Moxifloxacin Rash and Swelling     Lips swell and tingling in face   Lips swell and tingling in face   Lips swell and tingling in face   Lips swell and tingling in face   Around mouth       Nursing Notes Reviewed    Physical Exam:  ED Triage Vitals   Enc Vitals Group      BP 09/25/20 2310 119/66      Pulse 09/25/20 2346 89      Resp 09/25/20 2346 19      Temp 09/25/20 2346 98.1 °F (36.7 °C)      Temp src --       SpO2 09/25/20 2310 100 %      Weight 09/25/20 2346 110 lb (49.9 kg)      Height 09/25/20 2346 5' 6\" (1.676 m)      Head Circumference --       Peak Flow --       Pain Score --       Pain Loc --       Pain Edu? -- Excl. in GC? --          GENERAL APPEARANCE: Awake and alert. Cooperative. No acute distress. HEAD: Normocephalic. Atraumatic. EYES: EOM's grossly intact. Sclera anicteric. ENT: Mucous membranes are moist. Tolerates saliva. No trismus. NECK: Supple. No meningismus. Trachea midline. HEART: Rate of cardiac at 50 but normal blood pressure and mentating normally. Radial pulses 2+. Capillary refill less than 2 seconds  LUNGS: Respirations unlabored. CTAB. I do not appreciate rales at the bases  ABDOMEN: Soft. Non-tender. No guarding or rebound. EXTREMITIES: No acute deformities. There is no edema  SKIN: Warm and dry. NEUROLOGICAL: No gross facial drooping. Moves all 4 extremities spontaneously. PSYCHIATRIC: Normal mood.     I have reviewed and interpreted all of the currently available lab results from this visit (if applicable):  Results for orders placed or performed during the hospital encounter of 09/25/20   CBC Auto Differential   Result Value Ref Range    WBC 5.4 4.0 - 10.5 K/CU MM    RBC 3.33 (L) 4.2 - 5.4 M/CU MM    Hemoglobin 11.1 (L) 12.5 - 16.0 GM/DL    Hematocrit 34.8 (L) 37 - 47 %    .5 (H) 78 - 100 FL    MCH 33.3 (H) 27 - 31 PG    MCHC 31.9 (L) 32.0 - 36.0 %    RDW 15.2 (H) 11.7 - 14.9 %    Platelets 167 531 - 817 K/CU MM    MPV 9.7 7.5 - 11.1 FL    Differential Type AUTOMATED DIFFERENTIAL     Segs Relative 72.1 (H) 36 - 66 %    Lymphocytes % 19.3 (L) 24 - 44 %    Monocytes % 7.2 (H) 0 - 4 %    Eosinophils % 0.6 0 - 3 %    Basophils % 0.6 0 - 1 %    Segs Absolute 3.9 K/CU MM    Lymphocytes Absolute 1.0 K/CU MM    Monocytes Absolute 0.4 K/CU MM    Eosinophils Absolute 0.0 K/CU MM    Basophils Absolute 0.0 K/CU MM    Nucleated RBC % 0.0 %    Total Nucleated RBC 0.0 K/CU MM    Total Immature Neutrophil 0.01 K/CU MM    Immature Neutrophil % 0.2 0 - 0.43 %   Comprehensive Metabolic Panel   Result Value Ref Range    Sodium 134 (L) 135 - 145 MMOL/L    Potassium 3.4 (L) 3.5 - 5.1 MMOL/L    Chloride 98 (L) 99 - 110 mMol/L    CO2 20 (L) 21 - 32 MMOL/L    BUN 15 6 - 23 MG/DL    CREATININE 0.5 (L) 0.6 - 1.1 MG/DL    Glucose 113 (H) 70 - 99 MG/DL    Calcium 8.8 8.3 - 10.6 MG/DL    Alb 3.6 3.4 - 5.0 GM/DL    Total Protein 5.5 (L) 6.4 - 8.2 GM/DL    Total Bilirubin 0.2 0.0 - 1.0 MG/DL    ALT 10 10 - 40 U/L    AST 16 15 - 37 IU/L    Alkaline Phosphatase 98 40 - 129 IU/L    GFR Non-African American >60 >60 mL/min/1.73m2    GFR African American >60 >60 mL/min/1.73m2    Anion Gap 16 4 - 16   Lipase   Result Value Ref Range    Lipase 6 (L) 13 - 60 IU/L   Brain Natriuretic Peptide   Result Value Ref Range    Pro-BNP 1,370 (H) <300 PG/ML   Troponin   Result Value Ref Range    Troponin T <0.010 <0.01 NG/ML      Radiographs (if obtained):  [] The following radiograph was interpreted by myself in the absence of a radiologist:   [] Radiologist's Report Reviewed:  XR CHEST PORTABLE   Final Result   No acute abnormality identified. EKG (if obtained): (All EKG's are interpreted by myself in the absence of a cardiologist)    Chart review shows recent radiographs:  Xr Chest (2 Vw)    Result Date: 9/13/2020  EXAMINATION: TWO XRAY VIEWS OF THE CHEST 9/12/2020 11:18 pm COMPARISON: Chest radiograph August 22, 2020 HISTORY: ORDERING SYSTEM PROVIDED HISTORY: Pain TECHNOLOGIST PROVIDED HISTORY: Reason for exam:->Pain Reason for Exam: Pain Acuity: Acute Type of Exam: Initial Mechanism of Injury: Pain Relevant Medical/Surgical History: Pain FINDINGS: Median sternotomy wires are noted. The cardiomediastinal silhouette is within normal limits. There is no focal consolidation, pleural effusion, or pneumothorax. There is no evidence of edema. There is evidence of COPD. COPD. No acute pulmonary process.      Ct Chest W Contrast    Result Date: 9/13/2020  EXAMINATION: CT OF THE CHEST WITH CONTRAST 9/13/2020 4:33 am TECHNIQUE: CT of the chest was performed with the administration of intravenous contrast. Multiplanar reformatted images are provided for review. Dose modulation, iterative reconstruction, and/or weight based adjustment of the mA/kV was utilized to reduce the radiation dose to as low as reasonably achievable. COMPARISON: Chest radiograph 09/12/2020; CT chest 08/22/2020, 05/29/2020 HISTORY: ORDERING SYSTEM PROVIDED HISTORY: concern for right upper chest wound infection TECHNOLOGIST PROVIDED HISTORY: Reason for exam:->concern for right upper chest wound infection Reason for Exam: Palpitations; Chest Pain Acuity: Acute Type of Exam: Initial FINDINGS: Mediastinum: 14 mm short axis mediastinal lymph nodes. No appreciable hilar lymphadenopathy. Blanchie Bevels Upper normal caliber ascending thoracic aorta. Normal caliber main pulmonary artery. No filling defect within the central pulmonary arterial tree. Mild four-chamber cardiomegaly. Multivessel coronary calcifications status post PCI and CABG. No pericardial effusion. Unremarkable esophagus. Lungs/pleura: No pneumothorax, pleural effusion or consolidative airspace disease. Previously seen ground-glass opacities have resolved. Stable 6 mm triangular peribronchovascular nodule in the right lower lobe. No suspicious pulmonary nodule or mass. Clear central airways. Upper Abdomen: Enhancing round masses in the left hemiabdomen, possibly splenules given lack of in the left upper quadrant spleen. Postsurgical changes along the stomach consistent with Maru-en-Y gastric bypass. Soft Tissues/Bones: Skin thickening and loss of the fat planes overlying the right pectoralis muscle, superiorly however there is no drainable fluid collection, soft tissue gas or rim enhancing abscess. The deep fat planes are preserved. No enlarged axillary supraclavicular lymph nodes. Relatively symmetric skin thickening along the breasts. This is similar to decreased from prior exams. Decreased generalized subcutaneous fat stranding which may reflect improved body wall anasarca.   No acute osseous abnormality. MUSCULOSKELETAL Mild skin thickening and fat stranding in the area prior right chest port placement. No soft tissue gas, drainable fluid collection or rim enhancing abscess. Improved body wall anasarca. CARDIOPULMONARY Previously seen bilateral ground-glass opacities have resolved. Stable 6 mm peribronchovascular nodule is favored to represent a benign parenchymal lymph node given location and morphology. See recommendations. Stable mild cardiomegaly. Mild mediastinal lymphadenopathy, presumably reactive. OTHER Probably splenules in the upper abdomen as the spleen is not seen in the left upper quadrant. RECOMMENDATIONS: The previous CT chest exam recommended a 1 year follow-up for the right lower lobe nodule. Xr Chest Portable    Result Date: 9/26/2020  EXAMINATION: ONE XRAY VIEW OF THE CHEST 9/25/2020 8:36 pm COMPARISON: 09/12/2020 HISTORY: ORDERING SYSTEM PROVIDED HISTORY: chest pain TECHNOLOGIST PROVIDED HISTORY: Reason for exam:->chest pain Reason for Exam: chest pain Acuity: Unknown Type of Exam: Initial FINDINGS: Cardial pericardial silhouette is stable in appearance. Chronic interstitial opacities are identified. No acute focal infiltrate is seen. No pneumothorax is seen. No free air. No acute bony abnormality. No acute abnormality identified. MDM:  64-year-old female with acute on chronic chest pain. Is left-sided. Patient with a right sided chest wound although states that this is not where the pain is. Her past medical history is complicated secondary to cardiomyopathy as well as previous need for pacemaker and currently because of MRSA infection earlier this year is without a defibrillator or pacemaker in place. She presents with chest pain. EKG abnormal but no evidence of ST elevation MI. Heart rate 50 but blood pressure maintaining under adequate level.   500 cc fluid bolus given carefully in the setting of known cardiomyopathy although clinically patient does appear to be dry. Renal function normal.  Potassium decreased and this is been replenished with oral and IV. Pain treated with morphine. Patient states \"normally when I get pain [facility] gives me 4 to 8 mg of morphine it makes my pain better\". I am unable to get aspirin secondary to anaphylaxis. Nitro was not indicated. On repeat evaluation patient is resting comfortably and hemodynamically stable. She will require admission for continued trending of heart rate, blood pressures, cardiology consultation to discuss continue evaluation of potential pacemaker. No indication for external pacing or atropine or dopamine at this time. Clinical Impression:  1. Acute chest pain    2. Bradycardia      Disposition referral (if applicable):  No follow-up provider specified. Disposition medications (if applicable):  New Prescriptions    No medications on file       Comment: Please note this report has been produced using speech recognition software and may contain errors related to that system including errors in grammar, punctuation, and spelling, as well as words and phrases that may be inappropriate. If there are any questions or concerns please feel free to contact the dictating provider for clarification.       Renaldo Fay MD  09/26/20 1976

## 2020-09-26 NOTE — CONSULTS
Chart reviewed  Full note to follow                      Name:  Richie Callahan /Age/Sex: 1960  (62 y.o. female)   MRN & CSN:  [id number] & [id number] Admission Date/Time: 2020 11:04 PM   Location:  [address] [place] [address]- PCP: Tasha Malik, [facility] Day: 2          Referring physician:  Ana Olmos MD         Reason for consultation:  CP, dizzy        Thanks for referral.    Information source: patient    CC;  CP      HPI:   Thank you for involving me in taking  care of Richie Callahan who  is a 62 y. o.year  Old female  Presents with  H/o CAD, PCIs, EF 35%, had ICD , explanted due to staph, at [address] [place] [address],    HTN, hyperlipidimea, now  With recurrent episodes of palpitations. She also had  Severe  crushing pain over the whole chest.  Chest pain then started to radiate to her left arm and up to her neck and ear. She believes that she had passed out. She felt weak and dizzy. Later on at night she started sweating and having vomiting. She took 6 nitro tablets which helped for only about 15 minutes. Here initial trops are OK. She has been in hospital multiple times. With chest pain            Past medical history:    has a past medical history of Acute delirium, Arrhythmia, Arthritis, Asplenia, Asthma, CAD (coronary artery disease), Cardiomyopathy ([address] Cerebral artery occlusion with cerebral infarction ([address] CHF (congestive heart failure) ([address] Enlarged liver, GERD (gastroesophageal reflux disease), H/O echocardiogram, History of blood transfusion, Hx of cardiovascular stress test, Hyperlipidemia, Hypertension, Lymphoma ([address] MI, old, Movement disorder, MS (multiple sclerosis) ([address] OP (osteoporosis), PE (pulmonary embolism), Pneumonia, Pyelonephritis, Seizures ([address] Spleen enlarged, and Thyroid disease. Past surgical history:   has a past surgical history that includes Coronary angioplasty with stent; Hysterectomy; Appendectomy; Cholecystectomy;  Tonsillectomy; Gastric bypass surgery; Cardiac Q6H PRN  apixaban (ELIQUIS) tablet 5 mg, BID  aspirin chewable tablet 81 mg, Daily  atorvastatin (LIPITOR) tablet 80 mg, Nightly  clopidogrel (PLAVIX) tablet 75 mg, Daily  DULoxetine (CYMBALTA) extended release capsule 60 mg, Daily  famotidine (PEPCID) tablet 20 mg, BID  levETIRAcetam (KEPPRA) tablet 750 mg, BID  levothyroxine (SYNTHROID) tablet 150 mcg, Daily  mirtazapine (REMERON) tablet 30 mg, Nightly  midodrine (PROAMATINE) tablet 10 mg, TID WC  sodium chloride flush 0.9 % injection 10 mL, 2 times per day  sodium chloride flush 0.9 % injection 10 mL, PRN  acetaminophen (TYLENOL) tablet 650 mg, Q6H PRN    Or  acetaminophen (TYLENOL) suppository 650 mg, Q6H PRN  polyethylene glycol (GLYCOLAX) packet 17 g, Daily PRN  promethazine (PHENERGAN) tablet 12.5 mg, Q6H PRN    Or  ondansetron (ZOFRAN) injection 4 mg, Q6H PRN  nitroGLYCERIN (NITROSTAT) SL tablet 0.4 mg, Q5 Min PRN  potassium chloride (KLOR-CON M) extended release tablet 40 mEq, PRN    Or  potassium bicarb-citric acid (EFFER-K) effervescent tablet 40 mEq, PRN    Or  potassium chloride 10 mEq/100 mL IVPB (Peripheral Line), PRN  magnesium sulfate 2 g in 50 mL IVPB premix, PRN  oxyCODONE HCl (OXY-IR) immediate release tablet 20 mg, Q8H PRN  oxyCODONE HCl (OXY-IR) immediate release tablet 20 mg, Q3H PRN      Current Facility-Administered Medications   Medication Dose Route Frequency Provider Last Rate Last Dose    amiodarone (CORDARONE) tablet 200 mg  200 mg Oral Daily Shana Frazier MD   200 mg at 09/26/20 0938    albuterol sulfate  (90 Base) MCG/ACT inhaler 2 puff  2 puff Inhalation Q6H PRN Shana Frazier MD        apixaban (ELIQUIS) tablet 5 mg  5 mg Oral BID Shana Frazier MD   5 mg at 09/26/20 8948    aspirin chewable tablet 81 mg  81 mg Oral Daily Shana Frazier MD   81 mg at 09/26/20 6399    atorvastatin (LIPITOR) tablet 80 mg  80 mg Oral Nightly Shana Frazier MD        clopidogrel (PLAVIX) tablet 75 mg  75 mg Oral Daily Shana Frazier MD   75 mg at 09/26/20 9175    DULoxetine (CYMBALTA) extended release capsule 60 mg  60 mg Oral Daily Jane Resendiz MD   60 mg at 09/26/20 4732    famotidine (PEPCID) tablet 20 mg  20 mg Oral BID Jane Resendiz MD   20 mg at 09/26/20 0349    levETIRAcetam (KEPPRA) tablet 750 mg  750 mg Oral BID Jane Resendiz MD   750 mg at 09/26/20 4944    levothyroxine (SYNTHROID) tablet 150 mcg  150 mcg Oral Daily Jane Resendiz MD   150 mcg at 09/26/20 0626    mirtazapine (REMERON) tablet 30 mg  30 mg Oral Nightly Jane Resendiz MD        midodrine (PROAMATINE) tablet 10 mg  10 mg Oral TID WC Jane Resendiz MD   10 mg at 09/26/20 1608    sodium chloride flush 0.9 % injection 10 mL  10 mL Intravenous 2 times per day Jane Resendiz MD        sodium chloride flush 0.9 % injection 10 mL  10 mL Intravenous PRN Jane Resendiz MD        acetaminophen (TYLENOL) tablet 650 mg  650 mg Oral Q6H PRN Jane Resendiz MD        Or   [address] [place] [address] acetaminophen (TYLENOL) suppository 650 mg  650 mg Rectal Q6H PRN Jane Resendiz MD        polyethylene glycol (GLYCOLAX) packet 17 g  17 g Oral Daily PRN Jane Resendiz MD        promethazine (PHENERGAN) tablet 12.5 mg  12.5 mg Oral Q6H PRN Jane Resendiz MD        Or    ondansetron (ZOFRAN) injection 4 mg  4 mg Intravenous Q6H PRN Jane Resendiz MD        nitroGLYCERIN (NITROSTAT) SL tablet 0.4 mg  0.4 mg Sublingual Q5 Min PRN Jane Resendiz MD        potassium chloride (KLOR-CON M) extended release tablet 40 mEq  40 mEq Oral PRN Jane Resendiz MD        Or    potassium bicarb-citric acid (EFFER-K) effervescent tablet 40 mEq  40 mEq Oral PRN Jane Resendiz MD        Or   [address] [place] [address] Mark potassium chloride 10 mEq/100 mL IVPB (Peripheral Line)  10 mEq Intravenous PRN Jane Resendiz MD        magnesium sulfate 2 g in 50 mL IVPB premix  2 g Intravenous PRN Jane Resendiz MD        oxyCODONE HCl (OXY-IR) immediate release tablet 20 mg  20 mg Oral Q8H PRN Jane Resendiz MD   20 mg at 09/26/20 0626    oxyCODONE HCl (OXY-IR) immediate release tablet 20 mg  20 mg Oral Q3H PRN Juan Pena MD   20 mg at 09/26/20 1608     Review of Systems:  All 14 systems reviewed, all negative except for  CP    Physical Examination:    /77   Pulse 84   Temp 98.1 °F (36.7 °C) (Oral)   Resp 14   Ht 5' 6\" (1.676 m)   Wt 110 lb (49.9 kg)   SpO2 99%   BMI 17.75 kg/m²      Wt Readings from Last 3 Encounters:   09/25/20 110 lb (49.9 kg)   09/12/20 110 lb (49.9 kg)   08/26/20 147 lb 12.8 oz (67 kg)     Body mass index is 17.75 kg/m². General Appearance:  fair  Head: normocephalic     Eyes: normal, noninjected conjunctiva    ENT: normal mucosa, noninjected throat, normal     NECK: No JVP  No thyromegaly        Cardiovascular: No thrills palpated   Auscultation: Normal S1 and S2,  no murmur   carotid bruit no   Abdominal Aorta no bruit    Respiratory:    Breath sounds Clear = 0    Extremities:  Trace Edema clubbing ,   no cyanosis    SKIN: Warm and well perfused, no pallor or cyanosis    Vascular exam:  Pedal Pulses: palp  bilaterally        Abdomen:  No masses or tenderness. No organomegaly noted. Neurological:  Oriented to time, place, and person   No focal neurological deficit noted. Psychiatric:normal mood, no anxiety    Lab Review   Recent Labs     09/25/20  2240   WBC 5.4   HGB 11.1*   HCT 34.8*         Recent Labs     09/25/20  2240   *   K 3.4*   CL 98*   CO2 20*   BUN 15   CREATININE 0.5*     Recent Labs     09/25/20  2240   AST 16   ALT 10   BILITOT 0.2   ALKPHOS 98     No results for input(s): TROPONINI in the last 72 hours. Lab Results   Component Value Date    BNP 48 02/21/2013     Lab Results   Component Value Date    INR 0.94 12/29/2019    PROTIME 11.4 (L) 12/29/2019           Assessment/Recommendations:     - CP; serial trops, EKGS, ?  Stress on Monday  - risk factor modification  - freq palpitations: holter  - Low EF on meds  cPM  - ? EP cons to place device,was infected         Erik Peres MD, 9/26/2020 5:50 PM

## 2020-09-26 NOTE — H&P
[facility] History & Physical      PCP: Tasha Malik MD    Date of Admission: 9/25/2020    of Service: Pt seen/examined on 09/26/20 and Admitted to Inpatient***Observation    Hx taken from ***    Chief Complaint:  ***  History Of Present Illness: The patient is a 62 y.o. female PMHx ***  ***    In the ER ***    Past Medical History:        Diagnosis Date    Acute delirium     Arrhythmia     Arthritis     Asplenia     Asthma     CAD (coronary artery disease)     1st stent at age 46    Cardiomyopathy [facility])     Cerebral artery occlusion with cerebral infarction ([address]     [facility]     Enlarged liver     GERD (gastroesophageal reflux disease)     H/O echocardiogram 4/6/16    EF 55%, trivial TR & MR, stage 1 diastolic dysfunction    History of blood transfusion     Hx of cardiovascular stress test 12/29/2015    Alessia: EF 45%. Pharmacologic stress myocardial perfusion scan shows a fixed inferior-lateral defect w/ no inducible ischemia. No evidence of ischemia. Inferior-lateral infarction.  Normal LVSF    Hyperlipidemia     Hypertension     Lymphoma ([address]     Denies    MI, old     Movement disorder     MS (multiple sclerosis) ([address]     OP (osteoporosis)     PE (pulmonary embolism)     trapese filter    Pneumonia     Pyelonephritis     Seizures ([address]     Spleen enlarged     Thyroid disease        PastSurgical History:        Procedure Laterality Date    ABDOMEN SURGERY      APPENDECTOMY      CARDIAC DEFIBRILLATOR PLACEMENT      evera [id number] CONDITIONAL    CHOLECYSTECTOMY      COLONOSCOPY      CORONARY ANGIOPLASTY WITH STENT PLACEMENT      3 stents    CORONARY ARTERY BYPASS GRAFT      Age 49    ENDOSCOPY, COLON, DIAGNOSTIC      GASTRIC BYPASS SURGERY      HERNIA REPAIR      HIP SURGERY      HYSTERECTOMY      JOINT REPLACEMENT      PORT SURGERY N/A 6/1/2020    PORT INSERTION performed by Robel Adams MD at [place] [address]  SKIN BIOPSY      TONSILLECTOMY      VASCULAR SURGERY         Medications Prior to Admission:    Prior to Admission medications    Medication Sig Start Date End Date Taking? Authorizing Provider   naloxone 4 MG/0.1ML LIQD nasal spray 1 spray by Nasal route as needed for Opioid Reversal 8/27/20   Izaiah White MD   potassium chloride (KLOR-CON M) 20 MEQ extended release tablet Take 1 tablet by mouth daily Take only if taking torsemide 6/10/20   Kt Pride MD   midodrine (PROAMATINE) 10 MG tablet Take 1 tablet by mouth 3 times daily (with meals) 6/4/20   Uche Diaz MD   mirtazapine (REMERON) 30 MG tablet Take 30 mg by mouth nightly    Historical Provider, MD   levothyroxine (SYNTHROID) 112 MCG tablet Take 2 tablets by mouth Daily  Patient taking differently: Take 150 mcg by mouth Daily  1/4/20   Ghassan Rosado MD   ipratropium-albuterol (DUONEB) 0.5-2.5 (3) MG/3ML SOLN nebulizer solution Inhale 3 mLs into the lungs every 4 hours (while awake) 11/18/19   Zakiya Gonzalez MD   nitroGLYCERIN (NITROSTAT) 0.4 MG SL tablet up to max of 3 total doses.  If no relief after 1 dose, call 911. 11/1/19   Odell Boast, MD   naloxone 4 MG/0.1ML LIQD nasal spray 1 spray by Nasal route as needed for Opioid Reversal  Patient taking differently: 1 spray by Nasal route as needed for Opioid Reversal 06/08/2020 Patient states she has never used this 11/1/19   Odell Boast, MD   benzocaine-menthol (CEPACOL SORE THROAT) 15-3.6 MG lozenge Take 1 lozenge by mouth every 2 hours as needed for Sore Throat 11/1/19   Odell Boast, MD   cyanocobalamin 1000 MCG/ML injection INJECT 1 ML INTO THE MUSCLE ON THE FIRST OF [facility] MONTH 9/27/19   Historical Provider, MD   torsemide (DEMADEX) 20 MG tablet Take 20 mg by mouth daily as needed (For weight gain or fluid retention) Indications: pt takes 2 tablets daily     Historical Provider, MD   Icosapent Ethyl (VASCEPA) 0.5 g CAPS Take 500 mg by mouth 2 times daily 9/26/19   Danisha THOMPSON Lukas Azevedo, APRN - CNP   amiodarone (CORDARONE) 200 MG tablet Take 200 mg by mouth daily    Historical Provider, MD   apixaban (ELIQUIS) 5 MG TABS tablet Take 5 mg by mouth 2 times daily    Historical Provider, MD   DULoxetine (CYMBALTA) 60 MG extended release capsule Take 1 capsule by mouth daily 8/29/19   Lord Anish MD   ondansetron (ZOFRAN) 4 MG tablet Take 1 tablet by mouth every 6 hours as needed for Nausea or Vomiting 2/26/19   Idalia Peoples MD   magnesium oxide (MAG-OX) 400 MG tablet Take 800 mg by mouth 2 times daily    Historical Provider, MD   docusate sodium (COLACE) 100 MG capsule Take 100 mg by mouth 2 times daily as needed for Constipation    Historical Provider, MD   famotidine (PEPCID) 20 MG tablet Take 1 tablet by mouth 2 times daily 2/7/19   Aminah Patel MD   levETIRAcetam (KEPPRA) 750 MG tablet Take 750 mg by mouth 2 times daily     Historical Provider, MD   atorvastatin (LIPITOR) 80 MG tablet Take 80 mg by mouth nightly    Historical Provider, MD   clopidogrel (PLAVIX) 75 MG tablet Take 75 mg by mouth daily    Historical Provider, MD   acetaminophen 650 MG TABS Take 650 mg by mouth every 4 hours as needed 4/9/16   Casandra Bernabe MD   ferrous gluconate (FERGON) 324 (38 FE) MG tablet Take 324 mg by mouth 2 times daily     Historical Provider, MD   aspirin 81 MG tablet Take 81 mg by mouth daily     Historical Provider, MD   albuterol (PROVENTIL HFA;VENTOLIN HFA) 108 (90 BASE) MCG/ACT inhaler Inhale 2 puffs into the lungs every 6 hours as needed. Historical Provider, MD       Allergies:    Fentanyl; Moxifloxacin hcl in nacl; Povidone-iodine; Cyclobenzaprine; Methocarbamol; Tramadol; Baclofen; Ibuprofen; Naproxen; Nsaids; Tizanidine; Tolmetin; Tramadol hcl; Betadine [povidone iodine]; and Moxifloxacin    Social History:    The patient currently lives ***  TOBACCO:   reports that she has never smoked.  She has never used smokeless tobacco.  ETOH:   reports no history of alcohol use.    History:  Positive as follows:  ***      Problem Relation Age of Onset    High Blood Pressure Mother     High Cholesterol Mother     Heart Disease Mother     Diabetes Mother     Heart Disease Father     Cancer Father     Other Sister         Multiple Sclerosis    Heart Disease Maternal Grandmother     High Blood Pressure Maternal Grandmother     High Cholesterol Maternal Grandmother        REVIEW OF SYSTEMS:   Pertinent positives and negatives *** as noted in the HPI and ROS. All other systems reviewed and negative. Review of Systems  ***    PHYSICAL EXAM:  /66   Pulse 89   Temp 98.1 °F (36.7 °C)   Resp 19   Ht 5' 6\" (1.676 m)   Wt 110 lb (49.9 kg)   SpO2 100%   BMI 17.75 kg/m²   ***  Physical Exam      Imaging:    Xr Chest (2 Vw)    Result Date: 9/13/2020  EXAMINATION: TWO XRAY VIEWS OF THE CHEST 9/12/2020 11:18 pm COMPARISON: Chest radiograph August 22, 2020 HISTORY: ORDERING SYSTEM PROVIDED HISTORY: Pain TECHNOLOGIST PROVIDED HISTORY: Reason for exam:->Pain Reason for Exam: Pain Acuity: Acute Type of Exam: Initial Mechanism of Injury: Pain Relevant Medical/Surgical History: Pain FINDINGS: Median sternotomy wires are noted. The cardiomediastinal silhouette is within normal limits. There is no focal consolidation, pleural effusion, or pneumothorax. There is no evidence of edema. There is evidence of COPD. COPD. No acute pulmonary process. Ct Chest W Contrast    Result Date: 9/13/2020  EXAMINATION: CT OF THE CHEST WITH CONTRAST 9/13/2020 4:33 am TECHNIQUE: CT of the chest was performed with the administration of intravenous contrast. Multiplanar reformatted images are provided for review. Dose modulation, iterative reconstruction, and/or weight based adjustment of the mA/kV was utilized to reduce the radiation dose to as low as reasonably achievable.  COMPARISON: Chest radiograph 09/12/2020; CT chest 08/22/2020, 05/29/2020 HISTORY: ORDERING SYSTEM PROVIDED HISTORY: concern for right upper chest wound infection TECHNOLOGIST PROVIDED HISTORY: Reason for exam:->concern for right upper chest wound infection Reason for Exam: Palpitations; Chest Pain Acuity: Acute Type of Exam: Initial FINDINGS: Mediastinum: 14 mm short axis mediastinal lymph nodes. No appreciable hilar lymphadenopathy. Shaaron Money Upper normal caliber ascending thoracic aorta. Normal caliber main pulmonary artery. No filling defect within the central pulmonary arterial tree. Mild four-chamber cardiomegaly. Multivessel coronary calcifications status post PCI and CABG. No pericardial effusion. Unremarkable esophagus. Lungs/pleura: No pneumothorax, pleural effusion or consolidative airspace disease. Previously seen ground-glass opacities have resolved. Stable 6 mm triangular peribronchovascular nodule in the right lower lobe. No suspicious pulmonary nodule or mass. Clear central airways. Upper Abdomen: Enhancing round masses in the left hemiabdomen, possibly splenules given lack of in the left upper quadrant spleen. Postsurgical changes along the stomach consistent with Maru-en-Y gastric bypass. Soft Tissues/Bones: Skin thickening and loss of the fat planes overlying the right pectoralis muscle, superiorly however there is no drainable fluid collection, soft tissue gas or rim enhancing abscess. The deep fat planes are preserved. No enlarged axillary supraclavicular lymph nodes. Relatively symmetric skin thickening along the breasts. This is similar to decreased from prior exams. Decreased generalized subcutaneous fat stranding which may reflect improved body wall anasarca. No acute osseous abnormality. MUSCULOSKELETAL Mild skin thickening and fat stranding in the area prior right chest port placement. No soft tissue gas, drainable fluid collection or rim enhancing abscess. Improved body wall anasarca. CARDIOPULMONARY Previously seen bilateral ground-glass opacities have resolved.  Stable 6 mm peribronchovascular nodule is favored to represent a benign parenchymal lymph node given location and morphology. See recommendations. Stable mild cardiomegaly. Mild mediastinal lymphadenopathy, presumably reactive. OTHER Probably splenules in the upper abdomen as the spleen is not seen in the left upper quadrant. RECOMMENDATIONS: The previous CT chest exam recommended a 1 year follow-up for the right lower lobe nodule. Xr Chest Portable    Result Date: 9/26/2020  EXAMINATION: ONE XRAY VIEW OF THE CHEST 9/25/2020 8:36 pm COMPARISON: 09/12/2020 HISTORY: ORDERING SYSTEM PROVIDED HISTORY: chest pain TECHNOLOGIST PROVIDED HISTORY: Reason for exam:->chest pain Reason for Exam: chest pain Acuity: Unknown Type of Exam: Initial FINDINGS: Cardial pericardial silhouette is stable in appearance. Chronic interstitial opacities are identified. No acute focal infiltrate is seen. No pneumothorax is seen. No free air. No acute bony abnormality. No acute abnormality identified. EKG:    ***    CBC   Recent Labs     09/25/20  2240   WBC 5.4   HGB 11.1*   HCT 34.8*         RENAL  Recent Labs     09/25/20  2240   *   K 3.4*   CL 98*   CO2 20*   BUN 15   CREATININE 0.5*     LFT'S  Recent Labs     09/25/20  2240   AST 16   ALT 10   BILITOT 0.2   ALKPHOS 98     COAG  No results for input(s): INR in the last 72 hours.   CARDIAC ENZYMES  Recent Labs     09/25/20  2340   TROPONINT <0.010       U/A:    Lab Results   Component Value Date    COLORU YELLOW 09/13/2020    WBCUA 2 09/13/2020    RBCUA NONE SEEN 09/13/2020    MUCUS RARE 06/08/2020    BACTERIA NEGATIVE 09/13/2020    CLARITYU HAZY 09/13/2020    SPECGRAV 1.012 09/13/2020    LEUKOCYTESUR TRACE 09/13/2020    BLOODU NEGATIVE 09/13/2020    AMORPHOUS OCCASIONAL 09/13/2020       ABG    Lab Results   Component Value Date    ZKX7KGR 25.6 04/25/2018    DNB8JYI 36.0 04/25/2018    PO2ART 59 04/25/2018           There are no active hospital problems to display for this patient.       CERTIFICATION    I certify that Tremaine Toscano is expected to be hospitalized for >2***<2 midnights based on the following assessment and plan:    ASSESSMENT/PLAN:    1. ***  2. ***   3. ***    Chest Pain   Bradycardia    Chronic Illnesses  ***    DVT Prophylaxis: ***  GI Prophylaxis: ***  Diet: No diet orders on file  Home O2: ***  Code Status: ***  POA: ***       Crissy Peng MD

## 2020-09-26 NOTE — PROGRESS NOTES
Patient transferred to [facility] [phone] during downtime. No signs of distress. Patient immediately requesting pain medications when reaching floor. Roxana Romero hospitalist NP contacted regarding patient concern and no new orders received. Resting in bed quietly at this time, no grimacing or guarding noted. Call light in reach.

## 2020-09-26 NOTE — PROGRESS NOTES
[facility] was notified via perfect serve of the following;Patient states she takes 20mg oxycodone every 3 hrs. Patient  states this is ordered by Dr. Ashli Cooper at palliative care. Patient states she had script filled 9/20/20 at [facility] pharmacy. Please see flowsheet for current vitals.

## 2020-09-26 NOTE — H&P
[facility] History & Physical      PCP: Janene Lazo MD    Date of Admission: 9/25/2020    of Service: Pt seen/examined on 09/26/20 and Admitted to Observation    Hx taken from patient    Chief Complaint: Chest pain  History Of Present Illness: The patient is a 62 y.o. female PMHx asthma, CAD, cardiomyopathy, CHF, HTN, MI, MS, PE, seizures  Patient states that around 4:30 PM yesterday she started developing palpitations. She then developed a crushing pain over the whole chest.  Chest pain then started to radiate to her left arm and up to her neck and ear. She believes that she had passed out. She felt weak and dizzy. Later on at night she started sweating and having vomiting. She took 6 nitro tablets which helped for only about 15 minutes. The nausea and vomiting felt similar to previous heart attack she has had in the past.  In the emergency room she received nitro and morphine which also helped. Moving worsens her pain. She denies any fever. No wheezing or cough. Patient recently had her AICD removed due to infection. Past Medical History:        Diagnosis Date    Acute delirium     Arrhythmia     Arthritis     Asplenia     Asthma     CAD (coronary artery disease)     1st stent at age 46    Cardiomyopathy [facility])     Cerebral artery occlusion with cerebral infarction ([facility]     CHF (congestive heart failure) (HCC)     Enlarged liver     GERD (gastroesophageal reflux disease)     H/O echocardiogram 4/6/16    EF 55%, trivial TR & MR, stage 1 diastolic dysfunction    History of blood transfusion     Hx of cardiovascular stress test 12/29/2015    Alessia: EF 45%. Pharmacologic stress myocardial perfusion scan shows a fixed inferior-lateral defect w/ no inducible ischemia. No evidence of ischemia. Inferior-lateral infarction.  Normal LVSF    Hyperlipidemia     Hypertension     Lymphoma ([address]     Denies    MI, old     Movement disorder     MS (multiple sclerosis) ([facility]  OP (osteoporosis)     PE (pulmonary embolism)     trapese filter    Pneumonia     Pyelonephritis     Seizures (HCC)     Spleen enlarged     Thyroid disease        PastSurgical History:        Procedure Laterality Date    ABDOMEN SURGERY      APPENDECTOMY      CARDIAC DEFIBRILLATOR PLACEMENT      Cassie Palma [id number] CONDITIONAL    CHOLECYSTECTOMY      COLONOSCOPY      CORONARY ANGIOPLASTY WITH STENT PLACEMENT      3 stents    CORONARY ARTERY BYPASS GRAFT      Age 49    ENDOSCOPY, COLON, DIAGNOSTIC      GASTRIC BYPASS SURGERY      HERNIA REPAIR      HIP SURGERY      HYSTERECTOMY      JOINT REPLACEMENT      PORT SURGERY N/A 6/1/2020    PORT INSERTION performed by Charly Lambert MD at [address] [place] [address]      VASCULAR SURGERY         Medications Prior to Admission:    Prior to Admission medications    Medication Sig Start Date End Date Taking? Authorizing Provider   naloxone 4 MG/0.1ML LIQD nasal spray 1 spray by Nasal route as needed for Opioid Reversal 8/27/20   Zahra Paz MD   potassium chloride (KLOR-CON M) 20 MEQ extended release tablet Take 1 tablet by mouth daily Take only if taking torsemide 6/10/20   Mady Sneed MD   midodrine (PROAMATINE) 10 MG tablet Take 1 tablet by mouth 3 times daily (with meals) 6/4/20   Usama Oneill MD   mirtazapine (REMERON) 30 MG tablet Take 30 mg by mouth nightly    Historical Provider, MD   levothyroxine (SYNTHROID) 112 MCG tablet Take 2 tablets by mouth Daily  Patient taking differently: Take 150 mcg by mouth Daily  1/4/20   Therese Camacho MD   ipratropium-albuterol (DUONEB) 0.5-2.5 (3) MG/3ML SOLN nebulizer solution Inhale 3 mLs into the lungs every 4 hours (while awake) 11/18/19   Sarwat Crandall MD   nitroGLYCERIN (NITROSTAT) 0.4 MG SL tablet up to max of 3 total doses.  If no relief after 1 dose, call 911. 11/1/19   Cynthia Garcia MD   naloxone 4 MG/0.1ML LIQD nasal spray 1 spray by Nasal route as needed for Opioid Reversal  Patient taking differently: 1 spray by Nasal route as needed for Opioid Reversal 06/08/2020 Patient states she has never used this 11/1/19   Paddy Diaz MD   benzocaine-menthol (CEPACOL SORE THROAT) 15-3.6 MG lozenge Take 1 lozenge by mouth every 2 hours as needed for Sore Throat 11/1/19   Paddy Diaz MD   cyanocobalamin 1000 MCG/ML injection INJECT 1 ML INTO THE MUSCLE ON THE FIRST OF [facility] MONTH 9/27/19   Historical Provider, MD   torsemide (DEMADEX) 20 MG tablet Take 20 mg by mouth daily as needed (For weight gain or fluid retention) Indications: pt takes 2 tablets daily     Historical Provider, MD   Icosapent Ethyl (VASCEPA) 0.5 g CAPS Take 500 mg by mouth 2 times daily 9/26/19   JESSE Munoz - CNP   amiodarone (CORDARONE) 200 MG tablet Take 200 mg by mouth daily    Historical Provider, MD   apixaban (ELIQUIS) 5 MG TABS tablet Take 5 mg by mouth 2 times daily    Historical Provider, MD   DULoxetine (CYMBALTA) 60 MG extended release capsule Take 1 capsule by mouth daily 8/29/19   Manan Cartwright MD   ondansetron (ZOFRAN) 4 MG tablet Take 1 tablet by mouth every 6 hours as needed for Nausea or Vomiting 2/26/19   Rafael Newell MD   magnesium oxide (MAG-OX) 400 MG tablet Take 800 mg by mouth 2 times daily    Historical Provider, MD   docusate sodium (COLACE) 100 MG capsule Take 100 mg by mouth 2 times daily as needed for Constipation    Historical Provider, MD   famotidine (PEPCID) 20 MG tablet Take 1 tablet by mouth 2 times daily 2/7/19   Carrie Bermudez MD   levETIRAcetam (KEPPRA) 750 MG tablet Take 750 mg by mouth 2 times daily     Historical Provider, MD   atorvastatin (LIPITOR) 80 MG tablet Take 80 mg by mouth nightly    Historical Provider, MD   clopidogrel (PLAVIX) 75 MG tablet Take 75 mg by mouth daily    Historical Provider, MD   acetaminophen 650 MG TABS Take 650 mg by mouth every 4 hours as needed 4/9/16   Yefri Jesus MD   ferrous gluconate Psychiatric/Behavioral: Negative for confusion. The patient is not nervous/anxious. PHYSICAL EXAM:  /64   Pulse 57   Temp 97.3 °F (36.3 °C) (Oral)   Resp 11   Ht 5' 6\" (1.676 m)   Wt 110 lb (49.9 kg)   SpO2 99%   BMI 17.75 kg/m²   Physical Exam  Constitutional:       General: She is not in acute distress. Appearance: Normal appearance. She is not toxic-appearing or diaphoretic. HENT:      Head: Normocephalic and atraumatic. Nose: No rhinorrhea. Mouth/Throat:      Mouth: Mucous membranes are moist.   Eyes:      General:         Right eye: No discharge. Left eye: No discharge. Conjunctiva/sclera: Conjunctivae normal.   Neck:      Musculoskeletal: No muscular tenderness. Cardiovascular:      Rate and Rhythm: Regular rhythm. Bradycardia present. Pulses: Normal pulses. Heart sounds: Normal heart sounds. No murmur. No gallop. Pulmonary:      Effort: Pulmonary effort is normal. No respiratory distress. Breath sounds: No wheezing or rales. Abdominal:      General: Abdomen is flat. Palpations: Abdomen is soft. Tenderness: There is no abdominal tenderness. There is no guarding or rebound. Musculoskeletal:         General: No swelling. Right lower leg: No edema. Left lower leg: No edema. Lymphadenopathy:      Cervical: No cervical adenopathy. Skin:     Comments: Has right upper chest wound, wound appears to be healing. Has dressing over it. Neurological:      General: No focal deficit present. Mental Status: She is alert. Mental status is at baseline. Cranial Nerves: No cranial nerve deficit. Psychiatric:         Mood and Affect: Mood normal.         Behavior: Behavior normal.         Thought Content:  Thought content normal.         Judgment: Judgment normal.           Imaging:    Xr Chest (2 Vw)    Result Date: 9/13/2020  EXAMINATION: TWO XRAY VIEWS OF THE CHEST 9/12/2020 11:18 pm COMPARISON: Chest radiograph August 22, 2020 HISTORY: ORDERING SYSTEM PROVIDED HISTORY: Pain TECHNOLOGIST PROVIDED HISTORY: Reason for exam:->Pain Reason for Exam: Pain Acuity: Acute Type of Exam: Initial Mechanism of Injury: Pain Relevant Medical/Surgical History: Pain FINDINGS: Median sternotomy wires are noted. The cardiomediastinal silhouette is within normal limits. There is no focal consolidation, pleural effusion, or pneumothorax. There is no evidence of edema. There is evidence of COPD. COPD. No acute pulmonary process. Ct Chest W Contrast    Result Date: 9/13/2020  EXAMINATION: CT OF THE CHEST WITH CONTRAST 9/13/2020 4:33 am TECHNIQUE: CT of the chest was performed with the administration of intravenous contrast. Multiplanar reformatted images are provided for review. Dose modulation, iterative reconstruction, and/or weight based adjustment of the mA/kV was utilized to reduce the radiation dose to as low as reasonably achievable. COMPARISON: Chest radiograph 09/12/2020; CT chest 08/22/2020, 05/29/2020 HISTORY: ORDERING SYSTEM PROVIDED HISTORY: concern for right upper chest wound infection TECHNOLOGIST PROVIDED HISTORY: Reason for exam:->concern for right upper chest wound infection Reason for Exam: Palpitations; Chest Pain Acuity: Acute Type of Exam: Initial FINDINGS: Mediastinum: 14 mm short axis mediastinal lymph nodes. No appreciable hilar lymphadenopathy. Sarahi Money Upper normal caliber ascending thoracic aorta. Normal caliber main pulmonary artery. No filling defect within the central pulmonary arterial tree. Mild four-chamber cardiomegaly. Multivessel coronary calcifications status post PCI and CABG. No pericardial effusion. Unremarkable esophagus. Lungs/pleura: No pneumothorax, pleural effusion or consolidative airspace disease. Previously seen ground-glass opacities have resolved. Stable 6 mm triangular peribronchovascular nodule in the right lower lobe. No suspicious pulmonary nodule or mass.   Clear central airways. Upper Abdomen: Enhancing round masses in the left hemiabdomen, possibly splenules given lack of in the left upper quadrant spleen. Postsurgical changes along the stomach consistent with Maru-en-Y gastric bypass. Soft Tissues/Bones: Skin thickening and loss of the fat planes overlying the right pectoralis muscle, superiorly however there is no drainable fluid collection, soft tissue gas or rim enhancing abscess. The deep fat planes are preserved. No enlarged axillary supraclavicular lymph nodes. Relatively symmetric skin thickening along the breasts. This is similar to decreased from prior exams. Decreased generalized subcutaneous fat stranding which may reflect improved body wall anasarca. No acute osseous abnormality. MUSCULOSKELETAL Mild skin thickening and fat stranding in the area prior right chest port placement. No soft tissue gas, drainable fluid collection or rim enhancing abscess. Improved body wall anasarca. CARDIOPULMONARY Previously seen bilateral ground-glass opacities have resolved. Stable 6 mm peribronchovascular nodule is favored to represent a benign parenchymal lymph node given location and morphology. See recommendations. Stable mild cardiomegaly. Mild mediastinal lymphadenopathy, presumably reactive. OTHER Probably splenules in the upper abdomen as the spleen is not seen in the left upper quadrant. RECOMMENDATIONS: The previous CT chest exam recommended a 1 year follow-up for the right lower lobe nodule. Xr Chest Portable    Result Date: 9/26/2020  EXAMINATION: ONE XRAY VIEW OF THE CHEST 9/25/2020 8:36 pm COMPARISON: 09/12/2020 HISTORY: ORDERING SYSTEM PROVIDED HISTORY: chest pain TECHNOLOGIST PROVIDED HISTORY: Reason for exam:->chest pain Reason for Exam: chest pain Acuity: Unknown Type of Exam: Initial FINDINGS: Cardial pericardial silhouette is stable in appearance. Chronic interstitial opacities are identified. No acute focal infiltrate is seen.   No pneumothorax is seen.  No free air. No acute bony abnormality. No acute abnormality identified. CBC   Recent Labs     09/25/20  2240   WBC 5.4   HGB 11.1*   HCT 34.8*         RENAL  Recent Labs     09/25/20  2240   *   K 3.4*   CL 98*   CO2 20*   BUN 15   CREATININE 0.5*     LFT'S  Recent Labs     09/25/20  2240   AST 16   ALT 10   BILITOT 0.2   ALKPHOS 98     COAG  No results for input(s): INR in the last 72 hours. CARDIAC ENZYMES  Recent Labs     09/25/20  2340 09/26/20  0500   TROPONINT <0.010 <0.010       U/A:    Lab Results   Component Value Date    COLORU YELLOW 09/13/2020    WBCUA 2 09/13/2020    RBCUA NONE SEEN 09/13/2020    MUCUS RARE 06/08/2020    BACTERIA NEGATIVE 09/13/2020    CLARITYU HAZY 09/13/2020    SPECGRAV 1.012 09/13/2020    LEUKOCYTESUR TRACE 09/13/2020    BLOODU NEGATIVE 09/13/2020    AMORPHOUS OCCASIONAL 09/13/2020       ABG    Lab Results   Component Value Date    MGD2LCZ 25.6 04/25/2018    SVS1VNE 36.0 04/25/2018    PO2ART 59 04/25/2018           Active Hospital Problems    Diagnosis Date Noted    Chest pain [F90.8]        CERTIFICATION    I certify that Avni Patel is expected to be hospitalized for <2 midnights based on the following assessment and plan:    ASSESSMENT/PLAN:    1. Chest pain  -rule out ACS. Check serial troponins. Check EKG. Consult cardiology. Chest x-ray shows no acute process.       Chronic Illnesses  Recent chest wound infection status post ICD removal  Chronic systolic CHF  History of PE  Hypothyroidism  Seizure disorder  Depression/anxiety  CAD status post CABG  Chronic pain syndrome  Anemia of chronic disease  Status post gastric bypass    DVT Prophylaxis: Eliquis  Diet: DIET GENERAL; No Caffeine  Code Status: Full code  POA: Gracie Francis MD

## 2020-09-26 NOTE — PLAN OF CARE
Admitted this morning because of chest pain, palpitations and syncope. At 6:30 PM yesterday she had episode of palpitations with syncope for few minutes. She had another syncope few hours later. Also with nausea, vomiting, left-sided chest pain crushing with pain scale 9/10. She mentioned having on and off fever in the last few days. She also mentioned having shortness of breath when she wheels herself around the house. She has history of MRSA bacteremia with removal of ICD/pacemaker. EKG with junctional rhythm. Troponin negative. Vitals:    09/26/20 1128   BP: (!) 113/48   Pulse: (!) 48   Resp: 14   Temp: 97.9 °F (36.6 °C)   SpO2:      Regular rate and rhythm  Chest clear to auscultation, nonhealing wound on the right chest with scant yellowish discharge. Abdomen soft, nontender  Lower extremity with no edema. Assessment and plan:    Syncope: Cardiogenic. Has history of cardiomyopathy. Status post pacemaker/ICD placement, removed because of infection. Has junctional rhythm on EKG. Cardiology on consult. History of MRSA bacteremia: Last blood culture was negative. Reported on and off fever in the last few days. Seen normal.  Has chronic nonhealing wound on the ICD site. Will check blood culture. Avascular necrosis bilateral hip: Continue opioids.     Zulay Lott MD

## 2020-09-27 LAB
ALBUMIN SERPL-MCNC: 4 GM/DL (ref 3.4–5)
ALP BLD-CCNC: 104 IU/L (ref 40–129)
ALT SERPL-CCNC: 9 U/L (ref 10–40)
ANION GAP SERPL CALCULATED.3IONS-SCNC: 9 MMOL/L (ref 4–16)
AST SERPL-CCNC: 15 IU/L (ref 15–37)
BILIRUB SERPL-MCNC: 0.1 MG/DL (ref 0–1)
BUN BLDV-MCNC: 9 MG/DL (ref 6–23)
CALCIUM SERPL-MCNC: 9 MG/DL (ref 8.3–10.6)
CHLORIDE BLD-SCNC: 106 MMOL/L (ref 99–110)
CHOLESTEROL: 175 MG/DL
CO2: 24 MMOL/L (ref 21–32)
CREAT SERPL-MCNC: 0.6 MG/DL (ref 0.6–1.1)
EKG ATRIAL RATE: 52 BPM
EKG DIAGNOSIS: NORMAL
EKG P AXIS: 10 DEGREES
EKG P-R INTERVAL: 138 MS
EKG Q-T INTERVAL: 482 MS
EKG QRS DURATION: 96 MS
EKG QTC CALCULATION (BAZETT): 448 MS
EKG R AXIS: -37 DEGREES
EKG T AXIS: -52 DEGREES
EKG VENTRICULAR RATE: 52 BPM
GFR AFRICAN AMERICAN: >60 ML/MIN/1.73M2
GFR NON-AFRICAN AMERICAN: >60 ML/MIN/1.73M2
GLUCOSE BLD-MCNC: 98 MG/DL (ref 70–99)
HCT VFR BLD CALC: 36.5 % (ref 37–47)
HDLC SERPL-MCNC: 60 MG/DL
HEMOGLOBIN: 11.4 GM/DL (ref 12.5–16)
LDL CHOLESTEROL DIRECT: 95 MG/DL
MCH RBC QN AUTO: 33.9 PG (ref 27–31)
MCHC RBC AUTO-ENTMCNC: 31.2 % (ref 32–36)
MCV RBC AUTO: 108.6 FL (ref 78–100)
PDW BLD-RTO: 15.6 % (ref 11.7–14.9)
PLATELET # BLD: 313 K/CU MM (ref 140–440)
PMV BLD AUTO: 9.5 FL (ref 7.5–11.1)
POTASSIUM SERPL-SCNC: 3.6 MMOL/L (ref 3.5–5.1)
RBC # BLD: 3.36 M/CU MM (ref 4.2–5.4)
SODIUM BLD-SCNC: 139 MMOL/L (ref 135–145)
TOTAL PROTEIN: 5.8 GM/DL (ref 6.4–8.2)
TRIGL SERPL-MCNC: 86 MG/DL
TROPONIN T: <0.01 NG/ML
WBC # BLD: 4.8 K/CU MM (ref 4–10.5)

## 2020-09-27 PROCEDURE — 87070 CULTURE OTHR SPECIMN AEROBIC: CPT

## 2020-09-27 PROCEDURE — 6370000000 HC RX 637 (ALT 250 FOR IP): Performed by: INTERNAL MEDICINE

## 2020-09-27 PROCEDURE — 80061 LIPID PANEL: CPT

## 2020-09-27 PROCEDURE — 93010 ELECTROCARDIOGRAM REPORT: CPT | Performed by: INTERNAL MEDICINE

## 2020-09-27 PROCEDURE — 87147 CULTURE TYPE IMMUNOLOGIC: CPT

## 2020-09-27 PROCEDURE — APPSS45 APP SPLIT SHARED TIME 31-45 MINUTES: Performed by: NURSE PRACTITIONER

## 2020-09-27 PROCEDURE — 36415 COLL VENOUS BLD VENIPUNCTURE: CPT

## 2020-09-27 PROCEDURE — 6360000002 HC RX W HCPCS: Performed by: HOSPITALIST

## 2020-09-27 PROCEDURE — 96376 TX/PRO/DX INJ SAME DRUG ADON: CPT

## 2020-09-27 PROCEDURE — G0378 HOSPITAL OBSERVATION PER HR: HCPCS

## 2020-09-27 PROCEDURE — 87075 CULTR BACTERIA EXCEPT BLOOD: CPT

## 2020-09-27 PROCEDURE — 94761 N-INVAS EAR/PLS OXIMETRY MLT: CPT

## 2020-09-27 PROCEDURE — 80053 COMPREHEN METABOLIC PANEL: CPT

## 2020-09-27 PROCEDURE — 6370000000 HC RX 637 (ALT 250 FOR IP): Performed by: HOSPITALIST

## 2020-09-27 PROCEDURE — 2580000003 HC RX 258: Performed by: HOSPITALIST

## 2020-09-27 PROCEDURE — 87186 SC STD MICRODIL/AGAR DIL: CPT

## 2020-09-27 PROCEDURE — 87040 BLOOD CULTURE FOR BACTERIA: CPT

## 2020-09-27 PROCEDURE — 85027 COMPLETE CBC AUTOMATED: CPT

## 2020-09-27 PROCEDURE — 93005 ELECTROCARDIOGRAM TRACING: CPT | Performed by: HOSPITALIST

## 2020-09-27 PROCEDURE — 84484 ASSAY OF TROPONIN QUANT: CPT

## 2020-09-27 PROCEDURE — 87077 CULTURE AEROBIC IDENTIFY: CPT

## 2020-09-27 PROCEDURE — 80048 BASIC METABOLIC PNL TOTAL CA: CPT

## 2020-09-27 PROCEDURE — 99232 SBSQ HOSP IP/OBS MODERATE 35: CPT | Performed by: INTERNAL MEDICINE

## 2020-09-27 PROCEDURE — 83721 ASSAY OF BLOOD LIPOPROTEIN: CPT

## 2020-09-27 RX ADMIN — ASPIRIN 81 MG CHEWABLE TABLET 81 MG: 81 TABLET CHEWABLE at 08:35

## 2020-09-27 RX ADMIN — LEVETIRACETAM 750 MG: 500 TABLET, FILM COATED ORAL at 20:53

## 2020-09-27 RX ADMIN — OXYCODONE HYDROCHLORIDE 20 MG: 10 TABLET ORAL at 13:23

## 2020-09-27 RX ADMIN — MIDODRINE HYDROCHLORIDE 10 MG: 5 TABLET ORAL at 08:35

## 2020-09-27 RX ADMIN — OXYCODONE HYDROCHLORIDE 20 MG: 10 TABLET ORAL at 16:43

## 2020-09-27 RX ADMIN — CLOPIDOGREL BISULFATE 75 MG: 75 TABLET ORAL at 08:35

## 2020-09-27 RX ADMIN — APIXABAN 5 MG: 5 TABLET, FILM COATED ORAL at 20:53

## 2020-09-27 RX ADMIN — MIRTAZAPINE 30 MG: 15 TABLET, FILM COATED ORAL at 20:54

## 2020-09-27 RX ADMIN — AMIODARONE HYDROCHLORIDE 200 MG: 200 TABLET ORAL at 08:35

## 2020-09-27 RX ADMIN — ACETAMINOPHEN 650 MG: 325 TABLET ORAL at 03:10

## 2020-09-27 RX ADMIN — DULOXETINE HYDROCHLORIDE 60 MG: 30 CAPSULE, DELAYED RELEASE ORAL at 08:35

## 2020-09-27 RX ADMIN — LEVETIRACETAM 750 MG: 500 TABLET, FILM COATED ORAL at 08:35

## 2020-09-27 RX ADMIN — OXYCODONE HYDROCHLORIDE 20 MG: 10 TABLET ORAL at 03:10

## 2020-09-27 RX ADMIN — ACETAMINOPHEN 650 MG: 325 TABLET ORAL at 16:43

## 2020-09-27 RX ADMIN — OXYCODONE HYDROCHLORIDE 20 MG: 10 TABLET ORAL at 20:54

## 2020-09-27 RX ADMIN — ACETAMINOPHEN 650 MG: 325 TABLET ORAL at 10:16

## 2020-09-27 RX ADMIN — MIDODRINE HYDROCHLORIDE 10 MG: 5 TABLET ORAL at 13:24

## 2020-09-27 RX ADMIN — ATORVASTATIN CALCIUM 80 MG: 80 TABLET, FILM COATED ORAL at 20:53

## 2020-09-27 RX ADMIN — SODIUM CHLORIDE, PRESERVATIVE FREE 10 ML: 5 INJECTION INTRAVENOUS at 20:54

## 2020-09-27 RX ADMIN — MIDODRINE HYDROCHLORIDE 10 MG: 5 TABLET ORAL at 16:44

## 2020-09-27 RX ADMIN — APIXABAN 5 MG: 5 TABLET, FILM COATED ORAL at 08:35

## 2020-09-27 RX ADMIN — FAMOTIDINE 20 MG: 20 TABLET ORAL at 20:53

## 2020-09-27 RX ADMIN — ONDANSETRON 4 MG: 2 INJECTION INTRAMUSCULAR; INTRAVENOUS at 13:24

## 2020-09-27 RX ADMIN — FAMOTIDINE 20 MG: 20 TABLET ORAL at 08:35

## 2020-09-27 RX ADMIN — SODIUM CHLORIDE, PRESERVATIVE FREE 10 ML: 5 INJECTION INTRAVENOUS at 08:36

## 2020-09-27 RX ADMIN — ONDANSETRON 4 MG: 2 INJECTION INTRAMUSCULAR; INTRAVENOUS at 20:54

## 2020-09-27 RX ADMIN — OXYCODONE HYDROCHLORIDE 20 MG: 10 TABLET ORAL at 10:14

## 2020-09-27 RX ADMIN — OXYCODONE HYDROCHLORIDE 20 MG: 10 TABLET ORAL at 06:45

## 2020-09-27 RX ADMIN — LEVOTHYROXINE SODIUM 150 MCG: 150 TABLET ORAL at 06:45

## 2020-09-27 ASSESSMENT — PAIN SCALES - GENERAL
PAINLEVEL_OUTOF10: 9
PAINLEVEL_OUTOF10: 8
PAINLEVEL_OUTOF10: 7
PAINLEVEL_OUTOF10: 8
PAINLEVEL_OUTOF10: 9
PAINLEVEL_OUTOF10: 8
PAINLEVEL_OUTOF10: 9
PAINLEVEL_OUTOF10: 7

## 2020-09-27 ASSESSMENT — PAIN DESCRIPTION - FREQUENCY
FREQUENCY: CONTINUOUS

## 2020-09-27 ASSESSMENT — PAIN DESCRIPTION - ONSET
ONSET: ON-GOING

## 2020-09-27 ASSESSMENT — PAIN DESCRIPTION - DESCRIPTORS
DESCRIPTORS: ACHING
DESCRIPTORS: ACHING;DISCOMFORT
DESCRIPTORS: ACHING;SHARP

## 2020-09-27 ASSESSMENT — PAIN DESCRIPTION - PAIN TYPE
TYPE: CHRONIC PAIN
TYPE: ACUTE PAIN;CHRONIC PAIN
TYPE: CHRONIC PAIN
TYPE: ACUTE PAIN;CHRONIC PAIN

## 2020-09-27 ASSESSMENT — PAIN - FUNCTIONAL ASSESSMENT: PAIN_FUNCTIONAL_ASSESSMENT: ACTIVITIES ARE NOT PREVENTED

## 2020-09-27 ASSESSMENT — PAIN DESCRIPTION - ORIENTATION: ORIENTATION: RIGHT;LEFT

## 2020-09-27 ASSESSMENT — PAIN DESCRIPTION - LOCATION
LOCATION: GENERALIZED
LOCATION: HIP
LOCATION: GENERALIZED
LOCATION: GENERALIZED

## 2020-09-27 ASSESSMENT — PAIN DESCRIPTION - PROGRESSION
CLINICAL_PROGRESSION: NOT CHANGED

## 2020-09-27 NOTE — PROGRESS NOTES
Hospitalist Progress Note      Name:  Piyush Harrington /Age/Sex: 1960  (62 y.o. female)   MRN & CSN:  [id number] & [id number] Admission Date/Time: 2020 11:04 PM   Location:  [address] [place] [address] PCP: Ness Henderson MD         Hospital Day: 3    Assessment and Plan:   Piyush Harrington is a 62 y.o.  female  who presents with Chest pain    1. Syncope: Cardiogenic. Has history of cardiomyopathy. Status post pacemaker/ICD placement, removed because of infection. Has junctional rhythm on EKG. Cardiology on consult. Will consult electrophysiology. · For stress test tomorrow. 2. History of atrial flutter: Has episodes of slow ventricular response overnight. On amiodarone and anticoagulation. For Holter monitor. 3. Ischemic cardiomyopathy: ICD/pacemaker extracted because of infection. 4. History of MRSA bacteremia: Last blood culture was negative. Reported on and off fever in the last few days. Seen normal.  Has chronic nonhealing wound on the ICD site. Will check blood culture. 5. Avascular necrosis bilateral hip: Continue opioids. 6. Chronic systolic congestive heart failure  7. Coronary artery disease: Status post coronary artery bypass graft: Continue aspirin, Lipitor. 8. History of venous thromboembolism with pulmonary embolism: Continue anticoagulation. 9. Hypothyroidism: Continue Synthroid  10. Seizure disorder: On Keppra  11. Depression/anxiety  12. Anemia of chronic disease, status post gastric bypass. Diet DIET GENERAL; No Caffeine  Dietary Nutrition Supplements: Wound Healing Oral Supplement   DVT Prophylaxis [] Lovenox, []  Heparin, [] SCDs, [] Warfarin  [x] NOAC     GI Prophylaxis [] PPI,  [x] H2 Blocker,  [] Carafate,  [] Diet/Tube Feeds   Code Status Full Code   MDM [] Low, [x] Moderate,[]  High     History of Present Illness:     Chief Complaint: Chest pain    She was seen and examined today. Still complaining of on and off chest pain.   Has been having bradycardic events overnight. No shortness of breath. Still with bilateral hip pain. Ten point ROS reviewed negative, unless as noted above    Objective:   No intake or output data in the 24 hours ending 09/27/20 1356   Vitals:   Vitals:    09/27/20 0830   BP: (!) 114/50   Pulse: 52   Resp: 15   Temp: 98.2 °F (36.8 °C)   SpO2: 97%     Physical Exam:   GEN Awake female, sitting upright in bed in no apparent distress. Appears given age. EYES Pupils are equally round. No scleral erythema, discharge, or conjunctivitis. HENT Mucous membranes are moist. Oral pharynx without exudates, no evidence of thrush. NECK Supple, no apparent thyromegaly or masses. RESP Clear to auscultation, no wheezes, rales or rhonchi. Symmetric chest movement while on room air. Open wound, right chest with scant yellowish discharge. CARDIO/VASC S1/S2 auscultated. Regular rate without appreciable murmurs, rubs, or gallops. No JVD or carotid bruits. Peripheral pulses equal bilaterally and palpable. No peripheral edema. GI Abdomen is soft without significant tenderness, masses, or guarding. Bowel sounds are normoactive. Rectal exam deferred. MSK No gross joint deformities. SKIN Normal coloration, warm, dry. NEURO Cranial nerves appear grossly intact, normal speech, no lateralizing weakness. PSYCH Awake, alert, oriented x 4. Affect appropriate.     Medications:   Medications:    amiodarone  200 mg Oral Daily    apixaban  5 mg Oral BID    aspirin  81 mg Oral Daily    atorvastatin  80 mg Oral Nightly    clopidogrel  75 mg Oral Daily    DULoxetine  60 mg Oral Daily    famotidine  20 mg Oral BID    levETIRAcetam  750 mg Oral BID    levothyroxine  150 mcg Oral Daily    mirtazapine  30 mg Oral Nightly    midodrine  10 mg Oral TID     sodium chloride flush  10 mL Intravenous 2 times per day      Infusions:   PRN Meds: albuterol sulfate HFA, 2 puff, Q6H PRN  sodium chloride flush, 10 mL, PRN  acetaminophen, 650 mg, Q6H PRN

## 2020-09-27 NOTE — PROGRESS NOTES
Cardiology Progress Note     Today's Plan will plan for NM stress test in am     Admit Date:  9/25/2020    Consult reason/ Seen today for: chest pain     Subjective and  Overnight Events:  State she had chest pain during the night- did not tell nursing staff. States pain lasted for few minutes- no associated symptoms. Did not want to use NTG as it drops her bp    Telemetry SR     Assessment / Plan / Recommendation:     1. CAD-h/o CABG x 2 and  PCI of LAD 10/2019- now with chest pain concerning for angina: trend troponin negative x 3- Will plan for NM stress in am.  Further treatment and testing pending clinical course - continue with statin-  2. H/o Atrial flutter- now notes frequent palpitations:  currently in SR - on amiodarone- on anticoagulation with eliquis- clay vasc 3 - will assess palpation with Holter monitor- currently on  3. Ischemic cardiomyopathy EF 30-35% does not appear to be in fluid overload-not able to use ACE or ARB d/t hypotension    4. H/o ICD however it was explanted at [facility] in June 2020 d/t staph infection/lead infection- she had pocket infection post procedure - has non healing would to the right chest   5. H/o VT- s/p ablation - now with complaints of palpitations- Holter monitor on -   6. Hypotension- stable- using ProAmatine to assist         History of Presenting Illness:    Chief complain on admission : 62 y. o.year old who is admitted for  Chief Complaint   Patient presents with    Chest Pain        Past medical history:    has a past medical history of Acute delirium, Arrhythmia, Arthritis, Asplenia, Asthma, CAD (coronary artery disease), Cardiomyopathy ([address] Cerebral artery occlusion with cerebral infarction ([address] CHF (congestive heart failure) ([address] Enlarged liver, GERD (gastroesophageal reflux disease), H/O echocardiogram, History of blood transfusion, Hx of cardiovascular stress test, Hyperlipidemia, Hypertension, Lymphoma ([facility] MI, old, Movement disorder, MS (multiple sclerosis) ([facility] OP (osteoporosis), PE (pulmonary embolism), Pneumonia, Pyelonephritis, Seizures ([facility] Spleen enlarged, and Thyroid disease. Past surgical history:   has a past surgical history that includes Coronary angioplasty with stent; Hysterectomy; Appendectomy; Cholecystectomy; Tonsillectomy; Gastric bypass surgery; Cardiac defibrillator placement; hip surgery; Abdomen surgery; Colonoscopy; Endoscopy, colon, diagnostic; hernia repair; joint replacement; skin biopsy; vascular surgery; Coronary artery bypass graft; and Port Surgery (N/A, 6/1/2020). Social History:   reports that she has never smoked. She has never used smokeless tobacco. She reports that she does not drink alcohol or use drugs. Family history:  family history includes Cancer in her father; Diabetes in her mother; Heart Disease in her father, maternal grandmother, and mother; High Blood Pressure in her maternal grandmother and mother; High Cholesterol in her maternal grandmother and mother; Other in her sister. Allergies   Allergen Reactions    Fentanyl Swelling     Other reaction(s): Angioedema (swelling)    Moxifloxacin Hcl In Nacl Swelling and Rash    Povidone-Iodine Rash     Other reaction(s): AOF      Cyclobenzaprine      Other reaction(s): Other - comment required  \" it make my muscle very weak because of my MS\"  \" it make my muscle very weak because of my MS\"      Methocarbamol      Worsening edema  Other reaction(s): Other - comment required  Worsening edema  Worsening edema  Worsening edema      Tramadol Other (See Comments)     Doctor doesn't her to take due to heart problems  Seizures   Doctor doesn't her to take due to lowers seizure threshold.     Baclofen     Ibuprofen      \"Due to heart problems\"    Naproxen     Nsaids      \"Due to heart problems\"    Tizanidine     Tolmetin      \"Due to heart problems\"    Tramadol Hcl      Other reaction(s): Other - comment required  Told not to take due to history of seizures    Betadine [Povidone Iodine] Rash    Moxifloxacin Rash and Swelling     Lips swell and tingling in face   Lips swell and tingling in face   Lips swell and tingling in face   Lips swell and tingling in face   Around mouth       Review of Systems:   All 14 systems were reviewed and are negative  Except for the positive findings which are documented     BP (!) 124/47   Pulse 51   Temp 98.3 °F (36.8 °C) (Oral)   Resp 15   Ht 5' 6\" (1.676 m)   Wt 122 lb 6.4 oz (55.5 kg)   SpO2 98%   BMI 19.76 kg/m²   No intake or output data in the 24 hours ending 09/27/20 0810    Physical Exam:  Constitutional:   No acute distress  HENT:  Normocephalic, Atraumatic, Bilateral external ears normal,  Nose normal.   Neck- trachea is midline  Eyes:  PEERL, Conjunctiva normal  Respiratory:  Normal breath sounds, No respiratory distress, No wheezing, No chest tenderness. Cardiovascular:  Normal heart rate, Normal rhythm, 2/6 murmur appreciated, No rubs appreciated, No gallops appreciated, JVP not elevated   Abdomen/GI:  Soft, No tenderness  Musculoskeletal:  Intact distal pulses, no edema to lower legs,  No tenderness, No cyanosis, No clubbing. Integument:  Warm, Dry- would to right chest   Lymphatic:  No lymphadenopathy noted.    Neurologic:  Alert & oriented  Psychiatric:  Affect and Mood :pleasant     Medications:    amiodarone  200 mg Oral Daily    apixaban  5 mg Oral BID    aspirin  81 mg Oral Daily    atorvastatin  80 mg Oral Nightly    clopidogrel  75 mg Oral Daily    DULoxetine  60 mg Oral Daily    famotidine  20 mg Oral BID    levETIRAcetam  750 mg Oral BID    levothyroxine  150 mcg Oral Daily    mirtazapine  30 mg Oral Nightly    midodrine  10 mg Oral TID     sodium chloride flush  10 mL Intravenous 2 times per day       albuterol sulfate HFA, sodium chloride flush, acetaminophen **OR** acetaminophen, polyethylene glycol, promethazine **OR** ondansetron, nitroGLYCERIN, potassium chloride **OR** potassium alternative oral replacement **OR** potassium chloride, magnesium sulfate, oxyCODONE, oxyCODONE    Lab Data:  CBC:   Recent Labs     09/25/20 2240   WBC 5.4   HGB 11.1*   HCT 34.8*   .5*        BMP:   Recent Labs     09/25/20  2240   *   K 3.4*   CL 98*   CO2 20*   BUN 15   CREATININE 0.5*     PT/INR: No results for input(s): PROTIME, INR in the last 72 hours. BNP:    Recent Labs     09/25/20  2240   PROBNP 1,370*     TROPONIN:   Recent Labs     09/25/20  2340 09/26/20  0500   TROPONINT <0.010 <0.010              NM Myocardial Spect:  Scheduled       Impression:  Active Problems:    Chest pain  Resolved Problems:    * No resolved hospital problems. *       All labs, medications and tests reviewed by myself, continue all other medications of all above medical condition listed as is except for changes mentioned above. Thank you   Please call with questions. Electronically signed by JESSE Swain CNP on 9/27/2020 at 8:10 AM  I have seen ,spoken to  and examined this patient personally, independently of the nurse practitioner. I have reviewed the hospital care given to date and reviewed all pertinent labs and imaging. The plan was developed mutually at the time of the visit with the patient,  NP  and myself. I have spoken with patient, nursing staff and provided written and verbal instructions . The above note has been reviewed and I agree with the assessment, diagnosis, and treatment plan with changes made by me as follows     CARDIOLOGY ATTENDING ADDENDUM    HPI:  I have reviewed the above HPI  And agree with above   Luiig Mason is a 62 y. o.year old who and presents with had concerns including Chest Pain.   Chief Complaint   Patient presents with    Chest Pain     Interval history:  No CP    Physical Exam:  General:  Awake, alert, NAD  Head:normal  Eye:normal  Neck:  No JVD   Chest:  Clear to auscultation, respiration

## 2020-09-28 ENCOUNTER — APPOINTMENT (OUTPATIENT)
Dept: NUCLEAR MEDICINE | Age: 60
DRG: 313 | End: 2020-09-28
Payer: MEDICARE

## 2020-09-28 LAB
LV EF: 38 %
LVEF MODALITY: NORMAL

## 2020-09-28 PROCEDURE — G0378 HOSPITAL OBSERVATION PER HR: HCPCS

## 2020-09-28 PROCEDURE — APPSS15 APP SPLIT SHARED TIME 0-15 MINUTES: Performed by: NURSE PRACTITIONER

## 2020-09-28 PROCEDURE — 6370000000 HC RX 637 (ALT 250 FOR IP): Performed by: HOSPITALIST

## 2020-09-28 PROCEDURE — 93017 CV STRESS TEST TRACING ONLY: CPT

## 2020-09-28 PROCEDURE — 6370000000 HC RX 637 (ALT 250 FOR IP): Performed by: INTERNAL MEDICINE

## 2020-09-28 PROCEDURE — 99232 SBSQ HOSP IP/OBS MODERATE 35: CPT | Performed by: INTERNAL MEDICINE

## 2020-09-28 PROCEDURE — 6360000002 HC RX W HCPCS: Performed by: HOSPITALIST

## 2020-09-28 PROCEDURE — 99211 OFF/OP EST MAY X REQ PHY/QHP: CPT

## 2020-09-28 PROCEDURE — 6360000002 HC RX W HCPCS: Performed by: INTERNAL MEDICINE

## 2020-09-28 PROCEDURE — 94761 N-INVAS EAR/PLS OXIMETRY MLT: CPT

## 2020-09-28 PROCEDURE — 78452 HT MUSCLE IMAGE SPECT MULT: CPT

## 2020-09-28 PROCEDURE — A9500 TC99M SESTAMIBI: HCPCS | Performed by: NURSE PRACTITIONER

## 2020-09-28 PROCEDURE — 2580000003 HC RX 258: Performed by: HOSPITALIST

## 2020-09-28 PROCEDURE — 1200000000 HC SEMI PRIVATE

## 2020-09-28 PROCEDURE — 3430000000 HC RX DIAGNOSTIC RADIOPHARMACEUTICAL: Performed by: NURSE PRACTITIONER

## 2020-09-28 RX ADMIN — OXYCODONE HYDROCHLORIDE 20 MG: 10 TABLET ORAL at 03:10

## 2020-09-28 RX ADMIN — OXYCODONE HYDROCHLORIDE 20 MG: 10 TABLET ORAL at 06:22

## 2020-09-28 RX ADMIN — ASPIRIN 81 MG CHEWABLE TABLET 81 MG: 81 TABLET CHEWABLE at 13:37

## 2020-09-28 RX ADMIN — ACETAMINOPHEN 650 MG: 325 TABLET ORAL at 00:09

## 2020-09-28 RX ADMIN — ATORVASTATIN CALCIUM 80 MG: 80 TABLET, FILM COATED ORAL at 20:49

## 2020-09-28 RX ADMIN — REGADENOSON 0.4 MG: 0.08 INJECTION, SOLUTION INTRAVENOUS at 09:34

## 2020-09-28 RX ADMIN — Medication 10 MILLICURIE: at 07:25

## 2020-09-28 RX ADMIN — OXYCODONE HYDROCHLORIDE 20 MG: 10 TABLET ORAL at 22:43

## 2020-09-28 RX ADMIN — AMIODARONE HYDROCHLORIDE 200 MG: 200 TABLET ORAL at 15:55

## 2020-09-28 RX ADMIN — SODIUM CHLORIDE, PRESERVATIVE FREE 10 ML: 5 INJECTION INTRAVENOUS at 13:39

## 2020-09-28 RX ADMIN — OXYCODONE HYDROCHLORIDE 20 MG: 10 TABLET ORAL at 00:09

## 2020-09-28 RX ADMIN — OXYCODONE HYDROCHLORIDE 20 MG: 10 TABLET ORAL at 16:35

## 2020-09-28 RX ADMIN — CLOPIDOGREL BISULFATE 75 MG: 75 TABLET ORAL at 13:38

## 2020-09-28 RX ADMIN — LEVETIRACETAM 750 MG: 500 TABLET, FILM COATED ORAL at 20:48

## 2020-09-28 RX ADMIN — ONDANSETRON 4 MG: 2 INJECTION INTRAMUSCULAR; INTRAVENOUS at 22:45

## 2020-09-28 RX ADMIN — ACETAMINOPHEN 650 MG: 325 TABLET ORAL at 19:30

## 2020-09-28 RX ADMIN — APIXABAN 5 MG: 5 TABLET, FILM COATED ORAL at 20:49

## 2020-09-28 RX ADMIN — OXYCODONE HYDROCHLORIDE 20 MG: 10 TABLET ORAL at 10:07

## 2020-09-28 RX ADMIN — FAMOTIDINE 20 MG: 20 TABLET ORAL at 20:49

## 2020-09-28 RX ADMIN — ACETAMINOPHEN 650 MG: 325 TABLET ORAL at 13:29

## 2020-09-28 RX ADMIN — SODIUM CHLORIDE, PRESERVATIVE FREE 10 ML: 5 INJECTION INTRAVENOUS at 20:49

## 2020-09-28 RX ADMIN — ACETAMINOPHEN 650 MG: 325 TABLET ORAL at 06:22

## 2020-09-28 RX ADMIN — MIRTAZAPINE 30 MG: 15 TABLET, FILM COATED ORAL at 20:49

## 2020-09-28 RX ADMIN — Medication 30 MILLICURIE: at 09:35

## 2020-09-28 RX ADMIN — OXYCODONE HYDROCHLORIDE 20 MG: 10 TABLET ORAL at 19:35

## 2020-09-28 RX ADMIN — DULOXETINE HYDROCHLORIDE 60 MG: 30 CAPSULE, DELAYED RELEASE ORAL at 13:37

## 2020-09-28 RX ADMIN — OXYCODONE HYDROCHLORIDE 20 MG: 10 TABLET ORAL at 13:27

## 2020-09-28 ASSESSMENT — PAIN DESCRIPTION - ORIENTATION
ORIENTATION: RIGHT;LEFT

## 2020-09-28 ASSESSMENT — PAIN SCALES - GENERAL
PAINLEVEL_OUTOF10: 6
PAINLEVEL_OUTOF10: 6
PAINLEVEL_OUTOF10: 8
PAINLEVEL_OUTOF10: 9
PAINLEVEL_OUTOF10: 3
PAINLEVEL_OUTOF10: 8
PAINLEVEL_OUTOF10: 5
PAINLEVEL_OUTOF10: 6

## 2020-09-28 ASSESSMENT — PAIN DESCRIPTION - ONSET
ONSET: ON-GOING

## 2020-09-28 ASSESSMENT — PAIN DESCRIPTION - FREQUENCY
FREQUENCY: CONTINUOUS

## 2020-09-28 ASSESSMENT — PAIN DESCRIPTION - LOCATION
LOCATION: GENERALIZED

## 2020-09-28 ASSESSMENT — PAIN - FUNCTIONAL ASSESSMENT
PAIN_FUNCTIONAL_ASSESSMENT: ACTIVITIES ARE NOT PREVENTED

## 2020-09-28 ASSESSMENT — PAIN DESCRIPTION - PROGRESSION

## 2020-09-28 ASSESSMENT — PAIN DESCRIPTION - PAIN TYPE
TYPE: CHRONIC PAIN

## 2020-09-28 ASSESSMENT — PAIN DESCRIPTION - DESCRIPTORS
DESCRIPTORS: ACHING;SHARP

## 2020-09-28 NOTE — PROGRESS NOTES
Cardiology Note    Admit Date:  9/25/2020    Admission diagnosis / Complaint:  nuc med stress test pending this morning      Subjective:  Ms. An Ruano in stress test.       Assessment and Plan:    1. CAD-history of CABG x2 and PCI of LAD 10/20/2019-presents with chest pain concerning for angina. Troponins negative x3. Nuc med stress test pending this morning. Further treatment and testing pending clinical course-we will continue with statin  2. History of atrial flutter-she does complain of frequent palpitations-in sinus rhythm no atrial flutter noted on telemetry. Continue on amiodarone and anticoagulation. Patient Bobby vas score is 3-Holter monitor pending  3. Ischemic cardiomyopathy-EF 30 to 35% does not appear to be in fluid volume overload-not able to use ACE or arm due to hypotension  4. History of ICD-explanted at [facility] in June 2020 due to staph infection/lead infection-she had pocket infection post procedure-has nonhealing wound to right chest.  5.  History of VT-status post ablation-complains of palpitation-Holter pending  6.   History of hypotension-stable at this time on ProAmatine      Objective:   /77   Pulse 51   Temp 97.8 °F (36.6 °C) (Oral)   Resp 22   Ht 5' 6\" (1.676 m)   Wt 124 lb 12.8 oz (56.6 kg)   SpO2 100%   BMI 20.14 kg/m²   No intake or output data in the 24 hours ending 09/28/20 0904    TELEMETRY: Sinus    has a past medical history of Acute delirium, Arrhythmia, Arthritis, Asplenia, Asthma, CAD (coronary artery disease), Cardiomyopathy ([address] Cerebral artery occlusion with cerebral infarction ([address] CHF (congestive heart failure) ([address] Enlarged liver, GERD (gastroesophageal reflux disease), H/O echocardiogram, History of blood transfusion, Hx of cardiovascular stress test, Hyperlipidemia, Hypertension, Lymphoma ([address] MI, old, Movement disorder, MS (multiple sclerosis) ([address] OP (osteoporosis), PE (pulmonary embolism), Pneumonia, Pyelonephritis, Seizures ([address] Spleen 9:04 AM     I have seen ,spoken to  and examined this patient personally, independently of the nurse practitioner. I have reviewed the hospital care given to date and reviewed all pertinent labs and imaging. The plan was developed mutually at the time of the visit with the patient,  NP  and myself. I have spoken with patient, nursing staff and provided written and verbal instructions . The above note has been reviewed and I agree with the assessment, diagnosis, and treatment plan with changes made by me as follows     CARDIOLOGY ATTENDING ADDENDUM    HPI:  I have reviewed the above HPI  And agree with above   Mason Downs is a 62 y. o.year old who and presents with had concerns including Chest Pain.   Chief Complaint   Patient presents with    Chest Pain     Interval history:  No sob    Physical Exam:  General:  Awake, alert, NAD  Head:normal  Eye:normal  Neck:  No JVD   Chest:  Clear to auscultation, respiration easy  Cardiovascular:  RRR S1S2  Abdomen:   nontender  Extremities:  no edema  Pulses; palpable  Neuro: grossly normal      MEDICAL DECISION MAKING;    I agree with the above plan, which was planned by myself and discussed with NP.  cardiolite no ischemia old Sarina Bradley MD [facility]

## 2020-09-28 NOTE — CONSULTS
[address] Continence Nurse  Consult Note       Flakito Cruz  AGE: 62 y.o. GENDER: female  : 1960  TODAY'S DATE:  2020    Subjective:     Reason for  Evaluation and Assessment:  Wound care eval rt chest wound      Flakito Cruz is a 62 y.o. female referred by:   [x] Physician  [] Nursing  [] Other:     Wound Identification:  Wound Type: non-healing surgical  Contributing Factors: s/p icd removal due to infection        PAST MEDICAL HISTORY        Diagnosis Date    Acute delirium     Arrhythmia     Arthritis     Asplenia     Asthma     CAD (coronary artery disease)     1st stent at age 46    Cardiomyopathy [facility])     [facility]erebral [facility] with [facility]     CHF (congestive heart failure) (HCC)     Enlarged liver     GERD (gastroesophageal reflux disease)     H/O echocardiogram 16    EF 55%, trivial TR & MR, stage 1 diastolic dysfunction    History of blood transfusion     Hx of cardiovascular stress test 2015    Alessia: EF 45%. Pharmacologic stress myocardial perfusion scan shows a fixed inferior-lateral defect w/ no inducible ischemia. No evidence of ischemia. Inferior-lateral infarction.  Normal LVSF    Hyperlipidemia     Hypertension     Lymphoma ([address]     Denies    MI, old     Movement disorder     MS (multiple sclerosis) ([address]     OP (osteoporosis)     PE (pulmonary embolism)     trapese filter    Pneumonia     Pyelonephritis     Seizures ([address]     Spleen enlarged     Thyroid disease        PAST SURGICAL HISTORY    Past Surgical History:   Procedure Laterality Date    ABDOMEN SURGERY      APPENDECTOMY      CARDIAC DEFIBRILLATOR PLACEMENT      evera [id number] CONDITIONAL    CHOLECYSTECTOMY      COLONOSCOPY      CORONARY ANGIOPLASTY WITH STENT PLACEMENT      3 stents    CORONARY ARTERY BYPASS GRAFT      Age 49    ENDOSCOPY, COLON, DIAGNOSTIC      GASTRIC BYPASS SURGERY      HERNIA REPAIR      HIP SURGERY  HYSTERECTOMY      JOINT REPLACEMENT      PORT SURGERY N/A 6/1/2020    PORT INSERTION performed by Manda Catalan MD at [address] [place] [address]      VASCULAR SURGERY         FAMILY HISTORY    Family History   Problem Relation Age of Onset    High Blood Pressure Mother     High Cholesterol Mother     Heart Disease Mother     Diabetes Mother     Heart Disease Father     Cancer Father     Other Sister         Multiple Sclerosis    Heart Disease Maternal Grandmother     High Blood Pressure Maternal Grandmother     High Cholesterol Maternal Grandmother        SOCIAL HISTORY    Social History     Tobacco Use    Smoking status: Never Smoker    Smokeless tobacco: Never Used   Substance Use Topics    Alcohol use: No    Drug use: No       ALLERGIES    Allergies   Allergen Reactions    Fentanyl Swelling     Other reaction(s): Angioedema (swelling)    Moxifloxacin Hcl In Nacl Swelling and Rash    Povidone-Iodine Rash     Other reaction(s): AOF      Cyclobenzaprine      Other reaction(s): Other - comment required  \" it make my muscle very weak because of my MS\"  \" it make my muscle very weak because of my MS\"      Methocarbamol      Worsening edema  Other reaction(s): Other - comment required  Worsening edema  Worsening edema  Worsening edema      Tramadol Other (See Comments)     Doctor doesn't her to take due to heart problems  Seizures   Doctor doesn't her to take due to lowers seizure threshold.  Baclofen     Ibuprofen      \"Due to heart problems\"    Naproxen     Nsaids      \"Due to heart problems\"    Tizanidine     Tolmetin      \"Due to heart problems\"    Tramadol Hcl      Other reaction(s):  Other - comment required  Told not to take due to history of seizures    Betadine [Povidone Iodine] Rash    Moxifloxacin Rash and Swelling     Lips swell and tingling in face   Lips swell and tingling in face   Lips swell and tingling in face   Lips swell and tingling in face Around mouth       MEDICATIONS    No current facility-administered medications on file prior to encounter. Current Outpatient Medications on File Prior to Encounter   Medication Sig Dispense Refill    potassium chloride (KLOR-CON M) 20 MEQ extended release tablet Take 1 tablet by mouth daily Take only if taking torsemide 60 tablet 3    midodrine (PROAMATINE) 10 MG tablet Take 1 tablet by mouth 3 times daily (with meals) 90 tablet 0    mirtazapine (REMERON) 30 MG tablet Take 30 mg by mouth nightly      levothyroxine (SYNTHROID) 112 MCG tablet Take 2 tablets by mouth Daily (Patient taking differently: Take 150 mcg by mouth Daily ) 30 tablet 1    ipratropium-albuterol (DUONEB) 0.5-2.5 (3) MG/3ML SOLN nebulizer solution Inhale 3 mLs into the lungs every 4 hours (while awake) 360 mL 0    nitroGLYCERIN (NITROSTAT) 0.4 MG SL tablet up to max of 3 total doses.  If no relief after 1 dose, call 911. 25 tablet 3    naloxone 4 MG/0.1ML LIQD nasal spray 1 spray by Nasal route as needed for Opioid Reversal (Patient taking differently: 1 spray by Nasal route as needed for Opioid Reversal 06/08/2020 Patient states she has never used this) 1 each 5    benzocaine-menthol (CEPACOL SORE THROAT) 15-3.6 MG lozenge Take 1 lozenge by mouth every 2 hours as needed for Sore Throat 18 lozenge 1    cyanocobalamin 1000 MCG/ML injection INJECT 1 ML INTO THE MUSCLE ON THE FIRST OF EACH MONTH  11    torsemide (DEMADEX) 20 MG tablet Take 20 mg by mouth daily as needed (For weight gain or fluid retention) Indications: pt takes 2 tablets daily       Icosapent Ethyl (VASCEPA) 0.5 g CAPS Take 500 mg by mouth 2 times daily 180 capsule 2    amiodarone (CORDARONE) 200 MG tablet Take 200 mg by mouth daily      apixaban (ELIQUIS) 5 MG TABS tablet Take 5 mg by mouth 2 times daily      DULoxetine (CYMBALTA) 60 MG extended release capsule Take 1 capsule by mouth daily 30 capsule 3    ondansetron (ZOFRAN) 4 MG tablet Take 1 tablet by mouth every 6 hours as needed for Nausea or Vomiting 20 tablet 0    magnesium oxide (MAG-OX) 400 MG tablet Take 800 mg by mouth 2 times daily      docusate sodium (COLACE) 100 MG capsule Take 100 mg by mouth 2 times daily as needed for Constipation      famotidine (PEPCID) 20 MG tablet Take 1 tablet by mouth 2 times daily 60 tablet 3    levETIRAcetam (KEPPRA) 750 MG tablet Take 750 mg by mouth 2 times daily       atorvastatin (LIPITOR) 80 MG tablet Take 80 mg by mouth nightly      clopidogrel (PLAVIX) 75 MG tablet Take 75 mg by mouth daily      acetaminophen 650 MG TABS Take 650 mg by mouth every 4 hours as needed 120 tablet 3    ferrous gluconate (FERGON) 324 (38 FE) MG tablet Take 324 mg by mouth 2 times daily       aspirin 81 MG tablet Take 81 mg by mouth daily       albuterol (PROVENTIL HFA;VENTOLIN HFA) 108 (90 BASE) MCG/ACT inhaler Inhale 2 puffs into the lungs every 6 hours as needed.         naloxone 4 MG/0.1ML LIQD nasal spray 1 spray by Nasal route as needed for Opioid Reversal 1 each 5         Objective:      /66   Pulse 63   Temp 98.2 °F (36.8 °C) (Oral)   Resp 16   Ht 5' 6\" (1.676 m)   Wt 124 lb 12.8 oz (56.6 kg)   SpO2 92%   BMI 20.14 kg/m²   Donal Risk Score: Donal Scale Score: 18    LABS    CBC:   Lab Results   Component Value Date    WBC 4.8 09/27/2020    RBC 3.36 09/27/2020    HGB 11.4 09/27/2020    HCT 36.5 09/27/2020    .6 09/27/2020    MCH 33.9 09/27/2020    MCHC 31.2 09/27/2020    RDW 15.6 09/27/2020     09/27/2020    MPV 9.5 09/27/2020     CMP:    Lab Results   Component Value Date     09/27/2020    K 3.6 09/27/2020     09/27/2020    CO2 24 09/27/2020    BUN 9 09/27/2020    CREATININE 0.6 09/27/2020    GFRAA >60 09/27/2020    LABGLOM >60 09/27/2020    GLUCOSE 98 09/27/2020    PROT 5.8 09/27/2020    PROT 6.3 02/21/2013    LABALBU 4.0 09/27/2020    CALCIUM 9.0 09/27/2020    BILITOT 0.1 09/27/2020    ALKPHOS 104 09/27/2020    AST 15 09/27/2020    ALT 9 09/27/2020     Albumin:    Lab Results   Component Value Date    LABALBU 4.0 09/27/2020     PT/INR:    Lab Results   Component Value Date    PROTIME 11.4 12/29/2019    PROTIME 10.2 01/28/2012    INR 0.94 12/29/2019     HgBA1c:  No results found for: LABA1C      Assessment:     Patient Active Problem List   Diagnosis    Chest pain    Chest pain    CAD (coronary artery disease)    Hyperlipidemia    Thyroid disease    Acute exacerbation of CHF (congestive heart failure) (HCC)    CHF exacerbation (HCC)    Congestive heart failure (HCC)    Left upper quadrant pain    Pneumonia of left lower lobe due to infectious organism ([facility]    Angina at rest [facility])    Coronary artery disease involving coronary bypass graft of native heart with angina pectoris ([facility])    Shortness of breath    Ischemic cardiomyopathy    Acute metabolic encephalopathy    Acute delirium    Pneumonia    Sacral pain    Acute on chronic systolic CHF (congestive heart failure), NYHA class 3 (HCC)    Severe malnutrition ([facility])    Cellulitis    Cellulitis at gastrostomy tube site ([facility]    History of Maru-en-Y gastric bypass    Gastroparesis    Abdominal pain    Feeding tube dysfunction    Abdominal pain, epigastric    Gastrojejunostomy tube status (HCC)    Chronic malnutrition (HCC)    Asplenia    Shockable cardiac rhythm detected by automated external defibrillator    Financial difficulties    Chest pain at rest    Closed nondisplaced transverse fracture of left patella    Contusion of right knee    Multifocal pneumonia    Abnormal cardiac function test    Chronic systolic heart failure (HCC)    Seizure disorder (HCC)    Hypothyroidism    Cardiomyopathy ([facility]    Acute chest pain    Fever in adult    Pyelonephritis    MRSA bacteremia    Heart failure (HCC)    Infected chest wall abrasion, right, subsequent encounter    Granuloma of skin    Open wound of right chest wall    MRSA infection    Klebsiella pneumoniae cleansed with NS measured and pictured dressing as above. Pt see's dr Karoline Mayorga and is agreeable to wound clinic outpatient. Pt has pink scarring blanching noted had a previous wound there covered with sacral border. Heels intact. Atmos air pump placed to bed. Pt is at mild risk for skin breakdown AEB janet score. Observe janet orders. Specialty Bed Required :  yes  [] Low Air Loss   [x] Pressure Redistribution  [] Fluid Immersion  [] Bariatric  [] Total Pressure Relief  [] Other:     Discharge Plan:  Placement for patient upon discharge:  home  Hospice Care: no  Patient appropriate for Outpatient [address]:  Yes referral to be sent     Patient/Caregiver Teaching:  Level of patient/caregiver understanding able to: pt verbalized understanding of wound care. Electronically signed by Feroz Matthews.  JOSE Blair,  on 9/28/2020 at 4:31 PM

## 2020-09-28 NOTE — CARE COORDINATION
CM in to see Pt to discuss discharge planning. Pt from home with S/O and adult daughter. Current discharge plan is home. Pt states having DME to include walker, cane, shower chair, and states using her wheelchair the most.  Pt has insurance and pcp. Pt denies any needs at this time. CM available if needs arise.

## 2020-09-28 NOTE — PROGRESS NOTES
Hospitalist Progress Note      Name:  Tony Geronimo /Age/Sex: 1960  (62 y.o. female)   MRN & CSN:  [id number] & [id number] Admission Date/Time: 2020 11:04 PM   Location:  [address] [place] [address] PCP: Luis Eduardo Reed MD         Hospital Day: 4    Assessment and Plan:   Tony Geronimo is a 62 y.o.  female  who presents with Chest pain    1. Syncope: Cardiogenic. Has history of cardiomyopathy. Status post pacemaker/ICD placement, removed because of infection. Has junctional rhythm on EKG. Cardiology on consult. · For stress test today pending. 2. History of atrial flutter: Has episodes of slow ventricular response overnight. On amiodarone and anticoagulation. For Holter monitor. 3. Ischemic cardiomyopathy: ICD/pacemaker extracted because of infection. 4. History of MRSA bacteremia: Last blood culture was negative. Reported on and off fever in the last few days. Seen normal.  Has chronic nonhealing wound on the ICD site. Will check blood culture. 1. Wound culture with MRSA. 2. We will consult infectious disease as not sure if it is colonization or real infection. She had multiple antibiotics in the past.    5. Avascular necrosis bilateral hip: Continue opioids. 6. Chronic systolic congestive heart failure  7. Coronary artery disease: Status post coronary artery bypass graft: Continue aspirin, Lipitor. 8. History of venous thromboembolism with pulmonary embolism: Continue anticoagulation. 9. Hypothyroidism: Continue Synthroid  10. Seizure disorder: On Keppra  11. Depression/anxiety  12. Anemia of chronic disease, status post gastric bypass.     Diet DIET GENERAL; No Caffeine  Dietary Nutrition Supplements: Wound Healing Oral Supplement   DVT Prophylaxis [] Lovenox, []  Heparin, [] SCDs, [] Warfarin  [x] NOAC     GI Prophylaxis [] PPI,  [x] H2 Blocker,  [] Carafate,  [] Diet/Tube Feeds   Code Status Full Code   MDM [] Low, [x] Moderate,[]  High     History of Present Illness: Chief Complaint: Chest pain    She was seen and examined today. Has been eating Goldfish crackers this morning. Chest pain better. No shortness of breath. No fever or chills. No abdominal pain. Still complaining of pain in bilateral hip. Ten point ROS reviewed negative, unless as noted above    Objective:   No intake or output data in the 24 hours ending 09/28/20 1443   Vitals:   Vitals:    09/28/20 0245   BP: 127/77   Pulse: 51   Resp: 22   Temp: 97.8 °F (36.6 °C)   SpO2: 100%     Physical Exam:   GEN Awake female, sitting upright in bed in no apparent distress. Appears given age. EYES Pupils are equally round. No scleral erythema, discharge, or conjunctivitis. HENT Mucous membranes are moist. Oral pharynx without exudates, no evidence of thrush. NECK Supple, no apparent thyromegaly or masses. RESP Clear to auscultation, no wheezes, rales or rhonchi. Symmetric chest movement while on room air. Open wound, right chest with scant yellowish discharge. Has granulation tissue but no surrounding erythema or swelling. CARDIO/VASC S1/S2 auscultated. Regular rate without appreciable murmurs, rubs, or gallops. No JVD or carotid bruits. Peripheral pulses equal bilaterally and palpable. No peripheral edema. GI Abdomen is soft without significant tenderness, masses, or guarding. Bowel sounds are normoactive. Rectal exam deferred. MSK No gross joint deformities. SKIN Normal coloration, warm, dry. NEURO Cranial nerves appear grossly intact, normal speech, no lateralizing weakness. PSYCH Awake, alert, oriented x 4. Affect appropriate.     Medications:   Medications:    amiodarone  200 mg Oral Daily    apixaban  5 mg Oral BID    aspirin  81 mg Oral Daily    atorvastatin  80 mg Oral Nightly    clopidogrel  75 mg Oral Daily    DULoxetine  60 mg Oral Daily    famotidine  20 mg Oral BID    levETIRAcetam  750 mg Oral BID    levothyroxine  150 mcg Oral Daily    mirtazapine  30 mg Oral Nightly    midodrine  10 mg Oral TID     sodium chloride flush  10 mL Intravenous 2 times per day      Infusions:   PRN Meds: technetium sestamibi, 10 millicurie, ONCE PRN  technetium sestamibi, 30 millicurie, ONCE PRN  albuterol sulfate HFA, 2 puff, Q6H PRN  sodium chloride flush, 10 mL, PRN  acetaminophen, 650 mg, Q6H PRN    Or  acetaminophen, 650 mg, Q6H PRN  polyethylene glycol, 17 g, Daily PRN  promethazine, 12.5 mg, Q6H PRN    Or  ondansetron, 4 mg, Q6H PRN  nitroGLYCERIN, 0.4 mg, Q5 Min PRN  potassium chloride, 40 mEq, PRN    Or  potassium alternative oral replacement, 40 mEq, PRN    Or  potassium chloride, 10 mEq, PRN  magnesium sulfate, 2 g, PRN  oxyCODONE, 20 mg, Q8H PRN  oxyCODONE, 20 mg, Q3H PRN        Recent Labs     09/25/20  2240 09/27/20  1004   WBC 5.4 4.8   HGB 11.1* 11.4*   HCT 34.8* 36.5*    313      Recent Labs     09/25/20  2240 09/27/20  1004   * 139   K 3.4* 3.6   CL 98* 106   CO2 20* 24   BUN 15 9   CREATININE 0.5* 0.6     Recent Labs     09/25/20  2240 09/27/20  1004   AST 16 15   ALT 10 9*   BILITOT 0.2 0.1   ALKPHOS 98 104     No results for input(s): INR in the last 72 hours.   Recent Labs     09/25/20  2340 09/26/20  0500 09/27/20  1004   TROPONINT <0.010 <0.010 <0.010        Imaging reviewed      Electronically signed by Jose Meyer MD on 9/28/2020 at 2:43 PM

## 2020-09-29 LAB
ACQUISITION DURATION: NORMAL S
AVERAGE HEART RATE: 52 BPM
EKG DIAGNOSIS: NORMAL
HIGH SENSITIVE C-REACTIVE PROTEIN: 2.6 MG/L
HOOKUP DATE: NORMAL
HOOKUP TIME: NORMAL
MAX HEART RATE TIME/DATE: NORMAL
MAX HEART RATE: 97 BPM
MIN HEART RATE TIME/DATE: NORMAL
MIN HEART RATE: 41 BPM
NUMBER OF QRS COMPLEXES: NORMAL
NUMBER OF SUPRAVENTRICULAR BEATS IN RUNS: 0
NUMBER OF SUPRAVENTRICULAR COUPLETS: 1
NUMBER OF SUPRAVENTRICULAR ECTOPICS: 29
NUMBER OF SUPRAVENTRICULAR ISOLATED BEATS: 26
NUMBER OF SUPRAVENTRICULAR RUNS: 0
NUMBER OF VENTRICULAR BEATS IN RUNS: 0
NUMBER OF VENTRICULAR BIGEMINAL CYCLES: 0
NUMBER OF VENTRICULAR COUPLETS: 0
NUMBER OF VENTRICULAR ECTOPICS: 97
NUMBER OF VENTRICULAR ISOLATED BEATS: 92
NUMBER OF VENTRICULAR RUNS: 0

## 2020-09-29 PROCEDURE — 94761 N-INVAS EAR/PLS OXIMETRY MLT: CPT

## 2020-09-29 PROCEDURE — 2580000003 HC RX 258: Performed by: HOSPITALIST

## 2020-09-29 PROCEDURE — 6370000000 HC RX 637 (ALT 250 FOR IP): Performed by: HOSPITALIST

## 2020-09-29 PROCEDURE — G0008 ADMIN INFLUENZA VIRUS VAC: HCPCS | Performed by: INTERNAL MEDICINE

## 2020-09-29 PROCEDURE — 6370000000 HC RX 637 (ALT 250 FOR IP): Performed by: INTERNAL MEDICINE

## 2020-09-29 PROCEDURE — 86141 C-REACTIVE PROTEIN HS: CPT

## 2020-09-29 PROCEDURE — 1200000000 HC SEMI PRIVATE

## 2020-09-29 PROCEDURE — APPSS60 APP SPLIT SHARED TIME 46-60 MINUTES: Performed by: NURSE PRACTITIONER

## 2020-09-29 PROCEDURE — 6360000002 HC RX W HCPCS: Performed by: INTERNAL MEDICINE

## 2020-09-29 PROCEDURE — 36415 COLL VENOUS BLD VENIPUNCTURE: CPT

## 2020-09-29 PROCEDURE — 6360000002 HC RX W HCPCS: Performed by: HOSPITALIST

## 2020-09-29 PROCEDURE — 6370000000 HC RX 637 (ALT 250 FOR IP): Performed by: NURSE PRACTITIONER

## 2020-09-29 PROCEDURE — 2580000003 HC RX 258: Performed by: INTERNAL MEDICINE

## 2020-09-29 PROCEDURE — 99232 SBSQ HOSP IP/OBS MODERATE 35: CPT | Performed by: INTERNAL MEDICINE

## 2020-09-29 PROCEDURE — APPNB45 APP NON BILLABLE 31-45 MINUTES: Performed by: NURSE PRACTITIONER

## 2020-09-29 PROCEDURE — 90686 IIV4 VACC NO PRSV 0.5 ML IM: CPT | Performed by: INTERNAL MEDICINE

## 2020-09-29 RX ADMIN — MIDODRINE HYDROCHLORIDE 10 MG: 5 TABLET ORAL at 08:49

## 2020-09-29 RX ADMIN — OXYCODONE HYDROCHLORIDE 20 MG: 10 TABLET ORAL at 18:18

## 2020-09-29 RX ADMIN — ASPIRIN 81 MG CHEWABLE TABLET 81 MG: 81 TABLET CHEWABLE at 08:52

## 2020-09-29 RX ADMIN — ACETAMINOPHEN 650 MG: 325 TABLET ORAL at 18:18

## 2020-09-29 RX ADMIN — OXYCODONE HYDROCHLORIDE 20 MG: 10 TABLET ORAL at 11:54

## 2020-09-29 RX ADMIN — LEVETIRACETAM 750 MG: 500 TABLET, FILM COATED ORAL at 21:30

## 2020-09-29 RX ADMIN — ATORVASTATIN CALCIUM 80 MG: 80 TABLET, FILM COATED ORAL at 21:30

## 2020-09-29 RX ADMIN — MIRTAZAPINE 30 MG: 15 TABLET, FILM COATED ORAL at 21:30

## 2020-09-29 RX ADMIN — Medication: at 21:29

## 2020-09-29 RX ADMIN — FAMOTIDINE 20 MG: 20 TABLET ORAL at 08:52

## 2020-09-29 RX ADMIN — SODIUM CHLORIDE, PRESERVATIVE FREE 10 ML: 5 INJECTION INTRAVENOUS at 08:49

## 2020-09-29 RX ADMIN — MIDODRINE HYDROCHLORIDE 10 MG: 5 TABLET ORAL at 18:08

## 2020-09-29 RX ADMIN — SODIUM CHLORIDE, PRESERVATIVE FREE 10 ML: 5 INJECTION INTRAVENOUS at 21:32

## 2020-09-29 RX ADMIN — FAMOTIDINE 20 MG: 20 TABLET ORAL at 21:31

## 2020-09-29 RX ADMIN — AMIODARONE HYDROCHLORIDE 200 MG: 200 TABLET ORAL at 08:52

## 2020-09-29 RX ADMIN — OXYCODONE HYDROCHLORIDE 20 MG: 10 TABLET ORAL at 08:50

## 2020-09-29 RX ADMIN — CLOPIDOGREL BISULFATE 75 MG: 75 TABLET ORAL at 08:52

## 2020-09-29 RX ADMIN — OXYCODONE HYDROCHLORIDE 20 MG: 10 TABLET ORAL at 21:31

## 2020-09-29 RX ADMIN — MIDODRINE HYDROCHLORIDE 10 MG: 5 TABLET ORAL at 11:38

## 2020-09-29 RX ADMIN — INFLUENZA A VIRUS A/VICTORIA/2454/2019 IVR-207 (H1N1) ANTIGEN (PROPIOLACTONE INACTIVATED), INFLUENZA A VIRUS A/HONG KONG/2671/2019 IVR-208 (H3N2) ANTIGEN (PROPIOLACTONE INACTIVATED), INFLUENZA B VIRUS B/VICTORIA/705/2018 BVR-11 ANTIGEN (PROPIOLACTONE INACTIVATED), INFLUENZA B VIRUS B/PHUKET/3073/2013 BVR-1B ANTIGEN (PROPIOLACTONE INACTIVATED) 0.5 ML: 15; 15; 15; 15 INJECTION, SUSPENSION INTRAMUSCULAR at 11:33

## 2020-09-29 RX ADMIN — LEVETIRACETAM 750 MG: 500 TABLET, FILM COATED ORAL at 08:53

## 2020-09-29 RX ADMIN — ACETAMINOPHEN 650 MG: 325 TABLET ORAL at 02:19

## 2020-09-29 RX ADMIN — ACETAMINOPHEN 650 MG: 325 TABLET ORAL at 08:50

## 2020-09-29 RX ADMIN — DULOXETINE HYDROCHLORIDE 60 MG: 30 CAPSULE, DELAYED RELEASE ORAL at 08:53

## 2020-09-29 RX ADMIN — OXYCODONE HYDROCHLORIDE 20 MG: 10 TABLET ORAL at 05:32

## 2020-09-29 RX ADMIN — APIXABAN 5 MG: 5 TABLET, FILM COATED ORAL at 08:52

## 2020-09-29 RX ADMIN — OXYCODONE HYDROCHLORIDE 20 MG: 10 TABLET ORAL at 15:11

## 2020-09-29 RX ADMIN — OXYCODONE HYDROCHLORIDE 20 MG: 10 TABLET ORAL at 02:19

## 2020-09-29 RX ADMIN — LEVOTHYROXINE SODIUM 150 MCG: 150 TABLET ORAL at 05:33

## 2020-09-29 RX ADMIN — APIXABAN 5 MG: 5 TABLET, FILM COATED ORAL at 21:30

## 2020-09-29 RX ADMIN — CHLORHEXIDINE GLUCONATE: 4 LIQUID TOPICAL at 18:00

## 2020-09-29 RX ADMIN — ONDANSETRON 4 MG: 2 INJECTION INTRAMUSCULAR; INTRAVENOUS at 13:21

## 2020-09-29 RX ADMIN — VANCOMYCIN HYDROCHLORIDE 1000 MG: 1 INJECTION, POWDER, LYOPHILIZED, FOR SOLUTION INTRAVENOUS at 17:40

## 2020-09-29 ASSESSMENT — PAIN DESCRIPTION - ORIENTATION
ORIENTATION: RIGHT;LEFT
ORIENTATION: LEFT
ORIENTATION: LEFT

## 2020-09-29 ASSESSMENT — PAIN DESCRIPTION - PROGRESSION
CLINICAL_PROGRESSION: NOT CHANGED
CLINICAL_PROGRESSION: NOT CHANGED
CLINICAL_PROGRESSION: GRADUALLY IMPROVING
CLINICAL_PROGRESSION: NOT CHANGED
CLINICAL_PROGRESSION: GRADUALLY WORSENING
CLINICAL_PROGRESSION: NOT CHANGED
CLINICAL_PROGRESSION: GRADUALLY WORSENING

## 2020-09-29 ASSESSMENT — PAIN DESCRIPTION - DESCRIPTORS
DESCRIPTORS: ACHING

## 2020-09-29 ASSESSMENT — PAIN SCALES - GENERAL
PAINLEVEL_OUTOF10: 9
PAINLEVEL_OUTOF10: 8
PAINLEVEL_OUTOF10: 9
PAINLEVEL_OUTOF10: 7
PAINLEVEL_OUTOF10: 9
PAINLEVEL_OUTOF10: 8
PAINLEVEL_OUTOF10: 8
PAINLEVEL_OUTOF10: 7
PAINLEVEL_OUTOF10: 7
PAINLEVEL_OUTOF10: 8
PAINLEVEL_OUTOF10: 6

## 2020-09-29 ASSESSMENT — PAIN DESCRIPTION - PAIN TYPE
TYPE: CHRONIC PAIN;ACUTE PAIN
TYPE: ACUTE PAIN;CHRONIC PAIN
TYPE: CHRONIC PAIN;ACUTE PAIN
TYPE: CHRONIC PAIN;ACUTE PAIN

## 2020-09-29 ASSESSMENT — PAIN DESCRIPTION - FREQUENCY
FREQUENCY: CONTINUOUS

## 2020-09-29 ASSESSMENT — PAIN - FUNCTIONAL ASSESSMENT
PAIN_FUNCTIONAL_ASSESSMENT: PREVENTS OR INTERFERES SOME ACTIVE ACTIVITIES AND ADLS

## 2020-09-29 ASSESSMENT — PAIN DESCRIPTION - ONSET
ONSET: ON-GOING

## 2020-09-29 ASSESSMENT — PAIN DESCRIPTION - LOCATION
LOCATION: GENERALIZED;HIP
LOCATION: GENERALIZED;SHOULDER;HIP
LOCATION: HIP;GENERALIZED

## 2020-09-29 NOTE — PROGRESS NOTES
[facility]  consulted by Dr. Eron Tobias for monitoring and adjustment. 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 Intake/Output Summary (Last 24 hours) at 9/29/2020 1634  Last data filed at 9/29/2020 1524  Gross per 24 hour   Intake 680 ml   Output 0 ml   Net 680 ml       Pertinent Cultures:  Date    Source    Results  9/27   Wound    MRSA      Vancomycin level:   TROUGH:  No results for input(s): VANCOTROUGH in the last 72 hours. RANDOM:  No results for input(s): VANCORANDOM in the last 72 hours. Assessment:  WBC and temperature: WBC wnl, patient remains afebrile  SCr, BUN, and urine output: SNL, no UOP data  Day(s) of therapy: # 1  Vancomycin level: to be collected. Plan:  Dosing comments: started patient on Vancomycin 1000mg IV every 12 hours. Trough level ordered prior to fourth dose. Pharmacy will continue to monitor patient and adjust therapy as indicated    REPEAT VANCOMYCIN TROUGH SCHEDULED FOR 10/1 @ 0500. Thank you for the consult.   Angelica Newman [place]  9/29/2020 4:34 PM

## 2020-09-29 NOTE — PROGRESS NOTES
Cardiology Note    Admit Date:  9/25/2020    Admission diagnosis / Complaint:  CP      Subjective:  Ms. Sammie Walker denies any chest pain currently, shortness of breath, dizziness, or palpitations. Assessment and Plan:    1. CAD-history of CABG x 2 and PCI of LAD 10/20/2019-presents with chest pain concerning for angina. Troponins negative x3. Nuc med stress test negative for any new ischemia. 2.  History of atrial flutter-she does complain of frequent palpitations-in sinus rhythm no atrial flutter noted on telemetry. Continue on amiodarone and anticoagulation. Patient Bobby vas score is 3-Holter monitor pending  3. Ischemic cardiomyopathy-EF 30 to 35% does not appear to be in fluid volume overload-not able to use ACE or BB due to hypotension  4. History of ICD-explanted at [address] [place] [address] in June 2020 due to staph infection/lead infection-she had pocket infection post procedure-has nonhealing wound to right chest. Then had lead and device extraction. 5.  History of VT-status post ablation-complains of palpitation-Holter pending  6. History of hypotension-stable at this time on ProAmatine  7. We will sign off please reconsult for any new cardiac complaints or concerns.        Objective:   BP (!) 107/55   Pulse 50   Temp 98.4 °F (36.9 °C) (Oral)   Resp 10   Ht 5' 6\" (1.676 m)   Wt 130 lb 12.8 oz (59.3 kg)   SpO2 94%   BMI 21.11 kg/m²       Intake/Output Summary (Last 24 hours) at 9/29/2020 1100  Last data filed at 9/29/2020 1051  Gross per 24 hour   Intake 240 ml   Output 0 ml   Net 240 ml       TELEMETRY: Sinus    has a past medical history of Acute delirium, Arrhythmia, Arthritis, Asplenia, Asthma, CAD (coronary artery disease), Cardiomyopathy ([address] Cerebral artery occlusion with cerebral infarction ([address] CHF (congestive heart failure) ([address] Enlarged liver, GERD (gastroesophageal reflux disease), H/O echocardiogram, History of blood transfusion, Hx of cardiovascular stress test, Hyperlipidemia, Hypertension, Lymphoma ([facility] MI, old, Movement disorder, MS (multiple sclerosis) ([facility] OP (osteoporosis), PE (pulmonary embolism), Pneumonia, Pyelonephritis, Seizures ([facility] Severe malnutrition ([facility] Spleen enlarged, and Thyroid disease. has a past surgical history that includes Coronary angioplasty with stent; Hysterectomy; Appendectomy; Cholecystectomy; Tonsillectomy; Gastric bypass surgery; Cardiac defibrillator placement; hip surgery; Abdomen surgery; Colonoscopy; Endoscopy, colon, diagnostic; hernia repair; joint replacement; skin biopsy; vascular surgery; Coronary artery bypass graft; and Port Surgery (N/A, 6/1/2020).   Physical Exam:  General:  Awake, alert, NAD  Skin:  Warm and dry  Neck:  JVD not appreciated  Chest:  Clear to auscultation, respiration easy  Cardiovascular:  RRR S1S2, grade 2 out of 6 systolic murmur appreciated  Abdomen:  Soft nontender  Extremities:  No edema    Medications:    influenza virus vaccine  0.5 mL Intramuscular Once    amiodarone  200 mg Oral Daily    apixaban  5 mg Oral BID    aspirin  81 mg Oral Daily    atorvastatin  80 mg Oral Nightly    clopidogrel  75 mg Oral Daily    DULoxetine  60 mg Oral Daily    famotidine  20 mg Oral BID    levETIRAcetam  750 mg Oral BID    levothyroxine  150 mcg Oral Daily    mirtazapine  30 mg Oral Nightly    midodrine  10 mg Oral TID WC    sodium chloride flush  10 mL Intravenous 2 times per day       albuterol sulfate HFA, sodium chloride flush, acetaminophen **OR** acetaminophen, polyethylene glycol, promethazine **OR** ondansetron, nitroGLYCERIN, potassium chloride **OR** potassium alternative oral replacement **OR** potassium chloride, magnesium sulfate, oxyCODONE, oxyCODONE    Lab Data:  CBC:   Recent Labs     09/27/20  1004   WBC 4.8   HGB 11.4*   HCT 36.5*   .6*        BMP:   Recent Labs     09/27/20  1004      K 3.6      CO2 24   BUN 9   CREATININE 0.6     LIVER PROFILE:   Recent Labs

## 2020-09-29 NOTE — CONSULTS
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 Matilda Patel onboard, imp of large inferior wall MI, no ischemia post stress test, Medical management  · Remote CVA  · MS  · Lymphoma  · Allergy to moxifloxicin: Lips and facial swelling  · Multi-morbidity: per PMHx:  Arthritis, asplenia, asthma, CAD with PCI, cardiomyopathy, CVA, CHF, GERD, HLD, HTN, Lymphoma, MS, Seizures, hyster, appey, choley, Gastric bypass  Plan:  · Pharmacy to dose vancomycin, order placed   Decolonize with chlorhexidine baths daily x 10 days (end date 10/9/20)  · Start Bactroban nasally bid x 5 days (end date 10/4/10)   Check CRP today  · Agree with local wound care of right chest  · Will DW patient and case management regarding dalbavancin OP infusion as first choice IV ABX therapy, x 1 dose 1500 mg for DC plan    · For DC plan:  · Follow up in ID clinic in 1 week please  Weekly labs drawn on Monday during the course of treatment  CBC with differential, CMP, ESR, CRP,Vancomycin Trough  Fax results to Attn: [place] [facility]  · # [phone]  · Please do not DC until ID finializes plans with case management 20  ·   Thank you for allowing me to consult in the care of this patient.  ------------------------  REASON FOR CONSULT: Infective syndrome     Requested by:  Dr. Jenni Jain is a 62 y.o.   female who was admitted 2020 for further evaluation and management of palpitations and chest pain which radiated to her left arm and left neck and ear which started the day prior to admission. She reports she passed out, awoke and felt weak and dizzy. She then developed profuse sweating and vomiting. States she took a total of 6 NTG sublingual tablets which subsided the chest discomfort for about 15 minutes, then it started up again. The right chest wound culture grew on 8/22/20-MRSA, Klebsiella pneumoniae ESBL. She was treated with vanco 8/22-8/27/20 IV and meropenem 1 gm IV q8h x 14 days (end date 9/9/20), but did not receive ordered IV dalbavancin 1500 mg x 1 dose per OP infusion clinic, states she was not able to get to the infusion clinic. ? Infectious diseases service was consulted to evaluate the pt, and recommend further investigative and therapeutic measures. ROS: Other systems reviewed Including eyes, ENT, respiratory, cardiovascular, GI, , dermatologic, neurologic, psych, hem/lymphatic, musculoskeletal and endocrine were negative other than what is mentioned above.      Patient Active Problem List    Diagnosis Date Noted    Acute delirium      Priority: High    Coronary artery disease involving coronary bypass graft of native heart with angina pectoris ([address]      Priority: High    Shortness of breath      Priority: High    Ischemic cardiomyopathy      Priority: High    Angina at rest [facility])      Priority: High    MRSA infection     Klebsiella pneumoniae infection     Open wound of right chest wall     Granuloma of skin     Infected chest wall abrasion, right, subsequent encounter 08/22/2020    Heart failure ([address] 06/08/2020    MRSA bacteremia     Pyelonephritis     Fever in adult 05/24/2020    Cardiomyopathy ([address]     Acute chest pain     Chronic systolic heart failure ([address] 12/29/2019    Seizure disorder ([address] 12/29/2019    Hypothyroidism 12/29/2019    Abnormal cardiac function test 12/17/2019    Multifocal pneumonia 11/14/2019    Closed nondisplaced transverse fracture of left patella 10/24/2019    Contusion of right knee 10/24/2019    Chest pain at rest 09/29/2019    Financial difficulties 09/27/2019    Shockable cardiac rhythm detected by automated external defibrillator 05/29/2019    Asplenia     Feeding tube dysfunction     Abdominal pain, epigastric     Gastrojejunostomy tube status (HCC)     Chronic malnutrition (HCC)     Abdominal pain 03/31/2019    History of Maru-en-Y gastric bypass     Gastroparesis     Cellulitis at gastrostomy tube site ([address] 03/19/2019    Cellulitis     Severe malnutrition ([address] 02/04/2019    Acute on chronic systolic CHF (congestive heart failure), NYHA class 3 ([address] 02/03/2019    Sacral pain 01/28/2019    Pneumonia 01/26/2019    Acute metabolic encephalopathy 27/38/2056    Pneumonia of left lower lobe due to infectious organism ([address] 05/14/2017    Left upper quadrant pain 05/04/2017    Congestive heart failure (HCC)     CHF exacerbation (HCC)     Acute exacerbation of CHF (congestive heart failure) ([address]     CAD (coronary artery disease) 04/01/2016    Hyperlipidemia 04/01/2016    Thyroid disease 04/01/2016    Chest pain     Chest pain 01/28/2012     Past Medical History:   Diagnosis Date    Acute delirium     Arrhythmia     Arthritis     Asplenia     Asthma     CAD (coronary artery disease)     1st stent at age 46    Cardiomyopathy ([address]     Cerebral artery occlusion with cerebral infarction ([address]     CHF (congestive heart failure) ([address]     Enlarged liver     GERD (gastroesophageal reflux disease)     H/O echocardiogram 4/6/16    EF 55%, trivial TR & MR, stage 1 diastolic dysfunction    History of blood transfusion     Hx of cardiovascular stress test 12/29/2015    Alessia: EF 45%. Pharmacologic stress myocardial perfusion scan shows a fixed inferior-lateral defect w/ no inducible ischemia. No evidence of ischemia. Inferior-lateral infarction.  Normal LVSF    Hyperlipidemia     Hypertension     Lymphoma ([facility]     Denies    MI, old     Movement disorder     MS (multiple sclerosis) ([facility]     OP (osteoporosis)     PE (pulmonary embolism)     trapese filter    Pneumonia     Pyelonephritis     Seizures ([facility]     Severe malnutrition ([facility]     Spleen enlarged     Thyroid disease       Past Surgical History:   Procedure Laterality Date    ABDOMEN SURGERY      APPENDECTOMY      CARDIAC DEFIBRILLATOR PLACEMENT      adelaida [id number] CONDITIONAL    CHOLECYSTECTOMY      COLONOSCOPY      CORONARY ANGIOPLASTY WITH STENT PLACEMENT      3 stents    CORONARY ARTERY BYPASS GRAFT      Age 49    ENDOSCOPY, COLON, DIAGNOSTIC      GASTRIC BYPASS SURGERY      HERNIA REPAIR      HIP SURGERY      HYSTERECTOMY      JOINT REPLACEMENT      PORT SURGERY N/A 6/1/2020    PORT INSERTION performed by Alin Moctezuma MD at [address] [place] [address]      VASCULAR SURGERY        Family History   Problem Relation Age of Onset    High Blood Pressure Mother     High Cholesterol Mother     Heart Disease Mother     Diabetes Mother     Heart Disease Father     Cancer Father     Other Sister         Multiple Sclerosis    Heart Disease Maternal Grandmother     High Blood Pressure Maternal Grandmother     High Cholesterol Maternal Grandmother       Infectious disease related family history - not contibutory. SOCIAL HISTORY  Social History     Tobacco Use    Smoking status: Never Smoker    Smokeless tobacco: Never Used   Substance Use Topics    Alcohol use: No      · [place]ton [place]  · Lives:[place], [place] with partner and daughter  · Occupation:Retired nurse  · No recent travel of significance. · No recent unusual exposures. · Pets:  3 dogs, 4 cats  ?   ALLERGIES  Allergies   Allergen Reactions    Fentanyl Swelling     Other reaction(s): Angioedema (swelling)    Moxifloxacin Hcl In Nacl Swelling and Rash    Povidone-Iodine Rash

## 2020-09-29 NOTE — PROGRESS NOTES
Hospitalist Progress Note      Name:  Ede Monday /Age/Sex: 1960  (62 y.o. female)   MRN & CSN:  [id number] & [id number] Admission Date/Time: 2020 11:04 PM   Location:  [address] [place] [address]-A PCP: Sheryl Mcclellan MD         Hospital Day: 5    Assessment and Plan:   Ede Monday is a 62 y.o.  female  who presents with Chest pain    > Syncope:   >History of atrial flutter  >Ischemic cardiomyopathy: ICD/pacemaker extracted because of infection.  - On amiodarone and anticoagulation. -  Has history of cardiomyopathy.  Status post pacemaker/ICD placement, removed because of infection. - Has junctional rhythm on EKG.  Cardiology on consulted. - stress test neg for new ischemia, no further workup recommended       >History of MRSA bacteremia: Last blood culture was negative.  Reported on and off fever in the last few days.  Seen normal.  Has chronic nonhealing wound on the ICD site.  Will check blood culture.  -Wound culture with MRSA. -ID consult pending    >Avascular necrosis bilateral hip: Continue opioids. >Chronic systolic congestive heart failure  >Coronary artery disease: Status post coronary artery bypass graft: Continue aspirin, Lipitor. >History of venous thromboembolism with pulmonary embolism: Continue anticoagulation. >Hypothyroidism: Continue Synthroid  >Seizure disorder: On Keppra  >Depression/anxiety  >Anemia of chronic disease, status post gastric bypass.     Diet DIET GENERAL; No Caffeine  Dietary Nutrition Supplements: Wound Healing Oral Supplement   DVT Prophylaxis ? pt on eliquis   Code Status Full Code   Disposition  Home anticipate tomorrow     History of Present Illness:     Pt S&E. No chest pain, no dyspnea, no abd pain, no N/V, no F/C.     10-14 point ROS reviewed negative, unless as noted above    Objective:        Intake/Output Summary (Last 24 hours) at 2020 0843  Last data filed at 2020 0815  Gross per 24 hour   Intake --   Output 0 ml   Net 0 ml Vitals:   Vitals:    09/29/20 0218   BP: 125/60   Pulse: 70   Resp: 11   Temp: 97.7 °F (36.5 °C)   SpO2:      Physical Exam:      GEN Awake female, cooperative, no apparent distress. RESP Clear to auscultation, no wheezes, rales or rhonchi. Symmetric chest movement . CARDIO/VASC S1/S2 auscultated. Regular rate. GI Abdomen is soft without significant tenderness, Bowel sounds are normoactive. MSK No gross joint deformities. Spontaneous movement of all extremities  SKIN Normal coloration, warm, dry. NEURO normal speech,   PSYCH Awake, alert, oriented x 4. Affect appropriate.     Medications:   Medications:    influenza virus vaccine  0.5 mL Intramuscular Once    amiodarone  200 mg Oral Daily    apixaban  5 mg Oral BID    aspirin  81 mg Oral Daily    atorvastatin  80 mg Oral Nightly    clopidogrel  75 mg Oral Daily    DULoxetine  60 mg Oral Daily    famotidine  20 mg Oral BID    levETIRAcetam  750 mg Oral BID    levothyroxine  150 mcg Oral Daily    mirtazapine  30 mg Oral Nightly    midodrine  10 mg Oral TID WC    sodium chloride flush  10 mL Intravenous 2 times per day      Infusions:   PRN Meds: albuterol sulfate HFA, 2 puff, Q6H PRN  sodium chloride flush, 10 mL, PRN  acetaminophen, 650 mg, Q6H PRN    Or  acetaminophen, 650 mg, Q6H PRN  polyethylene glycol, 17 g, Daily PRN  promethazine, 12.5 mg, Q6H PRN    Or  ondansetron, 4 mg, Q6H PRN  nitroGLYCERIN, 0.4 mg, Q5 Min PRN  potassium chloride, 40 mEq, PRN    Or  potassium alternative oral replacement, 40 mEq, PRN    Or  potassium chloride, 10 mEq, PRN  magnesium sulfate, 2 g, PRN  oxyCODONE, 20 mg, Q8H PRN  oxyCODONE, 20 mg, Q3H PRN      Electronically signed by Arjun Campbell MD on 9/29/2020 at 8:43 AM

## 2020-09-30 ENCOUNTER — HOSPITAL ENCOUNTER (OUTPATIENT)
Dept: INFUSION THERAPY | Age: 60
Setting detail: INFUSION SERIES
Discharge: HOME | DRG: 313 | End: 2020-09-30
Payer: MEDICARE

## 2020-09-30 VITALS
BODY MASS INDEX: 22.02 KG/M2 | SYSTOLIC BLOOD PRESSURE: 123 MMHG | OXYGEN SATURATION: 99 % | TEMPERATURE: 97.8 F | HEART RATE: 54 BPM | DIASTOLIC BLOOD PRESSURE: 71 MMHG | HEIGHT: 66 IN | WEIGHT: 137 LBS | RESPIRATION RATE: 12 BRPM

## 2020-09-30 VITALS
HEART RATE: 61 BPM | DIASTOLIC BLOOD PRESSURE: 71 MMHG | RESPIRATION RATE: 16 BRPM | TEMPERATURE: 97.3 F | SYSTOLIC BLOOD PRESSURE: 150 MMHG

## 2020-09-30 DIAGNOSIS — S20.311D: Primary | ICD-10-CM

## 2020-09-30 DIAGNOSIS — L08.9: Primary | ICD-10-CM

## 2020-09-30 PROCEDURE — 05HY33Z INSERTION OF INFUSION DEVICE INTO UPPER VEIN, PERCUTANEOUS APPROACH: ICD-10-PCS | Performed by: HOSPITALIST

## 2020-09-30 PROCEDURE — 6360000002 HC RX W HCPCS: Performed by: INTERNAL MEDICINE

## 2020-09-30 PROCEDURE — 36410 VNPNXR 3YR/> PHY/QHP DX/THER: CPT

## 2020-09-30 PROCEDURE — 2580000003 HC RX 258: Performed by: HOSPITALIST

## 2020-09-30 PROCEDURE — 99222 1ST HOSP IP/OBS MODERATE 55: CPT | Performed by: INTERNAL MEDICINE

## 2020-09-30 PROCEDURE — 6370000000 HC RX 637 (ALT 250 FOR IP): Performed by: INTERNAL MEDICINE

## 2020-09-30 PROCEDURE — 99233 SBSQ HOSP IP/OBS HIGH 50: CPT | Performed by: NURSE PRACTITIONER

## 2020-09-30 PROCEDURE — 6370000000 HC RX 637 (ALT 250 FOR IP): Performed by: HOSPITALIST

## 2020-09-30 PROCEDURE — 2580000003 HC RX 258: Performed by: NURSE PRACTITIONER

## 2020-09-30 PROCEDURE — 6360000002 HC RX W HCPCS: Performed by: NURSE PRACTITIONER

## 2020-09-30 PROCEDURE — 2580000003 HC RX 258: Performed by: INTERNAL MEDICINE

## 2020-09-30 PROCEDURE — 99211 OFF/OP EST MAY X REQ PHY/QHP: CPT

## 2020-09-30 PROCEDURE — 76937 US GUIDE VASCULAR ACCESS: CPT

## 2020-09-30 PROCEDURE — 96365 THER/PROPH/DIAG IV INF INIT: CPT

## 2020-09-30 PROCEDURE — 6370000000 HC RX 637 (ALT 250 FOR IP): Performed by: NURSE PRACTITIONER

## 2020-09-30 PROCEDURE — C1751 CATH, INF, PER/CENT/MIDLINE: HCPCS

## 2020-09-30 RX ORDER — OXYCODONE HYDROCHLORIDE 20 MG/1
20 TABLET ORAL
Status: ON HOLD | COMMUNITY
Start: 2020-09-11 | End: 2021-04-01 | Stop reason: HOSPADM

## 2020-09-30 RX ORDER — LEVOTHYROXINE SODIUM 0.15 MG/1
150 TABLET ORAL DAILY
Qty: 1 TABLET | Refills: 0
Start: 2020-09-30 | End: 2021-01-22

## 2020-09-30 RX ADMIN — FAMOTIDINE 20 MG: 20 TABLET ORAL at 10:04

## 2020-09-30 RX ADMIN — DALBAVANCIN 1500 MG: 500 INJECTION, POWDER, FOR SOLUTION INTRAVENOUS at 14:04

## 2020-09-30 RX ADMIN — AMIODARONE HYDROCHLORIDE 200 MG: 200 TABLET ORAL at 10:03

## 2020-09-30 RX ADMIN — MIDODRINE HYDROCHLORIDE 10 MG: 5 TABLET ORAL at 13:08

## 2020-09-30 RX ADMIN — DEXTROSE MONOHYDRATE 5 ML: 50 INJECTION, SOLUTION INTRAVENOUS at 14:04

## 2020-09-30 RX ADMIN — OXYCODONE HYDROCHLORIDE 20 MG: 10 TABLET ORAL at 13:07

## 2020-09-30 RX ADMIN — ACETAMINOPHEN 650 MG: 325 TABLET ORAL at 00:33

## 2020-09-30 RX ADMIN — CLOPIDOGREL BISULFATE 75 MG: 75 TABLET ORAL at 10:03

## 2020-09-30 RX ADMIN — OXYCODONE HYDROCHLORIDE 20 MG: 10 TABLET ORAL at 06:26

## 2020-09-30 RX ADMIN — DULOXETINE HYDROCHLORIDE 60 MG: 30 CAPSULE, DELAYED RELEASE ORAL at 10:04

## 2020-09-30 RX ADMIN — ASPIRIN 81 MG CHEWABLE TABLET 81 MG: 81 TABLET CHEWABLE at 10:04

## 2020-09-30 RX ADMIN — APIXABAN 5 MG: 5 TABLET, FILM COATED ORAL at 10:03

## 2020-09-30 RX ADMIN — ACETAMINOPHEN 650 MG: 325 TABLET ORAL at 13:07

## 2020-09-30 RX ADMIN — LEVOTHYROXINE SODIUM 150 MCG: 150 TABLET ORAL at 06:27

## 2020-09-30 RX ADMIN — OXYCODONE HYDROCHLORIDE 20 MG: 10 TABLET ORAL at 10:04

## 2020-09-30 RX ADMIN — VANCOMYCIN HYDROCHLORIDE 1000 MG: 1 INJECTION, POWDER, LYOPHILIZED, FOR SOLUTION INTRAVENOUS at 05:41

## 2020-09-30 RX ADMIN — OXYCODONE HYDROCHLORIDE 20 MG: 10 TABLET ORAL at 00:33

## 2020-09-30 RX ADMIN — DEXTROSE MONOHYDRATE 5 ML: 50 INJECTION, SOLUTION INTRAVENOUS at 14:40

## 2020-09-30 RX ADMIN — Medication: at 10:05

## 2020-09-30 RX ADMIN — SODIUM CHLORIDE, PRESERVATIVE FREE 10 ML: 5 INJECTION INTRAVENOUS at 10:05

## 2020-09-30 RX ADMIN — LEVETIRACETAM 750 MG: 500 TABLET, FILM COATED ORAL at 10:04

## 2020-09-30 RX ADMIN — ACETAMINOPHEN 650 MG: 325 TABLET ORAL at 06:26

## 2020-09-30 ASSESSMENT — PAIN SCALES - GENERAL
PAINLEVEL_OUTOF10: 8
PAINLEVEL_OUTOF10: 9
PAINLEVEL_OUTOF10: 8

## 2020-09-30 ASSESSMENT — PAIN DESCRIPTION - LOCATION
LOCATION: GENERALIZED;HIP;LEG
LOCATION: GENERALIZED;LEG;HIP

## 2020-09-30 ASSESSMENT — PAIN DESCRIPTION - PAIN TYPE
TYPE: CHRONIC PAIN

## 2020-09-30 NOTE — DISCHARGE SUMMARY
Discharge Summary    Name:  Layne Brooks /Age/Sex: 1960  (62 y.o. female)   MRN & CSN:  [id number] & [id number] Admission Date/Time: 2020 11:04 PM   Attending:  Akhil Vargas MD Discharging Physician: Sparkle Sullivan MD     HPI:     The patient is a 62 y.o. female PMHx asthma, CAD, cardiomyopathy, CHF, HTN, MI, MS, PE, seizures  Patient states that around 4:30 PM yesterday she started developing palpitations. She then developed a crushing pain over the whole chest.  Chest pain then started to radiate to her left arm and up to her neck and ear. She believes that she had passed out. She felt weak and dizzy. Later on at night she started sweating and having vomiting. She took 6 nitro tablets which helped for only about 15 minutes. The nausea and vomiting felt similar to previous heart attack she has had in the past.  In the emergency room she received nitro and morphine which also helped. Moving worsens her pain. She denies any fever. No wheezing or cough.      Patient recently had her AICD removed due to infection.     Hospital Course: Layne Brooks is a 62 y.o.  female  who presents with Chest pain    > Syncope:   >History of atrial flutter  >Ischemic cardiomyopathy: ICD/pacemaker extracted because of infection.  - On amiodarone and anticoagulation.   -  Has history of cardiomyopathy.  Status post pacemaker/ICD placement, removed because of infection. - Has junctional rhythm on EKG.  Cardiology on consulted.    - stress test neg for new ischemia, no further workup recommended   - sx and clinically improved and was discharged home in a stable condition.      > Chest pain  - non Cardiac, no ACS, troponin neg, stress test neg, cleared by Cardiology     >MRSA wound infection  > History of MRSA bacteremia: Last blood culture was negative.  Reported on and off fever in the last few days.  Seen normal.  Has chronic nonhealing wound on the ICD site.  Will check blood culture.  -Wound culture with MRSA. -ID consulted, recommended  ? Dalbavancin 1500 mg IV x 1 dose as OP in infusion clinic  · Decolonize with chlorhexidine baths daily x 10 days (end date 10/9/20)  ? Continue Bactroban nasally bid x 5 days (end date 10/4/10)     >Avascular necrosis bilateral hip: Continue opioids. >Chronic systolic congestive heart failure  >Coronary artery disease: Status post coronary artery bypass graft: Continue aspirin, Lipitor. >History of venous thromboembolism with pulmonary embolism: Continue anticoagulation. >Hypothyroidism: Continue Synthroid  >Seizure disorder: On Keppra  >Depression/anxiety  >Anemia of chronic disease, status post gastric bypass.       The patient expressed appropriate understanding of and agreement with the discharge recommendations, medications, and plan. Consults this admission:  IP CONSULT TO HOSPITALIST  IP CONSULT TO CARDIOLOGY  IP CONSULT TO INFECTIOUS DISEASES  PHARMACY TO DOSE VANCOMYCIN  IP CONSULT TO IV TEAM    Discharge Instruction:   Follow up appointments:     See AVS    Disposition: Discharged to:   ? Home  Condition on discharge: Stable    [facility]ations[facility]      [facility]   Home Medication Instructions ALI:[id number]    Printed on:09/30/20 2164   Medication Information                      acetaminophen 650 MG TABS  Take 650 mg by mouth every 4 hours as needed             albuterol (PROVENTIL HFA;VENTOLIN HFA) 108 (90 BASE) MCG/ACT inhaler  Inhale 2 puffs into the lungs every 6 hours as needed.                amiodarone (CORDARONE) 200 MG tablet  Take 200 mg by mouth daily             apixaban (ELIQUIS) 5 MG TABS tablet  Take 5 mg by mouth 2 times daily             aspirin 81 MG tablet  Take 81 mg by mouth daily              atorvastatin (LIPITOR) 80 MG tablet  Take 80 mg by mouth nightly             benzocaine-menthol (CEPACOL SORE THROAT) 15-3.6 MG lozenge  Take 1 lozenge by mouth every 2 hours as needed for Sore Throat             chlorhexidine gluconate (ANTISEPTIC SKIN CLEANSER) 4 % SOLN external solution  Apply topically daily             clopidogrel (PLAVIX) 75 MG tablet  Take 75 mg by mouth daily             cyanocobalamin 1000 MCG/ML injection  INJECT 1 ML INTO THE MUSCLE ON THE FIRST OF EACH MONTH             docusate sodium (COLACE) 100 MG capsule  Take 100 mg by mouth 2 times daily as needed for Constipation             DULoxetine (CYMBALTA) 60 MG extended release capsule  Take 1 capsule by mouth daily             famotidine (PEPCID) 20 MG tablet  Take 1 tablet by mouth 2 times daily             ferrous gluconate (FERGON) 324 (38 FE) MG tablet  Take 324 mg by mouth 2 times daily              ipratropium-albuterol (DUONEB) 0.5-2.5 (3) MG/3ML SOLN nebulizer solution  Inhale 3 mLs into the lungs every 4 hours (while awake)             levETIRAcetam (KEPPRA) 750 MG tablet  Take 750 mg by mouth 2 times daily              levothyroxine (SYNTHROID) 150 MCG tablet  Take 1 tablet by mouth Daily             magnesium oxide (MAG-OX) 400 MG tablet  Take 800 mg by mouth 2 times daily             midodrine (PROAMATINE) 10 MG tablet  Take 1 tablet by mouth 3 times daily (with meals)             mirtazapine (REMERON) 30 MG tablet  Take 30 mg by mouth nightly             mupirocin (BACTROBAN) 2 % ointment  Apply topically 3 times daily for 5 days. Apply Cream onto a clean swab and rub the medication around the inside of each nostril, Then press nostrils together and massage for approximately one minute. naloxone 4 MG/0.1ML LIQD nasal spray  1 spray by Nasal route as needed for Opioid Reversal             naloxone 4 MG/0.1ML LIQD nasal spray  1 spray by Nasal route as needed for Opioid Reversal             nitroGLYCERIN (NITROSTAT) 0.4 MG SL tablet  up to max of 3 total doses. If no relief after 1 dose, call 911.              ondansetron (ZOFRAN) 4 MG tablet  Take 1 tablet by mouth every 6 hours as needed for Nausea or Vomiting             oxyCODONE (ROXICODONE) 20 MG immediate release tablet  Take 20 mg by mouth every 3 hours as needed. potassium chloride (KLOR-CON M) 20 MEQ extended release tablet  Take 1 tablet by mouth daily Take only if taking torsemide             torsemide (DEMADEX) 20 MG tablet  Take 20 mg by mouth daily as needed (For weight gain or fluid retention) Indications: pt takes 2 tablets daily                  Objective Findings at Discharge:   /71   Pulse 54   Temp 97.8 °F (36.6 °C) (Oral)   Resp 12   Ht 5' 6\" (1.676 m)   Wt 137 lb (62.1 kg)   SpO2 99%   BMI 22.11 kg/m²            PHYSICAL EXAM   GEN    Awake female, cooperative, no apparent distress. RESP  Clear to auscultation, no wheezes, rales or rhonchi. Symmetric chest movement . CARDIO/VASC           S1/S2 auscultated. Regular rate. GI        Abdomen is soft without significant tenderness, Bowel sounds are normoactive. MSK    No gross joint deformities. Spontaneous movement of all extremities  SKIN    Normal coloration, warm, dry. NEURO           normal speech,   PSYCH            Awake, alert, oriented x 4. Affect appropriate.     BMP/CBC  No results for input(s): NA, K, CL, CO2, BUN, CREATININE, WBC, HEMOGLOBIN, HCT, PLT in the last 72 hours. Invalid input(s): GLU    IMAGING:    Xr Chest Portable    Result Date: 9/26/2020  EXAMINATION: ONE XRAY VIEW OF THE CHEST 9/25/2020 8:36 pm COMPARISON: 09/12/2020 HISTORY: ORDERING SYSTEM PROVIDED HISTORY: chest pain TECHNOLOGIST PROVIDED HISTORY: Reason for exam:->chest pain Reason for Exam: chest pain Acuity: Unknown Type of Exam: Initial FINDINGS: Cardial pericardial silhouette is stable in appearance. Chronic interstitial opacities are identified. No acute focal infiltrate is seen. No pneumothorax is seen. No free air. No acute bony abnormality. No acute abnormality identified.      Nm Myocardial Spect Rest Exercise Or Rx    Result Date: 9/28/2020  Cardiac Perfusion Imaging   Demographics   Patient Name Rosalie VELA   Date of study        09/28/2020   Date of Birth     1960         Gender               Female   Age               62 year(s)         Race                    Patient Number    [id number]         Room Number          6443   Visit Number      [id number]          Height               66 inches   Corporate ID      [id number]           Weight               124 pounds   Accession Number  [id number]                                        NM Technologist      Brenda Mcfadden RT   [address] [facility], [address] [facility]  Physician         APRN-CNP           Cardiologist         Alejandrina PEACOCK   Conclusions   Summary  Medium sized defect of moderate severity which is persistent involving  inferior wall of myocardium. Abnormal Lexiscan nuclear scintigraphic study  suggestive of abnormal myocardial perfusion. Gated images demonstrate  abnormal left ventricular systolic function with EF of 38 %. Nuclear  scintigraphy demonstartes inferior wall infarct. Signatures   ------------------------------------------------------------------  Electronically signed by Karina Duncan MD  (Interpreting cardiologist) on 09/28/2020 at 19:41  ------------------------------------------------------------------  Procedure Procedure Type:   Nuclear Stress Test:Pharmacological, Myocardial Perfusion Imaging with  Pharm, NM MYOCARDIAL SPECT REST EXERCISE OR RX  Indications: CHF/SOB. Risk Factors   The patient risk factors include:prior PCI;prior CABG;hypercholesterolemia,  hypertension, family history of premature CAD, chronic lung disease, prior  heart failure and prior MI . Stress Protocols   Resting ECG  Sinus bradycardia.    Resting HR:49 bpm  Resting BP:127/68 mmHg  Stress Protocol:Pharmacologic - Lexiscan  Peak HR:55 bpm                   HR/BP product:6985  Peak BP:127/68 mmHg  Predicted HR: 161 bpm  % of predicted HR: 34   Exercise duration: 01:00 min  Reason for termination:Completed   Symptoms  No symptoms with Lexiscan infusion. Complications  Procedure complication was none. Procedure Medications   - Lexiscan I.V. bolus (over 15sec.) 0.4 mg admininstered @ 09/28/2020 09:35. Imaging Protocols   Rest                             Stress   Isotope:Sestamibi 99mTc          Isotope: Sestamibi 99mTc  Isotope dose:11 mCi              Isotope dose:32.6 mCi  Administration route: I.V. Administration route: I.V. Injection Date:09/28/2020 07:25  Injection Date:09/28/2020 09:35  Scan Date:09/28/2020 08:10       Scan Date:09/28/2020 14:00   Technique:        SPECT          Technique:        Prone                                                     Gated                                                     SPECT   Procedure Description   Upon patient arrival, the patient is identified using two identifiers and  the physician order is verified. An IV is established and 8-11mCi of 99mTc  Sestamibi is intravenously injected and followed with 10mL 0.9% Normal  Saline flush. A circulation period of 45 minutes occurs prior to resting  SPECT imaging. After imaging is complete the patient is escorted to the  stress lab. The patient is connected to the ECG and blood pressure is  measured. The RN starts the stress portion of the exam and rapidly  intravenously injects Lexiscan (regadenosine) 0.4mg over a period of 10 to15  seconds and follows with 5mL 0.9% Normal Saline flush. Immediately following  the Nuclear Technologist intravenously injects 22-33mCi of 99mTc Sestamibi  and 5mL 0.9% Normal Saline flush. After completion, recovery, and removal of  the IV, the patient rests during the second circulation period of 45  minutes. Final stress SPECT gated imaging is performed. The patient may  return home or to their room after stress imaging. The images are processed  and final charting is completed and sent to the appropriate cardiologist for  interpretation and reporting.    Perfusion Interpretation   Medium sized defect of moderate severity which is persistent involving  inferior wall of myocardium. Imaging Results    Summed scores     - Summed stress score: 47     - Summed rest score: 40     - Summed difference score:    3   Rest ejection  Ejection fraction:38 %  EDV :153 ml  ESV :95 ml  Stroke volume :58 ml  Medical History   Accession#:  [id number]  Admission Data Admission date: 09/25/2020 Admission Time: 23:04 Hospital Status: Inpatient.       Discharge Time of 35 minutes    Electronically signed by Lafrances Canavan, MD on 9/30/2020 at 12:39 PM

## 2020-09-30 NOTE — CARE COORDINATION
CM updated per Naomi Baez NP and Dr. Demetrius Barthel of need for delbavancin IV with request for patient to have it later today following discharge from hospital.     CM received written script with ICD 10 codes and took this to Ruy Fowler RN infusion clinic for approval.     Awaiting call back from [facility]. for availability. 12:40 PM  Received voicemail from [facility]. infusion clinic who states patient has been approved for delbavancin IV today and they will be able to accommodate her today at 2 pm. Brian Bonilla RN is aware.

## 2020-09-30 NOTE — CONSULTS
Consult received for access for IV antibiotics. Pt with poor vasculature. Successfully placed Arrow extended dwell midline in right brachial vein. Brisk blood return noted and flushes easily. Nurse Brenna lopez.

## 2020-09-30 NOTE — PROGRESS NOTES
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 HTN/CAD/HFrEF/Ischemic Cardiomyopathy  ? Acute NSTEMI:  Dr. Monika Ansari onboard, imp of large inferior wall MI, no ischemia post stress test, Medical management  ? Remote CVA  ? MS  ? Lymphoma  ? Allergy to moxifloxicin: Lips and facial swelling  · Multi-morbidity: per PMHx:  Arthritis, asplenia, asthma, CAD with PCI, cardiomyopathy, CVA, CHF, GERD, HLD, HTN, Lymphoma, MS, Seizures, hyster, appey, choley, Gastric bypass  Plan:  ? DC vancomycin  ? Dalbavancin 1500 mg IV x 1 dose as OP in infusion clinic  · Decolonize with chlorhexidine baths daily x 10 days (end date 10/9/20)  ? Continue Bactroban nasally bid x 5 days (end date 10/4/10)     · For DC plan:  · Follow up in ID clinic in 1 week please  · Weekly labs drawn on Monday during the course of treatment  · CBC with differential, CMP, ESR, CRP,Vancomycin Trough  · Fax results to Attn: [facility] Infectious Diseases Staff  · # [phone]  · OK to DC from ID standpoint when ready    Ongoing Antimicrobial Therapy  Dalbavancin  per OP x 1 dose? Completed Antimicrobial Therapy  Meropenem -9/10  Vancomycin -  ? History:? Interval history noted. Chief complaint:  Recurrent MRSA right chest ICD/Mediport pocket wound infection. Denies n/v/d/f or untoward effects of antibiotics. Resting quietly.      Physical Exam:  Vital Signs: BP (!) 118/98   Pulse 54   Temp 98.7 °F (37.1 °C) (Oral)   Resp 12   Ht 5' 6\" (1.676 m)   Wt 137 lb (62.1 kg)   SpO2 99%   BMI 22.11 kg/m²     Gen: alert and oriented X3, no distress  Skin: no stigmata of endocarditis  Wounds: right chest reddened wound with reddened area surrounded by slight erythema, warmth and tenderness to touch. HEMT: AT/NC Oropharynx pink, moist, and without lesions or exudates; dentition in good state of repair  Eyes: PERRLA, EOMI, conjunctiva pink, sclera anicteric. Neck: Supple. Trachea midline. No LAD. Chest: no distress and CTA. Good air movement. Heart: RRR and no MRG. Abd: soft, non-distended, no tenderness, no hepatomegaly. Normoactive bowel sounds. Ext: no clubbing, cyanosis, or edema  Catheter Site: without erythema or tenderness  Neuro: Mental status intact. CN 2-12 intact and no focal sensory or motor deficits     Radiologic / Imaging / TESTING  9/26/20 XR Chest Portable:  Impression    No acute abnormality identified.       9/28/20 NM Myocrdial Spect Rest Exercise OR RX:  Summary     Medium sized defect of moderate severity which is persistent involving     inferior wall of myocardium. Abnormal Lexiscan nuclear scintigraphic study     suggestive of abnormal myocardial perfusion. Gated images demonstrate     abnormal left ventricular systolic function with EF of 38 %. Nuclear     scintigraphy demonstartes inferior wall infarct.            Labs:    Recent Results (from the past 24 hour(s))   C-reactive protein    Collection Time: 09/29/20  2:55 PM   Result Value Ref Range    CRP, High Sensitivity 2.6 mg/L     CULTURE results: Invalid input(s): BLOOD CULTURE,  URINE CULTURE, SURGICAL CULTURE    Diagnosis:  Patient Active Problem List   Diagnosis    Chest pain    Chest pain    CAD (coronary artery disease)    Hyperlipidemia    Thyroid disease    Acute exacerbation of CHF (congestive heart failure) (HCC)    CHF exacerbation (HCC)    Congestive heart failure ([address]    Left upper quadrant pain    Pneumonia of left lower lobe due to infectious organism ([address]    Angina at rest [facility])    Coronary artery disease involving coronary bypass graft of native heart with angina pectoris ([facility])    Shortness of breath    Ischemic cardiomyopathy    Acute metabolic encephalopathy    Acute delirium    Pneumonia    Sacral pain    Acute on chronic systolic CHF (congestive heart failure), NYHA class 3 ([facility])    Severe malnutrition ([facility])    Cellulitis    Cellulitis at gastrostomy tube site [facility])    History of Maru-en-Y gastric bypass    Gastroparesis    Abdominal pain    Feeding tube dysfunction    Abdominal pain, epigastric    Gastrojejunostomy tube status ([facility])    Chronic malnutrition ([facility])    Asplenia    Shockable cardiac rhythm detected by automated external defibrillator    Financial difficulties    Chest pain at rest    Closed nondisplaced transverse fracture of left patella    Contusion of right knee    Multifocal pneumonia    Abnormal cardiac function test    Chronic systolic heart failure ([facility])    Seizure disorder ([facility])    Hypothyroidism    Cardiomyopathy ([facility]    Acute chest pain    Fever in adult    Pyelonephritis    MRSA bacteremia    Heart failure (HCC)    Infected chest wall abrasion, right, subsequent encounter    Granuloma of skin    Open wound of right chest wall    MRSA infection    Klebsiella pneumoniae infection       Active Problems  Principal Problem:    Chest pain  Resolved Problems:    * No resolved hospital problems. *    Electronically signed by: Electronically signed by JESSE Louis CNP on 9/30/2020 at 9:05 AM

## 2020-10-02 LAB
CULTURE: ABNORMAL
CULTURE: ABNORMAL
CULTURE: NORMAL
CULTURE: NORMAL
Lab: ABNORMAL
Lab: NORMAL
Lab: NORMAL
SPECIMEN: ABNORMAL
SPECIMEN: NORMAL
SPECIMEN: NORMAL

## 2020-10-13 ENCOUNTER — HOSPITAL ENCOUNTER (INPATIENT)
Age: 60
LOS: 1 days | Discharge: ANOTHER HOSPITAL | DRG: 309 | End: 2020-10-16
Attending: EMERGENCY MEDICINE | Admitting: STUDENT IN AN ORGANIZED HEALTH CARE EDUCATION/TRAINING PROGRAM
Payer: MEDICARE

## 2020-10-13 PROCEDURE — 99285 EMERGENCY DEPT VISIT HI MDM: CPT

## 2020-10-13 RX ORDER — ONDANSETRON 4 MG/1
4 TABLET, ORALLY DISINTEGRATING ORAL ONCE
Status: COMPLETED | OUTPATIENT
Start: 2020-10-14 | End: 2020-10-14

## 2020-10-13 NOTE — PROGRESS NOTES
Physician Progress Note      PATIENT:               Jana Rogel  CSN #:                  [id number]  :                       1960  ADMIT DATE:       2020 11:04 PM  [address] DATE:        2020 1:56 PM  RESPONDING  PROVIDER #:        Ariana Stover MD          QUERY TEXT:    Dear hospitalist,    Pt admitted with chest pain. Pt noted to have a previous heart attack. If   possible, please document in progress notes and discharge summary if you are   evaluating and/or treating any of the following:     The medical record reflects the following:  Risk Factors: Pt states felt similar to previous heart attack she has had in   the past.  Clinical Indicators: GI positive for nausea vomiting ,-felt similar to   previous heart attack she has had in the past., daughter  states Chest pain   then started to radiate to her left arm and up to her neck and ear  Treatment: Troponins X3 WNL, admission, telemetry, cardiologist consult, GI   consult, NM myocardial spect rest exercise RX, EKG X3, CXR,  Options provided:  -- Chest pain due to heart attack  -- Other - I will add my own diagnosis  -- Disagree - Not applicable / Not valid  -- Disagree - Clinically unable to determine / Unknown  -- Refer to Clinical Documentation Reviewer    PROVIDER RESPONSE TEXT:    Chest pain atypical non cardiac    Query created by: Jimena Valdez on 10/7/2020 10:02 AM      Electronically signed by:  Ariana Stover MD 10/13/2020 7:26 AM

## 2020-10-14 ENCOUNTER — APPOINTMENT (OUTPATIENT)
Dept: GENERAL RADIOLOGY | Age: 60
DRG: 309 | End: 2020-10-14
Payer: MEDICARE

## 2020-10-14 PROBLEM — I50.20 HFREF (HEART FAILURE WITH REDUCED EJECTION FRACTION) (HCC): Status: ACTIVE | Noted: 2020-06-08

## 2020-10-14 PROBLEM — M87.00 AVASCULAR NECROSIS (HCC): Status: ACTIVE | Noted: 2020-10-14

## 2020-10-14 PROBLEM — Z95.0 HISTORY OF PACEMAKER: Status: ACTIVE | Noted: 2020-10-14

## 2020-10-14 PROBLEM — R00.1 SYMPTOMATIC BRADYCARDIA: Status: ACTIVE | Noted: 2020-10-14

## 2020-10-14 PROBLEM — G35 MULTIPLE SCLEROSIS (HCC): Status: ACTIVE | Noted: 2020-10-14

## 2020-10-14 LAB
ALBUMIN SERPL-MCNC: 3.8 GM/DL (ref 3.4–5)
ALP BLD-CCNC: 95 IU/L (ref 40–128)
ALT SERPL-CCNC: 13 U/L (ref 10–40)
ANION GAP SERPL CALCULATED.3IONS-SCNC: 7 MMOL/L (ref 4–16)
AST SERPL-CCNC: 23 IU/L (ref 15–37)
BASOPHILS ABSOLUTE: 0 K/CU MM
BASOPHILS RELATIVE PERCENT: 0.9 % (ref 0–1)
BILIRUB SERPL-MCNC: 0.2 MG/DL (ref 0–1)
BUN BLDV-MCNC: 12 MG/DL (ref 6–23)
CALCIUM SERPL-MCNC: 8.7 MG/DL (ref 8.3–10.6)
CHLORIDE BLD-SCNC: 101 MMOL/L (ref 99–110)
CO2: 24 MMOL/L (ref 21–32)
CREAT SERPL-MCNC: 0.6 MG/DL (ref 0.6–1.1)
DIFFERENTIAL TYPE: ABNORMAL
EKG ATRIAL RATE: 43 BPM
EKG DIAGNOSIS: NORMAL
EKG P AXIS: 10 DEGREES
EKG P-R INTERVAL: 148 MS
EKG Q-T INTERVAL: 548 MS
EKG QRS DURATION: 106 MS
EKG QTC CALCULATION (BAZETT): 463 MS
EKG R AXIS: -35 DEGREES
EKG T AXIS: -40 DEGREES
EKG VENTRICULAR RATE: 43 BPM
EOSINOPHILS ABSOLUTE: 0 K/CU MM
EOSINOPHILS RELATIVE PERCENT: 0.9 % (ref 0–3)
GFR AFRICAN AMERICAN: >60 ML/MIN/1.73M2
GFR NON-AFRICAN AMERICAN: >60 ML/MIN/1.73M2
GLUCOSE BLD-MCNC: 109 MG/DL (ref 70–99)
HCT VFR BLD CALC: 35.6 % (ref 37–47)
HEMOGLOBIN: 11.1 GM/DL (ref 12.5–16)
IMMATURE NEUTROPHIL %: 0.2 % (ref 0–0.43)
LYMPHOCYTES ABSOLUTE: 1.1 K/CU MM
LYMPHOCYTES RELATIVE PERCENT: 24.5 % (ref 24–44)
MCH RBC QN AUTO: 33.2 PG (ref 27–31)
MCHC RBC AUTO-ENTMCNC: 31.2 % (ref 32–36)
MCV RBC AUTO: 106.6 FL (ref 78–100)
MONOCYTES ABSOLUTE: 0.4 K/CU MM
MONOCYTES RELATIVE PERCENT: 9.9 % (ref 0–4)
NUCLEATED RBC %: 0 %
PDW BLD-RTO: 15.2 % (ref 11.7–14.9)
PLATELET # BLD: 290 K/CU MM (ref 140–440)
PMV BLD AUTO: 9.8 FL (ref 7.5–11.1)
POTASSIUM SERPL-SCNC: 4.7 MMOL/L (ref 3.5–5.1)
PRO-BNP: 1189 PG/ML
RBC # BLD: 3.34 M/CU MM (ref 4.2–5.4)
SARS-COV-2, NAAT: NOT DETECTED
SEGMENTED NEUTROPHILS ABSOLUTE COUNT: 2.8 K/CU MM
SEGMENTED NEUTROPHILS RELATIVE PERCENT: 63.6 % (ref 36–66)
SODIUM BLD-SCNC: 132 MMOL/L (ref 135–145)
SOURCE: NORMAL
TOTAL IMMATURE NEUTOROPHIL: 0.01 K/CU MM
TOTAL NUCLEATED RBC: 0 K/CU MM
TOTAL PROTEIN: 6.1 GM/DL (ref 6.4–8.2)
TROPONIN T: <0.01 NG/ML
TROPONIN T: <0.01 NG/ML
WBC # BLD: 4.4 K/CU MM (ref 4–10.5)

## 2020-10-14 PROCEDURE — 71045 X-RAY EXAM CHEST 1 VIEW: CPT

## 2020-10-14 PROCEDURE — 93010 ELECTROCARDIOGRAM REPORT: CPT | Performed by: INTERNAL MEDICINE

## 2020-10-14 PROCEDURE — 93005 ELECTROCARDIOGRAM TRACING: CPT | Performed by: EMERGENCY MEDICINE

## 2020-10-14 PROCEDURE — 99221 1ST HOSP IP/OBS SF/LOW 40: CPT | Performed by: INTERNAL MEDICINE

## 2020-10-14 PROCEDURE — 84484 ASSAY OF TROPONIN QUANT: CPT

## 2020-10-14 PROCEDURE — 87040 BLOOD CULTURE FOR BACTERIA: CPT

## 2020-10-14 PROCEDURE — 6370000000 HC RX 637 (ALT 250 FOR IP): Performed by: EMERGENCY MEDICINE

## 2020-10-14 PROCEDURE — 96374 THER/PROPH/DIAG INJ IV PUSH: CPT

## 2020-10-14 PROCEDURE — 2580000003 HC RX 258: Performed by: STUDENT IN AN ORGANIZED HEALTH CARE EDUCATION/TRAINING PROGRAM

## 2020-10-14 PROCEDURE — G0378 HOSPITAL OBSERVATION PER HR: HCPCS

## 2020-10-14 PROCEDURE — 6360000002 HC RX W HCPCS: Performed by: STUDENT IN AN ORGANIZED HEALTH CARE EDUCATION/TRAINING PROGRAM

## 2020-10-14 PROCEDURE — APPSS60 APP SPLIT SHARED TIME 46-60 MINUTES: Performed by: NURSE PRACTITIONER

## 2020-10-14 PROCEDURE — U0002 COVID-19 LAB TEST NON-CDC: HCPCS

## 2020-10-14 PROCEDURE — 96376 TX/PRO/DX INJ SAME DRUG ADON: CPT

## 2020-10-14 PROCEDURE — 85025 COMPLETE CBC W/AUTO DIFF WBC: CPT

## 2020-10-14 PROCEDURE — 83880 ASSAY OF NATRIURETIC PEPTIDE: CPT

## 2020-10-14 PROCEDURE — 94761 N-INVAS EAR/PLS OXIMETRY MLT: CPT

## 2020-10-14 PROCEDURE — 80053 COMPREHEN METABOLIC PANEL: CPT

## 2020-10-14 PROCEDURE — 99222 1ST HOSP IP/OBS MODERATE 55: CPT | Performed by: INTERNAL MEDICINE

## 2020-10-14 PROCEDURE — 6370000000 HC RX 637 (ALT 250 FOR IP): Performed by: STUDENT IN AN ORGANIZED HEALTH CARE EDUCATION/TRAINING PROGRAM

## 2020-10-14 RX ORDER — ASPIRIN 81 MG/1
81 TABLET, CHEWABLE ORAL DAILY
Status: DISCONTINUED | OUTPATIENT
Start: 2020-10-14 | End: 2020-10-16 | Stop reason: HOSPADM

## 2020-10-14 RX ORDER — CLOPIDOGREL BISULFATE 75 MG/1
75 TABLET ORAL DAILY
Status: DISCONTINUED | OUTPATIENT
Start: 2020-10-14 | End: 2020-10-16 | Stop reason: HOSPADM

## 2020-10-14 RX ORDER — SODIUM CHLORIDE 0.9 % (FLUSH) 0.9 %
10 SYRINGE (ML) INJECTION EVERY 12 HOURS SCHEDULED
Status: DISCONTINUED | OUTPATIENT
Start: 2020-10-14 | End: 2020-10-16 | Stop reason: HOSPADM

## 2020-10-14 RX ORDER — ONDANSETRON 2 MG/ML
4 INJECTION INTRAMUSCULAR; INTRAVENOUS EVERY 6 HOURS PRN
Status: DISCONTINUED | OUTPATIENT
Start: 2020-10-14 | End: 2020-10-15

## 2020-10-14 RX ORDER — ACETAMINOPHEN 650 MG/1
650 SUPPOSITORY RECTAL EVERY 6 HOURS PRN
Status: DISCONTINUED | OUTPATIENT
Start: 2020-10-14 | End: 2020-10-16 | Stop reason: HOSPADM

## 2020-10-14 RX ORDER — MIRTAZAPINE 15 MG/1
30 TABLET, FILM COATED ORAL NIGHTLY
Status: DISCONTINUED | OUTPATIENT
Start: 2020-10-14 | End: 2020-10-16 | Stop reason: HOSPADM

## 2020-10-14 RX ORDER — SODIUM CHLORIDE 0.9 % (FLUSH) 0.9 %
10 SYRINGE (ML) INJECTION PRN
Status: DISCONTINUED | OUTPATIENT
Start: 2020-10-14 | End: 2020-10-16 | Stop reason: HOSPADM

## 2020-10-14 RX ORDER — MIDODRINE HYDROCHLORIDE 5 MG/1
10 TABLET ORAL
Status: DISCONTINUED | OUTPATIENT
Start: 2020-10-14 | End: 2020-10-16 | Stop reason: HOSPADM

## 2020-10-14 RX ORDER — FERROUS GLUCONATE 324(37.5)
324 TABLET ORAL 2 TIMES DAILY
Status: DISCONTINUED | OUTPATIENT
Start: 2020-10-14 | End: 2020-10-16 | Stop reason: HOSPADM

## 2020-10-14 RX ORDER — OXYCODONE HYDROCHLORIDE 5 MG/1
20 TABLET ORAL ONCE
Status: COMPLETED | OUTPATIENT
Start: 2020-10-14 | End: 2020-10-14

## 2020-10-14 RX ORDER — OXYCODONE HYDROCHLORIDE 10 MG/1
20 TABLET ORAL
Status: DISCONTINUED | OUTPATIENT
Start: 2020-10-14 | End: 2020-10-16 | Stop reason: HOSPADM

## 2020-10-14 RX ORDER — ACETAMINOPHEN 325 MG/1
650 TABLET ORAL EVERY 6 HOURS PRN
Status: DISCONTINUED | OUTPATIENT
Start: 2020-10-14 | End: 2020-10-16 | Stop reason: HOSPADM

## 2020-10-14 RX ORDER — AMIODARONE HYDROCHLORIDE 200 MG/1
400 TABLET ORAL DAILY
Status: DISCONTINUED | OUTPATIENT
Start: 2020-10-14 | End: 2020-10-16 | Stop reason: HOSPADM

## 2020-10-14 RX ORDER — PROMETHAZINE HYDROCHLORIDE 25 MG/1
12.5 TABLET ORAL EVERY 6 HOURS PRN
Status: DISCONTINUED | OUTPATIENT
Start: 2020-10-14 | End: 2020-10-15

## 2020-10-14 RX ORDER — TORSEMIDE 20 MG/1
20 TABLET ORAL DAILY PRN
Status: DISCONTINUED | OUTPATIENT
Start: 2020-10-14 | End: 2020-10-16 | Stop reason: HOSPADM

## 2020-10-14 RX ORDER — POLYETHYLENE GLYCOL 3350 17 G/17G
17 POWDER, FOR SOLUTION ORAL DAILY PRN
Status: DISCONTINUED | OUTPATIENT
Start: 2020-10-14 | End: 2020-10-16 | Stop reason: HOSPADM

## 2020-10-14 RX ORDER — ATORVASTATIN CALCIUM 80 MG/1
80 TABLET, FILM COATED ORAL NIGHTLY
Status: DISCONTINUED | OUTPATIENT
Start: 2020-10-14 | End: 2020-10-16 | Stop reason: HOSPADM

## 2020-10-14 RX ORDER — MAGNESIUM OXIDE 400 MG/1
800 TABLET ORAL 2 TIMES DAILY
Status: DISCONTINUED | OUTPATIENT
Start: 2020-10-14 | End: 2020-10-16 | Stop reason: HOSPADM

## 2020-10-14 RX ORDER — LEVOTHYROXINE SODIUM 0.15 MG/1
150 TABLET ORAL DAILY
Status: DISCONTINUED | OUTPATIENT
Start: 2020-10-14 | End: 2020-10-16 | Stop reason: HOSPADM

## 2020-10-14 RX ORDER — IPRATROPIUM BROMIDE AND ALBUTEROL SULFATE 2.5; .5 MG/3ML; MG/3ML
1 SOLUTION RESPIRATORY (INHALATION) EVERY 4 HOURS PRN
Status: DISCONTINUED | OUTPATIENT
Start: 2020-10-14 | End: 2020-10-16 | Stop reason: HOSPADM

## 2020-10-14 RX ORDER — ACETAMINOPHEN 325 MG/1
650 TABLET ORAL ONCE
Status: COMPLETED | OUTPATIENT
Start: 2020-10-14 | End: 2020-10-14

## 2020-10-14 RX ADMIN — ACETAMINOPHEN 650 MG: 325 TABLET ORAL at 01:58

## 2020-10-14 RX ADMIN — APIXABAN 5 MG: 5 TABLET, FILM COATED ORAL at 08:59

## 2020-10-14 RX ADMIN — SODIUM CHLORIDE, PRESERVATIVE FREE 10 ML: 5 INJECTION INTRAVENOUS at 08:59

## 2020-10-14 RX ADMIN — ASPIRIN 81 MG: 81 TABLET, CHEWABLE ORAL at 08:59

## 2020-10-14 RX ADMIN — ATORVASTATIN CALCIUM 80 MG: 80 TABLET, FILM COATED ORAL at 20:20

## 2020-10-14 RX ADMIN — MIDODRINE HYDROCHLORIDE 10 MG: 5 TABLET ORAL at 13:00

## 2020-10-14 RX ADMIN — FERROUS GLUCONATE TAB 324 MG (37.5 MG ELEMENTAL IRON) 324 MG: 324 (37.5 FE) TAB at 08:59

## 2020-10-14 RX ADMIN — ONDANSETRON 4 MG: 4 TABLET, ORALLY DISINTEGRATING ORAL at 01:15

## 2020-10-14 RX ADMIN — LEVETIRACETAM 750 MG: 500 TABLET, FILM COATED ORAL at 09:00

## 2020-10-14 RX ADMIN — APIXABAN 5 MG: 5 TABLET, FILM COATED ORAL at 20:21

## 2020-10-14 RX ADMIN — OXYCODONE HYDROCHLORIDE 20 MG: 5 TABLET ORAL at 02:59

## 2020-10-14 RX ADMIN — SODIUM CHLORIDE, PRESERVATIVE FREE 10 ML: 5 INJECTION INTRAVENOUS at 20:20

## 2020-10-14 RX ADMIN — FERROUS GLUCONATE TAB 324 MG (37.5 MG ELEMENTAL IRON) 324 MG: 324 (37.5 FE) TAB at 16:17

## 2020-10-14 RX ADMIN — MIDODRINE HYDROCHLORIDE 10 MG: 5 TABLET ORAL at 08:59

## 2020-10-14 RX ADMIN — LEVOTHYROXINE SODIUM 150 MCG: 150 TABLET ORAL at 09:02

## 2020-10-14 RX ADMIN — OXYCODONE HYDROCHLORIDE 20 MG: 10 TABLET ORAL at 08:59

## 2020-10-14 RX ADMIN — OXYCODONE HYDROCHLORIDE 20 MG: 10 TABLET ORAL at 16:17

## 2020-10-14 RX ADMIN — ONDANSETRON 4 MG: 2 INJECTION INTRAMUSCULAR; INTRAVENOUS at 08:59

## 2020-10-14 RX ADMIN — OXYCODONE HYDROCHLORIDE 20 MG: 10 TABLET ORAL at 23:37

## 2020-10-14 RX ADMIN — PROMETHAZINE HYDROCHLORIDE 12.5 MG: 25 TABLET ORAL at 22:23

## 2020-10-14 RX ADMIN — AMIODARONE HYDROCHLORIDE 400 MG: 200 TABLET ORAL at 09:00

## 2020-10-14 RX ADMIN — ONDANSETRON 4 MG: 2 INJECTION INTRAMUSCULAR; INTRAVENOUS at 16:17

## 2020-10-14 RX ADMIN — OXYCODONE HYDROCHLORIDE 20 MG: 10 TABLET ORAL at 20:21

## 2020-10-14 RX ADMIN — MAGNESIUM OXIDE 400 MG (241.3 MG MAGNESIUM) TABLET 800 MG: TABLET at 20:20

## 2020-10-14 RX ADMIN — MIDODRINE HYDROCHLORIDE 10 MG: 5 TABLET ORAL at 16:17

## 2020-10-14 RX ADMIN — CLOPIDOGREL BISULFATE 75 MG: 75 TABLET ORAL at 09:00

## 2020-10-14 RX ADMIN — LEVETIRACETAM 750 MG: 500 TABLET, FILM COATED ORAL at 20:20

## 2020-10-14 RX ADMIN — OXYCODONE HYDROCHLORIDE 20 MG: 10 TABLET ORAL at 12:50

## 2020-10-14 RX ADMIN — MAGNESIUM OXIDE 400 MG (241.3 MG MAGNESIUM) TABLET 800 MG: TABLET at 08:59

## 2020-10-14 RX ADMIN — MIRTAZAPINE 30 MG: 15 TABLET, FILM COATED ORAL at 20:20

## 2020-10-14 RX ADMIN — ACETAMINOPHEN 650 MG: 325 TABLET ORAL at 16:16

## 2020-10-14 RX ADMIN — OXYCODONE HYDROCHLORIDE 20 MG: 10 TABLET ORAL at 05:26

## 2020-10-14 RX ADMIN — ACETAMINOPHEN 650 MG: 325 TABLET ORAL at 23:37

## 2020-10-14 ASSESSMENT — ENCOUNTER SYMPTOMS
SHORTNESS OF BREATH: 1
COUGH: 1
SORE THROAT: 0
RHINORRHEA: 0
CHEST TIGHTNESS: 0
BACK PAIN: 0
COLOR CHANGE: 0
WHEEZING: 0
ABDOMINAL PAIN: 0
NAUSEA: 0
VOMITING: 0
DIARRHEA: 0

## 2020-10-14 ASSESSMENT — PAIN DESCRIPTION - PAIN TYPE
TYPE: ACUTE PAIN
TYPE_2: ACUTE PAIN
TYPE: ACUTE PAIN
TYPE: ACUTE PAIN

## 2020-10-14 ASSESSMENT — PAIN DESCRIPTION - FREQUENCY: FREQUENCY: INTERMITTENT

## 2020-10-14 ASSESSMENT — PAIN SCALES - GENERAL
PAINLEVEL_OUTOF10: 8
PAINLEVEL_OUTOF10: 9
PAINLEVEL_OUTOF10: 3
PAINLEVEL_OUTOF10: 10
PAINLEVEL_OUTOF10: 7
PAINLEVEL_OUTOF10: 7
PAINLEVEL_OUTOF10: 8
PAINLEVEL_OUTOF10: 8

## 2020-10-14 ASSESSMENT — PAIN DESCRIPTION - LOCATION
LOCATION_2: HIP
LOCATION: CHEST
LOCATION: CHEST
LOCATION: CHEST;BACK

## 2020-10-14 ASSESSMENT — PAIN DESCRIPTION - ORIENTATION
ORIENTATION: UPPER
ORIENTATION_2: LEFT;RIGHT

## 2020-10-14 ASSESSMENT — PAIN DESCRIPTION - DESCRIPTORS: DESCRIPTORS: PRESSURE

## 2020-10-14 ASSESSMENT — PAIN DESCRIPTION - INTENSITY
RATING_2: 9
RATING_2: 9

## 2020-10-14 ASSESSMENT — PAIN - FUNCTIONAL ASSESSMENT: PAIN_FUNCTIONAL_ASSESSMENT: ACTIVITIES ARE NOT PREVENTED

## 2020-10-14 ASSESSMENT — PAIN DESCRIPTION - PROGRESSION: CLINICAL_PROGRESSION: NOT CHANGED

## 2020-10-14 NOTE — CARE COORDINATION
LSW spoke with pt regarding her discharge plans. Pt lives with her Partner and dght. Pt has had [facility] in the past but is not active at this time. Pt has walker, wc, cane shower chair. Pt stated her partner and dght help her with her ADLs. Pt has a PCP and can afford her meds. Pt stated she plan to go home and denies any needs. [facility] and she stated she does not feel she needs HC unless she needs IV anti-bio again. LSW spoke with doctor and he stated he is waiting on ID and cardiology to see pt and make recommendations.

## 2020-10-14 NOTE — ED PROVIDER NOTES
CARDIAC DEFIBRILLATOR PLACEMENT      adelaida [id number] CONDITIONAL    CHOLECYSTECTOMY      COLONOSCOPY      CORONARY ANGIOPLASTY WITH STENT PLACEMENT      3 stents    CORONARY ARTERY BYPASS GRAFT      Age 49    ENDOSCOPY, COLON, DIAGNOSTIC      GASTRIC BYPASS SURGERY      HERNIA REPAIR      HIP SURGERY      HYSTERECTOMY      JOINT REPLACEMENT      PORT SURGERY N/A 6/1/2020    PORT INSERTION performed by Marjan Hoffmann MD at [facility] SKIN BIOPSY      TONSILLECTOMY      VASCULAR SURGERY       Family History   Problem Relation Age of Onset    High Blood Pressure Mother     High Cholesterol Mother     Heart Disease Mother     Diabetes Mother     Heart Disease Father     Cancer Father     Other Sister         Multiple Sclerosis    Heart Disease Maternal Grandmother     High Blood Pressure Maternal Grandmother     High Cholesterol Maternal Grandmother      Social History     Socioeconomic History    Marital status: Single     Spouse name: Not on file    Number of children: Not on file    Years of education: Not on file    Highest education level: Not on file   Occupational History    Not on file   Social Needs    Financial resource strain: Not on file    Food insecurity     Worry: Not on file     Inability: Not on file    Transportation needs     Medical: Not on file     Non-medical: Not on file   Tobacco Use    Smoking status: Never Smoker    Smokeless tobacco: Never Used   Substance and Sexual Activity    Alcohol use: No    Drug use: No    Sexual activity: Not Currently   Lifestyle    Physical activity     Days per week: Not on file     Minutes per session: Not on file    Stress: Not on file   Relationships    Social connections     Talks on phone: Not on file     Gets together: Not on file     Attends Islam service: Not on file     Active member of club or organization: Not on file     Attends meetings of clubs or organizations: Not on file Relationship status: Not on file    Intimate partner violence     Fear of current or ex partner: Not on file     Emotionally abused: Not on file     Physically abused: Not on file     Forced sexual activity: Not on file   Other Topics Concern    Not on file   Social History Narrative    Not on file     No current facility-administered medications for this encounter. Current Outpatient Medications   Medication Sig Dispense Refill    oxyCODONE (ROXICODONE) 20 MG immediate release tablet Take 20 mg by mouth every 3 hours as needed.  chlorhexidine gluconate (ANTISEPTIC SKIN CLEANSER) 4 % SOLN external solution Apply topically daily 1 Bottle 1    levothyroxine (SYNTHROID) 150 MCG tablet Take 1 tablet by mouth Daily 1 tablet 0    naloxone 4 MG/0.1ML LIQD nasal spray 1 spray by Nasal route as needed for Opioid Reversal 1 each 5    potassium chloride (KLOR-CON M) 20 MEQ extended release tablet Take 1 tablet by mouth daily Take only if taking torsemide 60 tablet 3    midodrine (PROAMATINE) 10 MG tablet Take 1 tablet by mouth 3 times daily (with meals) 90 tablet 0    mirtazapine (REMERON) 30 MG tablet Take 30 mg by mouth nightly      ipratropium-albuterol (DUONEB) 0.5-2.5 (3) MG/3ML SOLN nebulizer solution Inhale 3 mLs into the lungs every 4 hours (while awake) 360 mL 0    nitroGLYCERIN (NITROSTAT) 0.4 MG SL tablet up to max of 3 total doses.  If no relief after 1 dose, call 911. 25 tablet 3    naloxone 4 MG/0.1ML LIQD nasal spray 1 spray by Nasal route as needed for Opioid Reversal (Patient taking differently: 1 spray by Nasal route as needed for Opioid Reversal 06/08/2020 Patient states she has never used this) 1 each 5    benzocaine-menthol (CEPACOL SORE THROAT) 15-3.6 MG lozenge Take 1 lozenge by mouth every 2 hours as needed for Sore Throat 18 lozenge 1    cyanocobalamin 1000 MCG/ML injection INJECT 1 ML INTO THE MUSCLE ON THE FIRST OF EACH MONTH  11    torsemide (DEMADEX) 20 MG tablet Take 20 mg by mouth daily as needed (For weight gain or fluid retention) Indications: pt takes 2 tablets daily       amiodarone (CORDARONE) 200 MG tablet Take 200 mg by mouth daily      apixaban (ELIQUIS) 5 MG TABS tablet Take 5 mg by mouth 2 times daily      DULoxetine (CYMBALTA) 60 MG extended release capsule Take 1 capsule by mouth daily 30 capsule 3    ondansetron (ZOFRAN) 4 MG tablet Take 1 tablet by mouth every 6 hours as needed for Nausea or Vomiting 20 tablet 0    magnesium oxide (MAG-OX) 400 MG tablet Take 800 mg by mouth 2 times daily      docusate sodium (COLACE) 100 MG capsule Take 100 mg by mouth 2 times daily as needed for Constipation      famotidine (PEPCID) 20 MG tablet Take 1 tablet by mouth 2 times daily 60 tablet 3    levETIRAcetam (KEPPRA) 750 MG tablet Take 750 mg by mouth 2 times daily       atorvastatin (LIPITOR) 80 MG tablet Take 80 mg by mouth nightly      clopidogrel (PLAVIX) 75 MG tablet Take 75 mg by mouth daily      acetaminophen 650 MG TABS Take 650 mg by mouth every 4 hours as needed 120 tablet 3    ferrous gluconate (FERGON) 324 (38 FE) MG tablet Take 324 mg by mouth 2 times daily       aspirin 81 MG tablet Take 81 mg by mouth daily       albuterol (PROVENTIL HFA;VENTOLIN HFA) 108 (90 BASE) MCG/ACT inhaler Inhale 2 puffs into the lungs every 6 hours as needed. Allergies   Allergen Reactions    Fentanyl Swelling     Other reaction(s): Angioedema (swelling)    Moxifloxacin Hcl In Nacl Swelling and Rash    Povidone-Iodine Rash     Other reaction(s): AOF      Cyclobenzaprine      Other reaction(s): Other - comment required  \" it make my muscle very weak because of my MS\"  \" it make my muscle very weak because of my MS\"      Methocarbamol      Worsening edema  Other reaction(s):  Other - comment required  Worsening edema  Worsening edema  Worsening edema      Tramadol Other (See Comments)     Doctor doesn't her to take due to heart problems  Seizures   Doctor doesn't her to take due to lowers seizure threshold.  Baclofen     Ibuprofen      \"Due to heart problems\"    Naproxen     Nsaids      \"Due to heart problems\"    Tizanidine     Tolmetin      \"Due to heart problems\"    Tramadol Hcl      Other reaction(s): Other - comment required  Told not to take due to history of seizures    Betadine [Povidone Iodine] Rash    Moxifloxacin Rash and Swelling     Lips swell and tingling in face   Lips swell and tingling in face   Lips swell and tingling in face   Lips swell and tingling in face   Around mouth       Nursing Notes Reviewed    Physical Exam:  ED Triage Vitals   Enc Vitals Group      BP       Pulse       Resp       Temp       Temp src       SpO2       Weight       Height       Head Circumference       Peak Flow       Pain Score       Pain Loc       Pain Edu? Excl. in [address]? GENERAL APPEARANCE: Awake and alert. Cooperative. No acute distress. HEAD: Normocephalic. Atraumatic. EYES: EOM's grossly intact. Sclera anicteric. ENT: Mucous membranes are moist. Tolerates saliva. No trismus. NECK: Supple. No meningismus. Trachea midline. HEART: Bradycardic. Radial pulses 2+. CHEST: Healing open wound to right upper chest without surrounding erythema or active drainage  LUNGS: Respirations unlabored. CTAB  ABDOMEN: Soft. Non-tender. No guarding or rebound. EXTREMITIES: No acute deformities. SKIN: Warm and dry. NEUROLOGICAL: No gross facial drooping. Moves all 4 extremities spontaneously. PSYCHIATRIC: Normal mood.     I have reviewed and interpreted all of the currently available lab results from this visit (if applicable):  Results for orders placed or performed during the hospital encounter of 10/13/20   COVID-19    Specimen: Nasopharyngeal Swab   Result Value Ref Range    Source THROAT     SARS-CoV-2, NAAT NOT DETECTED    CBC Auto Differential   Result Value Ref Range    WBC 4.4 4.0 - 10.5 K/CU MM    RBC 3.34 (L) 4.2 - 5.4 M/CU MM    Hemoglobin 11.1 (L) 12.5 - 16.0 GM/DL    Hematocrit 35.6 (L) 37 - 47 %    .6 (H) 78 - 100 FL    MCH 33.2 (H) 27 - 31 PG    MCHC 31.2 (L) 32.0 - 36.0 %    RDW 15.2 (H) 11.7 - 14.9 %    Platelets 617 434 - 411 K/CU MM    MPV 9.8 7.5 - 11.1 FL    Differential Type AUTOMATED DIFFERENTIAL     Segs Relative 63.6 36 - 66 %    Lymphocytes % 24.5 24 - 44 %    Monocytes % 9.9 (H) 0 - 4 %    Eosinophils % 0.9 0 - 3 %    Basophils % 0.9 0 - 1 %    Segs Absolute 2.8 K/CU MM    Lymphocytes Absolute 1.1 K/CU MM    Monocytes Absolute 0.4 K/CU MM    Eosinophils Absolute 0.0 K/CU MM    Basophils Absolute 0.0 K/CU MM    Nucleated RBC % 0.0 %    Total Nucleated RBC 0.0 K/CU MM    Total Immature Neutrophil 0.01 K/CU MM    Immature Neutrophil % 0.2 0 - 0.43 %   Comprehensive Metabolic Panel w/ Reflex to MG   Result Value Ref Range    Sodium 132 (L) 135 - 145 MMOL/L    Potassium 4.7 3.5 - 5.1 MMOL/L    Chloride 101 99 - 110 mMol/L    CO2 24 21 - 32 MMOL/L    BUN 12 6 - 23 MG/DL    CREATININE 0.6 0.6 - 1.1 MG/DL    Glucose 109 (H) 70 - 99 MG/DL    Calcium 8.7 8.3 - 10.6 MG/DL    Alb 3.8 3.4 - 5.0 GM/DL    Total Protein 6.1 (L) 6.4 - 8.2 GM/DL    Total Bilirubin 0.2 0.0 - 1.0 MG/DL    ALT 13 10 - 40 U/L    AST 23 15 - 37 IU/L    Alkaline Phosphatase 95 40 - 128 IU/L    GFR Non-African American >60 >60 mL/min/1.73m2    GFR African American >60 >60 mL/min/1.73m2    Anion Gap 7 4 - 16   Troponin   Result Value Ref Range    Troponin T <0.010 <0.01 NG/ML   Brain Natriuretic Peptide   Result Value Ref Range    Pro-BNP 1,189 (H) <300 PG/ML   EKG 12 Lead   Result Value Ref Range    Ventricular Rate 43 BPM    Atrial Rate 43 BPM    P-R Interval 148 ms    QRS Duration 106 ms    Q-T Interval 548 ms    QTc Calculation (Bazett) 463 ms    P Axis 10 degrees    R Axis -35 degrees    T Axis -40 degrees    Diagnosis       Marked sinus bradycardia  Left axis deviation  Inferior infarct (cited on or before 12-FEB-2017)  T wave abnormality, consider lateral ischemia  Abnormal ECG  When compared with ECG of 27-SEP-2020 05:52,  Criteria for Anterior infarct are no longer present  Criteria for Anterolateral infarct are no longer present        Radiographs (if obtained):  [] The following radiograph was interpreted by myself in the absence of a radiologist:  [x] Radiologist's Report Reviewed:    EKG (if obtained): (All EKG's are interpreted by myself in the absence of a cardiologist)  Sinus bradycardia. Left axis deviation. Inverted T waves in the lateral leads and inferior leads. No ST elevation. Age-indeterminate inferior infarct. QTc is normal.  No significant change from 9/25/2020    MDM:  Plan of care is discussed thoroughly with the patient and family if present. If performed, all imaging and lab work also discussed with patient. All relevant prior results and chart reviewed if available. Patient presents as above. She is in no acute distress presentation. Presents with fever, fatigue, cough, multiple syncopal episodes. COVID swab is sent. Patient is placed on telemetry. She does not appear to have an infected wound on her chest at this time. Patient is bradycardic but hemodynamically stable without any active chest pain or shortness of breath at this time. Heart rate remaining mainly in the 40s. COVID is negative. Metabolic work-up is largely unremarkable. Troponin was within normal limits. EKG did not show any dynamic changes. Patient remains hemodynamically stable here. She is admitted for further management and observation. Clinical Impression:  1. Syncope, unspecified syncope type    2.  Bradycardia      (Please note that portions of this note may have been completed with a voice recognition program. Efforts were made to edit the dictations but occasionally words are mis-transcribed.)    MD Jeane Elena MD  10/14/20 [id number]

## 2020-10-14 NOTE — CONSULTS
Infectious Disease Consult Note  10/14/2020   Patient Name: Corey Weldon : 1960   Impression   Recurrent Right Chest Wall Wound Mediport/ICD Pocket Past MRSA:  · Afebrile, no leukocytosis  · Blood cultures 10/14-pending  · Past right chest wound culture 20-MRSA  · ICD explanted at [facility] 2020 due to MRSA lead/pocket infection  · Known to ID service-Dr. John Read as well as [facility] ID service  · Last treatment with IV OP therapy of dalbavancin 1500 mg on 20  · Patient requesting long term ABX suppresive therapy for MRSA    Allergy to moxifloxicin: Lips and facial swelling  · Lymphoma  · MS  · Remote CVA  · HTN/CAD/HFrEF/Ischemic Cardiomyopathy   Multi-morbidity: per PMHx:  Arthritis, asplenia, asthma, CAD with PCI, cardiomyopathy, CVA, CHF, GERD, HLD, HTN, Lymphoma, MS, Seizures, hyster, appey, choley, Gastric bypass  Plan:   Check CRP and Pct   Check ESR  ·   Thank you for allowing me to consult in the care of this patient.  ------------------------  REASON FOR CONSULT: Infective syndrome     Requested by: Dr. Carroll Lee is a 62 y.o.  female who was admitted 10/13/2020 for further evaluation and management of symptomatic bradycardia. States she has had 4 episodes of syncope this week. She is concerned her right chest wound is recurring due to temp of .3 at home with right clavicular pain 2 days prior to arrival.  States she was hospitalized this week at [facility] for VT/arrythmias. States she is requesting ABX suppressive therapy in hopes she can keep the MRSA infection \"at bay\" and possibly have a chance to obtain a future ICD/pacemaker placement. She is scared about her frequent arrhythmias. ?  Infectious diseases service was consulted to evaluate the pt, and recommend further investigative and therapeutic measures.     ROS: Other systems reviewed Including eyes, ENT, respiratory, cardiovascular, GI, , dermatologic, neurologic, psych, hem/lymphatic, REPAIR      HIP SURGERY      HYSTERECTOMY      JOINT REPLACEMENT      PORT SURGERY N/A 6/1/2020    PORT INSERTION performed by Lisha Garza MD at [address] [place] [address]      VASCULAR SURGERY        Family History   Problem Relation Age of Onset    High Blood Pressure Mother     High Cholesterol Mother     Heart Disease Mother     Diabetes Mother     Heart Disease Father     Cancer Father     Other Sister         Multiple Sclerosis    Heart Disease Maternal Grandmother     High Blood Pressure Maternal Grandmother     High Cholesterol Maternal Grandmother       Infectious disease related family history - not contibutory. SOCIAL HISTORY  Social History     Tobacco Use    Smoking status: Never Smoker    Smokeless tobacco: Never Used   Substance Use Topics    Alcohol use: No      · Arthurine Dark  · Lives:[place], [place] with partner and daughter  · Occupation:Retired nurse  · No recent travel of significance. · No recent unusual exposures. · Pets:  3 dogs, 4 cats  ? ALLERGIES  Allergies   Allergen Reactions    Fentanyl Swelling     Other reaction(s): Angioedema (swelling)    Moxifloxacin Hcl In Nacl Swelling and Rash    Povidone-Iodine Rash     Other reaction(s): AOF      Cyclobenzaprine      Other reaction(s): Other - comment required  \" it make my muscle very weak because of my MS\"  \" it make my muscle very weak because of my MS\"      Methocarbamol      Worsening edema  Other reaction(s): Other - comment required  Worsening edema  Worsening edema  Worsening edema      Tramadol Other (See Comments)     Doctor doesn't her to take due to heart problems  Seizures   Doctor doesn't her to take due to lowers seizure threshold.  Baclofen     Ibuprofen      \"Due to heart problems\"    Naproxen     Nsaids      \"Due to heart problems\"    Tizanidine     Tolmetin      \"Due to heart problems\"    Tramadol Hcl      Other reaction(s):  Other - comment required  Told not to take due to history of seizures    Betadine [Povidone Iodine] Rash    Moxifloxacin Rash and Swelling     Lips swell and tingling in face   Lips swell and tingling in face   Lips swell and tingling in face   Lips swell and tingling in face   Around mouth      MEDICATIONS  Reviewed and are per the chart/EMR. ? Antibiotics:   Present:  None    Past:  dalbavancin 1500 mg 9/30/20  Vancomycin 9/20-30?  -------------------------------------------------------------------------------------------------------------------    Vital Signs:  Vitals:    10/14/20 0854   BP:    Pulse: 50   Resp:    Temp:    SpO2:          Exam:    VS: noted; wt 121 lb (54.9 kg) 5'6\" Height  Gen: alert and oriented X3, no distress  Skin: no stigmata of endocarditis  Wounds: right chest wound moist, no drainage, no erythema or warmth, tender to touch. HEMT: AT/NC Oropharynx pink, moist, and without lesions or exudates; dentition in good state of repair  Eyes: PERRLA, EOMI, conjunctiva pink, sclera anicteric. Neck: Supple. Trachea midline. No LAD. Chest: no distress and CTA. Good air movement. Heart: Sinus Beny and no MRG. Abd: soft, non-distended, no tenderness, no hepatomegaly. Normoactive bowel sounds. Ext: no clubbing, cyanosis, or edema  Neuro: Mental status intact. CN 2-12 intact and no focal sensory or motor deficits    ? Diagnostic Studies: reviewed  10/13/20 XR Chest Portable:  Impression    No evidence of acute cardiopulmonary disease     ? ? I have examined this patient and available medical records on this date and have made the above observations, conclusions and recommendations. Electronically signed by: Electronically signed by JESSE Perea CNP on 10/14/2020 at 9:56 AM

## 2020-10-14 NOTE — ED TRIAGE NOTES
Patient arrived in the ED with bradycardia (38). Hr rate currently 42 patient stated she took \"8 Nitro since about 4 pm.\" But did not take BP or HR prior to taking them. Stated she took tylenol for possible fever and oxycodone Q 3hrs. Patient is alert and oriented, able to answer questions appropriately.

## 2020-10-14 NOTE — PLAN OF CARE
Problem: Skin Integrity:  Goal: Will show no infection signs and symptoms  Description: Will show no infection signs and symptoms  Outcome: Ongoing  Goal: Absence of new skin breakdown  Description: Absence of new skin breakdown  Outcome: Ongoing     Problem: Falls - Risk of:  Goal: Will remain free from falls  Description: Will remain free from falls  Outcome: Ongoing  Goal: Absence of physical injury  Description: Absence of physical injury  Outcome: Ongoing     Problem: Pain:  Description: Pain management should include both nonpharmacologic and pharmacologic interventions.   Goal: Pain level will decrease  Description: Pain level will decrease  Outcome: Ongoing  Goal: Control of acute pain  Description: Control of acute pain  Outcome: Ongoing  Goal: Control of chronic pain  Description: Control of chronic pain  Outcome: Ongoing

## 2020-10-14 NOTE — PROGRESS NOTES
.1. Syncope  2. Bradycardia  3. Ischemic cardiomyopathy  4. History of PE with IVC filter  5. htn  6. VT  7. Recurrent septicemia and ongoing MRSA and klebsiella infection  8. Multiple device infections and extractions. Patient has a very complex multiple comorbidities and recurrent infections    Patient presented with syncope. Syncope could be from bradycardia versus VT given her underlying condition unfortunately patient was not wearing her LifeVest at that time to assess for arrhythmia. Patient is 94.6 atrial paced last time on device check which shows that she is pacemaker dependent at that time. Last time I recommended that she needs to be on long-term antibiotics and removal of the device may not help her situation ---  later she was transferred to [facility] and they remove the system extraction again. She was on palliative care over there for a little bit and then discharged    Patient still has a fever of 102 an ongoing infection so she cannot have a device placed and I do not see a way to pay a place a device of an ongoing infection and also if the device is placed when there is no infection she is getting recurrent infections so this patient overall prognosis is poor as I suggested previously. Other option is to consider leadless pacemaker for pacemaker support but she would still have risk of VT's and risk of infection of the device too    I would recommend transferring her to [facility] as most of her care has been in [facility] and they have been following her mostly. I recommended palliative care last time for the patient given her overall prognosis.     Full note to follow

## 2020-10-14 NOTE — H&P
History and Physical      Name:  Desiree Baeza /Age/Sex: 1960  (62 y.o. female)   MRN & CSN:  [id number] & [id number] Admission Date/Time: 10/13/2020 11:45 PM   Location:  [id number] PCP: Michi Byrd MD       Hospital Day: 2    Assessment and Plan:   Desiree Baeza is a 62 y.o.  female  who presents with Symptomatic bradycardia    1. Symptomatic bradycardia  2. Presyncope  3. Chest pain, extensive past medical history and chronic  4. History of ischemic cardiomyopathy with PPM placement and recent removal secondary to ICD pocket infection  5. History of a flutter and multiple blood clots on Eliquis  6. CAD status post CABG and stents  7. Heart failure with reduced ejection fraction   Patient with extensive past cardiac history and we will continue chronic home medications. Syncope likely related to cardiovascular history and orthostatic hypotension but cannot exclude arrhythmogenic cause   Does not appear to be in decompensated heart failure at this time   Telemetry and EKG in [facility] cardiology, would appreciate their recommendations   Fall precautions, elevate head of bed, and bedrest with bedside commode    Other chronic medical conditions:   History of seizures: Continue home meds   Hypothyroidism: Continue home meds   Avascular necrosis of the hip: Sees pain management on chronic oxycodone: Continue home meds with respiratory precautions    Diet DIET GENERAL;   DVT Prophylaxis [] Lovenox, []  Heparin, [] SCDs, [] Ambulation   GI Prophylaxis [] PPI,  [] H2 Blocker,  [] Carafate,  [] Diet/Tube Feeds   Code Status Full Code   Disposition Patient requires continued admission due to Symptomatic bradycardia   MDM [] Low, [] Moderate,[x]  High  Patient's risk as above due to acuity of condition with potential for decompensation.      History of Present Illness:     Chief Complaint: Symptomatic bradycardia    Desiree Baeza is a 62 y.o.  female with an extensive past medical history with pertinent history of ischemic cardiomyopathy, a flutter, multiple blood clots currently anticoagulated on Eliquis, CAD status post CABG and stents, recent ESBL Klebsiella/MRSA ICD pocket infection requiring removal of ICD with discharge on LifeVest, multiple encounters for syncopal episodes and chest pain with last hospitalization on 10/4/2020 for the same, who presents with bradycardia and syncopal episodes x4 today. Patient does endorse feeling bad Monday, 10/12/2020 that started with subjective fever, cough, shortness of breath and fatigue that dissipated on 10/13/2020 in the a.m. More concerning was that patient had 4 episodes of syncope that seem to happen when patient was going from a seated to standing position that was accompanied by lightheadedness. Patient denied any head trauma, headaches, blurry vision, tinnitus, chest pain or palpitations preceding event, or seizure-like activity. Patient then took her oxygen saturation and noticed on her pulse ox that her heart rate was dipping into the 30s. Patient did note chest pain not related to syncopal episodes that she states is \"my usual.\"  Patient states she took \"8 nitros that did not seem to help. \"  Patient noted radiation into left arm and jaw. Patient denied diaphoresis or shortness of breath during those times. Was not related to exercise. Patient states this is chronic and stable. Discussed case with ED provider. Review of Systems   Constitutional: Positive for fever (Resolved). Negative for appetite change, chills and fatigue. HENT: Positive for congestion (Resolved). Negative for rhinorrhea and sore throat. Eyes: Negative for visual disturbance. Respiratory: Positive for cough (Resolved) and shortness of breath (Resolved). Negative for chest tightness and wheezing. Cardiovascular: Positive for chest pain. Negative for palpitations.         Bradycardia   Gastrointestinal: Negative for abdominal pain, diarrhea, nausea and vomiting. Genitourinary: Negative for dysuria, frequency and urgency. Musculoskeletal: Negative for arthralgias and back pain. Skin: Negative for color change, pallor and rash. Neurological: Positive for dizziness, syncope and weakness. Negative for seizures, speech difficulty, numbness and headaches. Psychiatric/Behavioral: Negative for agitation and suicidal ideas. Objective: Intake/Output Summary (Last 24 hours) at 10/14/2020 0522  Last data filed at 10/14/2020 0501  Gross per 24 hour   Intake --   Output 300 ml   Net -300 ml      Vitals:   Vitals:    10/14/20 0501   BP: (!) 128/57   Pulse: 55   Resp: 25   Temp:    SpO2:      Physical Exam:   Physical Exam  Vitals signs and nursing note reviewed. Constitutional:       General: She is not in acute distress. Appearance: Normal appearance. She is not ill-appearing. HENT:      Head: Atraumatic. Right Ear: External ear normal.      Left Ear: External ear normal.      Nose: Nose normal. No rhinorrhea. Mouth/Throat:      Mouth: Mucous membranes are moist.   Eyes:      General: No scleral icterus. Conjunctiva/sclera: Conjunctivae normal.      Pupils: Pupils are equal, round, and reactive to light. Neck:      Musculoskeletal: Neck supple. Cardiovascular:      Rate and Rhythm: Regular rhythm. Bradycardia present. Heart sounds: No murmur. No gallop. Pulmonary:      Effort: Pulmonary effort is normal. No tachypnea or respiratory distress. Breath sounds: No decreased air movement. No decreased breath sounds, wheezing, rhonchi or rales. Abdominal:      General: Bowel sounds are normal. There is no distension. Palpations: Abdomen is soft. Tenderness: There is no abdominal tenderness. There is no guarding or rebound. Musculoskeletal: Normal range of motion. Right lower leg: No edema. Left lower leg: No edema. Skin:     General: Skin is warm and dry.       Capillary Refill: Capillary refill takes less than 2 seconds. Neurological:      General: No focal deficit present. Mental Status: She is alert and oriented to person, place, and time. Mental status is at baseline. Cranial Nerves: No cranial nerve deficit, dysarthria or facial asymmetry. Sensory: Sensation is intact. Motor: No weakness, tremor or seizure activity. Gait: Gait normal.      Comments: Right lower extremity weakness secondary to MS and avascular necrosis of hip. Chronic and stable no apparent cranial nerve deficits   Psychiatric:         Attention and Perception: Attention normal.         Mood and Affect: Mood normal.         Behavior: Behavior normal. Behavior is cooperative. Past Medical History:      Past Medical History:   Diagnosis Date    Acute delirium     Arrhythmia     Arthritis     Asplenia     Asthma     CAD (coronary artery disease)     1st stent at age 46    Cardiomyopathy [facility])     Cerebral artery occlusion with cerebral infarction ([address]     CHF (congestive heart failure) (HCC)     Enlarged liver     GERD (gastroesophageal reflux disease)     H/O echocardiogram 4/6/16    EF 55%, trivial TR & MR, stage 1 diastolic dysfunction    History of blood transfusion     Hx of cardiovascular stress test 12/29/2015    Alessia: EF 45%. Pharmacologic stress myocardial perfusion scan shows a fixed inferior-lateral defect w/ no inducible ischemia. No evidence of ischemia. Inferior-lateral infarction. Normal LVSF    Hyperlipidemia     Hypertension     Lymphoma ([address]     Denies    MI, old     Movement disorder     MS (multiple sclerosis) ([address]     OP (osteoporosis)     PE (pulmonary embolism)     trapese filter    Pneumonia     Pyelonephritis     Seizures ([address]     Severe malnutrition (HCC)     Spleen enlarged     Thyroid disease      PSHX:  has a past surgical history that includes Coronary angioplasty with stent; Hysterectomy; Appendectomy; Cholecystectomy;  Tonsillectomy; Gastric bypass surgery; Cardiac defibrillator placement; hip surgery; Abdomen surgery; Colonoscopy; Endoscopy, colon, diagnostic; hernia repair; joint replacement; skin biopsy; vascular surgery; Coronary artery bypass graft; and Port Surgery (N/A, 6/1/2020). Allergies: Allergies   Allergen Reactions    Fentanyl Swelling     Other reaction(s): Angioedema (swelling)    Moxifloxacin Hcl In Nacl Swelling and Rash    Povidone-Iodine Rash     Other reaction(s): AOF      Cyclobenzaprine      Other reaction(s): Other - comment required  \" it make my muscle very weak because of my MS\"  \" it make my muscle very weak because of my MS\"      Methocarbamol      Worsening edema  Other reaction(s): Other - comment required  Worsening edema  Worsening edema  Worsening edema      Tramadol Other (See Comments)     Doctor doesn't her to take due to heart problems  Seizures   Doctor doesn't her to take due to lowers seizure threshold.  Baclofen     Ibuprofen      \"Due to heart problems\"    Naproxen     Nsaids      \"Due to heart problems\"    Tizanidine     Tolmetin      \"Due to heart problems\"    Tramadol Hcl      Other reaction(s): Other - comment required  Told not to take due to history of seizures    Betadine [Povidone Iodine] Rash    Moxifloxacin Rash and Swelling     Lips swell and tingling in face   Lips swell and tingling in face   Lips swell and tingling in face   Lips swell and tingling in face   Around mouth       FAM HX: family history includes Cancer in her father; Diabetes in her mother; Heart Disease in her father, maternal grandmother, and mother; High Blood Pressure in her maternal grandmother and mother; High Cholesterol in her maternal grandmother and mother; Other in her sister.   Soc HX:   Social History     Socioeconomic History    Marital status: Single     Spouse name: Not on file    Number of children: Not on file    Years of education: Not on file    Highest education level: Not on file Occupational History    Not on file   Social Needs    Financial resource strain: Not on file    Food insecurity     Worry: Not on file     Inability: Not on file    Transportation needs     Medical: Not on file     Non-medical: Not on file   Tobacco Use    Smoking status: Never Smoker    Smokeless tobacco: Never Used   Substance and Sexual Activity    Alcohol use: No    Drug use: No    Sexual activity: Not Currently   Lifestyle    Physical activity     Days per week: Not on file     Minutes per session: Not on file    Stress: Not on file   Relationships    Social connections     Talks on phone: Not on file     Gets together: Not on file     Attends Confucianist service: Not on file     Active member of club or organization: Not on file     Attends meetings of clubs or organizations: Not on file     Relationship status: Not on file    Intimate partner violence     Fear of current or ex partner: Not on file     Emotionally abused: Not on file     Physically abused: Not on file     Forced sexual activity: Not on file   Other Topics Concern    Not on file   Social History Narrative    Not on file       Medications:   Medications:    amiodarone  400 mg Oral Daily    apixaban  5 mg Oral BID    aspirin  81 mg Oral Daily    atorvastatin  80 mg Oral Nightly    clopidogrel  75 mg Oral Daily    ferrous gluconate  324 mg Oral BID    levETIRAcetam  750 mg Oral BID    levothyroxine  150 mcg Oral Daily    magnesium oxide  800 mg Oral BID    midodrine  10 mg Oral TID WC    mirtazapine  30 mg Oral Nightly    sodium chloride flush  10 mL Intravenous 2 times per day      Infusions:   PRN Meds: ipratropium-albuterol, 1 ampule, Q4H PRN  oxyCODONE, 20 mg, Q3H PRN  torsemide, 20 mg, Daily PRN  sodium chloride flush, 10 mL, PRN  acetaminophen, 650 mg, Q6H PRN    Or  acetaminophen, 650 mg, Q6H PRN  polyethylene glycol, 17 g, Daily PRN  promethazine, 12.5 mg, Q6H PRN    Or  ondansetron, 4 mg, Q6H PRN        Electronically signed by Dacia Maya MD on 10/14/2020 at 5:22 AM

## 2020-10-14 NOTE — CONSULTS
not a candidate for further intervention    She was discharged on last admission home with LifeVest she states she did not have her LifeVest on for the last 24 hours and she took it off after having a shower forgot to put it back on. She also notes that she has multiple alarms on LifeVest stating she believes her alarms were related to artifact. Pastmedical history:   Past Medical History:   Diagnosis Date    Acute delirium     Arrhythmia     Arthritis     Asplenia     Asthma     CAD (coronary artery disease)     1st stent at age 46    Cardiomyopathy [facility])     Cerebral artery occlusion with [facility]     CHF (congestive heart failure) (HCC)     Enlarged liver     GERD (gastroesophageal reflux disease)     H/O echocardiogram 4/6/16    EF 55%, trivial TR & MR, stage 1 diastolic dysfunction    History of blood transfusion     Hx of cardiovascular stress test 12/29/2015    Alessia: EF 45%. Pharmacologic stress myocardial perfusion scan shows a fixed inferior-lateral defect w/ no inducible ischemia. No evidence of ischemia. Inferior-lateral infarction.  Normal LVSF    Hyperlipidemia     Hypertension     Lymphoma ([facility]     Denies    MI, old     Movement disorder     MS (multiple sclerosis) ([facility]     OP (osteoporosis)     PE (pulmonary embolism)     trapese filter    Pneumonia     Pyelonephritis     Seizures (HCC)     Severe malnutrition (HCC)     Spleen enlarged     Thyroid disease        Surgical history :   Past Surgical History:   Procedure Laterality Date    ABDOMEN SURGERY      APPENDECTOMY      CARDIAC DEFIBRILLATOR PLACEMENT      adelaida [id number] CONDITIONAL    CHOLECYSTECTOMY      COLONOSCOPY      CORONARY ANGIOPLASTY WITH STENT PLACEMENT      3 stents    CORONARY ARTERY BYPASS GRAFT      Age 49    ENDOSCOPY, COLON, DIAGNOSTIC      GASTRIC BYPASS SURGERY      HERNIA REPAIR      HIP SURGERY      HYSTERECTOMY      JOINT REPLACEMENT      PORT SURGERY N/A 6/1/2020    PORT INSERTION performed by Aftab Macdonald MD at [address] [place] [address]      VASCULAR SURGERY         Family history:   Family History   Problem Relation Age of Onset    High Blood Pressure Mother     High Cholesterol Mother     Heart Disease Mother     Diabetes Mother     Heart Disease Father     Cancer Father     Other Sister         Multiple Sclerosis    Heart Disease Maternal Grandmother     High Blood Pressure Maternal Grandmother     High Cholesterol Maternal Grandmother        Social history :  reports that she has never smoked. She has never used smokeless tobacco. She reports that she does not drink alcohol or use drugs. Allergies   Allergen Reactions    Fentanyl Swelling     Other reaction(s): Angioedema (swelling)    Moxifloxacin Hcl In Nacl Swelling and Rash    Povidone-Iodine Rash     Other reaction(s): AOF      Cyclobenzaprine      Other reaction(s): Other - comment required  \" it make my muscle very weak because of my MS\"  \" it make my muscle very weak because of my MS\"      Methocarbamol      Worsening edema  Other reaction(s): Other - comment required  Worsening edema  Worsening edema  Worsening edema      Tramadol Other (See Comments)     Doctor doesn't her to take due to heart problems  Seizures   Doctor doesn't her to take due to lowers seizure threshold.  Baclofen     Ibuprofen      \"Due to heart problems\"    Naproxen     Nsaids      \"Due to heart problems\"    Tizanidine     Tolmetin      \"Due to heart problems\"    Tramadol Hcl      Other reaction(s):  Other - comment required  Told not to take due to history of seizures    Betadine [Povidone Iodine] Rash    Moxifloxacin Rash and Swelling     Lips swell and tingling in face   Lips swell and tingling in face   Lips swell and tingling in face   Lips swell and tingling in face   Around mouth       No current facility-administered medications on file prior to encounter. Current Outpatient Medications on File Prior to Encounter   Medication Sig Dispense Refill    oxyCODONE (ROXICODONE) 20 MG immediate release tablet Take 20 mg by mouth every 3 hours as needed.  chlorhexidine gluconate (ANTISEPTIC SKIN CLEANSER) 4 % SOLN external solution Apply topically daily 1 Bottle 1    levothyroxine (SYNTHROID) 150 MCG tablet Take 1 tablet by mouth Daily 1 tablet 0    potassium chloride (KLOR-CON M) 20 MEQ extended release tablet Take 1 tablet by mouth daily Take only if taking torsemide 60 tablet 3    midodrine (PROAMATINE) 10 MG tablet Take 1 tablet by mouth 3 times daily (with meals) 90 tablet 0    nitroGLYCERIN (NITROSTAT) 0.4 MG SL tablet up to max of 3 total doses.  If no relief after 1 dose, call 911. 25 tablet 3    benzocaine-menthol (CEPACOL SORE THROAT) 15-3.6 MG lozenge Take 1 lozenge by mouth every 2 hours as needed for Sore Throat 18 lozenge 1    cyanocobalamin 1000 MCG/ML injection INJECT 1 ML INTO THE MUSCLE ON THE FIRST OF EACH MONTH  11    torsemide (DEMADEX) 20 MG tablet Take 20 mg by mouth daily as needed (For weight gain or fluid retention) Indications: pt takes 2 tablets daily       amiodarone (CORDARONE) 200 MG tablet Take 200 mg by mouth daily      apixaban (ELIQUIS) 5 MG TABS tablet Take 5 mg by mouth 2 times daily      ondansetron (ZOFRAN) 4 MG tablet Take 1 tablet by mouth every 6 hours as needed for Nausea or Vomiting 20 tablet 0    magnesium oxide (MAG-OX) 400 MG tablet Take 800 mg by mouth 2 times daily      docusate sodium (COLACE) 100 MG capsule Take 100 mg by mouth 2 times daily as needed for Constipation      famotidine (PEPCID) 20 MG tablet Take 1 tablet by mouth 2 times daily 60 tablet 3    levETIRAcetam (KEPPRA) 750 MG tablet Take 750 mg by mouth 2 times daily       atorvastatin (LIPITOR) 80 MG tablet Take 80 mg by mouth nightly      clopidogrel (PLAVIX) 75 MG tablet Take 75 mg by mouth daily      acetaminophen 650 MG TABS Take 650 mg by mouth every 4 hours as needed 120 tablet 3    ferrous gluconate (FERGON) 324 (38 FE) MG tablet Take 324 mg by mouth 2 times daily       aspirin 81 MG tablet Take 81 mg by mouth daily       ipratropium-albuterol (DUONEB) 0.5-2.5 (3) MG/3ML SOLN nebulizer solution Inhale 3 mLs into the lungs every 4 hours (while awake) 360 mL 0    albuterol (PROVENTIL HFA;VENTOLIN HFA) 108 (90 BASE) MCG/ACT inhaler Inhale 2 puffs into the lungs every 6 hours as needed. Review of Systems:   Review of Systems   Constitutional: Positive for fatigue and fever. Negative for activity change and chills. HENT: Positive for congestion. Negative for ear pain and tinnitus. Eyes: Negative for photophobia, pain and visual disturbance. Respiratory: Positive for shortness of breath. Negative for cough, chest tightness and wheezing. Cardiovascular: Positive for chest pain and palpitations. Negative for leg swelling. Gastrointestinal: Positive for abdominal pain. Negative for blood in stool, constipation, diarrhea, nausea and vomiting. Endocrine: Negative for cold intolerance and heat intolerance. Genitourinary: Negative for dysuria, flank pain and hematuria. Musculoskeletal: Positive for arthralgias, back pain and gait problem. Negative for myalgias and neck stiffness. Skin: Negative for color change and rash. Wound on the right pectoral incision with drainage   Allergic/Immunologic: Negative for food allergies. Neurological: Positive for dizziness, syncope, weakness and light-headedness. Negative for numbness and headaches. Hematological: Does not bruise/bleed easily. Psychiatric/Behavioral: Negative for agitation, behavioral problems and confusion. The patient is nervous/anxious.           Examination:      Vitals:    10/14/20 0459 10/14/20 0501 10/14/20 0845 10/14/20 0854   BP: (!) 128/57 (!) 128/57 (!) 134/43    Pulse: 62 55 52 50   Resp: 18 25 14    Temp: 98 °F (36.7 °C) 98 °F (36.7 °C) 97.9 °F (36.6 °C)    TempSrc: Oral Oral Oral    SpO2: 100%  100%    Weight: 117 lb 8 oz (53.3 kg)  121 lb (54.9 kg)    Height: 5' 6\" (1.676 m)  5' 6\" (1.676 m)         Body mass index is 19.53 kg/m². Physical Exam  Constitutional:       Appearance: She is well-developed. She is ill-appearing. HENT:      Head: Normocephalic and atraumatic. Nose: No congestion. Mouth/Throat:      Mouth: Mucous membranes are moist.   Eyes:      Conjunctiva/sclera: Conjunctivae normal.      Pupils: Pupils are equal, round, and reactive to light. Neck:      Musculoskeletal: Normal range of motion. Thyroid: No thyromegaly. Vascular: No JVD. Cardiovascular:      Rate and Rhythm: Normal rate and regular rhythm. Heart sounds: Murmur (grade 2/6 systolic murmur) present. No friction rub. Pulmonary:      Effort: Pulmonary effort is normal. No respiratory distress. Breath sounds: Normal breath sounds. No stridor. No wheezing. Abdominal:      General: Bowel sounds are normal. There is no distension. Palpations: Abdomen is soft. Tenderness: There is no abdominal tenderness. Musculoskeletal: Normal range of motion. General: No tenderness. Skin:     General: Skin is warm and dry. Findings: No erythema. Comments: Seeing incisional wound on the right pectoral area   Neurological:      General: No focal deficit present. Mental Status: She is alert and oriented to person, place, and time. Cranial Nerves: No cranial nerve deficit. Motor: Weakness (right arm) present.    Psychiatric:         Behavior: Behavior normal.           CBC:   Lab Results   Component Value Date    WBC 4.4 10/14/2020    HGB 11.1 10/14/2020    HCT 35.6 10/14/2020     10/14/2020     Lipids:  Lab Results   Component Value Date    CHOL 175 09/27/2020    TRIG 86 09/27/2020    HDL 60 09/27/2020    LDLDIRECT 95 09/27/2020     PT/INR:   Lab Results   Component Value Date    INR 0.94 12/29/2019        BMP:    Lab Results   Component Value Date     (L) 10/14/2020    K 4.7 10/14/2020     10/14/2020    CO2 24 10/14/2020    BUN 12 10/14/2020     CMP:   Lab Results   Component Value Date    AST 23 10/14/2020    PROT 6.1 (L) 10/14/2020    BILITOT 0.2 10/14/2020    ALKPHOS 95 10/14/2020     TSH:  No results found for: TSH    EKGINTERPRETATION - EKG Interpretation:        IMPRESSION / RECOMMENDATIONS:     .1. Syncope  2. Bradycardia  3. Ischemic cardiomyopathy  4. History of PE with IVC filter  5. htn  6. VT  7. Recurrent septicemia and ongoing MRSA and klebsiella infection  8. Multiple device infections and extractions.      Patient has a very complex multiple comorbidities and recurrent infections     Patient presented with syncope.   Syncope could be from bradycardia versus VT given her underlying condition unfortunately patient was not wearing her LifeVest at that time to assess for arrhythmia.     Patient is 94.6 atrial paced last time on device check which shows that she is pacemaker dependent at that time.     Last time I recommended that she needs to be on long-term antibiotics and removal of the device may not help her situation ---  later she was transferred to [place] and they remove the system extraction again.     She was on palliative care over there for a little bit and then discharged     Patient still has a fever of 102 an ongoing infection so she cannot have a device placed and I do not see a way to pay a place a device of an ongoing infection and also if the device is placed when there is no infection she is getting recurrent infections so this patient overall prognosis is poor as I suggested previously.     Other option is to consider leadless pacemaker for pacemaker support but she would still have risk of VT's and risk of infection of the device too     I would recommend transferring her to [place] as most of her care has been in [place] and they have been following her mostly.     I recommended palliative care last time for the patient given her overall prognosis. Thanks again for allowing me to participate in care of this patient. Please call me if you have any questions. With best regards. Carmen Glover MD, 10/14/2020 9:29 AM     Please note this report has been partially produced using speech recognition software and may contain errors related to that system including errors in grammar, punctuation, and spelling, as well as words and phrases that may be inappropriate. If there are any questions or concerns please feel free to contact the dictating provider for clarification.

## 2020-10-14 NOTE — PROGRESS NOTES
with po diet and supplement during los       Nutrition Monitoring and Evaluation:   Food/Nutrient Intake Outcomes:  Food and Nutrient Intake, Supplement Intake  Physical Signs/Symptoms Outcomes:  Biochemical Data, GI Status, Nutrition Focused Physical Findings, Skin, Weight     Discharge Planning:    Continue Oral Nutrition Supplement     Electronically signed by Sissy Galvin RD, LD on 10/14/20 at 10:52 AM EDT    Contact: [phone]

## 2020-10-14 NOTE — PROGRESS NOTES
Patient seen and examined, chart reviewed- presented with concerns of symptomatic bradycardia for which cardio consulted. Also has right chest wall wound- mediport/ICD pocket- with recurrent MRSA- asking about long term abx as the wound is now draining more- consult ID.

## 2020-10-15 LAB
ALBUMIN SERPL-MCNC: 3.6 GM/DL (ref 3.4–5)
ANION GAP SERPL CALCULATED.3IONS-SCNC: 7 MMOL/L (ref 4–16)
BUN BLDV-MCNC: 10 MG/DL (ref 6–23)
CALCIUM SERPL-MCNC: 8.5 MG/DL (ref 8.3–10.6)
CHLORIDE BLD-SCNC: 109 MMOL/L (ref 99–110)
CO2: 26 MMOL/L (ref 21–32)
CREAT SERPL-MCNC: 0.7 MG/DL (ref 0.6–1.1)
ERYTHROCYTE SEDIMENTATION RATE: 19 MM/HR (ref 0–30)
GFR AFRICAN AMERICAN: >60 ML/MIN/1.73M2
GFR NON-AFRICAN AMERICAN: >60 ML/MIN/1.73M2
GLUCOSE BLD-MCNC: 103 MG/DL (ref 70–99)
HCT VFR BLD CALC: 34.2 % (ref 37–47)
HEMOGLOBIN: 10.6 GM/DL (ref 12.5–16)
HIGH SENSITIVE C-REACTIVE PROTEIN: 0.6 MG/L
MCH RBC QN AUTO: 33.4 PG (ref 27–31)
MCHC RBC AUTO-ENTMCNC: 31 % (ref 32–36)
MCV RBC AUTO: 107.9 FL (ref 78–100)
PDW BLD-RTO: 15.5 % (ref 11.7–14.9)
PHOSPHORUS: 4.3 MG/DL (ref 2.5–4.9)
PLATELET # BLD: 267 K/CU MM (ref 140–440)
PMV BLD AUTO: 9.2 FL (ref 7.5–11.1)
POTASSIUM SERPL-SCNC: 4 MMOL/L (ref 3.5–5.1)
PROCALCITONIN: 0.03
RBC # BLD: 3.17 M/CU MM (ref 4.2–5.4)
SODIUM BLD-SCNC: 142 MMOL/L (ref 135–145)
WBC # BLD: 4.4 K/CU MM (ref 4–10.5)

## 2020-10-15 PROCEDURE — 86141 C-REACTIVE PROTEIN HS: CPT

## 2020-10-15 PROCEDURE — 99231 SBSQ HOSP IP/OBS SF/LOW 25: CPT | Performed by: NURSE PRACTITIONER

## 2020-10-15 PROCEDURE — 84145 PROCALCITONIN (PCT): CPT

## 2020-10-15 PROCEDURE — 6370000000 HC RX 637 (ALT 250 FOR IP): Performed by: HOSPITALIST

## 2020-10-15 PROCEDURE — 6370000000 HC RX 637 (ALT 250 FOR IP): Performed by: STUDENT IN AN ORGANIZED HEALTH CARE EDUCATION/TRAINING PROGRAM

## 2020-10-15 PROCEDURE — G0378 HOSPITAL OBSERVATION PER HR: HCPCS

## 2020-10-15 PROCEDURE — 2580000003 HC RX 258: Performed by: STUDENT IN AN ORGANIZED HEALTH CARE EDUCATION/TRAINING PROGRAM

## 2020-10-15 PROCEDURE — 85027 COMPLETE CBC AUTOMATED: CPT

## 2020-10-15 PROCEDURE — 94761 N-INVAS EAR/PLS OXIMETRY MLT: CPT

## 2020-10-15 PROCEDURE — 36415 COLL VENOUS BLD VENIPUNCTURE: CPT

## 2020-10-15 PROCEDURE — 80069 RENAL FUNCTION PANEL: CPT

## 2020-10-15 PROCEDURE — 85652 RBC SED RATE AUTOMATED: CPT

## 2020-10-15 RX ORDER — PROMETHAZINE HYDROCHLORIDE 25 MG/1
25 TABLET ORAL EVERY 4 HOURS PRN
Status: DISCONTINUED | OUTPATIENT
Start: 2020-10-15 | End: 2020-10-16 | Stop reason: HOSPADM

## 2020-10-15 RX ORDER — ONDANSETRON 2 MG/ML
8 INJECTION INTRAMUSCULAR; INTRAVENOUS EVERY 4 HOURS PRN
Status: DISCONTINUED | OUTPATIENT
Start: 2020-10-15 | End: 2020-10-16 | Stop reason: HOSPADM

## 2020-10-15 RX ADMIN — ATORVASTATIN CALCIUM 80 MG: 80 TABLET, FILM COATED ORAL at 20:17

## 2020-10-15 RX ADMIN — OXYCODONE HYDROCHLORIDE 20 MG: 10 TABLET ORAL at 12:51

## 2020-10-15 RX ADMIN — MAGNESIUM OXIDE 400 MG (241.3 MG MAGNESIUM) TABLET 800 MG: TABLET at 20:17

## 2020-10-15 RX ADMIN — FERROUS GLUCONATE TAB 324 MG (37.5 MG ELEMENTAL IRON) 324 MG: 324 (37.5 FE) TAB at 16:06

## 2020-10-15 RX ADMIN — APIXABAN 5 MG: 5 TABLET, FILM COATED ORAL at 08:48

## 2020-10-15 RX ADMIN — ACETAMINOPHEN 650 MG: 325 TABLET ORAL at 23:58

## 2020-10-15 RX ADMIN — ACETAMINOPHEN 650 MG: 325 TABLET ORAL at 09:37

## 2020-10-15 RX ADMIN — MIDODRINE HYDROCHLORIDE 10 MG: 5 TABLET ORAL at 12:51

## 2020-10-15 RX ADMIN — LEVETIRACETAM 750 MG: 500 TABLET, FILM COATED ORAL at 20:35

## 2020-10-15 RX ADMIN — ASPIRIN 81 MG: 81 TABLET, CHEWABLE ORAL at 08:48

## 2020-10-15 RX ADMIN — MIDODRINE HYDROCHLORIDE 10 MG: 5 TABLET ORAL at 08:48

## 2020-10-15 RX ADMIN — SODIUM CHLORIDE, PRESERVATIVE FREE 10 ML: 5 INJECTION INTRAVENOUS at 20:18

## 2020-10-15 RX ADMIN — CLOPIDOGREL BISULFATE 75 MG: 75 TABLET ORAL at 08:49

## 2020-10-15 RX ADMIN — APIXABAN 5 MG: 5 TABLET, FILM COATED ORAL at 20:17

## 2020-10-15 RX ADMIN — AMIODARONE HYDROCHLORIDE 400 MG: 200 TABLET ORAL at 08:48

## 2020-10-15 RX ADMIN — PROMETHAZINE HYDROCHLORIDE 25 MG: 25 TABLET ORAL at 20:17

## 2020-10-15 RX ADMIN — OXYCODONE HYDROCHLORIDE 20 MG: 10 TABLET ORAL at 05:52

## 2020-10-15 RX ADMIN — FERROUS GLUCONATE TAB 324 MG (37.5 MG ELEMENTAL IRON) 324 MG: 324 (37.5 FE) TAB at 08:48

## 2020-10-15 RX ADMIN — MIRTAZAPINE 30 MG: 15 TABLET, FILM COATED ORAL at 20:17

## 2020-10-15 RX ADMIN — MIDODRINE HYDROCHLORIDE 10 MG: 5 TABLET ORAL at 16:06

## 2020-10-15 RX ADMIN — LEVETIRACETAM 750 MG: 500 TABLET, FILM COATED ORAL at 08:48

## 2020-10-15 RX ADMIN — OXYCODONE HYDROCHLORIDE 20 MG: 10 TABLET ORAL at 16:06

## 2020-10-15 RX ADMIN — LEVOTHYROXINE SODIUM 150 MCG: 150 TABLET ORAL at 05:36

## 2020-10-15 RX ADMIN — OXYCODONE HYDROCHLORIDE 20 MG: 10 TABLET ORAL at 09:42

## 2020-10-15 RX ADMIN — PROMETHAZINE HYDROCHLORIDE 25 MG: 25 TABLET ORAL at 10:48

## 2020-10-15 RX ADMIN — OXYCODONE HYDROCHLORIDE 20 MG: 10 TABLET ORAL at 02:45

## 2020-10-15 RX ADMIN — SODIUM CHLORIDE, PRESERVATIVE FREE 10 ML: 5 INJECTION INTRAVENOUS at 08:49

## 2020-10-15 RX ADMIN — OXYCODONE HYDROCHLORIDE 20 MG: 10 TABLET ORAL at 23:57

## 2020-10-15 RX ADMIN — MAGNESIUM OXIDE 400 MG (241.3 MG MAGNESIUM) TABLET 800 MG: TABLET at 08:47

## 2020-10-15 RX ADMIN — OXYCODONE HYDROCHLORIDE 20 MG: 10 TABLET ORAL at 20:17

## 2020-10-15 RX ADMIN — PROMETHAZINE HYDROCHLORIDE 12.5 MG: 25 TABLET ORAL at 05:36

## 2020-10-15 ASSESSMENT — PAIN SCALES - GENERAL
PAINLEVEL_OUTOF10: 6
PAINLEVEL_OUTOF10: 8
PAINLEVEL_OUTOF10: 6
PAINLEVEL_OUTOF10: 9
PAINLEVEL_OUTOF10: 8
PAINLEVEL_OUTOF10: 9
PAINLEVEL_OUTOF10: 9
PAINLEVEL_OUTOF10: 10
PAINLEVEL_OUTOF10: 9
PAINLEVEL_OUTOF10: 6
PAINLEVEL_OUTOF10: 0
PAINLEVEL_OUTOF10: 0
PAINLEVEL_OUTOF10: 9
PAINLEVEL_OUTOF10: 0
PAINLEVEL_OUTOF10: 9
PAINLEVEL_OUTOF10: 0

## 2020-10-15 ASSESSMENT — PAIN DESCRIPTION - PROGRESSION
CLINICAL_PROGRESSION: NOT CHANGED
CLINICAL_PROGRESSION: NOT CHANGED

## 2020-10-15 ASSESSMENT — PAIN DESCRIPTION - PAIN TYPE: TYPE: CHRONIC PAIN

## 2020-10-15 NOTE — PROGRESS NOTES
[address] [place] [address]  HOSPITALIST PROGRESS NOTE                       Name:  Avni Patel /Age/Sex: 1960  (62 y.o. female)   MRN & CSN:  [id number] & [id number] Admission Date/Time: 10/13/2020 11:45 PM   Location:  [facility] Attending:  Araceli Diehl MD                                                  HPI  Avni Patel is a 62 y.o. female who presents with symptomatic bradycardia    SUBJECTIVE  -still intermittently bradycardic but no syncope. With nausea today    10 point review of systems reviewed and negative unless noted above. ALLERGIES:   Allergies   Allergen Reactions    Fentanyl Swelling     Other reaction(s): Angioedema (swelling)    Moxifloxacin Hcl In Nacl Swelling and Rash    Povidone-Iodine Rash     Other reaction(s): AOF      Cyclobenzaprine      Other reaction(s): Other - comment required  \" it make my muscle very weak because of my MS\"  \" it make my muscle very weak because of my MS\"      Methocarbamol      Worsening edema  Other reaction(s): Other - comment required  Worsening edema  Worsening edema  Worsening edema      Tramadol Other (See Comments)     Doctor doesn't her to take due to heart problems  Seizures   Doctor doesn't her to take due to lowers seizure threshold.  Baclofen     Ibuprofen      \"Due to heart problems\"    Naproxen     Nsaids      \"Due to heart problems\"    Tizanidine     Tolmetin      \"Due to heart problems\"    Tramadol Hcl      Other reaction(s): Other - comment required  Told not to take due to history of seizures    Betadine [Povidone Iodine] Rash    Moxifloxacin Rash and Swelling     Lips swell and tingling in face   Lips swell and tingling in face   Lips swell and tingling in face   Lips swell and tingling in face   Around mouth       PCP: Yolanda Song MD    PAST MEDICAL HISTORY, SURGICAL HISTORY, SOCIAL HISTORY and  HOME MEDICATIONS all reviewed.       OBJECTIVE  Vitals:    10/14/20 1415 10/14/20 2015 10/15/20 0220 10/15/20 6262 BP: (!) 106/58 (!) 123/47 113/63 (!) 101/56   Pulse:  (!) 22 (!) 47 53   Resp: 11 12 12 16   Temp: 98 °F (36.7 °C) 97.8 °F (36.6 °C) 98 °F (36.7 °C) 97.9 °F (36.6 °C)   TempSrc: Oral Oral Oral Oral   SpO2: 96% 97% 96% 97%   Weight:   120 lb 12.8 oz (54.8 kg)    Height:           PHYSICAL EXAM   GEN Awake female, sitting upright in bed in no apparent distress. Gilberto Hilt EYES Pupils are equally round. No scleral erythema, discharge, or conjunctivitis. HENT Mucous membranes are moist. Oral pharynx without exudates, no evidence of thrush. NECK Supple, no apparent thyromegaly or masses. RESP Clear to auscultation, no wheezes, rales or rhonchi. Symmetric chest movement while on room air. CARDIO/VASC S1/S2 auscultated. Regular rate without appreciable murmurs, rubs, or gallops. No JVD or carotid bruits. Peripheral pulses equal bilaterally and palpable. No peripheral edema. GI Abdomen is soft without significant tenderness, masses, or guarding. Bowel sounds are normoactive. Rectal exam deferred.  No costovertebral angle tenderness. Normal appearing external genitalia. HEME/LYMPH No palpable cervical lymphadenopathy and no hepatosplenomegaly. No petechiae or ecchymoses. MSK Spontaneous movement of all extremities. No gross joint deformities. SKIN Normal coloration, warm, dry. NEURO Cranial nerves appear grossly intact, normal speech, no lateralizing weakness. PSYCH Awake, alert, oriented x 4. Affect appropriate. INTAKE: In: -   Out: 275   OUTPUT: In: -   Out: 275 [Urine:275]    LABS  Recent Labs     10/14/20  0118 10/15/20  0406   WBC 4.4 4.4   HGB 11.1* 10.6*   HCT 35.6* 34.2*    267      Recent Labs     10/14/20  0118 10/15/20  0406   * 142   K 4.7 4.0    109   CO2 24 26   PHOS  --  4.3   BUN 12 10   CREATININE 0.6 0.7     Recent Labs     10/14/20  0118   AST 23   ALT 13   BILITOT 0.2   ALKPHOS 95     No results for input(s): INR in the last 72 hours.   Recent Labs     10/14/20  0110

## 2020-10-15 NOTE — PROGRESS NOTES
Admit Date:  10/13/2020    Admission diagnosis / Complaint : syncope      Subjective:  Ms. Hernandez Score states she is feeling fatigued. She reported strong palpitations during the night      Objective:   BP (!) 101/56   Pulse 53   Temp 97.9 °F (36.6 °C) (Oral)   Resp 16   Ht 5' 6\" (1.676 m)   Wt 120 lb 12.8 oz (54.8 kg)   SpO2 97%   BMI 19.50 kg/m²       Intake/Output Summary (Last 24 hours) at 10/15/2020 0946  Last data filed at 10/15/2020 6834  Gross per 24 hour   Intake 240 ml   Output 275 ml   Net -35 ml       TELEMETRY: Sinus    has a past medical history of Acute delirium, Arrhythmia, Arthritis, Asplenia, Asthma, CAD (coronary artery disease), Cardiomyopathy ([address] Cerebral artery occlusion with cerebral infarction ([address] CHF (congestive heart failure) ([address] Enlarged liver, GERD (gastroesophageal reflux disease), H/O echocardiogram, History of blood transfusion, Hx of cardiovascular stress test, Hyperlipidemia, Hypertension, Lymphoma ([address] MI, old, Movement disorder, MS (multiple sclerosis) ([address] OP (osteoporosis), PE (pulmonary embolism), Pneumonia, Pyelonephritis, Seizures ([address] Severe malnutrition ([address] Spleen enlarged, and Thyroid disease. has a past surgical history that includes Coronary angioplasty with stent; Hysterectomy; Appendectomy; Cholecystectomy; Tonsillectomy; Gastric bypass surgery; Cardiac defibrillator placement; hip surgery; Abdomen surgery; Colonoscopy; Endoscopy, colon, diagnostic; hernia repair; joint replacement; skin biopsy; vascular surgery; Coronary artery bypass graft; and Port Surgery (N/A, 6/1/2020).      Physical Exam:  General:  Awake, alert, NAD  Skin:  Warm and dry dressing to the right upper chest wall  Neck:  JVD not appreciated   Chest:  Clear to auscultation, respiration easy  Cardiovascular:  RRR S1S2  Abdomen:  Soft non tender  Extremities:  no edema    Medications:    amiodarone  400 mg Oral Daily    apixaban  5 mg Oral BID    aspirin  81 mg Oral Daily    patient started on life vest in June- she has less than 25 hours of wear time - all alarms were noted to be artifact- no nakia or ventricular rhythms noted    Will sign off and be available as needed                       JESSE Mena - CNP, 10/15/2020 9:46 AM

## 2020-10-15 NOTE — PLAN OF CARE
Problem: Skin Integrity:  Goal: Will show no infection signs and symptoms  Description: Will show no infection signs and symptoms  10/15/2020 0820 by David Mejia RN  Outcome: Ongoing     Problem: Skin Integrity:  Goal: Absence of new skin breakdown  Description: Absence of new skin breakdown  10/15/2020 0820 by David Mejia RN  Outcome: Ongoing     Problem: Falls - Risk of:  Goal: Will remain free from falls  Description: Will remain free from falls  10/15/2020 0820 by David Mejia RN  Outcome: Ongoing     Problem: Falls - Risk of:  Goal: Absence of physical injury  Description: Absence of physical injury  10/15/2020 0820 by David Mejia RN  Outcome: Ongoing     Problem: Pain:  Goal: Pain level will decrease  Description: Pain level will decrease  10/15/2020 0820 by David Mejia RN  Outcome: Ongoing     Problem: Pain:  Goal: Control of acute pain  Description: Control of acute pain  10/15/2020 0820 by David Mejia RN  Outcome: Ongoing     Problem: Pain:  Goal: Control of chronic pain  Description: Control of chronic pain  10/15/2020 0820 by David Mejia RN  Outcome: Ongoing

## 2020-10-15 NOTE — PROGRESS NOTES
Infectious Disease Progress Note  10/15/2020   Patient Name: Richie Callahan : 1960      Impression  · Recurrent Right Chest Wall Wound Mediport/ICD Pocket Past MRSA:  ? Afebrile, no leukocytosis  ? Blood cultures 10/14-pending  ? Past right chest wound culture 20-MRSA  ? ICD explanted at [address] [place] [address] 2020 due to MRSA lead/pocket infection  ? Known to ID service-Dr. Sury Loving as well as [facility] ID service  ? Last treatment with IV OP therapy of dalbavancin 1500 mg on 20  ? Patient requesting long term ABX suppresive therapy for MRSA   ? Right chest wound does not appear infected at this time, no erythema, no drainage or edema or warmth. · Allergy to moxifloxicin: Lips and facial swelling  ? Lymphoma  ? MS  ? Remote CVA  ? HTN/CAD/HFrEF/Ischemic Cardiomyopathy  · Multi-morbidity: per PMHx:  Arthritis, asplenia, asthma, CAD with PCI, cardiomyopathy, CVA, CHF, GERD, HLD, HTN, Lymphoma, MS, Seizures, hyster, appey, choley, Gastric bypass  Plan:  · Plan for transfer to [address] [place] [address] for symptomatic bradycardia with reported syncope  · Will continue off ABX therapy at this time, right chest wound does not appear infected at this time. Biomarkers are not elevated, no leukocytosis or fevers. Ongoing Antimicrobial Therapy  None? Completed Antimicrobial Therapy  dalbavancin 1500 mg 20  Vancomycin -?  ? History:? Interval history noted. Chief complaint:  Past recurrent right chest wall wound mediport/ICD pocket MRSA infection. Denies n/v/d/f or untoward effects of antibiotics. Resting quietly, states is feeling better today. Physical Exam:  Vital Signs: BP (!) 101/56   Pulse 53   Temp 97.9 °F (36.6 °C) (Oral)   Resp 16   Ht 5' 6\" (1.676 m)   Wt 120 lb 12.8 oz (54.8 kg)   SpO2 97%   BMI 19.50 kg/m²     Gen: alert and oriented X3, no distress  Skin: no stigmata of endocarditis  Wounds: right chest wound moist, no drainage, no erythema or warmth, tender to touch.   HEMT: AT/NC Oropharynx pink, moist, and Problems:    Coronary artery disease involving coronary bypass graft of native heart with angina pectoris (HCC)    Ischemic cardiomyopathy    Chest pain    CAD (coronary artery disease)    Hyperlipidemia    Thyroid disease    Severe malnutrition (HCC)    History of Maru-en-Y gastric bypass    HFrEF (heart failure with reduced ejection fraction) ([address]    Open wound of right chest wall    History of pacemaker with removal secondary to infected leads    Avascular necrosis ([address]    Multiple sclerosis ([address]    Syncope  Resolved Problems:    * No resolved hospital problems. *    Electronically signed by: Electronically signed by Aristides Graham.  JESSE Poe CNP on 10/15/2020 at 10:45 AM

## 2020-10-16 VITALS
DIASTOLIC BLOOD PRESSURE: 58 MMHG | WEIGHT: 120.8 LBS | TEMPERATURE: 97.8 F | OXYGEN SATURATION: 98 % | BODY MASS INDEX: 19.41 KG/M2 | RESPIRATION RATE: 14 BRPM | SYSTOLIC BLOOD PRESSURE: 118 MMHG | HEART RATE: 53 BPM | HEIGHT: 66 IN

## 2020-10-16 PROBLEM — R00.1 BRADYCARDIA: Status: ACTIVE | Noted: 2020-10-16

## 2020-10-16 LAB — HIGH SENSITIVE C-REACTIVE PROTEIN: 0.5 MG/L

## 2020-10-16 PROCEDURE — 2580000003 HC RX 258: Performed by: STUDENT IN AN ORGANIZED HEALTH CARE EDUCATION/TRAINING PROGRAM

## 2020-10-16 PROCEDURE — G0378 HOSPITAL OBSERVATION PER HR: HCPCS

## 2020-10-16 PROCEDURE — 99231 SBSQ HOSP IP/OBS SF/LOW 25: CPT | Performed by: NURSE PRACTITIONER

## 2020-10-16 PROCEDURE — 6370000000 HC RX 637 (ALT 250 FOR IP): Performed by: HOSPITALIST

## 2020-10-16 PROCEDURE — 6370000000 HC RX 637 (ALT 250 FOR IP): Performed by: STUDENT IN AN ORGANIZED HEALTH CARE EDUCATION/TRAINING PROGRAM

## 2020-10-16 PROCEDURE — 36415 COLL VENOUS BLD VENIPUNCTURE: CPT

## 2020-10-16 PROCEDURE — 94761 N-INVAS EAR/PLS OXIMETRY MLT: CPT

## 2020-10-16 PROCEDURE — 1200000000 HC SEMI PRIVATE

## 2020-10-16 PROCEDURE — 86141 C-REACTIVE PROTEIN HS: CPT

## 2020-10-16 RX ADMIN — CLOPIDOGREL BISULFATE 75 MG: 75 TABLET ORAL at 09:45

## 2020-10-16 RX ADMIN — PROMETHAZINE HYDROCHLORIDE 25 MG: 25 TABLET ORAL at 09:45

## 2020-10-16 RX ADMIN — MIDODRINE HYDROCHLORIDE 10 MG: 5 TABLET ORAL at 12:25

## 2020-10-16 RX ADMIN — LEVOTHYROXINE SODIUM 150 MCG: 150 TABLET ORAL at 06:22

## 2020-10-16 RX ADMIN — OXYCODONE HYDROCHLORIDE 20 MG: 10 TABLET ORAL at 12:45

## 2020-10-16 RX ADMIN — OXYCODONE HYDROCHLORIDE 20 MG: 10 TABLET ORAL at 15:51

## 2020-10-16 RX ADMIN — ACETAMINOPHEN 650 MG: 325 TABLET ORAL at 06:17

## 2020-10-16 RX ADMIN — SODIUM CHLORIDE, PRESERVATIVE FREE 10 ML: 5 INJECTION INTRAVENOUS at 09:45

## 2020-10-16 RX ADMIN — PROMETHAZINE HYDROCHLORIDE 25 MG: 25 TABLET ORAL at 15:51

## 2020-10-16 RX ADMIN — APIXABAN 5 MG: 5 TABLET, FILM COATED ORAL at 09:45

## 2020-10-16 RX ADMIN — OXYCODONE HYDROCHLORIDE 20 MG: 10 TABLET ORAL at 09:45

## 2020-10-16 RX ADMIN — PROMETHAZINE HYDROCHLORIDE 25 MG: 25 TABLET ORAL at 02:59

## 2020-10-16 RX ADMIN — AMIODARONE HYDROCHLORIDE 400 MG: 200 TABLET ORAL at 09:45

## 2020-10-16 RX ADMIN — FERROUS GLUCONATE TAB 324 MG (37.5 MG ELEMENTAL IRON) 324 MG: 324 (37.5 FE) TAB at 09:45

## 2020-10-16 RX ADMIN — MIDODRINE HYDROCHLORIDE 10 MG: 5 TABLET ORAL at 09:45

## 2020-10-16 RX ADMIN — LEVETIRACETAM 750 MG: 500 TABLET, FILM COATED ORAL at 09:45

## 2020-10-16 RX ADMIN — OXYCODONE HYDROCHLORIDE 20 MG: 10 TABLET ORAL at 06:17

## 2020-10-16 RX ADMIN — MAGNESIUM OXIDE 400 MG (241.3 MG MAGNESIUM) TABLET 800 MG: TABLET at 09:44

## 2020-10-16 RX ADMIN — ACETAMINOPHEN 650 MG: 325 TABLET ORAL at 12:25

## 2020-10-16 RX ADMIN — OXYCODONE HYDROCHLORIDE 20 MG: 10 TABLET ORAL at 02:59

## 2020-10-16 RX ADMIN — ASPIRIN 81 MG: 81 TABLET, CHEWABLE ORAL at 09:45

## 2020-10-16 ASSESSMENT — PAIN DESCRIPTION - PROGRESSION
CLINICAL_PROGRESSION: NOT CHANGED

## 2020-10-16 ASSESSMENT — PAIN SCALES - GENERAL
PAINLEVEL_OUTOF10: 9
PAINLEVEL_OUTOF10: 9
PAINLEVEL_OUTOF10: 8
PAINLEVEL_OUTOF10: 9
PAINLEVEL_OUTOF10: 9

## 2020-10-16 ASSESSMENT — PAIN DESCRIPTION - LOCATION: LOCATION: HIP;KNEE

## 2020-10-16 ASSESSMENT — PAIN DESCRIPTION - ORIENTATION: ORIENTATION: RIGHT;LEFT

## 2020-10-16 ASSESSMENT — PAIN DESCRIPTION - PAIN TYPE: TYPE: CHRONIC PAIN

## 2020-10-16 NOTE — PLAN OF CARE
Problem: Skin Integrity:  Goal: Will show no infection signs and symptoms  Description: Will show no infection signs and symptoms  10/16/2020 1104 by Adams Rocha LPN  Outcome: Ongoing  10/16/2020 0507 by Quinton Ramos RN  Outcome: Ongoing  Goal: Absence of new skin breakdown  Description: Absence of new skin breakdown  10/16/2020 1104 by Adams Rocha LPN  Outcome: Ongoing  10/16/2020 0507 by Quinton Ramos RN  Outcome: Ongoing     Problem: Falls - Risk of:  Goal: Will remain free from falls  Description: Will remain free from falls  10/16/2020 1104 by Adams Rocha LPN  Outcome: Ongoing  10/16/2020 0507 by Quinton Ramos RN  Outcome: Ongoing  Goal: Absence of physical injury  Description: Absence of physical injury  10/16/2020 1104 by Adams Rocha LPN  Outcome: Ongoing  10/16/2020 0507 by Quinton Ramos RN  Outcome: Ongoing     Problem: Pain:  Goal: Pain level will decrease  Description: Pain level will decrease  10/16/2020 1104 by Adams Rocha LPN  Outcome: Ongoing  10/16/2020 0507 by Quinton Ramos RN  Outcome: Ongoing  Goal: Control of acute pain  Description: Control of acute pain  10/16/2020 1104 by Adams Rocha LPN  Outcome: Ongoing  10/16/2020 0507 by Quinton Ramos RN  Outcome: Ongoing  Goal: Control of chronic pain  Description: Control of chronic pain  10/16/2020 1104 by Adams Rocha LPN  Outcome: Ongoing  10/16/2020 0507 by Quinton Ramos RN  Outcome: Ongoing

## 2020-10-16 NOTE — PROGRESS NOTES
[address] [place] [address]  HOSPITALIST PROGRESS NOTE                       Name:  Diann Ojeda /Age/Sex: 1960  (62 y.o. female)   MRN & CSN:  [id number] & [id number] Admission Date/Time: 10/13/2020 11:45 PM   Location:  [facility] Attending:  José Miguel Velasco MD                                                  HPI  Diann Ojeda is a 62 y.o. female who presents with symptomatic bradycardia    SUBJECTIVE  Feels better today, awaiting transfer to [facility]    10 point review of systems reviewed and negative unless noted above. ALLERGIES:   Allergies   Allergen Reactions    Fentanyl Swelling     Other reaction(s): Angioedema (swelling)    Moxifloxacin Hcl In Nacl Swelling and Rash    Povidone-Iodine Rash     Other reaction(s): AOF      Cyclobenzaprine      Other reaction(s): Other - comment required  \" it make my muscle very weak because of my MS\"  \" it make my muscle very weak because of my MS\"      Methocarbamol      Worsening edema  Other reaction(s): Other - comment required  Worsening edema  Worsening edema  Worsening edema      Tramadol Other (See Comments)     Doctor doesn't her to take due to heart problems  Seizures   Doctor doesn't her to take due to lowers seizure threshold.  Baclofen     Ibuprofen      \"Due to heart problems\"    Naproxen     Nsaids      \"Due to heart problems\"    Tizanidine     Tolmetin      \"Due to heart problems\"    Tramadol Hcl      Other reaction(s): Other - comment required  Told not to take due to history of seizures    Betadine [Povidone Iodine] Rash    Moxifloxacin Rash and Swelling     Lips swell and tingling in face   Lips swell and tingling in face   Lips swell and tingling in face   Lips swell and tingling in face   Around mouth       PCP: Tea Hwang MD    PAST MEDICAL HISTORY, SURGICAL HISTORY, SOCIAL HISTORY and  HOME MEDICATIONS all reviewed.       OBJECTIVE  Vitals:    10/15/20 1515 10/15/20 2016 10/16/20 0257 10/16/20 0943   BP:  (!) 171/86 131/63 (!) 118/58   Pulse:  78 (!) 47 51   Resp:  18 12 14   Temp:  98 °F (36.7 °C) 98.4 °F (36.9 °C) 97.8 °F (36.6 °C)   TempSrc:  Oral Oral Oral   SpO2: 98% 98%  98%   Weight:       Height:           PHYSICAL EXAM   GEN Awake female, sitting upright in bed in no apparent distress. [place] EYES Pupils are equally round. No scleral erythema, discharge, or conjunctivitis. HENT Mucous membranes are moist. Oral pharynx without exudates, no evidence of thrush. NECK Supple, no apparent thyromegaly or masses. RESP Clear to auscultation, no wheezes, rales or rhonchi. Symmetric chest movement while on room air. CARDIO/VASC S1/S2 auscultated. Regular rate without appreciable murmurs, rubs, or gallops. No JVD or carotid bruits. Peripheral pulses equal bilaterally and palpable. No peripheral edema. GI Abdomen is soft without significant tenderness, masses, or guarding. Bowel sounds are normoactive. Rectal exam deferred.  No costovertebral angle tenderness. Normal appearing external genitalia. HEME/LYMPH No palpable cervical lymphadenopathy and no hepatosplenomegaly. No petechiae or ecchymoses. MSK Spontaneous movement of all extremities. No gross joint deformities. SKIN Normal coloration, warm, dry. NEURO Cranial nerves appear grossly intact, normal speech, no lateralizing weakness. PSYCH Awake, alert, oriented x 4. Affect appropriate. INTAKE: In: 10 [I.V.:10]  Out: -   OUTPUT: In: 10   Out: -     LABS  Recent Labs     10/14/20  0118 10/15/20  0406   WBC 4.4 4.4   HGB 11.1* 10.6*   HCT 35.6* 34.2*    267      Recent Labs     10/14/20  0118 10/15/20  0406   * 142   K 4.7 4.0    109   CO2 24 26   PHOS  --  4.3   BUN 12 10   CREATININE 0.6 0.7     Recent Labs     10/14/20  0118   AST 23   ALT 13   BILITOT 0.2   ALKPHOS 95     No results for input(s): INR in the last 72 hours.   Recent Labs     10/14/20  0118 10/14/20  0404   TROPONINT <0.010 <0.010          Abnormal labs for today noted      Imaging:     ECHO:    Microbiology:  Blood culture:    Urine culture:    Sputum culture:    Procedures done this admission:    MEDS  Scheduled Meds:   amiodarone  400 mg Oral Daily    apixaban  5 mg Oral BID    aspirin  81 mg Oral Daily    atorvastatin  80 mg Oral Nightly    clopidogrel  75 mg Oral Daily    ferrous gluconate  324 mg Oral BID    levETIRAcetam  750 mg Oral BID    levothyroxine  150 mcg Oral Daily    magnesium oxide  800 mg Oral BID    midodrine  10 mg Oral TID WC    mirtazapine  30 mg Oral Nightly    sodium chloride flush  10 mL Intravenous 2 times per day     Continuous Infusions:  PRN Meds:promethazine **OR** ondansetron, ipratropium-albuterol, oxyCODONE, torsemide, sodium chloride flush, acetaminophen **OR** acetaminophen, polyethylene glycol        ASSESSMENT and PLAN  Hospital Day: 4    1-Symptomatic bradycardia with reported syncope- underlying ICM- PPM/ICD recently extracted due to infection. Has lifevest at home which she reports using it, off course lifevest would not help with bradycardia. -noted EPS input, have called [facility] and she has been accepted -awaiting for bed. 2-Recurrent right chest wall infection- mediport/ICD pocket- reported yesterday had fever and hence consulted ID. No evidence of ongoing sepsis at this time    Otherwise with very complex history- chart reviewed.      Awaiting [facility] bed                 Disp:     Diet DIET GENERAL;  Dietary Nutrition Supplements: Wound Healing Oral Supplement   DVT Prophylaxis [] Lovenox, []  Heparin, [] SCDs, [] Ambulation   GI Prophylaxis [] PPI,  [] H2 Blocker,  [] Carafate,  [] Diet/Tube Feeds   Code Status Full Code   Disposition Patient requires continued admission due to symptomatic bradycardia   CMS Level of Risk [] Low, [] Moderate,[x]  High  Patient's risk as above due to symptomatic bradycardia     SABINA GALVEZ MD 10/16/2020 10:45 AM

## 2020-10-16 NOTE — PROGRESS NOTES
Report called to [address] at [facility]. All questions answered. Patient going to room 6040 at [facility]. Transportation set up with [facility] [address] ground in about an hour. Patient updated. Attempted to call Rosie Roberts at [facility] to update with no answer. Will attempt again.

## 2020-10-16 NOTE — PROGRESS NOTES
Infectious Disease Progress Note  10/16/2020   Patient Name: Corey Weldon : 1960      Impression  · Recurrent Right Chest Wall Wound Mediport/ICD Pocket Past MRSA:  ? Afebrile, no leukocytosis  ? Blood cultures 10/14-pending  ? Past right chest wound culture 20-MRSA  ? ICD explanted at [facility] 2020 due to MRSA lead/pocket infection  ? Known to ID service-Dr. John Read as well as [facility] ID service  ? Last treatment with IV OP therapy of dalbavancin 1500 mg on 20  ? Patient requesting long term ABX suppresive therapy for MRSA   ? Right chest wound does not appear infected at this time, no erythema, no drainage or edema or warmth. · Allergy to moxifloxicin: Lips and facial swelling  ? Lymphoma  ? MS  ? Remote CVA  ? HTN/CAD/HFrEF/Ischemic Cardiomyopathy  · Multi-morbidity: per PMHx:  Arthritis, asplenia, asthma, CAD with PCI, cardiomyopathy, CVA, CHF, GERD, HLD, HTN, Lymphoma, MS, Seizures, hyster, appey, choley, Gastric bypass  Plan:  · Plan for transfer to [facility] for symptomatic bradycardia with reported syncope  · Will continue off ABX therapy at this time, right chest wound does not appear infected at this time. Biomarkers are not elevated, no leukocytosis or fevers. · Recommend to continue local wound care for management of right chest wound    Ongoing Antimicrobial Therapy  None? Completed Antimicrobial Therapy  dalbavancin 1500 mg 20  Vancomycin -?  ? History:? Interval history noted. Chief complaint:  Past recurrent right chest wall wound mediport/ICD pocket MRSA infection. Denies n/v/d/f or untoward effects of antibiotics. Resting quietly, states is feeling better today.      Physical Exam:  Vital Signs: /63   Pulse (!) 47   Temp 98.4 °F (36.9 °C) (Oral)   Resp 12   Ht 5' 6\" (1.676 m)   Wt 120 lb 12.8 oz (54.8 kg)   SpO2 98%   BMI 19.50 kg/m²     Gen: alert and oriented X3, no distress  Skin: no stigmata of endocarditis  Wounds: right chest wound moist, no drainage, no erythema or warmth, tender to touch. HEMT: AT/NC Oropharynx pink, moist, and without lesions or exudates; dentition in good state of repair  Eyes: PERRLA, EOMI, conjunctiva pink, sclera anicteric. Neck: Supple. Trachea midline. No LAD. Chest: no distress and CTA. Good air movement. Heart: Sinus Beny and no MRG. Abd: soft, non-distended, no tenderness, no hepatomegaly. Normoactive bowel sounds. Ext: no clubbing, cyanosis, or edema  Neuro: Mental status intact.  CN 2-12 intact and no focal sensory or motor deficits     Radiologic / Imaging / TESTING  Impression    No evidence of acute cardiopulmonary disease          Labs:    Recent Results (from the past 24 hour(s))   C-Reactive Protein    Collection Time: 10/16/20  3:26 AM   Result Value Ref Range    CRP, High Sensitivity 0.5 mg/L     CULTURE results: Invalid input(s): BLOOD CULTURE,  URINE CULTURE, SURGICAL CULTURE    Diagnosis:  Patient Active Problem List   Diagnosis    Chest pain    Chest pain    CAD (coronary artery disease)    Hyperlipidemia    Thyroid disease    Acute exacerbation of CHF (congestive heart failure) (HCC)    CHF exacerbation (HCC)    Congestive heart failure ([facility]    Left upper quadrant pain    Pneumonia of left lower lobe due to infectious organism    Angina at rest [facility])    Coronary artery disease involving coronary bypass graft of native heart with angina pectoris ([facility])    Shortness of breath    Ischemic cardiomyopathy    Acute metabolic encephalopathy    Acute delirium    Pneumonia    Sacral pain    Acute on chronic systolic CHF (congestive heart failure), NYHA class 3 (HCC)    Severe malnutrition (HCC)    Cellulitis    Cellulitis at gastrostomy tube site ([facility]    History of Maru-en-Y gastric bypass    Gastroparesis    Abdominal pain    Feeding tube dysfunction    Abdominal pain, epigastric    Gastrojejunostomy tube status (HCC)    Chronic malnutrition (HCC)    Asplenia    Shockable cardiac rhythm detected by automated external defibrillator    Financial difficulties    Chest pain at rest    Closed nondisplaced transverse fracture of left patella    Contusion of right knee    Multifocal pneumonia    Abnormal cardiac function test    Chronic systolic heart failure (HCC)    Seizure disorder (HCC)    Hypothyroidism    Cardiomyopathy ([facility]    Acute chest pain    Fever in adult    Pyelonephritis    MRSA bacteremia    HFrEF (heart failure with reduced ejection fraction) ([facility]    Infected chest wall abrasion, right, subsequent encounter    Granuloma of skin    Open wound of right chest wall    MRSA infection    Klebsiella pneumoniae infection    Symptomatic bradycardia    History of pacemaker with removal secondary to infected leads    Avascular necrosis (HCC)    Multiple sclerosis (HCC)    Syncope       Active Problems  Principal Problem:    Symptomatic bradycardia  Active Problems:    Coronary artery disease involving coronary bypass graft of native heart with angina pectoris (HCC)    Ischemic cardiomyopathy    Chest pain    CAD (coronary artery disease)    Hyperlipidemia    Thyroid disease    Severe malnutrition (HCC)    History of Maru-en-Y gastric bypass    HFrEF (heart failure with reduced ejection fraction) (HCC)    Open wound of right chest wall    History of pacemaker with removal secondary to infected leads    Avascular necrosis (HCC)    Multiple sclerosis ([facility]    Syncope  Resolved Problems:    * No resolved hospital problems. *    Electronically signed by: Electronically signed by Oral Sage.  JESSE Poe CNP on 10/16/2020 at 9:23 AM

## 2020-10-16 NOTE — DISCHARGE SUMMARY
Discharge Summary    Name:  Katie Landrum /Age/Sex: 1960  (62 y.o. female)   MRN & CSN:  [id number] & [id number] Admission Date/Time: 10/13/2020 11:45 PM   Attending:  Mady Sneed MD Discharging Physician: Corine Banuelos MD     HPI and Hospital Course: Katie Landrum is a 62 y.o.  female  who presents with Bradycardia    HPI- as per H and [address]  1-Symptomatic bradycardia with reported syncope- underlying ICM- PPM/ICD recently extracted due to infection. Has lifevest at home which she reports using it, off course lifevest would not help with bradycardia. -noted EPS input, have called [facility] and she has been accepted -awaiting for bed. 2-Recurrent right chest wall infection- mediport/ICD pocket- reported yesterday had fever and hence consulted ID. No evidence of ongoing sepsis at this time     Otherwise with very complex history- chart reviewed    Going to [facility]    The patient expressed appropriate understanding of and agreement with the discharge recommendations, medications, and plan. Consults this admission:  IP CONSULT TO HOSPITALIST  IP CONSULT TO CARDIOLOGY  IP CONSULT TO ELECTROPHYSIOLOGY  IP CONSULT TO INFECTIOUS DISEASES    Discharge Instruction:   Follow up appointments:   Primary care physician:  within 2 weeks    Diet:  General/cardiac/ADA/as tolerated  Activity: {discharge activity: as tolerated  Disposition: Discharged to:   []Home, []C, []SNF, []Acute Rehab, []Hospice   Condition on discharge: Stable    Discharge Medications:      Jenny Sauce   Home Medication Instructions PAC:[id number]    Printed on:10/16/20 1420   Medication Information                      acetaminophen 650 MG TABS  Take 650 mg by mouth every 4 hours as needed             albuterol (PROVENTIL HFA;VENTOLIN HFA) 108 (90 BASE) MCG/ACT inhaler  Inhale 2 puffs into the lungs every 6 hours as needed.                amiodarone (CORDARONE) 200 MG tablet  Take 200 mg by mouth daily apixaban (ELIQUIS) 5 MG TABS tablet  Take 5 mg by mouth 2 times daily             aspirin 81 MG tablet  Take 81 mg by mouth daily              atorvastatin (LIPITOR) 80 MG tablet  Take 80 mg by mouth nightly             benzocaine-menthol (CEPACOL SORE THROAT) 15-3.6 MG lozenge  Take 1 lozenge by mouth every 2 hours as needed for Sore Throat             chlorhexidine gluconate (ANTISEPTIC SKIN CLEANSER) 4 % SOLN external solution  Apply topically daily             clopidogrel (PLAVIX) 75 MG tablet  Take 75 mg by mouth daily             cyanocobalamin 1000 MCG/ML injection  INJECT 1 ML INTO THE MUSCLE ON THE FIRST OF EACH MONTH             docusate sodium (COLACE) 100 MG capsule  Take 100 mg by mouth 2 times daily as needed for Constipation             famotidine (PEPCID) 20 MG tablet  Take 1 tablet by mouth 2 times daily             ferrous gluconate (FERGON) 324 (38 FE) MG tablet  Take 324 mg by mouth 2 times daily              ipratropium-albuterol (DUONEB) 0.5-2.5 (3) MG/3ML SOLN nebulizer solution  Inhale 3 mLs into the lungs every 4 hours (while awake)             levETIRAcetam (KEPPRA) 750 MG tablet  Take 750 mg by mouth 2 times daily              levothyroxine (SYNTHROID) 150 MCG tablet  Take 1 tablet by mouth Daily             magnesium oxide (MAG-OX) 400 MG tablet  Take 800 mg by mouth 2 times daily             midodrine (PROAMATINE) 10 MG tablet  Take 1 tablet by mouth 3 times daily (with meals)             nitroGLYCERIN (NITROSTAT) 0.4 MG SL tablet  up to max of 3 total doses. If no relief after 1 dose, call 911. ondansetron (ZOFRAN) 4 MG tablet  Take 1 tablet by mouth every 6 hours as needed for Nausea or Vomiting             oxyCODONE (ROXICODONE) 20 MG immediate release tablet  Take 20 mg by mouth every 3 hours as needed.              potassium chloride (KLOR-CON M) 20 MEQ extended release tablet  Take 1 tablet by mouth daily Take only if taking torsemide             torsemide (DEMADEX) 20

## 2020-10-20 ASSESSMENT — ENCOUNTER SYMPTOMS
NAUSEA: 0
BACK PAIN: 1
BLOOD IN STOOL: 0
SHORTNESS OF BREATH: 1
COUGH: 0
DIARRHEA: 0
ABDOMINAL PAIN: 1
COLOR CHANGE: 0
EYE PAIN: 0
VOMITING: 0
CONSTIPATION: 0
PHOTOPHOBIA: 0
WHEEZING: 0
CHEST TIGHTNESS: 0

## 2020-10-27 NOTE — PROGRESS NOTES
Physician Progress Note      PATIENT:               Jared Cardoza  CSN #:                  [id number]  :                       1960  ADMIT DATE:       10/13/2020 11:45 PM  [address] [place] DATE:        10/16/2020 4:07 PM  RESPONDING  PROVIDER #:        Haydee GALVEZ MD          QUERY TEXT:    Dear hospitalist,    Pt admitted with bradycardia,  syncopal episode. Pt noted to have symptomatic   bradycardia ICM. If possible, please document in progress notes and discharge   summary the relationship, if any, between syncope and symptomatic bradycardia   and or ischemic cardiomyopathy. The medical record reflects the following:  Risk Factors: history of ischemic cardiomyopathy with PPM placement but the   pacemaker was recently removed due to infection. Clinical Indicators: The H&P notes the syncope is likely related to   cardiovascular history. The DS notes symptomatic bradycardia with reported   syncope- underlying ICM. It is also noted that the patient is being   transferred for symptomatic bradycardia with reported syncope. Treatment: Consult to Electrophysiology and cardiology,  EKG X2, CXR,   admission, labs, cordarone, Proamatine  Options provided:  -- Syncope due to symptomatic bradycardia  -- Syncope due to ICM  -- Other - I will add my own diagnosis  -- Disagree - Not applicable / Not valid  -- Disagree - Clinically unable to determine / Unknown  -- Refer to Clinical Documentation Reviewer    PROVIDER RESPONSE TEXT:    This patient has syncope due to symptomatic bradycardia .     Query created by: Griselda Snook on 10/21/2020 8:55 AM      Electronically signed by:  Cristhian Aquino MD 10/27/2020 12:27 PM

## 2020-11-03 PROBLEM — R07.9 CHEST PAIN: Status: RESOLVED | Noted: 2020-11-03 | Resolved: 2020-11-03

## 2020-11-03 PROBLEM — J18.9 PNEUMONIA: Status: RESOLVED | Noted: 2019-01-26 | Resolved: 2020-11-03

## 2020-11-03 PROBLEM — J18.9 PNEUMONIA OF LEFT LOWER LOBE DUE TO INFECTIOUS ORGANISM: Status: RESOLVED | Noted: 2017-05-14 | Resolved: 2020-11-03

## 2020-11-20 ENCOUNTER — APPOINTMENT (OUTPATIENT)
Dept: GENERAL RADIOLOGY | Age: 60
End: 2020-11-20
Payer: MEDICARE

## 2020-11-20 ENCOUNTER — APPOINTMENT (OUTPATIENT)
Dept: ULTRASOUND IMAGING | Age: 60
End: 2020-11-20
Payer: MEDICARE

## 2020-11-20 ENCOUNTER — HOSPITAL ENCOUNTER (EMERGENCY)
Age: 60
Discharge: HOME | End: 2020-11-20
Attending: EMERGENCY MEDICINE
Payer: MEDICARE

## 2020-11-20 VITALS
HEART RATE: 75 BPM | WEIGHT: 128 LBS | BODY MASS INDEX: 20.57 KG/M2 | DIASTOLIC BLOOD PRESSURE: 55 MMHG | SYSTOLIC BLOOD PRESSURE: 120 MMHG | RESPIRATION RATE: 18 BRPM | OXYGEN SATURATION: 96 % | TEMPERATURE: 98.1 F | HEIGHT: 66 IN

## 2020-11-20 LAB
ALBUMIN SERPL-MCNC: 3.4 GM/DL (ref 3.4–5)
ALP BLD-CCNC: 105 IU/L (ref 40–129)
ALT SERPL-CCNC: 11 U/L (ref 10–40)
ANION GAP SERPL CALCULATED.3IONS-SCNC: 12 MMOL/L (ref 4–16)
APTT: 26.1 SECONDS (ref 25.1–37.1)
AST SERPL-CCNC: 16 IU/L (ref 15–37)
BASOPHILS ABSOLUTE: 0.1 K/CU MM
BASOPHILS RELATIVE PERCENT: 1.1 % (ref 0–1)
BILIRUB SERPL-MCNC: 0.1 MG/DL (ref 0–1)
BUN BLDV-MCNC: 13 MG/DL (ref 6–23)
CALCIUM SERPL-MCNC: 8.4 MG/DL (ref 8.3–10.6)
CHLORIDE BLD-SCNC: 104 MMOL/L (ref 99–110)
CO2: 22 MMOL/L (ref 21–32)
CREAT SERPL-MCNC: 0.7 MG/DL (ref 0.6–1.1)
DIFFERENTIAL TYPE: ABNORMAL
EOSINOPHILS ABSOLUTE: 0.3 K/CU MM
EOSINOPHILS RELATIVE PERCENT: 5.1 % (ref 0–3)
GFR AFRICAN AMERICAN: >60 ML/MIN/1.73M2
GFR NON-AFRICAN AMERICAN: >60 ML/MIN/1.73M2
GLUCOSE BLD-MCNC: 159 MG/DL
GLUCOSE BLD-MCNC: 159 MG/DL (ref 70–99)
GLUCOSE BLD-MCNC: 53 MG/DL (ref 70–99)
GLUCOSE BLD-MCNC: 57 MG/DL (ref 70–99)
HCT VFR BLD CALC: 37 % (ref 37–47)
HEMOGLOBIN: 11.5 GM/DL (ref 12.5–16)
IMMATURE NEUTROPHIL %: 0.4 % (ref 0–0.43)
INR BLD: 0.85 INDEX
LACTATE: 2.6 MMOL/L (ref 0.4–2)
LYMPHOCYTES ABSOLUTE: 0.8 K/CU MM
LYMPHOCYTES RELATIVE PERCENT: 15.1 % (ref 24–44)
MAGNESIUM: 1.9 MG/DL (ref 1.8–2.4)
MCH RBC QN AUTO: 35.3 PG (ref 27–31)
MCHC RBC AUTO-ENTMCNC: 31.1 % (ref 32–36)
MCV RBC AUTO: 113.5 FL (ref 78–100)
MONOCYTES ABSOLUTE: 0.5 K/CU MM
MONOCYTES RELATIVE PERCENT: 9.4 % (ref 0–4)
NUCLEATED RBC %: 0 %
PDW BLD-RTO: 16.7 % (ref 11.7–14.9)
PLATELET # BLD: 275 K/CU MM (ref 140–440)
PMV BLD AUTO: 10 FL (ref 7.5–11.1)
POTASSIUM SERPL-SCNC: 3.5 MMOL/L (ref 3.5–5.1)
PRO-BNP: 614.5 PG/ML
PROTHROMBIN TIME: 10.3 SECONDS (ref 11.7–14.5)
RBC # BLD: 3.26 M/CU MM (ref 4.2–5.4)
SEGMENTED NEUTROPHILS ABSOLUTE COUNT: 3.8 K/CU MM
SEGMENTED NEUTROPHILS RELATIVE PERCENT: 68.9 % (ref 36–66)
SODIUM BLD-SCNC: 138 MMOL/L (ref 135–145)
TOTAL IMMATURE NEUTOROPHIL: 0.02 K/CU MM
TOTAL NUCLEATED RBC: 0 K/CU MM
TOTAL PROTEIN: 6 GM/DL (ref 6.4–8.2)
TROPONIN T: <0.01 NG/ML
WBC # BLD: 5.5 K/CU MM (ref 4–10.5)

## 2020-11-20 PROCEDURE — 80053 COMPREHEN METABOLIC PANEL: CPT

## 2020-11-20 PROCEDURE — 84484 ASSAY OF TROPONIN QUANT: CPT

## 2020-11-20 PROCEDURE — 71045 X-RAY EXAM CHEST 1 VIEW: CPT

## 2020-11-20 PROCEDURE — 85610 PROTHROMBIN TIME: CPT

## 2020-11-20 PROCEDURE — 85730 THROMBOPLASTIN TIME PARTIAL: CPT

## 2020-11-20 PROCEDURE — 87040 BLOOD CULTURE FOR BACTERIA: CPT

## 2020-11-20 PROCEDURE — 82962 GLUCOSE BLOOD TEST: CPT

## 2020-11-20 PROCEDURE — 93971 EXTREMITY STUDY: CPT

## 2020-11-20 PROCEDURE — 83605 ASSAY OF LACTIC ACID: CPT

## 2020-11-20 PROCEDURE — 99283 EMERGENCY DEPT VISIT LOW MDM: CPT

## 2020-11-20 PROCEDURE — 83880 ASSAY OF NATRIURETIC PEPTIDE: CPT

## 2020-11-20 PROCEDURE — 83735 ASSAY OF MAGNESIUM: CPT

## 2020-11-20 PROCEDURE — 85025 COMPLETE CBC W/AUTO DIFF WBC: CPT

## 2020-11-20 PROCEDURE — 6370000000 HC RX 637 (ALT 250 FOR IP): Performed by: PHYSICIAN ASSISTANT

## 2020-11-20 PROCEDURE — 36415 COLL VENOUS BLD VENIPUNCTURE: CPT

## 2020-11-20 PROCEDURE — 6360000002 HC RX W HCPCS: Performed by: PHYSICIAN ASSISTANT

## 2020-11-20 PROCEDURE — 96372 THER/PROPH/DIAG INJ SC/IM: CPT

## 2020-11-20 PROCEDURE — 93005 ELECTROCARDIOGRAM TRACING: CPT | Performed by: EMERGENCY MEDICINE

## 2020-11-20 RX ORDER — NICOTINE POLACRILEX 4 MG
15 LOZENGE BUCCAL PRN
Status: DISCONTINUED | OUTPATIENT
Start: 2020-11-20 | End: 2020-11-21 | Stop reason: HOSPADM

## 2020-11-20 RX ORDER — DEXTROSE MONOHYDRATE 25 G/50ML
12.5 INJECTION, SOLUTION INTRAVENOUS PRN
Status: DISCONTINUED | OUTPATIENT
Start: 2020-11-20 | End: 2020-11-21 | Stop reason: HOSPADM

## 2020-11-20 RX ORDER — MUPIROCIN CALCIUM 20 MG/G
CREAM TOPICAL
Qty: 1 TUBE | Refills: 0 | Status: SHIPPED | OUTPATIENT
Start: 2020-11-20 | End: 2020-12-20

## 2020-11-20 RX ORDER — DOXYCYCLINE HYCLATE 100 MG
100 TABLET ORAL 2 TIMES DAILY
Qty: 20 TABLET | Refills: 0 | Status: SHIPPED | OUTPATIENT
Start: 2020-11-20 | End: 2020-11-30

## 2020-11-20 RX ORDER — DOXYCYCLINE HYCLATE 100 MG
100 TABLET ORAL ONCE
Status: COMPLETED | OUTPATIENT
Start: 2020-11-20 | End: 2020-11-20

## 2020-11-20 RX ORDER — OXYCODONE HYDROCHLORIDE 5 MG/1
20 TABLET ORAL ONCE
Status: COMPLETED | OUTPATIENT
Start: 2020-11-20 | End: 2020-11-20

## 2020-11-20 RX ORDER — DEXTROSE MONOHYDRATE 50 MG/ML
100 INJECTION, SOLUTION INTRAVENOUS PRN
Status: DISCONTINUED | OUTPATIENT
Start: 2020-11-20 | End: 2020-11-21 | Stop reason: HOSPADM

## 2020-11-20 RX ORDER — PROMETHAZINE HYDROCHLORIDE 25 MG/ML
25 INJECTION, SOLUTION INTRAMUSCULAR; INTRAVENOUS ONCE
Status: COMPLETED | OUTPATIENT
Start: 2020-11-20 | End: 2020-11-20

## 2020-11-20 RX ORDER — PROMETHAZINE HYDROCHLORIDE 25 MG/1
25 SUPPOSITORY RECTAL EVERY 6 HOURS PRN
Qty: 12 SUPPOSITORY | Refills: 0 | Status: SHIPPED | OUTPATIENT
Start: 2020-11-20 | End: 2020-11-27

## 2020-11-20 RX ADMIN — PROMETHAZINE HYDROCHLORIDE 25 MG: 25 INJECTION INTRAMUSCULAR; INTRAVENOUS at 21:24

## 2020-11-20 RX ADMIN — OXYCODONE HYDROCHLORIDE 20 MG: 5 TABLET ORAL at 21:26

## 2020-11-20 RX ADMIN — DOXYCYCLINE HYCLATE 100 MG: 100 TABLET, COATED ORAL at 23:05

## 2020-11-20 RX ADMIN — DEXTROSE 15 G: 15 GEL ORAL at 21:24

## 2020-11-20 RX ADMIN — MUPIROCIN: 20 OINTMENT TOPICAL at 22:13

## 2020-11-20 ASSESSMENT — PAIN DESCRIPTION - FREQUENCY: FREQUENCY: CONTINUOUS

## 2020-11-20 ASSESSMENT — PAIN DESCRIPTION - ORIENTATION: ORIENTATION: RIGHT

## 2020-11-20 ASSESSMENT — PAIN DESCRIPTION - PROGRESSION: CLINICAL_PROGRESSION: GRADUALLY WORSENING

## 2020-11-20 ASSESSMENT — PAIN DESCRIPTION - DESCRIPTORS: DESCRIPTORS: ACHING;THROBBING

## 2020-11-20 ASSESSMENT — PAIN SCALES - GENERAL
PAINLEVEL_OUTOF10: 9
PAINLEVEL_OUTOF10: 9

## 2020-11-20 ASSESSMENT — PAIN DESCRIPTION - PAIN TYPE: TYPE: ACUTE PAIN

## 2020-11-20 ASSESSMENT — PAIN DESCRIPTION - LOCATION: LOCATION: LEG

## 2020-11-20 ASSESSMENT — PAIN DESCRIPTION - ONSET: ONSET: ON-GOING

## 2020-11-20 NOTE — ED PROVIDER NOTES
Physician-In-Triage Note    I performed a medical screening examination and evaluated this patient briefly with the purpose of initiating their ED workup in an expeditious manner. Please see notes from other ED providers regarding comprehensive evaluation including full history, physical exam, interpretation of results, and medical decision making/disposition. I evaluated this patient via a TeleHealth platform as a physician in triage. I performed a medical screening evaluation on the patient remotely via the [employer]. In brief, Norma Cole is a 62 y.o. female who presents to the ED complaining of being sent by physician with concern for DVT or infection. History of MRSA bacteremia in the past.  Has R leg pain. No pain in the left leg. No fever in triage but was 101.8F Tmax at home. Has wound to the R chest from previous defibrillator site infection. Focused physical examination LIMITED due to telehealth platform but is notable for no acute distress, well-appearing, well-nourished, normal speech and mentation without obvious facial droop, no obvious rash. No obvious cranial nerve deficits on my initial exam.       Vitals reviewed per nursing documentation. Triage orders placed, including sepsis workup, RLE DVT study. I did not personally review any of the results of these tests, which will be reviewed and interpreted later by ED providers at the time of the patient's more comprehensive evaluation.           Woody Henry MD  11/20/20 Nabila Cortez

## 2020-11-20 NOTE — ED TRIAGE NOTES
Pt from home via self wheelchair sent by PCP. Recent DVt R leg and tx resolved. Yesterday new onset of similar symptoms. R leg pain starts lower. PCP concerned for new DVT.  A&Ox4

## 2020-11-21 PROCEDURE — 93010 ELECTROCARDIOGRAM REPORT: CPT | Performed by: INTERNAL MEDICINE

## 2020-11-21 NOTE — ED PROVIDER NOTES
I independently examined and evaluated Richie Callahan. In brief, increased drainage from pacemaker wound over the last few days. Palpitations, shortness of breath, right lower extremity pain. Vitals:    11/20/20 1815   BP: (!) 120/55   Pulse: 75   Resp: 18   Temp: 98.1 °F (36.7 °C)   SpO2: 96%     Focused exam revealed chronically ill-appearing patient sitting comfortably in the bed. Heart regular rate and rhythm, lungs clear to auscultation bilaterally. Pacemaker site with a small amount of drainage. No significant swelling or surrounding erythema. EKG:  Please see Dr. Jer Candelario. ED course:  Patient arrives in no acute distress with normal vital signs. Basic labs were obtained and are unremarkable. Normal white blood cell count. No fevers. Upon review of previous culture of the wound,-the bacteria that are swelling susceptibilities will start patient on doxycycline. I am not concerned for sepsis. We will discharge her home in stable condition. Recommended close follow-up with her primary care physician. I did don appropriate PPE (including N95 face mask, protective eye ware/safety glasses, gloves, hair covering, and no isolation gown), as recommended by the health facility/national standard best practice, during my bedside interactions with the patient. All diagnostic, treatment, and disposition decisions were made by myself in conjunction with the advanced practice provider. For all further details of the patient's emergency department visit, please see the advanced practice provider's documentation. Comment: Please note this report has been produced using speech recognition software and may contain errors related to that system including errors in grammar, punctuation, and spelling, as well as words and phrases that may be inappropriate. If there are any questions or concerns please feel free to contact the dictating provider for clarification.         Ramos Began, MD  11/20/20 0015

## 2020-11-21 NOTE — ED NOTES
Called pharmacy to inquire about Bactroban ointment. Sending up at this time.       Noemi Stroud RN  11/20/20 7938

## 2020-11-21 NOTE — ED PROVIDER NOTES
12 lead EKG per my interpretation:  Sinus Bradycardia 56  Axis is   Left axis deviation  QTc is  447  There is specific T wave changes appreciated. Inverted T wave III, AVF, V3-V6  There is no specific ST wave changes appreciated.     Prior EKG to compare with was not available         Karthikeyan Mckeon DO  11/20/20 1925

## 2020-11-21 NOTE — ED NOTES
Notified Basilia MAGALLON of blood result glucose 53 - verbal order for oral glucose given      Greg Whaley RN  11/20/20 2112

## 2020-11-21 NOTE — ED NOTES
Dayron 1st blood culture set, extra red top tube, lactic acid (plasma) from patient's right hand at 2045. Louise Turner 2nd blood culture set, extra red top tube from patient's left hand at 2050.       Aneesh Backer  11/20/20 2111

## 2020-11-22 NOTE — ED PROVIDER NOTES
EMERGENCY DEPARTMENT ENCOUNTER        PCP: Janene Lazo MD    [address] [facility]    Chief Complaint   Patient presents with    Leg Pain     R leg, recent DVT and treatment, pain has returned. Sent by PCP to see if another DVT. Of note, this patient was also evaluated by the attending physician, Dr. Mady Marshall. [facility]      Nasir Dale is a 62 y.o. female with PMH of HLD, CHF, CAD  who presents with right leg pain and concern for wound infection of chest. Patient reports she had her pacemaker removed due to infection and then the wound dehissed several weeks ago and has not healed. For the last several days she has had yellow/green drainage from the wound on her chest wall. She reports that she saw her PCP and her PCP told her that she should come to the emergency department and get admitted for PICC line placement and infectious disease consultation and IV antibiotics because patient reports she has a malabsorption issues. Patient also reports that she was advised to come to the ER to rule out DVT of her right lower extremity. She reports she has had pain in right lower extremity since yesterday. She reports diffuse right lower extremity pain that she described as aching and sharp. Severity of pain is rated 9 out of 10. Pain is worse in the back of the leg. Patient also reports associated palpitations, shortness of breath, nausea, vomiting, fever of 101.8 Fahrenheit all occurring today. She reports that she has not had a chronic pain medication since this morning. Patient denies diarrhea, constipation, melena, hematochezia, hematemesis. REVIEW OF SYSTEMS    10 systems were reviewed and are negative unless otherwise specified in HPI.     See HPI and nursing notes for additional information       PAST MEDICAL & SURGICAL HISTORY    Past Medical History:   Diagnosis Date    Acute delirium     Arrhythmia     Arthritis     Asplenia     Asthma     CAD (coronary artery disease)     1st stent at age 46    Cardiomyopathy [facility])     Cerebral artery occlusion with cerebral infarction [facility] [address]     CHF (congestive heart failure) ([facility] [address]     Enlarged liver     GERD (gastroesophageal reflux disease)     H/O echocardiogram 4/6/16    EF 55%, trivial TR & MR, stage 1 diastolic dysfunction    History of blood transfusion     Hx of cardiovascular stress test 12/29/2015    Alessia: EF 45%. Pharmacologic stress myocardial perfusion scan shows a fixed inferior-lateral defect w/ no inducible ischemia. No evidence of ischemia. Inferior-lateral infarction. Normal LVSF    Hyperlipidemia     Hypertension     Lymphoma ([facility] [address]     Denies    MI, old     Movement disorder     MS (multiple sclerosis) ([facility] [address] [facility]     OP (osteoporosis)     PE (pulmonary embolism)     trapese filter    Pneumonia     Pyelonephritis     Seizures (HCC)     Severe malnutrition (HCC)     Spleen enlarged     Thyroid disease      Past Surgical History:   Procedure Laterality Date    ABDOMEN SURGERY      APPENDECTOMY      CARDIAC DEFIBRILLATOR PLACEMENT      evera [id number] CONDITIONAL    CHOLECYSTECTOMY      COLONOSCOPY      CORONARY ANGIOPLASTY WITH STENT PLACEMENT      3 stents    CORONARY ARTERY BYPASS GRAFT      Age 49    ENDOSCOPY, COLON, DIAGNOSTIC      GASTRIC BYPASS SURGERY      HERNIA REPAIR      HIP SURGERY      HYSTERECTOMY      JOINT REPLACEMENT      PORT SURGERY N/A 6/1/2020    PORT INSERTION performed by Diana Hodges MD at [address] [place] [address]      VASCULAR SURGERY         CURRENT MEDICATIONS    Current Outpatient Rx   Medication Sig Dispense Refill    doxycycline hyclate (VIBRA-TABS) 100 MG tablet Take 1 tablet by mouth 2 times daily for 10 days 20 tablet 0    mupirocin (BACTROBAN) 2 % cream Apply topically 3 times daily to wound until healed.  1 Tube 0    promethazine (PROMETHEGAN) 25 MG suppository Place 1 suppository rectally every 6 hours as needed for Nausea 12 mg by mouth daily      acetaminophen 650 MG TABS Take 650 mg by mouth every 4 hours as needed 120 tablet 3    ferrous gluconate (FERGON) 324 (38 FE) MG tablet Take 324 mg by mouth 2 times daily       aspirin 81 MG tablet Take 81 mg by mouth daily       albuterol (PROVENTIL HFA;VENTOLIN HFA) 108 (90 BASE) MCG/ACT inhaler Inhale 2 puffs into the lungs every 6 hours as needed. ALLERGIES    Allergies   Allergen Reactions    Fentanyl Swelling     Other reaction(s): Angioedema (swelling)    Moxifloxacin Hcl In Nacl Swelling and Rash    Povidone-Iodine Rash     Other reaction(s): AOF      Cyclobenzaprine      Other reaction(s): Other - comment required  \" it make my muscle very weak because of my MS\"  \" it make my muscle very weak because of my MS\"      Methocarbamol      Worsening edema  Other reaction(s): Other - comment required  Worsening edema  Worsening edema  Worsening edema      Tramadol Other (See Comments)     Doctor doesn't her to take due to heart problems  Seizures   Doctor doesn't her to take due to lowers seizure threshold.  Baclofen     Ibuprofen      \"Due to heart problems\"    Naproxen     Nsaids      \"Due to heart problems\"    Tizanidine     Tolmetin      \"Due to heart problems\"    Tramadol Hcl      Other reaction(s):  Other - comment required  Told not to take due to history of seizures    Betadine [Povidone Iodine] Rash    Moxifloxacin Rash and Swelling     Lips swell and tingling in face   Lips swell and tingling in face   Lips swell and tingling in face   Lips swell and tingling in face   Around mouth       SOCIAL & FAMILY HISTORY    Social History     Socioeconomic History    Marital status: Single     Spouse name: None    Number of children: None    Years of education: None    Highest education level: None   Occupational History    None   Social Needs    Financial resource strain: None    Food insecurity     Worry: None     Inability: None    Transportation needs     Medical: None     Non-medical: None   Tobacco Use    Smoking status: Never Smoker    Smokeless tobacco: Never Used   Substance and Sexual Activity    Alcohol use: No    Drug use: No    Sexual activity: Not Currently   Lifestyle    Physical activity     Days per week: None     Minutes per session: None    Stress: None   Relationships    Social connections     Talks on phone: None     Gets together: None     Attends Jew service: None     Active member of club or organization: None     Attends meetings of clubs or organizations: None     Relationship status: None    Intimate partner violence     Fear of current or ex partner: None     Emotionally abused: None     Physically abused: None     Forced sexual activity: None   Other Topics Concern    None   Social History Narrative    None     Family History   Problem Relation Age of Onset    High Blood Pressure Mother     High Cholesterol Mother     Heart Disease Mother     Diabetes Mother     Heart Disease Father     Cancer Father     Other Sister         Multiple Sclerosis    Heart Disease Maternal Grandmother     High Blood Pressure Maternal Grandmother     High Cholesterol Maternal Grandmother        PHYSICAL EXAM    VITAL SIGNS: BP (!) 120/55   Pulse 75   Temp 98.1 °F (36.7 °C) (Oral)   Resp 18   Ht 5' 6\" (1.676 m)   Wt 128 lb (58.1 kg)   SpO2 96%   BMI 20.66 kg/m²    Constitutional:  Well developed, well nourished, no acute distress   HENT:  Atraumatic, moist mucus membranes  Neck/Lymphatics: supple, no JVD, no swollen nodes  Respiratory:  Lungs Clear, no retractions, no wheezing, rhonchi, rales, no accessory muscle usage  Cardiovascular:  Rate regular, regular Rhythm, no murmurs/rubs/gallops. No carotid bruits or murmurs heard in carotids. Vascular: Radial and DP pulses 2+ equal bilaterally  GI:  Soft, nontender, normal bowel sounds  Musculoskeletal:  No acute gross deformities.  Compartments are soft in bilateral lower extremities. There is right sided calf and thigh tenderness to palpation diffusely without point tenderness. No lower extremity edema bilaterally. Integument:  Skin warm and dry, no petechiae. There is a 5 cm x 2 cm wound on the right superior anterior chest wall with slight yellow-green discharge present on wound covering. The wound itself is without induration, erythema, lymphangitic streaking, fluctuance. There is no apparent active drainage from the wound. No purpura, vesicles, bullae. No crepitus. Neurologic:  Alert & oriented, normal speech. Cranial nerves II through XII intact. Strength 5 out of 5 in all extremities.   Psych: Pleasant affect, no hallucinations        LABS:  Results for orders placed or performed during the hospital encounter of 11/20/20   CBC Auto Differential   Result Value Ref Range    WBC 5.5 4.0 - 10.5 K/CU MM    RBC 3.26 (L) 4.2 - 5.4 M/CU MM    Hemoglobin 11.5 (L) 12.5 - 16.0 GM/DL    Hematocrit 37.0 37 - 47 %    .5 (H) 78 - 100 FL    MCH 35.3 (H) 27 - 31 PG    MCHC 31.1 (L) 32.0 - 36.0 %    RDW 16.7 (H) 11.7 - 14.9 %    Platelets 442 976 - 824 K/CU MM    MPV 10.0 7.5 - 11.1 FL    Differential Type AUTOMATED DIFFERENTIAL     Segs Relative 68.9 (H) 36 - 66 %    Lymphocytes % 15.1 (L) 24 - 44 %    Monocytes % 9.4 (H) 0 - 4 %    Eosinophils % 5.1 (H) 0 - 3 %    Basophils % 1.1 (H) 0 - 1 %    Segs Absolute 3.8 K/CU MM    Lymphocytes Absolute 0.8 K/CU MM    Monocytes Absolute 0.5 K/CU MM    Eosinophils Absolute 0.3 K/CU MM    Basophils Absolute 0.1 K/CU MM    Nucleated RBC % 0.0 %    Total Nucleated RBC 0.0 K/CU MM    Total Immature Neutrophil 0.02 K/CU MM    Immature Neutrophil % 0.4 0 - 0.43 %   Comprehensive Metabolic Panel w/ Reflex to MG   Result Value Ref Range    Sodium 138 135 - 145 MMOL/L    Potassium 3.5 3.5 - 5.1 MMOL/L    Chloride 104 99 - 110 mMol/L    CO2 22 21 - 32 MMOL/L    BUN 13 6 - 23 MG/DL    CREATININE 0.7 0.6 - 1.1 MG/DL    Glucose 53 (L) 70 - 99 MG/DL abnormality. ED COURSE & MEDICAL DECISION MAKING       Vital signs and nursing notes reviewed during ED course. Patient care and presentation staffed with and seen in conjunction with supervising physician, Dr. Jennie Montero. Patient presents as above which prompted work-up today. Patient placed on telemetry monitoring as well as continuous pulse oximetry monitoring. While in the ED today, labs, imaging, and EKG were obtained. For EKG interpretation- please see ED physician's note. Labs reveal hyperglycemia of 57 which was treated orally and repeat point-of-care glucose improved at 157. Initial lactic acid elevated at 2.6. No leukocytosis or leukopenia. Troponin is negative. Chest xray obtained today and reveals no acute cardiopulmonary process. No pneumothorax, no consolidation concerning for pneumonia, no pleural effusion. Right lateral knee venous duplex ultrasound obtained and reveals no evidence of DVT. Compartments are soft in the right lower extremity. Low clinical suspicion for necrotizing fasciitis, compartment syndrome, sepsis. Wound chest wall will be treated with topical antibiotic of mupirocin and oral doxycycline as outpatient wound culture performed at patient's PCP office reveals bacteria was sensitive to tetracycline and therefore is sensitive to doxycycline. PCP notes reveals outpatient order for referral to infectious disease. At this time we do feel that patient is suitable for outpatient close follow-up with PCP and outpatient follow-up with infectious disease. Pulses are equal in all extremities, no ripping or tearing pain, no widened mediastinum-low clinical suspicion for AAA/thoracic dissection. Patient treated with IM Phenergan for nausea and vomiting is able to tolerate oral intake of her home pain medication oxycodone as well as oral doxycycline. Topical Bactroban applied in the ED to wounds. Patient is nontoxic appearing. Vital signs are stable.  Patient is stable for using speech recognition software and may contain errors related to that system including errors in grammar, punctuation, and spelling, as well as words and phrases that may be inappropriate. If there are any questions or concerns please feel free to contact the dictating provider for clarification.         Trav JANN Anegl  11/22/20 [id number]

## 2020-11-24 LAB
EKG ATRIAL RATE: 56 BPM
EKG DIAGNOSIS: NORMAL
EKG P AXIS: 30 DEGREES
EKG P-R INTERVAL: 146 MS
EKG Q-T INTERVAL: 464 MS
EKG QRS DURATION: 88 MS
EKG QTC CALCULATION (BAZETT): 447 MS
EKG R AXIS: -46 DEGREES
EKG T AXIS: -77 DEGREES
EKG VENTRICULAR RATE: 56 BPM

## 2020-11-25 LAB
CULTURE: NORMAL
CULTURE: NORMAL
Lab: NORMAL
Lab: NORMAL
SPECIMEN: NORMAL
SPECIMEN: NORMAL

## 2021-01-15 ENCOUNTER — HOSPITAL ENCOUNTER (EMERGENCY)
Age: 61
Discharge: HOME | End: 2021-01-16
Attending: EMERGENCY MEDICINE
Payer: MEDICARE

## 2021-01-15 ENCOUNTER — APPOINTMENT (OUTPATIENT)
Dept: GENERAL RADIOLOGY | Age: 61
End: 2021-01-15
Payer: MEDICARE

## 2021-01-15 DIAGNOSIS — J18.9 PNEUMONIA DUE TO ORGANISM: Primary | ICD-10-CM

## 2021-01-15 DIAGNOSIS — J96.11 CHRONIC RESPIRATORY FAILURE WITH HYPOXIA (HCC): ICD-10-CM

## 2021-01-15 DIAGNOSIS — R06.00 DYSPNEA, UNSPECIFIED TYPE: ICD-10-CM

## 2021-01-15 LAB
INR BLD: 0.92 INDEX
PROTHROMBIN TIME: 11.1 SECONDS (ref 11.7–14.5)

## 2021-01-15 PROCEDURE — 99285 EMERGENCY DEPT VISIT HI MDM: CPT

## 2021-01-15 PROCEDURE — 96375 TX/PRO/DX INJ NEW DRUG ADDON: CPT

## 2021-01-15 PROCEDURE — 96368 THER/DIAG CONCURRENT INF: CPT

## 2021-01-15 PROCEDURE — 87040 BLOOD CULTURE FOR BACTERIA: CPT

## 2021-01-15 PROCEDURE — 85610 PROTHROMBIN TIME: CPT

## 2021-01-15 PROCEDURE — U0002 COVID-19 LAB TEST NON-CDC: HCPCS

## 2021-01-15 PROCEDURE — 83605 ASSAY OF LACTIC ACID: CPT

## 2021-01-15 PROCEDURE — 87804 INFLUENZA ASSAY W/OPTIC: CPT

## 2021-01-15 PROCEDURE — 96366 THER/PROPH/DIAG IV INF ADDON: CPT

## 2021-01-15 PROCEDURE — 82248 BILIRUBIN DIRECT: CPT

## 2021-01-15 PROCEDURE — 84484 ASSAY OF TROPONIN QUANT: CPT

## 2021-01-15 PROCEDURE — 6370000000 HC RX 637 (ALT 250 FOR IP): Performed by: EMERGENCY MEDICINE

## 2021-01-15 PROCEDURE — 85025 COMPLETE CBC W/AUTO DIFF WBC: CPT

## 2021-01-15 PROCEDURE — 83880 ASSAY OF NATRIURETIC PEPTIDE: CPT

## 2021-01-15 PROCEDURE — 6360000002 HC RX W HCPCS: Performed by: EMERGENCY MEDICINE

## 2021-01-15 PROCEDURE — 96365 THER/PROPH/DIAG IV INF INIT: CPT

## 2021-01-15 PROCEDURE — 94640 AIRWAY INHALATION TREATMENT: CPT

## 2021-01-15 PROCEDURE — 71045 X-RAY EXAM CHEST 1 VIEW: CPT

## 2021-01-15 PROCEDURE — 85730 THROMBOPLASTIN TIME PARTIAL: CPT

## 2021-01-15 PROCEDURE — 93005 ELECTROCARDIOGRAM TRACING: CPT | Performed by: EMERGENCY MEDICINE

## 2021-01-15 PROCEDURE — 80053 COMPREHEN METABOLIC PANEL: CPT

## 2021-01-15 RX ORDER — ALBUTEROL SULFATE 90 UG/1
2 AEROSOL, METERED RESPIRATORY (INHALATION) ONCE
Status: COMPLETED | OUTPATIENT
Start: 2021-01-15 | End: 2021-01-15

## 2021-01-15 RX ORDER — OXYCODONE HYDROCHLORIDE 5 MG/1
5 TABLET ORAL ONCE
Status: COMPLETED | OUTPATIENT
Start: 2021-01-15 | End: 2021-01-15

## 2021-01-15 RX ORDER — OXYCODONE HYDROCHLORIDE 5 MG/1
10 TABLET ORAL ONCE
Status: COMPLETED | OUTPATIENT
Start: 2021-01-15 | End: 2021-01-15

## 2021-01-15 RX ORDER — DEXAMETHASONE SODIUM PHOSPHATE 10 MG/ML
6 INJECTION, SOLUTION INTRAMUSCULAR; INTRAVENOUS ONCE
Status: COMPLETED | OUTPATIENT
Start: 2021-01-15 | End: 2021-01-15

## 2021-01-15 RX ADMIN — OXYCODONE HYDROCHLORIDE 5 MG: 5 TABLET ORAL at 23:32

## 2021-01-15 RX ADMIN — OXYCODONE 10 MG: 5 TABLET ORAL at 23:44

## 2021-01-15 RX ADMIN — Medication 2 PUFF: at 22:09

## 2021-01-15 RX ADMIN — DEXAMETHASONE SODIUM PHOSPHATE 6 MG: 10 INJECTION, SOLUTION INTRAMUSCULAR; INTRAVENOUS at 23:34

## 2021-01-15 RX ADMIN — ALBUTEROL SULFATE 2 PUFF: 90 AEROSOL, METERED RESPIRATORY (INHALATION) at 22:08

## 2021-01-15 ASSESSMENT — PAIN SCALES - GENERAL: PAINLEVEL_OUTOF10: 9

## 2021-01-15 ASSESSMENT — PAIN DESCRIPTION - PAIN TYPE: TYPE: ACUTE PAIN

## 2021-01-15 ASSESSMENT — PAIN DESCRIPTION - LOCATION: LOCATION: CHEST

## 2021-01-16 VITALS
SYSTOLIC BLOOD PRESSURE: 117 MMHG | DIASTOLIC BLOOD PRESSURE: 60 MMHG | OXYGEN SATURATION: 95 % | BODY MASS INDEX: 20.57 KG/M2 | WEIGHT: 128 LBS | TEMPERATURE: 98.9 F | RESPIRATION RATE: 22 BRPM | HEIGHT: 66 IN | HEART RATE: 59 BPM

## 2021-01-16 LAB
ALBUMIN SERPL-MCNC: 3.4 GM/DL (ref 3.4–5)
ALP BLD-CCNC: 115 IU/L (ref 40–129)
ALT SERPL-CCNC: 10 U/L (ref 10–40)
ANION GAP SERPL CALCULATED.3IONS-SCNC: 12 MMOL/L (ref 4–16)
APTT: 27.4 SECONDS (ref 25.1–37.1)
AST SERPL-CCNC: 23 IU/L (ref 15–37)
BASOPHILS ABSOLUTE: 0.1 K/CU MM
BASOPHILS RELATIVE PERCENT: 0.9 % (ref 0–1)
BILIRUB SERPL-MCNC: 0.2 MG/DL (ref 0–1)
BILIRUBIN DIRECT: 0.2 MG/DL (ref 0–0.3)
BILIRUBIN, INDIRECT: 0 MG/DL (ref 0–0.7)
BUN BLDV-MCNC: 11 MG/DL (ref 6–23)
CALCIUM SERPL-MCNC: 8.6 MG/DL (ref 8.3–10.6)
CHLORIDE BLD-SCNC: 101 MMOL/L (ref 99–110)
CO2: 23 MMOL/L (ref 21–32)
CREAT SERPL-MCNC: 0.6 MG/DL (ref 0.6–1.1)
DIFFERENTIAL TYPE: ABNORMAL
EOSINOPHILS ABSOLUTE: 0.2 K/CU MM
EOSINOPHILS RELATIVE PERCENT: 2.1 % (ref 0–3)
GFR AFRICAN AMERICAN: >60 ML/MIN/1.73M2
GFR NON-AFRICAN AMERICAN: >60 ML/MIN/1.73M2
GLUCOSE BLD-MCNC: 93 MG/DL (ref 70–99)
HCT VFR BLD CALC: 37.8 % (ref 37–47)
HEMOGLOBIN: 12.2 GM/DL (ref 12.5–16)
IMMATURE NEUTROPHIL %: 0.4 % (ref 0–0.43)
LACTATE: 1.3 MMOL/L (ref 0.4–2)
LYMPHOCYTES ABSOLUTE: 0.8 K/CU MM
LYMPHOCYTES RELATIVE PERCENT: 9.4 % (ref 24–44)
MCH RBC QN AUTO: 36.1 PG (ref 27–31)
MCHC RBC AUTO-ENTMCNC: 32.3 % (ref 32–36)
MCV RBC AUTO: 111.8 FL (ref 78–100)
MONOCYTES ABSOLUTE: 0.6 K/CU MM
MONOCYTES RELATIVE PERCENT: 7.1 % (ref 0–4)
NUCLEATED RBC %: 0 %
PDW BLD-RTO: 13.9 % (ref 11.7–14.9)
PLATELET # BLD: 339 K/CU MM (ref 140–440)
PMV BLD AUTO: 9.7 FL (ref 7.5–11.1)
POTASSIUM SERPL-SCNC: 3.8 MMOL/L (ref 3.5–5.1)
PRO-BNP: 847.2 PG/ML
RAPID INFLUENZA  B AGN: NEGATIVE
RAPID INFLUENZA A AGN: NEGATIVE
RBC # BLD: 3.38 M/CU MM (ref 4.2–5.4)
SARS-COV-2, NAAT: NOT DETECTED
SEGMENTED NEUTROPHILS ABSOLUTE COUNT: 6.5 K/CU MM
SEGMENTED NEUTROPHILS RELATIVE PERCENT: 80.1 % (ref 36–66)
SODIUM BLD-SCNC: 136 MMOL/L (ref 135–145)
SOURCE: NORMAL
TOTAL IMMATURE NEUTOROPHIL: 0.03 K/CU MM
TOTAL NUCLEATED RBC: 0 K/CU MM
TOTAL PROTEIN: 6.4 GM/DL (ref 6.4–8.2)
TROPONIN T: <0.01 NG/ML
WBC # BLD: 8.2 K/CU MM (ref 4–10.5)

## 2021-01-16 PROCEDURE — 93010 ELECTROCARDIOGRAM REPORT: CPT | Performed by: INTERNAL MEDICINE

## 2021-01-16 PROCEDURE — 6360000002 HC RX W HCPCS: Performed by: EMERGENCY MEDICINE

## 2021-01-16 PROCEDURE — 2580000003 HC RX 258: Performed by: EMERGENCY MEDICINE

## 2021-01-16 PROCEDURE — 6360000002 HC RX W HCPCS

## 2021-01-16 RX ORDER — NALOXONE HYDROCHLORIDE 1 MG/ML
1 INJECTION INTRAMUSCULAR; INTRAVENOUS; SUBCUTANEOUS ONCE
Status: COMPLETED | OUTPATIENT
Start: 2021-01-16 | End: 2021-01-16

## 2021-01-16 RX ORDER — NALOXONE HYDROCHLORIDE 1 MG/ML
INJECTION INTRAMUSCULAR; INTRAVENOUS; SUBCUTANEOUS
Status: COMPLETED
Start: 2021-01-16 | End: 2021-01-16

## 2021-01-16 RX ORDER — DEXAMETHASONE 4 MG/1
4 TABLET ORAL
Qty: 4 TABLET | Refills: 0 | Status: SHIPPED | OUTPATIENT
Start: 2021-01-16 | End: 2021-01-20

## 2021-01-16 RX ORDER — CEFDINIR 300 MG/1
300 CAPSULE ORAL 2 TIMES DAILY
Qty: 14 CAPSULE | Refills: 0 | Status: SHIPPED | OUTPATIENT
Start: 2021-01-16 | End: 2021-01-22

## 2021-01-16 RX ORDER — AZITHROMYCIN 500 MG/1
500 TABLET, FILM COATED ORAL DAILY
Qty: 5 TABLET | Refills: 0 | Status: SHIPPED | OUTPATIENT
Start: 2021-01-16 | End: 2021-01-21

## 2021-01-16 RX ORDER — ONDANSETRON 2 MG/ML
4 INJECTION INTRAMUSCULAR; INTRAVENOUS ONCE
Status: COMPLETED | OUTPATIENT
Start: 2021-01-16 | End: 2021-01-16

## 2021-01-16 RX ADMIN — NALOXONE HYDROCHLORIDE 1 MG: 1 INJECTION INTRAMUSCULAR; INTRAVENOUS; SUBCUTANEOUS at 01:27

## 2021-01-16 RX ADMIN — AZITHROMYCIN MONOHYDRATE 500 MG: 500 INJECTION, POWDER, LYOPHILIZED, FOR SOLUTION INTRAVENOUS at 03:54

## 2021-01-16 RX ADMIN — NALOXONE HYDROCHLORIDE 1 MG: 1 INJECTION PARENTERAL at 01:27

## 2021-01-16 RX ADMIN — CEFTRIAXONE 1 G: 1 INJECTION, POWDER, FOR SOLUTION INTRAMUSCULAR; INTRAVENOUS at 02:21

## 2021-01-16 RX ADMIN — ONDANSETRON 4 MG: 2 INJECTION INTRAMUSCULAR; INTRAVENOUS at 02:21

## 2021-01-16 ASSESSMENT — PAIN DESCRIPTION - DESCRIPTORS: DESCRIPTORS: ACHING

## 2021-01-16 ASSESSMENT — PAIN DESCRIPTION - LOCATION: LOCATION: BACK;CHEST;GENERALIZED

## 2021-01-16 NOTE — ED NOTES
Pt desat to 88%, pt put on 4 lpm via NC, Pt unresponsive to sternal rub. Dr. Fozia Mixon notified and order for narcan obtained and 1 mg given IV.  Pt became more alert and began answering questions     Cherelle Nugent RN  01/16/21 [id number]

## 2021-01-16 NOTE — ED PROVIDER NOTES
CHIEF COMPLAINT    Chief Complaint   Patient presents with    Shortness of Breath    Nasal Congestion    Fever    Chest Pain     HPI  Gloria Gayle is a 62 y.o. female with history of coronary artery disease, cardiomyopathy, atrial fibrillation, chronic anticoagulation, CHF, hypertension, hyperlipidemia, multiple sclerosis, seizure disorder who presents to the ED via EMS with complaints of fevers, chills, cough, shortness of breath, and arthralgias that started yesterday evening. Shortness of breath is rated as severe and exacerbated with exertion. She feels congested in her chest but has not been experiencing much of a cough. Shortness of breath is constant. She attempted to use home inhaler without relief. Patient has experienced fever as high as 102.2 °F as well. She used Tylenol 2 hours prior to arrival with minimal relief. She is complaining of diffuse body myalgias as well. Denies dizziness, lightness, nausea, vomiting. REVIEW OF SYSTEMS  Constitutional: Complains of fevers and chills  Eye: No visual changes  HENT: No earache or sore throat. Resp: Complains of cough and shortness of breath  Cardio: No chest pain or palpitations. GI: No abdominal pain, nausea, vomiting, constipation or diarrhea. No melena. : No dysuria, urgency or frequency. Endocrine: No heat intolerance, no cold intolerance, no polydipsia   Lymphatics: No adenopathy  Musculoskeletal: Complains of arthralgias  Neuro: No headaches. Psych: No homicidal or suicidal thoughts  Skin: No rash, No itching. ?  ?   PAST MEDICAL HISTORY  Past Medical History:   Diagnosis Date    Acute delirium     Arrhythmia     Arthritis     Asplenia     Asthma     CAD (coronary artery disease)     1st stent at age 46    Cardiomyopathy [facility])     Cerebral artery occlusion with cerebral infarction ([facility] [address]     CHF (congestive heart failure) ([facility] [address]     Enlarged liver     GERD (gastroesophageal reflux disease)     H/O echocardiogram 4/6/16 EF 55%, trivial TR & MR, stage 1 diastolic dysfunction    History of blood transfusion     Hx of cardiovascular stress test 12/29/2015    Alessia: EF 45%. Pharmacologic stress myocardial perfusion scan shows a fixed inferior-lateral defect w/ no inducible ischemia. No evidence of ischemia. Inferior-lateral infarction.  Normal LVSF    Hyperlipidemia     Hypertension     Lymphoma ([facility] [address]     Denies    MI, old     Movement disorder     MS (multiple sclerosis) ([facility] [address]     OP (osteoporosis)     PE (pulmonary embolism)     trapese filter    Pneumonia     Pyelonephritis     Seizures (HCC)     Severe malnutrition (HCC)     Spleen enlarged     Thyroid disease      FAMILY HISTORY  Family History   Problem Relation Age of Onset    High Blood Pressure Mother     High Cholesterol Mother     Heart Disease Mother     Diabetes Mother     Heart Disease Father     Cancer Father     Other Sister         Multiple Sclerosis    Heart Disease Maternal Grandmother     High Blood Pressure Maternal Grandmother     High Cholesterol Maternal Grandmother      SOCIAL HISTORY  Social History     Socioeconomic History    Marital status: Single     Spouse name: None    Number of children: None    Years of education: None    Highest education level: None   Occupational History    None   Social Needs    Financial resource strain: None    Food insecurity     Worry: None     Inability: None    Transportation needs     Medical: None     Non-medical: None   Tobacco Use    Smoking status: Never Smoker    Smokeless tobacco: Never Used   Substance and Sexual Activity    Alcohol use: No    Drug use: No    Sexual activity: Not Currently   Lifestyle    Physical activity     Days per week: None     Minutes per session: None    Stress: None   Relationships    Social connections     Talks on phone: None     Gets together: None     Attends Sikh service: None     Active member of club or organization: None Attends meetings of clubs or organizations: None     Relationship status: None    Intimate partner violence     Fear of current or ex partner: None     Emotionally abused: None     Physically abused: None     Forced sexual activity: None   Other Topics Concern    None   Social History Narrative    None       SURGICAL HISTORY  Past Surgical History:   Procedure Laterality Date    ABDOMEN SURGERY      APPENDECTOMY      CARDIAC DEFIBRILLATOR PLACEMENT      adelaida Hanson [id number] CONDITIONAL    CHOLECYSTECTOMY      COLONOSCOPY      CORONARY ANGIOPLASTY WITH STENT PLACEMENT      3 stents    CORONARY ARTERY BYPASS GRAFT      Age 49    ENDOSCOPY, COLON, DIAGNOSTIC      GASTRIC BYPASS SURGERY      HERNIA REPAIR      HIP SURGERY      HYSTERECTOMY      JOINT REPLACEMENT      PORT SURGERY N/A 6/1/2020    PORT INSERTION performed by Funmi Plaza MD at [address] [place] [address]      VASCULAR SURGERY       CURRENT MEDICATIONS  Previous Medications    ACETAMINOPHEN 650 MG TABS    Take 650 mg by mouth every 4 hours as needed    ALBUTEROL (PROVENTIL HFA;VENTOLIN HFA) 108 (90 BASE) MCG/ACT INHALER    Inhale 2 puffs into the lungs every 6 hours as needed.       AMIODARONE (CORDARONE) 200 MG TABLET    Take 200 mg by mouth daily    APIXABAN (ELIQUIS) 5 MG TABS TABLET    Take 5 mg by mouth 2 times daily    ASPIRIN 81 MG TABLET    Take 81 mg by mouth daily     ATORVASTATIN (LIPITOR) 80 MG TABLET    Take 80 mg by mouth nightly    BENZOCAINE-MENTHOL (CEPACOL SORE THROAT) 15-3.6 MG LOZENGE    Take 1 lozenge by mouth every 2 hours as needed for Sore Throat    CHLORHEXIDINE GLUCONATE (ANTISEPTIC SKIN CLEANSER) 4 % SOLN EXTERNAL SOLUTION    Apply topically daily    CLOPIDOGREL (PLAVIX) 75 MG TABLET    Take 75 mg by mouth daily    CYANOCOBALAMIN 1000 MCG/ML INJECTION    INJECT 1 ML INTO THE MUSCLE ON THE FIRST OF [facility] MONTH DOCUSATE SODIUM (COLACE) 100 MG CAPSULE    Take 100 mg by mouth 2 times daily as needed for Constipation    FAMOTIDINE (PEPCID) 20 MG TABLET    Take 1 tablet by mouth 2 times daily    FERROUS GLUCONATE (FERGON) 324 (38 FE) MG TABLET    Take 324 mg by mouth 2 times daily     IPRATROPIUM-ALBUTEROL (DUONEB) 0.5-2.5 (3) MG/3ML SOLN NEBULIZER SOLUTION    Inhale 3 mLs into the lungs every 4 hours (while awake)    LEVETIRACETAM (KEPPRA) 750 MG TABLET    Take 750 mg by mouth 2 times daily     LEVOTHYROXINE (SYNTHROID) 150 MCG TABLET    Take 1 tablet by mouth Daily    MAGNESIUM OXIDE (MAG-OX) 400 MG TABLET    Take 800 mg by mouth 2 times daily    MIDODRINE (PROAMATINE) 10 MG TABLET    Take 1 tablet by mouth 3 times daily (with meals)    NITROGLYCERIN (NITROSTAT) 0.4 MG SL TABLET    up to max of 3 total doses. If no relief after 1 dose, call 911. ONDANSETRON (ZOFRAN) 4 MG TABLET    Take 1 tablet by mouth every 6 hours as needed for Nausea or Vomiting    OXYCODONE (ROXICODONE) 20 MG IMMEDIATE RELEASE TABLET    Take 20 mg by mouth every 3 hours as needed. POTASSIUM CHLORIDE (KLOR-CON M) 20 MEQ EXTENDED RELEASE TABLET    Take 1 tablet by mouth daily Take only if taking torsemide    TORSEMIDE (DEMADEX) 20 MG TABLET    Take 20 mg by mouth daily as needed (For weight gain or fluid retention) Indications: pt takes 2 tablets daily      ALLERGIES  Allergies   Allergen Reactions    Fentanyl Swelling     Other reaction(s): Angioedema (swelling)    Moxifloxacin Hcl In Nacl Swelling and Rash    Povidone-Iodine Rash     Other reaction(s): AOF      Cyclobenzaprine      Other reaction(s): Other - comment required  \" it make my muscle very weak because of my MS\"  \" it make my muscle very weak because of my MS\"      Methocarbamol      Worsening edema  Other reaction(s):  Other - comment required  Worsening edema  Worsening edema  Worsening edema      Tramadol Other (See Comments) Neurologic: Alert & oriented x 3, Normal motor function, Normal sensory function, CN II-XII grossly intact as tested, No focal deficits noted. Psychiatric: Affect normal, Judgment normal, Mood normal.     EKG  Per my interpretation demonstrates sinus rhythm at a rate of 78 bpm.  Left axis deviation. Mildly prolonged QTc interval 474 ms. T wave inversions in the inferior and lateral leads which are old when compared to previous EKG. No other acute ST segment changes. RADIOLOGY  Labs Reviewed   CBC WITH AUTO DIFFERENTIAL - Abnormal; Notable for the following components:       Result Value    RBC 3.38 (*)     Hemoglobin 12.2 (*)     .8 (*)     MCH 36.1 (*)     Segs Relative 80.1 (*)     Lymphocytes % 9.4 (*)     Monocytes % 7.1 (*)     All other components within normal limits   BRAIN NATRIURETIC PEPTIDE - Abnormal; Notable for the following components:    Pro-.2 (*)     All other components within normal limits   PROTIME-INR - Abnormal; Notable for the following components:    Protime 11.1 (*)     All other components within normal limits   RAPID INFLUENZA A/B ANTIGENS   CULTURE, BLOOD 1   CULTURE, BLOOD 2   BASIC METABOLIC PANEL   TROPONIN   APTT   LACTIC ACID, PLASMA   COVID-19   HEPATIC FUNCTION PANEL     I personally reviewed the images. The radiologist's interpretation reveals:  Last Imaging results   XR CHEST PORTABLE   Final Result   Focal airspace disease within the right upper lung, greater medially. Pneumonia would be the primary consideration. Although not excluded, the   distribution would be unusual for COVID-19 pneumonia.            Procedures  NA  MEDS GIVEN IN ED:  Medications   azithromycin (ZITHROMAX) 500 mg in dextrose 5 % 250 mL IVPB (has no administration in time range)   oxyCODONE (ROXICODONE) immediate release tablet 5 mg (5 mg Oral Given 1/15/21 2332)   dexamethasone (PF) (DECADRON) injection 6 mg (6 mg Intravenous Given 1/15/21 2334) albuterol sulfate  (90 Base) MCG/ACT inhaler 2 puff (2 puffs Inhalation Given 1/15/21 2208)   ipratropium (ATROVENT HFA) 17 MCG/ACT inhaler 2 puff (2 puffs Inhalation Given 1/15/21 2209)   oxyCODONE (ROXICODONE) immediate release tablet 10 mg (10 mg Oral Given 1/15/21 2344)   naloxone [facility]) injection 1 mg (1 mg Intravenous Given 1/16/21 0127)   ondansetron (ZOFRAN) injection 4 mg (4 mg Intravenous Given 1/16/21 0221)   cefTRIAXone (ROCEPHIN) 1 g IVPB in 50 mL D5W minibag (1 g Intravenous New Bag 1/16/21 0221)     COURSE & MEDICAL DECISION MAKING 64-year-old female presents emergency department via EMS with complaints of fevers, chills, cough, shortness of breath. She appears chronically ill on exam. She has diminished breath sounds bilaterally with scattered rhonchi and some expiratory wheezing. At this time we will provide her with inhaler treatments as well as IV Decadron and obtain CBC, BMP, EKG, troponin, BNP, lactic acid, blood cultures, COVID-19 testing, influenza testing. CBC demonstrates baseline anemia. BMP is reassuring. EKG is without acute ischemic changes and troponin is nonelevated. BNP is elevated 847.2. Lactic acid is nonelevated. COVID-19 testing is negative. Chest x-ray shows focal airspace disease within the right upper lung. Given the patient's symptoms this does seem consistent with pneumonia. While here she continue to complain of diffuse pain and states that she has not taken her daily oxycodone. She takes 20 mg at home daily. She was initially provided with 5 mg but continue to complain of pain and was therefore provided with additional 10 mg. Unfortunately this did result in some mild respiratory depression and decreased responsiveness and she was provided with Narcan which resolved the respiratory mental depression. Patient was observed for over 1 hour without any further respiratory depression following Narcan. Patient was able to ambulate on room air with oxygen saturation of 88%. She normally wears 3 L at baseline. She feels better following aforementioned interventions here. Her presentation is consistent with pneumonia but given reassuring vital signs and reassuring laboratory studies she will be discharged home with a prescription for Omnicef, Decadron, and azithromycin after she was given an additional dose of Rocephin and azithromycin here. Instructed to follow primary care provider. Return precautions provided. Appropriate PPE utilized as indicated for entire patient encounter? Time of Disposition: See timeline  ? New Prescriptions    AZITHROMYCIN (ZITHROMAX) 500 MG TABLET    Take 1 tablet by mouth daily for 5 days    CEFDINIR (OMNICEF) 300 MG CAPSULE    Take 1 capsule by mouth 2 times daily for 7 days    DEXAMETHASONE (DECADRON) 4 MG TABLET    Take 1 tablet by mouth daily (with breakfast) for 4 days     FINAL IMPRESSION  1. Pneumonia due to organism    2. Dyspnea, unspecified type    3. Chronic respiratory failure with hypoxia [facility])      Electronically signed by:  Windy Grimm DO, 1/16/2021         Windy Grimm DO  01/16/21 0475

## 2021-01-16 NOTE — ED NOTES
Pt ambulated to bathroom and back, pulse ox dropped to 87%     Adama Garcia, RN  01/16/21 2294 [address], RN  01/16/21 4857

## 2021-01-20 LAB
EKG ATRIAL RATE: 78 BPM
EKG DIAGNOSIS: NORMAL
EKG P AXIS: 32 DEGREES
EKG P-R INTERVAL: 156 MS
EKG Q-T INTERVAL: 416 MS
EKG QRS DURATION: 102 MS
EKG QTC CALCULATION (BAZETT): 474 MS
EKG R AXIS: -50 DEGREES
EKG T AXIS: -78 DEGREES
EKG VENTRICULAR RATE: 78 BPM

## 2021-01-21 LAB
CULTURE: NORMAL
CULTURE: NORMAL
Lab: NORMAL
Lab: NORMAL
SPECIMEN: NORMAL
SPECIMEN: NORMAL

## 2021-01-22 ENCOUNTER — HOSPITAL ENCOUNTER (INPATIENT)
Age: 61
LOS: 5 days | Discharge: HOME | DRG: 177 | End: 2021-01-27
Attending: EMERGENCY MEDICINE | Admitting: INTERNAL MEDICINE
Payer: MEDICARE

## 2021-01-22 ENCOUNTER — APPOINTMENT (OUTPATIENT)
Dept: GENERAL RADIOLOGY | Age: 61
DRG: 177 | End: 2021-01-22
Payer: MEDICARE

## 2021-01-22 DIAGNOSIS — J18.9 PNEUMONIA OF RIGHT UPPER LOBE DUE TO INFECTIOUS ORGANISM: Primary | ICD-10-CM

## 2021-01-22 DIAGNOSIS — I50.9 ACUTE ON CHRONIC CONGESTIVE HEART FAILURE, UNSPECIFIED HEART FAILURE TYPE (HCC): ICD-10-CM

## 2021-01-22 DIAGNOSIS — Z78.9 FAILURE OF OUTPATIENT TREATMENT: ICD-10-CM

## 2021-01-22 LAB
ALBUMIN SERPL-MCNC: 3.1 GM/DL (ref 3.4–5)
ALP BLD-CCNC: 95 IU/L (ref 40–129)
ALT SERPL-CCNC: 8 U/L (ref 10–40)
ANION GAP SERPL CALCULATED.3IONS-SCNC: 9 MMOL/L (ref 4–16)
AST SERPL-CCNC: 13 IU/L (ref 15–37)
BASE EXCESS MIXED: 1.8 (ref 0–2.3)
BASOPHILS ABSOLUTE: 0.1 K/CU MM
BASOPHILS RELATIVE PERCENT: 1.2 % (ref 0–1)
BILIRUB SERPL-MCNC: 0.2 MG/DL (ref 0–1)
BUN BLDV-MCNC: 16 MG/DL (ref 6–23)
CALCIUM SERPL-MCNC: 8.2 MG/DL (ref 8.3–10.6)
CHLORIDE BLD-SCNC: 101 MMOL/L (ref 99–110)
CO2: 23 MMOL/L (ref 21–32)
COMMENT: ABNORMAL
CREAT SERPL-MCNC: 0.5 MG/DL (ref 0.6–1.1)
DIFFERENTIAL TYPE: ABNORMAL
EOSINOPHILS ABSOLUTE: 0.5 K/CU MM
EOSINOPHILS RELATIVE PERCENT: 8.1 % (ref 0–3)
GFR AFRICAN AMERICAN: >60 ML/MIN/1.73M2
GFR NON-AFRICAN AMERICAN: >60 ML/MIN/1.73M2
GLUCOSE BLD-MCNC: 100 MG/DL (ref 70–99)
HCO3 VENOUS: 25.7 MMOL/L (ref 19–25)
HCT VFR BLD CALC: 37.2 % (ref 37–47)
HEMOGLOBIN: 11.9 GM/DL (ref 12.5–16)
IMMATURE NEUTROPHIL %: 0.3 % (ref 0–0.43)
LACTATE: 0.8 MMOL/L (ref 0.4–2)
LYMPHOCYTES ABSOLUTE: 0.7 K/CU MM
LYMPHOCYTES RELATIVE PERCENT: 11.3 % (ref 24–44)
MCH RBC QN AUTO: 35.7 PG (ref 27–31)
MCHC RBC AUTO-ENTMCNC: 32 % (ref 32–36)
MCV RBC AUTO: 111.7 FL (ref 78–100)
MONOCYTES ABSOLUTE: 0.4 K/CU MM
MONOCYTES RELATIVE PERCENT: 7.4 % (ref 0–4)
NUCLEATED RBC %: 0 %
O2 SAT, VEN: 91.9 % (ref 50–70)
PCO2, VEN: 37 MMHG (ref 38–52)
PDW BLD-RTO: 13.9 % (ref 11.7–14.9)
PH VENOUS: 7.45 (ref 7.32–7.42)
PLATELET # BLD: 351 K/CU MM (ref 140–440)
PMV BLD AUTO: 8.9 FL (ref 7.5–11.1)
PO2, VEN: 185 MMHG (ref 28–48)
POTASSIUM SERPL-SCNC: 4.1 MMOL/L (ref 3.5–5.1)
PRO-BNP: 919.6 PG/ML
RBC # BLD: 3.33 M/CU MM (ref 4.2–5.4)
SEGMENTED NEUTROPHILS ABSOLUTE COUNT: 4.3 K/CU MM
SEGMENTED NEUTROPHILS RELATIVE PERCENT: 71.7 % (ref 36–66)
SODIUM BLD-SCNC: 133 MMOL/L (ref 135–145)
TOTAL IMMATURE NEUTOROPHIL: 0.02 K/CU MM
TOTAL NUCLEATED RBC: 0 K/CU MM
TOTAL PROTEIN: 5.4 GM/DL (ref 6.4–8.2)
TROPONIN T: <0.01 NG/ML
WBC # BLD: 5.9 K/CU MM (ref 4–10.5)

## 2021-01-22 PROCEDURE — 83880 ASSAY OF NATRIURETIC PEPTIDE: CPT

## 2021-01-22 PROCEDURE — 71045 X-RAY EXAM CHEST 1 VIEW: CPT

## 2021-01-22 PROCEDURE — 82805 BLOOD GASES W/O2 SATURATION: CPT

## 2021-01-22 PROCEDURE — 2580000003 HC RX 258: Performed by: EMERGENCY MEDICINE

## 2021-01-22 PROCEDURE — 80053 COMPREHEN METABOLIC PANEL: CPT

## 2021-01-22 PROCEDURE — 96375 TX/PRO/DX INJ NEW DRUG ADDON: CPT

## 2021-01-22 PROCEDURE — 84484 ASSAY OF TROPONIN QUANT: CPT

## 2021-01-22 PROCEDURE — 87040 BLOOD CULTURE FOR BACTERIA: CPT

## 2021-01-22 PROCEDURE — 85025 COMPLETE CBC W/AUTO DIFF WBC: CPT

## 2021-01-22 PROCEDURE — 96367 TX/PROPH/DG ADDL SEQ IV INF: CPT

## 2021-01-22 PROCEDURE — 6360000002 HC RX W HCPCS: Performed by: EMERGENCY MEDICINE

## 2021-01-22 PROCEDURE — 93005 ELECTROCARDIOGRAM TRACING: CPT | Performed by: EMERGENCY MEDICINE

## 2021-01-22 PROCEDURE — 83605 ASSAY OF LACTIC ACID: CPT

## 2021-01-22 PROCEDURE — 1200000000 HC SEMI PRIVATE

## 2021-01-22 PROCEDURE — 96365 THER/PROPH/DIAG IV INF INIT: CPT

## 2021-01-22 PROCEDURE — 99285 EMERGENCY DEPT VISIT HI MDM: CPT

## 2021-01-22 PROCEDURE — 36415 COLL VENOUS BLD VENIPUNCTURE: CPT

## 2021-01-22 PROCEDURE — 96376 TX/PRO/DX INJ SAME DRUG ADON: CPT

## 2021-01-22 RX ORDER — DOXYCYCLINE HYCLATE 50 MG/1
1 CAPSULE, GELATIN COATED ORAL 2 TIMES DAILY
COMMUNITY

## 2021-01-22 RX ORDER — MIRTAZAPINE 30 MG/1
30 TABLET, FILM COATED ORAL NIGHTLY
COMMUNITY
Start: 2020-10-08

## 2021-01-22 RX ORDER — LEVOTHYROXINE SODIUM 0.15 MG/1
150 TABLET ORAL
COMMUNITY
Start: 2020-11-09

## 2021-01-22 RX ORDER — GABAPENTIN 300 MG/1
300 CAPSULE ORAL 3 TIMES DAILY
COMMUNITY
Start: 2020-12-14

## 2021-01-22 RX ORDER — BUMETANIDE 1 MG/1
1 TABLET ORAL DAILY
COMMUNITY
Start: 2020-12-21

## 2021-01-22 RX ORDER — OXYCODONE HYDROCHLORIDE 20 MG/1
20 TABLET ORAL
COMMUNITY
Start: 2020-10-06 | End: 2021-01-22

## 2021-01-22 RX ORDER — IBUPROFEN 200 MG
1425 CAPSULE ORAL DAILY
COMMUNITY
Start: 2020-08-04

## 2021-01-22 RX ORDER — POTASSIUM CHLORIDE 750 MG/1
10 TABLET, EXTENDED RELEASE ORAL 2 TIMES DAILY
COMMUNITY
Start: 2021-01-18 | End: 2022-01-18

## 2021-01-22 RX ORDER — LEVETIRACETAM 1000 MG/1
1000 TABLET ORAL DAILY
COMMUNITY
Start: 2020-11-05

## 2021-01-22 RX ORDER — DABIGATRAN ETEXILATE 75 MG/1
150 CAPSULE, COATED PELLETS ORAL
COMMUNITY
Start: 2021-01-14 | End: 2022-01-14

## 2021-01-22 RX ORDER — BUDESONIDE AND FORMOTEROL FUMARATE DIHYDRATE 160; 4.5 UG/1; UG/1
2 AEROSOL RESPIRATORY (INHALATION) 2 TIMES DAILY
COMMUNITY
Start: 2021-01-14 | End: 2022-01-14

## 2021-01-22 RX ORDER — ACARBOSE 25 MG/1
25 TABLET ORAL
COMMUNITY
Start: 2020-07-01

## 2021-01-22 RX ORDER — ASPIRIN 81 MG/1
81 TABLET ORAL DAILY
Status: ON HOLD | COMMUNITY
End: 2021-01-25 | Stop reason: HOSPADM

## 2021-01-22 RX ORDER — FUROSEMIDE 10 MG/ML
40 INJECTION INTRAMUSCULAR; INTRAVENOUS ONCE
Status: COMPLETED | OUTPATIENT
Start: 2021-01-22 | End: 2021-01-22

## 2021-01-22 RX ORDER — ERGOCALCIFEROL (VITAMIN D2) 1250 MCG
50000 CAPSULE ORAL
COMMUNITY
Start: 2020-11-09

## 2021-01-22 RX ORDER — NITROGLYCERIN 0.4 MG/1
0.4 TABLET SUBLINGUAL PRN
COMMUNITY
Start: 2020-04-07

## 2021-01-22 RX ORDER — DULOXETIN HYDROCHLORIDE 60 MG/1
60 CAPSULE, DELAYED RELEASE ORAL DAILY
COMMUNITY
Start: 2020-11-09

## 2021-01-22 RX ORDER — OMEPRAZOLE 20 MG/1
CAPSULE, DELAYED RELEASE ORAL
COMMUNITY

## 2021-01-22 RX ORDER — SENNA PLUS 8.6 MG/1
1 TABLET ORAL DAILY
COMMUNITY

## 2021-01-22 RX ORDER — FAMOTIDINE 20 MG/1
20 TABLET, FILM COATED ORAL 2 TIMES DAILY
COMMUNITY
Start: 2020-11-17 | End: 2021-11-17

## 2021-01-22 RX ORDER — MORPHINE SULFATE 4 MG/ML
4 INJECTION, SOLUTION INTRAMUSCULAR; INTRAVENOUS ONCE
Status: COMPLETED | OUTPATIENT
Start: 2021-01-22 | End: 2021-01-22

## 2021-01-22 RX ORDER — SPIRONOLACTONE 25 MG/1
12.5 TABLET ORAL DAILY
COMMUNITY
Start: 2020-09-01

## 2021-01-22 RX ORDER — TORSEMIDE 20 MG/1
20 TABLET ORAL DAILY
COMMUNITY
Start: 2020-10-07 | End: 2021-01-22

## 2021-01-22 RX ORDER — MAGNESIUM OXIDE 400 MG/1
400 TABLET ORAL DAILY
COMMUNITY
Start: 2020-11-09

## 2021-01-22 RX ORDER — METHYLPREDNISOLONE SODIUM SUCCINATE 125 MG/2ML
60 INJECTION, POWDER, LYOPHILIZED, FOR SOLUTION INTRAMUSCULAR; INTRAVENOUS ONCE
Status: COMPLETED | OUTPATIENT
Start: 2021-01-22 | End: 2021-01-22

## 2021-01-22 RX ORDER — PSEUDOEPHEDRINE HCL 30 MG
100 TABLET ORAL 2 TIMES DAILY
COMMUNITY
Start: 2021-01-14 | End: 2021-01-22

## 2021-01-22 RX ORDER — METOPROLOL SUCCINATE 25 MG/1
12.5 TABLET, EXTENDED RELEASE ORAL DAILY
Status: ON HOLD | COMMUNITY
End: 2021-02-11 | Stop reason: HOSPADM

## 2021-01-22 RX ORDER — PROMETHAZINE HYDROCHLORIDE 25 MG/1
25 TABLET ORAL 4 TIMES DAILY PRN
COMMUNITY
Start: 2020-12-07

## 2021-01-22 RX ORDER — AMIODARONE HYDROCHLORIDE 200 MG/1
400 TABLET ORAL DAILY
COMMUNITY

## 2021-01-22 RX ORDER — RANOLAZINE 500 MG/1
500 TABLET, EXTENDED RELEASE ORAL EVERY 12 HOURS
COMMUNITY
Start: 2020-12-21

## 2021-01-22 RX ADMIN — CEFTRIAXONE SODIUM 2000 MG: 2 INJECTION, POWDER, FOR SOLUTION INTRAMUSCULAR; INTRAVENOUS at 20:59

## 2021-01-22 RX ADMIN — METHYLPREDNISOLONE SODIUM SUCCINATE 60 MG: 125 INJECTION, POWDER, FOR SOLUTION INTRAMUSCULAR; INTRAVENOUS at 20:59

## 2021-01-22 RX ADMIN — FUROSEMIDE 40 MG: 10 INJECTION, SOLUTION INTRAVENOUS at 22:38

## 2021-01-22 RX ADMIN — AZITHROMYCIN MONOHYDRATE 500 MG: 500 INJECTION, POWDER, LYOPHILIZED, FOR SOLUTION INTRAVENOUS at 22:17

## 2021-01-22 RX ADMIN — MORPHINE SULFATE 4 MG: 4 INJECTION, SOLUTION INTRAMUSCULAR; INTRAVENOUS at 20:59

## 2021-01-22 RX ADMIN — MORPHINE SULFATE 4 MG: 4 INJECTION, SOLUTION INTRAMUSCULAR; INTRAVENOUS at 23:21

## 2021-01-22 NOTE — ED PROVIDER NOTES
Emergency Department Encounter    Patient: Rubén Sales  MRN: [id number]  : 1960  Date of Evaluation: 2021  ED Provider:  LENNY CROUCH      Triage Chief Complaint:   Cough (possible Pneumonia)      [place]:  Rubén Sales is a 62 y.o. female that presents to the emergency department with persistent symptoms of pneumonia. Patient reports that she first became ill about 1 week ago. She noticed increasing shortness of breath and productive cough. Her oxygen levels have been dropping into the \"low 80s. \"  She used to be on oxygen, but this was discontinued about 1 year ago. Today represents her third visit to the emergency department in about the past week, and she has been sent home with different prescriptions each time. She was diagnosed with pneumonia and sent home with oral antibiotics. She was seen at [place] last night and told that her \"BNP was a little high\" so she was given an extra dose of furosemide. Patient reports that she has a history of a malabsorption syndrome and oral medications \"just do not work for me as well. \"  Patient is requesting admission or transfer to [facility] if she is not admitted here. Patient reports no other particular provocative or alleviating factors. ROS - see HPI, below listed is current ROS at time of my eval:  CONSTITUTIONAL: No fevers, chills, or sweats. EYES: No vision change, redness, drainage, or discharge. HENT: No sore throat, runny nose, or earache. No dental pain. No painful swallowing. RESPIRATORY: As above. CARDIOVASCULAR: No anginaltype chest pain, orthopnea, or edema. GASTROINTESTINAL: No nausea, vomiting, or abdominal pain. No diarrhea or constipation. No hematemesis, hematochezia, or melena. GENITOURINARY: No frequency, urgency, or dysuria. No hematuria. MUSCULOSKELETAL: No recent injury. No neck, back, or extremity pain. NEUROLOGICAL: No focal weakness, numbness, or tingling. SKIN: No rashes or other lesions reported. No yellowing of the skin. Past Medical History:   Diagnosis Date    Acute delirium     Arrhythmia     Arthritis     Asplenia     Asthma     CAD (coronary artery disease)     1st stent at age 46    Cardiomyopathy [facility])     Cerebral artery occlusion with cerebral infarction ([address]     CHF (congestive heart failure) (HCC)     Enlarged liver     GERD (gastroesophageal reflux disease)     H/O echocardiogram 4/6/16    EF 55%, trivial TR & MR, stage 1 diastolic dysfunction    History of blood transfusion     Hx of cardiovascular stress test 12/29/2015    Alessia: EF 45%. Pharmacologic stress myocardial perfusion scan shows a fixed inferior-lateral defect w/ no inducible ischemia. No evidence of ischemia. Inferior-lateral infarction.  Normal LVSF    Hyperlipidemia     Hypertension     Lymphoma ([address]     Denies    MI, old     Movement disorder     MS (multiple sclerosis) ([address]     OP (osteoporosis)     PE (pulmonary embolism)     trapese filter    Pneumonia     Pyelonephritis     Seizures ([address]     Severe malnutrition ([address]     Spleen enlarged     Thyroid disease      Past Surgical History:   Procedure Laterality Date    ABDOMEN SURGERY      APPENDECTOMY      CARDIAC DEFIBRILLATOR PLACEMENT      ever [id number] CONDITIONAL    CHOLECYSTECTOMY      COLONOSCOPY      CORONARY ANGIOPLASTY WITH STENT PLACEMENT      3 stents    CORONARY ARTERY BYPASS GRAFT      Age 49    ENDOSCOPY, COLON, DIAGNOSTIC      GASTRIC BYPASS SURGERY      HERNIA REPAIR      HIP SURGERY      HYSTERECTOMY      JOINT REPLACEMENT      PORT SURGERY N/A 6/1/2020    PORT INSERTION performed by [facility]y [facility] at [facility] SKIN BIOPSY      TONSILLECTOMY      VASCULAR SURGERY       Family History   Problem Relation Age of Onset    High Blood Pressure Mother     High Cholesterol Mother     Heart Disease Mother     Diabetes Mother  Heart Disease Father     Cancer Father     Other Sister         Multiple Sclerosis    Heart Disease Maternal Grandmother     High Blood Pressure Maternal Grandmother     High Cholesterol Maternal Grandmother      Social History     Socioeconomic History    Marital status: Single     Spouse name: Not on file    Number of children: Not on file    Years of education: Not on file    Highest education level: Not on file   Occupational History    Not on file   Social Needs    Financial resource strain: Not on file    Food insecurity     Worry: Not on file     Inability: Not on file   [employer] needs     Medical: Not on file     Non-medical: Not on file   Tobacco Use    Smoking status: Never Smoker    Smokeless tobacco: Never Used   Substance and Sexual Activity    Alcohol use: No    Drug use: No    Sexual activity: Not Currently   Lifestyle    Physical activity     Days per week: Not on file     Minutes per session: Not on file    Stress: Not on file   Relationships    Social connections     Talks on phone: Not on file     Gets together: Not on file     Attends Restorationism service: Not on file     Active member of club or organization: Not on file     Attends meetings of clubs or organizations: Not on file     Relationship status: Not on file    Intimate partner violence     Fear of current or ex partner: Not on file     Emotionally abused: Not on file     Physically abused: Not on file     Forced sexual activity: Not on file   Other Topics Concern    Not on file   Social History Narrative    Not on file     Current Facility-Administered Medications   Medication Dose Route Frequency Provider Last Rate Last Admin    azithromycin (ZITHROMAX) 500 mg in dextrose 5 % 250 mL IVPB  500 mg Intravenous Once Rodger Shultz MD        furosemide (LASIX) injection 40 mg  40 mg Intravenous Once Rodger Shultz MD         Current Outpatient Medications   Medication Sig Dispense Refill  beclomethasone (QVAR) 40 MCG/ACT inhaler Inhale 2 puffs into the lungs 2 times daily      acarbose (PRECOSE) 25 MG tablet Take 25 mg by mouth 3 times daily (with meals)      budesonide-formoterol (SYMBICORT) 160-4.5 MCG/ACT AERO Inhale 2 puffs into the lungs 2 times daily      bumetanide (BUMEX) 1 MG tablet Take 1 mg by mouth daily      calcium citrate (CALCITRATE) 950 (200 Ca) MG tablet Take 1,425 mg by mouth daily      dabigatran (PRADAXA) 75 MG capsule Take 150 mg by mouth      DULoxetine (CYMBALTA) 60 MG extended release capsule Take 60 mg by mouth daily      ergocalciferol (ERGOCALCIFEROL) 1.25 MG (00341 UT) capsule Take 50,000 Units by mouth Twice a Week      gabapentin (NEURONTIN) 300 MG capsule Take 300 mg by mouth 3 times daily.  mirtazapine (REMERON) 30 MG tablet Take 30 mg by mouth nightly      promethazine (PHENERGAN) 25 MG tablet Take 25 mg by mouth 4 times daily as needed      ranolazine (RANEXA) 500 MG extended release tablet Take 500 mg by mouth every 12 hours      spironolactone (ALDACTONE) 25 MG tablet Take 12.5 mg by mouth daily      docusate (COLACE, DULCOLAX) 100 MG CAPS Take 100 mg by mouth 2 times daily      famotidine (PEPCID) 20 MG tablet Take 20 mg by mouth 2 times daily      levETIRAcetam (KEPPRA) 1000 MG tablet Take 1,000 mg by mouth daily      levothyroxine (SYNTHROID) 150 MCG tablet Take 150 mcg by mouth every morning (before breakfast)      magnesium oxide (MAG-OX) 400 MG tablet Take 400 mg by mouth daily      nitroGLYCERIN (NITROSTAT) 0.4 MG SL tablet Place 0.4 mg under the tongue as needed      oxyCODONE (ROXICODONE) 20 MG immediate release tablet Take 20 mg by mouth every 3 hours as needed.       potassium chloride (KLOR-CON M) 10 MEQ extended release tablet Take 10 mEq by mouth 2 times daily 2 (10mEq)      torsemide (DEMADEX) 20 MG tablet Take 20 mg by mouth daily  albuterol (PROVENTIL HFA;VENTOLIN HFA) 108 (90 BASE) MCG/ACT inhaler Inhale 2 puffs into the lungs every 6 hours as needed. Allergies   Allergen Reactions    Fentanyl Swelling     Other reaction(s): Angioedema (swelling)    Moxifloxacin Hcl In Nacl Swelling and Rash    Povidone-Iodine Rash     Other reaction(s): AOF      Cyclobenzaprine      Other reaction(s): Other - comment required  \" it make my muscle very weak because of my MS\"  \" it make my muscle very weak because of my MS\"      Methocarbamol      Worsening edema  Other reaction(s): Other - comment required  Worsening edema  Worsening edema  Worsening edema      Tramadol Other (See Comments)     Doctor doesn't her to take due to heart problems  Seizures   Doctor doesn't her to take due to lowers seizure threshold.  Baclofen     Ibuprofen      \"Due to heart problems\"    Naproxen     Nsaids      \"Due to heart problems\"    Tizanidine     Tolmetin      \"Due to heart problems\"    Tramadol Hcl      Other reaction(s): Other - comment required  Told not to take due to history of seizures    Betadine [Povidone Iodine] Rash    Moxifloxacin Rash and Swelling     Lips swell and tingling in face   Lips swell and tingling in face   Lips swell and tingling in face   Lips swell and tingling in face   Around mouth       Nursing Notes Reviewed    Physical Exam:  Triage VS:    ED Triage Vitals [01/22/21 1832]   Enc Vitals Group      /80      Pulse 64      Resp 15      Temp 97.9 °F (36.6 °C)      Temp Source Oral      SpO2 97 %      Weight 123 lb (55.8 kg)      Height 5' 6\" (1.676 m)      Head Circumference       Peak Flow       Pain Score       Pain Loc       Pain Edu? Excl. in [address]? My pulse ox interpretation is  normal    GENERAL: Patient is awake, alert, and oriented appropriately. Patient is resting comfortably in a still position on the exam table. Well-nourished and well-developed. HEENT: Normocephalic and atraumatic. No midface, zygomatic, maxillary, or mandibular tenderness. No dental malocclusion. Pupils equal, round, and reactive to light. No redness or matting. Bilateral external ears are unremarkable. Nasal mucosa is pink without purulence. Oral mucosa is mildly dry. NECK: Supple without Kernig's or Brudzinski signs. No significant lymphadenopathy or limitation range of motion. No midline spinal tenderness. RESPIRATORY: Moderately diminished but symmetric aeration bilaterally. Coarse rhonchi including upper respiratory sounds diffusely. Occasional wheezes posteriorly. No chest wall tenderness. CARDIOVASCULAR: Regular rate and rhythm. No audible murmurs, rubs, or gallops. No central or peripheral cyanosis. GASTROINTESTINAL: Soft, nontender, and nondistended. No McBurney's or Otto's point tenderness. No guarding, rebound, rigidity. No mass or pulsatile mass. Bowel sounds are present in all quadrants. No costovertebral angle tenderness. NEUROLOGICAL: Awake, alert and oriented x 3. Cranial nerves III through XII are grossly intact as tested without facial droop or dermatomal paresthesias. Of note, forehead wrinkles are symmetric and intact. Conjugate gaze without entrapment. No asymmetry of the corners of the mouth or nasolabial folds. No gross motor or cerebellar deficits. MUSCULOSKELETAL: No asymmetric edema, Homans' sign, or cords. No tenderness or limitation range of motion to the bilateral shoulders, elbows, wrists, hips, knees, or ankles. No accompanying long bone tenderness or deformity. SKIN: Normal tone for ethnicity. Normal turgor and brisk capillary refill peripherally. No petechiae, purpura, vesicles, bullae, or other lesions. No icterus. PSYCHIATRIC: Normal mood. Normal affect. No voiced suicidal or homicidal ideation. Patient does not respond to internal stimuli. Emergency department course. Patient is brought to bed 35 and assessed and reassessed by me. Prior to initial evaluation, orders are placed for medical screening studies including CBC, metabolic panel, first lactate, and venous blood gas among others. Chest x-ray from January 15, 2021 is reviewed. After initial evaluation, orders are placed for ceftriaxone and azithromycin as documented. Methylprednisolone as documented is ordered. Lung sounds are quite coarse, but she is saturating appropriately on nasal cannula. Patient is adamant that she needs to be admitted to a hospital.  She reports that she has very bad lung disease and is prone to infections including MRSA blood infections. She is simply not getting any better with home management. We have discussed obtaining our initial results, and she is agreeable. Upon most recent reevaluation, patient remains generally stable. There has been no myron distress or cyanosis. BNP and chest x-ray is concerning for developing congestive heart failure in light of the recently diagnosed pneumonia. Laboratory testing does not strongly suggest sepsis. As discussed above, patient is adamant that she is getting worse with outpatient management and would like to be admitted to the hospital. Hospitalist is paged at [phone] to discuss admission. Initial verbal orders are discussed at 4713. Manager has also met with the patient and begun some initial planning for eventual discharge. There has been some initial interest in skilled nursing facility placement. Decision time to admit: 2148. Patient seen during [facility], I did don appropriate PPE during my encounters with the patient, including n95 (when appropriate) mask and eye protection as appropriate.     I have reviewed and interpreted all of the currently available lab results from this visit (if applicable):  Results for orders placed or performed during the hospital encounter of 01/22/21   CBC Auto Differential Result Value Ref Range    WBC 5.9 4.0 - 10.5 K/CU MM    RBC 3.33 (L) 4.2 - 5.4 M/CU MM    Hemoglobin 11.9 (L) 12.5 - 16.0 GM/DL    Hematocrit 37.2 37 - 47 %    .7 (H) 78 - 100 FL    MCH 35.7 (H) 27 - 31 PG    MCHC 32.0 32.0 - 36.0 %    RDW 13.9 11.7 - 14.9 %    Platelets 677 440 - 607 K/CU MM    MPV 8.9 7.5 - 11.1 FL    Differential Type AUTOMATED DIFFERENTIAL     Segs Relative 71.7 (H) 36 - 66 %    Lymphocytes % 11.3 (L) 24 - 44 %    Monocytes % 7.4 (H) 0 - 4 %    Eosinophils % 8.1 (H) 0 - 3 %    Basophils % 1.2 (H) 0 - 1 %    Segs Absolute 4.3 K/CU MM    Lymphocytes Absolute 0.7 K/CU MM    Monocytes Absolute 0.4 K/CU MM    Eosinophils Absolute 0.5 K/CU MM    Basophils Absolute 0.1 K/CU MM    Nucleated RBC % 0.0 %    Total Nucleated RBC 0.0 K/CU MM    Total Immature Neutrophil 0.02 K/CU MM    Immature Neutrophil % 0.3 0 - 0.43 %   Comprehensive Metabolic Panel w/ Reflex to MG   Result Value Ref Range    Sodium 133 (L) 135 - 145 MMOL/L    Potassium 4.1 3.5 - 5.1 MMOL/L    Chloride 101 99 - 110 mMol/L    CO2 23 21 - 32 MMOL/L    BUN 16 6 - 23 MG/DL    CREATININE 0.5 (L) 0.6 - 1.1 MG/DL    Glucose 100 (H) 70 - 99 MG/DL    Calcium 8.2 (L) 8.3 - 10.6 MG/DL    Alb 3.1 (L) 3.4 - 5.0 GM/DL    Total Protein 5.4 (L) 6.4 - 8.2 GM/DL    Total Bilirubin 0.2 0.0 - 1.0 MG/DL    ALT 8 (L) 10 - 40 U/L    AST 13 (L) 15 - 37 IU/L    Alkaline Phosphatase 95 40 - 129 IU/L    GFR Non-African American >60 >60 mL/min/1.73m2    GFR African American >60 >60 mL/min/1.73m2    Anion Gap 9 4 - 16   Troponin   Result Value Ref Range    Troponin T <0.010 <0.01 NG/ML   Brain Natriuretic Peptide   Result Value Ref Range    Pro-.6 (H) <300 PG/ML   Lactic Acid, Plasma   Result Value Ref Range    Lactate 0.8 0.4 - 2.0 mMOL/L   Blood Gas, Venous   Result Value Ref Range    pH, Krishna 7.45 (H) 7.32 - 7.42    pCO2, Krishna 37 (L) 38 - 52 mmHG    pO2, Krishna 185 (H) 28 - 48 mmHG    Base Exc, Mixed 1.8 0 - 2.3    HCO3, Venous 25.7 (H) 19 - 25 MMOL/L O2 Sat, Krishna 91.9 (H) 50 - 70 %    Comment VBG    EKG 12 Lead   Result Value Ref Range    Ventricular Rate 72 BPM    Atrial Rate 72 BPM    P-R Interval 132 ms    QRS Duration 88 ms    Q-T Interval 394 ms    QTc Calculation (Bazett) 431 ms    P Axis -7 degrees    R Axis -51 degrees    T Axis -59 degrees    Diagnosis       Sinus rhythm with fusion complexes  Left axis deviation  Inferior infarct (cited on or before 12-FEB-2017)  Anterolateral infarct (cited on or before 12-FEB-2017)  Abnormal ECG  When compared with ECG of 20-NOV-2020 19:14,  fusion complexes are now present  Questionable change in initial forces of Anterior leads  T wave inversion less evident in Lateral leads          Radiographs (if obtained):  Radiologist's Report Reviewed:  Xr Chest Portable    Result Date: 1/22/2021  EXAMINATION: ONE XRAY VIEW OF THE CHEST 1/22/2021 8:57 pm COMPARISON: Chest x-ray January 15, 2021 HISTORY: ORDERING SYSTEM PROVIDED HISTORY: Short of breath, pneumonia TECHNOLOGIST PROVIDED HISTORY: Reason for exam:->Short of breath, pneumonia Reason for Exam: Short of breath, pneumonia Acuity: Acute Type of Exam: Initial Additional signs and symptoms: na Relevant Medical/Surgical History: cad, hypertension FINDINGS: Cardiac silhouette appears stable. Atherosclerotic changes aorta. Postoperative changes of CABG procedure and median sternotomy. Cardiac loop recorder in place. Coronary stents in place. Mild interstitial opacities of the lungs bilaterally which could reflect edema versus infiltrate. No pleural effusion or pneumothorax. 1. Mild bilateral interstitial opacities of the lungs which could reflect pulmonary edema versus infiltrate.        EKG (if obtained): (All EKG's are interpreted by myself in the absence of a cardiologist) Twelve-lead EKG interpreted by me in the absence of a cardiologist.  There is no criteria ST elevation or reciprocal change. There are no hyperacute T wave changes. There is no sign of acute ischemia or infarction. This tracing shows a sinus rhythm with fusion complexes and a baseline artifact. Rate and intervals are 72 beats per minute, CT interval 132 milliseconds, QRS duration 88 milliseconds, QTc interval 431 milliseconds, and R axis left shifted at -51 degrees. There is no acute change compared with the most recent EKG dated January 15, 2020. Medical decision making:  Patient presents to the emergency department with persistent and subjectively worsening symptoms associated with a recently diagnosed pneumonia and underlying congestive heart failure. She feels short of breath constantly, and home antibiotics are not helping. She used to be on oxygen but no longer has it available. Lung sounds, BNP, and chest x-ray are also worrisome for component of congestive heart failure. Troponin and EKG do not show detectable ischemia or infarction. There has been no hemodynamic instability. These laboratory abnormalities could represent endorgan injury related to SIRS, but she has not required pressor support. Abdomen has been nonsurgical.    Procedures: None. Consultations: Hospitalist service    Clinical Impression:  1. Pneumonia of right upper lobe due to infectious organism    2. Acute on chronic congestive heart failure, unspecified heart failure type ([address]    3. Failure of outpatient treatment      Disposition referral (if applicable):  No follow-up provider specified.   Disposition medications (if applicable):  New Prescriptions    No medications on file     ED Provider Disposition Time  DISPOSITION Decision To Admit 01/22/2021 09:48:30 PM Comment: Please note this report has been produced using speech recognition software and may contain errors related to that system including errors in grammar, punctuation, and spelling, as well as words and phrases that may be inappropriate. Efforts were made to edit the dictations.         Aure Scales MD  01/23/21 2465

## 2021-01-23 LAB
ADENOVIRUS DETECTION BY PCR: NOT DETECTED
ANION GAP SERPL CALCULATED.3IONS-SCNC: 11 MMOL/L (ref 4–16)
BORDETELLA PARAPERTUSSIS BY PCR: NOT DETECTED
BORDETELLA PERTUSSIS PCR: NOT DETECTED
BUN BLDV-MCNC: 11 MG/DL (ref 6–23)
C-REACTIVE PROTEIN, HIGH SENSITIVITY: 66.4 MG/L
CALCIUM SERPL-MCNC: 8.7 MG/DL (ref 8.3–10.6)
CHLAMYDOPHILA PNEUMONIA PCR: NOT DETECTED
CHLORIDE BLD-SCNC: 95 MMOL/L (ref 99–110)
CO2: 23 MMOL/L (ref 21–32)
CORONAVIRUS 229E PCR: NOT DETECTED
CORONAVIRUS HKU1 PCR: NOT DETECTED
CORONAVIRUS NL63 PCR: NOT DETECTED
CORONAVIRUS OC43 PCR: NOT DETECTED
CREAT SERPL-MCNC: 0.6 MG/DL (ref 0.6–1.1)
GFR AFRICAN AMERICAN: >60 ML/MIN/1.73M2
GFR NON-AFRICAN AMERICAN: >60 ML/MIN/1.73M2
GLUCOSE BLD-MCNC: 119 MG/DL (ref 70–99)
HUMAN METAPNEUMOVIRUS PCR: NOT DETECTED
INFLUENZA A BY PCR: NOT DETECTED
INFLUENZA A H1 (2009) PCR: NOT DETECTED
INFLUENZA A H1 PANDEMIC PCR: NOT DETECTED
INFLUENZA A H3 PCR: NOT DETECTED
INFLUENZA B BY PCR: NOT DETECTED
MYCOPLASMA PNEUMONIAE PCR: NOT DETECTED
PARAINFLUENZA 1 PCR: NOT DETECTED
PARAINFLUENZA 2 PCR: NOT DETECTED
PARAINFLUENZA 3 PCR: NOT DETECTED
PARAINFLUENZA 4 PCR: NOT DETECTED
POTASSIUM SERPL-SCNC: 4 MMOL/L (ref 3.5–5.1)
PROCALCITONIN: 0.07
RHINOVIRUS ENTEROVIRUS PCR: NOT DETECTED
RSV PCR: NOT DETECTED
SARS-COV-2: NOT DETECTED
SODIUM BLD-SCNC: 129 MMOL/L (ref 135–145)

## 2021-01-23 PROCEDURE — 80048 BASIC METABOLIC PNL TOTAL CA: CPT

## 2021-01-23 PROCEDURE — 2700000000 HC OXYGEN THERAPY PER DAY

## 2021-01-23 PROCEDURE — 87081 CULTURE SCREEN ONLY: CPT

## 2021-01-23 PROCEDURE — 6370000000 HC RX 637 (ALT 250 FOR IP): Performed by: INTERNAL MEDICINE

## 2021-01-23 PROCEDURE — 2580000003 HC RX 258: Performed by: INTERNAL MEDICINE

## 2021-01-23 PROCEDURE — 0202U NFCT DS 22 TRGT SARS-COV-2: CPT

## 2021-01-23 PROCEDURE — 36415 COLL VENOUS BLD VENIPUNCTURE: CPT

## 2021-01-23 PROCEDURE — 6360000002 HC RX W HCPCS: Performed by: INTERNAL MEDICINE

## 2021-01-23 PROCEDURE — 94640 AIRWAY INHALATION TREATMENT: CPT

## 2021-01-23 PROCEDURE — 2500000003 HC RX 250 WO HCPCS: Performed by: INTERNAL MEDICINE

## 2021-01-23 PROCEDURE — 86140 C-REACTIVE PROTEIN: CPT

## 2021-01-23 PROCEDURE — 84145 PROCALCITONIN (PCT): CPT

## 2021-01-23 PROCEDURE — 94761 N-INVAS EAR/PLS OXIMETRY MLT: CPT

## 2021-01-23 PROCEDURE — 1200000000 HC SEMI PRIVATE

## 2021-01-23 PROCEDURE — 93010 ELECTROCARDIOGRAM REPORT: CPT | Performed by: INTERNAL MEDICINE

## 2021-01-23 RX ORDER — PROMETHAZINE HYDROCHLORIDE 25 MG/1
25 TABLET ORAL 4 TIMES DAILY PRN
Status: DISCONTINUED | OUTPATIENT
Start: 2021-01-23 | End: 2021-01-27 | Stop reason: HOSPADM

## 2021-01-23 RX ORDER — SODIUM CHLORIDE 0.9 % (FLUSH) 0.9 %
10 SYRINGE (ML) INJECTION PRN
Status: DISCONTINUED | OUTPATIENT
Start: 2021-01-23 | End: 2021-01-27 | Stop reason: HOSPADM

## 2021-01-23 RX ORDER — MAGNESIUM OXIDE 400 MG/1
400 TABLET ORAL DAILY
Status: DISCONTINUED | OUTPATIENT
Start: 2021-01-23 | End: 2021-01-27 | Stop reason: HOSPADM

## 2021-01-23 RX ORDER — PROMETHAZINE HYDROCHLORIDE 25 MG/1
12.5 TABLET ORAL EVERY 6 HOURS PRN
Status: DISCONTINUED | OUTPATIENT
Start: 2021-01-23 | End: 2021-01-27 | Stop reason: HOSPADM

## 2021-01-23 RX ORDER — ASPIRIN 81 MG/1
81 TABLET ORAL DAILY
Status: DISCONTINUED | OUTPATIENT
Start: 2021-01-23 | End: 2021-01-25

## 2021-01-23 RX ORDER — POLYETHYLENE GLYCOL 3350 17 G/17G
17 POWDER, FOR SOLUTION ORAL DAILY PRN
Status: DISCONTINUED | OUTPATIENT
Start: 2021-01-23 | End: 2021-01-27 | Stop reason: HOSPADM

## 2021-01-23 RX ORDER — LANOLIN ALCOHOL/MO/W.PET/CERES
1000 CREAM (GRAM) TOPICAL DAILY
Status: DISCONTINUED | OUTPATIENT
Start: 2021-01-23 | End: 2021-01-27 | Stop reason: HOSPADM

## 2021-01-23 RX ORDER — SODIUM CHLORIDE 0.9 % (FLUSH) 0.9 %
10 SYRINGE (ML) INJECTION EVERY 12 HOURS SCHEDULED
Status: DISCONTINUED | OUTPATIENT
Start: 2021-01-23 | End: 2021-01-27 | Stop reason: HOSPADM

## 2021-01-23 RX ORDER — NALOXONE HYDROCHLORIDE 0.4 MG/ML
0.4 INJECTION, SOLUTION INTRAMUSCULAR; INTRAVENOUS; SUBCUTANEOUS PRN
Status: DISCONTINUED | OUTPATIENT
Start: 2021-01-23 | End: 2021-01-27 | Stop reason: HOSPADM

## 2021-01-23 RX ORDER — DULOXETIN HYDROCHLORIDE 30 MG/1
60 CAPSULE, DELAYED RELEASE ORAL DAILY
Status: DISCONTINUED | OUTPATIENT
Start: 2021-01-23 | End: 2021-01-27 | Stop reason: HOSPADM

## 2021-01-23 RX ORDER — ALBUTEROL SULFATE 90 UG/1
2 AEROSOL, METERED RESPIRATORY (INHALATION) 4 TIMES DAILY
Status: DISCONTINUED | OUTPATIENT
Start: 2021-01-23 | End: 2021-01-27 | Stop reason: HOSPADM

## 2021-01-23 RX ORDER — BUDESONIDE AND FORMOTEROL FUMARATE DIHYDRATE 160; 4.5 UG/1; UG/1
2 AEROSOL RESPIRATORY (INHALATION) 2 TIMES DAILY
Status: DISCONTINUED | OUTPATIENT
Start: 2021-01-23 | End: 2021-01-27 | Stop reason: HOSPADM

## 2021-01-23 RX ORDER — POTASSIUM CHLORIDE 20 MEQ/1
10 TABLET, EXTENDED RELEASE ORAL 2 TIMES DAILY
Status: DISCONTINUED | OUTPATIENT
Start: 2021-01-23 | End: 2021-01-27 | Stop reason: HOSPADM

## 2021-01-23 RX ORDER — MIDODRINE HYDROCHLORIDE 5 MG/1
10 TABLET ORAL
Status: DISCONTINUED | OUTPATIENT
Start: 2021-01-23 | End: 2021-01-27 | Stop reason: HOSPADM

## 2021-01-23 RX ORDER — MIRTAZAPINE 15 MG/1
30 TABLET, FILM COATED ORAL NIGHTLY
Status: DISCONTINUED | OUTPATIENT
Start: 2021-01-23 | End: 2021-01-27 | Stop reason: HOSPADM

## 2021-01-23 RX ORDER — RANOLAZINE 500 MG/1
500 TABLET, EXTENDED RELEASE ORAL EVERY 12 HOURS
Status: DISCONTINUED | OUTPATIENT
Start: 2021-01-23 | End: 2021-01-27 | Stop reason: HOSPADM

## 2021-01-23 RX ORDER — ACETAMINOPHEN 325 MG/1
650 TABLET ORAL EVERY 6 HOURS PRN
Status: DISCONTINUED | OUTPATIENT
Start: 2021-01-23 | End: 2021-01-27 | Stop reason: HOSPADM

## 2021-01-23 RX ORDER — ACARBOSE 50 MG/1
25 TABLET ORAL
Status: DISCONTINUED | OUTPATIENT
Start: 2021-01-23 | End: 2021-01-27 | Stop reason: HOSPADM

## 2021-01-23 RX ORDER — FERROUS GLUCONATE 324(37.5)
324 TABLET ORAL 2 TIMES DAILY
Status: DISCONTINUED | OUTPATIENT
Start: 2021-01-23 | End: 2021-01-27 | Stop reason: HOSPADM

## 2021-01-23 RX ORDER — CLOPIDOGREL BISULFATE 75 MG/1
75 TABLET ORAL DAILY
Status: DISCONTINUED | OUTPATIENT
Start: 2021-01-23 | End: 2021-01-27 | Stop reason: HOSPADM

## 2021-01-23 RX ORDER — ONDANSETRON 2 MG/ML
4 INJECTION INTRAMUSCULAR; INTRAVENOUS EVERY 6 HOURS PRN
Status: DISCONTINUED | OUTPATIENT
Start: 2021-01-23 | End: 2021-01-27 | Stop reason: HOSPADM

## 2021-01-23 RX ORDER — ACETAMINOPHEN 650 MG/1
650 SUPPOSITORY RECTAL EVERY 6 HOURS PRN
Status: DISCONTINUED | OUTPATIENT
Start: 2021-01-23 | End: 2021-01-27 | Stop reason: HOSPADM

## 2021-01-23 RX ORDER — CALCIUM CARBONATE 500(1250)
500 TABLET ORAL DAILY
Status: DISCONTINUED | OUTPATIENT
Start: 2021-01-23 | End: 2021-01-27 | Stop reason: HOSPADM

## 2021-01-23 RX ORDER — FAMOTIDINE 20 MG/1
20 TABLET, FILM COATED ORAL 2 TIMES DAILY
Status: DISCONTINUED | OUTPATIENT
Start: 2021-01-23 | End: 2021-01-27 | Stop reason: HOSPADM

## 2021-01-23 RX ORDER — DABIGATRAN ETEXILATE 75 MG/1
150 CAPSULE, COATED PELLETS ORAL 2 TIMES DAILY
Status: DISCONTINUED | OUTPATIENT
Start: 2021-01-23 | End: 2021-01-27 | Stop reason: HOSPADM

## 2021-01-23 RX ORDER — ATORVASTATIN CALCIUM 80 MG/1
80 TABLET, FILM COATED ORAL NIGHTLY
Status: DISCONTINUED | OUTPATIENT
Start: 2021-01-23 | End: 2021-01-27 | Stop reason: HOSPADM

## 2021-01-23 RX ORDER — SPIRONOLACTONE 25 MG/1
12.5 TABLET ORAL DAILY
Status: DISCONTINUED | OUTPATIENT
Start: 2021-01-23 | End: 2021-01-27 | Stop reason: HOSPADM

## 2021-01-23 RX ORDER — LEVETIRACETAM 500 MG/1
1000 TABLET ORAL DAILY
Status: DISCONTINUED | OUTPATIENT
Start: 2021-01-23 | End: 2021-01-27 | Stop reason: HOSPADM

## 2021-01-23 RX ORDER — DOCUSATE SODIUM 100 MG/1
100 CAPSULE, LIQUID FILLED ORAL 2 TIMES DAILY PRN
Status: DISCONTINUED | OUTPATIENT
Start: 2021-01-23 | End: 2021-01-27 | Stop reason: HOSPADM

## 2021-01-23 RX ORDER — AMIODARONE HYDROCHLORIDE 200 MG/1
400 TABLET ORAL DAILY
Status: DISCONTINUED | OUTPATIENT
Start: 2021-01-23 | End: 2021-01-27 | Stop reason: HOSPADM

## 2021-01-23 RX ORDER — BUMETANIDE 0.25 MG/ML
1 INJECTION, SOLUTION INTRAMUSCULAR; INTRAVENOUS 2 TIMES DAILY
Status: DISCONTINUED | OUTPATIENT
Start: 2021-01-23 | End: 2021-01-24

## 2021-01-23 RX ORDER — METOPROLOL SUCCINATE 25 MG/1
12.5 TABLET, EXTENDED RELEASE ORAL DAILY
Status: DISCONTINUED | OUTPATIENT
Start: 2021-01-23 | End: 2021-01-27 | Stop reason: HOSPADM

## 2021-01-23 RX ORDER — LEVOTHYROXINE SODIUM 0.07 MG/1
150 TABLET ORAL
Status: DISCONTINUED | OUTPATIENT
Start: 2021-01-23 | End: 2021-01-27 | Stop reason: HOSPADM

## 2021-01-23 RX ORDER — OXYCODONE HYDROCHLORIDE 5 MG/1
20 TABLET ORAL
Status: DISCONTINUED | OUTPATIENT
Start: 2021-01-23 | End: 2021-01-27 | Stop reason: HOSPADM

## 2021-01-23 RX ORDER — GABAPENTIN 300 MG/1
300 CAPSULE ORAL 3 TIMES DAILY
Status: DISCONTINUED | OUTPATIENT
Start: 2021-01-23 | End: 2021-01-27 | Stop reason: HOSPADM

## 2021-01-23 RX ORDER — IBUPROFEN 200 MG
1425 CAPSULE ORAL DAILY
Status: DISCONTINUED | OUTPATIENT
Start: 2021-01-23 | End: 2021-01-23 | Stop reason: CLARIF

## 2021-01-23 RX ADMIN — CALCIUM 500 MG: 500 TABLET ORAL at 09:12

## 2021-01-23 RX ADMIN — FAMOTIDINE 20 MG: 20 TABLET, FILM COATED ORAL at 20:10

## 2021-01-23 RX ADMIN — CYANOCOBALAMIN TAB 1000 MCG 1000 MCG: 1000 TAB at 09:13

## 2021-01-23 RX ADMIN — GABAPENTIN 300 MG: 300 CAPSULE ORAL at 15:22

## 2021-01-23 RX ADMIN — FERROUS GLUCONATE TAB 324 MG (37.5 MG ELEMENTAL IRON) 324 MG: 324 (37.5 FE) TAB at 09:13

## 2021-01-23 RX ADMIN — METOPROLOL SUCCINATE 12.5 MG: 25 TABLET, EXTENDED RELEASE ORAL at 09:12

## 2021-01-23 RX ADMIN — MIRTAZAPINE 30 MG: 15 TABLET, FILM COATED ORAL at 20:10

## 2021-01-23 RX ADMIN — SODIUM CHLORIDE, PRESERVATIVE FREE 10 ML: 5 INJECTION INTRAVENOUS at 09:13

## 2021-01-23 RX ADMIN — LEVOTHYROXINE SODIUM 150 MCG: 75 TABLET ORAL at 06:04

## 2021-01-23 RX ADMIN — ALBUTEROL SULFATE 2 PUFF: 90 AEROSOL, METERED RESPIRATORY (INHALATION) at 12:32

## 2021-01-23 RX ADMIN — OXYCODONE HYDROCHLORIDE 20 MG: 5 TABLET ORAL at 21:28

## 2021-01-23 RX ADMIN — DULOXETINE HYDROCHLORIDE 60 MG: 30 CAPSULE, DELAYED RELEASE ORAL at 09:11

## 2021-01-23 RX ADMIN — OXYCODONE HYDROCHLORIDE 20 MG: 5 TABLET ORAL at 18:27

## 2021-01-23 RX ADMIN — ACETAMINOPHEN 650 MG: 325 TABLET ORAL at 12:09

## 2021-01-23 RX ADMIN — FERROUS GLUCONATE TAB 324 MG (37.5 MG ELEMENTAL IRON) 324 MG: 324 (37.5 FE) TAB at 20:12

## 2021-01-23 RX ADMIN — ALBUTEROL SULFATE 2 PUFF: 90 AEROSOL, METERED RESPIRATORY (INHALATION) at 19:50

## 2021-01-23 RX ADMIN — PROMETHAZINE HYDROCHLORIDE 12.5 MG: 25 TABLET ORAL at 12:09

## 2021-01-23 RX ADMIN — CEFEPIME HYDROCHLORIDE 1000 MG: 1 INJECTION, POWDER, FOR SOLUTION INTRAMUSCULAR; INTRAVENOUS at 15:21

## 2021-01-23 RX ADMIN — GABAPENTIN 300 MG: 300 CAPSULE ORAL at 20:10

## 2021-01-23 RX ADMIN — AMIODARONE HYDROCHLORIDE 400 MG: 200 TABLET ORAL at 09:11

## 2021-01-23 RX ADMIN — PROMETHAZINE HYDROCHLORIDE 12.5 MG: 25 TABLET ORAL at 02:39

## 2021-01-23 RX ADMIN — OXYCODONE HYDROCHLORIDE 20 MG: 5 TABLET ORAL at 06:04

## 2021-01-23 RX ADMIN — OXYCODONE HYDROCHLORIDE 20 MG: 5 TABLET ORAL at 12:06

## 2021-01-23 RX ADMIN — BUMETANIDE 1 MG: 0.25 INJECTION INTRAMUSCULAR; INTRAVENOUS at 20:10

## 2021-01-23 RX ADMIN — FAMOTIDINE 20 MG: 20 TABLET, FILM COATED ORAL at 09:11

## 2021-01-23 RX ADMIN — OXYCODONE HYDROCHLORIDE 20 MG: 5 TABLET ORAL at 09:13

## 2021-01-23 RX ADMIN — MIDODRINE HYDROCHLORIDE 10 MG: 5 TABLET ORAL at 16:53

## 2021-01-23 RX ADMIN — PROMETHAZINE HYDROCHLORIDE 12.5 MG: 25 TABLET ORAL at 18:27

## 2021-01-23 RX ADMIN — DABIGATRAN ETEXILATE MESYLATE 150 MG: 75 CAPSULE ORAL at 20:10

## 2021-01-23 RX ADMIN — ASPIRIN 81 MG: 81 TABLET, COATED ORAL at 09:12

## 2021-01-23 RX ADMIN — LEVETIRACETAM 1000 MG: 500 TABLET, FILM COATED ORAL at 09:11

## 2021-01-23 RX ADMIN — BUDESONIDE AND FORMOTEROL FUMARATE DIHYDRATE 2 PUFF: 160; 4.5 AEROSOL RESPIRATORY (INHALATION) at 08:45

## 2021-01-23 RX ADMIN — RANOLAZINE 500 MG: 500 TABLET, FILM COATED, EXTENDED RELEASE ORAL at 20:10

## 2021-01-23 RX ADMIN — CEFEPIME HYDROCHLORIDE 1000 MG: 1 INJECTION, POWDER, FOR SOLUTION INTRAMUSCULAR; INTRAVENOUS at 06:04

## 2021-01-23 RX ADMIN — POTASSIUM CHLORIDE 10 MEQ: 20 TABLET, EXTENDED RELEASE ORAL at 20:11

## 2021-01-23 RX ADMIN — SPIRONOLACTONE 12.5 MG: 25 TABLET ORAL at 09:12

## 2021-01-23 RX ADMIN — OXYCODONE HYDROCHLORIDE 20 MG: 5 TABLET ORAL at 02:39

## 2021-01-23 RX ADMIN — BUDESONIDE AND FORMOTEROL FUMARATE DIHYDRATE 2 PUFF: 160; 4.5 AEROSOL RESPIRATORY (INHALATION) at 19:44

## 2021-01-23 RX ADMIN — DABIGATRAN ETEXILATE MESYLATE 150 MG: 75 CAPSULE ORAL at 09:11

## 2021-01-23 RX ADMIN — SODIUM CHLORIDE, PRESERVATIVE FREE 10 ML: 5 INJECTION INTRAVENOUS at 20:11

## 2021-01-23 RX ADMIN — CLOPIDOGREL BISULFATE 75 MG: 75 TABLET ORAL at 09:12

## 2021-01-23 RX ADMIN — ALBUTEROL SULFATE 2 PUFF: 90 AEROSOL, METERED RESPIRATORY (INHALATION) at 17:05

## 2021-01-23 RX ADMIN — ACARBOSE 25 MG: 50 TABLET ORAL at 09:12

## 2021-01-23 RX ADMIN — GABAPENTIN 300 MG: 300 CAPSULE ORAL at 09:13

## 2021-01-23 RX ADMIN — MIDODRINE HYDROCHLORIDE 10 MG: 5 TABLET ORAL at 09:11

## 2021-01-23 RX ADMIN — OXYCODONE HYDROCHLORIDE 20 MG: 5 TABLET ORAL at 15:27

## 2021-01-23 RX ADMIN — ALBUTEROL SULFATE 2 PUFF: 90 AEROSOL, METERED RESPIRATORY (INHALATION) at 08:45

## 2021-01-23 RX ADMIN — ACARBOSE 25 MG: 50 TABLET ORAL at 16:53

## 2021-01-23 RX ADMIN — ATORVASTATIN CALCIUM 80 MG: 80 TABLET, FILM COATED ORAL at 20:10

## 2021-01-23 RX ADMIN — VANCOMYCIN HYDROCHLORIDE 1000 MG: 1 INJECTION, POWDER, LYOPHILIZED, FOR SOLUTION INTRAVENOUS at 18:27

## 2021-01-23 RX ADMIN — RANOLAZINE 500 MG: 500 TABLET, FILM COATED, EXTENDED RELEASE ORAL at 09:12

## 2021-01-23 RX ADMIN — VANCOMYCIN HYDROCHLORIDE 1000 MG: 1 INJECTION, POWDER, LYOPHILIZED, FOR SOLUTION INTRAVENOUS at 06:42

## 2021-01-23 RX ADMIN — CEFEPIME HYDROCHLORIDE 1000 MG: 1 INJECTION, POWDER, FOR SOLUTION INTRAMUSCULAR; INTRAVENOUS at 21:22

## 2021-01-23 RX ADMIN — MAGNESIUM OXIDE 400 MG: 400 TABLET ORAL at 09:11

## 2021-01-23 RX ADMIN — POTASSIUM CHLORIDE 10 MEQ: 20 TABLET, EXTENDED RELEASE ORAL at 09:12

## 2021-01-23 RX ADMIN — MIDODRINE HYDROCHLORIDE 10 MG: 5 TABLET ORAL at 12:06

## 2021-01-23 RX ADMIN — ACARBOSE 25 MG: 50 TABLET ORAL at 12:06

## 2021-01-23 RX ADMIN — ACETAMINOPHEN 650 MG: 325 TABLET ORAL at 18:27

## 2021-01-23 ASSESSMENT — PAIN SCALES - GENERAL
PAINLEVEL_OUTOF10: 0
PAINLEVEL_OUTOF10: 1
PAINLEVEL_OUTOF10: 7
PAINLEVEL_OUTOF10: 8

## 2021-01-23 NOTE — PROGRESS NOTES
-MRSA infection of ICD complicated by bacteremia June 2020. Appropriately treated with antibiotics. ICD removed since then. Currently on LifeVest.  She has had issues with recurrent ICD pocket infections. - Ischemic CMP, EF 35%; well compensated. On IMDUR, aldactone, metoprolol, Bumex.  -B12 deficiency: on cyanocobalamin  -Depression: on duloxetine and paroxetine  -HLD: Lipitor. -Iron deficiency: on ferrous gluconate  -Severe malnutrition due to chronic disease: general diet, NS, Mineral Supplement, Vitamin Supplement    Diet DIET GENERAL;   DVT Prophylaxis [x] dabigatran   GI Prophylaxis [] PPI,  [] H2 Blocker,  [] Carafate,  [] Diet/Tube Feeds   Code Status Full Code   Disposition  Home   MDM [] Low, [x] Moderate,[]  High     History of Present Illness:   Patient seen and examined. She reports feeling better today than when she was admitted. No fever or chills. No nausea or vomiting. She denies shortness of breath. Ten point ROS reviewed negative, unless as noted above    Objective: Intake/Output Summary (Last 24 hours) at 1/23/2021 1036  Last data filed at 1/23/2021 0911  Gross per 24 hour   Intake 250 ml   Output 2050 ml   Net -1800 ml      Vitals:   Vitals:    01/23/21 0908   BP: 124/71   Pulse: 68   Resp: 15   Temp: 98.3 °F (36.8 °C)   SpO2: 94%     Physical Exam:   GEN Awake female, sitting upright in bed. RESP breathing comfortably on supplemental oxygen 2 L/min via nasal cannula. CARDIO/VASC S1/S2 auscultated. Regular rate without appreciable murmurs. No peripheral edema. Wound dressing over the right chest wall [site of previously removed ICD]  GI Abdomen is soft without significant tenderness, masses, or guarding. Bowel sounds are normoactive. MSK No gross joint deformities. SKIN Normal coloration, warm, dry. NEURO normal speech, no lateralizing weakness.   PSYCH Awake, alert, oriented x 3    Medications:   Medications:

## 2021-01-23 NOTE — H&P
HISTORY AND PHYSICAL  (Hospitalist, Internal Medicine)  IDENTIFYING INFORMATION   PATIENT:  Roosevelt Andrade  MRN:  [id number]  ADMIT DATE: 1/22/2021      CHIEF COMPLAINT   Cough and shortness of breath    HISTORY OF PRESENT ILLNESS Initial vitals in ED /80, HR 65, RR 14, temperature 97.9, saturating 100% on 2 L of oxygen. Lab work significant for sodium 133, lactic acid 0.8, proBNP 919, troponin less than 0.010, hemoglobin 11.9, platelets 996. VBG-pH 7.45, PCO2 37, PO2 185, HCO3 25.7. Chest x-ray-mild bilateral interstitial opacities of the lung which could reflect pulmonary edema versus infiltrate. Blood cultures were drawn in ER. Patient received IV ceftriaxone, azithromycin, Lasix, methylprednisolone, morphineX2 in ER. PAST MEDICAL HISTORY PAST SURGICAL HISTORY   extensive cardiac history - CAD (s/p CABG and multiple PCI), chronic systolic/diastolic heart failure (EF 30%), VT (s/p ablation and BiV-ICD),  Most recent ablation in July 2019 at [facility], remote DVT / PE (s/p IVC filter), reported multiple sclerosis, rheumatoid arthritis, prior frontal lobe CVA (4/2016), reported seizure disorder, bilateral hip AVN, hypothyroidism, chronic pain, s/p splenectomy, GERD, Maru en Y gastric bypass c/b malabsorption (s/p PEG, now removed for recurrent infection) CABG, splenectomy, hysterectomy, appendectomy, cholecystectomy, AICD placement gastric bypass surgery, hernia repair.      FAMILY HISTORY SOCIAL HISTORY    Positive for extensive heart disease, hypertension, diabetes.   Denies smoking, alcohol, illicit drug abuse   MEDICATIONS ALLERGIES Reviewed medications with patient-Qvar, acarbose 25 mg 3 times daily with meals, Symbicort, Bumex 1 mg daily, dabigatran 150 mg twice daily, duloxetine 60 mg daily, ergocalciferol 50,000 units twice weekly, gabapentin 300 mg 3 times daily, mirtazapine 30 mg nightly, Phenergan 25 mg 4 times daily as needed, ranolazine 100 mg twice daily, oydxyzmuuvupne86.5 mg daily, famotidine 20 mg twice daily, Keppra 1000 mg daily, levothyroxine 150 MCG daily, magnesium oxide 400 mg daily, potassium chloride 10 mEq twice daily, metoprolol succinate 12.5 mg daily, omeprazole 20 mg daily, amiodarone 200 mg daily, oxycodone 20 mg every 3 hours as needed, midodrine 10 mg 3 times daily, DuoNeb, atorvastatin 80 mg nightly, Plavix 75 mg daily. NSAIDs, fentanyl, Betadine, tramadol, methocarbamol, tizanidine       PAST MEDICAL, SURGICAL, FAMILY, and SOCIAL HISTORY         Past Medical History:   Diagnosis Date    Acute delirium     Arrhythmia     Arthritis     Asplenia     Asthma     CAD (coronary artery disease)     1st stent at age 46    Cardiomyopathy [facility])     Cerebral artery occlusion with cerebral infarction [facility] [address]     CHF (congestive heart failure) (HCC)     Enlarged liver     GERD (gastroesophageal reflux disease)     H/O echocardiogram 4/6/16    EF 55%, trivial TR & MR, stage 1 diastolic dysfunction    History of blood transfusion     Hx of cardiovascular stress test 12/29/2015    Alessia: EF 45%. Pharmacologic stress myocardial perfusion scan shows a fixed inferior-lateral defect w/ no inducible ischemia. No evidence of ischemia. Inferior-lateral infarction.  Normal LVSF    Hyperlipidemia     Hypertension     Lymphoma ([address]     Denies    MI, old     Movement disorder     MS (multiple sclerosis) ([facility] [address]     OP (osteoporosis)     PE (pulmonary embolism)     trapese filter    Pneumonia     Pyelonephritis     Seizures (HCC)     Severe malnutrition (HCC)     Spleen enlarged     Thyroid disease Past Surgical History:   Procedure Laterality Date    ABDOMEN SURGERY      APPENDECTOMY      CARDIAC DEFIBRILLATOR PLACEMENT      Brit To [id number] CONDITIONAL    CHOLECYSTECTOMY      COLONOSCOPY      CORONARY ANGIOPLASTY WITH STENT PLACEMENT      3 stents    CORONARY ARTERY BYPASS GRAFT      Age 49    ENDOSCOPY, COLON, DIAGNOSTIC      GASTRIC BYPASS SURGERY      HERNIA REPAIR      HIP SURGERY      HYSTERECTOMY      JOINT REPLACEMENT      PORT SURGERY N/A 6/1/2020    PORT INSERTION performed by Kieran Otto MD at [place] [address] SKIN BIOPSY      TONSILLECTOMY      VASCULAR SURGERY       Family History   Problem Relation Age of Onset    High Blood Pressure Mother     High Cholesterol Mother     Heart Disease Mother     Diabetes Mother     Heart Disease Father     Cancer Father     Other Sister         Multiple Sclerosis    Heart Disease Maternal Grandmother     High Blood Pressure Maternal Grandmother     High Cholesterol Maternal Grandmother      Family Hx of HTN  Family Hx as reviewed above, otherwise non-contributory  Social History     Socioeconomic History    Marital status: Single     Spouse name: None    Number of children: None    Years of education: None    Highest education level: None   Occupational History    None   Social Needs    Financial resource strain: None    Food insecurity     Worry: None     Inability: None    Transportation needs     Medical: None     Non-medical: None   Tobacco Use    Smoking status: Never Smoker    Smokeless tobacco: Never Used   Substance and Sexual Activity    Alcohol use: No    Drug use: No    Sexual activity: Not Currently   Lifestyle    Physical activity     Days per week: None     Minutes per session: None    Stress: None   Relationships    Social connections     Talks on phone: None     Gets together: None     Attends Zoroastrian service: None     Active member of club or organization: None Attends meetings of clubs or organizations: None     Relationship status: None    Intimate partner violence     Fear of current or ex partner: None     Emotionally abused: None     Physically abused: None     Forced sexual activity: None   Other Topics Concern    None   Social History Narrative    None       MEDICATIONS   Medications Prior to Admission  Not in a hospital admission. Current Medications  No current facility-administered medications for this encounter. Current Outpatient Medications   Medication Sig Dispense Refill    beclomethasone (QVAR) 40 MCG/ACT inhaler Inhale 2 puffs into the lungs 2 times daily      acarbose (PRECOSE) 25 MG tablet Take 25 mg by mouth 3 times daily (with meals)      budesonide-formoterol (SYMBICORT) 160-4.5 MCG/ACT AERO Inhale 2 puffs into the lungs 2 times daily      bumetanide (BUMEX) 1 MG tablet Take 1 mg by mouth daily      calcium citrate (CALCITRATE) 950 (200 Ca) MG tablet Take 1,425 mg by mouth daily      dabigatran (PRADAXA) 75 MG capsule Take 150 mg by mouth      DULoxetine (CYMBALTA) 60 MG extended release capsule Take 60 mg by mouth daily      ergocalciferol (ERGOCALCIFEROL) 1.25 MG (82249 UT) capsule Take 50,000 Units by mouth Twice a Week      gabapentin (NEURONTIN) 300 MG capsule Take 300 mg by mouth 3 times daily.       mirtazapine (REMERON) 30 MG tablet Take 30 mg by mouth nightly      promethazine (PHENERGAN) 25 MG tablet Take 25 mg by mouth 4 times daily as needed      ranolazine (RANEXA) 500 MG extended release tablet Take 500 mg by mouth every 12 hours      spironolactone (ALDACTONE) 25 MG tablet Take 12.5 mg by mouth daily      docusate (COLACE, DULCOLAX) 100 MG CAPS Take 100 mg by mouth 2 times daily      famotidine (PEPCID) 20 MG tablet Take 20 mg by mouth 2 times daily      levETIRAcetam (KEPPRA) 1000 MG tablet Take 1,000 mg by mouth daily  levothyroxine (SYNTHROID) 150 MCG tablet Take 150 mcg by mouth every morning (before breakfast)      magnesium oxide (MAG-OX) 400 MG tablet Take 400 mg by mouth daily      nitroGLYCERIN (NITROSTAT) 0.4 MG SL tablet Place 0.4 mg under the tongue as needed      oxyCODONE (ROXICODONE) 20 MG immediate release tablet Take 20 mg by mouth every 3 hours as needed.  potassium chloride (KLOR-CON M) 10 MEQ extended release tablet Take 10 mEq by mouth 2 times daily 2 (10mEq)      torsemide (DEMADEX) 20 MG tablet Take 20 mg by mouth daily      metoprolol succinate (TOPROL XL) 25 MG extended release tablet Take 12.5 mg by mouth daily Half a tablet (12.5 total)      omeprazole (PRILOSEC) 20 MG delayed release capsule omeprazole 20 mg capsule,delayed release      senna (SENOKOT) 8.6 MG tablet Take 1 tablet by mouth daily      aspirin 81 MG EC tablet Take 81 mg by mouth daily      cyanocobalamin 1000 MCG tablet Take 1 tablet by mouth daily      ferrous gluconate (FERGON) 324 (38 Fe) MG tablet Take 1 tablet by mouth 2 times daily      amiodarone (CORDARONE) 200 MG tablet Take 400 mg by mouth daily      cefdinir (OMNICEF) 300 MG capsule Take 1 capsule by mouth 2 times daily for 7 days 14 capsule 0    oxyCODONE (ROXICODONE) 20 MG immediate release tablet Take 20 mg by mouth every 3 hours as needed.       chlorhexidine gluconate (ANTISEPTIC SKIN CLEANSER) 4 % SOLN external solution Apply topically daily 1 Bottle 1    midodrine (PROAMATINE) 10 MG tablet Take 1 tablet by mouth 3 times daily (with meals) 90 tablet 0    ipratropium-albuterol (DUONEB) 0.5-2.5 (3) MG/3ML SOLN nebulizer solution Inhale 3 mLs into the lungs every 4 hours (while awake) 360 mL 0    benzocaine-menthol (CEPACOL SORE THROAT) 15-3.6 MG lozenge Take 1 lozenge by mouth every 2 hours as needed for Sore Throat 18 lozenge 1  torsemide (DEMADEX) 20 MG tablet Take 20 mg by mouth daily as needed (For weight gain or fluid retention) Indications: pt takes 2 tablets daily       ondansetron (ZOFRAN) 4 MG tablet Take 1 tablet by mouth every 6 hours as needed for Nausea or Vomiting 20 tablet 0    docusate sodium (COLACE) 100 MG capsule Take 100 mg by mouth 2 times daily as needed for Constipation      atorvastatin (LIPITOR) 80 MG tablet Take 80 mg by mouth nightly      clopidogrel (PLAVIX) 75 MG tablet Take 75 mg by mouth daily      acetaminophen 650 MG TABS Take 650 mg by mouth every 4 hours as needed 120 tablet 3    albuterol (PROVENTIL HFA;VENTOLIN HFA) 108 (90 BASE) MCG/ACT inhaler Inhale 2 puffs into the lungs every 6 hours as needed. Allergies  Allergies   Allergen Reactions    Fentanyl Swelling     Other reaction(s): Angioedema (swelling)    Moxifloxacin Hcl In Nacl Swelling and Rash    Povidone-Iodine Rash     Other reaction(s): AOF      Cyclobenzaprine      Other reaction(s): Other - comment required  \" it make my muscle very weak because of my MS\"  \" it make my muscle very weak because of my MS\"      Methocarbamol      Worsening edema  Other reaction(s): Other - comment required  Worsening edema  Worsening edema  Worsening edema      Tramadol Other (See Comments)     Doctor doesn't her to take due to heart problems  Seizures   Doctor doesn't her to take due to lowers seizure threshold.  Baclofen     Ibuprofen      \"Due to heart problems\"    Naproxen     Nsaids      \"Due to heart problems\"    Tizanidine     Tolmetin      \"Due to heart problems\"    Tramadol Hcl      Other reaction(s):  Other - comment required  Told not to take due to history of seizures    Betadine [Povidone Iodine] Rash    Moxifloxacin Rash and Swelling     Lips swell and tingling in face   Lips swell and tingling in face   Lips swell and tingling in face   Lips swell and tingling in face   Around mouth       REVIEW OF SYSTEMS Within above limitations. 14 point review of systems reviewed. Pertinent positive or negative as per HPI or otherwise negative per 14 point systems review. PHYSICAL EXAM     Wt Readings from Last 3 Encounters:   01/22/21 123 lb (55.8 kg)   01/15/21 128 lb (58.1 kg)   11/20/20 128 lb (58.1 kg)       Blood pressure (!) 153/93, pulse 61, temperature 97.9 °F (36.6 °C), temperature source Oral, resp. rate 16, height 5' 6\" (1.676 m), weight 123 lb (55.8 kg), SpO2 97 %. GEN  -Awake, alert, NAD.   EYES   -PERRL. HENT  -MM are moist.   RESP  -LS CTA equal bilat, wheezes, + rhonchi. Symmetric chest movement. No respiratory distress noted. C/V  -S1/S2 auscultated. RRR. No JVD or carotid bruits. Peripheral pulses equal bilaterally and palpable. No peripheral edema. GI  -Abdomen is soft non-distended, no significant tenderness. No masses or guarding. + BS in all quadrants. Rectal exam deferred.   -No CVA tenderness. Coppola catheter is not present. MS  -B/L extremities -Full movements. No gross joint deformities. No swelling, intact sensation symmetrical.   SKIN  -Normal coloration, warm, dry. NEURO  -CN 2-12 appear grossly intact, normal speech, no lateralizing weakness. PSYC  -Awake, alert, oriented x 3. Appropriate affect. LABS AND IMAGING     Results for Justin Zeng (MRN [id number]) as of 1/23/2021 03:03   Ref.  Range 1/22/2021 19:37   Sodium Latest Ref Range: 135 - 145 MMOL/L 133 (L)   Potassium Latest Ref Range: 3.5 - 5.1 MMOL/L 4.1   Chloride Latest Ref Range: 99 - 110 mMol/L 101   CO2 Latest Ref Range: 21 - 32 MMOL/L 23   BUN Latest Ref Range: 6 - 23 MG/DL 16   Creatinine Latest Ref Range: 0.6 - 1.1 MG/DL 0.5 (L)   Anion Gap Latest Ref Range: 4 - 16  9   GFR Non- Latest Ref Range: >60 mL/min/1.73m2 >60   GFR African American Latest Ref Range: >60 mL/min/1.73m2 >60   Lactate, ser/plas Latest Ref Range: 0.4 - 2.0 mMOL/L 0.8   Glucose Latest Ref Range: 70 - 99 MG/ (H) Calcium Latest Ref Range: 8.3 - 10.6 MG/DL 8.2 (L)   Total Protein Latest Ref Range: 6.4 - 8.2 GM/DL 5.4 (L)   Pro-BNP Latest Ref Range: <300 PG/.6 (H)   Troponin T Latest Ref Range: <0.01 NG/ML <0.010   Albumin Latest Ref Range: 3.4 - 5.0 GM/DL 3.1 (L)   Alk Phos Latest Ref Range: 40 - 129 IU/L 95   ALT Latest Ref Range: 10 - 40 U/L 8 (L)   AST Latest Ref Range: 15 - 37 IU/L 13 (L)   Bilirubin Latest Ref Range: 0.0 - 1.0 MG/DL 0.2   WBC Latest Ref Range: 4.0 - 10.5 K/CU MM 5.9   RBC Latest Ref Range: 4.2 - 5.4 M/CU MM 3.33 (L)   Hemoglobin Quant Latest Ref Range: 12.5 - 16.0 GM/DL 11.9 (L)   Hematocrit Latest Ref Range: 37 - 47 % 37.2   MCV Latest Ref Range: 78 - 100 .7 (H)   MCH Latest Ref Range: 27 - 31 PG 35.7 (H)   MCHC Latest Ref Range: 32.0 - 36.0 % 32.0   MPV Latest Ref Range: 7.5 - 11.1 FL 8.9   RDW Latest Ref Range: 11.7 - 14.9 % 13.9   Platelet Count Latest Ref Range: 140 - 440 K/CU    Lymphocyte % Latest Ref Range: 24 - 44 % 11.3 (L)   Monocytes % Latest Ref Range: 0 - 4 % 7.4 (H)   Eosinophils % Latest Ref Range: 0 - 3 % 8.1 (H)   Basophils % Latest Ref Range: 0 - 1 % 1.2 (H)   Lymphocytes Absolute Latest Units: K/CU MM 0.7   Monocytes Absolute Latest Units: K/CU MM 0.4   Eosinophils Absolute Latest Units: K/CU MM 0.5   Basophils Absolute Latest Units: K/CU MM 0.1   Differential Type Unknown AUTOMATED DIFFERENTIAL   Segs Relative Latest Ref Range: 36 - 66 % 71.7 (H)   Segs Absolute Latest Units: K/CU MM 4.3   Nucleated RBC % Latest Units: % 0.0   Immature Neutrophil % Latest Ref Range: 0 - 0.43 % 0.3   Total Immature Neutrophil Latest Units: K/CU MM 0.02   Total Nucleated RBC Latest Units: K/CU MM 0.0     Results for Benitez Hi (MRN [id number]) as of 1/23/2021 03:03   Ref.  Range 1/22/2021 18:45   pH, Krishna Latest Ref Range: 7.32 - 7.42  7.45 (H)   pCO2, Krishna Latest Ref Range: 38 - 52 mmHG 37 (L)   pO2, Krishna Latest Ref Range: 28 - 48 mmHG 185 (H) HCO3, Venous Latest Ref Range: 19 - 25 MMOL/L 25.7 (H)   O2 Sat, Krishna Latest Ref Range: 50 - 70 % 91.9 (H)   Base Exc, Mixed Latest Ref Range: 0 - 2.3  1.8     Recent Imaging     XR CHEST PORTABLE [[id number]] Collected: 01/22/21 2131     Order Status: Completed Updated: 01/22/21 2135     Narrative:       EXAMINATION:   ONE XRAY VIEW OF THE CHEST     1/22/2021 8:57 pm     COMPARISON:   Chest x-ray January 15, 2021     HISTORY:   ORDERING SYSTEM PROVIDED HISTORY: Short of breath, pneumonia   TECHNOLOGIST PROVIDED HISTORY:   Reason for exam:->Short of breath, pneumonia   Reason for Exam: Short of breath, pneumonia   Acuity: Acute   Type of Exam: Initial   Additional signs and symptoms: na   Relevant Medical/Surgical History: cad, hypertension     FINDINGS:   Cardiac silhouette appears stable.  Atherosclerotic changes aorta. Postoperative changes of CABG procedure and median sternotomy.  Cardiac loop   recorder in place.  Coronary stents in place.  Mild interstitial opacities of   the lungs bilaterally which could reflect edema versus infiltrate.  No   pleural effusion or pneumothorax.      Impression:       1. Mild bilateral interstitial opacities of the lungs which could reflect   pulmonary edema versus infiltrate.            Relevant labs and imaging reviewed    ASSESSMENT AND PLAN     #. Acute on chronic respiratory failure with hypoxia:  -As per previous documentation patient is on 3 L of oxygen. Patient reports that she uses oxygen only as needed at home.  -Her oxygen has been dropping down to 80s at home.  -Currently on 2 L of oxygen saturating 100%  -Wean off oxygen as tolerated. #.  Pneumonia: We will treat as healthcare associated pneumonia  -Patient has multiple admissions to the hospital-recently 1/7/2021 to [place], [place] for decompensated systolic heart failure.  -Patient received ceftriaxone, azithromycin in the ED  -rapid COVID negative 1/15, 1/21([facility]). Ordered PCR. -Continue vancomycin, cefepime  -Blood cultures drawn in ED    #. Mild CHF exacerbation:  -Patient is on Bumex 1 mg daily  -Patient received 40 mg of IV Lasix in ED  -We will continue with IV Bumex twice daily for 2 doses and reevaluate. 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  -Patient currently on LifeVest.    #. CAD (s/p CABG and multiple PCI)    #. chronic systolic/diastolic heart failure  -TJNY-0/1404-DFT per care everywhere EF -35%    #. VT (s/p ablation and BiV-ICD-now removed)  #.  DVT/PE (s/p IVC filter)  -Reviewed care everywhere-nonocclusive DVT involving one of the right posterior tibial vein-11/2020  -patient currently on Pradaxa 150 mg twice daily    #. reported multiple sclerosis, rheumatoid arthritis  #. prior frontal lobe CVA (4/2016)  #. seizure disorder-continue Keppra    #. bilateral hip AVN-continue oxycodone    #. hypothyroidism-levothyroxine    #. s/p splenectomy  #. GERD-Pepcid    #. h/o Maru en Y gastric bypass c/b malabsorption-patient wants to be on regular diet due to failure to thrive. DVT Prophylaxis: Pradaxa  GI Prophylaxis: Pepcid  Code Status: FULL.   Discussed with patient      Case d/w ED physician    Reginald Adamson MD  Hospitalist, Internal Medicine  1/22/2021 at 11:25 PM

## 2021-01-23 NOTE — PROGRESS NOTES
[address] [facility]  consulted by Dr. Francia Baptiste for monitoring and adjustment. Indication for treatment: Pneumonia  Goal trough: 15 mcg/mL (AUC/CHUCKIE: 400-600)    Pertinent Laboratory Values:   Temp Readings from Last 3 Encounters:   01/23/21 98.1 °F (36.7 °C) (Oral)   01/15/21 98.9 °F (37.2 °C) (Oral)   11/20/20 98.1 °F (36.7 °C) (Oral)     Recent Labs     01/22/21  1937   WBC 5.9   LACTATE 0.8     Recent Labs     01/22/21 1937   BUN 16   CREATININE 0.5*     Estimated Creatinine Clearance: 107 mL/min (A) (based on SCr of 0.5 mg/dL (L)). Intake/Output Summary (Last 24 hours) at 1/23/2021 0530  Last data filed at 1/22/2021 2331  Gross per 24 hour   Intake    Output 1300 ml   Net -1300 ml       Pertinent Cultures:  Date    Source    Results  1/22   Blood    Ordered  1/23   Resp panel PCR  Negative    Vancomycin level:   TROUGH:  No results for input(s): VANCOTROUGH in the last 72 hours. RANDOM:  No results for input(s): VANCORANDOM in the last 72 hours. Assessment:  WBC and temperature: WNL  SCr, BUN, and urine output: WNL, stable  Day(s) of therapy:1  Vancomycin concentration: To be collected    Plan:  Start Vancomycin 1000 mg IVPB q12h  Trough prior to the 4th dose  Pharmacy will continue to monitor patient and adjust therapy as indicated    July 3 1/24/21 @3778    Thank you for the consult.   Leni Cleveland RPh   1/23/2021 5:30 AM

## 2021-01-24 LAB
ANION GAP SERPL CALCULATED.3IONS-SCNC: 9 MMOL/L (ref 4–16)
BUN BLDV-MCNC: 11 MG/DL (ref 6–23)
CALCIUM SERPL-MCNC: 8.4 MG/DL (ref 8.3–10.6)
CHLORIDE BLD-SCNC: 100 MMOL/L (ref 99–110)
CO2: 27 MMOL/L (ref 21–32)
CREAT SERPL-MCNC: 0.8 MG/DL (ref 0.6–1.1)
CULTURE: ABNORMAL
DOSE AMOUNT: ABNORMAL
DOSE TIME: ABNORMAL
GFR AFRICAN AMERICAN: >60 ML/MIN/1.73M2
GFR NON-AFRICAN AMERICAN: >60 ML/MIN/1.73M2
GLUCOSE BLD-MCNC: 104 MG/DL (ref 70–99)
LEGIONELLA URINARY AG: NEGATIVE
Lab: ABNORMAL
POTASSIUM SERPL-SCNC: 4.1 MMOL/L (ref 3.5–5.1)
SODIUM BLD-SCNC: 136 MMOL/L (ref 135–145)
SPECIMEN: ABNORMAL
STREP PNEUMONIAE ANTIGEN: NORMAL
VANCOMYCIN TROUGH: 9 UG/ML (ref 10–20)

## 2021-01-24 PROCEDURE — 87899 AGENT NOS ASSAY W/OPTIC: CPT

## 2021-01-24 PROCEDURE — 6370000000 HC RX 637 (ALT 250 FOR IP): Performed by: INTERNAL MEDICINE

## 2021-01-24 PROCEDURE — 1200000000 HC SEMI PRIVATE

## 2021-01-24 PROCEDURE — 87449 NOS EACH ORGANISM AG IA: CPT

## 2021-01-24 PROCEDURE — 2700000000 HC OXYGEN THERAPY PER DAY

## 2021-01-24 PROCEDURE — 36415 COLL VENOUS BLD VENIPUNCTURE: CPT

## 2021-01-24 PROCEDURE — 80048 BASIC METABOLIC PNL TOTAL CA: CPT

## 2021-01-24 PROCEDURE — 2580000003 HC RX 258: Performed by: INTERNAL MEDICINE

## 2021-01-24 PROCEDURE — 87070 CULTURE OTHR SPECIMN AEROBIC: CPT

## 2021-01-24 PROCEDURE — 87205 SMEAR GRAM STAIN: CPT

## 2021-01-24 PROCEDURE — 80202 ASSAY OF VANCOMYCIN: CPT

## 2021-01-24 PROCEDURE — 94640 AIRWAY INHALATION TREATMENT: CPT

## 2021-01-24 PROCEDURE — 6360000002 HC RX W HCPCS: Performed by: INTERNAL MEDICINE

## 2021-01-24 PROCEDURE — 94761 N-INVAS EAR/PLS OXIMETRY MLT: CPT

## 2021-01-24 RX ORDER — BUMETANIDE 1 MG/1
2 TABLET ORAL DAILY
Status: DISCONTINUED | OUTPATIENT
Start: 2021-01-24 | End: 2021-01-27 | Stop reason: HOSPADM

## 2021-01-24 RX ADMIN — ACARBOSE 25 MG: 50 TABLET ORAL at 11:48

## 2021-01-24 RX ADMIN — ALBUTEROL SULFATE 2 PUFF: 90 AEROSOL, METERED RESPIRATORY (INHALATION) at 14:52

## 2021-01-24 RX ADMIN — FERROUS GLUCONATE TAB 324 MG (37.5 MG ELEMENTAL IRON) 324 MG: 324 (37.5 FE) TAB at 08:39

## 2021-01-24 RX ADMIN — PROMETHAZINE HYDROCHLORIDE 12.5 MG: 25 TABLET ORAL at 22:27

## 2021-01-24 RX ADMIN — BUDESONIDE AND FORMOTEROL FUMARATE DIHYDRATE 2 PUFF: 160; 4.5 AEROSOL RESPIRATORY (INHALATION) at 07:12

## 2021-01-24 RX ADMIN — FAMOTIDINE 20 MG: 20 TABLET, FILM COATED ORAL at 20:12

## 2021-01-24 RX ADMIN — ACARBOSE 25 MG: 50 TABLET ORAL at 08:38

## 2021-01-24 RX ADMIN — OXYCODONE HYDROCHLORIDE 20 MG: 5 TABLET ORAL at 14:58

## 2021-01-24 RX ADMIN — CLOPIDOGREL BISULFATE 75 MG: 75 TABLET ORAL at 08:39

## 2021-01-24 RX ADMIN — ALBUTEROL SULFATE 2 PUFF: 90 AEROSOL, METERED RESPIRATORY (INHALATION) at 07:12

## 2021-01-24 RX ADMIN — MIDODRINE HYDROCHLORIDE 10 MG: 5 TABLET ORAL at 11:48

## 2021-01-24 RX ADMIN — VANCOMYCIN HYDROCHLORIDE 1000 MG: 1 INJECTION, POWDER, LYOPHILIZED, FOR SOLUTION INTRAVENOUS at 06:41

## 2021-01-24 RX ADMIN — GABAPENTIN 300 MG: 300 CAPSULE ORAL at 14:57

## 2021-01-24 RX ADMIN — ATORVASTATIN CALCIUM 80 MG: 80 TABLET, FILM COATED ORAL at 20:12

## 2021-01-24 RX ADMIN — OXYCODONE HYDROCHLORIDE 20 MG: 5 TABLET ORAL at 06:46

## 2021-01-24 RX ADMIN — POTASSIUM CHLORIDE 10 MEQ: 20 TABLET, EXTENDED RELEASE ORAL at 08:38

## 2021-01-24 RX ADMIN — ASPIRIN 81 MG: 81 TABLET, COATED ORAL at 08:38

## 2021-01-24 RX ADMIN — CEFEPIME HYDROCHLORIDE 1000 MG: 1 INJECTION, POWDER, FOR SOLUTION INTRAMUSCULAR; INTRAVENOUS at 22:27

## 2021-01-24 RX ADMIN — MIDODRINE HYDROCHLORIDE 10 MG: 5 TABLET ORAL at 16:54

## 2021-01-24 RX ADMIN — ACETAMINOPHEN 650 MG: 325 TABLET ORAL at 00:36

## 2021-01-24 RX ADMIN — ACETAMINOPHEN 650 MG: 325 TABLET ORAL at 22:27

## 2021-01-24 RX ADMIN — BUDESONIDE AND FORMOTEROL FUMARATE DIHYDRATE 2 PUFF: 160; 4.5 AEROSOL RESPIRATORY (INHALATION) at 19:08

## 2021-01-24 RX ADMIN — BUMETANIDE 2 MG: 1 TABLET ORAL at 11:48

## 2021-01-24 RX ADMIN — LEVOTHYROXINE SODIUM 150 MCG: 75 TABLET ORAL at 06:09

## 2021-01-24 RX ADMIN — PROMETHAZINE HYDROCHLORIDE 12.5 MG: 25 TABLET ORAL at 14:58

## 2021-01-24 RX ADMIN — ACARBOSE 25 MG: 50 TABLET ORAL at 16:54

## 2021-01-24 RX ADMIN — SODIUM CHLORIDE, PRESERVATIVE FREE 10 ML: 5 INJECTION INTRAVENOUS at 20:12

## 2021-01-24 RX ADMIN — ACETAMINOPHEN 650 MG: 325 TABLET ORAL at 06:46

## 2021-01-24 RX ADMIN — VANCOMYCIN HYDROCHLORIDE 1000 MG: 1 INJECTION, POWDER, LYOPHILIZED, FOR SOLUTION INTRAVENOUS at 17:55

## 2021-01-24 RX ADMIN — DULOXETINE HYDROCHLORIDE 60 MG: 30 CAPSULE, DELAYED RELEASE ORAL at 08:39

## 2021-01-24 RX ADMIN — PROMETHAZINE HYDROCHLORIDE 12.5 MG: 25 TABLET ORAL at 06:45

## 2021-01-24 RX ADMIN — OXYCODONE HYDROCHLORIDE 20 MG: 5 TABLET ORAL at 19:12

## 2021-01-24 RX ADMIN — SODIUM CHLORIDE, PRESERVATIVE FREE 10 ML: 5 INJECTION INTRAVENOUS at 15:14

## 2021-01-24 RX ADMIN — RANOLAZINE 500 MG: 500 TABLET, FILM COATED, EXTENDED RELEASE ORAL at 20:11

## 2021-01-24 RX ADMIN — ALBUTEROL SULFATE 2 PUFF: 90 AEROSOL, METERED RESPIRATORY (INHALATION) at 10:59

## 2021-01-24 RX ADMIN — CEFEPIME HYDROCHLORIDE 1000 MG: 1 INJECTION, POWDER, FOR SOLUTION INTRAMUSCULAR; INTRAVENOUS at 14:57

## 2021-01-24 RX ADMIN — LEVETIRACETAM 1000 MG: 500 TABLET, FILM COATED ORAL at 08:39

## 2021-01-24 RX ADMIN — ALBUTEROL SULFATE 2 PUFF: 90 AEROSOL, METERED RESPIRATORY (INHALATION) at 19:07

## 2021-01-24 RX ADMIN — SPIRONOLACTONE 12.5 MG: 25 TABLET ORAL at 08:38

## 2021-01-24 RX ADMIN — METOPROLOL SUCCINATE 12.5 MG: 25 TABLET, EXTENDED RELEASE ORAL at 08:38

## 2021-01-24 RX ADMIN — MIRTAZAPINE 30 MG: 15 TABLET, FILM COATED ORAL at 20:12

## 2021-01-24 RX ADMIN — OXYCODONE HYDROCHLORIDE 20 MG: 5 TABLET ORAL at 22:27

## 2021-01-24 RX ADMIN — AMIODARONE HYDROCHLORIDE 400 MG: 200 TABLET ORAL at 08:38

## 2021-01-24 RX ADMIN — CEFEPIME HYDROCHLORIDE 1000 MG: 1 INJECTION, POWDER, FOR SOLUTION INTRAMUSCULAR; INTRAVENOUS at 06:10

## 2021-01-24 RX ADMIN — FERROUS GLUCONATE TAB 324 MG (37.5 MG ELEMENTAL IRON) 324 MG: 324 (37.5 FE) TAB at 20:11

## 2021-01-24 RX ADMIN — POTASSIUM CHLORIDE 10 MEQ: 20 TABLET, EXTENDED RELEASE ORAL at 20:12

## 2021-01-24 RX ADMIN — GABAPENTIN 300 MG: 300 CAPSULE ORAL at 20:11

## 2021-01-24 RX ADMIN — PROMETHAZINE HYDROCHLORIDE 12.5 MG: 25 TABLET ORAL at 00:37

## 2021-01-24 RX ADMIN — MAGNESIUM OXIDE 400 MG: 400 TABLET ORAL at 08:38

## 2021-01-24 RX ADMIN — CYANOCOBALAMIN TAB 1000 MCG 1000 MCG: 1000 TAB at 08:39

## 2021-01-24 RX ADMIN — GABAPENTIN 300 MG: 300 CAPSULE ORAL at 08:38

## 2021-01-24 RX ADMIN — OXYCODONE HYDROCHLORIDE 20 MG: 5 TABLET ORAL at 09:58

## 2021-01-24 RX ADMIN — MIDODRINE HYDROCHLORIDE 10 MG: 5 TABLET ORAL at 08:38

## 2021-01-24 RX ADMIN — FAMOTIDINE 20 MG: 20 TABLET, FILM COATED ORAL at 08:38

## 2021-01-24 RX ADMIN — RANOLAZINE 500 MG: 500 TABLET, FILM COATED, EXTENDED RELEASE ORAL at 08:38

## 2021-01-24 RX ADMIN — DABIGATRAN ETEXILATE MESYLATE 150 MG: 75 CAPSULE ORAL at 08:39

## 2021-01-24 RX ADMIN — CALCIUM 500 MG: 500 TABLET ORAL at 08:38

## 2021-01-24 RX ADMIN — ACETAMINOPHEN 650 MG: 325 TABLET ORAL at 14:58

## 2021-01-24 RX ADMIN — DABIGATRAN ETEXILATE MESYLATE 150 MG: 75 CAPSULE ORAL at 20:12

## 2021-01-24 RX ADMIN — OXYCODONE HYDROCHLORIDE 20 MG: 5 TABLET ORAL at 00:37

## 2021-01-24 RX ADMIN — OXYCODONE HYDROCHLORIDE 20 MG: 5 TABLET ORAL at 03:46

## 2021-01-24 ASSESSMENT — PAIN DESCRIPTION - PAIN TYPE
TYPE: CHRONIC PAIN

## 2021-01-24 ASSESSMENT — PAIN DESCRIPTION - LOCATION: LOCATION: GENERALIZED

## 2021-01-24 ASSESSMENT — PAIN SCALES - GENERAL
PAINLEVEL_OUTOF10: 7
PAINLEVEL_OUTOF10: 5
PAINLEVEL_OUTOF10: 9

## 2021-01-24 NOTE — PROGRESS NOTES
-MRSA infection of ICD complicated by bacteremia June 2020. Appropriately treated with antibiotics. ICD removed since then. Currently on LifeVest.  She has had issues with recurrent ICD pocket infections. - Ischemic CMP, EF 35%; well compensated. On IMDUR, aldactone, metoprolol, Bumex.  -B12 deficiency: on cyanocobalamin  -Depression: on duloxetine and paroxetine  -HLD: Lipitor. -Iron deficiency: on ferrous gluconate  -Severe malnutrition due to chronic disease: general diet. Diet DIET GENERAL;   DVT Prophylaxis [x] dabigatran   GI Prophylaxis [] PPI,  [] H2 Blocker,  [] Carafate,  [] Diet/Tube Feeds   Code Status Full Code   Disposition  Home   MDM [] Low, [x] Moderate,[]  High     History of Present Illness:   Patient seen and examined. No acute issues overnight. No fever or chills. Occasionally coughing but nonproductive. Oxygen requirement stable at 2 L/min. Ten point ROS reviewed negative, unless as noted above    Objective: Intake/Output Summary (Last 24 hours) at 1/24/2021 1022  Last data filed at 1/24/2021 0855  Gross per 24 hour   Intake 480 ml   Output 1500 ml   Net -1020 ml      Vitals:   Vitals:    01/24/21 0833   BP: 100/63   Pulse: 53   Resp: 14   Temp: 97.6 °F (36.4 °C)   SpO2: 95%     Physical Exam:   GEN Awake female, sitting upright in bed. RESP breathing comfortably on supplemental oxygen 2 L/min via nasal cannula. CARDIO/VASC S1/S2 auscultated. Regular rate without appreciable murmurs. No peripheral edema. Wound dressing over the right chest wall [site of previously removed ICD]  GI Abdomen is soft without significant tenderness, masses, or guarding. Bowel sounds are normoactive. MSK No gross joint deformities. SKIN Normal coloration, warm, dry. NEURO normal speech, no lateralizing weakness.   PSYCH Awake, alert, oriented x 3    Medications:   Medications:    bumetanide  2 mg Oral Daily    sodium chloride flush  10 mL Intravenous 2 times per day

## 2021-01-24 NOTE — PROGRESS NOTES
[address] [facility]  consulted by Dr. Monique Bailon for monitoring and adjustment. Indication for treatment: Pneumonia  Goal trough: 15 mcg/mL (AUC/CHUCKIE: 400-600)    Pertinent Laboratory Values:   Temp Readings from Last 3 Encounters:   01/24/21 96.7 °F (35.9 °C) (Oral)   01/15/21 98.9 °F (37.2 °C) (Oral)   11/20/20 98.1 °F (36.7 °C) (Oral)     Recent Labs     01/22/21  1937   WBC 5.9   LACTATE 0.8     Recent Labs     01/22/21  1937 01/23/21  1156 01/24/21  0835   BUN 16 11 11   CREATININE 0.5* 0.6 0.8     Estimated Creatinine Clearance: 67 mL/min (based on SCr of 0.8 mg/dL). Intake/Output Summary (Last 24 hours) at 1/24/2021 1538  Last data filed at 1/24/2021 0855  Gross per 24 hour   Intake 480 ml   Output 1500 ml   Net -1020 ml       Pertinent Cultures:  Date    Source    Results  1/22   Blood    Ordered  1/23   Resp panel PCR  Negative    Vancomycin level:   TROUGH:  No results for input(s): VANCOTROUGH in the last 72 hours. RANDOM:  No results for input(s): VANCORANDOM in the last 72 hours. Assessment:  · WBC and temperature: WNL  · SCr, BUN, and urine output: WNL, stable  · Day(s) of therapy: # 2  · Vancomycin concentration:   · 1/24:  Due @ 1730    Plan:  · Renal labs are stable, wnl  · Continue Vancomycin 1000 mg IVPB q12h  · Trough level due this evening  · Pharmacy will continue to monitor patient and adjust therapy as indicated    July 3 1/24/21 @1730    Thank you for the consult.   Alicia Barbour [place]   1/24/2021 3:38 PM

## 2021-01-25 LAB
ANION GAP SERPL CALCULATED.3IONS-SCNC: 12 MMOL/L (ref 4–16)
BUN BLDV-MCNC: 10 MG/DL (ref 6–23)
CALCIUM SERPL-MCNC: 8.4 MG/DL (ref 8.3–10.6)
CHLORIDE BLD-SCNC: 99 MMOL/L (ref 99–110)
CO2: 31 MMOL/L (ref 21–32)
CREAT SERPL-MCNC: 0.7 MG/DL (ref 0.6–1.1)
GFR AFRICAN AMERICAN: >60 ML/MIN/1.73M2
GFR NON-AFRICAN AMERICAN: >60 ML/MIN/1.73M2
GLUCOSE BLD-MCNC: 82 MG/DL (ref 70–99)
POTASSIUM SERPL-SCNC: 4.2 MMOL/L (ref 3.5–5.1)
SODIUM BLD-SCNC: 142 MMOL/L (ref 135–145)

## 2021-01-25 PROCEDURE — 2700000000 HC OXYGEN THERAPY PER DAY

## 2021-01-25 PROCEDURE — 36415 COLL VENOUS BLD VENIPUNCTURE: CPT

## 2021-01-25 PROCEDURE — 80048 BASIC METABOLIC PNL TOTAL CA: CPT

## 2021-01-25 PROCEDURE — 6370000000 HC RX 637 (ALT 250 FOR IP): Performed by: INTERNAL MEDICINE

## 2021-01-25 PROCEDURE — 6360000002 HC RX W HCPCS: Performed by: INTERNAL MEDICINE

## 2021-01-25 PROCEDURE — APPNB45 APP NON BILLABLE 31-45 MINUTES: Performed by: NURSE PRACTITIONER

## 2021-01-25 PROCEDURE — 99222 1ST HOSP IP/OBS MODERATE 55: CPT | Performed by: INTERNAL MEDICINE

## 2021-01-25 PROCEDURE — 1200000000 HC SEMI PRIVATE

## 2021-01-25 PROCEDURE — 99221 1ST HOSP IP/OBS SF/LOW 40: CPT | Performed by: SURGERY

## 2021-01-25 PROCEDURE — 94640 AIRWAY INHALATION TREATMENT: CPT

## 2021-01-25 PROCEDURE — 2580000003 HC RX 258: Performed by: INTERNAL MEDICINE

## 2021-01-25 PROCEDURE — 94761 N-INVAS EAR/PLS OXIMETRY MLT: CPT

## 2021-01-25 RX ORDER — LINEZOLID 600 MG/1
600 TABLET, FILM COATED ORAL EVERY 12 HOURS SCHEDULED
Status: DISCONTINUED | OUTPATIENT
Start: 2021-01-25 | End: 2021-01-27 | Stop reason: HOSPADM

## 2021-01-25 RX ADMIN — GABAPENTIN 300 MG: 300 CAPSULE ORAL at 08:42

## 2021-01-25 RX ADMIN — ALBUTEROL SULFATE 2 PUFF: 90 AEROSOL, METERED RESPIRATORY (INHALATION) at 07:55

## 2021-01-25 RX ADMIN — CLOPIDOGREL BISULFATE 75 MG: 75 TABLET ORAL at 08:44

## 2021-01-25 RX ADMIN — ACETAMINOPHEN 650 MG: 325 TABLET ORAL at 04:51

## 2021-01-25 RX ADMIN — MIDODRINE HYDROCHLORIDE 10 MG: 5 TABLET ORAL at 16:43

## 2021-01-25 RX ADMIN — OXYCODONE HYDROCHLORIDE 20 MG: 5 TABLET ORAL at 04:51

## 2021-01-25 RX ADMIN — MAGNESIUM OXIDE 400 MG: 400 TABLET ORAL at 08:43

## 2021-01-25 RX ADMIN — CEFEPIME HYDROCHLORIDE 1000 MG: 1 INJECTION, POWDER, FOR SOLUTION INTRAMUSCULAR; INTRAVENOUS at 05:00

## 2021-01-25 RX ADMIN — OXYCODONE HYDROCHLORIDE 20 MG: 5 TABLET ORAL at 08:41

## 2021-01-25 RX ADMIN — MIDODRINE HYDROCHLORIDE 10 MG: 5 TABLET ORAL at 12:47

## 2021-01-25 RX ADMIN — RANOLAZINE 500 MG: 500 TABLET, FILM COATED, EXTENDED RELEASE ORAL at 21:46

## 2021-01-25 RX ADMIN — CYANOCOBALAMIN TAB 1000 MCG 1000 MCG: 1000 TAB at 08:43

## 2021-01-25 RX ADMIN — ACARBOSE 25 MG: 50 TABLET ORAL at 12:47

## 2021-01-25 RX ADMIN — AMIODARONE HYDROCHLORIDE 400 MG: 200 TABLET ORAL at 08:42

## 2021-01-25 RX ADMIN — PROMETHAZINE HYDROCHLORIDE 12.5 MG: 25 TABLET ORAL at 04:51

## 2021-01-25 RX ADMIN — MIDODRINE HYDROCHLORIDE 10 MG: 5 TABLET ORAL at 08:42

## 2021-01-25 RX ADMIN — METOPROLOL SUCCINATE 12.5 MG: 25 TABLET, EXTENDED RELEASE ORAL at 08:43

## 2021-01-25 RX ADMIN — ASPIRIN 81 MG: 81 TABLET, COATED ORAL at 08:43

## 2021-01-25 RX ADMIN — BUDESONIDE AND FORMOTEROL FUMARATE DIHYDRATE 2 PUFF: 160; 4.5 AEROSOL RESPIRATORY (INHALATION) at 07:56

## 2021-01-25 RX ADMIN — ATORVASTATIN CALCIUM 80 MG: 80 TABLET, FILM COATED ORAL at 21:46

## 2021-01-25 RX ADMIN — FERROUS GLUCONATE TAB 324 MG (37.5 MG ELEMENTAL IRON) 324 MG: 324 (37.5 FE) TAB at 21:49

## 2021-01-25 RX ADMIN — POTASSIUM CHLORIDE 10 MEQ: 20 TABLET, EXTENDED RELEASE ORAL at 21:46

## 2021-01-25 RX ADMIN — DULOXETINE HYDROCHLORIDE 60 MG: 30 CAPSULE, DELAYED RELEASE ORAL at 08:42

## 2021-01-25 RX ADMIN — ACETAMINOPHEN 650 MG: 325 TABLET ORAL at 16:43

## 2021-01-25 RX ADMIN — LINEZOLID 600 MG: 600 TABLET, FILM COATED ORAL at 21:49

## 2021-01-25 RX ADMIN — PROMETHAZINE HYDROCHLORIDE 12.5 MG: 25 TABLET ORAL at 16:43

## 2021-01-25 RX ADMIN — FAMOTIDINE 20 MG: 20 TABLET, FILM COATED ORAL at 21:46

## 2021-01-25 RX ADMIN — ACARBOSE 25 MG: 50 TABLET ORAL at 16:52

## 2021-01-25 RX ADMIN — MIRTAZAPINE 30 MG: 15 TABLET, FILM COATED ORAL at 21:46

## 2021-01-25 RX ADMIN — LEVETIRACETAM 1000 MG: 500 TABLET, FILM COATED ORAL at 08:42

## 2021-01-25 RX ADMIN — DABIGATRAN ETEXILATE MESYLATE 150 MG: 75 CAPSULE ORAL at 21:46

## 2021-01-25 RX ADMIN — CEFEPIME HYDROCHLORIDE 1000 MG: 1 INJECTION, POWDER, FOR SOLUTION INTRAMUSCULAR; INTRAVENOUS at 21:46

## 2021-01-25 RX ADMIN — FERROUS GLUCONATE TAB 324 MG (37.5 MG ELEMENTAL IRON) 324 MG: 324 (37.5 FE) TAB at 08:44

## 2021-01-25 RX ADMIN — LINEZOLID 600 MG: 600 TABLET, FILM COATED ORAL at 12:46

## 2021-01-25 RX ADMIN — SPIRONOLACTONE 12.5 MG: 25 TABLET ORAL at 08:43

## 2021-01-25 RX ADMIN — LEVOTHYROXINE SODIUM 150 MCG: 75 TABLET ORAL at 05:32

## 2021-01-25 RX ADMIN — ACARBOSE 25 MG: 50 TABLET ORAL at 08:44

## 2021-01-25 RX ADMIN — POTASSIUM CHLORIDE 10 MEQ: 20 TABLET, EXTENDED RELEASE ORAL at 08:44

## 2021-01-25 RX ADMIN — BUMETANIDE 2 MG: 1 TABLET ORAL at 08:44

## 2021-01-25 RX ADMIN — CALCIUM 500 MG: 500 TABLET ORAL at 08:43

## 2021-01-25 RX ADMIN — OXYCODONE HYDROCHLORIDE 20 MG: 5 TABLET ORAL at 16:43

## 2021-01-25 RX ADMIN — RANOLAZINE 500 MG: 500 TABLET, FILM COATED, EXTENDED RELEASE ORAL at 08:43

## 2021-01-25 RX ADMIN — FAMOTIDINE 20 MG: 20 TABLET, FILM COATED ORAL at 08:43

## 2021-01-25 RX ADMIN — VANCOMYCIN HYDROCHLORIDE 1000 MG: 1 INJECTION, POWDER, LYOPHILIZED, FOR SOLUTION INTRAVENOUS at 05:30

## 2021-01-25 RX ADMIN — CEFEPIME HYDROCHLORIDE 1000 MG: 1 INJECTION, POWDER, FOR SOLUTION INTRAMUSCULAR; INTRAVENOUS at 16:47

## 2021-01-25 RX ADMIN — OXYCODONE HYDROCHLORIDE 20 MG: 5 TABLET ORAL at 19:50

## 2021-01-25 RX ADMIN — DABIGATRAN ETEXILATE MESYLATE 150 MG: 75 CAPSULE ORAL at 08:43

## 2021-01-25 RX ADMIN — SODIUM CHLORIDE, PRESERVATIVE FREE 10 ML: 5 INJECTION INTRAVENOUS at 08:56

## 2021-01-25 RX ADMIN — OXYCODONE HYDROCHLORIDE 20 MG: 5 TABLET ORAL at 12:46

## 2021-01-25 ASSESSMENT — ENCOUNTER SYMPTOMS
EYES NEGATIVE: 1
ALLERGIC/IMMUNOLOGIC NEGATIVE: 1
SHORTNESS OF BREATH: 1
GASTROINTESTINAL NEGATIVE: 1

## 2021-01-25 ASSESSMENT — PAIN SCALES - GENERAL
PAINLEVEL_OUTOF10: 9
PAINLEVEL_OUTOF10: 8
PAINLEVEL_OUTOF10: 0
PAINLEVEL_OUTOF10: 9

## 2021-01-25 NOTE — PROGRESS NOTES
Bradycardia      Patient in room well-known to me who has recurrent infections and device removal.  Patient finally had a Micra recently after the episodes of syncope at [facility]. Patient device rate is set at VVI 40 so that is why the patient is running in the heart rate in sinus rate is at 40's at times and she is hemodynamically doing okay with that rate. I would not recommend increasing the device rate as VVI pacing can at times increase the risk of atrial fibrillation so I would recommend only demand pacing at VVI less than 40. Patient is planned for ICD infection is cleared on the left side per her has disclosed to her in [facility]. But patient is not happy with [facility] considering [employer] device when she wants to have [employer] device. And she wants to know if we can do it here. I told her lets wait for the infection to clear as still there is superficial infection which is unhealed wound so it cannot be done at this time anyways.     Patient currently being treated for pneumonia    Full note  to follow

## 2021-01-25 NOTE — PROGRESS NOTES
[address] [facility]  consulted by Dr. Jackson Hidalgo for monitoring and adjustment. Indication for treatment: Pneumonia  Goal trough: 15 mcg/mL (AUC/CHUCKIE: 400-600)    Pertinent Laboratory Values:   Temp Readings from Last 3 Encounters:   01/25/21 97.8 °F (36.6 °C) (Oral)   01/15/21 98.9 °F (37.2 °C) (Oral)   11/20/20 98.1 °F (36.7 °C) (Oral)     Recent Labs     01/22/21  1937   WBC 5.9   LACTATE 0.8     Recent Labs     01/22/21  1937 01/23/21  1156 01/24/21  0835   BUN 16 11 11   CREATININE 0.5* 0.6 0.8     Estimated Creatinine Clearance: 67 mL/min (based on SCr of 0.8 mg/dL). Intake/Output Summary (Last 24 hours) at 1/25/2021 0503  Last data filed at 1/24/2021 2150  Gross per 24 hour   Intake    Output 3500 ml   Net -3500 ml       Pertinent Cultures:  Date    Source    Results  1/22   Blood    Ordered  1/23   Resp panel PCR  Negative    Vancomycin level:   TROUGH:    Recent Labs     01/24/21  1719   VANCOTROUGH 9.0*     RANDOM:  No results for input(s): VANCORANDOM in the last 72 hours. Assessment:  · WBC and temperature: WNL  · SCr, BUN, and urine output: WNL, stable  · Day(s) of therapy: # 3  · Vancomycin concentration:   · 1/24: 9.0, sub-therapeutic    Plan:  · Patient has a history of accumulating Vancomycin when renal function was stable  · Continue Vancomycin 1000 mg IVPB q12h  · Repeat trough in 48 hours  · Pharmacy will continue to monitor patient and adjust therapy as indicated    July 3 1/26/21 @5984    Thank you for the consult.   Annamarie Bergeron RPh   1/25/2021 5:03 AM

## 2021-01-25 NOTE — CARE COORDINATION
Chart reviewed and screened for possible needs at discharge. Pt lives at home with her partner and daughter. Pt has a walker, wheelchair, cane and shower chair. Pt has PCP and insurance to help w/ RX's. CM available for possible needs at discharge.

## 2021-01-25 NOTE — CONSULTS
Electrophysiology Consult Note      Reason for consultation:  Bradycardia    Chief complaint : Shortness of breath and cough    Referring physician:       Primary care physician: Charis Jarvis MD      History of Present Illness:     Patient is a 81-year-old female with a history of ischemic cardiomyopathy s/p multiple device infections and extractions, history of PE with IVC filter, hypertension, history of VT, recurrent septicemia, hyperlipidemia, multiple sclerosis, syncope secondary to bradycardia s/p leadless single-chamber pacemaker at [facility] presents with complaints of shortness of breath and cough. She additionally had productive cough and fever of 101. She was Covid negative. This admission she is being treated for pneumonia. During this admission patient noted to be bradycardic with heart rate in 40s. EP was consulted for bradycardia and patient has a pacemaker so there was some concern about pacemaker not capturing. Patient reports she does have a history of syncope but has not passed out since the pacemaker was placed. She denies any chest pain, palpitations, lightheadedness, dizziness, edema, syncope. She states her shortness of breath has improved since admission    She tells is that Micra was placed because she was having syncopal episodes. She is waiting for systemic infection to clear and then states she is supposed to have a new ICD placed at [facility]. However she would like to have it placed here in [facility] because she would prefer to have [employer] device versus [employer] device and [facility] is recommending [employer] device.       Pastmedical history:   Past Medical History:   Diagnosis Date    Acute delirium     Arrhythmia     Arthritis     Asplenia     Asthma     CAD (coronary artery disease)     1st stent at age 46    Cardiomyopathy [facility])     Cerebral artery occlusion with cerebral infarction ([facility] [address]  CHF (congestive heart failure) (HCC)     Enlarged liver     GERD (gastroesophageal reflux disease)     H/O echocardiogram 4/6/16    EF 55%, trivial TR & MR, stage 1 diastolic dysfunction    History of blood transfusion     Hx of cardiovascular stress test 12/29/2015    Alessia: EF 45%. Pharmacologic stress myocardial perfusion scan shows a fixed inferior-lateral defect w/ no inducible ischemia. No evidence of ischemia. Inferior-lateral infarction.  Normal LVSF    Hyperlipidemia     Hypertension     Lymphoma ([address]     Denies    MI, old     Movement disorder     MS (multiple sclerosis) ([address]     OP (osteoporosis)     PE (pulmonary embolism)     trapese filter    Pneumonia     Pyelonephritis     Seizures ([address]     Severe malnutrition ([address]     Spleen enlarged     Thyroid disease        Surgical history :   Past Surgical History:   Procedure Laterality Date    ABDOMEN SURGERY      APPENDECTOMY      CARDIAC DEFIBRILLATOR PLACEMENT      evera [id number] CONDITIONAL    CHOLECYSTECTOMY      COLONOSCOPY      CORONARY ANGIOPLASTY WITH STENT PLACEMENT      3 stents    CORONARY ARTERY BYPASS GRAFT      Age 49    ENDOSCOPY, COLON, DIAGNOSTIC      GASTRIC BYPASS SURGERY      HERNIA REPAIR      HIP SURGERY      HYSTERECTOMY      JOINT REPLACEMENT      PORT SURGERY N/A 6/1/2020    PORT INSERTION performed by Jenny Wilson MD at [address]      TONSILLECTOMY      VASCULAR SURGERY         Family history:   Family History   Problem Relation Age of Onset    High Blood Pressure Mother     High Cholesterol Mother     Heart Disease Mother     Diabetes Mother     Heart Disease Father     Cancer Father     Other Sister         Multiple Sclerosis    Heart Disease Maternal Grandmother     High Blood Pressure Maternal Grandmother     High Cholesterol Maternal Grandmother  dabigatran (PRADAXA) 75 MG capsule Take 150 mg by mouth      DULoxetine (CYMBALTA) 60 MG extended release capsule Take 60 mg by mouth daily      ergocalciferol (ERGOCALCIFEROL) 1.25 MG (14641 UT) capsule Take 50,000 Units by mouth Twice a Week      gabapentin (NEURONTIN) 300 MG capsule Take 300 mg by mouth 3 times daily.  mirtazapine (REMERON) 30 MG tablet Take 30 mg by mouth nightly      promethazine (PHENERGAN) 25 MG tablet Take 25 mg by mouth 4 times daily as needed      ranolazine (RANEXA) 500 MG extended release tablet Take 500 mg by mouth every 12 hours      spironolactone (ALDACTONE) 25 MG tablet Take 12.5 mg by mouth daily      famotidine (PEPCID) 20 MG tablet Take 20 mg by mouth 2 times daily      levETIRAcetam (KEPPRA) 1000 MG tablet Take 1,000 mg by mouth daily      levothyroxine (SYNTHROID) 150 MCG tablet Take 150 mcg by mouth every morning (before breakfast)      magnesium oxide (MAG-OX) 400 MG tablet Take 400 mg by mouth daily      nitroGLYCERIN (NITROSTAT) 0.4 MG SL tablet Place 0.4 mg under the tongue as needed      potassium chloride (KLOR-CON M) 10 MEQ extended release tablet Take 10 mEq by mouth 2 times daily 2 (10mEq)      metoprolol succinate (TOPROL XL) 25 MG extended release tablet Take 12.5 mg by mouth daily Half a tablet (12.5 total)      omeprazole (PRILOSEC) 20 MG delayed release capsule omeprazole 20 mg capsule,delayed release      senna (SENOKOT) 8.6 MG tablet Take 1 tablet by mouth daily      cyanocobalamin 1000 MCG tablet Take 1 tablet by mouth daily      ferrous gluconate (FERGON) 324 (38 Fe) MG tablet Take 1 tablet by mouth 2 times daily      amiodarone (CORDARONE) 200 MG tablet Take 400 mg by mouth daily      oxyCODONE (ROXICODONE) 20 MG immediate release tablet Take 20 mg by mouth every 3 hours as needed.       chlorhexidine gluconate (ANTISEPTIC SKIN CLEANSER) 4 % SOLN external solution Apply topically daily 1 Bottle 1  midodrine (PROAMATINE) 10 MG tablet Take 1 tablet by mouth 3 times daily (with meals) 90 tablet 0    ipratropium-albuterol (DUONEB) 0.5-2.5 (3) MG/3ML SOLN nebulizer solution Inhale 3 mLs into the lungs every 4 hours (while awake) 360 mL 0    benzocaine-menthol (CEPACOL SORE THROAT) 15-3.6 MG lozenge Take 1 lozenge by mouth every 2 hours as needed for Sore Throat 18 lozenge 1    ondansetron (ZOFRAN) 4 MG tablet Take 1 tablet by mouth every 6 hours as needed for Nausea or Vomiting 20 tablet 0    docusate sodium (COLACE) 100 MG capsule Take 100 mg by mouth 2 times daily as needed for Constipation      atorvastatin (LIPITOR) 80 MG tablet Take 80 mg by mouth nightly      clopidogrel (PLAVIX) 75 MG tablet Take 75 mg by mouth daily      acetaminophen 650 MG TABS Take 650 mg by mouth every 4 hours as needed 120 tablet 3    albuterol (PROVENTIL HFA;VENTOLIN HFA) 108 (90 BASE) MCG/ACT inhaler Inhale 2 puffs into the lungs every 6 hours as needed. Review of Systems:   Review of Systems   Constitutional: Positive for activity change and fatigue. Negative for chills and fever. HENT: Negative for congestion, ear pain and tinnitus. Eyes: Negative for photophobia, pain and visual disturbance. Respiratory: Positive for cough and shortness of breath. Negative for chest tightness and wheezing. Cardiovascular: Negative for chest pain, palpitations and leg swelling. Right chest wall pectoral healing wound   Gastrointestinal: Negative for abdominal pain, blood in stool, constipation, diarrhea, nausea and vomiting. Endocrine: Negative for cold intolerance and heat intolerance. Genitourinary: Negative for dysuria, flank pain and hematuria. Musculoskeletal: Positive for arthralgias. Negative for back pain, myalgias and neck stiffness. Skin: Negative for color change and rash. Allergic/Immunologic: Negative for food allergies. Neurological: Negative for dizziness, light-headedness, numbness and headaches. Hematological: Does not bruise/bleed easily. Psychiatric/Behavioral: Negative for agitation, behavioral problems and confusion. Examination:      Vitals:    01/24/21 2008 01/25/21 0500 01/25/21 0756 01/25/21 0839   BP: 118/64 108/62  (!) 123/56   Pulse: (!) 48 50  50   Resp: 16 16  16   Temp: 98 °F (36.7 °C) 97.8 °F (36.6 °C)     TempSrc: Oral Oral     SpO2: 96% 94% 95% 99%   Weight:       Height:            Body mass index is 19.85 kg/m². Physical Exam  Constitutional:       Appearance: She is well-developed. HENT:      Head: Normocephalic and atraumatic. Nose: No congestion. Mouth/Throat:      Mouth: Mucous membranes are moist.   Eyes:      Conjunctiva/sclera: Conjunctivae normal.      Pupils: Pupils are equal, round, and reactive to light. Neck:      Musculoskeletal: Normal range of motion. Thyroid: No thyromegaly. Vascular: No JVD. Cardiovascular:      Rate and Rhythm: Regular rhythm. Bradycardia present. Heart sounds: Murmur (grade 2/6 systolic murmur) present. No friction rub. Comments: Right upper pectoral chest wall area - dressing is placed for healing infection  Pulmonary:      Effort: Pulmonary effort is normal. No respiratory distress. Breath sounds: No stridor. Rales present. No wheezing. Abdominal:      General: Bowel sounds are normal. There is no distension. Palpations: Abdomen is soft. Tenderness: There is no abdominal tenderness. Musculoskeletal: Normal range of motion. General: No tenderness. Skin:     General: Skin is warm and dry. Findings: No erythema. Neurological:      General: No focal deficit present. Mental Status: She is alert and oriented to person, place, and time. Cranial Nerves: No cranial nerve deficit.    Psychiatric:         Behavior: Behavior normal.               CBC:   Lab Results Component Value Date    WBC 5.9 01/22/2021    HGB 11.9 01/22/2021    HCT 37.2 01/22/2021     01/22/2021     Lipids:  Lab Results   Component Value Date    CHOL 175 09/27/2020    TRIG 86 09/27/2020    HDL 60 09/27/2020    LDLDIRECT 95 09/27/2020     PT/INR:   Lab Results   Component Value Date    INR 0.92 01/15/2021        BMP:    Lab Results   Component Value Date     01/25/2021    K 4.2 01/25/2021    CL 99 01/25/2021    CO2 31 01/25/2021    BUN 10 01/25/2021     CMP:   Lab Results   Component Value Date    AST 13 (L) 01/22/2021    PROT 5.4 (L) 01/22/2021    BILITOT 0.2 01/22/2021    ALKPHOS 95 01/22/2021     TSH:  No results found for: TSH    EKGINTERPRETATION - EKG Interpretation:        IMPRESSION / RECOMMENDATIONS:     1. Bradycardia  2. Pneumonia - ?   3. Ischemic cardiomyopathy  4. History of PE with IVC filter  5. htn  6. VT  7. Recurrent septicemia with MRSA and klebsiella infection  8. Multiple device infections and extractions.      Patient in room well-known to me who has recurrent infections and device removal.  Patient finally had a Micra recently after the episodes of syncope at [facility]. Patient device rate is set at VVI 40 so that is why the patient is running in the heart rate in sinus rate is at 40's at times and she is hemodynamically doing okay with that rate. I would not recommend increasing the device rate as VVI pacing can at times increase the risk of atrial fibrillation so I would recommend only demand pacing at VVI less than 40.     Patient is planned for ICD infection is cleared on the left side per her has disclosed to her in [facility]. But patient is not happy with [facility] considering [employer] device when she wants to have [employer] device. And she wants to know if we can do it here.   I told her lets wait for the infection to clear as still there is superficial infection which is unhealed wound so it cannot be done at this time anyways.    Patient currently being treated for pneumonia         Thanks again for allowing me to participate in care of this patient. Please call me if you have any questions. With best regards. Bryn Bass MD, 1/25/2021 2:54 PM     Please note this report has been partially produced using speech recognition software and may contain errors related to that system including errors in grammar, punctuation, and spelling, as well as words and phrases that may be inappropriate. If there are any questions or concerns please feel free to contact the dictating provider for clarification.

## 2021-01-25 NOTE — PROGRESS NOTES
Hospitalist Progress Note      Name:  Ethan Tamez /Age/Sex: 1960  (62 y.o. female)   MRN & CSN:  [id number] & [id number] Admission Date/Time: 2021  6:19 PM   [facility] PCP: Sky Zhu MD         Hospital Day: 4    Assessment and Plan:   Ethan Tamez is a 62 y.o.  female  who presents with shortness of breath. She reported her oxygen saturation was dropping down to 80s on room air at home. Acute on chronic respiratory failure with hypoxia:  Currently on 2 L of oxygen saturating above 92%  Wean off oxygen as tolerated. RUL pneumonia, possibly due to MRSA. Gram negative organisms not ruled out. Chest x-rayfocal airspace disease within the right upper lung, pneumonia would be primary consideration. Respiratory virus panel including COVID-19 PCR negative. Urine strep and Legionella antigens negative. Procalcitonin not elevated but CRP is elevated at 66. Blood cultures negative. Chronically colonized with MRSA. Plan  Switch IV vancomycin to po zyvox, continue cefepime. F/u respiratory culture. Chronic systolic CHF due to ischemic cardiomyopathy  Appears well compensated. No edema on chest x-ray. proBNP at baseline. Recent echo 2021 at outside hospitalstable. EF 35%     Plan  continue p.o. Bumex, toprol XL and aldactone. --Bradycardia with demand paced @ 40/min. EP consulted. PM interrogation today. Cardiology input appreciated. --Difficult IV Access - general surgery to place mediport tomorrow. Chronic issues  -CAD s/p CABG, s/p LAD stent Oct 2019. On plavix. -Hypothyroidism: on synthroid.  -Seizures: on Keppra   -Multiple sclerosis  -Prior hx of VTE - on dabigratran.  -Atrial flutter/fibrillation s/p ablation 19 by [facility] EP.    -VT S/p dual chamber AICD; s/p VT ablations at [facility] in 2019. ICD removed 2020 due to  MRSA infection. On Amiodarone. -MRSA infection of ICD complicated by bacteremia June 2020. Appropriately treated with antibiotics. ICD removed since then. She has had issues with recurrent ICD pocket infections. - Ischemic CMP, EF 35%; well compensated. On IMDUR, aldactone, metoprolol, Bumex.  -B12 deficiency: on cyanocobalamin  -Depression: on duloxetine and paroxetine  -HLD: Lipitor. -Iron deficiency: on ferrous gluconate  -Severe malnutrition due to chronic disease: general diet. Diet DIET GENERAL;   DVT Prophylaxis [x] dabigatran   GI Prophylaxis [] PPI,  [] H2 Blocker,  [] Carafate,  [] Diet/Tube Feeds   Code Status Full Code   Disposition  Home   MDM [] Low, [x] Moderate,[]  High     History of Present Illness:   Patient seen and examined. No acute issues overnight. No fever or chills. She had episodes of asymptomatic bradycardia overnight. Oxygen requirement stable at 2 L/min. Ten point ROS reviewed negative, unless as noted above    Objective: Intake/Output Summary (Last 24 hours) at 1/25/2021 1222  Last data filed at 1/24/2021 2150  Gross per 24 hour   Intake    Output 2000 ml   Net -2000 ml      Vitals:   Vitals:    01/25/21 0839   BP: (!) 123/56   Pulse: 50   Resp: 16   Temp:    SpO2: 99%     Physical Exam:   GEN Awake female, sitting upright in bed. RESP breathing comfortably on supplemental oxygen 2 L/min via nasal cannula. CARDIO/VASC S1/S2 auscultated. Regular rate without appreciable murmurs. No peripheral edema. Wound dressing over the right chest wall [site of previously removed ICD]  GI Abdomen is soft without significant tenderness, masses, or guarding. Bowel sounds are normoactive. MSK No gross joint deformities. SKIN Normal coloration, warm, dry. NEURO normal speech, no lateralizing weakness.   PSYCH Awake, alert, oriented x 3    Medications:   Medications:    linezolid  600 mg Oral 2 times per day    bumetanide  2 mg Oral Daily    sodium chloride flush  10 mL Intravenous 2 times per day  acarbose  25 mg Oral TID WC    albuterol sulfate HFA  2 puff Inhalation 4x daily    amiodarone  400 mg Oral Daily    aspirin  81 mg Oral Daily    atorvastatin  80 mg Oral Nightly    budesonide-formoterol  2 puff Inhalation BID    clopidogrel  75 mg Oral Daily    cyanocobalamin  1,000 mcg Oral Daily    dabigatran  150 mg Oral BID    DULoxetine  60 mg Oral Daily    famotidine  20 mg Oral BID    ferrous gluconate  324 mg Oral BID    gabapentin  300 mg Oral TID    levETIRAcetam  1,000 mg Oral Daily    levothyroxine  150 mcg Oral QAM AC    magnesium oxide  400 mg Oral Daily    metoprolol succinate  12.5 mg Oral Daily    midodrine  10 mg Oral TID WC    mirtazapine  30 mg Oral Nightly    potassium chloride  10 mEq Oral BID    ranolazine  500 mg Oral Q12H    spironolactone  12.5 mg Oral Daily    cefepime  1,000 mg Intravenous Q8H    calcium elemental  500 mg Oral Daily      Infusions:   PRN Meds:     sodium chloride flush, 10 mL, PRN      promethazine, 12.5 mg, Q6H PRN    Or      ondansetron, 4 mg, Q6H PRN      polyethylene glycol, 17 g, Daily PRN      acetaminophen, 650 mg, Q6H PRN    Or      acetaminophen, 650 mg, Q6H PRN      docusate sodium, 100 mg, BID PRN      oxyCODONE, 20 mg, Q3H PRN      promethazine, 25 mg, 4x Daily PRN      naloxone, 0.4 mg, PRN        CBC   Recent Labs     01/22/21 1937   WBC 5.9   HGB 11.9*   HCT 37.2         BMP   Recent Labs     01/22/21  1937 01/23/21  1156 01/24/21  0835   * 129* 136   K 4.1 4.0 4.1    95* 100   CO2 23 23 27   BUN 16 11 11   CREATININE 0.5* 0.6 0.8       Radiology report reviewed     Electronically signed by Meri Valdez MD on 1/25/2021 at 12:22 PM

## 2021-01-25 NOTE — CONSULTS
INPATIENT CARDIOLOGY CONSULT NOTE       Reason for consultation:  Bradycardia     Referring physician:  Summer Boogie MD     Primary care physician: Jojo Richardson MD      Dear Summer Boogie MD Thank you for the consult    Chief Complaint   Patient presents with    Cough     possible Pneumonia       History of present illness:Demetria is a 62 y. o.year old who  presents with  Chief Complaint   Patient presents with    Cough     possible Pneumonia       Ms. Doc Gomez has complex cardiac history and has been admitted to the hospital frequently in the past 1 year. Patient is a 81-year-old female with prior medical history significant for coronary artery disease s/p CABG, PCI to LIMA, history of ischemic cardiomyopathy, history of CVA, history of pulmonary embolism and IVC filter, hypertension, hyperlipidemia has previously been admitted to the hospital for sepsis and bacteremia's. Patient had an ICD extracted at [facility]. Patient presents to the ER with shortness of breath coughing. She also complained of fever chills. X-ray revealed pneumonia for which she is getting treatment. Patient presented to [facility] last month for syncope and was evaluated by EP. Patient underwent Micra pacemaker placement    Cardiology consulted to evaluate patient for bradycardia noted overnight with heart rate of 40 bpm    Telemetry strips were reviewed which showed paced rhythm at 40 bpm overnight.   As per patient she was asymptomatic during the time    Past medical history:  albuterol sulfate  (90 Base) MCG/ACT inhaler 2 puff  2 puff Inhalation 4x daily Teressa Callahan MD   2 puff at 01/25/21 0755    amiodarone (CORDARONE) tablet 400 mg  400 mg Oral Daily Teressa Callahan MD   400 mg at 01/25/21 0842    aspirin EC tablet 81 mg  81 mg Oral Daily Teressa Callahan MD   81 mg at 01/25/21 0843    atorvastatin (LIPITOR) tablet 80 mg  80 mg Oral Nightly Teressa Callahan MD   80 mg at 01/24/21 2012    budesonide-formoterol (SYMBICORT) 160-4.5 MCG/ACT inhaler 2 puff  2 puff Inhalation BID Teressa Callahan MD   2 puff at 01/25/21 0756    clopidogrel (PLAVIX) tablet 75 mg  75 mg Oral Daily Teressa Callahan MD   75 mg at 01/25/21 0844    vitamin B-12 (CYANOCOBALAMIN) tablet 1,000 mcg  1,000 mcg Oral Daily Teressa Callahan MD   1,000 mcg at 01/25/21 0843    dabigatran (PRADAXA) capsule 150 mg  150 mg Oral BID Teressa Callahan MD   150 mg at 01/25/21 0843    docusate sodium (COLACE) capsule 100 mg  100 mg Oral BID PRN Teressa Callahan MD        DULoxetine (CYMBALTA) extended release capsule 60 mg  60 mg Oral Daily Teressa Callahan MD   60 mg at 01/25/21 0842    famotidine (PEPCID) tablet 20 mg  20 mg Oral BID Teressa Callahan MD   20 mg at 01/25/21 0843    ferrous gluconate 324 (37.5 Fe) MG tablet 324 mg  324 mg Oral BID Teressa Callahan MD   324 mg at 01/25/21 0844    gabapentin (NEURONTIN) capsule 300 mg  300 mg Oral TID Teressa Callahan MD   300 mg at 01/25/21 0842    levETIRAcetam (KEPPRA) tablet 1,000 mg  1,000 mg Oral Daily Teressa Callahan MD   1,000 mg at 01/25/21 0842    levothyroxine (SYNTHROID) tablet 150 mcg  150 mcg Oral QAM AC Teressa Callahan MD   150 mcg at 01/25/21 0532    magnesium oxide (MAG-OX) tablet 400 mg  400 mg Oral Daily Teressa Callahan MD   400 mg at 01/25/21 4814  metoprolol succinate (TOPROL XL) extended release tablet 12.5 mg  12.5 mg Oral Daily Darin Smith MD   12.5 mg at 01/25/21 0843    midodrine (PROAMATINE) tablet 10 mg  10 mg Oral TID WC Darin Smith MD   10 mg at 01/25/21 0842    mirtazapine (REMERON) tablet 30 mg  30 mg Oral Nightly Darin Smith MD   30 mg at 01/24/21 2012    oxyCODONE (ROXICODONE) immediate release tablet 20 mg  20 mg Oral Q3H PRN Darin Smith MD   20 mg at 01/25/21 0841    potassium chloride (KLOR-CON M) extended release tablet 10 mEq  10 mEq Oral BID Darin Smith MD   10 mEq at 01/25/21 0844    promethazine (PHENERGAN) tablet 25 mg  25 mg Oral 4x Daily PRN Darin Smith MD        ranolazine [facility]) extended release tablet 500 mg  500 mg Oral Q12H Darin Smith MD   500 mg at 01/25/21 9559    spironolactone (ALDACTONE) tablet 12.5 mg  12.5 mg Oral Daily Darin Smith MD   12.5 mg at 01/25/21 0751    cefepime (MAXIPIME) 1000 mg IVPB minibag  1,000 mg Intravenous Q8H Darin Smith MD   Stopped at 01/25/21 0530    calcium elemental (OSCAL) tablet 500 mg  500 mg Oral Daily Darin Smith MD   500 mg at 01/25/21 0843    naloxone (NARCAN) injection 0.4 mg  0.4 mg Intravenous PRN Darin Smith MD             Review of Systems:     · Constitutional: + Fever   · Eyes: No Decreased Vision  · ENT: No Headaches, Hearing Loss or Vertigo  · Cardiovascular: No chest pain, + dyspnea  no palpitations or loss of consciousness  · Respiratory: + cough or wheezing    · Gastrointestinal: No abdominal pain, appetite loss, blood in stools, constipation, diarrhea or heartburn  · Genitourinary: No dysuria, trouble voiding, or hematuria  · Musculoskeletal:  No gait disturbance, weakness or joint complaints  · Integumentary: No rash or pruritis  · Neurological: No TIA or stroke symptoms  · Psychiatric: No anxiety or depression · Endocrine: No malaise, fatigue or temperature intolerance  · Hematologic/Lymphatic: No bleeding problems, blood clots or swollen lymph nodes  · Allergic/Immunologic: No nasal congestion or hives    All other systems were reviewed and were negative otherwise. Physical Examination:      Vitals:    01/25/21 0839   BP: (!) 123/56   Pulse: 50   Resp: 16   Temp:    SpO2: 99%      Wt Readings from Last 3 Encounters:   01/22/21 123 lb (55.8 kg)   01/15/21 128 lb (58.1 kg)   11/20/20 128 lb (58.1 kg)     Body mass index is 19.85 kg/m². General Appearance:  No distress, conversant  Constitutional:  Well developed, Well nourished  HEENT:  Normocephalic, Atraumatic, Oropharynx moist, No oral exudates,   Nose normal. Neck Supple Carotid: no carotid bruit  Eyes:  Conjunctiva normal, No discharge. Respiratory:    Normal breath sounds, No respiratory distress, No wheezing, no use of accessory muscles, diaphragm movement is normal  No chest Tenderness  Cardiovascular: S1-S2 No murmurs auscultated. No rubs, thrills or gallops. Normal  rhythm. Pedal pulses are normal. No pedal edema  GI:  Soft Non tender, non distended. :  No CVA tenderness. Musculoskeletal:   No tenderness, No cyanosis, No clubbing. Integument:  Warm, Dry, No erythema, No rash. Lymphatic:  No lymphadenopathy noted. Neurologic:  Alert & oriented x 3  No focal deficits noted. Psychiatric:  Affect normal, Judgment normal, Mood normal.       Lab Review     Recent Labs     01/22/21 1937   WBC 5.9   HGB 11.9*   HCT 37.2         Recent Labs     01/24/21  0835      K 4.1      CO2 27   BUN 11   CREATININE 0.8     Recent Labs     01/22/21 1937   AST 13*   ALT 8*   BILITOT 0.2   ALKPHOS 95     No results for input(s): TROPONINI in the last 72 hours.   Lab Results   Component Value Date    BNP 48 02/21/2013     Lab Results   Component Value Date    INR 0.92 01/15/2021    PROTIME 11.1 (L) 01/15/2021 All labs, images, EKGs were personally reviewed      Assessment: 62 y. o.year old with PMH of  has a past medical history of Acute delirium, Arrhythmia, Arthritis, Asplenia, Asthma, CAD (coronary artery disease), Cardiomyopathy ([address] Cerebral artery occlusion with cerebral infarction ([address] CHF (congestive heart failure) ([address] Enlarged liver, GERD (gastroesophageal reflux disease), H/O echocardiogram, History of blood transfusion, Hx of cardiovascular stress test, Hyperlipidemia, Hypertension, Lymphoma ([address] MI, old, Movement disorder, MS (multiple sclerosis) ([address] OP (osteoporosis), PE (pulmonary embolism), Pneumonia, Pyelonephritis, Seizures ([address] Severe malnutrition ([address] Spleen enlarged, and Thyroid disease. Recommendations:      1. Bradycardia with demand paced @ 40/min. Pt has micra PM. Will obtain device interrogation. EP Consulted   2. CAD s/p CABG and PCI. Stable CAD. Cont med mgt. Continue aspirin/Plavix statins. Aspirin can be stopped given patient is also on NOAC. 3. History of ventricular arrhythmias, VT's with multiple ablations, due to pocket wound infection which is still healing and ICD could not be placed. Patient is on amiodarone. 4. Hyperlipidemia continue with statin therapy  5. Chronically elevated troponins non-ACS stable. 6. Ischemic cardiomyopathy with LVEF 30-35% with combined systolic diastolic dysfunction. Compensated. Continue with diuretic therapy. Continue Toprol-XL and Aldactone. 7. Paroxysmal atrial fibrillation and flutter, history of stroke. On Pradaxa. Continue  8. History of DVT pulmonary embolism.   Continue with Pradaxa    Thank you for the consult    Dr. Afia Quijano  1/25/2021 10:38 AM

## 2021-01-25 NOTE — CONSULTS
Department of General Surgery   Surgical Service Dr. Shanice Spencer   Consult Note    Date of Consult: 1/25/21    Reason for Consult:  Poor IV access    Requesting Physician:  Lilly Prince MD    CHIEF COMPLAINT:  pneumonia    History Obtained From:  patient, electronic medical record    HISTORY OF PRESENT ILLNESS:      The patient is a 62 y.o. female who presents with shortness of breath and was found to have pneumonia. Patient is also known to have an extensive cardiac history-- CAD (s/p CABG and multiple PCI), chronic systolic/diastolic heart failure EF 35%, VT (s/p ablation and BiV-ICD), most recent ablation done at [facility] in July 2019. History of DVT/PE (s/p IVC filter). Patient surgical history also consists of splenectomy and tatiana en y gastric bypass. Patient has had multiple Gastrostomy tubes in the past and removed due to recurrent infections. Patient has poor IV access and stated that she has had a mediport in the past that Dr. Shanice Spencer placed. She states that in June she got MRSA and her pacemaker and the mediport were both removed. Patient still has a wound on the right chest that daily dressing changes are being done. Patient states that she is a hard stick and that she has trouble with PICC lines and CVC's as well. Patient has poor IV access and needing mediport placement for long term use for which general surgery was consulted    On 1/23/20 nasal culture was positive for STAPH AUREUS MRSA POSITIVE, however recent blood cultures from 1/15/2021 were negative at 5 days, and also blood cultures from 1/22/20 are showing no growth at 48 hours.       Past Medical History:    Past Medical History:   Diagnosis Date    Acute delirium     Arrhythmia     Arthritis     Asplenia     Asthma     CAD (coronary artery disease)     1st stent at age 46    Cardiomyopathy [facility])     Cerebral [facility] occlusion with [facility] infarction [facility]     CHF (congestive heart failure) (HCC)     Enlarged liver  GERD (gastroesophageal reflux disease)     H/O echocardiogram 4/6/16    EF 55%, trivial TR & MR, stage 1 diastolic dysfunction    History of blood transfusion     Hx of cardiovascular stress test 12/29/2015    Alessia: EF 45%. Pharmacologic stress myocardial perfusion scan shows a fixed inferior-lateral defect w/ no inducible ischemia. No evidence of ischemia. Inferior-lateral infarction.  Normal LVSF    Hyperlipidemia     Hypertension     Lymphoma [facility]     Denies    MI, old     Movement disorder     MS (multiple sclerosis) ([facility]     OP (osteoporosis)     PE (pulmonary embolism)     trapese filter    Pneumonia     Pyelonephritis     Seizures ([facility]     Severe malnutrition ([facility]     Spleen enlarged     Thyroid disease        Past Surgical History:    Past Surgical History:   Procedure Laterality Date    ABDOMEN SURGERY      APPENDECTOMY      CARDIAC DEFIBRILLATOR PLACEMENT      evera [id number] CONDITIONAL    CHOLECYSTECTOMY      COLONOSCOPY      CORONARY ANGIOPLASTY WITH STENT PLACEMENT      3 stents    CORONARY ARTERY BYPASS GRAFT      Age 49    ENDOSCOPY, COLON, DIAGNOSTIC      GASTRIC BYPASS SURGERY      HERNIA REPAIR      HIP SURGERY      HYSTERECTOMY      JOINT REPLACEMENT      PORT SURGERY N/A 6/1/2020    PORT INSERTION performed by Shelby Montelongo MD at [address] [place] [address]      VASCULAR SURGERY         Current Medications:   Current Facility-Administered Medications   Medication Dose Route Frequency Provider Last Rate Last Admin    linezolid (ZYVOX) tablet 600 mg  600 mg Oral 2 times per day Castro Ferraro MD        bumetanide (BUMEX) tablet 2 mg  2 mg Oral Daily Castro Ferraro MD   2 mg at 01/25/21 2400    sodium chloride flush 0.9 % injection 10 mL  10 mL Intravenous 2 times per day Austin Campbell MD   10 mL at 01/25/21 4165  famotidine (PEPCID) tablet 20 mg  20 mg Oral BID Miguel Angel Peoples MD   20 mg at 01/25/21 0843    ferrous gluconate 324 (37.5 Fe) MG tablet 324 mg  324 mg Oral BID Miguel Angel Peoples MD   324 mg at 01/25/21 0844    gabapentin (NEURONTIN) capsule 300 mg  300 mg Oral TID Miguel Angel Peoples MD   300 mg at 01/25/21 0842    levETIRAcetam (KEPPRA) tablet 1,000 mg  1,000 mg Oral Daily Miguel Angel Peoples MD   1,000 mg at 01/25/21 0842    levothyroxine (SYNTHROID) tablet 150 mcg  150 mcg Oral QAM AC Miguel Angel Peoples MD   150 mcg at 01/25/21 0532    magnesium oxide (MAG-OX) tablet 400 mg  400 mg Oral Daily Miguel Angel Peoples MD   400 mg at 01/25/21 0843    metoprolol succinate (TOPROL XL) extended release tablet 12.5 mg  12.5 mg Oral Daily Miguel Angel Peoples MD   12.5 mg at 01/25/21 0843    midodrine (PROAMATINE) tablet 10 mg  10 mg Oral TID WC Miguel Angel Peoples MD   10 mg at 01/25/21 0842    mirtazapine (REMERON) tablet 30 mg  30 mg Oral Nightly Miguel Angel Peoples MD   30 mg at 01/24/21 2012    oxyCODONE (ROXICODONE) immediate release tablet 20 mg  20 mg Oral Q3H PRN Miguel Angel Peoples MD   20 mg at 01/25/21 0841    potassium chloride (KLOR-CON M) extended release tablet 10 mEq  10 mEq Oral BID Miguel Angel Peoples MD   10 mEq at 01/25/21 0844    promethazine (PHENERGAN) tablet 25 mg  25 mg Oral 4x Daily PRN Miguel Angel Peoples MD        ranolazine [facility]) extended release tablet 500 mg  500 mg Oral Q12H Miguel Angel Peoples MD   500 mg at 01/25/21 0843    spironolactone (ALDACTONE) tablet 12.5 mg  12.5 mg Oral Daily Miguel Angel Peoples MD   12.5 mg at 01/25/21 0843    cefepime (MAXIPIME) 1000 mg IVPB minibag  1,000 mg Intravenous Q8H Miguel Angel Peoples MD   Stopped at 01/25/21 0530    calcium elemental (OSCAL) tablet 500 mg  500 mg Oral Daily Miguel Angel Peoples MD   500 mg at 01/25/21 0630  naloxone (NARCAN) injection 0.4 mg  0.4 mg Intravenous PRN Kandis Mcbride MD           Allergies:  Fentanyl, Moxifloxacin hcl in nacl, Povidone-iodine, Cyclobenzaprine, Methocarbamol, Tramadol, Baclofen, Ibuprofen, Naproxen, Nsaids, Tizanidine, Tolmetin, Tramadol hcl, Betadine [povidone iodine], and Moxifloxacin    Social History:   Social History     Socioeconomic History    Marital status: Single     Spouse name: None    Number of children: None    Years of education: None    Highest education level: None   Occupational History    None   Social Needs    Financial resource strain: None    Food insecurity     Worry: None     Inability: None    Transportation needs     Medical: None     Non-medical: None   Tobacco Use    Smoking status: Never Smoker    Smokeless tobacco: Never Used   Substance and Sexual Activity    Alcohol use: No    Drug use: No    Sexual activity: Not Currently   Lifestyle    Physical activity     Days per week: None     Minutes per session: None    Stress: None   Relationships    Social connections     Talks on phone: None     Gets together: None     Attends Jew service: None     Active member of club or organization: None     Attends meetings of clubs or organizations: None     Relationship status: None    Intimate partner violence     Fear of current or ex partner: None     Emotionally abused: None     Physically abused: None     Forced sexual activity: None   Other Topics Concern    None   Social History Narrative    None       Family History:   Family History   Problem Relation Age of Onset    High Blood Pressure Mother     High Cholesterol Mother     Heart Disease Mother     Diabetes Mother     Heart Disease Father     Cancer Father     Other Sister         Multiple Sclerosis    Heart Disease Maternal Grandmother     High Blood Pressure Maternal Grandmother     High Cholesterol Maternal Grandmother        REVIEW OFSYSTEMS:    Review of Systems Constitutional: Positive for chills and fever. On admission. Currently afebrile   HENT: Negative. Eyes: Negative. Respiratory: Positive for shortness of breath. Cardiovascular: Negative for chest pain and leg swelling. Extensive cardiac history. Gastrointestinal: Negative. Endocrine: Negative. Genitourinary: Negative. Musculoskeletal: Negative. Skin: Negative. Wound right chest. Old pacemaker/mediport site   Allergic/Immunologic: Negative. Neurological: Negative. Hematological: Negative. Psychiatric/Behavioral: Negative. PHYSICAL EXAM:  Vitals:    01/24/21 2008 01/25/21 0500 01/25/21 0756 01/25/21 0839   BP: 118/64 108/62  (!) 123/56   Pulse: (!) 48 50  50   Resp: 16 16  16   Temp: 98 °F (36.7 °C) 97.8 °F (36.6 °C)     TempSrc: Oral Oral     SpO2: 96% 94% 95% 99%   Weight:       Height:           Physical Exam  Vitals signs and nursing note reviewed. Constitutional:       Appearance: Normal appearance. HENT:      Head: Normocephalic and atraumatic. Right Ear: External ear normal.      Left Ear: External ear normal.      Nose: Nose normal.      Mouth/Throat:      Mouth: Mucous membranes are moist.   Eyes:      Extraocular Movements: Extraocular movements intact. Pupils: Pupils are equal, round, and reactive to light. Neck:      Musculoskeletal: Normal range of motion and neck supple. Cardiovascular:      Rate and Rhythm: Regular rhythm. Bradycardia present. Pulmonary:      Effort: Pulmonary effort is normal.   Abdominal:      General: Abdomen is flat. Bowel sounds are normal.      Palpations: Abdomen is soft. Musculoskeletal: Normal range of motion. Skin:     General: Skin is warm. Findings: Erythema present. Comments: Right chest wound   Neurological:      General: No focal deficit present. Mental Status: She is alert and oriented to person, place, and time. Mental status is at baseline.    Psychiatric: Mood and Affect: Mood normal.         Behavior: Behavior normal.         DATA:    CBC:   Lab Results   Component Value Date    WBC 5.9 01/22/2021    RBC 3.33 01/22/2021    HGB 11.9 01/22/2021    HCT 37.2 01/22/2021    .7 01/22/2021    MCH 35.7 01/22/2021    MCHC 32.0 01/22/2021    RDW 13.9 01/22/2021     01/22/2021    MPV 8.9 01/22/2021     CMP:    Lab Results   Component Value Date     01/24/2021    K 4.1 01/24/2021     01/24/2021    CO2 27 01/24/2021    BUN 11 01/24/2021    CREATININE 0.8 01/24/2021    GFRAA >60 01/24/2021    LABGLOM >60 01/24/2021    GLUCOSE 104 01/24/2021    PROT 5.4 01/22/2021    PROT 6.3 02/21/2013    LABALBU 3.1 01/22/2021    CALCIUM 8.4 01/24/2021    BILITOT 0.2 01/22/2021    ALKPHOS 95 01/22/2021    AST 13 01/22/2021    ALT 8 01/22/2021       IMPRESSION:    XR CHEST PORTABLE  Impression   1.  Mild bilateral interstitial opacities of the lungs which could reflect   pulmonary edema versus infiltrate.           Patient Active Problem List:     Chest pain     CAD (coronary artery disease)     Hyperlipidemia     Thyroid disease     Acute exacerbation of CHF (congestive heart failure) (HCC)     CHF exacerbation (HCC)     Congestive heart failure ([facility])     Left upper quadrant pain     Angina at rest [facility])     Coronary artery disease involving coronary bypass graft of native heart with angina pectoris ([facility])     Shortness of breath     Ischemic cardiomyopathy     Acute metabolic encephalopathy     Acute delirium     Sacral pain     Acute on chronic systolic CHF (congestive heart failure), NYHA class 3 ([facility])     Severe malnutrition (HCC)     Cellulitis     Cellulitis at gastrostomy tube site [facility])     History of Maru-en-Y gastric bypass     Gastroparesis     Abdominal pain     Feeding tube dysfunction     Abdominal pain, epigastric     Gastrojejunostomy tube status ([address]     Chronic malnutrition ([address]     Asplenia Shockable cardiac rhythm detected by automated external defibrillator     Financial difficulties     Chest pain at rest     Closed nondisplaced transverse fracture of left patella     Contusion of right knee     Multifocal pneumonia     Abnormal cardiac function test     Chronic systolic heart failure (HCC)     Seizure disorder (HCC)     Hypothyroidism     Cardiomyopathy ([address]     Acute chest pain     Fever in adult     Pyelonephritis     MRSA bacteremia     HFrEF (heart failure with reduced ejection fraction) (HCC)     Infected chest wall abrasion, right, subsequent encounter     Granuloma of skin     Open wound of right chest wall     MRSA infection     Klebsiella pneumoniae infection     Symptomatic bradycardia     History of pacemaker with removal secondary to infected leads     Avascular necrosis (HCC)     Multiple sclerosis (HCC)     Syncope     Bradycardia     Ulcer of chest wall, limited to breakdown of skin ([facility]     Pneumonia      Jose Guadalupe Pritchard is a 63 yo female that presented with SOB and poor IV access. PLAN:    -NPO after midnight  -Will plan for mediport placement tomorrow with Dr. Parker Guido  -Consent in the chart  -Covid negative on 1/23/021  -All questions answered. The patient was counseled at length about the risks of haroldo Covid-19 during their perioperative period and any recovery window from their procedure. The patient was made aware that haroldo Covid-19  may worsen their prognosis for recovering from their procedure  and lend to a higher morbidity and/or mortality risk. All material risks, benefits, and reasonable alternatives including postponing the procedure were discussed. The patient does wish to proceed with the procedure at this time. JESSE Franklin - CNP       Reviewed the chart and examined the pt, and agreed, and will proceed in am with the LEFT SCV Mediport placement under US, AND Fluoroscopy.     Roselyn Shah MD, FACS, FICS  1/25/2021  2:57 PM

## 2021-01-26 ENCOUNTER — ANESTHESIA EVENT (OUTPATIENT)
Dept: OPERATING ROOM | Age: 61
End: 2021-01-26

## 2021-01-26 ENCOUNTER — ANESTHESIA (OUTPATIENT)
Dept: OPERATING ROOM | Age: 61
End: 2021-01-26

## 2021-01-26 PROCEDURE — 99232 SBSQ HOSP IP/OBS MODERATE 35: CPT | Performed by: INTERNAL MEDICINE

## 2021-01-26 PROCEDURE — 6370000000 HC RX 637 (ALT 250 FOR IP): Performed by: INTERNAL MEDICINE

## 2021-01-26 PROCEDURE — 94640 AIRWAY INHALATION TREATMENT: CPT

## 2021-01-26 PROCEDURE — 76937 US GUIDE VASCULAR ACCESS: CPT

## 2021-01-26 PROCEDURE — 1200000000 HC SEMI PRIVATE

## 2021-01-26 PROCEDURE — 99231 SBSQ HOSP IP/OBS SF/LOW 25: CPT | Performed by: SURGERY

## 2021-01-26 PROCEDURE — 94761 N-INVAS EAR/PLS OXIMETRY MLT: CPT

## 2021-01-26 PROCEDURE — APPSS60 APP SPLIT SHARED TIME 46-60 MINUTES: Performed by: NURSE PRACTITIONER

## 2021-01-26 PROCEDURE — 6360000002 HC RX W HCPCS: Performed by: INTERNAL MEDICINE

## 2021-01-26 PROCEDURE — 2580000003 HC RX 258: Performed by: INTERNAL MEDICINE

## 2021-01-26 PROCEDURE — 2700000000 HC OXYGEN THERAPY PER DAY

## 2021-01-26 RX ADMIN — PROMETHAZINE HYDROCHLORIDE 12.5 MG: 25 TABLET ORAL at 17:27

## 2021-01-26 RX ADMIN — CEFEPIME HYDROCHLORIDE 1000 MG: 1 INJECTION, POWDER, FOR SOLUTION INTRAMUSCULAR; INTRAVENOUS at 14:22

## 2021-01-26 RX ADMIN — ALBUTEROL SULFATE 2 PUFF: 90 AEROSOL, METERED RESPIRATORY (INHALATION) at 08:00

## 2021-01-26 RX ADMIN — BUDESONIDE AND FORMOTEROL FUMARATE DIHYDRATE 2 PUFF: 160; 4.5 AEROSOL RESPIRATORY (INHALATION) at 08:00

## 2021-01-26 RX ADMIN — CEFEPIME HYDROCHLORIDE 1000 MG: 1 INJECTION, POWDER, FOR SOLUTION INTRAMUSCULAR; INTRAVENOUS at 21:58

## 2021-01-26 RX ADMIN — FAMOTIDINE 20 MG: 20 TABLET, FILM COATED ORAL at 20:39

## 2021-01-26 RX ADMIN — GABAPENTIN 300 MG: 300 CAPSULE ORAL at 20:39

## 2021-01-26 RX ADMIN — DABIGATRAN ETEXILATE MESYLATE 150 MG: 75 CAPSULE ORAL at 20:39

## 2021-01-26 RX ADMIN — OXYCODONE HYDROCHLORIDE 20 MG: 5 TABLET ORAL at 07:02

## 2021-01-26 RX ADMIN — GABAPENTIN 300 MG: 300 CAPSULE ORAL at 14:22

## 2021-01-26 RX ADMIN — OXYCODONE HYDROCHLORIDE 20 MG: 5 TABLET ORAL at 20:38

## 2021-01-26 RX ADMIN — RANOLAZINE 500 MG: 500 TABLET, FILM COATED, EXTENDED RELEASE ORAL at 20:39

## 2021-01-26 RX ADMIN — PROMETHAZINE HYDROCHLORIDE 12.5 MG: 25 TABLET ORAL at 23:24

## 2021-01-26 RX ADMIN — MIRTAZAPINE 30 MG: 15 TABLET, FILM COATED ORAL at 20:39

## 2021-01-26 RX ADMIN — POTASSIUM CHLORIDE 10 MEQ: 20 TABLET, EXTENDED RELEASE ORAL at 20:39

## 2021-01-26 RX ADMIN — OXYCODONE HYDROCHLORIDE 20 MG: 5 TABLET ORAL at 10:05

## 2021-01-26 RX ADMIN — CEFEPIME HYDROCHLORIDE 1000 MG: 1 INJECTION, POWDER, FOR SOLUTION INTRAMUSCULAR; INTRAVENOUS at 05:51

## 2021-01-26 RX ADMIN — ATORVASTATIN CALCIUM 80 MG: 80 TABLET, FILM COATED ORAL at 20:39

## 2021-01-26 RX ADMIN — SODIUM CHLORIDE, PRESERVATIVE FREE 10 ML: 5 INJECTION INTRAVENOUS at 14:25

## 2021-01-26 RX ADMIN — OXYCODONE HYDROCHLORIDE 20 MG: 5 TABLET ORAL at 14:18

## 2021-01-26 RX ADMIN — OXYCODONE HYDROCHLORIDE 20 MG: 5 TABLET ORAL at 03:54

## 2021-01-26 RX ADMIN — PROMETHAZINE HYDROCHLORIDE 12.5 MG: 25 TABLET ORAL at 10:05

## 2021-01-26 RX ADMIN — ALBUTEROL SULFATE 2 PUFF: 90 AEROSOL, METERED RESPIRATORY (INHALATION) at 11:22

## 2021-01-26 RX ADMIN — PROMETHAZINE HYDROCHLORIDE 12.5 MG: 25 TABLET ORAL at 03:54

## 2021-01-26 RX ADMIN — LINEZOLID 600 MG: 600 TABLET, FILM COATED ORAL at 20:46

## 2021-01-26 RX ADMIN — OXYCODONE HYDROCHLORIDE 20 MG: 5 TABLET ORAL at 23:28

## 2021-01-26 RX ADMIN — OXYCODONE HYDROCHLORIDE 20 MG: 5 TABLET ORAL at 17:27

## 2021-01-26 RX ADMIN — ALBUTEROL SULFATE 2 PUFF: 90 AEROSOL, METERED RESPIRATORY (INHALATION) at 15:27

## 2021-01-26 RX ADMIN — FERROUS GLUCONATE TAB 324 MG (37.5 MG ELEMENTAL IRON) 324 MG: 324 (37.5 FE) TAB at 20:46

## 2021-01-26 RX ADMIN — ACETAMINOPHEN 650 MG: 325 TABLET ORAL at 03:54

## 2021-01-26 RX ADMIN — LEVOTHYROXINE SODIUM 150 MCG: 75 TABLET ORAL at 05:51

## 2021-01-26 RX ADMIN — ACETAMINOPHEN 650 MG: 325 TABLET ORAL at 23:24

## 2021-01-26 RX ADMIN — ACETAMINOPHEN 650 MG: 325 TABLET ORAL at 10:05

## 2021-01-26 RX ADMIN — ACETAMINOPHEN 650 MG: 325 TABLET ORAL at 17:27

## 2021-01-26 RX ADMIN — ACARBOSE 25 MG: 50 TABLET ORAL at 17:27

## 2021-01-26 RX ADMIN — SODIUM CHLORIDE, PRESERVATIVE FREE 10 ML: 5 INJECTION INTRAVENOUS at 20:39

## 2021-01-26 RX ADMIN — MIDODRINE HYDROCHLORIDE 10 MG: 5 TABLET ORAL at 17:27

## 2021-01-26 ASSESSMENT — ENCOUNTER SYMPTOMS: SHORTNESS OF BREATH: 1

## 2021-01-26 ASSESSMENT — PAIN DESCRIPTION - PAIN TYPE: TYPE: CHRONIC PAIN

## 2021-01-26 ASSESSMENT — PAIN SCALES - GENERAL
PAINLEVEL_OUTOF10: 9
PAINLEVEL_OUTOF10: 7
PAINLEVEL_OUTOF10: 8

## 2021-01-26 ASSESSMENT — LIFESTYLE VARIABLES: SMOKING_STATUS: 0

## 2021-01-26 NOTE — ANESTHESIA PRE PROCEDURE
Department of Anesthesiology  Preprocedure Note       Name:  Gloria Gayle   Age:  62 y.o.  :  1960                                          MRN:  [id number]         Date:  2021      Surgeon: Seun Dumont):  Tawanna Gonsalez MD    Procedure: Procedure(s):  PORT INSERTION    Medications prior to admission:   Prior to Admission medications    Medication Sig Start Date End Date Taking? Authorizing Provider   beclomethasone (QVAR) 40 MCG/ACT inhaler Inhale 2 puffs into the lungs 2 times daily 21   Historical Provider, MD   acarbose (PRECOSE) 25 MG tablet Take 25 mg by mouth 3 times daily (with meals) 20   Historical Provider, MD   budesonide-formoterol (SYMBICORT) 160-4.5 MCG/ACT AERO Inhale 2 puffs into the lungs 2 times daily 21  Historical Provider, MD   bumetanide (BUMEX) 1 MG tablet Take 1 mg by mouth daily 20   Historical Provider, MD   calcium citrate (CALCITRATE) 950 (200 Ca) MG tablet Take 1,425 mg by mouth daily 20   Historical Provider, MD   dabigatran (PRADAXA) 75 MG capsule Take 150 mg by mouth 21  Historical Provider, MD   DULoxetine (CYMBALTA) 60 MG extended release capsule Take 60 mg by mouth daily 20   Historical Provider, MD   ergocalciferol (ERGOCALCIFEROL) 1.25 MG (05512 UT) capsule Take 50,000 Units by mouth Twice a Week 20   Historical Provider, MD   gabapentin (NEURONTIN) 300 MG capsule Take 300 mg by mouth 3 times daily.  20   Historical Provider, MD   metoprolol succinate (TOPROL XL) 25 MG extended release tablet Take 12.5 mg by mouth daily Half a tablet (12.5 total)    Historical Provider, MD   mirtazapine (REMERON) 30 MG tablet Take 30 mg by mouth nightly 10/8/20   Historical Provider, MD   omeprazole (PRILOSEC) 20 MG delayed release capsule omeprazole 20 mg capsule,delayed release    Historical Provider, MD promethazine (PHENERGAN) 25 MG tablet Take 25 mg by mouth 4 times daily as needed 12/7/20   Historical Provider, MD   ranolazine (RANEXA) 500 MG extended release tablet Take 500 mg by mouth every 12 hours 12/21/20   Historical Provider, MD   senna (SENOKOT) 8.6 MG tablet Take 1 tablet by mouth daily    Historical Provider, MD   spironolactone (ALDACTONE) 25 MG tablet Take 12.5 mg by mouth daily 9/1/20   Historical Provider, MD   cyanocobalamin 1000 MCG tablet Take 1 tablet by mouth daily    Historical Provider, MD   famotidine (PEPCID) 20 MG tablet Take 20 mg by mouth 2 times daily 11/17/20 11/17/21  Historical Provider, MD   ferrous gluconate (FERGON) 324 (38 Fe) MG tablet Take 1 tablet by mouth 2 times daily    Historical Provider, MD   levETIRAcetam (KEPPRA) 1000 MG tablet Take 1,000 mg by mouth daily 11/5/20   Historical Provider, MD   levothyroxine (SYNTHROID) 150 MCG tablet Take 150 mcg by mouth every morning (before breakfast) 11/9/20   Historical Provider, MD   magnesium oxide (MAG-OX) 400 MG tablet Take 400 mg by mouth daily 11/9/20   Historical Provider, MD   nitroGLYCERIN (NITROSTAT) 0.4 MG SL tablet Place 0.4 mg under the tongue as needed 4/7/20   Historical Provider, MD   potassium chloride (KLOR-CON M) 10 MEQ extended release tablet Take 10 mEq by mouth 2 times daily 2 (10mEq) 1/18/21 1/18/22  Historical Provider, MD   amiodarone (CORDARONE) 200 MG tablet Take 400 mg by mouth daily    Historical Provider, MD   oxyCODONE (ROXICODONE) 20 MG immediate release tablet Take 20 mg by mouth every 3 hours as needed.  9/11/20   Historical Provider, MD   chlorhexidine gluconate (ANTISEPTIC SKIN CLEANSER) 4 % SOLN external solution Apply topically daily 9/30/20   Harriet Rosales MD   midodrine (PROAMATINE) 10 MG tablet Take 1 tablet by mouth 3 times daily (with meals) 6/4/20   Oswaldo Mcgill MD ipratropium-albuterol (DUONEB) 0.5-2.5 (3) MG/3ML SOLN nebulizer solution Inhale 3 mLs into the lungs every 4 hours (while awake) 11/18/19   Al Sarmiento MD   benzocaine-menthol (CEPACOL SORE THROAT) 15-3.6 MG lozenge Take 1 lozenge by mouth every 2 hours as needed for Sore Throat 11/1/19   Jones Cheadle, MD   ondansetron (ZOFRAN) 4 MG tablet Take 1 tablet by mouth every 6 hours as needed for Nausea or Vomiting 2/26/19   Heath Foote MD   docusate sodium (COLACE) 100 MG capsule Take 100 mg by mouth 2 times daily as needed for Constipation    Historical Provider, MD   atorvastatin (LIPITOR) 80 MG tablet Take 80 mg by mouth nightly    Historical Provider, MD   clopidogrel (PLAVIX) 75 MG tablet Take 75 mg by mouth daily    Historical Provider, MD   acetaminophen 650 MG TABS Take 650 mg by mouth every 4 hours as needed 4/9/16   Beth Art MD   albuterol (PROVENTIL HFA;VENTOLIN HFA) 108 (90 BASE) MCG/ACT inhaler Inhale 2 puffs into the lungs every 6 hours as needed. Historical Provider, MD       Current medications:    No current facility-administered medications for this visit. No current outpatient medications on file.      Facility-Administered Medications Ordered in Other Visits   Medication Dose Route Frequency Provider Last Rate Last Admin    linezolid (ZYVOX) tablet 600 mg  600 mg Oral 2 times per day Winston Hernandez MD   600 mg at 01/25/21 2149    bumetanide (BUMEX) tablet 2 mg  2 mg Oral Daily Winston Hernandez MD   2 mg at 01/25/21 0844    sodium chloride flush 0.9 % injection 10 mL  10 mL Intravenous 2 times per day Hipolito Alejo MD   10 mL at 01/25/21 0856    sodium chloride flush 0.9 % injection 10 mL  10 mL Intravenous PRN Hipolito Alejo MD        promethazine (PHENERGAN) tablet 12.5 mg  12.5 mg Oral Q6H PRN Hipolito Alejo MD   12.5 mg at 01/26/21 1005    Or    ondansetron (ZOFRAN) injection 4 mg  4 mg Intravenous Q6H PRN Hipolito Alejo, MD  levothyroxine (SYNTHROID) tablet 150 mcg  150 mcg Oral QAM AC Naz Zepeda MD   150 mcg at 01/26/21 0551    magnesium oxide (MAG-OX) tablet 400 mg  400 mg Oral Daily Naz Zepeda MD   400 mg at 01/25/21 0843    metoprolol succinate (TOPROL XL) extended release tablet 12.5 mg  12.5 mg Oral Daily Naz Zepeda MD   12.5 mg at 01/25/21 0843    midodrine (PROAMATINE) tablet 10 mg  10 mg Oral TID WC Naz Zepeda MD   10 mg at 01/25/21 1643    mirtazapine (REMERON) tablet 30 mg  30 mg Oral Nightly Naz Zepeda MD   30 mg at 01/25/21 2146    oxyCODONE (ROXICODONE) immediate release tablet 20 mg  20 mg Oral Q3H PRN Naz Zepeda MD   20 mg at 01/26/21 1005    potassium chloride (KLOR-CON M) extended release tablet 10 mEq  10 mEq Oral BID Naz Zepeda MD   10 mEq at 01/25/21 2146    promethazine (PHENERGAN) tablet 25 mg  25 mg Oral 4x Daily PRN Naz Zepeda MD        ranolazine [facility]) extended release tablet 500 mg  500 mg Oral Q12H Michael Saba MD   500 mg at 01/25/21 2146    spironolactone (ALDACTONE) tablet 12.5 mg  12.5 mg Oral Daily Naz Zepeda MD   12.5 mg at 01/25/21 0843    cefepime (MAXIPIME) 1000 mg IVPB minibag  1,000 mg Intravenous Q8H Naz Zepeda MD   Stopped at 01/26/21 8187    calcium elemental (OSCAL) tablet 500 mg  500 mg Oral Daily Naz Zepeda MD   500 mg at 01/25/21 0843    naloxone (NARCAN) injection 0.4 mg  0.4 mg Intravenous PRN Naz Zepeda MD           Allergies: Allergies   Allergen Reactions    Fentanyl Swelling     Other reaction(s): Angioedema (swelling)    Moxifloxacin Hcl In Nacl Swelling and Rash    Povidone-Iodine Rash     Other reaction(s): AOF      Cyclobenzaprine      Other reaction(s):  Other - comment required  \" it make my muscle very weak because of my MS\"  \" it make my muscle very weak because of my MS\"      Methocarbamol      Worsening edema Other reaction(s): Other - comment required  Worsening edema  Worsening edema  Worsening edema      Tramadol Other (See Comments)     Doctor doesn't her to take due to heart problems  Seizures   Doctor doesn't her to take due to lowers seizure threshold.  Baclofen     Ibuprofen      \"Due to heart problems\"    Naproxen     Nsaids      \"Due to heart problems\"    Tizanidine     Tolmetin      \"Due to heart problems\"    Tramadol Hcl      Other reaction(s):  Other - comment required  Told not to take due to history of seizures    Betadine [Povidone Iodine] Rash    Moxifloxacin Rash and Swelling     Lips swell and tingling in face   Lips swell and tingling in face   Lips swell and tingling in face   Lips swell and tingling in face   Around mouth       Problem List:    Patient Active Problem List   Diagnosis Code    Chest pain R07.9    CAD (coronary artery disease) I25.10    Hyperlipidemia E78.5    Thyroid disease E07.9    Acute exacerbation of CHF (congestive heart failure) ([facility]) I50.9    CHF exacerbation ([facility]) I50.9    Congestive heart failure ([facility]) I50.9    Left upper quadrant pain R10.12    Angina at rest [facility]) I20.8    Coronary artery disease involving coronary bypass graft of native heart with angina pectoris ([facility]) I25.709    Shortness of breath R06.02    Ischemic cardiomyopathy I25.5    Acute metabolic encephalopathy X11.80    Acute delirium R41.0    Sacral pain M53.3    Acute on chronic systolic CHF (congestive heart failure), NYHA class 3 ([facility]) I50.23    Severe malnutrition ([facility]) E43    Cellulitis L03.90    Cellulitis at gastrostomy tube site ([facility] K94.22, L03.319    History of Maru-en-Y gastric bypass Z98.84    Gastroparesis K31.84    Abdominal pain R10.9    Feeding tube dysfunction T85.598A    Abdominal pain, epigastric R10.13    Gastrojejunostomy tube status ([facility]) Z93.4    Chronic malnutrition ([facility] E46    Asplenia Q89.01  Shockable cardiac rhythm detected by automated external defibrillator I49.9    Financial difficulties Z59.8    Chest pain at rest R07.9    Closed nondisplaced transverse fracture of left patella S82.035A    Contusion of right knee S80. 01XA    Multifocal pneumonia J18.9    Abnormal cardiac function test R94.30    Chronic systolic heart failure ([facility]) I50.22    Seizure disorder ([facility]) G40.909    Hypothyroidism E03.9    Cardiomyopathy ([facility]) I42.9    Acute chest pain R07.9    Fever in adult R50.9    Pyelonephritis N12    MRSA bacteremia R78.81, B95.62    HFrEF (heart failure with reduced ejection fraction) ([facility]) I50.20    Infected chest wall abrasion, right, subsequent encounter S20.311D, L08.9    Granuloma of skin L92.9    Open wound of right chest wall S21.101A    MRSA infection A49.02    Klebsiella pneumoniae infection A49.8    Symptomatic bradycardia R00.1    History of pacemaker with removal secondary to infected leads Z95.0    Avascular necrosis ([facility]) M87.00    Multiple sclerosis ([address] G35    Syncope R55    Bradycardia R00.1    Ulcer of chest wall, limited to breakdown of skin ([address] L98.491    Pneumonia J18.9       Past Medical History:        Diagnosis Date    Acute delirium     Arrhythmia     Arthritis     Asplenia     Asthma     CAD (coronary artery disease)     1st stent at age 46    Cardiomyopathy [facility])     Cerebral artery occlusion with cerebral infarction ([address]     CHF (congestive heart failure) ([address]     Enlarged liver     GERD (gastroesophageal reflux disease)     H/O echocardiogram 4/6/16    EF 55%, trivial TR & MR, stage 1 diastolic dysfunction    History of blood transfusion     Hx of cardiovascular stress test 12/29/2015    Alessia: EF 45%. Pharmacologic stress myocardial perfusion scan shows a fixed inferior-lateral defect w/ no inducible ischemia. No evidence of ischemia. Inferior-lateral infarction.  Normal LVSF    Hyperlipidemia     Hypertension K 4.2 01/25/2021    CL 99 01/25/2021    CO2 31 01/25/2021    BUN 10 01/25/2021    CREATININE 0.7 01/25/2021    GFRAA >60 01/25/2021    LABGLOM >60 01/25/2021    GLUCOSE 82 01/25/2021    PROT 5.4 01/22/2021    PROT 6.3 02/21/2013    CALCIUM 8.4 01/25/2021    BILITOT 0.2 01/22/2021    ALKPHOS 95 01/22/2021    AST 13 01/22/2021    ALT 8 01/22/2021       POC Tests: No results for input(s): POCGLU, POCNA, POCK, POCCL, POCBUN, POCHEMO, POCHCT in the last 72 hours. Coags:   Lab Results   Component Value Date    PROTIME 11.1 01/15/2021    PROTIME 10.2 01/28/2012    INR 0.92 01/15/2021    APTT 27.4 01/15/2021       HCG (If Applicable): No results found for: PREGTESTUR, PREGSERUM, HCG, HCGQUANT     ABGs:   Lab Results   Component Value Date    PO2ART 59 04/25/2018    XWO1PRC 36.0 04/25/2018    LEJ7VIP 25.6 04/25/2018        Type & Screen (If Applicable):  No results found for: [facility]    Drug/Infectious Status (If Applicable):  Lab Results   Component Value Date    HEPCAB NON REACTIVE 04/26/2018       COVID-19 Screening (If Applicable):   Lab Results   Component Value Date    COVID19 NOT DETECTED 01/23/2021         Anesthesia Evaluation  Patient summary reviewed and Nursing notes reviewed  Airway: Mallampati: II  TM distance: >3 FB   Neck ROM: full  Mouth opening: > = 3 FB Dental: normal exam         Pulmonary:   (+) pneumonia: unresolved,  shortness of breath:  asthma:     (-) not a current smoker          Patient did not smoke on day of surgery.                 ROS comment: Current pneumonia, over 3 l o2 home, osats 80 on admit for sob and pneumonia Karl Waters a 62 y.o.  female   who presented to the hospital with a complaint of cough and shortness of breath. Oxygen saturation in the 80s at home per admission HPI. On 3 L of oxygen at home as needed. Chest x-ray showed bilateral interstitial opacities which could be as a result of edema or infiltrate. She is admitted to the hospital for acute hypoxic respiratory failure secondary to pneumonia. Blood cultures x2 so far no growth. Strep and Legionella urine antigen negative. SARS-CoV-2 negative. MRSA nasal swab positive. Procalcitonin low at 0.068 on admission.     #.  Acute on chronic hypoxic respiratory failure---likely secondary to pneumonia. Chronically on 3 L of oxygen as needed. Currently on 2 L of oxygen.     #. Pneumonia---unspecified organism. MRSA nasal swab positive. 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 Initially treated with vancomycin and switched to Zyvox.  -Continue cefepime for total of 5 days  -Continue Zyvox for now   Cardiovascular:  Exercise tolerance: poor (<4 METS),   (+) hypertension:, angina:, pacemaker:, past MI:, CAD:, CABG/stent:, dysrhythmias: SVT, CHF: systolic and diastolic, hyperlipidemia      ECG reviewed               Beta Blocker:  Not on Beta Blocker      ROS comment: Sinus rhythm with fusion complexes   Left axis deviation   Inferior infarct (cited on or before 12-FEB-2017)   Anterolateral infarct (cited on or before 12-FEB-2017)   Abnormal ECG   When compared with ECG of 20-NOV-2020 19:14,   fusion complexes are now present   Questionable change in initial forces of Anterior leads   T wave inversion less evident in Lateral leads   Confirmed by Turner Castillo ([id number]) on 1/23/2021 2:55:04 PM     Summary   Medium sized defect of moderate severity which is persistent involving   inferior wall of myocardium.  Abnormal Lexiscan nuclear scintigraphic study  suggestive of abnormal myocardial perfusion. Gated images demonstrate   abnormal left ventricular systolic function with EF of 38 %. Nuclear   scintigraphy demonstartes inferior wall infarct.      Signatures      ------------------------------------------------------------------   Electronically signed by Dylan Augustin MD   (Interpreting cardiologist) on 09/28/2020 at 19:41   ------------------------------------------------------------------     Summary   Technically difficult examination. Left ventricular systolic function is abnormal.   Ejection fraction is visually estimated at 30-35%. Device wiring visualized within the right heart. Mild tricuspid regurgitation is present. RVSP is 22 mmHg. No evidence of any pericardial effusion. Signature      ------------------------------------------------------------------   Electronically signed by Karthikeyan Greer MD (Interpreting   physician) on 05/26/2020 at 04:30 PM  #. Chronic systolic and diastolic CHF---EF 40% at [facility] [place] on 1/8/2021. Patient has a LifeVest at home. Patient instructed to bring her LifeVest on 1/26. Additionally nurse is aware of that the patient needs to bring her LifeVest.  -Continue Bumex, Toprol-XL, Aldactone     #.  Bradycardia---recently underwent MICRA pacemaker placement at [facility] which is a leadless and MRI compatible pacemaker. VVI with a set rate of 40. EP consulted. Would not change the rate. Hemodynamically stable. 1/26/2021       Neuro/Psych:   (+) seizures:, CVA:, neuromuscular disease: multiple sclerosis,              ROS comment: Chronic pain GI/Hepatic/Renal:   (+) GERD:, liver disease:,          ROS comment: g tube. Endo/Other:    (+) hypothyroidism, blood dyscrasia: anticoagulation therapy:., malignancy/cancer. Pt had no PAT visit       Abdominal:           Vascular:   + PE.                            Anesthesia Plan      MAC     ASA 4     (Chart review only covid - 1/15, need to decide if needs repeated )  Induction: intravenous. Plan discussed with CRNA.                 JESSE Cooney - CRNA   1/26/2021

## 2021-01-26 NOTE — PROGRESS NOTES
Cardiology Progress Note     Today's Plan: Will sign off     Admit Date:  1/22/2021    Consult reason/ Seen today for: bradycardia    Subjective and  Overnight Events: patient awaiting port placement today. Assessment / Plan / Recommendation:     1. Bradycardia with demand paced @ 40/min. Pt has micra PM. No events seen on PM. EP consult appreciated. 2. CAD s/p CABG and PCI. Stable CAD. Cont med mgt. Continue aspirin/Plavix statins. Aspirin can be stopped given patient is also on NOAC. 3. History of ventricular arrhythmias, VT's with multiple ablations, due to pocket wound infection which is still healing and ICD could not be placed. Patient is on amiodarone. Plan for follow up at [facility] once infection cleared. 4. Hyperlipidemia continue with statin therapy  5. Chronically elevated troponins non-ACS stable. 6. Ischemic cardiomyopathy with LVEF 30-35% with combined systolic diastolic dysfunction. Compensated. Continue with diuretic therapy. Continue Toprol-XL and Aldactone. 7. Paroxysmal atrial fibrillation and flutter, history of stroke. On Pradaxa. Continue  8. History of DVT pulmonary embolism. Continue with Pradaxa  9. Will sign off, please re-consult for any new cardiac complaints or concerns. History of Presenting Illness:    Chief complain on admission : 62 y. o.year old who is admitted for  Chief Complaint   Patient presents with    Cough     possible Pneumonia        Past medical history: has a past medical history of Acute delirium, Arrhythmia, Arthritis, Asplenia, Asthma, CAD (coronary artery disease), Cardiomyopathy ([address] Cerebral artery occlusion with cerebral infarction ([address] CHF (congestive heart failure) ([address] Enlarged liver, GERD (gastroesophageal reflux disease), H/O echocardiogram, History of blood transfusion, Hx of cardiovascular stress test, Hyperlipidemia, Hypertension, Lymphoma ([address] MI, old, Movement disorder, MS (multiple sclerosis) ([address] OP (osteoporosis), PE (pulmonary embolism), Pneumonia, Pyelonephritis, Seizures ([address] Severe malnutrition ([address] Spleen enlarged, and Thyroid disease. Past surgical history:   has a past surgical history that includes Coronary angioplasty with stent; Hysterectomy; Appendectomy; Cholecystectomy; Tonsillectomy; Gastric bypass surgery; Cardiac defibrillator placement; hip surgery; Abdomen surgery; Colonoscopy; Endoscopy, colon, diagnostic; hernia repair; joint replacement; skin biopsy; vascular surgery; Coronary artery bypass graft; and Port Surgery (N/A, 6/1/2020). Social History:   reports that she has never smoked. She has never used smokeless tobacco. She reports that she does not drink alcohol or use drugs. Family history:  family history includes Cancer in her father; Diabetes in her mother; Heart Disease in her father, maternal grandmother, and mother; High Blood Pressure in her maternal grandmother and mother; High Cholesterol in her maternal grandmother and mother; Other in her sister. Allergies   Allergen Reactions    Fentanyl Swelling     Other reaction(s): Angioedema (swelling)    Moxifloxacin Hcl In Nacl Swelling and Rash    Povidone-Iodine Rash     Other reaction(s): AOF      Cyclobenzaprine      Other reaction(s): Other - comment required  \" it make my muscle very weak because of my MS\"  \" it make my muscle very weak because of my MS\"      Methocarbamol      Worsening edema  Other reaction(s):  Other - comment required  sodium chloride flush  10 mL Intravenous 2 times per day    acarbose  25 mg Oral TID WC    albuterol sulfate HFA  2 puff Inhalation 4x daily    amiodarone  400 mg Oral Daily    atorvastatin  80 mg Oral Nightly    budesonide-formoterol  2 puff Inhalation BID    clopidogrel  75 mg Oral Daily    cyanocobalamin  1,000 mcg Oral Daily    dabigatran  150 mg Oral BID    DULoxetine  60 mg Oral Daily    famotidine  20 mg Oral BID    ferrous gluconate  324 mg Oral BID    gabapentin  300 mg Oral TID    levETIRAcetam  1,000 mg Oral Daily    levothyroxine  150 mcg Oral QAM AC    magnesium oxide  400 mg Oral Daily    metoprolol succinate  12.5 mg Oral Daily    midodrine  10 mg Oral TID WC    mirtazapine  30 mg Oral Nightly    potassium chloride  10 mEq Oral BID    ranolazine  500 mg Oral Q12H    spironolactone  12.5 mg Oral Daily    cefepime  1,000 mg Intravenous Q8H    calcium elemental  500 mg Oral Daily       sodium chloride flush, promethazine **OR** ondansetron, polyethylene glycol, acetaminophen **OR** acetaminophen, docusate sodium, oxyCODONE, promethazine, naloxone    Lab Data:  CBC: No results for input(s): WBC, HGB, HCT, MCV, PLT in the last 72 hours. BMP:   Recent Labs     01/23/21  1156 01/24/21  0835 01/25/21  1242   * 136 142   K 4.0 4.1 4.2   CL 95* 100 99   CO2 23 27 31   BUN 11 11 10   CREATININE 0.6 0.8 0.7     PT/INR: No results for input(s): PROTIME, INR in the last 72 hours. BNP:  No results for input(s): PROBNP in the last 72 hours. TROPONIN: No results for input(s): TROPONINT in the last 72 hours. Impression:  Active Problems:    Pneumonia  Resolved Problems:    * No resolved hospital problems. *       All labs, medications and tests reviewed by myself, continue all other medications of all above medical condition listed as is except for changes mentioned above. Thank you   Please call with questions. Electronically signed by Lisa Ayers. JESSE Chowdhury CNP on 1/26/2021 at 9:01 AM         CARDIOLOGY ATTENDING ADDENDUM    I have seen, spoken to and examined this patient personally, independently of the nurse practitioner. I have reviewed the hospital care given to date and reviewed all pertinent labs and imaging. The plan was developed mutually at the time of the visit with the patient,  NP   and myself. I have spoken with patient, nursing staff and provided written and verbal instructions . The above note has been reviewed and I agree with the assessment, diagnosis, and treatment plan with changes made by me as follows       HPI:  I have reviewed the above HPI  And agree with above   Please review addendum/changes made to note above     Interval history:         Physical Exam:  General:  Awake, alert, NAD  Head:normal  Eye:normal  Neck:  No JVD   Chest:  Clear to auscultation, respiration easy  Cardiovascular:  s1s2  Abdomen:   nontender  Extremities:  +1 edema  Pulses; palpable  Neuro: grossly normal      MEDICAL DECISION MAKING;    I agree with the above plan, which was planned by myself and discussed with NP.     S/p Micra pacemaker  No acute events noted  Continue with medical management including Plavix statins  Continue with Toprol-XL and Aldactone  Follow-up with outpatient cardiology     please call us with any further questions      Dr. Alphonse Coronel MD

## 2021-01-26 NOTE — PROGRESS NOTES
Hospitalist Progress Note      Name:  Mak Castaneda /Age/Sex: 1960  (62 y.o. female)   MRN & CSN:  [id number] & [id number] Admission Date/Time: 2021  6:19 PM   Location:  [facility]9/1109-A PCP: Danisha Palacios MD         Hospital Day: 5    ASSESSMENT/PLAN:  Mak Castaneda is a 62 y.o.  female   who presented to the hospital with a complaint of cough and shortness of breath. Oxygen saturation in the 80s at home per admission HPI. On 3 L of oxygen at home as needed. Chest x-ray showed bilateral interstitial opacities which could be as a result of edema or infiltrate. She is admitted to the hospital for acute hypoxic respiratory failure secondary to pneumonia. Blood cultures x2 so far no growth. Strep and Legionella urine antigen negative. SARS-CoV-2 negative. MRSA nasal swab positive. Procalcitonin low at 0.068 on admission. #.  Acute on chronic hypoxic respiratory failurelikely secondary to pneumonia. Chronically on 3 L of oxygen as needed. Currently on 2 L of oxygen. #.  Pneumoniaunspecified organism. MRSA nasal swab positive. 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 Initially treated with vancomycin and switched to Zyvox. Continue cefepime for total of 5 days  Continue Zyvox for now    #. Chronic systolic and diastolic CHFEF 59% at [facility] on 2021. Patient has a LifeVest at home. Patient instructed to bring her LifeVest on . Additionally nurse is aware of that the patient needs to bring her LifeVest.  Continue Bumex, Toprol-XL, Aldactone    #. Bradycardiarecently underwent MICRA pacemaker placement at [facility] which is a leadless and MRI compatible pacemaker. VVI with a set rate of 40. EP consulted. Would not change the rate. Hemodynamically stable. #.  CADs/p CABG, s/p PCI to LAD with stent placements in 2019.   Continue Plavix CV: Regular rhythm. Bradycardic. No pitting lower extremity edema. GI: Abdomen soft. Nontender. Nondistended.  no Coppola in place. MSK: No bone fractures. No gross deformities. SKIN: warm, dry, no rashes  NEURO: Cranial nerves appear grossly intact, normal speech, no lateralizing weakness.   PSYCH: Awake, alert, oriented     Medications:   Medications:    linezolid  600 mg Oral 2 times per day    bumetanide  2 mg Oral Daily    sodium chloride flush  10 mL Intravenous 2 times per day    acarbose  25 mg Oral TID WC    albuterol sulfate HFA  2 puff Inhalation 4x daily    amiodarone  400 mg Oral Daily    atorvastatin  80 mg Oral Nightly    budesonide-formoterol  2 puff Inhalation BID    clopidogrel  75 mg Oral Daily    cyanocobalamin  1,000 mcg Oral Daily    dabigatran  150 mg Oral BID    DULoxetine  60 mg Oral Daily    famotidine  20 mg Oral BID    ferrous gluconate  324 mg Oral BID    gabapentin  300 mg Oral TID    levETIRAcetam  1,000 mg Oral Daily    levothyroxine  150 mcg Oral QAM AC    magnesium oxide  400 mg Oral Daily    metoprolol succinate  12.5 mg Oral Daily    midodrine  10 mg Oral TID WC    mirtazapine  30 mg Oral Nightly    potassium chloride  10 mEq Oral BID    ranolazine  500 mg Oral Q12H    spironolactone  12.5 mg Oral Daily    cefepime  1,000 mg Intravenous Q8H    calcium elemental  500 mg Oral Daily      Infusions:   PRN Meds:     sodium chloride flush, 10 mL, PRN      promethazine, 12.5 mg, Q6H PRN    Or      ondansetron, 4 mg, Q6H PRN      polyethylene glycol, 17 g, Daily PRN      acetaminophen, 650 mg, Q6H PRN    Or      acetaminophen, 650 mg, Q6H PRN      docusate sodium, 100 mg, BID PRN      oxyCODONE, 20 mg, Q3H PRN      promethazine, 25 mg, 4x Daily PRN      naloxone, 0.4 mg, PRN          Electronically signed by Anshul Galicia MD on 1/26/2021 at 10:40 AM

## 2021-01-26 NOTE — PROGRESS NOTES
GENERAL SURGERY PROGRESS NOTE    CC/HPI:           Patient feels ok waiting for her Mediport placement. Vitals:    01/25/21 0839 01/25/21 1515 01/25/21 2100 01/26/21 0545   BP: (!) 123/56 123/89 (!) 90/52 (!) 97/55   Pulse: 50 54 (!) 46 (!) 45   Resp: 16 16 16 16   Temp:  98.2 °F (36.8 °C) 98.2 °F (36.8 °C) 98.6 °F (37 °C)   TempSrc:  Oral Oral Oral   SpO2: 99% 96% 98% 96%   Weight:       Height:         I/O last 3 completed shifts: In: 100 [IV Piggyback:100]  Out: 2900 [Urine:2900]  No intake/output data recorded. Diet NPO, After Midnight Exceptions are: Sips of Water with Meds    Recent Results (from the past 48 hour(s))   Vancomycin, trough    Collection Time: 01/24/21  5:19 PM   Result Value Ref Range    Vancomycin Tr 9.0 (L) 10 - 20 UG/ML    DOSE AMOUNT DOSE AMT.  GIVEN - 1000 mg     DOSE TIME DOSE TIME GIVEN - 4729    Basic metabolic panel    Collection Time: 01/25/21 12:42 PM   Result Value Ref Range    Sodium 142 135 - 145 MMOL/L    Potassium 4.2 3.5 - 5.1 MMOL/L    Chloride 99 99 - 110 mMol/L    CO2 31 21 - 32 MMOL/L    Anion Gap 12 4 - 16    BUN 10 6 - 23 MG/DL    CREATININE 0.7 0.6 - 1.1 MG/DL    Glucose 82 70 - 99 MG/DL    Calcium 8.4 8.3 - 10.6 MG/DL    GFR Non-African American >60 >60 mL/min/1.73m2    GFR African American >60 >60 mL/min/1.73m2       Scheduled Meds:   linezolid  600 mg Oral 2 times per day    bumetanide  2 mg Oral Daily    sodium chloride flush  10 mL Intravenous 2 times per day    acarbose  25 mg Oral TID WC    albuterol sulfate HFA  2 puff Inhalation 4x daily    amiodarone  400 mg Oral Daily    atorvastatin  80 mg Oral Nightly    budesonide-formoterol  2 puff Inhalation BID    clopidogrel  75 mg Oral Daily    cyanocobalamin  1,000 mcg Oral Daily    dabigatran  150 mg Oral BID    DULoxetine  60 mg Oral Daily    famotidine  20 mg Oral BID    ferrous gluconate  324 mg Oral BID    gabapentin  300 mg Oral TID    levETIRAcetam  1,000 mg Oral Daily  levothyroxine  150 mcg Oral QAM AC    magnesium oxide  400 mg Oral Daily    metoprolol succinate  12.5 mg Oral Daily    midodrine  10 mg Oral TID WC    mirtazapine  30 mg Oral Nightly    potassium chloride  10 mEq Oral BID    ranolazine  500 mg Oral Q12H    spironolactone  12.5 mg Oral Daily    cefepime  1,000 mg Intravenous Q8H    calcium elemental  500 mg Oral Daily       Continuous Infusions:    Physical Exam:  HEENT: Anicteric sclerae, Oropharyngeal mucosae moist, pink and intact. Heart:  Normal S1 and S2, RRR  Lungs: Clear to auscultation bilaterally, No audible Wheezes or Rales. Extremities: No edema. Right upper chest wound still healing from July's infection, but the left is perfectly healed. Neuro: Alert and Oriented x 3, Non focal.  Abdomen: Soft, Benign, Non tender, Non distended, Positive bowel sounds. Active Problems:    Pneumonia  Resolved Problems:    * No resolved hospital problems. *      Assessment and Plan:  Sofya Cardenas is a 62 y.o. female who has poor peripheral IVs, and needs mediport, will place one today. Addendum @ 4:29 PM  Discussed with Dr Montse Ramos and Dr Krista Taylor, and will wait before placing the Mediport that she needs, in the left Subclavian area, tunneled, soon, after discussion with her doctors. .    ___________________________________________    Judith Wilkinson MD, FACS, FICS  1/26/2021  10:54 AM

## 2021-01-26 NOTE — PROGRESS NOTES
Patient is NPO with sips with med's. She is wanting to take her pain medication and phenergan. She is refusing all other med's at this time until she can eat again after the procedure.

## 2021-01-27 VITALS
TEMPERATURE: 98.3 F | DIASTOLIC BLOOD PRESSURE: 64 MMHG | OXYGEN SATURATION: 96 % | RESPIRATION RATE: 15 BRPM | SYSTOLIC BLOOD PRESSURE: 127 MMHG | HEART RATE: 48 BPM | HEIGHT: 66 IN | BODY MASS INDEX: 19.77 KG/M2 | WEIGHT: 123 LBS

## 2021-01-27 LAB
CULTURE: NORMAL
CULTURE: NORMAL
EKG ATRIAL RATE: 78 BPM
EKG DIAGNOSIS: NORMAL
EKG P AXIS: 32 DEGREES
EKG P-R INTERVAL: 156 MS
EKG Q-T INTERVAL: 416 MS
EKG QRS DURATION: 102 MS
EKG QTC CALCULATION (BAZETT): 474 MS
EKG R AXIS: -50 DEGREES
EKG T AXIS: -78 DEGREES
EKG VENTRICULAR RATE: 78 BPM
Lab: NORMAL
Lab: NORMAL
SPECIMEN: NORMAL
SPECIMEN: NORMAL

## 2021-01-27 PROCEDURE — 2580000003 HC RX 258: Performed by: INTERNAL MEDICINE

## 2021-01-27 PROCEDURE — 94761 N-INVAS EAR/PLS OXIMETRY MLT: CPT

## 2021-01-27 PROCEDURE — 99231 SBSQ HOSP IP/OBS SF/LOW 25: CPT | Performed by: SURGERY

## 2021-01-27 PROCEDURE — 6360000002 HC RX W HCPCS: Performed by: INTERNAL MEDICINE

## 2021-01-27 PROCEDURE — 6370000000 HC RX 637 (ALT 250 FOR IP): Performed by: INTERNAL MEDICINE

## 2021-01-27 PROCEDURE — 94640 AIRWAY INHALATION TREATMENT: CPT

## 2021-01-27 PROCEDURE — APPSS60 APP SPLIT SHARED TIME 46-60 MINUTES: Performed by: NURSE PRACTITIONER

## 2021-01-27 PROCEDURE — 94618 PULMONARY STRESS TESTING: CPT

## 2021-01-27 RX ADMIN — DABIGATRAN ETEXILATE MESYLATE 150 MG: 75 CAPSULE ORAL at 09:48

## 2021-01-27 RX ADMIN — BUMETANIDE 2 MG: 1 TABLET ORAL at 09:56

## 2021-01-27 RX ADMIN — ACETAMINOPHEN 650 MG: 325 TABLET ORAL at 13:09

## 2021-01-27 RX ADMIN — ALBUTEROL SULFATE 2 PUFF: 90 AEROSOL, METERED RESPIRATORY (INHALATION) at 08:17

## 2021-01-27 RX ADMIN — ACARBOSE 25 MG: 50 TABLET ORAL at 16:24

## 2021-01-27 RX ADMIN — CALCIUM 500 MG: 500 TABLET ORAL at 09:49

## 2021-01-27 RX ADMIN — AMIODARONE HYDROCHLORIDE 400 MG: 200 TABLET ORAL at 09:49

## 2021-01-27 RX ADMIN — OXYCODONE HYDROCHLORIDE 20 MG: 5 TABLET ORAL at 06:41

## 2021-01-27 RX ADMIN — POTASSIUM CHLORIDE 10 MEQ: 20 TABLET, EXTENDED RELEASE ORAL at 09:49

## 2021-01-27 RX ADMIN — CLOPIDOGREL BISULFATE 75 MG: 75 TABLET ORAL at 09:49

## 2021-01-27 RX ADMIN — SODIUM CHLORIDE, PRESERVATIVE FREE 10 ML: 5 INJECTION INTRAVENOUS at 09:57

## 2021-01-27 RX ADMIN — MIDODRINE HYDROCHLORIDE 10 MG: 5 TABLET ORAL at 09:50

## 2021-01-27 RX ADMIN — CEFEPIME HYDROCHLORIDE 1000 MG: 1 INJECTION, POWDER, FOR SOLUTION INTRAMUSCULAR; INTRAVENOUS at 13:08

## 2021-01-27 RX ADMIN — GABAPENTIN 300 MG: 300 CAPSULE ORAL at 09:49

## 2021-01-27 RX ADMIN — RANOLAZINE 500 MG: 500 TABLET, FILM COATED, EXTENDED RELEASE ORAL at 09:48

## 2021-01-27 RX ADMIN — ACETAMINOPHEN 650 MG: 325 TABLET ORAL at 05:42

## 2021-01-27 RX ADMIN — GABAPENTIN 300 MG: 300 CAPSULE ORAL at 13:08

## 2021-01-27 RX ADMIN — OXYCODONE HYDROCHLORIDE 20 MG: 5 TABLET ORAL at 03:46

## 2021-01-27 RX ADMIN — OXYCODONE HYDROCHLORIDE 20 MG: 5 TABLET ORAL at 16:24

## 2021-01-27 RX ADMIN — LEVOTHYROXINE SODIUM 150 MCG: 75 TABLET ORAL at 06:41

## 2021-01-27 RX ADMIN — MIDODRINE HYDROCHLORIDE 10 MG: 5 TABLET ORAL at 13:08

## 2021-01-27 RX ADMIN — FERROUS GLUCONATE TAB 324 MG (37.5 MG ELEMENTAL IRON) 324 MG: 324 (37.5 FE) TAB at 09:56

## 2021-01-27 RX ADMIN — LEVETIRACETAM 1000 MG: 500 TABLET, FILM COATED ORAL at 09:49

## 2021-01-27 RX ADMIN — PROMETHAZINE HYDROCHLORIDE 12.5 MG: 25 TABLET ORAL at 05:43

## 2021-01-27 RX ADMIN — OXYCODONE HYDROCHLORIDE 20 MG: 5 TABLET ORAL at 09:48

## 2021-01-27 RX ADMIN — MAGNESIUM OXIDE 400 MG: 400 TABLET ORAL at 09:49

## 2021-01-27 RX ADMIN — FAMOTIDINE 20 MG: 20 TABLET, FILM COATED ORAL at 09:50

## 2021-01-27 RX ADMIN — ACARBOSE 25 MG: 50 TABLET ORAL at 09:56

## 2021-01-27 RX ADMIN — METOPROLOL SUCCINATE 12.5 MG: 25 TABLET, EXTENDED RELEASE ORAL at 09:50

## 2021-01-27 RX ADMIN — CEFEPIME HYDROCHLORIDE 1000 MG: 1 INJECTION, POWDER, FOR SOLUTION INTRAMUSCULAR; INTRAVENOUS at 05:11

## 2021-01-27 RX ADMIN — SPIRONOLACTONE 12.5 MG: 25 TABLET ORAL at 09:49

## 2021-01-27 RX ADMIN — BUDESONIDE AND FORMOTEROL FUMARATE DIHYDRATE 2 PUFF: 160; 4.5 AEROSOL RESPIRATORY (INHALATION) at 08:17

## 2021-01-27 RX ADMIN — LINEZOLID 600 MG: 600 TABLET, FILM COATED ORAL at 09:56

## 2021-01-27 RX ADMIN — DULOXETINE HYDROCHLORIDE 60 MG: 30 CAPSULE, DELAYED RELEASE ORAL at 09:49

## 2021-01-27 RX ADMIN — ACARBOSE 25 MG: 50 TABLET ORAL at 13:08

## 2021-01-27 RX ADMIN — PROMETHAZINE HYDROCHLORIDE 12.5 MG: 25 TABLET ORAL at 13:10

## 2021-01-27 RX ADMIN — MIDODRINE HYDROCHLORIDE 10 MG: 5 TABLET ORAL at 16:24

## 2021-01-27 RX ADMIN — ALBUTEROL SULFATE 2 PUFF: 90 AEROSOL, METERED RESPIRATORY (INHALATION) at 11:29

## 2021-01-27 RX ADMIN — ALBUTEROL SULFATE 2 PUFF: 90 AEROSOL, METERED RESPIRATORY (INHALATION) at 15:13

## 2021-01-27 RX ADMIN — CYANOCOBALAMIN TAB 1000 MCG 1000 MCG: 1000 TAB at 09:49

## 2021-01-27 RX ADMIN — OXYCODONE HYDROCHLORIDE 20 MG: 5 TABLET ORAL at 13:08

## 2021-01-27 ASSESSMENT — PAIN SCALES - GENERAL: PAINLEVEL_OUTOF10: 9

## 2021-01-27 NOTE — PROGRESS NOTES
Pt qualified for home oxygen. Paperwork faxed to [employer]. Please do not discharge pt without oxygen. This testing will  and have to be repeated if pt has not discharged 48 hours from time testing was ordered. Please call [facility] @ [phone] if oxygen has not been delivered prior to pt discharging. Thanks.

## 2021-01-27 NOTE — PROGRESS NOTES
Hospitalist Progress Note      Name:  Spencer George /Age/Sex: 1960  (62 y.o. female)   MRN & CSN:  [id number] & [id number] Admission Date/Time: 2021  6:19 PM   Location:  [facility]91109-A PCP: Jaquelin Charlton MD         Hospital Day: 6    ASSESSMENT/PLAN:  Spencer George is a 62 y.o.  female   who presented to the hospital with a complaint of cough and shortness of breath. Oxygen saturation in the 80s at home per admission HPI. On 3 L of oxygen at home as needed. Chest x-ray showed bilateral interstitial opacities which could be as a result of edema or infiltrate. She is admitted to the hospital for acute hypoxic respiratory failure secondary to pneumonia. Blood cultures x2 so far no growth. Strep and Legionella urine antigen negative. SARS-CoV-2 negative. MRSA nasal swab positive. Procalcitonin low at 0.068 on admission. #.  Acute on chronic hypoxic respiratory failurelikely secondary to pneumonia. Chronically on 3 L of oxygen as needed. Currently on 2 L of oxygen. #.  Pneumoniaunspecified organism. MRSA nasal swab positive. 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 Initially treated with vancomycin and switched to Zyvox. Continue cefepime   Continue Zyvox for now    #. Chronic systolic and diastolic CHFEF 02% at [facility] [place] on 2021. Patient has a LifeVest at home. Patient instructed to bring her LifeVest on . Additionally nurse is aware of that the patient needs to bring her LifeVest.  Continue Bumex, Toprol-XL, Aldactone    #. Bradycardiarecently underwent MICRA pacemaker placement at [facility] which is a leadless and MRI compatible pacemaker. VVI with a set rate of 40. EP consulted. Would not change the rate. Hemodynamically stable. #.  CADs/p CABG, s/p PCI to LAD with stent placements in 2019.   Continue Plavix #.  Hypothyroidismon levothyroxine    #. Seizure disorderon Keppra    #. Right lower extremity DVTdiagnosed on November 5, 2020. Ultrasound showed nonocclusive DVT involving one of the right posterior tibial veins. Ultrasound appearance suggests that this is chronic. Chronically on [address]. #.  Paroxysmal A. fib & atrial flutters/p ablation on 7/5/2019 by [facility]. Continue Pradaxa    #. VTs/p dual AICD. S/p VT ablation at [facility] on July 12, 2019. ICD removed in June 2020 due to ICD pocket infection with MRSA and Klebsiella. On amiodarone    #. Severe protein calorie malnutrition    #. Difficult accesspatient to get Mediport today    #.  Multiple sclerosiswheelchair-bound. She is not on any MS specific medications. MEDICAL DECISION MAKING:  Labs reviewed  Imaging reviewed  Level of risk moderate  Diet Diet NPO Effective Now Exceptions are: Sips of Water with Meds   DVT Prophylaxis [] Lovenox, []  dabigratan, [] SCDs, [] Ambulation   GI Prophylaxis [] PPI,  [] H2 Blocker,  [] Carafate,  [] Diet/Tube Feeds   Code Status Full Code   Disposition  Home   MDM [] Low, [x] Moderate,[]  High     Chief complaint/Interval History/ROS     Chief Complaint: Shortness of breath    INTERVAL HISTORY:    The plan was to place Mediport yesterday. However the procedure has been canceled. There is a concern of placing a Mediport since the patient had ICD pocket infection with ICD removal and port removal in June 2020. ROS:  No chest pain. No abdominal pain. Objective: Intake/Output Summary (Last 24 hours) at 1/27/2021 0954  Last data filed at 1/27/2021 0857  Gross per 24 hour   Intake 560 ml   Output 750 ml   Net -190 ml      Vitals:   Vitals:    01/27/21 0945   BP: 104/74   Pulse: 60   Resp: 15   Temp: 98 °F (36.7 °C)   SpO2: 99%     Physical Exam:   GEN: Awake female, pleasant. Nontoxic appearing. Answers questions appropriately. EYES: No eye discharge. Ocular muscles intact. Electronically signed by Catrachita Ferrer MD on 1/27/2021 at 9:54 AM

## 2021-01-27 NOTE — PROGRESS NOTES
The nurse spoke with Pt's Partner, Mehreen Mattson - Writer informed Mehreen Srinivasan that Pt has been discharged and cleared medically. Mehreen Srinivasan stated that she is out of town until about 1930 this PM. Writer and Partner agreed on Discharge around 2000 and 2030. GILMA Butterfield confirmed with Pt as to the Discharge plan for this evening. Pt reluctantly in agreement.

## 2021-01-27 NOTE — PROGRESS NOTES
1/27/2021 1:38 PM  Patient Room #: 1109/1109-A  Patient Name: Bhupinder Judge    (Step 1 Done by RN if possible otherwise call Pulmonary Diagnostics)  1. Place patient on room air at rest for at least 30 minutes. If patient falls below 88% before 30 minutes then you can record the level and stop. Record room air saturation level _88_ %. If patient is at 88% or below, they will qualify for home oxygen and you can stop. If level does not fall below 88%, fill in level above. If indicated continue to Step 2. Signature:_Cortney Sosa, RRT _ Date: _01/27/2021__  (Step 2&3 Done by RCP)  2. Ambulate patient on room air until saturation falls below 89%. Record level of room air saturation with ambulation___ %. Next, place patient back on ___lpm oxygen and ambulate, record level __%. (Note:  this level must show improvement from room air level done with ambulation.)  If patients saturation on room air with ambulation is 88% or below AND patient shows improvement with oxygen during ambulation, they will qualify for home oxygen and you can stop. If patient does not drop below 89%, then patient should have an overnight oximetry trending on room air to see if level falls below 88%. Complete level in Step 3 below. 3. Room air overnight oximetry level 88 % for___  cumulative minutes. If patients room air oxygen level is < 89% for at least 5 cumulative minutes, patient will qualify for home oxygen and you can stop. (Attach Night Trending Report)    Complete order below: Diagnosis:__CHF, CAD_  Home oxygen at:  Length of Need: X Lifetime ?  3 Months     __2_lpm or __%   via  [x] nasal cannula  []mask  [] other:___         [x]continuous [x]  with activity  [x]  Nocturnal   [x] Portable Tanks [x]  Concentrator        Therapist Signature:_Cortney Sosa, RRT_     Date:  __01/27/2021_  Physician Signature:  __Electronically Signed in EMR_    Date:___ Physician Printed Name: Ordering User: Paco Sidhu MD  NPI:  [id number]  [x] Patient Qualifies      [] Patient Does NOT qualify

## 2021-01-27 NOTE — CONSULTS
Infectious Disease Consult Note  2021   Patient Name: Earl Hubbard YOB: 1960   Impression  ? Requested Evaluation for Port Placement in Left Chest:  · Afebrile, no leukocytosis  · Blood cultures -0-NGTD  · -MRSA screen positive    ? Possible Pneumonia vs Acute on Chronic HFrEF:  o -MRSA screen positive  · Legionella and Strep pna screens negative  · RDP negative  · -Pct 0.068, CRP 66.4  · -CXR: mild bilateral interstitial opacities of the lungs which could reflect pulmonary edema vs infiltrate    · Allergy to moxifloxicin:  Lips and facial swelling    · HTN/CAD/HFrEF/Ischemic Cardiomyopathy    · Remote CVA    · MS    · Lymphoma    · ICD Explanted at [facility] 2020 due to MRSA Lead/Pocket Infection:  ·  with Recurrent Infection of MRSA and Klebsiella pneumonia ESBL 20    · Multi-morbidity: per PMHx:  Arthritis, asplenia, asthma, CAD with PCI, cardiomyopathy, CVA, CHF, GERD, HLD, HTN, Lymphoma, MS, Seizures, hyster, appey, choley, Gastric bypass  Plan:  ? ID recommendation:  She is too high risk for mediport insertion, she has hypogammaglobulinemia, which places her at a higher risk for infection, and especially with her past frequent recurrent MRSA infections. · If she would have to have a mediport, ID recommends a MRSA decolonization of 10 days of Hibiclens baths as well as Bactroban bid to nares x 5 days, and vancomycin IV prophylaxis with port insertion.     Thank you for allowing me to consult in the care of this patient.  ------------------------  REASON FOR CONSULT: Infective syndrome     Requested by: Dr. Luke Hansen HPI:Patient is a 62 y.o.  female who was admitted 1/22/2021 for further evaluation and management of cough and SOB. She was recently seen in the ED on 1/15/21 with fever, chills, cough, SOB. Her CXR at that time revealed focal airspace disease within the RUL, her COVID-19, rapid, was negative. She was DCd to home with oral cefdinir, dexamethasone and azithromycin. She then reported her symptoms were unresolved so she then presented to [facility] ED, was told her pneumonia was resolving but did have early onset CHF and was advised to take extra Lasix. She returned to the ED here at [facility] reporting her symptoms were not improved, stating she now has a productive cough with a fever of 101 F, and requiring her home oxygen more frequent than her prn use. She was saturating at 100% on 2L/nc oxygen. CXR revealed mild bilateral interstitial opacities reflecting pulmonary edema vs infiltrate. Blood cultures were obtained and she was started on empiric ABX therapy of ceftriaxone, azithromycin, also given Lasix and Solu-Medrol. ?  Infectious diseases service was consulted to evaluate the pt, and recommend further investigative and therapeutic measures. ROS: Other systems reviewed Including eyes, ENT, respiratory, cardiovascular, GI, , dermatologic, neurologic, psych, hem/lymphatic, musculoskeletal and endocrine were negative other than what is mentioned above.      Patient Active Problem List    Diagnosis Date Noted    Acute delirium      Priority: High    Coronary artery disease involving coronary bypass graft of native heart with angina pectoris (HCC)      Priority: High    Shortness of breath      Priority: High    Ischemic cardiomyopathy      Priority: High    Angina at rest [facility])      Priority: High    Poor intravenous access     Pneumonia 01/22/2021    Ulcer of chest wall, limited to breakdown of skin ([facility]     Bradycardia 10/16/2020    Symptomatic bradycardia 10/14/2020  History of pacemaker with removal secondary to infected leads 10/14/2020    Avascular necrosis ([address] 10/14/2020    Multiple sclerosis ([address] 10/14/2020    Syncope     MRSA infection     Klebsiella pneumoniae infection     Open wound of right chest wall     Granuloma of skin     Infected chest wall abrasion, right, subsequent encounter 08/22/2020    HFrEF (heart failure with reduced ejection fraction) ([address] 06/08/2020    MRSA bacteremia     Pyelonephritis     Fever in adult 05/24/2020    Cardiomyopathy ([address]     Acute chest pain     Chronic systolic heart failure ([address] 12/29/2019    Seizure disorder ([address] 12/29/2019    Hypothyroidism 12/29/2019    Abnormal cardiac function test 12/17/2019    Multifocal pneumonia 11/14/2019    Closed nondisplaced transverse fracture of left patella 10/24/2019    Contusion of right knee 10/24/2019    Chest pain at rest 09/29/2019    Financial difficulties 09/27/2019    Shockable cardiac rhythm detected by automated external defibrillator 05/29/2019    Asplenia     Feeding tube dysfunction     Abdominal pain, epigastric     Gastrojejunostomy tube status (HCC)     Chronic malnutrition ([address]     Abdominal pain 03/31/2019    History of Maru-en-Y gastric bypass     Gastroparesis     Cellulitis at gastrostomy tube site ([address] 03/19/2019    Cellulitis     Severe malnutrition ([address] 02/04/2019    Acute on chronic systolic CHF (congestive heart failure), NYHA class 3 ([address] 02/03/2019    Sacral pain 01/28/2019    Acute metabolic encephalopathy 38/96/0476    Left upper quadrant pain 05/04/2017    Congestive heart failure (HCC)     CHF exacerbation (HCC)     Acute exacerbation of CHF (congestive heart failure) ([address]     CAD (coronary artery disease) 04/01/2016    Hyperlipidemia 04/01/2016    Thyroid disease 04/01/2016    Chest pain 01/28/2012     Past Medical History:   Diagnosis Date    Acute delirium     Arrhythmia     Arthritis     Asplenia  Asthma     CAD (coronary artery disease)     1st stent at age 46    Cardiomyopathy [facility])     Cerebral artery occlusion with cerebral infarction ([address]     CHF (congestive heart failure) (HCC)     Enlarged liver     GERD (gastroesophageal reflux disease)     H/O echocardiogram 4/6/16    EF 55%, trivial TR & MR, stage 1 diastolic dysfunction    History of blood transfusion     Hx of cardiovascular stress test 12/29/2015    Alessia: EF 45%. Pharmacologic stress myocardial perfusion scan shows a fixed inferior-lateral defect w/ no inducible ischemia. No evidence of ischemia. Inferior-lateral infarction.  Normal LVSF    Hyperlipidemia     Hypertension     Lymphoma ([address]     Denies    MI, old     Movement disorder     MS (multiple sclerosis) ([address]     OP (osteoporosis)     PE (pulmonary embolism)     trapese filter    Pneumonia     Pyelonephritis     Seizures ([address]     Severe malnutrition ([address]     Spleen enlarged     Thyroid disease       Past Surgical History:   Procedure Laterality Date    ABDOMEN SURGERY      APPENDECTOMY      CARDIAC DEFIBRILLATOR PLACEMENT      evera [id number] CONDITIONAL    CHOLECYSTECTOMY      COLONOSCOPY      CORONARY ANGIOPLASTY WITH STENT PLACEMENT      3 stents    CORONARY ARTERY BYPASS GRAFT      Age 49    ENDOSCOPY, COLON, DIAGNOSTIC      GASTRIC BYPASS SURGERY      HERNIA REPAIR      HIP SURGERY      HYSTERECTOMY      JOINT REPLACEMENT      PORT SURGERY N/A 6/1/2020    PORT INSERTION performed by Fransisco Israel MD at [address] [place] [address]      VASCULAR SURGERY        Family History   Problem Relation Age of Onset    High Blood Pressure Mother     High Cholesterol Mother     Heart Disease Mother     Diabetes Mother     Heart Disease Father     Cancer Father     Other Sister         Multiple Sclerosis    Heart Disease Maternal Grandmother     High Blood Pressure Maternal Grandmother  High Cholesterol Maternal Grandmother       Infectious disease related family history - not contibutory. SOCIAL HISTORY  Social History     Tobacco Use    Smoking status: Never Smoker    Smokeless tobacco: Never Used   Substance Use Topics    Alcohol use: No      · [place]  · Lives:[place], [place] with partner and daughter  · Occupation:Retired nurse  · No recent travel of significance. · No recent unusual exposures. · Pets:  3 dogs, 4 cats  ? ?   ?  ALLERGIES  Allergies   Allergen Reactions    Fentanyl Swelling     Other reaction(s): Angioedema (swelling)    Moxifloxacin Hcl In Nacl Swelling and Rash    Povidone-Iodine Rash     Other reaction(s): AOF      Cyclobenzaprine      Other reaction(s): Other - comment required  \" it make my muscle very weak because of my MS\"  \" it make my muscle very weak because of my MS\"      Methocarbamol      Worsening edema  Other reaction(s): Other - comment required  Worsening edema  Worsening edema  Worsening edema      Tramadol Other (See Comments)     Doctor doesn't her to take due to heart problems  Seizures   Doctor doesn't her to take due to lowers seizure threshold.  Baclofen     Ibuprofen      \"Due to heart problems\"    Naproxen     Nsaids      \"Due to heart problems\"    Tizanidine     Tolmetin      \"Due to heart problems\"    Tramadol Hcl      Other reaction(s): Other - comment required  Told not to take due to history of seizures    Betadine [Povidone Iodine] Rash    Moxifloxacin Rash and Swelling     Lips swell and tingling in face   Lips swell and tingling in face   Lips swell and tingling in face   Lips swell and tingling in face   Around mouth      MEDICATIONS  Reviewed and are per the chart/EMR. ? Antibiotics:   Present:   Linezolid 1/25-  Cefepime 1/23-    Past:  Azithromycin 1/22  Ceftriaxone 1/22  Vancomycin 1/23-25  ? -------------------------------------------------------------------------------------------------------------------    Vital Signs:  Vitals:    01/27/21 0817   BP:    Pulse:    Resp:    Temp:    SpO2: 95%         Exam:    VS: noted; wt 123 lb (55.8 kg) 5'6\" Height  Gen: alert and oriented X3, no distress  Skin: no stigmata of endocarditis  Wounds: C/D/I  HEMT: AT/NC Oropharynx pink, moist, and without lesions or exudates; dentition in good state of repair  Eyes: PERRLA, EOMI, conjunctiva pink, sclera anicteric. Neck: Supple. Trachea midline. No LAD. Chest: no distress and CTA. Good air movement. Heart: RRR and no MRG. Abd: soft, non-distended, no tenderness, no hepatomegaly. Normoactive bowel sounds. Ext: no clubbing, cyanosis, or edema  Catheter Site: without erythema or tenderness  Neuro: Mental status intact. CN 2-12 intact and no focal sensory or motor deficits    ? Diagnostic Studies: reviewed  1/27/21 XR Chest Portable:  Impression   1. Mild bilateral interstitial opacities of the lungs which could reflect   pulmonary edema versus infiltrate.         ?? I have examined this patient and available medical records on this date and have made the above observations, conclusions and recommendations. Electronically signed by: Electronically signed by Oleg Zhu.  JESSE Poe CNP on 1/27/2021 at 9:00 AM

## 2021-01-27 NOTE — PROGRESS NOTES
Oxygen tank has been delivered to Pt's Room. Pt understands that she is to call the number when she gets home.

## 2021-01-27 NOTE — PROGRESS NOTES
Doctor Please copy and paste below in your progress note per DME requirements. Patient was seen in hospital for  CHF, CAD    . I am prescribing oxygen because the diagnosis and testing requires the patient to have oxygen in the home. Conditions will improve or be benefited by oxygen use. The patient is able to perform good mobility and therefore requires the use of a portable oxygen system for ambulation.

## 2021-01-27 NOTE — PROGRESS NOTES
GENERAL SURGERY PROGRESS NOTE    CC/HPI:           Patient feels ok wanting her Mediport placement, the last infection that she had on the left side was more than 4 years ago. Vitals:    01/25/21 2100 01/26/21 0545 01/26/21 1400 01/26/21 1945   BP: (!) 90/52 (!) 97/55 (!) 125/58 129/67   Pulse: (!) 46 (!) 45 (!) 48 50   Resp: 16 16 16 17   Temp: 98.2 °F (36.8 °C) 98.6 °F (37 °C) 98 °F (36.7 °C) 98.8 °F (37.1 °C)   TempSrc: Oral Oral Oral Oral   SpO2: 98% 96% 100% 95%   Weight:       Height:         I/O last 3 completed shifts: In: 370 [P.O.:320; IV Piggyback:50]  Out: -   No intake/output data recorded.     DIET GENERAL;    Recent Results (from the past 48 hour(s))   Basic metabolic panel    Collection Time: 01/25/21 12:42 PM   Result Value Ref Range    Sodium 142 135 - 145 MMOL/L    Potassium 4.2 3.5 - 5.1 MMOL/L    Chloride 99 99 - 110 mMol/L    CO2 31 21 - 32 MMOL/L    Anion Gap 12 4 - 16    BUN 10 6 - 23 MG/DL    CREATININE 0.7 0.6 - 1.1 MG/DL    Glucose 82 70 - 99 MG/DL    Calcium 8.4 8.3 - 10.6 MG/DL    GFR Non-African American >60 >60 mL/min/1.73m2    GFR African American >60 >60 mL/min/1.73m2       Scheduled Meds:   linezolid  600 mg Oral 2 times per day    bumetanide  2 mg Oral Daily    sodium chloride flush  10 mL Intravenous 2 times per day    acarbose  25 mg Oral TID WC    albuterol sulfate HFA  2 puff Inhalation 4x daily    amiodarone  400 mg Oral Daily    atorvastatin  80 mg Oral Nightly    budesonide-formoterol  2 puff Inhalation BID    clopidogrel  75 mg Oral Daily    cyanocobalamin  1,000 mcg Oral Daily    dabigatran  150 mg Oral BID    DULoxetine  60 mg Oral Daily    famotidine  20 mg Oral BID    ferrous gluconate  324 mg Oral BID    gabapentin  300 mg Oral TID    levETIRAcetam  1,000 mg Oral Daily    levothyroxine  150 mcg Oral QAM AC    magnesium oxide  400 mg Oral Daily    metoprolol succinate  12.5 mg Oral Daily    midodrine  10 mg Oral TID WC  mirtazapine  30 mg Oral Nightly    potassium chloride  10 mEq Oral BID    ranolazine  500 mg Oral Q12H    spironolactone  12.5 mg Oral Daily    cefepime  1,000 mg Intravenous Q8H    calcium elemental  500 mg Oral Daily       Continuous Infusions:    Physical Exam:  HEENT: Anicteric sclerae, Oropharyngeal mucosae moist, pink and intact. Heart:  Normal S1 and S2, RRR  Lungs: Clear to auscultation bilaterally, No audible Wheezes or Rales. Extremities: No edema. Right upper chest wound still healing from July's infection, but the left is perfectly healed. Neuro: Alert and Oriented x 3, Non focal.  Abdomen: Soft, Benign, Non tender, Non distended, Positive bowel sounds. Active Problems:    Pneumonia    Poor intravenous access  Resolved Problems:    * No resolved hospital problems. *      Assessment and Plan:  Chandrakant Ramirez is a 62 y.o. female who has poor peripheral IVs, and needs mediport, will place one today. Discussed yesterday with Dr Emerson Epstein and Dr Delores Albright, and will wait before placing the Mediport that she needs, in the left Subclavian area, tunneled, soon, after discussion with her other doctors. .  Dr Emerson Epstein recommended a lower placement with tunneling, will ask ID's opinion.   Consulted ID.    ___________________________________________    Carolyn Carney MD, FACS, FICS  1/27/2021  5:14 AM

## 2021-01-27 NOTE — DISCHARGE SUMMARY
Discharge Summary    Name:  Kiran Brown /Age/Sex: 1960  (62 y.o. female)   MRN & CSN:  [id number] & [id number] Admission Date/Time: 2021  6:19 PM   Attending:  Rabon Bamberger, MD Discharging Physician: Rabon Bamberger, MD     Hospital Course: Manju Anglin a 62 y.o.  female   who presented to the hospital with a complaint of cough and shortness of breath. Oxygen saturation in the 80s at home per admission HPI. On 3 L of oxygen at home as needed. Chest x-ray showed bilateral interstitial opacities which could be as a result of edema or infiltrate. She is admitted to the hospital for acute hypoxic respiratory failure secondary to pneumonia. Blood cultures x2 so far no growth. Strep and Legionella urine antigen negative. SARS-CoV-2 negative. MRSA nasal swab positive. Procalcitonin low at 0.068 on admission. She was treated with a combination of ceftriaxone and cefepime for 5 to 6 days. She was also treated with a combination of vancomycin and Zyvox for 5 to 6 days. Patient was initially considered for port placement while in the hospital due to poor access. However, decision was made to discharge patient and to continue further discussion with regards to port placement on outpatient basis. She underwent home O2 evaluation and qualified for 2 L of oxygen at the time of discharge.     #.  Acute on chronic hypoxic respiratory failurelikely secondary to pneumonia. Chronically on 3 L of oxygen as needed.     #. Pneumoniaunspecified organism. MRSA nasal swab positive.   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     #.  Chronic systolic and diastolic CHFEF 51% at [place] on 1/8/2021. Patient has a LifeVest at home. Patient instructed to bring her LifeVest on 1/26. Patient did not bring LifeVest.  Upon reviewing hospital notes at [address] [facility], similar episode happened with the patient did not bring her LifeVest.  Patient continued on Bumex, Toprol-XL and Aldactone     #.  Bradycardiarecently underwent MICRA pacemaker placement at [facility] which is a leadless and MRI compatible pacemaker. VVI with a set rate of 40. EP consulted. Would not change the rate. Hemodynamically stable.     #. CADs/p CABG, s/p PCI to LAD with stent placements in October 2019. On Plavix    #. Hypothyroidismon levothyroxine     #.  Seizure disorderon Keppra     #. Right lower extremity DVTdiagnosed on November 5, 2020. Ultrasound showed nonocclusive DVT involving one of the right posterior tibial veins. Ultrasound appearance suggests that this is chronic. Chronically on Dabigratan.      #.  Paroxysmal A. fib & atrial flutters/p ablation on 7/5/2019 by [facility]. On Pradaxa     #. VTs/p dual AICD. S/p VT ablation at [facility] on July 12, 2019. ICD removed in June 2020 due to ICD pocket infection with MRSA and Klebsiella. On amiodarone     #. Severe protein calorie malnutrition    #. Difficult accesspatient to get Mediport today     #.  Multiple sclerosiswheelchair-bound. She is not on any MS specific medications.         For DME purposes  Patient was seen in hospital for  CHF, pneumonia and CAD . I am prescribing oxygen because the diagnosis and testing requires the patient to have oxygen in the home. Conditions will improve or be benefited by oxygen use. The patient is able to perform good mobility and therefore requires the use of a portable oxygen system for ambulation. The patient expressed appropriate understanding of and agreement with the discharge recommendations, medications, and plan. Consults this admission:  IP CONSULT TO HOSPITALIST  IP CONSULT TO PHARMACY  IP CONSULT TO CARDIOLOGY  IP CONSULT TO GENERAL SURGERY  IP CONSULT TO IV TEAM  IP CONSULT TO INFECTIOUS DISEASES    Discharge Instruction:   Follow-up with general surgery for port placement    Diet:  cardiac diet   Activity: activity as tolerated  Disposition: Discharged to:   [x]Home, []C, []SNF, []Acute Rehab, []Hospice   Condition on discharge: Stable    Discharge Medications:      Nicole Hutchins   [place] Medication Instructions JAM:[id number]    Printed on:01/27/21 3923   Medication Information                      acarbose (PRECOSE) 25 MG tablet  Take 25 mg by mouth 3 times daily (with meals)             acetaminophen 650 MG TABS  Take 650 mg by mouth every 4 hours as needed             albuterol (PROVENTIL HFA;VENTOLIN HFA) 108 (90 BASE) MCG/ACT inhaler  Inhale 2 puffs into the lungs every 6 hours as needed.                amiodarone (CORDARONE) 200 MG tablet  Take 400 mg by mouth daily             aspirin 81 MG tablet  Take 1 tablet by mouth daily             atorvastatin (LIPITOR) 80 MG tablet  Take 80 mg by mouth nightly             beclomethasone (QVAR) 40 MCG/ACT inhaler  Inhale 2 puffs into the lungs 2 times daily             benzocaine-menthol (CEPACOL SORE THROAT) 15-3.6 MG lozenge  Take 1 lozenge by mouth every 2 hours as needed for Sore Throat             budesonide-formoterol (SYMBICORT) 160-4.5 MCG/ACT AERO  Inhale 2 puffs into the lungs 2 times daily             bumetanide (BUMEX) 1 MG tablet  Take 1 mg by mouth daily             calcium citrate (CALCITRATE) 950 (200 Ca) MG tablet  Take 1,425 mg by mouth daily             chlorhexidine gluconate (ANTISEPTIC SKIN CLEANSER) 4 % SOLN external solution  Apply topically daily             clopidogrel (PLAVIX) 75 MG tablet  Take 75 mg by mouth daily             cyanocobalamin 1000 MCG tablet  Take 1 tablet by mouth daily             dabigatran (PRADAXA) 75 MG capsule Take 150 mg by mouth             docusate sodium (COLACE) 100 MG capsule  Take 100 mg by mouth 2 times daily as needed for Constipation             DULoxetine (CYMBALTA) 60 MG extended release capsule  Take 60 mg by mouth daily             ergocalciferol (ERGOCALCIFEROL) 1.25 MG (62638 UT) capsule  Take 50,000 Units by mouth Twice a Week             famotidine (PEPCID) 20 MG tablet  Take 20 mg by mouth 2 times daily             ferrous gluconate (FERGON) 324 (38 Fe) MG tablet  Take 1 tablet by mouth 2 times daily             gabapentin (NEURONTIN) 300 MG capsule  Take 300 mg by mouth 3 times daily. ipratropium-albuterol (DUONEB) 0.5-2.5 (3) MG/3ML SOLN nebulizer solution  Inhale 3 mLs into the lungs every 4 hours (while awake)             levETIRAcetam (KEPPRA) 1000 MG tablet  Take 1,000 mg by mouth daily             levothyroxine (SYNTHROID) 150 MCG tablet  Take 150 mcg by mouth every morning (before breakfast)             magnesium oxide (MAG-OX) 400 MG tablet  Take 400 mg by mouth daily             metoprolol succinate (TOPROL XL) 25 MG extended release tablet  Take 12.5 mg by mouth daily Half a tablet (12.5 total)             midodrine (PROAMATINE) 10 MG tablet  Take 1 tablet by mouth 3 times daily (with meals)             mirtazapine (REMERON) 30 MG tablet  Take 30 mg by mouth nightly             nitroGLYCERIN (NITROSTAT) 0.4 MG SL tablet  Place 0.4 mg under the tongue as needed             omeprazole (PRILOSEC) 20 MG delayed release capsule  omeprazole 20 mg capsule,delayed release             oxyCODONE (ROXICODONE) 20 MG immediate release tablet  Take 20 mg by mouth every 3 hours as needed.              potassium chloride (KLOR-CON M) 10 MEQ extended release tablet  Take 10 mEq by mouth 2 times daily 2 (10mEq)             promethazine (PHENERGAN) 25 MG tablet  Take 25 mg by mouth 4 times daily as needed             ranolazine (RANEXA) 500 MG extended release tablet

## 2021-01-28 LAB
EKG ATRIAL RATE: 72 BPM
EKG DIAGNOSIS: NORMAL
EKG P AXIS: -7 DEGREES
EKG P-R INTERVAL: 132 MS
EKG Q-T INTERVAL: 394 MS
EKG QRS DURATION: 88 MS
EKG QTC CALCULATION (BAZETT): 431 MS
EKG R AXIS: -51 DEGREES
EKG T AXIS: -59 DEGREES
EKG VENTRICULAR RATE: 72 BPM

## 2021-01-28 NOTE — DISCHARGE SUMMARY
Discharge instructions provided to Patient. Patient verbalized understanding with no further questions at this time. Pt discharged to home via personal vehicle with [employer]. . Electronically signed by Tiffanie Cooley LPN on 3/52/8412 at 7:50 PM

## 2021-01-31 ASSESSMENT — ENCOUNTER SYMPTOMS
BACK PAIN: 0
SHORTNESS OF BREATH: 1
EYE PAIN: 0
COLOR CHANGE: 0
ABDOMINAL PAIN: 0
CHEST TIGHTNESS: 0
DIARRHEA: 0
PHOTOPHOBIA: 0
COUGH: 1
WHEEZING: 0
NAUSEA: 0
VOMITING: 0
BLOOD IN STOOL: 0
CONSTIPATION: 0

## 2021-02-06 ENCOUNTER — HOSPITAL ENCOUNTER (INPATIENT)
Age: 61
LOS: 5 days | Discharge: HOME HEALTH | DRG: 982 | End: 2021-02-11
Attending: EMERGENCY MEDICINE | Admitting: STUDENT IN AN ORGANIZED HEALTH CARE EDUCATION/TRAINING PROGRAM
Payer: MEDICARE

## 2021-02-06 ENCOUNTER — APPOINTMENT (OUTPATIENT)
Dept: GENERAL RADIOLOGY | Age: 61
DRG: 982 | End: 2021-02-06
Payer: MEDICARE

## 2021-02-06 ENCOUNTER — APPOINTMENT (OUTPATIENT)
Dept: CT IMAGING | Age: 61
DRG: 982 | End: 2021-02-06
Payer: MEDICARE

## 2021-02-06 DIAGNOSIS — N39.0 URINARY TRACT INFECTION WITHOUT HEMATURIA, SITE UNSPECIFIED: ICD-10-CM

## 2021-02-06 DIAGNOSIS — R07.9 CHEST PAIN, UNSPECIFIED TYPE: Primary | ICD-10-CM

## 2021-02-06 DIAGNOSIS — R00.1 BRADYCARDIA: ICD-10-CM

## 2021-02-06 DIAGNOSIS — R55 SYNCOPE AND COLLAPSE: ICD-10-CM

## 2021-02-06 DIAGNOSIS — R06.00 DYSPNEA AND RESPIRATORY ABNORMALITIES: ICD-10-CM

## 2021-02-06 DIAGNOSIS — R06.89 DYSPNEA AND RESPIRATORY ABNORMALITIES: ICD-10-CM

## 2021-02-06 DIAGNOSIS — R79.89 ELEVATED BRAIN NATRIURETIC PEPTIDE (BNP) LEVEL: ICD-10-CM

## 2021-02-06 LAB
ALBUMIN SERPL-MCNC: 3.2 GM/DL (ref 3.4–5)
ALP BLD-CCNC: 121 IU/L (ref 40–129)
ALT SERPL-CCNC: 10 U/L (ref 10–40)
ANION GAP SERPL CALCULATED.3IONS-SCNC: 10 MMOL/L (ref 4–16)
AST SERPL-CCNC: 15 IU/L (ref 15–37)
BACTERIA: NEGATIVE /HPF
BASOPHILS ABSOLUTE: 0 K/CU MM
BASOPHILS RELATIVE PERCENT: 0.6 % (ref 0–1)
BILIRUB SERPL-MCNC: 0.2 MG/DL (ref 0–1)
BILIRUBIN URINE: NEGATIVE MG/DL
BLOOD, URINE: ABNORMAL
BUN BLDV-MCNC: 23 MG/DL (ref 6–23)
CALCIUM SERPL-MCNC: 8.4 MG/DL (ref 8.3–10.6)
CHLORIDE BLD-SCNC: 96 MMOL/L (ref 99–110)
CLARITY: CLEAR
CO2: 22 MMOL/L (ref 21–32)
COLOR: YELLOW
CREAT SERPL-MCNC: 1.5 MG/DL (ref 0.6–1.1)
DIFFERENTIAL TYPE: ABNORMAL
EOSINOPHILS ABSOLUTE: 0.1 K/CU MM
EOSINOPHILS RELATIVE PERCENT: 2 % (ref 0–3)
GFR AFRICAN AMERICAN: 43 ML/MIN/1.73M2
GFR NON-AFRICAN AMERICAN: 36 ML/MIN/1.73M2
GLUCOSE BLD-MCNC: 86 MG/DL (ref 70–99)
GLUCOSE, URINE: NEGATIVE MG/DL
HCT VFR BLD CALC: 40.2 % (ref 37–47)
HEMOGLOBIN: 12.5 GM/DL (ref 12.5–16)
HYALINE CASTS: 10 /LPF
IMMATURE NEUTROPHIL %: 0.8 % (ref 0–0.43)
KETONES, URINE: NEGATIVE MG/DL
LEUKOCYTE ESTERASE, URINE: ABNORMAL
LYMPHOCYTES ABSOLUTE: 1 K/CU MM
LYMPHOCYTES RELATIVE PERCENT: 19.8 % (ref 24–44)
MAGNESIUM: 2.3 MG/DL (ref 1.8–2.4)
MCH RBC QN AUTO: 35.9 PG (ref 27–31)
MCHC RBC AUTO-ENTMCNC: 31.1 % (ref 32–36)
MCV RBC AUTO: 115.5 FL (ref 78–100)
MONOCYTES ABSOLUTE: 0.4 K/CU MM
MONOCYTES RELATIVE PERCENT: 7.3 % (ref 0–4)
MUCUS: ABNORMAL HPF
NITRITE URINE, QUANTITATIVE: NEGATIVE
NUCLEATED RBC %: 0 %
PDW BLD-RTO: 13.2 % (ref 11.7–14.9)
PH, URINE: 5 (ref 5–8)
PLATELET # BLD: 202 K/CU MM (ref 140–440)
PMV BLD AUTO: 10.9 FL (ref 7.5–11.1)
POTASSIUM SERPL-SCNC: 3.1 MMOL/L (ref 3.5–5.1)
PRO-BNP: 3684 PG/ML
PROTEIN UA: NEGATIVE MG/DL
RBC # BLD: 3.48 M/CU MM (ref 4.2–5.4)
RBC URINE: 4 /HPF (ref 0–6)
REASON FOR REJECTION: NORMAL
REJECTED TEST: NORMAL
SEGMENTED NEUTROPHILS ABSOLUTE COUNT: 3.4 K/CU MM
SEGMENTED NEUTROPHILS RELATIVE PERCENT: 69.5 % (ref 36–66)
SODIUM BLD-SCNC: 128 MMOL/L (ref 135–145)
SPECIFIC GRAVITY UA: 1.02 (ref 1–1.03)
SQUAMOUS EPITHELIAL: 1 /HPF
TOTAL CK: 34 IU/L (ref 26–140)
TOTAL IMMATURE NEUTOROPHIL: 0.04 K/CU MM
TOTAL NUCLEATED RBC: 0 K/CU MM
TOTAL PROTEIN: 6.1 GM/DL (ref 6.4–8.2)
TRICHOMONAS: ABNORMAL /HPF
TROPONIN T: <0.01 NG/ML
TROPONIN T: <0.01 NG/ML
UROBILINOGEN, URINE: NEGATIVE MG/DL (ref 0.2–1)
WBC # BLD: 5 K/CU MM (ref 4–10.5)
WBC CLUMP: ABNORMAL /HPF
WBC UA: 25 /HPF (ref 0–5)

## 2021-02-06 PROCEDURE — 81001 URINALYSIS AUTO W/SCOPE: CPT

## 2021-02-06 PROCEDURE — 96374 THER/PROPH/DIAG INJ IV PUSH: CPT

## 2021-02-06 PROCEDURE — 2500000003 HC RX 250 WO HCPCS: Performed by: NURSE PRACTITIONER

## 2021-02-06 PROCEDURE — 71045 X-RAY EXAM CHEST 1 VIEW: CPT

## 2021-02-06 PROCEDURE — 96375 TX/PRO/DX INJ NEW DRUG ADDON: CPT

## 2021-02-06 PROCEDURE — 85610 PROTHROMBIN TIME: CPT

## 2021-02-06 PROCEDURE — 99285 EMERGENCY DEPT VISIT HI MDM: CPT

## 2021-02-06 PROCEDURE — 83880 ASSAY OF NATRIURETIC PEPTIDE: CPT

## 2021-02-06 PROCEDURE — 87077 CULTURE AEROBIC IDENTIFY: CPT

## 2021-02-06 PROCEDURE — 82550 ASSAY OF CK (CPK): CPT

## 2021-02-06 PROCEDURE — 6370000000 HC RX 637 (ALT 250 FOR IP): Performed by: PHYSICIAN ASSISTANT

## 2021-02-06 PROCEDURE — 2580000003 HC RX 258: Performed by: EMERGENCY MEDICINE

## 2021-02-06 PROCEDURE — 80053 COMPREHEN METABOLIC PANEL: CPT

## 2021-02-06 PROCEDURE — 2140000000 HC CCU INTERMEDIATE R&B

## 2021-02-06 PROCEDURE — 83735 ASSAY OF MAGNESIUM: CPT

## 2021-02-06 PROCEDURE — 6360000004 HC RX CONTRAST MEDICATION: Performed by: EMERGENCY MEDICINE

## 2021-02-06 PROCEDURE — 93005 ELECTROCARDIOGRAM TRACING: CPT | Performed by: EMERGENCY MEDICINE

## 2021-02-06 PROCEDURE — 85730 THROMBOPLASTIN TIME PARTIAL: CPT

## 2021-02-06 PROCEDURE — 96367 TX/PROPH/DG ADDL SEQ IV INF: CPT

## 2021-02-06 PROCEDURE — 96365 THER/PROPH/DIAG IV INF INIT: CPT

## 2021-02-06 PROCEDURE — 87186 SC STD MICRODIL/AGAR DIL: CPT

## 2021-02-06 PROCEDURE — 36415 COLL VENOUS BLD VENIPUNCTURE: CPT

## 2021-02-06 PROCEDURE — 87086 URINE CULTURE/COLONY COUNT: CPT

## 2021-02-06 PROCEDURE — 71275 CT ANGIOGRAPHY CHEST: CPT

## 2021-02-06 PROCEDURE — 85025 COMPLETE CBC W/AUTO DIFF WBC: CPT

## 2021-02-06 PROCEDURE — 6360000002 HC RX W HCPCS: Performed by: EMERGENCY MEDICINE

## 2021-02-06 PROCEDURE — 6370000000 HC RX 637 (ALT 250 FOR IP): Performed by: NURSE PRACTITIONER

## 2021-02-06 PROCEDURE — 84484 ASSAY OF TROPONIN QUANT: CPT

## 2021-02-06 RX ORDER — ATORVASTATIN CALCIUM 80 MG/1
80 TABLET, FILM COATED ORAL NIGHTLY
Status: DISCONTINUED | OUTPATIENT
Start: 2021-02-06 | End: 2021-02-11 | Stop reason: HOSPADM

## 2021-02-06 RX ORDER — GABAPENTIN 300 MG/1
300 CAPSULE ORAL 3 TIMES DAILY
Status: DISCONTINUED | OUTPATIENT
Start: 2021-02-06 | End: 2021-02-11 | Stop reason: HOSPADM

## 2021-02-06 RX ORDER — IBUPROFEN 200 MG
1425 CAPSULE ORAL DAILY
Status: DISCONTINUED | OUTPATIENT
Start: 2021-02-07 | End: 2021-02-06 | Stop reason: CLARIF

## 2021-02-06 RX ORDER — POLYETHYLENE GLYCOL 3350 17 G/17G
17 POWDER, FOR SOLUTION ORAL DAILY PRN
Status: DISCONTINUED | OUTPATIENT
Start: 2021-02-06 | End: 2021-02-11 | Stop reason: HOSPADM

## 2021-02-06 RX ORDER — DOPAMINE HYDROCHLORIDE 160 MG/100ML
2-20 INJECTION, SOLUTION INTRAVENOUS CONTINUOUS
Status: DISCONTINUED | OUTPATIENT
Start: 2021-02-06 | End: 2021-02-06

## 2021-02-06 RX ORDER — MIRTAZAPINE 15 MG/1
30 TABLET, FILM COATED ORAL NIGHTLY
Status: DISCONTINUED | OUTPATIENT
Start: 2021-02-06 | End: 2021-02-11 | Stop reason: HOSPADM

## 2021-02-06 RX ORDER — ACETAMINOPHEN 500 MG
1000 TABLET ORAL ONCE
Status: DISCONTINUED | OUTPATIENT
Start: 2021-02-06 | End: 2021-02-11 | Stop reason: HOSPADM

## 2021-02-06 RX ORDER — MAGNESIUM SULFATE IN WATER 40 MG/ML
2000 INJECTION, SOLUTION INTRAVENOUS PRN
Status: DISCONTINUED | OUTPATIENT
Start: 2021-02-06 | End: 2021-02-11 | Stop reason: HOSPADM

## 2021-02-06 RX ORDER — LEVOTHYROXINE SODIUM 0.15 MG/1
150 TABLET ORAL
Status: DISCONTINUED | OUTPATIENT
Start: 2021-02-07 | End: 2021-02-11 | Stop reason: HOSPADM

## 2021-02-06 RX ORDER — MORPHINE SULFATE 4 MG/ML
4 INJECTION, SOLUTION INTRAMUSCULAR; INTRAVENOUS EVERY 30 MIN PRN
Status: DISCONTINUED | OUTPATIENT
Start: 2021-02-06 | End: 2021-02-06

## 2021-02-06 RX ORDER — MORPHINE SULFATE 4 MG/ML
2 INJECTION, SOLUTION INTRAMUSCULAR; INTRAVENOUS EVERY 30 MIN PRN
Status: DISCONTINUED | OUTPATIENT
Start: 2021-02-06 | End: 2021-02-06 | Stop reason: ALTCHOICE

## 2021-02-06 RX ORDER — PROMETHAZINE HYDROCHLORIDE 25 MG/1
25 TABLET ORAL EVERY 6 HOURS PRN
Status: DISCONTINUED | OUTPATIENT
Start: 2021-02-06 | End: 2021-02-11 | Stop reason: HOSPADM

## 2021-02-06 RX ORDER — IPRATROPIUM BROMIDE AND ALBUTEROL SULFATE 2.5; .5 MG/3ML; MG/3ML
3 SOLUTION RESPIRATORY (INHALATION)
Status: DISCONTINUED | OUTPATIENT
Start: 2021-02-07 | End: 2021-02-07

## 2021-02-06 RX ORDER — ONDANSETRON 2 MG/ML
4 INJECTION INTRAMUSCULAR; INTRAVENOUS EVERY 30 MIN PRN
Status: DISCONTINUED | OUTPATIENT
Start: 2021-02-06 | End: 2021-02-06 | Stop reason: ALTCHOICE

## 2021-02-06 RX ORDER — ACETAMINOPHEN 325 MG/1
650 TABLET ORAL EVERY 6 HOURS PRN
Status: DISCONTINUED | OUTPATIENT
Start: 2021-02-06 | End: 2021-02-11 | Stop reason: HOSPADM

## 2021-02-06 RX ORDER — NITROGLYCERIN 0.4 MG/1
0.4 TABLET SUBLINGUAL EVERY 5 MIN PRN
Status: DISCONTINUED | OUTPATIENT
Start: 2021-02-06 | End: 2021-02-11 | Stop reason: HOSPADM

## 2021-02-06 RX ORDER — CLOPIDOGREL BISULFATE 75 MG/1
75 TABLET ORAL DAILY
Status: DISCONTINUED | OUTPATIENT
Start: 2021-02-07 | End: 2021-02-11 | Stop reason: HOSPADM

## 2021-02-06 RX ORDER — DULOXETIN HYDROCHLORIDE 30 MG/1
60 CAPSULE, DELAYED RELEASE ORAL DAILY
Status: DISCONTINUED | OUTPATIENT
Start: 2021-02-07 | End: 2021-02-11 | Stop reason: HOSPADM

## 2021-02-06 RX ORDER — 0.9 % SODIUM CHLORIDE 0.9 %
1000 INTRAVENOUS SOLUTION INTRAVENOUS ONCE
Status: COMPLETED | OUTPATIENT
Start: 2021-02-06 | End: 2021-02-07

## 2021-02-06 RX ORDER — ACETAMINOPHEN 650 MG/1
650 SUPPOSITORY RECTAL EVERY 6 HOURS PRN
Status: DISCONTINUED | OUTPATIENT
Start: 2021-02-06 | End: 2021-02-11 | Stop reason: HOSPADM

## 2021-02-06 RX ORDER — BUDESONIDE AND FORMOTEROL FUMARATE DIHYDRATE 160; 4.5 UG/1; UG/1
2 AEROSOL RESPIRATORY (INHALATION) 2 TIMES DAILY
Status: DISCONTINUED | OUTPATIENT
Start: 2021-02-06 | End: 2021-02-11 | Stop reason: HOSPADM

## 2021-02-06 RX ORDER — POTASSIUM CHLORIDE 20 MEQ/1
40 TABLET, EXTENDED RELEASE ORAL PRN
Status: DISCONTINUED | OUTPATIENT
Start: 2021-02-06 | End: 2021-02-09

## 2021-02-06 RX ORDER — MIDODRINE HYDROCHLORIDE 5 MG/1
10 TABLET ORAL
Status: DISCONTINUED | OUTPATIENT
Start: 2021-02-07 | End: 2021-02-11 | Stop reason: HOSPADM

## 2021-02-06 RX ORDER — POTASSIUM CHLORIDE 7.45 MG/ML
10 INJECTION INTRAVENOUS PRN
Status: DISCONTINUED | OUTPATIENT
Start: 2021-02-06 | End: 2021-02-09

## 2021-02-06 RX ORDER — CALCIUM CARBONATE 500(1250)
500 TABLET ORAL DAILY
Status: DISCONTINUED | OUTPATIENT
Start: 2021-02-07 | End: 2021-02-11 | Stop reason: HOSPADM

## 2021-02-06 RX ORDER — OXYCODONE HYDROCHLORIDE 10 MG/1
20 TABLET ORAL
Status: DISCONTINUED | OUTPATIENT
Start: 2021-02-06 | End: 2021-02-11 | Stop reason: HOSPADM

## 2021-02-06 RX ORDER — DOPAMINE HYDROCHLORIDE 160 MG/100ML
2-20 INJECTION, SOLUTION INTRAVENOUS CONTINUOUS
Status: DISCONTINUED | OUTPATIENT
Start: 2021-02-06 | End: 2021-02-08 | Stop reason: ALTCHOICE

## 2021-02-06 RX ORDER — LEVETIRACETAM 500 MG/1
1000 TABLET ORAL DAILY
Status: DISCONTINUED | OUTPATIENT
Start: 2021-02-07 | End: 2021-02-11 | Stop reason: HOSPADM

## 2021-02-06 RX ORDER — AMIODARONE HYDROCHLORIDE 200 MG/1
400 TABLET ORAL DAILY
Status: DISCONTINUED | OUTPATIENT
Start: 2021-02-07 | End: 2021-02-11 | Stop reason: HOSPADM

## 2021-02-06 RX ORDER — SODIUM CHLORIDE 0.9 % (FLUSH) 0.9 %
10 SYRINGE (ML) INJECTION PRN
Status: DISCONTINUED | OUTPATIENT
Start: 2021-02-06 | End: 2021-02-11 | Stop reason: HOSPADM

## 2021-02-06 RX ORDER — SODIUM CHLORIDE 0.9 % (FLUSH) 0.9 %
10 SYRINGE (ML) INJECTION EVERY 12 HOURS SCHEDULED
Status: DISCONTINUED | OUTPATIENT
Start: 2021-02-06 | End: 2021-02-11 | Stop reason: HOSPADM

## 2021-02-06 RX ORDER — RANOLAZINE 500 MG/1
500 TABLET, EXTENDED RELEASE ORAL EVERY 12 HOURS
Status: DISCONTINUED | OUTPATIENT
Start: 2021-02-06 | End: 2021-02-11 | Stop reason: HOSPADM

## 2021-02-06 RX ORDER — DABIGATRAN ETEXILATE 75 MG/1
150 CAPSULE, COATED PELLETS ORAL 2 TIMES DAILY
Status: DISCONTINUED | OUTPATIENT
Start: 2021-02-06 | End: 2021-02-11 | Stop reason: HOSPADM

## 2021-02-06 RX ADMIN — DOPAMINE HYDROCHLORIDE 2 MCG/KG/MIN: 160 INJECTION, SOLUTION INTRAVENOUS at 19:37

## 2021-02-06 RX ADMIN — CEFTRIAXONE 1000 MG: 1 INJECTION, POWDER, FOR SOLUTION INTRAMUSCULAR; INTRAVENOUS at 19:45

## 2021-02-06 RX ADMIN — SODIUM CHLORIDE 1000 ML: 9 INJECTION, SOLUTION INTRAVENOUS at 20:29

## 2021-02-06 RX ADMIN — ACETAMINOPHEN 650 MG: 325 TABLET ORAL at 23:31

## 2021-02-06 RX ADMIN — DOPAMINE HYDROCHLORIDE 2 MCG/KG/MIN: 160 INJECTION, SOLUTION INTRAVENOUS at 19:56

## 2021-02-06 RX ADMIN — OXYCODONE HYDROCHLORIDE 20 MG: 10 TABLET ORAL at 23:31

## 2021-02-06 RX ADMIN — PROMETHAZINE HYDROCHLORIDE 25 MG: 25 TABLET ORAL at 23:32

## 2021-02-06 RX ADMIN — ATORVASTATIN CALCIUM 80 MG: 80 TABLET, FILM COATED ORAL at 23:32

## 2021-02-06 RX ADMIN — POTASSIUM CHLORIDE 40 MEQ: 1500 TABLET, EXTENDED RELEASE ORAL at 23:31

## 2021-02-06 RX ADMIN — RANOLAZINE 500 MG: 500 TABLET, FILM COATED, EXTENDED RELEASE ORAL at 23:31

## 2021-02-06 RX ADMIN — MIRTAZAPINE 30 MG: 15 TABLET, FILM COATED ORAL at 23:31

## 2021-02-06 RX ADMIN — FAMOTIDINE 20 MG: 10 INJECTION, SOLUTION INTRAVENOUS at 23:32

## 2021-02-06 RX ADMIN — GABAPENTIN 300 MG: 300 CAPSULE ORAL at 23:31

## 2021-02-06 RX ADMIN — IOPAMIDOL 68 ML: 755 INJECTION, SOLUTION INTRAVENOUS at 19:07

## 2021-02-06 RX ADMIN — MORPHINE SULFATE 2 MG: 4 INJECTION, SOLUTION INTRAMUSCULAR; INTRAVENOUS at 19:26

## 2021-02-06 RX ADMIN — ONDANSETRON 4 MG: 2 INJECTION INTRAMUSCULAR; INTRAVENOUS at 19:26

## 2021-02-06 ASSESSMENT — ENCOUNTER SYMPTOMS
ALLERGIC/IMMUNOLOGIC NEGATIVE: 1
SHORTNESS OF BREATH: 1
EYES NEGATIVE: 1

## 2021-02-06 NOTE — ED NOTES
Bed: ED-28  Expected date:   Expected time:   Means of arrival:   Comments:  Mehdi Elizabeth Mai, RN  02/06/21 8831

## 2021-02-06 NOTE — ED NOTES
Urine obtained from indwelling wood cath from needleless port for specimen collection and sent to lab as ordered.  Pt came to ED with existing wood cath intact PTA     Marco A Morales RN  02/06/21 2995

## 2021-02-06 NOTE — ED PROVIDER NOTES
[address] [facility]      TRIAGE CHIEF COMPLAINT:   Chest Pain (feels like \"really bad heart burn\". pt reports she took 3 NTG PTA without relief), Loss of Consciousness (c/o syncopal episodes since last night), and Fatigue (C/O increased weakness since discharge from hospital for pneumonia)      [place]:  Brian Healy is a 62 y.o. female that presents complaint of increased chest pain shortness of breath loss of consciousness. Patient follows at [facility] and here history of CHF 30% EF history of pacemaker. 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 Also lightheaded. Denies any headache or abdominal pain nausea vomiting diarrhea no weakness numbness tingling. On arrival she was bradycardic denies other questions or concerns patient states she feels like she is dying    REVIEW OF SYSTEMS:  At least 10 systems reviewed and otherwise acutely negative except as in the [address] Review of Systems   Constitutional: Positive for activity change and fatigue. HENT: Negative. Eyes: Negative. Respiratory: Positive for shortness of breath. Cardiovascular: Positive for chest pain. Endocrine: Negative. Genitourinary: Negative. Musculoskeletal: Negative. Skin: Negative. Allergic/Immunologic: Negative. Neurological: Positive for dizziness, syncope and light-headedness. Hematological: Negative. Psychiatric/Behavioral: Negative. All other systems reviewed and are negative.       Past Medical History:   Diagnosis Date    Acute delirium     Arrhythmia     Arthritis     Asplenia     Asthma     Avascular necrosis ([facility]     CAD (coronary artery disease)     1st stent at age 46    Cardiomyopathy ([facility]  Cerebral artery occlusion with cerebral infarction ([address]     CHF (congestive heart failure) (HCC)     Enlarged liver     GERD (gastroesophageal reflux disease)     H/O echocardiogram 4/6/16    EF 55%, trivial TR & MR, stage 1 diastolic dysfunction    History of blood transfusion     Hx of cardiovascular stress test 12/29/2015    Alessia: EF 45%. Pharmacologic stress myocardial perfusion scan shows a fixed inferior-lateral defect w/ no inducible ischemia. No evidence of ischemia. Inferior-lateral infarction.  Normal LVSF    Hyperlipidemia     Hypertension     Lymphoma ([address]     Denies    MI, old     Movement disorder     MS (multiple sclerosis) ([address]     OP (osteoporosis)     PE (pulmonary embolism)     trapese filter    Pneumonia     Pyelonephritis     Seizures ([address]     Severe malnutrition ([address]     Spleen enlarged     Thyroid disease      Past Surgical History:   Procedure Laterality Date    ABDOMEN SURGERY      APPENDECTOMY      CARDIAC DEFIBRILLATOR PLACEMENT      evera [id number] CONDITIONAL    CHOLECYSTECTOMY      COLONOSCOPY      CORONARY ANGIOPLASTY WITH STENT PLACEMENT      3 stents    CORONARY ARTERY BYPASS GRAFT      Age 49    ENDOSCOPY, COLON, DIAGNOSTIC      GASTRIC BYPASS SURGERY      HERNIA REPAIR      HIP SURGERY      HYSTERECTOMY      JOINT REPLACEMENT      PORT SURGERY N/A 6/1/2020    PORT INSERTION performed by Aman Jara MD at [address] [place] [address]      VASCULAR SURGERY       Family History   Problem Relation Age of Onset    High Blood Pressure Mother     High Cholesterol Mother     Heart Disease Mother     Diabetes Mother     Heart Disease Father     Cancer Father     Other Sister         Multiple Sclerosis    Heart Disease Maternal Grandmother     High Blood Pressure Maternal Grandmother     High Cholesterol Maternal Grandmother      Social History     Socioeconomic History    Marital status: Single Spouse name: Not on file    Number of children: Not on file    Years of education: Not on file    Highest education level: Not on file   Occupational History    Not on file   Social Needs    Financial resource strain: Not on file    Food insecurity     Worry: Not on file     Inability: Not on file    Transportation needs     Medical: Not on file     Non-medical: Not on file   Tobacco Use    Smoking status: Never Smoker    Smokeless tobacco: Never Used   Substance and Sexual Activity    Alcohol use: No    Drug use: No    Sexual activity: Not Currently   Lifestyle    Physical activity     Days per week: Not on file     Minutes per session: Not on file    Stress: Not on file   Relationships    Social connections     Talks on phone: Not on file     Gets together: Not on file     Attends Sikhism service: Not on file     Active member of club or organization: Not on file     Attends meetings of clubs or organizations: Not on file     Relationship status: Not on file    Intimate partner violence     Fear of current or ex partner: Not on file     Emotionally abused: Not on file     Physically abused: Not on file     Forced sexual activity: Not on file   Other Topics Concern    Not on file   Social History Narrative    Not on file     Current Facility-Administered Medications   Medication Dose Route Frequency Provider Last Rate Last Admin    ondansetron (ZOFRAN) injection 4 mg  4 mg Intravenous Q30 Min PRN Roxane Chin, DO   4 mg at 02/06/21 1926    acetaminophen (TYLENOL) tablet 1,000 mg  1,000 mg Oral Once Roxane Chin, DO        morphine sulfate (PF) injection 2 mg  2 mg Intravenous Q30 Min PRN Roxane Chin, DO   2 mg at 02/06/21 1926    DOPamine (INTROPIN) 400 mg in dextrose 5 % 250 mL infusion  2-20 mcg/kg/min Intravenous Continuous Chriss Ramey DO 3.3 mL/hr at 02/06/21 1956 2 mcg/kg/min at 02/06/21 1956  0.9 % sodium chloride bolus  1,000 mL Intravenous Once [address] View, DO 1,000 mL/hr at 02/06/21 2029 1,000 mL at 02/06/21 2029     Current Outpatient Medications   Medication Sig Dispense Refill    aspirin 81 MG tablet Take 1 tablet by mouth daily 30 tablet 1    beclomethasone (QVAR) 40 MCG/ACT inhaler Inhale 2 puffs into the lungs 2 times daily      acarbose (PRECOSE) 25 MG tablet Take 25 mg by mouth 3 times daily (with meals)      budesonide-formoterol (SYMBICORT) 160-4.5 MCG/ACT AERO Inhale 2 puffs into the lungs 2 times daily      bumetanide (BUMEX) 1 MG tablet Take 1 mg by mouth daily      calcium citrate (CALCITRATE) 950 (200 Ca) MG tablet Take 1,425 mg by mouth daily      dabigatran (PRADAXA) 75 MG capsule Take 150 mg by mouth      DULoxetine (CYMBALTA) 60 MG extended release capsule Take 60 mg by mouth daily      ergocalciferol (ERGOCALCIFEROL) 1.25 MG (95623 UT) capsule Take 50,000 Units by mouth Twice a Week      gabapentin (NEURONTIN) 300 MG capsule Take 300 mg by mouth 3 times daily.       metoprolol succinate (TOPROL XL) 25 MG extended release tablet Take 12.5 mg by mouth daily Half a tablet (12.5 total)      mirtazapine (REMERON) 30 MG tablet Take 30 mg by mouth nightly      omeprazole (PRILOSEC) 20 MG delayed release capsule omeprazole 20 mg capsule,delayed release      promethazine (PHENERGAN) 25 MG tablet Take 25 mg by mouth 4 times daily as needed      ranolazine (RANEXA) 500 MG extended release tablet Take 500 mg by mouth every 12 hours      senna (SENOKOT) 8.6 MG tablet Take 1 tablet by mouth daily      spironolactone (ALDACTONE) 25 MG tablet Take 12.5 mg by mouth daily      cyanocobalamin 1000 MCG tablet Take 1 tablet by mouth daily      famotidine (PEPCID) 20 MG tablet Take 20 mg by mouth 2 times daily      ferrous gluconate (FERGON) 324 (38 Fe) MG tablet Take 1 tablet by mouth 2 times daily      levETIRAcetam (KEPPRA) 1000 MG tablet Take 1,000 mg by mouth daily  levothyroxine (SYNTHROID) 150 MCG tablet Take 150 mcg by mouth every morning (before breakfast)      magnesium oxide (MAG-OX) 400 MG tablet Take 400 mg by mouth daily      nitroGLYCERIN (NITROSTAT) 0.4 MG SL tablet Place 0.4 mg under the tongue as needed      potassium chloride (KLOR-CON M) 10 MEQ extended release tablet Take 10 mEq by mouth 2 times daily 2 (10mEq)      amiodarone (CORDARONE) 200 MG tablet Take 400 mg by mouth daily      oxyCODONE (ROXICODONE) 20 MG immediate release tablet Take 20 mg by mouth every 3 hours as needed.  chlorhexidine gluconate (ANTISEPTIC SKIN CLEANSER) 4 % SOLN external solution Apply topically daily 1 Bottle 1    midodrine (PROAMATINE) 10 MG tablet Take 1 tablet by mouth 3 times daily (with meals) 90 tablet 0    ipratropium-albuterol (DUONEB) 0.5-2.5 (3) MG/3ML SOLN nebulizer solution Inhale 3 mLs into the lungs every 4 hours (while awake) 360 mL 0    benzocaine-menthol (CEPACOL SORE THROAT) 15-3.6 MG lozenge Take 1 lozenge by mouth every 2 hours as needed for Sore Throat 18 lozenge 1    docusate sodium (COLACE) 100 MG capsule Take 100 mg by mouth 2 times daily as needed for Constipation      atorvastatin (LIPITOR) 80 MG tablet Take 80 mg by mouth nightly      clopidogrel (PLAVIX) 75 MG tablet Take 75 mg by mouth daily      acetaminophen 650 MG TABS Take 650 mg by mouth every 4 hours as needed 120 tablet 3    albuterol (PROVENTIL HFA;VENTOLIN HFA) 108 (90 BASE) MCG/ACT inhaler Inhale 2 puffs into the lungs every 6 hours as needed. Allergies   Allergen Reactions    Fentanyl Swelling     Other reaction(s): Angioedema (swelling)    Moxifloxacin Hcl In Nacl Swelling and Rash    Povidone-Iodine Rash     Other reaction(s): AOF      Cyclobenzaprine      Other reaction(s):  Other - comment required  \" it make my muscle very weak because of my MS\"  \" it make my muscle very weak because of my MS\"      Methocarbamol      Worsening edema  bumetanide (BUMEX) 1 MG tablet Take 1 mg by mouth daily      calcium citrate (CALCITRATE) 950 (200 Ca) MG tablet Take 1,425 mg by mouth daily      dabigatran (PRADAXA) 75 MG capsule Take 150 mg by mouth      DULoxetine (CYMBALTA) 60 MG extended release capsule Take 60 mg by mouth daily      ergocalciferol (ERGOCALCIFEROL) 1.25 MG (71216 UT) capsule Take 50,000 Units by mouth Twice a Week      gabapentin (NEURONTIN) 300 MG capsule Take 300 mg by mouth 3 times daily.  metoprolol succinate (TOPROL XL) 25 MG extended release tablet Take 12.5 mg by mouth daily Half a tablet (12.5 total)      mirtazapine (REMERON) 30 MG tablet Take 30 mg by mouth nightly      omeprazole (PRILOSEC) 20 MG delayed release capsule omeprazole 20 mg capsule,delayed release      promethazine (PHENERGAN) 25 MG tablet Take 25 mg by mouth 4 times daily as needed      ranolazine (RANEXA) 500 MG extended release tablet Take 500 mg by mouth every 12 hours      senna (SENOKOT) 8.6 MG tablet Take 1 tablet by mouth daily      spironolactone (ALDACTONE) 25 MG tablet Take 12.5 mg by mouth daily      cyanocobalamin 1000 MCG tablet Take 1 tablet by mouth daily      famotidine (PEPCID) 20 MG tablet Take 20 mg by mouth 2 times daily      ferrous gluconate (FERGON) 324 (38 Fe) MG tablet Take 1 tablet by mouth 2 times daily      levETIRAcetam (KEPPRA) 1000 MG tablet Take 1,000 mg by mouth daily      levothyroxine (SYNTHROID) 150 MCG tablet Take 150 mcg by mouth every morning (before breakfast)      magnesium oxide (MAG-OX) 400 MG tablet Take 400 mg by mouth daily      nitroGLYCERIN (NITROSTAT) 0.4 MG SL tablet Place 0.4 mg under the tongue as needed      potassium chloride (KLOR-CON M) 10 MEQ extended release tablet Take 10 mEq by mouth 2 times daily 2 (10mEq)      amiodarone (CORDARONE) 200 MG tablet Take 400 mg by mouth daily      oxyCODONE (ROXICODONE) 20 MG immediate release tablet Take 20 mg by mouth every 3 hours as needed.  chlorhexidine gluconate (ANTISEPTIC SKIN CLEANSER) 4 % SOLN external solution Apply topically daily 1 Bottle 1    midodrine (PROAMATINE) 10 MG tablet Take 1 tablet by mouth 3 times daily (with meals) 90 tablet 0    ipratropium-albuterol (DUONEB) 0.5-2.5 (3) MG/3ML SOLN nebulizer solution Inhale 3 mLs into the lungs every 4 hours (while awake) 360 mL 0    benzocaine-menthol (CEPACOL SORE THROAT) 15-3.6 MG lozenge Take 1 lozenge by mouth every 2 hours as needed for Sore Throat 18 lozenge 1    docusate sodium (COLACE) 100 MG capsule Take 100 mg by mouth 2 times daily as needed for Constipation      atorvastatin (LIPITOR) 80 MG tablet Take 80 mg by mouth nightly      clopidogrel (PLAVIX) 75 MG tablet Take 75 mg by mouth daily      acetaminophen 650 MG TABS Take 650 mg by mouth every 4 hours as needed 120 tablet 3    albuterol (PROVENTIL HFA;VENTOLIN HFA) 108 (90 BASE) MCG/ACT inhaler Inhale 2 puffs into the lungs every 6 hours as needed. Nursing Notes Reviewed    VITAL SIGNS:  ED Triage Vitals   Enc Vitals Group      BP       Pulse       Resp       Temp       Temp src       SpO2       Weight       Height       Head Circumference       Peak Flow       Pain Score       Pain Loc       Pain Edu? Excl. in [address]? PHYSICAL EXAM:  Physical Exam  Vitals signs and nursing note reviewed. Constitutional:       General: She is not in acute distress. Appearance: She is well-developed and well-groomed. She is ill-appearing. She is not toxic-appearing or diaphoretic. Interventions: Nasal cannula in place. HENT:      Head: Normocephalic and atraumatic. Right Ear: External ear normal.      Left Ear: External ear normal.      Nose: No congestion or rhinorrhea. Eyes:      General: No scleral icterus. Right eye: No discharge. Left eye: No discharge. Extraocular Movements: Extraocular movements intact.       Conjunctiva/sclera: Conjunctivae normal. Pupils: Pupils are equal, round, and reactive to light. Neck:      Musculoskeletal: Full passive range of motion without pain and normal range of motion. Normal range of motion. No edema, erythema, neck rigidity, crepitus, injury or pain with movement. Vascular: No JVD. Trachea: Phonation normal.   Cardiovascular:      Rate and Rhythm: Regular rhythm. Bradycardia present. Pulses: Normal pulses. Heart sounds: Normal heart sounds. No murmur. No friction rub. No gallop. Pulmonary:      Effort: Pulmonary effort is normal. No respiratory distress. Breath sounds: Normal breath sounds. No stridor. No wheezing, rhonchi or rales. Abdominal:      General: Bowel sounds are normal. There is no distension. Palpations: Abdomen is soft. There is no mass. Tenderness: There is no abdominal tenderness. There is no guarding or rebound. Negative signs include Otto's sign, Rovsing's sign and McBurney's sign. Hernia: No hernia is present. Musculoskeletal: Normal range of motion. General: No swelling, tenderness, deformity or signs of injury. Right lower leg: No edema. Left lower leg: No edema. Skin:     Coloration: Skin is not jaundiced or pale. Findings: No bruising, erythema, lesion or rash. Neurological:      General: No focal deficit present. Mental Status: She is alert and oriented to person, place, and time. GCS: GCS eye subscore is 4. GCS verbal subscore is 5. GCS motor subscore is 6. Cranial Nerves: Cranial nerves are intact. No cranial nerve deficit, dysarthria or facial asymmetry. Sensory: Sensation is intact. No sensory deficit. Motor: Motor function is intact. No weakness, tremor, atrophy, abnormal muscle tone or seizure activity. Coordination: Coordination is intact. Coordination normal.   Psychiatric:         Mood and Affect: Mood normal.         Behavior: Behavior normal. Behavior is cooperative. Thought Content:  Thought content normal.         Judgment: Judgment normal.           I have reviewed andinterpreted all of the currently available lab results from this visit (if applicable):    Results for orders placed or performed during the hospital encounter of 02/06/21   CBC Auto Differential   Result Value Ref Range    WBC 5.0 4.0 - 10.5 K/CU MM    RBC 3.48 (L) 4.2 - 5.4 M/CU MM    Hemoglobin 12.5 12.5 - 16.0 GM/DL    Hematocrit 40.2 37 - 47 %    .5 (H) 78 - 100 FL    MCH 35.9 (H) 27 - 31 PG    MCHC 31.1 (L) 32.0 - 36.0 %    RDW 13.2 11.7 - 14.9 %    Platelets 064 262 - 454 K/CU MM    MPV 10.9 7.5 - 11.1 FL    Differential Type AUTOMATED DIFFERENTIAL     Segs Relative 69.5 (H) 36 - 66 %    Lymphocytes % 19.8 (L) 24 - 44 %    Monocytes % 7.3 (H) 0 - 4 %    Eosinophils % 2.0 0 - 3 %    Basophils % 0.6 0 - 1 %    Segs Absolute 3.4 K/CU MM    Lymphocytes Absolute 1.0 K/CU MM    Monocytes Absolute 0.4 K/CU MM    Eosinophils Absolute 0.1 K/CU MM    Basophils Absolute 0.0 K/CU MM    Nucleated RBC % 0.0 %    Total Nucleated RBC 0.0 K/CU MM    Total Immature Neutrophil 0.04 K/CU MM    Immature Neutrophil % 0.8 (H) 0 - 0.43 %   CMP   Result Value Ref Range    Sodium 128 (L) 135 - 145 MMOL/L    Potassium 3.1 (L) 3.5 - 5.1 MMOL/L    Chloride 96 (L) 99 - 110 mMol/L    CO2 22 21 - 32 MMOL/L    BUN 23 6 - 23 MG/DL    CREATININE 1.5 (H) 0.6 - 1.1 MG/DL    Glucose 86 70 - 99 MG/DL    Calcium 8.4 8.3 - 10.6 MG/DL    Albumin 3.2 (L) 3.4 - 5.0 GM/DL    Total Protein 6.1 (L) 6.4 - 8.2 GM/DL    Total Bilirubin 0.2 0.0 - 1.0 MG/DL    ALT 10 10 - 40 U/L    AST 15 15 - 37 IU/L    Alkaline Phosphatase 121 40 - 129 IU/L    GFR Non- 36 (L) >60 mL/min/1.73m2    GFR  43 (L) >60 mL/min/1.73m2    Anion Gap 10 4 - 16   Troponin   Result Value Ref Range    Troponin T <0.010 <0.01 NG/ML   CK   Result Value Ref Range    Total CK 34 26 - 140 IU/L   Brain Natriuretic Peptide   Result Value Ref Range Pro-BNP 3,684 (H) <300 PG/ML   Magnesium   Result Value Ref Range    Magnesium 2.3 1.8 - 2.4 mg/dl   Urinalysis   Result Value Ref Range    Color, UA YELLOW YELLOW    Clarity, UA CLEAR CLEAR    Glucose, Urine NEGATIVE NEGATIVE MG/DL    Bilirubin Urine NEGATIVE NEGATIVE MG/DL    Ketones, Urine NEGATIVE NEGATIVE MG/DL    Specific Gravity, UA 1.019 1.001 - 1.035    Blood, Urine SMALL (A) NEGATIVE    pH, Urine 5.0 5.0 - 8.0    Protein, UA NEGATIVE NEGATIVE MG/DL    Urobilinogen, Urine NEGATIVE 0.2 - 1.0 MG/DL    Nitrite Urine, Quantitative NEGATIVE NEGATIVE    Leukocyte Esterase, Urine LARGE (A) NEGATIVE    RBC, UA 4 0 - 6 /HPF    WBC, UA 25 (H) 0 - 5 /HPF    Bacteria, UA NEGATIVE NEGATIVE /HPF    WBC Clumps, UA RARE /HPF    Squam Epithel, UA 1 /HPF    Mucus, UA RARE (A) NEGATIVE HPF    Trichomonas, UA NONE SEEN NONE SEEN /HPF    Hyaline Casts, UA 10 /LPF   SPECIMEN REJECTION   Result Value Ref Range    Rejected Test PTPTT     Reason for Rejection SPECIMEN QUANTITY NOT SUFFICIENT    EKG 12 Lead   Result Value Ref Range    Ventricular Rate 42 BPM    Atrial Rate 42 BPM    P-R Interval 154 ms    QRS Duration 108 ms    Q-T Interval 588 ms    QTc Calculation (Bazett) 490 ms    P Axis 10 degrees    R Axis -43 degrees    T Axis -59 degrees    Diagnosis       Marked sinus bradycardia  Left axis deviation  Inferior infarct (cited on or before 12-FEB-2017)  Cannot rule out Anterior infarct (cited on or before 12-FEB-2017)  T wave abnormality, consider lateral ischemia  Abnormal ECG  When compared with ECG of 22-JAN-2021 18:56,  Significant changes have occurred          Radiographs (if obtained):  [] The following radiograph was interpreted by myself in the absence of a radiologist:  [x] Radiologist's Report Reviewed:    CXR, CTA chest    Xr Chest Portable    Result Date: 1/22/2021 EXAMINATION: ONE XRAY VIEW OF THE CHEST 1/22/2021 8:57 pm COMPARISON: Chest x-ray January 15, 2021 HISTORY: ORDERING SYSTEM PROVIDED HISTORY: Short of breath, pneumonia TECHNOLOGIST PROVIDED HISTORY: Reason for exam:->Short of breath, pneumonia Reason for Exam: Short of breath, pneumonia Acuity: Acute Type of Exam: Initial Additional signs and symptoms: na Relevant Medical/Surgical History: cad, hypertension FINDINGS: Cardiac silhouette appears stable. Atherosclerotic changes aorta. Postoperative changes of CABG procedure and median sternotomy. Cardiac loop recorder in place. Coronary stents in place. Mild interstitial opacities of the lungs bilaterally which could reflect edema versus infiltrate. No pleural effusion or pneumothorax. 1. Mild bilateral interstitial opacities of the lungs which could reflect pulmonary edema versus infiltrate. Xr Chest Portable    Result Date: 1/15/2021  EXAMINATION: ONE XRAY VIEW OF THE CHEST 1/15/2021 7:07 pm COMPARISON: 11/20/2020 HISTORY: ORDERING SYSTEM PROVIDED HISTORY: cough, fevers, Covid exposure TECHNOLOGIST PROVIDED HISTORY: Reason for exam:->cough, fevers, Covid exposure Reason for Exam: cough, fevers, Covid exposure Acuity: Unknown Type of Exam: Initial FINDINGS: There is new airspace disease, mainly in the right upper lung, greater medially. The left lung is clear. Pacemaker implant noted. Cardial pericardial silhouette otherwise unremarkable. No pneumothorax is seen. No free air. No acute bony abnormality. Focal airspace disease within the right upper lung, greater medially. Pneumonia would be the primary consideration. Although not excluded, the distribution would be unusual for COVID-19 pneumonia.        EKG (if obtained): (All EKG's are interpreted by myself in the absence of a cardiologist)    12 lead EKG per my interpretation:  Sinus Bradycardia 42  Axis is   Left axis deviation  QTc is  490 There is specific T wave changes appreciated. Inverted T waves 2 3 V4 through V6  There is no specific ST wave changes appreciated. Prior EKG to compare with was available and similar to previous just more bradycardic      Total critical care time today provided was at least 50 minutes. This excludes seperately billable procedure. Critical care time provided for reviewing labs, images, giving fluids, oxygen, dopamine, antibiotics, consulting cardiology, consulting medicine, consulting pace maker company, that required close evaluation and/or intervention with concern for patient decompensation. MDM:    Patient here with chest pain shortness of breath loss of consciousness. Again she has a history of CAD multiple stents and pacemaker. She follows at [facility] she states her original pacemaker was removed in June due to covered in MRSA. She has a micropacemaker she states. Patient states last couple days she is refused come to the hospital but has been passing out having chest pain lightheadedness. On arrival patient is bradycardic EKG is otherwise negative just bradycardia marked interrogated pacemaker pacemaker representative came and adjusted her settings from 30 beats a minute which she said at and rates her to 50 beats a minute. She has history of CHF with EF of 30% she was oxygen all time work-up performed CT of the chest is negative BNP is elevated has UTI will culture give Rocephin. Given IV fluids otherwise I gave her morphine for pain and Zofran for nausea she took nitro at home with no relief. Patient otherwise stable again given fluids I did talk to cardiology here will start patient on dopamine again pacemaker adjusted given fluids given antibiotics admitted to hospital medicine otherwise stable.     CLINICAL IMPRESSION:  Final diagnoses:   Chest pain, unspecified type   Syncope and collapse   Dyspnea and respiratory abnormalities   Bradycardia Urinary tract infection without hematuria, site unspecified   Elevated brain natriuretic peptide (BNP) level       (Please note that portions of this note may have been completed with a voice recognition program. Efforts were made to edit the dictations but occasionally words aremis-transcribed.)    DISPOSITION REFERRAL (if applicable):  No follow-up provider specified.     DISPOSITION MEDICATIONS (if applicable):  New Prescriptions    No medications on file          Ochoa Doran, [facility],   02/06/21 1204

## 2021-02-06 NOTE — CARE COORDINATION
Pt identified as potential readmission. Last admission 1/22 - 1/27 for Acute on chronic hypoxic respiratory failurelikely secondary to pneumonia. Pt does use 3L 02 as needed. Pt was Covid neg and MRSA nasal swab positive. Pt also has Chronic systolic and diastolic CHF. Patient was instructed to bring her LifeVest on 1/26. Patient did not bring LifeVest. It was noted Dr. Kay Wright that per hospital notes at [address], similar episode happened with the patient did not bring her [address] was written for portable oxygen system for ambulation. Pt here today for CP. Pt also reported she took 3 NTG PTA without relief as well as loss of Consciousness (c/o syncopal episodes since last night), and Fatigue (C/O increased weakness since discharge from hospital for pneumonia). EkG is similar to previous just more bradycardic. Pt has indwelling wood cath. Dr. Janet Rosales notes d/t CP, Syncope and collapse as well as bradycardia and UTI, Pt is requiring readmission and there were no alternatives to admission to explore at this time.

## 2021-02-07 ENCOUNTER — APPOINTMENT (OUTPATIENT)
Dept: GENERAL RADIOLOGY | Age: 61
DRG: 982 | End: 2021-02-07
Payer: MEDICARE

## 2021-02-07 LAB
ALBUMIN SERPL-MCNC: 3.8 GM/DL (ref 3.4–5)
ALP BLD-CCNC: 125 IU/L (ref 40–128)
ALT SERPL-CCNC: 10 U/L (ref 10–40)
ANION GAP SERPL CALCULATED.3IONS-SCNC: 14 MMOL/L (ref 4–16)
AST SERPL-CCNC: 18 IU/L (ref 15–37)
BILIRUB SERPL-MCNC: 0.2 MG/DL (ref 0–1)
BUN BLDV-MCNC: 17 MG/DL (ref 6–23)
CALCIUM SERPL-MCNC: 8.3 MG/DL (ref 8.3–10.6)
CHLORIDE BLD-SCNC: 105 MMOL/L (ref 99–110)
CHOLESTEROL: 234 MG/DL
CO2: 19 MMOL/L (ref 21–32)
CREAT SERPL-MCNC: 1.1 MG/DL (ref 0.6–1.1)
EKG ATRIAL RATE: 42 BPM
EKG DIAGNOSIS: NORMAL
EKG P AXIS: 10 DEGREES
EKG P-R INTERVAL: 154 MS
EKG Q-T INTERVAL: 588 MS
EKG QRS DURATION: 108 MS
EKG QTC CALCULATION (BAZETT): 490 MS
EKG R AXIS: -43 DEGREES
EKG T AXIS: -59 DEGREES
EKG VENTRICULAR RATE: 42 BPM
GFR AFRICAN AMERICAN: >60 ML/MIN/1.73M2
GFR NON-AFRICAN AMERICAN: 51 ML/MIN/1.73M2
GLUCOSE BLD-MCNC: 84 MG/DL (ref 70–99)
HCT VFR BLD CALC: 42.2 % (ref 37–47)
HDLC SERPL-MCNC: 50 MG/DL
HEMOGLOBIN: 12.2 GM/DL (ref 12.5–16)
LDL CHOLESTEROL DIRECT: 156 MG/DL
MCH RBC QN AUTO: 36.3 PG (ref 27–31)
MCHC RBC AUTO-ENTMCNC: 28.9 % (ref 32–36)
MCV RBC AUTO: 125.6 FL (ref 78–100)
PDW BLD-RTO: 13.3 % (ref 11.7–14.9)
PLATELET # BLD: 202 K/CU MM (ref 140–440)
PMV BLD AUTO: 10.4 FL (ref 7.5–11.1)
POTASSIUM SERPL-SCNC: 4.1 MMOL/L (ref 3.5–5.1)
RBC # BLD: 3.36 M/CU MM (ref 4.2–5.4)
SODIUM BLD-SCNC: 138 MMOL/L (ref 135–145)
TOTAL PROTEIN: 6 GM/DL (ref 6.4–8.2)
TRIGL SERPL-MCNC: 185 MG/DL
TROPONIN T: <0.01 NG/ML
WBC # BLD: 5.7 K/CU MM (ref 4–10.5)

## 2021-02-07 PROCEDURE — 73564 X-RAY EXAM KNEE 4 OR MORE: CPT

## 2021-02-07 PROCEDURE — 2500000003 HC RX 250 WO HCPCS: Performed by: NURSE PRACTITIONER

## 2021-02-07 PROCEDURE — 94150 VITAL CAPACITY TEST: CPT

## 2021-02-07 PROCEDURE — 99222 1ST HOSP IP/OBS MODERATE 55: CPT | Performed by: INTERNAL MEDICINE

## 2021-02-07 PROCEDURE — 6370000000 HC RX 637 (ALT 250 FOR IP): Performed by: INTERNAL MEDICINE

## 2021-02-07 PROCEDURE — 2700000000 HC OXYGEN THERAPY PER DAY

## 2021-02-07 PROCEDURE — 6360000002 HC RX W HCPCS: Performed by: INTERNAL MEDICINE

## 2021-02-07 PROCEDURE — 6370000000 HC RX 637 (ALT 250 FOR IP): Performed by: PHYSICIAN ASSISTANT

## 2021-02-07 PROCEDURE — 83721 ASSAY OF BLOOD LIPOPROTEIN: CPT

## 2021-02-07 PROCEDURE — 36415 COLL VENOUS BLD VENIPUNCTURE: CPT

## 2021-02-07 PROCEDURE — 2140000000 HC CCU INTERMEDIATE R&B

## 2021-02-07 PROCEDURE — 6370000000 HC RX 637 (ALT 250 FOR IP): Performed by: NURSE PRACTITIONER

## 2021-02-07 PROCEDURE — 85027 COMPLETE CBC AUTOMATED: CPT

## 2021-02-07 PROCEDURE — 2580000003 HC RX 258: Performed by: INTERNAL MEDICINE

## 2021-02-07 PROCEDURE — 80053 COMPREHEN METABOLIC PANEL: CPT

## 2021-02-07 PROCEDURE — 80061 LIPID PANEL: CPT

## 2021-02-07 PROCEDURE — 6360000002 HC RX W HCPCS: Performed by: EMERGENCY MEDICINE

## 2021-02-07 PROCEDURE — 2580000003 HC RX 258: Performed by: NURSE PRACTITIONER

## 2021-02-07 PROCEDURE — 94640 AIRWAY INHALATION TREATMENT: CPT

## 2021-02-07 PROCEDURE — 84484 ASSAY OF TROPONIN QUANT: CPT

## 2021-02-07 PROCEDURE — 94761 N-INVAS EAR/PLS OXIMETRY MLT: CPT

## 2021-02-07 PROCEDURE — 93010 ELECTROCARDIOGRAM REPORT: CPT | Performed by: INTERNAL MEDICINE

## 2021-02-07 RX ORDER — IPRATROPIUM BROMIDE AND ALBUTEROL SULFATE 2.5; .5 MG/3ML; MG/3ML
3 SOLUTION RESPIRATORY (INHALATION) EVERY 4 HOURS PRN
Status: DISCONTINUED | OUTPATIENT
Start: 2021-02-07 | End: 2021-02-11 | Stop reason: HOSPADM

## 2021-02-07 RX ORDER — ALBUTEROL SULFATE 90 UG/1
2 AEROSOL, METERED RESPIRATORY (INHALATION) 2 TIMES DAILY
Status: DISCONTINUED | OUTPATIENT
Start: 2021-02-07 | End: 2021-02-11 | Stop reason: HOSPADM

## 2021-02-07 RX ADMIN — RANOLAZINE 500 MG: 500 TABLET, FILM COATED, EXTENDED RELEASE ORAL at 09:31

## 2021-02-07 RX ADMIN — PROMETHAZINE HYDROCHLORIDE 25 MG: 25 TABLET ORAL at 05:36

## 2021-02-07 RX ADMIN — ACETAMINOPHEN 650 MG: 325 TABLET ORAL at 13:44

## 2021-02-07 RX ADMIN — OXYCODONE HYDROCHLORIDE 20 MG: 10 TABLET ORAL at 02:38

## 2021-02-07 RX ADMIN — CALCIUM 500 MG: 500 TABLET ORAL at 13:45

## 2021-02-07 RX ADMIN — ACETAMINOPHEN 650 MG: 325 TABLET ORAL at 21:42

## 2021-02-07 RX ADMIN — DULOXETINE HYDROCHLORIDE 60 MG: 30 CAPSULE, DELAYED RELEASE ORAL at 09:28

## 2021-02-07 RX ADMIN — PROMETHAZINE HYDROCHLORIDE 25 MG: 25 TABLET ORAL at 13:45

## 2021-02-07 RX ADMIN — BUDESONIDE AND FORMOTEROL FUMARATE DIHYDRATE 2 PUFF: 160; 4.5 AEROSOL RESPIRATORY (INHALATION) at 19:23

## 2021-02-07 RX ADMIN — LEVETIRACETAM 1000 MG: 500 TABLET, FILM COATED ORAL at 09:28

## 2021-02-07 RX ADMIN — FAMOTIDINE 20 MG: 10 INJECTION, SOLUTION INTRAVENOUS at 09:29

## 2021-02-07 RX ADMIN — MIRTAZAPINE 30 MG: 15 TABLET, FILM COATED ORAL at 21:00

## 2021-02-07 RX ADMIN — GABAPENTIN 300 MG: 300 CAPSULE ORAL at 13:45

## 2021-02-07 RX ADMIN — OXYCODONE HYDROCHLORIDE 20 MG: 10 TABLET ORAL at 21:42

## 2021-02-07 RX ADMIN — OXYCODONE HYDROCHLORIDE 20 MG: 10 TABLET ORAL at 18:06

## 2021-02-07 RX ADMIN — MIDODRINE HYDROCHLORIDE 10 MG: 5 TABLET ORAL at 16:25

## 2021-02-07 RX ADMIN — SODIUM CHLORIDE, PRESERVATIVE FREE 10 ML: 5 INJECTION INTRAVENOUS at 21:01

## 2021-02-07 RX ADMIN — DOPAMINE HYDROCHLORIDE 8.5 MCG/KG/MIN: 160 INJECTION, SOLUTION INTRAVENOUS at 16:20

## 2021-02-07 RX ADMIN — MIDODRINE HYDROCHLORIDE 10 MG: 5 TABLET ORAL at 13:45

## 2021-02-07 RX ADMIN — OXYCODONE HYDROCHLORIDE 20 MG: 10 TABLET ORAL at 05:36

## 2021-02-07 RX ADMIN — DABIGATRAN ETEXILATE MESYLATE 150 MG: 75 CAPSULE ORAL at 09:28

## 2021-02-07 RX ADMIN — ATORVASTATIN CALCIUM 80 MG: 80 TABLET, FILM COATED ORAL at 21:01

## 2021-02-07 RX ADMIN — PROMETHAZINE HYDROCHLORIDE 25 MG: 25 TABLET ORAL at 21:00

## 2021-02-07 RX ADMIN — MIDODRINE HYDROCHLORIDE 10 MG: 5 TABLET ORAL at 09:28

## 2021-02-07 RX ADMIN — ALBUTEROL SULFATE 2 PUFF: 90 AEROSOL, METERED RESPIRATORY (INHALATION) at 19:23

## 2021-02-07 RX ADMIN — RANOLAZINE 500 MG: 500 TABLET, FILM COATED, EXTENDED RELEASE ORAL at 21:05

## 2021-02-07 RX ADMIN — LEVOTHYROXINE SODIUM 150 MCG: 150 TABLET ORAL at 05:36

## 2021-02-07 RX ADMIN — CLOPIDOGREL BISULFATE 75 MG: 75 TABLET ORAL at 09:28

## 2021-02-07 RX ADMIN — AMIODARONE HYDROCHLORIDE 400 MG: 200 TABLET ORAL at 09:28

## 2021-02-07 RX ADMIN — ACETAMINOPHEN 650 MG: 325 TABLET ORAL at 05:36

## 2021-02-07 RX ADMIN — CEFTRIAXONE 1000 MG: 1 INJECTION, POWDER, FOR SOLUTION INTRAMUSCULAR; INTRAVENOUS at 13:45

## 2021-02-07 RX ADMIN — BUDESONIDE AND FORMOTEROL FUMARATE DIHYDRATE 2 PUFF: 160; 4.5 AEROSOL RESPIRATORY (INHALATION) at 08:19

## 2021-02-07 RX ADMIN — DOPAMINE HYDROCHLORIDE 3.5 MCG/KG/MIN: 160 INJECTION, SOLUTION INTRAVENOUS at 09:37

## 2021-02-07 RX ADMIN — DABIGATRAN ETEXILATE MESYLATE 150 MG: 75 CAPSULE ORAL at 21:00

## 2021-02-07 RX ADMIN — OXYCODONE HYDROCHLORIDE 20 MG: 10 TABLET ORAL at 14:25

## 2021-02-07 RX ADMIN — GABAPENTIN 300 MG: 300 CAPSULE ORAL at 09:28

## 2021-02-07 RX ADMIN — FAMOTIDINE 20 MG: 10 INJECTION, SOLUTION INTRAVENOUS at 21:01

## 2021-02-07 RX ADMIN — GABAPENTIN 300 MG: 300 CAPSULE ORAL at 21:00

## 2021-02-07 ASSESSMENT — PAIN - FUNCTIONAL ASSESSMENT: PAIN_FUNCTIONAL_ASSESSMENT: PREVENTS OR INTERFERES SOME ACTIVE ACTIVITIES AND ADLS

## 2021-02-07 ASSESSMENT — PAIN SCALES - GENERAL
PAINLEVEL_OUTOF10: 6
PAINLEVEL_OUTOF10: 8
PAINLEVEL_OUTOF10: 9
PAINLEVEL_OUTOF10: 9

## 2021-02-07 ASSESSMENT — PAIN DESCRIPTION - ONSET: ONSET: ON-GOING

## 2021-02-07 ASSESSMENT — PAIN DESCRIPTION - FREQUENCY: FREQUENCY: INTERMITTENT

## 2021-02-07 ASSESSMENT — PAIN DESCRIPTION - DESCRIPTORS
DESCRIPTORS: ACHING
DESCRIPTORS: ACHING

## 2021-02-07 NOTE — PROGRESS NOTES
Hospitalist Progress Note      Name:  Walter Morrison /Age/Sex: 1960  (62 y.o. female)   MRN & CSN:  [id number] & [id number] Admission Date/Time: 2021  4:36 PM   Location:  [address] [place] [address] PCP: Charis Jarvis MD         Hospital Day: 2    Assessment and Plan:   Walter Morrison is a 62 y.o.  female  who presents with Syncope and collapse    Syncope: Likely from bradycardia  Has a pacemaker placed at [address] [place] [address]  Pacemaker interrogated and rate increased to 50 bpm  Started on dopamine infusion  Avoid rate lowering medication  Cardiology following  Electrophysiology on consult    Hyponatremia: We will monitor  Hypokalemia: Replace accordingly    Acute kidney injury: Resolved  Creatinine 1.5, with associated hyponatremia and hypokalemia  Avoid nephrotoxic agents. UTI: Rocephin. Check urine culture. Chronic systolic and diastolic heart failure, not in exacerbation  Ejection fraction 30 to 35% with mild mitral regurgitation  Diuretics held because of acute kidney injury    Coronary artery disease: Status post coronary artery bypass graft, percutaneous coronary intervention. Continue high intensity statin, Plavix, ranolazine    Paroxysmal atrial fibrillation/atrial flutter: Status post ablation on amiodarone and Pradaxa    Ventricular tachycardia: Status post dual AICD status post ablation  ICD removed in 2020 due to ICD pocket infection with MRSA Klebsiella  Currently on amiodarone. Life vest?    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: On oxycodone chronically    Multiple sclerosis: On Cymbalta and gabapentin    Protein calorie malnutrition    Chronic respiratory failure, hypoxic:     Diet DIET GENERAL; No Caffeine  Diet NPO, After Midnight   DVT Prophylaxis [] Lovenox, []  Heparin, [] SCDs, [] Warfarin  [x] NOAC  levETIRAcetam  1,000 mg Oral Daily    midodrine  10 mg Oral TID WC    mirtazapine  30 mg Oral Nightly    ranolazine  500 mg Oral Q12H    amiodarone  400 mg Oral Daily    atorvastatin  80 mg Oral Nightly    budesonide-formoterol  2 puff Inhalation BID    calcium elemental  500 mg Oral Daily      Infusions:    DOPamine 6 mcg/kg/min (02/07/21 1133)     PRN Meds:     ipratropium-albuterol, 3 mL, Q4H PRN      sodium chloride flush, 10 mL, PRN      acetaminophen, 650 mg, Q6H PRN    Or      acetaminophen, 650 mg, Q6H PRN      polyethylene glycol, 17 g, Daily PRN      nitroGLYCERIN, 0.4 mg, Q5 Min PRN      potassium chloride, 40 mEq, PRN    Or      potassium alternative oral replacement, 40 mEq, PRN    Or      potassium chloride, 10 mEq, PRN      magnesium sulfate, 2,000 mg, PRN      oxyCODONE, 20 mg, Q3H PRN      promethazine, 25 mg, Q6H PRN        Recent Labs     02/06/21 1755 02/07/21  0429   WBC 5.0 5.7   HGB 12.5 12.2*   HCT 40.2 42.2    202      Recent Labs     02/06/21 1755 02/07/21  0429   * 138   K 3.1* 4.1   CL 96* 105   CO2 22 19*   BUN 23 17   CREATININE 1.5* 1.1     Recent Labs     02/06/21 1755 02/07/21  0429   AST 15 18   ALT 10 10   BILITOT 0.2 0.2   ALKPHOS 121 125     No results for input(s): INR in the last 72 hours.   Recent Labs     02/06/21 1755 02/06/21 2249 02/07/21  0429   CKTOTAL 34  --   --    TROPONINT <0.010 <0.010 <0.010      Imaging reviewed    Electronically signed by Rochelle Stewart MD on 2/7/2021 at 2:44 PM

## 2021-02-07 NOTE — H&P
History and Physical      Name:  Asiha Garzon /Age/Sex: 1960  (62 y.o. female)   MRN & CSN:  [id number] & [id number] Admission Date/Time: 2021  4:36 PM   Location:  [facility] PCP: Jojo Richardson MD       Hospital Day: 1        Admitting Physician: Dr. Moris Brown and Plan:   Aisha Garzon is a 62 y.o. female who presents with chest pain/loss of consciousness      Syncope and collapse likely 2/2 bradycardia/hypotension. Has MICRA pacemaker from [facility]. Interrogated in ER and increased rate of 50 bpm.   Admit MedSurg   Telemetry/Troponin trend   Echo   Neuro checks   Cardiology consult   Continue dopamine infusion      Electrolyte abnormalities  Hyponatremia (128)  Hypokalemia (3.1)   Replete and monitor trends     Chronic systolic/diastolic heart failure. Does not look acutely decompensated. BNP 3684 (chronically elevated but above recent baseline). Last TTE (2020) EF 30 to 35%, mild MR. Monitor for clinical symptoms of hypervolemia   Holding diuretics for now due to JORGE     Acute kidney injury- sCr 1.5 with normal baseline   Monitor lab trends with IVF      CAD s/p CABG PCI ()     Seizure disorder continue Keppra      Chronic anticoagulation due to history of DVT/PE. Recent DVT posterior tibial vein 2020     Hypothyroidismcontinue Synthroid     Chronic pain syndrome due to bilateral hip avascular necrosiscontinue oxycodone     MS-Cymbalta/gabapentin     PCM- BMI 15.49. Consult Dietician. Hx of Maru-en-Y     Patient case discussed with ED provider    Diet  General   DVT Prophylaxis [] Lovenox, []  Heparin, [] SCDs, [] Ambulation  [x] Long term AC   GI Prophylaxis [x] PPI,  [] H2 Blocker,  [] Carafate,  [] Diet/Tube Feeds   Code Status Full     Disposition Admit to inpatient.     Patient plans to return home upon discharge   MDM [] Low, [] Moderate,[x]  High     -Patient assessment and plan discussed and reviewed with admitting physician: Gamal Gonsalez sec.No peripheral edema. GI -Abdomen is soft non distended and without significant TTP. + BS. No masses or guarding. Rectal exam deferred. No HSM   -No CVA/ flank tenderness. Coppola catheter is not present. LYMPH-No palpable cervical lymphadenopathy and no hepatosplenomegaly. No petechiae or ecchymoses. MS -No gross joint deformities. SKIN -Normal coloration, warm, dry. NEURO-Cranial nerves appear grossly intact, normal speech, no lateralizing weakness. PSYC-Awake, alert, oriented x 4- person, place, time, situation,  flat affect. Past Medical History:      Past Medical History:   Diagnosis Date    Acute delirium     Arrhythmia     Arthritis     Asplenia     Asthma     [facility]ascular necrosis ([facility]     CAD (coronary artery disease)     1st stent at age 46    Cardiomyopathy [facility])     Cerebral artery occlusion with cerebral infarction ([facility]     CHF (congestive heart failure) (HCC)     Enlarged liver     GERD (gastroesophageal reflux disease)     H/O echocardiogram 4/6/16    EF 55%, trivial TR & MR, stage 1 diastolic dysfunction    History of blood transfusion     Hx of cardiovascular stress test 12/29/2015    Alessia: EF 45%. Pharmacologic stress myocardial perfusion scan shows a fixed inferior-lateral defect w/ no inducible ischemia. No evidence of ischemia. Inferior-lateral infarction. Normal LVSF    Hyperlipidemia     Hypertension     Lymphoma ([facility]     Denies    MI, old     Movement disorder     MS (multiple sclerosis) ([facility]     OP (osteoporosis)     PE (pulmonary embolism)     trapese filter    Pneumonia     Pyelonephritis     Seizures (HCC)     Severe malnutrition (HCC)     Spleen enlarged     Thyroid disease        Past Surgical  History:    has a past surgical history that includes Coronary angioplasty with stent; Hysterectomy; Appendectomy; Cholecystectomy; Tonsillectomy; Gastric bypass surgery; Cardiac defibrillator placement; hip surgery; Abdomen surgery;  Colonoscopy; Endoscopy, colon, diagnostic; hernia repair; joint replacement; skin biopsy; vascular surgery; Coronary artery bypass graft; and Port Surgery (N/A, 6/1/2020). Social History:    FAM HX: Reviewed  family history includes Cancer in her father; Diabetes in her mother; Heart Disease in her father, maternal grandmother, and mother; High Blood Pressure in her maternal grandmother and mother; High Cholesterol in her maternal grandmother and mother; Other in her sister. Soc HX:   Social History     Socioeconomic History    Marital status: Single     Spouse name: None    Number of children: None    Years of education: None    Highest education level: None   Occupational History    None   Social Needs    Financial resource strain: None    Food insecurity     Worry: None     Inability: None    Transportation needs     Medical: None     Non-medical: None   Tobacco Use    Smoking status: Never Smoker    Smokeless tobacco: Never Used   Substance and Sexual Activity    Alcohol use: No    Drug use: No    Sexual activity: Not Currently   Lifestyle    Physical activity     Days per week: None     Minutes per session: None    Stress: None   Relationships    Social connections     Talks on phone: None     Gets together: None     Attends Ep[employer] service: None     Active member of club or organization: None     Attends meetings of clubs or organizations: None     Relationship status: None    Intimate partner violence     Fear of current or ex partner: None     Emotionally abused: None     Physically abused: None     Forced sexual activity: None   Other Topics Concern    None   Social History Narrative    None     TOBACCO:   reports that she has never smoked. She has never used smokeless tobacco.  ETOH:   reports no history of alcohol use. Drugs:  reports no history of drug use. Allergies:    Allergies   Allergen Reactions    Fentanyl Swelling     Other reaction(s): Angioedema (swelling)    Moxifloxacin Hcl In Nacl Swelling and Rash    Povidone-Iodine Rash     Other reaction(s): AOF      Cyclobenzaprine      Other reaction(s): Other - comment required  \" it make my muscle very weak because of my MS\"  \" it make my muscle very weak because of my MS\"      Methocarbamol      Worsening edema  Other reaction(s): Other - comment required  Worsening edema  Worsening edema  Worsening edema      Tramadol Other (See Comments)     Doctor doesn't her to take due to heart problems  Seizures   Doctor doesn't her to take due to lowers seizure threshold.  Baclofen     Ibuprofen      \"Due to heart problems\"    Naproxen     Nsaids      \"Due to heart problems\"    Tizanidine     Tolmetin      \"Due to heart problems\"    Tramadol Hcl      Other reaction(s): Other - comment required  Told not to take due to history of seizures    Betadine [Povidone Iodine] Rash    Moxifloxacin Rash and Swelling     Lips swell and tingling in face   Lips swell and tingling in face   Lips swell and tingling in face   Lips swell and tingling in face   Around mouth       Home Medications:     Prior to Admission medications    Medication Sig Start Date End Date Taking?  Authorizing Provider   aspirin 81 MG tablet Take 1 tablet by mouth daily 1/27/21   Paco Sidhu MD   beclomethasone (QVAR) 40 MCG/ACT inhaler Inhale 2 puffs into the lungs 2 times daily 1/14/21   Historical Provider, MD   acarbose (PRECOSE) 25 MG tablet Take 25 mg by mouth 3 times daily (with meals) 7/1/20   Historical Provider, MD   budesonide-formoterol (SYMBICORT) 160-4.5 MCG/ACT AERO Inhale 2 puffs into the lungs 2 times daily 1/14/21 1/14/22  Historical Provider, MD   bumetanide (BUMEX) 1 MG tablet Take 1 mg by mouth daily 12/21/20   Historical Provider, MD   calcium citrate (CALCITRATE) 950 (200 Ca) MG tablet Take 1,425 mg by mouth daily 8/4/20   Historical Provider, MD   dabigatran (PRADAXA) 75 MG capsule Take 150 mg by mouth 1/14/21 1/14/22  Historical Provider, MD DULoxetine (CYMBALTA) 60 MG extended release capsule Take 60 mg by mouth daily 11/9/20   Historical Provider, MD   ergocalciferol (ERGOCALCIFEROL) 1.25 MG (72622 UT) capsule Take 50,000 Units by mouth Twice a Week 11/9/20   Historical Provider, MD   gabapentin (NEURONTIN) 300 MG capsule Take 300 mg by mouth 3 times daily.  12/14/20   Historical Provider, MD   metoprolol succinate (TOPROL XL) 25 MG extended release tablet Take 12.5 mg by mouth daily Half a tablet (12.5 total)    Historical Provider, MD   mirtazapine (REMERON) 30 MG tablet Take 30 mg by mouth nightly 10/8/20   Historical Provider, MD   omeprazole (PRILOSEC) 20 MG delayed release capsule omeprazole 20 mg capsule,delayed release    Historical Provider, MD   promethazine (PHENERGAN) 25 MG tablet Take 25 mg by mouth 4 times daily as needed 12/7/20   Historical Provider, MD   ranolazine (RANEXA) 500 MG extended release tablet Take 500 mg by mouth every 12 hours 12/21/20   Historical Provider, MD   senna (SENOKOT) 8.6 MG tablet Take 1 tablet by mouth daily    Historical Provider, MD   spironolactone (ALDACTONE) 25 MG tablet Take 12.5 mg by mouth daily 9/1/20   Historical Provider, MD   cyanocobalamin 1000 MCG tablet Take 1 tablet by mouth daily    Historical Provider, MD   famotidine (PEPCID) 20 MG tablet Take 20 mg by mouth 2 times daily 11/17/20 11/17/21  Historical Provider, MD   ferrous gluconate (FERGON) 324 (38 Fe) MG tablet Take 1 tablet by mouth 2 times daily    Historical Provider, MD   levETIRAcetam (KEPPRA) 1000 MG tablet Take 1,000 mg by mouth daily 11/5/20   Historical Provider, MD   levothyroxine (SYNTHROID) 150 MCG tablet Take 150 mcg by mouth every morning (before breakfast) 11/9/20   Historical Provider, MD   magnesium oxide (MAG-OX) 400 MG tablet Take 400 mg by mouth daily 11/9/20   Historical Provider, MD   nitroGLYCERIN (NITROSTAT) 0.4 MG SL tablet Place 0.4 mg under the tongue as needed 4/7/20   Historical Provider, MD   potassium chloride (KLOR-CON M) 10 MEQ extended release tablet Take 10 mEq by mouth 2 times daily 2 (10mEq) 1/18/21 1/18/22  Historical Provider, MD   amiodarone (CORDARONE) 200 MG tablet Take 400 mg by mouth daily    Historical Provider, MD   oxyCODONE (ROXICODONE) 20 MG immediate release tablet Take 20 mg by mouth every 3 hours as needed. 9/11/20   Historical Provider, MD   chlorhexidine gluconate (ANTISEPTIC SKIN CLEANSER) 4 % SOLN external solution Apply topically daily 9/30/20   Deanne Crook MD   midodrine (PROAMATINE) 10 MG tablet Take 1 tablet by mouth 3 times daily (with meals) 6/4/20   Roc Fitzpatrick MD   ipratropium-albuterol (DUONEB) 0.5-2.5 (3) MG/3ML SOLN nebulizer solution Inhale 3 mLs into the lungs every 4 hours (while awake) 11/18/19   Mariusz Morrell MD   benzocaine-menthol (CEPACOL SORE THROAT) 15-3.6 MG lozenge Take 1 lozenge by mouth every 2 hours as needed for Sore Throat 11/1/19   Nagi Myles MD   docusate sodium (COLACE) 100 MG capsule Take 100 mg by mouth 2 times daily as needed for Constipation    Historical Provider, MD   atorvastatin (LIPITOR) 80 MG tablet Take 80 mg by mouth nightly    Historical Provider, MD   clopidogrel (PLAVIX) 75 MG tablet Take 75 mg by mouth daily    Historical Provider, MD   acetaminophen 650 MG TABS Take 650 mg by mouth every 4 hours as needed 4/9/16   Praveen Mae MD   albuterol (PROVENTIL HFA;VENTOLIN HFA) 108 (90 BASE) MCG/ACT inhaler Inhale 2 puffs into the lungs every 6 hours as needed.       Historical Provider, MD         Medications:   Medications:    acetaminophen  1,000 mg Oral Once    sodium chloride  1,000 mL Intravenous Once      Infusions:    DOPamine 2 mcg/kg/min (02/06/21 1956)     PRN Meds:     ondansetron, 4 mg, Q30 Min PRN      morphine, 2 mg, Q30 Min PRN        Data:     Laboratory this visit:  Reviewed  Recent Labs     02/06/21  1755   WBC 5.0   HGB 12.5   HCT 40.2         Recent Labs     02/06/21  1755   *   K 3.1*   CL 96*   CO2 22   BUN 23   CREATININE 1.5*     Recent Labs     02/06/21  1755   AST 15   ALT 10   BILITOT 0.2   ALKPHOS 121     No results for input(s): INR in the last 72 hours. Radiology this visit:  Reviewed. Xr Chest Portable    Result Date: 2/6/2021  EXAMINATION: ONE XRAY VIEW OF THE CHEST 2/6/2021 5:02 pm COMPARISON: 01/22/2021 HISTORY: ORDERING SYSTEM PROVIDED HISTORY: CP/syncope TECHNOLOGIST PROVIDED HISTORY: Reason for exam:->CP/syncope Reason for Exam: CP/syncope Acuity: Unknown Type of Exam: Initial Additional signs and symptoms: none Relevant Medical/Surgical History: CHF, CAD, asthma FINDINGS: Transvenous pacer remains in place. Status post median sternotomy. The lungs are without acute focal process. There is no effusion or pneumothorax. The cardiomediastinal silhouette is stable. The osseous structures are stable. No acute process. Xr Chest Portable    Result Date: 1/22/2021  EXAMINATION: ONE XRAY VIEW OF THE CHEST 1/22/2021 8:57 pm COMPARISON: Chest x-ray January 15, 2021 HISTORY: ORDERING SYSTEM PROVIDED HISTORY: Short of breath, pneumonia TECHNOLOGIST PROVIDED HISTORY: Reason for exam:->Short of breath, pneumonia Reason for Exam: Short of breath, pneumonia Acuity: Acute Type of Exam: Initial Additional signs and symptoms: na Relevant Medical/Surgical History: cad, hypertension FINDINGS: Cardiac silhouette appears stable. Atherosclerotic changes aorta. Postoperative changes of CABG procedure and median sternotomy. Cardiac loop recorder in place. Coronary stents in place. Mild interstitial opacities of the lungs bilaterally which could reflect edema versus infiltrate. No pleural effusion or pneumothorax. 1. Mild bilateral interstitial opacities of the lungs which could reflect pulmonary edema versus infiltrate.      Xr Chest Portable    Result Date: 1/15/2021  EXAMINATION: ONE XRAY VIEW OF THE CHEST 1/15/2021 7:07 pm COMPARISON: 11/20/2020 HISTORY: ORDERING SYSTEM PROVIDED HISTORY: cough, fevers, Covid exposure TECHNOLOGIST PROVIDED HISTORY: Reason for exam:->cough, fevers, Covid exposure Reason for Exam: cough, fevers, Covid exposure Acuity: Unknown Type of Exam: Initial FINDINGS: There is new airspace disease, mainly in the right upper lung, greater medially. The left lung is clear. Pacemaker implant noted. Cardial pericardial silhouette otherwise unremarkable. No pneumothorax is seen. No free air. No acute bony abnormality. Focal airspace disease within the right upper lung, greater medially. Pneumonia would be the primary consideration. Although not excluded, the distribution would be unusual for COVID-19 pneumonia. Cta Pulmonary W Contrast    Result Date: 2/6/2021  EXAMINATION: CTA OF THE CHEST 2/6/2021 7:20 pm TECHNIQUE: CTA of the chest was performed after the administration of intravenous contrast.  Multiplanar reformatted images are provided for review. MIP images are provided for review. Dose modulation, iterative reconstruction, and/or weight based adjustment of the mA/kV was utilized to reduce the radiation dose to as low as reasonably achievable. COMPARISON: 02/06/2021 and 09/13/2020 HISTORY: ORDERING SYSTEM PROVIDED HISTORY: chest pain/syncope/bradycarida TECHNOLOGIST PROVIDED HISTORY: Reason for exam:->chest pain/syncope/bradycarida Reason for Exam: chest pain/syncope/bradycardia Acuity: Acute Type of Exam: Initial FINDINGS: Pulmonary Arteries: Motion artifact degrades image quality. There is no acute pulmonary thromboembolus. Mediastinum: The heart is enlarged status post median sternotomy and CABG. A cardiac digital recorder device is within the right ventricle and left anterior chest wall. There are no enlarged thoracic lymph nodes. Lungs/pleura: The airway is patent. There is no pneumothorax or pleural effusion. There is biapical and bibasilar scarring with increased airspace disease in the bilateral bases favoring atelectasis.   Subtle ground-glass opacities within the bilateral upper lobes favor atelectasis rather than an infectious/inflammatory process. Upper Abdomen: Status post gastric bypass. A punctate nonobstructing nephrolithiasis present in the upper pole of the left kidney. A right upper pole hyperdense cyst favors a hemorrhagic renal cyst. Soft Tissues/Bones: Degenerative changes involve the thoracic spine. The bones are demineralized. 1. No acute abnormality.        EKG this visit:  Reviewed         Electronically signed by JESSE Reed CNP on 2/6/2021 at 8:37 PM

## 2021-02-07 NOTE — PROGRESS NOTES
Patient arrived to room 3107 via cart. BP 96/49(60) HR 57. Patient currently on a Dopamine GTT at 2 mcg/kg/min. Denies any chest pain, dizziness or being lightheaded at this time. Oriented to room and plan of care. Call light placed in reach.

## 2021-02-07 NOTE — PROGRESS NOTES
Consult received for Mediport placement. Patient has been followed by Dr. Lita Buckley for this prior to admission. Will let him know she is admitted.   NPO at midnight tonight in case it can be done tomorrow by Dr. Lita Buckley.    Electronically signed by Luis Vivas MD on 2/7/2021 at 11:34 AM

## 2021-02-08 LAB
ALBUMIN SERPL-MCNC: 3.6 GM/DL (ref 3.4–5)
ALP BLD-CCNC: 122 IU/L (ref 40–128)
ALT SERPL-CCNC: 9 U/L (ref 10–40)
ANION GAP SERPL CALCULATED.3IONS-SCNC: 9 MMOL/L (ref 4–16)
AST SERPL-CCNC: 15 IU/L (ref 15–37)
BILIRUB SERPL-MCNC: 0.2 MG/DL (ref 0–1)
BUN BLDV-MCNC: 10 MG/DL (ref 6–23)
CALCIUM SERPL-MCNC: 8.5 MG/DL (ref 8.3–10.6)
CHLORIDE BLD-SCNC: 105 MMOL/L (ref 99–110)
CO2: 25 MMOL/L (ref 21–32)
CREAT SERPL-MCNC: 0.7 MG/DL (ref 0.6–1.1)
CULTURE: ABNORMAL
CULTURE: ABNORMAL
GFR AFRICAN AMERICAN: >60 ML/MIN/1.73M2
GFR NON-AFRICAN AMERICAN: >60 ML/MIN/1.73M2
GLUCOSE BLD-MCNC: 88 MG/DL (ref 70–99)
HCT VFR BLD CALC: 36 % (ref 37–47)
HEMOGLOBIN: 10.9 GM/DL (ref 12.5–16)
Lab: ABNORMAL
MCH RBC QN AUTO: 35.2 PG (ref 27–31)
MCHC RBC AUTO-ENTMCNC: 30.3 % (ref 32–36)
MCV RBC AUTO: 116.1 FL (ref 78–100)
PDW BLD-RTO: 13 % (ref 11.7–14.9)
PLATELET # BLD: 236 K/CU MM (ref 140–440)
PMV BLD AUTO: 10.4 FL (ref 7.5–11.1)
POTASSIUM SERPL-SCNC: 4.8 MMOL/L (ref 3.5–5.1)
RBC # BLD: 3.1 M/CU MM (ref 4.2–5.4)
SODIUM BLD-SCNC: 139 MMOL/L (ref 135–145)
SPECIMEN: ABNORMAL
TOTAL PROTEIN: 5.8 GM/DL (ref 6.4–8.2)
WBC # BLD: 6 K/CU MM (ref 4–10.5)

## 2021-02-08 PROCEDURE — 6360000002 HC RX W HCPCS: Performed by: EMERGENCY MEDICINE

## 2021-02-08 PROCEDURE — 6370000000 HC RX 637 (ALT 250 FOR IP): Performed by: INTERNAL MEDICINE

## 2021-02-08 PROCEDURE — 99233 SBSQ HOSP IP/OBS HIGH 50: CPT | Performed by: INTERNAL MEDICINE

## 2021-02-08 PROCEDURE — 94150 VITAL CAPACITY TEST: CPT

## 2021-02-08 PROCEDURE — 85027 COMPLETE CBC AUTOMATED: CPT

## 2021-02-08 PROCEDURE — 36415 COLL VENOUS BLD VENIPUNCTURE: CPT

## 2021-02-08 PROCEDURE — 2500000003 HC RX 250 WO HCPCS: Performed by: NURSE PRACTITIONER

## 2021-02-08 PROCEDURE — 2580000003 HC RX 258: Performed by: INTERNAL MEDICINE

## 2021-02-08 PROCEDURE — 6370000000 HC RX 637 (ALT 250 FOR IP): Performed by: NURSE PRACTITIONER

## 2021-02-08 PROCEDURE — 80053 COMPREHEN METABOLIC PANEL: CPT

## 2021-02-08 PROCEDURE — 51702 INSERT TEMP BLADDER CATH: CPT

## 2021-02-08 PROCEDURE — 2580000003 HC RX 258: Performed by: NURSE PRACTITIONER

## 2021-02-08 PROCEDURE — 6370000000 HC RX 637 (ALT 250 FOR IP): Performed by: PHYSICIAN ASSISTANT

## 2021-02-08 PROCEDURE — 2700000000 HC OXYGEN THERAPY PER DAY

## 2021-02-08 PROCEDURE — 99222 1ST HOSP IP/OBS MODERATE 55: CPT | Performed by: INTERNAL MEDICINE

## 2021-02-08 PROCEDURE — 6360000002 HC RX W HCPCS: Performed by: INTERNAL MEDICINE

## 2021-02-08 PROCEDURE — 94640 AIRWAY INHALATION TREATMENT: CPT

## 2021-02-08 PROCEDURE — 2140000000 HC CCU INTERMEDIATE R&B

## 2021-02-08 PROCEDURE — APPNB45 APP NON BILLABLE 31-45 MINUTES: Performed by: NURSE PRACTITIONER

## 2021-02-08 PROCEDURE — 99211 OFF/OP EST MAY X REQ PHY/QHP: CPT

## 2021-02-08 PROCEDURE — 99221 1ST HOSP IP/OBS SF/LOW 40: CPT | Performed by: SURGERY

## 2021-02-08 RX ADMIN — OXYCODONE HYDROCHLORIDE 20 MG: 10 TABLET ORAL at 21:18

## 2021-02-08 RX ADMIN — PROMETHAZINE HYDROCHLORIDE 25 MG: 25 TABLET ORAL at 21:19

## 2021-02-08 RX ADMIN — OXYCODONE HYDROCHLORIDE 20 MG: 10 TABLET ORAL at 10:35

## 2021-02-08 RX ADMIN — PROMETHAZINE HYDROCHLORIDE 25 MG: 25 TABLET ORAL at 02:22

## 2021-02-08 RX ADMIN — GABAPENTIN 300 MG: 300 CAPSULE ORAL at 08:02

## 2021-02-08 RX ADMIN — RANOLAZINE 500 MG: 500 TABLET, FILM COATED, EXTENDED RELEASE ORAL at 10:35

## 2021-02-08 RX ADMIN — SODIUM CHLORIDE, PRESERVATIVE FREE 10 ML: 5 INJECTION INTRAVENOUS at 08:04

## 2021-02-08 RX ADMIN — FAMOTIDINE 20 MG: 10 INJECTION, SOLUTION INTRAVENOUS at 21:19

## 2021-02-08 RX ADMIN — LEVOTHYROXINE SODIUM 150 MCG: 150 TABLET ORAL at 07:09

## 2021-02-08 RX ADMIN — MIDODRINE HYDROCHLORIDE 10 MG: 5 TABLET ORAL at 08:03

## 2021-02-08 RX ADMIN — SODIUM CHLORIDE, PRESERVATIVE FREE 10 ML: 5 INJECTION INTRAVENOUS at 21:19

## 2021-02-08 RX ADMIN — MEROPENEM 1000 MG: 1 INJECTION, POWDER, FOR SOLUTION INTRAVENOUS at 16:48

## 2021-02-08 RX ADMIN — ALBUTEROL SULFATE 2 PUFF: 90 AEROSOL, METERED RESPIRATORY (INHALATION) at 21:31

## 2021-02-08 RX ADMIN — BUDESONIDE AND FORMOTEROL FUMARATE DIHYDRATE 2 PUFF: 160; 4.5 AEROSOL RESPIRATORY (INHALATION) at 07:48

## 2021-02-08 RX ADMIN — MEROPENEM 1000 MG: 1 INJECTION, POWDER, FOR SOLUTION INTRAVENOUS at 08:27

## 2021-02-08 RX ADMIN — MIRTAZAPINE 30 MG: 15 TABLET, FILM COATED ORAL at 21:17

## 2021-02-08 RX ADMIN — OXYCODONE HYDROCHLORIDE 20 MG: 10 TABLET ORAL at 17:59

## 2021-02-08 RX ADMIN — DOPAMINE HYDROCHLORIDE 8.5 MCG/KG/MIN: 160 INJECTION, SOLUTION INTRAVENOUS at 01:49

## 2021-02-08 RX ADMIN — DABIGATRAN ETEXILATE MESYLATE 150 MG: 75 CAPSULE ORAL at 08:03

## 2021-02-08 RX ADMIN — ACETAMINOPHEN 650 MG: 325 TABLET ORAL at 14:43

## 2021-02-08 RX ADMIN — OXYCODONE HYDROCHLORIDE 20 MG: 10 TABLET ORAL at 14:44

## 2021-02-08 RX ADMIN — MIDODRINE HYDROCHLORIDE 10 MG: 5 TABLET ORAL at 16:48

## 2021-02-08 RX ADMIN — ACETAMINOPHEN 650 MG: 325 TABLET ORAL at 07:08

## 2021-02-08 RX ADMIN — BUDESONIDE AND FORMOTEROL FUMARATE DIHYDRATE 2 PUFF: 160; 4.5 AEROSOL RESPIRATORY (INHALATION) at 21:32

## 2021-02-08 RX ADMIN — ACETAMINOPHEN 650 MG: 325 TABLET ORAL at 21:18

## 2021-02-08 RX ADMIN — GABAPENTIN 300 MG: 300 CAPSULE ORAL at 14:44

## 2021-02-08 RX ADMIN — OXYCODONE HYDROCHLORIDE 20 MG: 10 TABLET ORAL at 07:08

## 2021-02-08 RX ADMIN — GABAPENTIN 300 MG: 300 CAPSULE ORAL at 21:19

## 2021-02-08 RX ADMIN — ALBUTEROL SULFATE 2 PUFF: 90 AEROSOL, METERED RESPIRATORY (INHALATION) at 07:48

## 2021-02-08 RX ADMIN — LEVETIRACETAM 1000 MG: 500 TABLET, FILM COATED ORAL at 08:04

## 2021-02-08 RX ADMIN — DULOXETINE HYDROCHLORIDE 60 MG: 30 CAPSULE, DELAYED RELEASE ORAL at 08:03

## 2021-02-08 RX ADMIN — ATORVASTATIN CALCIUM 80 MG: 80 TABLET, FILM COATED ORAL at 21:19

## 2021-02-08 RX ADMIN — MEROPENEM 1000 MG: 1 INJECTION, POWDER, FOR SOLUTION INTRAVENOUS at 23:37

## 2021-02-08 RX ADMIN — MIDODRINE HYDROCHLORIDE 10 MG: 5 TABLET ORAL at 12:28

## 2021-02-08 RX ADMIN — OXYCODONE HYDROCHLORIDE 20 MG: 10 TABLET ORAL at 02:22

## 2021-02-08 RX ADMIN — FAMOTIDINE 20 MG: 10 INJECTION, SOLUTION INTRAVENOUS at 08:05

## 2021-02-08 RX ADMIN — RANOLAZINE 500 MG: 500 TABLET, FILM COATED, EXTENDED RELEASE ORAL at 21:18

## 2021-02-08 RX ADMIN — CALCIUM 500 MG: 500 TABLET ORAL at 12:28

## 2021-02-08 RX ADMIN — DABIGATRAN ETEXILATE MESYLATE 150 MG: 75 CAPSULE ORAL at 21:17

## 2021-02-08 ASSESSMENT — ENCOUNTER SYMPTOMS
EYE PAIN: 0
BLOOD IN STOOL: 0
CONSTIPATION: 0
COUGH: 0
VOMITING: 0
CHEST TIGHTNESS: 0
SHORTNESS OF BREATH: 0
PHOTOPHOBIA: 0
COLOR CHANGE: 0
WHEEZING: 0
BACK PAIN: 0
NAUSEA: 0
ABDOMINAL PAIN: 0
DIARRHEA: 0

## 2021-02-08 ASSESSMENT — PAIN SCALES - GENERAL
PAINLEVEL_OUTOF10: 9
PAINLEVEL_OUTOF10: 8
PAINLEVEL_OUTOF10: 9
PAINLEVEL_OUTOF10: 9
PAINLEVEL_OUTOF10: 4
PAINLEVEL_OUTOF10: 9

## 2021-02-08 ASSESSMENT — PAIN DESCRIPTION - ONSET
ONSET: ON-GOING
ONSET: ON-GOING

## 2021-02-08 ASSESSMENT — PAIN DESCRIPTION - FREQUENCY: FREQUENCY: CONTINUOUS

## 2021-02-08 NOTE — PROGRESS NOTES
Today's plan: EP to see her today, off dopamine drip      Admit Date:  2/6/2021    Subjective:ok      Chief complaints on admission  Chief Complaint   Patient presents with    Chest Pain     feels like \"really bad heart burn\". pt reports she took 3 NTG PTA without relief    Loss of Consciousness     c/o syncopal episodes since last night    Fatigue     C/O increased weakness since discharge from hospital for pneumonia         History of present illness:Demetria is a 62 y. o.year old who  presents with had concerns including Chest Pain (feels like \"really bad heart burn\". pt reports she took 3 NTG PTA without relief), Loss of Consciousness (c/o syncopal episodes since last night), and Fatigue (C/O increased weakness since discharge from hospital for pneumonia). Past medical history:    has a past medical history of Acute delirium, Arrhythmia, Arthritis, Asplenia, Asthma, Avascular necrosis ([address] CAD (coronary artery disease), Cardiomyopathy ([address] Cerebral artery occlusion with cerebral infarction ([address] CHF (congestive heart failure) ([address] Enlarged liver, GERD (gastroesophageal reflux disease), H/O echocardiogram, History of blood transfusion, Hx of cardiovascular stress test, Hyperlipidemia, Hypertension, Lymphoma ([address] MI, old, Movement disorder, MS (multiple sclerosis) ([address] OP (osteoporosis), PE (pulmonary embolism), Pneumonia, Pyelonephritis, Seizures ([address] Severe malnutrition ([address] Spleen enlarged, and Thyroid disease. Past surgical history:   has a past surgical history that includes Coronary angioplasty with stent; Hysterectomy; Appendectomy; Cholecystectomy; Tonsillectomy; Gastric bypass surgery; Cardiac defibrillator placement; hip surgery; Abdomen surgery; Colonoscopy; Endoscopy, colon, diagnostic; hernia repair; joint replacement; skin biopsy; vascular surgery; Coronary artery bypass graft; and Port Surgery (N/A, 6/1/2020).   Social History: reports that she has never smoked. She has never used smokeless tobacco. She reports that she does not drink alcohol or use drugs. Family history:  family history includes Cancer in her father; Diabetes in her mother; Heart Disease in her father, maternal grandmother, and mother; High Blood Pressure in her maternal grandmother and mother; High Cholesterol in her maternal grandmother and mother; Other in her sister. Allergies   Allergen Reactions    Fentanyl Swelling     Other reaction(s): Angioedema (swelling)    Moxifloxacin Hcl In Nacl Swelling and Rash    Povidone-Iodine Rash     Other reaction(s): AOF      Cyclobenzaprine      Other reaction(s): Other - comment required  \" it make my muscle very weak because of my MS\"  \" it make my muscle very weak because of my MS\"      Methocarbamol      Worsening edema  Other reaction(s): Other - comment required  Worsening edema  Worsening edema  Worsening edema      Tramadol Other (See Comments)     Doctor doesn't her to take due to heart problems  Seizures   Doctor doesn't her to take due to lowers seizure threshold.  Baclofen     Ibuprofen      \"Due to heart problems\"    Naproxen     Nsaids      \"Due to heart problems\"    Tizanidine     Tolmetin      \"Due to heart problems\"    Tramadol Hcl      Other reaction(s):  Other - comment required  Told not to take due to history of seizures    Betadine [Povidone Iodine] Rash    Moxifloxacin Rash and Swelling     Lips swell and tingling in face   Lips swell and tingling in face   Lips swell and tingling in face   Lips swell and tingling in face   Around mouth         Objective:   BP (!) 95/57   Pulse 50   Temp 98.1 °F (36.7 °C) (Oral)   Resp 18   Ht 5' 6\" (1.676 m)   Wt 124 lb 6.4 oz (56.4 kg)   SpO2 100%   BMI 20.08 kg/m²       Intake/Output Summary (Last 24 hours) at 2/8/2021 1427  Last data filed at 2/7/2021 1816  Gross per 24 hour   Intake    Output 600 ml   Net -600 ml       TELEMETRY: Sinus Physical Exam:  Constitutional:  Well developed, Well nourished, No acute distress, Non-toxic appearance. HENT:  Normocephalic, Atraumatic, Bilateral external ears normal, Oropharynx moist, No oral exudates, Nose normal. Neck- Normal range of motion, No tenderness, Supple, No stridor. Eyes:  PERRL, EOMI, Conjunctiva normal, No discharge. Respiratory:  Normal breath sounds, No respiratory distress, No wheezing, No chest tenderness. ,no use of accessory muscles, diaphragm movement is normal  Cardiovascular: (PMI) apex non displaced,no lifts no thrills, no s3,no s4, Normal heart rate, Normal rhythm, No murmurs, No rubs, No gallops. Carotid arteries pulse and amplitude are normal no bruit, no abdominal bruit noted ( normal abdominal aorta ausculation), femoral arteries pulse and amplitude are normal no bruit, pedal pulses are normal  GI:  Bowel sounds normal, Soft, No tenderness, No masses, No pulsatile masses. : External genitalia appear normal, No masses or lesions. No discharge. No CVA tenderness. Musculoskeletal:  Intact distal pulses, No edema, No tenderness, No cyanosis, No clubbing. Good range of motion in all major joints. No tenderness to palpation or major deformities noted. Back- No tenderness. Integument:  Warm, Dry, No erythema, No rash. Lymphatic:  No lymphadenopathy noted. Neurologic:  Alert & oriented x 3, Normal motor function, Normal sensory function, No focal deficits noted.    Psychiatric:  Affect normal, Judgment normal, Mood normal.     Medications:    meropenem  1,000 mg Intravenous Q8H    albuterol sulfate HFA  2 puff Inhalation BID    acetaminophen  1,000 mg Oral Once    sodium chloride flush  10 mL Intravenous 2 times per day    famotidine (PEPCID) injection  20 mg Intravenous BID    clopidogrel  75 mg Oral Daily    dabigatran  150 mg Oral BID    DULoxetine  60 mg Oral Daily    gabapentin  300 mg Oral TID    levothyroxine  150 mcg Oral QAM AC  levETIRAcetam  1,000 mg Oral Daily    midodrine  10 mg Oral TID WC    mirtazapine  30 mg Oral Nightly    ranolazine  500 mg Oral Q12H    amiodarone  400 mg Oral Daily    atorvastatin  80 mg Oral Nightly    budesonide-formoterol  2 puff Inhalation BID    calcium elemental  500 mg Oral Daily       ipratropium-albuterol, sodium chloride flush, acetaminophen **OR** acetaminophen, polyethylene glycol, nitroGLYCERIN, potassium chloride **OR** potassium alternative oral replacement **OR** potassium chloride, magnesium sulfate, oxyCODONE, promethazine    Lab Data:  CBC:   Recent Labs     02/06/21 1755 02/07/21 0429 02/08/21  1144   WBC 5.0 5.7 6.0   HGB 12.5 12.2* 10.9*   HCT 40.2 42.2 36.0*   .5* 125.6* 116.1*    202 236     BMP:   Recent Labs     02/06/21 1755 02/07/21 0429   * 138   K 3.1* 4.1   CL 96* 105   CO2 22 19*   BUN 23 17   CREATININE 1.5* 1.1     LIVER PROFILE:   Recent Labs     02/06/21  1755 02/07/21 0429   AST 15 18   ALT 10 10   BILITOT 0.2 0.2   ALKPHOS 121 125     PT/INR: No results for input(s): PROTIME, INR in the last 72 hours. APTT: No results for input(s): APTT in the last 72 hours. BNP:  No results for input(s): BNP in the last 72 hours. TROPONIN: @TROPONINI:3@      Assessment:  62 y. o.year old who is admitted for          Plan:  1. Syncope and bradycardia: patient had new micropacer placed at [facility], pacer interrogated in ED, back up heart rate was 35 bpm, started on dopamine in ED and her pacer rate was adjusted to 50 bpm.will get EP tomorrow  2. CAD h/o LAD PCI THRU LIMA: Continue aspirin and plavix continue statins, could not tolerate BB.  Continue Midodrine  3. h/o PE: on pradaxa  4. ICM: had ICD which is extracted at [facility] due to bacteremia, h/o vtach and vfib, will , ? afib also, continue paradaxa plavix, and amiodarone.   5. H/o cva: stable  6. HTN: controlled, no current medication  7. MS: stable  8.  Seizure: stable All labs, medications and tests reviewed, continue all other medications of all above medical condition listed as is.       Ben Palacios MD 2/8/2021 2:27 PM

## 2021-02-08 NOTE — CONSULTS
Electrophysiology Consult Note      Reason for consultation:  Bradycardia    Chief complaint :  Syncope    Referring physician:      Primary care physician: Zion Hylton MD      History of Present Illness:     Patient is a 60-year-old  female with history of ischemic cardiomyopathy, s/p multiple device infections and extractions, history of PE and IVC filter, hypertension, history of VT, recurrent septicemia, hyperlipidemia, multiple sclerosis, syncope secondary to bradycardia and had leadless pacemaker at [address] [place] [address] now presents with complaints of fatigue and syncope    Patient reports she was recently hospitalized with pneumonia. She tells me that she was discharged too soon. Patient reports that last week she spent the entire week in bed. She states she did not eat or drink much. She states that on Saturday she had 4 episodes of syncope. She reports that she was feeling dizzy and lightheaded for to all 4 episodes. She reports that 2 of the syncopal episodes occurred while standing. She states that the other 2 syncopal episodes occurred after she sat up in the bed and then she fell backwards in the bed. She states that she regained consciousness after quickly laying flat. She tells me that she feels her syncopal episode was due to dehydration and malnutrition and also low heart rates    Patient does take midodrine at home and patient reports that when she was feeling so bad for the past 5 days she did not take her medications as prescribed. She tells me she feels she is failure to thrive.   She denies chest pain, palpitations, shortness of breath or edema    Pastmedical history:   Past Medical History:   Diagnosis Date    Acute delirium     Arrhythmia     Arthritis     Asplenia     Asthma     [facility]     CAD (coronary artery disease)     1st stent at age 46    Cardiomy[facility]  Cerebral artery occlusion with cerebral infarction ([address]     CHF (congestive heart failure) (HCC)     Enlarged liver     GERD (gastroesophageal reflux disease)     H/O echocardiogram 4/6/16    EF 55%, trivial TR & MR, stage 1 diastolic dysfunction    History of blood transfusion     Hx of cardiovascular stress test 12/29/2015    Alessia: EF 45%. Pharmacologic stress myocardial perfusion scan shows a fixed inferior-lateral defect w/ no inducible ischemia. No evidence of ischemia. Inferior-lateral infarction.  Normal LVSF    Hyperlipidemia     Hypertension     Lymphoma ([address]     Denies    MI, old     Movement disorder     MS (multiple sclerosis) ([address]     OP (osteoporosis)     PE (pulmonary embolism)     trapese filter    Pneumonia     Pyelonephritis     Seizures ([address]     Severe malnutrition ([address]     Spleen enlarged     Thyroid disease        Surgical history :   Past Surgical History:   Procedure Laterality Date    ABDOMEN SURGERY      APPENDECTOMY      CARDIAC DEFIBRILLATOR PLACEMENT      evera [id number] CONDITIONAL    CHOLECYSTECTOMY      COLONOSCOPY      CORONARY ANGIOPLASTY WITH STENT PLACEMENT      3 stents    CORONARY ARTERY BYPASS GRAFT      Age 49    ENDOSCOPY, COLON, DIAGNOSTIC      GASTRIC BYPASS SURGERY      HERNIA REPAIR      HIP SURGERY      HYSTERECTOMY      JOINT REPLACEMENT      PORT SURGERY N/A 6/1/2020    PORT INSERTION performed by Brendan Jorgensen MD at [facility]      TONSILLECTOMY      VASCULAR SURGERY         Family history:   Family History   Problem Relation Age of Onset    High Blood Pressure Mother     High Cholesterol Mother     Heart Disease Mother     Diabetes Mother     Heart Disease Father     Cancer Father     Other Sister         Multiple Sclerosis    Heart Disease Maternal Grandmother     High Blood Pressure Maternal Grandmother     High Cholesterol Maternal Grandmother Social history :  reports that she has never smoked. She has never used smokeless tobacco. She reports that she does not drink alcohol or use drugs. Allergies   Allergen Reactions    Fentanyl Swelling     Other reaction(s): Angioedema (swelling)    Moxifloxacin Hcl In Nacl Swelling and Rash    Povidone-Iodine Rash     Other reaction(s): AOF      Cyclobenzaprine      Other reaction(s): Other - comment required  \" it make my muscle very weak because of my MS\"  \" it make my muscle very weak because of my MS\"      Methocarbamol      Worsening edema  Other reaction(s): Other - comment required  Worsening edema  Worsening edema  Worsening edema      Tramadol Other (See Comments)     Doctor doesn't her to take due to heart problems  Seizures   Doctor doesn't her to take due to lowers seizure threshold.  Baclofen     Ibuprofen      \"Due to heart problems\"    Naproxen     Nsaids      \"Due to heart problems\"    Tizanidine     Tolmetin      \"Due to heart problems\"    Tramadol Hcl      Other reaction(s): Other - comment required  Told not to take due to history of seizures    Betadine [Povidone Iodine] Rash    Moxifloxacin Rash and Swelling     Lips swell and tingling in face   Lips swell and tingling in face   Lips swell and tingling in face   Lips swell and tingling in face   Around mouth       No current facility-administered medications on file prior to encounter.       Current Outpatient Medications on File Prior to Encounter   Medication Sig Dispense Refill    aspirin 81 MG tablet Take 1 tablet by mouth daily 30 tablet 1    beclomethasone (QVAR) 40 MCG/ACT inhaler Inhale 2 puffs into the lungs 2 times daily      acarbose (PRECOSE) 25 MG tablet Take 25 mg by mouth 3 times daily (with meals)      budesonide-formoterol (SYMBICORT) 160-4.5 MCG/ACT AERO Inhale 2 puffs into the lungs 2 times daily      bumetanide (BUMEX) 1 MG tablet Take 1 mg by mouth daily  calcium citrate (CALCITRATE) 950 (200 Ca) MG tablet Take 1,425 mg by mouth daily      dabigatran (PRADAXA) 75 MG capsule Take 150 mg by mouth      DULoxetine (CYMBALTA) 60 MG extended release capsule Take 60 mg by mouth daily      ergocalciferol (ERGOCALCIFEROL) 1.25 MG (93461 UT) capsule Take 50,000 Units by mouth Twice a Week      gabapentin (NEURONTIN) 300 MG capsule Take 300 mg by mouth 3 times daily.  metoprolol succinate (TOPROL XL) 25 MG extended release tablet Take 12.5 mg by mouth daily Half a tablet (12.5 total)      mirtazapine (REMERON) 30 MG tablet Take 30 mg by mouth nightly      omeprazole (PRILOSEC) 20 MG delayed release capsule omeprazole 20 mg capsule,delayed release      promethazine (PHENERGAN) 25 MG tablet Take 25 mg by mouth 4 times daily as needed      ranolazine (RANEXA) 500 MG extended release tablet Take 500 mg by mouth every 12 hours      senna (SENOKOT) 8.6 MG tablet Take 1 tablet by mouth daily      spironolactone (ALDACTONE) 25 MG tablet Take 12.5 mg by mouth daily      cyanocobalamin 1000 MCG tablet Take 1 tablet by mouth daily      famotidine (PEPCID) 20 MG tablet Take 20 mg by mouth 2 times daily      ferrous gluconate (FERGON) 324 (38 Fe) MG tablet Take 1 tablet by mouth 2 times daily      levETIRAcetam (KEPPRA) 1000 MG tablet Take 1,000 mg by mouth daily      levothyroxine (SYNTHROID) 150 MCG tablet Take 150 mcg by mouth every morning (before breakfast)      magnesium oxide (MAG-OX) 400 MG tablet Take 400 mg by mouth daily      nitroGLYCERIN (NITROSTAT) 0.4 MG SL tablet Place 0.4 mg under the tongue as needed      potassium chloride (KLOR-CON M) 10 MEQ extended release tablet Take 10 mEq by mouth 2 times daily 2 (10mEq)      amiodarone (CORDARONE) 200 MG tablet Take 400 mg by mouth daily      oxyCODONE (ROXICODONE) 20 MG immediate release tablet Take 20 mg by mouth every 3 hours as needed.  chlorhexidine gluconate (ANTISEPTIC SKIN CLEANSER) 4 % SOLN external solution Apply topically daily 1 Bottle 1    midodrine (PROAMATINE) 10 MG tablet Take 1 tablet by mouth 3 times daily (with meals) 90 tablet 0    ipratropium-albuterol (DUONEB) 0.5-2.5 (3) MG/3ML SOLN nebulizer solution Inhale 3 mLs into the lungs every 4 hours (while awake) 360 mL 0    benzocaine-menthol (CEPACOL SORE THROAT) 15-3.6 MG lozenge Take 1 lozenge by mouth every 2 hours as needed for Sore Throat 18 lozenge 1    docusate sodium (COLACE) 100 MG capsule Take 100 mg by mouth 2 times daily as needed for Constipation      atorvastatin (LIPITOR) 80 MG tablet Take 80 mg by mouth nightly      clopidogrel (PLAVIX) 75 MG tablet Take 75 mg by mouth daily      acetaminophen 650 MG TABS Take 650 mg by mouth every 4 hours as needed 120 tablet 3    albuterol (PROVENTIL HFA;VENTOLIN HFA) 108 (90 BASE) MCG/ACT inhaler Inhale 2 puffs into the lungs every 6 hours as needed. Review of Systems:   Review of Systems   Constitutional: Positive for activity change, appetite change and fatigue. Negative for chills and fever. HENT: Negative for congestion, ear pain and tinnitus. Eyes: Negative for photophobia, pain and visual disturbance. Respiratory: Negative for cough, chest tightness, shortness of breath and wheezing. Cardiovascular: Negative for chest pain, palpitations and leg swelling. Gastrointestinal: Negative for abdominal pain, blood in stool, constipation, diarrhea, nausea and vomiting. Endocrine: Negative for cold intolerance and heat intolerance. Genitourinary: Negative for dysuria, flank pain and hematuria. Musculoskeletal: Positive for arthralgias and gait problem. Negative for back pain, myalgias and neck stiffness. Skin: Negative for color change and rash. Allergic/Immunologic: Negative for food allergies. Neurological: Positive for dizziness, syncope and light-headedness. Negative for numbness and headaches. Hematological: Does not bruise/bleed easily. Psychiatric/Behavioral: Negative for agitation, behavioral problems and confusion. Examination:      Vitals:    02/08/21 0832 02/08/21 1037 02/08/21 1214 02/08/21 1215   BP: (!) 117/55 (!) 122/58  (!) 95/57   Pulse:  52  50   Resp:  16  18   Temp:    98.1 °F (36.7 °C)   TempSrc:    Oral   SpO2:  100%  100%   Weight:       Height:   5' 6\" (1.676 m)         Body mass index is 20.08 kg/m². Physical Exam  Constitutional:       Appearance: She is well-developed. She is ill-appearing. HENT:      Head: Normocephalic and atraumatic. Nose: No congestion. Mouth/Throat:      Mouth: Mucous membranes are moist.   Eyes:      Conjunctiva/sclera: Conjunctivae normal.      Pupils: Pupils are equal, round, and reactive to light. Neck:      Musculoskeletal: Normal range of motion. Thyroid: No thyromegaly. Vascular: No JVD. Cardiovascular:      Rate and Rhythm: Regular rhythm. Bradycardia present. Heart sounds: Murmur (grade 2/6 systolic murmur) present. No friction rub. Pulmonary:      Effort: Pulmonary effort is normal. No respiratory distress. Breath sounds: Normal breath sounds. No stridor. No wheezing. Abdominal:      General: Bowel sounds are normal. There is no distension. Palpations: Abdomen is soft. Tenderness: There is no abdominal tenderness. Musculoskeletal: Normal range of motion. General: No tenderness. Skin:     General: Skin is warm and dry. Findings: No erythema. Neurological:      General: No focal deficit present. Mental Status: She is alert and oriented to person, place, and time.    Psychiatric:         Behavior: Behavior normal.               CBC:   Lab Results   Component Value Date    WBC 6.0 02/08/2021    HGB 10.9 02/08/2021    HCT 36.0 02/08/2021     02/08/2021 But patient is convinced that she passed out from low heart rate and she wants her heart rate elevated and at this time patient is not understanding the importance of not RV pacing. Will try to discuss with again tomorrow and if she gets convinced I can decrease the rate to VVI 40 or VVIR 40      Thanks again for allowing me to participate in care of this patient. Please call me if you have any questions. With best regards. Ruma Hernandez MD, 2/8/2021 2:41 PM     Please note this report has been partially produced using speech recognition software and may contain errors related to that system including errors in grammar, punctuation, and spelling, as well as words and phrases that may be inappropriate. If there are any questions or concerns please feel free to contact the dictating provider for clarification.

## 2021-02-08 NOTE — CARE COORDINATION
Chart reviewed/Pt is a readmit 1/22-1/27. Pt was also in the ED 1/15 for 8 hours. LSW into the room to initiate discharge planning. Pt stated that she just got her lunch and requested that the LSW return.

## 2021-02-08 NOTE — CONSULTS
[facility] Continence Nurse  Consult Note       Edd Irene  AGE: 62 y.o. GENDER: female  : 1960  TODAY'S DATE:  2021    Subjective:     Reason for  Evaluation and Assessment: wound assessment      Edd Irene is a 62 y.o. female referred by:   [x] Physician  [] Nursing  [] Other:     Wound Identification:  Wound Type: non-healing surgical  Contributing Factors: chronic pressure, decreased mobility and malnutrition        PAST MEDICAL HISTORY        Diagnosis Date    Acute delirium     Arrhythmia     Arthritis     Asplenia     Asthma     Avascular necrosis ([address]     CAD (coronary artery disease)     1st stent at age 46    Cardiomyopathy [facility])     Cerebral artery occlusion with cerebral infarction ([address]     CHF (congestive heart failure) (HCC)     Enlarged liver     GERD (gastroesophageal reflux disease)     H/O echocardiogram 16    EF 55%, trivial TR & MR, stage 1 diastolic dysfunction    History of blood transfusion     Hx of cardiovascular stress test 2015    Alessia: EF 45%. Pharmacologic stress myocardial perfusion scan shows a fixed inferior-lateral defect w/ no inducible ischemia. No evidence of ischemia. Inferior-lateral infarction.  Normal LVSF    Hyperlipidemia     Hypertension     Lymphoma ([address]     Denies    MI, old     Movement disorder     MS (multiple sclerosis) ([address]     OP (osteoporosis)     PE (pulmonary embolism)     trapese filter    Pneumonia     Pyelonephritis     Seizures ([address]     Severe malnutrition ([address]     Spleen enlarged     Thyroid disease        PAST SURGICAL HISTORY    Past Surgical History:   Procedure Laterality Date    ABDOMEN SURGERY      APPENDECTOMY      CARDIAC DEFIBRILLATOR PLACEMENT      evera [id number] CONDITIONAL    CHOLECYSTECTOMY      COLONOSCOPY      CORONARY ANGIOPLASTY WITH STENT PLACEMENT      3 stents    CORONARY ARTERY BYPASS GRAFT      Age 49  ENDOSCOPY, COLON, DIAGNOSTIC      GASTRIC BYPASS SURGERY      HERNIA REPAIR      HIP SURGERY      HYSTERECTOMY      JOINT REPLACEMENT      PORT SURGERY N/A 6/1/2020    PORT INSERTION performed by Nain Villanueva MD at [address] [place] [address]      VASCULAR SURGERY         FAMILY HISTORY    Family History   Problem Relation Age of Onset    High Blood Pressure Mother     High Cholesterol Mother     Heart Disease Mother     Diabetes Mother     Heart Disease Father     Cancer Father     Other Sister         Multiple Sclerosis    Heart Disease Maternal Grandmother     High Blood Pressure Maternal Grandmother     High Cholesterol Maternal Grandmother        SOCIAL HISTORY    Social History     Tobacco Use    Smoking status: Never Smoker    Smokeless tobacco: Never Used   Substance Use Topics    Alcohol use: No    Drug use: No       ALLERGIES    Allergies   Allergen Reactions    Fentanyl Swelling     Other reaction(s): Angioedema (swelling)    Moxifloxacin Hcl In Nacl Swelling and Rash    Povidone-Iodine Rash     Other reaction(s): AOF      Cyclobenzaprine      Other reaction(s): Other - comment required  \" it make my muscle very weak because of my MS\"  \" it make my muscle very weak because of my MS\"      Methocarbamol      Worsening edema  Other reaction(s): Other - comment required  Worsening edema  Worsening edema  Worsening edema      Tramadol Other (See Comments)     Doctor doesn't her to take due to heart problems  Seizures   Doctor doesn't her to take due to lowers seizure threshold.  Baclofen     Ibuprofen      \"Due to heart problems\"    Naproxen     Nsaids      \"Due to heart problems\"    Tizanidine     Tolmetin      \"Due to heart problems\"    Tramadol Hcl      Other reaction(s):  Other - comment required  Told not to take due to history of seizures    Betadine [Povidone Iodine] Rash    Moxifloxacin Rash and Swelling     Lips swell and tingling in face Lips swell and tingling in face   Lips swell and tingling in face   Lips swell and tingling in face   Around mouth       MEDICATIONS    No current facility-administered medications on file prior to encounter. Current Outpatient Medications on File Prior to Encounter   Medication Sig Dispense Refill    aspirin 81 MG tablet Take 1 tablet by mouth daily 30 tablet 1    beclomethasone (QVAR) 40 MCG/ACT inhaler Inhale 2 puffs into the lungs 2 times daily      acarbose (PRECOSE) 25 MG tablet Take 25 mg by mouth 3 times daily (with meals)      budesonide-formoterol (SYMBICORT) 160-4.5 MCG/ACT AERO Inhale 2 puffs into the lungs 2 times daily      bumetanide (BUMEX) 1 MG tablet Take 1 mg by mouth daily      calcium citrate (CALCITRATE) 950 (200 Ca) MG tablet Take 1,425 mg by mouth daily      dabigatran (PRADAXA) 75 MG capsule Take 150 mg by mouth      DULoxetine (CYMBALTA) 60 MG extended release capsule Take 60 mg by mouth daily      ergocalciferol (ERGOCALCIFEROL) 1.25 MG (26445 UT) capsule Take 50,000 Units by mouth Twice a Week      gabapentin (NEURONTIN) 300 MG capsule Take 300 mg by mouth 3 times daily.       metoprolol succinate (TOPROL XL) 25 MG extended release tablet Take 12.5 mg by mouth daily Half a tablet (12.5 total)      mirtazapine (REMERON) 30 MG tablet Take 30 mg by mouth nightly      omeprazole (PRILOSEC) 20 MG delayed release capsule omeprazole 20 mg capsule,delayed release      promethazine (PHENERGAN) 25 MG tablet Take 25 mg by mouth 4 times daily as needed      ranolazine (RANEXA) 500 MG extended release tablet Take 500 mg by mouth every 12 hours      senna (SENOKOT) 8.6 MG tablet Take 1 tablet by mouth daily      spironolactone (ALDACTONE) 25 MG tablet Take 12.5 mg by mouth daily      cyanocobalamin 1000 MCG tablet Take 1 tablet by mouth daily      famotidine (PEPCID) 20 MG tablet Take 20 mg by mouth 2 times daily Component Value Date    WBC 5.7 02/07/2021    RBC 3.36 02/07/2021    HGB 12.2 02/07/2021    HCT 42.2 02/07/2021    .6 02/07/2021    MCH 36.3 02/07/2021    MCHC 28.9 02/07/2021    RDW 13.3 02/07/2021     02/07/2021    MPV 10.4 02/07/2021     CMP:    Lab Results   Component Value Date     02/07/2021    K 4.1 02/07/2021     02/07/2021    CO2 19 02/07/2021    BUN 17 02/07/2021    CREATININE 1.1 02/07/2021    GFRAA >60 02/07/2021    LABGLOM 51 02/07/2021    GLUCOSE 84 02/07/2021    PROT 6.0 02/07/2021    PROT 6.3 02/21/2013    LABALBU 3.8 02/07/2021    CALCIUM 8.3 02/07/2021    BILITOT 0.2 02/07/2021    ALKPHOS 125 02/07/2021    AST 18 02/07/2021    ALT 10 02/07/2021     Albumin:    Lab Results   Component Value Date    LABALBU 3.8 02/07/2021     PT/INR:    Lab Results   Component Value Date    PROTIME 11.1 01/15/2021    PROTIME 10.2 01/28/2012    INR 0.92 01/15/2021     HgBA1c:  No results found for: LABA1C      Assessment:     Patient Active Problem List   Diagnosis    Chest pain    CAD (coronary artery disease)    Hyperlipidemia    Thyroid disease    [facility]    CHF exacerbation (HCC)    Congestive heart failure (HCC)    Left upper quadrant pain    Angina at rest [facility])    Coronary artery disease involving coronary bypass graft of native heart with angina pectoris (HCC)    Shortness of breath    Ischemic cardiomyopathy    Acute metabolic encephalopathy    Acute delirium    Sacral pain    Acute on chronic systolic CHF (congestive heart failure), NYHA class 3 (HCC)    Severe malnutrition (HCC)    Cellulitis    Cellulitis at gastrostomy tube site ([facility]    History of Maru-en-Y gastric bypass    Gastroparesis    Abdominal pain    Feeding tube dysfunction    Abdominal pain, epigastric    Gastrojejunostomy tube status (HCC)    Chronic malnutrition (HCC)    Asplenia  Shockable cardiac rhythm detected by automated external defibrillator    Financial difficulties    Chest pain at rest    Closed nondisplaced transverse fracture of left patella    Contusion of right knee    Multifocal pneumonia    Abnormal cardiac function test    Chronic systolic heart failure (HCC)    Seizure disorder ([facility])    Hypothyroidism    Cardiomyopathy [facility]    Acute chest pain    Fever in adult    Pyelonephritis    MRSA bacteremia    HFrEF (heart failure with reduced ejection fraction) ([facility])    Infected chest wall abrasion, right, subsequent encounter    Granuloma of skin    Open wound of right chest wall    MRSA infection    Klebsiella pneumoniae infection    Symptomatic bradycardia    History of pacemaker with removal secondary to infected leads    Avascular necrosis ([facility])    Multiple sclerosis ([facility])    Syncope    Bradycardia    Ulcer of chest wall, limited to breakdown of skin ([facility])    Pneumonia    Poor intravenous access    Syncope and collapse       Measurements:  Wound 08/24/20 Chest Right;Upper (Active)   Wound Etiology Non-Healing Surgical 02/08/21 1015   Dressing Status New dressing applied 02/08/21 1015   Wound Cleansed Cleansed with saline 02/08/21 1015   Dressing/Treatment Collagen;Silicone border 22/88/81 1015   Wound Length (cm) 3 cm 02/08/21 1015   Wound Width (cm) 5 cm 02/08/21 1015   Wound Depth (cm) 0.1 cm 02/08/21 1015   Wound Surface Area (cm^2) 15 cm^2 02/08/21 1015   Change in Wound Size % (l*w) -61.64 02/08/21 1015   Wound Volume (cm^3) 1.5 cm^3 02/08/21 1015   Wound Healing % 46 02/08/21 1015   Distance Tunneling (cm) 0 cm 02/08/21 1015   Tunneling Position ___ O'Clock 0 02/08/21 1015   Undermining Starts ___ O'Clock 0 02/08/21 1015   Undermining Ends___ O'Clock 0 02/08/21 1015   Undermining Maxium Distance (cm) 0 02/08/21 1015   Wound Assessment Pink/red; Hyper granulation tissue 02/08/21 1015   Drainage Amount Small 02/08/21 1015 Drainage Description Serosanguinous 02/08/21 1015   Odor None 02/08/21 1015   Luz Elena-wound Assessment Intact 02/08/21 1015   Margins Defined edges 02/08/21 1015   Wound Thickness Description not for Pressure Injury Full thickness 02/08/21 1015   Number of days: 167       Response to treatment:  Well tolerated by patient. Pain Assessment:  Severity:  none  Quality of pain: na  Wound Pain Timing/Severity: na  Premedicated: no    Plan:     Plan of Care: Wound 08/24/20 Chest Right;Upper-Dressing/Treatment: Collagen, Silicone border     Pt in bed. Last seen on 9/28/20 for right chest wound. Non healing surgical. Picture and measurements taken. Some hypergranulation tissue noted. Treatment applied as above. Recommend follow up at outpatient wound clinic. Pt agreeable. Referral sent. Heels intact. Sacrum with blanchable erythema possible scar tissue. Pt declined Mepilex for prevention. Turns independently in bed. Atmos air pump applied. Pt is at mild risk for skin breakdown AEB Donal. Follow Donal orders. Specialty Bed Required : yes  [] Low Air Loss   [x] Pressure Redistribution  [] Fluid Immersion  [] Bariatric  [] Total Pressure Relief  [] Other:     Discharge Plan:  Placement for patient upon discharge: tbd  Hospice Care: no  Patient appropriate for Outpatient [address]: yes-referral sent    Patient/Caregiver Teaching:  Level of patient/caregiver understanding able to:   Voiced understanding.         Electronically signed by Georgette Stephens RN,  on 2/8/2021 at 12:48 PM

## 2021-02-08 NOTE — PROGRESS NOTES
Hospitalist Progress Note      Name:  Gloria Gayle /Age/Sex: 1960  (62 y.o. female)   MRN & CSN:  [id number] & [id number] Admission Date/Time: 2021  4:36 PM   Location:  [address] [place] [address] PCP: Janak Rivas MD         Hospital Day: 3    Assessment and Plan:   Gloria Gayle is a 62 y.o.  female  who presents with Syncope and collapse    Syncope: Likely from bradycardia  Has a pacemaker placed at [facility]  Pacemaker interrogated and rate increased to 50 bpm  Started on dopamine infusion  Avoid rate lowering medication  Cardiology following  Electrophysiology on consult - pending    Hyponatremia: We will monitor  Hypokalemia: Replace accordingly    Acute kidney injury: Resolved  Creatinine 1.5, with associated hyponatremia and hypokalemia  Avoid nephrotoxic agents. UTI: Rocephin. Urine culture with ESBL  Antibiotic switched to Meropenem  Needs IV ATB on discharge    Chronic systolic and diastolic heart failure, not in exacerbation  Ejection fraction 30 to 35% with mild mitral regurgitation  Diuretics held because of acute kidney injury    Coronary artery disease: Status post coronary artery bypass graft, percutaneous coronary intervention. Continue high intensity statin, Plavix, ranolazine    Paroxysmal atrial fibrillation/atrial flutter: Status post ablation on amiodarone and Pradaxa    Ventricular tachycardia: Status post dual AICD status post ablation  ICD removed in 2020 due to ICD pocket infection with MRSA Klebsiella  Currently on amiodarone. Life vest?    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: On oxycodone chronically    Multiple sclerosis: On Cymbalta and gabapentin    Protein calorie malnutrition    Chronic respiratory failure, hypoxic:     Severe Knee Pain, Right:   Started after fall  Xray with no acute process  Orthopedic Surgery on consult For Mediport placement today. Diet Diet NPO, After Midnight Exceptions are: Sips of Water with Meds   DVT Prophylaxis [] Lovenox, []  Heparin, [] SCDs, [] Warfarin  [x] NOAC     GI Prophylaxis [] PPI,  [x] H2 Blocker,  [] Carafate,  [] Diet/Tube Feeds   Code Status Full Code   MDM [] Low, [] Moderate,[x]  High     History of Present Illness:     Chief Complaint: Syncope and collapse    Seen and examined today. Still with dysuria. No chest pain. No SOB. No cough. Ten point ROS reviewed negative, unless as noted above    Objective: Intake/Output Summary (Last 24 hours) at 2/8/2021 1142  Last data filed at 2/7/2021 1816  Gross per 24 hour   Intake    Output 600 ml   Net -600 ml      Vitals:   Vitals:    02/08/21 1037   BP: (!) 122/58   Pulse: 52   Resp: 16   Temp:    SpO2: 100%     Physical Exam:   GEN Awake female, sitting upright in bed in no apparent distress. Appears given age. Cachectic  EYES Pupils are equally round. No scleral erythema, discharge, or conjunctivitis. HENT Mucous membranes are moist. Oral pharynx without exudates, no evidence of thrush. NECK Supple, no apparent thyromegaly or masses. RESP Clear to auscultation, no wheezes, rales or rhonchi. Symmetric chest movement while on room air. CARDIO/VASC bradycardic  GI Abdomen is soft without significant tenderness, masses, or guarding. Bowel sounds are normoactive. Rectal exam deferred. MSK No gross joint deformities. SKIN Normal coloration, warm, dry. NEURO Cranial nerves appear grossly intact, normal speech, no lateralizing weakness. PSYCH Awake, alert, oriented x 4. Affect appropriate.     Medications:   Medications:    meropenem  1,000 mg Intravenous Q8H    albuterol sulfate HFA  2 puff Inhalation BID    acetaminophen  1,000 mg Oral Once    sodium chloride flush  10 mL Intravenous 2 times per day    famotidine (PEPCID) injection  20 mg Intravenous BID    clopidogrel  75 mg Oral Daily  dabigatran  150 mg Oral BID    DULoxetine  60 mg Oral Daily    gabapentin  300 mg Oral TID    levothyroxine  150 mcg Oral QAM AC    levETIRAcetam  1,000 mg Oral Daily    midodrine  10 mg Oral TID WC    mirtazapine  30 mg Oral Nightly    ranolazine  500 mg Oral Q12H    amiodarone  400 mg Oral Daily    atorvastatin  80 mg Oral Nightly    budesonide-formoterol  2 puff Inhalation BID    calcium elemental  500 mg Oral Daily      Infusions:     PRN Meds:     ipratropium-albuterol, 3 mL, Q4H PRN      sodium chloride flush, 10 mL, PRN      acetaminophen, 650 mg, Q6H PRN    Or      acetaminophen, 650 mg, Q6H PRN      polyethylene glycol, 17 g, Daily PRN      nitroGLYCERIN, 0.4 mg, Q5 Min PRN      potassium chloride, 40 mEq, PRN    Or      potassium alternative oral replacement, 40 mEq, PRN    Or      potassium chloride, 10 mEq, PRN      magnesium sulfate, 2,000 mg, PRN      oxyCODONE, 20 mg, Q3H PRN      promethazine, 25 mg, Q6H PRN      Recent Labs     02/06/21 1755 02/07/21 0429   WBC 5.0 5.7   HGB 12.5 12.2*   HCT 40.2 42.2    202      Recent Labs     02/06/21 1755 02/07/21 0429   * 138   K 3.1* 4.1   CL 96* 105   CO2 22 19*   BUN 23 17   CREATININE 1.5* 1.1     Recent Labs     02/06/21 1755 02/07/21 0429   AST 15 18   ALT 10 10   BILITOT 0.2 0.2   ALKPHOS 121 125     No results for input(s): INR in the last 72 hours.   Recent Labs     02/06/21 1755 02/06/21 2249 02/07/21 0429   CKTOTAL 34  --   --    TROPONINT <0.010 <0.010 <0.010      Imaging reviewed    Electronically signed by Kia Sanchez MD on 2/8/2021 at 11:42 AM

## 2021-02-08 NOTE — PROGRESS NOTES
Comprehensive Nutrition Assessment    Type and Reason for Visit:  Initial, Consult(general nutrition management)    Nutrition Recommendations/Plan:   Resume diet when appropriate, no added salt   Offer snacks as needed   Encourage consistent intake   Will continue to follow up during stay     Nutrition Assessment:  Admit with chest pain, syncope and collapse. NPO at this time. Noted on past admits dislikes low sodium diet and oral nutrition supplements, usually on no added salt diet. Hx pacemaker, MS, gastric bypass. Limited po data at this time. Weight hx mostly stated versus actual weights. Hx of chronic malnutrition noted in problem list. High nutrition risk at this time with predicted suboptimal intake. Malnutrition Assessment:  Malnutrition Status:  Insufficient data(not available on visit, currently in isolation, limited po data and accurate weight hx)    Context:  Chronic Illness       Estimated Daily Nutrient Needs:  Energy (kcal):  4797-9531 (25-30 juancho/kg); Weight Used for Energy Requirements:  Ideal     Protein (g):  59-71 (1-1.2 g/kg); Weight Used for Protein Requirements:  Ideal        Fluid (ml/day):  1700; Method Used for Fluid Requirements:  1 ml/kcal      Nutrition Related Findings:  not available on visit, resting in bed also in contact isolation at this time. Wounds:  (recent infection pacemaker)       Current Nutrition Therapies:    Diet NPO, After Midnight Exceptions are: Sips of Water with Meds    Anthropometric Measures:  · Height: 5' 6\" (167.6 cm)  · Current Body Weight: 124 lb 5.4 oz (56.4 kg)   · Admission Body Weight:      · Usual Body Weight: 120 lb 13 oz (54.8 kg)(last actual weight known, most in hx stated wts)     · Ideal Body Weight: 130 lbs; % Ideal Body Weight 95.6 %   · BMI: 20.1  · Adjusted Body Weight:  ; No Adjustment   · BMI Categories: Normal Weight (BMI 18.5-24. 9)       Nutrition Diagnosis:   · Predicted inadequate energy intake related to other (comment)(reduced appetite in illness) as evidenced by other (comment)(limited po data at this time, hx malnutrition in chronic illness)    Nutrition Interventions:   Food and/or Nutrient Delivery:  Start Oral Diet  Nutrition Education/Counseling:  No recommendation at this time   Coordination of Nutrition Care:  Continue to monitor while inpatient    Goals:  Patient will tolerate diet to consume at least 50-75% at meals during stay       Nutrition Monitoring and Evaluation:   Behavioral-Environmental Outcomes:  None Identified   Food/Nutrient Intake Outcomes:  Diet Advancement/Tolerance, Food and Nutrient Intake  Physical Signs/Symptoms Outcomes:  Biochemical Data, Skin, Weight, Meal Time Behavior     Discharge Planning:     Too soon to determine     Electronically signed by Paul Dubon RD, LD on 2/8/21 at 12:26 PM EST    Contact: [phone]

## 2021-02-08 NOTE — CONSULTS
SURGICAL CONSULTATION    Date of Admission:  2/6/2021  Date of Consultation:  2/8/2021    PCP:  Svetlana Irene MD  Thank you  very much for your consultation, it's always appreciated. I had the privilege today to see your patient Edd Irene. Chief Complaint / History of Present Illness:  Edd Irene is a very pleasant 62 y.o. female who presents with syncope and collapse, her pacer was interrogated ,and then her rate was increased to 50 bpm, she has a MAJOR need for a central venous permanent catheter, and last week, discussions were made with many consultants, and all agreed to proceed if I can place it away from the left upper chest scar, so I will in am.    Needs a Mediport, will place one in her LEFT SCV in am.    PMH:   has a past medical history of Acute delirium, Arrhythmia, Arthritis, Asplenia, Asthma, Avascular necrosis ([address] CAD (coronary artery disease), Cardiomyopathy ([address] Cerebral artery occlusion with cerebral infarction [facility]), CHF (congestive heart failure) ([address] Enlarged liver, GERD (gastroesophageal reflux disease), H/O echocardiogram (4/6/16), History of blood transfusion, cardiovascular stress test (12/29/2015), Hyperlipidemia, Hypertension, Lymphoma ([address] MI, old, Movement disorder, MS (multiple sclerosis) ([address] OP (osteoporosis), PE (pulmonary embolism), Pneumonia, Pyelonephritis, Seizures ([address] Severe malnutrition ([address] Spleen enlarged, and Thyroid disease. PSH:   has a past surgical history that includes Coronary angioplasty with stent; Hysterectomy; Appendectomy; Cholecystectomy; Tonsillectomy; Gastric bypass surgery; Cardiac defibrillator placement; hip surgery; Abdomen surgery; Colonoscopy; Endoscopy, colon, diagnostic; hernia repair; joint replacement; skin biopsy; vascular surgery; Coronary artery bypass graft; and Port Surgery (N/A, 6/1/2020). Allergies:     Allergies   Allergen Reactions    Fentanyl Swelling Other reaction(s): Angioedema (swelling)    Moxifloxacin Hcl In Nacl Swelling and Rash    Povidone-Iodine Rash     Other reaction(s): AOF      Cyclobenzaprine      Other reaction(s): Other - comment required  \" it make my muscle very weak because of my MS\"  \" it make my muscle very weak because of my MS\"      Methocarbamol      Worsening edema  Other reaction(s): Other - comment required  Worsening edema  Worsening edema  Worsening edema      Tramadol Other (See Comments)     Doctor doesn't her to take due to heart problems  Seizures   Doctor doesn't her to take due to lowers seizure threshold.  Baclofen     Ibuprofen      \"Due to heart problems\"    Naproxen     Nsaids      \"Due to heart problems\"    Tizanidine     Tolmetin      \"Due to heart problems\"    Tramadol Hcl      Other reaction(s): Other - comment required  Told not to take due to history of seizures    Betadine [Povidone Iodine] Rash    Moxifloxacin Rash and Swelling     Lips swell and tingling in face   Lips swell and tingling in face   Lips swell and tingling in face   Lips swell and tingling in face   Around mouth        Home Meds:    Prior to Admission medications    Medication Sig Start Date End Date Taking?  Authorizing Provider   aspirin 81 MG tablet Take 1 tablet by mouth daily 1/27/21   Paco Sidhu MD   beclomethasone (QVAR) 40 MCG/ACT inhaler Inhale 2 puffs into the lungs 2 times daily 1/14/21   Historical Provider, MD   acarbose (PRECOSE) 25 MG tablet Take 25 mg by mouth 3 times daily (with meals) 7/1/20   Historical Provider, MD   budesonide-formoterol (SYMBICORT) 160-4.5 MCG/ACT AERO Inhale 2 puffs into the lungs 2 times daily 1/14/21 1/14/22  Historical Provider, MD   bumetanide (BUMEX) 1 MG tablet Take 1 mg by mouth daily 12/21/20   Historical Provider, MD   calcium citrate (CALCITRATE) 950 (200 Ca) MG tablet Take 1,425 mg by mouth daily 8/4/20   Historical Provider, MD dabigatran (PRADAXA) 75 MG capsule Take 150 mg by mouth 1/14/21 1/14/22  Historical Provider, MD   DULoxetine (CYMBALTA) 60 MG extended release capsule Take 60 mg by mouth daily 11/9/20   Historical Provider, MD   ergocalciferol (ERGOCALCIFEROL) 1.25 MG (59521 UT) capsule Take 50,000 Units by mouth Twice a Week 11/9/20   Historical Provider, MD   gabapentin (NEURONTIN) 300 MG capsule Take 300 mg by mouth 3 times daily.  12/14/20   Historical Provider, MD   metoprolol succinate (TOPROL XL) 25 MG extended release tablet Take 12.5 mg by mouth daily Half a tablet (12.5 total)    Historical Provider, MD   mirtazapine (REMERON) 30 MG tablet Take 30 mg by mouth nightly 10/8/20   Historical Provider, MD   omeprazole (PRILOSEC) 20 MG delayed release capsule omeprazole 20 mg capsule,delayed release    Historical Provider, MD   promethazine (PHENERGAN) 25 MG tablet Take 25 mg by mouth 4 times daily as needed 12/7/20   Historical Provider, MD   ranolazine (RANEXA) 500 MG extended release tablet Take 500 mg by mouth every 12 hours 12/21/20   Historical Provider, MD   senna (SENOKOT) 8.6 MG tablet Take 1 tablet by mouth daily    Historical Provider, MD   spironolactone (ALDACTONE) 25 MG tablet Take 12.5 mg by mouth daily 9/1/20   Historical Provider, MD   cyanocobalamin 1000 MCG tablet Take 1 tablet by mouth daily    Historical Provider, MD   famotidine (PEPCID) 20 MG tablet Take 20 mg by mouth 2 times daily 11/17/20 11/17/21  Historical Provider, MD   ferrous gluconate (FERGON) 324 (38 Fe) MG tablet Take 1 tablet by mouth 2 times daily    Historical Provider, MD   levETIRAcetam (KEPPRA) 1000 MG tablet Take 1,000 mg by mouth daily 11/5/20   Historical Provider, MD   levothyroxine (SYNTHROID) 150 MCG tablet Take 150 mcg by mouth every morning (before breakfast) 11/9/20   Historical Provider, MD   magnesium oxide (MAG-OX) 400 MG tablet Take 400 mg by mouth daily 11/9/20   Historical Provider, MD nitroGLYCERIN (NITROSTAT) 0.4 MG SL tablet Place 0.4 mg under the tongue as needed 4/7/20   Historical Provider, MD   potassium chloride (KLOR-CON M) 10 MEQ extended release tablet Take 10 mEq by mouth 2 times daily 2 (10mEq) 1/18/21 1/18/22  Historical Provider, MD   amiodarone (CORDARONE) 200 MG tablet Take 400 mg by mouth daily    Historical Provider, MD   oxyCODONE (ROXICODONE) 20 MG immediate release tablet Take 20 mg by mouth every 3 hours as needed. 9/11/20   Historical Provider, MD   chlorhexidine gluconate (ANTISEPTIC SKIN CLEANSER) 4 % SOLN external solution Apply topically daily 9/30/20   Be Hernandez MD   midodrine (PROAMATINE) 10 MG tablet Take 1 tablet by mouth 3 times daily (with meals) 6/4/20   Darryle Point, MD   ipratropium-albuterol (DUONEB) 0.5-2.5 (3) MG/3ML SOLN nebulizer solution Inhale 3 mLs into the lungs every 4 hours (while awake) 11/18/19   Fab Pierson MD   benzocaine-menthol (CEPACOL SORE THROAT) 15-3.6 MG lozenge Take 1 lozenge by mouth every 2 hours as needed for Sore Throat 11/1/19   Josue Grover MD   docusate sodium (COLACE) 100 MG capsule Take 100 mg by mouth 2 times daily as needed for Constipation    Historical Provider, MD   atorvastatin (LIPITOR) 80 MG tablet Take 80 mg by mouth nightly    Historical Provider, MD   clopidogrel (PLAVIX) 75 MG tablet Take 75 mg by mouth daily    Historical Provider, MD   acetaminophen 650 MG TABS Take 650 mg by mouth every 4 hours as needed 4/9/16   Richard Burroughs MD   albuterol (PROVENTIL HFA;VENTOLIN HFA) 108 (90 BASE) MCG/ACT inhaler Inhale 2 puffs into the lungs every 6 hours as needed.       Historical Provider, MD        Hospital Meds:    Current Facility-Administered Medications   Medication Dose Route Frequency Provider Last Rate Last Admin    meropenem (MERREM) 1,000 mg in sodium chloride 0.9 % 100 mL IVPB (mini-bag)  1,000 mg Intravenous Q8H Shira Thomas MD   Stopped at 02/08/21 2841  albuterol sulfate  (90 Base) MCG/ACT inhaler 2 puff  2 puff Inhalation BID Brendan Hale MD   2 puff at 02/08/21 0748    ipratropium-albuterol (DUONEB) nebulizer solution 3 mL  3 mL Inhalation Q4H PRN Brendan Hale MD        acetaminophen (TYLENOL) tablet 1,000 mg  1,000 mg Oral Once [facility]        sodium chloride flush 0.9 % injection 10 mL  10 mL Intravenous 2 times per day Bedrossi Algonquin, APRN - CNP   10 mL at 02/08/21 0804    sodium chloride flush 0.9 % injection 10 mL  10 mL Intravenous PRN Rick Queen, APRN - CNP        famotidine (PEPCID) injection 20 mg  20 mg Intravenous BID Bedrossi Queen, APRN - CNP   20 mg at 02/08/21 0805    acetaminophen (TYLENOL) tablet 650 mg  650 mg Oral Q6H PRN Bedrossi [place], APRN - CNP   650 mg at 02/08/21 8794    Or    acetaminophen (TYLENOL) suppository 650 mg  650 mg Rectal Q6H PRN Rick Queen, APRN - CNP        polyethylene glycol (GLYCOLAX) packet 17 g  17 g Oral Daily PRN Rick Queen, APRN - CNP        nitroGLYCERIN (NITROSTAT) SL tablet 0.4 mg  0.4 mg Sublingual Q5 Min PRN Bedrossi Al[place], APRN - CNP        potassium chloride (KLOR-CON M) extended release tablet 40 mEq  40 mEq Oral PRN Bedrossi Bernice, APRN - CNP   40 mEq at 02/06/21 2331    Or    potassium bicarb-citric acid (EFFER-K) effervescent tablet 40 mEq  40 mEq Oral PRN Bedrossi Queen, APRN - CNP        Or    potassium chloride 10 mEq/100 mL IVPB (Peripheral Line)  10 mEq Intravenous PRN Rick Queen, APRN - CNP        magnesium sulfate 2000 mg in 50 mL IVPB premix  2,000 mg Intravenous PRN Rick Algonquin, APRN - CNP        clopidogrel (PLAVIX) tablet 75 mg  75 mg Oral Daily Rick Algonquin, APRN - CNP   Stopped at 02/08/21 0827    dabigatran (PRADAXA) capsule 150 mg  150 mg Oral BID Rick Queen, APRN - CNP   150 mg at 02/08/21 0803    DULoxetine (CYMBALTA) extended release capsule 60 mg  60 mg Oral Daily JESSE Jason - CNP   60 mg at 02/08/21 9428  gabapentin (NEURONTIN) capsule 300 mg  300 mg Oral TID Ezzard Brighter, APRN - CNP   300 mg at 02/08/21 0802    levothyroxine (SYNTHROID) tablet 150 mcg  150 mcg Oral QAM AC Ezzard Brighter, APRN - CNP   150 mcg at 02/08/21 3014    levETIRAcetam (KEPPRA) tablet 1,000 mg  1,000 mg Oral Daily Ezzard Brighter, APRN - CNP   1,000 mg at 02/08/21 0804    midodrine (PROAMATINE) tablet 10 mg  10 mg Oral TID WC Ezzard Brighter, APRN - CNP   10 mg at 02/08/21 1228    mirtazapine (REMERON) tablet 30 mg  30 mg Oral Nightly Ezzard Brighter, APRN - CNP   30 mg at 02/07/21 2100    oxyCODONE HCl (OXY-IR) immediate release tablet 20 mg  20 mg Oral Q3H PRN Angeles Amaya MD   20 mg at 02/08/21 1035    ranolazine (RANEXA) extended release tablet 500 mg  500 mg Oral Q12H Ezzard Brighter, APRN - CNP   500 mg at 02/08/21 1035    amiodarone (CORDARONE) tablet 400 mg  400 mg Oral Daily Ezzard Brighter, APRN - CNP   Stopped at 02/08/21 3527    atorvastatin (LIPITOR) tablet 80 mg  80 mg Oral Nightly Ezzard Brighter, APRN - CNP   80 mg at 02/07/21 2101    budesonide-formoterol (SYMBICORT) 160-4.5 MCG/ACT inhaler 2 puff  2 puff Inhalation BID Ezzard Brighter, APRN - CNP   2 puff at 02/08/21 0748    promethazine (PHENERGAN) tablet 25 mg  25 mg Oral Q6H PRN [employer], PA-C   25 mg at 02/08/21 0222    calcium elemental (OSCAL) tablet 500 mg  500 mg Oral Daily Ezzard Brighter, APRN - CNP   500 mg at 02/08/21 1228       Social History / Family History:     Social History     Socioeconomic History    Marital status: Single     Spouse name: None    Number of children: None    Years of education: None    Highest education level: None   Occupational History    None   Social Needs    Financial resource strain: None    Food insecurity     Worry: None     Inability: None    Transportation needs     Medical: None     Non-medical: None   Tobacco Use    Smoking status: Never Smoker    Smokeless tobacco: Never Used   Substance and Sexual Activity    Alcohol use:  No  Drug use: No    Sexual activity: Not Currently   Lifestyle    Physical activity     Days per week: None     Minutes per session: None    Stress: None   Relationships    Social connections     Talks on phone: None     Gets together: None     Attends [employer] service: None     Active member of club or organization: None     Attends meetings of clubs or organizations: None     Relationship status: None    Intimate partner violence     Fear of current or ex partner: None     Emotionally abused: None     Physically abused: None     Forced sexual activity: None   Other Topics Concern    None   Social History Narrative    None      Family History   Problem Relation Age of Onset    High Blood Pressure Mother     High Cholesterol Mother     Heart Disease Mother     Diabetes Mother     Heart Disease Father     Cancer Father     Other Sister         Multiple Sclerosis    Heart Disease Maternal Grandmother     High Blood Pressure Maternal Grandmother     High Cholesterol Maternal Grandmother     otherwise irrelevant to this surgical issue. Review of Systems:  Constitutional: Negative for fever, chills, diaphoresis, appetite change and fatigue. HENT: Negative for sore throat, trouble swallowing and voice change. Respiratory: Negative for cough, positive for shortness of breath no wheezing. Cardiovascular: Negative for chest pain positive for SOB with one flight of stairs exertion, no pitting LE edema. Gastrointestinal: Negative for nausea, vomiting, abdominal distention, constipation, no diarrhea, blood in stool, anal bleeding or rectal pain. Musculoskeletal: Negative for joint swelling and arthralgias. Skin: Warm and dry, well perfused. Neurological: Negative for seizures, positive for syncope, negative for speech difficulty and weakness. Hematological/Lymphatic: Negative for adenopathy. No history of DVT/PE. Does not bruise/bleed easily. Psychiatric/Behavioral: Negative for agitation. All others reviewed and negative. Physical Exam:  Vital Signs:   Vitals:    02/08/21 1215   BP: (!) 95/57   Pulse: 50   Resp: 18   Temp: 98.1 °F (36.7 °C)   SpO2: 100%     Body mass index is 20.08 kg/m². General appearance: Pt Alert and oriented, in no apparent acute distress. HEENT:  LISSETTE, Conjunctiva clear. EOMI, No JVDs, Bruits, Megalies: neither thyroid nor lymph nodes. Lungs: Clear to auscultation bilaterally. Heart: Regular rate and rhythm, S1, S2 normal, no murmur, rub or gallop. Abdomen: Non tender. Non distended. Positive bowel sounds. No hernias noted, no masses palpable. Extremities: Warm, well perfused, no cyanosis or edema. Skin: Skin color, texture, turgor normal.  Neurologic: Grossly normal, Cranial nerves from II to XII intact, Deep tendon reflexes normal.  Lymph nodes: No palpable LNs, Cervical, groins, abdominal.  Musculoskeletal: Bilateral Upper and lower extremities ROM within normal limits. Radiologic / Imaging / TESTING  CT chest on feb 6th 2021:     Impression   1. No acute abnormality.         I reviewed. Labs:    No results found for this or any previous visit (from the past 24 hour(s)).     Diagnosis:  Patient Active Problem List   Diagnosis    Chest pain    CAD (coronary artery disease)    Hyperlipidemia    Thyroid disease    Acute exacerbation of CHF (congestive heart failure) (HCC)    [facility] exacerbation ([facility]    Congestive heart failure ([facility]    Left upper quadrant pain    Angina at rest [facility])    Coronary artery disease involving coronary bypass graft of native heart with angina pectoris ([facility])    Shortness of breath    Ischemic cardiomyopathy    Acute metabolic encephalopathy    Acute delirium    Sacral pain    Acute on chronic systolic CHF (congestive heart failure), NYHA class 3 (HCC)    Severe malnutrition (HCC)    Cellulitis    Cellulitis at gastrostomy tube site ([facility]    History of Maru-en-Y gastric bypass    Gastroparesis  Abdominal pain    Feeding tube dysfunction    Abdominal pain, epigastric    Gastrojejunostomy tube status (HCC)    Chronic malnutrition (HCC)    Asplenia    Shockable cardiac rhythm detected by automated external defibrillator    Financial difficulties    Chest pain at rest    Closed nondisplaced transverse fracture of left patella    Contusion of right knee    Multifocal pneumonia    Abnormal cardiac function test    Chronic systolic heart failure (HCC)    Seizure disorder (HCC)    Hypothyroidism    Cardiomyopathy (HCC)    Acute chest pain    Fever in adult    Pyelonephritis    MRSA bacteremia    HFrEF (heart failure with reduced ejection fraction) (HCC)    Infected chest wall abrasion, right, subsequent encounter    Granuloma of skin    Open wound of right chest wall    MRSA infection    Klebsiella pneumoniae infection    Symptomatic bradycardia    History of pacemaker with removal secondary to infected leads    Avascular necrosis (HCC)    Multiple sclerosis (HCC)    Syncope    Bradycardia    Ulcer of chest wall, limited to breakdown of skin (HCC)    Pneumonia    Poor intravenous access    Syncope and collapse       Assessment & plan:  Autumn Wilson is a very pleasant 62 y.o. female presenting with Needs for a Mediport, will place one in her LEFT SCV in am..    Thank you doctor very much for your consultation and for the opportunity to take care of Mrs Autumn Wilson with you, I'll follow along with you and I'll update you on any new events in her care.    ____________________________________________    Maria Esther Yin MD, FACS, FICS    2/8/2021  2:06 PM      Patient was seen with total face to face time of 45 minutes. More than 50% of this visit was counseling and education as above in my assessment and plan section of my note.

## 2021-02-09 ENCOUNTER — APPOINTMENT (OUTPATIENT)
Dept: GENERAL RADIOLOGY | Age: 61
DRG: 982 | End: 2021-02-09
Payer: MEDICARE

## 2021-02-09 ENCOUNTER — ANESTHESIA EVENT (OUTPATIENT)
Dept: OPERATING ROOM | Age: 61
DRG: 982 | End: 2021-02-09
Payer: MEDICARE

## 2021-02-09 ENCOUNTER — ANESTHESIA (OUTPATIENT)
Dept: OPERATING ROOM | Age: 61
DRG: 982 | End: 2021-02-09
Payer: MEDICARE

## 2021-02-09 VITALS
SYSTOLIC BLOOD PRESSURE: 114 MMHG | RESPIRATION RATE: 10 BRPM | DIASTOLIC BLOOD PRESSURE: 58 MMHG | TEMPERATURE: 98.6 F | OXYGEN SATURATION: 96 %

## 2021-02-09 LAB
ALBUMIN SERPL-MCNC: 3.6 GM/DL (ref 3.4–5)
ALP BLD-CCNC: 128 IU/L (ref 40–128)
ALT SERPL-CCNC: 10 U/L (ref 10–40)
ANION GAP SERPL CALCULATED.3IONS-SCNC: 10 MMOL/L (ref 4–16)
ANION GAP SERPL CALCULATED.3IONS-SCNC: 8 MMOL/L (ref 4–16)
AST SERPL-CCNC: 22 IU/L (ref 15–37)
BILIRUB SERPL-MCNC: 0.2 MG/DL (ref 0–1)
BUN BLDV-MCNC: 10 MG/DL (ref 6–23)
BUN BLDV-MCNC: 9 MG/DL (ref 6–23)
CALCIUM SERPL-MCNC: 8.5 MG/DL (ref 8.3–10.6)
CALCIUM SERPL-MCNC: 8.5 MG/DL (ref 8.3–10.6)
CHLORIDE BLD-SCNC: 103 MMOL/L (ref 99–110)
CHLORIDE BLD-SCNC: 107 MMOL/L (ref 99–110)
CO2: 22 MMOL/L (ref 21–32)
CO2: 23 MMOL/L (ref 21–32)
CREAT SERPL-MCNC: 0.7 MG/DL (ref 0.6–1.1)
CREAT SERPL-MCNC: 0.7 MG/DL (ref 0.6–1.1)
GFR AFRICAN AMERICAN: >60 ML/MIN/1.73M2
GFR AFRICAN AMERICAN: >60 ML/MIN/1.73M2
GFR NON-AFRICAN AMERICAN: >60 ML/MIN/1.73M2
GFR NON-AFRICAN AMERICAN: >60 ML/MIN/1.73M2
GLUCOSE BLD-MCNC: 113 MG/DL (ref 70–99)
GLUCOSE BLD-MCNC: 92 MG/DL (ref 70–99)
HCT VFR BLD CALC: 40.3 % (ref 37–47)
HEMOGLOBIN: 11.3 GM/DL (ref 12.5–16)
MCH RBC QN AUTO: 36.1 PG (ref 27–31)
MCHC RBC AUTO-ENTMCNC: 28 % (ref 32–36)
MCV RBC AUTO: 128.8 FL (ref 78–100)
PDW BLD-RTO: 13.2 % (ref 11.7–14.9)
PLATELET # BLD: 229 K/CU MM (ref 140–440)
PMV BLD AUTO: 10.5 FL (ref 7.5–11.1)
POTASSIUM SERPL-SCNC: 4.8 MMOL/L (ref 3.5–5.1)
POTASSIUM SERPL-SCNC: 5.6 MMOL/L (ref 3.5–5.1)
RBC # BLD: 3.13 M/CU MM (ref 4.2–5.4)
SODIUM BLD-SCNC: 134 MMOL/L (ref 135–145)
SODIUM BLD-SCNC: 139 MMOL/L (ref 135–145)
TOTAL PROTEIN: 5.8 GM/DL (ref 6.4–8.2)
WBC # BLD: 5.5 K/CU MM (ref 4–10.5)

## 2021-02-09 PROCEDURE — 2709999900 HC NON-CHARGEABLE SUPPLY: Performed by: SURGERY

## 2021-02-09 PROCEDURE — 80048 BASIC METABOLIC PNL TOTAL CA: CPT

## 2021-02-09 PROCEDURE — 76000 FLUOROSCOPY <1 HR PHYS/QHP: CPT

## 2021-02-09 PROCEDURE — 2700000000 HC OXYGEN THERAPY PER DAY

## 2021-02-09 PROCEDURE — 3700000001 HC ADD 15 MINUTES (ANESTHESIA): Performed by: SURGERY

## 2021-02-09 PROCEDURE — 2580000003 HC RX 258: Performed by: SURGERY

## 2021-02-09 PROCEDURE — 6360000002 HC RX W HCPCS: Performed by: NURSE ANESTHETIST, CERTIFIED REGISTERED

## 2021-02-09 PROCEDURE — 85027 COMPLETE CBC AUTOMATED: CPT

## 2021-02-09 PROCEDURE — 80053 COMPREHEN METABOLIC PANEL: CPT

## 2021-02-09 PROCEDURE — 1200000000 HC SEMI PRIVATE

## 2021-02-09 PROCEDURE — 77001 FLUOROGUIDE FOR VEIN DEVICE: CPT | Performed by: NURSE PRACTITIONER

## 2021-02-09 PROCEDURE — 2580000003 HC RX 258: Performed by: NURSE PRACTITIONER

## 2021-02-09 PROCEDURE — 6360000002 HC RX W HCPCS: Performed by: INTERNAL MEDICINE

## 2021-02-09 PROCEDURE — 3700000000 HC ANESTHESIA ATTENDED CARE: Performed by: SURGERY

## 2021-02-09 PROCEDURE — 05H633Z INSERTION OF INFUSION DEVICE INTO LEFT SUBCLAVIAN VEIN, PERCUTANEOUS APPROACH: ICD-10-PCS | Performed by: SURGERY

## 2021-02-09 PROCEDURE — 77001 FLUOROGUIDE FOR VEIN DEVICE: CPT | Performed by: SURGERY

## 2021-02-09 PROCEDURE — 3600000013 HC SURGERY LEVEL 3 ADDTL 15MIN: Performed by: SURGERY

## 2021-02-09 PROCEDURE — 36415 COLL VENOUS BLD VENIPUNCTURE: CPT

## 2021-02-09 PROCEDURE — 2500000003 HC RX 250 WO HCPCS: Performed by: NURSE ANESTHETIST, CERTIFIED REGISTERED

## 2021-02-09 PROCEDURE — 3600000003 HC SURGERY LEVEL 3 BASE: Performed by: SURGERY

## 2021-02-09 PROCEDURE — 94761 N-INVAS EAR/PLS OXIMETRY MLT: CPT

## 2021-02-09 PROCEDURE — 76937 US GUIDE VASCULAR ACCESS: CPT | Performed by: NURSE PRACTITIONER

## 2021-02-09 PROCEDURE — 2500000003 HC RX 250 WO HCPCS: Performed by: NURSE PRACTITIONER

## 2021-02-09 PROCEDURE — 6370000000 HC RX 637 (ALT 250 FOR IP): Performed by: PHYSICIAN ASSISTANT

## 2021-02-09 PROCEDURE — 2580000003 HC RX 258: Performed by: INTERNAL MEDICINE

## 2021-02-09 PROCEDURE — 6360000002 HC RX W HCPCS: Performed by: SURGERY

## 2021-02-09 PROCEDURE — 6370000000 HC RX 637 (ALT 250 FOR IP): Performed by: NURSE PRACTITIONER

## 2021-02-09 PROCEDURE — 2500000003 HC RX 250 WO HCPCS: Performed by: SURGERY

## 2021-02-09 PROCEDURE — 6370000000 HC RX 637 (ALT 250 FOR IP): Performed by: INTERNAL MEDICINE

## 2021-02-09 PROCEDURE — 71045 X-RAY EXAM CHEST 1 VIEW: CPT

## 2021-02-09 PROCEDURE — 36561 INSERT TUNNELED CV CATH: CPT | Performed by: NURSE PRACTITIONER

## 2021-02-09 PROCEDURE — C1894 INTRO/SHEATH, NON-LASER: HCPCS | Performed by: SURGERY

## 2021-02-09 PROCEDURE — B5181ZZ FLUOROSCOPY OF SUPERIOR VENA CAVA USING LOW OSMOLAR CONTRAST: ICD-10-PCS | Performed by: SURGERY

## 2021-02-09 PROCEDURE — U0002 COVID-19 LAB TEST NON-CDC: HCPCS

## 2021-02-09 PROCEDURE — 94640 AIRWAY INHALATION TREATMENT: CPT

## 2021-02-09 PROCEDURE — 36561 INSERT TUNNELED CV CATH: CPT | Performed by: SURGERY

## 2021-02-09 PROCEDURE — 0JH60WZ INSERTION OF TOTALLY IMPLANTABLE VASCULAR ACCESS DEVICE INTO CHEST SUBCUTANEOUS TISSUE AND FASCIA, OPEN APPROACH: ICD-10-PCS | Performed by: SURGERY

## 2021-02-09 RX ORDER — MORPHINE SULFATE 0.5 MG/ML
INJECTION, SOLUTION EPIDURAL; INTRATHECAL; INTRAVENOUS PRN
Status: DISCONTINUED | OUTPATIENT
Start: 2021-02-09 | End: 2021-02-09 | Stop reason: SDUPTHER

## 2021-02-09 RX ORDER — PROPOFOL 10 MG/ML
INJECTION, EMULSION INTRAVENOUS PRN
Status: DISCONTINUED | OUTPATIENT
Start: 2021-02-09 | End: 2021-02-09 | Stop reason: SDUPTHER

## 2021-02-09 RX ORDER — DEXAMETHASONE SODIUM PHOSPHATE 4 MG/ML
INJECTION, SOLUTION INTRA-ARTICULAR; INTRALESIONAL; INTRAMUSCULAR; INTRAVENOUS; SOFT TISSUE PRN
Status: DISCONTINUED | OUTPATIENT
Start: 2021-02-09 | End: 2021-02-09 | Stop reason: SDUPTHER

## 2021-02-09 RX ORDER — LIDOCAINE HYDROCHLORIDE AND EPINEPHRINE 10; 10 MG/ML; UG/ML
INJECTION, SOLUTION INFILTRATION; PERINEURAL
Status: COMPLETED | OUTPATIENT
Start: 2021-02-09 | End: 2021-02-09

## 2021-02-09 RX ORDER — LIDOCAINE HYDROCHLORIDE 20 MG/ML
INJECTION, SOLUTION INTRAVENOUS PRN
Status: DISCONTINUED | OUTPATIENT
Start: 2021-02-09 | End: 2021-02-09 | Stop reason: SDUPTHER

## 2021-02-09 RX ORDER — KETAMINE HYDROCHLORIDE 10 MG/ML
INJECTION, SOLUTION INTRAMUSCULAR; INTRAVENOUS PRN
Status: DISCONTINUED | OUTPATIENT
Start: 2021-02-09 | End: 2021-02-09 | Stop reason: SDUPTHER

## 2021-02-09 RX ORDER — VANCOMYCIN HYDROCHLORIDE 500 MG/10ML
1000 INJECTION, POWDER, LYOPHILIZED, FOR SOLUTION INTRAVENOUS ONCE
Status: DISCONTINUED | OUTPATIENT
Start: 2021-02-09 | End: 2021-02-09 | Stop reason: ALTCHOICE

## 2021-02-09 RX ORDER — HEPARIN SODIUM (PORCINE) LOCK FLUSH IV SOLN 100 UNIT/ML 100 UNIT/ML
SOLUTION INTRAVENOUS
Status: COMPLETED | OUTPATIENT
Start: 2021-02-09 | End: 2021-02-09

## 2021-02-09 RX ORDER — SODIUM CHLORIDE, SODIUM LACTATE, POTASSIUM CHLORIDE, CALCIUM CHLORIDE 600; 310; 30; 20 MG/100ML; MG/100ML; MG/100ML; MG/100ML
INJECTION, SOLUTION INTRAVENOUS CONTINUOUS
Status: DISCONTINUED | OUTPATIENT
Start: 2021-02-09 | End: 2021-02-10

## 2021-02-09 RX ORDER — MAGNESIUM HYDROXIDE 1200 MG/15ML
LIQUID ORAL CONTINUOUS PRN
Status: COMPLETED | OUTPATIENT
Start: 2021-02-09 | End: 2021-02-09

## 2021-02-09 RX ORDER — CEFAZOLIN SODIUM 1 G/3ML
INJECTION, POWDER, FOR SOLUTION INTRAMUSCULAR; INTRAVENOUS PRN
Status: DISCONTINUED | OUTPATIENT
Start: 2021-02-09 | End: 2021-02-09 | Stop reason: SDUPTHER

## 2021-02-09 RX ADMIN — LEVOTHYROXINE SODIUM 150 MCG: 150 TABLET ORAL at 06:39

## 2021-02-09 RX ADMIN — KETAMINE HYDROCHLORIDE 10 MG: 10 INJECTION INTRAMUSCULAR; INTRAVENOUS at 10:12

## 2021-02-09 RX ADMIN — PROMETHAZINE HYDROCHLORIDE 25 MG: 25 TABLET ORAL at 03:21

## 2021-02-09 RX ADMIN — PROMETHAZINE HYDROCHLORIDE 25 MG: 25 TABLET ORAL at 12:10

## 2021-02-09 RX ADMIN — FAMOTIDINE 20 MG: 10 INJECTION, SOLUTION INTRAVENOUS at 21:17

## 2021-02-09 RX ADMIN — DABIGATRAN ETEXILATE MESYLATE 150 MG: 75 CAPSULE ORAL at 21:17

## 2021-02-09 RX ADMIN — MIDODRINE HYDROCHLORIDE 10 MG: 5 TABLET ORAL at 18:03

## 2021-02-09 RX ADMIN — FAMOTIDINE 20 MG: 10 INJECTION, SOLUTION INTRAVENOUS at 14:05

## 2021-02-09 RX ADMIN — SODIUM CHLORIDE, POTASSIUM CHLORIDE, SODIUM LACTATE AND CALCIUM CHLORIDE: 600; 310; 30; 20 INJECTION, SOLUTION INTRAVENOUS at 19:41

## 2021-02-09 RX ADMIN — ATORVASTATIN CALCIUM 80 MG: 80 TABLET, FILM COATED ORAL at 21:17

## 2021-02-09 RX ADMIN — PROMETHAZINE HYDROCHLORIDE 25 MG: 25 TABLET ORAL at 18:03

## 2021-02-09 RX ADMIN — KETAMINE HYDROCHLORIDE 10 MG: 10 INJECTION INTRAMUSCULAR; INTRAVENOUS at 09:39

## 2021-02-09 RX ADMIN — CLOPIDOGREL BISULFATE 75 MG: 75 TABLET ORAL at 14:04

## 2021-02-09 RX ADMIN — ALBUTEROL SULFATE 2 PUFF: 90 AEROSOL, METERED RESPIRATORY (INHALATION) at 07:36

## 2021-02-09 RX ADMIN — PROPOFOL 300 MG: 10 INJECTION, EMULSION INTRAVENOUS at 09:42

## 2021-02-09 RX ADMIN — OXYCODONE HYDROCHLORIDE 20 MG: 10 TABLET ORAL at 21:17

## 2021-02-09 RX ADMIN — KETAMINE HYDROCHLORIDE 10 MG: 10 INJECTION INTRAMUSCULAR; INTRAVENOUS at 10:20

## 2021-02-09 RX ADMIN — OXYCODONE HYDROCHLORIDE 20 MG: 10 TABLET ORAL at 18:04

## 2021-02-09 RX ADMIN — SODIUM CHLORIDE, PRESERVATIVE FREE 10 ML: 5 INJECTION INTRAVENOUS at 21:18

## 2021-02-09 RX ADMIN — DULOXETINE HYDROCHLORIDE 60 MG: 30 CAPSULE, DELAYED RELEASE ORAL at 14:04

## 2021-02-09 RX ADMIN — OXYCODONE HYDROCHLORIDE 20 MG: 10 TABLET ORAL at 12:10

## 2021-02-09 RX ADMIN — ACETAMINOPHEN 650 MG: 325 TABLET ORAL at 03:20

## 2021-02-09 RX ADMIN — KETAMINE HYDROCHLORIDE 10 MG: 10 INJECTION INTRAMUSCULAR; INTRAVENOUS at 10:05

## 2021-02-09 RX ADMIN — MEROPENEM 1000 MG: 1 INJECTION, POWDER, FOR SOLUTION INTRAVENOUS at 21:22

## 2021-02-09 RX ADMIN — GABAPENTIN 300 MG: 300 CAPSULE ORAL at 21:17

## 2021-02-09 RX ADMIN — MIDODRINE HYDROCHLORIDE 10 MG: 5 TABLET ORAL at 14:04

## 2021-02-09 RX ADMIN — CALCIUM 500 MG: 500 TABLET ORAL at 14:04

## 2021-02-09 RX ADMIN — SODIUM CHLORIDE, POTASSIUM CHLORIDE, SODIUM LACTATE AND CALCIUM CHLORIDE: 600; 310; 30; 20 INJECTION, SOLUTION INTRAVENOUS at 09:07

## 2021-02-09 RX ADMIN — ACETAMINOPHEN 650 MG: 325 TABLET ORAL at 18:04

## 2021-02-09 RX ADMIN — ALBUTEROL SULFATE 2 PUFF: 90 AEROSOL, METERED RESPIRATORY (INHALATION) at 20:40

## 2021-02-09 RX ADMIN — AMIODARONE HYDROCHLORIDE 400 MG: 200 TABLET ORAL at 14:05

## 2021-02-09 RX ADMIN — DEXAMETHASONE SODIUM PHOSPHATE 4 MG: 4 INJECTION, SOLUTION INTRAMUSCULAR; INTRAVENOUS at 09:45

## 2021-02-09 RX ADMIN — GABAPENTIN 300 MG: 300 CAPSULE ORAL at 14:04

## 2021-02-09 RX ADMIN — MEROPENEM 1000 MG: 1 INJECTION, POWDER, FOR SOLUTION INTRAVENOUS at 14:05

## 2021-02-09 RX ADMIN — CEFAZOLIN 1000 MG: 1 INJECTION, POWDER, FOR SOLUTION INTRAMUSCULAR; INTRAVENOUS; PARENTERAL at 09:40

## 2021-02-09 RX ADMIN — LIDOCAINE HYDROCHLORIDE 100 MG: 20 INJECTION, SOLUTION INTRAVENOUS at 09:39

## 2021-02-09 RX ADMIN — OXYCODONE HYDROCHLORIDE 20 MG: 10 TABLET ORAL at 03:21

## 2021-02-09 RX ADMIN — SODIUM CHLORIDE, POTASSIUM CHLORIDE, SODIUM LACTATE AND CALCIUM CHLORIDE: 600; 310; 30; 20 INJECTION, SOLUTION INTRAVENOUS at 15:57

## 2021-02-09 RX ADMIN — OXYCODONE HYDROCHLORIDE 20 MG: 10 TABLET ORAL at 15:21

## 2021-02-09 RX ADMIN — PROPOFOL 30 MG: 10 INJECTION, EMULSION INTRAVENOUS at 09:39

## 2021-02-09 RX ADMIN — LEVETIRACETAM 1000 MG: 500 TABLET, FILM COATED ORAL at 14:04

## 2021-02-09 RX ADMIN — RANOLAZINE 500 MG: 500 TABLET, FILM COATED, EXTENDED RELEASE ORAL at 21:17

## 2021-02-09 RX ADMIN — MORPHINE SULFATE 0.5 MG: 0.5 INJECTION, SOLUTION EPIDURAL; INTRATHECAL; INTRAVENOUS at 11:31

## 2021-02-09 RX ADMIN — MORPHINE SULFATE 1 MG: 0.5 INJECTION, SOLUTION EPIDURAL; INTRATHECAL; INTRAVENOUS at 10:35

## 2021-02-09 RX ADMIN — ACETAMINOPHEN 650 MG: 325 TABLET ORAL at 12:09

## 2021-02-09 RX ADMIN — MORPHINE SULFATE 1 MG: 0.5 INJECTION, SOLUTION EPIDURAL; INTRATHECAL; INTRAVENOUS at 10:23

## 2021-02-09 RX ADMIN — MORPHINE SULFATE 1 MG: 0.5 INJECTION, SOLUTION EPIDURAL; INTRATHECAL; INTRAVENOUS at 11:02

## 2021-02-09 RX ADMIN — RANOLAZINE 500 MG: 500 TABLET, FILM COATED, EXTENDED RELEASE ORAL at 14:04

## 2021-02-09 RX ADMIN — MIRTAZAPINE 30 MG: 15 TABLET, FILM COATED ORAL at 21:17

## 2021-02-09 RX ADMIN — BUDESONIDE AND FORMOTEROL FUMARATE DIHYDRATE 2 PUFF: 160; 4.5 AEROSOL RESPIRATORY (INHALATION) at 20:40

## 2021-02-09 RX ADMIN — KETAMINE HYDROCHLORIDE 10 MG: 10 INJECTION INTRAMUSCULAR; INTRAVENOUS at 09:57

## 2021-02-09 RX ADMIN — BUDESONIDE AND FORMOTEROL FUMARATE DIHYDRATE 2 PUFF: 160; 4.5 AEROSOL RESPIRATORY (INHALATION) at 07:36

## 2021-02-09 ASSESSMENT — PAIN DESCRIPTION - FREQUENCY
FREQUENCY: CONTINUOUS

## 2021-02-09 ASSESSMENT — PULMONARY FUNCTION TESTS
PIF_VALUE: 1
PIF_VALUE: 1
PIF_VALUE: 0
PIF_VALUE: 1
PIF_VALUE: 1
PIF_VALUE: 0
PIF_VALUE: 0
PIF_VALUE: 1
PIF_VALUE: 1
PIF_VALUE: 0
PIF_VALUE: 1
PIF_VALUE: 1
PIF_VALUE: 0
PIF_VALUE: 1
PIF_VALUE: 0
PIF_VALUE: 1
PIF_VALUE: 0
PIF_VALUE: 1
PIF_VALUE: 0
PIF_VALUE: 1
PIF_VALUE: 1
PIF_VALUE: 0
PIF_VALUE: 1
PIF_VALUE: 0
PIF_VALUE: 1
PIF_VALUE: 0
PIF_VALUE: 0
PIF_VALUE: 1
PIF_VALUE: 0
PIF_VALUE: 0
PIF_VALUE: 1
PIF_VALUE: 0
PIF_VALUE: 1
PIF_VALUE: 0
PIF_VALUE: 1
PIF_VALUE: 0
PIF_VALUE: 1
PIF_VALUE: 1
PIF_VALUE: 0
PIF_VALUE: 1
PIF_VALUE: 0
PIF_VALUE: 0
PIF_VALUE: 1
PIF_VALUE: 1
PIF_VALUE: 0
PIF_VALUE: 1

## 2021-02-09 ASSESSMENT — PAIN DESCRIPTION - ORIENTATION
ORIENTATION: RIGHT;LEFT

## 2021-02-09 ASSESSMENT — PAIN DESCRIPTION - PROGRESSION
CLINICAL_PROGRESSION: NOT CHANGED

## 2021-02-09 ASSESSMENT — PAIN DESCRIPTION - DESCRIPTORS
DESCRIPTORS: ACHING

## 2021-02-09 ASSESSMENT — PAIN - FUNCTIONAL ASSESSMENT
PAIN_FUNCTIONAL_ASSESSMENT: 0-10
PAIN_FUNCTIONAL_ASSESSMENT: PREVENTS OR INTERFERES SOME ACTIVE ACTIVITIES AND ADLS
PAIN_FUNCTIONAL_ASSESSMENT: PREVENTS OR INTERFERES SOME ACTIVE ACTIVITIES AND ADLS

## 2021-02-09 ASSESSMENT — PAIN DESCRIPTION - LOCATION: LOCATION: GENERALIZED

## 2021-02-09 ASSESSMENT — PAIN DESCRIPTION - PAIN TYPE
TYPE: CHRONIC PAIN

## 2021-02-09 ASSESSMENT — PAIN DESCRIPTION - ONSET
ONSET: GRADUAL
ONSET: GRADUAL

## 2021-02-09 ASSESSMENT — ENCOUNTER SYMPTOMS
SHORTNESS OF BREATH: 1
SHORTNESS OF BREATH: 1

## 2021-02-09 ASSESSMENT — PAIN SCALES - GENERAL
PAINLEVEL_OUTOF10: 9
PAINLEVEL_OUTOF10: 8
PAINLEVEL_OUTOF10: 0
PAINLEVEL_OUTOF10: 4
PAINLEVEL_OUTOF10: 3

## 2021-02-09 NOTE — PROGRESS NOTES
Received pt from [facility]. Alert and oriented. . Pt has bruising left chest area and scattered BUE. Pt also has blanchable redness coccyx. Mepilex sacral border placed on coccyx for prevention of skin breakdown and atmos air pump to mattress. Also pt has preexisting nonhealing wound right chest Skin assessment verified by this nurse and student nurse Celestino Doyle. No distress noted.

## 2021-02-09 NOTE — PROGRESS NOTES
Hospitalist Progress Note      Name:  Kiran Brown /Age/Sex: 1960  (62 y.o. female)   MRN & CSN:  [id number] & [id number] Admission Date/Time: 2021  4:36 PM   Location:  [address] [place] [address] PCP: Mercy Bowen MD         Hospital Day: 4    Assessment and Plan:   Kiran Brown is a 62 y.o.  female  who presents with Syncope and collapse    Syncope: Likely from bradycardia  Has a pacemaker placed at [facility]  Pacemaker interrogated and rate increased to 50 bpm  Started on dopamine infusion - discontinued  Avoid rate lowering medication  Cardiology following  Electrophysiology on consult - plan to decrease VVI to 40    Hyponatremia: We will monitor    Hyperkalemia: Lokelma given. Repeat BMP later    Acute kidney injury: Resolved  Creatinine 1.5, with associated hyponatremia and hypokalemia  Avoid nephrotoxic agents. UTI: Rocephin. Urine culture with ESBL  Antibiotic switched to Meropenem  Needs IV ATB on discharge    Chronic systolic and diastolic heart failure, not in exacerbation  Ejection fraction 30 to 35% with mild mitral regurgitation  Diuretics held because of acute kidney injury    Coronary artery disease: Status post coronary artery bypass graft, percutaneous coronary intervention. Continue high intensity statin, Plavix, ranolazine    Paroxysmal atrial fibrillation/atrial flutter: Status post ablation on amiodarone and Pradaxa    Ventricular tachycardia: Status post dual AICD status post ablation  ICD removed in 2020 due to ICD pocket infection with MRSA Klebsiella  Currently on amiodarone. Life vest?    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: On oxycodone chronically    Multiple sclerosis: On Cymbalta and gabapentin    Protein calorie malnutrition    Chronic respiratory failure, hypoxic:     Severe Knee Pain, Right:   Started after fall Xray with no acute process  Orthopedic Surgery on consult    For Mediport placement today. Diet Diet NPO, After Midnight Exceptions are: Sips of Water with Meds  Diet NPO, After Midnight Exceptions are: Sips of Water with Meds   DVT Prophylaxis [] Lovenox, []  Heparin, [] SCDs, [] Warfarin  [x] NOAC     GI Prophylaxis [] PPI,  [x] H2 Blocker,  [] Carafate,  [] Diet/Tube Feeds   Code Status Full Code   MDM [] Low, [x] Moderate,[]  High     History of Present Illness:     Chief Complaint: Syncope and collapse    Seen and examined today. No chest pain, SOB, cough. Ten point ROS reviewed negative, unless as noted above    Objective: Intake/Output Summary (Last 24 hours) at 2/9/2021 1310  Last data filed at 2/8/2021 2311  Gross per 24 hour   Intake    Output 800 ml   Net -800 ml      Vitals:   Vitals:    02/09/21 0857   BP: (!) 123/51   Pulse: 50   Resp: 16   Temp: 97.3 °F (36.3 °C)   SpO2: 100%     Physical Exam:   GEN Awake female, sitting upright in bed in no apparent distress. Appears given age. Cachectic  EYES Pupils are equally round. No scleral erythema, discharge, or conjunctivitis. HENT Mucous membranes are moist. Oral pharynx without exudates, no evidence of thrush. NECK Supple, no apparent thyromegaly or masses. RESP Clear to auscultation, no wheezes, rales or rhonchi. Symmetric chest movement while on room air. CARDIO/VASC bradycardic  GI Abdomen is soft without significant tenderness, masses, or guarding. Bowel sounds are normoactive. Rectal exam deferred. MSK No gross joint deformities. SKIN Normal coloration, warm, dry. NEURO Cranial nerves appear grossly intact, normal speech, no lateralizing weakness. PSYCH Awake, alert, oriented x 4. Affect appropriate.     Medications:   Medications:    sodium zirconium cyclosilicate  10 g Oral Once    [START ON 2/10/2021] ceFAZolin  1,000 mg Intravenous On Call to OR    meropenem  1,000 mg Intravenous Q8H  albuterol sulfate HFA  2 puff Inhalation BID    acetaminophen  1,000 mg Oral Once    sodium chloride flush  10 mL Intravenous 2 times per day    famotidine (PEPCID) injection  20 mg Intravenous BID    clopidogrel  75 mg Oral Daily    dabigatran  150 mg Oral BID    DULoxetine  60 mg Oral Daily    gabapentin  300 mg Oral TID    levothyroxine  150 mcg Oral QAM AC    levETIRAcetam  1,000 mg Oral Daily    midodrine  10 mg Oral TID WC    mirtazapine  30 mg Oral Nightly    ranolazine  500 mg Oral Q12H    amiodarone  400 mg Oral Daily    atorvastatin  80 mg Oral Nightly    budesonide-formoterol  2 puff Inhalation BID    calcium elemental  500 mg Oral Daily      Infusions:    lactated ringers 100 mL/hr at 02/09/21 0907     PRN Meds:     ipratropium-albuterol, 3 mL, Q4H PRN      sodium chloride flush, 10 mL, PRN      acetaminophen, 650 mg, Q6H PRN    Or      acetaminophen, 650 mg, Q6H PRN      polyethylene glycol, 17 g, Daily PRN      nitroGLYCERIN, 0.4 mg, Q5 Min PRN      magnesium sulfate, 2,000 mg, PRN      oxyCODONE, 20 mg, Q3H PRN      promethazine, 25 mg, Q6H PRN      Recent Labs     02/07/21  0429 02/08/21  1144 02/09/21  0307   WBC 5.7 6.0 5.5   HGB 12.2* 10.9* 11.3*   HCT 42.2 36.0* 40.3    236 229      Recent Labs     02/07/21  0429 02/08/21  1144 02/09/21  0307    139 139   K 4.1 4.8 5.6*    105 107   CO2 19* 25 22   BUN 17 10 10   CREATININE 1.1 0.7 0.7     Recent Labs     02/07/21  0429 02/08/21  1144 02/09/21  0307   AST 18 15 22   ALT 10 9* 10   BILITOT 0.2 0.2 0.2   ALKPHOS 125 122 128     No results for input(s): INR in the last 72 hours.   Recent Labs     02/06/21  1755 02/06/21  2249 02/07/21  0429   CKTOTAL 34  --   --    TROPONINT <0.010 <0.010 <0.010      Imaging reviewed    Electronically signed by Elvia Ridley MD on 2/9/2021 at 1:10 PM

## 2021-02-09 NOTE — ANESTHESIA POSTPROCEDURE EVALUATION
Department of Anesthesiology  Postprocedure Note    Patient: Tesha Miller  MRN: [id number]  YOB: 1960  Date of evaluation: 2/9/2021  Time:  11:59 AM     Procedure Summary     Date: 02/09/21 Room / Location: [address]    Anesthesia Start: 5706 Anesthesia Stop: 3566    Procedure: MEDIPORT INSERTION UNDER FLUOROSCOPY (N/A ) Diagnosis: (-)    Surgeons: Bay Rossi MD Responsible Provider: Behzad Owen MD    Anesthesia Type: MAC ASA Status: 4          Anesthesia Type: MAC    Jorge Phase I:  9    Jorge Phase II:  9    Last vitals: Reviewed and per EMR flowsheets.        Anesthesia Post Evaluation    Patient location during evaluation: floor  Patient participation: complete - patient participated  Level of consciousness: awake and alert  Pain score: 3 (chronic back pain)  Airway patency: patent  Nausea & Vomiting: no nausea and no vomiting  Complications: no  Cardiovascular status: hemodynamically stable  Respiratory status: spontaneous ventilation and nasal cannula  Hydration status: stable

## 2021-02-09 NOTE — ANESTHESIA PRE PROCEDURE
Department of Anesthesiology  Preprocedure Note       Name:  Britany Valera   Age:  62 y.o.  :  1960                                          MRN:  [id number]         Date:  2021      Surgeon: Zuri Cid):  Radha Huizar MD    Procedure: Procedure(s): MEDIPORT INSERTION UNDER FLUOROSCOPY    Medications prior to admission:   Prior to Admission medications    Medication Sig Start Date End Date Taking? Authorizing Provider   aspirin 81 MG tablet Take 1 tablet by mouth daily 21   Ramana Villeda MD   beclomethasone (QVAR) 40 MCG/ACT inhaler Inhale 2 puffs into the lungs 2 times daily 21   Historical Provider, MD   acarbose (PRECOSE) 25 MG tablet Take 25 mg by mouth 3 times daily (with meals) 20   Historical Provider, MD   budesonide-formoterol (SYMBICORT) 160-4.5 MCG/ACT AERO Inhale 2 puffs into the lungs 2 times daily 21  Historical Provider, MD   bumetanide (BUMEX) 1 MG tablet Take 1 mg by mouth daily 20   Historical Provider, MD   calcium citrate (CALCITRATE) 950 (200 Ca) MG tablet Take 1,425 mg by mouth daily 20   Historical Provider, MD   dabigatran (PRADAXA) 75 MG capsule Take 150 mg by mouth 21  Historical Provider, MD   DULoxetine (CYMBALTA) 60 MG extended release capsule Take 60 mg by mouth daily 20   Historical Provider, MD   ergocalciferol (ERGOCALCIFEROL) 1.25 MG (48641 UT) capsule Take 50,000 Units by mouth Twice a Week 20   Historical Provider, MD   gabapentin (NEURONTIN) 300 MG capsule Take 300 mg by mouth 3 times daily.  20   Historical Provider, MD   metoprolol succinate (TOPROL XL) 25 MG extended release tablet Take 12.5 mg by mouth daily Half a tablet (12.5 total)    Historical Provider, MD   mirtazapine (REMERON) 30 MG tablet Take 30 mg by mouth nightly 10/8/20   Historical Provider, MD   omeprazole (PRILOSEC) 20 MG delayed release capsule omeprazole 20 mg capsule,delayed release    Historical Provider, MD promethazine (PHENERGAN) 25 MG tablet Take 25 mg by mouth 4 times daily as needed 12/7/20   Historical Provider, MD   ranolazine (RANEXA) 500 MG extended release tablet Take 500 mg by mouth every 12 hours 12/21/20   Historical Provider, MD   senna (SENOKOT) 8.6 MG tablet Take 1 tablet by mouth daily    Historical Provider, MD   spironolactone (ALDACTONE) 25 MG tablet Take 12.5 mg by mouth daily 9/1/20   Historical Provider, MD   cyanocobalamin 1000 MCG tablet Take 1 tablet by mouth daily    Historical Provider, MD   famotidine (PEPCID) 20 MG tablet Take 20 mg by mouth 2 times daily 11/17/20 11/17/21  Historical Provider, MD   ferrous gluconate (FERGON) 324 (38 Fe) MG tablet Take 1 tablet by mouth 2 times daily    Historical Provider, MD   levETIRAcetam (KEPPRA) 1000 MG tablet Take 1,000 mg by mouth daily 11/5/20   Historical Provider, MD   levothyroxine (SYNTHROID) 150 MCG tablet Take 150 mcg by mouth every morning (before breakfast) 11/9/20   Historical Provider, MD   magnesium oxide (MAG-OX) 400 MG tablet Take 400 mg by mouth daily 11/9/20   Historical Provider, MD   nitroGLYCERIN (NITROSTAT) 0.4 MG SL tablet Place 0.4 mg under the tongue as needed 4/7/20   Historical Provider, MD   potassium chloride (KLOR-CON M) 10 MEQ extended release tablet Take 10 mEq by mouth 2 times daily 2 (10mEq) 1/18/21 1/18/22  Historical Provider, MD   amiodarone (CORDARONE) 200 MG tablet Take 400 mg by mouth daily    Historical Provider, MD   oxyCODONE (ROXICODONE) 20 MG immediate release tablet Take 20 mg by mouth every 3 hours as needed.  9/11/20   Historical Provider, MD   chlorhexidine gluconate (ANTISEPTIC SKIN CLEANSER) 4 % SOLN external solution Apply topically daily 9/30/20   Radhika Murphy MD   midodrine (PROAMATINE) 10 MG tablet Take 1 tablet by mouth 3 times daily (with meals) 6/4/20   Mercedes Fabian MD ipratropium-albuterol (DUONEB) 0.5-2.5 (3) MG/3ML SOLN nebulizer solution Inhale 3 mLs into the lungs every 4 hours (while awake) 11/18/19   Gris Fam MD   benzocaine-menthol (CEPACOL SORE THROAT) 15-3.6 MG lozenge Take 1 lozenge by mouth every 2 hours as needed for Sore Throat 11/1/19   Chiki Patel MD   docusate sodium (COLACE) 100 MG capsule Take 100 mg by mouth 2 times daily as needed for Constipation    Historical Provider, MD   atorvastatin (LIPITOR) 80 MG tablet Take 80 mg by mouth nightly    Historical Provider, MD   clopidogrel (PLAVIX) 75 MG tablet Take 75 mg by mouth daily    Historical Provider, MD   acetaminophen 650 MG TABS Take 650 mg by mouth every 4 hours as needed 4/9/16   Vi Delacruz MD   albuterol (PROVENTIL HFA;VENTOLIN HFA) 108 (90 BASE) MCG/ACT inhaler Inhale 2 puffs into the lungs every 6 hours as needed.       Historical Provider, MD       Current medications:    Current Facility-Administered Medications   Medication Dose Route Frequency Provider Last Rate Last Admin    sodium zirconium cyclosilicate (LOKELMA) oral suspension 10 g  10 g Oral Once Melissa Rain MD        [facility]) 1,000 mg in sodium chloride 0.9 % 100 mL IVPB (mini-bag)  1,000 mg Intravenous Q8H Melissa Rain MD   Stopped at 02/09/21 0102    albuterol sulfate  (90 Base) MCG/ACT inhaler 2 puff  2 puff Inhalation BID Melissa Rain MD   2 puff at 02/09/21 0736    ipratropium-albuterol (DUONEB) nebulizer solution 3 mL  3 mL Inhalation Q4H PRN Melissa Rain MD        acetaminophen (TYLENOL) tablet 1,000 mg  1,000 mg Oral Once Issa Oliver, DO        sodium chloride flush 0.9 % injection 10 mL  10 mL Intravenous 2 times per day JESSE Barrios - CNP   10 mL at 02/08/21 2119    sodium chloride flush 0.9 % injection 10 mL  10 mL Intravenous PRN JESSE Barrios - CNP  atorvastatin (LIPITOR) tablet 80 mg  80 mg Oral Nightly JESSE Monsalve - CNP   80 mg at 02/08/21 2119    budesonide-formoterol (SYMBICORT) 160-4.5 MCG/ACT inhaler 2 puff  2 puff Inhalation BID Izabella Brewster APRN - CNP   2 puff at 02/09/21 0736    promethazine (PHENERGAN) tablet 25 mg  25 mg Oral Q6H PRN Jose Stroud PA-C   25 mg at 02/09/21 0321    calcium elemental (OSCAL) tablet 500 mg  500 mg Oral Daily JESSE Monsalve - CNP   500 mg at 02/08/21 1228       Allergies: Allergies   Allergen Reactions    Fentanyl Swelling     Other reaction(s): Angioedema (swelling)    Moxifloxacin Hcl In Nacl Swelling and Rash    Povidone-Iodine Rash     Other reaction(s): AOF      Cyclobenzaprine      Other reaction(s): Other - comment required  \" it make my muscle very weak because of my MS\"  \" it make my muscle very weak because of my MS\"      Methocarbamol      Worsening edema  Other reaction(s): Other - comment required  Worsening edema  Worsening edema  Worsening edema      Tramadol Other (See Comments)     Doctor doesn't her to take due to heart problems  Seizures   Doctor doesn't her to take due to lowers seizure threshold.  Baclofen     Ibuprofen      \"Due to heart problems\"    Naproxen     Nsaids      \"Due to heart problems\"    Tizanidine     Tolmetin      \"Due to heart problems\"    Tramadol Hcl      Other reaction(s):  Other - comment required  Told not to take due to history of seizures    Betadine [Povidone Iodine] Rash    Moxifloxacin Rash and Swelling     Lips swell and tingling in face   Lips swell and tingling in face   Lips swell and tingling in face   Lips swell and tingling in face   Around mouth       Problem List:    Patient Active Problem List   Diagnosis Code    Chest pain R07.9    CAD (coronary artery disease) I25.10    Hyperlipidemia E78.5    Thyroid disease E07.9    Acute exacerbation of CHF (congestive heart failure) ([facility]) I50.9    CHF exacerbation ([facility]) I50.9  Congestive heart failure (HCC) I50.9    Left upper quadrant pain R10.12    Angina at rest [facility]) I20.8    Coronary artery disease involving coronary bypass graft of native heart with angina pectoris ([facility]) I25.709    Shortness of breath R06.02    Ischemic cardiomyopathy I25.5    Acute metabolic encephalopathy L56.94    Acute delirium R41.0    Sacral pain M53.3    Acute on chronic systolic CHF (congestive heart failure), NYHA class 3 ([facility]) I50.23    Severe malnutrition ([facility]) E43    Cellulitis L03.90    Cellulitis at gastrostomy tube site ([facility] K94.22, L03.319    History of Maru-en-Y gastric bypass Z98.84    Gastroparesis K31.84    Abdominal pain R10.9    Feeding tube dysfunction T85.598A    Abdominal pain, epigastric R10.13    Gastrojejunostomy tube status ([facility]) Z93.4    Chronic malnutrition ([facility]) E46    Asplenia Q89.01    Shockable cardiac rhythm detected by automated external defibrillator I49.9    Financial difficulties Z59.8    Chest pain at rest R07.9    Closed nondisplaced transverse fracture of left patella S82.035A    Contusion of right knee S80. 01XA    Multifocal pneumonia J18.9    Abnormal cardiac function test R94.30    Chronic systolic heart failure ([facility]) I50.22    Seizure disorder ([facility]) G40.909    Hypothyroidism E03.9    Cardiomyopathy ([facility]) I42.9    Acute chest pain R07.9    Fever in adult R50.9    Pyelonephritis N12    MRSA bacteremia R78.81, B95.62    HFrEF (heart failure with reduced ejection fraction) ([facility]) I50.20    Infected chest wall abrasion, right, subsequent encounter S20.311D, L08.9    Granuloma of skin L92.9    Open wound of right chest wall S21.101A    MRSA infection A49.02    Klebsiella pneumoniae infection A49.8    Symptomatic bradycardia R00.1    History of pacemaker with removal secondary to infected leads Z95.0    Avascular necrosis ([facility]) M87.00    Multiple sclerosis ([facility]) G35    Syncope R55    Bradycardia R00.1  Ulcer of chest wall, limited to breakdown of skin ([address] L98.491    Pneumonia J18.9    Poor intravenous access Z78.9    Syncope and collapse R55       Past Medical History:        Diagnosis Date    Acute delirium     Arrhythmia     Arthritis     Asplenia     Asthma     Avascular necrosis ([address]     CAD (coronary artery disease)     1st stent at age 46    Cardiomyopathy [facility])     Cerebral artery occlusion with cerebral infarction ([address]     CHF (congestive heart failure) (HCC)     Enlarged liver     GERD (gastroesophageal reflux disease)     H/O echocardiogram 4/6/16    EF 55%, trivial TR & MR, stage 1 diastolic dysfunction    History of blood transfusion     Hx of cardiovascular stress test 12/29/2015    Alessia: EF 45%. Pharmacologic stress myocardial perfusion scan shows a fixed inferior-lateral defect w/ no inducible ischemia. No evidence of ischemia. Inferior-lateral infarction.  Normal LVSF    Hyperlipidemia     Hypertension     Lymphoma ([address]     Denies    MI, old     Movement disorder     MS (multiple sclerosis) ([address]     OP (osteoporosis)     PE (pulmonary embolism)     trapese filter    Pneumonia     Pyelonephritis     Seizures ([address]     Severe malnutrition ([address]     Spleen enlarged     Thyroid disease        Past Surgical History:        Procedure Laterality Date    ABDOMEN SURGERY      APPENDECTOMY      CARDIAC DEFIBRILLATOR PLACEMENT      evera [id number] CONDITIONAL    CHOLECYSTECTOMY      COLONOSCOPY      CORONARY ANGIOPLASTY WITH STENT PLACEMENT      3 stents    CORONARY ARTERY BYPASS GRAFT      Age 49    ENDOSCOPY, COLON, DIAGNOSTIC      GASTRIC BYPASS SURGERY      HERNIA REPAIR      HIP SURGERY      HYSTERECTOMY      JOINT REPLACEMENT      PORT SURGERY N/A 6/1/2020    PORT INSERTION performed [facility] Mary [facility] MD [facility]ULAR [facility]         Social History:    Social History Tobacco Use    Smoking status: Never Smoker    Smokeless tobacco: Never Used   Substance Use Topics    Alcohol use: No                                Counseling given: Not Answered      Vital Signs (Current):   Vitals:    02/08/21 2111 02/08/21 2130 02/09/21 0000 02/09/21 0317   BP: (!) 90/53  (!) 101/53 (!) 131/53   Pulse: 52  53 50   Resp: 18  (!) 65 (!) 49   Temp: 36.6 °C (97.8 °F)  36.5 °C (97.7 °F) 36.5 °C (97.7 °F)   TempSrc: Oral  Oral Oral   SpO2: 100% 99% 99% 100%   Weight:    124 lb 12.8 oz (56.6 kg)   Height:                                                  BP Readings from Last 3 Encounters:   02/09/21 (!) 131/53   01/27/21 127/64   01/16/21 117/60       NPO Status:                                                                                 BMI:   Wt Readings from Last 3 Encounters:   02/09/21 124 lb 12.8 oz (56.6 kg)   01/22/21 123 lb (55.8 kg)   01/15/21 128 lb (58.1 kg)     Body mass index is 20.14 kg/m². CBC:   Lab Results   Component Value Date    WBC 5.5 02/09/2021    RBC 3.13 02/09/2021    HGB 11.3 02/09/2021    HCT 40.3 02/09/2021    .8 02/09/2021    RDW 13.2 02/09/2021     02/09/2021       CMP:   Lab Results   Component Value Date     02/09/2021    K 5.6 02/09/2021     02/09/2021    CO2 22 02/09/2021    BUN 10 02/09/2021    CREATININE 0.7 02/09/2021    GFRAA >60 02/09/2021    LABGLOM >60 02/09/2021    GLUCOSE 92 02/09/2021    PROT 5.8 02/09/2021    PROT 6.3 02/21/2013    CALCIUM 8.5 02/09/2021    BILITOT 0.2 02/09/2021    ALKPHOS 128 02/09/2021    AST 22 02/09/2021    ALT 10 02/09/2021       POC Tests: No results for input(s): POCGLU, POCNA, POCK, POCCL, POCBUN, POCHEMO, POCHCT in the last 72 hours.     Coags:   Lab Results   Component Value Date    PROTIME 11.1 01/15/2021    PROTIME 10.2 01/28/2012    INR 0.92 01/15/2021    APTT 27.4 01/15/2021       HCG (If Applicable): No results found for: PREGTESTUR, PREGSERUM, HCG, HCGQUANT     ABGs:   Lab Results Component Value Date    PO2ART 59 04/25/2018    WYY3ERG 36.0 04/25/2018    DSI5OXU 25.6 04/25/2018        Type & Screen (If Applicable):  No results found for: LABABO, LABRH    Drug/Infectious Status (If Applicable):  Lab Results   Component Value Date    HEPCAB NON REACTIVE 04/26/2018       COVID-19 Screening (If Applicable):   Lab Results   Component Value Date    COVID19 NOT DETECTED 02/09/2021    COVID19 NOT DETECTED 01/23/2021         Anesthesia Evaluation  Patient summary reviewed no history of anesthetic complications:   Airway: Mallampati: III        Dental:          Pulmonary:normal exam    (+) pneumonia:  shortness of breath:  asthma:                           ROS comment: H/O PE:  trapese filter   Cardiovascular:  Exercise tolerance: poor (<4 METS),   (+) hypertension:, angina:, past MI:, CAD:, CHF:,                ROS comment: Stress test:  Alessia: EF 45%. Pharmacologic stress myocardial perfusion scan shows a fixed inferior-lateral defect w/ no inducible ischemia. No evidence of ischemia. Inferior-lateral infarction. Normal LVSF     Neuro/Psych:   (+) seizures:, CVA:, neuromuscular disease: multiple sclerosis, psychiatric history:            GI/Hepatic/Renal:   (+) GERD:, liver disease:, renal disease (Pyelonephritis):,          ROS comment: Severe malnutrition   asplenia. Endo/Other:    (+) hypothyroidism::., .                  ROS comment: Avascular necrosis  Abdominal:           Vascular:                                      Anesthesia Plan      MAC     ASA 4       Induction: intravenous. MIPS: Prophylactic antiemetics administered. Anesthetic plan and risks discussed with patient. Pre Anesthesia Evaluation complete. Anesthesia plan, risks, benefits, alternatives, and personnel discussed with patient and/or legal guardian. Patient and/or legal guardian verbalized an understanding and agreed to proceed. Anesthesia plan discussed with care team members and agreed upon. JESSE Ramirez - CRNA  2/9/2021  Brendon Denise, JESSE - CRNA   2/9/2021

## 2021-02-09 NOTE — PROGRESS NOTES
Returned from same day. Report received at bedside. Bruising noted to the left upper chest, site observed with PACU RN.

## 2021-02-09 NOTE — ANESTHESIA PRE PROCEDURE
Department of Anesthesiology  Preprocedure Note       Name:  Tesha Miller   Age:  62 y.o.  :  1960                                          MRN:  [id number]         Date:  2021      Surgeon: Jana Peter):  Bay Rossi MD    Procedure: Procedure(s): MEDIPORT INSERTION W/ FLUOROSCOPY    Medications prior to admission:   Prior to Admission medications    Medication Sig Start Date End Date Taking? Authorizing Provider   aspirin 81 MG tablet Take 1 tablet by mouth daily 21   Mago Johnson MD   beclomethasone (QVAR) 40 MCG/ACT inhaler Inhale 2 puffs into the lungs 2 times daily 21   Historical Provider, MD   acarbose (PRECOSE) 25 MG tablet Take 25 mg by mouth 3 times daily (with meals) 20   Historical Provider, MD   budesonide-formoterol (SYMBICORT) 160-4.5 MCG/ACT AERO Inhale 2 puffs into the lungs 2 times daily 21  Historical Provider, MD   bumetanide (BUMEX) 1 MG tablet Take 1 mg by mouth daily 20   Historical Provider, MD   calcium citrate (CALCITRATE) 950 (200 Ca) MG tablet Take 1,425 mg by mouth daily 20   Historical Provider, MD   dabigatran (PRADAXA) 75 MG capsule Take 150 mg by mouth 21  Historical Provider, MD   DULoxetine (CYMBALTA) 60 MG extended release capsule Take 60 mg by mouth daily 20   Historical Provider, MD   ergocalciferol (ERGOCALCIFEROL) 1.25 MG (07721 UT) capsule Take 50,000 Units by mouth Twice a Week 20   Historical Provider, MD   gabapentin (NEURONTIN) 300 MG capsule Take 300 mg by mouth 3 times daily.  20   Historical Provider, MD   metoprolol succinate (TOPROL XL) 25 MG extended release tablet Take 12.5 mg by mouth daily Half a tablet (12.5 total)    Historical Provider, MD   mirtazapine (REMERON) 30 MG tablet Take 30 mg by mouth nightly 10/8/20   Historical Provider, MD   omeprazole (PRILOSEC) 20 MG delayed release capsule omeprazole 20 mg capsule,delayed release    Historical Provider, MD promethazine (PHENERGAN) 25 MG tablet Take 25 mg by mouth 4 times daily as needed 12/7/20   Historical Provider, MD   ranolazine (RANEXA) 500 MG extended release tablet Take 500 mg by mouth every 12 hours 12/21/20   Historical Provider, MD   senna (SENOKOT) 8.6 MG tablet Take 1 tablet by mouth daily    Historical Provider, MD   spironolactone (ALDACTONE) 25 MG tablet Take 12.5 mg by mouth daily 9/1/20   Historical Provider, MD   cyanocobalamin 1000 MCG tablet Take 1 tablet by mouth daily    Historical Provider, MD   famotidine (PEPCID) 20 MG tablet Take 20 mg by mouth 2 times daily 11/17/20 11/17/21  Historical Provider, MD   ferrous gluconate (FERGON) 324 (38 Fe) MG tablet Take 1 tablet by mouth 2 times daily    Historical Provider, MD   levETIRAcetam (KEPPRA) 1000 MG tablet Take 1,000 mg by mouth daily 11/5/20   Historical Provider, MD   levothyroxine (SYNTHROID) 150 MCG tablet Take 150 mcg by mouth every morning (before breakfast) 11/9/20   Historical Provider, MD   magnesium oxide (MAG-OX) 400 MG tablet Take 400 mg by mouth daily 11/9/20   Historical Provider, MD   nitroGLYCERIN (NITROSTAT) 0.4 MG SL tablet Place 0.4 mg under the tongue as needed 4/7/20   Historical Provider, MD   potassium chloride (KLOR-CON M) 10 MEQ extended release tablet Take 10 mEq by mouth 2 times daily 2 (10mEq) 1/18/21 1/18/22  Historical Provider, MD   amiodarone (CORDARONE) 200 MG tablet Take 400 mg by mouth daily    Historical Provider, MD   oxyCODONE (ROXICODONE) 20 MG immediate release tablet Take 20 mg by mouth every 3 hours as needed.  9/11/20   Historical Provider, MD   chlorhexidine gluconate (ANTISEPTIC SKIN CLEANSER) 4 % SOLN external solution Apply topically daily 9/30/20   Fariha Dudley MD   midodrine (PROAMATINE) 10 MG tablet Take 1 tablet by mouth 3 times daily (with meals) 6/4/20   Ray Garcia MD ipratropium-albuterol (DUONEB) 0.5-2.5 (3) MG/3ML SOLN nebulizer solution Inhale 3 mLs into the lungs every 4 hours (while awake) 11/18/19   Shelly Deal MD   benzocaine-menthol (CEPACOL SORE THROAT) 15-3.6 MG lozenge Take 1 lozenge by mouth every 2 hours as needed for Sore Throat 11/1/19   Sai Lemus MD   docusate sodium (COLACE) 100 MG capsule Take 100 mg by mouth 2 times daily as needed for Constipation    Historical Provider, MD   atorvastatin (LIPITOR) 80 MG tablet Take 80 mg by mouth nightly    Historical Provider, MD   clopidogrel (PLAVIX) 75 MG tablet Take 75 mg by mouth daily    Historical Provider, MD   acetaminophen 650 MG TABS Take 650 mg by mouth every 4 hours as needed 4/9/16   Gabo Forman MD   albuterol (PROVENTIL HFA;VENTOLIN HFA) 108 (90 BASE) MCG/ACT inhaler Inhale 2 puffs into the lungs every 6 hours as needed. Historical Provider, MD       Current medications:    No current facility-administered medications for this visit. No current outpatient medications on file.      Facility-Administered Medications Ordered in Other Visits   Medication Dose Route Frequency Provider Last Rate Last Admin    sodium zirconium cyclosilicate (LOKELMA) oral suspension 10 g  10 g Oral Once Marisa Prieto MD        lactated ringers infusion   Intravenous Continuous Mindy Flores  mL/hr at 02/09/21 0907 New Bag at 02/09/21 0907    [START ON 2/10/2021] ceFAZolin (ANCEF) 1,000 mg in dextrose 5 % 50 mL IVPB (mini-bag)  1,000 mg Intravenous On Call to MD Aristides        meropenem (MERREM) 1,000 mg in sodium chloride 0.9 % 100 mL IVPB (mini-bag)  1,000 mg Intravenous Q8H Marisa Prieto MD   Stopped at 02/09/21 0102    albuterol sulfate  (90 Base) MCG/ACT inhaler 2 puff  2 puff Inhalation BID Marisa Prieto MD   2 puff at 02/09/21 0736    ipratropium-albuterol (DUONEB) nebulizer solution 3 mL  3 mL Inhalation Q4H PRN Cloteal MD Ida  acetaminophen (TYLENOL) tablet 1,000 mg  1,000 mg Oral Once Mónica DO Flori        sodium chloride flush 0.9 % injection 10 mL  10 mL Intravenous 2 times per day JESSE Cooper CNP   10 mL at 02/08/21 2119    sodium chloride flush 0.9 % injection 10 mL  10 mL Intravenous PRN JESSE Cooper CNP        famotidine (PEPCID) injection 20 mg  20 mg Intravenous BID JESSE Cooper CNP   20 mg at 02/08/21 2119    acetaminophen (TYLENOL) tablet 650 mg  650 mg Oral Q6H PRN JESSE Cooper CNP   650 mg at 02/09/21 1209    Or    acetaminophen (TYLENOL) suppository 650 mg  650 mg Rectal Q6H PRN JESSE Cooper CNP        polyethylene glycol (GLYCOLAX) packet 17 g  17 g Oral Daily PRN JESSE Cooper CNP        nitroGLYCERIN (NITROSTAT) SL tablet 0.4 mg  0.4 mg Sublingual Q5 Min PRN JESSE Cooper CNP        magnesium sulfate 2000 mg in 50 mL IVPB premix  2,000 mg Intravenous PRN JESSE Cooper CNP        clopidogrel (PLAVIX) tablet 75 mg  75 mg Oral Daily JESSE Cooper CNP   Stopped at 02/08/21 0827    dabigatran (PRADAXA) capsule 150 mg  150 mg Oral BID JESSE Cooper CNP   150 mg at 02/08/21 2117    DULoxetine (CYMBALTA) extended release capsule 60 mg  60 mg Oral Daily JESSE Cooper CNP   60 mg at 02/08/21 2017    gabapentin (NEURONTIN) capsule 300 mg  300 mg Oral TID JESSE Cooper CNP   300 mg at 02/08/21 2119    levothyroxine (SYNTHROID) tablet 150 mcg  150 mcg Oral QAM  JESSE Cooper CNP   150 mcg at 02/09/21 6728    levETIRAcetam (KEPPRA) tablet 1,000 mg  1,000 mg Oral Daily JESSE Cooper CNP   1,000 mg at 02/08/21 0804    midodrine (PROAMATINE) tablet 10 mg  10 mg Oral TID  JESSE Cooper CNP   10 mg at 02/08/21 1648    mirtazapine (REMERON) tablet 30 mg  30 mg Oral Nightly JESSE Cooper CNP   30 mg at 02/08/21 2657  oxyCODONE HCl (OXY-IR) immediate release tablet 20 mg  20 mg Oral Q3H PRN Pippa Berry MD   20 mg at 02/09/21 1210    ranolazine (RANEXA) extended release tablet 500 mg  500 mg Oral Q12H Ardath Maxcy, APRN - CNP   500 mg at 02/08/21 2118    amiodarone (CORDARONE) tablet 400 mg  400 mg Oral Daily Ardath Maxcy, APRN - CNP   Stopped at 02/08/21 5358    atorvastatin (LIPITOR) tablet 80 mg  80 mg Oral Nightly Ardath Maxcy, APRN - CNP   80 mg at 02/08/21 2119    budesonide-formoterol (SYMBICORT) 160-4.5 MCG/ACT inhaler 2 puff  2 puff Inhalation BID Ardath Maxcy, APRN - CNP   2 puff at 02/09/21 0736    promethazine (PHENERGAN) tablet 25 mg  25 mg Oral Q6H PRN Deny Gao PA-C   25 mg at 02/09/21 1210    calcium elemental (OSCAL) tablet 500 mg  500 mg Oral Daily Ardath Maxcy, APRN - CNP   500 mg at 02/08/21 1228       Allergies: Allergies   Allergen Reactions    Fentanyl Swelling     Other reaction(s): Angioedema (swelling)    Moxifloxacin Hcl In Nacl Swelling and Rash    Povidone-Iodine Rash     Other reaction(s): AOF      Cyclobenzaprine      Other reaction(s): Other - comment required  \" it make my muscle very weak because of my MS\"  \" it make my muscle very weak because of my MS\"      Methocarbamol      Worsening edema  Other reaction(s): Other - comment required  Worsening edema  Worsening edema  Worsening edema      Tramadol Other (See Comments)     Doctor doesn't her to take due to heart problems  Seizures   Doctor doesn't her to take due to lowers seizure threshold.  Baclofen     Ibuprofen      \"Due to heart problems\"    Naproxen     Nsaids      \"Due to heart problems\"    Tizanidine     Tolmetin      \"Due to heart problems\"    Tramadol Hcl      Other reaction(s):  Other - comment required  Told not to take due to history of seizures    Betadine [Povidone Iodine] Rash    Moxifloxacin Rash and Swelling     Lips swell and tingling in face   Lips swell and tingling in face  Granuloma of skin L92.9    Open wound of right chest wall S21.101A    MRSA infection A49.02    Klebsiella pneumoniae infection A49.8    Symptomatic bradycardia R00.1    History of pacemaker with removal secondary to infected leads Z95.0    Avascular necrosis (HCC) M87.00    Multiple sclerosis (HCC) G35    Syncope R55    Bradycardia R00.1    Ulcer of chest wall, limited to breakdown of skin (HCC) L98.491    Pneumonia J18.9    Poor intravenous access Z78.9    Syncope and collapse R55       Past Medical History:        Diagnosis Date    Acute delirium     Arrhythmia     Arthritis     Asplenia     Asthma     Avascular necrosis ([address]     CAD (coronary artery disease)     1st stent at age 46    Cardiomyopathy [facility])     Cerebral artery occlusion with cerebral infarction ([address]     CHF (congestive heart failure) (HCC)     Enlarged liver     GERD (gastroesophageal reflux disease)     H/O echocardiogram 4/6/16    EF 55%, trivial TR & MR, stage 1 diastolic dysfunction    History of blood transfusion     Hx of cardiovascular stress test 12/29/2015    Alessia: EF 45%. Pharmacologic stress myocardial perfusion scan shows a fixed inferior-lateral defect w/ no inducible ischemia. No evidence of ischemia. Inferior-lateral infarction.  Normal LVSF    Hyperlipidemia     Hypertension     Lymphoma ([address]     Denies    MI, old     Movement disorder     MS (multiple sclerosis) ([address]     OP (osteoporosis)     PE (pulmonary embolism)     trapese filter    Pneumonia     Pyelonephritis     Seizures (HCC)     Severe malnutrition ([address]     Spleen enlarged     Thyroid disease        Past Surgical History:        Procedure Laterality Date    ABDOMEN SURGERY      APPENDECTOMY      CARDIAC DEFIBRILLATOR PLACEMENT      evera [id number] CONDITIONAL    CHOLECYSTECTOMY      COLONOSCOPY      CORONARY ANGIOPLASTY WITH STENT PLACEMENT      3 stents    CORONARY ARTERY BYPASS GRAFT      Age 49  ENDOSCOPY, COLON, DIAGNOSTIC      GASTRIC BYPASS SURGERY      HERNIA REPAIR      HIP SURGERY      HYSTERECTOMY      JOINT REPLACEMENT      PORT SURGERY N/A 6/1/2020    PORT INSERTION performed by Ray Rico MD at [address] [place] [address]      VASCULAR SURGERY         Social History:    Social History     Tobacco Use    Smoking status: Never Smoker    Smokeless tobacco: Never Used   Substance Use Topics    Alcohol use: No                                Counseling given: Not Answered      Vital Signs (Current): There were no vitals filed for this visit. BP Readings from Last 3 Encounters:   02/09/21 (!) 123/51   02/09/21 (!) 114/58   01/27/21 127/64       NPO Status:                                                                                 BMI:   Wt Readings from Last 3 Encounters:   02/09/21 124 lb 12.8 oz (56.6 kg)   01/22/21 123 lb (55.8 kg)   01/15/21 128 lb (58.1 kg)     There is no height or weight on file to calculate BMI.    CBC:   Lab Results   Component Value Date    WBC 5.5 02/09/2021    RBC 3.13 02/09/2021    HGB 11.3 02/09/2021    HCT 40.3 02/09/2021    .8 02/09/2021    RDW 13.2 02/09/2021     02/09/2021       CMP:   Lab Results   Component Value Date     02/09/2021    K 5.6 02/09/2021     02/09/2021    CO2 22 02/09/2021    BUN 10 02/09/2021    CREATININE 0.7 02/09/2021    GFRAA >60 02/09/2021    LABGLOM >60 02/09/2021    GLUCOSE 92 02/09/2021    PROT 5.8 02/09/2021    PROT 6.3 02/21/2013    CALCIUM 8.5 02/09/2021    BILITOT 0.2 02/09/2021    ALKPHOS 128 02/09/2021    AST 22 02/09/2021    ALT 10 02/09/2021       POC Tests: No results for input(s): POCGLU, POCNA, POCK, POCCL, POCBUN, POCHEMO, POCHCT in the last 72 hours.     Coags:   Lab Results   Component Value Date    PROTIME 11.1 01/15/2021    PROTIME 10.2 01/28/2012    INR 0.92 01/15/2021    APTT 27.4 01/15/2021 HCG (If Applicable): No results found for: PREGTESTUR, PREGSERUM, HCG, HCGQUANT     ABGs:   Lab Results   Component Value Date    PO2ART 59 04/25/2018    OGS6JZT 36.0 04/25/2018    [id number] 25.6 04/25/2018        Type & Screen (If Applicable):  No results found for: LABABO, LABRH    Drug/Infectious Status (If Applicable):  Lab Results   Component Value Date    HEPCAB NON REACTIVE 04/26/2018       COVID-19 Screening (If Applicable):   Lab Results   Component Value Date    COVID19 NOT DETECTED 02/09/2021    COVID19 NOT DETECTED 01/23/2021         Anesthesia Evaluation  Patient summary reviewed no history of anesthetic complications:   Airway: Mallampati: III  TM distance: >3 FB   Neck ROM: full  Mouth opening: > = 3 FB Dental:    (+) upper dentures      Pulmonary:normal exam    (+) pneumonia:  shortness of breath:  asthma:                           ROS comment: H/O PE:  trapese filter   Cardiovascular:  Exercise tolerance: poor (<4 METS),   (+) hypertension:, angina:, past MI:, CAD:, CHF:,                ROS comment: Stress test:  Alessia: EF 45%. Pharmacologic stress myocardial perfusion scan shows a fixed inferior-lateral defect w/ no inducible ischemia. No evidence of ischemia. Inferior-lateral infarction. Normal LVSF     Neuro/Psych:   (+) seizures:, CVA:, neuromuscular disease: multiple sclerosis, psychiatric history:            GI/Hepatic/Renal:   (+) GERD:, liver disease:, renal disease (Pyelonephritis):,          ROS comment: Severe malnutrition   asplenia. Endo/Other:    (+) hypothyroidism::., .          Pt had no PAT visit        ROS comment: Avascular necrosis  Abdominal:           Vascular:                                      Anesthesia Plan      MAC and general     ASA 4       Induction: intravenous. MIPS: Postoperative opioids intended and Prophylactic antiemetics administered. Anesthetic plan and risks discussed with patient. Plan discussed with CRNA. Attending anesthesiologist reviewed and agrees with Pre Eval content      Pre Anesthesia Evaluation complete. Anesthesia plan, risks, benefits, alternatives, and personnel discussed with patient and/or legal guardian. Patient and/or legal guardian verbalized an understanding and agreed to proceed. Anesthesia plan discussed with care team members and agreed upon.   Catrachito Hansen, JESSE - SHELDON  2/9/2021  JESSE Burks - SHELDON   2/9/2021

## 2021-02-09 NOTE — OP NOTE
[facility] [facility] OPERATIVE PROCEDURE NOTE    Nicole Goldsmith, 1960, 62 y.o.,  female, CSN: [id number]  February 9, 2021    PRE-OP DIAGNOSIS: Very poor IV access, and need for IV. POST-OP DIAGNOSIS: Same + TOTALLY OCCLUDED LEFT SCV. [place] Gumarizaing PROCEDURE: ATTEMPTED BUT FAILED Placement of an 6.6-Nepali, low profile Mediport via left  subclavian vein approach, and also left internal jugular vein. London Gulling using the micropuncture kit first, Under Ultrasound guidance, and under Direct Fluoroscopy BUT FAILED. London Uribeing . 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 London Curran    SURGEON(S):   Gentry Sheets M.D., F.A.C.S., F.I.C.S. Co- SURGEON:  Dr. Arnold MD    ASSISTANT(S):  Aida Aguilar, JESSE- CNP  The use of a first assistant was necessary for the proper positioning, prepping, and draping of the patient, intraoperative retraction, passing sutures and passing sutures and closure of skin and subcutaneous tissues. ANESTHESIA: MAC + Local 1% Xylocaine with Epinephrine, 0.5% Marcaine, mixed, 50% : 50%    SPECIMENS: NONE.    ESTIMATED BLOOD LOSS:  Less then 10 ml           DRAIN: NONE    COMPLICATIONS: NONE           CONDITION / DISPOSITION:  Stable, to PACU     OPERATIVE DESCRIPTION:      After proper informed consent was signed by the  patient with all the risks, benefits, potential complications, and possible  alternatives of the procedure, including but not limited to bleeding,  infection, pneumothorax requiring chest tube placement, port  clotting/clogging requiring port revision with new port placement, amongst  others, and like in this case INABILITY to place the port, due to a total occlusion of her left subclavian vein. . the patient was properly identified. In the holding area, she received  1 grams of Ancef IV.  TEDs and SCDs were placed on her legs and activated bilaterally. she voided her urinary bladder prior to OR, and Hibiclens skin  prep was performed of the upper chest and lower neck bilaterally. she was  taken to the operating room and was placed supine on the operating room  table, and after institution of general IV sedation, a shoulder bump was  used. A folded sheet was placed under her scapulae bilaterally, after a sterile dressing was placed on her right subclavian open non healing (months old) wound. We exposed  the clavicle as anterior as possible. The patient was placed in  Trendelenburg position. The bilateral upper chest and neck all the way to  the chin were prepped and draped in the usual sterile fashion also using  Ioban drape to prevent any contact of the port with bacterial skin cherry. A  left SCV approach was started with the micro puncture kit, the left subclavian vein was cannulated not without difficulty. Dark venous blood was aspirated. The guidewire was then passed through the  trocar but despite my BEST efforts under fluoroscopy it would go up the internal jugular vein and not crossing the midline and could NOT reach the heart. AFTER MULTIPLE ATTEMPTS were tried, unsuccessfully, the left IJ was cannulated but still the guidewire could NOT pass to the right. . 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 Decision was made to abort, and Dermabond skin glue was applied on top of the puncture sites after further  more local anesthetic mixture was injected. A postoperative x-ray was ordered stat. The patient tolerated  The attempted, but failed procedure well without any complications and was sent to the PACU in  stable condition. I will discuss with her and her other attendings, and possibly place a port in the right SCV, AROUND her skin wound. .    ____________________________________________    Yodit Garcia MD, FACS, FICS    2/9/2021  9:05 AM

## 2021-02-10 ENCOUNTER — APPOINTMENT (OUTPATIENT)
Dept: GENERAL RADIOLOGY | Age: 61
DRG: 982 | End: 2021-02-10
Payer: MEDICARE

## 2021-02-10 ENCOUNTER — ANESTHESIA (OUTPATIENT)
Dept: OPERATING ROOM | Age: 61
DRG: 982 | End: 2021-02-10
Payer: MEDICARE

## 2021-02-10 VITALS
TEMPERATURE: 97 F | OXYGEN SATURATION: 100 % | DIASTOLIC BLOOD PRESSURE: 61 MMHG | SYSTOLIC BLOOD PRESSURE: 92 MMHG | RESPIRATION RATE: 5 BRPM

## 2021-02-10 LAB
ALBUMIN SERPL-MCNC: 3.2 GM/DL (ref 3.4–5)
ALP BLD-CCNC: 103 IU/L (ref 40–128)
ALT SERPL-CCNC: 7 U/L (ref 10–40)
ANION GAP SERPL CALCULATED.3IONS-SCNC: 6 MMOL/L (ref 4–16)
AST SERPL-CCNC: 11 IU/L (ref 15–37)
BILIRUB SERPL-MCNC: 0.2 MG/DL (ref 0–1)
BUN BLDV-MCNC: 9 MG/DL (ref 6–23)
CALCIUM SERPL-MCNC: 8.1 MG/DL (ref 8.3–10.6)
CHLORIDE BLD-SCNC: 107 MMOL/L (ref 99–110)
CO2: 25 MMOL/L (ref 21–32)
CREAT SERPL-MCNC: 0.6 MG/DL (ref 0.6–1.1)
GFR AFRICAN AMERICAN: >60 ML/MIN/1.73M2
GFR NON-AFRICAN AMERICAN: >60 ML/MIN/1.73M2
GLUCOSE BLD-MCNC: 102 MG/DL (ref 70–99)
HCT VFR BLD CALC: 31.5 % (ref 37–47)
HEMOGLOBIN: 9.7 GM/DL (ref 12.5–16)
MCH RBC QN AUTO: 35.8 PG (ref 27–31)
MCHC RBC AUTO-ENTMCNC: 30.8 % (ref 32–36)
MCV RBC AUTO: 116.2 FL (ref 78–100)
PDW BLD-RTO: 13.3 % (ref 11.7–14.9)
PLATELET # BLD: 244 K/CU MM (ref 140–440)
PMV BLD AUTO: 10.3 FL (ref 7.5–11.1)
POTASSIUM SERPL-SCNC: 4.9 MMOL/L (ref 3.5–5.1)
RBC # BLD: 2.71 M/CU MM (ref 4.2–5.4)
SARS-COV-2, NAAT: NOT DETECTED
SARS-COV-2: NORMAL
SODIUM BLD-SCNC: 138 MMOL/L (ref 135–145)
SOURCE: NORMAL
TOTAL PROTEIN: 4.9 GM/DL (ref 6.4–8.2)
WBC # BLD: 7 K/CU MM (ref 4–10.5)

## 2021-02-10 PROCEDURE — 2580000003 HC RX 258: Performed by: INTERNAL MEDICINE

## 2021-02-10 PROCEDURE — 7100000000 HC PACU RECOVERY - FIRST 15 MIN: Performed by: SURGERY

## 2021-02-10 PROCEDURE — 36415 COLL VENOUS BLD VENIPUNCTURE: CPT

## 2021-02-10 PROCEDURE — 85027 COMPLETE CBC AUTOMATED: CPT

## 2021-02-10 PROCEDURE — 6370000000 HC RX 637 (ALT 250 FOR IP): Performed by: PHYSICIAN ASSISTANT

## 2021-02-10 PROCEDURE — 6360000002 HC RX W HCPCS: Performed by: SURGERY

## 2021-02-10 PROCEDURE — 7100000001 HC PACU RECOVERY - ADDTL 15 MIN: Performed by: SURGERY

## 2021-02-10 PROCEDURE — 3700000000 HC ANESTHESIA ATTENDED CARE: Performed by: SURGERY

## 2021-02-10 PROCEDURE — 94640 AIRWAY INHALATION TREATMENT: CPT

## 2021-02-10 PROCEDURE — 6360000002 HC RX W HCPCS

## 2021-02-10 PROCEDURE — 36561 INSERT TUNNELED CV CATH: CPT | Performed by: SURGERY

## 2021-02-10 PROCEDURE — 2580000003 HC RX 258: Performed by: SURGERY

## 2021-02-10 PROCEDURE — 6370000000 HC RX 637 (ALT 250 FOR IP): Performed by: INTERNAL MEDICINE

## 2021-02-10 PROCEDURE — 1200000000 HC SEMI PRIVATE

## 2021-02-10 PROCEDURE — 2500000003 HC RX 250 WO HCPCS: Performed by: SURGERY

## 2021-02-10 PROCEDURE — 2709999900 HC NON-CHARGEABLE SUPPLY: Performed by: SURGERY

## 2021-02-10 PROCEDURE — 80053 COMPREHEN METABOLIC PANEL: CPT

## 2021-02-10 PROCEDURE — 76937 US GUIDE VASCULAR ACCESS: CPT | Performed by: SURGERY

## 2021-02-10 PROCEDURE — 6370000000 HC RX 637 (ALT 250 FOR IP): Performed by: SURGERY

## 2021-02-10 PROCEDURE — 05H533Z INSERTION OF INFUSION DEVICE INTO RIGHT SUBCLAVIAN VEIN, PERCUTANEOUS APPROACH: ICD-10-PCS | Performed by: SURGERY

## 2021-02-10 PROCEDURE — C1788 PORT, INDWELLING, IMP: HCPCS | Performed by: SURGERY

## 2021-02-10 PROCEDURE — 2500000003 HC RX 250 WO HCPCS

## 2021-02-10 PROCEDURE — 71045 X-RAY EXAM CHEST 1 VIEW: CPT

## 2021-02-10 PROCEDURE — 6370000000 HC RX 637 (ALT 250 FOR IP): Performed by: NURSE PRACTITIONER

## 2021-02-10 PROCEDURE — 6360000002 HC RX W HCPCS: Performed by: INTERNAL MEDICINE

## 2021-02-10 PROCEDURE — 3600000013 HC SURGERY LEVEL 3 ADDTL 15MIN: Performed by: SURGERY

## 2021-02-10 PROCEDURE — 94761 N-INVAS EAR/PLS OXIMETRY MLT: CPT

## 2021-02-10 PROCEDURE — 3700000001 HC ADD 15 MINUTES (ANESTHESIA): Performed by: SURGERY

## 2021-02-10 PROCEDURE — 99232 SBSQ HOSP IP/OBS MODERATE 35: CPT | Performed by: INTERNAL MEDICINE

## 2021-02-10 PROCEDURE — 99024 POSTOP FOLLOW-UP VISIT: CPT | Performed by: SURGERY

## 2021-02-10 PROCEDURE — 76000 FLUOROSCOPY <1 HR PHYS/QHP: CPT

## 2021-02-10 PROCEDURE — 3600000003 HC SURGERY LEVEL 3 BASE: Performed by: SURGERY

## 2021-02-10 PROCEDURE — 77001 FLUOROGUIDE FOR VEIN DEVICE: CPT | Performed by: SURGERY

## 2021-02-10 PROCEDURE — 0JH60WZ INSERTION OF TOTALLY IMPLANTABLE VASCULAR ACCESS DEVICE INTO CHEST SUBCUTANEOUS TISSUE AND FASCIA, OPEN APPROACH: ICD-10-PCS | Performed by: SURGERY

## 2021-02-10 RX ORDER — DIPHENHYDRAMINE HYDROCHLORIDE 50 MG/ML
12.5 INJECTION INTRAMUSCULAR; INTRAVENOUS
Status: DISCONTINUED | OUTPATIENT
Start: 2021-02-10 | End: 2021-02-10 | Stop reason: HOSPADM

## 2021-02-10 RX ORDER — MAGNESIUM SULFATE 1 G/100ML
1000 INJECTION INTRAVENOUS PRN
Status: DISCONTINUED | OUTPATIENT
Start: 2021-02-10 | End: 2021-02-11 | Stop reason: HOSPADM

## 2021-02-10 RX ORDER — SODIUM CHLORIDE 0.9 % (FLUSH) 0.9 %
10 SYRINGE (ML) INJECTION PRN
Status: DISCONTINUED | OUTPATIENT
Start: 2021-02-10 | End: 2021-02-11 | Stop reason: HOSPADM

## 2021-02-10 RX ORDER — 0.9 % SODIUM CHLORIDE 0.9 %
500 INTRAVENOUS SOLUTION INTRAVENOUS
Status: DISCONTINUED | OUTPATIENT
Start: 2021-02-10 | End: 2021-02-10 | Stop reason: HOSPADM

## 2021-02-10 RX ORDER — PROPOFOL 10 MG/ML
INJECTION, EMULSION INTRAVENOUS PRN
Status: DISCONTINUED | OUTPATIENT
Start: 2021-02-10 | End: 2021-02-10 | Stop reason: SDUPTHER

## 2021-02-10 RX ORDER — LABETALOL HYDROCHLORIDE 5 MG/ML
5 INJECTION, SOLUTION INTRAVENOUS EVERY 10 MIN PRN
Status: DISCONTINUED | OUTPATIENT
Start: 2021-02-10 | End: 2021-02-10 | Stop reason: HOSPADM

## 2021-02-10 RX ORDER — ONDANSETRON 2 MG/ML
INJECTION INTRAMUSCULAR; INTRAVENOUS PRN
Status: DISCONTINUED | OUTPATIENT
Start: 2021-02-10 | End: 2021-02-10 | Stop reason: SDUPTHER

## 2021-02-10 RX ORDER — HYDRALAZINE HYDROCHLORIDE 20 MG/ML
5 INJECTION INTRAMUSCULAR; INTRAVENOUS EVERY 10 MIN PRN
Status: DISCONTINUED | OUTPATIENT
Start: 2021-02-10 | End: 2021-02-10 | Stop reason: HOSPADM

## 2021-02-10 RX ORDER — POTASSIUM CHLORIDE 7.45 MG/ML
10 INJECTION INTRAVENOUS PRN
Status: DISCONTINUED | OUTPATIENT
Start: 2021-02-10 | End: 2021-02-11 | Stop reason: HOSPADM

## 2021-02-10 RX ORDER — SODIUM CHLORIDE, SODIUM LACTATE, POTASSIUM CHLORIDE, CALCIUM CHLORIDE 600; 310; 30; 20 MG/100ML; MG/100ML; MG/100ML; MG/100ML
INJECTION, SOLUTION INTRAVENOUS CONTINUOUS
Status: DISCONTINUED | OUTPATIENT
Start: 2021-02-10 | End: 2021-02-11 | Stop reason: HOSPADM

## 2021-02-10 RX ORDER — LIDOCAINE HYDROCHLORIDE 20 MG/ML
INJECTION, SOLUTION INTRAVENOUS PRN
Status: DISCONTINUED | OUTPATIENT
Start: 2021-02-10 | End: 2021-02-10 | Stop reason: SDUPTHER

## 2021-02-10 RX ORDER — HEPARIN SODIUM (PORCINE) LOCK FLUSH IV SOLN 100 UNIT/ML 100 UNIT/ML
SOLUTION INTRAVENOUS
Status: COMPLETED | OUTPATIENT
Start: 2021-02-10 | End: 2021-02-10

## 2021-02-10 RX ORDER — FENTANYL CITRATE 50 UG/ML
50 INJECTION, SOLUTION INTRAMUSCULAR; INTRAVENOUS EVERY 5 MIN PRN
Status: DISCONTINUED | OUTPATIENT
Start: 2021-02-10 | End: 2021-02-10 | Stop reason: HOSPADM

## 2021-02-10 RX ORDER — MORPHINE SULFATE 4 MG/ML
4 INJECTION, SOLUTION INTRAMUSCULAR; INTRAVENOUS
Status: DISCONTINUED | OUTPATIENT
Start: 2021-02-10 | End: 2021-02-11 | Stop reason: HOSPADM

## 2021-02-10 RX ORDER — HYDROMORPHONE HCL 110MG/55ML
0.5 PATIENT CONTROLLED ANALGESIA SYRINGE INTRAVENOUS EVERY 5 MIN PRN
Status: DISCONTINUED | OUTPATIENT
Start: 2021-02-10 | End: 2021-02-10 | Stop reason: HOSPADM

## 2021-02-10 RX ORDER — PROMETHAZINE HYDROCHLORIDE 25 MG/ML
6.25 INJECTION, SOLUTION INTRAMUSCULAR; INTRAVENOUS
Status: DISCONTINUED | OUTPATIENT
Start: 2021-02-10 | End: 2021-02-10 | Stop reason: HOSPADM

## 2021-02-10 RX ORDER — KETAMINE HCL 50MG/ML(1)
SYRINGE (ML) INTRAVENOUS PRN
Status: DISCONTINUED | OUTPATIENT
Start: 2021-02-10 | End: 2021-02-10 | Stop reason: SDUPTHER

## 2021-02-10 RX ORDER — ONDANSETRON 2 MG/ML
4 INJECTION INTRAMUSCULAR; INTRAVENOUS
Status: DISCONTINUED | OUTPATIENT
Start: 2021-02-10 | End: 2021-02-10 | Stop reason: HOSPADM

## 2021-02-10 RX ORDER — HYDROCODONE BITARTRATE AND ACETAMINOPHEN 5; 325 MG/1; MG/1
1 TABLET ORAL PRN
Status: DISCONTINUED | OUTPATIENT
Start: 2021-02-10 | End: 2021-02-10 | Stop reason: HOSPADM

## 2021-02-10 RX ORDER — MEPERIDINE HYDROCHLORIDE 25 MG/ML
12.5 INJECTION INTRAMUSCULAR; INTRAVENOUS; SUBCUTANEOUS EVERY 5 MIN PRN
Status: DISCONTINUED | OUTPATIENT
Start: 2021-02-10 | End: 2021-02-10 | Stop reason: HOSPADM

## 2021-02-10 RX ORDER — HYDROCODONE BITARTRATE AND ACETAMINOPHEN 5; 325 MG/1; MG/1
2 TABLET ORAL EVERY 6 HOURS PRN
Status: DISCONTINUED | OUTPATIENT
Start: 2021-02-10 | End: 2021-02-10 | Stop reason: HOSPADM

## 2021-02-10 RX ORDER — SODIUM CHLORIDE 0.9 % (FLUSH) 0.9 %
10 SYRINGE (ML) INJECTION EVERY 12 HOURS SCHEDULED
Status: DISCONTINUED | OUTPATIENT
Start: 2021-02-10 | End: 2021-02-11 | Stop reason: HOSPADM

## 2021-02-10 RX ADMIN — SODIUM CHLORIDE, POTASSIUM CHLORIDE, SODIUM LACTATE AND CALCIUM CHLORIDE: 600; 310; 30; 20 INJECTION, SOLUTION INTRAVENOUS at 06:17

## 2021-02-10 RX ADMIN — AMIODARONE HYDROCHLORIDE 400 MG: 200 TABLET ORAL at 10:49

## 2021-02-10 RX ADMIN — LEVETIRACETAM 1000 MG: 500 TABLET, FILM COATED ORAL at 10:49

## 2021-02-10 RX ADMIN — MIDODRINE HYDROCHLORIDE 10 MG: 5 TABLET ORAL at 17:47

## 2021-02-10 RX ADMIN — PHENYLEPHRINE HYDROCHLORIDE 100 MCG: 10 INJECTION INTRAVENOUS at 08:16

## 2021-02-10 RX ADMIN — PROPOFOL 70 MG: 10 INJECTION, EMULSION INTRAVENOUS at 08:05

## 2021-02-10 RX ADMIN — ACETAMINOPHEN 650 MG: 325 TABLET ORAL at 14:56

## 2021-02-10 RX ADMIN — GABAPENTIN 300 MG: 300 CAPSULE ORAL at 14:56

## 2021-02-10 RX ADMIN — CALCIUM 500 MG: 500 TABLET ORAL at 10:49

## 2021-02-10 RX ADMIN — PHENYLEPHRINE HYDROCHLORIDE 100 MCG: 10 INJECTION INTRAVENOUS at 08:32

## 2021-02-10 RX ADMIN — CLOPIDOGREL BISULFATE 75 MG: 75 TABLET ORAL at 10:49

## 2021-02-10 RX ADMIN — DABIGATRAN ETEXILATE MESYLATE 150 MG: 75 CAPSULE ORAL at 10:49

## 2021-02-10 RX ADMIN — PROMETHAZINE HYDROCHLORIDE 25 MG: 25 TABLET ORAL at 22:07

## 2021-02-10 RX ADMIN — MIDODRINE HYDROCHLORIDE 10 MG: 5 TABLET ORAL at 10:49

## 2021-02-10 RX ADMIN — FAMOTIDINE 20 MG: 10 INJECTION, SOLUTION INTRAVENOUS at 10:50

## 2021-02-10 RX ADMIN — PHENYLEPHRINE HYDROCHLORIDE 100 MCG: 10 INJECTION INTRAVENOUS at 08:25

## 2021-02-10 RX ADMIN — PROPOFOL 20 MG: 10 INJECTION, EMULSION INTRAVENOUS at 08:07

## 2021-02-10 RX ADMIN — OXYCODONE HYDROCHLORIDE 20 MG: 10 TABLET ORAL at 10:51

## 2021-02-10 RX ADMIN — ACETAMINOPHEN 650 MG: 325 TABLET ORAL at 22:07

## 2021-02-10 RX ADMIN — ALBUTEROL SULFATE 2 PUFF: 90 AEROSOL, METERED RESPIRATORY (INHALATION) at 20:17

## 2021-02-10 RX ADMIN — OXYCODONE HYDROCHLORIDE 20 MG: 10 TABLET ORAL at 06:16

## 2021-02-10 RX ADMIN — FAMOTIDINE 20 MG: 10 INJECTION, SOLUTION INTRAVENOUS at 22:06

## 2021-02-10 RX ADMIN — OXYCODONE HYDROCHLORIDE 20 MG: 10 TABLET ORAL at 22:06

## 2021-02-10 RX ADMIN — MEROPENEM 1000 MG: 1 INJECTION, POWDER, FOR SOLUTION INTRAVENOUS at 22:07

## 2021-02-10 RX ADMIN — SODIUM CHLORIDE, PRESERVATIVE FREE 10 ML: 5 INJECTION INTRAVENOUS at 10:50

## 2021-02-10 RX ADMIN — MEROPENEM 1000 MG: 1 INJECTION, POWDER, FOR SOLUTION INTRAVENOUS at 06:16

## 2021-02-10 RX ADMIN — PHENYLEPHRINE HYDROCHLORIDE 100 MCG: 10 INJECTION INTRAVENOUS at 08:36

## 2021-02-10 RX ADMIN — OXYCODONE HYDROCHLORIDE 20 MG: 10 TABLET ORAL at 14:57

## 2021-02-10 RX ADMIN — MIRTAZAPINE 30 MG: 15 TABLET, FILM COATED ORAL at 22:07

## 2021-02-10 RX ADMIN — OXYCODONE HYDROCHLORIDE 20 MG: 10 TABLET ORAL at 00:22

## 2021-02-10 RX ADMIN — GABAPENTIN 300 MG: 300 CAPSULE ORAL at 22:07

## 2021-02-10 RX ADMIN — PROMETHAZINE HYDROCHLORIDE 25 MG: 25 TABLET ORAL at 14:56

## 2021-02-10 RX ADMIN — DULOXETINE HYDROCHLORIDE 60 MG: 30 CAPSULE, DELAYED RELEASE ORAL at 10:50

## 2021-02-10 RX ADMIN — Medication 10 MG: at 08:04

## 2021-02-10 RX ADMIN — PHENYLEPHRINE HYDROCHLORIDE 100 MCG: 10 INJECTION INTRAVENOUS at 08:45

## 2021-02-10 RX ADMIN — OXYCODONE HYDROCHLORIDE 20 MG: 10 TABLET ORAL at 03:17

## 2021-02-10 RX ADMIN — SODIUM CHLORIDE, POTASSIUM CHLORIDE, SODIUM LACTATE AND CALCIUM CHLORIDE: 600; 310; 30; 20 INJECTION, SOLUTION INTRAVENOUS at 09:53

## 2021-02-10 RX ADMIN — GABAPENTIN 300 MG: 300 CAPSULE ORAL at 10:49

## 2021-02-10 RX ADMIN — SODIUM CHLORIDE, POTASSIUM CHLORIDE, SODIUM LACTATE AND CALCIUM CHLORIDE: 600; 310; 30; 20 INJECTION, SOLUTION INTRAVENOUS at 22:07

## 2021-02-10 RX ADMIN — PHENYLEPHRINE HYDROCHLORIDE 100 MCG: 10 INJECTION INTRAVENOUS at 08:48

## 2021-02-10 RX ADMIN — ACETAMINOPHEN 650 MG: 325 TABLET ORAL at 00:23

## 2021-02-10 RX ADMIN — PHENYLEPHRINE HYDROCHLORIDE 100 MCG: 10 INJECTION INTRAVENOUS at 09:07

## 2021-02-10 RX ADMIN — ONDANSETRON 4 MG: 2 INJECTION INTRAMUSCULAR; INTRAVENOUS at 08:40

## 2021-02-10 RX ADMIN — RANOLAZINE 500 MG: 500 TABLET, FILM COATED, EXTENDED RELEASE ORAL at 22:06

## 2021-02-10 RX ADMIN — PHENYLEPHRINE HYDROCHLORIDE 100 MCG: 10 INJECTION INTRAVENOUS at 08:41

## 2021-02-10 RX ADMIN — RANOLAZINE 500 MG: 500 TABLET, FILM COATED, EXTENDED RELEASE ORAL at 10:50

## 2021-02-10 RX ADMIN — PROMETHAZINE HYDROCHLORIDE 25 MG: 25 TABLET ORAL at 00:22

## 2021-02-10 RX ADMIN — MEROPENEM 1000 MG: 1 INJECTION, POWDER, FOR SOLUTION INTRAVENOUS at 14:11

## 2021-02-10 RX ADMIN — ACETAMINOPHEN 650 MG: 325 TABLET ORAL at 06:16

## 2021-02-10 RX ADMIN — PROMETHAZINE HYDROCHLORIDE 25 MG: 25 TABLET ORAL at 06:16

## 2021-02-10 RX ADMIN — DABIGATRAN ETEXILATE MESYLATE 150 MG: 75 CAPSULE ORAL at 22:06

## 2021-02-10 RX ADMIN — ATORVASTATIN CALCIUM 80 MG: 80 TABLET, FILM COATED ORAL at 22:07

## 2021-02-10 RX ADMIN — LEVOTHYROXINE SODIUM 150 MCG: 150 TABLET ORAL at 06:16

## 2021-02-10 RX ADMIN — BUDESONIDE AND FORMOTEROL FUMARATE DIHYDRATE 2 PUFF: 160; 4.5 AEROSOL RESPIRATORY (INHALATION) at 20:16

## 2021-02-10 RX ADMIN — ENOXAPARIN SODIUM 40 MG: 40 INJECTION SUBCUTANEOUS at 14:59

## 2021-02-10 RX ADMIN — LIDOCAINE HYDROCHLORIDE 100 MG: 20 INJECTION, SOLUTION INTRAVENOUS at 08:04

## 2021-02-10 RX ADMIN — OXYCODONE HYDROCHLORIDE 20 MG: 10 TABLET ORAL at 17:47

## 2021-02-10 ASSESSMENT — PULMONARY FUNCTION TESTS
PIF_VALUE: 1
PIF_VALUE: 14
PIF_VALUE: 13
PIF_VALUE: 12
PIF_VALUE: 9
PIF_VALUE: 6
PIF_VALUE: 5
PIF_VALUE: 11
PIF_VALUE: 14
PIF_VALUE: 11
PIF_VALUE: 13
PIF_VALUE: 6
PIF_VALUE: 14
PIF_VALUE: 14
PIF_VALUE: 1
PIF_VALUE: 12
PIF_VALUE: 13
PIF_VALUE: 13
PIF_VALUE: 14
PIF_VALUE: 13
PIF_VALUE: 13
PIF_VALUE: 30
PIF_VALUE: 17
PIF_VALUE: 13
PIF_VALUE: 14
PIF_VALUE: 14
PIF_VALUE: 9
PIF_VALUE: 6
PIF_VALUE: 14
PIF_VALUE: 13
PIF_VALUE: 14
PIF_VALUE: 13
PIF_VALUE: 1
PIF_VALUE: 14
PIF_VALUE: 10
PIF_VALUE: 13
PIF_VALUE: 6
PIF_VALUE: 33
PIF_VALUE: 1
PIF_VALUE: 18
PIF_VALUE: 12
PIF_VALUE: 1
PIF_VALUE: 14
PIF_VALUE: 31
PIF_VALUE: 14
PIF_VALUE: 13
PIF_VALUE: 21
PIF_VALUE: 14
PIF_VALUE: 12
PIF_VALUE: 14
PIF_VALUE: 14
PIF_VALUE: 11
PIF_VALUE: 14
PIF_VALUE: 12
PIF_VALUE: 0
PIF_VALUE: 13
PIF_VALUE: 12
PIF_VALUE: 13

## 2021-02-10 ASSESSMENT — PAIN - FUNCTIONAL ASSESSMENT: PAIN_FUNCTIONAL_ASSESSMENT: 0-10

## 2021-02-10 ASSESSMENT — PAIN SCALES - GENERAL
PAINLEVEL_OUTOF10: 0
PAINLEVEL_OUTOF10: 8
PAINLEVEL_OUTOF10: 2
PAINLEVEL_OUTOF10: 8
PAINLEVEL_OUTOF10: 9
PAINLEVEL_OUTOF10: 9

## 2021-02-10 ASSESSMENT — PAIN DESCRIPTION - PAIN TYPE: TYPE: ACUTE PAIN;CHRONIC PAIN;SURGICAL PAIN

## 2021-02-10 ASSESSMENT — PAIN DESCRIPTION - LOCATION: LOCATION: CHEST;BACK;HIP

## 2021-02-10 NOTE — OP NOTE
a Rodriges needle and dark venous blood was aspirated freely without any  difficulty. It was then flushed using 30 mL of normal saline followed by 3  mL of heparinized saline solution 100 units per mL. The deep  dermis/subcutaneous fat was reapproximated using 3-0 Vicryl and the skin of  this incision as well as of the skin nick incision at the mid inferior  clavicular point / access site were reapproximated using 4-0 Vicryl in a  running subcuticular fashion followed by Dermabond skin glue after further  more local anesthetic mixture was injected. A postoperative x-ray revealed adequate  placement of the Mediport without any complications. The patient tolerated  the procedure very well without any complications and was sent to the PACU in  stable condition.      ____________________________________________    Rohini Mahajan MD, FACS, FICS    2/10/2021  9:23 AM

## 2021-02-10 NOTE — PROGRESS NOTES
Hospitalist Progress Note      Name:  Andrew Schneider /Age/Sex: 1960  (62 y.o. female)   MRN & CSN:  [id number] & [id number] Admission Date/Time: 2021  4:36 PM   Location:  [facility]/9763-Z PCP: David Warner MD         Hospital Day: 5    Assessment and Plan:   Andrew Schneider is a 62 y.o.  female  who presents with Syncope and collapse    Syncope: Likely from bradycardia  Has a pacemaker placed at [facility]  Pacemaker interrogated and rate increased to 50 bpm  Started on dopamine infusion - discontinued  Avoid rate lowering medication  Cardiology following  Electrophysiology on consult - plan to decrease VVI to 40    Hyponatremia: We will monitor    Hyperkalemia resolved. Acute kidney injury: Resolved  Creatinine 1.5, with associated hyponatremia and hypokalemia  Avoid nephrotoxic agents. UTI: Rocephin. Urine culture with ESBL  Antibiotic switched to Meropenem  Needs IV ATB on discharge    Chronic systolic and diastolic heart failure, not in exacerbation  Ejection fraction 30 to 35% with mild mitral regurgitation  Diuretics held because of acute kidney injury    Coronary artery disease: Status post coronary artery bypass graft, percutaneous coronary intervention. Continue high intensity statin, Plavix, ranolazine    Paroxysmal atrial fibrillation/atrial flutter: Status post ablation on amiodarone and Pradaxa    Ventricular tachycardia: Status post dual AICD status post ablation  ICD removed in 2020 due to ICD pocket infection with MRSA Klebsiella  Currently on amiodarone. Life vest?    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: On oxycodone chronically    Multiple sclerosis: On Cymbalta and gabapentin    Protein calorie malnutrition    Chronic respiratory failure, hypoxic:     Severe Knee Pain, Right:   Started after fall  Xray with no acute process Orthopedic Surgery on consult      Diet DIET GENERAL; No Caffeine   DVT Prophylaxis [] Lovenox, []  Heparin, [] SCDs, [] Warfarin  [x] NOAC     GI Prophylaxis [] PPI,  [x] H2 Blocker,  [] Carafate,  [] Diet/Tube Feeds   Code Status Full Code   MDM [] Low, [x] Moderate,[]  High     History of Present Illness:     Chief Complaint: Syncope and collapse    Seen and examined today. Soreness on the surgical site. No chest pain. No dizziness or lightheadedness. Ten point ROS reviewed negative, unless as noted above    Objective: Intake/Output Summary (Last 24 hours) at 2/10/2021 1257  Last data filed at 2/10/2021 1049  Gross per 24 hour   Intake 360 ml   Output 755 ml   Net -395 ml      Vitals:   Vitals:    02/10/21 1015   BP: (!) 112/59   Pulse: 52   Resp: 16   Temp:    SpO2: 100%     Physical Exam:   GEN Awake female, sitting upright in bed in no apparent distress. Appears given age. Cachectic  EYES Pupils are equally round. No scleral erythema, discharge, or conjunctivitis. HENT Mucous membranes are moist. Oral pharynx without exudates, no evidence of thrush. NECK Supple, no apparent thyromegaly or masses. RESP Clear to auscultation, no wheezes, rales or rhonchi. Symmetric chest movement while on room air. CARDIO/VASC bradycardic  GI Abdomen is soft without significant tenderness, masses, or guarding. Bowel sounds are normoactive. Rectal exam deferred. MSK No gross joint deformities. SKIN Normal coloration, warm, dry. NEURO Cranial nerves appear grossly intact, normal speech, no lateralizing weakness. PSYCH Awake, alert, oriented x 4. Affect appropriate.     Medications:   Medications:    sodium chloride flush  10 mL Intravenous 2 times per day    enoxaparin  40 mg Subcutaneous Daily    sodium zirconium cyclosilicate  10 g Oral Once    ceFAZolin  1,000 mg Intravenous On Call to OR    meropenem  1,000 mg Intravenous Q8H    albuterol sulfate HFA  2 puff Inhalation BID    acetaminophen  1,000 mg Oral Once    sodium chloride flush  10 mL Intravenous 2 times per day    famotidine (PEPCID) injection  20 mg Intravenous BID    clopidogrel  75 mg Oral Daily    dabigatran  150 mg Oral BID    DULoxetine  60 mg Oral Daily    gabapentin  300 mg Oral TID    levothyroxine  150 mcg Oral QAM AC    levETIRAcetam  1,000 mg Oral Daily    midodrine  10 mg Oral TID WC    mirtazapine  30 mg Oral Nightly    ranolazine  500 mg Oral Q12H    amiodarone  400 mg Oral Daily    atorvastatin  80 mg Oral Nightly    budesonide-formoterol  2 puff Inhalation BID    calcium elemental  500 mg Oral Daily      Infusions:    lactated ringers 50 mL/hr at 02/10/21 1101     PRN Meds:     sodium chloride flush, 10 mL, PRN      potassium chloride, 10 mEq, PRN      magnesium sulfate, 1,000 mg, PRN      morphine, 4 mg, Q1H PRN      ipratropium-albuterol, 3 mL, Q4H PRN      sodium chloride flush, 10 mL, PRN      acetaminophen, 650 mg, Q6H PRN    Or      acetaminophen, 650 mg, Q6H PRN      polyethylene glycol, 17 g, Daily PRN      nitroGLYCERIN, 0.4 mg, Q5 Min PRN      magnesium sulfate, 2,000 mg, PRN      oxyCODONE, 20 mg, Q3H PRN      promethazine, 25 mg, Q6H PRN      Recent Labs     02/08/21  1144 02/09/21  0307 02/10/21  0534   WBC 6.0 5.5 7.0   HGB 10.9* 11.3* 9.7*   HCT 36.0* 40.3 31.5*    229 244      Recent Labs     02/09/21  0307 02/09/21  1334 02/10/21  0534    134* 138   K 5.6* 4.8 4.9    103 107   CO2 22 23 25   BUN 10 9 9   CREATININE 0.7 0.7 0.6     Recent Labs     02/08/21  1144 02/09/21  0307 02/10/21  0534   AST 15 22 11*   ALT 9* 10 7*   BILITOT 0.2 0.2 0.2   ALKPHOS 122 128 103     Imaging reviewed    Electronically signed by Christy Lyn MD on 2/10/2021 at 12:57 PM

## 2021-02-10 NOTE — PROGRESS NOTES
GENERAL SURGERY PROGRESS NOTE    CC/HPI:           Patient feels ok waiting for her surgery today. Vitals:    02/09/21 2042 02/09/21 2110 02/10/21 0311 02/10/21 0516   BP:  (!) 108/55 133/72    Pulse:  59 51    Resp:  18 13    Temp:  98.2 °F (36.8 °C)     TempSrc:  Oral     SpO2: 98% 96% 99%    Weight:    124 lb 1.9 oz (56.3 kg)   Height:         I/O last 3 completed shifts:  In: -   Out: 750 [Urine:750]  No intake/output data recorded.     Diet NPO, After Midnight Exceptions are: Sips of Water with Meds    Recent Results (from the past 48 hour(s))   CBC    Collection Time: 02/08/21 11:44 AM   Result Value Ref Range    WBC 6.0 4.0 - 10.5 K/CU MM    RBC 3.10 (L) 4.2 - 5.4 M/CU MM    Hemoglobin 10.9 (L) 12.5 - 16.0 GM/DL    Hematocrit 36.0 (L) 37 - 47 %    .1 (H) 78 - 100 FL    MCH 35.2 (H) 27 - 31 PG    MCHC 30.3 (L) 32.0 - 36.0 %    RDW 13.0 11.7 - 14.9 %    Platelets 792 593 - 323 K/CU MM    MPV 10.4 7.5 - 11.1 FL   Comprehensive Metabolic Panel w/ Reflex to MG    Collection Time: 02/08/21 11:44 AM   Result Value Ref Range    Sodium 139 135 - 145 MMOL/L    Potassium 4.8 3.5 - 5.1 MMOL/L    Chloride 105 99 - 110 mMol/L    CO2 25 21 - 32 MMOL/L    BUN 10 6 - 23 MG/DL    CREATININE 0.7 0.6 - 1.1 MG/DL    Glucose 88 70 - 99 MG/DL    Calcium 8.5 8.3 - 10.6 MG/DL    Albumin 3.6 3.4 - 5.0 GM/DL    Total Protein 5.8 (L) 6.4 - 8.2 GM/DL    Total Bilirubin 0.2 0.0 - 1.0 MG/DL    ALT 9 (L) 10 - 40 U/L    AST 15 15 - 37 IU/L    Alkaline Phosphatase 122 40 - 128 IU/L    GFR Non-African American >60 >60 mL/min/1.73m2    GFR African American >60 >60 mL/min/1.73m2    Anion Gap 9 4 - 16   CBC    Collection Time: 02/09/21  3:07 AM   Result Value Ref Range    WBC 5.5 4.0 - 10.5 K/CU MM    RBC 3.13 (L) 4.2 - 5.4 M/CU MM    Hemoglobin 11.3 (L) 12.5 - 16.0 GM/DL    Hematocrit 40.3 37 - 47 %    .8 (H) 78 - 100 FL    MCH 36.1 (H) 27 - 31 PG    MCHC 28.0 (L) 32.0 - 36.0 %    RDW 13.2 11.7 - 14.9 %    Platelets 229 140 - 440 K/CU MM    MPV 10.5 7.5 - 11.1 FL   Comprehensive Metabolic Panel w/ Reflex to MG    Collection Time: 02/09/21  3:07 AM   Result Value Ref Range    Sodium 139 135 - 145 MMOL/L    Potassium 5.6 (HH) 3.5 - 5.1 MMOL/L    Chloride 107 99 - 110 mMol/L    CO2 22 21 - 32 MMOL/L    BUN 10 6 - 23 MG/DL    CREATININE 0.7 0.6 - 1.1 MG/DL    Glucose 92 70 - 99 MG/DL    Calcium 8.5 8.3 - 10.6 MG/DL    Albumin 3.6 3.4 - 5.0 GM/DL    Total Protein 5.8 (L) 6.4 - 8.2 GM/DL    Total Bilirubin 0.2 0.0 - 1.0 MG/DL    ALT 10 10 - 40 U/L    AST 22 15 - 37 IU/L    Alkaline Phosphatase 128 40 - 128 IU/L    GFR Non-African American >60 >60 mL/min/1.73m2    GFR African American >60 >60 mL/min/1.73m2    Anion Gap 10 4 - 16   COVID-19    Collection Time: 02/09/21  7:27 AM    Specimen: Nasopharyngeal   Result Value Ref Range    Source THROAT     SARS-CoV-2, NAAT NOT DETECTED    Basic metabolic panel    Collection Time: 02/09/21  1:34 PM   Result Value Ref Range    Sodium 134 (L) 135 - 145 MMOL/L    Potassium 4.8 3.5 - 5.1 MMOL/L    Chloride 103 99 - 110 mMol/L    CO2 23 21 - 32 MMOL/L    Anion Gap 8 4 - 16    BUN 9 6 - 23 MG/DL    CREATININE 0.7 0.6 - 1.1 MG/DL    Glucose 113 (H) 70 - 99 MG/DL    Calcium 8.5 8.3 - 10.6 MG/DL    GFR Non-African American >60 >60 mL/min/1.73m2    GFR African American >60 >60 mL/min/1.73m2   CBC    Collection Time: 02/10/21  5:34 AM   Result Value Ref Range    WBC 7.0 4.0 - 10.5 K/CU MM    RBC 2.71 (L) 4.2 - 5.4 M/CU MM    Hemoglobin 9.7 (L) 12.5 - 16.0 GM/DL    Hematocrit 31.5 (L) 37 - 47 %    .2 (H) 78 - 100 FL    MCH 35.8 (H) 27 - 31 PG    MCHC 30.8 (L) 32.0 - 36.0 %    RDW 13.3 11.7 - 14.9 %    Platelets 661 489 - 438 K/CU MM    MPV 10.3 7.5 - 11.1 FL   Comprehensive Metabolic Panel w/ Reflex to MG    Collection Time: 02/10/21  5:34 AM   Result Value Ref Range    Sodium 138 135 - 145 MMOL/L    Potassium 4.9 3.5 - 5.1 MMOL/L    Chloride 107 99 - 110 mMol/L    CO2 25 21 - 32 MMOL/L    BUN 9 6 - 23 MG/DL    CREATININE 0.6 0.6 - 1.1 MG/DL    Glucose 102 (H) 70 - 99 MG/DL    Calcium 8.1 (L) 8.3 - 10.6 MG/DL    Albumin 3.2 (L) 3.4 - 5.0 GM/DL    Total Protein 4.9 (L) 6.4 - 8.2 GM/DL    Total Bilirubin 0.2 0.0 - 1.0 MG/DL    ALT 7 (L) 10 - 40 U/L    AST 11 (L) 15 - 37 IU/L    Alkaline Phosphatase 103 40 - 128 IU/L    GFR Non-African American >60 >60 mL/min/1.73m2    GFR African American >60 >60 mL/min/1.73m2    Anion Gap 6 4 - 16       Scheduled Meds:   sodium zirconium cyclosilicate  10 g Oral Once    ceFAZolin  1,000 mg Intravenous On Call to OR    meropenem  1,000 mg Intravenous Q8H    albuterol sulfate HFA  2 puff Inhalation BID    acetaminophen  1,000 mg Oral Once    sodium chloride flush  10 mL Intravenous 2 times per day    famotidine (PEPCID) injection  20 mg Intravenous BID    clopidogrel  75 mg Oral Daily    dabigatran  150 mg Oral BID    DULoxetine  60 mg Oral Daily    gabapentin  300 mg Oral TID    levothyroxine  150 mcg Oral QAM AC    levETIRAcetam  1,000 mg Oral Daily    midodrine  10 mg Oral TID WC    mirtazapine  30 mg Oral Nightly    ranolazine  500 mg Oral Q12H    amiodarone  400 mg Oral Daily    atorvastatin  80 mg Oral Nightly    budesonide-formoterol  2 puff Inhalation BID    calcium elemental  500 mg Oral Daily       Continuous Infusions:   lactated ringers 100 mL/hr at 02/10/21 0617       Physical Exam:  HEENT: Anicteric sclerae, Oropharyngeal mucosae moist, pink and intact. Heart:  Normal S1 and S2, RRR  Lungs: Clear to auscultation bilaterally, No audible Wheezes or Rales. Extremities: No edema. Neuro: Alert and Oriented x 3, Non focal.  Abdomen: Soft, Benign, Non tender, Non distended, Positive bowel sounds. Incision: Nicely healing: No erythema, No discharge. Principal Problem:    Syncope and collapse  Resolved Problems:    * No resolved hospital problems.  *      Assessment and Plan:  Brian Healy is a 62 y.o. female who is POD # 1 status post: ATTEMPTED BUT FAILED Placement of an 6.6-Telugu, low profile Mediport via left  subclavian vein approach, and also left internal jugular vein. Severiano Parrish using the micropuncture kit first, Under Ultrasound guidance, and under Direct Fluoroscopy BUT FAILED. Severiano Parrish . 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 .     Discussed with her and her other attendings, and WILL place a port in the right SCV, AROUND her skin wound today.       Increase Ambulation to at least 4x/day walk in the hallways with assistance. Respiratory melissa-operative care: Incentive Spirometry / deep breathing and coughing 10x/hr while awake. Continue DVT prophylaxis with Teds and SCDs and SC Lovenox. Continue GI prophylaxis with Protonix IV till able to tolerate PO.   Continue melissa-op Abx for 24 hrs after surgery then dc.    ___________________________________________    Rola Curran MD, FACS, FICS  2/10/2021  7:32 AM

## 2021-02-10 NOTE — PROGRESS NOTES
Today's plan: [address] FOR DISCHARGE      Admit Date:  2/6/2021    Subjective:ok      Chief complaints on admission  Chief Complaint   Patient presents with    Chest Pain     feels like \"really bad heart burn\". pt reports she took 3 NTG PTA without relief    Loss of Consciousness     c/o syncopal episodes since last night    Fatigue     C/O increased weakness since discharge from hospital for pneumonia         History of present illness:Demetria is a 62 y. o.year old who  presents with had concerns including Chest Pain (feels like \"really bad heart burn\". pt reports she took 3 NTG PTA without relief), Loss of Consciousness (c/o syncopal episodes since last night), and Fatigue (C/O increased weakness since discharge from hospital for pneumonia). Past medical history:    has a past medical history of Acute delirium, Arrhythmia, Arthritis, Asplenia, Asthma, Avascular necrosis ([address] CAD (coronary artery disease), Cardiomyopathy ([address] Cerebral artery occlusion with cerebral infarction ([address] CHF (congestive heart failure) ([address] Enlarged liver, GERD (gastroesophageal reflux disease), H/O echocardiogram, History of blood transfusion, Hx of cardiovascular stress test, Hyperlipidemia, Hypertension, Lymphoma ([address] MI, old, Movement disorder, MS (multiple sclerosis) ([address] OP (osteoporosis), PE (pulmonary embolism), Pneumonia, Pyelonephritis, Seizures ([address] Severe malnutrition ([address] Spleen enlarged, and Thyroid disease. Past surgical history:   has a past surgical history that includes Coronary angioplasty with stent; Hysterectomy; Appendectomy; Cholecystectomy; Tonsillectomy; Gastric bypass surgery; Cardiac defibrillator placement; hip surgery; Abdomen surgery; Colonoscopy; Endoscopy, colon, diagnostic; hernia repair; joint replacement; skin biopsy; vascular surgery; Coronary artery bypass graft; and Port Surgery (N/A, 6/1/2020).   Social History: reports that she has never smoked. She has never used smokeless tobacco. She reports that she does not drink alcohol or use drugs. Family history:  family history includes Cancer in her father; Diabetes in her mother; Heart Disease in her father, maternal grandmother, and mother; High Blood Pressure in her maternal grandmother and mother; High Cholesterol in her maternal grandmother and mother; Other in her sister. Allergies   Allergen Reactions    Fentanyl Swelling     Other reaction(s): Angioedema (swelling)    Moxifloxacin Hcl In Nacl Swelling and Rash    Povidone-Iodine Rash     Other reaction(s): AOF      Cyclobenzaprine      Other reaction(s): Other - comment required  \" it make my muscle very weak because of my MS\"  \" it make my muscle very weak because of my MS\"      Methocarbamol      Worsening edema  Other reaction(s): Other - comment required  Worsening edema  Worsening edema  Worsening edema      Tramadol Other (See Comments)     Doctor doesn't her to take due to heart problems  Seizures   Doctor doesn't her to take due to lowers seizure threshold.  Baclofen     Ibuprofen      \"Due to heart problems\"    Naproxen     Nsaids      \"Due to heart problems\"    Tizanidine     Tolmetin      \"Due to heart problems\"    Tramadol Hcl      Other reaction(s):  Other - comment required  Told not to take due to history of seizures    Betadine [Povidone Iodine] Rash    Moxifloxacin Rash and Swelling     Lips swell and tingling in face   Lips swell and tingling in face   Lips swell and tingling in face   Lips swell and tingling in face   Around mouth         Objective:   BP (!) 112/59   Pulse 52   Temp 98.5 °F (36.9 °C) (Temporal)   Resp 16   Ht 5' 6\" (1.676 m)   Wt 124 lb 1.9 oz (56.3 kg)   SpO2 100%   BMI 20.03 kg/m²       Intake/Output Summary (Last 24 hours) at 2/10/2021 1321  Last data filed at 2/10/2021 1049  Gross per 24 hour   Intake 360 ml   Output 755 ml   Net -395 ml TELEMETRY: Sinus     Physical Exam:  Constitutional:  Well developed, Well nourished, No acute distress, Non-toxic appearance. HENT:  Normocephalic, Atraumatic, Bilateral external ears normal, Oropharynx moist, No oral exudates, Nose normal. Neck- Normal range of motion, No tenderness, Supple, No stridor. Eyes:  PERRL, EOMI, Conjunctiva normal, No discharge. Respiratory:  Normal breath sounds, No respiratory distress, No wheezing, No chest tenderness. ,no use of accessory muscles, diaphragm movement is normal  Cardiovascular: (PMI) apex non displaced,no lifts no thrills, no s3,no s4, Normal heart rate, Normal rhythm, No murmurs, No rubs, No gallops. Carotid arteries pulse and amplitude are normal no bruit, no abdominal bruit noted ( normal abdominal aorta ausculation), femoral arteries pulse and amplitude are normal no bruit, pedal pulses are normal  GI:  Bowel sounds normal, Soft, No tenderness, No masses, No pulsatile masses. : External genitalia appear normal, No masses or lesions. No discharge. No CVA tenderness. Musculoskeletal:  Intact distal pulses, No edema, No tenderness, No cyanosis, No clubbing. Good range of motion in all major joints. No tenderness to palpation or major deformities noted. Back- No tenderness. Integument:  Warm, Dry, No erythema, No rash. Lymphatic:  No lymphadenopathy noted. Neurologic:  Alert & oriented x 3, Normal motor function, Normal sensory function, No focal deficits noted.    Psychiatric:  Affect normal, Judgment normal, Mood normal.     Medications:    sodium chloride flush  10 mL Intravenous 2 times per day    enoxaparin  40 mg Subcutaneous Daily    sodium zirconium cyclosilicate  10 g Oral Once    ceFAZolin  1,000 mg Intravenous On Call to OR    meropenem  1,000 mg Intravenous Q8H    albuterol sulfate HFA  2 puff Inhalation BID    acetaminophen  1,000 mg Oral Once    sodium chloride flush  10 mL Intravenous 2 times per day  famotidine (PEPCID) injection  20 mg Intravenous BID    clopidogrel  75 mg Oral Daily    dabigatran  150 mg Oral BID    DULoxetine  60 mg Oral Daily    gabapentin  300 mg Oral TID    levothyroxine  150 mcg Oral QAM AC    levETIRAcetam  1,000 mg Oral Daily    midodrine  10 mg Oral TID WC    mirtazapine  30 mg Oral Nightly    ranolazine  500 mg Oral Q12H    amiodarone  400 mg Oral Daily    atorvastatin  80 mg Oral Nightly    budesonide-formoterol  2 puff Inhalation BID    calcium elemental  500 mg Oral Daily      lactated ringers 50 mL/hr at 02/10/21 1101     sodium chloride flush, potassium chloride, magnesium sulfate, morphine, ipratropium-albuterol, sodium chloride flush, acetaminophen **OR** acetaminophen, polyethylene glycol, nitroGLYCERIN, magnesium sulfate, oxyCODONE, promethazine    Lab Data:  CBC:   Recent Labs     02/08/21  1144 02/09/21  0307 02/10/21  0534   WBC 6.0 5.5 7.0   HGB 10.9* 11.3* 9.7*   HCT 36.0* 40.3 31.5*   .1* 128.8* 116.2*    229 244     BMP:   Recent Labs     02/09/21  0307 02/09/21  1334 02/10/21  0534    134* 138   K 5.6* 4.8 4.9    103 107   CO2 22 23 25   BUN 10 9 9   CREATININE 0.7 0.7 0.6     LIVER PROFILE:   Recent Labs     02/08/21  1144 02/09/21  0307 02/10/21  0534   AST 15 22 11*   ALT 9* 10 7*   BILITOT 0.2 0.2 0.2   ALKPHOS 122 128 103     PT/INR: No results for input(s): PROTIME, INR in the last 72 hours. APTT: No results for input(s): APTT in the last 72 hours. BNP:  No results for input(s): BNP in the last 72 hours. TROPONIN: @TROPONINI:3@      Assessment:  62 y. o.year old who is admitted for          Plan:  1.  Syncope and bradycardia: patient had new micropacer placed at [facility], pacer interrogated in ED, back up heart rate was 35 bpm, started on dopamine in ED and her pacer rate was adjusted to 50 bpm.will get EP tomorrow 2. CAD h/o LAD PCI THRU [place]: Continue aspirin and plavix continue statins, could not tolerate BB.  Continue Midodrine  3. h/o PE: on pradaxa  4. ICM: had ICD which is extracted at [facility] due to bacteremia, h/o vtach and vfib, will , ? afib also, continue paradaxa plavix, and amiodarone.   5. H/o cva: stable  6. HTN: controlled, no current medication  7. MS: stable  8. Seizure: stable  All labs, medications and tests reviewed, continue all other medications of all above medical condition listed as is.       Royal Kamari MD 2/10/2021 1:21 PM

## 2021-02-10 NOTE — PROGRESS NOTES
1828 Patient received from the OR monitor placed and alarms on.  [facility]  2190 awake and talking denies any needs at this time   1000 xray done at bedside   1013 Report called to [facility] prior to transport back to room   1020 transferred back to room via bed with monitor per transporter Katelyn Marrero

## 2021-02-10 NOTE — ANESTHESIA POSTPROCEDURE EVALUATION
Department of Anesthesiology  Postprocedure Note    Patient: Tesha Miller  MRN: [id number]  YOB: 1960  Date of evaluation: 2/10/2021  Time:  9:40 AM     Procedure Summary     Date: 02/10/21 Room / Location: [address]    Anesthesia Start: 7742 Anesthesia Stop:     Procedure: MEDIPORT INSERTION W/ FLUOROSCOPY (Right Chest) Diagnosis: (-)    Surgeons: Bay Rossi MD Responsible Provider: Ike López MD    Anesthesia Type: MAC, general ASA Status: 4          Anesthesia Type: MAC, general    Jorge Phase I:      Jorge Phase II:      Last vitals: Reviewed and per EMR flowsheets.        Anesthesia Post Evaluation    Patient location during evaluation: PACU  Patient participation: complete - patient participated  Level of consciousness: awake and alert  Airway patency: patent  Nausea & Vomiting: no nausea and no vomiting  Complications: no  Cardiovascular status: hemodynamically stable and blood pressure returned to baseline  Respiratory status: acceptable, spontaneous ventilation, nonlabored ventilation and face mask  Hydration status: stable

## 2021-02-11 VITALS
WEIGHT: 144.8 LBS | SYSTOLIC BLOOD PRESSURE: 104 MMHG | HEART RATE: 51 BPM | HEIGHT: 66 IN | RESPIRATION RATE: 13 BRPM | BODY MASS INDEX: 23.27 KG/M2 | OXYGEN SATURATION: 100 % | TEMPERATURE: 97.7 F | DIASTOLIC BLOOD PRESSURE: 73 MMHG

## 2021-02-11 LAB
ALBUMIN SERPL-MCNC: 3.2 GM/DL (ref 3.4–5)
ALP BLD-CCNC: 97 IU/L (ref 40–128)
ALT SERPL-CCNC: 7 U/L (ref 10–40)
ANION GAP SERPL CALCULATED.3IONS-SCNC: 7 MMOL/L (ref 4–16)
AST SERPL-CCNC: 16 IU/L (ref 15–37)
BASOPHILS ABSOLUTE: 0.1 K/CU MM
BASOPHILS RELATIVE PERCENT: 0.9 % (ref 0–1)
BILIRUB SERPL-MCNC: 0.2 MG/DL (ref 0–1)
BUN BLDV-MCNC: 9 MG/DL (ref 6–23)
CALCIUM SERPL-MCNC: 8.2 MG/DL (ref 8.3–10.6)
CHLORIDE BLD-SCNC: 109 MMOL/L (ref 99–110)
CO2: 25 MMOL/L (ref 21–32)
CREAT SERPL-MCNC: 0.7 MG/DL (ref 0.6–1.1)
DIFFERENTIAL TYPE: ABNORMAL
EOSINOPHILS ABSOLUTE: 0.2 K/CU MM
EOSINOPHILS RELATIVE PERCENT: 3.7 % (ref 0–3)
GFR AFRICAN AMERICAN: >60 ML/MIN/1.73M2
GFR NON-AFRICAN AMERICAN: >60 ML/MIN/1.73M2
GLUCOSE BLD-MCNC: 80 MG/DL (ref 70–99)
HCT VFR BLD CALC: 36.5 % (ref 37–47)
HEMOGLOBIN: 10.6 GM/DL (ref 12.5–16)
IMMATURE NEUTROPHIL %: 0.4 % (ref 0–0.43)
LYMPHOCYTES ABSOLUTE: 1.7 K/CU MM
LYMPHOCYTES RELATIVE PERCENT: 29.8 % (ref 24–44)
MCH RBC QN AUTO: 36.1 PG (ref 27–31)
MCHC RBC AUTO-ENTMCNC: 29 % (ref 32–36)
MCV RBC AUTO: 124.1 FL (ref 78–100)
MONOCYTES ABSOLUTE: 0.7 K/CU MM
MONOCYTES RELATIVE PERCENT: 12.6 % (ref 0–4)
NUCLEATED RBC %: 0 %
PDW BLD-RTO: 13.5 % (ref 11.7–14.9)
PLATELET # BLD: 222 K/CU MM (ref 140–440)
PMV BLD AUTO: 10.3 FL (ref 7.5–11.1)
POTASSIUM SERPL-SCNC: 5.1 MMOL/L (ref 3.5–5.1)
RBC # BLD: 2.94 M/CU MM (ref 4.2–5.4)
SEGMENTED NEUTROPHILS ABSOLUTE COUNT: 3 K/CU MM
SEGMENTED NEUTROPHILS RELATIVE PERCENT: 52.6 % (ref 36–66)
SODIUM BLD-SCNC: 141 MMOL/L (ref 135–145)
TOTAL IMMATURE NEUTOROPHIL: 0.02 K/CU MM
TOTAL NUCLEATED RBC: 0 K/CU MM
TOTAL PROTEIN: 4.9 GM/DL (ref 6.4–8.2)
WBC # BLD: 5.6 K/CU MM (ref 4–10.5)

## 2021-02-11 PROCEDURE — 94640 AIRWAY INHALATION TREATMENT: CPT

## 2021-02-11 PROCEDURE — 2700000000 HC OXYGEN THERAPY PER DAY

## 2021-02-11 PROCEDURE — 85025 COMPLETE CBC W/AUTO DIFF WBC: CPT

## 2021-02-11 PROCEDURE — 2580000003 HC RX 258: Performed by: SURGERY

## 2021-02-11 PROCEDURE — 6360000002 HC RX W HCPCS: Performed by: SURGERY

## 2021-02-11 PROCEDURE — 99231 SBSQ HOSP IP/OBS SF/LOW 25: CPT | Performed by: NURSE PRACTITIONER

## 2021-02-11 PROCEDURE — 80053 COMPREHEN METABOLIC PANEL: CPT

## 2021-02-11 PROCEDURE — 36415 COLL VENOUS BLD VENIPUNCTURE: CPT

## 2021-02-11 PROCEDURE — 94761 N-INVAS EAR/PLS OXIMETRY MLT: CPT

## 2021-02-11 PROCEDURE — 6370000000 HC RX 637 (ALT 250 FOR IP): Performed by: SURGERY

## 2021-02-11 RX ORDER — MIDODRINE HYDROCHLORIDE 10 MG/1
10 TABLET ORAL
Qty: 90 TABLET | Refills: 0 | Status: SHIPPED | OUTPATIENT
Start: 2021-02-11

## 2021-02-11 RX ADMIN — OXYCODONE HYDROCHLORIDE 20 MG: 10 TABLET ORAL at 12:25

## 2021-02-11 RX ADMIN — AMIODARONE HYDROCHLORIDE 400 MG: 200 TABLET ORAL at 09:14

## 2021-02-11 RX ADMIN — MIDODRINE HYDROCHLORIDE 10 MG: 5 TABLET ORAL at 09:20

## 2021-02-11 RX ADMIN — GABAPENTIN 300 MG: 300 CAPSULE ORAL at 09:14

## 2021-02-11 RX ADMIN — MEROPENEM 1000 MG: 1 INJECTION, POWDER, FOR SOLUTION INTRAVENOUS at 06:04

## 2021-02-11 RX ADMIN — DABIGATRAN ETEXILATE MESYLATE 150 MG: 75 CAPSULE ORAL at 09:14

## 2021-02-11 RX ADMIN — CLOPIDOGREL BISULFATE 75 MG: 75 TABLET ORAL at 09:14

## 2021-02-11 RX ADMIN — LEVETIRACETAM 1000 MG: 500 TABLET, FILM COATED ORAL at 09:14

## 2021-02-11 RX ADMIN — LEVOTHYROXINE SODIUM 150 MCG: 150 TABLET ORAL at 06:05

## 2021-02-11 RX ADMIN — DULOXETINE HYDROCHLORIDE 60 MG: 30 CAPSULE, DELAYED RELEASE ORAL at 09:14

## 2021-02-11 RX ADMIN — PROMETHAZINE HYDROCHLORIDE 25 MG: 25 TABLET ORAL at 06:05

## 2021-02-11 RX ADMIN — ALBUTEROL SULFATE 2 PUFF: 90 AEROSOL, METERED RESPIRATORY (INHALATION) at 08:09

## 2021-02-11 RX ADMIN — CALCIUM 500 MG: 500 TABLET ORAL at 12:23

## 2021-02-11 RX ADMIN — ACETAMINOPHEN 650 MG: 325 TABLET ORAL at 06:05

## 2021-02-11 RX ADMIN — MEROPENEM 1000 MG: 1 INJECTION, POWDER, FOR SOLUTION INTRAVENOUS at 14:50

## 2021-02-11 RX ADMIN — RANOLAZINE 500 MG: 500 TABLET, FILM COATED, EXTENDED RELEASE ORAL at 09:35

## 2021-02-11 RX ADMIN — OXYCODONE HYDROCHLORIDE 20 MG: 10 TABLET ORAL at 09:14

## 2021-02-11 RX ADMIN — BUDESONIDE AND FORMOTEROL FUMARATE DIHYDRATE 2 PUFF: 160; 4.5 AEROSOL RESPIRATORY (INHALATION) at 08:10

## 2021-02-11 RX ADMIN — OXYCODONE HYDROCHLORIDE 20 MG: 10 TABLET ORAL at 06:05

## 2021-02-11 RX ADMIN — GABAPENTIN 300 MG: 300 CAPSULE ORAL at 14:50

## 2021-02-11 RX ADMIN — ENOXAPARIN SODIUM 40 MG: 40 INJECTION SUBCUTANEOUS at 09:14

## 2021-02-11 RX ADMIN — MIDODRINE HYDROCHLORIDE 10 MG: 5 TABLET ORAL at 12:23

## 2021-02-11 RX ADMIN — OXYCODONE HYDROCHLORIDE 20 MG: 10 TABLET ORAL at 15:26

## 2021-02-11 ASSESSMENT — PAIN SCALES - GENERAL
PAINLEVEL_OUTOF10: 8
PAINLEVEL_OUTOF10: 9

## 2021-02-11 NOTE — DISCHARGE INSTR - DIET
? Good nutrition is important when healing from an illness, injury, or surgery. Follow any nutrition recommendations given to you during your hospital stay. ? If you were given an oral nutrition supplement while in the hospital, continue to take this supplement at home. You can take it with meals, in-between meals, and/or before bedtime. These supplements can be purchased at most local grocery stores, pharmacies, and chain [employer]-stores. ? If you have any questions about your diet or nutrition, call the hospital and ask for the dietitian.       Diet as tolerated

## 2021-02-11 NOTE — DISCHARGE SUMMARY
Discharge Summary    Name:  Angelique Almanzar /Age/Sex: 1960  (62 y.o. female)   MRN & CSN:  [id number] & [id number] Admission Date/Time: 2021  4:36 PM   Attending:  Capo Velasquez MD Discharging Physician: Capo Velasquez MD     Hospital Course: Angelique Almanzar is a 62 y.o.  female  who presents with Syncope and collapse    Syncope: Likely from bradycardia  Has a pacemaker placed at [facility]  Pacemaker interrogated and rate increased to 50 bpm  Started on dopamine infusion - discontinued  Avoid rate lowering medication  Cardiology following  Electrophysiology on consult     Hyponatremia:      Hyperkalemia resolved.     Acute kidney injury: Resolved  Creatinine 1.5, with associated hyponatremia and hypokalemia  Avoid nephrotoxic agents.     UTI: Rocephin. Urine culture with ESBL  Antibiotic switched to Meropenem  DC on Ertapenem     Chronic systolic and diastolic heart failure, not in exacerbation  Ejection fraction 30 to 35% with mild mitral regurgitation     Coronary artery disease: Status post coronary artery bypass graft, percutaneous coronary intervention. Continue high intensity statin, Plavix, ranolazine.     Paroxysmal atrial fibrillation/atrial flutter: Status post ablation on amiodarone and Pradaxa     Ventricular tachycardia: Status post dual AICD status post ablation  ICD removed in 2020 due to ICD pocket infection with MRSA Klebsiella  Currently on amiodarone. Life vest?     Seizure disorder: Continue Keppra     Venous thromboembolism: Continue dabigatran     Hypothyroidism:  On Synthroid     Chronic pain syndrome with bilateral hip vascular necrosis: On oxycodone chronically     Multiple sclerosis: On Cymbalta and gabapentin     Protein calorie malnutrition     Chronic respiratory failure, hypoxic:      Severe Knee Pain, Right:   Started after fall  Xray with no acute process  Orthopedic Surgery on consult    The patient expressed appropriate understanding of and agreement with the discharge recommendations, medications, and plan. Consults this admission:  IP CONSULT TO CARDIOLOGY  IP CONSULT TO HOSPITALIST  IP CONSULT TO GENERAL SURGERY  IP CONSULT TO DIETITIAN  IP CONSULT TO ELECTROPHYSIOLOGY  IP CONSULT TO ORTHOPEDIC SURGERY  IP CONSULT TO SPIRITUAL SERVICES  IP CONSULT TO [address] [place] [address] NEEDS    Discharge Instruction:   Follow up appointments: Cardiology, Surgery, EP, Orthopedic Surgery  Primary care physician:  within 2 weeks    Diet:  cardiac diet   Activity: activity as tolerated  Disposition: Discharged to:   [x]Home, []HHC, []SNF, []Acute Rehab, []Hospice   Condition on discharge: Stable    Discharge Medications:      Ken Bales   Home Medication Instructions NWT:[id number]    Printed on:02/11/21 1100   Medication Information                      acarbose (PRECOSE) 25 MG tablet  Take 25 mg by mouth 3 times daily (with meals)             acetaminophen 650 MG TABS  Take 650 mg by mouth every 4 hours as needed             albuterol (PROVENTIL HFA;VENTOLIN HFA) 108 (90 BASE) MCG/ACT inhaler  Inhale 2 puffs into the lungs every 6 hours as needed.                amiodarone (CORDARONE) 200 MG tablet  Take 400 mg by mouth daily             aspirin 81 MG tablet  Take 1 tablet by mouth daily             atorvastatin (LIPITOR) 80 MG tablet  Take 80 mg by mouth nightly             beclomethasone (QVAR) 40 MCG/ACT inhaler  Inhale 2 puffs into the lungs 2 times daily             benzocaine-menthol (CEPACOL SORE THROAT) 15-3.6 MG lozenge  Take 1 lozenge by mouth every 2 hours as needed for Sore Throat             budesonide-formoterol (SYMBICORT) 160-4.5 MCG/ACT AERO  Inhale 2 puffs into the lungs 2 times daily             bumetanide (BUMEX) 1 MG tablet  Take 1 mg by mouth daily             calcium citrate (CALCITRATE) 950 (200 Ca) MG tablet  Take 1,425 mg by mouth daily             chlorhexidine gluconate (ANTISEPTIC SKIN CLEANSER) 4 % SOLN external solution  Apply topically daily clopidogrel (PLAVIX) 75 MG tablet  Take 75 mg by mouth daily             cyanocobalamin 1000 MCG tablet  Take 1 tablet by mouth daily             dabigatran (PRADAXA) 75 MG capsule  Take 150 mg by mouth             docusate sodium (COLACE) 100 MG capsule  Take 100 mg by mouth 2 times daily as needed for Constipation             DULoxetine (CYMBALTA) 60 MG extended release capsule  Take 60 mg by mouth daily             ergocalciferol (ERGOCALCIFEROL) 1.25 MG (45526 UT) capsule  Take 50,000 Units by mouth Twice a Week             ertapenem (INVANZ) infusion  Infuse 1,000 mg intravenously every 24 hours for 4 days Compound per protocol.             famotidine (PEPCID) 20 MG tablet  Take 20 mg by mouth 2 times daily             ferrous gluconate (FERGON) 324 (38 Fe) MG tablet  Take 1 tablet by mouth 2 times daily             gabapentin (NEURONTIN) 300 MG capsule  Take 300 mg by mouth 3 times daily. ipratropium-albuterol (DUONEB) 0.5-2.5 (3) MG/3ML SOLN nebulizer solution  Inhale 3 mLs into the lungs every 4 hours (while awake)             levETIRAcetam (KEPPRA) 1000 MG tablet  Take 1,000 mg by mouth daily             levothyroxine (SYNTHROID) 150 MCG tablet  Take 150 mcg by mouth every morning (before breakfast)             magnesium oxide (MAG-OX) 400 MG tablet  Take 400 mg by mouth daily             midodrine (PROAMATINE) 10 MG tablet  Take 1 tablet by mouth 3 times daily (with meals)             mirtazapine (REMERON) 30 MG tablet  Take 30 mg by mouth nightly             nitroGLYCERIN (NITROSTAT) 0.4 MG SL tablet  Place 0.4 mg under the tongue as needed             omeprazole (PRILOSEC) 20 MG delayed release capsule  omeprazole 20 mg capsule,delayed release             oxyCODONE (ROXICODONE) 20 MG immediate release tablet  Take 20 mg by mouth every 3 hours as needed.              potassium chloride (KLOR-CON M) 10 MEQ extended release tablet  Take 10 mEq by mouth 2 times daily 2 (10mEq) promethazine (PHENERGAN) 25 MG tablet  Take 25 mg by mouth 4 times daily as needed             ranolazine (RANEXA) 500 MG extended release tablet  Take 500 mg by mouth every 12 hours             senna (SENOKOT) 8.6 MG tablet  Take 1 tablet by mouth daily             spironolactone (ALDACTONE) 25 MG tablet  Take 12.5 mg by mouth daily                 Objective Findings at Discharge:   /73   Pulse 51   Temp 97.7 °F (36.5 °C) (Oral)   Resp 13   Ht 5' 6\" (1.676 m)   Wt 144 lb 12.8 oz (65.7 kg)   SpO2 100%   BMI 23.37 kg/m²            GEN    Awake female, sitting upright in bed in no apparent distress. Appears given age. Cachectic  EYES   Pupils are equally round. No scleral erythema, discharge, or conjunctivitis. HENT  Mucous membranes are moist. Oral pharynx without exudates, no evidence of thrush. NECK  Supple, no apparent thyromegaly or masses. RESP  Clear to auscultation, no wheezes, rales or rhonchi. Symmetric chest movement while on room air. CARDIO/VASC           bradycardic  GI        Abdomen is soft without significant tenderness, masses, or guarding. Bowel sounds are normoactive. Rectal exam deferred. MSK    No gross joint deformities. SKIN    Normal coloration, warm, dry. NEURO           Cranial nerves appear grossly intact, normal speech, no lateralizing weakness. PSYCH            Awake, alert, oriented x 4. Affect appropriate.   BMP/CBC  Recent Labs     02/09/21  0307 02/09/21  1334 02/10/21  0534 02/11/21  0526    134* 138 141   K 5.6* 4.8 4.9 5.1    103 107 109   CO2 22 23 25 25   BUN 10 9 9 9   CREATININE 0.7 0.7 0.6 0.7   WBC 5.5  --  7.0 5.6   HCT 40.3  --  31.5* 36.5*     --  244 222       IMAGING:  Xr Knee Right (min 4 Views)    Result Date: 2/7/2021  EXAMINATION: FOUR XRAY VIEWS OF THE RIGHT KNEE 2/7/2021 1:05 pm COMPARISON: 10/28/2019 HISTORY: ORDERING SYSTEM PROVIDED HISTORY: Pain on right knee after a fall TECHNOLOGIST PROVIDED HISTORY: Reason for exam:->Pain on right knee after a fall Reason for Exam: Pain on right knee after a fall Acuity: Acute Type of Exam: Initial Mechanism of Injury: fall Relevant Medical/Surgical History: none FINDINGS: No evidence of acute fracture or dislocation. No focal osseous lesion. No evidence of joint effusion. No focal soft tissue abnormality. No acute abnormality of the knee. Fl Less Than 1 Hour    Result Date: 2/10/2021  Radiology exam is complete. No Radiologist dictation. Please follow up with ordering provider. Fl Less Than 1 Hour    Result Date: 2/9/2021  Radiology exam is complete. No Radiologist dictation. Please follow up with ordering provider. Xr Chest Portable    Result Date: 2/10/2021  EXAMINATION: ONE XRAY VIEW OF THE CHEST 2/10/2021 9:47 am COMPARISON: 2/9/2021 HISTORY: ORDERING SYSTEM PROVIDED HISTORY: Please r/o right pneumothorax TECHNOLOGIST PROVIDED HISTORY: Reason for exam:->Please r/o right pneumothorax Reason for Exam: Please r/o right pneumothorax FINDINGS: Right subclavian central venous catheter is in place, tip in the SVC/right atrial junction. Sternal wires are in place. Cardiac monitoring device overlies the left heart. The pulmonary vasculature is within normal limits. The lungs are clear. No pneumothorax is seen. The bones are osteopenic. Status post placement of a right subclavian central venous catheter. No pneumothorax seen. Xr Chest Portable    Result Date: 2/9/2021  EXAMINATION: ONE XRAY VIEW OF THE CHEST 2/9/2021 12:35 pm COMPARISON: 02/06/2021 HISTORY: ORDERING SYSTEM PROVIDED HISTORY: please r/o left pneumothorax TECHNOLOGIST PROVIDED HISTORY: Reason for exam:->please r/o left pneumothorax Reason for Exam: please r/o left pneumothorax FINDINGS: This status post median sternotomy. The lungs are without acute focal process. There is no effusion or pneumothorax. The cardiomediastinal silhouette is stable. The osseous structures are stable.      No acute process. Xr Chest Portable    Result Date: 2/6/2021  EXAMINATION: ONE XRAY VIEW OF THE CHEST 2/6/2021 5:02 pm COMPARISON: 01/22/2021 HISTORY: ORDERING SYSTEM PROVIDED HISTORY: CP/syncope TECHNOLOGIST PROVIDED HISTORY: Reason for exam:->CP/syncope Reason for Exam: CP/syncope Acuity: Unknown Type of Exam: Initial Additional signs and symptoms: none Relevant Medical/Surgical History: CHF, CAD, asthma FINDINGS: Transvenous pacer remains in place. Status post median sternotomy. The lungs are without acute focal process. There is no effusion or pneumothorax. The cardiomediastinal silhouette is stable. The osseous structures are stable. No acute process. Xr Chest Portable    Result Date: 1/22/2021  EXAMINATION: ONE XRAY VIEW OF THE CHEST 1/22/2021 8:57 pm COMPARISON: Chest x-ray January 15, 2021 HISTORY: ORDERING SYSTEM PROVIDED HISTORY: Short of breath, pneumonia TECHNOLOGIST PROVIDED HISTORY: Reason for exam:->Short of breath, pneumonia Reason for Exam: Short of breath, pneumonia Acuity: Acute Type of Exam: Initial Additional signs and symptoms: na Relevant Medical/Surgical History: cad, hypertension FINDINGS: Cardiac silhouette appears stable. Atherosclerotic changes aorta. Postoperative changes of CABG procedure and median sternotomy. Cardiac loop recorder in place. Coronary stents in place. Mild interstitial opacities of the lungs bilaterally which could reflect edema versus infiltrate. No pleural effusion or pneumothorax. 1. Mild bilateral interstitial opacities of the lungs which could reflect pulmonary edema versus infiltrate.      Xr Chest Portable    Result Date: 1/15/2021  EXAMINATION: ONE XRAY VIEW OF THE CHEST 1/15/2021 7:07 pm COMPARISON: 11/20/2020 HISTORY: ORDERING SYSTEM PROVIDED HISTORY: cough, fevers, Covid exposure TECHNOLOGIST PROVIDED HISTORY: Reason for exam:->cough, fevers, Covid exposure Reason for Exam: cough, fevers, Covid exposure Acuity: Unknown Type of Exam: Initial FINDINGS: There is new airspace disease, mainly in the right upper lung, greater medially. The left lung is clear. Pacemaker implant noted. Cardial pericardial silhouette otherwise unremarkable. No pneumothorax is seen. No free air. No acute bony abnormality. Focal airspace disease within the right upper lung, greater medially. Pneumonia would be the primary consideration. Although not excluded, the distribution would be unusual for COVID-19 pneumonia. Cta Pulmonary W Contrast    Result Date: 2/6/2021  EXAMINATION: CTA OF THE CHEST 2/6/2021 7:20 pm TECHNIQUE: CTA of the chest was performed after the administration of intravenous contrast.  Multiplanar reformatted images are provided for review. MIP images are provided for review. Dose modulation, iterative reconstruction, and/or weight based adjustment of the mA/kV was utilized to reduce the radiation dose to as low as reasonably achievable. COMPARISON: 02/06/2021 and 09/13/2020 HISTORY: ORDERING SYSTEM PROVIDED HISTORY: chest pain/syncope/bradycarida TECHNOLOGIST PROVIDED HISTORY: Reason for exam:->chest pain/syncope/bradycarida Reason for Exam: chest pain/syncope/bradycardia Acuity: Acute Type of Exam: Initial FINDINGS: Pulmonary Arteries: Motion artifact degrades image quality. There is no acute pulmonary thromboembolus. Mediastinum: The heart is enlarged status post median sternotomy and CABG. A cardiac digital recorder device is within the right ventricle and left anterior chest wall. There are no enlarged thoracic lymph nodes. Lungs/pleura: The airway is patent. There is no pneumothorax or pleural effusion. There is biapical and bibasilar scarring with increased airspace disease in the bilateral bases favoring atelectasis. Subtle ground-glass opacities within the bilateral upper lobes favor atelectasis rather than an infectious/inflammatory process. Upper Abdomen: Status post gastric bypass.   A punctate nonobstructing nephrolithiasis present in the upper pole of the left kidney. A right upper pole hyperdense cyst favors a hemorrhagic renal cyst. Soft Tissues/Bones: Degenerative changes involve the thoracic spine. The bones are demineralized. 1. No acute abnormality.        Discharge Time of 20 minutes    Electronically signed by David Dockery MD on 2/11/2021 at 11:00 AM

## 2021-02-11 NOTE — CARE COORDINATION
Chart reviewed, spoke with patient for continued discharge planning. She confirmed plan to return home at discharge with her SO/Chyna and her youngest daughter/Elida. She requested [facility] for IV atbx. States she has enough DME to be a store including her wheelchair, walkers, canes and denies any new DME needs at discharge. CM following. 10:55 AM  PS to [facility] with [facility] to provide referral for new IV atbx once medically stable. 11:20 AM  [place] with Mercedes [place] [facility] [place] states they are able to accept patient for infusion and she will set up IV atbx with [facility]. PS to Dr. Lam Jang to alert to need for anaphylaxis kit and to inquire if IV atbx to be able to start tomorrow in the home setting. Dr. Lam Jang states he will add the anaphylaxis kit to the RX and ok to start IV atbx tomorrow. 11:36 AM  Updated by [facility] with [facility] [place] are not able to [place] patient. Called and spoke with patient who requested either [facility] or [facility] as she has had both in the past.     [facility] and left message on the infusion line. Faxed clinicals at this time. Received a call back from Eleazar Latif with [facility] who states they will call this CM back and update me as soon as case is reviewed. Awaiting update on acceptance. Awaiting insurance coverage of IV atbx prior to patient discharge, updated pt and Jeanne RN.       1:45 PM  Spoke with Vera/antoien with [facility] who states patient has been accepted and infusions are covered at 100%, they will start care tomorrow. Updated pt and Jeanne RN. Pt able to discharge from CM standpoint.

## 2021-02-11 NOTE — PROGRESS NOTES
Admit Date:  2/6/2021    Admission diagnosis / Complaint : syncope      Subjective:  Ms. Everette Bowles resting in bed. Denies cardiac complaints      Objective:   /73   Pulse 51   Temp 97.7 °F (36.5 °C) (Oral)   Resp 13   Ht 5' 6\" (1.676 m)   Wt 144 lb 12.8 oz (65.7 kg)   SpO2 100%   BMI 23.37 kg/m²       Intake/Output Summary (Last 24 hours) at 2/11/2021 1338  Last data filed at 2/10/2021 1713  Gross per 24 hour   Intake    Output 250 ml   Net -250 ml       TELEMETRY: v paced   has a past medical history of Acute delirium, Arrhythmia, Arthritis, Asplenia, Asthma, Avascular necrosis ([address] CAD (coronary artery disease), Cardiomyopathy ([address] Cerebral artery occlusion with cerebral infarction ([address] CHF (congestive heart failure) ([address] Enlarged liver, GERD (gastroesophageal reflux disease), H/O echocardiogram, History of blood transfusion, Hx of cardiovascular stress test, Hyperlipidemia, Hypertension, Lymphoma ([address] MI, old, Movement disorder, MS (multiple sclerosis) ([address] OP (osteoporosis), PE (pulmonary embolism), Pneumonia, Pyelonephritis, Seizures ([address] Severe malnutrition ([address] Spleen enlarged, and Thyroid disease. has a past surgical history that includes Coronary angioplasty with stent; Hysterectomy; Appendectomy; Cholecystectomy; Tonsillectomy; Gastric bypass surgery; Cardiac defibrillator placement; hip surgery; Abdomen surgery; Colonoscopy; Endoscopy, colon, diagnostic; hernia repair; joint replacement; skin biopsy; vascular surgery; Coronary artery bypass graft; [address] Surgery (N/A, 6/1/2020); [address] Surgery (N/A, 2/9/2021); and Port Surgery (Right, 2/10/2021).   Physical Exam:  General:  Awake, alert, NAD  Skin:  Warm and dry  Neck:  JVD not appreciated  Chest:  Clear to auscultation, respiration easy  Cardiovascular:  RRR (bradycardic) S1S2, grade 2/6 systolic murmur  Abdomen:  Soft nontender  Extremities:  No edema    Medications:    sodium chloride flush  10 mL Intravenous 2 times per day  enoxaparin  40 mg Subcutaneous Daily    sodium zirconium cyclosilicate  10 g Oral Once    meropenem  1,000 mg Intravenous Q8H    albuterol sulfate HFA  2 puff Inhalation BID    acetaminophen  1,000 mg Oral Once    sodium chloride flush  10 mL Intravenous 2 times per day    famotidine (PEPCID) injection  20 mg Intravenous BID    clopidogrel  75 mg Oral Daily    dabigatran  150 mg Oral BID    DULoxetine  60 mg Oral Daily    gabapentin  300 mg Oral TID    levothyroxine  150 mcg Oral QAM AC    levETIRAcetam  1,000 mg Oral Daily    midodrine  10 mg Oral TID WC    mirtazapine  30 mg Oral Nightly    ranolazine  500 mg Oral Q12H    amiodarone  400 mg Oral Daily    atorvastatin  80 mg Oral Nightly    budesonide-formoterol  2 puff Inhalation BID    calcium elemental  500 mg Oral Daily      lactated ringers 50 mL/hr at 02/10/21 2207     sodium chloride flush, potassium chloride, magnesium sulfate, morphine, ipratropium-albuterol, sodium chloride flush, acetaminophen **OR** acetaminophen, polyethylene glycol, nitroGLYCERIN, magnesium sulfate, oxyCODONE, promethazine    Lab Data:  CBC:   Recent Labs     02/09/21  0307 02/10/21  0534 02/11/21  0526   WBC 5.5 7.0 5.6   HGB 11.3* 9.7* 10.6*   HCT 40.3 31.5* 36.5*   .8* 116.2* 124.1*    244 222     BMP:   Recent Labs     02/09/21  1334 02/10/21  0534 02/11/21  0526   * 138 141   K 4.8 4.9 5.1    107 109   CO2 23 25 25   BUN 9 9 9   CREATININE 0.7 0.6 0.7     LIVER PROFILE:   Recent Labs     02/09/21  0307 02/10/21  0534 02/11/21  0526   AST 22 11* 16   ALT 10 7* 7*   BILITOT 0.2 0.2 0.2   ALKPHOS 128 103 97           Assessment and Plan:    Syncope secondary to hypotension  Single chamber pacemaker- Micra  Ischemic cardiomyopahty  History of recurrent infection and device infections  Multiple sclerosis  History of PE and IVC filter    Patient denies further episodes of syncope. Patient would like to decrease the lower rate to 40 bpm on Micra. Will change to VVI 40 to avoid RV pacing. Patient's blood pressure is stable at this time.-She is on ProAmatine 10 mg 3 times daily    Patient is stable for discharge at this time      JESSE Pérez - CNP, 2/11/2021 1:38 PM     Please note this report has been partially produced using speech recognition software and may contain errors related to that system including errors in grammar, punctuation, and spelling, as well as words and phrases that may be inappropriate. If there are any questions or concerns please feel free to contact the dictating provider for clarification.

## 2021-02-11 NOTE — PROGRESS NOTES
Per Cara Pt using [facility] for [facility] and infusion. [facility] can not service pt.

## 2021-02-11 NOTE — FLOWSHEET NOTE
Discharge instructions given to patient, voices understanding. Patient to be discharged with accessed mediport for home IV antibiotic infusions through [facility]. Patient awaiting transportation from family to home.

## 2021-02-12 ENCOUNTER — APPOINTMENT (OUTPATIENT)
Dept: CT IMAGING | Age: 61
End: 2021-02-12
Payer: MEDICARE

## 2021-02-12 ENCOUNTER — HOSPITAL ENCOUNTER (EMERGENCY)
Age: 61
Discharge: HOME | End: 2021-02-12
Attending: EMERGENCY MEDICINE
Payer: MEDICARE

## 2021-02-12 VITALS
HEIGHT: 66 IN | RESPIRATION RATE: 16 BRPM | DIASTOLIC BLOOD PRESSURE: 70 MMHG | OXYGEN SATURATION: 100 % | SYSTOLIC BLOOD PRESSURE: 120 MMHG | WEIGHT: 140 LBS | HEART RATE: 56 BPM | BODY MASS INDEX: 22.5 KG/M2 | TEMPERATURE: 98.9 F

## 2021-02-12 DIAGNOSIS — T83.511D URINARY TRACT INFECTION ASSOCIATED WITH INDWELLING URETHRAL CATHETER, SUBSEQUENT ENCOUNTER: Primary | ICD-10-CM

## 2021-02-12 DIAGNOSIS — N39.0 URINARY TRACT INFECTION ASSOCIATED WITH INDWELLING URETHRAL CATHETER, SUBSEQUENT ENCOUNTER: Primary | ICD-10-CM

## 2021-02-12 DIAGNOSIS — N28.9 RENAL LESION: ICD-10-CM

## 2021-02-12 DIAGNOSIS — R31.9 HEMATURIA, UNSPECIFIED TYPE: ICD-10-CM

## 2021-02-12 LAB
ALBUMIN SERPL-MCNC: 2.8 GM/DL (ref 3.4–5)
ALP BLD-CCNC: 100 IU/L (ref 40–129)
ALT SERPL-CCNC: 9 U/L (ref 10–40)
ANION GAP SERPL CALCULATED.3IONS-SCNC: 8 MMOL/L (ref 4–16)
AST SERPL-CCNC: 17 IU/L (ref 15–37)
BACTERIA: NEGATIVE /HPF
BASOPHILS ABSOLUTE: 0.1 K/CU MM
BASOPHILS RELATIVE PERCENT: 0.9 % (ref 0–1)
BILIRUB SERPL-MCNC: 0.1 MG/DL (ref 0–1)
BILIRUBIN URINE: NEGATIVE MG/DL
BLOOD, URINE: ABNORMAL
BUN BLDV-MCNC: 13 MG/DL (ref 6–23)
CALCIUM SERPL-MCNC: 8.2 MG/DL (ref 8.3–10.6)
CHLORIDE BLD-SCNC: 106 MMOL/L (ref 99–110)
CLARITY: ABNORMAL
CO2: 25 MMOL/L (ref 21–32)
COLOR: ABNORMAL
CREAT SERPL-MCNC: 0.7 MG/DL (ref 0.6–1.1)
DIFFERENTIAL TYPE: ABNORMAL
EOSINOPHILS ABSOLUTE: 0.2 K/CU MM
EOSINOPHILS RELATIVE PERCENT: 3.7 % (ref 0–3)
GFR AFRICAN AMERICAN: >60 ML/MIN/1.73M2
GFR NON-AFRICAN AMERICAN: >60 ML/MIN/1.73M2
GLUCOSE BLD-MCNC: 101 MG/DL (ref 70–99)
GLUCOSE, URINE: NEGATIVE MG/DL
HCT VFR BLD CALC: 32.2 % (ref 37–47)
HEMOGLOBIN: 9.7 GM/DL (ref 12.5–16)
HYPHENATED YEAST: ABNORMAL /HPF
IMMATURE NEUTROPHIL %: 0.2 % (ref 0–0.43)
INR BLD: 0.98 INDEX
KETONES, URINE: NEGATIVE MG/DL
LEUKOCYTE ESTERASE, URINE: ABNORMAL
LYMPHOCYTES ABSOLUTE: 1.2 K/CU MM
LYMPHOCYTES RELATIVE PERCENT: 21.8 % (ref 24–44)
MCH RBC QN AUTO: 36.1 PG (ref 27–31)
MCHC RBC AUTO-ENTMCNC: 30.1 % (ref 32–36)
MCV RBC AUTO: 119.7 FL (ref 78–100)
MONOCYTES ABSOLUTE: 0.6 K/CU MM
MONOCYTES RELATIVE PERCENT: 10.5 % (ref 0–4)
MUCUS: ABNORMAL HPF
NITRITE URINE, QUANTITATIVE: NEGATIVE
NUCLEATED RBC %: 0 %
PDW BLD-RTO: 13.2 % (ref 11.7–14.9)
PH, URINE: 5 (ref 5–8)
PLATELET # BLD: 237 K/CU MM (ref 140–440)
PMV BLD AUTO: 10.1 FL (ref 7.5–11.1)
POTASSIUM SERPL-SCNC: 4.5 MMOL/L (ref 3.5–5.1)
PROTEIN UA: 100 MG/DL
PROTHROMBIN TIME: 11.8 SECONDS (ref 11.7–14.5)
RBC # BLD: 2.69 M/CU MM (ref 4.2–5.4)
RBC URINE: 2476 /HPF (ref 0–6)
SEGMENTED NEUTROPHILS ABSOLUTE COUNT: 3.4 K/CU MM
SEGMENTED NEUTROPHILS RELATIVE PERCENT: 62.9 % (ref 36–66)
SODIUM BLD-SCNC: 139 MMOL/L (ref 135–145)
SPECIFIC GRAVITY UA: 1.03 (ref 1–1.03)
TOTAL IMMATURE NEUTOROPHIL: 0.01 K/CU MM
TOTAL NUCLEATED RBC: 0 K/CU MM
TOTAL PROTEIN: 5.2 GM/DL (ref 6.4–8.2)
TRICHOMONAS: ABNORMAL /HPF
UROBILINOGEN, URINE: NEGATIVE MG/DL (ref 0.2–1)
WBC # BLD: 5.5 K/CU MM (ref 4–10.5)
WBC UA: 234 /HPF (ref 0–5)
YEAST: ABNORMAL /HPF

## 2021-02-12 PROCEDURE — 36415 COLL VENOUS BLD VENIPUNCTURE: CPT

## 2021-02-12 PROCEDURE — 80053 COMPREHEN METABOLIC PANEL: CPT

## 2021-02-12 PROCEDURE — 81001 URINALYSIS AUTO W/SCOPE: CPT

## 2021-02-12 PROCEDURE — 85610 PROTHROMBIN TIME: CPT

## 2021-02-12 PROCEDURE — 85025 COMPLETE CBC W/AUTO DIFF WBC: CPT

## 2021-02-12 PROCEDURE — 74176 CT ABD & PELVIS W/O CONTRAST: CPT

## 2021-02-12 PROCEDURE — 99285 EMERGENCY DEPT VISIT HI MDM: CPT

## 2021-02-12 PROCEDURE — 87086 URINE CULTURE/COLONY COUNT: CPT

## 2021-02-12 NOTE — CARE COORDINATION
Pt identified as potential readmission. Last admission 2/6/2021 - 2/11/2021 for Syncope: Likely from bradycardia    Pt here today for hematuria. She states she was just discharged from the hospital yesterday. During her admission she was here for chest pain, but also had a UTI and was started on antibiotics which she is getting via a MediPort at home. The home health care nurse was out today to do the antibiotics. She states that she called her primary care provider who instructed her to go to the ED because there was blood in the [place] bag. No Acute Findings. Readmission was avoided and pt was able to be d/c'd home.

## 2021-02-12 NOTE — ED PROVIDER NOTES
eMERGENCY dEPARTMENT eNCOUnter      PCP: Natali Medellin MD    [facility]    Chief Complaint   Patient presents with    Hematuria     discharged from hosp yesterday, hematuria noticed in catheter yesterday. HPI    Bhupinder Judge is a 62 y.o. female who presents with hematuria. She states she was just discharged from the hospital yesterday. During her admission she was here for chest pain, but also had a UTI and was started on antibiotics which she is getting via a MediPort at home. The home health care nurse was out today to do the antibiotics. She states that she called her primary care provider who instructed her to go to the ED because there was blood in the Coppola bag. She states she always has an indwelling Coppola catheter. States that has not been changed recently. She reports that she noticed blood in the urine last night. She reports some lower abdominal and flank discomfort bilaterally. No new fever, chills. No vomiting. REVIEW OF SYSTEMS    Constitutional:  Denies fever, chills  HENT:  Denies sore throat, congestion  Respiratory:  Denies cough or shortness of breath   Cardiovascular:  Denies chest pain, palpitations  GI:  See HPI. Denies abdominal pain, nausea, vomiting, or diarrhea   :  See HPI.   Musculoskeletal:  Denies pain or edema  Skin:  Denies rash, color change  Neurologic:  Denies headache, focal weakness or sensory changes     See HPI and nursing notes additional information  All other review of systems negative at this time      PAST MEDICAL HISTORY/SURGICAL HISTORY    Past Medical History:   Diagnosis Date    Acute delirium     Arrhythmia     Arthritis     Asplenia     Asthma     Avascular necrosis ([address]     CAD (coronary artery disease)     1st stent at age 46    Cardiomyopathy [facility])     Cerebral artery occlusion with cerebral infarction ([address]     CHF (congestive heart failure) ([address]     Enlarged liver  GERD (gastroesophageal reflux disease)     H/O echocardiogram 4/6/16    EF 55%, trivial TR & MR, stage 1 diastolic dysfunction    History of blood transfusion     Hx of cardiovascular stress test 12/29/2015    Alessia: EF 45%. Pharmacologic stress myocardial perfusion scan shows a fixed inferior-lateral defect w/ no inducible ischemia. No evidence of ischemia. Inferior-lateral infarction. Normal LVSF    Hyperlipidemia     Hypertension     Lymphoma ([facility]     Denies    MI, old     Movement disorder     MS (multiple sclerosis) ([facility]     OP (osteoporosis)     PE (pulmonary embolism)     trapese filter    Pneumonia     Pyelonephritis     Seizures (HCC)     Severe malnutrition ([facility]     Spleen enlarged     Thyroid disease      Past Surgical History:   Procedure Laterality Date    ABDOMEN SURGERY      APPENDECTOMY      CARDIAC DEFIBRILLATOR PLACEMENT      evera [id number] CONDITIONAL    CHOLECYSTECTOMY      COLONOSCOPY      CORONARY ANGIOPLASTY WITH STENT PLACEMENT      3 stents    CORONARY ARTERY BYPASS GRAFT      Age 49    ENDOSCOPY, COLON, DIAGNOSTIC      GASTRIC BYPASS SURGERY      HERNIA REPAIR      HIP SURGERY      HYSTERECTOMY      JOINT REPLACEMENT      PORT SURGERY N/A 6/1/2020    PORT INSERTION performed by Izabel Alexis MD at [address] [place] [address] N/A 2/9/2021    MEDIPORT INSERTION UNDER FLUOROSCOPY performed by Izabel Alexis MD at [address] [place] [address] Right 2/10/2021    MEDIPORT INSERTION W/ FLUOROSCOPY [facility]      VASCULAR SURGERY         CURRENT MEDICATIONS    Current Outpatient Rx   Medication Sig Dispense Refill    ertapenem (INVANZ) infusion Infuse 1,000 mg intravenously every 24 hours for 4 days Compound per protocol.  4000 mg 0    midodrine (PROAMATINE) 10 MG tablet Take 1 tablet by mouth 3 times daily (with meals) 90 tablet 0  aspirin 81 MG tablet Take 1 tablet by mouth daily 30 tablet 1    beclomethasone (QVAR) 40 MCG/ACT inhaler Inhale 2 puffs into the lungs 2 times daily      acarbose (PRECOSE) 25 MG tablet Take 25 mg by mouth 3 times daily (with meals)      budesonide-formoterol (SYMBICORT) 160-4.5 MCG/ACT AERO Inhale 2 puffs into the lungs 2 times daily      bumetanide (BUMEX) 1 MG tablet Take 1 mg by mouth daily      calcium citrate (CALCITRATE) 950 (200 Ca) MG tablet Take 1,425 mg by mouth daily      dabigatran (PRADAXA) 75 MG capsule Take 150 mg by mouth      DULoxetine (CYMBALTA) 60 MG extended release capsule Take 60 mg by mouth daily      ergocalciferol (ERGOCALCIFEROL) 1.25 MG (25945 UT) capsule Take 50,000 Units by mouth Twice a Week      gabapentin (NEURONTIN) 300 MG capsule Take 300 mg by mouth 3 times daily.       mirtazapine (REMERON) 30 MG tablet Take 30 mg by mouth nightly      omeprazole (PRILOSEC) 20 MG delayed release capsule omeprazole 20 mg capsule,delayed release      promethazine (PHENERGAN) 25 MG tablet Take 25 mg by mouth 4 times daily as needed      ranolazine (RANEXA) 500 MG extended release tablet Take 500 mg by mouth every 12 hours      senna (SENOKOT) 8.6 MG tablet Take 1 tablet by mouth daily      spironolactone (ALDACTONE) 25 MG tablet Take 12.5 mg by mouth daily      cyanocobalamin 1000 MCG tablet Take 1 tablet by mouth daily      famotidine (PEPCID) 20 MG tablet Take 20 mg by mouth 2 times daily      ferrous gluconate (FERGON) 324 (38 Fe) MG tablet Take 1 tablet by mouth 2 times daily      levETIRAcetam (KEPPRA) 1000 MG tablet Take 1,000 mg by mouth daily      levothyroxine (SYNTHROID) 150 MCG tablet Take 150 mcg by mouth every morning (before breakfast)      magnesium oxide (MAG-OX) 400 MG tablet Take 400 mg by mouth daily      nitroGLYCERIN (NITROSTAT) 0.4 MG SL tablet Place 0.4 mg under the tongue as needed  potassium chloride (KLOR-CON M) 10 MEQ extended release tablet Take 10 mEq by mouth 2 times daily 2 (10mEq)      amiodarone (CORDARONE) 200 MG tablet Take 400 mg by mouth daily      oxyCODONE (ROXICODONE) 20 MG immediate release tablet Take 20 mg by mouth every 3 hours as needed.  chlorhexidine gluconate (ANTISEPTIC SKIN CLEANSER) 4 % SOLN external solution Apply topically daily 1 Bottle 1    ipratropium-albuterol (DUONEB) 0.5-2.5 (3) MG/3ML SOLN nebulizer solution Inhale 3 mLs into the lungs every 4 hours (while awake) 360 mL 0    benzocaine-menthol (CEPACOL SORE THROAT) 15-3.6 MG lozenge Take 1 lozenge by mouth every 2 hours as needed for Sore Throat 18 lozenge 1    docusate sodium (COLACE) 100 MG capsule Take 100 mg by mouth 2 times daily as needed for Constipation      atorvastatin (LIPITOR) 80 MG tablet Take 80 mg by mouth nightly      clopidogrel (PLAVIX) 75 MG tablet Take 75 mg by mouth daily      acetaminophen 650 MG TABS Take 650 mg by mouth every 4 hours as needed 120 tablet 3    albuterol (PROVENTIL HFA;VENTOLIN HFA) 108 (90 BASE) MCG/ACT inhaler Inhale 2 puffs into the lungs every 6 hours as needed. ALLERGIES    Allergies   Allergen Reactions    Fentanyl Swelling     Other reaction(s): Angioedema (swelling)    Moxifloxacin Hcl In Nacl Swelling and Rash    Povidone-Iodine Rash     Other reaction(s): AOF      Cyclobenzaprine      Other reaction(s): Other - comment required  \" it make my muscle very weak because of my MS\"  \" it make my muscle very weak because of my MS\"      Methocarbamol      Worsening edema  Other reaction(s): Other - comment required  Worsening edema  Worsening edema  Worsening edema      Tramadol Other (See Comments)     Doctor doesn't her to take due to heart problems  Seizures   Doctor doesn't her to take due to lowers seizure threshold.     Baclofen     Ibuprofen      \"Due to heart problems\"    Naproxen     Nsaids      \"Due to heart problems\" Social History Narrative    None       PHYSICAL EXAM    VITAL SIGNS: There were no vitals taken for this visit. Constitutional:  Well-developed, well-nourished, appears comfortable  Eyes:  PERRL, EOM intact  HENT:  Atraumatic, external ears normal, nose normal, oropharynx moist.   NECK: Supple, normal ROM  Respiratory:  No respiratory distress, normal breath sounds   Cardiovascular:  Heart rate regular, normal rhythm, no murmurs  GI:  Abdomen soft, non-distended, suprapubic abdominal tenderness  :  No CVA tenderness. Coppola bag with dark appearing urine in the bag. Integument:  Well hydrated, no rashes      LABS:  Labs Reviewed   URINALYSIS - Abnormal; Notable for the following components:       Result Value    Color, UA MEGHAN (*)     Clarity, UA SLIGHTLY CLOUDY (*)     Blood, Urine LARGE (*)     Protein,  (*)     Leukocyte Esterase, Urine SMALL (*)     RBC, UA 2,476 (*)     WBC,  (*)     Mucus, UA FEW (*)     All other components within normal limits   CBC WITH AUTO DIFFERENTIAL - Abnormal; Notable for the following components:    RBC 2.69 (*)     Hemoglobin 9.7 (*)     Hematocrit 32.2 (*)     .7 (*)     MCH 36.1 (*)     MCHC 30.1 (*)     Lymphocytes % 21.8 (*)     Monocytes % 10.5 (*)     Eosinophils % 3.7 (*)     All other components within normal limits   COMPREHENSIVE METABOLIC PANEL - Abnormal; Notable for the following components:    Glucose 101 (*)     Calcium 8.2 (*)     Albumin 2.8 (*)     Total Protein 5.2 (*)     ALT 9 (*)     All other components within normal limits   CULTURE, URINE   PROTIME-INR           ED COURSE & MEDICAL DECISION MAKING       Vital signs and nursing notes reviewed during ED course. I have independently evaluated this patient. All pertinent Lab data and radiographic results reviewed with patient at bedside. The patient and/or the family were informed of the results of any tests/labs/imaging, the treatment plan, and time was allotted to answer questions. This is a 64-year-old female who presented to the hematuria. She was recently diagnosed with UTI which is resistant to multiple antibiotics and she is on antibiotics via a MediPort with home health care. Today hemoglobin is 9.7 which appears to be around the patient's baseline. CT scan showed no acute abnormalities. An indeterminate right renal lesion unchanged when compared to recent exam, but new from 2019. Did recommend outpatient follow-up for this, gave her information on it and asked her to contact her primary care provider for further imaging. Urinalysis did show blood and numerous white blood cells as well. She is being treated for UTI outpatient as above. I do feel the patient can be discharged home with instructions to follow-up closely outpatient. We did discuss the bleeding worsens, she has clots, clogged Coppola catheter or other new finding she should return to the emergency department for evaluation. Otherwise she will follow up with her primary care provider on an outpatient basis. Differential diagnosis: Pyelonephritis, Kidney Stone, UTI, Urosepsis, Appendicitis, bladder mass    The likelihood of other entities in the differential is insufficient to justify any further testing for them. This was explained to the patient. The patient was advised that persistent or worsening symptoms would require further evaluation. Clinical  IMPRESSION    UTI with hematuria, renal lesion on CT      Diagnosis and plan discussed in detail with patient who understands and agrees. Patient agrees to return emergency department if symptoms worsen or any new symptoms develop. (Please note the MDM and HPI sections of this note were completed with a voice recognition program.  Efforts were made to edit the dictations but occasionally words are mis-transcribed.)      Kurt Avian DO  02/12/21 [id number]

## 2021-02-14 LAB
CULTURE: ABNORMAL
CULTURE: ABNORMAL
Lab: ABNORMAL
SPECIMEN: ABNORMAL

## 2021-02-25 NOTE — PROGRESS NOTES
Physician Progress Note      PATIENT:               Lauryn Hernández  CSN #:                  [id number]  :                       1960  ADMIT DATE:       2021 4:36 PM  DISCH DATE:        2021 5:55 PM  RESPONDING  PROVIDER #:        Devonte Bo MD          QUERY TEXT:    Patient admitted with syncope. Noted documentation of syncope due to   hypotension by Paddy Gamble NP (card) on 21 and syncope due to   bradycardia by Dr Rosy Street in discharge summary on 21. If possible, please document in progress notes and discharge summary if you   are evaluating and /or treating any of the following: The medical record reflects the following:  Risk Factors: pacemaker back up rate set at 35 bpm, beta blocker on home med   list, pt admitted to not taking her midodrine  Clinical Indicators: On arrival, SBP 86-99, P 51-58, dizzy and lightheaded   accompanied syncopal episodes,  Treatment: increased back up rate of pacemaker, cardiology consult, EP   consult, midodrine resumed    Thank you,  Cholo Salcido, RN  [phone]  Options provided:  -- syncope due to hypotension confirmed and syncope due to bradycardia ruled   out  -- syncope due to bradycardia confirmed and syncope due to hypotension ruled   out  -- Other - I will add my own diagnosis  -- Disagree - Not applicable / Not valid  -- Disagree - Clinically unable to determine / Unknown  -- Refer to Clinical Documentation Reviewer    PROVIDER RESPONSE TEXT:    After study, syncope due to bradycardia confirmed and syncope due to   hypotension ruled out.     Query created by: Zoraida Oconnell on 2021 1:13 PM      Electronically signed by:  Devonte Bo MD 2021 9:53 AM

## 2021-02-26 ENCOUNTER — HOSPITAL ENCOUNTER (EMERGENCY)
Age: 61
Discharge: ANOTHER HOSPITAL | End: 2021-02-27
Attending: EMERGENCY MEDICINE
Payer: MEDICARE

## 2021-02-26 ENCOUNTER — APPOINTMENT (OUTPATIENT)
Dept: GENERAL RADIOLOGY | Age: 61
End: 2021-02-26
Payer: MEDICARE

## 2021-02-26 ENCOUNTER — APPOINTMENT (OUTPATIENT)
Dept: CT IMAGING | Age: 61
End: 2021-02-26
Payer: MEDICARE

## 2021-02-26 DIAGNOSIS — R06.00 DYSPNEA AND RESPIRATORY ABNORMALITIES: ICD-10-CM

## 2021-02-26 DIAGNOSIS — N39.0 URINARY TRACT INFECTION WITH HEMATURIA, SITE UNSPECIFIED: ICD-10-CM

## 2021-02-26 DIAGNOSIS — R07.9 CHEST PAIN, UNSPECIFIED TYPE: ICD-10-CM

## 2021-02-26 DIAGNOSIS — R00.1 BRADYCARDIA: Primary | ICD-10-CM

## 2021-02-26 DIAGNOSIS — R55 SYNCOPE AND COLLAPSE: ICD-10-CM

## 2021-02-26 DIAGNOSIS — R06.89 DYSPNEA AND RESPIRATORY ABNORMALITIES: ICD-10-CM

## 2021-02-26 DIAGNOSIS — R31.9 URINARY TRACT INFECTION WITH HEMATURIA, SITE UNSPECIFIED: ICD-10-CM

## 2021-02-26 LAB
ALBUMIN SERPL-MCNC: 2.8 GM/DL (ref 3.4–5)
ALP BLD-CCNC: 114 IU/L (ref 40–128)
ALT SERPL-CCNC: 6 U/L (ref 10–40)
ANION GAP SERPL CALCULATED.3IONS-SCNC: 10 MMOL/L (ref 4–16)
AST SERPL-CCNC: 13 IU/L (ref 15–37)
BACTERIA: ABNORMAL /HPF
BASOPHILS ABSOLUTE: 0 K/CU MM
BASOPHILS RELATIVE PERCENT: 0.7 % (ref 0–1)
BILIRUB SERPL-MCNC: 0.2 MG/DL (ref 0–1)
BILIRUBIN URINE: NEGATIVE MG/DL
BLOOD, URINE: NEGATIVE
BUN BLDV-MCNC: 11 MG/DL (ref 6–23)
CALCIUM SERPL-MCNC: 8 MG/DL (ref 8.3–10.6)
CHLORIDE BLD-SCNC: 100 MMOL/L (ref 99–110)
CLARITY: ABNORMAL
CO2: 21 MMOL/L (ref 21–32)
COLOR: ABNORMAL
CREAT SERPL-MCNC: 0.6 MG/DL (ref 0.6–1.1)
DIFFERENTIAL TYPE: ABNORMAL
EOSINOPHILS ABSOLUTE: 0 K/CU MM
EOSINOPHILS RELATIVE PERCENT: 0 % (ref 0–3)
GFR AFRICAN AMERICAN: >60 ML/MIN/1.73M2
GFR NON-AFRICAN AMERICAN: >60 ML/MIN/1.73M2
GLUCOSE BLD-MCNC: 105 MG/DL (ref 70–99)
GLUCOSE BLD-MCNC: 117 MG/DL (ref 70–99)
GLUCOSE, URINE: NEGATIVE MG/DL
HCT VFR BLD CALC: 32.2 % (ref 37–47)
HEMOGLOBIN: 10.2 GM/DL (ref 12.5–16)
HYPHENATED YEAST: ABNORMAL /HPF
IMMATURE NEUTROPHIL %: 0.5 % (ref 0–0.43)
KETONES, URINE: NEGATIVE MG/DL
LACTATE: 0.5 MMOL/L (ref 0.4–2)
LEUKOCYTE ESTERASE, URINE: ABNORMAL
LYMPHOCYTES ABSOLUTE: 0.7 K/CU MM
LYMPHOCYTES RELATIVE PERCENT: 16.1 % (ref 24–44)
MAGNESIUM: 2 MG/DL (ref 1.8–2.4)
MCH RBC QN AUTO: 36 PG (ref 27–31)
MCHC RBC AUTO-ENTMCNC: 31.7 % (ref 32–36)
MCV RBC AUTO: 113.8 FL (ref 78–100)
MONOCYTES ABSOLUTE: 0.2 K/CU MM
MONOCYTES RELATIVE PERCENT: 5 % (ref 0–4)
MUCUS: ABNORMAL HPF
NITRITE URINE, QUANTITATIVE: NEGATIVE
NUCLEATED RBC %: 0 %
PDW BLD-RTO: 13.7 % (ref 11.7–14.9)
PH, URINE: 7 (ref 5–8)
PLATELET # BLD: 277 K/CU MM (ref 140–440)
PMV BLD AUTO: 10.7 FL (ref 7.5–11.1)
POTASSIUM SERPL-SCNC: 4.3 MMOL/L (ref 3.5–5.1)
PRO-BNP: 958.2 PG/ML
PROTEIN UA: 30 MG/DL
RBC # BLD: 2.83 M/CU MM (ref 4.2–5.4)
RBC URINE: 99 /HPF (ref 0–6)
SEGMENTED NEUTROPHILS ABSOLUTE COUNT: 3.3 K/CU MM
SEGMENTED NEUTROPHILS RELATIVE PERCENT: 77.7 % (ref 36–66)
SODIUM BLD-SCNC: 131 MMOL/L (ref 135–145)
SPECIFIC GRAVITY UA: 1.03 (ref 1–1.03)
TOTAL CK: 24 IU/L (ref 26–140)
TOTAL IMMATURE NEUTOROPHIL: 0.02 K/CU MM
TOTAL NUCLEATED RBC: 0 K/CU MM
TOTAL PROTEIN: 5.4 GM/DL (ref 6.4–8.2)
TRICHOMONAS: ABNORMAL /HPF
TROPONIN T: <0.01 NG/ML
TSH HIGH SENSITIVITY: 0.89 UIU/ML (ref 0.27–4.2)
UROBILINOGEN, URINE: 2 MG/DL (ref 0.2–1)
WBC # BLD: 4.2 K/CU MM (ref 4–10.5)
WBC UA: 136 /HPF (ref 0–5)
YEAST: ABNORMAL /HPF

## 2021-02-26 PROCEDURE — 6360000002 HC RX W HCPCS: Performed by: EMERGENCY MEDICINE

## 2021-02-26 PROCEDURE — 71275 CT ANGIOGRAPHY CHEST: CPT

## 2021-02-26 PROCEDURE — 2580000003 HC RX 258: Performed by: EMERGENCY MEDICINE

## 2021-02-26 PROCEDURE — 87077 CULTURE AEROBIC IDENTIFY: CPT

## 2021-02-26 PROCEDURE — 72125 CT NECK SPINE W/O DYE: CPT

## 2021-02-26 PROCEDURE — 83605 ASSAY OF LACTIC ACID: CPT

## 2021-02-26 PROCEDURE — 70450 CT HEAD/BRAIN W/O DYE: CPT

## 2021-02-26 PROCEDURE — 71045 X-RAY EXAM CHEST 1 VIEW: CPT

## 2021-02-26 PROCEDURE — 93005 ELECTROCARDIOGRAM TRACING: CPT | Performed by: EMERGENCY MEDICINE

## 2021-02-26 PROCEDURE — 84443 ASSAY THYROID STIM HORMONE: CPT

## 2021-02-26 PROCEDURE — 96365 THER/PROPH/DIAG IV INF INIT: CPT

## 2021-02-26 PROCEDURE — 96376 TX/PRO/DX INJ SAME DRUG ADON: CPT

## 2021-02-26 PROCEDURE — 82550 ASSAY OF CK (CPK): CPT

## 2021-02-26 PROCEDURE — 84484 ASSAY OF TROPONIN QUANT: CPT

## 2021-02-26 PROCEDURE — 80053 COMPREHEN METABOLIC PANEL: CPT

## 2021-02-26 PROCEDURE — 87086 URINE CULTURE/COLONY COUNT: CPT

## 2021-02-26 PROCEDURE — 87186 SC STD MICRODIL/AGAR DIL: CPT

## 2021-02-26 PROCEDURE — 87040 BLOOD CULTURE FOR BACTERIA: CPT

## 2021-02-26 PROCEDURE — 81001 URINALYSIS AUTO W/SCOPE: CPT

## 2021-02-26 PROCEDURE — 85610 PROTHROMBIN TIME: CPT

## 2021-02-26 PROCEDURE — 96375 TX/PRO/DX INJ NEW DRUG ADDON: CPT

## 2021-02-26 PROCEDURE — 99285 EMERGENCY DEPT VISIT HI MDM: CPT

## 2021-02-26 PROCEDURE — 96361 HYDRATE IV INFUSION ADD-ON: CPT

## 2021-02-26 PROCEDURE — 83735 ASSAY OF MAGNESIUM: CPT

## 2021-02-26 PROCEDURE — 83880 ASSAY OF NATRIURETIC PEPTIDE: CPT

## 2021-02-26 PROCEDURE — 82962 GLUCOSE BLOOD TEST: CPT

## 2021-02-26 PROCEDURE — 85025 COMPLETE CBC W/AUTO DIFF WBC: CPT

## 2021-02-26 PROCEDURE — 6360000004 HC RX CONTRAST MEDICATION: Performed by: EMERGENCY MEDICINE

## 2021-02-26 RX ORDER — MORPHINE SULFATE 4 MG/ML
4 INJECTION, SOLUTION INTRAMUSCULAR; INTRAVENOUS EVERY 30 MIN PRN
Status: DISCONTINUED | OUTPATIENT
Start: 2021-02-26 | End: 2021-02-27 | Stop reason: HOSPADM

## 2021-02-26 RX ORDER — 0.9 % SODIUM CHLORIDE 0.9 %
1000 INTRAVENOUS SOLUTION INTRAVENOUS ONCE
Status: COMPLETED | OUTPATIENT
Start: 2021-02-26 | End: 2021-02-27

## 2021-02-26 RX ORDER — ONDANSETRON 2 MG/ML
4 INJECTION INTRAMUSCULAR; INTRAVENOUS EVERY 30 MIN PRN
Status: DISCONTINUED | OUTPATIENT
Start: 2021-02-26 | End: 2021-02-27 | Stop reason: HOSPADM

## 2021-02-26 RX ADMIN — ONDANSETRON 4 MG: 2 INJECTION INTRAMUSCULAR; INTRAVENOUS at 21:27

## 2021-02-26 RX ADMIN — CEFTRIAXONE 1000 MG: 1 INJECTION, POWDER, FOR SOLUTION INTRAMUSCULAR; INTRAVENOUS at 21:28

## 2021-02-26 RX ADMIN — SODIUM CHLORIDE 1000 ML: 9 INJECTION, SOLUTION INTRAVENOUS at 18:31

## 2021-02-26 RX ADMIN — IOPAMIDOL 70 ML: 755 INJECTION, SOLUTION INTRAVENOUS at 19:46

## 2021-02-26 RX ADMIN — ONDANSETRON 4 MG: 2 INJECTION INTRAMUSCULAR; INTRAVENOUS at 18:33

## 2021-02-26 RX ADMIN — MORPHINE SULFATE 2 MG: 4 INJECTION, SOLUTION INTRAMUSCULAR; INTRAVENOUS at 21:27

## 2021-02-26 ASSESSMENT — ENCOUNTER SYMPTOMS
ALLERGIC/IMMUNOLOGIC NEGATIVE: 1
SHORTNESS OF BREATH: 1
BACK PAIN: 1
EYES NEGATIVE: 1
GASTROINTESTINAL NEGATIVE: 1

## 2021-02-26 ASSESSMENT — PAIN SCALES - GENERAL: PAINLEVEL_OUTOF10: 9

## 2021-02-26 NOTE — ED NOTES
Bed: 04TR-04  Expected date:   Expected time:   Means of arrival:   Comments:  [place] twp-bradycardia     Arslan Neumann RN  02/26/21 [id number]

## 2021-02-26 NOTE — CARE COORDINATION
Pt identified as potential readmission. Last admission 2/6/2021 - 2/11/2021 for presents with Syncope and collapse. Pt here today for Bradycardia. Patient being admitted for Bradycardia, syncope and collapse. Bradycardia (in 40's per EMS)    Pt is requiring transfer at this time.

## 2021-02-26 NOTE — ED PROVIDER NOTES
[facility]      TRIAGE CHIEF COMPLAINT:   Bradycardia (in 40's per EMS)      [place]:  Ibeth Merritt is a 62 y.o. female that presents with complaint of chest pain shortness of breath loss of consciousness low blood pressure. Patient was seen by me a couple weeks ago for the same complaint was admitted here she has a history of a Micro pacemaker usually follows up at [place]. Patient states she passed out today hit her head. Having chest pain shortness of breath blood pressure low on arrival has a port history of MRSA infection and again usually goes to [place]. Denies other questions or concerns no new extremity pain feels like she may have a urinary tract infection as well has a Coppola catheter in. REVIEW OF SYSTEMS:  At least 10 systems reviewed and otherwise acutely negative except as in the [address] Review of Systems   Constitutional: Positive for fatigue. HENT: Negative. Eyes: Negative. Respiratory: Positive for shortness of breath. Cardiovascular: Positive for chest pain. Gastrointestinal: Negative. Endocrine: Negative. Genitourinary: Positive for dysuria. Musculoskeletal: Positive for back pain. Skin: Negative. Allergic/Immunologic: Negative. Neurological: Positive for dizziness, syncope, light-headedness and headaches. Hematological: Negative. Psychiatric/Behavioral: Negative. All other systems reviewed and are negative.       Past Medical History:   Diagnosis Date    Acute delirium     Arrhythmia     Arthritis     Asplenia     Asthma     Avascular necrosis ([address]     CAD (coronary artery disease)     1st stent at age 46    Cardiomyopathy [facility])     Cerebral artery occlusion with cerebral infarction [facility]     CHF (congestive heart failure) (HCC)     Enlarged liver     GERD (gastroesophageal reflux disease)     H/O echocardiogram 4/6/16    EF 55%, trivial TR & MR, stage 1 diastolic dysfunction    History of blood transfusion  Hx of cardiovascular stress test 12/29/2015    Alessia: EF 45%. Pharmacologic stress myocardial perfusion scan shows a fixed inferior-lateral defect w/ no inducible ischemia. No evidence of ischemia. Inferior-lateral infarction.  Normal LVSF    Hyperlipidemia     Hypertension     Lymphoma ([address]     Denies    MI, old     Movement disorder     MS (multiple sclerosis) ([facility]     OP (osteoporosis)     PE (pulmonary embolism)     trapese filter    Pneumonia     Pyelonephritis     Seizures ([facility] [address]     Severe malnutrition ([facility] [address]     Spleen enlarged     Thyroid disease      Past Surgical History:   Procedure Laterality Date    ABDOMEN SURGERY      APPENDECTOMY      CARDIAC DEFIBRILLATOR PLACEMENT      evera [id number] CONDITIONAL    CHOLECYSTECTOMY      COLONOSCOPY      CORONARY ANGIOPLASTY WITH STENT PLACEMENT      3 stents    CORONARY ARTERY BYPASS GRAFT      Age 49    ENDOSCOPY, COLON, DIAGNOSTIC      GASTRIC BYPASS SURGERY      HERNIA REPAIR      HIP SURGERY      HYSTERECTOMY      JOINT REPLACEMENT      PORT SURGERY N/A 6/1/2020    PORT INSERTION performed by Kieran Otto MD at [address] [place] [address] N/A 2/9/2021    MEDIPORT INSERTION UNDER FLUOROSCOPY performed by Kieran Otto MD at [address] [place] [address] Right 2/10/2021    MEDIPORT INSERTION W/ FLUOROSCOPY performed by Kieran Otto MD at [address] [place] [address]      VASCULAR SURGERY       Family History   Problem Relation Age of Onset    High Blood Pressure Mother     High Cholesterol Mother     Heart Disease Mother     Diabetes Mother     Heart Disease Father     Cancer Father     Other Sister         Multiple Sclerosis    Heart Disease Maternal Grandmother     High Blood Pressure Maternal Grandmother     High Cholesterol Maternal Grandmother      Social History     Socioeconomic History    Marital status: Single     Spouse name: Not on file    Number of children: Not on file    Years of education: Not on file    Highest education level: Not on file   Occupational History    Not on file   Social Needs    Financial resource strain: Not on file    Food insecurity     Worry: Not on file     Inability: Not on file    Transportation needs     Medical: Not on file     Non-medical: Not on file   Tobacco Use    Smoking status: Never Smoker    Smokeless tobacco: Never Used   Substance and Sexual Activity    Alcohol use: No    Drug use: No    Sexual activity: Not Currently   Lifestyle    Physical activity     Days per week: Not on file     Minutes per session: Not on file    Stress: Not on file   Relationships    Social connections     Talks on phone: Not on file     Gets together: Not on file     Attends Christian service: Not on file     Active member of club or organization: Not on file     Attends meetings of clubs or organizations: Not on file     Relationship status: Not on file    Intimate partner violence     Fear of current or ex partner: Not on file     Emotionally abused: Not on file     Physically abused: Not on file     Forced sexual activity: Not on file   Other Topics Concern    Not on file   Social History Narrative    Not on file     Current Facility-Administered Medications   Medication Dose Route Frequency Provider Last Rate Last Admin    ondansetron (ZOFRAN) injection 4 mg  4 mg Intravenous Q30 Min PRN Ochoa Doran, DO   4 mg at 02/26/21 2127    morphine sulfate (PF) injection 4 mg  4 mg Intravenous Q30 Min PRN Ochoa Messinaaine, DO   2 mg at 02/26/21 2127    norepinephrine (LEVOPHED) 16 mg in dextrose 5% 250 mL infusion  2-100 mcg/min Intravenous Continuous Chriss Ramey DO        0.9 % sodium chloride bolus  1,000 mL Intravenous Once Ochoa Doran DO         Current Outpatient Medications   Medication Sig Dispense Refill    midodrine (PROAMATINE) 10 MG tablet Take 1 tablet by mouth 3 times daily (with meals) 90 tablet 0    aspirin 81 MG tablet Take 1 tablet by mouth daily 30 tablet 1    beclomethasone (QVAR) 40 MCG/ACT inhaler Inhale 2 puffs into the lungs 2 times daily      acarbose (PRECOSE) 25 MG tablet Take 25 mg by mouth 3 times daily (with meals)      budesonide-formoterol (SYMBICORT) 160-4.5 MCG/ACT AERO Inhale 2 puffs into the lungs 2 times daily      bumetanide (BUMEX) 1 MG tablet Take 1 mg by mouth daily      calcium citrate (CALCITRATE) 950 (200 Ca) MG tablet Take 1,425 mg by mouth daily      dabigatran (PRADAXA) 75 MG capsule Take 150 mg by mouth      DULoxetine (CYMBALTA) 60 MG extended release capsule Take 60 mg by mouth daily      ergocalciferol (ERGOCALCIFEROL) 1.25 MG (14782 UT) capsule Take 50,000 Units by mouth Twice a Week      gabapentin (NEURONTIN) 300 MG capsule Take 300 mg by mouth 3 times daily.       mirtazapine (REMERON) 30 MG tablet Take 30 mg by mouth nightly      omeprazole (PRILOSEC) 20 MG delayed release capsule omeprazole 20 mg capsule,delayed release      promethazine (PHENERGAN) 25 MG tablet Take 25 mg by mouth 4 times daily as needed      ranolazine (RANEXA) 500 MG extended release tablet Take 500 mg by mouth every 12 hours      senna (SENOKOT) 8.6 MG tablet Take 1 tablet by mouth daily      spironolactone (ALDACTONE) 25 MG tablet Take 12.5 mg by mouth daily      cyanocobalamin 1000 MCG tablet Take 1 tablet by mouth daily      famotidine (PEPCID) 20 MG tablet Take 20 mg by mouth 2 times daily      ferrous gluconate (FERGON) 324 (38 Fe) MG tablet Take 1 tablet by mouth 2 times daily      levETIRAcetam (KEPPRA) 1000 MG tablet Take 1,000 mg by mouth daily      levothyroxine (SYNTHROID) 150 MCG tablet Take 150 mcg by mouth every morning (before breakfast)      magnesium oxide (MAG-OX) 400 MG tablet Take 400 mg by mouth daily      nitroGLYCERIN (NITROSTAT) 0.4 MG SL tablet Place 0.4 mg under the tongue as needed      potassium chloride (KLOR-CON M) 10 MEQ extended release tablet Take 10 mEq by mouth 2 times daily 2 (10mEq)      amiodarone (CORDARONE) 200 MG tablet Take 400 mg by mouth daily      oxyCODONE (ROXICODONE) 20 MG immediate release tablet Take 20 mg by mouth every 3 hours as needed.  chlorhexidine gluconate (ANTISEPTIC SKIN CLEANSER) 4 % SOLN external solution Apply topically daily 1 Bottle 1    ipratropium-albuterol (DUONEB) 0.5-2.5 (3) MG/3ML SOLN nebulizer solution Inhale 3 mLs into the lungs every 4 hours (while awake) 360 mL 0    benzocaine-menthol (CEPACOL SORE THROAT) 15-3.6 MG lozenge Take 1 lozenge by mouth every 2 hours as needed for Sore Throat 18 lozenge 1    docusate sodium (COLACE) 100 MG capsule Take 100 mg by mouth 2 times daily as needed for Constipation      atorvastatin (LIPITOR) 80 MG tablet Take 80 mg by mouth nightly      clopidogrel (PLAVIX) 75 MG tablet Take 75 mg by mouth daily      acetaminophen 650 MG TABS Take 650 mg by mouth every 4 hours as needed 120 tablet 3    albuterol (PROVENTIL HFA;VENTOLIN HFA) 108 (90 BASE) MCG/ACT inhaler Inhale 2 puffs into the lungs every 6 hours as needed. Allergies   Allergen Reactions    Fentanyl Swelling     Other reaction(s): Angioedema (swelling)    Moxifloxacin Hcl In Nacl Swelling and Rash    Povidone-Iodine Rash     Other reaction(s): AOF      Cyclobenzaprine      Other reaction(s): Other - comment required  \" it make my muscle very weak because of my MS\"  \" it make my muscle very weak because of my MS\"      Methocarbamol      Worsening edema  Other reaction(s): Other - comment required  Worsening edema  Worsening edema  Worsening edema      Tramadol Other (See Comments)     Doctor doesn't her to take due to heart problems  Seizures   Doctor doesn't her to take due to lowers seizure threshold.     Baclofen     Ibuprofen      \"Due to heart problems\"    Naproxen     Nsaids      \"Due to heart problems\"    Tizanidine     Tolmetin      \"Due to heart problems\"    Tramadol Hcl      Other reaction(s): Other - comment required  Told not to take due to history of seizures    Betadine [Povidone Iodine] Rash    Moxifloxacin Rash and Swelling     Lips swell and tingling in face   Lips swell and tingling in face   Lips swell and tingling in face   Lips swell and tingling in face   Around mouth     Current Facility-Administered Medications   Medication Dose Route Frequency Provider Last Rate Last Admin    ondansetron (ZOFRAN) injection 4 mg  4 mg Intravenous Q30 Min PRN Linette Scripture, DO   4 mg at 02/26/21 2127    morphine sulfate (PF) injection 4 mg  4 mg Intravenous Q30 Min PRN Linette Scripture, DO   2 mg at 02/26/21 2127    norepinephrine (LEVOPHED) 16 mg in dextrose 5% 250 mL infusion  2-100 mcg/min Intravenous Continuous Chriss Ramey DO        0.9 % sodium chloride bolus  1,000 mL Intravenous Once Linette Scripture, DO         Current Outpatient Medications   Medication Sig Dispense Refill    midodrine (PROAMATINE) 10 MG tablet Take 1 tablet by mouth 3 times daily (with meals) 90 tablet 0    aspirin 81 MG tablet Take 1 tablet by mouth daily 30 tablet 1    beclomethasone (QVAR) 40 MCG/ACT inhaler Inhale 2 puffs into the lungs 2 times daily      acarbose (PRECOSE) 25 MG tablet Take 25 mg by mouth 3 times daily (with meals)      budesonide-formoterol (SYMBICORT) 160-4.5 MCG/ACT AERO Inhale 2 puffs into the lungs 2 times daily      bumetanide (BUMEX) 1 MG tablet Take 1 mg by mouth daily      calcium citrate (CALCITRATE) 950 (200 Ca) MG tablet Take 1,425 mg by mouth daily      dabigatran (PRADAXA) 75 MG capsule Take 150 mg by mouth      DULoxetine (CYMBALTA) 60 MG extended release capsule Take 60 mg by mouth daily      ergocalciferol (ERGOCALCIFEROL) 1.25 MG (47255 UT) capsule Take 50,000 Units by mouth Twice a Week      gabapentin (NEURONTIN) 300 MG capsule Take 300 mg by mouth 3 times daily.       mirtazapine (REMERON) 30 MG tablet Take 30 mg by mouth nightly      omeprazole (PRILOSEC) 20 MG delayed release capsule omeprazole 20 mg capsule,delayed release      promethazine (PHENERGAN) 25 MG tablet Take 25 mg by mouth 4 times daily as needed      ranolazine (RANEXA) 500 MG extended release tablet Take 500 mg by mouth every 12 hours      senna (SENOKOT) 8.6 MG tablet Take 1 tablet by mouth daily      spironolactone (ALDACTONE) 25 MG tablet Take 12.5 mg by mouth daily      cyanocobalamin 1000 MCG tablet Take 1 tablet by mouth daily      famotidine (PEPCID) 20 MG tablet Take 20 mg by mouth 2 times daily      ferrous gluconate (FERGON) 324 (38 Fe) MG tablet Take 1 tablet by mouth 2 times daily      levETIRAcetam (KEPPRA) 1000 MG tablet Take 1,000 mg by mouth daily      levothyroxine (SYNTHROID) 150 MCG tablet Take 150 mcg by mouth every morning (before breakfast)      magnesium oxide (MAG-OX) 400 MG tablet Take 400 mg by mouth daily      nitroGLYCERIN (NITROSTAT) 0.4 MG SL tablet Place 0.4 mg under the tongue as needed      potassium chloride (KLOR-CON M) 10 MEQ extended release tablet Take 10 mEq by mouth 2 times daily 2 (10mEq)      amiodarone (CORDARONE) 200 MG tablet Take 400 mg by mouth daily      oxyCODONE (ROXICODONE) 20 MG immediate release tablet Take 20 mg by mouth every 3 hours as needed.       chlorhexidine gluconate (ANTISEPTIC SKIN CLEANSER) 4 % SOLN external solution Apply topically daily 1 Bottle 1    ipratropium-albuterol (DUONEB) 0.5-2.5 (3) MG/3ML SOLN nebulizer solution Inhale 3 mLs into the lungs every 4 hours (while awake) 360 mL 0    benzocaine-menthol (CEPACOL SORE THROAT) 15-3.6 MG lozenge Take 1 lozenge by mouth every 2 hours as needed for Sore Throat 18 lozenge 1    docusate sodium (COLACE) 100 MG capsule Take 100 mg by mouth 2 times daily as needed for Constipation      atorvastatin (LIPITOR) 80 MG tablet Take 80 mg by mouth nightly      clopidogrel (PLAVIX) 75 MG tablet Take 75 mg by mouth daily      acetaminophen 650 MG TABS Take 650 mg by mouth every 4 hours as needed 120 tablet 3    albuterol (PROVENTIL HFA;VENTOLIN HFA) 108 (90 BASE) MCG/ACT inhaler Inhale 2 puffs into the lungs every 6 hours as needed. Nursing Notes Reviewed    VITAL SIGNS:  ED Triage Vitals   Enc Vitals Group      BP       Pulse       Resp       Temp       Temp src       SpO2       Weight       Height       Head Circumference       Peak Flow       Pain Score       Pain Loc       Pain Edu? Excl. in [address]? PHYSICAL EXAM:  Physical Exam  Vitals signs and nursing note reviewed. Constitutional:       General: She is not in acute distress. Appearance: Normal appearance. She is well-developed and well-groomed. She is not ill-appearing, toxic-appearing or diaphoretic. Interventions: Nasal cannula in place. HENT:      Head: Normocephalic and atraumatic. Right Ear: External ear normal.      Left Ear: External ear normal.   Eyes:      General: No scleral icterus. Right eye: No discharge. Left eye: No discharge. Extraocular Movements: Extraocular movements intact. Conjunctiva/sclera: Conjunctivae normal.      Pupils: Pupils are equal, round, and reactive to light. Neck:      Musculoskeletal: Normal range of motion. No neck rigidity. Cardiovascular:      Rate and Rhythm: Normal rate and regular rhythm. Pulses: Normal pulses. Heart sounds: Normal heart sounds. No murmur. No friction rub. No gallop. Pulmonary:      Effort: Pulmonary effort is normal. No respiratory distress. Breath sounds: Normal breath sounds. No stridor. No wheezing, rhonchi or rales. Chest:      Chest wall: Tenderness present. Abdominal:      General: Bowel sounds are normal. There is no distension. Palpations: Abdomen is soft. There is no mass. Tenderness: There is no abdominal tenderness. There is no guarding or rebound. Hernia: No hernia is present. Musculoskeletal: Normal range of motion. General: Tenderness present.  No swelling, deformity or signs of injury. Right lower leg: No edema. Left lower leg: No edema. Skin:     General: Skin is warm. Coloration: Skin is not jaundiced or pale. Findings: No bruising, erythema, lesion or rash. Neurological:      General: No focal deficit present. Mental Status: She is alert and oriented to person, place, and time. GCS: GCS eye subscore is 4. GCS verbal subscore is 5. GCS motor subscore is 6. Cranial Nerves: Cranial nerves are intact. No cranial nerve deficit, dysarthria or facial asymmetry. Sensory: No sensory deficit. Motor: No weakness. Coordination: Coordination normal.   Psychiatric:         Mood and Affect: Mood normal.         Behavior: Behavior normal. Behavior is cooperative. Thought Content:  Thought content normal.         Judgment: Judgment normal.           I have reviewed andinterpreted all of the currently available lab results from this visit (if applicable):    Results for orders placed or performed during the hospital encounter of 02/26/21   CBC Auto Differential   Result Value Ref Range    WBC 4.2 4.0 - 10.5 K/CU MM    RBC 2.83 (L) 4.2 - 5.4 M/CU MM    Hemoglobin 10.2 (L) 12.5 - 16.0 GM/DL    Hematocrit 32.2 (L) 37 - 47 %    .8 (H) 78 - 100 FL    MCH 36.0 (H) 27 - 31 PG    MCHC 31.7 (L) 32.0 - 36.0 %    RDW 13.7 11.7 - 14.9 %    Platelets 899 481 - 723 K/CU MM    MPV 10.7 7.5 - 11.1 FL    Differential Type AUTOMATED DIFFERENTIAL     Segs Relative 77.7 (H) 36 - 66 %    Lymphocytes % 16.1 (L) 24 - 44 %    Monocytes % 5.0 (H) 0 - 4 %    Eosinophils % 0.0 0 - 3 %    Basophils % 0.7 0 - 1 %    Segs Absolute 3.3 K/CU MM    Lymphocytes Absolute 0.7 K/CU MM    Monocytes Absolute 0.2 K/CU MM    Eosinophils Absolute 0.0 K/CU MM    Basophils Absolute 0.0 K/CU MM    Nucleated RBC % 0.0 %    Total Nucleated RBC 0.0 K/CU MM    Total Immature Neutrophil 0.02 K/CU MM    Immature Neutrophil % 0.5 (H) 0 - 0.43 %   CMP Result Value Ref Range    Sodium 131 (L) 135 - 145 MMOL/L    Potassium 4.3 3.5 - 5.1 MMOL/L    Chloride 100 99 - 110 mMol/L    CO2 21 21 - 32 MMOL/L    BUN 11 6 - 23 MG/DL    CREATININE 0.6 0.6 - 1.1 MG/DL    Glucose 117 (H) 70 - 99 MG/DL    Calcium 8.0 (L) 8.3 - 10.6 MG/DL    Albumin 2.8 (L) 3.4 - 5.0 GM/DL    Total Protein 5.4 (L) 6.4 - 8.2 GM/DL    Total Bilirubin 0.2 0.0 - 1.0 MG/DL    ALT 6 (L) 10 - 40 U/L    AST 13 (L) 15 - 37 IU/L    Alkaline Phosphatase 114 40 - 128 IU/L    GFR Non-African American >60 >60 mL/min/1.73m2    GFR African American >60 >60 mL/min/1.73m2    Anion Gap 10 4 - 16   Troponin   Result Value Ref Range    Troponin T <0.010 <0.01 NG/ML   CK   Result Value Ref Range    Total CK 24 (L) 26 - 140 IU/L   Brain Natriuretic Peptide   Result Value Ref Range    Pro-.2 (H) <300 PG/ML   Lactic Acid, Plasma   Result Value Ref Range    Lactate 0.5 0.4 - 2.0 mMOL/L   Magnesium   Result Value Ref Range    Magnesium 2.0 1.8 - 2.4 mg/dl   TSH without Reflex   Result Value Ref Range    TSH, High Sensitivity 0.891 0.270 - 4.20 uIu/ml   Urinalysis   Result Value Ref Range    Color, UA MEGHAN (A) YELLOW    Clarity, UA SLIGHTLY CLOUDY (A) CLEAR    Glucose, Urine NEGATIVE NEGATIVE MG/DL    Bilirubin Urine NEGATIVE NEGATIVE MG/DL    Ketones, Urine NEGATIVE NEGATIVE MG/DL    Specific Gravity, UA 1.029 1.001 - 1.035    Blood, Urine NEGATIVE NEGATIVE    pH, Urine 7.0 5.0 - 8.0    Protein, UA 30 (A) NEGATIVE MG/DL    Urobilinogen, Urine 2.0 (H) 0.2 - 1.0 MG/DL    Nitrite Urine, Quantitative NEGATIVE NEGATIVE    Leukocyte Esterase, Urine LARGE (A) NEGATIVE    RBC, UA 99 (H) 0 - 6 /HPF    WBC,  (H) 0 - 5 /HPF    Bacteria, UA MODERATE (A) NEGATIVE /HPF    Yeast, UA MANY /HPF    Hyphenated Yeast OCCASIONAL /HPF    Mucus, UA RARE (A) NEGATIVE HPF    Trichomonas, UA NONE SEEN NONE SEEN /HPF   POCT Glucose   Result Value Ref Range    POC Glucose 105 (H) 70 - 99 MG/DL        Radiographs (if obtained):  [] The following radiograph was interpreted by myself in the absence of a radiologist:  [x] Radiologist's Report Reviewed:    CT Brain, C spine, CXR, CTA chest    Ct Abdomen Pelvis Wo Contrast Additional Contrast? None    Result Date: 2/12/2021  EXAMINATION: CT OF THE ABDOMEN AND PELVIS WITHOUT CONTRAST 2/12/2021 1:57 pm TECHNIQUE: CT of the abdomen and pelvis was performed without the administration of intravenous contrast. Multiplanar reformatted images are provided for review. Dose modulation, iterative reconstruction, and/or weight based adjustment of the mA/kV was utilized to reduce the radiation dose to as low as reasonably achievable. COMPARISON: 05/24/2020 HISTORY: ORDERING SYSTEM PROVIDED HISTORY: hematuria. blood in wood bag TECHNOLOGIST PROVIDED HISTORY: Reason for exam:->hematuria. blood in wood bag Additional Contrast?->None Decision Support Exception->Emergency Medical Condition (MA) Reason for Exam: hematuria. blood in wood bag Acuity: Acute Type of Exam: Initial Additional signs and symptoms: none Relevant Medical/Surgical History: see chart FINDINGS: Lower Chest: Parenchymal bands of atelectasis are noted within the lower lungs bilaterally. Noncontrast imaging the base of the heart is unremarkable. Mild diffuse edema is noted within the subcutaneous fat of the chest wall. Organs: Limited noncontrast imaging of the liver is unremarkable. The spleen has been resected. Adrenals are unremarkable. Pancreas is atrophied but otherwise unremarkable on these noncontrast images. No acute renal abnormality identified. Hyperdense lesion in the superior pole the right kidney is again noted measuring 1.2 cm, unchanged since May 2020, but new when compared to the 04/17/2019 examination. There is punctate nonobstructing nephrolithiasis within the left kidney. No renal stones are seen on the right. No ureteral calculi are seen. GI/Bowel: Large amount of stool is noted within the colon.   The appendix is not well visualized. Distal esophagus is unremarkable. Patient status post gastric bypass. No evidence of small bowel obstruction. Pelvis: Uterus is surgically absent. Coppola catheter present within the urinary bladder. No free pelvic fluid is found. Peritoneum/Retroperitoneum: Abdominal aorta normal in caliber. No retroperitoneal lymphadenopathy is seen. IVC filter in place. Bones/Soft Tissues: Diffuse subcutaneous edema is noted. No osteolytic or osteoblastic bone lesions are identified. No acute bony abnormalities are detected. Right total hip arthroplasty noted. No abnormalities identified to explain the patient's gross hematuria. Specifically, no hydronephrosis or hydroureter. No ureteral calculi or are found. Punctate nonobstructing nephrolithiasis within the left kidney is similar when compared to the previous exam. Indeterminate right renal lesion, unchanged when compared to the most recent exam, but new when compared to the 2019 examination. Nonemergent follow-up dedicated MRI is again recommended to better evaluate that. Large amount of stool within the colon, raising the possibly of constipation. Xr Knee Right (min 4 Views)    Result Date: 2/7/2021  EXAMINATION: FOUR XRAY VIEWS OF THE RIGHT KNEE 2/7/2021 1:05 pm COMPARISON: 10/28/2019 HISTORY: ORDERING SYSTEM PROVIDED HISTORY: Pain on right knee after a fall TECHNOLOGIST PROVIDED HISTORY: Reason for exam:->Pain on right knee after a fall Reason for Exam: Pain on right knee after a fall Acuity: Acute Type of Exam: Initial Mechanism of Injury: fall Relevant Medical/Surgical History: none FINDINGS: No evidence of acute fracture or dislocation. No focal osseous lesion. No evidence of joint effusion. No focal soft tissue abnormality. No acute abnormality of the knee. Fl Less Than 1 Hour    Result Date: 2/10/2021  Radiology exam is complete. No Radiologist dictation. Please follow up with ordering provider.      Fl Less Than 1 Hour    Result Date: 2/9/2021  Radiology exam is complete. No Radiologist dictation. Please follow up with ordering provider. Xr Chest Portable    Result Date: 2/10/2021  EXAMINATION: ONE XRAY VIEW OF THE CHEST 2/10/2021 9:47 am COMPARISON: 2/9/2021 HISTORY: ORDERING SYSTEM PROVIDED HISTORY: Please r/o right pneumothorax TECHNOLOGIST PROVIDED HISTORY: Reason for exam:->Please r/o right pneumothorax Reason for Exam: Please r/o right pneumothorax FINDINGS: Right subclavian central venous catheter is in place, tip in the SVC/right atrial junction. Sternal wires are in place. Cardiac monitoring device overlies the left heart. The pulmonary vasculature is within normal limits. The lungs are clear. No pneumothorax is seen. The bones are osteopenic. Status post placement of a right subclavian central venous catheter. No pneumothorax seen. Xr Chest Portable    Result Date: 2/9/2021  EXAMINATION: ONE XRAY VIEW OF THE CHEST 2/9/2021 12:35 pm COMPARISON: 02/06/2021 HISTORY: ORDERING SYSTEM PROVIDED HISTORY: please r/o left pneumothorax TECHNOLOGIST PROVIDED HISTORY: Reason for exam:->please r/o left pneumothorax Reason for Exam: please r/o left pneumothorax FINDINGS: This status post median sternotomy. The lungs are without acute focal process. There is no effusion or pneumothorax. The cardiomediastinal silhouette is stable. The osseous structures are stable. No acute process. Xr Chest Portable    Result Date: 2/6/2021  EXAMINATION: ONE XRAY VIEW OF THE CHEST 2/6/2021 5:02 pm COMPARISON: 01/22/2021 HISTORY: ORDERING SYSTEM PROVIDED HISTORY: CP/syncope TECHNOLOGIST PROVIDED HISTORY: Reason for exam:->CP/syncope Reason for Exam: CP/syncope Acuity: Unknown Type of Exam: Initial Additional signs and symptoms: none Relevant Medical/Surgical History: CHF, CAD, asthma FINDINGS: Transvenous pacer remains in place. Status post median sternotomy. The lungs are without acute focal process.   There is no effusion or pneumothorax. The cardiomediastinal silhouette is stable. The osseous structures are stable. No acute process. Cta Pulmonary W Contrast    Result Date: 2/6/2021  EXAMINATION: CTA OF THE CHEST 2/6/2021 7:20 pm TECHNIQUE: CTA of the chest was performed after the administration of intravenous contrast.  Multiplanar reformatted images are provided for review. MIP images are provided for review. Dose modulation, iterative reconstruction, and/or weight based adjustment of the mA/kV was utilized to reduce the radiation dose to as low as reasonably achievable. COMPARISON: 02/06/2021 and 09/13/2020 HISTORY: ORDERING SYSTEM PROVIDED HISTORY: chest pain/syncope/bradycarida TECHNOLOGIST PROVIDED HISTORY: Reason for exam:->chest pain/syncope/bradycarida Reason for Exam: chest pain/syncope/bradycardia Acuity: Acute Type of Exam: Initial FINDINGS: Pulmonary Arteries: Motion artifact degrades image quality. There is no acute pulmonary thromboembolus. Mediastinum: The heart is enlarged status post median sternotomy and CABG. A cardiac digital recorder device is within the right ventricle and left anterior chest wall. There are no enlarged thoracic lymph nodes. Lungs/pleura: The airway is patent. There is no pneumothorax or pleural effusion. There is biapical and bibasilar scarring with increased airspace disease in the bilateral bases favoring atelectasis. Subtle ground-glass opacities within the bilateral upper lobes favor atelectasis rather than an infectious/inflammatory process. Upper Abdomen: Status post gastric bypass. A punctate nonobstructing nephrolithiasis present in the upper pole of the left kidney. A right upper pole hyperdense cyst favors a hemorrhagic renal cyst. Soft Tissues/Bones: Degenerative changes involve the thoracic spine. The bones are demineralized. 1. No acute abnormality.        EKG (if obtained): (All EKG's are interpreted by myself in the absence of a cardiologist)    12 lead EKG per my interpretation:  Sinus Bradycardia 49  Axis is  Left axis deviation  QTc is  500  There is specific T wave changes appreciated. Inverted T wave AVF, V4-V6  There is no specific ST wave changes appreciated. Prior EKG to compare with was not available     Total critical care time today provided was at least 65 minutes. This excludes seperately billable procedure. Critical care time provided for reviewing labs, images, giving fluids, oxygen, consulting cardiology, giving antibiotics, pressors, consulting [facility] that required close evaluation and/or intervention with concern for patient decompensation. MDM:    Patient here with chest pain shortness of breath. Also had syncope. I saw patient about 2 weeks ago for the same complaint admitted her. She has a history of a micro pacemaker usually follows at [place]. Patient also feels like she has a UTI has a Coppola catheter and states she was lightheaded today passed out hit her head. Patient appears well on arrival she was hypotensive has a port and history of MRSA patient given IV fluids pain nausea medicine oxygen which she wears all the time so far work-up is negative for UTI given antibiotics I did talk to cardiology here had pacemaker interrogated here, pacemaker appears well cardiology who I talked to the notes are well recommended she go back to [place] today for further evaluation and intervention. Patient excepted at [place] awaiting bed and transport. Patient given pain medicine and IV fluids oxygen CT scan of her chest is negative given antibiotics for UTI she does have a port if needed for pressors we will give her pressors through her port she does have a history of chronic hypotension and syncope and falls per cardiology here. Otherwise patient stable resting in bed comfortably she has chronic bradycardia as well.   Will order pressors in case she needs them awaiting transport given chronic hypotension UTI otherwise patient stable labs

## 2021-02-27 VITALS
RESPIRATION RATE: 14 BRPM | DIASTOLIC BLOOD PRESSURE: 58 MMHG | HEART RATE: 50 BPM | TEMPERATURE: 98.7 F | BODY MASS INDEX: 20.22 KG/M2 | WEIGHT: 103 LBS | OXYGEN SATURATION: 100 % | SYSTOLIC BLOOD PRESSURE: 108 MMHG | HEIGHT: 60 IN

## 2021-02-27 LAB — TROPONIN T: <0.01 NG/ML

## 2021-02-27 PROCEDURE — 2580000003 HC RX 258: Performed by: EMERGENCY MEDICINE

## 2021-02-27 PROCEDURE — 6370000000 HC RX 637 (ALT 250 FOR IP): Performed by: EMERGENCY MEDICINE

## 2021-02-27 PROCEDURE — 93010 ELECTROCARDIOGRAM REPORT: CPT | Performed by: INTERNAL MEDICINE

## 2021-02-27 PROCEDURE — 2700000000 HC OXYGEN THERAPY PER DAY

## 2021-02-27 PROCEDURE — 84484 ASSAY OF TROPONIN QUANT: CPT

## 2021-02-27 RX ORDER — OXYCODONE HYDROCHLORIDE 5 MG/1
20 TABLET ORAL ONCE
Status: COMPLETED | OUTPATIENT
Start: 2021-02-27 | End: 2021-02-27

## 2021-02-27 RX ADMIN — OXYCODONE HYDROCHLORIDE 20 MG: 5 TABLET ORAL at 07:59

## 2021-02-27 RX ADMIN — SODIUM CHLORIDE 1000 ML: 9 INJECTION, SOLUTION INTRAVENOUS at 01:36

## 2021-02-27 RX ADMIN — OXYCODONE HYDROCHLORIDE 20 MG: 5 TABLET ORAL at 04:00

## 2021-02-27 RX ADMIN — OXYCODONE HYDROCHLORIDE 20 MG: 5 TABLET ORAL at 01:01

## 2021-02-27 ASSESSMENT — PAIN DESCRIPTION - ONSET: ONSET: ON-GOING

## 2021-02-27 ASSESSMENT — PAIN SCALES - GENERAL
PAINLEVEL_OUTOF10: 9
PAINLEVEL_OUTOF10: 9

## 2021-02-27 ASSESSMENT — PAIN DESCRIPTION - FREQUENCY: FREQUENCY: CONTINUOUS

## 2021-02-27 ASSESSMENT — PAIN DESCRIPTION - PAIN TYPE: TYPE: ACUTE PAIN

## 2021-02-27 ASSESSMENT — PAIN DESCRIPTION - LOCATION: LOCATION: HIP

## 2021-02-27 NOTE — ED NOTES
Lauren Sterling from [facility] called report given,informed they do not have a bed yet but he will call the unit clerk when it is ready so we can set up transport     Shiraz FeldmanHel[facility]  02/26/21 [id number]

## 2021-02-27 NOTE — ED NOTES
Pt got a bed at the [facility] heart on the 7th floor   Bed 7060  Report #[phone]  Jojo Hanks is the accepting Dr Nancy Niño  02/27/21 1247

## 2021-02-27 NOTE — ED NOTES
Bedside report to Omar Jiménez RN  Pt requested additional beverage. Provided.       Ingrid Nicole RN  02/27/21 [id number]

## 2021-02-27 NOTE — ED NOTES
Pt states [employer] was just at bedside and completed interrogation.       Prasanna Dutton RN  02/26/21 [id number]

## 2021-02-27 NOTE — ED NOTES
[facility] here to transport patient to [facility] at this time. Patient transferred to stretcher and exited building without incident.       Tani Miller, RN  02/27/21 [id number]

## 2021-02-28 LAB
CULTURE: ABNORMAL
CULTURE: ABNORMAL
Lab: ABNORMAL
SPECIMEN: ABNORMAL

## 2021-03-02 LAB
EKG ATRIAL RATE: 49 BPM
EKG DIAGNOSIS: NORMAL
EKG P AXIS: 15 DEGREES
EKG P-R INTERVAL: 162 MS
EKG Q-T INTERVAL: 554 MS
EKG QRS DURATION: 88 MS
EKG QTC CALCULATION (BAZETT): 500 MS
EKG R AXIS: -42 DEGREES
EKG T AXIS: -62 DEGREES
EKG VENTRICULAR RATE: 49 BPM

## 2021-03-03 LAB
CULTURE: NORMAL
CULTURE: NORMAL
Lab: NORMAL
Lab: NORMAL
SPECIMEN: NORMAL
SPECIMEN: NORMAL

## 2021-03-26 ENCOUNTER — HOSPITAL ENCOUNTER (INPATIENT)
Age: 61
LOS: 4 days | Discharge: HOME HEALTH | DRG: 698 | End: 2021-04-01
Attending: EMERGENCY MEDICINE | Admitting: INTERNAL MEDICINE
Payer: MEDICARE

## 2021-03-26 ENCOUNTER — APPOINTMENT (OUTPATIENT)
Dept: GENERAL RADIOLOGY | Age: 61
DRG: 698 | End: 2021-03-26
Payer: MEDICARE

## 2021-03-26 ENCOUNTER — APPOINTMENT (OUTPATIENT)
Dept: CT IMAGING | Age: 61
DRG: 698 | End: 2021-03-26
Payer: MEDICARE

## 2021-03-26 DIAGNOSIS — R07.9 CHEST PAIN, UNSPECIFIED TYPE: ICD-10-CM

## 2021-03-26 DIAGNOSIS — R10.9 ABDOMINAL PAIN, UNSPECIFIED ABDOMINAL LOCATION: ICD-10-CM

## 2021-03-26 DIAGNOSIS — N12 PYELONEPHRITIS: ICD-10-CM

## 2021-03-26 DIAGNOSIS — R10.9 FLANK PAIN: Primary | ICD-10-CM

## 2021-03-26 DIAGNOSIS — D80.9 IMMUNOGLOBULIN DEFICIENCY (HCC): ICD-10-CM

## 2021-03-26 PROBLEM — N39.0 UTI (URINARY TRACT INFECTION): Status: ACTIVE | Noted: 2021-03-26

## 2021-03-26 LAB
ALBUMIN SERPL-MCNC: 3.1 GM/DL (ref 3.4–5)
ALP BLD-CCNC: 88 IU/L (ref 40–129)
ALT SERPL-CCNC: 9 U/L (ref 10–40)
ANION GAP SERPL CALCULATED.3IONS-SCNC: 11 MMOL/L (ref 4–16)
AST SERPL-CCNC: 12 IU/L (ref 15–37)
BACTERIA: ABNORMAL /HPF
BASOPHILS ABSOLUTE: 0 K/CU MM
BASOPHILS RELATIVE PERCENT: 1.1 % (ref 0–1)
BILIRUB SERPL-MCNC: 0.2 MG/DL (ref 0–1)
BILIRUBIN URINE: NEGATIVE MG/DL
BLOOD, URINE: ABNORMAL
BUN BLDV-MCNC: 14 MG/DL (ref 6–23)
CALCIUM OXALATE CRYSTALS: ABNORMAL /HPF
CALCIUM SERPL-MCNC: 8.1 MG/DL (ref 8.3–10.6)
CHLORIDE BLD-SCNC: 99 MMOL/L (ref 99–110)
CLARITY: ABNORMAL
CO2: 28 MMOL/L (ref 21–32)
COLOR: YELLOW
CREAT SERPL-MCNC: 0.6 MG/DL (ref 0.6–1.1)
DIFFERENTIAL TYPE: ABNORMAL
EOSINOPHILS ABSOLUTE: 0 K/CU MM
EOSINOPHILS RELATIVE PERCENT: 0.8 % (ref 0–3)
GFR AFRICAN AMERICAN: >60 ML/MIN/1.73M2
GFR NON-AFRICAN AMERICAN: >60 ML/MIN/1.73M2
GLUCOSE BLD-MCNC: 242 MG/DL (ref 70–99)
GLUCOSE, URINE: NEGATIVE MG/DL
HCT VFR BLD CALC: 35.2 % (ref 37–47)
HEMOGLOBIN: 10.9 GM/DL (ref 12.5–16)
IMMATURE NEUTROPHIL %: 0.3 % (ref 0–0.43)
KETONES, URINE: NEGATIVE MG/DL
LACTATE: 0.5 MMOL/L (ref 0.4–2)
LACTATE: 2.6 MMOL/L (ref 0.4–2)
LEUKOCYTE ESTERASE, URINE: ABNORMAL
LIPASE: 13 IU/L (ref 13–60)
LYMPHOCYTES ABSOLUTE: 0.6 K/CU MM
LYMPHOCYTES RELATIVE PERCENT: 15.6 % (ref 24–44)
MAGNESIUM: 1.9 MG/DL (ref 1.8–2.4)
MCH RBC QN AUTO: 35.6 PG (ref 27–31)
MCHC RBC AUTO-ENTMCNC: 31 % (ref 32–36)
MCV RBC AUTO: 115 FL (ref 78–100)
MONOCYTES ABSOLUTE: 0.2 K/CU MM
MONOCYTES RELATIVE PERCENT: 5 % (ref 0–4)
MUCUS: ABNORMAL HPF
NITRITE URINE, QUANTITATIVE: NEGATIVE
NUCLEATED RBC %: 0 %
PDW BLD-RTO: 15.3 % (ref 11.7–14.9)
PH, URINE: 6 (ref 5–8)
PLATELET # BLD: 273 K/CU MM (ref 140–440)
PMV BLD AUTO: 9.5 FL (ref 7.5–11.1)
POTASSIUM SERPL-SCNC: 3.3 MMOL/L (ref 3.5–5.1)
PRO-BNP: 520.4 PG/ML
PROTEIN UA: NEGATIVE MG/DL
RBC # BLD: 3.06 M/CU MM (ref 4.2–5.4)
RBC URINE: 23 /HPF (ref 0–6)
SEGMENTED NEUTROPHILS ABSOLUTE COUNT: 2.8 K/CU MM
SEGMENTED NEUTROPHILS RELATIVE PERCENT: 77.2 % (ref 36–66)
SODIUM BLD-SCNC: 138 MMOL/L (ref 135–145)
SPECIFIC GRAVITY UA: 1.02 (ref 1–1.03)
SQUAMOUS EPITHELIAL: 2 /HPF
TOTAL CK: 23 IU/L (ref 26–140)
TOTAL IMMATURE NEUTOROPHIL: 0.01 K/CU MM
TOTAL NUCLEATED RBC: 0 K/CU MM
TOTAL PROTEIN: 5.4 GM/DL (ref 6.4–8.2)
TRICHOMONAS: ABNORMAL /HPF
TROPONIN T: <0.01 NG/ML
TROPONIN T: <0.01 NG/ML
UROBILINOGEN, URINE: NEGATIVE MG/DL (ref 0.2–1)
WBC # BLD: 3.6 K/CU MM (ref 4–10.5)
WBC CLUMP: ABNORMAL /HPF
WBC UA: 136 /HPF (ref 0–5)
YEAST: ABNORMAL /HPF

## 2021-03-26 PROCEDURE — G0378 HOSPITAL OBSERVATION PER HR: HCPCS

## 2021-03-26 PROCEDURE — 96375 TX/PRO/DX INJ NEW DRUG ADDON: CPT

## 2021-03-26 PROCEDURE — 82550 ASSAY OF CK (CPK): CPT

## 2021-03-26 PROCEDURE — 85025 COMPLETE CBC W/AUTO DIFF WBC: CPT

## 2021-03-26 PROCEDURE — 83735 ASSAY OF MAGNESIUM: CPT

## 2021-03-26 PROCEDURE — 2580000003 HC RX 258: Performed by: EMERGENCY MEDICINE

## 2021-03-26 PROCEDURE — 74176 CT ABD & PELVIS W/O CONTRAST: CPT

## 2021-03-26 PROCEDURE — 93005 ELECTROCARDIOGRAM TRACING: CPT | Performed by: EMERGENCY MEDICINE

## 2021-03-26 PROCEDURE — 87186 SC STD MICRODIL/AGAR DIL: CPT

## 2021-03-26 PROCEDURE — 83880 ASSAY OF NATRIURETIC PEPTIDE: CPT

## 2021-03-26 PROCEDURE — 96367 TX/PROPH/DG ADDL SEQ IV INF: CPT

## 2021-03-26 PROCEDURE — 87077 CULTURE AEROBIC IDENTIFY: CPT

## 2021-03-26 PROCEDURE — 83605 ASSAY OF LACTIC ACID: CPT

## 2021-03-26 PROCEDURE — 96372 THER/PROPH/DIAG INJ SC/IM: CPT

## 2021-03-26 PROCEDURE — 96365 THER/PROPH/DIAG IV INF INIT: CPT

## 2021-03-26 PROCEDURE — 80053 COMPREHEN METABOLIC PANEL: CPT

## 2021-03-26 PROCEDURE — 6370000000 HC RX 637 (ALT 250 FOR IP): Performed by: EMERGENCY MEDICINE

## 2021-03-26 PROCEDURE — 6360000002 HC RX W HCPCS: Performed by: EMERGENCY MEDICINE

## 2021-03-26 PROCEDURE — 84484 ASSAY OF TROPONIN QUANT: CPT

## 2021-03-26 PROCEDURE — 83690 ASSAY OF LIPASE: CPT

## 2021-03-26 PROCEDURE — 71045 X-RAY EXAM CHEST 1 VIEW: CPT

## 2021-03-26 PROCEDURE — 87086 URINE CULTURE/COLONY COUNT: CPT

## 2021-03-26 PROCEDURE — 99285 EMERGENCY DEPT VISIT HI MDM: CPT

## 2021-03-26 PROCEDURE — 96361 HYDRATE IV INFUSION ADD-ON: CPT

## 2021-03-26 PROCEDURE — 81001 URINALYSIS AUTO W/SCOPE: CPT

## 2021-03-26 PROCEDURE — 96376 TX/PRO/DX INJ SAME DRUG ADON: CPT

## 2021-03-26 RX ORDER — ONDANSETRON 2 MG/ML
4 INJECTION INTRAMUSCULAR; INTRAVENOUS EVERY 30 MIN PRN
Status: DISCONTINUED | OUTPATIENT
Start: 2021-03-26 | End: 2021-03-27

## 2021-03-26 RX ORDER — ACETAMINOPHEN 500 MG
1000 TABLET ORAL ONCE
Status: DISCONTINUED | OUTPATIENT
Start: 2021-03-26 | End: 2021-04-01 | Stop reason: HOSPADM

## 2021-03-26 RX ORDER — DICYCLOMINE HYDROCHLORIDE 10 MG/ML
20 INJECTION INTRAMUSCULAR ONCE
Status: COMPLETED | OUTPATIENT
Start: 2021-03-26 | End: 2021-03-26

## 2021-03-26 RX ORDER — MORPHINE SULFATE 4 MG/ML
4 INJECTION, SOLUTION INTRAMUSCULAR; INTRAVENOUS EVERY 30 MIN PRN
Status: DISCONTINUED | OUTPATIENT
Start: 2021-03-26 | End: 2021-03-27

## 2021-03-26 RX ORDER — 0.9 % SODIUM CHLORIDE 0.9 %
500 INTRAVENOUS SOLUTION INTRAVENOUS ONCE
Status: COMPLETED | OUTPATIENT
Start: 2021-03-26 | End: 2021-03-26

## 2021-03-26 RX ADMIN — CEFTRIAXONE 1000 MG: 1 INJECTION, POWDER, FOR SOLUTION INTRAMUSCULAR; INTRAVENOUS at 20:07

## 2021-03-26 RX ADMIN — MORPHINE SULFATE 4 MG: 4 INJECTION, SOLUTION INTRAMUSCULAR; INTRAVENOUS at 23:05

## 2021-03-26 RX ADMIN — ONDANSETRON 4 MG: 2 INJECTION INTRAMUSCULAR; INTRAVENOUS at 20:07

## 2021-03-26 RX ADMIN — PIPERACILLIN AND TAZOBACTAM 3375 MG: 3; .375 INJECTION, POWDER, FOR SOLUTION INTRAVENOUS at 23:05

## 2021-03-26 RX ADMIN — DICYCLOMINE HYDROCHLORIDE 20 MG: 20 INJECTION, SOLUTION INTRAMUSCULAR at 20:10

## 2021-03-26 RX ADMIN — MORPHINE SULFATE 4 MG: 4 INJECTION, SOLUTION INTRAMUSCULAR; INTRAVENOUS at 20:07

## 2021-03-26 RX ADMIN — SODIUM CHLORIDE 500 ML: 9 INJECTION, SOLUTION INTRAVENOUS at 20:40

## 2021-03-26 ASSESSMENT — ENCOUNTER SYMPTOMS
EYES NEGATIVE: 1
SHORTNESS OF BREATH: 1
NAUSEA: 1
ABDOMINAL PAIN: 1
ALLERGIC/IMMUNOLOGIC NEGATIVE: 1

## 2021-03-26 ASSESSMENT — PAIN DESCRIPTION - PAIN TYPE
TYPE_2: ACUTE PAIN
TYPE: ACUTE PAIN

## 2021-03-26 ASSESSMENT — PAIN DESCRIPTION - LOCATION: LOCATION_2: ABDOMEN

## 2021-03-26 ASSESSMENT — PAIN DESCRIPTION - INTENSITY: RATING_2: 9

## 2021-03-26 NOTE — ED PROVIDER NOTES
[facility]      TRIAGE CHIEF COMPLAINT:   Abdominal Pain, Flank Pain (right), and Chest Pain      [place]:  Thea Bryson is a 62 y.o. female that presents with complaint of right side abdominal pain chest pain shortness of breath I have seen patient recently for similar complaints she does have a history of micropacemaker she has been admitted here several times history of multiple episodes of syncope, orthostatic hypotension usually goes to [facility] has been transferred several times which I did last time as well. Patient states continued chest pain which she has chronically. She is now on oxygen at home she states she states she has a Coppola catheter in because she is on so much diuretics for her CHF that she gets lightheaded and passes out so she has a Coppola catheter in. States she feels like she has a UTI she has been on Macrobid by her PCP but states is not working. Denies any fevers cough travel sick contacts just right side abdominal pain flank pain. Is on chronic pain medicine at home morphine. Pain currently constant. No other questions or concerns    REVIEW OF SYSTEMS:  At least 10 systems reviewed and otherwise acutely negative except as in the [address] Review of Systems   Constitutional: Positive for fatigue. HENT: Negative. Eyes: Negative. Respiratory: Positive for shortness of breath. Cardiovascular: Positive for chest pain. Gastrointestinal: Positive for abdominal pain and nausea. Endocrine: Negative. Genitourinary: Positive for dysuria and flank pain. Skin: Negative. Allergic/Immunologic: Negative. Neurological: Negative. Hematological: Negative. Psychiatric/Behavioral: Negative. All other systems reviewed and are negative.       Past Medical History:   Diagnosis Date    Acute delirium     Arrhythmia     Arthritis     Asplenia     Asthma     Avascular necrosis ([facility]     CAD (coronary artery disease)     1st stent at age 41    Cardiomyopathy ([address]     Cerebral artery occlusion with cerebral infarction ([facility]     CHF (congestive heart failure) (HCC)     Enlarged liver     GERD (gastroesophageal reflux disease)     H/O echocardiogram 4/6/16    EF 55%, trivial TR & MR, stage 1 diastolic dysfunction    History of blood transfusion     Hx of cardiovascular stress test 12/29/2015    Alessia: EF 45%. Pharmacologic stress myocardial perfusion scan shows a fixed inferior-lateral defect w/ no inducible ischemia. No evidence of ischemia. Inferior-lateral infarction.  Normal LVSF    Hyperlipidemia     Hypertension     Lymphoma ([address]     Denies    MI, old     Movement disorder     MS (multiple sclerosis) ([facility]     OP (osteoporosis)     PE (pulmonary embolism)     trapese filter    Pneumonia     Pyelonephritis     Seizures (HCC)     Severe malnutrition (HCC)     Spleen enlarged     Thyroid disease      Past Surgical History:   Procedure Laterality Date    ABDOMEN SURGERY      APPENDECTOMY      CARDIAC DEFIBRILLATOR PLACEMENT      simaa [id number] CONDITIONAL    CHOLECYSTECTOMY      COLONOSCOPY      CORONARY ANGIOPLASTY WITH STENT PLACEMENT      3 stents    CORONARY ARTERY BYPASS GRAFT      Age 49    ENDOSCOPY, COLON, DIAGNOSTIC      GASTRIC BYPASS SURGERY      HERNIA REPAIR      HIP SURGERY      HYSTERECTOMY      JOINT REPLACEMENT      PORT SURGERY N/A 6/1/2020    PORT INSERTION performed by Althea Harp MD at [address] [place] [address] N/A 2/9/2021    MEDIPORT INSERTION UNDER FLUOROSCOPY performed by Althea Harp MD at [address] [place] [address] Right 2/10/2021    MEDIPORT INSERTION W/ FLUOROSCOPY performed by Althea Harp MD at [address] [place] [address]      VASCULAR SURGERY       Family History   Problem Relation Age of Onset    High Blood Pressure Mother     High Cholesterol Mother     Heart Disease Mother     Diabetes Mother     Heart Disease Father     Cancer Father     Other Sister         Multiple Sclerosis    Heart Disease Maternal Grandmother     High Blood Pressure Maternal Grandmother     High Cholesterol Maternal Grandmother      Social History     Socioeconomic History    Marital status: Single     Spouse name: Not on file    Number of children: Not on file    Years of education: Not on file    Highest education level: Not on file   Occupational History    Not on file   Social Needs    Financial resource strain: Not on file    Food insecurity     Worry: Not on file     Inability: Not on file   [employer] needs     Medical: Not on file     Non-medical: Not on file   Tobacco Use    Smoking status: Never Smoker    Smokeless tobacco: Never Used   Substance and Sexual Activity    Alcohol use: No    Drug use: No    Sexual activity: Not Currently   Lifestyle    Physical activity     Days per week: Not on file     Minutes per session: Not on file    Stress: Not on file   Relationships    Social connections     Talks on phone: Not on file     Gets together: Not on file     Attends [employer] service: Not on file     Active member of club or organization: Not on file     Attends meetings of clubs or organizations: Not on file     Relationship status: Not on file    Intimate partner violence     Fear of current or ex partner: Not on file     Emotionally abused: Not on file     Physically abused: Not on file     Forced sexual activity: Not on file   Other Topics Concern    Not on file   Social History Narrative    Not on file     Current Facility-Administered Medications   Medication Dose Route Frequency Provider Last Rate Last Admin    ondansetron (ZOFRAN) injection 4 mg  4 mg Intravenous Q30 Min PRN Stan Staff, DO   4 mg at 03/26/21 2007    acetaminophen (TYLENOL) tablet 1,000 mg  1,000 mg Oral Once Stan Staff, DO        morphine sulfate (PF) injection 4 mg  4 mg Intravenous Q30 Min PRN Stan Staff, DO   4 mg at 03/26/21 2007    piperacillin-tazobactam (ZOSYN) 3,375 mg in dextrose 5 % 50 mL IVPB (mini-bag)  3,375 mg Intravenous Once Linford Stain, DO         Current Outpatient Medications   Medication Sig Dispense Refill    midodrine (PROAMATINE) 10 MG tablet Take 1 tablet by mouth 3 times daily (with meals) 90 tablet 0    aspirin 81 MG tablet Take 1 tablet by mouth daily 30 tablet 1    beclomethasone (QVAR) 40 MCG/ACT inhaler Inhale 2 puffs into the lungs 2 times daily      acarbose (PRECOSE) 25 MG tablet Take 25 mg by mouth 3 times daily (with meals)      budesonide-formoterol (SYMBICORT) 160-4.5 MCG/ACT AERO Inhale 2 puffs into the lungs 2 times daily      bumetanide (BUMEX) 1 MG tablet Take 1 mg by mouth daily      calcium citrate (CALCITRATE) 950 (200 Ca) MG tablet Take 1,425 mg by mouth daily      dabigatran (PRADAXA) 75 MG capsule Take 150 mg by mouth      DULoxetine (CYMBALTA) 60 MG extended release capsule Take 60 mg by mouth daily      ergocalciferol (ERGOCALCIFEROL) 1.25 MG (14585 UT) capsule Take 50,000 Units by mouth Twice a Week      gabapentin (NEURONTIN) 300 MG capsule Take 300 mg by mouth 3 times daily.       mirtazapine (REMERON) 30 MG tablet Take 30 mg by mouth nightly      omeprazole (PRILOSEC) 20 MG delayed release capsule omeprazole 20 mg capsule,delayed release      promethazine (PHENERGAN) 25 MG tablet Take 25 mg by mouth 4 times daily as needed      ranolazine (RANEXA) 500 MG extended release tablet Take 500 mg by mouth every 12 hours      senna (SENOKOT) 8.6 MG tablet Take 1 tablet by mouth daily      spironolactone (ALDACTONE) 25 MG tablet Take 12.5 mg by mouth daily      cyanocobalamin 1000 MCG tablet Take 1 tablet by mouth daily      famotidine (PEPCID) 20 MG tablet Take 20 mg by mouth 2 times daily      ferrous gluconate (FERGON) 324 (38 Fe) MG tablet Take 1 tablet by mouth 2 times daily      levETIRAcetam (KEPPRA) 1000 MG tablet Take 1,000 mg by mouth daily      levothyroxine (SYNTHROID) 150 MCG tablet Take 150 mcg by mouth every morning (before breakfast)      magnesium oxide (MAG-OX) 400 MG tablet Take 400 mg by mouth daily      nitroGLYCERIN (NITROSTAT) 0.4 MG SL tablet Place 0.4 mg under the tongue as needed      potassium chloride (KLOR-CON M) 10 MEQ extended release tablet Take 10 mEq by mouth 2 times daily 2 (10mEq)      amiodarone (CORDARONE) 200 MG tablet Take 400 mg by mouth daily      oxyCODONE (ROXICODONE) 20 MG immediate release tablet Take 20 mg by mouth every 3 hours as needed.  chlorhexidine gluconate (ANTISEPTIC SKIN CLEANSER) 4 % SOLN external solution Apply topically daily 1 Bottle 1    ipratropium-albuterol (DUONEB) 0.5-2.5 (3) MG/3ML SOLN nebulizer solution Inhale 3 mLs into the lungs every 4 hours (while awake) 360 mL 0    benzocaine-menthol (CEPACOL SORE THROAT) 15-3.6 MG lozenge Take 1 lozenge by mouth every 2 hours as needed for Sore Throat 18 lozenge 1    docusate sodium (COLACE) 100 MG capsule Take 100 mg by mouth 2 times daily as needed for Constipation      atorvastatin (LIPITOR) 80 MG tablet Take 80 mg by mouth nightly      clopidogrel (PLAVIX) 75 MG tablet Take 75 mg by mouth daily      acetaminophen 650 MG TABS Take 650 mg by mouth every 4 hours as needed 120 tablet 3    albuterol (PROVENTIL HFA;VENTOLIN HFA) 108 (90 BASE) MCG/ACT inhaler Inhale 2 puffs into the lungs every 6 hours as needed. Allergies   Allergen Reactions    Fentanyl Swelling     Other reaction(s): Angioedema (swelling)    Moxifloxacin Hcl In Nacl Swelling and Rash    Povidone-Iodine Rash     Other reaction(s): AOF      Cyclobenzaprine      Other reaction(s): Other - comment required  \" it make my muscle very weak because of my MS\"  \" it make my muscle very weak because of my MS\"      Methocarbamol      Worsening edema  Other reaction(s):  Other - comment required  Worsening edema  Worsening edema  Worsening edema      Tramadol Other (See Comments)     Doctor doesn't her to take due to heart problems  Seizures   Doctor doesn't her to take due to lowers seizure threshold.  Baclofen     Ibuprofen      \"Due to heart problems\"    Naproxen     Nsaids      \"Due to heart problems\"    Tizanidine     Tolmetin      \"Due to heart problems\"    Tramadol Hcl      Other reaction(s):  Other - comment required  Told not to take due to history of seizures    Betadine [Povidone Iodine] Rash    Moxifloxacin Rash and Swelling     Lips swell and tingling in face   Lips swell and tingling in face   Lips swell and tingling in face   Lips swell and tingling in face   Around mouth     Current Facility-Administered Medications   Medication Dose Route Frequency Provider Last Rate Last Admin    ondansetron (ZOFRAN) injection 4 mg  4 mg Intravenous Q30 Min PRN Ruchi Kee DO   4 mg at 03/26/21 2007    acetaminophen (TYLENOL) tablet 1,000 mg  1,000 mg Oral Once Ruchi Kee DO        morphine sulfate (PF) injection 4 mg  4 mg Intravenous Q30 Min PRN Ruchi Kee DO   4 mg at 03/26/21 2007    piperacillin-tazobactam (ZOSYN) 3,375 mg in dextrose 5 % 50 mL IVPB (mini-bag)  3,375 mg Intravenous Once Ruchi Kee DO         Current Outpatient Medications   Medication Sig Dispense Refill    midodrine (PROAMATINE) 10 MG tablet Take 1 tablet by mouth 3 times daily (with meals) 90 tablet 0    aspirin 81 MG tablet Take 1 tablet by mouth daily 30 tablet 1    beclomethasone (QVAR) 40 MCG/ACT inhaler Inhale 2 puffs into the lungs 2 times daily      acarbose (PRECOSE) 25 MG tablet Take 25 mg by mouth 3 times daily (with meals)      budesonide-formoterol (SYMBICORT) 160-4.5 MCG/ACT AERO Inhale 2 puffs into the lungs 2 times daily      bumetanide (BUMEX) 1 MG tablet Take 1 mg by mouth daily      calcium citrate (CALCITRATE) 950 (200 Ca) MG tablet Take 1,425 mg by mouth daily      dabigatran (PRADAXA) 75 MG capsule Take 150 mg by mouth      DULoxetine (CYMBALTA) 60 MG extended release 18 lozenge 1    docusate sodium (COLACE) 100 MG capsule Take 100 mg by mouth 2 times daily as needed for Constipation      atorvastatin (LIPITOR) 80 MG tablet Take 80 mg by mouth nightly      clopidogrel (PLAVIX) 75 MG tablet Take 75 mg by mouth daily      acetaminophen 650 MG TABS Take 650 mg by mouth every 4 hours as needed 120 tablet 3    albuterol (PROVENTIL HFA;VENTOLIN HFA) 108 (90 BASE) MCG/ACT inhaler Inhale 2 puffs into the lungs every 6 hours as needed. Nursing Notes Reviewed    VITAL SIGNS:  ED Triage Vitals [03/26/21 1722]   Enc Vitals Group      /63      Pulse 72      Resp 26      Temp       Temp src       SpO2 100 %      Weight       Height       Head Circumference       Peak Flow       Pain Score       Pain Loc       Pain Edu? Excl. in [address]? PHYSICAL EXAM:  Physical Exam  Vitals signs and nursing note reviewed. Constitutional:       General: She is not in acute distress. Appearance: Normal appearance. She is well-developed and well-groomed. She is not ill-appearing, toxic-appearing or diaphoretic. Interventions: Nasal cannula in place. HENT:      Head: Normocephalic and atraumatic. Right Ear: External ear normal.      Left Ear: External ear normal.      Nose: No congestion or rhinorrhea. Eyes:      General: No scleral icterus. Right eye: No discharge. Left eye: No discharge. Extraocular Movements: Extraocular movements intact. Conjunctiva/sclera: Conjunctivae normal.      Pupils: Pupils are equal, round, and reactive to light. Neck:      Musculoskeletal: Full passive range of motion without pain and normal range of motion. Normal range of motion. No edema, erythema, neck rigidity, crepitus, injury, pain with movement or torticollis. Vascular: No JVD. Trachea: Phonation normal.   Cardiovascular:      Rate and Rhythm: Normal rate and regular rhythm. Pulses: Normal pulses. Heart sounds: Normal heart sounds. No murmur. No friction rub. No gallop. Pulmonary:      Effort: Pulmonary effort is normal. No respiratory distress. Breath sounds: Normal breath sounds. No stridor. No wheezing, rhonchi or rales. Abdominal:      General: Bowel sounds are normal. There is no distension. There are no signs of injury. Palpations: Abdomen is soft. There is no mass or pulsatile mass. Tenderness: There is abdominal tenderness in the right lower quadrant and suprapubic area. There is right CVA tenderness. There is no guarding or rebound. Negative signs include Otto's sign, Rovsing's sign and McBurney's sign. Hernia: No hernia is present. Musculoskeletal: Normal range of motion. General: No swelling, tenderness, deformity or signs of injury. Right lower leg: No edema. Left lower leg: No edema. Skin:     General: Skin is warm. Coloration: Skin is not jaundiced or pale. Findings: No bruising, erythema, lesion or rash. Neurological:      General: No focal deficit present. Mental Status: She is alert and oriented to person, place, and time. GCS: GCS eye subscore is 4. GCS verbal subscore is 5. GCS motor subscore is 6. Cranial Nerves: Cranial nerves are intact. No cranial nerve deficit, dysarthria or facial asymmetry. Sensory: Sensation is intact. No sensory deficit. Motor: Motor function is intact. No weakness, tremor, atrophy, abnormal muscle tone or seizure activity. Coordination: Coordination is intact. Coordination normal.   Psychiatric:         Mood and Affect: Mood normal.         Behavior: Behavior normal. Behavior is cooperative. Thought Content:  Thought content normal.         Judgment: Judgment normal.           I have reviewed andinterpreted all of the currently available lab results from this visit (if applicable):    Results for orders placed or performed during the hospital encounter of 03/26/21   CBC Auto Differential   Result Value Ref Range    WBC 3.6 (L) 4.0 - 10.5 K/CU MM    RBC 3.06 (L) 4.2 - 5.4 M/CU MM    Hemoglobin 10.9 (L) 12.5 - 16.0 GM/DL    Hematocrit 35.2 (L) 37 - 47 %    .0 (H) 78 - 100 FL    MCH 35.6 (H) 27 - 31 PG    MCHC 31.0 (L) 32.0 - 36.0 %    RDW 15.3 (H) 11.7 - 14.9 %    Platelets 676 039 - 243 K/CU MM    MPV 9.5 7.5 - 11.1 FL    Differential Type AUTOMATED DIFFERENTIAL     Segs Relative 77.2 (H) 36 - 66 %    Lymphocytes % 15.6 (L) 24 - 44 %    Monocytes % 5.0 (H) 0 - 4 %    Eosinophils % 0.8 0 - 3 %    Basophils % 1.1 (H) 0 - 1 %    Segs Absolute 2.8 K/CU MM    Lymphocytes Absolute 0.6 K/CU MM    Monocytes Absolute 0.2 K/CU MM    Eosinophils Absolute 0.0 K/CU MM    Basophils Absolute 0.0 K/CU MM    Nucleated RBC % 0.0 %    Total Nucleated RBC 0.0 K/CU MM    Total Immature Neutrophil 0.01 K/CU MM    Immature Neutrophil % 0.3 0 - 0.43 %   CMP   Result Value Ref Range    Sodium 138 135 - 145 MMOL/L    Potassium 3.3 (L) 3.5 - 5.1 MMOL/L    Chloride 99 99 - 110 mMol/L    CO2 28 21 - 32 MMOL/L    BUN 14 6 - 23 MG/DL    CREATININE 0.6 0.6 - 1.1 MG/DL    Glucose 242 (H) 70 - 99 MG/DL    Calcium 8.1 (L) 8.3 - 10.6 MG/DL    Albumin 3.1 (L) 3.4 - 5.0 GM/DL    Total Protein 5.4 (L) 6.4 - 8.2 GM/DL    Total Bilirubin 0.2 0.0 - 1.0 MG/DL    ALT 9 (L) 10 - 40 U/L    AST 12 (L) 15 - 37 IU/L    Alkaline Phosphatase 88 40 - 129 IU/L    GFR Non-African American >60 >60 mL/min/1.73m2    GFR African American >60 >60 mL/min/1.73m2    Anion Gap 11 4 - 16   Lipase   Result Value Ref Range    Lipase 13 13 - 60 IU/L   Troponin   Result Value Ref Range    Troponin T <0.010 <0.01 NG/ML   Lactic Acid, Plasma   Result Value Ref Range    Lactate 2.6 (HH) 0.4 - 2.0 mMOL/L   Brain Natriuretic Peptide   Result Value Ref Range    Pro-.4 (H) <300 PG/ML   CK   Result Value Ref Range    Total CK 23 (L) 26 - 140 IU/L   Urinalysis   Result Value Ref Range    Color, UA YELLOW YELLOW    Clarity, UA SLIGHTLY CLOUDY (A) CLEAR    Glucose, Urine NEGATIVE NEGATIVE MG/DL    Bilirubin Urine NEGATIVE NEGATIVE MG/DL    Ketones, Urine NEGATIVE NEGATIVE MG/DL    Specific Gravity, UA 1.025 1.001 - 1.035    Blood, Urine SMALL (A) NEGATIVE    pH, Urine 6.0 5.0 - 8.0    Protein, UA NEGATIVE NEGATIVE MG/DL    Urobilinogen, Urine NEGATIVE 0.2 - 1.0 MG/DL    Nitrite Urine, Quantitative NEGATIVE NEGATIVE    Leukocyte Esterase, Urine LARGE (A) NEGATIVE    RBC, UA 23 (H) 0 - 6 /HPF    WBC,  (H) 0 - 5 /HPF    Bacteria, UA MODERATE (A) NEGATIVE /HPF    WBC Clumps, UA FEW /HPF    Yeast, UA MANY /HPF    Squam Epithel, UA 2 /HPF    Mucus, UA RARE (A) NEGATIVE HPF    Trichomonas, UA NONE SEEN NONE SEEN /HPF    Ca Oxalate Ananya, UA OCCASIONAL /HPF   Magnesium   Result Value Ref Range    Magnesium 1.9 1.8 - 2.4 mg/dl   Lactic Acid, Plasma   Result Value Ref Range    Lactate 0.5 0.4 - 2.0 mMOL/L   Troponin   Result Value Ref Range    Troponin T <0.010 <0.01 NG/ML        Radiographs (if obtained):  [] The following radiograph was interpreted by myself in the absence of a radiologist:  [x] Radiologist's Report Reviewed:    CT abdomen pelvis, chest x-ray    Ct Head Wo Contrast    Result Date: 2/26/2021  EXAMINATION: CT OF THE HEAD WITHOUT CONTRAST  2/26/2021 7:40 pm TECHNIQUE: CT of the head was performed without the administration of intravenous contrast. Dose modulation, iterative reconstruction, and/or weight based adjustment of the mA/kV was utilized to reduce the radiation dose to as low as reasonably achievable. COMPARISON: 10/23/2019 HISTORY: ORDERING SYSTEM PROVIDED HISTORY: syncope/head injury TECHNOLOGIST PROVIDED HISTORY: Has a \"code stroke\" or \"stroke alert\" been called? ->No Reason for exam:->syncope/head injury Decision Support Exception->Emergency Medical Condition (MA) Reason for Exam: syncope/head injury Acuity: Acute Type of Exam: Initial Mechanism of Injury: fall Relevant Medical/Surgical History: none FINDINGS: BRAIN/VENTRICLES: Stable focal area of encephalomalacia in the left frontal lobe white matter. There are nonspecific hypoattenuating foci in the subcortical and periventricular white matter that most likely represent chronic microangiopathic ischemic changes in a patient of this age. Gray-white differentiation is otherwise maintained. No evidence of hydrocephalus. No acute intracranial hemorrhage. No extra-axial fluid collection. No evidence of mass, mass effect or midline shift. ORBITS: The visualized portion of the orbits demonstrate no acute abnormality. SINUSES: The visualized paranasal sinuses and mastoid air cells demonstrate no acute abnormality. SOFT TISSUES/SKULL:  No acute abnormality of the visualized skull or soft tissues. Stable exam without acute intracranial abnormality. Ct Cervical Spine Wo Contrast    Result Date: 2/26/2021  EXAMINATION: CT OF THE CERVICAL SPINE WITHOUT CONTRAST 2/26/2021 7:41 pm TECHNIQUE: CT of the cervical spine was performed without the administration of intravenous contrast. Multiplanar reformatted images are provided for review. Dose modulation, iterative reconstruction, and/or weight based adjustment of the mA/kV was utilized to reduce the radiation dose to as low as reasonably achievable. COMPARISON: October 23, 2019 HISTORY: ORDERING SYSTEM PROVIDED HISTORY: fall TECHNOLOGIST PROVIDED HISTORY: Reason for exam:->fall Decision Support Exception->Emergency Medical Condition (MA) Reason for Exam: syncope/head injury Acuity: Acute Type of Exam: Initial Mechanism of Injury: fall Additional signs and symptoms: no Relevant Medical/Surgical History: none FINDINGS: No acute fracture. No subluxation. Partial C4-C5 fusion. No prevertebral soft tissue swelling. No acute traumatic findings.      Xr Chest Portable    Result Date: 2/26/2021  EXAMINATION: ONE XRAY VIEW OF THE CHEST 2/26/2021 6:23 pm COMPARISON: Chest x-ray February 10, 2021 HISTORY: ORDERING SYSTEM PROVIDED HISTORY: bradycardia/syncope TECHNOLOGIST PROVIDED HISTORY: Reason for exam:->bradycardia/syncope Reason for Exam: bradycardia, chf Acuity: Acute Type of Exam: Initial Additional signs and symptoms: na Relevant Medical/Surgical History: chf FINDINGS: Cardiac silhouette is stable. Atherosclerotic changes of the aorta. Right-sided MediPort in place which is stable in position. Cardiac loop recorder projects over the left lower chest.  Coronary stents in place. Postoperative changes of CABG procedure. No focal consolidation identified. Chronic appearing interstitial changes of the lungs. No pleural effusion or pneumothorax. Osseous structures appear stable. Postoperative changes of median sternotomy. 1. No acute cardiopulmonary process identified. Cta Chest W Contrast    Result Date: 3/1/2021  EXAMINATION: CTA OF THE CHEST, 2/26/2021 7:44 pm TECHNIQUE: CTA of the chest was performed after the administration of intravenous contrast.  Multiplanar reformatted images are provided for review. MIP images are provided for review. Dose modulation, iterative reconstruction, and/or weight based adjustment of the mA/kV was utilized to reduce the radiation dose to as low as reasonably achievable. COMPARISON: None HISTORY: ORDERING SYSTEM PROVIDED HISTORY: Syncope/chest pain TECHNOLOGIST PROVIDED HISTORY: Reason for exam:  Syncope/chest pain Decision Support Exception:  Emergency Medical Condition (MA) Reason for Exam: Syncope/chest pain Acuity: Acute Type of Exam: Initial Additional signs and symptoms: No Relevant Medical/Surgical History: 70 mL Isovue 370 used. FINDINGS: Pulmonary Arteries: Pulmonary arteries are adequately opacified for evaluation. No evidence of intraluminal filling defect to suggest pulmonary embolism. Main pulmonary artery is normal in caliber. Mediastinum: Evidence of prior CABG. Coronary artery atherosclerotic calcifications are present. There is a right subclavian line with tip at the cavoatrial junction.  No pericardial effusion. No cardiomegaly. Central airways are clear. Thoracic aorta demonstrates no acute abnormality. Lungs/pleura: Mild bibasilar atelectasis. No consolidation or pulmonary edema. No pleural effusion or pneumothorax. Upper Abdomen:  Limited images of the upper abdomen demonstrate no acute abnormality. No focal adrenal nodule. Soft Tissues/Bones: Age related degenerative changes of the visualized osseous structures without focal destructive lesion. No evidence of pulmonary embolism. Cardiomegaly without overt pulmonary edema. Evidence of prior CABG with coronary artery atherosclerotic disease. EKG (if obtained): (All EKG's are interpreted by myself in the absence of a cardiologist)      12 lead EKG per my interpretation:  Normal Sinus Rhythm 68  Axis is   Left axis deviation  QTc is  480  There is no specific T wave changes appreciated. There is no specific ST wave changes appreciated. Prior EKG to compare with was not available     Total critical care time today provided was at least 30 minutes. This excludes seperately billable procedure. Critical care time provided for reviewing labs/images, giving oxygen, giving IV fluids, giving antibiotics, consulting medicine that required close evaluation and/or intervention with concern for patient decompensation. MDM:    Patient here with abdominal pain flank pain dysuria chest pain shortness of breath. Again history of chronic chest pain shortness of breath she has a history of CHF, Micra pacemaker which she has been seen here multiple times for and transferred to [facility] as well. I seen her recently for the same complaint. She does wear oxygen at home and is now wheelchair-bound has a Coppola catheter in place due to what she states so much diuretics that she gets lightheaded hypotensive and passes out and that she comes here. Otherwise she appears well she is resting in bed comfortably her vital signs are stable.   She is on chronic pain medicine at home she did get upset that I did not give her narcotics after she first got here she is refusing all medical treatment until she gets pain medicine. I did explain to her that I am not going to bargain with her she does have a UTI I did explain that she does have allergies I did not give her additional narcotic medication because she does have allergies listed. She states she cannot take morphine and Dilaudid will give her small dose of morphine. I gave her Rocephin culture urine and she does have a UTI does have a Coppola catheter in place. So far cardiac work-up is negative lactates mildly elevated we will give her low-dose IV fluids since she does have CHF. Otherwise patient stable again resting in bed comfortably she does not appear to be in acute distress. Patient given pain medicine nausea medicine IV fluids her lactic acid initially was elevated repeat is improving delta troponins negative CT scans negative. Patient does have pyelonephritis does have a urinary tract infection with right flank pain abdominal pain UTI indwelling Coppola catheter. I gave her Rocephin and was going to discharge her home with Keflex but patient states she has antibiotic resistant bacteria I did review her last culture and she does have resistance to ciprofloxacin, fluoroquinolones, Rocephin, intermittent to Macrobid which she has been on outpatient patient does have sensitivity to Zosyn and ertapenem's she does need IV antibiotics and is requesting to see infectious disease we will give her Zosyn again culture urine no admit her to hospital medicine otherwise stable.     CLINICAL IMPRESSION:  Final diagnoses:   Flank pain   Abdominal pain, unspecified abdominal location   Chest pain, unspecified type   Pyelonephritis       (Please note that portions of this note may have been completed with a voice recognition program. Efforts were made to edit the dictations but occasionally words aremis-transcribed.) DISPOSITION REFERRAL (if applicable):  No follow-up provider specified.     DISPOSITION MEDICATIONS (if applicable):  New Prescriptions    No medications on file          Leon Guerra, [facility],   03/26/21 0468

## 2021-03-27 LAB
ANION GAP SERPL CALCULATED.3IONS-SCNC: 6 MMOL/L (ref 4–16)
BUN BLDV-MCNC: 10 MG/DL (ref 6–23)
CALCIUM SERPL-MCNC: 8 MG/DL (ref 8.3–10.6)
CHLORIDE BLD-SCNC: 105 MMOL/L (ref 99–110)
CO2: 27 MMOL/L (ref 21–32)
CREAT SERPL-MCNC: 0.6 MG/DL (ref 0.6–1.1)
EKG ATRIAL RATE: 50 BPM
EKG ATRIAL RATE: 68 BPM
EKG DIAGNOSIS: NORMAL
EKG DIAGNOSIS: NORMAL
EKG P AXIS: 16 DEGREES
EKG P AXIS: 33 DEGREES
EKG P-R INTERVAL: 150 MS
EKG P-R INTERVAL: 174 MS
EKG Q-T INTERVAL: 452 MS
EKG Q-T INTERVAL: 528 MS
EKG QRS DURATION: 118 MS
EKG QRS DURATION: 90 MS
EKG QTC CALCULATION (BAZETT): 480 MS
EKG QTC CALCULATION (BAZETT): 481 MS
EKG R AXIS: -40 DEGREES
EKG R AXIS: -45 DEGREES
EKG T AXIS: -64 DEGREES
EKG T AXIS: -66 DEGREES
EKG VENTRICULAR RATE: 50 BPM
EKG VENTRICULAR RATE: 68 BPM
GFR AFRICAN AMERICAN: >60 ML/MIN/1.73M2
GFR NON-AFRICAN AMERICAN: >60 ML/MIN/1.73M2
GLUCOSE BLD-MCNC: 128 MG/DL (ref 70–99)
HCT VFR BLD CALC: 30.7 % (ref 37–47)
HEMOGLOBIN: 9.8 GM/DL (ref 12.5–16)
MCH RBC QN AUTO: 37.3 PG (ref 27–31)
MCHC RBC AUTO-ENTMCNC: 31.9 % (ref 32–36)
MCV RBC AUTO: 116.7 FL (ref 78–100)
PDW BLD-RTO: 15.4 % (ref 11.7–14.9)
PLATELET # BLD: 246 K/CU MM (ref 140–440)
PMV BLD AUTO: 9.8 FL (ref 7.5–11.1)
POTASSIUM SERPL-SCNC: 4.1 MMOL/L (ref 3.5–5.1)
RBC # BLD: 2.63 M/CU MM (ref 4.2–5.4)
SODIUM BLD-SCNC: 138 MMOL/L (ref 135–145)
TROPONIN T: <0.01 NG/ML
WBC # BLD: 4.8 K/CU MM (ref 4–10.5)

## 2021-03-27 PROCEDURE — 2580000003 HC RX 258: Performed by: INTERNAL MEDICINE

## 2021-03-27 PROCEDURE — 6360000002 HC RX W HCPCS: Performed by: INTERNAL MEDICINE

## 2021-03-27 PROCEDURE — 2580000003 HC RX 258: Performed by: STUDENT IN AN ORGANIZED HEALTH CARE EDUCATION/TRAINING PROGRAM

## 2021-03-27 PROCEDURE — 93010 ELECTROCARDIOGRAM REPORT: CPT | Performed by: INTERNAL MEDICINE

## 2021-03-27 PROCEDURE — G0378 HOSPITAL OBSERVATION PER HR: HCPCS

## 2021-03-27 PROCEDURE — 80048 BASIC METABOLIC PNL TOTAL CA: CPT

## 2021-03-27 PROCEDURE — 94761 N-INVAS EAR/PLS OXIMETRY MLT: CPT

## 2021-03-27 PROCEDURE — 94640 AIRWAY INHALATION TREATMENT: CPT

## 2021-03-27 PROCEDURE — 96376 TX/PRO/DX INJ SAME DRUG ADON: CPT

## 2021-03-27 PROCEDURE — 6360000002 HC RX W HCPCS: Performed by: STUDENT IN AN ORGANIZED HEALTH CARE EDUCATION/TRAINING PROGRAM

## 2021-03-27 PROCEDURE — 86774 TETANUS ANTIBODY: CPT

## 2021-03-27 PROCEDURE — 6370000000 HC RX 637 (ALT 250 FOR IP): Performed by: STUDENT IN AN ORGANIZED HEALTH CARE EDUCATION/TRAINING PROGRAM

## 2021-03-27 PROCEDURE — 84484 ASSAY OF TROPONIN QUANT: CPT

## 2021-03-27 PROCEDURE — 87081 CULTURE SCREEN ONLY: CPT

## 2021-03-27 PROCEDURE — 96367 TX/PROPH/DG ADDL SEQ IV INF: CPT

## 2021-03-27 PROCEDURE — 93005 ELECTROCARDIOGRAM TRACING: CPT | Performed by: STUDENT IN AN ORGANIZED HEALTH CARE EDUCATION/TRAINING PROGRAM

## 2021-03-27 PROCEDURE — 96366 THER/PROPH/DIAG IV INF ADDON: CPT

## 2021-03-27 PROCEDURE — 6370000000 HC RX 637 (ALT 250 FOR IP): Performed by: INTERNAL MEDICINE

## 2021-03-27 PROCEDURE — 85027 COMPLETE CBC AUTOMATED: CPT

## 2021-03-27 PROCEDURE — 99223 1ST HOSP IP/OBS HIGH 75: CPT | Performed by: INTERNAL MEDICINE

## 2021-03-27 PROCEDURE — 99214 OFFICE O/P EST MOD 30 MIN: CPT | Performed by: INTERNAL MEDICINE

## 2021-03-27 RX ORDER — BUMETANIDE 1 MG/1
1 TABLET ORAL DAILY
Status: DISCONTINUED | OUTPATIENT
Start: 2021-03-27 | End: 2021-04-01 | Stop reason: HOSPADM

## 2021-03-27 RX ORDER — SODIUM CHLORIDE 0.9 % (FLUSH) 0.9 %
10 SYRINGE (ML) INJECTION EVERY 12 HOURS SCHEDULED
Status: DISCONTINUED | OUTPATIENT
Start: 2021-03-27 | End: 2021-04-01 | Stop reason: HOSPADM

## 2021-03-27 RX ORDER — IPRATROPIUM BROMIDE AND ALBUTEROL SULFATE 2.5; .5 MG/3ML; MG/3ML
3 SOLUTION RESPIRATORY (INHALATION)
Status: DISCONTINUED | OUTPATIENT
Start: 2021-03-27 | End: 2021-03-27

## 2021-03-27 RX ORDER — BUDESONIDE AND FORMOTEROL FUMARATE DIHYDRATE 160; 4.5 UG/1; UG/1
2 AEROSOL RESPIRATORY (INHALATION) 2 TIMES DAILY
Status: DISCONTINUED | OUTPATIENT
Start: 2021-03-27 | End: 2021-04-01 | Stop reason: HOSPADM

## 2021-03-27 RX ORDER — ATORVASTATIN CALCIUM 80 MG/1
80 TABLET, FILM COATED ORAL NIGHTLY
Status: DISCONTINUED | OUTPATIENT
Start: 2021-03-27 | End: 2021-04-01 | Stop reason: HOSPADM

## 2021-03-27 RX ORDER — POLYETHYLENE GLYCOL 3350 17 G/17G
17 POWDER, FOR SOLUTION ORAL DAILY PRN
Status: DISCONTINUED | OUTPATIENT
Start: 2021-03-27 | End: 2021-04-01 | Stop reason: HOSPADM

## 2021-03-27 RX ORDER — ALBUTEROL SULFATE 90 UG/1
2 AEROSOL, METERED RESPIRATORY (INHALATION) EVERY 6 HOURS PRN
Status: DISCONTINUED | OUTPATIENT
Start: 2021-03-27 | End: 2021-03-27

## 2021-03-27 RX ORDER — MAGNESIUM OXIDE 400 MG/1
400 TABLET ORAL DAILY
Status: DISCONTINUED | OUTPATIENT
Start: 2021-03-27 | End: 2021-04-01 | Stop reason: HOSPADM

## 2021-03-27 RX ORDER — ACARBOSE 50 MG/1
25 TABLET ORAL
Status: DISCONTINUED | OUTPATIENT
Start: 2021-03-27 | End: 2021-04-01 | Stop reason: HOSPADM

## 2021-03-27 RX ORDER — ACETAMINOPHEN 325 MG/1
650 TABLET ORAL EVERY 6 HOURS PRN
Status: DISCONTINUED | OUTPATIENT
Start: 2021-03-27 | End: 2021-04-01 | Stop reason: HOSPADM

## 2021-03-27 RX ORDER — FERROUS GLUCONATE 324(37.5)
324 TABLET ORAL 2 TIMES DAILY WITH MEALS
Status: DISCONTINUED | OUTPATIENT
Start: 2021-03-27 | End: 2021-04-01 | Stop reason: HOSPADM

## 2021-03-27 RX ORDER — LANOLIN ALCOHOL/MO/W.PET/CERES
1000 CREAM (GRAM) TOPICAL DAILY
Status: DISCONTINUED | OUTPATIENT
Start: 2021-03-27 | End: 2021-04-01 | Stop reason: HOSPADM

## 2021-03-27 RX ORDER — CALCIUM CARBONATE 500(1250)
500 TABLET ORAL DAILY
Status: DISCONTINUED | OUTPATIENT
Start: 2021-03-27 | End: 2021-04-01 | Stop reason: HOSPADM

## 2021-03-27 RX ORDER — FLUTICASONE PROPIONATE 110 UG/1
2 AEROSOL, METERED RESPIRATORY (INHALATION) 2 TIMES DAILY
Status: DISCONTINUED | OUTPATIENT
Start: 2021-03-27 | End: 2021-03-27

## 2021-03-27 RX ORDER — FAMOTIDINE 20 MG/1
20 TABLET, FILM COATED ORAL 2 TIMES DAILY
Status: DISCONTINUED | OUTPATIENT
Start: 2021-03-27 | End: 2021-04-01 | Stop reason: HOSPADM

## 2021-03-27 RX ORDER — SPIRONOLACTONE 25 MG/1
12.5 TABLET ORAL DAILY
Status: DISCONTINUED | OUTPATIENT
Start: 2021-03-27 | End: 2021-04-01 | Stop reason: HOSPADM

## 2021-03-27 RX ORDER — SODIUM CHLORIDE 9 MG/ML
25 INJECTION, SOLUTION INTRAVENOUS PRN
Status: DISCONTINUED | OUTPATIENT
Start: 2021-03-27 | End: 2021-04-01 | Stop reason: HOSPADM

## 2021-03-27 RX ORDER — RANOLAZINE 500 MG/1
500 TABLET, EXTENDED RELEASE ORAL 2 TIMES DAILY
Status: DISCONTINUED | OUTPATIENT
Start: 2021-03-27 | End: 2021-04-01 | Stop reason: HOSPADM

## 2021-03-27 RX ORDER — SODIUM CHLORIDE 0.9 % (FLUSH) 0.9 %
10 SYRINGE (ML) INJECTION PRN
Status: DISCONTINUED | OUTPATIENT
Start: 2021-03-27 | End: 2021-04-01 | Stop reason: HOSPADM

## 2021-03-27 RX ORDER — ASPIRIN 81 MG/1
81 TABLET, CHEWABLE ORAL DAILY
Status: DISCONTINUED | OUTPATIENT
Start: 2021-03-27 | End: 2021-04-01 | Stop reason: HOSPADM

## 2021-03-27 RX ORDER — LEVETIRACETAM 500 MG/1
1000 TABLET ORAL DAILY
Status: DISCONTINUED | OUTPATIENT
Start: 2021-03-27 | End: 2021-04-01 | Stop reason: HOSPADM

## 2021-03-27 RX ORDER — ONDANSETRON 2 MG/ML
4 INJECTION INTRAMUSCULAR; INTRAVENOUS EVERY 6 HOURS PRN
Status: DISCONTINUED | OUTPATIENT
Start: 2021-03-27 | End: 2021-04-01 | Stop reason: HOSPADM

## 2021-03-27 RX ORDER — ACETAMINOPHEN 650 MG/1
650 SUPPOSITORY RECTAL EVERY 6 HOURS PRN
Status: DISCONTINUED | OUTPATIENT
Start: 2021-03-27 | End: 2021-04-01 | Stop reason: HOSPADM

## 2021-03-27 RX ORDER — GABAPENTIN 300 MG/1
300 CAPSULE ORAL 3 TIMES DAILY
Status: DISCONTINUED | OUTPATIENT
Start: 2021-03-27 | End: 2021-04-01 | Stop reason: HOSPADM

## 2021-03-27 RX ORDER — MIRTAZAPINE 15 MG/1
30 TABLET, FILM COATED ORAL NIGHTLY
Status: DISCONTINUED | OUTPATIENT
Start: 2021-03-27 | End: 2021-04-01 | Stop reason: HOSPADM

## 2021-03-27 RX ORDER — IPRATROPIUM BROMIDE AND ALBUTEROL SULFATE 2.5; .5 MG/3ML; MG/3ML
3 SOLUTION RESPIRATORY (INHALATION) EVERY 4 HOURS PRN
Status: DISCONTINUED | OUTPATIENT
Start: 2021-03-27 | End: 2021-04-01 | Stop reason: HOSPADM

## 2021-03-27 RX ORDER — PANTOPRAZOLE SODIUM 40 MG/1
40 TABLET, DELAYED RELEASE ORAL
Status: DISCONTINUED | OUTPATIENT
Start: 2021-03-27 | End: 2021-04-01 | Stop reason: HOSPADM

## 2021-03-27 RX ORDER — DABIGATRAN ETEXILATE 150 MG/1
150 CAPSULE, COATED PELLETS ORAL 2 TIMES DAILY
Status: DISCONTINUED | OUTPATIENT
Start: 2021-03-27 | End: 2021-04-01 | Stop reason: HOSPADM

## 2021-03-27 RX ORDER — DOCUSATE SODIUM 100 MG/1
100 CAPSULE, LIQUID FILLED ORAL 2 TIMES DAILY PRN
Status: DISCONTINUED | OUTPATIENT
Start: 2021-03-27 | End: 2021-04-01 | Stop reason: HOSPADM

## 2021-03-27 RX ORDER — OXYCODONE HYDROCHLORIDE 10 MG/1
20 TABLET ORAL
Status: DISCONTINUED | OUTPATIENT
Start: 2021-03-27 | End: 2021-04-01 | Stop reason: HOSPADM

## 2021-03-27 RX ORDER — ERGOCALCIFEROL 1.25 MG/1
50000 CAPSULE ORAL
Status: DISCONTINUED | OUTPATIENT
Start: 2021-03-29 | End: 2021-04-01 | Stop reason: HOSPADM

## 2021-03-27 RX ORDER — ALBUTEROL SULFATE 90 UG/1
2 AEROSOL, METERED RESPIRATORY (INHALATION) 4 TIMES DAILY
Status: DISCONTINUED | OUTPATIENT
Start: 2021-03-27 | End: 2021-04-01 | Stop reason: HOSPADM

## 2021-03-27 RX ORDER — DULOXETIN HYDROCHLORIDE 30 MG/1
60 CAPSULE, DELAYED RELEASE ORAL DAILY
Status: DISCONTINUED | OUTPATIENT
Start: 2021-03-27 | End: 2021-04-01 | Stop reason: HOSPADM

## 2021-03-27 RX ORDER — AMIODARONE HYDROCHLORIDE 200 MG/1
400 TABLET ORAL DAILY
Status: DISCONTINUED | OUTPATIENT
Start: 2021-03-27 | End: 2021-04-01 | Stop reason: HOSPADM

## 2021-03-27 RX ORDER — POTASSIUM CHLORIDE 750 MG/1
10 TABLET, FILM COATED, EXTENDED RELEASE ORAL 2 TIMES DAILY
Status: DISCONTINUED | OUTPATIENT
Start: 2021-03-27 | End: 2021-04-01 | Stop reason: HOSPADM

## 2021-03-27 RX ORDER — CLOPIDOGREL BISULFATE 75 MG/1
75 TABLET ORAL DAILY
Status: DISCONTINUED | OUTPATIENT
Start: 2021-03-27 | End: 2021-04-01 | Stop reason: HOSPADM

## 2021-03-27 RX ORDER — PROMETHAZINE HYDROCHLORIDE 12.5 MG/1
12.5 TABLET ORAL EVERY 6 HOURS PRN
Status: DISCONTINUED | OUTPATIENT
Start: 2021-03-27 | End: 2021-04-01 | Stop reason: HOSPADM

## 2021-03-27 RX ORDER — MIDODRINE HYDROCHLORIDE 5 MG/1
10 TABLET ORAL
Status: DISCONTINUED | OUTPATIENT
Start: 2021-03-27 | End: 2021-04-01 | Stop reason: HOSPADM

## 2021-03-27 RX ORDER — LEVOTHYROXINE SODIUM 0.15 MG/1
150 TABLET ORAL
Status: DISCONTINUED | OUTPATIENT
Start: 2021-03-27 | End: 2021-04-01 | Stop reason: HOSPADM

## 2021-03-27 RX ORDER — POTASSIUM CHLORIDE 20 MEQ/1
20 TABLET, EXTENDED RELEASE ORAL ONCE
Status: COMPLETED | OUTPATIENT
Start: 2021-03-27 | End: 2021-03-27

## 2021-03-27 RX ADMIN — ACARBOSE 25 MG: 50 TABLET ORAL at 09:29

## 2021-03-27 RX ADMIN — ONDANSETRON 4 MG: 2 INJECTION INTRAMUSCULAR; INTRAVENOUS at 23:06

## 2021-03-27 RX ADMIN — Medication 2 PUFF: at 21:01

## 2021-03-27 RX ADMIN — ONDANSETRON 4 MG: 2 INJECTION INTRAMUSCULAR; INTRAVENOUS at 23:33

## 2021-03-27 RX ADMIN — PROMETHAZINE HYDROCHLORIDE 12.5 MG: 12.5 TABLET ORAL at 18:56

## 2021-03-27 RX ADMIN — OXYCODONE HYDROCHLORIDE 20 MG: 10 TABLET ORAL at 18:56

## 2021-03-27 RX ADMIN — GABAPENTIN 300 MG: 300 CAPSULE ORAL at 23:30

## 2021-03-27 RX ADMIN — BUDESONIDE AND FORMOTEROL FUMARATE DIHYDRATE 2 PUFF: 160; 4.5 AEROSOL RESPIRATORY (INHALATION) at 07:24

## 2021-03-27 RX ADMIN — OXYCODONE HYDROCHLORIDE 20 MG: 10 TABLET ORAL at 09:00

## 2021-03-27 RX ADMIN — PROMETHAZINE HYDROCHLORIDE 12.5 MG: 12.5 TABLET ORAL at 09:00

## 2021-03-27 RX ADMIN — OXYCODONE HYDROCHLORIDE 20 MG: 10 TABLET ORAL at 05:41

## 2021-03-27 RX ADMIN — MIRTAZAPINE 30 MG: 15 TABLET, FILM COATED ORAL at 01:32

## 2021-03-27 RX ADMIN — RANOLAZINE 500 MG: 500 TABLET, FILM COATED, EXTENDED RELEASE ORAL at 23:30

## 2021-03-27 RX ADMIN — LEVOTHYROXINE SODIUM 150 MCG: 150 TABLET ORAL at 05:40

## 2021-03-27 RX ADMIN — RANOLAZINE 500 MG: 500 TABLET, FILM COATED, EXTENDED RELEASE ORAL at 01:33

## 2021-03-27 RX ADMIN — MAGNESIUM OXIDE 400 MG: 400 TABLET ORAL at 09:07

## 2021-03-27 RX ADMIN — FAMOTIDINE 20 MG: 20 TABLET ORAL at 09:02

## 2021-03-27 RX ADMIN — ATORVASTATIN CALCIUM 80 MG: 80 TABLET, FILM COATED ORAL at 23:30

## 2021-03-27 RX ADMIN — POTASSIUM CHLORIDE 10 MEQ: 750 TABLET, FILM COATED, EXTENDED RELEASE ORAL at 23:33

## 2021-03-27 RX ADMIN — MIDODRINE HYDROCHLORIDE 10 MG: 5 TABLET ORAL at 09:06

## 2021-03-27 RX ADMIN — MIRTAZAPINE 30 MG: 15 TABLET, FILM COATED ORAL at 23:33

## 2021-03-27 RX ADMIN — Medication 2 PUFF: at 07:24

## 2021-03-27 RX ADMIN — SODIUM CHLORIDE, PRESERVATIVE FREE 10 ML: 5 INJECTION INTRAVENOUS at 23:34

## 2021-03-27 RX ADMIN — PANTOPRAZOLE SODIUM 40 MG: 40 TABLET, DELAYED RELEASE ORAL at 05:40

## 2021-03-27 RX ADMIN — ALBUTEROL SULFATE 2 PUFF: 90 AEROSOL, METERED RESPIRATORY (INHALATION) at 07:24

## 2021-03-27 RX ADMIN — GABAPENTIN 300 MG: 300 CAPSULE ORAL at 09:06

## 2021-03-27 RX ADMIN — BUDESONIDE AND FORMOTEROL FUMARATE DIHYDRATE 2 PUFF: 160; 4.5 AEROSOL RESPIRATORY (INHALATION) at 21:01

## 2021-03-27 RX ADMIN — OXYCODONE HYDROCHLORIDE 20 MG: 10 TABLET ORAL at 15:56

## 2021-03-27 RX ADMIN — OXYCODONE HYDROCHLORIDE 20 MG: 10 TABLET ORAL at 12:50

## 2021-03-27 RX ADMIN — FAMOTIDINE 20 MG: 20 TABLET ORAL at 01:33

## 2021-03-27 RX ADMIN — FERROUS GLUCONATE TAB 324 MG (37.5 MG ELEMENTAL IRON) 324 MG: 324 (37.5 FE) TAB at 16:00

## 2021-03-27 RX ADMIN — POTASSIUM CHLORIDE 20 MEQ: 1500 TABLET, EXTENDED RELEASE ORAL at 01:33

## 2021-03-27 RX ADMIN — ACARBOSE 25 MG: 50 TABLET ORAL at 12:59

## 2021-03-27 RX ADMIN — AMIODARONE HYDROCHLORIDE 400 MG: 200 TABLET ORAL at 09:03

## 2021-03-27 RX ADMIN — ALBUTEROL SULFATE 2 PUFF: 90 AEROSOL, METERED RESPIRATORY (INHALATION) at 15:38

## 2021-03-27 RX ADMIN — CLOPIDOGREL BISULFATE 75 MG: 75 TABLET ORAL at 09:02

## 2021-03-27 RX ADMIN — ATORVASTATIN CALCIUM 80 MG: 80 TABLET, FILM COATED ORAL at 01:33

## 2021-03-27 RX ADMIN — Medication 2 PUFF: at 11:09

## 2021-03-27 RX ADMIN — ACETAMINOPHEN 650 MG: 325 TABLET ORAL at 12:49

## 2021-03-27 RX ADMIN — OXYCODONE HYDROCHLORIDE 20 MG: 10 TABLET ORAL at 01:33

## 2021-03-27 RX ADMIN — ALBUTEROL SULFATE 2 PUFF: 90 AEROSOL, METERED RESPIRATORY (INHALATION) at 21:01

## 2021-03-27 RX ADMIN — FAMOTIDINE 20 MG: 20 TABLET ORAL at 23:29

## 2021-03-27 RX ADMIN — ASPIRIN 81 MG CHEWABLE TABLET 81 MG: 81 TABLET CHEWABLE at 09:02

## 2021-03-27 RX ADMIN — MEROPENEM 1000 MG: 1 INJECTION, POWDER, FOR SOLUTION INTRAVENOUS at 05:37

## 2021-03-27 RX ADMIN — ACARBOSE 25 MG: 50 TABLET ORAL at 17:45

## 2021-03-27 RX ADMIN — MEROPENEM 1000 MG: 1 INJECTION, POWDER, FOR SOLUTION INTRAVENOUS at 12:47

## 2021-03-27 RX ADMIN — MIDODRINE HYDROCHLORIDE 10 MG: 5 TABLET ORAL at 12:49

## 2021-03-27 RX ADMIN — OXYCODONE HYDROCHLORIDE 20 MG: 10 TABLET ORAL at 23:06

## 2021-03-27 RX ADMIN — SPIRONOLACTONE 12.5 MG: 25 TABLET ORAL at 09:05

## 2021-03-27 RX ADMIN — DULOXETINE HYDROCHLORIDE 60 MG: 30 CAPSULE, DELAYED RELEASE ORAL at 09:04

## 2021-03-27 RX ADMIN — LEVETIRACETAM 1000 MG: 500 TABLET, FILM COATED ORAL at 09:04

## 2021-03-27 RX ADMIN — DABIGATRAN ETEXILATE MESYLATE 150 MG: 150 CAPSULE ORAL at 09:29

## 2021-03-27 RX ADMIN — MEROPENEM 2000 MG: 1 INJECTION, POWDER, FOR SOLUTION INTRAVENOUS at 23:31

## 2021-03-27 RX ADMIN — CYANOCOBALAMIN TAB 1000 MCG 1000 MCG: 1000 TAB at 09:26

## 2021-03-27 RX ADMIN — ACETAMINOPHEN 650 MG: 325 TABLET ORAL at 05:41

## 2021-03-27 RX ADMIN — RANOLAZINE 500 MG: 500 TABLET, FILM COATED, EXTENDED RELEASE ORAL at 09:07

## 2021-03-27 RX ADMIN — MIDODRINE HYDROCHLORIDE 10 MG: 5 TABLET ORAL at 16:00

## 2021-03-27 RX ADMIN — ACETAMINOPHEN 650 MG: 325 TABLET ORAL at 23:20

## 2021-03-27 RX ADMIN — ACETAMINOPHEN 650 MG: 325 TABLET ORAL at 18:56

## 2021-03-27 RX ADMIN — Medication 2 PUFF: at 15:38

## 2021-03-27 RX ADMIN — BUMETANIDE 1 MG: 1 TABLET ORAL at 09:04

## 2021-03-27 RX ADMIN — DABIGATRAN ETEXILATE MESYLATE 150 MG: 150 CAPSULE ORAL at 01:36

## 2021-03-27 RX ADMIN — GABAPENTIN 300 MG: 300 CAPSULE ORAL at 12:50

## 2021-03-27 RX ADMIN — ALBUTEROL SULFATE 2 PUFF: 90 AEROSOL, METERED RESPIRATORY (INHALATION) at 11:09

## 2021-03-27 RX ADMIN — DABIGATRAN ETEXILATE MESYLATE 150 MG: 150 CAPSULE ORAL at 23:30

## 2021-03-27 RX ADMIN — POTASSIUM CHLORIDE 10 MEQ: 750 TABLET, FILM COATED, EXTENDED RELEASE ORAL at 09:03

## 2021-03-27 RX ADMIN — FERROUS GLUCONATE TAB 324 MG (37.5 MG ELEMENTAL IRON) 324 MG: 324 (37.5 FE) TAB at 09:02

## 2021-03-27 RX ADMIN — Medication 500 MG: at 09:07

## 2021-03-27 RX ADMIN — SODIUM CHLORIDE, PRESERVATIVE FREE 10 ML: 5 INJECTION INTRAVENOUS at 09:11

## 2021-03-27 ASSESSMENT — PAIN SCALES - GENERAL
PAINLEVEL_OUTOF10: 6
PAINLEVEL_OUTOF10: 9
PAINLEVEL_OUTOF10: 8
PAINLEVEL_OUTOF10: 9

## 2021-03-27 ASSESSMENT — PAIN DESCRIPTION - PROGRESSION
CLINICAL_PROGRESSION: GRADUALLY IMPROVING
CLINICAL_PROGRESSION: GRADUALLY WORSENING

## 2021-03-27 ASSESSMENT — PAIN DESCRIPTION - LOCATION: LOCATION: HIP;BACK

## 2021-03-27 ASSESSMENT — PAIN DESCRIPTION - DESCRIPTORS: DESCRIPTORS: PRESSURE

## 2021-03-27 ASSESSMENT — PAIN DESCRIPTION - ORIENTATION: ORIENTATION: LEFT;RIGHT

## 2021-03-27 NOTE — CONSULTS
Including eyes, ENT, respiratory, cardiovascular, GI, , dermatologic, neurologic, psych, hem/lymphatic, musculoskeletal and endocrine were negative other than what is mentioned above.      Patient Active Problem List    Diagnosis Date Noted    Acute delirium      Priority: High    Coronary artery disease involving coronary bypass graft of native heart with angina pectoris (HCC)      Priority: High    Shortness of breath      Priority: High    Ischemic cardiomyopathy      Priority: High    Angina at rest [facility])      Priority: High    UTI (urinary tract infection) 03/26/2021    Syncope and collapse 02/06/2021    Poor intravenous access     Pneumonia 01/22/2021    Ulcer of chest wall, limited to breakdown of skin ([address]     Bradycardia 10/16/2020    Symptomatic bradycardia 10/14/2020    History of pacemaker with removal secondary to infected leads 10/14/2020    Avascular necrosis ([address] 10/14/2020    Multiple sclerosis ([address] 10/14/2020    Syncope     MRSA infection     Klebsiella pneumoniae infection     Open wound of right chest wall     Granuloma of skin     Infected chest wall abrasion, right, subsequent encounter 08/22/2020    HFrEF (heart failure with reduced ejection fraction) ([address] 06/08/2020    MRSA bacteremia     Pyelonephritis     Fever in adult 05/24/2020    Cardiomyopathy ([address]     Acute chest pain     Chronic systolic heart failure ([address] 12/29/2019    Seizure disorder ([address] 12/29/2019    Hypothyroidism 12/29/2019    Abnormal cardiac function test 12/17/2019    Multifocal pneumonia 11/14/2019    Closed nondisplaced transverse fracture of left patella 10/24/2019    Contusion of right knee 10/24/2019    Chest pain at rest 09/29/2019    Financial difficulties 09/27/2019    Shockable cardiac rhythm detected by automated external defibrillator 05/29/2019    Asplenia     Feeding tube dysfunction     Abdominal pain, epigastric     Gastrojejunostomy tube status ([address]     [id number] CONDITIONAL    CHOLECYSTECTOMY      COLONOSCOPY      CORONARY ANGIOPLASTY WITH STENT PLACEMENT      3 stents    CORONARY ARTERY BYPASS GRAFT      Age 49    ENDOSCOPY, COLON, DIAGNOSTIC      GASTRIC BYPASS SURGERY      HERNIA REPAIR      HIP SURGERY      HYSTERECTOMY      JOINT REPLACEMENT      PORT SURGERY N/A 6/1/2020    PORT INSERTION performed by Kieran Hui MD at [facility] N/A 2/9/2021    MEDIPORT INSERTION UNDER FLUOROSCOPY performed by Kieran Hui MD at [address] [place] [address] Right 2/10/2021    MEDIPORT INSERTION W/ FLUOROSCOPY performed by Kieran Hui MD at [address] [place] [address]      VASCULAR SURGERY        Family History   Problem Relation Age of Onset    High Blood Pressure Mother     High Cholesterol Mother     Heart Disease Mother     Diabetes Mother     Heart Disease Father     Cancer Father     Other Sister         Multiple Sclerosis    Heart Disease Maternal Grandmother     High Blood Pressure Maternal Grandmother     High Cholesterol Maternal Grandmother       Infectious disease related family history - not contibutory. SOCIAL HISTORY  Social History     Tobacco Use    Smoking status: Never Smoker    Smokeless tobacco: Never Used   Substance Use Topics    Alcohol use: No       Born:   Lived   Occupation:   No recent travel of significance.  No recent unusual exposures.  NO pets    ? ALLERGIES  Allergies   Allergen Reactions    Fentanyl Swelling     Other reaction(s): Angioedema (swelling)    Moxifloxacin Hcl In Nacl Swelling and Rash    Povidone-Iodine Rash     Other reaction(s): AOF      Cyclobenzaprine      Other reaction(s): Other - comment required  \" it make my muscle very weak because of my MS\"  \" it make my muscle very weak because of my MS\"      Methocarbamol      Worsening edema  Other reaction(s):  Other - comment required  Worsening edema  Worsening edema  Worsening Meningococcal Vac Of Unknown Formula And Unknown Serogroup 01/17/2018    Pneumococcal Conjugate 13-valent ([id number]) 11/03/2014, 01/17/2018    Pneumococcal Conjugate 7-valent (Prevnar7) 12/21/1999    Pneumococcal Polysaccharide ([id number]) 09/10/2012, 08/01/2013, 04/20/2017    Polio IPV (IPOL) 08/05/2010, 01/07/2011    Td (Adult), 2 Lf Tetanus Toxoid, Pf (Td, Absorbed) 08/26/2019    Td, unspecified formulation 08/26/2019    Tdap (Boostrix, Adacel) 10/19/2007     ? Antibiotics:   Ceftriaxone  Meropenem  ?  -------------------------------------------------------------------------------------------------------------------    Vital Signs:  Vitals:    03/27/21 1109   BP:    Pulse:    Resp:    Temp:    SpO2: 96%         Exam:    VS: noted; wt 46.7 kg  Gen: alert and oriented X3, no distress  Skin: no stigmata of endocarditis  Wounds:  Right ACW C/D/I  HEMT: AT/NC Oropharynx pink, moist, and without lesions or exudates; dentition in good state of repair  Eyes: PERRLA, EOMI, conjunctiva pink, sclera anicteric. Neck: Supple. Trachea midline. No LAD. Chest: no distress and CTA. Good air movement. Heart: RRR and no MRG. Abd: soft, non-distended, no tenderness, no hepatomegaly. Normoactive bowel sounds. Ext: no clubbing, cyanosis, or edema  Catheter Site: without erythema or tenderness  LDA: Right anterior chest wall Mediport  Neuro: Mental status intact. CN 2-12 intact and no focal sensory or motor deficits    ? Diagnostic Studies: reviewed  ? ? I have examined this patient and available medical records on this date and have made the above observations, conclusions and recommendations.   Electronically signed by: Electronically signed by Yeny Silver MD on 3/27/2021 at 11:40 AM

## 2021-03-27 NOTE — H&P
History and Physical      Name:  Preston Hughes /Age/Sex: 1960  (62 y.o. female)   MRN & CSN:  [id number] & [id number] Admission Date/Time: 3/26/2021  5:16 PM   Location:  ED[facility]/ED-19 PCP: Dina Fong MD       Hospital Day: 1    Assessment and Plan:   Preston Hughes is a 62 y.o.  female  who presents with UTI (urinary tract infection)    1. Recurrent urinary tract infection  2. Leukopenia  -Admit to observation services  -No serious criteria met  -Urine culture pending  -History of MRSA, CRE, and ESBL  -Has been successfully treated with meropenem in the past, ED started Zosyn, switched to meropenem  -Consult infectious disease, appreciate recommendations  -Urine culture pending    3. Hypokalemia  -3.3 on admission, 20 mEq oral potassium given  -Continue home potassium dose  -A. m. labs    4. Chest pain  5. History of CAD status post CABG, status post PCI to LAD with stent placement in 2019  -Consult cardiology, appreciate recommendations  -Initial troponin 0.010, trend troponins every 6 hours x2  -EKG in a.m. Other chronic medical conditions:  Continue all home meds except stated above or contraindicated.    Chronic hypoxic respiratory failure: Not currently in exacerbation, continue home 3 L per nasal cannula   Chronic systolic and diastolic CHF: Currently in exacerbation   Bradycardia with MICRA pacemaker   Hypothyroidism   Seizure disorder   Right lower extremity DVT on 2020: Chronically on dabigatran   Paroxysmal A. fib and a flutter status post ablation on Pradaxa   VT status post dual AICD status post VT ablation with ICD removed 2020 secondary to ICD pocket infection with MRSA and Klebsiella   Difficult access with right chest MediPort   Multiple sclerosis wheelchair-bound    Diet No diet orders on file   DVT Prophylaxis [] Lovenox, []  Heparin, [] SCDs, [] Ambulation   GI Prophylaxis [] PPI,  [] H2 Blocker,  [] Carafate,  [] Diet/Tube Feeds   Code Status Prior   Disposition Patient requires continued admission due to UTI (urinary tract infection)   MDM [] Low, [] Moderate,[]  High  Patient's risk as above due to acuity of condition with potential for decompensation. History of Present Illness:     Chief Complaint: UTI (urinary tract infection)    Maria Esther Eaton is a 62 y.o.  female with family history and PMH as stated below, who presents with below stated complaints:     Abdominal Pain  This is a recurrent problem. The current episode started in the past 7 days. The onset quality is gradual. The problem occurs constantly. The problem has been gradually worsening. The pain is located in the right flank and suprapubic region. The pain is at a severity of 9/10. The pain is severe. The quality of the pain is aching and dull. The abdominal pain radiates to the back. Pertinent negatives include no headaches. Nothing aggravates the pain. The pain is relieved by nothing. Treatments tried: abx. The treatment provided no relief. Prior diagnostic workup includes CT scan. Flank Pain  Associated symptoms include abdominal pain. Pertinent negatives include no headaches. Chest Pain   This is a recurrent problem. The current episode started today. The onset quality is sudden. The problem occurs constantly. The problem has been gradually improving. The pain is present in the substernal region. The pain is at a severity of 8/10. The pain is moderate. The quality of the pain is described as dull and pressure. The pain radiates to the right jaw, left jaw and left arm. Associated symptoms include abdominal pain and exertional chest pressure. Pertinent negatives include no headaches. Treatments tried: 4 nitro mild. The treatment provided mild relief. Discussed case with ED provider. ROS:   Review of Systems   Gastrointestinal: Positive for abdominal pain. Genitourinary: Positive for flank pain. Neurological: Negative for headaches.      Objective:   No intake or output data in the 24 hours ending 03/26/21 2248   Vitals:   Vitals:    03/26/21 2200   BP: (!) 113/58   Pulse: 56   Resp: 16   Temp:    SpO2:      BP (!) 113/58   Pulse 56   Temp 98.4 °F (36.9 °C) (Oral)   Resp 16   SpO2 98%   Physical Exam:     General Appearance:  Alert, cooperative, no distress, appears stated age   Head:  Normocephalic, without obvious abnormality, atraumatic   Eyes:  PERRL, conjunctiva/corneas clear, EOM's intact, fundi benign, both eyes   Ears:  Normal TM's and external ear canals, both ears   Nose: Nares normal, septum midline,mucosa normal, no drainage or sinus tenderness   Throat: Lips, mucosa, and tongue normal; teeth and gums normal   Neck: Supple, symmetrical, trachea midline, no adenopathy;  thyroid: not enlarged, symmetric, no tenderness/mass/nodules; no carotid bruit or JVD   Back:   Symmetric, no curvature, ROM normal, no CVA tenderness   Lungs:   Clear to auscultation bilaterally, respirations unlabored   Breasts:  No masses or tenderness   Heart:  Regular rate and rhythm, S1 and S2 normal, no murmur, rub, or gallop   Abdomen:   Soft, suprapubic tenderness, bowel sounds active all four quadrants,  no masses, no organomegaly   Pelvic: Deferred   Extremities: Extremities normal, atraumatic, no cyanosis or edema   Pulses: 2+ and symmetric   Skin: Skin color, texture, turgor normal, no rashes or lesions   Lymph nodes: Cervical, supraclavicular, and axillary nodes normal   Neurologic: Normal     Past Medical History:      Past Medical History:   Diagnosis Date    Acute delirium     Arrhythmia     Arthritis     Asplenia     Asthma     Avascular necrosis ([address]     CAD (coronary artery disease)     1st stent at age 46    Cardiomyopathy ([address]     Cerebral artery occlusion with cerebral infarction ([address]     CHF (congestive heart failure) (HCC)     Enlarged liver     GERD (gastroesophageal reflux disease)     H/O echocardiogram 4/6/16    EF 55%, trivial TR & MR, stage 1 diastolic dysfunction    History of blood transfusion     Hx of cardiovascular stress test 12/29/2015    Alessia: EF 45%. Pharmacologic stress myocardial perfusion scan shows a fixed inferior-lateral defect w/ no inducible ischemia. No evidence of ischemia. Inferior-lateral infarction. Normal LVSF    Hyperlipidemia     Hypertension     Lymphoma ([facility]     Denies    MI, old     Movement disorder     MS (multiple sclerosis) ([facility]     OP (osteoporosis)     PE (pulmonary embolism)     trapese filter    Pneumonia     Pyelonephritis     Seizures ([facility]     Severe malnutrition (HCC)     Spleen enlarged     Thyroid disease      PSHX:  has a past surgical history that includes Coronary angioplasty with stent; Hysterectomy; Appendectomy; Cholecystectomy; Tonsillectomy; Gastric bypass surgery; Cardiac defibrillator placement; hip surgery; Abdomen surgery; Colonoscopy; Endoscopy, colon, diagnostic; hernia repair; joint replacement; skin biopsy; vascular surgery; Coronary artery bypass graft; [address] Surgery (N/A, 6/1/2020); [address] Surgery (N/A, 2/9/2021); and Port Surgery (Right, 2/10/2021). Allergies: Allergies   Allergen Reactions    Fentanyl Swelling     Other reaction(s): Angioedema (swelling)    Moxifloxacin Hcl In Nacl Swelling and Rash    Povidone-Iodine Rash     Other reaction(s): AOF      Cyclobenzaprine      Other reaction(s): Other - comment required  \" it make my muscle very weak because of my MS\"  \" it make my muscle very weak because of my MS\"      Methocarbamol      Worsening edema  Other reaction(s): Other - comment required  Worsening edema  Worsening edema  Worsening edema      Tramadol Other (See Comments)     Doctor doesn't her to take due to heart problems  Seizures   Doctor doesn't her to take due to lowers seizure threshold.     Baclofen     Ibuprofen      \"Due to heart problems\"    Naproxen     Nsaids      \"Due to heart problems\"    Tizanidine     Tolmetin      \"Due to heart problems\"    Tramadol Hcl      Other reaction(s): Other - comment required  Told not to take due to history of seizures    Betadine [Povidone Iodine] Rash    Moxifloxacin Rash and Swelling     Lips swell and tingling in face   Lips swell and tingling in face   Lips swell and tingling in face   Lips swell and tingling in face   Around mouth       FAM HX: family history includes Cancer in her father; Diabetes in her mother; Heart Disease in her father, maternal grandmother, and mother; High Blood Pressure in her maternal grandmother and mother; High Cholesterol in her maternal grandmother and mother; Other in her sister.   Soc HX:   Social History     Socioeconomic History    Marital status: Single     Spouse name: None    Number of children: None    Years of education: None    Highest education level: None   Occupational History    None   Social Needs    Financial resource strain: None    Food insecurity     Worry: None     Inability: None    Transportation needs     Medical: None     Non-medical: None   Tobacco Use    Smoking status: Never Smoker    Smokeless tobacco: Never Used   Substance and Sexual Activity    Alcohol use: No    Drug use: No    Sexual activity: Not Currently   Lifestyle    Physical activity     Days per week: None     Minutes per session: None    Stress: None   Relationships    Social connections     Talks on phone: None     Gets together: None     Attends Confucianist service: None     Active member of club or organization: None     Attends meetings of clubs or organizations: None     Relationship status: None    Intimate partner violence     Fear of current or ex partner: None     Emotionally abused: None     Physically abused: None     Forced sexual activity: None   Other Topics Concern    None   Social History Narrative    None       Data:     CBC:   Lab Results   Component Value Date    WBC 3.6 03/26/2021    RBC 3.06 03/26/2021    HGB 10.9 03/26/2021    HCT 35.2 03/26/2021 .0 03/26/2021    MCH 35.6 03/26/2021    MCHC 31.0 03/26/2021    RDW 15.3 03/26/2021     03/26/2021    MPV 9.5 03/26/2021     U/A:    Lab Results   Component Value Date    COLORU YELLOW 03/26/2021    PROTEINU NEGATIVE 03/26/2021    WBCUA 136 03/26/2021    RBCUA 23 03/26/2021    MUCUS RARE 03/26/2021    TRICHOMONAS NONE SEEN 03/26/2021    YEAST MANY 03/26/2021    BACTERIA MODERATE 03/26/2021    CLARITYU SLIGHTLY CLOUDY 03/26/2021    SPECGRAV 1.025 03/26/2021    LEUKOCYTESUR LARGE 03/26/2021    UROBILINOGEN NEGATIVE 03/26/2021    BILIRUBINUR NEGATIVE 03/26/2021    BLOODU SMALL 03/26/2021    AMORPHOUS OCCASIONAL 09/13/2020       Medications:   Home Medications:   Prior to Admission medications    Medication Sig Start Date End Date Taking?  Authorizing Provider   midodrine (PROAMATINE) 10 MG tablet Take 1 tablet by mouth 3 times daily (with meals) 2/11/21   Alicia Dietz MD   aspirin 81 MG tablet Take 1 tablet by mouth daily 1/27/21   Fredrick Nielsen MD   beclomethasone (QVAR) 40 MCG/ACT inhaler Inhale 2 puffs into the lungs 2 times daily 1/14/21   Historical Provider, MD   acarbose (PRECOSE) 25 MG tablet Take 25 mg by mouth 3 times daily (with meals) 7/1/20   Historical Provider, MD   budesonide-formoterol (SYMBICORT) 160-4.5 MCG/ACT AERO Inhale 2 puffs into the lungs 2 times daily 1/14/21 1/14/22  Historical Provider, MD   bumetanide (BUMEX) 1 MG tablet Take 1 mg by mouth daily 12/21/20   Historical Provider, MD   calcium citrate (CALCITRATE) 950 (200 Ca) MG tablet Take 1,425 mg by mouth daily 8/4/20   Historical Provider, MD   dabigatran (PRADAXA) 75 MG capsule Take 150 mg by mouth 1/14/21 1/14/22  Historical Provider, MD   DULoxetine (CYMBALTA) 60 MG extended release capsule Take 60 mg by mouth daily 11/9/20   Historical Provider, MD   ergocalciferol (ERGOCALCIFEROL) 1.25 MG (50884 UT) capsule Take 50,000 Units by mouth Twice a Week 11/9/20   Historical Provider, MD   gabapentin (NEURONTIN) 300 MG capsule Take 300 mg by mouth 3 times daily. 12/14/20   Historical Provider, MD   mirtazapine (REMERON) 30 MG tablet Take 30 mg by mouth nightly 10/8/20   Historical Provider, MD   omeprazole (PRILOSEC) 20 MG delayed release capsule omeprazole 20 mg capsule,delayed release    Historical Provider, MD   promethazine (PHENERGAN) 25 MG tablet Take 25 mg by mouth 4 times daily as needed 12/7/20   Historical Provider, MD   ranolazine (RANEXA) 500 MG extended release tablet Take 500 mg by mouth every 12 hours 12/21/20   Historical Provider, MD   senna (SENOKOT) 8.6 MG tablet Take 1 tablet by mouth daily    Historical Provider, MD   spironolactone (ALDACTONE) 25 MG tablet Take 12.5 mg by mouth daily 9/1/20   Historical Provider, MD   cyanocobalamin 1000 MCG tablet Take 1 tablet by mouth daily    Historical Provider, MD   famotidine (PEPCID) 20 MG tablet Take 20 mg by mouth 2 times daily 11/17/20 11/17/21  Historical Provider, MD   ferrous gluconate (FERGON) 324 (38 Fe) MG tablet Take 1 tablet by mouth 2 times daily    Historical Provider, MD   levETIRAcetam (KEPPRA) 1000 MG tablet Take 1,000 mg by mouth daily 11/5/20   Historical Provider, MD   levothyroxine (SYNTHROID) 150 MCG tablet Take 150 mcg by mouth every morning (before breakfast) 11/9/20   Historical Provider, MD   magnesium oxide (MAG-OX) 400 MG tablet Take 400 mg by mouth daily 11/9/20   Historical Provider, MD   nitroGLYCERIN (NITROSTAT) 0.4 MG SL tablet Place 0.4 mg under the tongue as needed 4/7/20   Historical Provider, MD   potassium chloride (KLOR-CON M) 10 MEQ extended release tablet Take 10 mEq by mouth 2 times daily 2 (10mEq) 1/18/21 1/18/22  Historical Provider, MD   amiodarone (CORDARONE) 200 MG tablet Take 400 mg by mouth daily    Historical Provider, MD   oxyCODONE (ROXICODONE) 20 MG immediate release tablet Take 20 mg by mouth every 3 hours as needed.  9/11/20   Historical Provider, MD   chlorhexidine gluconate (ANTISEPTIC SKIN CLEANSER) 4 % SOLN external solution Apply topically daily 9/30/20   Luis Cerda MD   ipratropium-albuterol (DUONEB) 0.5-2.5 (3) MG/3ML SOLN nebulizer solution Inhale 3 mLs into the lungs every 4 hours (while awake) 11/18/19   Joseline Sommer MD   benzocaine-menthol (CEPACOL SORE THROAT) 15-3.6 MG lozenge Take 1 lozenge by mouth every 2 hours as needed for Sore Throat 11/1/19   Mychal Adams MD   docusate sodium (COLACE) 100 MG capsule Take 100 mg by mouth 2 times daily as needed for Constipation    Historical Provider, MD   atorvastatin (LIPITOR) 80 MG tablet Take 80 mg by mouth nightly    Historical Provider, MD   clopidogrel (PLAVIX) 75 MG tablet Take 75 mg by mouth daily    Historical Provider, MD   acetaminophen 650 MG TABS Take 650 mg by mouth every 4 hours as needed 4/9/16   Henok Paige MD   albuterol (PROVENTIL HFA;VENTOLIN HFA) 108 (90 BASE) MCG/ACT inhaler Inhale 2 puffs into the lungs every 6 hours as needed. Historical Provider, MD     Medications:    acetaminophen  1,000 mg Oral Once    piperacillin-tazobactam  3,375 mg Intravenous Once      Infusions:   PRN Meds: ondansetron, 4 mg, Q30 Min PRN  morphine, 4 mg, Q30 Min PRN        Electronically signed by Saira Galeana DO on 3/26/2021 at 10:48 PM      This dictation was created with voice recognition software. While attempts have been made to review the dictation as it is transcribed, on occasion the spoken word can be misinterpreted by the technology leading to omissions or inappropriate words, phrases or sentences.

## 2021-03-27 NOTE — PROGRESS NOTES
Vince Story MD, Markrysten Gracia                Internal Medicine Hospitalist             Daily Progress  Note   Subjective:     Chief Complaint   Patient presents with    Abdominal Pain    Flank Pain     right    Chest Pain     Ms. Lopez Stable of still having off and on chest pains last night. Objective:    BP (!) 113/55   Pulse 50   Temp 98.1 °F (36.7 °C) (Oral)   Resp 16   Ht 5' (1.524 m)   Wt 103 lb (46.7 kg)   SpO2 100%   BMI 20.12 kg/m²      Intake/Output Summary (Last 24 hours) at 3/27/2021 1428  Last data filed at 3/27/2021 1334  Gross per 24 hour   Intake 120 ml   Output 700 ml   Net -580 ml      Physical Exam:  Heart:  Regular rate and rhythm, normal S1 and S2 in all 4 auscultatory areas. No rubs  Murmurs or gallops heard. Lungs: Mostly clear to auscultation, decreased breath sounds at bases. No wheezes appreciated no crackles heard. Abdomen: Soft, non distended. Bowel sounds appreciated. No obvious liver or spleen enlargement. Non tender, no rebound noted. Extremities: Non tender, no swelling noted, strength 5/5 both legs. CNS: Grossly intact.     Labs:  CBC with Differential:    Lab Results   Component Value Date    WBC 4.8 03/27/2021    RBC 2.63 03/27/2021    HGB 9.8 03/27/2021    HCT 30.7 03/27/2021     03/27/2021    .7 03/27/2021    MCH 37.3 03/27/2021    MCHC 31.9 03/27/2021    RDW 15.4 03/27/2021    SEGSPCT 77.2 03/26/2021    BANDSPCT 8 06/08/2020    LYMPHOPCT 15.6 03/26/2021    MONOPCT 5.0 03/26/2021    EOSPCT 0.0 01/28/2012    BASOPCT 1.1 03/26/2021    MONOSABS 0.2 03/26/2021    LYMPHSABS 0.6 03/26/2021    EOSABS 0.0 03/26/2021    BASOSABS 0.0 03/26/2021    DIFFTYPE AUTOMATED DIFFERENTIAL 03/26/2021     CMP:    Lab Results   Component Value Date     03/27/2021    K 4.1 03/27/2021     03/27/2021    CO2 27 03/27/2021    BUN 10 03/27/2021    CREATININE 0.6 03/27/2021    GFRAA >60 03/27/2021    LABGLOM >60 03/27/2021    GLUCOSE 128 03/27/2021    PROT 5.4 03/26/2021    PROT 6.3 02/21/2013    LABALBU 3.1 03/26/2021    CALCIUM 8.0 03/27/2021    BILITOT 0.2 03/26/2021    ALKPHOS 88 03/26/2021    AST 12 03/26/2021    ALT 9 03/26/2021     Recent Labs     03/26/21  1746 03/26/21  2147   TROPONINT <0.010 <0.010     Lab Results   Component Value Date    TSHHS 0.891 02/26/2021         sodium chloride        potassium chloride  10 mEq Oral BID    pantoprazole  40 mg Oral QAM AC    mirtazapine  30 mg Oral Nightly    midodrine  10 mg Oral TID WC    clopidogrel  75 mg Oral Daily    atorvastatin  80 mg Oral Nightly    acarbose  25 mg Oral TID WC    budesonide-formoterol  2 puff Inhalation BID    bumetanide  1 mg Oral Daily    calcium elemental  500 mg Oral Daily    dabigatran  150 mg Oral BID    DULoxetine  60 mg Oral Daily    [START ON 3/29/2021] vitamin D  50,000 Units Oral Once per day on Mon Thu    gabapentin  300 mg Oral TID    ranolazine  500 mg Oral BID    spironolactone  12.5 mg Oral Daily    cyanocobalamin  1,000 mcg Oral Daily    famotidine  20 mg Oral BID    ferrous gluconate  324 mg Oral BID WC    levETIRAcetam  1,000 mg Oral Daily    levothyroxine  150 mcg Oral QAM AC    magnesium oxide  400 mg Oral Daily    amiodarone  400 mg Oral Daily    aspirin  81 mg Oral Daily    sodium chloride flush  10 mL Intravenous 2 times per day    meropenem  1,000 mg Intravenous Q8H    albuterol sulfate HFA  2 puff Inhalation 4x daily    ipratropium  2 puff Inhalation 4x daily    acetaminophen  1,000 mg Oral Once         Assessment:       Patient Active Problem List    Diagnosis Date Noted    Acute delirium      Priority: High    Coronary artery disease involving coronary bypass graft of native heart with angina pectoris (HCC)      Priority: High    Shortness of breath      Priority: High    Ischemic cardiomyopathy      Priority: High    Angina at rest [facility]      Priority: High    UTI (urinary tract infection) 03/26/2021    Syncope and collapse 02/06/2021    Poor intravenous access     Pneumonia 01/22/2021    Ulcer of chest wall, limited to breakdown of skin ([address]     Bradycardia 10/16/2020    Symptomatic bradycardia 10/14/2020    History of pacemaker with removal secondary to infected leads 10/14/2020    Avascular necrosis ([address] 10/14/2020    Multiple sclerosis ([address] 10/14/2020    Syncope     MRSA infection     Klebsiella pneumoniae infection     Open wound of right chest wall     Granuloma of skin     Infected chest wall abrasion, right, subsequent encounter 08/22/2020    HFrEF (heart failure with reduced ejection fraction) ([address] 06/08/2020    MRSA bacteremia     Pyelonephritis     Fever in adult 05/24/2020    Cardiomyopathy ([address]     Acute chest pain     Chronic systolic heart failure ([address] 12/29/2019    Seizure disorder ([address] 12/29/2019    Hypothyroidism 12/29/2019    Abnormal cardiac function test 12/17/2019    Multifocal pneumonia 11/14/2019    Closed nondisplaced transverse fracture of left patella 10/24/2019    Contusion of right knee 10/24/2019    Chest pain at rest 09/29/2019    Financial difficulties 09/27/2019    Shockable cardiac rhythm detected by automated external defibrillator 05/29/2019    Asplenia     Feeding tube dysfunction     Abdominal pain, epigastric     Gastrojejunostomy tube status (HCC)     Chronic malnutrition (HCC)     Flank pain 03/31/2019    History of Maru-en-Y gastric bypass     Gastroparesis     Cellulitis at gastrostomy tube site ([address] 03/19/2019    Cellulitis     Severe malnutrition ([address] 02/04/2019    Acute on chronic systolic CHF (congestive heart failure), NYHA class 3 ([address] 02/03/2019    Sacral pain 01/28/2019    Acute metabolic encephalopathy 01/66/8547    Left upper quadrant pain 05/04/2017    Congestive heart failure (HCC)     CHF exacerbation (HCC)     Acute exacerbation of CHF (congestive heart failure) ([address]     CAD (coronary artery disease) 04/01/2016    Hyperlipidemia 04/01/2016    Thyroid disease 04/01/2016    Chest pain 01/28/2012       Plan:     Problems being addressed this admission:   Recurrent UTI's  Chest Pains / CAD s/p cabg and pci / PAF on doac  DM 2  Anemia    She tells me that she is also being seen by ID and they changed her antibiotics. Will have urology also see her as she may need some more investigations. She has E Coli infection. awaitng sensitivity. Cardiology has no new suggestions. Her sugars are stable and check a1c. Repeat labs in am , check stool and anemia panel. Consultants:  ID  Cardiology  Urology    General Orders:  Repeat basic labs again in am.  I have explained to the patient and discussed with him/her the treatment plan.   Maci Logan MD, Nico Pal

## 2021-03-27 NOTE — PROGRESS NOTES
Patient refused ordered cardiac diet, Dr. Homer Augustin notifed new order received to order general diet.

## 2021-03-27 NOTE — ED NOTES
Handoff report called to [facility] on [address]     Romayne Cristal, RN  03/27/21 0003

## 2021-03-27 NOTE — PROGRESS NOTES
Comprehensive Nutrition Assessment    Type and Reason for Visit:  Initial, Positive Nutrition Screen(poor appetite, weight loss)    Nutrition Recommendations/Plan:   Continue diet per order   Small frequent meals/snacks as needed   May offer oral nutrition supplement if patient will accept, generally refuses/dislikes  Encourage consistent meal intake   Will continue to follow up during stay    Nutrition Assessment:  Admit for observation with abdomen pain, chest pain, recurrent UTI, hx chronic pain. Currently on cardiac diet, has not yet had a meal with new admit. Usually refuses oral nutrition supplements due to dislike. High nutrition risk at this time with trend for weight loss, reported poor intake, hx chronic severe malnutrition. Malnutrition Assessment:  Malnutrition Status:  Insufficient data(current problem chronic severe malnutrition)    Context:  Chronic Illness       Estimated Daily Nutrient Needs:  Energy (kcal):  8649-3075 (30-35 juancho/kg); Weight Used for Energy Requirements:  Current     Protein (g):  56-70 (1.2-1.5 g/kg); Weight Used for Protein Requirements:  Current        Fluid (ml/day):  1600; Method Used for Fluid Requirements:  1 ml/kcal      Nutrition Related Findings:  not available on visit-contact isolation, hx MS wheelchair bound, remote hx roun-en-y, PN and PEG, chronic pain 9/10, meds noted: precose, oscal, Fe, Mag-ox, remeron, B-12, vitamin D      Wounds:  None       Current Nutrition Therapies:    DIET CARDIAC; Anthropometric Measures:  · Height: 5' (152.4 cm)  · Current Body Weight: 103 lb (46.7 kg)   · Admission Body Weight: 103 lb (46.7 kg)    · Usual Body Weight: 117 lb 1 oz (53.1 kg)(per records from outside facility)     · Ideal Body Weight: 100 lbs; % Ideal Body Weight 103 %   · BMI: 20.1  · Adjusted Body Weight:  ; No Adjustment   · BMI Categories: Normal Weight (BMI 18.5-24. 9)       Nutrition Diagnosis:   · Predicted inadequate energy intake related to pain, other (comment)(reduced appetite in chronic illness) as evidenced by weight loss, poor intake prior to admission      Nutrition Interventions:   Food and/or Nutrient Delivery:  Continue Current Diet, Snacks (Comment)  Nutrition Education/Counseling:  No recommendation at this time   Coordination of Nutrition Care:  Continue to monitor while inpatient    Goals:  Patient will tolerate diet to consume at least 50-75% at meals during stay       Nutrition Monitoring and Evaluation:   Behavioral-Environmental Outcomes:  None Identified   Food/Nutrient Intake Outcomes:  Diet Advancement/Tolerance, Food and Nutrient Intake  Physical Signs/Symptoms Outcomes:  Biochemical Data, Meal Time Behavior, GI Status, Skin, Weight, Nausea or Vomiting     Discharge Planning:    Continue current diet     Electronically signed by Charis Marrero RD, LD on 3/27/21 at 9:38 AM EDT    Contact: [phone]

## 2021-03-27 NOTE — ED NOTES
Patient requesting more pain medicine and spoke with Dr Christian Alegre.      Jasmin Olivo, RN  03/26/21 2006

## 2021-03-27 NOTE — CONSULTS
CHART REVIEWED  PT EXAMINED  STILL HAS MILD CP  TROPS ARE NORMAL  FULL NOTE TO FOLLOW  WILL FU AS OP FOR CARDIAC STATUS                      Name:  Laura Levin /Age/Sex: 1960  (62 y.o. female)   MRN & CSN:  [id number] & [id number] Admission Date/Time: 3/26/2021  5:16 PM   Location:  [address] [place] [address] PCP: Nanci Shaffer MD       Hospital Day: 2          Referring physician:  Magnus Cantu DO         Reason for consultation:  CP        Thanks for referral.    Information source: patient    CC;  UTI      HPI:   Thank you for involving me in taking  care of Laura Levin who  is a 62 y. o.year  Old female  Presents with  H/o CAD, multiple PCIS,   Has cleadless pacer now as PPM was infected now  With UTI, also c/o mid chest mild CP, trops and EKGS are unremarkable. Past medical history:    has a past medical history of Acute delirium, Arrhythmia, Arthritis, Asplenia, Asthma, Avascular necrosis ([address] CAD (coronary artery disease), Cardiomyopathy ([address] Cerebral artery occlusion with cerebral infarction ([address] CHF (congestive heart failure) ([address] Enlarged liver, GERD (gastroesophageal reflux disease), H/O echocardiogram, History of blood transfusion, Hx of cardiovascular stress test, Hyperlipidemia, Hypertension, Lymphoma ([address] MI, old, Movement disorder, MS (multiple sclerosis) ([address] OP (osteoporosis), PE (pulmonary embolism), Pneumonia, Pyelonephritis, Seizures ([address] Severe malnutrition ([address] Spleen enlarged, and Thyroid disease. Past surgical history:   has a past surgical history that includes Coronary angioplasty with stent; Hysterectomy; Appendectomy; Cholecystectomy; Tonsillectomy; Gastric bypass surgery; Cardiac defibrillator placement; hip surgery; Abdomen surgery; Colonoscopy; Endoscopy, colon, diagnostic; hernia repair; joint replacement; skin biopsy; vascular surgery; Coronary artery bypass graft; [address] (N/A, 2020);  [address] Surgery (N/A, 2021); and [address] Surgery (Right, 2/10/2021). Social History:   reports that she has never smoked. She has never used smokeless tobacco. She reports that she does not drink alcohol or use drugs. Family history:  family history includes Cancer in her father; Diabetes in her mother; Heart Disease in her father, maternal grandmother, and mother; High Blood Pressure in her maternal grandmother and mother; High Cholesterol in her maternal grandmother and mother; Other in her sister. Allergies   Allergen Reactions    Fentanyl Swelling     Other reaction(s): Angioedema (swelling)    Moxifloxacin Hcl In Nacl Swelling and Rash    Povidone-Iodine Rash     Other reaction(s): AOF      Cyclobenzaprine      Other reaction(s): Other - comment required  \" it make my muscle very weak because of my MS\"  \" it make my muscle very weak because of my MS\"      Methocarbamol      Worsening edema  Other reaction(s): Other - comment required  Worsening edema  Worsening edema  Worsening edema      Tramadol Other (See Comments)     Doctor doesn't her to take due to heart problems  Seizures   Doctor doesn't her to take due to lowers seizure threshold.  Baclofen     Ibuprofen      \"Due to heart problems\"    Naproxen     Nsaids      \"Due to heart problems\"    Tizanidine     Tolmetin      \"Due to heart problems\"    Tramadol Hcl      Other reaction(s):  Other - comment required  Told not to take due to history of seizures    Betadine [Povidone Iodine] Rash    Moxifloxacin Rash and Swelling     Lips swell and tingling in face   Lips swell and tingling in face   Lips swell and tingling in face   Lips swell and tingling in face   Around mouth       potassium chloride (KLOR-CON) extended release tablet 10 mEq, BID  oxyCODONE HCl (OXY-IR) immediate release tablet 20 mg, Q3H PRN  pantoprazole (PROTONIX) tablet 40 mg, QAM AC  mirtazapine (REMERON) tablet 30 mg, Nightly  midodrine (PROAMATINE) tablet 10 mg, TID WC  clopidogrel (PLAVIX) tablet 75 mg,  potassium chloride (KLOR-CON) extended release tablet 10 mEq  10 mEq Oral BID [facility], DO   10 mEq at 03/27/21 7647    oxyCODONE HCl (OXY-IR) immediate release tablet 20 mg  20 mg Oral Q3H PRN [facility], DO   20 mg at 03/27/21 1250    pantoprazole (PROTONIX) tablet 40 mg  40 mg Oral QAM [facility], DO   40 mg at 03/27/21 0540    mirtazapine (REMERON) tablet 30 mg  30 mg Oral Nightly [facility], DO   30 mg at 03/27/21 0132    midodrine (PROAMATINE) tablet 10 mg  10 mg Oral TID [facility], DO   10 mg at 03/27/21 1249    clopidogrel (PLAVIX) tablet 75 mg  75 mg Oral Daily [facility], DO   75 mg at 03/27/21 0902    atorvastatin (LIPITOR) tablet 80 mg  80 mg Oral Nightly [facility], DO   80 mg at 03/27/21 0133    docusate sodium (COLACE) capsule 100 mg  100 mg Oral BID PRN [facility], DO        benzocaine-menthol (CEPACOL SORE THROAT) lozenge 1 lozenge  1 lozenge Oral Q2H PRN [facility], DO        acarbose (PRECOSE) tablet 25 mg  25 mg Oral TID [facility], DO   25 mg at 03/27/21 1259    budesonide-formoterol (SYMBICORT) 160-4.5 MCG/ACT inhaler 2 puff  2 puff Inhalation BID [facility], DO   2 puff at 03/27/21 0724    bumetanide (BUMEX) tablet 1 mg  1 mg Oral Daily [facility], DO   1 mg at 03/27/21 3200    calcium elemental (OSCAL) tablet 500 mg  500 mg Oral Daily [facility], DO   500 mg at 03/27/21 0815    dabigatran (PRADAXA) capsule 150 mg  150 mg Oral BID [facility], DO   150 mg at 03/27/21 4380    DULoxetine (CYMBALTA) extended release capsule 60 mg  60 mg Oral Daily [facility], DO   60 mg at 03/27/21 0904    [START ON 3/29/2021] vitamin D (ERGOCALCIFEROL) capsule 50,000 Units  50,000 Units Oral Once per day on Mon Thu Cristi Hathaway DO        gabapentin (NEURONTIN) capsule 300 mg  300 mg Oral TID [facility], DO   300 mg at 03/27/21 1250    ranolazine (RANEXA) extended release tablet 500 mg  500 mg Oral BID Charles Gardner 200 mL/hr at 03/27/21 1247 1,000 mg at 03/27/21 1247    ipratropium-albuterol (DUONEB) nebulizer solution 3 mL  3 mL Inhalation Q4H PRN Devendra Mc MD        albuterol sulfate  (90 Base) MCG/ACT inhaler 2 puff  2 puff Inhalation 4x daily Devendra Mc MD   2 puff at 03/27/21 1109    ipratropium (ATROVENT HFA) 17 MCG/ACT inhaler 2 puff  2 puff Inhalation 4x daily Devendra cM MD   2 puff at 03/27/21 1109    acetaminophen (TYLENOL) tablet 1,000 mg  1,000 mg Oral Once [facility], DO         Review of Systems:  All 14 systems reviewed, all negative except for  cp    Physical Examination:    BP (!) 113/55   Pulse 50   Temp 98.1 °F (36.7 °C) (Oral)   Resp 16   Ht 5' (1.524 m)   Wt 103 lb (46.7 kg)   SpO2 100%   BMI 20.12 kg/m²      Wt Readings from Last 3 Encounters:   03/26/21 103 lb (46.7 kg)   02/26/21 103 lb (46.7 kg)   02/12/21 140 lb (63.5 kg)     Body mass index is 20.12 kg/m². General Appearance:  fair  Head: normocephalic     Eyes: normal, noninjected conjunctiva    ENT: normal mucosa, noninjected throat, normal     NECK: No JVP  No thyromegaly        Cardiovascular: No thrills palpated   Auscultation: Normal S1 and S2,  no murmur   carotid bruit no   Abdominal Aorta no bruit    Respiratory:    Breath sounds Clear = 0    Extremities:  none Edema clubbing ,   no cyanosis    SKIN: Warm and well perfused, no pallor or cyanosis    Vascular exam:  Pedal Pulses: palp  bilaterally        Abdomen:  No masses or tenderness. No organomegaly noted. Neurological:  Oriented to time, place, and person   No focal neurological deficit noted.   Psychiatric:normal mood, no anxiety    Lab Review   Recent Labs     03/27/21  0642   WBC 4.8   HGB 9.8*   HCT 30.7*         Recent Labs     03/27/21  0642      K 4.1      CO2 27   BUN 10   CREATININE 0.6     Recent Labs     03/26/21  1746   AST 12*   ALT 9*   BILITOT 0.2   ALKPHOS 88     No results for input(s): TROPONINI in the last 72 hours.   Lab Results   Component Value Date    BNP 48 02/21/2013     Lab Results   Component Value Date    INR 0.98 02/12/2021    PROTIME 11.8 02/12/2021           Assessment/Recommendations:     - CAD; serial trops, EKG  Med therapy  - risk factor modification  - A fib in SR on Nabila Feldman MD, 3/27/2021 1:34 PM

## 2021-03-27 NOTE — PLAN OF CARE
Problem: Skin Integrity:  Goal: Will show no infection signs and symptoms  Description: Will show no infection signs and symptoms  3/27/2021 0958 by Brianna Velasco  Outcome: Ongoing  3/27/2021 0301 by Stella Hernandez RN  Outcome: Ongoing     Problem: Skin Integrity:  Goal: Will show no infection signs and symptoms  Description: Will show no infection signs and symptoms  3/27/2021 0958 by Brianna Velasco  Outcome: Ongoing     Problem: Skin Integrity:  Goal: Absence of new skin breakdown  Description: Absence of new skin breakdown  3/27/2021 0958 by Brianna Velasco  Outcome: Ongoing     Problem: Falls - Risk of:  Goal: Will remain free from falls  Description: Will remain free from falls  3/27/2021 0958 by Brianna Velasco  Outcome: Ongoing  3/27/2021 0301 by Stella Hernandez RN  Outcome: Ongoing     Problem: Falls - Risk of:  Goal: Absence of physical injury  Description: Absence of physical injury  3/27/2021 0958 by Brianna Velasco  Outcome: Ongoing  3/27/2021 0301 by Stella Hernandez RN  Outcome: Ongoing

## 2021-03-28 ENCOUNTER — APPOINTMENT (OUTPATIENT)
Dept: GENERAL RADIOLOGY | Age: 61
DRG: 698 | End: 2021-03-28
Payer: MEDICARE

## 2021-03-28 PROBLEM — N39.0 RECURRENT UTI: Status: ACTIVE | Noted: 2021-03-28

## 2021-03-28 LAB
ANION GAP SERPL CALCULATED.3IONS-SCNC: 8 MMOL/L (ref 4–16)
BACTERIA: NEGATIVE /HPF
BASOPHILS ABSOLUTE: 0.1 K/CU MM
BASOPHILS RELATIVE PERCENT: 0.5 % (ref 0–1)
BILIRUBIN URINE: NEGATIVE MG/DL
BLOOD, URINE: NEGATIVE
BUN BLDV-MCNC: 8 MG/DL (ref 6–23)
CALCIUM SERPL-MCNC: 8.1 MG/DL (ref 8.3–10.6)
CHLORIDE BLD-SCNC: 107 MMOL/L (ref 99–110)
CLARITY: ABNORMAL
CO2: 26 MMOL/L (ref 21–32)
COLOR: YELLOW
CREAT SERPL-MCNC: 0.6 MG/DL (ref 0.6–1.1)
CULTURE: ABNORMAL
CULTURE: ABNORMAL
DIFFERENTIAL TYPE: ABNORMAL
EOSINOPHILS ABSOLUTE: 0.1 K/CU MM
EOSINOPHILS RELATIVE PERCENT: 0.8 % (ref 0–3)
FERRITIN: 86 NG/ML (ref 15–150)
FOLATE: 10.3 NG/ML (ref 3.1–17.5)
GFR AFRICAN AMERICAN: >60 ML/MIN/1.73M2
GFR NON-AFRICAN AMERICAN: >60 ML/MIN/1.73M2
GLUCOSE BLD-MCNC: 129 MG/DL (ref 70–99)
GLUCOSE, URINE: NEGATIVE MG/DL
HCT VFR BLD CALC: 31.3 % (ref 37–47)
HEMOGLOBIN: 9.7 GM/DL (ref 12.5–16)
HYPHENATED YEAST: ABNORMAL /HPF
IMMATURE NEUTROPHIL %: 0.4 % (ref 0–0.43)
IRON: 34 UG/DL (ref 37–145)
KETONES, URINE: NEGATIVE MG/DL
LEUKOCYTE ESTERASE, URINE: ABNORMAL
LYMPHOCYTES ABSOLUTE: 0.3 K/CU MM
LYMPHOCYTES RELATIVE PERCENT: 3 % (ref 24–44)
Lab: ABNORMAL
MCH RBC QN AUTO: 37 PG (ref 27–31)
MCHC RBC AUTO-ENTMCNC: 31 % (ref 32–36)
MCV RBC AUTO: 119.5 FL (ref 78–100)
MONOCYTES ABSOLUTE: 0.5 K/CU MM
MONOCYTES RELATIVE PERCENT: 4.5 % (ref 0–4)
MUCUS: ABNORMAL HPF
NITRITE URINE, QUANTITATIVE: POSITIVE
NUCLEATED RBC %: 0 %
PCT TRANSFERRIN: 15 % (ref 10–44)
PDW BLD-RTO: 15.3 % (ref 11.7–14.9)
PH, URINE: 5 (ref 5–8)
PLATELET # BLD: 230 K/CU MM (ref 140–440)
PMV BLD AUTO: 9.6 FL (ref 7.5–11.1)
POTASSIUM SERPL-SCNC: 4.3 MMOL/L (ref 3.5–5.1)
PROCALCITONIN: 0.06
PROTEIN UA: NEGATIVE MG/DL
RBC # BLD: 2.62 M/CU MM (ref 4.2–5.4)
RBC URINE: 9 /HPF (ref 0–6)
RETICULOCYTE COUNT PCT: 2 % (ref 0.2–2.2)
SARS-COV-2, NAAT: NOT DETECTED
SEGMENTED NEUTROPHILS ABSOLUTE COUNT: 10 K/CU MM
SEGMENTED NEUTROPHILS RELATIVE PERCENT: 90.8 % (ref 36–66)
SODIUM BLD-SCNC: 141 MMOL/L (ref 135–145)
SOURCE: NORMAL
SPECIFIC GRAVITY UA: 1.02 (ref 1–1.03)
SPECIMEN: ABNORMAL
SQUAMOUS EPITHELIAL: 1 /HPF
TOTAL IMMATURE NEUTOROPHIL: 0.04 K/CU MM
TOTAL IRON BINDING CAPACITY: 233 UG/DL (ref 250–450)
TOTAL NUCLEATED RBC: 0 K/CU MM
TRICHOMONAS: ABNORMAL /HPF
TROPONIN T: <0.01 NG/ML
TROPONIN T: <0.01 NG/ML
UNSATURATED IRON BINDING CAPACITY: 199 UG/DL (ref 110–370)
UROBILINOGEN, URINE: NEGATIVE MG/DL (ref 0.2–1)
VITAMIN B-12: 418.5 PG/ML (ref 211–911)
WBC # BLD: 11 K/CU MM (ref 4–10.5)
WBC UA: 73 /HPF (ref 0–5)
YEAST: ABNORMAL /HPF

## 2021-03-28 PROCEDURE — 1200000000 HC SEMI PRIVATE

## 2021-03-28 PROCEDURE — 85025 COMPLETE CBC W/AUTO DIFF WBC: CPT

## 2021-03-28 PROCEDURE — 87086 URINE CULTURE/COLONY COUNT: CPT

## 2021-03-28 PROCEDURE — 84484 ASSAY OF TROPONIN QUANT: CPT

## 2021-03-28 PROCEDURE — 6360000002 HC RX W HCPCS: Performed by: INTERNAL MEDICINE

## 2021-03-28 PROCEDURE — G0378 HOSPITAL OBSERVATION PER HR: HCPCS

## 2021-03-28 PROCEDURE — 96376 TX/PRO/DX INJ SAME DRUG ADON: CPT

## 2021-03-28 PROCEDURE — 94761 N-INVAS EAR/PLS OXIMETRY MLT: CPT

## 2021-03-28 PROCEDURE — 6360000002 HC RX W HCPCS: Performed by: STUDENT IN AN ORGANIZED HEALTH CARE EDUCATION/TRAINING PROGRAM

## 2021-03-28 PROCEDURE — 82607 VITAMIN B-12: CPT

## 2021-03-28 PROCEDURE — 83550 IRON BINDING TEST: CPT

## 2021-03-28 PROCEDURE — 87635 SARS-COV-2 COVID-19 AMP PRB: CPT

## 2021-03-28 PROCEDURE — 84145 PROCALCITONIN (PCT): CPT

## 2021-03-28 PROCEDURE — 80048 BASIC METABOLIC PNL TOTAL CA: CPT

## 2021-03-28 PROCEDURE — U0003 INFECTIOUS AGENT DETECTION BY NUCLEIC ACID (DNA OR RNA); SEVERE ACUTE RESPIRATORY SYNDROME CORONAVIRUS 2 (SARS-COV-2) (CORONAVIRUS DISEASE [COVID-19]), AMPLIFIED PROBE TECHNIQUE, MAKING USE OF HIGH THROUGHPUT TECHNOLOGIES AS DESCRIBED BY CMS-2020-01-R: HCPCS

## 2021-03-28 PROCEDURE — 85045 AUTOMATED RETICULOCYTE COUNT: CPT

## 2021-03-28 PROCEDURE — 94640 AIRWAY INHALATION TREATMENT: CPT

## 2021-03-28 PROCEDURE — 36591 DRAW BLOOD OFF VENOUS DEVICE: CPT

## 2021-03-28 PROCEDURE — 71045 X-RAY EXAM CHEST 1 VIEW: CPT

## 2021-03-28 PROCEDURE — 83540 ASSAY OF IRON: CPT

## 2021-03-28 PROCEDURE — 96366 THER/PROPH/DIAG IV INF ADDON: CPT

## 2021-03-28 PROCEDURE — 6370000000 HC RX 637 (ALT 250 FOR IP): Performed by: STUDENT IN AN ORGANIZED HEALTH CARE EDUCATION/TRAINING PROGRAM

## 2021-03-28 PROCEDURE — APPSS60 APP SPLIT SHARED TIME 46-60 MINUTES: Performed by: NURSE PRACTITIONER

## 2021-03-28 PROCEDURE — 82746 ASSAY OF FOLIC ACID SERUM: CPT

## 2021-03-28 PROCEDURE — 82728 ASSAY OF FERRITIN: CPT

## 2021-03-28 PROCEDURE — 2580000003 HC RX 258: Performed by: STUDENT IN AN ORGANIZED HEALTH CARE EDUCATION/TRAINING PROGRAM

## 2021-03-28 PROCEDURE — 81001 URINALYSIS AUTO W/SCOPE: CPT

## 2021-03-28 PROCEDURE — 99232 SBSQ HOSP IP/OBS MODERATE 35: CPT | Performed by: INTERNAL MEDICINE

## 2021-03-28 PROCEDURE — 2580000003 HC RX 258: Performed by: INTERNAL MEDICINE

## 2021-03-28 RX ORDER — METHYLPREDNISOLONE SODIUM SUCCINATE 40 MG/ML
40 INJECTION, POWDER, LYOPHILIZED, FOR SOLUTION INTRAMUSCULAR; INTRAVENOUS DAILY
Status: DISCONTINUED | OUTPATIENT
Start: 2021-03-28 | End: 2021-04-01 | Stop reason: HOSPADM

## 2021-03-28 RX ADMIN — FERROUS GLUCONATE TAB 324 MG (37.5 MG ELEMENTAL IRON) 324 MG: 324 (37.5 FE) TAB at 08:47

## 2021-03-28 RX ADMIN — SODIUM CHLORIDE, PRESERVATIVE FREE 10 ML: 5 INJECTION INTRAVENOUS at 17:35

## 2021-03-28 RX ADMIN — ACETAMINOPHEN 650 MG: 325 TABLET ORAL at 06:18

## 2021-03-28 RX ADMIN — ATORVASTATIN CALCIUM 80 MG: 80 TABLET, FILM COATED ORAL at 23:18

## 2021-03-28 RX ADMIN — POTASSIUM CHLORIDE 10 MEQ: 750 TABLET, FILM COATED, EXTENDED RELEASE ORAL at 23:19

## 2021-03-28 RX ADMIN — MIDODRINE HYDROCHLORIDE 10 MG: 5 TABLET ORAL at 17:29

## 2021-03-28 RX ADMIN — GABAPENTIN 300 MG: 300 CAPSULE ORAL at 23:28

## 2021-03-28 RX ADMIN — ONDANSETRON 4 MG: 2 INJECTION INTRAMUSCULAR; INTRAVENOUS at 13:07

## 2021-03-28 RX ADMIN — SODIUM CHLORIDE, PRESERVATIVE FREE 10 ML: 5 INJECTION INTRAVENOUS at 08:43

## 2021-03-28 RX ADMIN — DABIGATRAN ETEXILATE MESYLATE 150 MG: 150 CAPSULE ORAL at 08:47

## 2021-03-28 RX ADMIN — GABAPENTIN 300 MG: 300 CAPSULE ORAL at 08:48

## 2021-03-28 RX ADMIN — MIDODRINE HYDROCHLORIDE 10 MG: 5 TABLET ORAL at 13:17

## 2021-03-28 RX ADMIN — METHYLPREDNISOLONE SODIUM SUCCINATE 40 MG: 40 INJECTION, POWDER, FOR SOLUTION INTRAMUSCULAR; INTRAVENOUS at 17:34

## 2021-03-28 RX ADMIN — RANOLAZINE 500 MG: 500 TABLET, FILM COATED, EXTENDED RELEASE ORAL at 08:48

## 2021-03-28 RX ADMIN — PROMETHAZINE HYDROCHLORIDE 12.5 MG: 12.5 TABLET ORAL at 17:29

## 2021-03-28 RX ADMIN — ALBUTEROL SULFATE 2 PUFF: 90 AEROSOL, METERED RESPIRATORY (INHALATION) at 15:14

## 2021-03-28 RX ADMIN — OXYCODONE HYDROCHLORIDE 20 MG: 10 TABLET ORAL at 09:27

## 2021-03-28 RX ADMIN — ALBUTEROL SULFATE 2 PUFF: 90 AEROSOL, METERED RESPIRATORY (INHALATION) at 07:09

## 2021-03-28 RX ADMIN — MEROPENEM 2000 MG: 1 INJECTION, POWDER, FOR SOLUTION INTRAVENOUS at 23:41

## 2021-03-28 RX ADMIN — LEVETIRACETAM 1000 MG: 500 TABLET, FILM COATED ORAL at 08:47

## 2021-03-28 RX ADMIN — SPIRONOLACTONE 12.5 MG: 25 TABLET ORAL at 08:48

## 2021-03-28 RX ADMIN — ACETAMINOPHEN 650 MG: 325 TABLET ORAL at 13:06

## 2021-03-28 RX ADMIN — OXYCODONE HYDROCHLORIDE 20 MG: 10 TABLET ORAL at 02:49

## 2021-03-28 RX ADMIN — ACARBOSE 25 MG: 50 TABLET ORAL at 08:52

## 2021-03-28 RX ADMIN — CLOPIDOGREL BISULFATE 75 MG: 75 TABLET ORAL at 09:03

## 2021-03-28 RX ADMIN — BUDESONIDE AND FORMOTEROL FUMARATE DIHYDRATE 2 PUFF: 160; 4.5 AEROSOL RESPIRATORY (INHALATION) at 21:58

## 2021-03-28 RX ADMIN — DULOXETINE HYDROCHLORIDE 60 MG: 30 CAPSULE, DELAYED RELEASE ORAL at 08:47

## 2021-03-28 RX ADMIN — FAMOTIDINE 20 MG: 20 TABLET ORAL at 08:55

## 2021-03-28 RX ADMIN — PANTOPRAZOLE SODIUM 40 MG: 40 TABLET, DELAYED RELEASE ORAL at 06:32

## 2021-03-28 RX ADMIN — GABAPENTIN 300 MG: 300 CAPSULE ORAL at 13:07

## 2021-03-28 RX ADMIN — DABIGATRAN ETEXILATE MESYLATE 150 MG: 150 CAPSULE ORAL at 23:19

## 2021-03-28 RX ADMIN — ACETAMINOPHEN 650 MG: 325 TABLET ORAL at 22:29

## 2021-03-28 RX ADMIN — ASPIRIN 81 MG CHEWABLE TABLET 81 MG: 81 TABLET CHEWABLE at 08:47

## 2021-03-28 RX ADMIN — SODIUM CHLORIDE, PRESERVATIVE FREE 10 ML: 5 INJECTION INTRAVENOUS at 13:10

## 2021-03-28 RX ADMIN — LEVOTHYROXINE SODIUM 150 MCG: 150 TABLET ORAL at 06:32

## 2021-03-28 RX ADMIN — FERROUS GLUCONATE TAB 324 MG (37.5 MG ELEMENTAL IRON) 324 MG: 324 (37.5 FE) TAB at 17:29

## 2021-03-28 RX ADMIN — AMIODARONE HYDROCHLORIDE 400 MG: 200 TABLET ORAL at 08:54

## 2021-03-28 RX ADMIN — ALBUTEROL SULFATE 2 PUFF: 90 AEROSOL, METERED RESPIRATORY (INHALATION) at 21:58

## 2021-03-28 RX ADMIN — OXYCODONE HYDROCHLORIDE 20 MG: 10 TABLET ORAL at 17:29

## 2021-03-28 RX ADMIN — OXYCODONE HYDROCHLORIDE 20 MG: 10 TABLET ORAL at 22:29

## 2021-03-28 RX ADMIN — BUDESONIDE AND FORMOTEROL FUMARATE DIHYDRATE 2 PUFF: 160; 4.5 AEROSOL RESPIRATORY (INHALATION) at 07:09

## 2021-03-28 RX ADMIN — POTASSIUM CHLORIDE 10 MEQ: 750 TABLET, FILM COATED, EXTENDED RELEASE ORAL at 08:57

## 2021-03-28 RX ADMIN — BUMETANIDE 1 MG: 1 TABLET ORAL at 09:03

## 2021-03-28 RX ADMIN — FAMOTIDINE 20 MG: 20 TABLET ORAL at 23:19

## 2021-03-28 RX ADMIN — Medication 2 PUFF: at 15:14

## 2021-03-28 RX ADMIN — ALBUTEROL SULFATE 2 PUFF: 90 AEROSOL, METERED RESPIRATORY (INHALATION) at 11:16

## 2021-03-28 RX ADMIN — MIRTAZAPINE 30 MG: 15 TABLET, FILM COATED ORAL at 23:19

## 2021-03-28 RX ADMIN — OXYCODONE HYDROCHLORIDE 20 MG: 10 TABLET ORAL at 13:07

## 2021-03-28 RX ADMIN — MIDODRINE HYDROCHLORIDE 10 MG: 5 TABLET ORAL at 08:56

## 2021-03-28 RX ADMIN — Medication 2 PUFF: at 11:16

## 2021-03-28 RX ADMIN — ACARBOSE 25 MG: 50 TABLET ORAL at 17:32

## 2021-03-28 RX ADMIN — SODIUM CHLORIDE, PRESERVATIVE FREE 10 ML: 5 INJECTION INTRAVENOUS at 23:29

## 2021-03-28 RX ADMIN — OXYCODONE HYDROCHLORIDE 20 MG: 10 TABLET ORAL at 06:18

## 2021-03-28 RX ADMIN — Medication 2 PUFF: at 21:58

## 2021-03-28 RX ADMIN — MAGNESIUM OXIDE 400 MG: 400 TABLET ORAL at 08:56

## 2021-03-28 RX ADMIN — SODIUM CHLORIDE, PRESERVATIVE FREE 10 ML: 5 INJECTION INTRAVENOUS at 15:15

## 2021-03-28 RX ADMIN — RANOLAZINE 500 MG: 500 TABLET, FILM COATED, EXTENDED RELEASE ORAL at 23:19

## 2021-03-28 RX ADMIN — MEROPENEM 2000 MG: 1 INJECTION, POWDER, FOR SOLUTION INTRAVENOUS at 08:40

## 2021-03-28 RX ADMIN — Medication 500 MG: at 08:48

## 2021-03-28 RX ADMIN — MEROPENEM 2000 MG: 1 INJECTION, POWDER, FOR SOLUTION INTRAVENOUS at 15:15

## 2021-03-28 RX ADMIN — ACARBOSE 25 MG: 50 TABLET ORAL at 13:11

## 2021-03-28 RX ADMIN — CYANOCOBALAMIN TAB 1000 MCG 1000 MCG: 1000 TAB at 08:47

## 2021-03-28 RX ADMIN — Medication 2 PUFF: at 07:09

## 2021-03-28 ASSESSMENT — PAIN DESCRIPTION - DESCRIPTORS
DESCRIPTORS: ACHING;SORE

## 2021-03-28 ASSESSMENT — PAIN DESCRIPTION - ORIENTATION
ORIENTATION: RIGHT;LEFT
ORIENTATION: RIGHT
ORIENTATION: RIGHT;LEFT
ORIENTATION: RIGHT;LEFT

## 2021-03-28 ASSESSMENT — PAIN DESCRIPTION - FREQUENCY
FREQUENCY: CONTINUOUS

## 2021-03-28 ASSESSMENT — PAIN SCALES - GENERAL
PAINLEVEL_OUTOF10: 9
PAINLEVEL_OUTOF10: 4
PAINLEVEL_OUTOF10: 8
PAINLEVEL_OUTOF10: 6
PAINLEVEL_OUTOF10: 9

## 2021-03-28 ASSESSMENT — PAIN DESCRIPTION - ONSET: ONSET: ON-GOING

## 2021-03-28 ASSESSMENT — PAIN DESCRIPTION - PAIN TYPE: TYPE: ACUTE PAIN;CHRONIC PAIN

## 2021-03-28 NOTE — PROGRESS NOTES
[facility] [place]  [address] [facility],Third [address]  Phone: [phone]    Fax [phone]                  Jorge Baez MD, Keena Resendiz MD, Sari Dandy, MD, MD Saad Cota, MD Eric Guerrier MD Rosalin Florence, JESSE Burnett, JESSE Victoria, JESSE Horton, APRMARLIN    Cardiology Progress Note     Today's Plan: continue current medical therapy    Admit Date:  3/26/2021    Consult reason/ Seen today for: chest pain    Subjective and  Overnight Events:  Patient denies chest pain today. States that she is feeling weak. Assessment / Plan / Recommendation:     1. CAD: troponin's are negative. Chest pain resolved. Plan medical therapy only at this time. Continue   2. Atrial fibrillation: sinus rhythm today. Continue Pradaxa 150 mg twice daily. In care potassium is 4.3 today magnesium 1.9 3/26/2021. We will recheck labs in morning. Recommend keeping potassium 4-4.5 and magnesium 2-2.2 in patients with atrial fibrillation. 3. R ventricle PPM (Micra): Interrogation today shows normal functioning right ventricular pacemaker lowest rate set at 40 with battery life of approximately 8 years. February patient had ventricular rate decreased from 50-40 due to high occurrence of RV pacing to protect patient from pacemaker induced cardiomyopathy as she already has reduced ejection fraction. 4. history of syncope: Patient denies any current episodes of syncope since being on higher dose of midodrine. 5. History of ventricular tachycardia and ventricular fibrillation. Patient status post ICD x2 both occurrences patient developed  Endocarditis and subcutaneous pacemaker and wires had to be removed. Patient does have LifeVest at home but is being monitored through [facility]. She does not like to wear her LifeVest as it is extremely difficult for her to get on and LifeVest alarms frequently.   Will request [employer] to come to hospital if patient daughter can bring to us for evaluation of device. Patient states every time she gets a new garment device works for short period of time then she gets lots of alarms. 6. Ischemic cardiomyopathy with reduced ejection fraction without heart failure: Patient appears fairly euvolemic. Echo done at [facility] march 2, 2021 showed an ejection fraction of 30 to 35% global hypokinesis akinesis of inferior posterior segments right ventricle systolic pressure estimated 30 mmHg. Continue Aldactone. Patient not on beta-blocker or ACE ARB or Arni due to hypotension and syncope. 7. DVT prophylaxis if not contraindicated while in the hospital.        History of Presenting Illness:    Chief complain on admission : 62 y. o.year old who is admitted for  Chief Complaint   Patient presents with    Abdominal Pain    Flank Pain     right    Chest Pain        Past medical history:    has a past medical history of Acute delirium, Arrhythmia, Arthritis, Asplenia, Asthma, Avascular necrosis ([address] CAD (coronary artery disease), Cardiomyopathy ([address] Cerebral artery occlusion with cerebral infarction ([address] CHF (congestive heart failure) ([address] Enlarged liver, GERD (gastroesophageal reflux disease), H/O echocardiogram, History of blood transfusion, Hx of cardiovascular stress test, Hyperlipidemia, Hypertension, Lymphoma ([address] MI, old, Movement disorder, MS (multiple sclerosis) ([address] OP (osteoporosis), PE (pulmonary embolism), Pneumonia, Pyelonephritis, Seizures ([address] Severe malnutrition ([address] Spleen enlarged, and Thyroid disease. Past surgical history:   has a past surgical history that includes Coronary angioplasty with stent; Hysterectomy; Appendectomy; Cholecystectomy; Tonsillectomy; Gastric bypass surgery; Cardiac defibrillator placement; hip surgery; Abdomen surgery;  Colonoscopy; Endoscopy, colon, diagnostic; hernia repair; joint replacement; skin biopsy; vascular surgery; Coronary artery bypass graft;  [address] (N/A, 6/1/2020); [address] Surgery (N/A, 2/9/2021); and [place] [facility] (Right, 2/10/2021). Social History:   reports that she has never smoked. She has never used smokeless tobacco. She reports that she does not drink alcohol or use drugs. Family history:  family history includes Cancer in her father; Diabetes in her mother; Heart Disease in her father, maternal grandmother, and mother; High Blood Pressure in her maternal grandmother and mother; High Cholesterol in her maternal grandmother and mother; Other in her sister. Allergies   Allergen Reactions    Fentanyl Swelling     Other reaction(s): Angioedema (swelling)    Moxifloxacin Hcl In Nacl Swelling and Rash    Povidone-Iodine Rash     Other reaction(s): AOF      Cyclobenzaprine      Other reaction(s): Other - comment required  \" it make my muscle very weak because of my MS\"  \" it make my muscle very weak because of my MS\"      Methocarbamol      Worsening edema  Other reaction(s): Other - comment required  Worsening edema  Worsening edema  Worsening edema      Tramadol Other (See Comments)     Doctor doesn't her to take due to heart problems  Seizures   Doctor doesn't her to take due to lowers seizure threshold.  Baclofen     Ibuprofen      \"Due to heart problems\"    Naproxen     Nsaids      \"Due to heart problems\"    Tizanidine     Tolmetin      \"Due to heart problems\"    Tramadol Hcl      Other reaction(s):  Other - comment required  Told not to take due to history of seizures    Betadine [Povidone Iodine] Rash    Moxifloxacin Rash and Swelling     Lips swell and tingling in face   Lips swell and tingling in face   Lips swell and tingling in face   Lips swell and tingling in face   Around mouth       Review of Systems   All 14 systems were reviewed and are negative  Except for the positive findings  which as documented     BP (!) 104/50 Comment: manua  Pulse 61   Temp 99.3 °F (37.4 °C) (Oral) Resp 17   Ht 5' (1.524 m)   Wt 103 lb (46.7 kg)   SpO2 95%   BMI 20.12 kg/m²       Intake/Output Summary (Last 24 hours) at 3/28/2021 1239  Last data filed at 3/28/2021 0914  Gross per 24 hour   Intake 610 ml   Output 1950 ml   Net -1340 ml       Physical Exam  Vitals signs reviewed. Constitutional:       General: She is not in acute distress. Appearance: Normal appearance. She is not ill-appearing. HENT:      Head: Atraumatic. Neck:      Musculoskeletal: Neck supple. No muscular tenderness. Vascular: No carotid bruit. Cardiovascular:      Rate and Rhythm: Normal rate and regular rhythm. Pulses: Normal pulses. Heart sounds: Normal heart sounds. No murmur. Pulmonary:      Effort: Pulmonary effort is normal. No respiratory distress. Breath sounds: Normal breath sounds. Musculoskeletal:         General: No swelling or deformity. Neurological:      Mental Status: She is alert. Telemetry Reviewed:   Sinus rhythm rate 61.     Medications:    meropenem  2,000 mg Intravenous Q8H    potassium chloride  10 mEq Oral BID    pantoprazole  40 mg Oral QAM AC    mirtazapine  30 mg Oral Nightly    midodrine  10 mg Oral TID WC    clopidogrel  75 mg Oral Daily    atorvastatin  80 mg Oral Nightly    acarbose  25 mg Oral TID WC    budesonide-formoterol  2 puff Inhalation BID    bumetanide  1 mg Oral Daily    calcium elemental  500 mg Oral Daily    dabigatran  150 mg Oral BID    DULoxetine  60 mg Oral Daily    [START ON 3/29/2021] vitamin D  50,000 Units Oral Once per day on Mon Thu    gabapentin  300 mg Oral TID    ranolazine  500 mg Oral BID    spironolactone  12.5 mg Oral Daily    cyanocobalamin  1,000 mcg Oral Daily    famotidine  20 mg Oral BID    ferrous gluconate  324 mg Oral BID WC    levETIRAcetam  1,000 mg Oral Daily    levothyroxine  150 mcg Oral QAM AC    magnesium oxide  400 mg Oral Daily    amiodarone  400 mg Oral Daily    aspirin  81 mg Oral Daily seen ,spoken to  and examined this patient personally, independently of the nurse practitioner. I have reviewed the hospital care given to date and reviewed all pertinent labs and imaging. The plan was developed mutually at the time of the visit with the patient,  NP  and myself. I have spoken with patient, nursing staff and provided written and verbal instructions . The above note has been reviewed and I agree with the assessment, diagnosis, and treatment plan with changes made by me as follows     CARDIOLOGY ATTENDING ADDENDUM    HPI:  I have reviewed the above HPI  And agree with above   Isabela Wright is a 62 y. o.year old who and presents with had concerns including Abdominal Pain, Flank Pain (right), and Chest Pain.   Chief Complaint   Patient presents with    Abdominal Pain    Flank Pain     right    Chest Pain     Interval history:  No CP    Physical Exam:  General:  Awake, alert, NAD  Head:normal  Eye:normal  Neck:  No JVD   Chest:  Clear to auscultation, respiration easy  Cardiovascular:  RRR S1S2  Abdomen:   nontender  Extremities:  tr edema  Pulses; palpable  Neuro: grossly normal      MEDICAL DECISION MAKING;    I agree with the above plan, which was planned by myself and discussed with NP.  CPM with antiischemics        Tiffanie Aguiar MD [facility]

## 2021-03-28 NOTE — PROGRESS NOTES
Prabhu Suarez MD, [facility]                Internal Medicine Hospitalist             Daily Progress  Note   Subjective:     Chief Complaint   Patient presents with    Abdominal Pain    Flank Pain     right    Chest Pain     Ms. Buck Complains of SOB. Objective:    BP (!) 104/50 Comment: manua  Pulse 61   Temp 99.3 °F (37.4 °C) (Oral)   Resp 17   Ht 5' (1.524 m)   Wt 103 lb (46.7 kg)   SpO2 95%   BMI 20.12 kg/m²      Intake/Output Summary (Last 24 hours) at 3/28/2021 1354  Last data filed at 3/28/2021 1318  Gross per 24 hour   Intake 930 ml   Output 450 ml   Net 480 ml      Physical Exam:  Heart:  Regular rate and rhythm, normal S1 and S2 in all 4 auscultatory areas. No rubs  Murmurs or gallops heard. Lungs: Mostly bronchial breathing to auscultation, decreased breath sounds at bases. Expiratory wheezes appreciated no crackles heard. Abdomen: Soft, non distended. Bowel sounds appreciated. No obvious liver or spleen enlargement. Non tender, no rebound noted. Extremities: Non tender, no swelling noted, strength 5/5 both legs. CNS: Grossly intact.     Labs:  CBC with Differential:    Lab Results   Component Value Date    WBC 11.0 03/28/2021    RBC 2.62 03/28/2021    HGB 9.7 03/28/2021    HCT 31.3 03/28/2021     03/28/2021    .5 03/28/2021    MCH 37.0 03/28/2021    MCHC 31.0 03/28/2021    RDW 15.3 03/28/2021    SEGSPCT 90.8 03/28/2021    BANDSPCT 8 06/08/2020    LYMPHOPCT 3.0 03/28/2021    MONOPCT 4.5 03/28/2021    EOSPCT 0.0 01/28/2012    BASOPCT 0.5 03/28/2021    MONOSABS 0.5 03/28/2021    LYMPHSABS 0.3 03/28/2021    EOSABS 0.1 03/28/2021    BASOSABS 0.1 03/28/2021    DIFFTYPE AUTOMATED DIFFERENTIAL 03/28/2021     CMP:    Lab Results   Component Value Date     03/28/2021    K 4.3 03/28/2021     03/28/2021    CO2 26 03/28/2021    BUN 8 03/28/2021    CREATININE 0.6 03/28/2021    GFRAA >60 03/28/2021    LABGLOM >60 03/28/2021    GLUCOSE 129 03/28/2021    PROT 5.4 03/26/2021 PROT 6.3 02/21/2013    LABALBU 3.1 03/26/2021    CALCIUM 8.1 03/28/2021    BILITOT 0.2 03/26/2021    ALKPHOS 88 03/26/2021    AST 12 03/26/2021    ALT 9 03/26/2021     Recent Labs     03/26/21  2147 03/27/21  1458 03/28/21  0133   TROPONINT <0.010 <0.010 <0.010     Lab Results   Component Value Date    TSHHS 0.891 02/26/2021         sodium chloride        meropenem  2,000 mg Intravenous Q8H    potassium chloride  10 mEq Oral BID    pantoprazole  40 mg Oral QAM AC    mirtazapine  30 mg Oral Nightly    midodrine  10 mg Oral TID WC    clopidogrel  75 mg Oral Daily    atorvastatin  80 mg Oral Nightly    acarbose  25 mg Oral TID WC    budesonide-formoterol  2 puff Inhalation BID    bumetanide  1 mg Oral Daily    calcium elemental  500 mg Oral Daily    dabigatran  150 mg Oral BID    DULoxetine  60 mg Oral Daily    [START ON 3/29/2021] vitamin D  50,000 Units Oral Once per day on Mon Thu    gabapentin  300 mg Oral TID    ranolazine  500 mg Oral BID    spironolactone  12.5 mg Oral Daily    cyanocobalamin  1,000 mcg Oral Daily    famotidine  20 mg Oral BID    ferrous gluconate  324 mg Oral BID WC    levETIRAcetam  1,000 mg Oral Daily    levothyroxine  150 mcg Oral QAM AC    magnesium oxide  400 mg Oral Daily    amiodarone  400 mg Oral Daily    aspirin  81 mg Oral Daily    sodium chloride flush  10 mL Intravenous 2 times per day    albuterol sulfate HFA  2 puff Inhalation 4x daily    ipratropium  2 puff Inhalation 4x daily    acetaminophen  1,000 mg Oral Once         Assessment:       Patient Active Problem List    Diagnosis Date Noted    Acute delirium      Priority: High    Coronary artery disease involving coronary bypass graft of native heart with angina pectoris (HCC)      Priority: High    Shortness of breath      Priority: High    Ischemic cardiomyopathy      Priority: High    Angina at rest [facility])      Priority: High    S/P cardiac pacemaker procedure     UTI (urinary tract (coronary artery disease) 04/01/2016    Hyperlipidemia 04/01/2016    Thyroid disease 04/01/2016    Chest pain 01/28/2012       Plan:     Problems being addressed this admission:   Recurrent UTI's  3/27/21-She tells me that she is also being seen by ID and they changed her antibiotics. Will have urology also see her as she may need some more investigations. She has E Coli infection. awaitng sensitivity. 3/28/21- awaiting to be seen by urology. ID upped her merrem  2 gm q8h and exchange catheter and send ua. Chest Pains / CAD s/p cabg and pci / PAF on doac  3/27/21-Cardiology has no new suggestions. 3/28/21- she ruled out for an AMI. COPDE r/o PNA  3/27/21- check resp dz panel, check CXR, start Steroids and mednebs    DM 2  3/27/21-Her sugars are stable and check a1c  3/28/21- sugar is 128 today. Anemia  3/27/21-Repeat labs in am , check stool and anemia panel. 3/28/21- hb is 9.7 today. Could be ch dz. .       Consultants:  ID  Cardiology  Urology    General Orders:  Repeat basic labs again in am.  I have explained to the patient and discussed with him/her the treatment plan.   Tam Sebastian MD, [address]

## 2021-03-29 PROBLEM — D80.9 IMMUNOGLOBULIN DEFICIENCY (HCC): Status: ACTIVE | Noted: 2021-03-29

## 2021-03-29 LAB
ANION GAP SERPL CALCULATED.3IONS-SCNC: 5 MMOL/L (ref 4–16)
BUN BLDV-MCNC: 9 MG/DL (ref 6–23)
CALCIUM SERPL-MCNC: 8.8 MG/DL (ref 8.3–10.6)
CHLORIDE BLD-SCNC: 105 MMOL/L (ref 99–110)
CO2: 31 MMOL/L (ref 21–32)
CREAT SERPL-MCNC: 0.6 MG/DL (ref 0.6–1.1)
CULTURE: ABNORMAL
CULTURE: ABNORMAL
GFR AFRICAN AMERICAN: >60 ML/MIN/1.73M2
GFR NON-AFRICAN AMERICAN: >60 ML/MIN/1.73M2
GLUCOSE BLD-MCNC: 134 MG/DL (ref 70–99)
HCT VFR BLD CALC: 31.5 % (ref 37–47)
HEMOGLOBIN: 9.6 GM/DL (ref 12.5–16)
Lab: ABNORMAL
MCH RBC QN AUTO: 36.4 PG (ref 27–31)
MCHC RBC AUTO-ENTMCNC: 30.5 % (ref 32–36)
MCV RBC AUTO: 119.3 FL (ref 78–100)
PDW BLD-RTO: 15 % (ref 11.7–14.9)
PLATELET # BLD: 224 K/CU MM (ref 140–440)
PMV BLD AUTO: 9.9 FL (ref 7.5–11.1)
POTASSIUM SERPL-SCNC: 4.8 MMOL/L (ref 3.5–5.1)
RBC # BLD: 2.64 M/CU MM (ref 4.2–5.4)
SODIUM BLD-SCNC: 141 MMOL/L (ref 135–145)
SPECIMEN: ABNORMAL
WBC # BLD: 9 K/CU MM (ref 4–10.5)

## 2021-03-29 PROCEDURE — 1200000000 HC SEMI PRIVATE

## 2021-03-29 PROCEDURE — 85027 COMPLETE CBC AUTOMATED: CPT

## 2021-03-29 PROCEDURE — 99232 SBSQ HOSP IP/OBS MODERATE 35: CPT | Performed by: INTERNAL MEDICINE

## 2021-03-29 PROCEDURE — 2700000000 HC OXYGEN THERAPY PER DAY

## 2021-03-29 PROCEDURE — 80048 BASIC METABOLIC PNL TOTAL CA: CPT

## 2021-03-29 PROCEDURE — APPSS45 APP SPLIT SHARED TIME 31-45 MINUTES: Performed by: NURSE PRACTITIONER

## 2021-03-29 PROCEDURE — 6370000000 HC RX 637 (ALT 250 FOR IP): Performed by: STUDENT IN AN ORGANIZED HEALTH CARE EDUCATION/TRAINING PROGRAM

## 2021-03-29 PROCEDURE — 2580000003 HC RX 258: Performed by: STUDENT IN AN ORGANIZED HEALTH CARE EDUCATION/TRAINING PROGRAM

## 2021-03-29 PROCEDURE — 99233 SBSQ HOSP IP/OBS HIGH 50: CPT | Performed by: INTERNAL MEDICINE

## 2021-03-29 PROCEDURE — 2580000003 HC RX 258: Performed by: INTERNAL MEDICINE

## 2021-03-29 PROCEDURE — 94640 AIRWAY INHALATION TREATMENT: CPT

## 2021-03-29 PROCEDURE — 6360000002 HC RX W HCPCS: Performed by: INTERNAL MEDICINE

## 2021-03-29 PROCEDURE — 94761 N-INVAS EAR/PLS OXIMETRY MLT: CPT

## 2021-03-29 RX ADMIN — OXYCODONE HYDROCHLORIDE 20 MG: 10 TABLET ORAL at 15:53

## 2021-03-29 RX ADMIN — ASPIRIN 81 MG CHEWABLE TABLET 81 MG: 81 TABLET CHEWABLE at 08:59

## 2021-03-29 RX ADMIN — Medication 2 PUFF: at 22:48

## 2021-03-29 RX ADMIN — ALBUTEROL SULFATE 2 PUFF: 90 AEROSOL, METERED RESPIRATORY (INHALATION) at 09:01

## 2021-03-29 RX ADMIN — ACETAMINOPHEN 650 MG: 325 TABLET ORAL at 12:16

## 2021-03-29 RX ADMIN — AMIODARONE HYDROCHLORIDE 400 MG: 200 TABLET ORAL at 09:00

## 2021-03-29 RX ADMIN — RANOLAZINE 500 MG: 500 TABLET, FILM COATED, EXTENDED RELEASE ORAL at 09:01

## 2021-03-29 RX ADMIN — POTASSIUM CHLORIDE 10 MEQ: 750 TABLET, FILM COATED, EXTENDED RELEASE ORAL at 22:47

## 2021-03-29 RX ADMIN — MIDODRINE HYDROCHLORIDE 10 MG: 5 TABLET ORAL at 17:40

## 2021-03-29 RX ADMIN — LEVETIRACETAM 1000 MG: 500 TABLET, FILM COATED ORAL at 09:00

## 2021-03-29 RX ADMIN — GABAPENTIN 300 MG: 300 CAPSULE ORAL at 22:48

## 2021-03-29 RX ADMIN — DABIGATRAN ETEXILATE MESYLATE 150 MG: 150 CAPSULE ORAL at 22:47

## 2021-03-29 RX ADMIN — PANTOPRAZOLE SODIUM 40 MG: 40 TABLET, DELAYED RELEASE ORAL at 05:54

## 2021-03-29 RX ADMIN — ACARBOSE 25 MG: 50 TABLET ORAL at 12:16

## 2021-03-29 RX ADMIN — Medication 2 PUFF: at 12:13

## 2021-03-29 RX ADMIN — LEVOTHYROXINE SODIUM 150 MCG: 150 TABLET ORAL at 05:54

## 2021-03-29 RX ADMIN — OXYCODONE HYDROCHLORIDE 20 MG: 10 TABLET ORAL at 08:59

## 2021-03-29 RX ADMIN — ATORVASTATIN CALCIUM 80 MG: 80 TABLET, FILM COATED ORAL at 22:48

## 2021-03-29 RX ADMIN — BUDESONIDE AND FORMOTEROL FUMARATE DIHYDRATE 2 PUFF: 160; 4.5 AEROSOL RESPIRATORY (INHALATION) at 22:49

## 2021-03-29 RX ADMIN — ACETAMINOPHEN 650 MG: 325 TABLET ORAL at 05:55

## 2021-03-29 RX ADMIN — GABAPENTIN 300 MG: 300 CAPSULE ORAL at 09:00

## 2021-03-29 RX ADMIN — FAMOTIDINE 20 MG: 20 TABLET ORAL at 22:48

## 2021-03-29 RX ADMIN — PROMETHAZINE HYDROCHLORIDE 12.5 MG: 12.5 TABLET ORAL at 12:16

## 2021-03-29 RX ADMIN — FERROUS GLUCONATE TAB 324 MG (37.5 MG ELEMENTAL IRON) 324 MG: 324 (37.5 FE) TAB at 17:40

## 2021-03-29 RX ADMIN — SPIRONOLACTONE 12.5 MG: 25 TABLET ORAL at 09:01

## 2021-03-29 RX ADMIN — Medication 2 PUFF: at 09:02

## 2021-03-29 RX ADMIN — Medication 2 PUFF: at 17:12

## 2021-03-29 RX ADMIN — ACARBOSE 25 MG: 50 TABLET ORAL at 17:42

## 2021-03-29 RX ADMIN — MEROPENEM 2000 MG: 1 INJECTION, POWDER, FOR SOLUTION INTRAVENOUS at 15:53

## 2021-03-29 RX ADMIN — OXYCODONE HYDROCHLORIDE 20 MG: 10 TABLET ORAL at 12:16

## 2021-03-29 RX ADMIN — PROMETHAZINE HYDROCHLORIDE 12.5 MG: 12.5 TABLET ORAL at 05:55

## 2021-03-29 RX ADMIN — GABAPENTIN 300 MG: 300 CAPSULE ORAL at 15:50

## 2021-03-29 RX ADMIN — OXYCODONE HYDROCHLORIDE 20 MG: 10 TABLET ORAL at 19:02

## 2021-03-29 RX ADMIN — ALBUTEROL SULFATE 2 PUFF: 90 AEROSOL, METERED RESPIRATORY (INHALATION) at 22:48

## 2021-03-29 RX ADMIN — CYANOCOBALAMIN TAB 1000 MCG 1000 MCG: 1000 TAB at 09:01

## 2021-03-29 RX ADMIN — PROMETHAZINE HYDROCHLORIDE 12.5 MG: 12.5 TABLET ORAL at 19:02

## 2021-03-29 RX ADMIN — OXYCODONE HYDROCHLORIDE 20 MG: 10 TABLET ORAL at 02:49

## 2021-03-29 RX ADMIN — FAMOTIDINE 20 MG: 20 TABLET ORAL at 09:00

## 2021-03-29 RX ADMIN — BUMETANIDE 1 MG: 1 TABLET ORAL at 08:59

## 2021-03-29 RX ADMIN — MAGNESIUM OXIDE 400 MG: 400 TABLET ORAL at 09:00

## 2021-03-29 RX ADMIN — Medication 500 MG: at 09:00

## 2021-03-29 RX ADMIN — CLOPIDOGREL BISULFATE 75 MG: 75 TABLET ORAL at 09:02

## 2021-03-29 RX ADMIN — MIDODRINE HYDROCHLORIDE 10 MG: 5 TABLET ORAL at 12:17

## 2021-03-29 RX ADMIN — OXYCODONE HYDROCHLORIDE 20 MG: 10 TABLET ORAL at 22:48

## 2021-03-29 RX ADMIN — ACARBOSE 25 MG: 50 TABLET ORAL at 09:00

## 2021-03-29 RX ADMIN — OXYCODONE HYDROCHLORIDE 20 MG: 10 TABLET ORAL at 05:55

## 2021-03-29 RX ADMIN — ERGOCALCIFEROL 50000 UNITS: 1.25 CAPSULE ORAL at 09:00

## 2021-03-29 RX ADMIN — FERROUS GLUCONATE TAB 324 MG (37.5 MG ELEMENTAL IRON) 324 MG: 324 (37.5 FE) TAB at 09:02

## 2021-03-29 RX ADMIN — ALBUTEROL SULFATE 2 PUFF: 90 AEROSOL, METERED RESPIRATORY (INHALATION) at 12:15

## 2021-03-29 RX ADMIN — MEROPENEM 2000 MG: 1 INJECTION, POWDER, FOR SOLUTION INTRAVENOUS at 06:33

## 2021-03-29 RX ADMIN — POTASSIUM CHLORIDE 10 MEQ: 750 TABLET, FILM COATED, EXTENDED RELEASE ORAL at 09:01

## 2021-03-29 RX ADMIN — RANOLAZINE 500 MG: 500 TABLET, FILM COATED, EXTENDED RELEASE ORAL at 22:47

## 2021-03-29 RX ADMIN — MIRTAZAPINE 30 MG: 15 TABLET, FILM COATED ORAL at 22:48

## 2021-03-29 RX ADMIN — SODIUM CHLORIDE, PRESERVATIVE FREE 10 ML: 5 INJECTION INTRAVENOUS at 09:04

## 2021-03-29 RX ADMIN — METHYLPREDNISOLONE SODIUM SUCCINATE 40 MG: 40 INJECTION, POWDER, FOR SOLUTION INTRAMUSCULAR; INTRAVENOUS at 08:58

## 2021-03-29 RX ADMIN — DABIGATRAN ETEXILATE MESYLATE 150 MG: 150 CAPSULE ORAL at 09:00

## 2021-03-29 RX ADMIN — DULOXETINE HYDROCHLORIDE 60 MG: 30 CAPSULE, DELAYED RELEASE ORAL at 08:59

## 2021-03-29 RX ADMIN — ACETAMINOPHEN 650 MG: 325 TABLET ORAL at 19:01

## 2021-03-29 RX ADMIN — ALBUTEROL SULFATE 2 PUFF: 90 AEROSOL, METERED RESPIRATORY (INHALATION) at 17:11

## 2021-03-29 RX ADMIN — MIDODRINE HYDROCHLORIDE 10 MG: 5 TABLET ORAL at 09:01

## 2021-03-29 RX ADMIN — BUDESONIDE AND FORMOTEROL FUMARATE DIHYDRATE 2 PUFF: 160; 4.5 AEROSOL RESPIRATORY (INHALATION) at 09:03

## 2021-03-29 ASSESSMENT — PAIN DESCRIPTION - PROGRESSION
CLINICAL_PROGRESSION: GRADUALLY WORSENING

## 2021-03-29 ASSESSMENT — PAIN - FUNCTIONAL ASSESSMENT: PAIN_FUNCTIONAL_ASSESSMENT: PREVENTS OR INTERFERES SOME ACTIVE ACTIVITIES AND ADLS

## 2021-03-29 ASSESSMENT — PAIN DESCRIPTION - DESCRIPTORS: DESCRIPTORS: ACHING;SORE

## 2021-03-29 ASSESSMENT — PAIN DESCRIPTION - ONSET: ONSET: ON-GOING

## 2021-03-29 ASSESSMENT — PAIN SCALES - GENERAL
PAINLEVEL_OUTOF10: 4
PAINLEVEL_OUTOF10: 9
PAINLEVEL_OUTOF10: 4
PAINLEVEL_OUTOF10: 4
PAINLEVEL_OUTOF10: 5

## 2021-03-29 ASSESSMENT — PAIN DESCRIPTION - ORIENTATION: ORIENTATION: RIGHT

## 2021-03-29 NOTE — CONSULTS
[facility], [address]   Consult Note  [facility] 1 2 3 4 5    Date: 3/29/2021   Patient: Thea Bryson   : 1960   DOA: 3/26/2021   MRN: [id number]   ROOM#: [id number]     Reason for Consult: Recurrent UTI's  Requesting Physician:  Dr. Kiersten Denise  Collaborating Urologist on Call at time of admission: Dr. Duncan Angles: Chest, abdominal, and right flank pain    History Obtained From:  patient, electronic medical record    HISTORY OF PRESENT ILLNESS:                The patient is a 62 y.o. female with significant past medical history of CAD, CVA, CHF, Multiple Sclerosis, and recurrent UTI's who presented with chest, abdominal, and right flank pain. Pt has a chronic indwelling catheter d/t being on so many diuretics and trouble making it to the bathroom before leaking. When she is able to make it to the bathroom, she does not think she empties her bladder completely. Pt is interested in having an SPT placed to help empty bladder and prevent heavy recurrence of UTI's. She is currently comfortable. Moderate amount of debris in wood tubing noted. ED Provider's HPI 3/26/21: Thea Bryson is a 62 y.o. female that presents with complaint of right side abdominal pain chest pain shortness of breath I have seen patient recently for similar complaints she does have a history of micropacemaker she has been admitted here several times history of multiple episodes of syncope, orthostatic [facility] which I did last time as well. Patient states continued chest pain which she has chronically. She is now on oxygen at home she states she states she has a Wood catheter in because she is on so much diuretics for her CHF that she gets lightheaded and passes out so she has a Wood catheter in. States she feels like she has a UTI she has been on Macrobid by her PCP but states is not working.   Denies any fevers cough travel sick contacts just right side abdominal pain flank pain. Is on chronic pain medicine at home morphine. Pain currently constant. No other questions or concerns. Past Medical History:        Diagnosis Date    Acute delirium     Arrhythmia     Arthritis     Asplenia     Asthma     [facility]ascular necrosis ([facility]     CAD (coronary artery disease)     1st stent at age 46    Cardiomyopathy [facility])     Cerebral artery occlusion with cerebral infarction ([facility]     CHF (congestive heart failure) (HCC)     Enlarged liver     GERD (gastroesophageal reflux disease)     H/O echocardiogram 4/6/16    EF 55%, trivial TR & MR, stage 1 diastolic dysfunction    History of blood transfusion     Hx of cardiovascular stress test 12/29/2015    Alessia: EF 45%. Pharmacologic stress myocardial perfusion scan shows a fixed inferior-lateral defect w/ no inducible ischemia. No evidence of ischemia. Inferior-lateral infarction.  Normal LVSF    Hyperlipidemia     Hypertension     Lymphoma ([facility]     Denies    MI, old     Movement disorder     MS (multiple sclerosis) ([facility]     OP (osteoporosis)     PE (pulmonary embolism)     trapese filter    Pneumonia     Pyelonephritis     Seizures (HCC)     Severe malnutrition ([address]     Spleen enlarged     Thyroid disease      Past Surgical History:        Procedure Laterality Date    ABDOMEN SURGERY      APPENDECTOMY      CARDIAC DEFIBRILLATOR PLACEMENT      evera [id number] CONDITIONAL    CHOLECYSTECTOMY      COLONOSCOPY      CORONARY ANGIOPLASTY WITH STENT PLACEMENT      3 stents    CORONARY ARTERY BYPASS GRAFT      Age 49    ENDOSCOPY, COLON, DIAGNOSTIC      GASTRIC BYPASS SURGERY      HERNIA REPAIR      HIP SURGERY      HYSTERECTOMY      JOINT REPLACEMENT      PORT SURGERY N/A 6/1/2020    PORT INSERTION performed by Aftab Garcia MD at [address] [place] [address] N/A 2/9/2021    MEDIPORT INSERTION UNDER FLUOROSCOPY performed [facility]ftab [facility] MD at [facility] OR    PORT SURGERY Right 2/10/2021    MEDIPORT INSERTION W/ FLUOROSCOPY performed by Adriane Emmanuel MD at [address] [place] [address]      VASCULAR SURGERY       Current Medications:   Current Facility-Administered Medications: meropenem (MERREM) 2,000 mg in sodium chloride 0.9 % 100 mL IVPB, 2,000 mg, Intravenous, Q8H  methylPREDNISolone sodium (SOLU-MEDROL) injection 40 mg, 40 mg, Intravenous, Daily  potassium chloride (KLOR-CON) extended release tablet 10 mEq, 10 mEq, Oral, BID  oxyCODONE HCl (OXY-IR) immediate release tablet 20 mg, 20 mg, Oral, Q3H PRN  pantoprazole (PROTONIX) tablet 40 mg, 40 mg, Oral, QAM AC  mirtazapine (REMERON) tablet 30 mg, 30 mg, Oral, Nightly  midodrine (PROAMATINE) tablet 10 mg, 10 mg, Oral, TID WC  clopidogrel (PLAVIX) tablet 75 mg, 75 mg, Oral, Daily  atorvastatin (LIPITOR) tablet 80 mg, 80 mg, Oral, Nightly  docusate sodium (COLACE) capsule 100 mg, 100 mg, Oral, BID PRN  benzocaine-menthol (CEPACOL SORE THROAT) lozenge 1 lozenge, 1 lozenge, Oral, Q2H PRN  acarbose (PRECOSE) tablet 25 mg, 25 mg, Oral, TID WC  budesonide-formoterol (SYMBICORT) 160-4.5 MCG/ACT inhaler 2 puff, 2 puff, Inhalation, BID  bumetanide (BUMEX) tablet 1 mg, 1 mg, Oral, Daily  calcium elemental (OSCAL) tablet 500 mg, 500 mg, Oral, Daily  dabigatran (PRADAXA) capsule 150 mg, 150 mg, Oral, BID  DULoxetine (CYMBALTA) extended release capsule 60 mg, 60 mg, Oral, Daily  vitamin D (ERGOCALCIFEROL) capsule 50,000 Units, 50,000 Units, Oral, Once per day on Mon Thu  gabapentin (NEURONTIN) capsule 300 mg, 300 mg, Oral, TID  ranolazine (RANEXA) extended release tablet 500 mg, 500 mg, Oral, BID  spironolactone (ALDACTONE) tablet 12.5 mg, 12.5 mg, Oral, Daily  vitamin B-12 (CYANOCOBALAMIN) tablet 1,000 mcg, 1,000 mcg, Oral, Daily  famotidine (PEPCID) tablet 20 mg, 20 mg, Oral, BID  ferrous gluconate 324 (37.5 Fe) MG tablet 324 mg, 324 mg, Oral, BID WC  levETIRAcetam (KEPPRA) tablet 1,000 mg, 1,000 mg, Oral, Daily  levothyroxine (SYNTHROID) tablet 150 mcg, 150 mcg, Oral, QAM AC  magnesium oxide (MAG-OX) tablet 400 mg, 400 mg, Oral, Daily  amiodarone (CORDARONE) tablet 400 mg, 400 mg, Oral, Daily  aspirin chewable tablet 81 mg, 81 mg, Oral, Daily  sodium chloride flush 0.9 % injection 10 mL, 10 mL, Intravenous, 2 times per day  sodium chloride flush 0.9 % injection 10 mL, 10 mL, Intravenous, PRN  0.9 % sodium chloride infusion, 25 mL, Intravenous, PRN  promethazine (PHENERGAN) tablet 12.5 mg, 12.5 mg, Oral, Q6H PRN **OR** ondansetron (ZOFRAN) injection 4 mg, 4 mg, Intravenous, Q6H PRN  acetaminophen (TYLENOL) tablet 650 mg, 650 mg, Oral, Q6H PRN **OR** acetaminophen (TYLENOL) suppository 650 mg, 650 mg, Rectal, Q6H PRN  polyethylene glycol (GLYCOLAX) packet 17 g, 17 g, Oral, Daily PRN  ipratropium-albuterol (DUONEB) nebulizer solution 3 mL, 3 mL, Inhalation, Q4H PRN  albuterol sulfate  (90 Base) MCG/ACT inhaler 2 puff, 2 puff, Inhalation, 4x daily  ipratropium (ATROVENT HFA) 17 MCG/ACT inhaler 2 puff, 2 puff, Inhalation, 4x daily  acetaminophen (TYLENOL) tablet 1,000 mg, 1,000 mg, Oral, Once    Allergies:  Fentanyl, Moxifloxacin hcl in nacl, Povidone-iodine, Cyclobenzaprine, Methocarbamol, Tramadol, Baclofen, Ibuprofen, Naproxen, Nsaids, Tizanidine, Tolmetin, Tramadol hcl, Betadine [povidone iodine], and Moxifloxacin    Social History:   TOBACCO:   reports that she has never smoked. She has never used smokeless tobacco.  ETOH:   reports no history of alcohol use. DRUGS:   reports no history of drug use.     Family History:       Problem Relation Age of Onset    High Blood Pressure Mother     High Cholesterol Mother     Heart Disease Mother     Diabetes Mother     Heart Disease Father     Cancer Father     Other Sister         Multiple Sclerosis    Heart Disease Maternal Grandmother     High Blood Pressure Maternal Grandmother     High Cholesterol Maternal Grandmother        REVIEW OF SYSTEMS: CONSTITUTIONAL:  positive for  fatigue  RESPIRATORY:  negative  CARDIOVASCULAR:  negative  GASTROINTESTINAL:  negative  GENITOURINARY:  positive for hesitancy and decreased stream    PHYSICAL EXAM:      VITALS:  BP (!) 107/59   Pulse 51   Temp 97.6 °F (36.4 °C) (Oral)   Resp 18   Ht 5' (1.524 m)   Wt 103 lb (46.7 kg)   SpO2 97%   BMI 20.12 kg/m²      TEMPERATURE:  Current - Temp: 97.6 °F (36.4 °C); Max - Temp  Av.6 °F (37 °C)  Min: 97.6 °F (36.4 °C)  Max: 99.3 °F (37.4 °C)  24HR BLOOD PRESSURE RANGE:  Systolic (23MRP), RDU:167 , Min:100 , LXF:239   ; Diastolic (92BHE), NYX:24, Min:49, Max:59      General appearance: alert, appears stated age, cooperative and no distress  Head: Normocephalic, without obvious abnormality, atraumatic  Back: No CVA tenderness  Abdomen: Soft, non-tender, non-distended   : 16fr wood catheter in place, urine cloudy yellow with moderate amount of debris noted in tubing. DATA:    WBC:    Lab Results   Component Value Date    WBC 9.0 2021     Hemoglobin/Hematocrit:    Lab Results   Component Value Date    HGB 9.6 2021    HCT 31.5 2021     BMP:    Lab Results   Component Value Date     2021    K 4.8 2021     2021    CO2 31 2021    BUN 9 2021    LABALBU 3.1 2021    CREATININE 0.6 2021    CALCIUM 8.8 2021    GFRAA >60 2021    LABGLOM >60 2021     PT/INR:    Lab Results   Component Value Date    PROTIME 11.8 2021    PROTIME 10.2 2012    INR 0.98 2021     Urine Culture: pending    Imaging:  Ct Abdomen Pelvis Wo Contrast Additional Contrast? None    Result Date: 3/26/2021  EXAMINATION: CT OF THE ABDOMEN AND PELVIS WITHOUT CONTRAST 3/26/2021 7:39 pm TECHNIQUE: CT of the abdomen and pelvis was performed without the administration of intravenous contrast. Multiplanar reformatted images are provided for review.  Dose modulation, iterative reconstruction, and/or weight based adjustment of the mA/kV was utilized to reduce the radiation dose to as low as reasonably achievable. COMPARISON: 02/12/2021 HISTORY: ORDERING SYSTEM PROVIDED HISTORY: abd pain/dysuria TECHNOLOGIST PROVIDED HISTORY: Reason for exam:->abd pain/dysuria Additional Contrast?->None Decision Support Exception->Emergency Medical Condition (MA) Reason for Exam: abd pain/dysuria Acuity: Acute Type of Exam: Initial Additional signs and symptoms: surg. hx; gastric bypass,gallbladder,hysterectomy,appendectomy Relevant Medical/Surgical History: none FINDINGS: Lower Chest: Slight prominence of the pulmonary vasculature. No pleural effusion. Normal heart size. Loop recorder the left anterior chest. Additional cardiac medical device is present anteriorly in the right ventricle thought to be an implanted defibrillator. Organs: Liver is normal in appearance without worrisome focal lesion on these noncontrast images. Small stable high-density cyst present anteriorly in the mid right kidney unchanged. Small nonobstructing calculus remains in the mid left kidney. No hydronephrosis. Spleen and gallbladder are absent. Prominent splenules anteriorly the left upper quadrant. .  Other abdominal organs appear normal. GI/Bowel: Sutures present on the stomach from prior gastric bypass surgery. Appendix surgically absent. Moderate amount of stool throughout the colon. No bowel obstruction. No evident significant diverticular disease. Pelvis: Uterus is surgically absent. Coppola catheter decompresses the bladder. No free fluid or adenopathy. Significant scan artifact from right total hip replacement. Peritoneum/Retroperitoneum: No mass, adenopathy, or ascites. Normal size of the aorta. IVC filter in place and unchanged. No free air. Bones/Soft Tissues: No acute abnormality. Prior right total hip replacement. No acute intra-abnormality identified. Stable small nonobstructing calculus in mid left kidney.   Slight vascular congestion. No acute abnormality of the kidneys or bladder evident. Xr Chest Portable    Result Date: 3/28/2021  EXAMINATION: ONE XRAY VIEW OF THE CHEST 3/28/2021 2:43 pm COMPARISON: 03/26/2021 HISTORY: ORDERING SYSTEM PROVIDED HISTORY: r/o pna TECHNOLOGIST PROVIDED HISTORY: Reason for exam:->r/o pna FINDINGS: Cardiomediastinal silhouette is stable. New nonspecific left retrocardiac opacity. No pleural effusion or pneumothorax. No gross bony abnormality. New nonspecific left retrocardiac opacity, atelectasis versus evolving pneumonia. Xr Chest Portable    Result Date: 3/26/2021  EXAMINATION: ONE XRAY VIEW OF THE CHEST 3/26/2021 2:49 pm COMPARISON: 02/26/2021 HISTORY: ORDERING SYSTEM PROVIDED HISTORY: chest pain TECHNOLOGIST PROVIDED HISTORY: Reason for exam:->chest pain Reason for Exam: chest pain Acuity: Unknown Type of Exam: Initial Additional signs and symptoms: unknown Relevant Medical/Surgical History: CHF, CAD, asthma FINDINGS: Cardial pericardial silhouette is stable in appearance. Leadless pacemaker is again found. Midline sternotomy wires are unchanged in configuration. Mariaa catheter is unchanged. Chronic interstitial opacities are seen throughout both lungs. No focal infiltrates are identified. No pneumothorax is seen. No free air. No acute bony abnormality. No acute abnormality detected. Assessment & Plan:      Jefry Milton is a 62y.o. year old female admitted 3/26/2021 for UTI. 1) UTI   UA dip with pos nit, large leuks, and mod yeast   Urine cx pending   WBC 9.0, afebrile   On IV Merrem  2) Feelings of Incomplete Bladder Emptying 16fr wood catheter in place   Likely has some degree of neurogenic bladder secondary to her MS   Pt is very interested in having an SPT placed   Recommend wood catheter exchange today   Will consult IR for SPT placement once her urine is clear of infection    Will continue to follow    Patient seen and examined, chart reviewed. Electronically signed by Severiano Martinez PA-C on 3/29/2021 at 8:05 AM

## 2021-03-29 NOTE — PROGRESS NOTES
[address], [address] [facility],Third Floor  Phone: [phone]    Fax [phone]                  Devendra Mc MD, Maryjo Real MD, Erin Samuels MD, MD Edel Roach MD Eli Mode, MD Mearl Overall, MD Volodymyr Duncan, APRMARLIN Vu, APRN  Herrera Hazard, [address], JESSE    Cardiology Progress Note     Today's Plan: continue to monitor    Admit Date:  3/26/2021    Consult reason/ Seen today for: chest pain    Subjective and  Overnight Events: patient states that her strength is a little better today. Assessment / Plan / Recommendation:     1. CAD: troponin's are negative. Chest pain resolved. Plan medical therapy only at this time. Continue   2. Atrial fibrillation: sinus rhythm today. Continue Pradaxa 150 mg twice daily. In care potassium is 4.8 today. magnesium 1.9 3/26/2021. Recommend keeping potassium 4-4.5 and magnesium 2-2.2 in patients with atrial fibrillation. 3. R ventricle PPM (Micra): Interrogation 3/28/2021 shows normal functioning right ventricular pacemaker lowest rate set at 40 with battery life of approximately 8 years. February patient had ventricular rate decreased from 50-40 due to high occurrence of RV pacing to protect patient from pacemaker induced cardiomyopathy as she already has reduced ejection fraction. 4. history of syncope: Patient denies any current episodes of syncope since being on higher dose of midodrine. 5. History of ventricular tachycardia and ventricular fibrillation. Patient status post ICD x2 both occurrences patient developed  Endocarditis and subcutaneous pacemaker and wires had to be removed. Will ask life vest rep to coordinate with patient to verify device is working properly. 6. Ischemic cardiomyopathy with reduced ejection fraction without heart failure: Patient appears fairly euvolemic.   Echo done at [facility] march 2, 2021 showed an ejection fraction of 30 to 35% global hypokinesis akinesis of inferior posterior segments right ventricle systolic pressure estimated 30 mmHg. Continue Aldactone. Patient not on beta-blocker or ACE ARB or Arni due to hypotension and syncope. 7. DVT prophylaxis if not contraindicated while in the hospital.        History of Presenting Illness:    Chief complain on admission : 62 y. o.year old who is admitted for  Chief Complaint   Patient presents with    Abdominal Pain    Flank Pain     right    Chest Pain        Past medical history:    has a past medical history of Acute delirium, Arrhythmia, Arthritis, Asplenia, Asthma, Avascular necrosis ([address] CAD (coronary artery disease), Cardiomyopathy ([address] Cerebral artery occlusion with cerebral infarction ([address] CHF (congestive heart failure) ([address] Enlarged liver, GERD (gastroesophageal reflux disease), H/O echocardiogram, History of blood transfusion, Hx of cardiovascular stress test, Hyperlipidemia, Hypertension, Lymphoma ([address] MI, old, Movement disorder, MS (multiple sclerosis) ([address] OP (osteoporosis), PE (pulmonary embolism), Pneumonia, Pyelonephritis, Seizures ([address] Severe malnutrition ([address] Spleen enlarged, and Thyroid disease. Past surgical history:   has a past surgical history that includes Coronary angioplasty with stent; Hysterectomy; Appendectomy; Cholecystectomy; Tonsillectomy; Gastric bypass surgery; Cardiac defibrillator placement; hip surgery; Abdomen surgery; Colonoscopy; Endoscopy, colon, diagnostic; hernia repair; joint replacement; skin biopsy; vascular surgery; Coronary artery bypass graft; [facility] Surgery (N/A, 6/1/2020); [facility] Surgery (N/A, 2/9/2021); and [facility] (Right, 2/10/2021). Social History:   reports that she has never smoked. She has never used smokeless tobacco. She reports that she does not drink alcohol or use drugs.   Family history:  family history includes Cancer in her father; Diabetes in her mother; Heart Disease in her father, maternal grandmother, and mother; High Blood Pressure in her maternal grandmother and mother; High Cholesterol in her maternal grandmother and mother; Other in her sister. Allergies   Allergen Reactions    Fentanyl Swelling     Other reaction(s): Angioedema (swelling)    Moxifloxacin Hcl In Nacl Swelling and Rash    Povidone-Iodine Rash     Other reaction(s): AOF      Cyclobenzaprine      Other reaction(s): Other - comment required  \" it make my muscle very weak because of my MS\"  \" it make my muscle very weak because of my MS\"      Methocarbamol      Worsening edema  Other reaction(s): Other - comment required  Worsening edema  Worsening edema  Worsening edema      Tramadol Other (See Comments)     Doctor doesn't her to take due to heart problems  Seizures   Doctor doesn't her to take due to lowers seizure threshold.  Baclofen     Ibuprofen      \"Due to heart problems\"    Naproxen     Nsaids      \"Due to heart problems\"    Tizanidine     Tolmetin      \"Due to heart problems\"    Tramadol Hcl      Other reaction(s): Other - comment required  Told not to take due to history of seizures    Betadine [Povidone Iodine] Rash    Moxifloxacin Rash and Swelling     Lips swell and tingling in face   Lips swell and tingling in face   Lips swell and tingling in face   Lips swell and tingling in face   Around mouth       Review of Systems   All 14 systems were reviewed and are negative  Except for the positive findings  which as documented     BP (!) 120/58   Pulse 56   Temp 97.9 °F (36.6 °C) (Oral)   Resp 16   Ht 5' (1.524 m)   Wt 103 lb (46.7 kg)   SpO2 98%   BMI 20.12 kg/m²       Intake/Output Summary (Last 24 hours) at 3/29/2021 1441  Last data filed at 3/29/2021 1208  Gross per 24 hour   Intake    Output 2405 ml   Net -2405 ml       Physical Exam  Vitals signs reviewed. Constitutional:       General: She is not in acute distress. Appearance: Normal appearance.  She is not ill-appearing. HENT:      Head: Atraumatic. Neck:      Musculoskeletal: Neck supple. No muscular tenderness. Vascular: No carotid bruit. Cardiovascular:      Rate and Rhythm: Normal rate and regular rhythm. Pulses: Normal pulses. Heart sounds: Normal heart sounds. No murmur. Pulmonary:      Effort: Pulmonary effort is normal. No respiratory distress. Breath sounds: Normal breath sounds. Musculoskeletal:         General: No swelling or deformity. Neurological:      Mental Status: She is alert.          Telemetry Reviewed:   Sinus rhythm rate 49    Medications:    [START ON 3/30/2021] pneumococcal polyvalent  0.5 mL Intramuscular Once    meropenem  2,000 mg Intravenous Q8H    methylPREDNISolone  40 mg Intravenous Daily    potassium chloride  10 mEq Oral BID    pantoprazole  40 mg Oral QAM AC    mirtazapine  30 mg Oral Nightly    midodrine  10 mg Oral TID WC    clopidogrel  75 mg Oral Daily    atorvastatin  80 mg Oral Nightly    acarbose  25 mg Oral TID WC    budesonide-formoterol  2 puff Inhalation BID    bumetanide  1 mg Oral Daily    calcium elemental  500 mg Oral Daily    dabigatran  150 mg Oral BID    DULoxetine  60 mg Oral Daily    vitamin D  50,000 Units Oral Once per day on Mon Thu    gabapentin  300 mg Oral TID    ranolazine  500 mg Oral BID    spironolactone  12.5 mg Oral Daily    cyanocobalamin  1,000 mcg Oral Daily    famotidine  20 mg Oral BID    ferrous gluconate  324 mg Oral BID WC    levETIRAcetam  1,000 mg Oral Daily    levothyroxine  150 mcg Oral QAM AC    magnesium oxide  400 mg Oral Daily    amiodarone  400 mg Oral Daily    aspirin  81 mg Oral Daily    sodium chloride flush  10 mL Intravenous 2 times per day    albuterol sulfate HFA  2 puff Inhalation 4x daily    ipratropium  2 puff Inhalation 4x daily    acetaminophen  1,000 mg Oral Once      sodium chloride       oxyCODONE, docusate sodium, benzocaine-menthol, sodium chloride flush, sodium chloride, promethazine **OR** ondansetron, acetaminophen **OR** acetaminophen, polyethylene glycol, ipratropium-albuterol    Lab Data:  CBC:   Recent Labs     03/27/21  0642 03/28/21  0555 03/29/21  0555   WBC 4.8 11.0* 9.0   HGB 9.8* 9.7* 9.6*   HCT 30.7* 31.3* 31.5*   .7* 119.5* 119.3*    230 224     BMP:   Recent Labs     03/27/21  0642 03/28/21  0555 03/29/21  0555    141 141   K 4.1 4.3 4.8    107 105   CO2 27 26 31   BUN 10 8 9   CREATININE 0.6 0.6 0.6     PT/INR: No results for input(s): PROTIME, INR in the last 72 hours. BNP:    Recent Labs     03/26/21  1746   PROBNP 520.4*     TROPONIN:   Recent Labs     03/27/21  1458 03/28/21  0133 03/28/21  1445   TROPONINT <0.010 <0.010 <0.010        ECHO :   Echocardiogram 3/2/2021  Result Narrative   · Echo technically suboptimal due to poor acoustic windows  · Left ventricular chamber is low normal in size with global hypokinesis   and akinesis of the inferoposterior segments suggesting infarction. Global   function moderately reduced, ejection fraction 30-35%  · Right ventricle is normal in size and function. Ventricular systolic   pressure estimated at +/-30mmHg.  Device lead in the right heart chambers   · Left and right atria measure normal in size   · Valve structures are consistent with age. No valve dysfunction         All labs, medications and tests reviewed by myself , continue all other medications of all above medical condition listed as is except for changes mentioned above. Thank you very much for consult , please call with questions. Electronically signed by JESSE Osborn CNP on 3/29/2021 at 2:41 PM  I have seen ,spoken to  and examined this patient personally, independently of the nurse practitioner. I have reviewed the hospital care given to date and reviewed all pertinent labs and imaging. The plan was developed mutually at the time of the visit with the patient,  NP  and myself.  I have spoken with patient, nursing staff and provided written and verbal instructions . The above note has been reviewed and I agree with the assessment, diagnosis, and treatment plan with changes made by me as follows     CARDIOLOGY ATTENDING ADDENDUM    HPI:  I have reviewed the above HPI  And agree with above   Kulwant Calderon is a 62 y. o.year old who and presents with had concerns including Abdominal Pain, Flank Pain (right), and Chest Pain.   Chief Complaint   Patient presents with    Abdominal Pain    Flank Pain     right    Chest Pain     Interval history:  NO cp    Physical Exam:  General:  Awake, alert, NAD  Head:normal  Eye:normal  Neck:  No JVD   Chest:  Clear to auscultation, respiration easy  Cardiovascular:  RRR S1S2  Abdomen:   nontender  Extremities:  TR edema  Pulses; palpable  Neuro: grossly normal      MEDICAL DECISION MAKING;    I agree with the above plan, which was planned by myself and discussed with NP.  Carlyn Hussein MD [facility]

## 2021-03-29 NOTE — PROGRESS NOTES
Sriniavs Leahy MD, [facility]                Internal Medicine Hospitalist             Daily Progress  Note   Subjective:     Chief Complaint   Patient presents with    Abdominal Pain    Flank Pain     right    Chest Pain     Ms. Buck Complains of nil, breathing slightly better. Objective:    BP (!) 102/52   Pulse 59   Temp 98.1 °F (36.7 °C) (Oral)   Resp 16   Ht 5' (1.524 m)   Wt 103 lb (46.7 kg)   SpO2 98%   BMI 20.12 kg/m²      Intake/Output Summary (Last 24 hours) at 3/29/2021 1627  Last data filed at 3/29/2021 1208  Gross per 24 hour   Intake    Output 1400 ml   Net -1400 ml      Physical Exam:  Heart:  Regular rate and rhythm, normal S1 and S2 in all 4 auscultatory areas. No rubs  Murmurs or gallops heard. Lungs: Mostly bronchial breathing to auscultation, decreased breath sounds at bases. Expiratory wheezes appreciated no crackles heard. Abdomen: Soft, non distended. Bowel sounds appreciated. No obvious liver or spleen enlargement. Non tender, no rebound noted. Extremities: Non tender, no swelling noted, strength 5/5 both legs. CNS: Grossly intact.     Labs:  CBC with Differential:    Lab Results   Component Value Date    WBC 9.0 03/29/2021    RBC 2.64 03/29/2021    HGB 9.6 03/29/2021    HCT 31.5 03/29/2021     03/29/2021    .3 03/29/2021    MCH 36.4 03/29/2021    MCHC 30.5 03/29/2021    RDW 15.0 03/29/2021    SEGSPCT 90.8 03/28/2021    BANDSPCT 8 06/08/2020    LYMPHOPCT 3.0 03/28/2021    MONOPCT 4.5 03/28/2021    EOSPCT 0.0 01/28/2012    BASOPCT 0.5 03/28/2021    MONOSABS 0.5 03/28/2021    LYMPHSABS 0.3 03/28/2021    EOSABS 0.1 03/28/2021    BASOSABS 0.1 03/28/2021    DIFFTYPE AUTOMATED DIFFERENTIAL 03/28/2021     CMP:    Lab Results   Component Value Date     03/29/2021    K 4.8 03/29/2021     03/29/2021    CO2 31 03/29/2021    BUN 9 03/29/2021    CREATININE 0.6 03/29/2021    GFRAA >60 03/29/2021    LABGLOM >60 03/29/2021    GLUCOSE 134 03/29/2021    PROT 5.4 03/26/2021    PROT 6.3 02/21/2013    LABALBU 3.1 03/26/2021    CALCIUM 8.8 03/29/2021    BILITOT 0.2 03/26/2021    ALKPHOS 88 03/26/2021    AST 12 03/26/2021    ALT 9 03/26/2021     Recent Labs     03/27/21  1458 03/28/21  0133 03/28/21  1445   TROPONINT <0.010 <0.010 <0.010     Lab Results   Component Value Date    TSHHS 0.891 02/26/2021         sodium chloride        [START ON 3/30/2021] pneumococcal polyvalent  0.5 mL Intramuscular Once    meropenem  2,000 mg Intravenous Q8H    methylPREDNISolone  40 mg Intravenous Daily    potassium chloride  10 mEq Oral BID    pantoprazole  40 mg Oral QAM AC    mirtazapine  30 mg Oral Nightly    midodrine  10 mg Oral TID WC    clopidogrel  75 mg Oral Daily    atorvastatin  80 mg Oral Nightly    acarbose  25 mg Oral TID WC    budesonide-formoterol  2 puff Inhalation BID    bumetanide  1 mg Oral Daily    calcium elemental  500 mg Oral Daily    dabigatran  150 mg Oral BID    DULoxetine  60 mg Oral Daily    vitamin D  50,000 Units Oral Once per day on Mon Thu    gabapentin  300 mg Oral TID    ranolazine  500 mg Oral BID    spironolactone  12.5 mg Oral Daily    cyanocobalamin  1,000 mcg Oral Daily    famotidine  20 mg Oral BID    ferrous gluconate  324 mg Oral BID WC    levETIRAcetam  1,000 mg Oral Daily    levothyroxine  150 mcg Oral QAM AC    magnesium oxide  400 mg Oral Daily    amiodarone  400 mg Oral Daily    aspirin  81 mg Oral Daily    sodium chloride flush  10 mL Intravenous 2 times per day    albuterol sulfate HFA  2 puff Inhalation 4x daily    ipratropium  2 puff Inhalation 4x daily    acetaminophen  1,000 mg Oral Once         Assessment:       Patient Active Problem List    Diagnosis Date Noted    Acute delirium      Priority: High    Coronary artery disease involving coronary bypass graft of native heart with angina pectoris (HCC)      Priority: High    Shortness of breath      Priority: High    Ischemic cardiomyopathy      Priority: metabolic encephalopathy 39/82/8928    Pneumonia of left lower lobe due to infectious organism 05/14/2017    Left upper quadrant pain 05/04/2017    Congestive heart failure (HCC)     CHF exacerbation (HCC)     Acute exacerbation of CHF (congestive heart failure) ([facility]     CAD (coronary artery disease) 04/01/2016    Hyperlipidemia 04/01/2016    Thyroid disease 04/01/2016    Chest pain 01/28/2012       Plan:     Problems being addressed this admission:   Recurrent UTI's  3/27/21-She tells me that she is also being seen by ID and they changed her antibiotics. Will have urology also see her as she may need some more investigations.  She has E Coli infection. awaitng sensitivity. 3/28/21- awaiting to be seen by urology. ID upped her merrem  2 gm q8h and exchange catheter and send ua.   3/29/21- patient tells me that urology wants to put in a suprapubic catheter, meanwhile continue as per ID.      Chest Pains / CAD s/p cabg and pci / PAF on doac  3/27/21-Cardiology has no new suggestions. 3/28/21- she ruled out for an AMI.     COPDE r/o PNA  3/27/21- check resp dz panel, check CXR, start Steroids and mednebs  3/29/21- awaiting resp dz panel, meanwhile continue as before. I do not think she is ready to be discharged today.      DM 2  3/27/21-Her sugars are stable and check a1c  3/28/21- sugar is 128 today. 3/29/21- sugar is 134 today, continue to monitor.      Anemia  3/27/21-Repeat labs in am , check stool and anemia panel.   3/28/21- hb is 9.7 today. Could be ch dz.       Consultants:  ID  Cardiology  Urology    General Orders:  Repeat basic labs again in am.  I have explained to the patient and discussed with him/her the treatment plan.   Mega Whaley MD, [address]

## 2021-03-29 NOTE — PROGRESS NOTES
Infectious Disease Progress Note  3/29/2021   Patient Name: Vicki Sandoval : 1960       Reason for visit: F/u ESBL E coli CAUTI, possible pneumonia? History:? Interval history noted  Denies n/v/d/f or untoward effects of antimicrobials  Physical Exam:  Vital Signs: BP (!) 120/58   Pulse 56   Temp 97.9 °F (36.6 °C) (Oral)   Resp 16   Ht 5' (1.524 m)   Wt 103 lb (46.7 kg)   SpO2 98%   BMI 20.12 kg/m²     Gen: alert and oriented X3, no distress  Skin: no stigmata of endocarditis  Wounds: C/D/I  HEMT: AT/NC Oropharynx pink, moist, and without lesions or exudates; dentition in good state of repair  Eyes: PERRLA, EOMI, conjunctiva pink, sclera anicteric. Neck: Supple. Trachea midline. No LAD. Chest: bilateral rhonchi  Heart: RRR and no MRG. Abd: soft, non-distended, no tenderness, no hepatomegaly. Normoactive bowel sounds. Ext: no clubbing, cyanosis, or edema  Catheter Site: without erythema or tenderness  LDA: CVC:right ACW mediport, Urethral catheter:  Neuro: Mental status intact. CN 2-12 intact and no focal sensory or motor deficits     Radiologic / Imaging / TESTING  3/28/2021 2:43 pm     COMPARISON:   2021     HISTORY:   ORDERING SYSTEM PROVIDED HISTORY: r/o pna   TECHNOLOGIST PROVIDED HISTORY:   Reason for exam:->r/o pna     FINDINGS:   Cardiomediastinal silhouette is stable. New nonspecific left retrocardiac   opacity. No pleural effusion or pneumothorax. No gross bony abnormality. Impression:   New nonspecific left retrocardiac opacity, atelectasis versus evolving   pneumonia.         Labs:    Recent Results (from the past 24 hour(s))   Troponin    Collection Time: 21  2:45 PM   Result Value Ref Range    Troponin T <0.010 <0.01 NG/ML   Procalcitonin    Collection Time: 21  2:45 PM   Result Value Ref Range    Procalcitonin 0.059    CBC    Collection Time: 21  5:55 AM   Result Value Ref Range    WBC 9.0 4.0 - 10.5 K/CU MM    RBC 2.64 (L) 4.2 - 5.4 M/CU MM Hemoglobin 9.6 (L) 12.5 - 16.0 GM/DL    Hematocrit 31.5 (L) 37 - 47 %    .3 (H) 78 - 100 FL    MCH 36.4 (H) 27 - 31 PG    MCHC 30.5 (L) 32.0 - 36.0 %    RDW 15.0 (H) 11.7 - 14.9 %    Platelets 749 546 - 206 K/CU MM    MPV 9.9 7.5 - 11.1 FL   Basic metabolic panel    Collection Time: 03/29/21  5:55 AM   Result Value Ref Range    Sodium 141 135 - 145 MMOL/L    Potassium 4.8 3.5 - 5.1 MMOL/L    Chloride 105 99 - 110 mMol/L    CO2 31 21 - 32 MMOL/L    Anion Gap 5 4 - 16    BUN 9 6 - 23 MG/DL    CREATININE 0.6 0.6 - 1.1 MG/DL    Glucose 134 (H) 70 - 99 MG/DL    Calcium 8.8 8.3 - 10.6 MG/DL    GFR Non-African American >60 >60 mL/min/1.73m2    GFR African American >60 >60 mL/min/1.73m2     CULTURE results: Invalid input(s): BLOOD CULTURE,  URINE CULTURE, SURGICAL CULTURE    Diagnosis:  Patient Active Problem List   Diagnosis    Chest pain    CAD (coronary artery disease)    Hyperlipidemia    Thyroid disease    Acute exacerbation of CHF (congestive heart failure) (HCC)    CHF exacerbation (HCC)    Congestive heart failure (HCC)    Left upper quadrant pain    Pneumonia of left lower lobe due to infectious organism    Angina at rest [facility])    Coronary artery disease involving coronary bypass graft of native heart with angina pectoris ([facility])    Shortness of breath    Ischemic cardiomyopathy    Acute metabolic encephalopathy    Acute delirium    Sacral pain    Acute on chronic systolic CHF (congestive heart failure), NYHA class 3 (HCC)    Severe malnutrition (HCC)    Cellulitis    Cellulitis at gastrostomy tube site ([facility]    History of Maru-en-Y gastric bypass    Gastroparesis    Flank pain    Feeding tube dysfunction    Abdominal pain, epigastric    Gastrojejunostomy tube status (HCC)    Chronic malnutrition ([facility]    Asplenia after surgical procedure    Shockable cardiac rhythm detected by automated external defibrillator    Financial difficulties    Chest pain at rest    Closed nondisplaced transverse fracture of left patella    Contusion of right knee    Multifocal pneumonia    Abnormal cardiac function test    Chronic systolic heart failure (HCC)    Seizure disorder (HCC)    Hypothyroidism    Cardiomyopathy [facility]    Acute chest pain    Fever in adult    Pyelonephritis    MRSA bacteremia    HFrEF (heart failure with reduced ejection fraction) (HCC)    Infected chest wall abrasion, right, subsequent encounter    Granuloma of skin    Open wound of right chest wall    MRSA infection    Klebsiella pneumoniae infection    Symptomatic bradycardia    History of pacemaker with removal secondary to infected leads    Avascular necrosis (HCC)    Multiple sclerosis (HCC)    Syncope    Bradycardia    Ulcer of chest wall, limited to breakdown of skin (HCC)    Pneumonia    Poor intravenous access    Syncope and collapse    UTI (urinary tract infection)    S/P cardiac pacemaker procedure    Recurrent UTI    Immunoglobulin deficiency (HCC)       Active Problems  Principal Problem:    UTI (urinary tract infection)  Active Problems:    Pneumonia of left lower lobe due to infectious organism    Flank pain    Asplenia after surgical procedure    S/P cardiac pacemaker procedure    Recurrent UTI    Immunoglobulin deficiency ([facility])  Resolved Problems:    * No resolved hospital problems. *      Impression and plan   Summary and rationale: Patient is a 62 y.o.   female with surgical asplenia, MS, neurogenic bladder, CHF with pacemaker, who presented with suprapubic abdominal pain. Dx with CAUTI POA, urine cx positive for ESBL E coli. Presently has rhonchorous breathing, cough and a new left lower infiltrate. Has pneumonia. Concerned about CVID. Will need Pneumovax and Td vaccination   Clinical status: Improving   Therapeutic: continue meropenem, extended dosing. May discharge home when ready with ertapenem 1g daily to complete a cumulative 2 week course.      Diagnostic:  F/u: MRSA nares, if positive then will add anti-MRSA coverage.  Other: f/u urology service recommendations.         Electronically signed by: Electronically signed by Deng Diana MD on 3/29/2021 at 10:59 AM

## 2021-03-30 LAB
CULTURE: ABNORMAL
HCT VFR BLD CALC: 31.2 % (ref 37–47)
HEMOGLOBIN: 9.8 GM/DL (ref 12.5–16)
Lab: ABNORMAL
MCH RBC QN AUTO: 37.3 PG (ref 27–31)
MCHC RBC AUTO-ENTMCNC: 31.4 % (ref 32–36)
MCV RBC AUTO: 118.6 FL (ref 78–100)
PDW BLD-RTO: 15 % (ref 11.7–14.9)
PLATELET # BLD: 249 K/CU MM (ref 140–440)
PMV BLD AUTO: 10.1 FL (ref 7.5–11.1)
RBC # BLD: 2.63 M/CU MM (ref 4.2–5.4)
SPECIMEN: ABNORMAL
WBC # BLD: 13.4 K/CU MM (ref 4–10.5)

## 2021-03-30 PROCEDURE — 85027 COMPLETE CBC AUTOMATED: CPT

## 2021-03-30 PROCEDURE — 6370000000 HC RX 637 (ALT 250 FOR IP): Performed by: INTERNAL MEDICINE

## 2021-03-30 PROCEDURE — 6370000000 HC RX 637 (ALT 250 FOR IP): Performed by: STUDENT IN AN ORGANIZED HEALTH CARE EDUCATION/TRAINING PROGRAM

## 2021-03-30 PROCEDURE — 2580000003 HC RX 258: Performed by: STUDENT IN AN ORGANIZED HEALTH CARE EDUCATION/TRAINING PROGRAM

## 2021-03-30 PROCEDURE — 6360000002 HC RX W HCPCS: Performed by: INTERNAL MEDICINE

## 2021-03-30 PROCEDURE — 2700000000 HC OXYGEN THERAPY PER DAY

## 2021-03-30 PROCEDURE — 99232 SBSQ HOSP IP/OBS MODERATE 35: CPT | Performed by: INTERNAL MEDICINE

## 2021-03-30 PROCEDURE — G0009 ADMIN PNEUMOCOCCAL VACCINE: HCPCS | Performed by: INTERNAL MEDICINE

## 2021-03-30 PROCEDURE — 90732 PPSV23 VACC 2 YRS+ SUBQ/IM: CPT | Performed by: INTERNAL MEDICINE

## 2021-03-30 PROCEDURE — 1200000000 HC SEMI PRIVATE

## 2021-03-30 PROCEDURE — 94640 AIRWAY INHALATION TREATMENT: CPT

## 2021-03-30 PROCEDURE — APPSS45 APP SPLIT SHARED TIME 31-45 MINUTES: Performed by: NURSE PRACTITIONER

## 2021-03-30 PROCEDURE — 94761 N-INVAS EAR/PLS OXIMETRY MLT: CPT

## 2021-03-30 PROCEDURE — 2580000003 HC RX 258: Performed by: INTERNAL MEDICINE

## 2021-03-30 PROCEDURE — 6370000000 HC RX 637 (ALT 250 FOR IP): Performed by: PHYSICIAN ASSISTANT

## 2021-03-30 RX ORDER — MINOCYCLINE HYDROCHLORIDE 100 MG/1
100 CAPSULE ORAL 2 TIMES DAILY
Status: DISCONTINUED | OUTPATIENT
Start: 2021-03-30 | End: 2021-04-01 | Stop reason: HOSPADM

## 2021-03-30 RX ORDER — FLUCONAZOLE 100 MG/1
200 TABLET ORAL DAILY
Status: DISCONTINUED | OUTPATIENT
Start: 2021-03-30 | End: 2021-04-01 | Stop reason: HOSPADM

## 2021-03-30 RX ADMIN — PNEUMOCOCCAL VACCINE POLYVALENT 0.5 ML
25; 25; 25; 25; 25; 25; 25; 25; 25; 25; 25; 25; 25; 25; 25; 25; 25; 25; 25; 25; 25; 25; 25 INJECTION, SOLUTION INTRAMUSCULAR; SUBCUTANEOUS at 14:02

## 2021-03-30 RX ADMIN — AMIODARONE HYDROCHLORIDE 400 MG: 200 TABLET ORAL at 08:39

## 2021-03-30 RX ADMIN — OXYCODONE HYDROCHLORIDE 20 MG: 10 TABLET ORAL at 08:30

## 2021-03-30 RX ADMIN — ATORVASTATIN CALCIUM 80 MG: 80 TABLET, FILM COATED ORAL at 21:27

## 2021-03-30 RX ADMIN — MAGNESIUM OXIDE 400 MG: 400 TABLET ORAL at 08:39

## 2021-03-30 RX ADMIN — OXYCODONE HYDROCHLORIDE 20 MG: 10 TABLET ORAL at 15:10

## 2021-03-30 RX ADMIN — MIDODRINE HYDROCHLORIDE 10 MG: 5 TABLET ORAL at 18:23

## 2021-03-30 RX ADMIN — Medication 500 MG: at 08:38

## 2021-03-30 RX ADMIN — MEROPENEM 2000 MG: 1 INJECTION, POWDER, FOR SOLUTION INTRAVENOUS at 14:01

## 2021-03-30 RX ADMIN — MIRTAZAPINE 30 MG: 15 TABLET, FILM COATED ORAL at 21:27

## 2021-03-30 RX ADMIN — Medication 2 PUFF: at 11:53

## 2021-03-30 RX ADMIN — MIDODRINE HYDROCHLORIDE 10 MG: 5 TABLET ORAL at 14:15

## 2021-03-30 RX ADMIN — GABAPENTIN 300 MG: 300 CAPSULE ORAL at 21:27

## 2021-03-30 RX ADMIN — FERROUS GLUCONATE TAB 324 MG (37.5 MG ELEMENTAL IRON) 324 MG: 324 (37.5 FE) TAB at 08:39

## 2021-03-30 RX ADMIN — OXYCODONE HYDROCHLORIDE 20 MG: 10 TABLET ORAL at 21:27

## 2021-03-30 RX ADMIN — GABAPENTIN 300 MG: 300 CAPSULE ORAL at 14:01

## 2021-03-30 RX ADMIN — PANTOPRAZOLE SODIUM 40 MG: 40 TABLET, DELAYED RELEASE ORAL at 05:08

## 2021-03-30 RX ADMIN — FERROUS GLUCONATE TAB 324 MG (37.5 MG ELEMENTAL IRON) 324 MG: 324 (37.5 FE) TAB at 18:22

## 2021-03-30 RX ADMIN — PROMETHAZINE HYDROCHLORIDE 12.5 MG: 12.5 TABLET ORAL at 05:07

## 2021-03-30 RX ADMIN — CLOPIDOGREL BISULFATE 75 MG: 75 TABLET ORAL at 08:38

## 2021-03-30 RX ADMIN — MIDODRINE HYDROCHLORIDE 10 MG: 5 TABLET ORAL at 08:39

## 2021-03-30 RX ADMIN — ACETAMINOPHEN 650 MG: 325 TABLET ORAL at 05:08

## 2021-03-30 RX ADMIN — Medication 2 PUFF: at 15:37

## 2021-03-30 RX ADMIN — DABIGATRAN ETEXILATE MESYLATE 150 MG: 150 CAPSULE ORAL at 08:40

## 2021-03-30 RX ADMIN — MINOCYCLINE HYDROCHLORIDE 100 MG: 100 CAPSULE ORAL at 21:26

## 2021-03-30 RX ADMIN — METHYLPREDNISOLONE SODIUM SUCCINATE 40 MG: 40 INJECTION, POWDER, FOR SOLUTION INTRAMUSCULAR; INTRAVENOUS at 08:40

## 2021-03-30 RX ADMIN — RANOLAZINE 500 MG: 500 TABLET, FILM COATED, EXTENDED RELEASE ORAL at 08:38

## 2021-03-30 RX ADMIN — OXYCODONE HYDROCHLORIDE 20 MG: 10 TABLET ORAL at 11:35

## 2021-03-30 RX ADMIN — ALBUTEROL SULFATE 2 PUFF: 90 AEROSOL, METERED RESPIRATORY (INHALATION) at 11:53

## 2021-03-30 RX ADMIN — PROMETHAZINE HYDROCHLORIDE 12.5 MG: 12.5 TABLET ORAL at 18:23

## 2021-03-30 RX ADMIN — SODIUM CHLORIDE, PRESERVATIVE FREE 10 ML: 5 INJECTION INTRAVENOUS at 21:25

## 2021-03-30 RX ADMIN — ALBUTEROL SULFATE 2 PUFF: 90 AEROSOL, METERED RESPIRATORY (INHALATION) at 21:44

## 2021-03-30 RX ADMIN — FLUCONAZOLE 200 MG: 100 TABLET ORAL at 14:01

## 2021-03-30 RX ADMIN — LEVOTHYROXINE SODIUM 150 MCG: 150 TABLET ORAL at 05:08

## 2021-03-30 RX ADMIN — BUMETANIDE 1 MG: 1 TABLET ORAL at 08:38

## 2021-03-30 RX ADMIN — FAMOTIDINE 20 MG: 20 TABLET ORAL at 21:27

## 2021-03-30 RX ADMIN — FAMOTIDINE 20 MG: 20 TABLET ORAL at 08:39

## 2021-03-30 RX ADMIN — ACETAMINOPHEN 650 MG: 325 TABLET ORAL at 11:35

## 2021-03-30 RX ADMIN — ALBUTEROL SULFATE 2 PUFF: 90 AEROSOL, METERED RESPIRATORY (INHALATION) at 15:37

## 2021-03-30 RX ADMIN — POTASSIUM CHLORIDE 10 MEQ: 750 TABLET, FILM COATED, EXTENDED RELEASE ORAL at 08:38

## 2021-03-30 RX ADMIN — Medication 2 PUFF: at 21:44

## 2021-03-30 RX ADMIN — Medication 2 PUFF: at 08:37

## 2021-03-30 RX ADMIN — DABIGATRAN ETEXILATE MESYLATE 150 MG: 150 CAPSULE ORAL at 21:26

## 2021-03-30 RX ADMIN — POTASSIUM CHLORIDE 10 MEQ: 750 TABLET, FILM COATED, EXTENDED RELEASE ORAL at 21:27

## 2021-03-30 RX ADMIN — CYANOCOBALAMIN TAB 1000 MCG 1000 MCG: 1000 TAB at 08:39

## 2021-03-30 RX ADMIN — MEROPENEM 2000 MG: 1 INJECTION, POWDER, FOR SOLUTION INTRAVENOUS at 00:24

## 2021-03-30 RX ADMIN — ACARBOSE 25 MG: 50 TABLET ORAL at 08:40

## 2021-03-30 RX ADMIN — DULOXETINE HYDROCHLORIDE 60 MG: 30 CAPSULE, DELAYED RELEASE ORAL at 08:38

## 2021-03-30 RX ADMIN — BUDESONIDE AND FORMOTEROL FUMARATE DIHYDRATE 2 PUFF: 160; 4.5 AEROSOL RESPIRATORY (INHALATION) at 08:37

## 2021-03-30 RX ADMIN — ASPIRIN 81 MG CHEWABLE TABLET 81 MG: 81 TABLET CHEWABLE at 08:39

## 2021-03-30 RX ADMIN — PROMETHAZINE HYDROCHLORIDE 12.5 MG: 12.5 TABLET ORAL at 11:35

## 2021-03-30 RX ADMIN — ALBUTEROL SULFATE 2 PUFF: 90 AEROSOL, METERED RESPIRATORY (INHALATION) at 08:37

## 2021-03-30 RX ADMIN — ACETAMINOPHEN 650 MG: 325 TABLET ORAL at 18:22

## 2021-03-30 RX ADMIN — OXYCODONE HYDROCHLORIDE 20 MG: 10 TABLET ORAL at 18:23

## 2021-03-30 RX ADMIN — GABAPENTIN 300 MG: 300 CAPSULE ORAL at 08:39

## 2021-03-30 RX ADMIN — SODIUM CHLORIDE, PRESERVATIVE FREE 10 ML: 5 INJECTION INTRAVENOUS at 18:23

## 2021-03-30 RX ADMIN — SODIUM CHLORIDE, PRESERVATIVE FREE 10 ML: 5 INJECTION INTRAVENOUS at 08:41

## 2021-03-30 RX ADMIN — BUDESONIDE AND FORMOTEROL FUMARATE DIHYDRATE 2 PUFF: 160; 4.5 AEROSOL RESPIRATORY (INHALATION) at 21:44

## 2021-03-30 RX ADMIN — OXYCODONE HYDROCHLORIDE 20 MG: 10 TABLET ORAL at 05:07

## 2021-03-30 RX ADMIN — RANOLAZINE 500 MG: 500 TABLET, FILM COATED, EXTENDED RELEASE ORAL at 21:28

## 2021-03-30 RX ADMIN — MEROPENEM 2000 MG: 1 INJECTION, POWDER, FOR SOLUTION INTRAVENOUS at 22:52

## 2021-03-30 RX ADMIN — SPIRONOLACTONE 12.5 MG: 25 TABLET ORAL at 08:38

## 2021-03-30 RX ADMIN — LEVETIRACETAM 1000 MG: 500 TABLET, FILM COATED ORAL at 08:38

## 2021-03-30 RX ADMIN — SODIUM CHLORIDE, PRESERVATIVE FREE 10 ML: 5 INJECTION INTRAVENOUS at 00:28

## 2021-03-30 ASSESSMENT — PAIN SCALES - GENERAL
PAINLEVEL_OUTOF10: 8
PAINLEVEL_OUTOF10: 9

## 2021-03-30 ASSESSMENT — PAIN DESCRIPTION - PROGRESSION: CLINICAL_PROGRESSION: GRADUALLY WORSENING

## 2021-03-30 NOTE — PROGRESS NOTES
[facility] [place]  [address], [address] [facility],Third [address]  Phone: [phone]    Fax [phone]                  Lisbeth Eng MD, Nahum Cantrell MD, Maricruz Alvares MD, MD Tamara Chiang MD Christobal Hageman, MD Josph Hard, MD Jerral Spindle, APRMARLIN Irvin, APRMARLIN Narvaez, [facility] RoyceJESSE Mcgovern    Cardiology Progress Note     Today's Plan: continue to monitor    Admit Date:  3/26/2021    Consult reason/ Seen today for: chest pain    Subjective and  Overnight Events: patient is feeling worse today. She denies shortness of breath but just feels blah. She states that she is having superpubic catheter placed coty this week. Assessment / Plan / Recommendation:     1. CAD: troponin's are negative. Chest pain resolved. Plan medical therapy only at this time. Continue   2. Atrial fibrillation: sinus rhythm today. Continue Pradaxa 150 mg twice daily. In care potassium is 4.9 today. magnesium 1.9 3/26/2021. Recommend keeping potassium 4-4.5 and magnesium 2-2.2 in patients with atrial fibrillation. 3. R ventricle PPM (Micra): Interrogation 3/28/2021 shows normal functioning right ventricular pacemaker lowest rate set at 40 with battery life of approximately 8 years. February patient had ventricular rate decreased from 50-40 due to high occurrence of RV pacing to protect patient from pacemaker induced cardiomyopathy as she already has reduced ejection fraction. 4. history of syncope: Patient denies any current episodes of syncope since being on higher dose of midodrine. 5. History of ventricular tachycardia and ventricular fibrillation. Patient status post ICD x2 both occurrences patient developed  Endocarditis and subcutaneous pacemaker and wires had to be removed. Will ask life vest rep to coordinate with patient to verify device is working properly.    6. Ischemic cardiomyopathy with reduced ejection fraction without heart failure: Patient appears fairly euvolemic. Echo done at [facility] march 2, 2021 showed an ejection fraction of 30 to 35% global hypokinesis akinesis of inferior posterior segments right ventricle systolic pressure estimated 30 mmHg. Continue Aldactone. Patient not on beta-blocker or ACE ARB or Arni due to hypotension and syncope. 7. DVT prophylaxis if not contraindicated while in the hospital.    8. Preoperative risk: she is considered a moderate risk for adverse cardiac event. History of Presenting Illness:    Chief complain on admission : 62 y. o.year old who is admitted for  Chief Complaint   Patient presents with    Abdominal Pain    Flank Pain     right    Chest Pain        Past medical history:    has a past medical history of Acute delirium, Arrhythmia, Arthritis, Asplenia, Asthma, Avascular necrosis ([address] CAD (coronary artery disease), Cardiomyopathy ([address] Cerebral artery occlusion with cerebral infarction ([address] CHF (congestive heart failure) ([address] Enlarged liver, GERD (gastroesophageal reflux disease), H/O echocardiogram, History of blood transfusion, Hx of cardiovascular stress test, Hyperlipidemia, Hypertension, Lymphoma ([address] MI, old, Movement disorder, MS (multiple sclerosis) ([address] OP (osteoporosis), PE (pulmonary embolism), Pneumonia, Pyelonephritis, Seizures ([address] Severe malnutrition ([address] Spleen enlarged, and Thyroid disease. Past surgical history:   has a past surgical history that includes Coronary angioplasty with stent; Hysterectomy; Appendectomy; Cholecystectomy; Tonsillectomy; Gastric bypass surgery; Cardiac defibrillator placement; hip surgery; Abdomen surgery; Colonoscopy; Endoscopy, colon, diagnostic; hernia repair; joint replacement; skin biopsy; vascular surgery; Coronary artery bypass graft; [address] Surgery (N/A, 6/1/2020); [address] Surgery (N/A, 2/9/2021); and Port Surgery (Right, 2/10/2021). Social History:   reports that she has never smoked.  She has never used smokeless tobacco. She reports that she does not drink alcohol or use drugs. Family history:  family history includes Cancer in her father; Diabetes in her mother; Heart Disease in her father, maternal grandmother, and mother; High Blood Pressure in her maternal grandmother and mother; High Cholesterol in her maternal grandmother and mother; Other in her sister. Allergies   Allergen Reactions    Fentanyl Swelling     Other reaction(s): Angioedema (swelling)    Moxifloxacin Hcl In Nacl Swelling and Rash    Povidone-Iodine Rash     Other reaction(s): AOF      Cyclobenzaprine      Other reaction(s): Other - comment required  \" it make my muscle very weak because of my MS\"  \" it make my muscle very weak because of my MS\"      Methocarbamol      Worsening edema  Other reaction(s): Other - comment required  Worsening edema  Worsening edema  Worsening edema      Tramadol Other (See Comments)     Doctor doesn't her to take due to heart problems  Seizures   Doctor doesn't her to take due to lowers seizure threshold.  Baclofen     Ibuprofen      \"Due to heart problems\"    Naproxen     Nsaids      \"Due to heart problems\"    Tizanidine     Tolmetin      \"Due to heart problems\"    Tramadol Hcl      Other reaction(s):  Other - comment required  Told not to take due to history of seizures    Betadine [Povidone Iodine] Rash    Moxifloxacin Rash and Swelling     Lips swell and tingling in face   Lips swell and tingling in face   Lips swell and tingling in face   Lips swell and tingling in face   Around mouth       Review of Systems   All 14 systems were reviewed and are negative  Except for the positive findings  which as documented     BP (!) 109/52   Pulse 59   Temp 98.1 °F (36.7 °C) (Oral)   Resp 18   Ht 5' (1.524 m)   Wt 103 lb (46.7 kg)   SpO2 95%   BMI 20.12 kg/m²       Intake/Output Summary (Last 24 hours) at 3/30/2021 1736  Last data filed at 3/30/2021 1142  Gross per 24 hour   Intake  puff Inhalation 4x daily    acetaminophen  1,000 mg Oral Once      sodium chloride       oxyCODONE, docusate sodium, benzocaine-menthol, sodium chloride flush, sodium chloride, promethazine **OR** ondansetron, acetaminophen **OR** acetaminophen, polyethylene glycol, ipratropium-albuterol    Lab Data:  CBC:   Recent Labs     03/28/21  0555 03/29/21  0555 03/30/21  0540   WBC 11.0* 9.0 13.4*   HGB 9.7* 9.6* 9.8*   HCT 31.3* 31.5* 31.2*   .5* 119.3* 118.6*    224 249     BMP:   Recent Labs     03/28/21  0555 03/29/21  0555    141   K 4.3 4.8    105   CO2 26 31   BUN 8 9   CREATININE 0.6 0.6     PT/INR: No results for input(s): PROTIME, INR in the last 72 hours. BNP:    No results for input(s): PROBNP in the last 72 hours. TROPONIN:   Recent Labs     03/28/21  0133 03/28/21  1445   TROPONINT <0.010 <0.010        ECHO :   Echocardiogram 3/2/2021  Result Narrative   · Echo technically suboptimal due to poor acoustic windows  · Left ventricular chamber is low normal in size with global hypokinesis   and akinesis of the inferoposterior segments suggesting infarction. Global   function moderately reduced, ejection fraction 30-35%  · Right ventricle is normal in size and function. Ventricular systolic   pressure estimated at +/-30mmHg.  Device lead in the right heart chambers   · Left and right atria measure normal in size   · Valve structures are consistent with age. No valve dysfunction         All labs, medications and tests reviewed by myself , continue all other medications of all above medical condition listed as is except for changes mentioned above. Thank you very much for consult , please call with questions. Electronically signed by JESSE Arevalo CNP on 3/30/2021 at 5:36 PM     I have seen ,spoken to  and examined this patient personally, independently of the nurse practitioner.  I have reviewed the hospital care given to date and reviewed all pertinent labs and

## 2021-03-30 NOTE — PROGRESS NOTES
Physician Progress Note      Jackelyn Calvo  CSN #:                  [id number]  :                       1960  ADMIT DATE:       3/26/2021 5:16 PM  [address] [place] DATE:  RESPONDING  PROVIDER #:        Yuki Looney MD          QUERY TEXT:    Pt admitted with UTI. Pt noted to have chronic indwelling urinary catheter. If possible, please document in the progress notes and discharge summary if   you are evaluating and/or treating any of the following: The medical record reflects the following:  Risk Factors: chronic indwelling catheter  Clinical Indicators: 3/27 I&D documentation / Dr. Thea Baxter, \"CAUTI-POA,   Indwelling urethral catheter. \" 3/26 UA: moderate bacteria, large leuks. 3/28   UA: positive nitrate, large leuks. Urine Culture positive for E-Coli. Treatment: Meropenem IV    Thank you,  Cory Gayle RN  Options provided:  -- UTI due to chronic indwelling urinary catheter  -- Other - I will add my own diagnosis  -- Disagree - Not applicable / Not valid  -- Disagree - Clinically unable to determine / Unknown  -- Refer to Clinical Documentation Reviewer    PROVIDER RESPONSE TEXT:    UTI is due to the chronic indwelling urinary catheter.     Query created by: Rob Adams on 3/29/2021 10:20 AM      Electronically signed by:  Yuki Looney MD 3/30/2021 4:42 PM

## 2021-03-30 NOTE — CARE COORDINATION
CM faxed [facility] notes regarding IV antibiotics at home when discharged.   0820 CM into see pt and updated her. Pt is agreeable to home care with [facility] and home IV therapy if needed. [address]  4040 CM called [facility] and left a message for [facility] regarding the fax and IV antibiotics.    IV antibiotics are covered at 100%   Call Migdalia Kennedy with [facility] at [phone]   Fax order to [phone]

## 2021-03-30 NOTE — PROGRESS NOTES
Infectious Disease Progress Note  3/30/2021   Patient Name: Andrade Richter : 1960       Reason for visit: F/u ESBL E coli CAUTI, possible pneumonia? History:? Interval history noted  Denies n/v/d/f or untoward effects of antimicrobials  Feels weaker today. Physical Exam:  Vital Signs: /71   Pulse 53   Temp 97.9 °F (36.6 °C) (Oral)   Resp 18   Ht 5' (1.524 m)   Wt 103 lb (46.7 kg)   SpO2 94%   BMI 20.12 kg/m²     Gen: alert and oriented X3, no distress  Skin: no stigmata of endocarditis  Wounds: C/D/I  HEMT: AT/NC Oropharynx pink, moist, and without lesions or exudates; dentition in good state of repair  Eyes: PERRLA, EOMI, conjunctiva pink, sclera anicteric. Neck: Supple. Trachea midline. No LAD. Chest: bilateral rhonchi  Heart: RRR and no MRG. Abd: soft, non-distended, no tenderness, no hepatomegaly. Normoactive bowel sounds. Ext: no clubbing, cyanosis, or edema  Catheter Site: without erythema or tenderness  LDA: CVC:right ACW mediport, Urethral catheter:  Neuro: Mental status intact. CN 2-12 intact and no focal sensory or motor deficits     Radiologic / Imaging / TESTING  3/28/2021 2:43 pm     COMPARISON:   2021     HISTORY:   ORDERING SYSTEM PROVIDED HISTORY: r/o pna   TECHNOLOGIST PROVIDED HISTORY:   Reason for exam:->r/o pna     FINDINGS:   Cardiomediastinal silhouette is stable. New nonspecific left retrocardiac   opacity. No pleural effusion or pneumothorax. No gross bony abnormality. Impression:   New nonspecific left retrocardiac opacity, atelectasis versus evolving   pneumonia.         Labs:    Recent Results (from the past 24 hour(s))   CBC    Collection Time: 21  5:40 AM   Result Value Ref Range    WBC 13.4 (H) 4.0 - 10.5 K/CU MM    RBC 2.63 (L) 4.2 - 5.4 M/CU MM    Hemoglobin 9.8 (L) 12.5 - 16.0 GM/DL    Hematocrit 31.2 (L) 37 - 47 %    .6 (H) 78 - 100 FL    MCH 37.3 (H) 27 - 31 PG    MCHC 31.4 (L) 32.0 - 36.0 %    RDW 15.0 (H) 11.7 - 14.9 % Platelets 430 131 - 015 K/CU MM    MPV 10.1 7.5 - 11.1 FL     CULTURE results: Invalid input(s): BLOOD CULTURE,  URINE CULTURE, SURGICAL CULTURE    Diagnosis:  Patient Active Problem List   Diagnosis    Chest pain    CAD (coronary artery disease)    Hyperlipidemia    Thyroid disease    Acute exacerbation of CHF (congestive heart failure) ([facility])    CHF exacerbation ([facility])    [facility]est[facility] failure [facility]    Left upper quadrant pain    Pneumonia of left lower lobe due to infectious organism    Angina at rest [facility])    Coronary artery disease involving coronary bypass graft of native heart with angina pectoris ([facility])    Shortness of breath    Ischemic cardiomyopathy    Acute metabolic encephalopathy    Acute delirium    Sacral pain    Acute on chronic systolic CHF (congestive heart failure), NYHA class 3 ([facility])    Severe malnutrition ([facility])    Cellulitis    Cellulitis at gastrostomy tube site ([facility]    History of Maru-en-Y gastric bypass    Gastroparesis    Flank pain    Feeding tube dysfunction    Abdominal pain, epigastric    Gastrojejunostomy tube status ([facility])    Chronic malnutrition ([facility])    Asplenia after surgical procedure    Shockable cardiac rhythm detected by automated external defibrillator    Financial difficulties    Chest pain at rest    Closed nondisplaced transverse fracture of left patella    Contusion of right knee    Multifocal pneumonia    Abnormal cardiac function test    Chronic systolic heart failure (HCC)    Seizure disorder ([facility])    Hypothyroidism    Cardiomyopathy ([facility]    Acute chest pain    Fever in adult    Pyelonephritis    MRSA bacteremia    HFrEF (heart failure with reduced ejection fraction) ([facility])    Infected chest wall abrasion, right, subsequent encounter    Granuloma of skin    Open wound of right chest wall    MRSA infection    Klebsiella pneumoniae infection    Symptomatic bradycardia    History of pacemaker with removal secondary to infected leads    Avascular necrosis (HCC)    Multiple sclerosis (HCC)    Syncope    Bradycardia    Ulcer of chest wall, limited to breakdown of skin (HCC)    Pneumonia    Poor intravenous access    Syncope and collapse    UTI (urinary tract infection)    S/P cardiac pacemaker procedure    Recurrent UTI    Immunoglobulin deficiency (HCC)       Active Problems  Principal Problem:    UTI (urinary tract infection)  Active Problems:    Pneumonia of left lower lobe due to infectious organism    Flank pain    Asplenia after surgical procedure    S/P cardiac pacemaker procedure    Recurrent UTI    Immunoglobulin deficiency ([address]  Resolved Problems:    * No resolved hospital problems. *      Impression and plan   Summary and rationale: Patient is a 62 y.o.   female with surgical asplenia, MS, neurogenic bladder, CHF with pacemaker, who presented with suprapubic abdominal pain. Dx with CAUTI POA, urine cx positive for ESBL E coli. Presently has rhonchorous breathing, cough and a new left lower infiltrate. Has pneumonia. Concerned about CVID.  Clinical status: Continues to have rhonchorous breathing, and feels sicker. Concerned about pneumonia. We will add minocycline   Therapeutic: continue meropenem, extended dosing. Minocycline 100 mg p.o. twice daily for 2 weeks may discharge home when ready with ertapenem 1g daily to complete a cumulative 2 week course.  Fluconazole 200 mg p.o. daily for 2 weeks   Diagnostic:   F/u: MRSA nares, if positive then will add anti-MRSA coverage.  Other: Plans for suprapubic cystostomy noted.         Electronically signed by: Electronically signed by Shani Aguillon MD on 3/30/2021 at 2:07 PM

## 2021-03-30 NOTE — PROGRESS NOTES
Luana Louis, Urology called and requested the Urology cart for early tomorrow morning. To do a Cystoscope early tomorrow morning. Spoke to [employer]. Night House Supervisor will have the Urology Cart up here in the early morning.

## 2021-03-30 NOTE — PROGRESS NOTES
[facility] [address]   Progress Note  [facility] 0 1 2      Date: 3/30/2021   Patient: Ena Diaz   : 1960   DOA: 3/26/2021   MRN: [id number]   ROOM#: [id number]     Admit Date: 3/26/2021     Collaborating Urologist on Call at time of admission: Dr. Mg Anna  CC: Chest, abdominal, and right flank pain  Reason for Consult: Recurrent UTI's    Subjective:     Pain: mild, no nausea and no vomiting,   Bowel Movement/Flatus:   Yes  Voidinfr wood catheter in place, urine clear yellow     Pt resting in bed, states she is comfortable today. Objective:    Vitals:    /71   Pulse 53   Temp 97.9 °F (36.6 °C) (Oral)   Resp 18   Ht 5' (1.524 m)   Wt 103 lb (46.7 kg)   SpO2 96%   BMI 20.12 kg/m²    Temp  Av °F (36.7 °C)  Min: 97.9 °F (36.6 °C)  Max: 98.1 °F (36.7 °C)       Intake/Output Summary (Last 24 hours) at 3/30/2021 1018  Last data filed at 3/29/2021 0167  Gross per 24 hour   Intake    Output 1250 ml   Net -1250 ml       Physical Exam:   General appearance: alert, appears stated age, cooperative and no distress  Head: Normocephalic, without obvious abnormality, atraumatic  Back: No CVA tenderness  Abdomen: Soft, non-tender, non-distended   : 16fr wood catheter in place, urine cloudy yellow with moderate amount of debris noted in tubing.     Labs:   WBC:    Lab Results   Component Value Date    WBC 13.4 2021      Hemoglobin/Hematocrit:    Lab Results   Component Value Date    HGB 9.8 2021    HCT 31.2 2021      BMP:   Lab Results   Component Value Date     2021    K 4.8 2021     2021    CO2 31 2021    BUN 9 2021    LABALBU 3.1 2021    CREATININE 0.6 2021    CALCIUM 8.8 2021    GFRAA >60 2021    LABGLOM >60 2021      PT/INR:    Lab Results   Component Value Date    PROTIME 11.8 2021    PROTIME 10.2 2012    INR 0.98 2021      PTT:    Lab Results   Component surgically absent. Coppola catheter decompresses the bladder. No free fluid or adenopathy. Significant scan artifact from right total hip replacement. Peritoneum/Retroperitoneum: No mass, adenopathy, or ascites. Normal size of the aorta. IVC filter in place and unchanged. No free air. Bones/Soft Tissues: No acute abnormality. Prior right total hip replacement. No acute intra-abnormality identified. Stable small nonobstructing calculus in mid left kidney. Slight vascular congestion. No acute abnormality of the kidneys or bladder evident. Xr Chest Portable    Result Date: 3/28/2021  EXAMINATION: ONE XRAY VIEW OF THE CHEST 3/28/2021 2:43 pm COMPARISON: 03/26/2021 HISTORY: ORDERING SYSTEM PROVIDED HISTORY: r/o pna TECHNOLOGIST PROVIDED HISTORY: Reason for exam:->r/o pna FINDINGS: Cardiomediastinal silhouette is stable. New nonspecific left retrocardiac opacity. No pleural effusion or pneumothorax. No gross bony abnormality. New nonspecific left retrocardiac opacity, atelectasis versus evolving pneumonia. Xr Chest Portable    Result Date: 3/26/2021  EXAMINATION: ONE XRAY VIEW OF THE CHEST 3/26/2021 2:49 pm COMPARISON: 02/26/2021 HISTORY: ORDERING SYSTEM PROVIDED HISTORY: chest pain TECHNOLOGIST PROVIDED HISTORY: Reason for exam:->chest pain Reason for Exam: chest pain Acuity: Unknown Type of Exam: Initial Additional signs and symptoms: unknown Relevant Medical/Surgical History: CHF, CAD, asthma FINDINGS: Cardial pericardial silhouette is stable in appearance. Leadless pacemaker is again found. Midline sternotomy wires are unchanged in configuration. Mariaa catheter is unchanged. Chronic interstitial opacities are seen throughout both lungs. No focal infiltrates are identified. No pneumothorax is seen. No free air. No acute bony abnormality. No acute abnormality detected.        Assessment & Plan:      Andressa Abraham is a 62y.o. year old female admitted 3/26/2021 for UTI.     1)

## 2021-03-30 NOTE — PROGRESS NOTES
HISTORY:    3/30: Overall stable. Nontoxic-appearing. ROS:  No chest pain. No abdominal pain. No nausea. No vomiting. Objective: Intake/Output Summary (Last 24 hours) at 3/30/2021 1632  Last data filed at 3/30/2021 1142  Gross per 24 hour   Intake    Output 2950 ml   Net -2950 ml      Vitals:   Vitals:    03/30/21 1540   BP:    Pulse:    Resp:    Temp:    SpO2: 96%     Physical Exam:   GEN: Awake female, nontoxic appearing. Answers questions appropriate. EYES: No eye discharge. HENT: Mucous membranes moist.  No nasal discharge. NECK: Supple  RESP: Bibasilar crackles. Symmetric chest expansion. Accessory muscle use. CV: RRR. No pitting extremity edema. GI: Abdomen soft. Nondistended. : Coppola catheter in place  MSK: No bony fractures. No gross forms. SKIN: warm, dry, no rashes,  NEURO: Cranial nerves appear grossly intact, normal speech.   PSYCH: Awake, alert, oriented    Medications:   Medications:    fluconazole  200 mg Oral Daily    minocycline  100 mg Oral BID    meropenem  2,000 mg Intravenous Q8H    methylPREDNISolone  40 mg Intravenous Daily    potassium chloride  10 mEq Oral BID    pantoprazole  40 mg Oral QAM AC    mirtazapine  30 mg Oral Nightly    midodrine  10 mg Oral TID WC    clopidogrel  75 mg Oral Daily    atorvastatin  80 mg Oral Nightly    acarbose  25 mg Oral TID WC    budesonide-formoterol  2 puff Inhalation BID    bumetanide  1 mg Oral Daily    calcium elemental  500 mg Oral Daily    dabigatran  150 mg Oral BID    DULoxetine  60 mg Oral Daily    vitamin D  50,000 Units Oral Once per day on Mon Thu    gabapentin  300 mg Oral TID    ranolazine  500 mg Oral BID    spironolactone  12.5 mg Oral Daily    cyanocobalamin  1,000 mcg Oral Daily    famotidine  20 mg Oral BID    ferrous gluconate  324 mg Oral BID WC    levETIRAcetam  1,000 mg Oral Daily    levothyroxine  150 mcg Oral QAM AC    magnesium oxide  400 mg Oral Daily    amiodarone  400 mg Oral Daily    aspirin  81 mg Oral Daily    sodium chloride flush  10 mL Intravenous 2 times per day    albuterol sulfate HFA  2 puff Inhalation 4x daily    ipratropium  2 puff Inhalation 4x daily    acetaminophen  1,000 mg Oral Once      Infusions:    sodium chloride       PRN Meds: oxyCODONE, 20 mg, Q3H PRN  docusate sodium, 100 mg, BID PRN  benzocaine-menthol, 1 lozenge, Q2H PRN  sodium chloride flush, 10 mL, PRN  sodium chloride, 25 mL, PRN  promethazine, 12.5 mg, Q6H PRN    Or  ondansetron, 4 mg, Q6H PRN  acetaminophen, 650 mg, Q6H PRN    Or  acetaminophen, 650 mg, Q6H PRN  polyethylene glycol, 17 g, Daily PRN  ipratropium-albuterol, 3 mL, Q4H PRN          Electronically signed by Darby Cooley MD on 3/30/2021 at 4:32 PM

## 2021-03-31 PROCEDURE — APPSS30 APP SPLIT SHARED TIME 16-30 MINUTES: Performed by: NURSE PRACTITIONER

## 2021-03-31 PROCEDURE — 94761 N-INVAS EAR/PLS OXIMETRY MLT: CPT

## 2021-03-31 PROCEDURE — 2580000003 HC RX 258: Performed by: INTERNAL MEDICINE

## 2021-03-31 PROCEDURE — 99232 SBSQ HOSP IP/OBS MODERATE 35: CPT | Performed by: INTERNAL MEDICINE

## 2021-03-31 PROCEDURE — 6370000000 HC RX 637 (ALT 250 FOR IP): Performed by: PHYSICIAN ASSISTANT

## 2021-03-31 PROCEDURE — 6370000000 HC RX 637 (ALT 250 FOR IP): Performed by: STUDENT IN AN ORGANIZED HEALTH CARE EDUCATION/TRAINING PROGRAM

## 2021-03-31 PROCEDURE — 2580000003 HC RX 258: Performed by: STUDENT IN AN ORGANIZED HEALTH CARE EDUCATION/TRAINING PROGRAM

## 2021-03-31 PROCEDURE — 6360000002 HC RX W HCPCS: Performed by: INTERNAL MEDICINE

## 2021-03-31 PROCEDURE — 6360000002 HC RX W HCPCS: Performed by: STUDENT IN AN ORGANIZED HEALTH CARE EDUCATION/TRAINING PROGRAM

## 2021-03-31 PROCEDURE — 2700000000 HC OXYGEN THERAPY PER DAY

## 2021-03-31 PROCEDURE — 94640 AIRWAY INHALATION TREATMENT: CPT

## 2021-03-31 PROCEDURE — 1200000000 HC SEMI PRIVATE

## 2021-03-31 PROCEDURE — 6370000000 HC RX 637 (ALT 250 FOR IP): Performed by: INTERNAL MEDICINE

## 2021-03-31 RX ORDER — CHLORHEXIDINE GLUCONATE 0.12 MG/ML
15 RINSE ORAL 2 TIMES DAILY
Status: DISCONTINUED | OUTPATIENT
Start: 2021-03-31 | End: 2021-04-01 | Stop reason: HOSPADM

## 2021-03-31 RX ADMIN — MIDODRINE HYDROCHLORIDE 10 MG: 5 TABLET ORAL at 10:31

## 2021-03-31 RX ADMIN — ACETAMINOPHEN 650 MG: 325 TABLET ORAL at 23:14

## 2021-03-31 RX ADMIN — BUDESONIDE AND FORMOTEROL FUMARATE DIHYDRATE 2 PUFF: 160; 4.5 AEROSOL RESPIRATORY (INHALATION) at 08:16

## 2021-03-31 RX ADMIN — GABAPENTIN 300 MG: 300 CAPSULE ORAL at 10:27

## 2021-03-31 RX ADMIN — OXYCODONE HYDROCHLORIDE 20 MG: 10 TABLET ORAL at 10:24

## 2021-03-31 RX ADMIN — RANOLAZINE 500 MG: 500 TABLET, FILM COATED, EXTENDED RELEASE ORAL at 10:27

## 2021-03-31 RX ADMIN — OXYCODONE HYDROCHLORIDE 20 MG: 10 TABLET ORAL at 23:14

## 2021-03-31 RX ADMIN — LEVETIRACETAM 1000 MG: 500 TABLET, FILM COATED ORAL at 10:25

## 2021-03-31 RX ADMIN — Medication: at 13:53

## 2021-03-31 RX ADMIN — MEROPENEM 2000 MG: 1 INJECTION, POWDER, FOR SOLUTION INTRAVENOUS at 23:22

## 2021-03-31 RX ADMIN — ACETAMINOPHEN 650 MG: 325 TABLET ORAL at 10:24

## 2021-03-31 RX ADMIN — CLOPIDOGREL BISULFATE 75 MG: 75 TABLET ORAL at 10:27

## 2021-03-31 RX ADMIN — ALBUTEROL SULFATE 2 PUFF: 90 AEROSOL, METERED RESPIRATORY (INHALATION) at 22:45

## 2021-03-31 RX ADMIN — FAMOTIDINE 20 MG: 20 TABLET ORAL at 20:40

## 2021-03-31 RX ADMIN — FERROUS GLUCONATE TAB 324 MG (37.5 MG ELEMENTAL IRON) 324 MG: 324 (37.5 FE) TAB at 10:31

## 2021-03-31 RX ADMIN — SPIRONOLACTONE 12.5 MG: 25 TABLET ORAL at 10:28

## 2021-03-31 RX ADMIN — BUMETANIDE 1 MG: 1 TABLET ORAL at 10:45

## 2021-03-31 RX ADMIN — 0.12% CHLORHEXIDINE GLUCONATE 15 ML: 1.2 RINSE ORAL at 20:40

## 2021-03-31 RX ADMIN — Medication 2 PUFF: at 22:46

## 2021-03-31 RX ADMIN — POTASSIUM CHLORIDE 10 MEQ: 750 TABLET, FILM COATED, EXTENDED RELEASE ORAL at 10:25

## 2021-03-31 RX ADMIN — DULOXETINE HYDROCHLORIDE 60 MG: 30 CAPSULE, DELAYED RELEASE ORAL at 10:28

## 2021-03-31 RX ADMIN — Medication 2 PUFF: at 08:17

## 2021-03-31 RX ADMIN — MIRTAZAPINE 30 MG: 15 TABLET, FILM COATED ORAL at 20:40

## 2021-03-31 RX ADMIN — ALBUTEROL SULFATE 2 PUFF: 90 AEROSOL, METERED RESPIRATORY (INHALATION) at 15:51

## 2021-03-31 RX ADMIN — 0.12% CHLORHEXIDINE GLUCONATE 15 ML: 1.2 RINSE ORAL at 13:53

## 2021-03-31 RX ADMIN — Medication 2 PUFF: at 12:06

## 2021-03-31 RX ADMIN — POTASSIUM CHLORIDE 10 MEQ: 750 TABLET, FILM COATED, EXTENDED RELEASE ORAL at 20:40

## 2021-03-31 RX ADMIN — MEROPENEM 2000 MG: 1 INJECTION, POWDER, FOR SOLUTION INTRAVENOUS at 15:08

## 2021-03-31 RX ADMIN — SODIUM CHLORIDE, PRESERVATIVE FREE 10 ML: 5 INJECTION INTRAVENOUS at 10:28

## 2021-03-31 RX ADMIN — ACETAMINOPHEN 650 MG: 325 TABLET ORAL at 01:51

## 2021-03-31 RX ADMIN — OXYCODONE HYDROCHLORIDE 20 MG: 10 TABLET ORAL at 14:03

## 2021-03-31 RX ADMIN — ALBUTEROL SULFATE 2 PUFF: 90 AEROSOL, METERED RESPIRATORY (INHALATION) at 12:06

## 2021-03-31 RX ADMIN — OXYCODONE HYDROCHLORIDE 20 MG: 10 TABLET ORAL at 18:14

## 2021-03-31 RX ADMIN — PROMETHAZINE HYDROCHLORIDE 12.5 MG: 12.5 TABLET ORAL at 23:14

## 2021-03-31 RX ADMIN — OXYCODONE HYDROCHLORIDE 20 MG: 10 TABLET ORAL at 01:51

## 2021-03-31 RX ADMIN — Medication: at 20:40

## 2021-03-31 RX ADMIN — MEROPENEM 2000 MG: 1 INJECTION, POWDER, FOR SOLUTION INTRAVENOUS at 06:30

## 2021-03-31 RX ADMIN — GABAPENTIN 300 MG: 300 CAPSULE ORAL at 13:52

## 2021-03-31 RX ADMIN — CYANOCOBALAMIN TAB 1000 MCG 1000 MCG: 1000 TAB at 10:26

## 2021-03-31 RX ADMIN — GABAPENTIN 300 MG: 300 CAPSULE ORAL at 20:40

## 2021-03-31 RX ADMIN — MINOCYCLINE HYDROCHLORIDE 100 MG: 100 CAPSULE ORAL at 10:25

## 2021-03-31 RX ADMIN — DABIGATRAN ETEXILATE MESYLATE 150 MG: 150 CAPSULE ORAL at 10:26

## 2021-03-31 RX ADMIN — Medication 2 PUFF: at 15:51

## 2021-03-31 RX ADMIN — MIDODRINE HYDROCHLORIDE 10 MG: 5 TABLET ORAL at 13:53

## 2021-03-31 RX ADMIN — ONDANSETRON 4 MG: 2 INJECTION INTRAMUSCULAR; INTRAVENOUS at 10:24

## 2021-03-31 RX ADMIN — ACETAMINOPHEN 650 MG: 325 TABLET ORAL at 18:14

## 2021-03-31 RX ADMIN — ATORVASTATIN CALCIUM 80 MG: 80 TABLET, FILM COATED ORAL at 20:40

## 2021-03-31 RX ADMIN — OXYCODONE HYDROCHLORIDE 20 MG: 10 TABLET ORAL at 06:22

## 2021-03-31 RX ADMIN — FAMOTIDINE 20 MG: 20 TABLET ORAL at 10:27

## 2021-03-31 RX ADMIN — RANOLAZINE 500 MG: 500 TABLET, FILM COATED, EXTENDED RELEASE ORAL at 20:40

## 2021-03-31 RX ADMIN — LEVOTHYROXINE SODIUM 150 MCG: 150 TABLET ORAL at 06:22

## 2021-03-31 RX ADMIN — CHLORHEXIDINE GLUCONATE: 4 LIQUID TOPICAL at 15:08

## 2021-03-31 RX ADMIN — ALBUTEROL SULFATE 2 PUFF: 90 AEROSOL, METERED RESPIRATORY (INHALATION) at 08:16

## 2021-03-31 RX ADMIN — PANTOPRAZOLE SODIUM 40 MG: 40 TABLET, DELAYED RELEASE ORAL at 06:22

## 2021-03-31 RX ADMIN — BUDESONIDE AND FORMOTEROL FUMARATE DIHYDRATE 2 PUFF: 160; 4.5 AEROSOL RESPIRATORY (INHALATION) at 22:46

## 2021-03-31 RX ADMIN — METHYLPREDNISOLONE SODIUM SUCCINATE 40 MG: 40 INJECTION, POWDER, FOR SOLUTION INTRAMUSCULAR; INTRAVENOUS at 10:28

## 2021-03-31 RX ADMIN — ACARBOSE 25 MG: 50 TABLET ORAL at 13:52

## 2021-03-31 RX ADMIN — FLUCONAZOLE 200 MG: 100 TABLET ORAL at 10:27

## 2021-03-31 RX ADMIN — MAGNESIUM OXIDE 400 MG: 400 TABLET ORAL at 10:27

## 2021-03-31 RX ADMIN — ASPIRIN 81 MG CHEWABLE TABLET 81 MG: 81 TABLET CHEWABLE at 10:27

## 2021-03-31 RX ADMIN — PROMETHAZINE HYDROCHLORIDE 12.5 MG: 12.5 TABLET ORAL at 01:51

## 2021-03-31 RX ADMIN — SODIUM CHLORIDE, PRESERVATIVE FREE 10 ML: 5 INJECTION INTRAVENOUS at 20:41

## 2021-03-31 RX ADMIN — PROMETHAZINE HYDROCHLORIDE 12.5 MG: 12.5 TABLET ORAL at 18:14

## 2021-03-31 RX ADMIN — MINOCYCLINE HYDROCHLORIDE 100 MG: 100 CAPSULE ORAL at 20:40

## 2021-03-31 RX ADMIN — AMIODARONE HYDROCHLORIDE 400 MG: 200 TABLET ORAL at 10:27

## 2021-03-31 RX ADMIN — Medication 500 MG: at 10:27

## 2021-03-31 RX ADMIN — VANCOMYCIN HYDROCHLORIDE 1000 MG: 1 INJECTION, POWDER, LYOPHILIZED, FOR SOLUTION INTRAVENOUS at 13:53

## 2021-03-31 RX ADMIN — ACARBOSE 25 MG: 50 TABLET ORAL at 10:26

## 2021-03-31 RX ADMIN — DABIGATRAN ETEXILATE MESYLATE 150 MG: 150 CAPSULE ORAL at 20:40

## 2021-03-31 ASSESSMENT — PAIN DESCRIPTION - DESCRIPTORS: DESCRIPTORS: ACHING

## 2021-03-31 ASSESSMENT — PAIN DESCRIPTION - ONSET: ONSET: ON-GOING

## 2021-03-31 ASSESSMENT — PAIN SCALES - GENERAL: PAINLEVEL_OUTOF10: 8

## 2021-03-31 NOTE — PROGRESS NOTES
[facility] [address]   Progress Note  [facility] 0 1 2      Date: 3/31/2021   Patient: Pretty Krishnamurthy   : 1960   DOA: 3/26/2021   MRN: [id number]   ROOM#: [id number]     Admit Date: 3/26/2021     Collaborating Urologist on Call at time of admission: Dr. Edward Armstrong  CC: Chest, abdominal, and right flank pain  Reason for Consult: Recurrent UTI's    Subjective:     Pain: mild, no nausea and no vomiting,   Bowel Movement/Flatus:   Yes  Voidinfr wood catheter in place, urine clear yellow     Pt resting in bed, states she is comfortable today. Discussed recommendation for bedside cystoscopy today, pt verbalizes understanding and is agreeable to proceed. Diagnosis: Incomplete Bladder Emptying, Recurrent UTI's, and possible Neurogenic Bladder    Procedure: Bedside Cystoscopy, wood catheter exchange    Provider: Agnes Cantrell PA-C    Complications: none    Specimens: none were obtained    Indication for Procedure: Pretty Krishnamurthy is a 62y.o. year old female with history of CAD, CVA, CHF, Multiple Sclerosis, and recurrent UTI's who presented with chest, abdominal, and right flank pain. She is wheel-chair bound. Pt has a chronic indwelling catheter since 2021 d/t being on so many diuretics and trouble making it to the bathroom before leaking. When she is able to make it to the bathroom, she does not think she empties her bladder completely and suspects she has some degree of neurogenic bladder. Pt is interested in having an SPT placed to help empty bladder and prevent heavy recurrence of UTI's. Decision for cystoscopy to evaluate for any anatomical abnormality that may explain her incomplete bladder emptying. Description of procedure: The patient was placed in the dorsal lithotomy position and 10cc water aspirated from wood balloon and 16fr wood catheter was removed. External genitalia exam revealed no obvious cystocele.  Genitalia sterilely prepped with chlorhexidine, and then draped in the usual fashion. Flexible bedside cystoscopy was inserted under camera vision. Urethra was unremarkable with no strictures or lesions. The scope was passed into the bladder and bladder was carefully surveyed. Bladder mucosa was normal besides one 2cm area of erythema noted posterior bladder wall, likely secondary to wood catheter irritation. Bilateral ureteral orifices appeared normal. Bladder trabeculation +1. The cystoscope was then removed and a 16fr wood catheter was inserted and wood balloon inflated with 10cc sterile water. Pt tolerated well. RN Britany Boone present and assisted during procedure. Objective:    Vitals:    BP (!) 105/58   Pulse 54   Temp 97.1 °F (36.2 °C) (Oral)   Resp 16   Ht 5' (1.524 m)   Wt 131 lb 3.2 oz (59.5 kg)   SpO2 95%   BMI 25.62 kg/m²    Temp  Av.7 °F (36.5 °C)  Min: 97.1 °F (36.2 °C)  Max: 98.1 °F (36.7 °C)       Intake/Output Summary (Last 24 hours) at 3/31/2021 0858  Last data filed at 3/30/2021 2300  Gross per 24 hour   Intake    Output 3250 ml   Net -3250 ml       Physical Exam:   General appearance: alert, appears stated age, cooperative and no distress  Head: Normocephalic, without obvious abnormality, atraumatic  Back: No CVA tenderness  Abdomen: Soft, non-tender, non-distended   : 16fr wood catheter in place, urine clear yellow.     Labs:   WBC:    Lab Results   Component Value Date    WBC 13.4 2021      Hemoglobin/Hematocrit:    Lab Results   Component Value Date    HGB 9.8 2021    HCT 31.2 2021      BMP:   Lab Results   Component Value Date     2021    K 4.8 2021     2021    CO2 31 2021    BUN 9 2021    LABALBU 3.1 2021    CREATININE 0.6 2021    CALCIUM 8.8 2021    GFRAA >60 2021    LABGLOM >60 2021      PT/INR:    Lab Results   Component Value Date    PROTIME 11.8 2021    PROTIME 10.2 2012    INR 0.98 2021      PTT:    Lab Results Component Value Date    APTT 27.4 01/15/2021     Urine Culture:  YEAST 75,000 CFU/ml No further workup     Imaging:  Ct Abdomen Pelvis Wo Contrast Additional Contrast? None    Result Date: 3/26/2021  EXAMINATION: CT OF THE ABDOMEN AND PELVIS WITHOUT CONTRAST 3/26/2021 7:39 pm TECHNIQUE: CT of the abdomen and pelvis was performed without the administration of intravenous contrast. Multiplanar reformatted images are provided for review. Dose modulation, iterative reconstruction, and/or weight based adjustment of the mA/kV was utilized to reduce the radiation dose to as low as reasonably achievable. COMPARISON: 02/12/2021 HISTORY: ORDERING SYSTEM PROVIDED HISTORY: abd pain/dysuria TECHNOLOGIST PROVIDED HISTORY: Reason for exam:->abd pain/dysuria Additional Contrast?->None Decision Support Exception->Emergency Medical Condition (MA) Reason for Exam: abd pain/dysuria Acuity: Acute Type of Exam: Initial Additional signs and symptoms: surg. hx; gastric bypass,gallbladder,hysterectomy,appendectomy Relevant Medical/Surgical History: none FINDINGS: Lower Chest: Slight prominence of the pulmonary vasculature. No pleural effusion. Normal heart size. Loop recorder the left anterior chest. Additional cardiac medical device is present anteriorly in the right ventricle thought to be an implanted defibrillator. Organs: Liver is normal in appearance without worrisome focal lesion on these noncontrast images. Small stable high-density cyst present anteriorly in the mid right kidney unchanged. Small nonobstructing calculus remains in the mid left kidney. No hydronephrosis. Spleen and gallbladder are absent. Prominent splenules anteriorly the left upper quadrant. .  Other abdominal organs appear normal. GI/Bowel: Sutures present on the stomach from prior gastric bypass surgery. Appendix surgically absent. Moderate amount of stool throughout the colon. No bowel obstruction. No evident significant diverticular disease.  Pelvis: Uterus is surgically absent. Coppola catheter decompresses the bladder. No free fluid or adenopathy. Significant scan artifact from right total hip replacement. Peritoneum/Retroperitoneum: No mass, adenopathy, or ascites. Normal size of the aorta. IVC filter in place and unchanged. No free air. Bones/Soft Tissues: No acute abnormality. Prior right total hip replacement. No acute intra-abnormality identified. Stable small nonobstructing calculus in mid left kidney. Slight vascular congestion. No acute abnormality of the kidneys or bladder evident. Xr Chest Portable    Result Date: 3/28/2021  EXAMINATION: ONE XRAY VIEW OF THE CHEST 3/28/2021 2:43 pm COMPARISON: 03/26/2021 HISTORY: ORDERING SYSTEM PROVIDED HISTORY: r/o pna TECHNOLOGIST PROVIDED HISTORY: Reason for exam:->r/o pna FINDINGS: Cardiomediastinal silhouette is stable. New nonspecific left retrocardiac opacity. No pleural effusion or pneumothorax. No gross bony abnormality. New nonspecific left retrocardiac opacity, atelectasis versus evolving pneumonia. Xr Chest Portable    Result Date: 3/26/2021  EXAMINATION: ONE XRAY VIEW OF THE CHEST 3/26/2021 2:49 pm COMPARISON: 02/26/2021 HISTORY: ORDERING SYSTEM PROVIDED HISTORY: chest pain TECHNOLOGIST PROVIDED HISTORY: Reason for exam:->chest pain Reason for Exam: chest pain Acuity: Unknown Type of Exam: Initial Additional signs and symptoms: unknown Relevant Medical/Surgical History: CHF, CAD, asthma FINDINGS: Cardial pericardial silhouette is stable in appearance. Leadless pacemaker is again found. Midline sternotomy wires are unchanged in configuration. Mariaa catheter is unchanged. Chronic interstitial opacities are seen throughout both lungs. No focal infiltrates are identified. No pneumothorax is seen. No free air. No acute bony abnormality. No acute abnormality detected. Assessment & Plan:      Lisa Slater is a 62y.o. year old female admitted 3/26/2021 for UTI.    1) Complicated UTI              3/26/21 Urine cx + e.coli ESBL >100,000   3/28/21 Urine cx +Yeast 75,000 (old catheter)              WBC 13.4, afebrile              On IV Merrem and oral Diflucan; plan for outpatient IV abx therapy  2) Feelings of Incomplete Bladder Emptying 16fr wood catheter in place, last exchanged 3/29/21              Likely has some degree of neurogenic bladder secondary to her MS   3/31/21 Bedside cystoscopy with ~2cm area of erythema posterior bladder wall, likely secondary to wood catheter irritation. Otherwise normal.              Pt is very interested in having an SPT placed, agree that this will be a better potential long-term solution than indwelling urethral catheter. Recommend SPT placement by IR as outpatient. She will need to hold her Pradaxa x3 days and Plavix x5 days prior to procedure. We will arrange this as outpatient with IR.     Pt stable from a  standpoint. Will sign off, please call with any questions. Pt to follow up in our office 2 weeks after SPT placement by IR. Patient seen and examined, chart reviewed.      Electronically signed by Concepción Canseco PA-C on 3/31/2021 at 8:58 AM

## 2021-03-31 NOTE — PROGRESS NOTES
Comprehensive Nutrition Assessment    Type and Reason for Visit:  Reassess    Nutrition Recommendations/Plan:   · Resume General Diet as timely as able  · Offer protein containing snacks as able (usually refuses oral supplements)  · Continue appetite stimulant    Nutrition Assessment:  Pt known to RD with ongoing chronic severe malnutrition admitted with recurrent UTI's, NPO today for bedside cystoscopy noted. Has been eating sub optimally here, consuming less than half of General Diet. Will continue to follow as high nutrition risk. Malnutrition Assessment:  Malnutrition Status:  Insufficient data(current problem chronic severe malnutrition)    Context:  Chronic Illness       Estimated Daily Nutrient Needs:  Energy (kcal):  2913-5692 (30-35 juancho/kg); Weight Used for Energy Requirements:  Current     Protein (g):  56-70 (1.2-1.5 g/kg); Weight Used for Protein Requirements:  Current        Fluid (ml/day):  1600; Method Used for Fluid Requirements:  1 ml/kcal      Nutrition Related Findings:  not available on visit-contact isolation, hx MS wheelchair bound, remote hx roun-en-y, PN and PEG, chronic pain 9/10, meds noted: precose, oscal, Fe, Mag-ox, remeron, B-12, vitamin D      Wounds:  None       Current Nutrition Therapies:    Diet NPO Effective Now    Anthropometric Measures:  · Height: 5' (152.4 cm)  · Current Body Weight: 131 lb 3.2 oz (59.5 kg)   · Admission Body Weight: 131 lb 3.2 oz (59.5 kg)    · Usual Body Weight: 117 lb 1 oz (53.1 kg)(per records from outside facility)     · Ideal Body Weight: 100 lbs; % Ideal Body Weight 103 %   · BMI: 25.6  · BMI Categories: Overweight (BMI 25.0-29. 9)       Nutrition Diagnosis:   · Predicted inadequate energy intake related to pain, other (comment)(reduced appetite in chronic illness) as evidenced by NPO or clear liquid status due to medical condition, poor intake prior to admission    Nutrition Interventions:   Food and/or Nutrient Delivery:  Start Oral Diet, Snacks Nutrition Education/Counseling:  No recommendation at this time   Coordination of Nutrition Care:  Continue to monitor while inpatient    Goals:  Patient will tolerate diet to consume at least 50-75% at meals during stay       Nutrition Monitoring and Evaluation:   Behavioral-Environmental Outcomes:  None Identified   Food/Nutrient Intake Outcomes:  Diet Advancement/Tolerance, Food and Nutrient Intake  Physical Signs/Symptoms Outcomes:  Biochemical Data, Meal Time Behavior, GI Status, Skin, Weight, Nausea or Vomiting     Discharge Planning:    Continue current diet     Electronically signed by Alexis Santos RD, LD on 3/31/21 at 9:20 AM EDT    Contact: [phone]

## 2021-03-31 NOTE — PROGRESS NOTES
[address] [facility]  consulted by Dr. Emil Pruitt for monitoring and adjustment. Indication for treatment: Pneumonia  Goal trough: 15 mcg/mL, -600     Pertinent Laboratory Values:   Temp Readings from Last 3 Encounters:   03/31/21 99 °F (37.2 °C) (Oral)   02/27/21 98.7 °F (37.1 °C)   02/12/21 98.9 °F (37.2 °C)     Recent Labs     03/29/21  0555 03/30/21  0540   WBC 9.0 13.4*     Recent Labs     03/29/21  0555   BUN 9   CREATININE 0.6     Estimated Creatinine Clearance: 81 mL/min (based on SCr of 0.6 mg/dL). Intake/Output Summary (Last 24 hours) at 3/31/2021 1626  Last data filed at 3/31/2021 1508  Gross per 24 hour   Intake 350 ml   Output 550 ml   Net -200 ml       Pertinent Cultures:  Date    Source    Results  3/27   MRSA nasal screen  Positive            Vancomycin level:   TROUGH:  No results for input(s): VANCOTROUGH in the last 72 hours. RANDOM:  No results for input(s): VANCORANDOM in the last 72 hours. Assessment:  · WBC and temperature: elevated WBC (receiving methylprednisolone), afebrile  · SCr, BUN, and urine output: previously at baseline   · Day(s) of therapy: 1   · Vancomycin concentration: to be collected    Plan:  · Start vancomycin 1000 mg q12h based on historic dosing requirements   · Of note, patient will accumulate   · Pharmacy will continue to monitor patient and adjust therapy as indicated    Melissaamauryjeannette 3 4/3 @3076    Thank you for the consult.   Lm Page, SelenaD, BCPS   3/31/2021 4:26 PM

## 2021-03-31 NOTE — CARE COORDINATION
CM collaborated with urology PA. Pt has to be off of the bld thinners. Supra pubic unable to be done. Will have to have as an outpt. CM called IR to see about schedule as outpt. CM informed that that the Urology office would have to schedule. CM updated Poonam MAGALLON.   1136 CM into see pt, pt is very nauseated. Notified nursing. CM informed pt that CM from [place] waiver program called. CM received permission to Call Shannon Clifton [phone]. CM called and left a message. [phone] CM called Trever Bhakta at [employer] and informed that prob discharge [facility]

## 2021-03-31 NOTE — PROGRESS NOTES
Infectious Disease Progress Note  3/31/2021   Patient Name: Mati Romero : 1960       Reason for visit: F/u ESBL E coli CAUTI, possible pneumonia? History:? Interval history noted  Has more nausea today. Physical Exam:  Vital Signs: BP (!) 105/58   Pulse 54   Temp 97.1 °F (36.2 °C) (Oral)   Resp 16   Ht 5' (1.524 m)   Wt 131 lb 3.2 oz (59.5 kg)   SpO2 95%   BMI 25.62 kg/m²     Gen: alert and oriented X3, no distress  Skin: no stigmata of endocarditis  Wounds: C/D/I  HEMT: AT/NC Oropharynx pink, moist, and without lesions or exudates; dentition in good state of repair  Eyes: PERRLA, EOMI, conjunctiva pink, sclera anicteric. Neck: Supple. Trachea midline. No LAD. Chest: bilateral rhonchi  Heart: RRR and no MRG. Abd: soft, non-distended, no tenderness, no hepatomegaly. Normoactive bowel sounds. Ext: no clubbing, cyanosis, or edema  Catheter Site: without erythema or tenderness  LDA: CVC:right ACW mediport, Urethral catheter:  Neuro: Mental status intact. CN 2-12 intact and no focal sensory or motor deficits     Radiologic / Imaging / TESTING  3/28/2021 2:43 pm     COMPARISON:   2021     HISTORY:   ORDERING SYSTEM PROVIDED HISTORY: r/o pna   TECHNOLOGIST PROVIDED HISTORY:   Reason for exam:->r/o pna     FINDINGS:   Cardiomediastinal silhouette is stable. New nonspecific left retrocardiac   opacity. No pleural effusion or pneumothorax. No gross bony abnormality. Impression:   New nonspecific left retrocardiac opacity, atelectasis versus evolving   pneumonia. Labs:    No results found for this or any previous visit (from the past 24 hour(s)).   CULTURE results: Invalid input(s): BLOOD CULTURE,  URINE CULTURE, SURGICAL CULTURE    Diagnosis:  Patient Active Problem List   Diagnosis    Chest pain    CAD (coronary artery disease)    Hyperlipidemia    Thyroid disease    Acute exacerbation of CHF (congestive heart failure) ([facility] [address]    CHF exacerbation (HCC)    Congestive heart (urinary tract infection)  Active Problems:    Pneumonia of left lower lobe due to infectious organism    Flank pain    Asplenia after surgical procedure    S/P cardiac pacemaker procedure    Recurrent UTI    Immunoglobulin deficiency ([address]  Resolved Problems:    * No resolved hospital problems. *      Impression and plan   Summary and rationale: Patient is a 62 y.o.   female with surgical asplenia, MS, neurogenic bladder, CHF with pacemaker, who presented with suprapubic abdominal pain. Dx with CAUTI POA, urine cx positive for ESBL E coli. Presently has rhonchorous breathing, cough and a new left lower infiltrate. Has pneumonia. Concerned about CVID. PPSV-23 given on 3/30/21   Clinical status: MRSA nares positive,    Therapeutic: continue meropenem, extended dosing. Minocycline 100 mg p.o. twice daily for 2 weeks may discharge home when ready with ertapenem 1g daily to complete a cumulative 2 week course.  Start vancomycin for MRSA pneumonia (MRSA nares positive)   Fluconazole 200 mg p.o. daily for 2 weeks   Diagnostic:   F/u: MRSA nares, if positive then will add anti-MRSA coverage.  Other: Plans for suprapubic cystostomy noted.         Electronically signed by: Electronically signed by Georgette Lyman MD on 3/31/2021 at 11:48 AM

## 2021-03-31 NOTE — PROGRESS NOTES
Hospitalist Progress Note      Name:  Rocio Coppola /Age/Sex: 1960  (62 y.o. female)   MRN & CSN:  [id number] & [id number] Admission Date/Time: 3/26/2021  5:16 PM   Location:  [address] [place] [address] PCP: Cristina Lee MD         Hospital Day: 6    ASSESSMENT:  Rocio Coppola is a 62 y.o.  female who presented to the hospital with pain. CT abdomen pelvis without acute pathology but showed stable small nonobstructing calculus in the mid left kidney. Urine culture grew ESBL E. coli as well as yeast.  MRSA nasal swab positive. #.  Catheter associated UTIPOA  #.  ESBL E. coli UTI  #.  Yeast UTI  #. Urinary retentionchronic indwelling catheter in place. Plan  for suprapubic catheter terminated since IR requested that the patient be off Plavix for 5 days and Pradaxa for 3-days prior to procedure. SPT will have to be done on outpatient basis. #.  CADs/p CABG and PCI to LAD with stent placement in 2019  #. Chronic hypoxic respiratory failurechronically on 3 L of oxygen continuously at home  #. Paroxysmal A. fibS/p ablation in the past.  On Pradaxa  #. Bradycardias/p Micra pacemaker placement in the past  #.  Multiple sclerosiswheelchair-bound  #. ICD pocket infectionwith removal of the ICD in the past  #. Chronic systolic CHFstable. No exacerbation  #. Right lower extremity DVT2020. On Pradaxa  #. Hypothyroidismon levothyroxine  #. Seizure disorderon Keppra  #.   Hypokalemiaresolved    PLAN:  Fluconazole 200 mg daily for a total of 2 weeks  Minocycline 100 mg twice daily for a total of 2 weeks  Continue meropenem  Ertapenem 1 g daily on discharge for community for 2 weeks course    Diet Diet NPO Effective Now   DVT Prophylaxis [x] Pradaxa, []  Heparin, [] SCDs, [] Ambulation   GI Prophylaxis [] PPI,  [] H2 Blocker,  [] Carafate,  [] Diet/Tube Feeds   Code Status Full Code   Disposition  Home   MDM [] Low, [] Moderate,[x]  High     Chief complaint/Interval History/ROS     Chief Complaint: Abdominal pain    INTERVAL HISTORY:    3/31: He is nauseated this morning. No plans for suprapubic catheter placement on this admission    3/30: Overall stable. Nontoxic-appearing. ROS:  No chest pain. No abdominal pain. No nausea. No vomiting. Objective: Intake/Output Summary (Last 24 hours) at 3/31/2021 1150  Last data filed at 3/30/2021 2300  Gross per 24 hour   Intake    Output 550 ml   Net -550 ml      Vitals:   Vitals:    03/31/21 0817   BP:    Pulse:    Resp: 16   Temp:    SpO2: 95%     Physical Exam:   GEN: Awake female, nontoxic appearing. Answers questions appropriate. EYES: No eye discharge. HENT: Mucous membranes moist.  No nasal discharge. NECK: Supple  RESP: Bibasilar crackles. Symmetric chest expansion. Accessory muscle use. CV: RRR. No pitting extremity edema. GI: Abdomen soft. Nondistended. : Coppola catheter in place  MSK: No bony fractures. No gross forms. SKIN: warm, dry, no rashes,  NEURO: Cranial nerves appear grossly intact, normal speech.   PSYCH: Awake, alert, oriented    Medications:   Medications:    mupirocin   Nasal BID    chlorhexidine  15 mL Mouth/Throat BID    chlorhexidine gluconate   Topical Daily    fluconazole  200 mg Oral Daily    minocycline  100 mg Oral BID    meropenem  2,000 mg Intravenous Q8H    methylPREDNISolone  40 mg Intravenous Daily    potassium chloride  10 mEq Oral BID    pantoprazole  40 mg Oral QAM AC    mirtazapine  30 mg Oral Nightly    midodrine  10 mg Oral TID WC    clopidogrel  75 mg Oral Daily    atorvastatin  80 mg Oral Nightly    acarbose  25 mg Oral TID WC    budesonide-formoterol  2 puff Inhalation BID    bumetanide  1 mg Oral Daily    calcium elemental  500 mg Oral Daily    dabigatran  150 mg Oral BID    DULoxetine  60 mg Oral Daily    vitamin D  50,000 Units Oral Once per day on Mon Thu    gabapentin  300 mg Oral TID    ranolazine  500 mg Oral BID    spironolactone  12.5 mg Oral Daily    cyanocobalamin  1,000 mcg Oral Daily    famotidine  20 mg Oral BID    ferrous gluconate  324 mg Oral BID WC    levETIRAcetam  1,000 mg Oral Daily    levothyroxine  150 mcg Oral QAM AC    magnesium oxide  400 mg Oral Daily    amiodarone  400 mg Oral Daily    aspirin  81 mg Oral Daily    sodium chloride flush  10 mL Intravenous 2 times per day    albuterol sulfate HFA  2 puff Inhalation 4x daily    ipratropium  2 puff Inhalation 4x daily    acetaminophen  1,000 mg Oral Once      Infusions:    sodium chloride       PRN Meds: oxyCODONE, 20 mg, Q3H PRN  docusate sodium, 100 mg, BID PRN  benzocaine-menthol, 1 lozenge, Q2H PRN  sodium chloride flush, 10 mL, PRN  sodium chloride, 25 mL, PRN  promethazine, 12.5 mg, Q6H PRN    Or  ondansetron, 4 mg, Q6H PRN  acetaminophen, 650 mg, Q6H PRN    Or  acetaminophen, 650 mg, Q6H PRN  polyethylene glycol, 17 g, Daily PRN  ipratropium-albuterol, 3 mL, Q4H PRN          Electronically signed by Ángel Calderon MD on 3/31/2021 at 11:50 AM

## 2021-03-31 NOTE — PROGRESS NOTES
[facility] PHYSICAL REHABILITATION [place]  [address] [place],[address]  Phone: [phone]    Fax [phone]                  Patti Cavazos MD, Antelmo Llanes MD, Luis M Razo MD, Leatrice Deist, MD Cyril Lutes, MD Emma Apgar, MD Shirleyann Herb, MD Gemma Copper, APRN      [facility], APRN  Grant Mc, [address] [place] [address] Lauren, APRMARLIN    Cardiology Progress Note     Today's Plan: continue to monitor    Admit Date:  3/26/2021    Consult reason/ Seen today for: chest pain    Subjective and  Overnight Events: Patient is lethargic today. She says she is feeling worse today    Assessment / Plan / Recommendation:     1. CAD: troponin's are negative. Chest pain resolved. Plan medical therapy only at this time. Continue   2. Atrial fibrillation: sinus rhythm today. Continue Pradaxa 150 mg twice daily. In care potassium is 4.8 today. magnesium 1.9 3/26/2021. Recommend keeping potassium 4-4.5 and magnesium 2-2.2 in patients with atrial fibrillation. 3. R ventricle PPM (Micra): Interrogation 3/28/2021 shows normal functioning right ventricular pacemaker lowest rate set at 40 with battery life of approximately 8 years. February patient had ventricular rate decreased from 50-40 due to high occurrence of RV pacing to protect patient from pacemaker induced cardiomyopathy as she already has reduced ejection fraction. 4. history of syncope: Patient denies any current episodes of syncope since being on higher dose of midodrine. 5. History of ventricular tachycardia and ventricular fibrillation. Patient status post ICD x2 both occurrences patient developed  Endocarditis and subcutaneous pacemaker and wires had to be removed. Will ask life vest rep to coordinate with patient to verify device is working properly. 6. Ischemic cardiomyopathy with reduced ejection fraction without heart failure: Patient appears fairly euvolemic.   Echo done at [facility] 2021 history:  family history includes Cancer in her father; Diabetes in her mother; Heart Disease in her father, maternal grandmother, and mother; High Blood Pressure in her maternal grandmother and mother; High Cholesterol in her maternal grandmother and mother; Other in her sister. Allergies   Allergen Reactions    Fentanyl Swelling     Other reaction(s): Angioedema (swelling)    Moxifloxacin Hcl In Nacl Swelling and Rash    Povidone-Iodine Rash     Other reaction(s): AOF      Cyclobenzaprine      Other reaction(s): Other - comment required  \" it make my muscle very weak because of my MS\"  \" it make my muscle very weak because of my MS\"      Methocarbamol      Worsening edema  Other reaction(s): Other - comment required  Worsening edema  Worsening edema  Worsening edema      Tramadol Other (See Comments)     Doctor doesn't her to take due to heart problems  Seizures   Doctor doesn't her to take due to lowers seizure threshold.  Baclofen     Ibuprofen      \"Due to heart problems\"    Naproxen     Nsaids      \"Due to heart problems\"    Tizanidine     Tolmetin      \"Due to heart problems\"    Tramadol Hcl      Other reaction(s): Other - comment required  Told not to take due to history of seizures    Betadine [Povidone Iodine] Rash    Moxifloxacin Rash and Swelling     Lips swell and tingling in face   Lips swell and tingling in face   Lips swell and tingling in face   Lips swell and tingling in face   Around mouth       Review of Systems   All 14 systems were reviewed and are negative  Except for the positive findings  which as documented     /62   Pulse 55   Temp 99 °F (37.2 °C) (Oral)   Resp 8   Ht 5' (1.524 m)   Wt 131 lb 3.2 oz (59.5 kg)   SpO2 95%   BMI 25.62 kg/m²       Intake/Output Summary (Last 24 hours) at 3/31/2021 1755  Last data filed at 3/31/2021 1508  Gross per 24 hour   Intake 350 ml   Output 550 ml   Net -200 ml       Physical Exam  Vitals signs reviewed.    Constitutional: General: She is not in acute distress. Appearance: Normal appearance. She is not ill-appearing. HENT:      Head: Atraumatic. Neck:      Musculoskeletal: Neck supple. No muscular tenderness. Vascular: No carotid bruit. Cardiovascular:      Rate and Rhythm: Normal rate and regular rhythm. Pulses: Normal pulses. Heart sounds: Normal heart sounds. No murmur. Pulmonary:      Effort: Pulmonary effort is normal. No respiratory distress. Breath sounds: Rhonchi present. Musculoskeletal:         General: No swelling or deformity. Neurological:      Mental Status: She is alert.          Telemetry Reviewed:   Sinus rhythm     Medications:    mupirocin   Nasal BID    chlorhexidine  15 mL Mouth/Throat BID    chlorhexidine gluconate   Topical Daily    vancomycin  1,000 mg Intravenous Q12H    fluconazole  200 mg Oral Daily    minocycline  100 mg Oral BID    meropenem  2,000 mg Intravenous Q8H    methylPREDNISolone  40 mg Intravenous Daily    potassium chloride  10 mEq Oral BID    pantoprazole  40 mg Oral QAM AC    mirtazapine  30 mg Oral Nightly    midodrine  10 mg Oral TID WC    clopidogrel  75 mg Oral Daily    atorvastatin  80 mg Oral Nightly    acarbose  25 mg Oral TID WC    budesonide-formoterol  2 puff Inhalation BID    bumetanide  1 mg Oral Daily    calcium elemental  500 mg Oral Daily    dabigatran  150 mg Oral BID    DULoxetine  60 mg Oral Daily    vitamin D  50,000 Units Oral Once per day on Mon Thu    gabapentin  300 mg Oral TID    ranolazine  500 mg Oral BID    spironolactone  12.5 mg Oral Daily    cyanocobalamin  1,000 mcg Oral Daily    famotidine  20 mg Oral BID    ferrous gluconate  324 mg Oral BID WC    levETIRAcetam  1,000 mg Oral Daily    levothyroxine  150 mcg Oral QAM AC    magnesium oxide  400 mg Oral Daily    amiodarone  400 mg Oral Daily    aspirin  81 mg Oral Daily    sodium chloride flush  10 mL Intravenous 2 times per day    albuterol sulfate HFA  2 puff Inhalation 4x daily    ipratropium  2 puff Inhalation 4x daily    acetaminophen  1,000 mg Oral Once      sodium chloride       oxyCODONE, docusate sodium, benzocaine-menthol, sodium chloride flush, sodium chloride, promethazine **OR** ondansetron, acetaminophen **OR** acetaminophen, polyethylene glycol, ipratropium-albuterol    Lab Data:  CBC:   Recent Labs     03/29/21  0555 03/30/21  0540   WBC 9.0 13.4*   HGB 9.6* 9.8*   HCT 31.5* 31.2*   .3* 118.6*    249     BMP:   Recent Labs     03/29/21  0555      K 4.8      CO2 31   BUN 9   CREATININE 0.6     PT/INR: No results for input(s): PROTIME, INR in the last 72 hours. BNP:    No results for input(s): PROBNP in the last 72 hours. TROPONIN:   No results for input(s): TROPONINT in the last 72 hours. ECHO :   Echocardiogram 3/2/2021  Result Narrative   · Echo technically suboptimal due to poor acoustic windows  · Left ventricular chamber is low normal in size with global hypokinesis   and akinesis of the inferoposterior segments suggesting infarction. Global   function moderately reduced, ejection fraction 30-35%  · Right ventricle is normal in size and function. Ventricular systolic   pressure estimated at +/-30mmHg.  Device lead in the right heart chambers   · Left and right atria measure normal in size   · Valve structures are consistent with age. No valve dysfunction         All labs, medications and tests reviewed by myself , continue all other medications of all above medical condition listed as is except for changes mentioned above. Thank you very much for consult , please call with questions. Electronically signed by JESSE Fonseca CNP on 3/31/2021 at 5:55 PM     I have seen ,spoken to  and examined this patient personally, independently of the nurse practitioner. I have reviewed the hospital care given to date and reviewed all pertinent labs and imaging. The plan was developed mutually at the time of the visit with the patient,  NP  and myself. I have spoken with patient, nursing staff and provided written and verbal instructions . The above note has been reviewed and I agree with the assessment, diagnosis, and treatment plan with changes made by me as follows     CARDIOLOGY ATTENDING ADDENDUM    HPI:  I have reviewed the above HPI  And agree with above   Isis Gongora is a 62 y. o.year old who and presents with had concerns including Abdominal Pain, Flank Pain (right), and Chest Pain. Chief Complaint   Patient presents with    Abdominal Pain    Flank Pain     right    Chest Pain     Interval history:  tired    Physical Exam:  General:  Awake, alert, NAD  Head:normal  Eye:normal  Neck:  No JVD   Chest:  Clear to auscultation, respiration easy  Cardiovascular:  RRR S1S2  Abdomen:   nontender  Extremities:  tr edema  Pulses; palpable  Neuro: grossly normal      MEDICAL DECISION MAKING;    I agree with the above plan, which was planned by myself and discussed with NP.   Cardiac status unchanged  CPM        Aba Elizabeth MD [facility]

## 2021-03-31 NOTE — PROGRESS NOTES
Unable to place SPT today as pt is on Plavix. Plavix needs to be held for 5 days prior to procedure. Pradaxa needs to be held 3 days prior to procedure.

## 2021-04-01 VITALS
HEIGHT: 60 IN | RESPIRATION RATE: 17 BRPM | TEMPERATURE: 98.2 F | WEIGHT: 131.2 LBS | HEART RATE: 60 BPM | BODY MASS INDEX: 25.76 KG/M2 | DIASTOLIC BLOOD PRESSURE: 60 MMHG | SYSTOLIC BLOOD PRESSURE: 106 MMHG | OXYGEN SATURATION: 95 %

## 2021-04-01 PROCEDURE — 2700000000 HC OXYGEN THERAPY PER DAY

## 2021-04-01 PROCEDURE — APPSS45 APP SPLIT SHARED TIME 31-45 MINUTES: Performed by: NURSE PRACTITIONER

## 2021-04-01 PROCEDURE — 99231 SBSQ HOSP IP/OBS SF/LOW 25: CPT | Performed by: INTERNAL MEDICINE

## 2021-04-01 PROCEDURE — 6370000000 HC RX 637 (ALT 250 FOR IP): Performed by: PHYSICIAN ASSISTANT

## 2021-04-01 PROCEDURE — 2580000003 HC RX 258: Performed by: INTERNAL MEDICINE

## 2021-04-01 PROCEDURE — 2580000003 HC RX 258: Performed by: STUDENT IN AN ORGANIZED HEALTH CARE EDUCATION/TRAINING PROGRAM

## 2021-04-01 PROCEDURE — 6370000000 HC RX 637 (ALT 250 FOR IP): Performed by: INTERNAL MEDICINE

## 2021-04-01 PROCEDURE — 6360000002 HC RX W HCPCS: Performed by: INTERNAL MEDICINE

## 2021-04-01 PROCEDURE — 94761 N-INVAS EAR/PLS OXIMETRY MLT: CPT

## 2021-04-01 PROCEDURE — 6370000000 HC RX 637 (ALT 250 FOR IP): Performed by: STUDENT IN AN ORGANIZED HEALTH CARE EDUCATION/TRAINING PROGRAM

## 2021-04-01 PROCEDURE — 94640 AIRWAY INHALATION TREATMENT: CPT

## 2021-04-01 RX ORDER — FLUCONAZOLE 100 MG/1
200 TABLET ORAL DAILY
Qty: 22 TABLET | Refills: 0 | Status: SHIPPED | OUTPATIENT
Start: 2021-04-01 | End: 2021-04-12

## 2021-04-01 RX ORDER — METOPROLOL SUCCINATE 25 MG/1
12.5 TABLET, EXTENDED RELEASE ORAL DAILY
Status: DISCONTINUED | OUTPATIENT
Start: 2021-04-01 | End: 2021-04-01

## 2021-04-01 RX ORDER — MINOCYCLINE HYDROCHLORIDE 100 MG/1
100 CAPSULE ORAL 2 TIMES DAILY
Qty: 24 CAPSULE | Refills: 0 | Status: SHIPPED | OUTPATIENT
Start: 2021-04-01 | End: 2021-04-13

## 2021-04-01 RX ADMIN — FAMOTIDINE 20 MG: 20 TABLET ORAL at 08:10

## 2021-04-01 RX ADMIN — ACARBOSE 25 MG: 50 TABLET ORAL at 14:27

## 2021-04-01 RX ADMIN — FERROUS GLUCONATE TAB 324 MG (37.5 MG ELEMENTAL IRON) 324 MG: 324 (37.5 FE) TAB at 08:12

## 2021-04-01 RX ADMIN — LEVOTHYROXINE SODIUM 150 MCG: 150 TABLET ORAL at 05:31

## 2021-04-01 RX ADMIN — ACETAMINOPHEN 650 MG: 325 TABLET ORAL at 08:13

## 2021-04-01 RX ADMIN — BUDESONIDE AND FORMOTEROL FUMARATE DIHYDRATE 2 PUFF: 160; 4.5 AEROSOL RESPIRATORY (INHALATION) at 08:44

## 2021-04-01 RX ADMIN — OXYCODONE HYDROCHLORIDE 20 MG: 10 TABLET ORAL at 08:10

## 2021-04-01 RX ADMIN — CYANOCOBALAMIN TAB 1000 MCG 1000 MCG: 1000 TAB at 08:13

## 2021-04-01 RX ADMIN — Medication: at 08:11

## 2021-04-01 RX ADMIN — Medication 2 PUFF: at 16:04

## 2021-04-01 RX ADMIN — AMIODARONE HYDROCHLORIDE 400 MG: 200 TABLET ORAL at 08:12

## 2021-04-01 RX ADMIN — BUMETANIDE 1 MG: 1 TABLET ORAL at 08:11

## 2021-04-01 RX ADMIN — DULOXETINE HYDROCHLORIDE 60 MG: 30 CAPSULE, DELAYED RELEASE ORAL at 08:11

## 2021-04-01 RX ADMIN — SPIRONOLACTONE 12.5 MG: 25 TABLET ORAL at 08:13

## 2021-04-01 RX ADMIN — SODIUM CHLORIDE, PRESERVATIVE FREE 10 ML: 5 INJECTION INTRAVENOUS at 08:10

## 2021-04-01 RX ADMIN — ERGOCALCIFEROL 50000 UNITS: 1.25 CAPSULE ORAL at 08:12

## 2021-04-01 RX ADMIN — ALBUTEROL SULFATE 2 PUFF: 90 AEROSOL, METERED RESPIRATORY (INHALATION) at 16:04

## 2021-04-01 RX ADMIN — OXYCODONE HYDROCHLORIDE 20 MG: 10 TABLET ORAL at 04:13

## 2021-04-01 RX ADMIN — VANCOMYCIN HYDROCHLORIDE 1000 MG: 1 INJECTION, POWDER, LYOPHILIZED, FOR SOLUTION INTRAVENOUS at 14:28

## 2021-04-01 RX ADMIN — 0.12% CHLORHEXIDINE GLUCONATE 15 ML: 1.2 RINSE ORAL at 08:10

## 2021-04-01 RX ADMIN — MEROPENEM 2000 MG: 1 INJECTION, POWDER, FOR SOLUTION INTRAVENOUS at 07:45

## 2021-04-01 RX ADMIN — Medication 500 MG: at 08:18

## 2021-04-01 RX ADMIN — GABAPENTIN 300 MG: 300 CAPSULE ORAL at 14:26

## 2021-04-01 RX ADMIN — VANCOMYCIN HYDROCHLORIDE 1000 MG: 1 INJECTION, POWDER, LYOPHILIZED, FOR SOLUTION INTRAVENOUS at 04:08

## 2021-04-01 RX ADMIN — ALBUTEROL SULFATE 2 PUFF: 90 AEROSOL, METERED RESPIRATORY (INHALATION) at 12:13

## 2021-04-01 RX ADMIN — ACARBOSE 25 MG: 50 TABLET ORAL at 08:13

## 2021-04-01 RX ADMIN — MIDODRINE HYDROCHLORIDE 10 MG: 5 TABLET ORAL at 14:27

## 2021-04-01 RX ADMIN — MAGNESIUM OXIDE 400 MG: 400 TABLET ORAL at 08:12

## 2021-04-01 RX ADMIN — FLUCONAZOLE 200 MG: 100 TABLET ORAL at 08:13

## 2021-04-01 RX ADMIN — MINOCYCLINE HYDROCHLORIDE 100 MG: 100 CAPSULE ORAL at 08:11

## 2021-04-01 RX ADMIN — OXYCODONE HYDROCHLORIDE 20 MG: 10 TABLET ORAL at 11:04

## 2021-04-01 RX ADMIN — OXYCODONE HYDROCHLORIDE 20 MG: 10 TABLET ORAL at 14:26

## 2021-04-01 RX ADMIN — METHYLPREDNISOLONE SODIUM SUCCINATE 40 MG: 40 INJECTION, POWDER, FOR SOLUTION INTRAMUSCULAR; INTRAVENOUS at 08:11

## 2021-04-01 RX ADMIN — Medication 2 PUFF: at 08:44

## 2021-04-01 RX ADMIN — PANTOPRAZOLE SODIUM 40 MG: 40 TABLET, DELAYED RELEASE ORAL at 05:31

## 2021-04-01 RX ADMIN — RANOLAZINE 500 MG: 500 TABLET, FILM COATED, EXTENDED RELEASE ORAL at 08:14

## 2021-04-01 RX ADMIN — DABIGATRAN ETEXILATE MESYLATE 150 MG: 150 CAPSULE ORAL at 08:12

## 2021-04-01 RX ADMIN — LEVETIRACETAM 1000 MG: 500 TABLET, FILM COATED ORAL at 08:12

## 2021-04-01 RX ADMIN — Medication 2 PUFF: at 12:13

## 2021-04-01 RX ADMIN — PROMETHAZINE HYDROCHLORIDE 12.5 MG: 12.5 TABLET ORAL at 08:12

## 2021-04-01 RX ADMIN — GABAPENTIN 300 MG: 300 CAPSULE ORAL at 08:14

## 2021-04-01 RX ADMIN — PROMETHAZINE HYDROCHLORIDE 12.5 MG: 12.5 TABLET ORAL at 14:26

## 2021-04-01 RX ADMIN — POTASSIUM CHLORIDE 10 MEQ: 750 TABLET, FILM COATED, EXTENDED RELEASE ORAL at 08:13

## 2021-04-01 RX ADMIN — MIDODRINE HYDROCHLORIDE 10 MG: 5 TABLET ORAL at 08:11

## 2021-04-01 RX ADMIN — CLOPIDOGREL BISULFATE 75 MG: 75 TABLET ORAL at 08:14

## 2021-04-01 RX ADMIN — ASPIRIN 81 MG CHEWABLE TABLET 81 MG: 81 TABLET CHEWABLE at 08:12

## 2021-04-01 RX ADMIN — ALBUTEROL SULFATE 2 PUFF: 90 AEROSOL, METERED RESPIRATORY (INHALATION) at 08:44

## 2021-04-01 ASSESSMENT — PAIN SCALES - GENERAL
PAINLEVEL_OUTOF10: 9

## 2021-04-01 NOTE — PROGRESS NOTES
Hospitalist Progress Note      Name:  Dede Drake /Age/Sex: 1960  (62 y.o. female)   MRN & CSN:  [id number] & [id number] Admission Date/Time: 3/26/2021  5:16 PM   Location:  [address] [place] [address] PCP: Chandana Summers MD         Hospital Day: 7    ASSESSMENT:  Dede Drake is a 62 y.o.  female who presented to the hospital with pain. CT abdomen pelvis without acute pathology but showed stable small nonobstructing calculus in the mid left kidney. Urine culture grew ESBL E. coli as well as yeast.  MRSA nasal swab positive. #.  Catheter associated UTIPOA  #.  ESBL E. coli UTI  #.  Yeast UTI  #. Urinary retentionchronic indwelling catheter in place. Plan  for suprapubic catheter terminated since IR requested that the patient be off Plavix for 5 days and Pradaxa for 3-days prior to procedure. SPT will have to be done on outpatient basis. #.  CADs/p CABG and PCI to LAD with stent placement in 2019  #. Chronic hypoxic respiratory failurechronically on 3 L of oxygen continuously at home  #. Paroxysmal A. fibS/p ablation in the past.  On Pradaxa  #. Bradycardias/p Micra pacemaker placement in the past  #.  Multiple sclerosiswheelchair-bound  #. ICD pocket infectionwith removal of the ICD in the past  #. Chronic systolic CHFstable. No exacerbation  #. Right lower extremity DVT2020. On Pradaxa  #. Hypothyroidismon levothyroxine  #. Seizure disorderon Keppra  #.   Hypokalemiaresolved    PLAN:  Fluconazole 200 mg daily for a total of 2 weeks  Minocycline 100 mg twice daily for a total of 2 weeks  Continue meropenem  Ertapenem 1 g daily on discharge for community for 2 weeks course  Toprol-XL 12.5 daily    Diet DIET GENERAL;   DVT Prophylaxis [x] Pradaxa, []  Heparin, [] SCDs, [] Ambulation   GI Prophylaxis [] PPI,  [] H2 Blocker,  [] Carafate,  [] Diet/Tube Feeds   Code Status Full Code   Disposition  Home   MDM [] Low, [x] Moderate,[] High     Chief complaint/Interval History/ROS     Chief Complaint: Abdominal pain    INTERVAL HISTORY:    4/1: Patient is hesitant on going home today. She stated that she does not feel good. She stated that she would like to go home tomorrow. Patient initiated on a Toprol-XL 12.5 daily today. She is typically hypotensive and will have to monitor her to see how she responds to the Toprol-XL    3/31: He is nauseated this morning. No plans for suprapubic catheter placement on this admission    3/30: Overall stable. Nontoxic-appearing. ROS:  No chest pain. No abdominal pain. No nausea. No vomiting. Objective: Intake/Output Summary (Last 24 hours) at 4/1/2021 1057  Last data filed at 4/1/2021 0535  Gross per 24 hour   Intake 350 ml   Output 1325 ml   Net -975 ml      Vitals:   Vitals:    04/01/21 0844   BP:    Pulse:    Resp: 16   Temp:    SpO2: 95%     Physical Exam:   GEN: Awake female, nontoxic appearing. Answers questions appropriate. EYES: No eye discharge. HENT: Mucous membranes moist.  No nasal discharge. NECK: Supple  RESP: Bibasilar crackles. Symmetric chest expansion. No accessory muscle use. CV: RRR. No pitting extremity edema. GI: Abdomen soft. Nondistended. : Coppola catheter in place  MSK: No bony fractures. No gross forms. SKIN: warm, dry, no rashes,  NEURO: Cranial nerves appear grossly intact, normal speech.   PSYCH: Awake, alert, oriented    Medications:   Medications:    metoprolol succinate  12.5 mg Oral Daily    mupirocin   Nasal BID    chlorhexidine  15 mL Mouth/Throat BID    chlorhexidine gluconate   Topical Daily    vancomycin  1,000 mg Intravenous Q12H    fluconazole  200 mg Oral Daily    minocycline  100 mg Oral BID    meropenem  2,000 mg Intravenous Q8H    methylPREDNISolone  40 mg Intravenous Daily    potassium chloride  10 mEq Oral BID    pantoprazole  40 mg Oral QAM AC    mirtazapine  30 mg Oral Nightly    midodrine  10 mg Oral TID WC    clopidogrel  75 mg Oral Daily    atorvastatin  80 mg Oral Nightly    acarbose  25 mg Oral TID     budesonide-formoterol  2 puff Inhalation BID    bumetanide  1 mg Oral Daily    calcium elemental  500 mg Oral Daily    dabigatran  150 mg Oral BID    DULoxetine  60 mg Oral Daily    vitamin D  50,000 Units Oral Once per day on Mon Thu    gabapentin  300 mg Oral TID    ranolazine  500 mg Oral BID    spironolactone  12.5 mg Oral Daily    cyanocobalamin  1,000 mcg Oral Daily    famotidine  20 mg Oral BID    ferrous gluconate  324 mg Oral BID WC    levETIRAcetam  1,000 mg Oral Daily    levothyroxine  150 mcg Oral QAM AC    magnesium oxide  400 mg Oral Daily    amiodarone  400 mg Oral Daily    aspirin  81 mg Oral Daily    sodium chloride flush  10 mL Intravenous 2 times per day    albuterol sulfate HFA  2 puff Inhalation 4x daily    ipratropium  2 puff Inhalation 4x daily    acetaminophen  1,000 mg Oral Once      Infusions:    sodium chloride       PRN Meds: oxyCODONE, 20 mg, Q3H PRN  docusate sodium, 100 mg, BID PRN  benzocaine-menthol, 1 lozenge, Q2H PRN  sodium chloride flush, 10 mL, PRN  sodium chloride, 25 mL, PRN  promethazine, 12.5 mg, Q6H PRN    Or  ondansetron, 4 mg, Q6H PRN  acetaminophen, 650 mg, Q6H PRN    Or  acetaminophen, 650 mg, Q6H PRN  polyethylene glycol, 17 g, Daily PRN  ipratropium-albuterol, 3 mL, Q4H PRN          Electronically signed by Moni Dsouza MD on 4/1/2021 at 10:57 AM

## 2021-04-01 NOTE — CARE COORDINATION
Pt on discharge, left  ramon Gamez with [facility] at [phone] to notify of pt's discharge. Faxed AVS/ HHC orders/ Script to both departments at [phone] (infusion) and [phone] (nursing). TC to [place] at [phone], spoke with Ari Reynolds. Received call back from Carmen Overton confirming she received all needed info, pt is set for discharge.

## 2021-04-01 NOTE — PROGRESS NOTES
Infectious Disease Progress Note  2021   Patient Name: Angela Russo : 1960       Reason for visit: F/u ESBL E coli CAUTI, possible pneumonia? History:? Interval history noted  Has more nausea today. Physical Exam:  Vital Signs: /71   Pulse 63   Temp 97.7 °F (36.5 °C) (Oral)   Resp 16   Ht 5' (1.524 m)   Wt 131 lb 3.2 oz (59.5 kg)   SpO2 95%   BMI 25.62 kg/m²     Gen: alert and oriented X3, no distress  Skin: no stigmata of endocarditis  Wounds: C/D/I  HEMT: AT/NC Oropharynx pink, moist, and without lesions or exudates; dentition in good state of repair  Eyes: PERRLA, EOMI, conjunctiva pink, sclera anicteric. Neck: Supple. Trachea midline. No LAD. Chest: bilateral rhonchi  Heart: RRR and no MRG. Abd: soft, non-distended, no tenderness, no hepatomegaly. Normoactive bowel sounds. Ext: no clubbing, cyanosis, or edema  Catheter Site: without erythema or tenderness  LDA: CVC:right ACW mediport, Urethral catheter:  Neuro: Mental status intact. CN 2-12 intact and no focal sensory or motor deficits     Radiologic / Imaging / TESTING  3/28/2021 2:43 pm     COMPARISON:   2021     HISTORY:   ORDERING SYSTEM PROVIDED HISTORY: r/o pna   TECHNOLOGIST PROVIDED HISTORY:   Reason for exam:->r/o pna     FINDINGS:   Cardiomediastinal silhouette is stable. New nonspecific left retrocardiac   opacity. No pleural effusion or pneumothorax. No gross bony abnormality. Impression:   New nonspecific left retrocardiac opacity, atelectasis versus evolving   pneumonia. Labs:    No results found for this or any previous visit (from the past 24 hour(s)).   CULTURE results: Invalid input(s): BLOOD CULTURE,  URINE CULTURE, SURGICAL CULTURE    Diagnosis:  Patient Active Problem List   Diagnosis    Chest pain    CAD (coronary artery disease)    Hyperlipidemia    Thyroid disease    Acute exacerbation of CHF (congestive heart failure) ([facility]    CHF exacerbation (HCC)    Congestive heart failure ([facility]    Left upper quadrant pain    Pneumonia of left lower lobe due to infectious organism    Angina at rest [facility])    Coronary artery disease involving coronary bypass graft of native heart with angina pectoris ([facility])    Shortness of breath    Ischemic cardiomyopathy    Acute metabolic encephalopathy    Acute delirium    Sacral pain    Acute on chronic systolic CHF (congestive heart failure), NYHA class 3 ([facility])    Severe malnutrition ([facility])    Cellulitis    Cellulitis at gastrostomy tube site ([facility]    History of Maru-en-Y gastric bypass    Gastroparesis    Flank pain    Feeding tube dysfunction    Abdominal pain, epigastric    Gastrojejunostomy tube status ([facility]    Chronic malnutrition ([facility])    Asplenia after surgical procedure    Shockable cardiac rhythm detected by automated external defibrillator    Financial difficulties    Chest pain at rest    Closed nondisplaced transverse fracture of left patella    Contusion of right knee    Multifocal pneumonia    Abnormal cardiac function test    Chronic systolic heart failure (HCC)    Seizure disorder ([facility])    Hypothyroidism    Cardiomyopathy ([facility]    Acute chest pain    Fever in adult    Pyelonephritis    MRSA bacteremia    HFrEF (heart failure with reduced ejection fraction) ([facility])    Infected chest wall abrasion, right, subsequent encounter    Granuloma of skin    Open wound of right chest wall    MRSA infection    Klebsiella pneumoniae infection    Symptomatic bradycardia    History of pacemaker with removal secondary to infected leads    Avascular necrosis ([facility])    Multiple sclerosis ([facility])    Syncope    Bradycardia    Ulcer of chest wall, limited to breakdown of skin ([facility])    Pneumonia    Poor intravenous access    Syncope and collapse    UTI (urinary tract infection)    S/P cardiac pacemaker procedure    Recurrent UTI    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 *      Impression and plan   Summary and rationale: Patient is a 62 y.o.   female with surgical asplenia, MS, neurogenic bladder, CHF with pacemaker, who presented with suprapubic abdominal pain. Dx with CAUTI POA, urine cx positive for ESBL E coli. Presently has rhonchorous breathing, cough and a new left lower infiltrate. Has pneumonia. Concerned about CVID. PPSV-23 given on 3/30/21   Clinical status: MRSA nares positive,    Therapeutic: continue meropenem, extended dosing. Minocycline 100 mg p.o. twice daily for 2 weeks may discharge home when ready with ertapenem 1g daily to complete a cumulative 2 week course.  Start vancomycin for MRSA pneumonia (MRSA nares positive)   Fluconazole 200 mg p.o. daily for 2 weeks   Diagnostic:   F/u: MRSA nares, if positive then will add anti-MRSA coverage.  Other: Plans for suprapubic cystostomy noted.         Electronically signed by: Electronically signed by Romina Walker MD on 4/1/2021 at 11:39 AM

## 2021-04-01 NOTE — PROGRESS NOTES
[facility] [place] [facility] [address] [facility],Third Floor  Phone: [phone]    Fax [phone]                  Miguel Tristan MD, Milla Barr MD, Consuelo Aguilar MD, MD Brigette Perez MD Marjo Gubler, MD Christin Perkins, MD Idris Romero, JESSE Hadley, JESSE Hartmann [facility]    Cardiology Progress Note     Today's Plan: continue to monitor    Admit Date:  3/26/2021    Consult reason/ Seen today for: chest pain    Subjective and  Overnight Events: Patient is more alert today and states that she is not ready to go home. She states that she needs more time on antibiotic. Assessment / Plan / Recommendation:     1. CAD: troponin's are negative. Chest pain resolved. Plan medical therapy only at this time. Continue   2. Atrial fibrillation: sinus rhythm today. Continue Pradaxa 150 mg twice daily. Recommend keeping potassium 4-4.5 and magnesium 2-2.2 in patients with atrial fibrillation. 3. R ventricle PPM (Micra): Interrogation 3/28/2021 shows normal functioning right ventricular pacemaker lowest rate set at 40 with battery life of approximately 8 years. February patient had ventricular rate decreased from 50-40 due to high occurrence of RV pacing to protect patient from pacemaker induced cardiomyopathy as she already has reduced ejection fraction. 4. history of syncope: Patient denies any current episodes of syncope since being on higher dose of midodrine. 5. History of ventricular tachycardia and ventricular fibrillation. Patient status post ICD x2 both occurrences patient developed  Endocarditis and subcutaneous pacemaker and wires had to be removed. Will ask life vest rep to coordinate with patient to verify device is working properly. 6. Ischemic cardiomyopathy with reduced ejection fraction without heart failure: Patient appears fairly euvolemic.   Echo done at [facility] march 2, 2021 showed an ejection fraction of 30 to 35% global hypokinesis akinesis of inferior posterior segments right ventricle systolic pressure estimated 30 mmHg. Continue Aldactone. Patient not on beta-blocker or ACE ARB or Arni due to hypotension and syncope. Additionally No BB due to bradycardia. EPS 2/11/2021 states to avoid significant pacing to prevent pacer induced cardiomyopathy. 7. DVT prophylaxis if not contraindicated while in the hospital.    8. Preoperative risk: she is considered a moderate risk for adverse cardiac event. History of Presenting Illness:    Chief complain on admission : 62 y. o.year old who is admitted for  Chief Complaint   Patient presents with    Abdominal Pain    Flank Pain     right    Chest Pain        Past medical history:    has a past medical history of Acute delirium, Arrhythmia, Arthritis, Asplenia, Asthma, Avascular necrosis ([address] CAD (coronary artery disease), Cardiomyopathy ([address] Cerebral artery occlusion with cerebral infarction ([address] CHF (congestive heart failure) ([address] Enlarged liver, GERD (gastroesophageal reflux disease), H/O echocardiogram, History of blood transfusion, Hx of cardiovascular stress test, Hyperlipidemia, Hypertension, Lymphoma ([address] MI, old, Movement disorder, MS (multiple sclerosis) ([address] OP (osteoporosis), PE (pulmonary embolism), Pneumonia, Pyelonephritis, Seizures ([address] Severe malnutrition ([address] Spleen enlarged, and Thyroid disease. Past surgical history:   has a past surgical history that includes Coronary angioplasty with stent; Hysterectomy; Appendectomy; Cholecystectomy; Tonsillectomy; Gastric bypass surgery; Cardiac defibrillator placement; hip surgery; Abdomen surgery; Colonoscopy; Endoscopy, colon, diagnostic; hernia repair; joint replacement; skin biopsy; vascular surgery; Coronary artery bypass graft; [address] Surgery (N/A, 6/1/2020); [address] Surgery (N/A, 2/9/2021); and [place] Surgery (Right, 2/10/2021).   Social History:   reports that she has never smoked. She has never used smokeless tobacco. She reports that she does not drink alcohol or use drugs. Family history:  family history includes Cancer in her father; Diabetes in her mother; Heart Disease in her father, maternal grandmother, and mother; High Blood Pressure in her maternal grandmother and mother; High Cholesterol in her maternal grandmother and mother; Other in her sister. Allergies   Allergen Reactions    Fentanyl Swelling     Other reaction(s): Angioedema (swelling)    Moxifloxacin Hcl In Nacl Swelling and Rash    Povidone-Iodine Rash     Other reaction(s): AOF      Cyclobenzaprine      Other reaction(s): Other - comment required  \" it make my muscle very weak because of my MS\"  \" it make my muscle very weak because of my MS\"      Methocarbamol      Worsening edema  Other reaction(s): Other - comment required  Worsening edema  Worsening edema  Worsening edema      Tramadol Other (See Comments)     Doctor doesn't her to take due to heart problems  Seizures   Doctor doesn't her to take due to lowers seizure threshold.  Baclofen     Ibuprofen      \"Due to heart problems\"    Naproxen     Nsaids      \"Due to heart problems\"    Tizanidine     Tolmetin      \"Due to heart problems\"    Tramadol Hcl      Other reaction(s):  Other - comment required  Told not to take due to history of seizures    Betadine [Povidone Iodine] Rash    Moxifloxacin Rash and Swelling     Lips swell and tingling in face   Lips swell and tingling in face   Lips swell and tingling in face   Lips swell and tingling in face   Around mouth       Review of Systems   All 14 systems were reviewed and are negative  Except for the positive findings  which as documented     /71   Pulse 63   Temp 97.7 °F (36.5 °C) (Oral)   Resp 16   Ht 5' (1.524 m)   Wt 131 lb 3.2 oz (59.5 kg)   SpO2 93%   BMI 25.62 kg/m²       Intake/Output Summary (Last 24 hours) at 4/1/2021 1408  Last data filed at 4/1/2021 0535  Gross per 24 hour   Intake 350 ml   Output 1325 ml   Net -975 ml       Physical Exam  Vitals signs reviewed. Constitutional:       General: She is not in acute distress. Appearance: Normal appearance. She is not ill-appearing. HENT:      Head: Atraumatic. Neck:      Musculoskeletal: Neck supple. No muscular tenderness. Vascular: No carotid bruit. Cardiovascular:      Rate and Rhythm: Normal rate and regular rhythm. Pulses: Normal pulses. Heart sounds: Normal heart sounds. No murmur. Pulmonary:      Effort: Pulmonary effort is normal. No respiratory distress. Breath sounds: Rhonchi present. Musculoskeletal:         General: No swelling or deformity. Neurological:      Mental Status: She is alert.        Telemetry Reviewed:   Sinus bradycardia    Medications:    mupirocin   Nasal BID    chlorhexidine  15 mL Mouth/Throat BID    chlorhexidine gluconate   Topical Daily    vancomycin  1,000 mg Intravenous Q12H    fluconazole  200 mg Oral Daily    minocycline  100 mg Oral BID    meropenem  2,000 mg Intravenous Q8H    methylPREDNISolone  40 mg Intravenous Daily    potassium chloride  10 mEq Oral BID    pantoprazole  40 mg Oral QAM AC    mirtazapine  30 mg Oral Nightly    midodrine  10 mg Oral TID WC    clopidogrel  75 mg Oral Daily    atorvastatin  80 mg Oral Nightly    acarbose  25 mg Oral TID WC    budesonide-formoterol  2 puff Inhalation BID    bumetanide  1 mg Oral Daily    calcium elemental  500 mg Oral Daily    dabigatran  150 mg Oral BID    DULoxetine  60 mg Oral Daily    vitamin D  50,000 Units Oral Once per day on Mon Thu    gabapentin  300 mg Oral TID    ranolazine  500 mg Oral BID    spironolactone  12.5 mg Oral Daily    cyanocobalamin  1,000 mcg Oral Daily    famotidine  20 mg Oral BID    ferrous gluconate  324 mg Oral BID WC    levETIRAcetam  1,000 mg Oral Daily    levothyroxine  150 mcg Oral QAM AC    magnesium oxide  400 mg Oral Daily  amiodarone  400 mg Oral Daily    aspirin  81 mg Oral Daily    sodium chloride flush  10 mL Intravenous 2 times per day    albuterol sulfate HFA  2 puff Inhalation 4x daily    ipratropium  2 puff Inhalation 4x daily    acetaminophen  1,000 mg Oral Once      sodium chloride       oxyCODONE, docusate sodium, benzocaine-menthol, sodium chloride flush, sodium chloride, promethazine **OR** ondansetron, acetaminophen **OR** acetaminophen, polyethylene glycol, ipratropium-albuterol    Lab Data:  CBC:   Recent Labs     03/30/21  0540   WBC 13.4*   HGB 9.8*   HCT 31.2*   .6*        BMP:   No results for input(s): NA, K, CL, CO2, PHOS, BUN, CREATININE in the last 72 hours. Invalid input(s): CA  PT/INR: No results for input(s): PROTIME, INR in the last 72 hours. BNP:    No results for input(s): PROBNP in the last 72 hours. TROPONIN:   No results for input(s): TROPONINT in the last 72 hours. ECHO :   Echocardiogram 3/2/2021  Result Narrative   · Echo technically suboptimal due to poor acoustic windows  · Left ventricular chamber is low normal in size with global hypokinesis   and akinesis of the inferoposterior segments suggesting infarction. Global   function moderately reduced, ejection fraction 30-35%  · Right ventricle is normal in size and function. Ventricular systolic   pressure estimated at +/-30mmHg.  Device lead in the right heart chambers   · Left and right atria measure normal in size   · Valve structures are consistent with age. No valve dysfunction         All labs, medications and tests reviewed by myself , continue all other medications of all above medical condition listed as is except for changes mentioned above. Thank you very much for consult , please call with questions. Electronically signed by JESSE Clarke CNP on 4/1/2021 at 2:08 PM     I have seen ,spoken to  and examined this patient personally, independently of the nurse practitioner.  I have reviewed the hospital care given to date and reviewed all pertinent labs and imaging. The plan was developed mutually at the time of the visit with the patient,  NP  and myself. I have spoken with patient, nursing staff and provided written and verbal instructions . The above note has been reviewed and I agree with the assessment, diagnosis, and treatment plan with changes made by me as follows     CARDIOLOGY ATTENDING ADDENDUM    HPI:  I have reviewed the above HPI  And agree with above   Arabella Niño is a 62 y. o.year old who and presents with had concerns including Abdominal Pain, Flank Pain (right), and Chest Pain. Chief Complaint   Patient presents with    Abdominal Pain    Flank Pain     right    Chest Pain     Interval history:  No CP    Physical Exam:  General:  Awake, alert, NAD  Head:normal  Eye:normal  Neck:  No JVD   Chest:  Clear to auscultation, respiration easy  Cardiovascular:  RRR S1S2  Abdomen:   nontender  Extremities:  no edema  Pulses; palpable  Neuro: grossly normal      MEDICAL DECISION MAKING;    I agree with the above plan, which was planned by myself and discussed with NP.   Stable cardiac status  Will FU as needed        Román Maza MD [facility] [place]

## 2021-04-01 NOTE — DISCHARGE SUMMARY
the past  #. Chronic systolic CHFstable. No exacerbation. Not on ACE-I/ARB because chronically hypotensive. On aldactone. #.  Right lower extremity DVTNovember 2020. On Pradaxa  #. Hypothyroidismon levothyroxine  #. Seizure disorderon Keppra  #. Hypokalemiaresolved  #. Chronically hypotensiveon midodrine    The patient expressed appropriate understanding of and agreement with the discharge recommendations, medications, and plan. Consults this admission:  IP CONSULT TO HOSPITALIST  IP CONSULT TO INFECTIOUS DISEASES  IP CONSULT TO CARDIOLOGY  IP CONSULT TO UROLOGY  IP CONSULT TO INTERVENTIONAL RADIOLOGY  PHARMACY TO DOSE VANCOMYCIN  IP CONSULT TO [address] [place] [address] NEEDS    Discharge Instruction:   Follow-up with urology and infectious disease    Diet:  cardiac diet   Activity: activity as tolerated  Disposition: Discharged to:   [x]Home, [x]C, []SNF, []Acute Rehab, []Hospice   Condition on discharge: Stable    Discharge Medications:      Christopher Jimenez   Home Medication Instructions JAM:[id number]    Printed on:04/01/21 3525   Medication Information                      acarbose (PRECOSE) 25 MG tablet  Take 25 mg by mouth 3 times daily (with meals)             acetaminophen 650 MG TABS  Take 650 mg by mouth every 4 hours as needed             albuterol (PROVENTIL HFA;VENTOLIN HFA) 108 (90 BASE) MCG/ACT inhaler  Inhale 2 puffs into the lungs every 6 hours as needed.                amiodarone (CORDARONE) 200 MG tablet  Take 400 mg by mouth daily             aspirin 81 MG tablet  Take 1 tablet by mouth daily             atorvastatin (LIPITOR) 80 MG tablet  Take 80 mg by mouth nightly             beclomethasone (QVAR) 40 MCG/ACT inhaler  Inhale 2 puffs into the lungs 2 times daily             benzocaine-menthol (CEPACOL SORE THROAT) 15-3.6 MG lozenge  Take 1 lozenge by mouth every 2 hours as needed for Sore Throat             budesonide-formoterol (SYMBICORT) 160-4.5 MCG/ACT AERO  Inhale 2 puffs into the lungs 2 times daily             bumetanide (BUMEX) 1 MG tablet  Take 1 mg by mouth daily             calcium citrate (CALCITRATE) 950 (200 Ca) MG tablet  Take 1,425 mg by mouth daily             chlorhexidine gluconate (ANTISEPTIC SKIN CLEANSER) 4 % SOLN external solution  Apply topically daily             clopidogrel (PLAVIX) 75 MG tablet  Take 75 mg by mouth daily             cyanocobalamin 1000 MCG tablet  Take 1 tablet by mouth daily             dabigatran (PRADAXA) 75 MG capsule  Take 150 mg by mouth             docusate sodium (COLACE) 100 MG capsule  Take 100 mg by mouth 2 times daily as needed for Constipation             DULoxetine (CYMBALTA) 60 MG extended release capsule  Take 60 mg by mouth daily             ergocalciferol (ERGOCALCIFEROL) 1.25 MG (12295 UT) capsule  Take 50,000 Units by mouth Twice a Week             ertapenem (INVANZ) infusion  Infuse 1,000 mg intravenously every 24 hours for 9 days Compound per protocol.             famotidine (PEPCID) 20 MG tablet  Take 20 mg by mouth 2 times daily             ferrous gluconate (FERGON) 324 (38 Fe) MG tablet  Take 1 tablet by mouth 2 times daily             fluconazole (DIFLUCAN) 100 MG tablet  Take 2 tablets by mouth daily for 11 days             gabapentin (NEURONTIN) 300 MG capsule  Take 300 mg by mouth 3 times daily.              ipratropium-albuterol (DUONEB) 0.5-2.5 (3) MG/3ML SOLN nebulizer solution  Inhale 3 mLs into the lungs every 4 hours (while awake)             levETIRAcetam (KEPPRA) 1000 MG tablet  Take 1,000 mg by mouth daily             levothyroxine (SYNTHROID) 150 MCG tablet  Take 150 mcg by mouth every morning (before breakfast)             magnesium oxide (MAG-OX) 400 MG tablet  Take 400 mg by mouth daily             midodrine (PROAMATINE) 10 MG tablet  Take 1 tablet by mouth 3 times daily (with meals)             minocycline (MINOCIN;DYNACIN) 100 MG capsule  Take 1 capsule by mouth 2 times daily for 12 days mirtazapine (REMERON) 30 MG tablet  Take 30 mg by mouth nightly             nitroGLYCERIN (NITROSTAT) 0.4 MG SL tablet  Place 0.4 mg under the tongue as needed             omeprazole (PRILOSEC) 20 MG delayed release capsule  omeprazole 20 mg capsule,delayed release             potassium chloride (KLOR-CON M) 10 MEQ extended release tablet  Take 10 mEq by mouth 2 times daily 2 (10mEq)             promethazine (PHENERGAN) 25 MG tablet  Take 25 mg by mouth 4 times daily as needed             ranolazine (RANEXA) 500 MG extended release tablet  Take 500 mg by mouth every 12 hours             senna (SENOKOT) 8.6 MG tablet  Take 1 tablet by mouth daily             spironolactone (ALDACTONE) 25 MG tablet  Take 12.5 mg by mouth daily                 Objective Findings at Discharge:   /71   Pulse 63   Temp 97.7 °F (36.5 °C) (Oral)   Resp 16   Ht 5' (1.524 m)   Wt 131 lb 3.2 oz (59.5 kg)   SpO2 93%   BMI 25.62 kg/m²            PHYSICAL EXAM   GEN: Awake female, nontoxic appearing. Answers questions appropriate. EYES: No eye discharge. HENT: Mucous membranes moist.  No nasal discharge. NECK: Supple  RESP: Bibasilar crackles. Symmetric chest expansion. No accessory muscle use. CV: RRR. No pitting extremity edema. GI: Abdomen soft. Nondistended. : Coppola catheter in place  MSK: No bony fractures. No gross forms. SKIN: warm, dry, no rashes,  NEURO: Cranial nerves appear grossly intact, normal speech.   PSYCH: Awake, alert, oriented    BMP/CBC  Recent Labs     03/30/21  0540   WBC 13.4*   HCT 31.2*          IMAGING:  All imaging reviewed before discharge    Discharge Time of 36 minutes    Electronically signed by Ángel Calderon MD on 4/1/2021 at 2:09 PM

## 2021-04-01 NOTE — PROGRESS NOTES
CLINICAL PHARMACY NOTE: MEDS TO [address] Patient?: No  Total # of Prescriptions Filled: 2   The following medications were delivered to the patient:  · Minocycline 100mg  · Fluconazole 200mg  Total # of Interventions Completed: 1  Time Spent (min): 30    Additional Documentation:  Fluconazole was written for 2 100mg tablets daily. We don't carry the 100mg so we changed to 200mg once daily.

## 2021-04-02 ENCOUNTER — CARE COORDINATION (OUTPATIENT)
Dept: CASE MANAGEMENT | Age: 61
End: 2021-04-02

## 2021-04-03 LAB — TETANUS IGG AB: 2 IU/ML

## 2021-04-05 ENCOUNTER — CARE COORDINATION (OUTPATIENT)
Dept: CASE MANAGEMENT | Age: 61
End: 2021-04-05

## 2021-04-05 NOTE — CARE COORDINATION
[address] [facility] Transitions Initial Follow Up Call    Call within 2 business days of discharge: Yes    Patient: Florina Opitz Patient : 1960   MRN: [id number]  Reason for Admission:  Abdominal/ flank pain  Discharge Date: 21 RARS: Readmission Risk Score: 55      Last Discharge [facility]       Complaint Diagnosis Description Type Department Provider    3/26/21 Abdominal Pain; Flank Pain; Chest Pain Flank pain . .. ED to Hosp-Admission (Discharged) (ADMITTED) Chepe Giron MD; Quoc English. .. Follow Up: Attempted to reach patient for Care Transition follow up. First number is disconnected. No answer to phone/ second contact. Message left with CTN contact information and request for call back. Second and final attempt. No future appointments.     Geneva Granado RN

## 2021-04-06 ENCOUNTER — APPOINTMENT (OUTPATIENT)
Dept: GENERAL RADIOLOGY | Age: 61
End: 2021-04-06
Payer: MEDICARE

## 2021-04-06 ENCOUNTER — HOSPITAL ENCOUNTER (EMERGENCY)
Age: 61
Discharge: HOME | End: 2021-04-07
Attending: EMERGENCY MEDICINE
Payer: MEDICARE

## 2021-04-06 DIAGNOSIS — R53.83 FATIGUE, UNSPECIFIED TYPE: Primary | ICD-10-CM

## 2021-04-06 DIAGNOSIS — E16.2 HYPOGLYCEMIA: ICD-10-CM

## 2021-04-06 LAB
ALBUMIN SERPL-MCNC: 3 GM/DL (ref 3.4–5)
ALP BLD-CCNC: 75 IU/L (ref 40–129)
ALT SERPL-CCNC: 69 U/L (ref 10–40)
ANION GAP SERPL CALCULATED.3IONS-SCNC: 15 MMOL/L (ref 4–16)
AST SERPL-CCNC: 61 IU/L (ref 15–37)
BASOPHILS ABSOLUTE: 0 K/CU MM
BASOPHILS RELATIVE PERCENT: 0.4 % (ref 0–1)
BILIRUB SERPL-MCNC: 0.1 MG/DL (ref 0–1)
BUN BLDV-MCNC: 10 MG/DL (ref 6–23)
CALCIUM SERPL-MCNC: 7.8 MG/DL (ref 8.3–10.6)
CHLORIDE BLD-SCNC: 97 MMOL/L (ref 99–110)
CO2: 25 MMOL/L (ref 21–32)
CREAT SERPL-MCNC: 0.5 MG/DL (ref 0.6–1.1)
DIFFERENTIAL TYPE: ABNORMAL
EOSINOPHILS ABSOLUTE: 0 K/CU MM
EOSINOPHILS RELATIVE PERCENT: 0.4 % (ref 0–3)
GFR AFRICAN AMERICAN: >60 ML/MIN/1.73M2
GFR NON-AFRICAN AMERICAN: >60 ML/MIN/1.73M2
GLUCOSE BLD-MCNC: 58 MG/DL (ref 70–99)
HCT VFR BLD CALC: 36.5 % (ref 37–47)
HEMOGLOBIN: 11.3 GM/DL (ref 12.5–16)
IMMATURE NEUTROPHIL %: 0.4 % (ref 0–0.43)
LACTATE: 0.5 MMOL/L (ref 0.4–2)
LYMPHOCYTES ABSOLUTE: 0.7 K/CU MM
LYMPHOCYTES RELATIVE PERCENT: 12.5 % (ref 24–44)
MCH RBC QN AUTO: 35.4 PG (ref 27–31)
MCHC RBC AUTO-ENTMCNC: 31 % (ref 32–36)
MCV RBC AUTO: 114.4 FL (ref 78–100)
MONOCYTES ABSOLUTE: 0.3 K/CU MM
MONOCYTES RELATIVE PERCENT: 5.1 % (ref 0–4)
NUCLEATED RBC %: 0 %
PDW BLD-RTO: 13.6 % (ref 11.7–14.9)
PLATELET # BLD: 272 K/CU MM (ref 140–440)
PMV BLD AUTO: 9.7 FL (ref 7.5–11.1)
POTASSIUM SERPL-SCNC: 3.4 MMOL/L (ref 3.5–5.1)
PRO-BNP: 1784 PG/ML
RBC # BLD: 3.19 M/CU MM (ref 4.2–5.4)
SEGMENTED NEUTROPHILS ABSOLUTE COUNT: 4.3 K/CU MM
SEGMENTED NEUTROPHILS RELATIVE PERCENT: 81.2 % (ref 36–66)
SODIUM BLD-SCNC: 137 MMOL/L (ref 135–145)
TOTAL IMMATURE NEUTOROPHIL: 0.02 K/CU MM
TOTAL NUCLEATED RBC: 0 K/CU MM
TOTAL PROTEIN: 4.7 GM/DL (ref 6.4–8.2)
TROPONIN T: <0.01 NG/ML
WBC # BLD: 5.3 K/CU MM (ref 4–10.5)

## 2021-04-06 PROCEDURE — 6370000000 HC RX 637 (ALT 250 FOR IP): Performed by: EMERGENCY MEDICINE

## 2021-04-06 PROCEDURE — 80053 COMPREHEN METABOLIC PANEL: CPT

## 2021-04-06 PROCEDURE — 83880 ASSAY OF NATRIURETIC PEPTIDE: CPT

## 2021-04-06 PROCEDURE — 94640 AIRWAY INHALATION TREATMENT: CPT

## 2021-04-06 PROCEDURE — 96374 THER/PROPH/DIAG INJ IV PUSH: CPT

## 2021-04-06 PROCEDURE — 85025 COMPLETE CBC W/AUTO DIFF WBC: CPT

## 2021-04-06 PROCEDURE — 84484 ASSAY OF TROPONIN QUANT: CPT

## 2021-04-06 PROCEDURE — 83605 ASSAY OF LACTIC ACID: CPT

## 2021-04-06 PROCEDURE — 93005 ELECTROCARDIOGRAM TRACING: CPT | Performed by: EMERGENCY MEDICINE

## 2021-04-06 PROCEDURE — 83735 ASSAY OF MAGNESIUM: CPT

## 2021-04-06 PROCEDURE — 6360000002 HC RX W HCPCS: Performed by: EMERGENCY MEDICINE

## 2021-04-06 PROCEDURE — 2580000003 HC RX 258: Performed by: EMERGENCY MEDICINE

## 2021-04-06 PROCEDURE — 99284 EMERGENCY DEPT VISIT MOD MDM: CPT

## 2021-04-06 PROCEDURE — 71045 X-RAY EXAM CHEST 1 VIEW: CPT

## 2021-04-06 RX ORDER — IPRATROPIUM BROMIDE AND ALBUTEROL SULFATE 2.5; .5 MG/3ML; MG/3ML
1 SOLUTION RESPIRATORY (INHALATION) ONCE
Status: COMPLETED | OUTPATIENT
Start: 2021-04-06 | End: 2021-04-06

## 2021-04-06 RX ORDER — 0.9 % SODIUM CHLORIDE 0.9 %
500 INTRAVENOUS SOLUTION INTRAVENOUS ONCE
Status: COMPLETED | OUTPATIENT
Start: 2021-04-06 | End: 2021-04-07

## 2021-04-06 RX ORDER — ONDANSETRON 2 MG/ML
8 INJECTION INTRAMUSCULAR; INTRAVENOUS ONCE
Status: COMPLETED | OUTPATIENT
Start: 2021-04-06 | End: 2021-04-06

## 2021-04-06 RX ADMIN — ONDANSETRON 8 MG: 2 INJECTION INTRAMUSCULAR; INTRAVENOUS at 23:09

## 2021-04-06 RX ADMIN — IPRATROPIUM BROMIDE AND ALBUTEROL SULFATE 1 AMPULE: .5; 3 SOLUTION RESPIRATORY (INHALATION) at 23:00

## 2021-04-06 RX ADMIN — SODIUM CHLORIDE 500 ML: 9 INJECTION, SOLUTION INTRAVENOUS at 23:09

## 2021-04-06 ASSESSMENT — PAIN DESCRIPTION - PAIN TYPE: TYPE: ACUTE PAIN

## 2021-04-06 ASSESSMENT — PAIN DESCRIPTION - LOCATION: LOCATION: CHEST

## 2021-04-07 VITALS
RESPIRATION RATE: 13 BRPM | SYSTOLIC BLOOD PRESSURE: 145 MMHG | HEART RATE: 62 BPM | BODY MASS INDEX: 19.31 KG/M2 | HEIGHT: 63 IN | WEIGHT: 109 LBS | OXYGEN SATURATION: 99 % | TEMPERATURE: 98.7 F | DIASTOLIC BLOOD PRESSURE: 70 MMHG

## 2021-04-07 LAB
EKG ATRIAL RATE: 59 BPM
EKG DIAGNOSIS: NORMAL
EKG P AXIS: 14 DEGREES
EKG P-R INTERVAL: 134 MS
EKG Q-T INTERVAL: 404 MS
EKG QRS DURATION: 92 MS
EKG QTC CALCULATION (BAZETT): 399 MS
EKG R AXIS: -45 DEGREES
EKG T AXIS: 158 DEGREES
EKG VENTRICULAR RATE: 59 BPM
GLUCOSE BLD-MCNC: 61 MG/DL (ref 70–99)
MAGNESIUM: 1.8 MG/DL (ref 1.8–2.4)

## 2021-04-07 PROCEDURE — 82962 GLUCOSE BLOOD TEST: CPT

## 2021-04-07 PROCEDURE — 93010 ELECTROCARDIOGRAM REPORT: CPT | Performed by: INTERNAL MEDICINE

## 2021-04-07 RX ORDER — ONDANSETRON 4 MG/1
4 TABLET, ORALLY DISINTEGRATING ORAL EVERY 8 HOURS PRN
Qty: 15 TABLET | Refills: 0 | Status: SHIPPED | OUTPATIENT
Start: 2021-04-07

## 2021-04-07 NOTE — ED PROVIDER NOTES
Triage Chief Complaint:   Chest Pain and Shortness of Breath    [place]:  Laura Levin is a 62 y.o. female that presents with complaints of fatigue, congestion and shortness of breath over the past few days. States that symptoms are worse with exertion. States that she just feels tired and feels like she might die. Denies any chest pain, abdominal pain, vomiting, diarrhea, constipation. She has a chronic indwelling Coppola catheter. She was recently admitted for UTI and is still on antibiotics. States that her temperature has been between 99 and 100 °F.  States that she is very nauseated and has not been eating or drinking well. Denies any lower extremity pain or swelling. ROS:  At least 14 systems reviewed and otherwise acutely negative except as in the [address] Past Medical History:   Diagnosis Date    Acute delirium     Arrhythmia     Arthritis     Asplenia     Asthma     Avascular necrosis ([address]     CAD (coronary artery disease)     1st stent at age 46    Cardiomyopathy [facility])     Cerebral artery occlusion with cerebral infarction ([address]     CHF (congestive heart failure) (HCC)     Enlarged liver     GERD (gastroesophageal reflux disease)     H/O echocardiogram 4/6/16    EF 55%, trivial TR & MR, stage 1 diastolic dysfunction    History of blood transfusion     Hx of cardiovascular stress test 12/29/2015    Alessia: EF 45%. Pharmacologic stress myocardial perfusion scan shows a fixed inferior-lateral defect w/ no inducible ischemia. No evidence of ischemia. Inferior-lateral infarction.  Normal LVSF    Hyperlipidemia     Hypertension     Lymphoma ([address]     Denies    MI, old     Movement disorder     MS (multiple sclerosis) ([address]     OP (osteoporosis)     PE (pulmonary embolism)     trapese filter    Pneumonia     Pyelonephritis     Seizures ([address]     Severe malnutrition (HCC)     Spleen enlarged     Thyroid disease      Past Surgical History:   Procedure Laterality Date    ABDOMEN Glucose 58 (L) 70 - 99 MG/DL    Calcium 7.8 (L) 8.3 - 10.6 MG/DL    Albumin 3.0 (L) 3.4 - 5.0 GM/DL    Total Protein 4.7 (L) 6.4 - 8.2 GM/DL    Total Bilirubin 0.1 0.0 - 1.0 MG/DL    ALT 69 (H) 10 - 40 U/L    AST 61 (H) 15 - 37 IU/L    Alkaline Phosphatase 75 40 - 129 IU/L    GFR Non-African American >60 >60 mL/min/1.73m2    GFR African American >60 >60 mL/min/1.73m2    Anion Gap 15 4 - 16   Troponin   Result Value Ref Range    Troponin T <0.010 <0.01 NG/ML   Brain Natriuretic Peptide   Result Value Ref Range    Pro-BNP 1,784 (H) <300 PG/ML   Lactic Acid, Plasma   Result Value Ref Range    Lactate 0.5 0.4 - 2.0 mMOL/L   POCT Glucose   Result Value Ref Range    POC Glucose 61 (L) 70 - 99 MG/DL      Radiographs (if obtained):  [] The following radiograph was interpreted by myself in the absence of a radiologist:  [x] Radiologist's Report Reviewed:    EKG (if obtained): (All EKG's are interpreted by myself in the absence of a cardiologist)  Normal sinus rhythm with left axis deviation. Age-indeterminate inferior and anterolateral infarcts. QTc is normal.  No significant change compared with 3/27/2021. MDM:  Plan of care is discussed thoroughly with the patient and family if present. If performed, all imaging and lab work also discussed with patient. All relevant prior results and chart reviewed if available. Patient presents as above. She is in no acute distress and has normal vital signs. Presents with fatigue, congestion and does have rhonchorous breath sounds bilaterally. Patient started on IV fluids as she appears mildly dehydrated on exam and also given Zofran for nausea. Patient has a benign abdominal exam.  Overall does not appear toxic and some of this may be deconditioning from recent hospitalizations. Patient's metabolic work-up is largely unremarkable except for slight hypoglycemia. The patient is given some food and fluids here.  She states that she does not have much of an appetite and does not eat much. Repeat blood glucose is 61. She does not have a leukocytosis and is not acutely anemic. Chest x-ray shows no evidence of consolidative process or pulmonary edema. I doubt acute life-threatening or emergent process at this time that requires hospitalization. Patient was discharged home with Zofran to use for nausea and encouraged to eat/drink as much as she is able. Encouraged to follow-up with PCP this week. Return for any worsening symptoms. Clinical Impression:  1. Fatigue, unspecified type    2.  Hypoglycemia      (Please note that portions of this note may have been completed with a voice recognition program. Efforts were made to edit the dictations but occasionally words are mis-transcribed.)    MD Janes Nunez MD  04/07/21 0306

## 2021-04-07 NOTE — ED NOTES
Patient waiting on family for transport     George Mendieta, [address] [facility]  04/07/21 8413

## 2021-04-08 ENCOUNTER — CARE COORDINATION (OUTPATIENT)
Dept: CARE COORDINATION | Age: 61
End: 2021-04-08

## 2021-04-08 LAB
PNEUMOCOCCAL ANTIBODY TYPE 12F: 0.13 UG/ML
PNEUMOCOCCAL ANTIBODY TYPE 14: 2.06 UG/ML
PNEUMOCOCCAL ANTIBODY TYPE 18C: 1.03 UG/ML
PNEUMOCOCCAL ANTIBODY TYPE 19F: 1 UG/ML
PNEUMOCOCCAL ANTIBODY TYPE 1: 0.63 UG/ML
PNEUMOCOCCAL ANTIBODY TYPE 23F: 0.94 UG/ML
PNEUMOCOCCAL ANTIBODY TYPE 3: 0.33 UG/ML
PNEUMOCOCCAL ANTIBODY TYPE 4: 0.24 UG/ML
PNEUMOCOCCAL ANTIBODY TYPE 5: 0.74 UG/ML
PNEUMOCOCCAL ANTIBODY TYPE 6B: 1.81 UG/ML
PNEUMOCOCCAL ANTIBODY TYPE 7F: 0.8 UG/ML
PNEUMOCOCCAL ANTIBODY TYPE 8: 0.09 UG/ML
PNEUMOCOCCAL ANTIBODY TYPE 9N: 0.28 UG/ML
PNEUMOCOCCAL ANTIBODY TYPE 9V: 1.67 UG/ML
PNEUMOCOCCAL INTERPRETATION: NORMAL

## 2021-04-08 NOTE — CARE COORDINATION
Call to pt for ed f/u. First number is non working number. Second number recording states person unavailable. First attempt.

## 2021-04-09 ENCOUNTER — CARE COORDINATION (OUTPATIENT)
Dept: CARE COORDINATION | Age: 61
End: 2021-04-09

## 2021-04-09 NOTE — CARE COORDINATION
Call to pt for ed f/u, first number is non working number, second number with recording that person is unavailable. Second attempt, no further outreach planned.

## 2021-04-25 PROBLEM — N39.0 UTI (URINARY TRACT INFECTION): Status: RESOLVED | Noted: 2021-03-26 | Resolved: 2021-04-25

## 2021-05-14 NOTE — CONSULTS
CARDIOLOGY CONSULT NOTE   Reason for consultation: syncope, bradycardia    Referring physician:  Andrew Cole DO     Primary care physician: Jojo Richardson MD      Dear Dr. Justyn Arias  Thanks for the consult. History of present illness:Demetria is a 62 y. o.year old who  presents with  chest pain for last few years, happening daily, intermittent for 15 to 20 mins and aggravated with activity substernal also,reproducible with palpation, radiated to shoulder, 6/10, tender to touch,associated with shortness of breath, + sweating, nausea, she also complaint of syncope and was found to be bradycardia and hypotensive, her ICD was removed in [facility] for infection and placed a micro pacer in her. Chief Complaint   Patient presents with    Chest Pain     feels like \"really bad heart burn\". pt reports she took 3 NTG PTA without relief    Loss of Consciousness     c/o syncopal episodes since last night    Fatigue     C/O increased weakness since discharge from hospital for pneumonia     Blood pressure, cholesterol, blood glucose and weight are well controlled. Past medical history:    has a past medical history of Acute delirium, Arrhythmia, Arthritis, Asplenia, Asthma, Avascular necrosis ([address] CAD (coronary artery disease), Cardiomyopathy ([address] Cerebral artery occlusion with cerebral infarction ([address] CHF (congestive heart failure) ([address] Enlarged liver, GERD (gastroesophageal reflux disease), H/O echocardiogram, History of blood transfusion, Hx of cardiovascular stress test, Hyperlipidemia, Hypertension, Lymphoma ([address] MI, old, Movement disorder, MS (multiple sclerosis) ([address] OP (osteoporosis), PE (pulmonary embolism), Pneumonia, Pyelonephritis, Seizures ([address] Severe malnutrition ([address] Spleen enlarged, and Thyroid disease.   Past surgical history: has a past surgical history that includes Coronary angioplasty with stent; Hysterectomy; Appendectomy; Cholecystectomy; Tonsillectomy; Gastric bypass surgery; Cardiac defibrillator placement; hip surgery; Abdomen surgery; Colonoscopy; Endoscopy, colon, diagnostic; hernia repair; joint replacement; skin biopsy; vascular surgery; Coronary artery bypass graft; and Port Surgery (N/A, 6/1/2020). Social History:   reports that she has never smoked. She has never used smokeless tobacco. She reports that she does not drink alcohol or use drugs. Family history:   no family history of CAD, STROKE of DM    Allergies   Allergen Reactions    Fentanyl Swelling     Other reaction(s): Angioedema (swelling)    Moxifloxacin Hcl In Nacl Swelling and Rash    Povidone-Iodine Rash     Other reaction(s): AOF      Cyclobenzaprine      Other reaction(s): Other - comment required  \" it make my muscle very weak because of my MS\"  \" it make my muscle very weak because of my MS\"      Methocarbamol      Worsening edema  Other reaction(s): Other - comment required  Worsening edema  Worsening edema  Worsening edema      Tramadol Other (See Comments)     Doctor doesn't her to take due to heart problems  Seizures   Doctor doesn't her to take due to lowers seizure threshold.  Baclofen     Ibuprofen      \"Due to heart problems\"    Naproxen     Nsaids      \"Due to heart problems\"    Tizanidine     Tolmetin      \"Due to heart problems\"    Tramadol Hcl      Other reaction(s):  Other - comment required  Told not to take due to history of seizures    Betadine [Povidone Iodine] Rash    Moxifloxacin Rash and Swelling     Lips swell and tingling in face   Lips swell and tingling in face   Lips swell and tingling in face   Lips swell and tingling in face   Around mouth           albuterol sulfate  (90 Base) MCG/ACT inhaler 2 puff, BID      ipratropium-albuterol (DUONEB) nebulizer solution 3 mL, Q4H PRN   cefTRIAXone (ROCEPHIN) 1000 mg IVPB in 50 mL D5W minibag, Q24H      acetaminophen (TYLENOL) tablet 1,000 mg, Once      DOPamine (INTROPIN) 400 mg in dextrose 5 % 250 mL infusion, Continuous      sodium chloride flush 0.9 % injection 10 mL, 2 times per day      sodium chloride flush 0.9 % injection 10 mL, PRN      famotidine (PEPCID) injection 20 mg, BID      acetaminophen (TYLENOL) tablet 650 mg, Q6H PRN    Or      acetaminophen (TYLENOL) suppository 650 mg, Q6H PRN      polyethylene glycol (GLYCOLAX) packet 17 g, Daily PRN      nitroGLYCERIN (NITROSTAT) SL tablet 0.4 mg, Q5 Min PRN      potassium chloride (KLOR-CON M) extended release tablet 40 mEq, PRN    Or      potassium bicarb-citric acid (EFFER-K) effervescent tablet 40 mEq, PRN    Or      potassium chloride 10 mEq/100 mL IVPB (Peripheral Line), PRN      magnesium sulfate 2000 mg in 50 mL IVPB premix, PRN      clopidogrel (PLAVIX) tablet 75 mg, Daily      dabigatran (PRADAXA) capsule 150 mg, BID      DULoxetine (CYMBALTA) extended release capsule 60 mg, Daily      gabapentin (NEURONTIN) capsule 300 mg, TID      levothyroxine (SYNTHROID) tablet 150 mcg, QAM AC      levETIRAcetam (KEPPRA) tablet 1,000 mg, Daily      midodrine (PROAMATINE) tablet 10 mg, TID WC      mirtazapine (REMERON) tablet 30 mg, Nightly      oxyCODONE HCl (OXY-IR) immediate release tablet 20 mg, Q3H PRN      ranolazine (RANEXA) extended release tablet 500 mg, Q12H      amiodarone (CORDARONE) tablet 400 mg, Daily      atorvastatin (LIPITOR) tablet 80 mg, Nightly      budesonide-formoterol (SYMBICORT) 160-4.5 MCG/ACT inhaler 2 puff, BID      promethazine (PHENERGAN) tablet 25 mg, Q6H PRN      calcium elemental (OSCAL) tablet 500 mg, Daily      Current Facility-Administered Medications   Medication Dose Route Frequency Provider Last Rate Last Admin    albuterol sulfate  (90 Base) MCG/ACT inhaler 2 puff  2 puff Inhalation BID Christy Lyn MD  ipratropium-albuterol (DUONEB) nebulizer solution 3 mL  3 mL Inhalation Q4H PRN Omar Boss MD        cefTRIAXone (ROCEPHIN) 1000 mg IVPB in 50 mL D5W minibag  1,000 mg Intravenous Q24H Omar Boss MD   Stopped at 02/07/21 1415    acetaminophen (TYLENOL) tablet 1,000 mg  1,000 mg Oral Once Yoav Merchant DO        DOPamine (INTROPIN) 400 mg in dextrose 5 % 250 mL infusion  2-20 mcg/kg/min Intravenous Continuous Chriss Ramey DO 13.9 mL/hr at 02/07/21 1620 8.5 mcg/kg/min at 02/07/21 1620    sodium chloride flush 0.9 % injection 10 mL  10 mL Intravenous 2 times per day Baby Corea, APRN - CNP        sodium chloride flush 0.9 % injection 10 mL  10 mL Intravenous PRN Baby Corea, APRN - CNP        famotidine (PEPCID) injection 20 mg  20 mg Intravenous BID Baby Corea, APRN - CNP   20 mg at 02/07/21 0929    acetaminophen (TYLENOL) tablet 650 mg  650 mg Oral Q6H PRN Baby Corea, APRN - CNP   650 mg at 02/07/21 1344    Or    acetaminophen (TYLENOL) suppository 650 mg  650 mg Rectal Q6H PRN Baby Corea, APRN - CNP        polyethylene glycol (GLYCOLAX) packet 17 g  17 g Oral Daily PRN Baby Corea, APRN - CNP        nitroGLYCERIN (NITROSTAT) SL tablet 0.4 mg  0.4 mg Sublingual Q5 Min PRN Baby Corea, APRN - CNP        potassium chloride (KLOR-CON M) extended release tablet 40 mEq  40 mEq Oral PRN Baby Coera, APRN - CNP   40 mEq at 02/06/21 2331    Or    potassium bicarb-citric acid (EFFER-K) effervescent tablet 40 mEq  40 mEq Oral PRN Baby Corea, APRN - CNP        Or    potassium chloride 10 mEq/100 mL IVPB (Peripheral Line)  10 mEq Intravenous PRN Baby Corea, APRN - CNP        magnesium sulfate 2000 mg in 50 mL IVPB premix  2,000 mg Intravenous PRN Baby Corea, APRN - CNP        clopidogrel (PLAVIX) tablet 75 mg  75 mg Oral Daily Baby Corea, APRN - CNP   75 mg at 02/07/21 6410    dabigatran (PRADAXA) capsule 150 mg  150 mg Oral BID Baby JESSE Corea CNP   150 mg at 02/07/21 8230  DULoxetine (CYMBALTA) extended release capsule 60 mg  60 mg Oral Daily Izabella Brewster APRN - CNP   60 mg at 02/07/21 5348    gabapentin (NEURONTIN) capsule 300 mg  300 mg Oral TID Izabella Brewster, APRN - CNP   300 mg at 02/07/21 1345    levothyroxine (SYNTHROID) tablet 150 mcg  150 mcg Oral QAM AC Izabella Brewster, APRN - CNP   150 mcg at 02/07/21 0536    levETIRAcetam (KEPPRA) tablet 1,000 mg  1,000 mg Oral Daily Izabella Brewster, APRN - CNP   1,000 mg at 02/07/21 3937    midodrine (PROAMATINE) tablet 10 mg  10 mg Oral TID WC Izabella Brewster APRN - CNP   10 mg at 02/07/21 1625    mirtazapine (REMERON) tablet 30 mg  30 mg Oral Nightly Izabella Brewster, APRN - CNP   30 mg at 02/06/21 2331    oxyCODONE HCl (OXY-IR) immediate release tablet 20 mg  20 mg Oral Q3H PRN Radha Sheriff MD   20 mg at 02/07/21 1425    ranolazine (RANEXA) extended release tablet 500 mg  500 mg Oral Q12H Izabella Brewster, APRN - CNP   500 mg at 02/07/21 4976    amiodarone (CORDARONE) tablet 400 mg  400 mg Oral Daily Izabella Brewster, APRN - CNP   400 mg at 02/07/21 0765    atorvastatin (LIPITOR) tablet 80 mg  80 mg Oral Nightly Izabella Brewster, APRN - CNP   80 mg at 02/06/21 2332    budesonide-formoterol (SYMBICORT) 160-4.5 MCG/ACT inhaler 2 puff  2 puff Inhalation BID Izabella Brewster, APRN - CNP   2 puff at 02/07/21 0819    promethazine (PHENERGAN) tablet 25 mg  25 mg Oral Q6H PRN Jose Stroud PA-C   25 mg at 02/07/21 1345    calcium elemental (OSCAL) tablet 500 mg  500 mg Oral Daily Izabella Brewster, APRN - CNP   500 mg at 02/07/21 1345     Review of Systems:   · Constitutional: No Fever or Weight Loss   · Eyes: No Decreased Vision  · ENT: No Headaches, Hearing Loss or Vertigo  · Cardiovascular: No chest pain, dyspnea on exertion, palpitations or loss of consciousness  · Respiratory: No cough or wheezing    · Gastrointestinal: No abdominal pain, appetite loss, blood in stools, constipation, diarrhea or heartburn Musculoskeletal:  Intact distal pulses, No edema, No tenderness, No cyanosis, No clubbing. Good range of motion in all major joints. No tenderness to palpation or major deformities noted. Back- No tenderness. Integument:  Warm, Dry, No erythema, No rash. Skin: no rash, no ulcers  Lymphatic:  No lymphadenopathy noted. Neurologic:  Alert & oriented x 3, Normal motor function, Normal sensory function, No focal deficits noted. Psychiatric:  Affect normal, Judgment normal, Mood normal.   Lab Review   Recent Labs     02/07/21 0429   WBC 5.7   HGB 12.2*   HCT 42.2         Recent Labs     02/07/21 0429      K 4.1      CO2 19*   BUN 17   CREATININE 1.1     Recent Labs     02/07/21 0429   AST 18   ALT 10   BILITOT 0.2   ALKPHOS 125     No results for input(s): TROPONINI in the last 72 hours. Lab Results   Component Value Date    BNP 48 02/21/2013     Lab Results   Component Value Date    INR 0.92 01/15/2021    PROTIME 11.1 (L) 01/15/2021         EKG:    Chest Xray:    ECHO:  Labs, echo, meds reviewed  Assessment: 62 y. o.year old with PMH of  has a past medical history of Acute delirium, Arrhythmia, Arthritis, Asplenia, Asthma, Avascular necrosis ([address] CAD (coronary artery disease), Cardiomyopathy ([address] Cerebral artery occlusion with cerebral infarction ([address] CHF (congestive heart failure) ([address] Enlarged liver, GERD (gastroesophageal reflux disease), H/O echocardiogram, History of blood transfusion, Hx of cardiovascular stress test, Hyperlipidemia, Hypertension, Lymphoma ([address] MI, old, Movement disorder, MS (multiple sclerosis) ([address] OP (osteoporosis), PE (pulmonary embolism), Pneumonia, Pyelonephritis, Seizures ([address] Severe malnutrition ([address] Spleen enlarged, and Thyroid disease.       Recommendations: 1. Syncope and bradycardia: patient had new micropacer placed at [facility], pacer interrogated in ED, back up heart rate was 35 bpm, started on dopamine in ED and her pacer rate was adjusted to 50 bpm.will get EP tomorrow  2. CAD h/o LAD PCI THRU [place]: Continue aspirin and plavix continue statins, could not tolerate BB. Continue Midodrine  3. h/o PE: on pradaxa  4. ICM: had ICD which is extracted at [facility] due to bacteremia, h/o vtach and vfib, will , ? afib also, continue paradaxa plavix, and amiodarone.   5. H/o cva: stable  6. HTN: controlled, no current medication  7. MS: stable  8. Seizure: stable  All labs, medications and tests reviewed, continue all other medications of all above medical condition listed as is.          Ben Palacios MD, 2/7/2021 4:35 PM Offered and patient declined

## 2022-08-31 NOTE — ED TRIAGE NOTES
Orders only-mother instructed on dose, use and any potential SE. Pt states she was sleeping and was woke up short of breath and had severe midsternal chest pain radiating across her chest. Pt states she felt like she was going to pass out.

## 2023-05-23 NOTE — PROGRESS NOTES
Patient is resistant to discharge. I assured her that she is medically ready for discharge but continues to be resistant to discharge. Per CM, she did refuse discharge on previous admission and provided similar excuse as she did today. Humira Counseling:  I discussed with the patient the risks of adalimumab including but not limited to myelosuppression, immunosuppression, autoimmune hepatitis, demyelinating diseases, lymphoma, and serious infections.  The patient understands that monitoring is required including a PPD at baseline and must alert us or the primary physician if symptoms of infection or other concerning signs are noted.

## 2023-10-26 NOTE — CARE COORDINATION
CM received consult for patient to see why patient continues to keep coming to hospital for IV antibiotics. CM Dima reported that patient recently was treated for upper lobe PNA w/no improvement. Patient has been to [facility] in past and has been requesting to return for care again recently. CM went to visit patient and introduced self and explained role of CM. 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 Patient did report that she was very happy with Dr. Robert Arias and thrilled that he is hearing and wanting to listen to her. Patient states that she has always got infections and has been chronically ill for the past 33 years. Patient has a malabsorption disorder and pills will not work on patient. Patient also shared that she has been diagnosed w/ failure to thrive and CHF. Patient was just recently transferred to [facility] and then released. This angered patient very much according to patient as she does not feel anyone is listening to her health concerns. Patient reported that this is her 3rd trip to the hospital and she is \"fed up, but Thankfully. .. . patient finally got a great doctor tonight that is listening to her. \" Patient requested that CM ask doctor if she can get some more pain medication. Patient reported that she still has MRSA and gets septic easy. CM shared that above with Dr. Robert Arias. "I have little need for you or your questions!"

## (undated) DEVICE — CONTAINER,SPECIMEN,OR STERILE,4OZ: Brand: MEDLINE

## (undated) DEVICE — SOLUTION IV IRRIG POUR BRL 0.9% SODIUM CHL 2F7124

## (undated) DEVICE — DRESSING TRNSPAR W5XL4.5IN FLM SHT SEMIPERMEABLE WIND

## (undated) DEVICE — YANKAUER,BULB TIP,W/O VENT,RIGID,STERILE: Brand: MEDLINE

## (undated) DEVICE — GAUZE,SPONGE,4"X4",16PLY,XRAY,STRL,LF: Brand: MEDLINE

## (undated) DEVICE — INTENDED FOR TISSUE SEPARATION, AND OTHER PROCEDURES THAT REQUIRE A SHARP SURGICAL BLADE TO PUNCTURE OR CUT.: Brand: BARD-PARKER ® STAINLESS STEEL BLADES

## (undated) DEVICE — GLOVE ORANGE PI 7   MSG9070

## (undated) DEVICE — TOWEL,OR,DSP,ST,BLUE,STD,6/PK,12PK/CS: Brand: MEDLINE

## (undated) DEVICE — SPONGE GZ W4XL8IN COT WVN 12 PLY

## (undated) DEVICE — ADHESIVE SKIN CLSR 0.7ML TOP DERMBND ADV

## (undated) DEVICE — PENCIL ES CRD L10FT HND SWCHING ROCK SWCH W/ EDGE COAT BLDE

## (undated) DEVICE — GLOVE SURG SZ 65 THK91MIL LTX FREE SYN POLYISOPRENE

## (undated) DEVICE — SUTURE PROL SZ 2-0 L30IN NONABSORBABLE BLU L26MM SH 1/2 CIR 8833H

## (undated) DEVICE — DRAPE SHEET ULTRAGARD: Brand: MEDLINE

## (undated) DEVICE — SUTURE VCRL SZ 3-0 L27IN ABSRB UD L26MM SH 1/2 CIR J416H

## (undated) DEVICE — GEL US 20GM NONIRRITATING OVERWRAPPED FILE PCH TRNSMIT

## (undated) DEVICE — MINI STICK MAX COAXIAL MICROINTRODUCER KIT: Brand: ANGIODYNAMICS

## (undated) DEVICE — GLOVE ORANGE PI 7 1/2   MSG9075

## (undated) DEVICE — COUNTER NDL 30 COUNT FOAM STRP SGL MAG

## (undated) DEVICE — COVER US PRB W15XL120CM W/ GEL RUBBERBAND TAPE STRP FLD GEN

## (undated) DEVICE — PORT INFUS L55CM 0.016ML 0.4ML CATH OD2.2MM ID1.4MM INTRO: Type: IMPLANTABLE DEVICE | Status: NON-FUNCTIONAL

## (undated) DEVICE — JELLY,LUBE,STERILE,FLIP TOP,TUBE,2-OZ: Brand: MEDLINE

## (undated) DEVICE — GOWN,ECLIPSE,POLYRNF,BRTHSLV,L,30/CS: Brand: MEDLINE

## (undated) DEVICE — SUTURE COAT VCRL SZ 4-0 L18IN ABSRB UD L19MM PS-2 1/2 CIR J496G

## (undated) DEVICE — COVER,C-ARM,41X74: Brand: MEDLINE

## (undated) DEVICE — TUBING, SUCTION, 9/32" X 10', STRAIGHT: Brand: MEDLINE

## (undated) DEVICE — GLOVE SURG SZ 7 CRM LTX FREE POLYISOPRENE POLYMER BEAD ANTI

## (undated) DEVICE — DRAPE,UTILITY,XL,4/PK,STERILE: Brand: MEDLINE

## (undated) DEVICE — 3M™ IOBAN™ 2 ANTIMICROBIAL INCISE DRAPE 6650EZ: Brand: IOBAN™ 2

## (undated) DEVICE — SYRINGE IRRIG 60ML SFT PLIABLE BLB EZ TO GRP 1 HND USE W/

## (undated) DEVICE — GOWN,ECLIPSE,POLYRNF,BRTHSLV,XL,30/CS: Brand: MEDLINE

## (undated) DEVICE — DECANTER FLD 9IN ST BG FOR ASEP TRNSF OF FLD

## (undated) DEVICE — ELECTRODE ES AD CRDLSS PT RET REM POLYHESIVE

## (undated) DEVICE — APPLICATOR MEDICATED 26 CC SOLUTION HI LT ORNG CHLORAPREP

## (undated) DEVICE — MARKER SURG SKIN UTIL REGULAR/FINE 2 TIP W/ RUL AND 9 LBL

## (undated) DEVICE — PACK,BASIC,IX: Brand: MEDLINE

## (undated) DEVICE — KIT,ANTI FOG,W/SPONGE & FLUID,SOFT PACK: Brand: MEDLINE

## (undated) DEVICE — GLOVE SURG SZ 65 CRM LTX FREE POLYISOPRENE POLYMER BEAD ANTI

## (undated) DEVICE — SHEET,T,THYROID,STERILE: Brand: MEDLINE

## (undated) DEVICE — GARMENT,MEDLINE,DVT,INT,CALF,MED, GEN2: Brand: MEDLINE

## (undated) DEVICE — TUBING, SUCTION, 3/16" X 10', STRAIGHT: Brand: MEDLINE